# Patient Record
Sex: MALE | Race: WHITE | NOT HISPANIC OR LATINO | Employment: FULL TIME | URBAN - METROPOLITAN AREA
[De-identification: names, ages, dates, MRNs, and addresses within clinical notes are randomized per-mention and may not be internally consistent; named-entity substitution may affect disease eponyms.]

---

## 2021-07-23 ENCOUNTER — OFFICE VISIT (OUTPATIENT)
Dept: FAMILY MEDICINE | Facility: CLINIC | Age: 41
End: 2021-07-23
Payer: COMMERCIAL

## 2021-07-23 ENCOUNTER — LAB VISIT (OUTPATIENT)
Dept: LAB | Facility: HOSPITAL | Age: 41
End: 2021-07-23
Payer: COMMERCIAL

## 2021-07-23 VITALS
WEIGHT: 194.25 LBS | HEART RATE: 96 BPM | DIASTOLIC BLOOD PRESSURE: 118 MMHG | OXYGEN SATURATION: 98 % | SYSTOLIC BLOOD PRESSURE: 174 MMHG

## 2021-07-23 DIAGNOSIS — Z00.00 WELLNESS EXAMINATION: Primary | ICD-10-CM

## 2021-07-23 DIAGNOSIS — I10 ESSENTIAL HYPERTENSION: ICD-10-CM

## 2021-07-23 LAB
BASOPHILS # BLD AUTO: 0.01 K/UL (ref 0–0.2)
BASOPHILS NFR BLD: 0.2 % (ref 0–1.9)
DIFFERENTIAL METHOD: ABNORMAL
EOSINOPHIL # BLD AUTO: 0.1 K/UL (ref 0–0.5)
EOSINOPHIL NFR BLD: 1.3 % (ref 0–8)
ERYTHROCYTE [DISTWIDTH] IN BLOOD BY AUTOMATED COUNT: 12.1 % (ref 11.5–14.5)
HCT VFR BLD AUTO: 47.2 % (ref 40–54)
HGB BLD-MCNC: 16.3 G/DL (ref 14–18)
IMM GRANULOCYTES # BLD AUTO: 0 K/UL (ref 0–0.04)
IMM GRANULOCYTES NFR BLD AUTO: 0 % (ref 0–0.5)
LYMPHOCYTES # BLD AUTO: 1.3 K/UL (ref 1–4.8)
LYMPHOCYTES NFR BLD: 28.9 % (ref 18–48)
MCH RBC QN AUTO: 32 PG (ref 27–31)
MCHC RBC AUTO-ENTMCNC: 34.5 G/DL (ref 32–36)
MCV RBC AUTO: 93 FL (ref 82–98)
MONOCYTES # BLD AUTO: 0.4 K/UL (ref 0.3–1)
MONOCYTES NFR BLD: 8 % (ref 4–15)
NEUTROPHILS # BLD AUTO: 2.9 K/UL (ref 1.8–7.7)
NEUTROPHILS NFR BLD: 61.6 % (ref 38–73)
NRBC BLD-RTO: 0 /100 WBC
PLATELET # BLD AUTO: 155 K/UL (ref 150–450)
PMV BLD AUTO: 9.4 FL (ref 9.2–12.9)
RBC # BLD AUTO: 5.09 M/UL (ref 4.6–6.2)
WBC # BLD AUTO: 4.64 K/UL (ref 3.9–12.7)

## 2021-07-23 PROCEDURE — 99386 PR PREVENTIVE VISIT,NEW,40-64: ICD-10-PCS | Mod: S$GLB,,, | Performed by: FAMILY MEDICINE

## 2021-07-23 PROCEDURE — 99999 PR PBB SHADOW E&M-NEW PATIENT-LVL III: CPT | Mod: PBBFAC,,, | Performed by: FAMILY MEDICINE

## 2021-07-23 PROCEDURE — 36415 COLL VENOUS BLD VENIPUNCTURE: CPT | Mod: PO | Performed by: FAMILY MEDICINE

## 2021-07-23 PROCEDURE — 99386 PREV VISIT NEW AGE 40-64: CPT | Mod: S$GLB,,, | Performed by: FAMILY MEDICINE

## 2021-07-23 PROCEDURE — 80061 LIPID PANEL: CPT | Performed by: FAMILY MEDICINE

## 2021-07-23 PROCEDURE — 85025 COMPLETE CBC W/AUTO DIFF WBC: CPT | Performed by: FAMILY MEDICINE

## 2021-07-23 PROCEDURE — 99999 PR PBB SHADOW E&M-NEW PATIENT-LVL III: ICD-10-PCS | Mod: PBBFAC,,, | Performed by: FAMILY MEDICINE

## 2021-07-23 PROCEDURE — 80053 COMPREHEN METABOLIC PANEL: CPT | Performed by: FAMILY MEDICINE

## 2021-07-23 RX ORDER — LOSARTAN POTASSIUM 50 MG/1
50 TABLET ORAL DAILY
Qty: 90 TABLET | Refills: 3 | Status: SHIPPED | OUTPATIENT
Start: 2021-07-23 | End: 2022-03-21 | Stop reason: SDUPTHER

## 2021-07-24 LAB
ALBUMIN SERPL BCP-MCNC: 4.3 G/DL (ref 3.5–5.2)
ALP SERPL-CCNC: 100 U/L (ref 55–135)
ALT SERPL W/O P-5'-P-CCNC: 48 U/L (ref 10–44)
ANION GAP SERPL CALC-SCNC: 8 MMOL/L (ref 8–16)
AST SERPL-CCNC: 33 U/L (ref 10–40)
BILIRUB SERPL-MCNC: 0.9 MG/DL (ref 0.1–1)
BUN SERPL-MCNC: 15 MG/DL (ref 6–20)
CALCIUM SERPL-MCNC: 9.8 MG/DL (ref 8.7–10.5)
CHLORIDE SERPL-SCNC: 104 MMOL/L (ref 95–110)
CHOLEST SERPL-MCNC: 249 MG/DL (ref 120–199)
CHOLEST/HDLC SERPL: 5.7 {RATIO} (ref 2–5)
CO2 SERPL-SCNC: 26 MMOL/L (ref 23–29)
CREAT SERPL-MCNC: 1.2 MG/DL (ref 0.5–1.4)
EST. GFR  (AFRICAN AMERICAN): >60 ML/MIN/1.73 M^2
EST. GFR  (NON AFRICAN AMERICAN): >60 ML/MIN/1.73 M^2
GLUCOSE SERPL-MCNC: 102 MG/DL (ref 70–110)
HDLC SERPL-MCNC: 44 MG/DL (ref 40–75)
HDLC SERPL: 17.7 % (ref 20–50)
LDLC SERPL CALC-MCNC: 183.8 MG/DL (ref 63–159)
NONHDLC SERPL-MCNC: 205 MG/DL
POTASSIUM SERPL-SCNC: 4.7 MMOL/L (ref 3.5–5.1)
PROT SERPL-MCNC: 7.5 G/DL (ref 6–8.4)
SODIUM SERPL-SCNC: 138 MMOL/L (ref 136–145)
TRIGL SERPL-MCNC: 106 MG/DL (ref 30–150)

## 2021-07-27 ENCOUNTER — OFFICE VISIT (OUTPATIENT)
Dept: FAMILY MEDICINE | Facility: CLINIC | Age: 41
End: 2021-07-27
Payer: COMMERCIAL

## 2021-07-27 VITALS
HEART RATE: 98 BPM | WEIGHT: 196 LBS | DIASTOLIC BLOOD PRESSURE: 100 MMHG | OXYGEN SATURATION: 99 % | SYSTOLIC BLOOD PRESSURE: 140 MMHG

## 2021-07-27 DIAGNOSIS — E78.00 PURE HYPERCHOLESTEROLEMIA: Primary | ICD-10-CM

## 2021-07-27 PROCEDURE — 99999 PR PBB SHADOW E&M-EST. PATIENT-LVL III: CPT | Mod: PBBFAC,,, | Performed by: FAMILY MEDICINE

## 2021-07-27 PROCEDURE — 1126F PR PAIN SEVERITY QUANTIFIED, NO PAIN PRESENT: ICD-10-PCS | Mod: CPTII,S$GLB,, | Performed by: FAMILY MEDICINE

## 2021-07-27 PROCEDURE — 3080F DIAST BP >= 90 MM HG: CPT | Mod: CPTII,S$GLB,, | Performed by: FAMILY MEDICINE

## 2021-07-27 PROCEDURE — 3077F SYST BP >= 140 MM HG: CPT | Mod: CPTII,S$GLB,, | Performed by: FAMILY MEDICINE

## 2021-07-27 PROCEDURE — 1126F AMNT PAIN NOTED NONE PRSNT: CPT | Mod: CPTII,S$GLB,, | Performed by: FAMILY MEDICINE

## 2021-07-27 PROCEDURE — 99214 PR OFFICE/OUTPT VISIT, EST, LEVL IV, 30-39 MIN: ICD-10-PCS | Mod: S$GLB,,, | Performed by: FAMILY MEDICINE

## 2021-07-27 PROCEDURE — 99214 OFFICE O/P EST MOD 30 MIN: CPT | Mod: S$GLB,,, | Performed by: FAMILY MEDICINE

## 2021-07-27 PROCEDURE — 1159F PR MEDICATION LIST DOCUMENTED IN MEDICAL RECORD: ICD-10-PCS | Mod: CPTII,S$GLB,, | Performed by: FAMILY MEDICINE

## 2021-07-27 PROCEDURE — 99999 PR PBB SHADOW E&M-EST. PATIENT-LVL III: ICD-10-PCS | Mod: PBBFAC,,, | Performed by: FAMILY MEDICINE

## 2021-07-27 PROCEDURE — 1159F MED LIST DOCD IN RCRD: CPT | Mod: CPTII,S$GLB,, | Performed by: FAMILY MEDICINE

## 2021-07-27 PROCEDURE — 3077F PR MOST RECENT SYSTOLIC BLOOD PRESSURE >= 140 MM HG: ICD-10-PCS | Mod: CPTII,S$GLB,, | Performed by: FAMILY MEDICINE

## 2021-07-27 PROCEDURE — 3080F PR MOST RECENT DIASTOLIC BLOOD PRESSURE >= 90 MM HG: ICD-10-PCS | Mod: CPTII,S$GLB,, | Performed by: FAMILY MEDICINE

## 2021-07-27 PROCEDURE — 1160F PR REVIEW ALL MEDS BY PRESCRIBER/CLIN PHARMACIST DOCUMENTED: ICD-10-PCS | Mod: CPTII,S$GLB,, | Performed by: FAMILY MEDICINE

## 2021-07-27 PROCEDURE — 1160F RVW MEDS BY RX/DR IN RCRD: CPT | Mod: CPTII,S$GLB,, | Performed by: FAMILY MEDICINE

## 2021-07-27 RX ORDER — ROSUVASTATIN CALCIUM 20 MG/1
20 TABLET, COATED ORAL DAILY
Qty: 90 TABLET | Refills: 3 | Status: ON HOLD | OUTPATIENT
Start: 2021-07-27 | End: 2021-10-17 | Stop reason: HOSPADM

## 2021-10-12 ENCOUNTER — LAB VISIT (OUTPATIENT)
Dept: LAB | Facility: HOSPITAL | Age: 41
End: 2021-10-12
Attending: FAMILY MEDICINE
Payer: COMMERCIAL

## 2021-10-12 DIAGNOSIS — E78.00 PURE HYPERCHOLESTEROLEMIA: ICD-10-CM

## 2021-10-12 LAB
CHOLEST SERPL-MCNC: 163 MG/DL (ref 120–199)
CHOLEST/HDLC SERPL: 7.4 {RATIO} (ref 2–5)
HDLC SERPL-MCNC: 22 MG/DL (ref 40–75)
HDLC SERPL: 13.5 % (ref 20–50)
LDLC SERPL CALC-MCNC: 101.4 MG/DL (ref 63–159)
NONHDLC SERPL-MCNC: 141 MG/DL
TRIGL SERPL-MCNC: 198 MG/DL (ref 30–150)

## 2021-10-12 PROCEDURE — 80061 LIPID PANEL: CPT | Performed by: FAMILY MEDICINE

## 2021-10-12 PROCEDURE — 36415 COLL VENOUS BLD VENIPUNCTURE: CPT | Mod: PO | Performed by: FAMILY MEDICINE

## 2021-10-14 ENCOUNTER — LAB VISIT (OUTPATIENT)
Dept: LAB | Facility: HOSPITAL | Age: 41
End: 2021-10-14
Attending: FAMILY MEDICINE
Payer: COMMERCIAL

## 2021-10-14 ENCOUNTER — OFFICE VISIT (OUTPATIENT)
Dept: FAMILY MEDICINE | Facility: CLINIC | Age: 41
End: 2021-10-14
Payer: COMMERCIAL

## 2021-10-14 VITALS
BODY MASS INDEX: 27.49 KG/M2 | SYSTOLIC BLOOD PRESSURE: 120 MMHG | DIASTOLIC BLOOD PRESSURE: 90 MMHG | WEIGHT: 185.63 LBS | HEART RATE: 78 BPM | HEIGHT: 69 IN | TEMPERATURE: 98 F | OXYGEN SATURATION: 98 %

## 2021-10-14 DIAGNOSIS — R39.9 URINARY SYMPTOM OR SIGN: ICD-10-CM

## 2021-10-14 DIAGNOSIS — L29.9 PRURITUS: Primary | ICD-10-CM

## 2021-10-14 DIAGNOSIS — L29.9 PRURITUS: ICD-10-CM

## 2021-10-14 LAB
ALBUMIN SERPL BCP-MCNC: 3.7 G/DL (ref 3.5–5.2)
ALP SERPL-CCNC: 262 U/L (ref 55–135)
ALT SERPL W/O P-5'-P-CCNC: 98 U/L (ref 10–44)
ANION GAP SERPL CALC-SCNC: 14 MMOL/L (ref 8–16)
AST SERPL-CCNC: 53 U/L (ref 10–40)
BASOPHILS # BLD AUTO: 0.03 K/UL (ref 0–0.2)
BASOPHILS NFR BLD: 0.5 % (ref 0–1.9)
BILIRUB SERPL-MCNC: 7.7 MG/DL (ref 0.1–1)
BUN SERPL-MCNC: 13 MG/DL (ref 6–20)
CALCIUM SERPL-MCNC: 10.1 MG/DL (ref 8.7–10.5)
CHLORIDE SERPL-SCNC: 101 MMOL/L (ref 95–110)
CO2 SERPL-SCNC: 22 MMOL/L (ref 23–29)
CREAT SERPL-MCNC: 1 MG/DL (ref 0.5–1.4)
DIFFERENTIAL METHOD: ABNORMAL
EOSINOPHIL # BLD AUTO: 0.2 K/UL (ref 0–0.5)
EOSINOPHIL NFR BLD: 2.7 % (ref 0–8)
ERYTHROCYTE [DISTWIDTH] IN BLOOD BY AUTOMATED COUNT: 12.6 % (ref 11.5–14.5)
EST. GFR  (AFRICAN AMERICAN): >60 ML/MIN/1.73 M^2
EST. GFR  (NON AFRICAN AMERICAN): >60 ML/MIN/1.73 M^2
GLUCOSE SERPL-MCNC: 97 MG/DL (ref 70–110)
HCT VFR BLD AUTO: 49.8 % (ref 40–54)
HGB BLD-MCNC: 16.5 G/DL (ref 14–18)
IMM GRANULOCYTES # BLD AUTO: 0.02 K/UL (ref 0–0.04)
IMM GRANULOCYTES NFR BLD AUTO: 0.3 % (ref 0–0.5)
LYMPHOCYTES # BLD AUTO: 1.4 K/UL (ref 1–4.8)
LYMPHOCYTES NFR BLD: 24.2 % (ref 18–48)
MCH RBC QN AUTO: 32.2 PG (ref 27–31)
MCHC RBC AUTO-ENTMCNC: 33.1 G/DL (ref 32–36)
MCV RBC AUTO: 97 FL (ref 82–98)
MONOCYTES # BLD AUTO: 0.5 K/UL (ref 0.3–1)
MONOCYTES NFR BLD: 8.5 % (ref 4–15)
NEUTROPHILS # BLD AUTO: 3.8 K/UL (ref 1.8–7.7)
NEUTROPHILS NFR BLD: 63.8 % (ref 38–73)
NRBC BLD-RTO: 0 /100 WBC
PLATELET # BLD AUTO: 183 K/UL (ref 150–450)
PMV BLD AUTO: 10 FL (ref 9.2–12.9)
POTASSIUM SERPL-SCNC: 4.2 MMOL/L (ref 3.5–5.1)
PROT SERPL-MCNC: 7.3 G/DL (ref 6–8.4)
RBC # BLD AUTO: 5.12 M/UL (ref 4.6–6.2)
SODIUM SERPL-SCNC: 137 MMOL/L (ref 136–145)
WBC # BLD AUTO: 5.91 K/UL (ref 3.9–12.7)

## 2021-10-14 PROCEDURE — 1159F PR MEDICATION LIST DOCUMENTED IN MEDICAL RECORD: ICD-10-PCS | Mod: CPTII,S$GLB,, | Performed by: FAMILY MEDICINE

## 2021-10-14 PROCEDURE — 99214 OFFICE O/P EST MOD 30 MIN: CPT | Mod: S$GLB,,, | Performed by: FAMILY MEDICINE

## 2021-10-14 PROCEDURE — 4010F ACE/ARB THERAPY RXD/TAKEN: CPT | Mod: CPTII,S$GLB,, | Performed by: FAMILY MEDICINE

## 2021-10-14 PROCEDURE — 1160F RVW MEDS BY RX/DR IN RCRD: CPT | Mod: CPTII,S$GLB,, | Performed by: FAMILY MEDICINE

## 2021-10-14 PROCEDURE — 99999 PR PBB SHADOW E&M-EST. PATIENT-LVL III: CPT | Mod: PBBFAC,,, | Performed by: FAMILY MEDICINE

## 2021-10-14 PROCEDURE — 1160F PR REVIEW ALL MEDS BY PRESCRIBER/CLIN PHARMACIST DOCUMENTED: ICD-10-PCS | Mod: CPTII,S$GLB,, | Performed by: FAMILY MEDICINE

## 2021-10-14 PROCEDURE — 99214 PR OFFICE/OUTPT VISIT, EST, LEVL IV, 30-39 MIN: ICD-10-PCS | Mod: S$GLB,,, | Performed by: FAMILY MEDICINE

## 2021-10-14 PROCEDURE — 3008F BODY MASS INDEX DOCD: CPT | Mod: CPTII,S$GLB,, | Performed by: FAMILY MEDICINE

## 2021-10-14 PROCEDURE — 85025 COMPLETE CBC W/AUTO DIFF WBC: CPT | Performed by: FAMILY MEDICINE

## 2021-10-14 PROCEDURE — 4010F PR ACE/ARB THEARPY RXD/TAKEN: ICD-10-PCS | Mod: CPTII,S$GLB,, | Performed by: FAMILY MEDICINE

## 2021-10-14 PROCEDURE — 80053 COMPREHEN METABOLIC PANEL: CPT | Performed by: FAMILY MEDICINE

## 2021-10-14 PROCEDURE — 3080F DIAST BP >= 90 MM HG: CPT | Mod: CPTII,S$GLB,, | Performed by: FAMILY MEDICINE

## 2021-10-14 PROCEDURE — 3008F PR BODY MASS INDEX (BMI) DOCUMENTED: ICD-10-PCS | Mod: CPTII,S$GLB,, | Performed by: FAMILY MEDICINE

## 2021-10-14 PROCEDURE — 36415 COLL VENOUS BLD VENIPUNCTURE: CPT | Mod: PO | Performed by: FAMILY MEDICINE

## 2021-10-14 PROCEDURE — 3074F SYST BP LT 130 MM HG: CPT | Mod: CPTII,S$GLB,, | Performed by: FAMILY MEDICINE

## 2021-10-14 PROCEDURE — 3074F PR MOST RECENT SYSTOLIC BLOOD PRESSURE < 130 MM HG: ICD-10-PCS | Mod: CPTII,S$GLB,, | Performed by: FAMILY MEDICINE

## 2021-10-14 PROCEDURE — 99999 PR PBB SHADOW E&M-EST. PATIENT-LVL III: ICD-10-PCS | Mod: PBBFAC,,, | Performed by: FAMILY MEDICINE

## 2021-10-14 PROCEDURE — 1159F MED LIST DOCD IN RCRD: CPT | Mod: CPTII,S$GLB,, | Performed by: FAMILY MEDICINE

## 2021-10-14 PROCEDURE — 3080F PR MOST RECENT DIASTOLIC BLOOD PRESSURE >= 90 MM HG: ICD-10-PCS | Mod: CPTII,S$GLB,, | Performed by: FAMILY MEDICINE

## 2021-10-15 ENCOUNTER — OFFICE VISIT (OUTPATIENT)
Dept: FAMILY MEDICINE | Facility: CLINIC | Age: 41
End: 2021-10-15
Payer: COMMERCIAL

## 2021-10-15 VITALS
WEIGHT: 187.38 LBS | BODY MASS INDEX: 27.75 KG/M2 | SYSTOLIC BLOOD PRESSURE: 120 MMHG | HEART RATE: 96 BPM | OXYGEN SATURATION: 98 % | DIASTOLIC BLOOD PRESSURE: 88 MMHG | HEIGHT: 69 IN

## 2021-10-15 DIAGNOSIS — R79.89 ELEVATED LIVER FUNCTION TESTS: ICD-10-CM

## 2021-10-15 DIAGNOSIS — R79.89 ELEVATED LIVER FUNCTION TESTS: Primary | ICD-10-CM

## 2021-10-15 DIAGNOSIS — R17 JAUNDICE: Primary | ICD-10-CM

## 2021-10-15 PROBLEM — E80.6 HYPERBILIRUBINEMIA: Status: ACTIVE | Noted: 2021-10-15

## 2021-10-15 PROBLEM — K83.1 BILIARY OBSTRUCTION: Status: ACTIVE | Noted: 2021-10-15

## 2021-10-15 PROBLEM — I10 PRIMARY HYPERTENSION: Status: ACTIVE | Noted: 2021-10-15

## 2021-10-15 PROBLEM — K83.1 OBSTRUCTIVE JAUNDICE: Status: ACTIVE | Noted: 2021-10-15

## 2021-10-15 PROBLEM — E78.00 HYPERCHOLESTEROLEMIA: Status: ACTIVE | Noted: 2021-10-15

## 2021-10-15 PROCEDURE — 1159F MED LIST DOCD IN RCRD: CPT | Mod: CPTII,S$GLB,, | Performed by: FAMILY MEDICINE

## 2021-10-15 PROCEDURE — 4010F PR ACE/ARB THEARPY RXD/TAKEN: ICD-10-PCS | Mod: CPTII,S$GLB,, | Performed by: FAMILY MEDICINE

## 2021-10-15 PROCEDURE — 3074F SYST BP LT 130 MM HG: CPT | Mod: CPTII,S$GLB,, | Performed by: FAMILY MEDICINE

## 2021-10-15 PROCEDURE — 4010F ACE/ARB THERAPY RXD/TAKEN: CPT | Mod: CPTII,S$GLB,, | Performed by: FAMILY MEDICINE

## 2021-10-15 PROCEDURE — 3074F PR MOST RECENT SYSTOLIC BLOOD PRESSURE < 130 MM HG: ICD-10-PCS | Mod: CPTII,S$GLB,, | Performed by: FAMILY MEDICINE

## 2021-10-15 PROCEDURE — 1160F PR REVIEW ALL MEDS BY PRESCRIBER/CLIN PHARMACIST DOCUMENTED: ICD-10-PCS | Mod: CPTII,S$GLB,, | Performed by: FAMILY MEDICINE

## 2021-10-15 PROCEDURE — 3008F BODY MASS INDEX DOCD: CPT | Mod: CPTII,S$GLB,, | Performed by: FAMILY MEDICINE

## 2021-10-15 PROCEDURE — 99999 PR PBB SHADOW E&M-EST. PATIENT-LVL III: CPT | Mod: PBBFAC,,, | Performed by: FAMILY MEDICINE

## 2021-10-15 PROCEDURE — 99214 PR OFFICE/OUTPT VISIT, EST, LEVL IV, 30-39 MIN: ICD-10-PCS | Mod: S$GLB,,, | Performed by: FAMILY MEDICINE

## 2021-10-15 PROCEDURE — 3008F PR BODY MASS INDEX (BMI) DOCUMENTED: ICD-10-PCS | Mod: CPTII,S$GLB,, | Performed by: FAMILY MEDICINE

## 2021-10-15 PROCEDURE — 1159F PR MEDICATION LIST DOCUMENTED IN MEDICAL RECORD: ICD-10-PCS | Mod: CPTII,S$GLB,, | Performed by: FAMILY MEDICINE

## 2021-10-15 PROCEDURE — 3079F PR MOST RECENT DIASTOLIC BLOOD PRESSURE 80-89 MM HG: ICD-10-PCS | Mod: CPTII,S$GLB,, | Performed by: FAMILY MEDICINE

## 2021-10-15 PROCEDURE — 99999 PR PBB SHADOW E&M-EST. PATIENT-LVL III: ICD-10-PCS | Mod: PBBFAC,,, | Performed by: FAMILY MEDICINE

## 2021-10-15 PROCEDURE — 3079F DIAST BP 80-89 MM HG: CPT | Mod: CPTII,S$GLB,, | Performed by: FAMILY MEDICINE

## 2021-10-15 PROCEDURE — 1160F RVW MEDS BY RX/DR IN RCRD: CPT | Mod: CPTII,S$GLB,, | Performed by: FAMILY MEDICINE

## 2021-10-15 PROCEDURE — 99214 OFFICE O/P EST MOD 30 MIN: CPT | Mod: S$GLB,,, | Performed by: FAMILY MEDICINE

## 2021-10-18 DIAGNOSIS — K83.1 OBSTRUCTIVE JAUNDICE: Primary | ICD-10-CM

## 2021-10-19 ENCOUNTER — ANESTHESIA EVENT (OUTPATIENT)
Dept: ENDOSCOPY | Facility: HOSPITAL | Age: 41
End: 2021-10-19
Payer: COMMERCIAL

## 2021-10-19 RX ORDER — SODIUM CHLORIDE 9 MG/ML
INJECTION, SOLUTION INTRAVENOUS CONTINUOUS
Status: CANCELLED | OUTPATIENT
Start: 2021-10-19

## 2021-10-19 RX ORDER — SODIUM CHLORIDE 0.9 % (FLUSH) 0.9 %
10 SYRINGE (ML) INJECTION
Status: CANCELLED | OUTPATIENT
Start: 2021-10-19

## 2021-10-20 ENCOUNTER — HOSPITAL ENCOUNTER (OUTPATIENT)
Facility: HOSPITAL | Age: 41
Discharge: HOME | End: 2021-10-20
Attending: INTERNAL MEDICINE | Admitting: INTERNAL MEDICINE
Payer: COMMERCIAL

## 2021-10-20 ENCOUNTER — ANESTHESIA (OUTPATIENT)
Dept: ENDOSCOPY | Facility: HOSPITAL | Age: 41
End: 2021-10-20
Payer: COMMERCIAL

## 2021-10-20 VITALS
BODY MASS INDEX: 26.96 KG/M2 | DIASTOLIC BLOOD PRESSURE: 58 MMHG | TEMPERATURE: 97 F | OXYGEN SATURATION: 99 % | RESPIRATION RATE: 18 BRPM | HEART RATE: 56 BPM | HEIGHT: 69 IN | SYSTOLIC BLOOD PRESSURE: 132 MMHG | WEIGHT: 182 LBS

## 2021-10-20 DIAGNOSIS — K83.1 OBSTRUCTIVE JAUNDICE: Primary | ICD-10-CM

## 2021-10-20 PROCEDURE — 27200946 HC BRUSH, CYTOLOGY: Performed by: INTERNAL MEDICINE

## 2021-10-20 PROCEDURE — 43242 PR UPGI ENDOSCOPY,FN NEEDLE BX,GUIDED: ICD-10-PCS | Mod: 51,,, | Performed by: INTERNAL MEDICINE

## 2021-10-20 PROCEDURE — 74328 PR  X-RAY FOR BILE DUCT ENDOSCOPY: ICD-10-PCS | Mod: 26,,, | Performed by: INTERNAL MEDICINE

## 2021-10-20 PROCEDURE — 88305 TISSUE EXAM BY PATHOLOGIST: ICD-10-PCS | Mod: 26,,, | Performed by: PATHOLOGY

## 2021-10-20 PROCEDURE — 43242 EGD US FINE NEEDLE BX/ASPIR: CPT | Mod: 51,,, | Performed by: INTERNAL MEDICINE

## 2021-10-20 PROCEDURE — D9220A PRA ANESTHESIA: Mod: ANES,,, | Performed by: ANESTHESIOLOGY

## 2021-10-20 PROCEDURE — 88112 CYTOPATH CELL ENHANCE TECH: CPT | Performed by: PATHOLOGY

## 2021-10-20 PROCEDURE — 27201674 HC SPHINCTERTOME: Performed by: INTERNAL MEDICINE

## 2021-10-20 PROCEDURE — 88305 TISSUE EXAM BY PATHOLOGIST: CPT | Performed by: STUDENT IN AN ORGANIZED HEALTH CARE EDUCATION/TRAINING PROGRAM

## 2021-10-20 PROCEDURE — 43274 PR ERCP W/STENT PLCMNT BILIARY/PANCREATIC DUCT: ICD-10-PCS | Mod: ,,, | Performed by: INTERNAL MEDICINE

## 2021-10-20 PROCEDURE — 37000008 HC ANESTHESIA 1ST 15 MINUTES: Performed by: INTERNAL MEDICINE

## 2021-10-20 PROCEDURE — D9220A PRA ANESTHESIA: ICD-10-PCS | Mod: CRNA,,, | Performed by: STUDENT IN AN ORGANIZED HEALTH CARE EDUCATION/TRAINING PROGRAM

## 2021-10-20 PROCEDURE — 88173 CYTOPATH EVAL FNA REPORT: CPT | Performed by: STUDENT IN AN ORGANIZED HEALTH CARE EDUCATION/TRAINING PROGRAM

## 2021-10-20 PROCEDURE — 43242 EGD US FINE NEEDLE BX/ASPIR: CPT | Performed by: INTERNAL MEDICINE

## 2021-10-20 PROCEDURE — 43261 PR ERCP,BIOPSY: ICD-10-PCS | Mod: 59,,, | Performed by: INTERNAL MEDICINE

## 2021-10-20 PROCEDURE — D9220A PRA ANESTHESIA: Mod: CRNA,,, | Performed by: STUDENT IN AN ORGANIZED HEALTH CARE EDUCATION/TRAINING PROGRAM

## 2021-10-20 PROCEDURE — 88305 TISSUE EXAM BY PATHOLOGIST: CPT | Mod: 59 | Performed by: PATHOLOGY

## 2021-10-20 PROCEDURE — 88342 IMHCHEM/IMCYTCHM 1ST ANTB: CPT | Mod: 26,,, | Performed by: PATHOLOGY

## 2021-10-20 PROCEDURE — 27202131 HC NEEDLE, FNB SINGLE (ANY): Performed by: INTERNAL MEDICINE

## 2021-10-20 PROCEDURE — 25000003 PHARM REV CODE 250: Performed by: STUDENT IN AN ORGANIZED HEALTH CARE EDUCATION/TRAINING PROGRAM

## 2021-10-20 PROCEDURE — 88342 IMHCHEM/IMCYTCHM 1ST ANTB: CPT | Performed by: PATHOLOGY

## 2021-10-20 PROCEDURE — 43274 ERCP DUCT STENT PLACEMENT: CPT | Mod: ,,, | Performed by: INTERNAL MEDICINE

## 2021-10-20 PROCEDURE — 25000003 PHARM REV CODE 250: Performed by: INTERNAL MEDICINE

## 2021-10-20 PROCEDURE — 88342 CHG IMMUNOCYTOCHEMISTRY: ICD-10-PCS | Mod: 26,,, | Performed by: PATHOLOGY

## 2021-10-20 PROCEDURE — 43261 ENDO CHOLANGIOPANCREATOGRAPH: CPT | Mod: 59,,, | Performed by: INTERNAL MEDICINE

## 2021-10-20 PROCEDURE — 88305 TISSUE EXAM BY PATHOLOGIST: CPT | Mod: 26,,, | Performed by: PATHOLOGY

## 2021-10-20 PROCEDURE — 88112 CYTOPATH CELL ENHANCE TECH: CPT | Mod: 26,,, | Performed by: PATHOLOGY

## 2021-10-20 PROCEDURE — C1769 GUIDE WIRE: HCPCS | Performed by: INTERNAL MEDICINE

## 2021-10-20 PROCEDURE — 27201012 HC FORCEPS, HOT/COLD, DISP: Performed by: INTERNAL MEDICINE

## 2021-10-20 PROCEDURE — 63600175 PHARM REV CODE 636 W HCPCS: Performed by: STUDENT IN AN ORGANIZED HEALTH CARE EDUCATION/TRAINING PROGRAM

## 2021-10-20 PROCEDURE — C2617 STENT, NON-COR, TEM W/O DEL: HCPCS | Performed by: INTERNAL MEDICINE

## 2021-10-20 PROCEDURE — 74328 X-RAY BILE DUCT ENDOSCOPY: CPT | Performed by: INTERNAL MEDICINE

## 2021-10-20 PROCEDURE — 74328 X-RAY BILE DUCT ENDOSCOPY: CPT | Mod: 26,,, | Performed by: INTERNAL MEDICINE

## 2021-10-20 PROCEDURE — 88112 PR  CYTOPATH, CELL ENHANCE TECH: ICD-10-PCS | Mod: 26,,, | Performed by: PATHOLOGY

## 2021-10-20 PROCEDURE — 88305 TISSUE EXAM BY PATHOLOGIST: ICD-10-PCS | Mod: 26,59,, | Performed by: STUDENT IN AN ORGANIZED HEALTH CARE EDUCATION/TRAINING PROGRAM

## 2021-10-20 PROCEDURE — 37000009 HC ANESTHESIA EA ADD 15 MINS: Performed by: INTERNAL MEDICINE

## 2021-10-20 PROCEDURE — D9220A PRA ANESTHESIA: ICD-10-PCS | Mod: ANES,,, | Performed by: ANESTHESIOLOGY

## 2021-10-20 PROCEDURE — 88173 PR  INTERPRETATION OF FNA SMEAR: ICD-10-PCS | Mod: 26,,, | Performed by: STUDENT IN AN ORGANIZED HEALTH CARE EDUCATION/TRAINING PROGRAM

## 2021-10-20 PROCEDURE — 43261 ENDO CHOLANGIOPANCREATOGRAPH: CPT | Performed by: INTERNAL MEDICINE

## 2021-10-20 PROCEDURE — 88305 TISSUE EXAM BY PATHOLOGIST: CPT | Mod: 26,59,, | Performed by: STUDENT IN AN ORGANIZED HEALTH CARE EDUCATION/TRAINING PROGRAM

## 2021-10-20 PROCEDURE — 63600175 PHARM REV CODE 636 W HCPCS: Performed by: NURSE ANESTHETIST, CERTIFIED REGISTERED

## 2021-10-20 PROCEDURE — 27202125 HC BALLOON, EXTRACTION (ANY): Performed by: INTERNAL MEDICINE

## 2021-10-20 PROCEDURE — 43274 ERCP DUCT STENT PLACEMENT: CPT | Performed by: INTERNAL MEDICINE

## 2021-10-20 PROCEDURE — 88173 CYTOPATH EVAL FNA REPORT: CPT | Mod: 26,,, | Performed by: STUDENT IN AN ORGANIZED HEALTH CARE EDUCATION/TRAINING PROGRAM

## 2021-10-20 RX ORDER — PROPOFOL 10 MG/ML
VIAL (ML) INTRAVENOUS
Status: DISCONTINUED | OUTPATIENT
Start: 2021-10-20 | End: 2021-10-20

## 2021-10-20 RX ORDER — LIDOCAINE HYDROCHLORIDE 20 MG/ML
INJECTION, SOLUTION EPIDURAL; INFILTRATION; INTRACAUDAL; PERINEURAL
Status: DISCONTINUED | OUTPATIENT
Start: 2021-10-20 | End: 2021-10-20

## 2021-10-20 RX ORDER — DEXMEDETOMIDINE HYDROCHLORIDE 100 UG/ML
INJECTION, SOLUTION INTRAVENOUS
Status: DISCONTINUED | OUTPATIENT
Start: 2021-10-20 | End: 2021-10-20

## 2021-10-20 RX ORDER — KETAMINE HCL IN 0.9 % NACL 50 MG/5 ML
SYRINGE (ML) INTRAVENOUS
Status: DISCONTINUED | OUTPATIENT
Start: 2021-10-20 | End: 2021-10-20

## 2021-10-20 RX ORDER — INDOMETHACIN 50 MG/1
SUPPOSITORY RECTAL
Status: COMPLETED | OUTPATIENT
Start: 2021-10-20 | End: 2021-10-20

## 2021-10-20 RX ORDER — MIDAZOLAM HYDROCHLORIDE 1 MG/ML
INJECTION, SOLUTION INTRAMUSCULAR; INTRAVENOUS
Status: DISCONTINUED | OUTPATIENT
Start: 2021-10-20 | End: 2021-10-20

## 2021-10-20 RX ORDER — CIPROFLOXACIN 2 MG/ML
INJECTION, SOLUTION INTRAVENOUS
Status: DISCONTINUED | OUTPATIENT
Start: 2021-10-20 | End: 2021-10-20

## 2021-10-20 RX ORDER — FENTANYL CITRATE 50 UG/ML
INJECTION, SOLUTION INTRAMUSCULAR; INTRAVENOUS
Status: DISCONTINUED | OUTPATIENT
Start: 2021-10-20 | End: 2021-10-20

## 2021-10-20 RX ORDER — CIPROFLOXACIN 500 MG/1
500 TABLET ORAL EVERY 12 HOURS
Qty: 10 TABLET | Refills: 0 | Status: SHIPPED | OUTPATIENT
Start: 2021-10-20 | End: 2021-10-25

## 2021-10-20 RX ORDER — ONDANSETRON 2 MG/ML
INJECTION INTRAMUSCULAR; INTRAVENOUS
Status: DISCONTINUED | OUTPATIENT
Start: 2021-10-20 | End: 2021-10-20

## 2021-10-20 RX ORDER — PROPOFOL 10 MG/ML
VIAL (ML) INTRAVENOUS CONTINUOUS PRN
Status: DISCONTINUED | OUTPATIENT
Start: 2021-10-20 | End: 2021-10-20

## 2021-10-20 RX ORDER — SODIUM CHLORIDE 9 MG/ML
INJECTION, SOLUTION INTRAVENOUS CONTINUOUS
Status: DISCONTINUED | OUTPATIENT
Start: 2021-10-20 | End: 2021-10-20 | Stop reason: HOSPADM

## 2021-10-20 RX ADMIN — GLYCOPYRROLATE 0.2 MG: 0.2 INJECTION, SOLUTION INTRAMUSCULAR; INTRAVITREAL at 09:10

## 2021-10-20 RX ADMIN — Medication 10 MG: at 09:10

## 2021-10-20 RX ADMIN — Medication 10 MG: at 10:10

## 2021-10-20 RX ADMIN — DEXMEDETOMIDINE HYDROCHLORIDE 12 MCG: 100 INJECTION, SOLUTION INTRAVENOUS at 09:10

## 2021-10-20 RX ADMIN — Medication 150 MCG/KG/MIN: at 09:10

## 2021-10-20 RX ADMIN — PROPOFOL 70 MG: 10 INJECTION, EMULSION INTRAVENOUS at 09:10

## 2021-10-20 RX ADMIN — MIDAZOLAM 2 MG: 1 INJECTION INTRAMUSCULAR; INTRAVENOUS at 09:10

## 2021-10-20 RX ADMIN — SODIUM CHLORIDE: 0.9 INJECTION, SOLUTION INTRAVENOUS at 08:10

## 2021-10-20 RX ADMIN — SODIUM CHLORIDE: 0.9 INJECTION, SOLUTION INTRAVENOUS at 09:10

## 2021-10-20 RX ADMIN — Medication 20 MG: at 09:10

## 2021-10-20 RX ADMIN — PROPOFOL 10 MG: 10 INJECTION, EMULSION INTRAVENOUS at 09:10

## 2021-10-20 RX ADMIN — ONDANSETRON 4 MG: 2 INJECTION INTRAMUSCULAR; INTRAVENOUS at 10:10

## 2021-10-20 RX ADMIN — DEXMEDETOMIDINE HYDROCHLORIDE 4 MCG: 100 INJECTION, SOLUTION INTRAVENOUS at 09:10

## 2021-10-20 RX ADMIN — INDOMETHACIN 100 MG: 50 SUPPOSITORY RECTAL at 09:10

## 2021-10-20 RX ADMIN — FENTANYL CITRATE 25 MCG: 50 INJECTION INTRAMUSCULAR; INTRAVENOUS at 10:10

## 2021-10-20 RX ADMIN — LIDOCAINE HYDROCHLORIDE 80 MG: 20 INJECTION, SOLUTION EPIDURAL; INFILTRATION; INTRACAUDAL at 09:10

## 2021-10-20 RX ADMIN — CIPROFLOXACIN 400 MG: 2 INJECTION, SOLUTION INTRAVENOUS at 09:10

## 2021-10-25 ENCOUNTER — OFFICE VISIT (OUTPATIENT)
Dept: TRANSPLANT | Facility: CLINIC | Age: 41
End: 2021-10-25
Payer: COMMERCIAL

## 2021-10-25 ENCOUNTER — TELEPHONE (OUTPATIENT)
Dept: ENDOSCOPY | Facility: HOSPITAL | Age: 41
End: 2021-10-25
Payer: COMMERCIAL

## 2021-10-25 ENCOUNTER — TELEPHONE (OUTPATIENT)
Dept: TRANSPLANT | Facility: HOSPITAL | Age: 41
End: 2021-10-25
Payer: COMMERCIAL

## 2021-10-25 ENCOUNTER — PATIENT MESSAGE (OUTPATIENT)
Dept: GASTROENTEROLOGY | Facility: CLINIC | Age: 41
End: 2021-10-25
Payer: COMMERCIAL

## 2021-10-25 ENCOUNTER — TELEPHONE (OUTPATIENT)
Dept: HEMATOLOGY/ONCOLOGY | Facility: CLINIC | Age: 41
End: 2021-10-25
Payer: COMMERCIAL

## 2021-10-25 ENCOUNTER — TELEPHONE (OUTPATIENT)
Dept: TRANSPLANT | Facility: CLINIC | Age: 41
End: 2021-10-25
Payer: COMMERCIAL

## 2021-10-25 VITALS
DIASTOLIC BLOOD PRESSURE: 90 MMHG | HEIGHT: 69 IN | TEMPERATURE: 98 F | WEIGHT: 184.06 LBS | RESPIRATION RATE: 16 BRPM | BODY MASS INDEX: 27.26 KG/M2 | HEART RATE: 103 BPM | SYSTOLIC BLOOD PRESSURE: 136 MMHG | OXYGEN SATURATION: 98 %

## 2021-10-25 DIAGNOSIS — K83.1 BILIARY OBSTRUCTION: Primary | ICD-10-CM

## 2021-10-25 LAB
COMMENT: NORMAL
FINAL PATHOLOGIC DIAGNOSIS: NORMAL
MICROSCOPIC EXAM: NORMAL

## 2021-10-25 PROCEDURE — 1159F MED LIST DOCD IN RCRD: CPT | Mod: CPTII,S$GLB,, | Performed by: TRANSPLANT SURGERY

## 2021-10-25 PROCEDURE — 99205 PR OFFICE/OUTPT VISIT, NEW, LEVL V, 60-74 MIN: ICD-10-PCS | Mod: S$GLB,,, | Performed by: TRANSPLANT SURGERY

## 2021-10-25 PROCEDURE — 3080F PR MOST RECENT DIASTOLIC BLOOD PRESSURE >= 90 MM HG: ICD-10-PCS | Mod: CPTII,S$GLB,, | Performed by: TRANSPLANT SURGERY

## 2021-10-25 PROCEDURE — 4010F PR ACE/ARB THEARPY RXD/TAKEN: ICD-10-PCS | Mod: CPTII,S$GLB,, | Performed by: TRANSPLANT SURGERY

## 2021-10-25 PROCEDURE — 1159F PR MEDICATION LIST DOCUMENTED IN MEDICAL RECORD: ICD-10-PCS | Mod: CPTII,S$GLB,, | Performed by: TRANSPLANT SURGERY

## 2021-10-25 PROCEDURE — 3008F PR BODY MASS INDEX (BMI) DOCUMENTED: ICD-10-PCS | Mod: CPTII,S$GLB,, | Performed by: TRANSPLANT SURGERY

## 2021-10-25 PROCEDURE — 99999 PR PBB SHADOW E&M-EST. PATIENT-LVL III: ICD-10-PCS | Mod: PBBFAC,,,

## 2021-10-25 PROCEDURE — 3080F DIAST BP >= 90 MM HG: CPT | Mod: CPTII,S$GLB,, | Performed by: TRANSPLANT SURGERY

## 2021-10-25 PROCEDURE — 3008F BODY MASS INDEX DOCD: CPT | Mod: CPTII,S$GLB,, | Performed by: TRANSPLANT SURGERY

## 2021-10-25 PROCEDURE — 1111F PR DISCHARGE MEDS RECONCILED W/ CURRENT OUTPATIENT MED LIST: ICD-10-PCS | Mod: CPTII,S$GLB,, | Performed by: TRANSPLANT SURGERY

## 2021-10-25 PROCEDURE — 99205 OFFICE O/P NEW HI 60 MIN: CPT | Mod: S$GLB,,, | Performed by: TRANSPLANT SURGERY

## 2021-10-25 PROCEDURE — 3075F SYST BP GE 130 - 139MM HG: CPT | Mod: CPTII,S$GLB,, | Performed by: TRANSPLANT SURGERY

## 2021-10-25 PROCEDURE — 1111F DSCHRG MED/CURRENT MED MERGE: CPT | Mod: CPTII,S$GLB,, | Performed by: TRANSPLANT SURGERY

## 2021-10-25 PROCEDURE — 4010F ACE/ARB THERAPY RXD/TAKEN: CPT | Mod: CPTII,S$GLB,, | Performed by: TRANSPLANT SURGERY

## 2021-10-25 PROCEDURE — 99999 PR PBB SHADOW E&M-EST. PATIENT-LVL III: CPT | Mod: PBBFAC,,,

## 2021-10-25 PROCEDURE — 3075F PR MOST RECENT SYSTOLIC BLOOD PRESS GE 130-139MM HG: ICD-10-PCS | Mod: CPTII,S$GLB,, | Performed by: TRANSPLANT SURGERY

## 2021-10-26 ENCOUNTER — CONFERENCE (OUTPATIENT)
Dept: TRANSPLANT | Facility: CLINIC | Age: 41
End: 2021-10-26
Payer: COMMERCIAL

## 2021-10-26 ENCOUNTER — TELEPHONE (OUTPATIENT)
Dept: HEMATOLOGY/ONCOLOGY | Facility: CLINIC | Age: 41
End: 2021-10-26
Payer: COMMERCIAL

## 2021-10-26 ENCOUNTER — TELEPHONE (OUTPATIENT)
Dept: RADIATION ONCOLOGY | Facility: CLINIC | Age: 41
End: 2021-10-26
Payer: COMMERCIAL

## 2021-10-27 ENCOUNTER — HOSPITAL ENCOUNTER (OUTPATIENT)
Dept: RADIOLOGY | Facility: HOSPITAL | Age: 41
Discharge: HOME | End: 2021-10-27
Attending: TRANSPLANT SURGERY
Payer: COMMERCIAL

## 2021-10-27 DIAGNOSIS — K83.1 BILIARY OBSTRUCTION: ICD-10-CM

## 2021-10-27 LAB
COMMENT: NORMAL
FINAL PATHOLOGIC DIAGNOSIS: NORMAL
Lab: NORMAL

## 2021-10-27 PROCEDURE — A9698 NON-RAD CONTRAST MATERIALNOC: HCPCS | Mod: PO | Performed by: TRANSPLANT SURGERY

## 2021-10-27 PROCEDURE — 71260 CT THORAX DX C+: CPT | Mod: 26,,, | Performed by: RADIOLOGY

## 2021-10-27 PROCEDURE — 25500020 PHARM REV CODE 255: Mod: PO | Performed by: TRANSPLANT SURGERY

## 2021-10-27 PROCEDURE — 71260 CT CHEST ABDOMEN PELVIS WITH CONTRAST (XPD): ICD-10-PCS | Mod: 26,,, | Performed by: RADIOLOGY

## 2021-10-27 PROCEDURE — 71260 CT THORAX DX C+: CPT | Mod: TC,PO

## 2021-10-27 PROCEDURE — 74177 CT ABD & PELVIS W/CONTRAST: CPT | Mod: TC,PO

## 2021-10-27 PROCEDURE — 74177 CT ABD & PELVIS W/CONTRAST: CPT | Mod: 26,,, | Performed by: RADIOLOGY

## 2021-10-27 PROCEDURE — 74177 CT CHEST ABDOMEN PELVIS WITH CONTRAST (XPD): ICD-10-PCS | Mod: 26,,, | Performed by: RADIOLOGY

## 2021-10-27 RX ADMIN — IOHEXOL 75 ML: 350 INJECTION, SOLUTION INTRAVENOUS at 02:10

## 2021-10-27 RX ADMIN — IOHEXOL 1000 ML: 9 SOLUTION ORAL at 02:10

## 2021-10-28 ENCOUNTER — TELEPHONE (OUTPATIENT)
Dept: ENDOSCOPY | Facility: HOSPITAL | Age: 41
End: 2021-10-28
Payer: COMMERCIAL

## 2021-10-28 LAB
FINAL PATHOLOGIC DIAGNOSIS: NORMAL
GROSS: NORMAL
Lab: NORMAL
MICROSCOPIC EXAM: NORMAL

## 2021-10-29 ENCOUNTER — TELEPHONE (OUTPATIENT)
Dept: ENDOSCOPY | Facility: HOSPITAL | Age: 41
End: 2021-10-29
Payer: COMMERCIAL

## 2021-10-29 ENCOUNTER — OFFICE VISIT (OUTPATIENT)
Dept: RADIATION ONCOLOGY | Facility: CLINIC | Age: 41
End: 2021-10-29
Payer: COMMERCIAL

## 2021-10-29 VITALS
DIASTOLIC BLOOD PRESSURE: 88 MMHG | SYSTOLIC BLOOD PRESSURE: 132 MMHG | WEIGHT: 185.69 LBS | HEIGHT: 69 IN | BODY MASS INDEX: 27.5 KG/M2 | RESPIRATION RATE: 16 BRPM | HEART RATE: 95 BPM | TEMPERATURE: 97 F

## 2021-10-29 DIAGNOSIS — K83.1 BILIARY STRICTURE: Primary | ICD-10-CM

## 2021-10-29 DIAGNOSIS — K83.1 BILIARY OBSTRUCTION: ICD-10-CM

## 2021-10-29 PROCEDURE — 1111F DSCHRG MED/CURRENT MED MERGE: CPT | Mod: CPTII,S$GLB,, | Performed by: RADIOLOGY

## 2021-10-29 PROCEDURE — 3075F SYST BP GE 130 - 139MM HG: CPT | Mod: CPTII,S$GLB,, | Performed by: RADIOLOGY

## 2021-10-29 PROCEDURE — 99204 OFFICE O/P NEW MOD 45 MIN: CPT | Mod: S$GLB,,, | Performed by: RADIOLOGY

## 2021-10-29 PROCEDURE — 3008F PR BODY MASS INDEX (BMI) DOCUMENTED: ICD-10-PCS | Mod: CPTII,S$GLB,, | Performed by: RADIOLOGY

## 2021-10-29 PROCEDURE — 99999 PR PBB SHADOW E&M-EST. PATIENT-LVL IV: CPT | Mod: PBBFAC,,, | Performed by: RADIOLOGY

## 2021-10-29 PROCEDURE — 3008F BODY MASS INDEX DOCD: CPT | Mod: CPTII,S$GLB,, | Performed by: RADIOLOGY

## 2021-10-29 PROCEDURE — 1160F RVW MEDS BY RX/DR IN RCRD: CPT | Mod: CPTII,S$GLB,, | Performed by: RADIOLOGY

## 2021-10-29 PROCEDURE — 1159F MED LIST DOCD IN RCRD: CPT | Mod: CPTII,S$GLB,, | Performed by: RADIOLOGY

## 2021-10-29 PROCEDURE — 99204 PR OFFICE/OUTPT VISIT, NEW, LEVL IV, 45-59 MIN: ICD-10-PCS | Mod: S$GLB,,, | Performed by: RADIOLOGY

## 2021-10-29 PROCEDURE — 3079F DIAST BP 80-89 MM HG: CPT | Mod: CPTII,S$GLB,, | Performed by: RADIOLOGY

## 2021-10-29 PROCEDURE — 1160F PR REVIEW ALL MEDS BY PRESCRIBER/CLIN PHARMACIST DOCUMENTED: ICD-10-PCS | Mod: CPTII,S$GLB,, | Performed by: RADIOLOGY

## 2021-10-29 PROCEDURE — 4010F ACE/ARB THERAPY RXD/TAKEN: CPT | Mod: CPTII,S$GLB,, | Performed by: RADIOLOGY

## 2021-10-29 PROCEDURE — 3075F PR MOST RECENT SYSTOLIC BLOOD PRESS GE 130-139MM HG: ICD-10-PCS | Mod: CPTII,S$GLB,, | Performed by: RADIOLOGY

## 2021-10-29 PROCEDURE — 99999 PR PBB SHADOW E&M-EST. PATIENT-LVL IV: ICD-10-PCS | Mod: PBBFAC,,, | Performed by: RADIOLOGY

## 2021-10-29 PROCEDURE — 3079F PR MOST RECENT DIASTOLIC BLOOD PRESSURE 80-89 MM HG: ICD-10-PCS | Mod: CPTII,S$GLB,, | Performed by: RADIOLOGY

## 2021-10-29 PROCEDURE — 4010F PR ACE/ARB THEARPY RXD/TAKEN: ICD-10-PCS | Mod: CPTII,S$GLB,, | Performed by: RADIOLOGY

## 2021-10-29 PROCEDURE — 1159F PR MEDICATION LIST DOCUMENTED IN MEDICAL RECORD: ICD-10-PCS | Mod: CPTII,S$GLB,, | Performed by: RADIOLOGY

## 2021-10-29 PROCEDURE — 1111F PR DISCHARGE MEDS RECONCILED W/ CURRENT OUTPATIENT MED LIST: ICD-10-PCS | Mod: CPTII,S$GLB,, | Performed by: RADIOLOGY

## 2021-11-01 ENCOUNTER — TELEPHONE (OUTPATIENT)
Dept: ENDOSCOPY | Facility: HOSPITAL | Age: 41
End: 2021-11-01
Payer: COMMERCIAL

## 2021-11-01 ENCOUNTER — OFFICE VISIT (OUTPATIENT)
Dept: HEMATOLOGY/ONCOLOGY | Facility: CLINIC | Age: 41
End: 2021-11-01
Payer: COMMERCIAL

## 2021-11-01 VITALS
BODY MASS INDEX: 27.49 KG/M2 | WEIGHT: 185.63 LBS | RESPIRATION RATE: 18 BRPM | DIASTOLIC BLOOD PRESSURE: 90 MMHG | OXYGEN SATURATION: 98 % | HEART RATE: 84 BPM | HEIGHT: 69 IN | SYSTOLIC BLOOD PRESSURE: 131 MMHG

## 2021-11-01 DIAGNOSIS — K83.1 BILIARY OBSTRUCTION: ICD-10-CM

## 2021-11-01 DIAGNOSIS — C24.0 HILAR CHOLANGIOCARCINOMA: Primary | ICD-10-CM

## 2021-11-01 PROCEDURE — 3080F DIAST BP >= 90 MM HG: CPT | Mod: CPTII,S$GLB,, | Performed by: INTERNAL MEDICINE

## 2021-11-01 PROCEDURE — 1159F MED LIST DOCD IN RCRD: CPT | Mod: CPTII,S$GLB,, | Performed by: INTERNAL MEDICINE

## 2021-11-01 PROCEDURE — 3008F PR BODY MASS INDEX (BMI) DOCUMENTED: ICD-10-PCS | Mod: CPTII,S$GLB,, | Performed by: INTERNAL MEDICINE

## 2021-11-01 PROCEDURE — 3008F BODY MASS INDEX DOCD: CPT | Mod: CPTII,S$GLB,, | Performed by: INTERNAL MEDICINE

## 2021-11-01 PROCEDURE — 1111F DSCHRG MED/CURRENT MED MERGE: CPT | Mod: CPTII,S$GLB,, | Performed by: INTERNAL MEDICINE

## 2021-11-01 PROCEDURE — 3075F PR MOST RECENT SYSTOLIC BLOOD PRESS GE 130-139MM HG: ICD-10-PCS | Mod: CPTII,S$GLB,, | Performed by: INTERNAL MEDICINE

## 2021-11-01 PROCEDURE — 4010F ACE/ARB THERAPY RXD/TAKEN: CPT | Mod: CPTII,S$GLB,, | Performed by: INTERNAL MEDICINE

## 2021-11-01 PROCEDURE — 1160F RVW MEDS BY RX/DR IN RCRD: CPT | Mod: CPTII,S$GLB,, | Performed by: INTERNAL MEDICINE

## 2021-11-01 PROCEDURE — 99999 PR PBB SHADOW E&M-EST. PATIENT-LVL IV: CPT | Mod: PBBFAC,,, | Performed by: INTERNAL MEDICINE

## 2021-11-01 PROCEDURE — 3080F PR MOST RECENT DIASTOLIC BLOOD PRESSURE >= 90 MM HG: ICD-10-PCS | Mod: CPTII,S$GLB,, | Performed by: INTERNAL MEDICINE

## 2021-11-01 PROCEDURE — 1160F PR REVIEW ALL MEDS BY PRESCRIBER/CLIN PHARMACIST DOCUMENTED: ICD-10-PCS | Mod: CPTII,S$GLB,, | Performed by: INTERNAL MEDICINE

## 2021-11-01 PROCEDURE — 99999 PR PBB SHADOW E&M-EST. PATIENT-LVL IV: ICD-10-PCS | Mod: PBBFAC,,, | Performed by: INTERNAL MEDICINE

## 2021-11-01 PROCEDURE — 3075F SYST BP GE 130 - 139MM HG: CPT | Mod: CPTII,S$GLB,, | Performed by: INTERNAL MEDICINE

## 2021-11-01 PROCEDURE — 99205 OFFICE O/P NEW HI 60 MIN: CPT | Mod: S$GLB,,, | Performed by: INTERNAL MEDICINE

## 2021-11-01 PROCEDURE — 1111F PR DISCHARGE MEDS RECONCILED W/ CURRENT OUTPATIENT MED LIST: ICD-10-PCS | Mod: CPTII,S$GLB,, | Performed by: INTERNAL MEDICINE

## 2021-11-01 PROCEDURE — 99205 PR OFFICE/OUTPT VISIT, NEW, LEVL V, 60-74 MIN: ICD-10-PCS | Mod: S$GLB,,, | Performed by: INTERNAL MEDICINE

## 2021-11-01 PROCEDURE — 4010F PR ACE/ARB THEARPY RXD/TAKEN: ICD-10-PCS | Mod: CPTII,S$GLB,, | Performed by: INTERNAL MEDICINE

## 2021-11-01 PROCEDURE — 1159F PR MEDICATION LIST DOCUMENTED IN MEDICAL RECORD: ICD-10-PCS | Mod: CPTII,S$GLB,, | Performed by: INTERNAL MEDICINE

## 2021-11-01 RX ORDER — PROMETHAZINE HYDROCHLORIDE 12.5 MG/1
12.5 TABLET ORAL EVERY 6 HOURS PRN
Qty: 60 TABLET | Refills: 2 | Status: ON HOLD | OUTPATIENT
Start: 2021-11-01 | End: 2022-06-17 | Stop reason: HOSPADM

## 2021-11-01 RX ORDER — LIDOCAINE AND PRILOCAINE 25; 25 MG/G; MG/G
CREAM TOPICAL ONCE
Qty: 30 G | Refills: 2 | Status: SHIPPED | OUTPATIENT
Start: 2021-11-01 | End: 2021-11-05

## 2021-11-01 RX ORDER — ONDANSETRON 8 MG/1
8 TABLET, ORALLY DISINTEGRATING ORAL EVERY 6 HOURS PRN
Qty: 60 TABLET | Refills: 2 | Status: ON HOLD | OUTPATIENT
Start: 2021-11-01 | End: 2022-06-17 | Stop reason: HOSPADM

## 2021-11-02 ENCOUNTER — TELEPHONE (OUTPATIENT)
Dept: HEMATOLOGY/ONCOLOGY | Facility: CLINIC | Age: 41
End: 2021-11-02
Payer: COMMERCIAL

## 2021-11-02 ENCOUNTER — CONFERENCE (OUTPATIENT)
Dept: TRANSPLANT | Facility: CLINIC | Age: 41
End: 2021-11-02
Payer: COMMERCIAL

## 2021-11-02 ENCOUNTER — DOCUMENTATION ONLY (OUTPATIENT)
Dept: ONCOLOGY | Facility: HOSPITAL | Age: 41
End: 2021-11-02
Payer: COMMERCIAL

## 2021-11-02 ENCOUNTER — TELEPHONE (OUTPATIENT)
Dept: ENDOSCOPY | Facility: HOSPITAL | Age: 41
End: 2021-11-02
Payer: COMMERCIAL

## 2021-11-03 ENCOUNTER — HOSPITAL ENCOUNTER (OUTPATIENT)
Facility: HOSPITAL | Age: 41
Discharge: HOME | End: 2021-11-05
Attending: EMERGENCY MEDICINE | Admitting: HOSPITALIST
Payer: COMMERCIAL

## 2021-11-03 ENCOUNTER — PATIENT MESSAGE (OUTPATIENT)
Dept: ENDOSCOPY | Facility: HOSPITAL | Age: 41
End: 2021-11-03
Payer: COMMERCIAL

## 2021-11-03 ENCOUNTER — NURSE TRIAGE (OUTPATIENT)
Dept: ADMINISTRATIVE | Facility: CLINIC | Age: 41
End: 2021-11-03
Payer: COMMERCIAL

## 2021-11-03 ENCOUNTER — HOSPITAL ENCOUNTER (OUTPATIENT)
Dept: RADIOLOGY | Facility: HOSPITAL | Age: 41
Discharge: HOME | End: 2021-11-03
Attending: RADIOLOGY
Payer: COMMERCIAL

## 2021-11-03 DIAGNOSIS — K83.1 BILIARY OBSTRUCTION: ICD-10-CM

## 2021-11-03 DIAGNOSIS — A41.9 SEPSIS: ICD-10-CM

## 2021-11-03 DIAGNOSIS — K83.09 CHOLANGITIS: Primary | ICD-10-CM

## 2021-11-03 DIAGNOSIS — C24.0 HILAR CHOLANGIOCARCINOMA: ICD-10-CM

## 2021-11-03 DIAGNOSIS — R07.9 CHEST PAIN: ICD-10-CM

## 2021-11-03 LAB
BASOPHILS # BLD AUTO: 0.02 K/UL (ref 0–0.2)
BASOPHILS NFR BLD: 0.3 % (ref 0–1.9)
DIFFERENTIAL METHOD: ABNORMAL
EOSINOPHIL # BLD AUTO: 0.1 K/UL (ref 0–0.5)
EOSINOPHIL NFR BLD: 0.7 % (ref 0–8)
ERYTHROCYTE [DISTWIDTH] IN BLOOD BY AUTOMATED COUNT: 12.7 % (ref 11.5–14.5)
GLUCOSE SERPL-MCNC: 134 MG/DL (ref 70–110)
HCT VFR BLD AUTO: 35.6 % (ref 40–54)
HGB BLD-MCNC: 12.3 G/DL (ref 14–18)
IMM GRANULOCYTES # BLD AUTO: 0.08 K/UL (ref 0–0.04)
IMM GRANULOCYTES NFR BLD AUTO: 1.1 % (ref 0–0.5)
LYMPHOCYTES # BLD AUTO: 0.4 K/UL (ref 1–4.8)
LYMPHOCYTES NFR BLD: 6.2 % (ref 18–48)
MCH RBC QN AUTO: 32.6 PG (ref 27–31)
MCHC RBC AUTO-ENTMCNC: 34.6 G/DL (ref 32–36)
MCV RBC AUTO: 94 FL (ref 82–98)
MONOCYTES # BLD AUTO: 0.5 K/UL (ref 0.3–1)
MONOCYTES NFR BLD: 7.4 % (ref 4–15)
NEUTROPHILS # BLD AUTO: 6 K/UL (ref 1.8–7.7)
NEUTROPHILS NFR BLD: 84.3 % (ref 38–73)
NRBC BLD-RTO: 0 /100 WBC
PLATELET # BLD AUTO: 149 K/UL (ref 150–450)
PMV BLD AUTO: 9.4 FL (ref 9.2–12.9)
RBC # BLD AUTO: 3.77 M/UL (ref 4.6–6.2)
WBC # BLD AUTO: 7.06 K/UL (ref 3.9–12.7)

## 2021-11-03 PROCEDURE — 93010 EKG 12-LEAD: ICD-10-PCS | Mod: ,,, | Performed by: INTERNAL MEDICINE

## 2021-11-03 PROCEDURE — 87502 INFLUENZA DNA AMP PROBE: CPT

## 2021-11-03 PROCEDURE — U0002 COVID-19 LAB TEST NON-CDC: HCPCS | Performed by: EMERGENCY MEDICINE

## 2021-11-03 PROCEDURE — 63600175 PHARM REV CODE 636 W HCPCS: Performed by: STUDENT IN AN ORGANIZED HEALTH CARE EDUCATION/TRAINING PROGRAM

## 2021-11-03 PROCEDURE — 99285 EMERGENCY DEPT VISIT HI MDM: CPT | Mod: CS,,, | Performed by: EMERGENCY MEDICINE

## 2021-11-03 PROCEDURE — 78815 PET IMAGE W/CT SKULL-THIGH: CPT | Mod: 26,PS,, | Performed by: RADIOLOGY

## 2021-11-03 PROCEDURE — 83690 ASSAY OF LIPASE: CPT | Performed by: STUDENT IN AN ORGANIZED HEALTH CARE EDUCATION/TRAINING PROGRAM

## 2021-11-03 PROCEDURE — 99285 PR EMERGENCY DEPT VISIT,LEVEL V: ICD-10-PCS | Mod: CS,,, | Performed by: EMERGENCY MEDICINE

## 2021-11-03 PROCEDURE — 78815 NM PET CT ROUTINE: ICD-10-PCS | Mod: 26,PS,, | Performed by: RADIOLOGY

## 2021-11-03 PROCEDURE — 80053 COMPREHEN METABOLIC PANEL: CPT | Mod: 91 | Performed by: STUDENT IN AN ORGANIZED HEALTH CARE EDUCATION/TRAINING PROGRAM

## 2021-11-03 PROCEDURE — 85025 COMPLETE CBC W/AUTO DIFF WBC: CPT | Mod: 91 | Performed by: STUDENT IN AN ORGANIZED HEALTH CARE EDUCATION/TRAINING PROGRAM

## 2021-11-03 PROCEDURE — 96361 HYDRATE IV INFUSION ADD-ON: CPT

## 2021-11-03 PROCEDURE — 78815 PET IMAGE W/CT SKULL-THIGH: CPT | Mod: TC,PN

## 2021-11-03 PROCEDURE — 81003 URINALYSIS AUTO W/O SCOPE: CPT | Performed by: STUDENT IN AN ORGANIZED HEALTH CARE EDUCATION/TRAINING PROGRAM

## 2021-11-03 PROCEDURE — 93010 ELECTROCARDIOGRAM REPORT: CPT | Mod: ,,, | Performed by: INTERNAL MEDICINE

## 2021-11-03 PROCEDURE — 93005 ELECTROCARDIOGRAM TRACING: CPT

## 2021-11-03 PROCEDURE — 99285 EMERGENCY DEPT VISIT HI MDM: CPT | Mod: 25

## 2021-11-03 PROCEDURE — 87040 BLOOD CULTURE FOR BACTERIA: CPT | Performed by: STUDENT IN AN ORGANIZED HEALTH CARE EDUCATION/TRAINING PROGRAM

## 2021-11-03 PROCEDURE — 83605 ASSAY OF LACTIC ACID: CPT | Performed by: STUDENT IN AN ORGANIZED HEALTH CARE EDUCATION/TRAINING PROGRAM

## 2021-11-03 RX ADMIN — SODIUM CHLORIDE, SODIUM LACTATE, POTASSIUM CHLORIDE, AND CALCIUM CHLORIDE 2505 ML: .6; .31; .03; .02 INJECTION, SOLUTION INTRAVENOUS at 11:11

## 2021-11-04 ENCOUNTER — ANESTHESIA (OUTPATIENT)
Dept: ENDOSCOPY | Facility: HOSPITAL | Age: 41
End: 2021-11-04
Payer: COMMERCIAL

## 2021-11-04 ENCOUNTER — ANESTHESIA EVENT (OUTPATIENT)
Dept: ENDOSCOPY | Facility: HOSPITAL | Age: 41
End: 2021-11-04
Payer: COMMERCIAL

## 2021-11-04 PROBLEM — K83.09 CHOLANGITIS: Status: ACTIVE | Noted: 2021-11-04

## 2021-11-04 PROBLEM — C24.0: Status: ACTIVE | Noted: 2021-11-04

## 2021-11-04 LAB
ALBUMIN SERPL BCP-MCNC: 2.9 G/DL (ref 3.5–5.2)
ALBUMIN SERPL BCP-MCNC: 2.9 G/DL (ref 3.5–5.2)
ALP SERPL-CCNC: 134 U/L (ref 55–135)
ALP SERPL-CCNC: 151 U/L (ref 55–135)
ALT SERPL W/O P-5'-P-CCNC: 51 U/L (ref 10–44)
ALT SERPL W/O P-5'-P-CCNC: 55 U/L (ref 10–44)
ANION GAP SERPL CALC-SCNC: 9 MMOL/L (ref 8–16)
ANION GAP SERPL CALC-SCNC: 9 MMOL/L (ref 8–16)
AST SERPL-CCNC: 34 U/L (ref 10–40)
AST SERPL-CCNC: 35 U/L (ref 10–40)
BASOPHILS # BLD AUTO: 0.02 K/UL (ref 0–0.2)
BASOPHILS NFR BLD: 0.3 % (ref 0–1.9)
BILIRUB SERPL-MCNC: 2.7 MG/DL (ref 0.1–1)
BILIRUB SERPL-MCNC: 2.7 MG/DL (ref 0.1–1)
BILIRUB UR QL STRIP: NEGATIVE
BUN SERPL-MCNC: 14 MG/DL (ref 6–20)
BUN SERPL-MCNC: 18 MG/DL (ref 6–20)
CALCIUM SERPL-MCNC: 8.9 MG/DL (ref 8.7–10.5)
CALCIUM SERPL-MCNC: 9.2 MG/DL (ref 8.7–10.5)
CHLORIDE SERPL-SCNC: 102 MMOL/L (ref 95–110)
CHLORIDE SERPL-SCNC: 106 MMOL/L (ref 95–110)
CLARITY UR REFRACT.AUTO: CLEAR
CO2 SERPL-SCNC: 20 MMOL/L (ref 23–29)
CO2 SERPL-SCNC: 24 MMOL/L (ref 23–29)
COLOR UR AUTO: YELLOW
CREAT SERPL-MCNC: 1 MG/DL (ref 0.5–1.4)
CREAT SERPL-MCNC: 1.2 MG/DL (ref 0.5–1.4)
CTP QC/QA: YES
CTP QC/QA: YES
DIFFERENTIAL METHOD: ABNORMAL
EOSINOPHIL # BLD AUTO: 0.1 K/UL (ref 0–0.5)
EOSINOPHIL NFR BLD: 1 % (ref 0–8)
ERYTHROCYTE [DISTWIDTH] IN BLOOD BY AUTOMATED COUNT: 12.7 % (ref 11.5–14.5)
EST. GFR  (AFRICAN AMERICAN): >60 ML/MIN/1.73 M^2
EST. GFR  (AFRICAN AMERICAN): >60 ML/MIN/1.73 M^2
EST. GFR  (NON AFRICAN AMERICAN): >60 ML/MIN/1.73 M^2
EST. GFR  (NON AFRICAN AMERICAN): >60 ML/MIN/1.73 M^2
GLUCOSE SERPL-MCNC: 129 MG/DL (ref 70–110)
GLUCOSE SERPL-MCNC: 174 MG/DL (ref 70–110)
GLUCOSE UR QL STRIP: NEGATIVE
HCT VFR BLD AUTO: 37.2 % (ref 40–54)
HGB BLD-MCNC: 12.3 G/DL (ref 14–18)
HGB UR QL STRIP: NEGATIVE
IMM GRANULOCYTES # BLD AUTO: 0.02 K/UL (ref 0–0.04)
IMM GRANULOCYTES NFR BLD AUTO: 0.3 % (ref 0–0.5)
KETONES UR QL STRIP: NEGATIVE
LACTATE SERPL-SCNC: 1.6 MMOL/L (ref 0.5–2.2)
LEUKOCYTE ESTERASE UR QL STRIP: NEGATIVE
LIPASE SERPL-CCNC: 42 U/L (ref 4–60)
LYMPHOCYTES # BLD AUTO: 0.7 K/UL (ref 1–4.8)
LYMPHOCYTES NFR BLD: 11.6 % (ref 18–48)
MAGNESIUM SERPL-MCNC: 2 MG/DL (ref 1.6–2.6)
MCH RBC QN AUTO: 32.2 PG (ref 27–31)
MCHC RBC AUTO-ENTMCNC: 33.1 G/DL (ref 32–36)
MCV RBC AUTO: 97 FL (ref 82–98)
MONOCYTES # BLD AUTO: 0.5 K/UL (ref 0.3–1)
MONOCYTES NFR BLD: 8.3 % (ref 4–15)
NEUTROPHILS # BLD AUTO: 4.8 K/UL (ref 1.8–7.7)
NEUTROPHILS NFR BLD: 78.5 % (ref 38–73)
NITRITE UR QL STRIP: NEGATIVE
NRBC BLD-RTO: 0 /100 WBC
PH UR STRIP: 5 [PH] (ref 5–8)
PHOSPHATE SERPL-MCNC: 3.4 MG/DL (ref 2.7–4.5)
PLATELET # BLD AUTO: 125 K/UL (ref 150–450)
PMV BLD AUTO: 9.2 FL (ref 9.2–12.9)
POC MOLECULAR INFLUENZA A AGN: NEGATIVE
POC MOLECULAR INFLUENZA B AGN: NEGATIVE
POTASSIUM SERPL-SCNC: 3.6 MMOL/L (ref 3.5–5.1)
POTASSIUM SERPL-SCNC: 4.4 MMOL/L (ref 3.5–5.1)
PROT SERPL-MCNC: 6.1 G/DL (ref 6–8.4)
PROT SERPL-MCNC: 6.3 G/DL (ref 6–8.4)
PROT UR QL STRIP: NEGATIVE
RBC # BLD AUTO: 3.82 M/UL (ref 4.6–6.2)
SARS-COV-2 RDRP RESP QL NAA+PROBE: NEGATIVE
SODIUM SERPL-SCNC: 131 MMOL/L (ref 136–145)
SODIUM SERPL-SCNC: 139 MMOL/L (ref 136–145)
SP GR UR STRIP: 1.01 (ref 1–1.03)
URN SPEC COLLECT METH UR: NORMAL
WBC # BLD AUTO: 6.12 K/UL (ref 3.9–12.7)

## 2021-11-04 PROCEDURE — 74328 PR  X-RAY FOR BILE DUCT ENDOSCOPY: ICD-10-PCS | Mod: 26,,, | Performed by: INTERNAL MEDICINE

## 2021-11-04 PROCEDURE — 87040 BLOOD CULTURE FOR BACTERIA: CPT | Mod: 59 | Performed by: INTERNAL MEDICINE

## 2021-11-04 PROCEDURE — C2617 STENT, NON-COR, TEM W/O DEL: HCPCS | Performed by: INTERNAL MEDICINE

## 2021-11-04 PROCEDURE — 27202131 HC NEEDLE, FNB SINGLE (ANY): Performed by: INTERNAL MEDICINE

## 2021-11-04 PROCEDURE — 37000009 HC ANESTHESIA EA ADD 15 MINS: Performed by: INTERNAL MEDICINE

## 2021-11-04 PROCEDURE — 43264 ERCP REMOVE DUCT CALCULI: CPT | Mod: ,,, | Performed by: INTERNAL MEDICINE

## 2021-11-04 PROCEDURE — 99220 PR INITIAL OBSERVATION CARE,LEVL III: CPT | Mod: ,,, | Performed by: HOSPITALIST

## 2021-11-04 PROCEDURE — 74328 X-RAY BILE DUCT ENDOSCOPY: CPT | Performed by: INTERNAL MEDICINE

## 2021-11-04 PROCEDURE — 63600175 PHARM REV CODE 636 W HCPCS: Performed by: STUDENT IN AN ORGANIZED HEALTH CARE EDUCATION/TRAINING PROGRAM

## 2021-11-04 PROCEDURE — 88342 IMHCHEM/IMCYTCHM 1ST ANTB: CPT | Mod: 26,,, | Performed by: PATHOLOGY

## 2021-11-04 PROCEDURE — 43242 EGD US FINE NEEDLE BX/ASPIR: CPT | Mod: ,,, | Performed by: INTERNAL MEDICINE

## 2021-11-04 PROCEDURE — 88305 TISSUE EXAM BY PATHOLOGIST: ICD-10-PCS | Mod: 26,59,, | Performed by: PATHOLOGY

## 2021-11-04 PROCEDURE — 43264 ERCP REMOVE DUCT CALCULI: CPT | Performed by: INTERNAL MEDICINE

## 2021-11-04 PROCEDURE — 63600175 PHARM REV CODE 636 W HCPCS: Performed by: HOSPITALIST

## 2021-11-04 PROCEDURE — 99220 PR INITIAL OBSERVATION CARE,LEVL III: ICD-10-PCS | Mod: ,,, | Performed by: HOSPITALIST

## 2021-11-04 PROCEDURE — 27202125 HC BALLOON, EXTRACTION (ANY): Performed by: INTERNAL MEDICINE

## 2021-11-04 PROCEDURE — 43261 ENDO CHOLANGIOPANCREATOGRAPH: CPT | Mod: 59,,, | Performed by: INTERNAL MEDICINE

## 2021-11-04 PROCEDURE — 88305 TISSUE EXAM BY PATHOLOGIST: CPT | Performed by: PATHOLOGY

## 2021-11-04 PROCEDURE — 88173 CYTOPATH EVAL FNA REPORT: CPT | Performed by: PATHOLOGY

## 2021-11-04 PROCEDURE — 88173 PR  INTERPRETATION OF FNA SMEAR: ICD-10-PCS | Mod: 26,,, | Performed by: PATHOLOGY

## 2021-11-04 PROCEDURE — D9220A PRA ANESTHESIA: Mod: CRNA,,, | Performed by: STUDENT IN AN ORGANIZED HEALTH CARE EDUCATION/TRAINING PROGRAM

## 2021-11-04 PROCEDURE — D9220A PRA ANESTHESIA: ICD-10-PCS | Mod: ANES,,, | Performed by: ANESTHESIOLOGY

## 2021-11-04 PROCEDURE — 88305 TISSUE EXAM BY PATHOLOGIST: CPT | Mod: 59 | Performed by: PATHOLOGY

## 2021-11-04 PROCEDURE — 88305 TISSUE EXAM BY PATHOLOGIST: CPT | Mod: 26,59,, | Performed by: PATHOLOGY

## 2021-11-04 PROCEDURE — 37000008 HC ANESTHESIA 1ST 15 MINUTES: Performed by: INTERNAL MEDICINE

## 2021-11-04 PROCEDURE — 88112 CYTOPATH CELL ENHANCE TECH: CPT | Mod: 59 | Performed by: PATHOLOGY

## 2021-11-04 PROCEDURE — 43276 ERCP STENT EXCHANGE W/DILATE: CPT | Performed by: INTERNAL MEDICINE

## 2021-11-04 PROCEDURE — 27202410 HC FORCEPS, WIRE-GUIDED: Performed by: INTERNAL MEDICINE

## 2021-11-04 PROCEDURE — 43276 ERCP STENT EXCHANGE W/DILATE: CPT | Mod: 59,,, | Performed by: INTERNAL MEDICINE

## 2021-11-04 PROCEDURE — 74328 X-RAY BILE DUCT ENDOSCOPY: CPT | Mod: 26,,, | Performed by: INTERNAL MEDICINE

## 2021-11-04 PROCEDURE — 43242 EGD US FINE NEEDLE BX/ASPIR: CPT | Performed by: INTERNAL MEDICINE

## 2021-11-04 PROCEDURE — D9220A PRA ANESTHESIA: ICD-10-PCS | Mod: CRNA,,, | Performed by: STUDENT IN AN ORGANIZED HEALTH CARE EDUCATION/TRAINING PROGRAM

## 2021-11-04 PROCEDURE — C1769 GUIDE WIRE: HCPCS | Performed by: INTERNAL MEDICINE

## 2021-11-04 PROCEDURE — 99284 EMERGENCY DEPT VISIT MOD MDM: CPT | Mod: 25,,, | Performed by: INTERNAL MEDICINE

## 2021-11-04 PROCEDURE — G0378 HOSPITAL OBSERVATION PER HR: HCPCS

## 2021-11-04 PROCEDURE — 25000003 PHARM REV CODE 250: Performed by: STUDENT IN AN ORGANIZED HEALTH CARE EDUCATION/TRAINING PROGRAM

## 2021-11-04 PROCEDURE — 25000003 PHARM REV CODE 250: Performed by: HOSPITALIST

## 2021-11-04 PROCEDURE — 99284 PR EMERGENCY DEPT VISIT,LEVEL IV: ICD-10-PCS | Mod: 25,,, | Performed by: INTERNAL MEDICINE

## 2021-11-04 PROCEDURE — 83735 ASSAY OF MAGNESIUM: CPT | Performed by: HOSPITALIST

## 2021-11-04 PROCEDURE — 43264 PR ERCP,W/REMOVAL STONE,BIL/PANCR DUCTS: ICD-10-PCS | Mod: ,,, | Performed by: INTERNAL MEDICINE

## 2021-11-04 PROCEDURE — 27200946 HC BRUSH, CYTOLOGY: Performed by: INTERNAL MEDICINE

## 2021-11-04 PROCEDURE — 96365 THER/PROPH/DIAG IV INF INIT: CPT

## 2021-11-04 PROCEDURE — 88112 CYTOPATH CELL ENHANCE TECH: CPT | Mod: 26,59,, | Performed by: PATHOLOGY

## 2021-11-04 PROCEDURE — 84100 ASSAY OF PHOSPHORUS: CPT | Performed by: HOSPITALIST

## 2021-11-04 PROCEDURE — 80053 COMPREHEN METABOLIC PANEL: CPT | Performed by: STUDENT IN AN ORGANIZED HEALTH CARE EDUCATION/TRAINING PROGRAM

## 2021-11-04 PROCEDURE — 43261 ENDO CHOLANGIOPANCREATOGRAPH: CPT | Performed by: INTERNAL MEDICINE

## 2021-11-04 PROCEDURE — 85025 COMPLETE CBC W/AUTO DIFF WBC: CPT | Performed by: HOSPITALIST

## 2021-11-04 PROCEDURE — 43276 PR ERCP W/RMVL/EXCH STENT BILIARY/PANCREATIC DUCT W/DILATION: ICD-10-PCS | Mod: 59,,, | Performed by: INTERNAL MEDICINE

## 2021-11-04 PROCEDURE — 43261 PR ERCP,BIOPSY: ICD-10-PCS | Mod: 59,,, | Performed by: INTERNAL MEDICINE

## 2021-11-04 PROCEDURE — 88112 PR  CYTOPATH, CELL ENHANCE TECH: ICD-10-PCS | Mod: 26,59,, | Performed by: PATHOLOGY

## 2021-11-04 PROCEDURE — 94761 N-INVAS EAR/PLS OXIMETRY MLT: CPT

## 2021-11-04 PROCEDURE — 43242 PR UPGI ENDOSCOPY,FN NEEDLE BX,GUIDED: ICD-10-PCS | Mod: ,,, | Performed by: INTERNAL MEDICINE

## 2021-11-04 PROCEDURE — 88342 CHG IMMUNOCYTOCHEMISTRY: ICD-10-PCS | Mod: 26,,, | Performed by: PATHOLOGY

## 2021-11-04 PROCEDURE — D9220A PRA ANESTHESIA: Mod: ANES,,, | Performed by: ANESTHESIOLOGY

## 2021-11-04 PROCEDURE — 96361 HYDRATE IV INFUSION ADD-ON: CPT

## 2021-11-04 PROCEDURE — 88173 CYTOPATH EVAL FNA REPORT: CPT | Mod: 26,,, | Performed by: PATHOLOGY

## 2021-11-04 PROCEDURE — 88342 IMHCHEM/IMCYTCHM 1ST ANTB: CPT | Performed by: PATHOLOGY

## 2021-11-04 RX ORDER — LOSARTAN POTASSIUM 50 MG/1
50 TABLET ORAL DAILY
Status: DISCONTINUED | OUTPATIENT
Start: 2021-11-04 | End: 2021-11-05 | Stop reason: HOSPADM

## 2021-11-04 RX ORDER — MIDAZOLAM HYDROCHLORIDE 1 MG/ML
INJECTION, SOLUTION INTRAMUSCULAR; INTRAVENOUS
Status: DISCONTINUED | OUTPATIENT
Start: 2021-11-04 | End: 2021-11-04

## 2021-11-04 RX ORDER — SODIUM CHLORIDE 0.9 % (FLUSH) 0.9 %
10 SYRINGE (ML) INJECTION
Status: DISCONTINUED | OUTPATIENT
Start: 2021-11-04 | End: 2021-11-05 | Stop reason: HOSPADM

## 2021-11-04 RX ORDER — PROPOFOL 10 MG/ML
VIAL (ML) INTRAVENOUS
Status: DISCONTINUED | OUTPATIENT
Start: 2021-11-04 | End: 2021-11-04

## 2021-11-04 RX ORDER — TALC
6 POWDER (GRAM) TOPICAL NIGHTLY PRN
Status: DISCONTINUED | OUTPATIENT
Start: 2021-11-04 | End: 2021-11-04

## 2021-11-04 RX ORDER — CHOLESTYRAMINE 4 G/9G
4 POWDER, FOR SUSPENSION ORAL 2 TIMES DAILY
Status: DISCONTINUED | OUTPATIENT
Start: 2021-11-04 | End: 2021-11-04

## 2021-11-04 RX ORDER — NALOXONE HCL 0.4 MG/ML
0.02 VIAL (ML) INJECTION
Status: DISCONTINUED | OUTPATIENT
Start: 2021-11-04 | End: 2021-11-05 | Stop reason: HOSPADM

## 2021-11-04 RX ORDER — DIPHENHYDRAMINE HCL 25 MG
25 CAPSULE ORAL EVERY 6 HOURS PRN
Status: DISCONTINUED | OUTPATIENT
Start: 2021-11-04 | End: 2021-11-05 | Stop reason: HOSPADM

## 2021-11-04 RX ORDER — SODIUM CHLORIDE 0.9 % (FLUSH) 0.9 %
10 SYRINGE (ML) INJECTION EVERY 12 HOURS PRN
Status: DISCONTINUED | OUTPATIENT
Start: 2021-11-04 | End: 2021-11-05 | Stop reason: HOSPADM

## 2021-11-04 RX ORDER — ACETAMINOPHEN 325 MG/1
650 TABLET ORAL EVERY 6 HOURS PRN
Status: DISCONTINUED | OUTPATIENT
Start: 2021-11-04 | End: 2021-11-05 | Stop reason: HOSPADM

## 2021-11-04 RX ORDER — MEPERIDINE HYDROCHLORIDE 50 MG/ML
25 INJECTION INTRAMUSCULAR; INTRAVENOUS; SUBCUTANEOUS ONCE
Status: COMPLETED | OUTPATIENT
Start: 2021-11-04 | End: 2021-11-04

## 2021-11-04 RX ORDER — IPRATROPIUM BROMIDE AND ALBUTEROL SULFATE 2.5; .5 MG/3ML; MG/3ML
3 SOLUTION RESPIRATORY (INHALATION) EVERY 4 HOURS PRN
Status: DISCONTINUED | OUTPATIENT
Start: 2021-11-04 | End: 2021-11-05 | Stop reason: HOSPADM

## 2021-11-04 RX ORDER — KETAMINE HCL IN 0.9 % NACL 50 MG/5 ML
SYRINGE (ML) INTRAVENOUS
Status: DISCONTINUED | OUTPATIENT
Start: 2021-11-04 | End: 2021-11-04

## 2021-11-04 RX ORDER — GLUCAGON 1 MG
1 KIT INJECTION
Status: DISCONTINUED | OUTPATIENT
Start: 2021-11-04 | End: 2021-11-05 | Stop reason: HOSPADM

## 2021-11-04 RX ORDER — ONDANSETRON 2 MG/ML
INJECTION INTRAMUSCULAR; INTRAVENOUS
Status: DISCONTINUED | OUTPATIENT
Start: 2021-11-04 | End: 2021-11-04

## 2021-11-04 RX ORDER — MAG HYDROX/ALUMINUM HYD/SIMETH 200-200-20
30 SUSPENSION, ORAL (FINAL DOSE FORM) ORAL 4 TIMES DAILY PRN
Status: DISCONTINUED | OUTPATIENT
Start: 2021-11-04 | End: 2021-11-05 | Stop reason: HOSPADM

## 2021-11-04 RX ORDER — FENTANYL CITRATE 50 UG/ML
INJECTION, SOLUTION INTRAMUSCULAR; INTRAVENOUS
Status: DISCONTINUED | OUTPATIENT
Start: 2021-11-04 | End: 2021-11-04

## 2021-11-04 RX ORDER — PROPOFOL 10 MG/ML
VIAL (ML) INTRAVENOUS CONTINUOUS PRN
Status: DISCONTINUED | OUTPATIENT
Start: 2021-11-04 | End: 2021-11-04

## 2021-11-04 RX ORDER — DEXTROSE MONOHYDRATE, SODIUM CHLORIDE, AND POTASSIUM CHLORIDE 50; 1.49; 4.5 G/1000ML; G/1000ML; G/1000ML
INJECTION, SOLUTION INTRAVENOUS CONTINUOUS
Status: DISCONTINUED | OUTPATIENT
Start: 2021-11-04 | End: 2021-11-05 | Stop reason: HOSPADM

## 2021-11-04 RX ORDER — IBUPROFEN 200 MG
24 TABLET ORAL
Status: DISCONTINUED | OUTPATIENT
Start: 2021-11-04 | End: 2021-11-05 | Stop reason: HOSPADM

## 2021-11-04 RX ORDER — PHENYLEPHRINE HCL IN 0.9% NACL 1 MG/10 ML
SYRINGE (ML) INTRAVENOUS
Status: DISCONTINUED | OUTPATIENT
Start: 2021-11-04 | End: 2021-11-04

## 2021-11-04 RX ORDER — TALC
6 POWDER (GRAM) TOPICAL NIGHTLY PRN
Status: DISCONTINUED | OUTPATIENT
Start: 2021-11-04 | End: 2021-11-05 | Stop reason: HOSPADM

## 2021-11-04 RX ORDER — LIDOCAINE HYDROCHLORIDE 20 MG/ML
INJECTION, SOLUTION EPIDURAL; INFILTRATION; INTRACAUDAL; PERINEURAL
Status: DISCONTINUED | OUTPATIENT
Start: 2021-11-04 | End: 2021-11-04

## 2021-11-04 RX ORDER — IBUPROFEN 200 MG
16 TABLET ORAL
Status: DISCONTINUED | OUTPATIENT
Start: 2021-11-04 | End: 2021-11-05 | Stop reason: HOSPADM

## 2021-11-04 RX ORDER — ONDANSETRON 2 MG/ML
4 INJECTION INTRAMUSCULAR; INTRAVENOUS EVERY 8 HOURS PRN
Status: DISCONTINUED | OUTPATIENT
Start: 2021-11-04 | End: 2021-11-05 | Stop reason: HOSPADM

## 2021-11-04 RX ORDER — DEXMEDETOMIDINE HYDROCHLORIDE 100 UG/ML
INJECTION, SOLUTION INTRAVENOUS
Status: DISCONTINUED | OUTPATIENT
Start: 2021-11-04 | End: 2021-11-04

## 2021-11-04 RX ADMIN — SODIUM CHLORIDE: 0.9 INJECTION, SOLUTION INTRAVENOUS at 08:11

## 2021-11-04 RX ADMIN — MIDAZOLAM 2 MG: 1 INJECTION INTRAMUSCULAR; INTRAVENOUS at 08:11

## 2021-11-04 RX ADMIN — DEXMEDETOMIDINE HYDROCHLORIDE 4 MCG: 100 INJECTION, SOLUTION INTRAVENOUS at 08:11

## 2021-11-04 RX ADMIN — PIPERACILLIN AND TAZOBACTAM 4.5 G: 4; .5 INJECTION, POWDER, LYOPHILIZED, FOR SOLUTION INTRAVENOUS; PARENTERAL at 10:11

## 2021-11-04 RX ADMIN — DEXMEDETOMIDINE HYDROCHLORIDE 4 MCG: 100 INJECTION, SOLUTION INTRAVENOUS at 09:11

## 2021-11-04 RX ADMIN — LOSARTAN POTASSIUM 50 MG: 50 TABLET, FILM COATED ORAL at 01:11

## 2021-11-04 RX ADMIN — FENTANYL CITRATE 25 MCG: 50 INJECTION INTRAMUSCULAR; INTRAVENOUS at 09:11

## 2021-11-04 RX ADMIN — PROPOFOL 30 MG: 10 INJECTION, EMULSION INTRAVENOUS at 08:11

## 2021-11-04 RX ADMIN — GLYCOPYRROLATE 0.2 MG: 0.2 INJECTION, SOLUTION INTRAMUSCULAR; INTRAVITREAL at 08:11

## 2021-11-04 RX ADMIN — LIDOCAINE HYDROCHLORIDE 50 MG: 20 INJECTION, SOLUTION EPIDURAL; INFILTRATION; INTRACAUDAL at 08:11

## 2021-11-04 RX ADMIN — DEXTROSE, SODIUM CHLORIDE, AND POTASSIUM CHLORIDE: 5; .45; .15 INJECTION INTRAVENOUS at 07:11

## 2021-11-04 RX ADMIN — Medication 30 MG: at 08:11

## 2021-11-04 RX ADMIN — FENTANYL CITRATE 50 MCG: 50 INJECTION INTRAMUSCULAR; INTRAVENOUS at 08:11

## 2021-11-04 RX ADMIN — Medication 100 MCG: at 09:11

## 2021-11-04 RX ADMIN — MEPERIDINE HYDROCHLORIDE 25 MG: 50 INJECTION INTRAMUSCULAR; INTRAVENOUS; SUBCUTANEOUS at 11:11

## 2021-11-04 RX ADMIN — PIPERACILLIN AND TAZOBACTAM 4.5 G: 4; .5 INJECTION, POWDER, LYOPHILIZED, FOR SOLUTION INTRAVENOUS; PARENTERAL at 01:11

## 2021-11-04 RX ADMIN — DEXTROSE, SODIUM CHLORIDE, AND POTASSIUM CHLORIDE: 5; .45; .15 INJECTION INTRAVENOUS at 01:11

## 2021-11-04 RX ADMIN — Medication 10 MG: at 09:11

## 2021-11-04 RX ADMIN — Medication 200 MCG/KG/MIN: at 08:11

## 2021-11-04 RX ADMIN — PIPERACILLIN AND TAZOBACTAM 4.5 G: 4; .5 INJECTION, POWDER, LYOPHILIZED, FOR SOLUTION INTRAVENOUS; PARENTERAL at 12:11

## 2021-11-04 RX ADMIN — ACETAMINOPHEN 650 MG: 325 TABLET ORAL at 06:11

## 2021-11-04 RX ADMIN — ONDANSETRON 4 MG: 2 INJECTION INTRAMUSCULAR; INTRAVENOUS at 08:11

## 2021-11-05 ENCOUNTER — TELEPHONE (OUTPATIENT)
Dept: ENDOSCOPY | Facility: HOSPITAL | Age: 41
End: 2021-11-05
Payer: COMMERCIAL

## 2021-11-05 ENCOUNTER — TELEPHONE (OUTPATIENT)
Dept: RADIATION ONCOLOGY | Facility: CLINIC | Age: 41
End: 2021-11-05
Payer: COMMERCIAL

## 2021-11-05 VITALS
SYSTOLIC BLOOD PRESSURE: 134 MMHG | BODY MASS INDEX: 27.25 KG/M2 | RESPIRATION RATE: 18 BRPM | TEMPERATURE: 98 F | DIASTOLIC BLOOD PRESSURE: 73 MMHG | HEART RATE: 80 BPM | OXYGEN SATURATION: 100 % | WEIGHT: 184 LBS | HEIGHT: 69 IN

## 2021-11-05 DIAGNOSIS — K83.1 BILIARY STRICTURE: Primary | ICD-10-CM

## 2021-11-05 LAB
ALBUMIN SERPL BCP-MCNC: 2.6 G/DL (ref 3.5–5.2)
ALP SERPL-CCNC: 114 U/L (ref 55–135)
ALT SERPL W/O P-5'-P-CCNC: 44 U/L (ref 10–44)
ANION GAP SERPL CALC-SCNC: 7 MMOL/L (ref 8–16)
AST SERPL-CCNC: 32 U/L (ref 10–40)
BASOPHILS # BLD AUTO: 0.01 K/UL (ref 0–0.2)
BASOPHILS NFR BLD: 0.2 % (ref 0–1.9)
BILIRUB SERPL-MCNC: 2.4 MG/DL (ref 0.1–1)
BUN SERPL-MCNC: 9 MG/DL (ref 6–20)
CALCIUM SERPL-MCNC: 8.1 MG/DL (ref 8.7–10.5)
CHLORIDE SERPL-SCNC: 100 MMOL/L (ref 95–110)
CO2 SERPL-SCNC: 26 MMOL/L (ref 23–29)
CREAT SERPL-MCNC: 0.9 MG/DL (ref 0.5–1.4)
DIFFERENTIAL METHOD: ABNORMAL
EOSINOPHIL # BLD AUTO: 0.1 K/UL (ref 0–0.5)
EOSINOPHIL NFR BLD: 1.4 % (ref 0–8)
ERYTHROCYTE [DISTWIDTH] IN BLOOD BY AUTOMATED COUNT: 12.7 % (ref 11.5–14.5)
EST. GFR  (AFRICAN AMERICAN): >60 ML/MIN/1.73 M^2
EST. GFR  (NON AFRICAN AMERICAN): >60 ML/MIN/1.73 M^2
FINAL PATHOLOGIC DIAGNOSIS: NORMAL
GLUCOSE SERPL-MCNC: 124 MG/DL (ref 70–110)
HCT VFR BLD AUTO: 33.6 % (ref 40–54)
HGB BLD-MCNC: 10.9 G/DL (ref 14–18)
IMM GRANULOCYTES # BLD AUTO: 0.03 K/UL (ref 0–0.04)
IMM GRANULOCYTES NFR BLD AUTO: 0.5 % (ref 0–0.5)
LYMPHOCYTES # BLD AUTO: 0.8 K/UL (ref 1–4.8)
LYMPHOCYTES NFR BLD: 13.6 % (ref 18–48)
Lab: NORMAL
MAGNESIUM SERPL-MCNC: 1.9 MG/DL (ref 1.6–2.6)
MCH RBC QN AUTO: 31.5 PG (ref 27–31)
MCHC RBC AUTO-ENTMCNC: 32.4 G/DL (ref 32–36)
MCV RBC AUTO: 97 FL (ref 82–98)
MONOCYTES # BLD AUTO: 0.5 K/UL (ref 0.3–1)
MONOCYTES NFR BLD: 8.9 % (ref 4–15)
NEUTROPHILS # BLD AUTO: 4.2 K/UL (ref 1.8–7.7)
NEUTROPHILS NFR BLD: 75.4 % (ref 38–73)
NRBC BLD-RTO: 0 /100 WBC
PHOSPHATE SERPL-MCNC: 2.3 MG/DL (ref 2.7–4.5)
PLATELET # BLD AUTO: 140 K/UL (ref 150–450)
PMV BLD AUTO: 9.7 FL (ref 9.2–12.9)
POTASSIUM SERPL-SCNC: 4.1 MMOL/L (ref 3.5–5.1)
PROT SERPL-MCNC: 5.2 G/DL (ref 6–8.4)
RBC # BLD AUTO: 3.46 M/UL (ref 4.6–6.2)
SODIUM SERPL-SCNC: 133 MMOL/L (ref 136–145)
WBC # BLD AUTO: 5.53 K/UL (ref 3.9–12.7)

## 2021-11-05 PROCEDURE — 99217 PR OBSERVATION CARE DISCHARGE: ICD-10-PCS | Mod: ,,, | Performed by: INTERNAL MEDICINE

## 2021-11-05 PROCEDURE — 85025 COMPLETE CBC W/AUTO DIFF WBC: CPT | Performed by: HOSPITALIST

## 2021-11-05 PROCEDURE — 84100 ASSAY OF PHOSPHORUS: CPT | Performed by: HOSPITALIST

## 2021-11-05 PROCEDURE — 83735 ASSAY OF MAGNESIUM: CPT | Performed by: HOSPITALIST

## 2021-11-05 PROCEDURE — 99217 PR OBSERVATION CARE DISCHARGE: CPT | Mod: ,,, | Performed by: INTERNAL MEDICINE

## 2021-11-05 PROCEDURE — 80053 COMPREHEN METABOLIC PANEL: CPT | Performed by: STUDENT IN AN ORGANIZED HEALTH CARE EDUCATION/TRAINING PROGRAM

## 2021-11-05 PROCEDURE — 36415 COLL VENOUS BLD VENIPUNCTURE: CPT | Performed by: STUDENT IN AN ORGANIZED HEALTH CARE EDUCATION/TRAINING PROGRAM

## 2021-11-05 PROCEDURE — 25000003 PHARM REV CODE 250: Performed by: HOSPITALIST

## 2021-11-05 PROCEDURE — 63600175 PHARM REV CODE 636 W HCPCS: Performed by: HOSPITALIST

## 2021-11-05 PROCEDURE — G0378 HOSPITAL OBSERVATION PER HR: HCPCS

## 2021-11-05 RX ORDER — LIDOCAINE AND PRILOCAINE 25; 25 MG/G; MG/G
CREAM TOPICAL ONCE
Qty: 30 G | Refills: 2 | Status: SHIPPED | OUTPATIENT
Start: 2021-11-05 | End: 2021-11-05

## 2021-11-05 RX ORDER — CIPROFLOXACIN 500 MG/1
500 TABLET ORAL EVERY 12 HOURS
Qty: 28 TABLET | Refills: 0 | Status: SHIPPED | OUTPATIENT
Start: 2021-11-05 | End: 2021-11-19

## 2021-11-05 RX ORDER — METRONIDAZOLE 500 MG/1
500 TABLET ORAL EVERY 8 HOURS
Qty: 42 TABLET | Refills: 0 | Status: SHIPPED | OUTPATIENT
Start: 2021-11-05 | End: 2021-11-19

## 2021-11-05 RX ADMIN — PIPERACILLIN AND TAZOBACTAM 4.5 G: 4; .5 INJECTION, POWDER, LYOPHILIZED, FOR SOLUTION INTRAVENOUS; PARENTERAL at 05:11

## 2021-11-05 RX ADMIN — LOSARTAN POTASSIUM 50 MG: 50 TABLET, FILM COATED ORAL at 09:11

## 2021-11-08 ENCOUNTER — TELEPHONE (OUTPATIENT)
Dept: RADIATION ONCOLOGY | Facility: CLINIC | Age: 41
End: 2021-11-08
Payer: COMMERCIAL

## 2021-11-08 ENCOUNTER — TELEPHONE (OUTPATIENT)
Dept: TRANSPLANT | Facility: CLINIC | Age: 41
End: 2021-11-08
Payer: COMMERCIAL

## 2021-11-09 ENCOUNTER — DOCUMENTATION ONLY (OUTPATIENT)
Dept: HEMATOLOGY/ONCOLOGY | Facility: CLINIC | Age: 41
End: 2021-11-09
Payer: COMMERCIAL

## 2021-11-09 ENCOUNTER — PATIENT MESSAGE (OUTPATIENT)
Dept: TRANSPLANT | Facility: CLINIC | Age: 41
End: 2021-11-09
Payer: COMMERCIAL

## 2021-11-09 LAB
BACTERIA BLD CULT: NORMAL
BACTERIA BLD CULT: NORMAL
FINAL PATHOLOGIC DIAGNOSIS: NORMAL

## 2021-11-10 ENCOUNTER — TELEPHONE (OUTPATIENT)
Dept: INTERVENTIONAL RADIOLOGY/VASCULAR | Facility: CLINIC | Age: 41
End: 2021-11-10
Payer: COMMERCIAL

## 2021-11-10 DIAGNOSIS — R63.4 WEIGHT LOSS: Primary | ICD-10-CM

## 2021-11-10 DIAGNOSIS — C24.0 HILAR CHOLANGIOCARCINOMA: Primary | ICD-10-CM

## 2021-11-10 LAB
BACTERIA BLD CULT: NORMAL
BACTERIA BLD CULT: NORMAL

## 2021-11-11 ENCOUNTER — OFFICE VISIT (OUTPATIENT)
Dept: TRANSPLANT | Facility: CLINIC | Age: 41
End: 2021-11-11
Payer: COMMERCIAL

## 2021-11-11 ENCOUNTER — PATIENT MESSAGE (OUTPATIENT)
Dept: HEMATOLOGY/ONCOLOGY | Facility: CLINIC | Age: 41
End: 2021-11-11
Payer: COMMERCIAL

## 2021-11-11 VITALS
SYSTOLIC BLOOD PRESSURE: 137 MMHG | BODY MASS INDEX: 26.94 KG/M2 | HEART RATE: 112 BPM | RESPIRATION RATE: 16 BRPM | DIASTOLIC BLOOD PRESSURE: 89 MMHG | OXYGEN SATURATION: 98 % | TEMPERATURE: 97 F | WEIGHT: 181.88 LBS | HEIGHT: 69 IN

## 2021-11-11 DIAGNOSIS — C24.0 HILAR CHOLANGIOCARCINOMA: Primary | ICD-10-CM

## 2021-11-11 PROCEDURE — 99499 NO LOS: ICD-10-PCS | Mod: S$GLB,,, | Performed by: TRANSPLANT SURGERY

## 2021-11-11 PROCEDURE — 3079F DIAST BP 80-89 MM HG: CPT | Mod: CPTII,S$GLB,, | Performed by: TRANSPLANT SURGERY

## 2021-11-11 PROCEDURE — 3075F SYST BP GE 130 - 139MM HG: CPT | Mod: CPTII,S$GLB,, | Performed by: TRANSPLANT SURGERY

## 2021-11-11 PROCEDURE — 3075F PR MOST RECENT SYSTOLIC BLOOD PRESS GE 130-139MM HG: ICD-10-PCS | Mod: CPTII,S$GLB,, | Performed by: TRANSPLANT SURGERY

## 2021-11-11 PROCEDURE — 99499 UNLISTED E&M SERVICE: CPT | Mod: S$GLB,,, | Performed by: TRANSPLANT SURGERY

## 2021-11-11 PROCEDURE — 99999 PR PBB SHADOW E&M-EST. PATIENT-LVL III: CPT | Mod: PBBFAC,,,

## 2021-11-11 PROCEDURE — 3008F BODY MASS INDEX DOCD: CPT | Mod: CPTII,S$GLB,, | Performed by: TRANSPLANT SURGERY

## 2021-11-11 PROCEDURE — 99999 PR PBB SHADOW E&M-EST. PATIENT-LVL III: ICD-10-PCS | Mod: PBBFAC,,,

## 2021-11-11 PROCEDURE — 3008F PR BODY MASS INDEX (BMI) DOCUMENTED: ICD-10-PCS | Mod: CPTII,S$GLB,, | Performed by: TRANSPLANT SURGERY

## 2021-11-11 PROCEDURE — 3079F PR MOST RECENT DIASTOLIC BLOOD PRESSURE 80-89 MM HG: ICD-10-PCS | Mod: CPTII,S$GLB,, | Performed by: TRANSPLANT SURGERY

## 2021-11-15 ENCOUNTER — HOSPITAL ENCOUNTER (OUTPATIENT)
Dept: RADIATION THERAPY | Facility: HOSPITAL | Age: 41
Discharge: HOME | End: 2021-11-15
Attending: RADIOLOGY
Payer: COMMERCIAL

## 2021-11-15 DIAGNOSIS — C24.0 HILAR CHOLANGIOCARCINOMA: Primary | ICD-10-CM

## 2021-11-15 LAB
FINAL PATHOLOGIC DIAGNOSIS: NORMAL
GROSS: NORMAL
Lab: NORMAL

## 2021-11-15 PROCEDURE — 77334 RADIATION TREATMENT AID(S): CPT | Mod: TC | Performed by: RADIOLOGY

## 2021-11-15 PROCEDURE — 77014 HC CT GUIDANCE RADIATION THERAPY FLDS PLACEMENT: CPT | Mod: TC | Performed by: RADIOLOGY

## 2021-11-16 ENCOUNTER — TELEPHONE (OUTPATIENT)
Dept: HEMATOLOGY/ONCOLOGY | Facility: CLINIC | Age: 41
End: 2021-11-16
Payer: COMMERCIAL

## 2021-11-18 ENCOUNTER — TELEPHONE (OUTPATIENT)
Dept: RADIATION ONCOLOGY | Facility: CLINIC | Age: 41
End: 2021-11-18
Payer: COMMERCIAL

## 2021-11-19 PROCEDURE — 77301 RADIOTHERAPY DOSE PLAN IMRT: CPT | Mod: 26,,, | Performed by: RADIOLOGY

## 2021-11-19 PROCEDURE — 77301 RADIOTHERAPY DOSE PLAN IMRT: CPT | Mod: TC | Performed by: RADIOLOGY

## 2021-11-19 PROCEDURE — 77301 PR  INTEN MOD RADIOTHER PLAN W/DOSE VOL HIST: ICD-10-PCS | Mod: 26,,, | Performed by: RADIOLOGY

## 2021-11-22 ENCOUNTER — PATIENT MESSAGE (OUTPATIENT)
Dept: HEMATOLOGY/ONCOLOGY | Facility: CLINIC | Age: 41
End: 2021-11-22
Payer: COMMERCIAL

## 2021-11-22 PROCEDURE — 77300 RADIATION THERAPY DOSE PLAN: CPT | Mod: TC | Performed by: RADIOLOGY

## 2021-11-22 PROCEDURE — 77300 RADIATION THERAPY DOSE PLAN: CPT | Mod: 26,,, | Performed by: RADIOLOGY

## 2021-11-22 PROCEDURE — 77338 DESIGN MLC DEVICE FOR IMRT: CPT | Mod: 26,,, | Performed by: RADIOLOGY

## 2021-11-22 PROCEDURE — 77338 DESIGN MLC DEVICE FOR IMRT: CPT | Mod: TC | Performed by: RADIOLOGY

## 2021-11-22 PROCEDURE — 77300 PR RADIATION THERAPY,DOSIMETRY PLAN: ICD-10-PCS | Mod: 26,,, | Performed by: RADIOLOGY

## 2021-11-22 PROCEDURE — 77338 PR  MLC IMRT DESIGN & CONSTRUCTION PER IMRT PLAN: ICD-10-PCS | Mod: 26,,, | Performed by: RADIOLOGY

## 2021-11-23 PROCEDURE — 77470 SPECIAL RADIATION TREATMENT: CPT | Mod: 26,59,, | Performed by: RADIOLOGY

## 2021-11-23 PROCEDURE — 77470 SPECIAL RADIATION TREATMENT: CPT | Mod: 59,TC | Performed by: RADIOLOGY

## 2021-11-23 PROCEDURE — 77470 PR  SPECIAL RADIATION TREATMENT: ICD-10-PCS | Mod: 26,59,, | Performed by: RADIOLOGY

## 2021-11-24 ENCOUNTER — HOSPITAL ENCOUNTER (OUTPATIENT)
Facility: OTHER | Age: 41
Discharge: HOME | End: 2021-11-24
Attending: RADIOLOGY | Admitting: RADIOLOGY
Payer: COMMERCIAL

## 2021-11-24 VITALS
HEIGHT: 69 IN | WEIGHT: 181 LBS | BODY MASS INDEX: 26.81 KG/M2 | SYSTOLIC BLOOD PRESSURE: 151 MMHG | HEART RATE: 80 BPM | DIASTOLIC BLOOD PRESSURE: 91 MMHG | RESPIRATION RATE: 16 BRPM | OXYGEN SATURATION: 99 % | TEMPERATURE: 98 F

## 2021-11-24 DIAGNOSIS — C24.0 HILAR CHOLANGIOCARCINOMA: ICD-10-CM

## 2021-11-24 DIAGNOSIS — C22.1 CHOLANGIOCARCINOMA: ICD-10-CM

## 2021-11-24 PROCEDURE — 63600175 PHARM REV CODE 636 W HCPCS: Performed by: RADIOLOGY

## 2021-11-24 PROCEDURE — 99153 MOD SED SAME PHYS/QHP EA: CPT | Performed by: RADIOLOGY

## 2021-11-24 PROCEDURE — C1894 INTRO/SHEATH, NON-LASER: HCPCS | Performed by: RADIOLOGY

## 2021-11-24 PROCEDURE — 99152 MOD SED SAME PHYS/QHP 5/>YRS: CPT | Performed by: RADIOLOGY

## 2021-11-24 PROCEDURE — C1788 PORT, INDWELLING, IMP: HCPCS | Performed by: RADIOLOGY

## 2021-11-24 PROCEDURE — 25000003 PHARM REV CODE 250: Performed by: RADIOLOGY

## 2021-11-24 DEVICE — POWERPORT CLEARVUE IMPLANTABLE PORT WITH ATTACHABLE 8F POLYURETHANE OPEN-ENDED SINGLE-LUMEN VENOUS CATHETER INTERMEDIATE KIT
Type: IMPLANTABLE DEVICE | Site: CHEST  WALL | Status: FUNCTIONAL
Brand: POWERPORT CLEARVUE

## 2021-11-24 RX ORDER — CEFAZOLIN SODIUM 1 G/50ML
SOLUTION INTRAVENOUS
Status: DISCONTINUED | OUTPATIENT
Start: 2021-11-24 | End: 2021-11-24 | Stop reason: HOSPADM

## 2021-11-24 RX ORDER — LIDOCAINE HYDROCHLORIDE 10 MG/ML
INJECTION INFILTRATION; PERINEURAL
Status: DISCONTINUED | OUTPATIENT
Start: 2021-11-24 | End: 2021-11-24 | Stop reason: HOSPADM

## 2021-11-24 RX ORDER — HEPARIN SODIUM 1000 [USP'U]/ML
INJECTION, SOLUTION INTRAVENOUS; SUBCUTANEOUS
Status: DISCONTINUED | OUTPATIENT
Start: 2021-11-24 | End: 2021-11-24 | Stop reason: HOSPADM

## 2021-11-24 RX ORDER — FENTANYL CITRATE 50 UG/ML
INJECTION, SOLUTION INTRAMUSCULAR; INTRAVENOUS
Status: DISCONTINUED | OUTPATIENT
Start: 2021-11-24 | End: 2021-11-24 | Stop reason: HOSPADM

## 2021-11-24 RX ORDER — HYDROCODONE BITARTRATE AND ACETAMINOPHEN 5; 325 MG/1; MG/1
1 TABLET ORAL EVERY 4 HOURS PRN
Status: DISCONTINUED | OUTPATIENT
Start: 2021-11-24 | End: 2021-11-24 | Stop reason: HOSPADM

## 2021-11-24 RX ORDER — MIDAZOLAM HYDROCHLORIDE 1 MG/ML
INJECTION, SOLUTION INTRAMUSCULAR; INTRAVENOUS
Status: DISCONTINUED | OUTPATIENT
Start: 2021-11-24 | End: 2021-11-24 | Stop reason: HOSPADM

## 2021-11-29 ENCOUNTER — LAB VISIT (OUTPATIENT)
Dept: LAB | Facility: HOSPITAL | Age: 41
End: 2021-11-29
Attending: INTERNAL MEDICINE
Payer: COMMERCIAL

## 2021-11-29 ENCOUNTER — INFUSION (OUTPATIENT)
Dept: INFUSION THERAPY | Facility: HOSPITAL | Age: 41
End: 2021-11-29
Attending: INTERNAL MEDICINE
Payer: COMMERCIAL

## 2021-11-29 ENCOUNTER — OFFICE VISIT (OUTPATIENT)
Dept: HEMATOLOGY/ONCOLOGY | Facility: CLINIC | Age: 41
End: 2021-11-29
Payer: COMMERCIAL

## 2021-11-29 VITALS
BODY MASS INDEX: 28.01 KG/M2 | DIASTOLIC BLOOD PRESSURE: 90 MMHG | RESPIRATION RATE: 18 BRPM | OXYGEN SATURATION: 100 % | WEIGHT: 189.13 LBS | TEMPERATURE: 98 F | SYSTOLIC BLOOD PRESSURE: 140 MMHG | HEART RATE: 93 BPM | HEIGHT: 69 IN

## 2021-11-29 VITALS
TEMPERATURE: 98 F | SYSTOLIC BLOOD PRESSURE: 136 MMHG | BODY MASS INDEX: 28.01 KG/M2 | DIASTOLIC BLOOD PRESSURE: 97 MMHG | HEIGHT: 69 IN | RESPIRATION RATE: 18 BRPM | HEART RATE: 80 BPM | WEIGHT: 189.13 LBS

## 2021-11-29 DIAGNOSIS — C24.0 HILAR CHOLANGIOCARCINOMA: ICD-10-CM

## 2021-11-29 DIAGNOSIS — K83.1 BILIARY OBSTRUCTION: ICD-10-CM

## 2021-11-29 DIAGNOSIS — C24.0 HILAR CHOLANGIOCARCINOMA: Primary | ICD-10-CM

## 2021-11-29 DIAGNOSIS — K83.1 BILIARY OBSTRUCTION: Primary | ICD-10-CM

## 2021-11-29 DIAGNOSIS — E80.6 HYPERBILIRUBINEMIA: ICD-10-CM

## 2021-11-29 LAB
ALBUMIN SERPL BCP-MCNC: 4 G/DL (ref 3.5–5.2)
ALP SERPL-CCNC: 162 U/L (ref 55–135)
ALT SERPL W/O P-5'-P-CCNC: 53 U/L (ref 10–44)
ANION GAP SERPL CALC-SCNC: 8 MMOL/L (ref 8–16)
AST SERPL-CCNC: 41 U/L (ref 10–40)
BASOPHILS # BLD AUTO: 0.03 K/UL (ref 0–0.2)
BASOPHILS NFR BLD: 0.6 % (ref 0–1.9)
BILIRUB SERPL-MCNC: 1.1 MG/DL (ref 0.1–1)
BUN SERPL-MCNC: 11 MG/DL (ref 6–20)
CALCIUM SERPL-MCNC: 9.9 MG/DL (ref 8.7–10.5)
CANCER AG19-9 SERPL-ACNC: 2403 U/ML (ref 0–40)
CHLORIDE SERPL-SCNC: 103 MMOL/L (ref 95–110)
CO2 SERPL-SCNC: 28 MMOL/L (ref 23–29)
CREAT SERPL-MCNC: 0.8 MG/DL (ref 0.5–1.4)
DIFFERENTIAL METHOD: ABNORMAL
EOSINOPHIL # BLD AUTO: 0.2 K/UL (ref 0–0.5)
EOSINOPHIL NFR BLD: 4.8 % (ref 0–8)
ERYTHROCYTE [DISTWIDTH] IN BLOOD BY AUTOMATED COUNT: 13.2 % (ref 11.5–14.5)
EST. GFR  (AFRICAN AMERICAN): >60 ML/MIN/1.73 M^2
EST. GFR  (NON AFRICAN AMERICAN): >60 ML/MIN/1.73 M^2
GLUCOSE SERPL-MCNC: 95 MG/DL (ref 70–110)
HCT VFR BLD AUTO: 43.2 % (ref 40–54)
HGB BLD-MCNC: 14.5 G/DL (ref 14–18)
IMM GRANULOCYTES # BLD AUTO: 0.01 K/UL (ref 0–0.04)
IMM GRANULOCYTES NFR BLD AUTO: 0.2 % (ref 0–0.5)
LYMPHOCYTES # BLD AUTO: 1.6 K/UL (ref 1–4.8)
LYMPHOCYTES NFR BLD: 31.7 % (ref 18–48)
MCH RBC QN AUTO: 32.2 PG (ref 27–31)
MCHC RBC AUTO-ENTMCNC: 33.6 G/DL (ref 32–36)
MCV RBC AUTO: 96 FL (ref 82–98)
MONOCYTES # BLD AUTO: 0.5 K/UL (ref 0.3–1)
MONOCYTES NFR BLD: 10.3 % (ref 4–15)
NEUTROPHILS # BLD AUTO: 2.6 K/UL (ref 1.8–7.7)
NEUTROPHILS NFR BLD: 52.4 % (ref 38–73)
NRBC BLD-RTO: 0 /100 WBC
PLATELET # BLD AUTO: 153 K/UL (ref 150–450)
PMV BLD AUTO: 8.6 FL (ref 9.2–12.9)
POTASSIUM SERPL-SCNC: 4.3 MMOL/L (ref 3.5–5.1)
PROT SERPL-MCNC: 7 G/DL (ref 6–8.4)
RBC # BLD AUTO: 4.5 M/UL (ref 4.6–6.2)
SODIUM SERPL-SCNC: 139 MMOL/L (ref 136–145)
WBC # BLD AUTO: 4.95 K/UL (ref 3.9–12.7)

## 2021-11-29 PROCEDURE — 96416 CHEMO PROLONG INFUSE W/PUMP: CPT

## 2021-11-29 PROCEDURE — 4010F PR ACE/ARB THEARPY RXD/TAKEN: ICD-10-PCS | Mod: CPTII,S$GLB,, | Performed by: NURSE PRACTITIONER

## 2021-11-29 PROCEDURE — 96374 THER/PROPH/DIAG INJ IV PUSH: CPT

## 2021-11-29 PROCEDURE — 77014 PR  CT GUIDANCE PLACEMENT RAD THERAPY FIELDS: ICD-10-PCS | Mod: 26,,, | Performed by: RADIOLOGY

## 2021-11-29 PROCEDURE — 36415 COLL VENOUS BLD VENIPUNCTURE: CPT | Performed by: INTERNAL MEDICINE

## 2021-11-29 PROCEDURE — 99999 PR PBB SHADOW E&M-EST. PATIENT-LVL III: CPT | Mod: PBBFAC,,, | Performed by: NURSE PRACTITIONER

## 2021-11-29 PROCEDURE — 63600175 PHARM REV CODE 636 W HCPCS: Performed by: INTERNAL MEDICINE

## 2021-11-29 PROCEDURE — 85025 COMPLETE CBC W/AUTO DIFF WBC: CPT | Performed by: INTERNAL MEDICINE

## 2021-11-29 PROCEDURE — 99999 PR PBB SHADOW E&M-EST. PATIENT-LVL III: ICD-10-PCS | Mod: PBBFAC,,, | Performed by: NURSE PRACTITIONER

## 2021-11-29 PROCEDURE — 80053 COMPREHEN METABOLIC PANEL: CPT | Performed by: INTERNAL MEDICINE

## 2021-11-29 PROCEDURE — 77386 HC IMRT, COMPLEX: CPT | Performed by: RADIOLOGY

## 2021-11-29 PROCEDURE — 77417 THER RADIOLOGY PORT IMAGE(S): CPT | Performed by: RADIOLOGY

## 2021-11-29 PROCEDURE — 86301 IMMUNOASSAY TUMOR CA 19-9: CPT | Performed by: INTERNAL MEDICINE

## 2021-11-29 PROCEDURE — 96375 TX/PRO/DX INJ NEW DRUG ADDON: CPT

## 2021-11-29 PROCEDURE — 4010F ACE/ARB THERAPY RXD/TAKEN: CPT | Mod: CPTII,S$GLB,, | Performed by: NURSE PRACTITIONER

## 2021-11-29 PROCEDURE — 99215 PR OFFICE/OUTPT VISIT, EST, LEVL V, 40-54 MIN: ICD-10-PCS | Mod: S$GLB,,, | Performed by: NURSE PRACTITIONER

## 2021-11-29 PROCEDURE — 77014 HC CT GUIDANCE RADIATION THERAPY FLDS PLACEMENT: CPT | Mod: TC | Performed by: RADIOLOGY

## 2021-11-29 PROCEDURE — G6002 PR STEREOSCOPIC XRAY GUIDE FOR RADIATION TX DELIV: ICD-10-PCS | Mod: 26,59,, | Performed by: RADIOLOGY

## 2021-11-29 PROCEDURE — G6002 STEREOSCOPIC X-RAY GUIDANCE: HCPCS | Mod: 26,59,, | Performed by: RADIOLOGY

## 2021-11-29 PROCEDURE — 77014 PR  CT GUIDANCE PLACEMENT RAD THERAPY FIELDS: CPT | Mod: 26,,, | Performed by: RADIOLOGY

## 2021-11-29 PROCEDURE — 99215 OFFICE O/P EST HI 40 MIN: CPT | Mod: S$GLB,,, | Performed by: NURSE PRACTITIONER

## 2021-11-29 PROCEDURE — 25000003 PHARM REV CODE 250: Performed by: INTERNAL MEDICINE

## 2021-11-29 RX ORDER — ONDANSETRON 2 MG/ML
8 INJECTION INTRAMUSCULAR; INTRAVENOUS
Status: COMPLETED | OUTPATIENT
Start: 2021-11-29 | End: 2021-11-29

## 2021-11-29 RX ORDER — HEPARIN 100 UNIT/ML
500 SYRINGE INTRAVENOUS
Status: CANCELLED | OUTPATIENT
Start: 2021-11-29

## 2021-11-29 RX ORDER — HEPARIN 100 UNIT/ML
500 SYRINGE INTRAVENOUS
Status: DISCONTINUED | OUTPATIENT
Start: 2021-11-29 | End: 2021-11-29 | Stop reason: HOSPADM

## 2021-11-29 RX ORDER — SODIUM CHLORIDE 0.9 % (FLUSH) 0.9 %
10 SYRINGE (ML) INJECTION
Status: CANCELLED | OUTPATIENT
Start: 2021-11-29

## 2021-11-29 RX ORDER — ONDANSETRON 2 MG/ML
8 INJECTION INTRAMUSCULAR; INTRAVENOUS
Status: CANCELLED | OUTPATIENT
Start: 2021-11-29

## 2021-11-29 RX ORDER — HEPARIN 100 UNIT/ML
500 SYRINGE INTRAVENOUS
Status: CANCELLED | OUTPATIENT
Start: 2021-12-04

## 2021-11-29 RX ORDER — SODIUM CHLORIDE 0.9 % (FLUSH) 0.9 %
10 SYRINGE (ML) INJECTION
Status: CANCELLED | OUTPATIENT
Start: 2021-12-04

## 2021-11-29 RX ORDER — SODIUM CHLORIDE 0.9 % (FLUSH) 0.9 %
10 SYRINGE (ML) INJECTION
Status: DISCONTINUED | OUTPATIENT
Start: 2021-11-29 | End: 2021-11-29 | Stop reason: HOSPADM

## 2021-11-29 RX ADMIN — ONDANSETRON 8 MG: 2 INJECTION, SOLUTION INTRAMUSCULAR; INTRAVENOUS at 11:11

## 2021-11-29 RX ADMIN — FLUOROURACIL 2275 MG: 50 INJECTION, SOLUTION INTRAVENOUS at 12:11

## 2021-11-30 PROCEDURE — G6002 PR STEREOSCOPIC XRAY GUIDE FOR RADIATION TX DELIV: ICD-10-PCS | Mod: 26,,, | Performed by: RADIOLOGY

## 2021-11-30 PROCEDURE — G6002 STEREOSCOPIC X-RAY GUIDANCE: HCPCS | Mod: 26,,, | Performed by: RADIOLOGY

## 2021-11-30 PROCEDURE — 77386 HC IMRT, COMPLEX: CPT | Performed by: RADIOLOGY

## 2021-12-01 ENCOUNTER — DOCUMENTATION ONLY (OUTPATIENT)
Dept: RADIATION ONCOLOGY | Facility: CLINIC | Age: 41
End: 2021-12-01
Payer: COMMERCIAL

## 2021-12-01 ENCOUNTER — HOSPITAL ENCOUNTER (OUTPATIENT)
Dept: RADIATION THERAPY | Facility: HOSPITAL | Age: 41
Discharge: HOME | End: 2021-12-01
Attending: RADIOLOGY
Payer: COMMERCIAL

## 2021-12-01 PROCEDURE — 77014 PR  CT GUIDANCE PLACEMENT RAD THERAPY FIELDS: ICD-10-PCS | Mod: 26,,, | Performed by: RADIOLOGY

## 2021-12-01 PROCEDURE — G6002 STEREOSCOPIC X-RAY GUIDANCE: HCPCS | Mod: 26,59,, | Performed by: RADIOLOGY

## 2021-12-01 PROCEDURE — 77014 HC CT GUIDANCE RADIATION THERAPY FLDS PLACEMENT: CPT | Mod: TC | Performed by: RADIOLOGY

## 2021-12-01 PROCEDURE — 77336 RADIATION PHYSICS CONSULT: CPT | Performed by: RADIOLOGY

## 2021-12-01 PROCEDURE — 77386 HC IMRT, COMPLEX: CPT | Performed by: RADIOLOGY

## 2021-12-01 PROCEDURE — 77014 PR  CT GUIDANCE PLACEMENT RAD THERAPY FIELDS: CPT | Mod: 26,,, | Performed by: RADIOLOGY

## 2021-12-01 PROCEDURE — G6002 PR STEREOSCOPIC XRAY GUIDE FOR RADIATION TX DELIV: ICD-10-PCS | Mod: 26,59,, | Performed by: RADIOLOGY

## 2021-12-02 PROCEDURE — G6002 PR STEREOSCOPIC XRAY GUIDE FOR RADIATION TX DELIV: ICD-10-PCS | Mod: 26,,, | Performed by: RADIOLOGY

## 2021-12-02 PROCEDURE — 77417 THER RADIOLOGY PORT IMAGE(S): CPT | Performed by: RADIOLOGY

## 2021-12-02 PROCEDURE — 77386 HC IMRT, COMPLEX: CPT | Performed by: RADIOLOGY

## 2021-12-02 PROCEDURE — G6002 STEREOSCOPIC X-RAY GUIDANCE: HCPCS | Mod: 26,,, | Performed by: RADIOLOGY

## 2021-12-03 PROCEDURE — G6002 PR STEREOSCOPIC XRAY GUIDE FOR RADIATION TX DELIV: ICD-10-PCS | Mod: 26,,, | Performed by: RADIOLOGY

## 2021-12-03 PROCEDURE — 77386 HC IMRT, COMPLEX: CPT | Performed by: RADIOLOGY

## 2021-12-03 PROCEDURE — G6002 STEREOSCOPIC X-RAY GUIDANCE: HCPCS | Mod: 26,,, | Performed by: RADIOLOGY

## 2021-12-04 ENCOUNTER — INFUSION (OUTPATIENT)
Dept: INFUSION THERAPY | Facility: HOSPITAL | Age: 41
End: 2021-12-04
Attending: INTERNAL MEDICINE
Payer: COMMERCIAL

## 2021-12-04 VITALS
RESPIRATION RATE: 18 BRPM | OXYGEN SATURATION: 100 % | SYSTOLIC BLOOD PRESSURE: 145 MMHG | DIASTOLIC BLOOD PRESSURE: 90 MMHG | HEART RATE: 92 BPM | TEMPERATURE: 98 F

## 2021-12-04 DIAGNOSIS — K83.1 BILIARY OBSTRUCTION: Primary | ICD-10-CM

## 2021-12-04 PROCEDURE — 63600175 PHARM REV CODE 636 W HCPCS: Performed by: INTERNAL MEDICINE

## 2021-12-04 PROCEDURE — A4216 STERILE WATER/SALINE, 10 ML: HCPCS | Performed by: INTERNAL MEDICINE

## 2021-12-04 PROCEDURE — 25000003 PHARM REV CODE 250: Performed by: INTERNAL MEDICINE

## 2021-12-04 RX ORDER — HEPARIN 100 UNIT/ML
500 SYRINGE INTRAVENOUS
Status: DISCONTINUED | OUTPATIENT
Start: 2021-12-04 | End: 2021-12-04 | Stop reason: HOSPADM

## 2021-12-04 RX ORDER — SODIUM CHLORIDE 0.9 % (FLUSH) 0.9 %
10 SYRINGE (ML) INJECTION
Status: DISCONTINUED | OUTPATIENT
Start: 2021-12-04 | End: 2021-12-04 | Stop reason: HOSPADM

## 2021-12-04 RX ADMIN — HEPARIN 500 UNITS: 100 SYRINGE at 12:12

## 2021-12-04 RX ADMIN — Medication 10 ML: at 12:12

## 2021-12-06 ENCOUNTER — OFFICE VISIT (OUTPATIENT)
Dept: HEMATOLOGY/ONCOLOGY | Facility: CLINIC | Age: 41
End: 2021-12-06
Payer: COMMERCIAL

## 2021-12-06 ENCOUNTER — LAB VISIT (OUTPATIENT)
Dept: LAB | Facility: HOSPITAL | Age: 41
End: 2021-12-06
Attending: INTERNAL MEDICINE
Payer: COMMERCIAL

## 2021-12-06 ENCOUNTER — INFUSION (OUTPATIENT)
Dept: INFUSION THERAPY | Facility: HOSPITAL | Age: 41
End: 2021-12-06
Attending: INTERNAL MEDICINE
Payer: COMMERCIAL

## 2021-12-06 VITALS
HEIGHT: 69 IN | TEMPERATURE: 98 F | DIASTOLIC BLOOD PRESSURE: 93 MMHG | SYSTOLIC BLOOD PRESSURE: 133 MMHG | BODY MASS INDEX: 27.77 KG/M2 | WEIGHT: 187.5 LBS | HEART RATE: 70 BPM | OXYGEN SATURATION: 99 % | RESPIRATION RATE: 18 BRPM

## 2021-12-06 DIAGNOSIS — Z79.899 IMMUNODEFICIENCY SECONDARY TO CHEMOTHERAPY: ICD-10-CM

## 2021-12-06 DIAGNOSIS — D49.9 IMMUNODEFICIENCY SECONDARY TO NEOPLASM: ICD-10-CM

## 2021-12-06 DIAGNOSIS — C24.0 HILAR CHOLANGIOCARCINOMA: ICD-10-CM

## 2021-12-06 DIAGNOSIS — D84.81 IMMUNODEFICIENCY SECONDARY TO NEOPLASM: ICD-10-CM

## 2021-12-06 DIAGNOSIS — C24.0 HILAR CHOLANGIOCARCINOMA: Primary | ICD-10-CM

## 2021-12-06 DIAGNOSIS — K83.1 BILIARY OBSTRUCTION: Primary | ICD-10-CM

## 2021-12-06 DIAGNOSIS — T45.1X5A IMMUNODEFICIENCY SECONDARY TO CHEMOTHERAPY: ICD-10-CM

## 2021-12-06 DIAGNOSIS — E80.6 HYPERBILIRUBINEMIA: ICD-10-CM

## 2021-12-06 DIAGNOSIS — D84.821 IMMUNODEFICIENCY SECONDARY TO CHEMOTHERAPY: ICD-10-CM

## 2021-12-06 LAB
ALBUMIN SERPL BCP-MCNC: 3.8 G/DL (ref 3.5–5.2)
ALP SERPL-CCNC: 129 U/L (ref 55–135)
ALT SERPL W/O P-5'-P-CCNC: 39 U/L (ref 10–44)
ANION GAP SERPL CALC-SCNC: 10 MMOL/L (ref 8–16)
AST SERPL-CCNC: 26 U/L (ref 10–40)
BASOPHILS # BLD AUTO: 0.02 K/UL (ref 0–0.2)
BASOPHILS NFR BLD: 0.5 % (ref 0–1.9)
BILIRUB SERPL-MCNC: 1.3 MG/DL (ref 0.1–1)
BUN SERPL-MCNC: 11 MG/DL (ref 6–20)
CALCIUM SERPL-MCNC: 9.5 MG/DL (ref 8.7–10.5)
CHLORIDE SERPL-SCNC: 104 MMOL/L (ref 95–110)
CO2 SERPL-SCNC: 25 MMOL/L (ref 23–29)
CREAT SERPL-MCNC: 0.8 MG/DL (ref 0.5–1.4)
DIFFERENTIAL METHOD: ABNORMAL
EOSINOPHIL # BLD AUTO: 0.1 K/UL (ref 0–0.5)
EOSINOPHIL NFR BLD: 3.3 % (ref 0–8)
ERYTHROCYTE [DISTWIDTH] IN BLOOD BY AUTOMATED COUNT: 13.2 % (ref 11.5–14.5)
EST. GFR  (AFRICAN AMERICAN): >60 ML/MIN/1.73 M^2
EST. GFR  (NON AFRICAN AMERICAN): >60 ML/MIN/1.73 M^2
GLUCOSE SERPL-MCNC: 111 MG/DL (ref 70–110)
HCT VFR BLD AUTO: 40.7 % (ref 40–54)
HGB BLD-MCNC: 13.9 G/DL (ref 14–18)
IMM GRANULOCYTES # BLD AUTO: 0.02 K/UL (ref 0–0.04)
IMM GRANULOCYTES NFR BLD AUTO: 0.5 % (ref 0–0.5)
LYMPHOCYTES # BLD AUTO: 0.6 K/UL (ref 1–4.8)
LYMPHOCYTES NFR BLD: 15.1 % (ref 18–48)
MCH RBC QN AUTO: 32.2 PG (ref 27–31)
MCHC RBC AUTO-ENTMCNC: 34.2 G/DL (ref 32–36)
MCV RBC AUTO: 94 FL (ref 82–98)
MONOCYTES # BLD AUTO: 0.5 K/UL (ref 0.3–1)
MONOCYTES NFR BLD: 11.3 % (ref 4–15)
NEUTROPHILS # BLD AUTO: 2.8 K/UL (ref 1.8–7.7)
NEUTROPHILS NFR BLD: 69.3 % (ref 38–73)
NRBC BLD-RTO: 0 /100 WBC
PLATELET # BLD AUTO: 148 K/UL (ref 150–450)
PMV BLD AUTO: 8.8 FL (ref 9.2–12.9)
POTASSIUM SERPL-SCNC: 3.9 MMOL/L (ref 3.5–5.1)
PROT SERPL-MCNC: 6.9 G/DL (ref 6–8.4)
RBC # BLD AUTO: 4.32 M/UL (ref 4.6–6.2)
SODIUM SERPL-SCNC: 139 MMOL/L (ref 136–145)
WBC # BLD AUTO: 3.98 K/UL (ref 3.9–12.7)

## 2021-12-06 PROCEDURE — 99215 OFFICE O/P EST HI 40 MIN: CPT | Mod: S$GLB,,, | Performed by: INTERNAL MEDICINE

## 2021-12-06 PROCEDURE — 77336 RADIATION PHYSICS CONSULT: CPT | Performed by: RADIOLOGY

## 2021-12-06 PROCEDURE — 77014 PR  CT GUIDANCE PLACEMENT RAD THERAPY FIELDS: ICD-10-PCS | Mod: 26,,, | Performed by: RADIOLOGY

## 2021-12-06 PROCEDURE — 77386 HC IMRT, COMPLEX: CPT | Performed by: RADIOLOGY

## 2021-12-06 PROCEDURE — 80053 COMPREHEN METABOLIC PANEL: CPT | Performed by: INTERNAL MEDICINE

## 2021-12-06 PROCEDURE — 77014 HC CT GUIDANCE RADIATION THERAPY FLDS PLACEMENT: CPT | Mod: TC | Performed by: RADIOLOGY

## 2021-12-06 PROCEDURE — 96416 CHEMO PROLONG INFUSE W/PUMP: CPT

## 2021-12-06 PROCEDURE — G6002 STEREOSCOPIC X-RAY GUIDANCE: HCPCS | Mod: 26,59,, | Performed by: RADIOLOGY

## 2021-12-06 PROCEDURE — 25000003 PHARM REV CODE 250: Performed by: INTERNAL MEDICINE

## 2021-12-06 PROCEDURE — 36415 COLL VENOUS BLD VENIPUNCTURE: CPT | Performed by: INTERNAL MEDICINE

## 2021-12-06 PROCEDURE — 77014 PR  CT GUIDANCE PLACEMENT RAD THERAPY FIELDS: CPT | Mod: 26,,, | Performed by: RADIOLOGY

## 2021-12-06 PROCEDURE — 4010F PR ACE/ARB THEARPY RXD/TAKEN: ICD-10-PCS | Mod: CPTII,S$GLB,, | Performed by: INTERNAL MEDICINE

## 2021-12-06 PROCEDURE — 85025 COMPLETE CBC W/AUTO DIFF WBC: CPT | Performed by: INTERNAL MEDICINE

## 2021-12-06 PROCEDURE — 63600175 PHARM REV CODE 636 W HCPCS: Performed by: INTERNAL MEDICINE

## 2021-12-06 PROCEDURE — 99999 PR PBB SHADOW E&M-EST. PATIENT-LVL III: ICD-10-PCS | Mod: PBBFAC,,, | Performed by: INTERNAL MEDICINE

## 2021-12-06 PROCEDURE — 96375 TX/PRO/DX INJ NEW DRUG ADDON: CPT

## 2021-12-06 PROCEDURE — 4010F ACE/ARB THERAPY RXD/TAKEN: CPT | Mod: CPTII,S$GLB,, | Performed by: INTERNAL MEDICINE

## 2021-12-06 PROCEDURE — 99999 PR PBB SHADOW E&M-EST. PATIENT-LVL III: CPT | Mod: PBBFAC,,, | Performed by: INTERNAL MEDICINE

## 2021-12-06 PROCEDURE — 99215 PR OFFICE/OUTPT VISIT, EST, LEVL V, 40-54 MIN: ICD-10-PCS | Mod: S$GLB,,, | Performed by: INTERNAL MEDICINE

## 2021-12-06 PROCEDURE — G6002 PR STEREOSCOPIC XRAY GUIDE FOR RADIATION TX DELIV: ICD-10-PCS | Mod: 26,59,, | Performed by: RADIOLOGY

## 2021-12-06 RX ORDER — HEPARIN 100 UNIT/ML
500 SYRINGE INTRAVENOUS
Status: CANCELLED | OUTPATIENT
Start: 2021-12-06

## 2021-12-06 RX ORDER — SODIUM CHLORIDE 0.9 % (FLUSH) 0.9 %
10 SYRINGE (ML) INJECTION
Status: DISCONTINUED | OUTPATIENT
Start: 2021-12-06 | End: 2021-12-06 | Stop reason: HOSPADM

## 2021-12-06 RX ORDER — HEPARIN 100 UNIT/ML
500 SYRINGE INTRAVENOUS
Status: DISCONTINUED | OUTPATIENT
Start: 2021-12-06 | End: 2021-12-06 | Stop reason: HOSPADM

## 2021-12-06 RX ORDER — ONDANSETRON 2 MG/ML
8 INJECTION INTRAMUSCULAR; INTRAVENOUS
Status: COMPLETED | OUTPATIENT
Start: 2021-12-06 | End: 2021-12-06

## 2021-12-06 RX ORDER — HEPARIN 100 UNIT/ML
500 SYRINGE INTRAVENOUS
Status: CANCELLED | OUTPATIENT
Start: 2021-12-11

## 2021-12-06 RX ORDER — SODIUM CHLORIDE 0.9 % (FLUSH) 0.9 %
10 SYRINGE (ML) INJECTION
Status: CANCELLED | OUTPATIENT
Start: 2021-12-11

## 2021-12-06 RX ORDER — SODIUM CHLORIDE 0.9 % (FLUSH) 0.9 %
10 SYRINGE (ML) INJECTION
Status: CANCELLED | OUTPATIENT
Start: 2021-12-06

## 2021-12-06 RX ORDER — ONDANSETRON 2 MG/ML
8 INJECTION INTRAMUSCULAR; INTRAVENOUS
Status: CANCELLED | OUTPATIENT
Start: 2021-12-06

## 2021-12-06 RX ADMIN — FLUOROURACIL 2275 MG: 50 INJECTION, SOLUTION INTRAVENOUS at 12:12

## 2021-12-06 RX ADMIN — ONDANSETRON 8 MG: 2 INJECTION, SOLUTION INTRAMUSCULAR; INTRAVENOUS at 12:12

## 2021-12-07 ENCOUNTER — DOCUMENTATION ONLY (OUTPATIENT)
Dept: RADIATION ONCOLOGY | Facility: CLINIC | Age: 41
End: 2021-12-07
Payer: COMMERCIAL

## 2021-12-07 PROCEDURE — G6002 PR STEREOSCOPIC XRAY GUIDE FOR RADIATION TX DELIV: ICD-10-PCS | Mod: 26,,, | Performed by: RADIOLOGY

## 2021-12-07 PROCEDURE — 77386 HC IMRT, COMPLEX: CPT | Performed by: RADIOLOGY

## 2021-12-07 PROCEDURE — G6002 STEREOSCOPIC X-RAY GUIDANCE: HCPCS | Mod: 26,,, | Performed by: RADIOLOGY

## 2021-12-07 PROCEDURE — 77417 THER RADIOLOGY PORT IMAGE(S): CPT | Performed by: RADIOLOGY

## 2021-12-08 PROCEDURE — G6002 PR STEREOSCOPIC XRAY GUIDE FOR RADIATION TX DELIV: ICD-10-PCS | Mod: 26,59,, | Performed by: RADIOLOGY

## 2021-12-08 PROCEDURE — 77014 PR  CT GUIDANCE PLACEMENT RAD THERAPY FIELDS: CPT | Mod: 26,,, | Performed by: RADIOLOGY

## 2021-12-08 PROCEDURE — 77014 HC CT GUIDANCE RADIATION THERAPY FLDS PLACEMENT: CPT | Mod: TC | Performed by: RADIOLOGY

## 2021-12-08 PROCEDURE — 77014 PR  CT GUIDANCE PLACEMENT RAD THERAPY FIELDS: ICD-10-PCS | Mod: 26,,, | Performed by: RADIOLOGY

## 2021-12-08 PROCEDURE — 77386 HC IMRT, COMPLEX: CPT | Performed by: RADIOLOGY

## 2021-12-08 PROCEDURE — G6002 STEREOSCOPIC X-RAY GUIDANCE: HCPCS | Mod: 26,59,, | Performed by: RADIOLOGY

## 2021-12-09 ENCOUNTER — PATIENT MESSAGE (OUTPATIENT)
Dept: HEMATOLOGY/ONCOLOGY | Facility: CLINIC | Age: 41
End: 2021-12-09
Payer: COMMERCIAL

## 2021-12-09 PROCEDURE — G6002 PR STEREOSCOPIC XRAY GUIDE FOR RADIATION TX DELIV: ICD-10-PCS | Mod: 26,,, | Performed by: RADIOLOGY

## 2021-12-09 PROCEDURE — G6002 STEREOSCOPIC X-RAY GUIDANCE: HCPCS | Mod: 26,,, | Performed by: RADIOLOGY

## 2021-12-09 PROCEDURE — 77336 RADIATION PHYSICS CONSULT: CPT | Performed by: RADIOLOGY

## 2021-12-09 PROCEDURE — 77386 HC IMRT, COMPLEX: CPT | Performed by: RADIOLOGY

## 2021-12-10 ENCOUNTER — TELEPHONE (OUTPATIENT)
Dept: ENDOSCOPY | Facility: HOSPITAL | Age: 41
End: 2021-12-10
Payer: COMMERCIAL

## 2021-12-10 PROCEDURE — 77417 THER RADIOLOGY PORT IMAGE(S): CPT | Performed by: RADIOLOGY

## 2021-12-10 PROCEDURE — G6002 PR STEREOSCOPIC XRAY GUIDE FOR RADIATION TX DELIV: ICD-10-PCS | Mod: 26,,, | Performed by: RADIOLOGY

## 2021-12-10 PROCEDURE — 77386 HC IMRT, COMPLEX: CPT | Performed by: RADIOLOGY

## 2021-12-10 PROCEDURE — G6002 STEREOSCOPIC X-RAY GUIDANCE: HCPCS | Mod: 26,,, | Performed by: RADIOLOGY

## 2021-12-11 ENCOUNTER — INFUSION (OUTPATIENT)
Dept: INFUSION THERAPY | Facility: HOSPITAL | Age: 41
End: 2021-12-11
Attending: INTERNAL MEDICINE
Payer: COMMERCIAL

## 2021-12-11 VITALS
TEMPERATURE: 97 F | RESPIRATION RATE: 17 BRPM | SYSTOLIC BLOOD PRESSURE: 142 MMHG | HEART RATE: 95 BPM | DIASTOLIC BLOOD PRESSURE: 92 MMHG

## 2021-12-11 DIAGNOSIS — K83.1 BILIARY OBSTRUCTION: Primary | ICD-10-CM

## 2021-12-11 PROCEDURE — 63600175 PHARM REV CODE 636 W HCPCS: Performed by: INTERNAL MEDICINE

## 2021-12-11 PROCEDURE — 25000003 PHARM REV CODE 250: Performed by: INTERNAL MEDICINE

## 2021-12-11 PROCEDURE — A4216 STERILE WATER/SALINE, 10 ML: HCPCS | Performed by: INTERNAL MEDICINE

## 2021-12-11 RX ORDER — SODIUM CHLORIDE 0.9 % (FLUSH) 0.9 %
10 SYRINGE (ML) INJECTION
Status: DISCONTINUED | OUTPATIENT
Start: 2021-12-11 | End: 2021-12-11 | Stop reason: HOSPADM

## 2021-12-11 RX ORDER — HEPARIN 100 UNIT/ML
500 SYRINGE INTRAVENOUS
Status: DISCONTINUED | OUTPATIENT
Start: 2021-12-11 | End: 2021-12-11 | Stop reason: HOSPADM

## 2021-12-11 RX ADMIN — Medication 10 ML: at 12:12

## 2021-12-11 RX ADMIN — HEPARIN 500 UNITS: 100 SYRINGE at 12:12

## 2021-12-13 ENCOUNTER — INFUSION (OUTPATIENT)
Dept: INFUSION THERAPY | Facility: HOSPITAL | Age: 41
End: 2021-12-13
Attending: INTERNAL MEDICINE
Payer: COMMERCIAL

## 2021-12-13 ENCOUNTER — OFFICE VISIT (OUTPATIENT)
Dept: HEMATOLOGY/ONCOLOGY | Facility: CLINIC | Age: 41
End: 2021-12-13
Payer: COMMERCIAL

## 2021-12-13 ENCOUNTER — LAB VISIT (OUTPATIENT)
Dept: LAB | Facility: HOSPITAL | Age: 41
End: 2021-12-13
Attending: INTERNAL MEDICINE
Payer: COMMERCIAL

## 2021-12-13 VITALS
TEMPERATURE: 98 F | RESPIRATION RATE: 18 BRPM | SYSTOLIC BLOOD PRESSURE: 142 MMHG | WEIGHT: 190.69 LBS | HEART RATE: 83 BPM | OXYGEN SATURATION: 100 % | HEIGHT: 69 IN | DIASTOLIC BLOOD PRESSURE: 103 MMHG | BODY MASS INDEX: 28.24 KG/M2

## 2021-12-13 VITALS
DIASTOLIC BLOOD PRESSURE: 96 MMHG | HEART RATE: 78 BPM | SYSTOLIC BLOOD PRESSURE: 142 MMHG | RESPIRATION RATE: 18 BRPM | TEMPERATURE: 98 F

## 2021-12-13 DIAGNOSIS — C24.0 HILAR CHOLANGIOCARCINOMA: ICD-10-CM

## 2021-12-13 DIAGNOSIS — C24.0 HILAR CHOLANGIOCARCINOMA: Primary | ICD-10-CM

## 2021-12-13 DIAGNOSIS — I10 DIASTOLIC HYPERTENSION: ICD-10-CM

## 2021-12-13 DIAGNOSIS — D84.81 IMMUNODEFICIENCY SECONDARY TO NEOPLASM: ICD-10-CM

## 2021-12-13 DIAGNOSIS — E80.6 HYPERBILIRUBINEMIA: ICD-10-CM

## 2021-12-13 DIAGNOSIS — D49.9 IMMUNODEFICIENCY SECONDARY TO NEOPLASM: ICD-10-CM

## 2021-12-13 DIAGNOSIS — Z79.899 IMMUNODEFICIENCY SECONDARY TO CHEMOTHERAPY: ICD-10-CM

## 2021-12-13 DIAGNOSIS — D84.821 IMMUNODEFICIENCY SECONDARY TO CHEMOTHERAPY: ICD-10-CM

## 2021-12-13 DIAGNOSIS — K83.1 BILIARY OBSTRUCTION: ICD-10-CM

## 2021-12-13 DIAGNOSIS — K83.1 BILIARY OBSTRUCTION: Primary | ICD-10-CM

## 2021-12-13 DIAGNOSIS — T45.1X5A IMMUNODEFICIENCY SECONDARY TO CHEMOTHERAPY: ICD-10-CM

## 2021-12-13 PROBLEM — Z79.69 IMMUNODEFICIENCY SECONDARY TO CHEMOTHERAPY: Status: ACTIVE | Noted: 2021-12-13

## 2021-12-13 LAB
ALBUMIN SERPL BCP-MCNC: 4 G/DL (ref 3.5–5.2)
ALP SERPL-CCNC: 118 U/L (ref 55–135)
ALT SERPL W/O P-5'-P-CCNC: 36 U/L (ref 10–44)
ANION GAP SERPL CALC-SCNC: 10 MMOL/L (ref 8–16)
AST SERPL-CCNC: 27 U/L (ref 10–40)
BASOPHILS # BLD AUTO: 0.01 K/UL (ref 0–0.2)
BASOPHILS NFR BLD: 0.2 % (ref 0–1.9)
BILIRUB SERPL-MCNC: 1.2 MG/DL (ref 0.1–1)
BUN SERPL-MCNC: 11 MG/DL (ref 6–20)
CALCIUM SERPL-MCNC: 9.9 MG/DL (ref 8.7–10.5)
CANCER AG19-9 SERPL-ACNC: 1414.6 U/ML (ref 0–40)
CHLORIDE SERPL-SCNC: 102 MMOL/L (ref 95–110)
CO2 SERPL-SCNC: 25 MMOL/L (ref 23–29)
CREAT SERPL-MCNC: 0.9 MG/DL (ref 0.5–1.4)
DIFFERENTIAL METHOD: ABNORMAL
EOSINOPHIL # BLD AUTO: 0.1 K/UL (ref 0–0.5)
EOSINOPHIL NFR BLD: 2.9 % (ref 0–8)
ERYTHROCYTE [DISTWIDTH] IN BLOOD BY AUTOMATED COUNT: 13.4 % (ref 11.5–14.5)
EST. GFR  (AFRICAN AMERICAN): >60 ML/MIN/1.73 M^2
EST. GFR  (NON AFRICAN AMERICAN): >60 ML/MIN/1.73 M^2
GLUCOSE SERPL-MCNC: 112 MG/DL (ref 70–110)
HCT VFR BLD AUTO: 43 % (ref 40–54)
HGB BLD-MCNC: 14.5 G/DL (ref 14–18)
IMM GRANULOCYTES # BLD AUTO: 0.01 K/UL (ref 0–0.04)
IMM GRANULOCYTES NFR BLD AUTO: 0.2 % (ref 0–0.5)
LYMPHOCYTES # BLD AUTO: 0.4 K/UL (ref 1–4.8)
LYMPHOCYTES NFR BLD: 9.3 % (ref 18–48)
MCH RBC QN AUTO: 32.4 PG (ref 27–31)
MCHC RBC AUTO-ENTMCNC: 33.7 G/DL (ref 32–36)
MCV RBC AUTO: 96 FL (ref 82–98)
MONOCYTES # BLD AUTO: 0.6 K/UL (ref 0.3–1)
MONOCYTES NFR BLD: 13.4 % (ref 4–15)
NEUTROPHILS # BLD AUTO: 3 K/UL (ref 1.8–7.7)
NEUTROPHILS NFR BLD: 74 % (ref 38–73)
NRBC BLD-RTO: 0 /100 WBC
PLATELET # BLD AUTO: 135 K/UL (ref 150–450)
PMV BLD AUTO: 8.6 FL (ref 9.2–12.9)
POTASSIUM SERPL-SCNC: 4.1 MMOL/L (ref 3.5–5.1)
PROT SERPL-MCNC: 7.2 G/DL (ref 6–8.4)
RBC # BLD AUTO: 4.48 M/UL (ref 4.6–6.2)
SODIUM SERPL-SCNC: 137 MMOL/L (ref 136–145)
WBC # BLD AUTO: 4.09 K/UL (ref 3.9–12.7)

## 2021-12-13 PROCEDURE — 85025 COMPLETE CBC W/AUTO DIFF WBC: CPT | Performed by: INTERNAL MEDICINE

## 2021-12-13 PROCEDURE — 77014 PR  CT GUIDANCE PLACEMENT RAD THERAPY FIELDS: ICD-10-PCS | Mod: 26,,, | Performed by: RADIOLOGY

## 2021-12-13 PROCEDURE — 99999 PR PBB SHADOW E&M-EST. PATIENT-LVL III: CPT | Mod: PBBFAC,,, | Performed by: PHYSICIAN ASSISTANT

## 2021-12-13 PROCEDURE — 25000003 PHARM REV CODE 250: Performed by: INTERNAL MEDICINE

## 2021-12-13 PROCEDURE — 36415 COLL VENOUS BLD VENIPUNCTURE: CPT | Performed by: INTERNAL MEDICINE

## 2021-12-13 PROCEDURE — 80053 COMPREHEN METABOLIC PANEL: CPT | Performed by: INTERNAL MEDICINE

## 2021-12-13 PROCEDURE — 96374 THER/PROPH/DIAG INJ IV PUSH: CPT

## 2021-12-13 PROCEDURE — 96416 CHEMO PROLONG INFUSE W/PUMP: CPT

## 2021-12-13 PROCEDURE — G6002 PR STEREOSCOPIC XRAY GUIDE FOR RADIATION TX DELIV: ICD-10-PCS | Mod: 26,59,, | Performed by: RADIOLOGY

## 2021-12-13 PROCEDURE — 63600175 PHARM REV CODE 636 W HCPCS: Performed by: INTERNAL MEDICINE

## 2021-12-13 PROCEDURE — 86301 IMMUNOASSAY TUMOR CA 19-9: CPT | Performed by: INTERNAL MEDICINE

## 2021-12-13 PROCEDURE — 99999 PR PBB SHADOW E&M-EST. PATIENT-LVL III: ICD-10-PCS | Mod: PBBFAC,,, | Performed by: PHYSICIAN ASSISTANT

## 2021-12-13 PROCEDURE — 77014 PR  CT GUIDANCE PLACEMENT RAD THERAPY FIELDS: CPT | Mod: 26,,, | Performed by: RADIOLOGY

## 2021-12-13 PROCEDURE — G6002 STEREOSCOPIC X-RAY GUIDANCE: HCPCS | Mod: 26,59,, | Performed by: RADIOLOGY

## 2021-12-13 PROCEDURE — 77014 HC CT GUIDANCE RADIATION THERAPY FLDS PLACEMENT: CPT | Mod: TC | Performed by: RADIOLOGY

## 2021-12-13 PROCEDURE — 77386 HC IMRT, COMPLEX: CPT | Performed by: RADIOLOGY

## 2021-12-13 PROCEDURE — 99215 OFFICE O/P EST HI 40 MIN: CPT | Mod: S$GLB,,, | Performed by: PHYSICIAN ASSISTANT

## 2021-12-13 PROCEDURE — 99215 PR OFFICE/OUTPT VISIT, EST, LEVL V, 40-54 MIN: ICD-10-PCS | Mod: S$GLB,,, | Performed by: PHYSICIAN ASSISTANT

## 2021-12-13 RX ORDER — SODIUM CHLORIDE 0.9 % (FLUSH) 0.9 %
10 SYRINGE (ML) INJECTION
Status: CANCELLED | OUTPATIENT
Start: 2021-12-13

## 2021-12-13 RX ORDER — ONDANSETRON 2 MG/ML
8 INJECTION INTRAMUSCULAR; INTRAVENOUS
Status: CANCELLED | OUTPATIENT
Start: 2021-12-13

## 2021-12-13 RX ORDER — HEPARIN 100 UNIT/ML
500 SYRINGE INTRAVENOUS
Status: CANCELLED | OUTPATIENT
Start: 2021-12-13

## 2021-12-13 RX ORDER — HEPARIN 100 UNIT/ML
500 SYRINGE INTRAVENOUS
Status: CANCELLED | OUTPATIENT
Start: 2021-12-18

## 2021-12-13 RX ORDER — SODIUM CHLORIDE 0.9 % (FLUSH) 0.9 %
10 SYRINGE (ML) INJECTION
Status: CANCELLED | OUTPATIENT
Start: 2021-12-18

## 2021-12-13 RX ORDER — ONDANSETRON 2 MG/ML
8 INJECTION INTRAMUSCULAR; INTRAVENOUS
Status: COMPLETED | OUTPATIENT
Start: 2021-12-13 | End: 2021-12-13

## 2021-12-13 RX ORDER — SODIUM CHLORIDE 0.9 % (FLUSH) 0.9 %
10 SYRINGE (ML) INJECTION
Status: DISCONTINUED | OUTPATIENT
Start: 2021-12-13 | End: 2021-12-13 | Stop reason: HOSPADM

## 2021-12-13 RX ORDER — HEPARIN 100 UNIT/ML
500 SYRINGE INTRAVENOUS
Status: DISCONTINUED | OUTPATIENT
Start: 2021-12-13 | End: 2021-12-13 | Stop reason: HOSPADM

## 2021-12-13 RX ADMIN — ONDANSETRON 8 MG: 2 INJECTION, SOLUTION INTRAMUSCULAR; INTRAVENOUS at 01:12

## 2021-12-13 RX ADMIN — FLUOROURACIL 2275 MG: 50 INJECTION, SOLUTION INTRAVENOUS at 01:12

## 2021-12-14 ENCOUNTER — DOCUMENTATION ONLY (OUTPATIENT)
Dept: RADIATION ONCOLOGY | Facility: CLINIC | Age: 41
End: 2021-12-14
Payer: COMMERCIAL

## 2021-12-14 PROCEDURE — 77386 HC IMRT, COMPLEX: CPT | Performed by: RADIOLOGY

## 2021-12-14 PROCEDURE — 77417 THER RADIOLOGY PORT IMAGE(S): CPT | Performed by: RADIOLOGY

## 2021-12-14 PROCEDURE — G6002 PR STEREOSCOPIC XRAY GUIDE FOR RADIATION TX DELIV: ICD-10-PCS | Mod: 26,,, | Performed by: RADIOLOGY

## 2021-12-14 PROCEDURE — 77014 HC CT GUIDANCE RADIATION THERAPY FLDS PLACEMENT: CPT | Mod: TC | Performed by: RADIOLOGY

## 2021-12-14 PROCEDURE — 77290 THER RAD SIMULAJ FIELD CPLX: CPT | Mod: TC | Performed by: RADIOLOGY

## 2021-12-14 PROCEDURE — 77290 THER RAD SIMULAJ FIELD CPLX: CPT | Mod: 26,,, | Performed by: RADIOLOGY

## 2021-12-14 PROCEDURE — 77290 PR  SET RADN THERAPY FIELD COMPLEX: ICD-10-PCS | Mod: 26,,, | Performed by: RADIOLOGY

## 2021-12-14 PROCEDURE — G6002 STEREOSCOPIC X-RAY GUIDANCE: HCPCS | Mod: 26,,, | Performed by: RADIOLOGY

## 2021-12-14 PROCEDURE — 77336 RADIATION PHYSICS CONSULT: CPT | Mod: 59 | Performed by: RADIOLOGY

## 2021-12-15 PROCEDURE — G6002 STEREOSCOPIC X-RAY GUIDANCE: HCPCS | Mod: 26,,, | Performed by: RADIOLOGY

## 2021-12-15 PROCEDURE — 77386 HC IMRT, COMPLEX: CPT | Performed by: RADIOLOGY

## 2021-12-15 PROCEDURE — G6002 PR STEREOSCOPIC XRAY GUIDE FOR RADIATION TX DELIV: ICD-10-PCS | Mod: 26,,, | Performed by: RADIOLOGY

## 2021-12-16 PROCEDURE — 77338 PR  MLC IMRT DESIGN & CONSTRUCTION PER IMRT PLAN: ICD-10-PCS | Mod: 26,,, | Performed by: RADIOLOGY

## 2021-12-16 PROCEDURE — 77014 PR  CT GUIDANCE PLACEMENT RAD THERAPY FIELDS: ICD-10-PCS | Mod: 26,,, | Performed by: RADIOLOGY

## 2021-12-16 PROCEDURE — 77300 RADIATION THERAPY DOSE PLAN: CPT | Mod: 26,,, | Performed by: RADIOLOGY

## 2021-12-16 PROCEDURE — 77014 HC CT GUIDANCE RADIATION THERAPY FLDS PLACEMENT: CPT | Mod: TC | Performed by: RADIOLOGY

## 2021-12-16 PROCEDURE — 77338 DESIGN MLC DEVICE FOR IMRT: CPT | Mod: TC,59 | Performed by: RADIOLOGY

## 2021-12-16 PROCEDURE — G6002 STEREOSCOPIC X-RAY GUIDANCE: HCPCS | Mod: 26,59,, | Performed by: RADIOLOGY

## 2021-12-16 PROCEDURE — 77338 DESIGN MLC DEVICE FOR IMRT: CPT | Mod: 26,,, | Performed by: RADIOLOGY

## 2021-12-16 PROCEDURE — 77417 THER RADIOLOGY PORT IMAGE(S): CPT | Performed by: RADIOLOGY

## 2021-12-16 PROCEDURE — G6002 PR STEREOSCOPIC XRAY GUIDE FOR RADIATION TX DELIV: ICD-10-PCS | Mod: 26,59,, | Performed by: RADIOLOGY

## 2021-12-16 PROCEDURE — 77300 PR RADIATION THERAPY,DOSIMETRY PLAN: ICD-10-PCS | Mod: 26,,, | Performed by: RADIOLOGY

## 2021-12-16 PROCEDURE — 77300 RADIATION THERAPY DOSE PLAN: CPT | Mod: TC | Performed by: RADIOLOGY

## 2021-12-16 PROCEDURE — 77386 HC IMRT, COMPLEX: CPT | Performed by: RADIOLOGY

## 2021-12-16 PROCEDURE — 77014 PR  CT GUIDANCE PLACEMENT RAD THERAPY FIELDS: CPT | Mod: 26,,, | Performed by: RADIOLOGY

## 2021-12-17 PROCEDURE — G6002 PR STEREOSCOPIC XRAY GUIDE FOR RADIATION TX DELIV: ICD-10-PCS | Mod: 26,,, | Performed by: RADIOLOGY

## 2021-12-17 PROCEDURE — G6002 STEREOSCOPIC X-RAY GUIDANCE: HCPCS | Mod: 26,,, | Performed by: RADIOLOGY

## 2021-12-17 PROCEDURE — 77336 RADIATION PHYSICS CONSULT: CPT | Performed by: RADIOLOGY

## 2021-12-17 PROCEDURE — 77386 HC IMRT, COMPLEX: CPT | Performed by: RADIOLOGY

## 2021-12-18 ENCOUNTER — INFUSION (OUTPATIENT)
Dept: INFUSION THERAPY | Facility: HOSPITAL | Age: 41
End: 2021-12-18
Attending: PHYSICIAN ASSISTANT
Payer: COMMERCIAL

## 2021-12-18 VITALS
DIASTOLIC BLOOD PRESSURE: 92 MMHG | RESPIRATION RATE: 18 BRPM | HEART RATE: 83 BPM | SYSTOLIC BLOOD PRESSURE: 131 MMHG | TEMPERATURE: 98 F

## 2021-12-18 DIAGNOSIS — C24.0 HILAR CHOLANGIOCARCINOMA: ICD-10-CM

## 2021-12-18 DIAGNOSIS — K83.1 BILIARY OBSTRUCTION: Primary | ICD-10-CM

## 2021-12-18 PROCEDURE — 25000003 PHARM REV CODE 250: Performed by: INTERNAL MEDICINE

## 2021-12-18 PROCEDURE — A4216 STERILE WATER/SALINE, 10 ML: HCPCS | Performed by: INTERNAL MEDICINE

## 2021-12-18 PROCEDURE — 99211 OFF/OP EST MAY X REQ PHY/QHP: CPT

## 2021-12-18 PROCEDURE — 63600175 PHARM REV CODE 636 W HCPCS: Performed by: INTERNAL MEDICINE

## 2021-12-18 RX ORDER — SODIUM CHLORIDE 0.9 % (FLUSH) 0.9 %
10 SYRINGE (ML) INJECTION
Status: DISCONTINUED | OUTPATIENT
Start: 2021-12-18 | End: 2021-12-18 | Stop reason: HOSPADM

## 2021-12-18 RX ORDER — HEPARIN 100 UNIT/ML
500 SYRINGE INTRAVENOUS
Status: DISCONTINUED | OUTPATIENT
Start: 2021-12-18 | End: 2021-12-18 | Stop reason: HOSPADM

## 2021-12-18 RX ADMIN — Medication 10 ML: at 07:12

## 2021-12-18 RX ADMIN — HEPARIN 500 UNITS: 100 SYRINGE at 07:12

## 2021-12-20 ENCOUNTER — LAB VISIT (OUTPATIENT)
Dept: LAB | Facility: HOSPITAL | Age: 41
End: 2021-12-20
Attending: INTERNAL MEDICINE
Payer: COMMERCIAL

## 2021-12-20 ENCOUNTER — OFFICE VISIT (OUTPATIENT)
Dept: HEMATOLOGY/ONCOLOGY | Facility: CLINIC | Age: 41
End: 2021-12-20
Payer: COMMERCIAL

## 2021-12-20 ENCOUNTER — INFUSION (OUTPATIENT)
Dept: INFUSION THERAPY | Facility: HOSPITAL | Age: 41
End: 2021-12-20
Attending: INTERNAL MEDICINE
Payer: COMMERCIAL

## 2021-12-20 VITALS
DIASTOLIC BLOOD PRESSURE: 89 MMHG | HEART RATE: 87 BPM | TEMPERATURE: 98 F | SYSTOLIC BLOOD PRESSURE: 132 MMHG | RESPIRATION RATE: 18 BRPM

## 2021-12-20 VITALS
RESPIRATION RATE: 18 BRPM | HEIGHT: 69 IN | TEMPERATURE: 98 F | DIASTOLIC BLOOD PRESSURE: 94 MMHG | OXYGEN SATURATION: 100 % | BODY MASS INDEX: 28.09 KG/M2 | WEIGHT: 189.69 LBS | HEART RATE: 89 BPM | SYSTOLIC BLOOD PRESSURE: 131 MMHG

## 2021-12-20 DIAGNOSIS — I10 DIASTOLIC HYPERTENSION: ICD-10-CM

## 2021-12-20 DIAGNOSIS — C24.0 HILAR CHOLANGIOCARCINOMA: Primary | ICD-10-CM

## 2021-12-20 DIAGNOSIS — T45.1X5A IMMUNODEFICIENCY SECONDARY TO CHEMOTHERAPY: ICD-10-CM

## 2021-12-20 DIAGNOSIS — Z79.899 IMMUNODEFICIENCY SECONDARY TO CHEMOTHERAPY: ICD-10-CM

## 2021-12-20 DIAGNOSIS — K83.1 BILIARY OBSTRUCTION: Primary | ICD-10-CM

## 2021-12-20 DIAGNOSIS — D84.821 IMMUNODEFICIENCY SECONDARY TO CHEMOTHERAPY: ICD-10-CM

## 2021-12-20 DIAGNOSIS — C24.0 HILAR CHOLANGIOCARCINOMA: ICD-10-CM

## 2021-12-20 DIAGNOSIS — D49.9 IMMUNODEFICIENCY SECONDARY TO NEOPLASM: ICD-10-CM

## 2021-12-20 DIAGNOSIS — E80.6 HYPERBILIRUBINEMIA: ICD-10-CM

## 2021-12-20 DIAGNOSIS — D84.81 IMMUNODEFICIENCY SECONDARY TO NEOPLASM: ICD-10-CM

## 2021-12-20 DIAGNOSIS — K83.1 BILIARY OBSTRUCTION: ICD-10-CM

## 2021-12-20 LAB
ALBUMIN SERPL BCP-MCNC: 3.8 G/DL (ref 3.5–5.2)
ALP SERPL-CCNC: 118 U/L (ref 55–135)
ALT SERPL W/O P-5'-P-CCNC: 33 U/L (ref 10–44)
ANION GAP SERPL CALC-SCNC: 8 MMOL/L (ref 8–16)
AST SERPL-CCNC: 22 U/L (ref 10–40)
BASOPHILS # BLD AUTO: 0.02 K/UL (ref 0–0.2)
BASOPHILS NFR BLD: 0.5 % (ref 0–1.9)
BILIRUB SERPL-MCNC: 0.9 MG/DL (ref 0.1–1)
BUN SERPL-MCNC: 12 MG/DL (ref 6–20)
CALCIUM SERPL-MCNC: 10 MG/DL (ref 8.7–10.5)
CANCER AG19-9 SERPL-ACNC: 1190.8 U/ML (ref 0–40)
CHLORIDE SERPL-SCNC: 100 MMOL/L (ref 95–110)
CO2 SERPL-SCNC: 27 MMOL/L (ref 23–29)
CREAT SERPL-MCNC: 0.8 MG/DL (ref 0.5–1.4)
DIFFERENTIAL METHOD: ABNORMAL
EOSINOPHIL # BLD AUTO: 0.1 K/UL (ref 0–0.5)
EOSINOPHIL NFR BLD: 3.5 % (ref 0–8)
ERYTHROCYTE [DISTWIDTH] IN BLOOD BY AUTOMATED COUNT: 13.3 % (ref 11.5–14.5)
EST. GFR  (AFRICAN AMERICAN): >60 ML/MIN/1.73 M^2
EST. GFR  (NON AFRICAN AMERICAN): >60 ML/MIN/1.73 M^2
GLUCOSE SERPL-MCNC: 92 MG/DL (ref 70–110)
HCT VFR BLD AUTO: 42.6 % (ref 40–54)
HGB BLD-MCNC: 14.3 G/DL (ref 14–18)
IMM GRANULOCYTES # BLD AUTO: 0.02 K/UL (ref 0–0.04)
IMM GRANULOCYTES NFR BLD AUTO: 0.5 % (ref 0–0.5)
LYMPHOCYTES # BLD AUTO: 0.2 K/UL (ref 1–4.8)
LYMPHOCYTES NFR BLD: 6.1 % (ref 18–48)
MCH RBC QN AUTO: 32.1 PG (ref 27–31)
MCHC RBC AUTO-ENTMCNC: 33.6 G/DL (ref 32–36)
MCV RBC AUTO: 96 FL (ref 82–98)
MONOCYTES # BLD AUTO: 0.6 K/UL (ref 0.3–1)
MONOCYTES NFR BLD: 16.5 % (ref 4–15)
NEUTROPHILS # BLD AUTO: 2.7 K/UL (ref 1.8–7.7)
NEUTROPHILS NFR BLD: 72.9 % (ref 38–73)
NRBC BLD-RTO: 0 /100 WBC
PLATELET # BLD AUTO: 136 K/UL (ref 150–450)
PMV BLD AUTO: 8.8 FL (ref 9.2–12.9)
POTASSIUM SERPL-SCNC: 3.9 MMOL/L (ref 3.5–5.1)
PROT SERPL-MCNC: 7.1 G/DL (ref 6–8.4)
RBC # BLD AUTO: 4.45 M/UL (ref 4.6–6.2)
SODIUM SERPL-SCNC: 135 MMOL/L (ref 136–145)
WBC # BLD AUTO: 3.76 K/UL (ref 3.9–12.7)

## 2021-12-20 PROCEDURE — 99999 PR PBB SHADOW E&M-EST. PATIENT-LVL III: ICD-10-PCS | Mod: PBBFAC,,, | Performed by: PHYSICIAN ASSISTANT

## 2021-12-20 PROCEDURE — 77386 HC IMRT, COMPLEX: CPT | Performed by: RADIOLOGY

## 2021-12-20 PROCEDURE — 96416 CHEMO PROLONG INFUSE W/PUMP: CPT

## 2021-12-20 PROCEDURE — 86301 IMMUNOASSAY TUMOR CA 19-9: CPT | Performed by: INTERNAL MEDICINE

## 2021-12-20 PROCEDURE — 36415 COLL VENOUS BLD VENIPUNCTURE: CPT | Performed by: INTERNAL MEDICINE

## 2021-12-20 PROCEDURE — 77014 HC CT GUIDANCE RADIATION THERAPY FLDS PLACEMENT: CPT | Mod: TC | Performed by: RADIOLOGY

## 2021-12-20 PROCEDURE — 99215 OFFICE O/P EST HI 40 MIN: CPT | Mod: S$GLB,,, | Performed by: PHYSICIAN ASSISTANT

## 2021-12-20 PROCEDURE — 80053 COMPREHEN METABOLIC PANEL: CPT | Performed by: INTERNAL MEDICINE

## 2021-12-20 PROCEDURE — G6002 STEREOSCOPIC X-RAY GUIDANCE: HCPCS | Mod: 26,,, | Performed by: RADIOLOGY

## 2021-12-20 PROCEDURE — G6002 PR STEREOSCOPIC XRAY GUIDE FOR RADIATION TX DELIV: ICD-10-PCS | Mod: 26,,, | Performed by: RADIOLOGY

## 2021-12-20 PROCEDURE — 96374 THER/PROPH/DIAG INJ IV PUSH: CPT

## 2021-12-20 PROCEDURE — 25000003 PHARM REV CODE 250: Performed by: INTERNAL MEDICINE

## 2021-12-20 PROCEDURE — 85025 COMPLETE CBC W/AUTO DIFF WBC: CPT | Performed by: INTERNAL MEDICINE

## 2021-12-20 PROCEDURE — 99999 PR PBB SHADOW E&M-EST. PATIENT-LVL III: CPT | Mod: PBBFAC,,, | Performed by: PHYSICIAN ASSISTANT

## 2021-12-20 PROCEDURE — 63600175 PHARM REV CODE 636 W HCPCS: Performed by: INTERNAL MEDICINE

## 2021-12-20 PROCEDURE — 99215 PR OFFICE/OUTPT VISIT, EST, LEVL V, 40-54 MIN: ICD-10-PCS | Mod: S$GLB,,, | Performed by: PHYSICIAN ASSISTANT

## 2021-12-20 RX ORDER — SODIUM CHLORIDE 0.9 % (FLUSH) 0.9 %
10 SYRINGE (ML) INJECTION
Status: CANCELLED | OUTPATIENT
Start: 2021-12-20

## 2021-12-20 RX ORDER — ONDANSETRON 2 MG/ML
8 INJECTION INTRAMUSCULAR; INTRAVENOUS
Status: CANCELLED | OUTPATIENT
Start: 2021-12-20

## 2021-12-20 RX ORDER — SODIUM CHLORIDE 0.9 % (FLUSH) 0.9 %
10 SYRINGE (ML) INJECTION
Status: DISCONTINUED | OUTPATIENT
Start: 2021-12-20 | End: 2021-12-20 | Stop reason: HOSPADM

## 2021-12-20 RX ORDER — SODIUM CHLORIDE 0.9 % (FLUSH) 0.9 %
10 SYRINGE (ML) INJECTION
Status: CANCELLED | OUTPATIENT
Start: 2021-12-27

## 2021-12-20 RX ORDER — ONDANSETRON 2 MG/ML
8 INJECTION INTRAMUSCULAR; INTRAVENOUS
Status: COMPLETED | OUTPATIENT
Start: 2021-12-20 | End: 2021-12-20

## 2021-12-20 RX ORDER — HEPARIN 100 UNIT/ML
500 SYRINGE INTRAVENOUS
Status: CANCELLED | OUTPATIENT
Start: 2022-01-01

## 2021-12-20 RX ORDER — HEPARIN 100 UNIT/ML
500 SYRINGE INTRAVENOUS
Status: CANCELLED | OUTPATIENT
Start: 2021-12-20

## 2021-12-20 RX ORDER — SODIUM CHLORIDE 0.9 % (FLUSH) 0.9 %
10 SYRINGE (ML) INJECTION
Status: CANCELLED | OUTPATIENT
Start: 2022-01-01

## 2021-12-20 RX ORDER — ONDANSETRON 2 MG/ML
8 INJECTION INTRAMUSCULAR; INTRAVENOUS
Status: CANCELLED | OUTPATIENT
Start: 2021-12-27

## 2021-12-20 RX ORDER — HEPARIN 100 UNIT/ML
500 SYRINGE INTRAVENOUS
Status: CANCELLED | OUTPATIENT
Start: 2021-12-27

## 2021-12-20 RX ORDER — HEPARIN 100 UNIT/ML
500 SYRINGE INTRAVENOUS
Status: CANCELLED | OUTPATIENT
Start: 2021-12-25

## 2021-12-20 RX ORDER — HEPARIN 100 UNIT/ML
500 SYRINGE INTRAVENOUS
Status: DISCONTINUED | OUTPATIENT
Start: 2021-12-20 | End: 2021-12-20 | Stop reason: HOSPADM

## 2021-12-20 RX ORDER — SODIUM CHLORIDE 0.9 % (FLUSH) 0.9 %
10 SYRINGE (ML) INJECTION
Status: CANCELLED | OUTPATIENT
Start: 2021-12-25

## 2021-12-20 RX ADMIN — FLUOROURACIL 1365 MG: 50 INJECTION, SOLUTION INTRAVENOUS at 01:12

## 2021-12-20 RX ADMIN — ONDANSETRON 8 MG: 2 INJECTION, SOLUTION INTRAMUSCULAR; INTRAVENOUS at 12:12

## 2021-12-21 ENCOUNTER — DOCUMENTATION ONLY (OUTPATIENT)
Dept: RADIATION ONCOLOGY | Facility: CLINIC | Age: 41
End: 2021-12-21
Payer: COMMERCIAL

## 2021-12-21 PROCEDURE — 77386 HC IMRT, COMPLEX: CPT | Performed by: RADIOLOGY

## 2021-12-21 PROCEDURE — G6002 STEREOSCOPIC X-RAY GUIDANCE: HCPCS | Mod: 26,,, | Performed by: RADIOLOGY

## 2021-12-21 PROCEDURE — G6002 PR STEREOSCOPIC XRAY GUIDE FOR RADIATION TX DELIV: ICD-10-PCS | Mod: 26,,, | Performed by: RADIOLOGY

## 2021-12-21 PROCEDURE — 77417 THER RADIOLOGY PORT IMAGE(S): CPT | Performed by: RADIOLOGY

## 2021-12-22 PROCEDURE — G6002 STEREOSCOPIC X-RAY GUIDANCE: HCPCS | Mod: 26,,, | Performed by: RADIOLOGY

## 2021-12-22 PROCEDURE — G6002 PR STEREOSCOPIC XRAY GUIDE FOR RADIATION TX DELIV: ICD-10-PCS | Mod: 26,,, | Performed by: RADIOLOGY

## 2021-12-22 PROCEDURE — 77386 HC IMRT, COMPLEX: CPT | Performed by: RADIOLOGY

## 2021-12-22 PROCEDURE — 77336 RADIATION PHYSICS CONSULT: CPT | Performed by: RADIOLOGY

## 2021-12-23 ENCOUNTER — INFUSION (OUTPATIENT)
Dept: INFUSION THERAPY | Facility: HOSPITAL | Age: 41
End: 2021-12-23
Payer: COMMERCIAL

## 2021-12-23 VITALS
TEMPERATURE: 99 F | RESPIRATION RATE: 18 BRPM | SYSTOLIC BLOOD PRESSURE: 136 MMHG | HEART RATE: 86 BPM | DIASTOLIC BLOOD PRESSURE: 97 MMHG

## 2021-12-23 DIAGNOSIS — K83.1 BILIARY OBSTRUCTION: Primary | ICD-10-CM

## 2021-12-23 PROCEDURE — 77386 HC IMRT, COMPLEX: CPT | Performed by: RADIOLOGY

## 2021-12-23 PROCEDURE — 99211 OFF/OP EST MAY X REQ PHY/QHP: CPT | Mod: 25

## 2021-12-23 PROCEDURE — A4216 STERILE WATER/SALINE, 10 ML: HCPCS | Performed by: INTERNAL MEDICINE

## 2021-12-23 PROCEDURE — 77014 HC CT GUIDANCE RADIATION THERAPY FLDS PLACEMENT: CPT | Mod: TC | Performed by: RADIOLOGY

## 2021-12-23 PROCEDURE — 25000003 PHARM REV CODE 250: Performed by: INTERNAL MEDICINE

## 2021-12-23 PROCEDURE — 77014 PR  CT GUIDANCE PLACEMENT RAD THERAPY FIELDS: ICD-10-PCS | Mod: 26,,, | Performed by: RADIOLOGY

## 2021-12-23 PROCEDURE — 63600175 PHARM REV CODE 636 W HCPCS: Performed by: INTERNAL MEDICINE

## 2021-12-23 PROCEDURE — G6002 STEREOSCOPIC X-RAY GUIDANCE: HCPCS | Mod: 26,59,, | Performed by: RADIOLOGY

## 2021-12-23 PROCEDURE — 77014 PR  CT GUIDANCE PLACEMENT RAD THERAPY FIELDS: CPT | Mod: 26,,, | Performed by: RADIOLOGY

## 2021-12-23 PROCEDURE — G6002 PR STEREOSCOPIC XRAY GUIDE FOR RADIATION TX DELIV: ICD-10-PCS | Mod: 26,59,, | Performed by: RADIOLOGY

## 2021-12-23 RX ORDER — SODIUM CHLORIDE 0.9 % (FLUSH) 0.9 %
10 SYRINGE (ML) INJECTION
Status: DISCONTINUED | OUTPATIENT
Start: 2021-12-23 | End: 2021-12-23 | Stop reason: HOSPADM

## 2021-12-23 RX ORDER — HEPARIN 100 UNIT/ML
500 SYRINGE INTRAVENOUS
Status: DISCONTINUED | OUTPATIENT
Start: 2021-12-23 | End: 2021-12-23 | Stop reason: HOSPADM

## 2021-12-23 RX ADMIN — Medication 10 ML: at 02:12

## 2021-12-23 RX ADMIN — HEPARIN 500 UNITS: 100 SYRINGE at 02:12

## 2021-12-23 NOTE — PROGRESS NOTES
"                                                         PROGRESS NOTE    Subjective:       Patient ID: Romeo Larson is a 41 y.o. male.    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 Mr Larson is a 40 yo man who presents today for further management of suspected hilar cholangiocarcinoma. He presented with dark urine, abdominal pain and jaundice since August 2021. He presented to outside hospital ER with elevated bilirubin. MRCP was performed demonstrating bi-lobar intrahepatic bile duct dilation and a ~2cm ill defined mass at the hilum concerning for hilar cholangiocarcinoma. He was referred to the advanced endoscopy group at [facility] [place]. ERCP confirmed severe, malignant appearing stricture involving right and left main hepatic ducts. Biliary stents were placed to relieve obstruction. EUS was performed. It showed an irregular hypoechoic mass where the hepatic duct bifurcates into the right and left hepatic ducts. The mass measured 11.4 mm by 11.3 mm in maximal cross-sectional diameter. FNA sampling of hilar lymph nodes was negative for metastatic disease. Bile duct biopsy showed "rare groups of glandular epithelium with mild atypia, strips of benign glandular epithelium intermixed with blood clot, and focal fibrous tissue with chronic inflammation."  CT C/A/P 10/27/21:  1. Intrahepatic biliary dilatation despite the presence of 2 biliary drains.  The patient is recent comparison MRI a revealed a possible central hepatic mass, concerning for either cholangiocarcinoma or hepatocellular carcinoma.  This is, however, not definitively demonstrated on today's exam.  There are enlarged lymph nodes present in the vicinity of the ana cristina hepatis and celiac axis as detailed above.  2. Scattered pulmonary nodules measuring up to 6 mm.  3. Other findings as detailed above.  He presents today for further management. Seen by Dr Jara and considered a " "candidate for liver transplant. Seen by Dr Cunningham last Friday. Scheduled for PET this Wednesday. 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   Discussed neoadjuvant chemoradiation with 5FU followed by neoadjuvant cis/gem prior to liver transplant.   2. Hospitalized 11/3/21-21 for fever 2/2 acute cholangitis. Repeat ERCP biopsy on 21 again non-diagnostic. Biopsy of the celiac lymph node was negative.   3. PET scan 11/3/21: "1. Wedge-shaped geographic hypermetabolic activity within the right lobe of the liver in a similar distribution to the intrahepatic biliary dilatation.  This could relate to inflammation from multiple etiologies including biliary stasis, cholangitis, and obstructive dilatation.  Neoplastic involvement would be difficult to exclude.  The liver is poorly evaluated given background activity. 2. Hypermetabolic lymphadenopathy is noted in a periportal and celiac distribution.  Infectious inflammatory and neoplastic etiologies are on the differential.  Index nodes have been measured. 3. Multiple pulmonary nodules are noted too small to categorize by PET-CT fusion as evidence seen on a prior CT of 10/27/2021.  CT for follow-up of size stability is necessary."  4. Started chemoradiation with 5FU on 21.     Interval History:   Mr Larson returns for week 6 of chemoradiation with 5FU. Tolerating it very well. No side effects. Eating and drinking well    ECO    ROS:   A ten-point system review is obtained and negative except for what was stated in the Interval History.     Physical Examination:   Vital signs reviewed.   General: well hydrated, well developed, in no acute distress  HEENT: normocephalic, PERRLA, EOMI, anicteric sclerae, oropharynx clear  Neck: supple, no JVD, thyromegaly, cervical or supraclavicular lymphadenopathy  Lungs: clear breath sounds bilaterally, no wheezing, rales, or rhonchi  Chest: port site clean  Heart: RRR, no M/R/G  Abdomen: soft, no tenderness, non-distended, no " "hepatosplenomegaly, mass, or hernia. BS present  Extremities: no clubbing, cyanosis, or edema  Skin: no rash, ulcer, or open wounds  Neuro: alert and oriented x 4, no focal neuro deficit  Psych: pleasant and appropriate mood and affect    Objective:     Laboratory Data:  Labs reviewed. CBC, CMP unremarkable. Adequate for chemo    Imaging Data:  PET scan 11/3/21: "1. Wedge-shaped geographic hypermetabolic activity within the right lobe of the liver in a similar distribution to the intrahepatic biliary dilatation.  This could relate to inflammation from multiple etiologies including biliary stasis, cholangitis, and obstructive dilatation.  Neoplastic involvement would be difficult to exclude.  The liver is poorly evaluated given background activity. 2. Hypermetabolic lymphadenopathy is noted in a periportal and celiac distribution.  Infectious inflammatory and neoplastic etiologies are on the differential.  Index nodes have been measured. 3. Multiple pulmonary nodules are noted too small to categorize by PET-CT fusion as evidence seen on a prior CT of 10/27/2021.  CT for follow-up of size stability is necessary."    Assessment and Plan:     1. Hilar cholangiocarcinoma    2. 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 Essential hypertension    5. Obstructive jaundice      1-3  - Mr Marsha is a 40 yo man with likely stage I hilar cholangiocarcinoma. Biopsy non-diagnostic but clinical picture most consistent with hilar cholangiocarcinoma. He is a candidate for liver transplant. Getting neoadjuvant chemoradiation on protocol. After he completes chemoradiation, he will have neoadjuvant chemotherapy with cis/gem prior to liver transplant  - doing well. Week 6 chemoradiation with 5FU  - last dose of radiation this Wed morning. Remove pump this Wednesday. 5FU dose has been re-calculated  - long discussion re the regimen, schedule and side effects of cis/gem  - The side effects " of cis/gem were discussed with patient, which include but are not limited to hair loss, fatigue, decreased appetite, weight loss, weakness, bone marrow suppression, increased risk of infection, sepsis, anemia that may require blood transfusion, low platelet counts with increased risk of bleeding that may require platelet transfusion, diarrhea, constipation, mucositis, damage to the brain, heart, liver, kidney, damage to the nerves that may not be reversible, severe allergic reaction, damage at the injection site, sterility, secondary cancer, death. Handouts provided to patient. Patient understands and agrees with the plan. Consent signed in the office  - dexamethasone sent to pharmacy. He also has zofran and phenergan  - scheduled for stent exchange on 1/14  - return in 2 weeks to start cis/gem. Get restaging CT scan in [place] prior to starting chemo  - will give 3 cycles of cis/gem (9 weeks) then restage.     4.  - BP controlled  - c/w current medication    5.  - improved after ERCP  - monitor    Follow-up:     RTC in 2 weeks  Knows to call in the interval if any problems arise.    Electronically signed by Young Wesley

## 2021-12-27 ENCOUNTER — OFFICE VISIT (OUTPATIENT)
Dept: HEMATOLOGY/ONCOLOGY | Facility: CLINIC | Age: 41
End: 2021-12-27
Payer: COMMERCIAL

## 2021-12-27 ENCOUNTER — LAB VISIT (OUTPATIENT)
Dept: LAB | Facility: HOSPITAL | Age: 41
End: 2021-12-27
Attending: INTERNAL MEDICINE
Payer: COMMERCIAL

## 2021-12-27 ENCOUNTER — DOCUMENTATION ONLY (OUTPATIENT)
Dept: RADIATION ONCOLOGY | Facility: CLINIC | Age: 41
End: 2021-12-27
Payer: COMMERCIAL

## 2021-12-27 ENCOUNTER — INFUSION (OUTPATIENT)
Dept: INFUSION THERAPY | Facility: HOSPITAL | Age: 41
End: 2021-12-27
Payer: COMMERCIAL

## 2021-12-27 VITALS
TEMPERATURE: 99 F | HEIGHT: 69 IN | DIASTOLIC BLOOD PRESSURE: 95 MMHG | TEMPERATURE: 99 F | SYSTOLIC BLOOD PRESSURE: 139 MMHG | OXYGEN SATURATION: 100 % | HEART RATE: 75 BPM | HEART RATE: 86 BPM | BODY MASS INDEX: 28.27 KG/M2 | WEIGHT: 190.88 LBS | RESPIRATION RATE: 18 BRPM | RESPIRATION RATE: 18 BRPM | DIASTOLIC BLOOD PRESSURE: 98 MMHG | SYSTOLIC BLOOD PRESSURE: 136 MMHG

## 2021-12-27 DIAGNOSIS — K83.1 BILIARY OBSTRUCTION: Primary | ICD-10-CM

## 2021-12-27 DIAGNOSIS — D84.81 IMMUNODEFICIENCY SECONDARY TO NEOPLASM: ICD-10-CM

## 2021-12-27 DIAGNOSIS — C24.0 HILAR CHOLANGIOCARCINOMA: ICD-10-CM

## 2021-12-27 DIAGNOSIS — Z79.899 IMMUNODEFICIENCY SECONDARY TO CHEMOTHERAPY: ICD-10-CM

## 2021-12-27 DIAGNOSIS — E80.6 HYPERBILIRUBINEMIA: ICD-10-CM

## 2021-12-27 DIAGNOSIS — C24.0 HILAR CHOLANGIOCARCINOMA: Primary | ICD-10-CM

## 2021-12-27 DIAGNOSIS — K83.1 BILIARY OBSTRUCTION: ICD-10-CM

## 2021-12-27 DIAGNOSIS — D84.821 IMMUNODEFICIENCY SECONDARY TO CHEMOTHERAPY: ICD-10-CM

## 2021-12-27 DIAGNOSIS — T45.1X5A IMMUNODEFICIENCY SECONDARY TO CHEMOTHERAPY: ICD-10-CM

## 2021-12-27 DIAGNOSIS — D49.9 IMMUNODEFICIENCY SECONDARY TO NEOPLASM: ICD-10-CM

## 2021-12-27 DIAGNOSIS — I10 DIASTOLIC HYPERTENSION: ICD-10-CM

## 2021-12-27 LAB
ALBUMIN SERPL BCP-MCNC: 3.6 G/DL (ref 3.5–5.2)
ALP SERPL-CCNC: 120 U/L (ref 55–135)
ALT SERPL W/O P-5'-P-CCNC: 27 U/L (ref 10–44)
ANION GAP SERPL CALC-SCNC: 5 MMOL/L (ref 8–16)
AST SERPL-CCNC: 20 U/L (ref 10–40)
BASOPHILS # BLD AUTO: 0.03 K/UL (ref 0–0.2)
BASOPHILS NFR BLD: 1 % (ref 0–1.9)
BILIRUB SERPL-MCNC: 0.6 MG/DL (ref 0.1–1)
BUN SERPL-MCNC: 11 MG/DL (ref 6–20)
CALCIUM SERPL-MCNC: 9.4 MG/DL (ref 8.7–10.5)
CANCER AG19-9 SERPL-ACNC: 960.8 U/ML (ref 0–40)
CHLORIDE SERPL-SCNC: 105 MMOL/L (ref 95–110)
CO2 SERPL-SCNC: 29 MMOL/L (ref 23–29)
CREAT SERPL-MCNC: 0.9 MG/DL (ref 0.5–1.4)
DIFFERENTIAL METHOD: ABNORMAL
EOSINOPHIL # BLD AUTO: 0.1 K/UL (ref 0–0.5)
EOSINOPHIL NFR BLD: 3.8 % (ref 0–8)
ERYTHROCYTE [DISTWIDTH] IN BLOOD BY AUTOMATED COUNT: 13.4 % (ref 11.5–14.5)
EST. GFR  (AFRICAN AMERICAN): >60 ML/MIN/1.73 M^2
EST. GFR  (NON AFRICAN AMERICAN): >60 ML/MIN/1.73 M^2
GLUCOSE SERPL-MCNC: 102 MG/DL (ref 70–110)
HCT VFR BLD AUTO: 41.4 % (ref 40–54)
HGB BLD-MCNC: 13.6 G/DL (ref 14–18)
IMM GRANULOCYTES # BLD AUTO: 0.01 K/UL (ref 0–0.04)
IMM GRANULOCYTES NFR BLD AUTO: 0.3 % (ref 0–0.5)
LYMPHOCYTES # BLD AUTO: 0.3 K/UL (ref 1–4.8)
LYMPHOCYTES NFR BLD: 10.5 % (ref 18–48)
MCH RBC QN AUTO: 32.3 PG (ref 27–31)
MCHC RBC AUTO-ENTMCNC: 32.9 G/DL (ref 32–36)
MCV RBC AUTO: 98 FL (ref 82–98)
MONOCYTES # BLD AUTO: 0.6 K/UL (ref 0.3–1)
MONOCYTES NFR BLD: 20.4 % (ref 4–15)
NEUTROPHILS # BLD AUTO: 2 K/UL (ref 1.8–7.7)
NEUTROPHILS NFR BLD: 64 % (ref 38–73)
NRBC BLD-RTO: 0 /100 WBC
PLATELET # BLD AUTO: 103 K/UL (ref 150–450)
PMV BLD AUTO: 8.4 FL (ref 9.2–12.9)
POTASSIUM SERPL-SCNC: 4.2 MMOL/L (ref 3.5–5.1)
PROT SERPL-MCNC: 6.7 G/DL (ref 6–8.4)
RBC # BLD AUTO: 4.21 M/UL (ref 4.6–6.2)
SODIUM SERPL-SCNC: 139 MMOL/L (ref 136–145)
WBC # BLD AUTO: 3.14 K/UL (ref 3.9–12.7)

## 2021-12-27 PROCEDURE — 96416 CHEMO PROLONG INFUSE W/PUMP: CPT

## 2021-12-27 PROCEDURE — 99215 OFFICE O/P EST HI 40 MIN: CPT | Mod: S$GLB,,, | Performed by: PHYSICIAN ASSISTANT

## 2021-12-27 PROCEDURE — 80053 COMPREHEN METABOLIC PANEL: CPT | Performed by: INTERNAL MEDICINE

## 2021-12-27 PROCEDURE — 77386 HC IMRT, COMPLEX: CPT | Performed by: RADIOLOGY

## 2021-12-27 PROCEDURE — 96375 TX/PRO/DX INJ NEW DRUG ADDON: CPT

## 2021-12-27 PROCEDURE — 99999 PR PBB SHADOW E&M-EST. PATIENT-LVL III: ICD-10-PCS | Mod: PBBFAC,,, | Performed by: PHYSICIAN ASSISTANT

## 2021-12-27 PROCEDURE — 99215 PR OFFICE/OUTPT VISIT, EST, LEVL V, 40-54 MIN: ICD-10-PCS | Mod: S$GLB,,, | Performed by: PHYSICIAN ASSISTANT

## 2021-12-27 PROCEDURE — 86301 IMMUNOASSAY TUMOR CA 19-9: CPT | Performed by: INTERNAL MEDICINE

## 2021-12-27 PROCEDURE — 77417 THER RADIOLOGY PORT IMAGE(S): CPT | Performed by: RADIOLOGY

## 2021-12-27 PROCEDURE — 63600175 PHARM REV CODE 636 W HCPCS: Performed by: INTERNAL MEDICINE

## 2021-12-27 PROCEDURE — 85025 COMPLETE CBC W/AUTO DIFF WBC: CPT | Performed by: INTERNAL MEDICINE

## 2021-12-27 PROCEDURE — 36415 COLL VENOUS BLD VENIPUNCTURE: CPT | Performed by: INTERNAL MEDICINE

## 2021-12-27 PROCEDURE — 99999 PR PBB SHADOW E&M-EST. PATIENT-LVL III: CPT | Mod: PBBFAC,,, | Performed by: PHYSICIAN ASSISTANT

## 2021-12-27 PROCEDURE — G6002 STEREOSCOPIC X-RAY GUIDANCE: HCPCS | Mod: 26,,, | Performed by: RADIOLOGY

## 2021-12-27 PROCEDURE — 96374 THER/PROPH/DIAG INJ IV PUSH: CPT

## 2021-12-27 PROCEDURE — G6002 PR STEREOSCOPIC XRAY GUIDE FOR RADIATION TX DELIV: ICD-10-PCS | Mod: 26,,, | Performed by: RADIOLOGY

## 2021-12-27 PROCEDURE — 25000003 PHARM REV CODE 250: Performed by: INTERNAL MEDICINE

## 2021-12-27 RX ORDER — ONDANSETRON 2 MG/ML
8 INJECTION INTRAMUSCULAR; INTRAVENOUS
Status: COMPLETED | OUTPATIENT
Start: 2021-12-27 | End: 2021-12-27

## 2021-12-27 RX ORDER — SODIUM CHLORIDE 0.9 % (FLUSH) 0.9 %
10 SYRINGE (ML) INJECTION
Status: DISCONTINUED | OUTPATIENT
Start: 2021-12-27 | End: 2021-12-27 | Stop reason: HOSPADM

## 2021-12-27 RX ORDER — HEPARIN 100 UNIT/ML
500 SYRINGE INTRAVENOUS
Status: DISCONTINUED | OUTPATIENT
Start: 2021-12-27 | End: 2021-12-27 | Stop reason: HOSPADM

## 2021-12-27 RX ADMIN — FLUOROURACIL 1365 MG: 50 INJECTION, SOLUTION INTRAVENOUS at 10:12

## 2021-12-27 RX ADMIN — ONDANSETRON 8 MG: 2 INJECTION, SOLUTION INTRAMUSCULAR; INTRAVENOUS at 10:12

## 2021-12-28 PROCEDURE — 77014 PR  CT GUIDANCE PLACEMENT RAD THERAPY FIELDS: ICD-10-PCS | Mod: 26,,, | Performed by: RADIOLOGY

## 2021-12-28 PROCEDURE — 77386 HC IMRT, COMPLEX: CPT | Performed by: RADIOLOGY

## 2021-12-28 PROCEDURE — 77014 PR  CT GUIDANCE PLACEMENT RAD THERAPY FIELDS: CPT | Mod: 26,,, | Performed by: RADIOLOGY

## 2021-12-28 PROCEDURE — G6002 PR STEREOSCOPIC XRAY GUIDE FOR RADIATION TX DELIV: ICD-10-PCS | Mod: 26,59,, | Performed by: RADIOLOGY

## 2021-12-28 PROCEDURE — 77336 RADIATION PHYSICS CONSULT: CPT | Performed by: RADIOLOGY

## 2021-12-28 PROCEDURE — G6002 STEREOSCOPIC X-RAY GUIDANCE: HCPCS | Mod: 26,59,, | Performed by: RADIOLOGY

## 2021-12-28 PROCEDURE — 77014 HC CT GUIDANCE RADIATION THERAPY FLDS PLACEMENT: CPT | Mod: TC | Performed by: RADIOLOGY

## 2021-12-29 PROCEDURE — G6002 PR STEREOSCOPIC XRAY GUIDE FOR RADIATION TX DELIV: ICD-10-PCS | Mod: 26,,, | Performed by: RADIOLOGY

## 2021-12-29 PROCEDURE — G6002 STEREOSCOPIC X-RAY GUIDANCE: HCPCS | Mod: 26,,, | Performed by: RADIOLOGY

## 2021-12-29 PROCEDURE — 77417 THER RADIOLOGY PORT IMAGE(S): CPT | Performed by: RADIOLOGY

## 2021-12-29 PROCEDURE — 77386 HC IMRT, COMPLEX: CPT | Performed by: RADIOLOGY

## 2021-12-30 ENCOUNTER — INFUSION (OUTPATIENT)
Dept: INFUSION THERAPY | Facility: HOSPITAL | Age: 41
End: 2021-12-30
Attending: INTERNAL MEDICINE
Payer: COMMERCIAL

## 2021-12-30 VITALS
SYSTOLIC BLOOD PRESSURE: 117 MMHG | HEART RATE: 101 BPM | TEMPERATURE: 99 F | DIASTOLIC BLOOD PRESSURE: 81 MMHG | RESPIRATION RATE: 18 BRPM

## 2021-12-30 DIAGNOSIS — K83.1 BILIARY OBSTRUCTION: Primary | ICD-10-CM

## 2021-12-30 DIAGNOSIS — C24.0 HILAR CHOLANGIOCARCINOMA: ICD-10-CM

## 2021-12-30 PROCEDURE — A4216 STERILE WATER/SALINE, 10 ML: HCPCS | Performed by: INTERNAL MEDICINE

## 2021-12-30 PROCEDURE — 99211 OFF/OP EST MAY X REQ PHY/QHP: CPT

## 2021-12-30 PROCEDURE — G6002 STEREOSCOPIC X-RAY GUIDANCE: HCPCS | Mod: 26,,, | Performed by: RADIOLOGY

## 2021-12-30 PROCEDURE — 25000003 PHARM REV CODE 250: Performed by: INTERNAL MEDICINE

## 2021-12-30 PROCEDURE — G6002 PR STEREOSCOPIC XRAY GUIDE FOR RADIATION TX DELIV: ICD-10-PCS | Mod: 26,,, | Performed by: RADIOLOGY

## 2021-12-30 PROCEDURE — 63600175 PHARM REV CODE 636 W HCPCS: Performed by: INTERNAL MEDICINE

## 2021-12-30 PROCEDURE — 77386 HC IMRT, COMPLEX: CPT | Performed by: RADIOLOGY

## 2021-12-30 RX ORDER — SODIUM CHLORIDE 0.9 % (FLUSH) 0.9 %
10 SYRINGE (ML) INJECTION
Status: DISCONTINUED | OUTPATIENT
Start: 2021-12-30 | End: 2021-12-30 | Stop reason: HOSPADM

## 2021-12-30 RX ORDER — HEPARIN 100 UNIT/ML
500 SYRINGE INTRAVENOUS
Status: DISCONTINUED | OUTPATIENT
Start: 2021-12-30 | End: 2021-12-30 | Stop reason: HOSPADM

## 2021-12-30 RX ADMIN — Medication 10 ML: at 12:12

## 2021-12-30 RX ADMIN — HEPARIN 500 UNITS: 100 SYRINGE at 12:12

## 2022-01-03 ENCOUNTER — INFUSION (OUTPATIENT)
Dept: INFUSION THERAPY | Facility: HOSPITAL | Age: 42
End: 2022-01-03
Attending: INTERNAL MEDICINE
Payer: COMMERCIAL

## 2022-01-03 ENCOUNTER — OFFICE VISIT (OUTPATIENT)
Dept: HEMATOLOGY/ONCOLOGY | Facility: CLINIC | Age: 42
End: 2022-01-03
Payer: COMMERCIAL

## 2022-01-03 ENCOUNTER — HOSPITAL ENCOUNTER (OUTPATIENT)
Dept: RADIATION THERAPY | Facility: HOSPITAL | Age: 42
Discharge: HOME | End: 2022-01-03
Attending: RADIOLOGY
Payer: COMMERCIAL

## 2022-01-03 ENCOUNTER — LAB VISIT (OUTPATIENT)
Dept: LAB | Facility: HOSPITAL | Age: 42
End: 2022-01-03
Attending: INTERNAL MEDICINE
Payer: COMMERCIAL

## 2022-01-03 VITALS
HEIGHT: 69 IN | DIASTOLIC BLOOD PRESSURE: 96 MMHG | TEMPERATURE: 98 F | OXYGEN SATURATION: 99 % | BODY MASS INDEX: 28.49 KG/M2 | HEART RATE: 96 BPM | WEIGHT: 192.38 LBS | RESPIRATION RATE: 18 BRPM | SYSTOLIC BLOOD PRESSURE: 120 MMHG

## 2022-01-03 VITALS
RESPIRATION RATE: 18 BRPM | SYSTOLIC BLOOD PRESSURE: 143 MMHG | HEART RATE: 82 BPM | DIASTOLIC BLOOD PRESSURE: 91 MMHG | TEMPERATURE: 98 F

## 2022-01-03 DIAGNOSIS — I10 ESSENTIAL HYPERTENSION: ICD-10-CM

## 2022-01-03 DIAGNOSIS — K83.1 OBSTRUCTIVE JAUNDICE: ICD-10-CM

## 2022-01-03 DIAGNOSIS — D84.821 IMMUNODEFICIENCY SECONDARY TO CHEMOTHERAPY: ICD-10-CM

## 2022-01-03 DIAGNOSIS — C24.0 HILAR CHOLANGIOCARCINOMA: ICD-10-CM

## 2022-01-03 DIAGNOSIS — D49.9 IMMUNODEFICIENCY SECONDARY TO NEOPLASM: ICD-10-CM

## 2022-01-03 DIAGNOSIS — C24.0 HILAR CHOLANGIOCARCINOMA: Primary | ICD-10-CM

## 2022-01-03 DIAGNOSIS — Z79.899 IMMUNODEFICIENCY SECONDARY TO CHEMOTHERAPY: ICD-10-CM

## 2022-01-03 DIAGNOSIS — D84.81 IMMUNODEFICIENCY SECONDARY TO NEOPLASM: ICD-10-CM

## 2022-01-03 DIAGNOSIS — T45.1X5A IMMUNODEFICIENCY SECONDARY TO CHEMOTHERAPY: ICD-10-CM

## 2022-01-03 DIAGNOSIS — K83.1 BILIARY OBSTRUCTION: Primary | ICD-10-CM

## 2022-01-03 LAB
ALBUMIN SERPL BCP-MCNC: 4 G/DL (ref 3.5–5.2)
ALP SERPL-CCNC: 122 U/L (ref 55–135)
ALT SERPL W/O P-5'-P-CCNC: 31 U/L (ref 10–44)
ANION GAP SERPL CALC-SCNC: 10 MMOL/L (ref 8–16)
AST SERPL-CCNC: 23 U/L (ref 10–40)
BASOPHILS # BLD AUTO: 0.03 K/UL (ref 0–0.2)
BASOPHILS NFR BLD: 0.7 % (ref 0–1.9)
BILIRUB SERPL-MCNC: 1.2 MG/DL (ref 0.1–1)
BUN SERPL-MCNC: 11 MG/DL (ref 6–20)
CALCIUM SERPL-MCNC: 9.4 MG/DL (ref 8.7–10.5)
CANCER AG19-9 SERPL-ACNC: 780.8 U/ML (ref 0–40)
CHLORIDE SERPL-SCNC: 101 MMOL/L (ref 95–110)
CO2 SERPL-SCNC: 26 MMOL/L (ref 23–29)
CREAT SERPL-MCNC: 0.9 MG/DL (ref 0.5–1.4)
DIFFERENTIAL METHOD: ABNORMAL
EOSINOPHIL # BLD AUTO: 0.2 K/UL (ref 0–0.5)
EOSINOPHIL NFR BLD: 3.4 % (ref 0–8)
ERYTHROCYTE [DISTWIDTH] IN BLOOD BY AUTOMATED COUNT: 13.8 % (ref 11.5–14.5)
EST. GFR  (AFRICAN AMERICAN): >60 ML/MIN/1.73 M^2
EST. GFR  (NON AFRICAN AMERICAN): >60 ML/MIN/1.73 M^2
GLUCOSE SERPL-MCNC: 140 MG/DL (ref 70–110)
HCT VFR BLD AUTO: 44.4 % (ref 40–54)
HGB BLD-MCNC: 14.6 G/DL (ref 14–18)
IMM GRANULOCYTES # BLD AUTO: 0.01 K/UL (ref 0–0.04)
IMM GRANULOCYTES NFR BLD AUTO: 0.2 % (ref 0–0.5)
LYMPHOCYTES # BLD AUTO: 1 K/UL (ref 1–4.8)
LYMPHOCYTES NFR BLD: 22.1 % (ref 18–48)
MCH RBC QN AUTO: 32.1 PG (ref 27–31)
MCHC RBC AUTO-ENTMCNC: 32.9 G/DL (ref 32–36)
MCV RBC AUTO: 98 FL (ref 82–98)
MONOCYTES # BLD AUTO: 0.7 K/UL (ref 0.3–1)
MONOCYTES NFR BLD: 16 % (ref 4–15)
NEUTROPHILS # BLD AUTO: 2.6 K/UL (ref 1.8–7.7)
NEUTROPHILS NFR BLD: 57.6 % (ref 38–73)
NRBC BLD-RTO: 0 /100 WBC
PLATELET # BLD AUTO: 102 K/UL (ref 150–450)
PMV BLD AUTO: 8.9 FL (ref 9.2–12.9)
POTASSIUM SERPL-SCNC: 4 MMOL/L (ref 3.5–5.1)
PROT SERPL-MCNC: 6.7 G/DL (ref 6–8.4)
RBC # BLD AUTO: 4.55 M/UL (ref 4.6–6.2)
SODIUM SERPL-SCNC: 137 MMOL/L (ref 136–145)
WBC # BLD AUTO: 4.44 K/UL (ref 3.9–12.7)

## 2022-01-03 PROCEDURE — G6002 STEREOSCOPIC X-RAY GUIDANCE: HCPCS | Mod: 26,59,, | Performed by: RADIOLOGY

## 2022-01-03 PROCEDURE — 3080F PR MOST RECENT DIASTOLIC BLOOD PRESSURE >= 90 MM HG: ICD-10-PCS | Mod: CPTII,S$GLB,, | Performed by: INTERNAL MEDICINE

## 2022-01-03 PROCEDURE — 99215 OFFICE O/P EST HI 40 MIN: CPT | Mod: S$GLB,,, | Performed by: INTERNAL MEDICINE

## 2022-01-03 PROCEDURE — 36415 COLL VENOUS BLD VENIPUNCTURE: CPT | Performed by: INTERNAL MEDICINE

## 2022-01-03 PROCEDURE — 77014 HC CT GUIDANCE RADIATION THERAPY FLDS PLACEMENT: CPT | Mod: TC | Performed by: RADIOLOGY

## 2022-01-03 PROCEDURE — 99215 PR OFFICE/OUTPT VISIT, EST, LEVL V, 40-54 MIN: ICD-10-PCS | Mod: S$GLB,,, | Performed by: INTERNAL MEDICINE

## 2022-01-03 PROCEDURE — 1160F PR REVIEW ALL MEDS BY PRESCRIBER/CLIN PHARMACIST DOCUMENTED: ICD-10-PCS | Mod: CPTII,S$GLB,, | Performed by: INTERNAL MEDICINE

## 2022-01-03 PROCEDURE — 1159F MED LIST DOCD IN RCRD: CPT | Mod: CPTII,S$GLB,, | Performed by: INTERNAL MEDICINE

## 2022-01-03 PROCEDURE — 1159F PR MEDICATION LIST DOCUMENTED IN MEDICAL RECORD: ICD-10-PCS | Mod: CPTII,S$GLB,, | Performed by: INTERNAL MEDICINE

## 2022-01-03 PROCEDURE — 25000003 PHARM REV CODE 250: Performed by: INTERNAL MEDICINE

## 2022-01-03 PROCEDURE — 3074F SYST BP LT 130 MM HG: CPT | Mod: CPTII,S$GLB,, | Performed by: INTERNAL MEDICINE

## 2022-01-03 PROCEDURE — 96374 THER/PROPH/DIAG INJ IV PUSH: CPT | Mod: 59

## 2022-01-03 PROCEDURE — 96375 TX/PRO/DX INJ NEW DRUG ADDON: CPT

## 2022-01-03 PROCEDURE — 99999 PR PBB SHADOW E&M-EST. PATIENT-LVL III: CPT | Mod: PBBFAC,,, | Performed by: INTERNAL MEDICINE

## 2022-01-03 PROCEDURE — 3080F DIAST BP >= 90 MM HG: CPT | Mod: CPTII,S$GLB,, | Performed by: INTERNAL MEDICINE

## 2022-01-03 PROCEDURE — 3074F PR MOST RECENT SYSTOLIC BLOOD PRESSURE < 130 MM HG: ICD-10-PCS | Mod: CPTII,S$GLB,, | Performed by: INTERNAL MEDICINE

## 2022-01-03 PROCEDURE — 1160F RVW MEDS BY RX/DR IN RCRD: CPT | Mod: CPTII,S$GLB,, | Performed by: INTERNAL MEDICINE

## 2022-01-03 PROCEDURE — 77336 RADIATION PHYSICS CONSULT: CPT | Performed by: RADIOLOGY

## 2022-01-03 PROCEDURE — 96416 CHEMO PROLONG INFUSE W/PUMP: CPT

## 2022-01-03 PROCEDURE — 3008F BODY MASS INDEX DOCD: CPT | Mod: CPTII,S$GLB,, | Performed by: INTERNAL MEDICINE

## 2022-01-03 PROCEDURE — 77386 HC IMRT, COMPLEX: CPT | Performed by: RADIOLOGY

## 2022-01-03 PROCEDURE — 86301 IMMUNOASSAY TUMOR CA 19-9: CPT | Performed by: INTERNAL MEDICINE

## 2022-01-03 PROCEDURE — 63600175 PHARM REV CODE 636 W HCPCS: Performed by: INTERNAL MEDICINE

## 2022-01-03 PROCEDURE — G6002 PR STEREOSCOPIC XRAY GUIDE FOR RADIATION TX DELIV: ICD-10-PCS | Mod: 26,59,, | Performed by: RADIOLOGY

## 2022-01-03 PROCEDURE — 77014 PR  CT GUIDANCE PLACEMENT RAD THERAPY FIELDS: ICD-10-PCS | Mod: 26,,, | Performed by: RADIOLOGY

## 2022-01-03 PROCEDURE — 99999 PR PBB SHADOW E&M-EST. PATIENT-LVL III: ICD-10-PCS | Mod: PBBFAC,,, | Performed by: INTERNAL MEDICINE

## 2022-01-03 PROCEDURE — 80053 COMPREHEN METABOLIC PANEL: CPT | Performed by: INTERNAL MEDICINE

## 2022-01-03 PROCEDURE — 85025 COMPLETE CBC W/AUTO DIFF WBC: CPT | Performed by: INTERNAL MEDICINE

## 2022-01-03 PROCEDURE — 77014 PR  CT GUIDANCE PLACEMENT RAD THERAPY FIELDS: CPT | Mod: 26,,, | Performed by: RADIOLOGY

## 2022-01-03 PROCEDURE — 3008F PR BODY MASS INDEX (BMI) DOCUMENTED: ICD-10-PCS | Mod: CPTII,S$GLB,, | Performed by: INTERNAL MEDICINE

## 2022-01-03 RX ORDER — HEPARIN 100 UNIT/ML
500 SYRINGE INTRAVENOUS
Status: CANCELLED | OUTPATIENT
Start: 2022-01-03

## 2022-01-03 RX ORDER — HEPARIN 100 UNIT/ML
500 SYRINGE INTRAVENOUS
Status: CANCELLED | OUTPATIENT
Start: 2022-01-08

## 2022-01-03 RX ORDER — ONDANSETRON 2 MG/ML
8 INJECTION INTRAMUSCULAR; INTRAVENOUS
Status: COMPLETED | OUTPATIENT
Start: 2022-01-03 | End: 2022-01-03

## 2022-01-03 RX ORDER — DEXAMETHASONE 4 MG/1
8 TABLET ORAL DAILY
Qty: 80 TABLET | Refills: 0 | Status: SHIPPED | OUTPATIENT
Start: 2022-01-03 | End: 2022-03-24

## 2022-01-03 RX ORDER — SODIUM CHLORIDE 0.9 % (FLUSH) 0.9 %
10 SYRINGE (ML) INJECTION
Status: DISCONTINUED | OUTPATIENT
Start: 2022-01-03 | End: 2022-01-03 | Stop reason: HOSPADM

## 2022-01-03 RX ORDER — ONDANSETRON 2 MG/ML
8 INJECTION INTRAMUSCULAR; INTRAVENOUS
Status: CANCELLED | OUTPATIENT
Start: 2022-01-03

## 2022-01-03 RX ORDER — SODIUM CHLORIDE 0.9 % (FLUSH) 0.9 %
10 SYRINGE (ML) INJECTION
Status: CANCELLED | OUTPATIENT
Start: 2022-01-03

## 2022-01-03 RX ORDER — SODIUM CHLORIDE 0.9 % (FLUSH) 0.9 %
10 SYRINGE (ML) INJECTION
Status: CANCELLED | OUTPATIENT
Start: 2022-01-08

## 2022-01-03 RX ORDER — HEPARIN 100 UNIT/ML
500 SYRINGE INTRAVENOUS
Status: DISCONTINUED | OUTPATIENT
Start: 2022-01-03 | End: 2022-01-03 | Stop reason: HOSPADM

## 2022-01-03 RX ADMIN — ONDANSETRON 8 MG: 2 INJECTION, SOLUTION INTRAMUSCULAR; INTRAVENOUS at 09:01

## 2022-01-03 RX ADMIN — FLUOROURACIL 910 MG: 50 INJECTION, SOLUTION INTRAVENOUS at 10:01

## 2022-01-03 NOTE — PLAN OF CARE
Patient tolerated 5FU with no complications. VSS. Pt instructed to call MD with any problems. NAD. Pt discharged home independently with 5fu pump infusing. Instructed the patient to return to clinic on Wednesday around 10:50 for pump dc.

## 2022-01-04 PROCEDURE — G6002 PR STEREOSCOPIC XRAY GUIDE FOR RADIATION TX DELIV: ICD-10-PCS | Mod: 26,,, | Performed by: RADIOLOGY

## 2022-01-04 PROCEDURE — G6002 STEREOSCOPIC X-RAY GUIDANCE: HCPCS | Mod: 26,,, | Performed by: RADIOLOGY

## 2022-01-04 PROCEDURE — 77386 HC IMRT, COMPLEX: CPT | Performed by: RADIOLOGY

## 2022-01-05 ENCOUNTER — DOCUMENTATION ONLY (OUTPATIENT)
Dept: RADIATION ONCOLOGY | Facility: CLINIC | Age: 42
End: 2022-01-05
Payer: COMMERCIAL

## 2022-01-05 ENCOUNTER — INFUSION (OUTPATIENT)
Dept: INFUSION THERAPY | Facility: HOSPITAL | Age: 42
End: 2022-01-05
Attending: INTERNAL MEDICINE
Payer: COMMERCIAL

## 2022-01-05 VITALS
TEMPERATURE: 99 F | OXYGEN SATURATION: 100 % | DIASTOLIC BLOOD PRESSURE: 85 MMHG | RESPIRATION RATE: 18 BRPM | SYSTOLIC BLOOD PRESSURE: 130 MMHG | HEART RATE: 88 BPM

## 2022-01-05 DIAGNOSIS — K83.1 BILIARY OBSTRUCTION: Primary | ICD-10-CM

## 2022-01-05 PROCEDURE — G6002 PR STEREOSCOPIC XRAY GUIDE FOR RADIATION TX DELIV: ICD-10-PCS | Mod: 26,,, | Performed by: RADIOLOGY

## 2022-01-05 PROCEDURE — 99211 OFF/OP EST MAY X REQ PHY/QHP: CPT

## 2022-01-05 PROCEDURE — A4216 STERILE WATER/SALINE, 10 ML: HCPCS | Performed by: INTERNAL MEDICINE

## 2022-01-05 PROCEDURE — 77336 RADIATION PHYSICS CONSULT: CPT | Performed by: RADIOLOGY

## 2022-01-05 PROCEDURE — 63600175 PHARM REV CODE 636 W HCPCS: Performed by: INTERNAL MEDICINE

## 2022-01-05 PROCEDURE — 25000003 PHARM REV CODE 250: Performed by: INTERNAL MEDICINE

## 2022-01-05 PROCEDURE — 77386 HC IMRT, COMPLEX: CPT | Performed by: RADIOLOGY

## 2022-01-05 PROCEDURE — G6002 STEREOSCOPIC X-RAY GUIDANCE: HCPCS | Mod: 26,,, | Performed by: RADIOLOGY

## 2022-01-05 RX ORDER — HEPARIN 100 UNIT/ML
500 SYRINGE INTRAVENOUS
Status: DISCONTINUED | OUTPATIENT
Start: 2022-01-05 | End: 2022-01-05 | Stop reason: HOSPADM

## 2022-01-05 RX ORDER — SODIUM CHLORIDE 0.9 % (FLUSH) 0.9 %
10 SYRINGE (ML) INJECTION
Status: DISCONTINUED | OUTPATIENT
Start: 2022-01-05 | End: 2022-01-05 | Stop reason: HOSPADM

## 2022-01-05 RX ADMIN — Medication 10 ML: at 10:01

## 2022-01-05 RX ADMIN — HEPARIN 500 UNITS: 100 SYRINGE at 10:01

## 2022-01-05 NOTE — PLAN OF CARE
1010  Patient seated in chair, VSS, Assessment done.  CADD pump completed infusion, volume 0.  Pump disconnected. Port deaccessed, flushed, heparin locked.  Patient RTC 1/19/22 Patient ambulated off floor independently.

## 2022-01-06 ENCOUNTER — PATIENT MESSAGE (OUTPATIENT)
Dept: INFUSION THERAPY | Facility: HOSPITAL | Age: 42
End: 2022-01-06
Payer: COMMERCIAL

## 2022-01-07 ENCOUNTER — TELEPHONE (OUTPATIENT)
Dept: ENDOSCOPY | Facility: HOSPITAL | Age: 42
End: 2022-01-07
Payer: COMMERCIAL

## 2022-01-10 ENCOUNTER — TELEPHONE (OUTPATIENT)
Dept: ENDOSCOPY | Facility: HOSPITAL | Age: 42
End: 2022-01-10
Payer: COMMERCIAL

## 2022-01-10 ENCOUNTER — PATIENT MESSAGE (OUTPATIENT)
Dept: ENDOSCOPY | Facility: HOSPITAL | Age: 42
End: 2022-01-10
Payer: COMMERCIAL

## 2022-01-10 NOTE — TELEPHONE ENCOUNTER
Received request to schedule patient for ERCP on 1/14/22 at 9:00 am. Spoke with Patient. Reviewed medical history and medications. Instructed on procedure and prep. Patient verbalized understanding of instructions. copy of instructions sent via pt portal.

## 2022-01-11 ENCOUNTER — TELEPHONE (OUTPATIENT)
Dept: ENDOSCOPY | Facility: HOSPITAL | Age: 42
End: 2022-01-11
Payer: COMMERCIAL

## 2022-01-14 ENCOUNTER — HOSPITAL ENCOUNTER (OUTPATIENT)
Facility: HOSPITAL | Age: 42
Discharge: HOME | End: 2022-01-14
Attending: INTERNAL MEDICINE | Admitting: INTERNAL MEDICINE
Payer: COMMERCIAL

## 2022-01-14 ENCOUNTER — ANESTHESIA EVENT (OUTPATIENT)
Dept: ENDOSCOPY | Facility: HOSPITAL | Age: 42
End: 2022-01-14
Payer: COMMERCIAL

## 2022-01-14 ENCOUNTER — ANESTHESIA (OUTPATIENT)
Dept: ENDOSCOPY | Facility: HOSPITAL | Age: 42
End: 2022-01-14
Payer: COMMERCIAL

## 2022-01-14 VITALS
SYSTOLIC BLOOD PRESSURE: 117 MMHG | RESPIRATION RATE: 20 BRPM | BODY MASS INDEX: 27.4 KG/M2 | WEIGHT: 185 LBS | OXYGEN SATURATION: 96 % | HEIGHT: 69 IN | HEART RATE: 71 BPM | DIASTOLIC BLOOD PRESSURE: 72 MMHG | TEMPERATURE: 98 F

## 2022-01-14 DIAGNOSIS — K83.1 BILIARY STRICTURE: ICD-10-CM

## 2022-01-14 PROCEDURE — 88112 CYTOPATH CELL ENHANCE TECH: CPT | Performed by: PATHOLOGY

## 2022-01-14 PROCEDURE — 43276 PR ERCP W/RMVL/EXCH STENT BILIARY/PANCREATIC DUCT W/DILATION: ICD-10-PCS | Mod: 59,,, | Performed by: INTERNAL MEDICINE

## 2022-01-14 PROCEDURE — 27201089 HC SNARE, DISP (ANY): Performed by: INTERNAL MEDICINE

## 2022-01-14 PROCEDURE — 43276 ERCP STENT EXCHANGE W/DILATE: CPT | Performed by: INTERNAL MEDICINE

## 2022-01-14 PROCEDURE — 88305 TISSUE EXAM BY PATHOLOGIST: CPT | Performed by: PATHOLOGY

## 2022-01-14 PROCEDURE — 74328 X-RAY BILE DUCT ENDOSCOPY: CPT | Mod: 26,,, | Performed by: INTERNAL MEDICINE

## 2022-01-14 PROCEDURE — 63600175 PHARM REV CODE 636 W HCPCS: Performed by: NURSE ANESTHETIST, CERTIFIED REGISTERED

## 2022-01-14 PROCEDURE — 74328 X-RAY BILE DUCT ENDOSCOPY: CPT | Performed by: INTERNAL MEDICINE

## 2022-01-14 PROCEDURE — 43276 ERCP STENT EXCHANGE W/DILATE: CPT | Mod: ,,, | Performed by: INTERNAL MEDICINE

## 2022-01-14 PROCEDURE — C1769 GUIDE WIRE: HCPCS | Performed by: INTERNAL MEDICINE

## 2022-01-14 PROCEDURE — D9220A PRA ANESTHESIA: Mod: CRNA,,, | Performed by: NURSE ANESTHETIST, CERTIFIED REGISTERED

## 2022-01-14 PROCEDURE — D9220A PRA ANESTHESIA: ICD-10-PCS | Mod: CRNA,,, | Performed by: NURSE ANESTHETIST, CERTIFIED REGISTERED

## 2022-01-14 PROCEDURE — 37000008 HC ANESTHESIA 1ST 15 MINUTES: Performed by: INTERNAL MEDICINE

## 2022-01-14 PROCEDURE — D9220A PRA ANESTHESIA: Mod: ANES,,, | Performed by: ANESTHESIOLOGY

## 2022-01-14 PROCEDURE — 88112 CYTOPATH CELL ENHANCE TECH: CPT | Mod: 26,,, | Performed by: PATHOLOGY

## 2022-01-14 PROCEDURE — 25000003 PHARM REV CODE 250: Performed by: INTERNAL MEDICINE

## 2022-01-14 PROCEDURE — 43261 PR ERCP,BIOPSY: ICD-10-PCS | Mod: 59,,, | Performed by: INTERNAL MEDICINE

## 2022-01-14 PROCEDURE — D9220A PRA ANESTHESIA: ICD-10-PCS | Mod: ANES,,, | Performed by: ANESTHESIOLOGY

## 2022-01-14 PROCEDURE — 74328 PR  X-RAY FOR BILE DUCT ENDOSCOPY: ICD-10-PCS | Mod: 26,,, | Performed by: INTERNAL MEDICINE

## 2022-01-14 PROCEDURE — 25500020 PHARM REV CODE 255: Performed by: INTERNAL MEDICINE

## 2022-01-14 PROCEDURE — 88112 PR  CYTOPATH, CELL ENHANCE TECH: ICD-10-PCS | Mod: 26,,, | Performed by: PATHOLOGY

## 2022-01-14 PROCEDURE — 88305 TISSUE EXAM BY PATHOLOGIST: ICD-10-PCS | Mod: 26,,, | Performed by: PATHOLOGY

## 2022-01-14 PROCEDURE — 27202125 HC BALLOON, EXTRACTION (ANY): Performed by: INTERNAL MEDICINE

## 2022-01-14 PROCEDURE — 25000003 PHARM REV CODE 250: Performed by: NURSE ANESTHETIST, CERTIFIED REGISTERED

## 2022-01-14 PROCEDURE — 27200946 HC BRUSH, CYTOLOGY: Performed by: INTERNAL MEDICINE

## 2022-01-14 PROCEDURE — 37000009 HC ANESTHESIA EA ADD 15 MINS: Performed by: INTERNAL MEDICINE

## 2022-01-14 PROCEDURE — 27202410 HC FORCEPS, WIRE-GUIDED: Performed by: INTERNAL MEDICINE

## 2022-01-14 PROCEDURE — 43261 ENDO CHOLANGIOPANCREATOGRAPH: CPT | Performed by: INTERNAL MEDICINE

## 2022-01-14 PROCEDURE — 43261 ENDO CHOLANGIOPANCREATOGRAPH: CPT | Mod: 59,,, | Performed by: INTERNAL MEDICINE

## 2022-01-14 PROCEDURE — 88305 TISSUE EXAM BY PATHOLOGIST: CPT | Mod: 26,,, | Performed by: PATHOLOGY

## 2022-01-14 RX ORDER — PROPOFOL 10 MG/ML
VIAL (ML) INTRAVENOUS CONTINUOUS PRN
Status: DISCONTINUED | OUTPATIENT
Start: 2022-01-14 | End: 2022-01-14

## 2022-01-14 RX ORDER — SODIUM CHLORIDE 9 MG/ML
INJECTION, SOLUTION INTRAVENOUS CONTINUOUS
Status: DISCONTINUED | OUTPATIENT
Start: 2022-01-14 | End: 2022-01-14 | Stop reason: HOSPADM

## 2022-01-14 RX ORDER — LIDOCAINE HYDROCHLORIDE 20 MG/ML
INJECTION INTRAVENOUS
Status: DISCONTINUED | OUTPATIENT
Start: 2022-01-14 | End: 2022-01-14

## 2022-01-14 RX ORDER — CIPROFLOXACIN 2 MG/ML
INJECTION, SOLUTION INTRAVENOUS
Status: DISCONTINUED | OUTPATIENT
Start: 2022-01-14 | End: 2022-01-14

## 2022-01-14 RX ORDER — FENTANYL CITRATE 50 UG/ML
25 INJECTION, SOLUTION INTRAMUSCULAR; INTRAVENOUS EVERY 5 MIN PRN
Status: DISCONTINUED | OUTPATIENT
Start: 2022-01-14 | End: 2022-01-14 | Stop reason: HOSPADM

## 2022-01-14 RX ORDER — CIPROFLOXACIN 500 MG/1
500 TABLET ORAL EVERY 12 HOURS
Qty: 14 TABLET | Refills: 0 | Status: SHIPPED | OUTPATIENT
Start: 2022-01-14 | End: 2022-01-21

## 2022-01-14 RX ORDER — PROPOFOL 10 MG/ML
VIAL (ML) INTRAVENOUS
Status: DISCONTINUED | OUTPATIENT
Start: 2022-01-14 | End: 2022-01-14

## 2022-01-14 RX ORDER — ONDANSETRON 2 MG/ML
4 INJECTION INTRAMUSCULAR; INTRAVENOUS DAILY PRN
Status: DISCONTINUED | OUTPATIENT
Start: 2022-01-14 | End: 2022-01-14 | Stop reason: HOSPADM

## 2022-01-14 RX ORDER — SODIUM CHLORIDE 0.9 % (FLUSH) 0.9 %
10 SYRINGE (ML) INJECTION
Status: DISCONTINUED | OUTPATIENT
Start: 2022-01-14 | End: 2022-01-14 | Stop reason: HOSPADM

## 2022-01-14 RX ORDER — DEXMEDETOMIDINE HYDROCHLORIDE 100 UG/ML
INJECTION, SOLUTION INTRAVENOUS
Status: DISCONTINUED | OUTPATIENT
Start: 2022-01-14 | End: 2022-01-14

## 2022-01-14 RX ADMIN — DEXMEDETOMIDINE HYDROCHLORIDE 8 MCG: 100 INJECTION, SOLUTION INTRAVENOUS at 09:01

## 2022-01-14 RX ADMIN — GLYCOPYRROLATE 0.2 MG: 0.2 INJECTION, SOLUTION INTRAMUSCULAR; INTRAVITREAL at 09:01

## 2022-01-14 RX ADMIN — PROPOFOL 20 MG: 10 INJECTION, EMULSION INTRAVENOUS at 09:01

## 2022-01-14 RX ADMIN — PROPOFOL 70 MG: 10 INJECTION, EMULSION INTRAVENOUS at 09:01

## 2022-01-14 RX ADMIN — LIDOCAINE HYDROCHLORIDE 40 MG: 20 INJECTION, SOLUTION INTRAVENOUS at 09:01

## 2022-01-14 RX ADMIN — SODIUM CHLORIDE: 0.9 INJECTION, SOLUTION INTRAVENOUS at 08:01

## 2022-01-14 RX ADMIN — IOHEXOL 15 ML: 300 INJECTION, SOLUTION INTRAVENOUS at 09:01

## 2022-01-14 RX ADMIN — Medication 200 MCG/KG/MIN: at 09:01

## 2022-01-14 RX ADMIN — CIPROFLOXACIN 400 MG: 2 INJECTION, SOLUTION INTRAVENOUS at 09:01

## 2022-01-14 NOTE — ANESTHESIA PREPROCEDURE EVALUATION
01/14/2022  Romeo Larson is a 41 y.o., male.  Pre-operative evaluation for Procedure(s) (LRB):  ERCP (ENDOSCOPIC RETROGRADE CHOLANGIOPANCREATOGRAPHY) (N/A)    Romeo Larson is a 41 y.o. male       Patient Active Problem List   Diagnosis    Hypercholesterolemia    Diastolic hypertension    Hyperbilirubinemia    Obstructive jaundice    Biliary obstruction    Painless jaundice    Cholangitis    Hilar cholangiocarcinoma    Immunodeficiency secondary to neoplasm    Immunodeficiency secondary to chemotherapy       Past Surgical History:   Procedure Laterality Date    ENDOSCOPIC ULTRASOUND OF UPPER GASTROINTESTINAL TRACT N/A 10/20/2021    Procedure: ULTRASOUND, UPPER GI TRACT, ENDOSCOPIC;  Surgeon: Valeriy Sotelo MD;  [facility]  Service: Endoscopy;  Laterality: N/A;  wife to email vacc card-inst email-tb    ERCP N/A 10/20/2021    Procedure: ERCP (ENDOSCOPIC RETROGRADE CHOLANGIOPANCREATOGRAPHY);  Surgeon: Valeriy Sotelo MD;  [address]  Service: Endoscopy;  Laterality: N/A;    ERCP N/A 11/4/2021    Procedure: ERCP (ENDOSCOPIC RETROGRADE CHOLANGIOPANCREATOGRAPHY);  Surgeon: Valeriy Sotelo MD;  [address]  Service: Endoscopy;  Laterality: N/A;    ERCP N/A 11/4/2021    Procedure: ERCP (ENDOSCOPIC RETROGRADE CHOLANGIOPANCREATOGRAPHY);  Surgeon: Roselia Pereyra MD;  [facility]  Service: Endoscopy;  Laterality: N/A;  pt vaccinated, instructed to bring card to procedure, instructions sent portal-MG    INSERTION OF TUNNELED CENTRAL VENOUS CATHETER (CVC) WITH SUBCUTANEOUS PORT N/A 11/24/2021    Procedure: INSERTION, PORT-A-CATH;  Surgeon: Prem Syed MD;  Location: [facility] CATH LAB;  Service: Radiology;  Laterality: N/A;       Social History     Socioeconomic History    Marital status:    Tobacco Use     Smoking status: Never Smoker    Smokeless tobacco: Never Used   Substance and Sexual Activity    Alcohol use: Not Currently    Drug use: Never    Sexual activity: Yes       No current facility-administered medications on file prior to encounter.     Current Outpatient Medications on File Prior to Encounter   Medication Sig Dispense Refill    losartan (COZAAR) 50 MG tablet Take 1 tablet (50 mg total) by mouth once daily. 90 tablet 3    ondansetron (ZOFRAN-ODT) 8 MG TbDL Take 1 tablet (8 mg total) by mouth every 6 (six) hours as needed (nausea). 60 tablet 2    promethazine (PHENERGAN) 12.5 MG Tab Take 1 tablet (12.5 mg total) by mouth every 6 (six) hours as needed (nausea). 60 tablet 2       Review of patient's allergies indicates:  No Known Allergies      CBC: No results for input(s): WBC, RBC, HGB, HCT, PLT, MCV, MCH, MCHC in the last 72 hours.    CMP: No results for input(s): NA, K, CL, CO2, BUN, CREATININE, GLU, MG, PHOS, CALCIUM, ALBUMIN, PROT, ALKPHOS, ALT, AST, BILITOT in the last 72 hours.    INR  No results for input(s): PT, INR, PROTIME, APTT in the last 72 hours.      Diagnostic Studies:    EKG:   Results for orders placed or performed during the hospital encounter of 11/03/21   EKG 12-lead    Collection Time: 11/03/21 11:32 PM    Narrative    Test Reason : A41.9,    Vent. Rate : 099 BPM     Atrial Rate : 099 BPM     P-R Int : 134 ms          QRS Dur : 080 ms      QT Int : 338 ms       P-R-T Axes : 037 040 004 degrees     QTc Int : 433 ms    Normal sinus rhythm  Possible  Inferior infarct ,age undetermined  Abnormal ECG  No previous ECGs available  Confirmed by Amado COSME MD ([id number]) on 11/4/2021 3:00:51 PM    Referred By: JONEL YOON           Confirmed By:Amado COSME MD       TTE:  No results found for this or any previous visit.  No results found for: EF   No results found for this or any previous visit.    CAMILO:  No results found for this or any previous visit.    Stress Test:  No results found  for this or any previous visit.       LHC:  No results found for this or any previous visit.       PFT:  No results found for: FEV1, FVC, DBV0OIU, TLC, DLCO     ALLERGIES:   Review of patient's allergies indicates:  No Known Allergies  LDA:      Lines/Drains/Airways     Central Venous Catheter Line            Port A Cath Single Lumen 11/24/21 1202 right subclavian;right internal jugular 50 days                 Anesthesia Evaluation      Airway   Mallampati: II  TM distance: Normal, at least 6 cm  Neck ROM: Normal ROM  Dental    (+) In tact    Pulmonary    Cardiovascular     Rate: Normal    Neuro/Psych      GI/Hepatic/Renal      Endo/Other    Abdominal                   Anesthesia Evaluation    I have reviewed the Patient Summary Reports.    I have reviewed the Nursing Notes. I have reviewed the NPO Status.   I have reviewed the Medications.     Review of Systems  Anesthesia Hx:  No problems with previous Anesthesia  Denies Family Hx of Anesthesia complications.   Denies Personal Hx of Anesthesia complications.   Pulmonary:  Pulmonary Normal    Renal/:  Renal/ Normal     Hepatic/GI:   Hilar cholangiocarcinoma; h/o acute cholangitis   Neurological:  Neurology Normal    Endocrine:  Endocrine Normal        Physical Exam  General:  Well nourished    Airway/Jaw/Neck:  Airway Findings: Mouth Opening: Normal Tongue: Normal  General Airway Assessment: Adult, Good  Mallampati: II  TM Distance: Normal, at least 6 cm  Jaw/Neck Findings:  Neck ROM: Normal ROM      Dental:  Dental Findings: In tact   Chest/Lungs:  Chest/Lungs Findings: Clear to auscultation     Heart/Vascular:  Heart Findings: Rate: Normal  Rhythm: Regular Rhythm        Mental Status:  Mental Status Findings:  Alert and Oriented, Cooperative         Anesthesia Plan  Type of Anesthesia, risks & benefits discussed:  Anesthesia Type:  general, MAC    Patient's Preference:   Plan Factors:          Intra-op Monitoring Plan: standard ASA monitors  Intra-op  Monitoring Plan Comments:   Post Op Pain Control Plan: per primary service following discharge from PACU  Post Op Pain Control Plan Comments:     Induction:   IV  Beta Blocker:  Patient is not currently on a Beta-Blocker (No further documentation required).       Informed Consent: Patient understands risks and agrees with Anesthesia plan.  Questions answered. Anesthesia consent signed with patient.  ASA Score: 2     Day of Surgery Review of History & Physical:    H&P update referred to the provider.         Ready For Surgery From Anesthesia Perspective.

## 2022-01-14 NOTE — PROVATION PATIENT INSTRUCTIONS
Discharge Summary/Instructions after an Endoscopic Procedure  Patient Name: Romeo Larson  Patient MRN: [id number]  Patient YOB: 1980 Friday, January 14, 2022  Roselia Pereyra MD  Dear patient,  As a result of recent federal legislation (The Federal Cures Act), you may   receive lab or pathology results from your procedure in your [facility]   account before your physician is able to contact you. Your physician or   their representative will relay the results to you with their   recommendations at their soonest availability.  Thank you,  RESTRICTIONS:  During your procedure today, you received medications for sedation.  These   medications may affect your judgment, balance and coordination.  Therefore,   for 24 hours, you have the following restrictions:   - DO NOT drive a car, operate machinery, make legal/financial decisions,   sign important papers or drink alcohol.    ACTIVITY:  Today: no heavy lifting, straining or running due to procedural   sedation/anesthesia.  The following day: return to full activity including work.  DIET:  Eat and drink normally unless instructed otherwise.     TREATMENT FOR COMMON SIDE EFFECTS:  - Mild abdominal pain, nausea, belching, bloating or excessive gas:  rest,   eat lightly and use a heating pad.  - Sore Throat: treat with throat lozenges and/or gargle with warm salt   water.  - Because air was used during the procedure, expelling large amounts of air   from your rectum or belching is normal.  - If a bowel prep was taken, you may not have a bowel movement for 1-3 days.    This is normal.  SYMPTOMS TO WATCH FOR AND REPORT TO YOUR PHYSICIAN:  1. Abdominal pain or bloating, other than gas cramps.  2. Chest pain.  3. Back pain.  4. Signs of infection such as: chills or fever occurring within 24 hours   after the procedure.  5. Rectal bleeding, which would show as bright red, maroon, or black stools.   (A tablespoon of blood from the rectum is not serious,  especially if   hemorrhoids are present.)  6. Vomiting.  7. Weakness or dizziness.  GO DIRECTLY TO THE NEAREST EMERGENCY ROOM IF YOU HAVE ANY OF THE FOLLOWING:      Difficulty breathing              Chills and/or fever over 101 F   Persistent vomiting and/or vomiting blood   Severe abdominal pain   Severe chest pain   Black, tarry stools   Bleeding- more than one tablespoon   Any other symptom or condition that you feel may need urgent attention  Your doctor recommends these additional instructions:  If any biopsies were taken, your doctors clinic will contact you in 1 to 2   weeks with any results.  - Discharge patient to home.   - Resume previous diet.   - Continue present medications.   - Return to referring physician.   - Patient has a contact number available for emergencies.  The signs and   symptoms of potential delayed complications were discussed with the   patient.  Return to normal activities tomorrow.  Written discharge   instructions were provided to the patient.   - Repeat ERCP in 8 weeks to exchange stent.   - Ciprofloxacin for 1 week.  For questions, problems or results please call your physician - Roselia Pereyra MD at Work:  [phone].  [facility], EMERGENCY ROOM PHONE NUMBER: [phone]  IF A COMPLICATION OR EMERGENCY SITUATION ARISES AND YOU ARE UNABLE TO REACH   YOUR PHYSICIAN - GO DIRECTLY TO THE EMERGENCY ROOM.  Roselia Pereyra MD  1/14/2022 10:43:42 AM  This report has been verified and signed electronically.  Dear patient,  As a result of recent federal legislation (The Federal Cures Act), you may   receive lab or pathology results from your procedure in your [facility]   account before your physician is able to contact you. Your physician or   their representative will relay the results to you with their   recommendations at their soonest availability.  Thank you,  PROVATION

## 2022-01-14 NOTE — ANESTHESIA POSTPROCEDURE EVALUATION
Anesthesia Post Evaluation    Patient: Romeo Larson    Procedure(s) Performed: Procedure(s) (LRB):  ERCP (ENDOSCOPIC RETROGRADE CHOLANGIOPANCREATOGRAPHY) (N/A)    Final Anesthesia Type: general      Patient location during evaluation: PACU  Patient participation: Yes- Able to Participate  Level of consciousness: awake and alert  Post-procedure vital signs: reviewed and stable  Pain management: adequate  Airway patency: patent    PONV status at discharge: No PONV  Anesthetic complications: no      Cardiovascular status: hemodynamically stable  Respiratory status: spontaneous ventilation  Follow-up not needed.          Vitals Value Taken Time   /72 01/14/22 1016   Temp 98.0 01/14/22 1019   Pulse 70 01/14/22 1018   Resp 15 01/14/22 1018   SpO2 99 % 01/14/22 1018   Vitals shown include unvalidated device data.      No case tracking events are documented in the log.      Pain/Moe Score: Pain Rating Prior to Med Admin: 0 (1/14/2022 10:00 AM)  Pain Rating Post Med Admin: 0 (1/14/2022 10:00 AM)

## 2022-01-14 NOTE — PROGRESS NOTES
Patient stable. VSS. Wife at bedside. Patient given discharge instructions. All questions answered. Patient verbalized understanding of instructions.   Waiting on Dr. Dick Preeyra to speak with patient and wife. Will continue to monitor patient.

## 2022-01-14 NOTE — DISCHARGE INSTRUCTIONS
"Patient Education       Endoscopic Retrograde Cholangiopancreatography   Why is this procedure done?   ERCP is the short name for an endoscopic retrograde cholangiopancreatography. It is a test to check your liver, pancreas, bile ducts, and gallbladder. Treatments can also be done during the procedure.  An ERCP may be used to:  · Look for the cause of your belly problem  · Get small pieces of tissue for a biopsy  · Check for blocks in the ducts  · Treat blocked ducts or remove stones in the gallbladder  · Place drain tubes or stents to lessen fluid from cysts in the pancreas     What will the results be?   Your doctor will better understand what is causing your belly problems. Some conditions are able to be treated right away.  What happens before the procedure?   Your doctor will ask you about your health history. Talk to your doctor about:  · If you are pregnant or nursing.  · All the drugs you are taking. Be sure to include all prescription and over-the-counter (OTC) drugs, and herbal supplements. Tell the doctor about any drug allergy. Bring a list of drugs you take with you.  · Any bleeding problems. Be sure to tell your doctor if you are taking any drugs that may cause bleeding. Some of these are warfarin, rivaroxaban, apixaban, ticagrelor, clopidogrel, ketorolac, ibuprofen, naproxen, or aspirin. Certain vitamins and herbs, such as garlic and fish oil, may also add to the risk for bleeding. You may need to stop these drugs as well. Talk to your doctor about them.  · You may be given a dye called "contrast" for this procedure. Tell your doctor if you are allergic to dye.  · Your doctor may give you a drug to empty out your stomach and intestines before the procedure.  · When you need to stop eating or drinking before your procedure.  · You will not be allowed to drive right away after the procedure. Ask a family member or a friend to drive you home.  What happens during the procedure?   · Once you are in the " procedure room, the staff will put an IV in your arm to give you fluids and drugs. You will be given a drug to make you sleepy. It will also help you stay pain free during the procedure.  · You will lie on your left side or on your stomach with your head turned to the right.  · Your doctor will spray a numbing drug on the back of your throat. A mouthpiece will be placed in your mouth to keep it open during the procedure.  · Your doctor will put a special scope in your mouth. You will be asked to swallow. This will help the tube go down into your stomach and intestines. The scope has a camera and light that lets the doctor see inside your body. The pictures from the scope will be seen on a monitor in the room.  · Your doctor will pass a tiny tube from the scope into the opening of the bile and pancreatic ducts. A dye is injected into the ducts. This helps them show up more clearly on x-rays.     · The doctor may also pass a very small scope through the main endoscope. The doctor can look directly at the bile and pancreatic ducts with the small scope. The doctor can also take tissue samples from inside of the ducts.  · The doctor may take out any stones that are seen. Narrow areas will be made wider. Your doctor may also take tissue samples to send to the lab.  · The scope is removed.  · The procedure takes 30 minutes to 2 hours.  What happens after the procedure?   · You will go to the Recovery Room and the staff will watch you closely. Your doctor will tell you when you can go home.  · You may feel pain in your throat after the procedure.  · You may feel pressure in your belly and feel like burping after the procedure.  What problems could happen?   · Infection  · Pancreatitis  · Bleeding  · Damage to the esophagus, stomach, or small intestine  Where can I learn more?   UpToDate  [email or web address]   Last Reviewed Date    2021-04-14  Consumer Information Use and Disclaimer   This information is not specific medical advice and does not replace information you receive from your health care provider. This is only a brief summary of general information. It does NOT include all information about conditions, illnesses, injuries, tests, procedures, treatments, therapies, discharge instructions or life-style choices that may apply to you. You must talk with your health care provider for complete information about your health and treatment options. This information should not be used to decide whether or not to accept your health care providers advice, instructions or recommendations. Only your health care provider has the knowledge and training to provide advice that is right for you.  Copyright   Copyright © 2021 [employer] and its affiliates and/or licensors. All rights reserved.

## 2022-01-14 NOTE — TRANSFER OF CARE
"Anesthesia Transfer of Care Note    Patient: Romeo Larson    Procedure(s) Performed: Procedure(s) (LRB):  ERCP (ENDOSCOPIC RETROGRADE CHOLANGIOPANCREATOGRAPHY) (N/A)    Patient location: PACU    Anesthesia Type: general    Transport from OR: Transported from OR on 2-3 L/min O2 by NC with adequate spontaneous ventilation    Post pain: adequate analgesia    Post assessment: no apparent anesthetic complications    Post vital signs: stable    Level of consciousness: awake    Nausea/Vomiting: no nausea/vomiting    Complications: none    Transfer of care protocol was followed      Last vitals:   Visit Vitals  /88   Pulse 81   Temp 36.7 °C (98.1 °F) (Temporal)   Resp 18   Ht 5' 9" (1.753 m)   Wt 83.9 kg (185 lb)   SpO2 100%   BMI 27.32 kg/m²     "

## 2022-01-15 ENCOUNTER — TELEPHONE (OUTPATIENT)
Dept: ENDOSCOPY | Facility: HOSPITAL | Age: 42
End: 2022-01-15
Payer: COMMERCIAL

## 2022-01-15 DIAGNOSIS — K83.1 BILIARY STRICTURE: Primary | ICD-10-CM

## 2022-01-18 ENCOUNTER — HOSPITAL ENCOUNTER (OUTPATIENT)
Dept: RADIOLOGY | Facility: HOSPITAL | Age: 42
Discharge: HOME | End: 2022-01-18
Attending: INTERNAL MEDICINE
Payer: COMMERCIAL

## 2022-01-18 DIAGNOSIS — C24.0 HILAR CHOLANGIOCARCINOMA: ICD-10-CM

## 2022-01-18 PROCEDURE — 74177 CT CHEST ABDOMEN PELVIS WITH CONTRAST (XPD): ICD-10-PCS | Mod: 26,,, | Performed by: RADIOLOGY

## 2022-01-18 PROCEDURE — 74177 CT ABD & PELVIS W/CONTRAST: CPT | Mod: TC,PO

## 2022-01-18 PROCEDURE — A9698 NON-RAD CONTRAST MATERIALNOC: HCPCS | Mod: PO | Performed by: INTERNAL MEDICINE

## 2022-01-18 PROCEDURE — 25500020 PHARM REV CODE 255: Mod: PO | Performed by: INTERNAL MEDICINE

## 2022-01-18 PROCEDURE — 71260 CT THORAX DX C+: CPT | Mod: 26,,, | Performed by: RADIOLOGY

## 2022-01-18 PROCEDURE — 71260 CT THORAX DX C+: CPT | Mod: TC,PO

## 2022-01-18 PROCEDURE — 71260 CT CHEST ABDOMEN PELVIS WITH CONTRAST (XPD): ICD-10-PCS | Mod: 26,,, | Performed by: RADIOLOGY

## 2022-01-18 PROCEDURE — 74177 CT ABD & PELVIS W/CONTRAST: CPT | Mod: 26,,, | Performed by: RADIOLOGY

## 2022-01-18 RX ORDER — SODIUM CHLORIDE 0.9 % (FLUSH) 0.9 %
10 SYRINGE (ML) INJECTION
Status: CANCELLED | OUTPATIENT
Start: 2022-01-18

## 2022-01-18 RX ORDER — HEPARIN 100 UNIT/ML
500 SYRINGE INTRAVENOUS
Status: CANCELLED | OUTPATIENT
Start: 2022-01-18

## 2022-01-18 RX ADMIN — IOHEXOL 75 ML: 350 INJECTION, SOLUTION INTRAVENOUS at 02:01

## 2022-01-18 RX ADMIN — IOHEXOL 1000 ML: 9 SOLUTION ORAL at 02:01

## 2022-01-18 NOTE — PROGRESS NOTES
"                                                         PROGRESS NOTE    Subjective:       Patient ID: Romeo Larson is a 41 y.o. male.    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 Mr Larson is a 42 yo man who presents today for further management of suspected hilar cholangiocarcinoma. He presented with dark urine, abdominal pain and jaundice since August 2021. He presented to outside hospital ER with elevated bilirubin. MRCP was performed demonstrating bi-lobar intrahepatic bile duct dilation and a ~2cm ill defined mass at the hilum concerning for hilar cholangiocarcinoma. [place] [facility]CP confirmed severe, malignant appearing stricture involving right and left main hepatic ducts. Biliary stents were placed to relieve obstruction. EUS was performed. It showed an irregular hypoechoic mass where the hepatic duct bifurcates into the right and left hepatic ducts. The mass measured 11.4 mm by 11.3 mm in maximal cross-sectional diameter. FNA sampling of hilar lymph nodes was negative for metastatic disease. Bile duct biopsy showed "rare groups of glandular epithelium with mild atypia, strips of benign glandular epithelium intermixed with blood clot, and focal fibrous tissue with chronic inflammation."  CT C/A/P 10/27/21:  1. Intrahepatic biliary dilatation despite the presence of 2 biliary drains.  The patient is recent comparison MRI a revealed a possible central hepatic mass, concerning for either cholangiocarcinoma or hepatocellular carcinoma.  This is, however, not definitively demonstrated on today's exam.  There are enlarged lymph nodes present in the vicinity of the ana cristina hepatis and celiac axis as detailed above.  2. Scattered pulmonary nodules measuring up to 6 mm.  3. Other findings as detailed above.  He presents today for further management. Seen by Dr Jara and considered a " "candidate for liver transplant. Seen by Dr Cunningham last Friday. Scheduled for PET this Wednesday. 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   Discussed neoadjuvant chemoradiation with 5FU followed by neoadjuvant cis/gem prior to liver transplant.   2. Hospitalized 11/3/21-21 for fever 2/2 acute cholangitis. Repeat ERCP biopsy on 21 again non-diagnostic. Biopsy of the celiac lymph node was negative.   3. PET scan 11/3/21: "1. Wedge-shaped geographic hypermetabolic activity within the right lobe of the liver in a similar distribution to the intrahepatic biliary dilatation.  This could relate to inflammation from multiple etiologies including biliary stasis, cholangitis, and obstructive dilatation.  Neoplastic involvement would be difficult to exclude.  The liver is poorly evaluated given background activity. 2. Hypermetabolic lymphadenopathy is noted in a periportal and celiac distribution.  Infectious inflammatory and neoplastic etiologies are on the differential.  Index nodes have been measured. 3. Multiple pulmonary nodules are noted too small to categorize by PET-CT fusion as evidence seen on a prior CT of 10/27/2021.  CT for follow-up of size stability is necessary."  4. Started chemoradiation with 5FU on 21.     Interval History:   Mr Larson, patient of Dr. Landaverde, to clinic for follow up and to begin cycle 1 of cis/gem. Tolerating it very well. No side effects. Eating and drinking well    ECO    ROS:   A ten-point system review is obtained and negative except for what was stated in the Interval History.     Physical Examination:   Vital signs reviewed.   General: well hydrated, well developed, in no acute distress  HEENT: normocephalic, PERRLA, EOMI, anicteric sclerae, oropharynx clear  Neck: supple, no JVD, thyromegaly, cervical or supraclavicular lymphadenopathy  Lungs: clear breath sounds bilaterally, no wheezing, rales, or rhonchi  Chest: port site clean  Heart: RRR, no M/R/G  Abdomen: soft, no " tenderness, non-distended, no hepatosplenomegaly, mass, or hernia. BS present  Extremities: no clubbing, cyanosis, or edema  Skin: no rash, ulcer, or open wounds  Neuro: alert and oriented x 4, no focal neuro deficit  Psych: pleasant and appropriate mood and affect    LABS:  WBC   Date Value Ref Range Status   01/18/2022 3.83 (L) 3.90 - 12.70 K/uL Final     Hemoglobin   Date Value Ref Range Status   01/18/2022 14.9 14.0 - 18.0 g/dL Final     Hematocrit   Date Value Ref Range Status   01/18/2022 42.9 40.0 - 54.0 % Final     Platelets   Date Value Ref Range Status   01/18/2022 121 (L) 150 - 450 K/uL Final       Chemistry        Component Value Date/Time     01/18/2022 1455    K 4.4 01/18/2022 1455     01/18/2022 1455    CO2 29 01/18/2022 1455    BUN 12 01/18/2022 1455    CREATININE 1.1 01/18/2022 1455    GLU 72 01/18/2022 1455        Component Value Date/Time    CALCIUM 9.5 01/18/2022 1455    ALKPHOS 139 (H) 01/18/2022 1455    AST 25 01/18/2022 1455    ALT 47 (H) 01/18/2022 1455    BILITOT 0.6 01/18/2022 1455    ESTGFRAFRICA >60 01/18/2022 1455    EGFRNONAA >60 01/18/2022 1455      ,      Assessment and Plan:     1. Hilar cholangiocarcinoma    2. 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 Essential hypertension    5. Obstructive jaundice      1-3  - Mr Marsha is a 42 yo man with likely stage I hilar cholangiocarcinoma. Biopsy non-diagnostic but clinical picture most consistent with hilar cholangiocarcinoma. He is a candidate for liver transplant. Getting neoadjuvant chemoradiation on protocol. After he completes chemoradiation, he will have neoadjuvant chemotherapy with cis/gem prior to liver transplant.    He has completed 6 weeks of chemoradiation with 5FU and here to begin neoadjuvant chemotherapy. Plan to give 3 cycles of cis/gem (9 weeks) then restage. Previously consented with Dr. Wesley. Labs reviewed and acceptable to proceed. Tumor marker continuing to  decline. Reviewed CT scans with patient. He will have labs before cycle 1 day 8.    RTC 3 weeks with labs (CBC,CMP,Ca19-9), to see Dr. Wesley and cycle #2,day 1 cis/gem cycle .      4.  - BP controlled  - c/w current medication    5.  - improved after ERCP. Bili has normalized.    Patient is in agreement with the proposed treatment plan. All questions were answered to the patient's satisfaction. Pt knows to call clinic if anything is needed before the next clinic visit.    Nevaeh Lopez, MSN, APRN, FNP-C  Hematology and Medical Oncology  Nurse Practitioner to Dr. Misael Barker  Nurse Practitioner, [facility]

## 2022-01-18 NOTE — H&P
History & Physical - Short Stay  Gastroenterology      SUBJECTIVE:     Procedure: ERCP    Chief Complaint/Indication for Procedure: cholangio    History of Present Illness:  Patient is a 41 y.o. male with cholangio coming for stent exchange.     No medications prior to admission.       Review of patient's allergies indicates:  No Known Allergies     Past Medical History:   Diagnosis Date    Cholangiocarcinoma     Hypercholesteremia     Hypertension      Past Surgical History:   Procedure Laterality Date    ENDOSCOPIC ULTRASOUND OF UPPER GASTROINTESTINAL TRACT N/A 10/20/2021    Procedure: ULTRASOUND, UPPER GI TRACT, ENDOSCOPIC;  Surgeon: Valeriy Sotelo MD;  Location: [address]);  Service: Endoscopy;  Laterality: N/A;  wife to email vacc card-inst email-tb    ERCP N/A 10/20/2021    Procedure: ERCP (ENDOSCOPIC RETROGRADE CHOLANGIOPANCREATOGRAPHY);  Surgeon: Valeriy Sotelo MD;  [facility] [address]);  Service: Endoscopy;  Laterality: N/A;    ERCP N/A 11/4/2021    Procedure: ERCP (ENDOSCOPIC RETROGRADE CHOLANGIOPANCREATOGRAPHY);  Surgeon: Valeriy Sotelo MD;  Location: [address]);  Service: Endoscopy;  Laterality: N/A;    ERCP N/A 11/4/2021    Procedure: ERCP (ENDOSCOPIC RETROGRADE CHOLANGIOPANCREATOGRAPHY);  Surgeon: Roselia Pereyra MD;  [facility]  Service: Endoscopy;  Laterality: N/A;  pt vaccinated, instructed to bring card to procedure, instructions sent portal-MG    ERCP N/A 1/14/2022    Procedure: ERCP (ENDOSCOPIC RETROGRADE CHOLANGIOPANCREATOGRAPHY);  Surgeon: Roselia Pereyra MD;  Location: [address]);  Service: Endoscopy;  Laterality: N/A;  inst portal-right chest port-tb  Pt requested at home COVID testing, Pt instructed at home RAPID to be done morning of procedure, Pt instructed to call endoscopy scheuding if there is a positive result, Pt informed if a positive     INSERTION OF TUNNELED CENTRAL VENOUS CATHETER (CVC) WITH  SUBCUTANEOUS PORT N/A 11/24/2021    Procedure: INSERTION, PORT-A-CATH;  Surgeon: Prem Syed MD;  Location: [facility] C[facility] LAB;  Service: Radiology;  Laterality: N/A;    TONSILLECTOMY       Family History   Problem Relation Age of Onset    No Known Problems Mother     No Known Problems Father     No Known Problems Sister     No Known Problems Brother      Social History     Tobacco Use    Smoking status: Never Smoker    Smokeless tobacco: Never Used   Substance Use Topics    Alcohol use: Not Currently    Drug use: Never          OBJECTIVE:     Vital Signs (Most Recent)  Temp: 97.9 °F (36.6 °C) (01/14/22 1000)  Pulse: 71 (01/14/22 1015)  Resp: 20 (01/14/22 1015)  BP: 117/72 (01/14/22 1015)  SpO2: 96 % (01/14/22 1015)         ASSESSMENT/PLAN:     Patient is a 41 y.o. male with cholangio coming for stent exchange.     Plan: ERCP    Anesthesia Plan: Moderate Sedation    ASA Grade: ASA 2 - Patient with mild systemic disease with no functional limitations

## 2022-01-19 ENCOUNTER — PATIENT MESSAGE (OUTPATIENT)
Dept: HEMATOLOGY/ONCOLOGY | Facility: CLINIC | Age: 42
End: 2022-01-19
Payer: COMMERCIAL

## 2022-01-19 ENCOUNTER — OFFICE VISIT (OUTPATIENT)
Dept: HEMATOLOGY/ONCOLOGY | Facility: CLINIC | Age: 42
End: 2022-01-19
Payer: COMMERCIAL

## 2022-01-19 ENCOUNTER — PATIENT MESSAGE (OUTPATIENT)
Dept: HEMATOLOGY/ONCOLOGY | Facility: CLINIC | Age: 42
End: 2022-01-19

## 2022-01-19 ENCOUNTER — INFUSION (OUTPATIENT)
Dept: INFUSION THERAPY | Facility: HOSPITAL | Age: 42
End: 2022-01-19
Payer: COMMERCIAL

## 2022-01-19 VITALS
DIASTOLIC BLOOD PRESSURE: 88 MMHG | TEMPERATURE: 98 F | BODY MASS INDEX: 28.41 KG/M2 | SYSTOLIC BLOOD PRESSURE: 136 MMHG | HEART RATE: 99 BPM | WEIGHT: 191.81 LBS | HEIGHT: 69 IN

## 2022-01-19 VITALS — HEART RATE: 73 BPM | DIASTOLIC BLOOD PRESSURE: 73 MMHG | SYSTOLIC BLOOD PRESSURE: 108 MMHG

## 2022-01-19 DIAGNOSIS — I10 ESSENTIAL HYPERTENSION: ICD-10-CM

## 2022-01-19 DIAGNOSIS — K83.1 BILIARY OBSTRUCTION: Primary | ICD-10-CM

## 2022-01-19 DIAGNOSIS — T45.1X5A IMMUNODEFICIENCY SECONDARY TO CHEMOTHERAPY: ICD-10-CM

## 2022-01-19 DIAGNOSIS — K83.1 OBSTRUCTIVE JAUNDICE: ICD-10-CM

## 2022-01-19 DIAGNOSIS — D49.9 IMMUNODEFICIENCY SECONDARY TO NEOPLASM: ICD-10-CM

## 2022-01-19 DIAGNOSIS — Z79.899 IMMUNODEFICIENCY SECONDARY TO CHEMOTHERAPY: ICD-10-CM

## 2022-01-19 DIAGNOSIS — D84.821 IMMUNODEFICIENCY SECONDARY TO CHEMOTHERAPY: ICD-10-CM

## 2022-01-19 DIAGNOSIS — D84.81 IMMUNODEFICIENCY SECONDARY TO NEOPLASM: ICD-10-CM

## 2022-01-19 DIAGNOSIS — C24.0 HILAR CHOLANGIOCARCINOMA: Primary | ICD-10-CM

## 2022-01-19 PROCEDURE — 99215 OFFICE O/P EST HI 40 MIN: CPT | Mod: S$GLB,,, | Performed by: NURSE PRACTITIONER

## 2022-01-19 PROCEDURE — 99999 PR PBB SHADOW E&M-EST. PATIENT-LVL III: CPT | Mod: PBBFAC,,, | Performed by: NURSE PRACTITIONER

## 2022-01-19 PROCEDURE — 99215 PR OFFICE/OUTPT VISIT, EST, LEVL V, 40-54 MIN: ICD-10-PCS | Mod: S$GLB,,, | Performed by: NURSE PRACTITIONER

## 2022-01-19 PROCEDURE — 25000003 PHARM REV CODE 250: Performed by: INTERNAL MEDICINE

## 2022-01-19 PROCEDURE — 1159F MED LIST DOCD IN RCRD: CPT | Mod: CPTII,S$GLB,, | Performed by: NURSE PRACTITIONER

## 2022-01-19 PROCEDURE — 96413 CHEMO IV INFUSION 1 HR: CPT

## 2022-01-19 PROCEDURE — A4216 STERILE WATER/SALINE, 10 ML: HCPCS | Performed by: INTERNAL MEDICINE

## 2022-01-19 PROCEDURE — 1159F PR MEDICATION LIST DOCUMENTED IN MEDICAL RECORD: ICD-10-PCS | Mod: CPTII,S$GLB,, | Performed by: NURSE PRACTITIONER

## 2022-01-19 PROCEDURE — 99999 PR PBB SHADOW E&M-EST. PATIENT-LVL III: ICD-10-PCS | Mod: PBBFAC,,, | Performed by: NURSE PRACTITIONER

## 2022-01-19 PROCEDURE — 96375 TX/PRO/DX INJ NEW DRUG ADDON: CPT

## 2022-01-19 PROCEDURE — 63600175 PHARM REV CODE 636 W HCPCS: Performed by: INTERNAL MEDICINE

## 2022-01-19 PROCEDURE — 3008F PR BODY MASS INDEX (BMI) DOCUMENTED: ICD-10-PCS | Mod: CPTII,S$GLB,, | Performed by: NURSE PRACTITIONER

## 2022-01-19 PROCEDURE — 3008F BODY MASS INDEX DOCD: CPT | Mod: CPTII,S$GLB,, | Performed by: NURSE PRACTITIONER

## 2022-01-19 PROCEDURE — 3079F PR MOST RECENT DIASTOLIC BLOOD PRESSURE 80-89 MM HG: ICD-10-PCS | Mod: CPTII,S$GLB,, | Performed by: NURSE PRACTITIONER

## 2022-01-19 PROCEDURE — 3075F PR MOST RECENT SYSTOLIC BLOOD PRESS GE 130-139MM HG: ICD-10-PCS | Mod: CPTII,S$GLB,, | Performed by: NURSE PRACTITIONER

## 2022-01-19 PROCEDURE — 96361 HYDRATE IV INFUSION ADD-ON: CPT

## 2022-01-19 PROCEDURE — 96367 TX/PROPH/DG ADDL SEQ IV INF: CPT

## 2022-01-19 PROCEDURE — 96417 CHEMO IV INFUS EACH ADDL SEQ: CPT

## 2022-01-19 PROCEDURE — 3079F DIAST BP 80-89 MM HG: CPT | Mod: CPTII,S$GLB,, | Performed by: NURSE PRACTITIONER

## 2022-01-19 PROCEDURE — 3075F SYST BP GE 130 - 139MM HG: CPT | Mod: CPTII,S$GLB,, | Performed by: NURSE PRACTITIONER

## 2022-01-19 RX ORDER — SODIUM CHLORIDE 0.9 % (FLUSH) 0.9 %
10 SYRINGE (ML) INJECTION
Status: DISCONTINUED | OUTPATIENT
Start: 2022-01-19 | End: 2022-01-19 | Stop reason: HOSPADM

## 2022-01-19 RX ORDER — SODIUM CHLORIDE AND POTASSIUM CHLORIDE 150; 900 MG/100ML; MG/100ML
INJECTION, SOLUTION INTRAVENOUS
Status: COMPLETED | OUTPATIENT
Start: 2022-01-19 | End: 2022-01-19

## 2022-01-19 RX ORDER — HEPARIN 100 UNIT/ML
500 SYRINGE INTRAVENOUS
Status: DISCONTINUED | OUTPATIENT
Start: 2022-01-19 | End: 2022-01-19 | Stop reason: HOSPADM

## 2022-01-19 RX ORDER — MAGNESIUM SULFATE 1 G/100ML
1 INJECTION INTRAVENOUS
Status: COMPLETED | OUTPATIENT
Start: 2022-01-19 | End: 2022-01-19

## 2022-01-19 RX ADMIN — PALONOSETRON HYDROCHLORIDE 0.25 MG: 0.25 INJECTION, SOLUTION INTRAVENOUS at 10:01

## 2022-01-19 RX ADMIN — POTASSIUM CHLORIDE AND SODIUM CHLORIDE: 900; 150 INJECTION, SOLUTION INTRAVENOUS at 08:01

## 2022-01-19 RX ADMIN — CISPLATIN 51 MG: 1 INJECTION INTRAVENOUS at 11:01

## 2022-01-19 RX ADMIN — APREPITANT 130 MG: 130 INJECTION, EMULSION INTRAVENOUS at 09:01

## 2022-01-19 RX ADMIN — GEMCITABINE HYDROCHLORIDE 2000 MG: 1 INJECTION, SOLUTION INTRAVENOUS at 10:01

## 2022-01-19 RX ADMIN — HEPARIN 500 UNITS: 100 SYRINGE at 12:01

## 2022-01-19 RX ADMIN — SODIUM CHLORIDE 500 ML: 0.9 INJECTION, SOLUTION INTRAVENOUS at 12:01

## 2022-01-19 RX ADMIN — SODIUM CHLORIDE: 0.9 INJECTION, SOLUTION INTRAVENOUS at 10:01

## 2022-01-19 RX ADMIN — Medication 10 ML: at 12:01

## 2022-01-19 RX ADMIN — MAGNESIUM SULFATE HEPTAHYDRATE 1 G: 1 INJECTION, SOLUTION INTRAVENOUS at 08:01

## 2022-01-19 NOTE — PLAN OF CARE
Patient tolerated D1 Gemzar,cisplatin today. Pt received pre-hydration and post bolus- okay to give post hydration over 1 hour per Dr.Du. MART. PAC accessed with positive blood return. Pt declined AVS, uses [facility]. Discharged home, ambulated independently.

## 2022-01-20 ENCOUNTER — TELEPHONE (OUTPATIENT)
Dept: ENDOSCOPY | Facility: HOSPITAL | Age: 42
End: 2022-01-20
Payer: COMMERCIAL

## 2022-01-20 ENCOUNTER — PATIENT MESSAGE (OUTPATIENT)
Dept: HEMATOLOGY/ONCOLOGY | Facility: CLINIC | Age: 42
End: 2022-01-20
Payer: COMMERCIAL

## 2022-01-21 ENCOUNTER — PATIENT MESSAGE (OUTPATIENT)
Dept: ENDOSCOPY | Facility: HOSPITAL | Age: 42
End: 2022-01-21
Payer: COMMERCIAL

## 2022-01-21 LAB
FINAL PATHOLOGIC DIAGNOSIS: NORMAL
Lab: NORMAL

## 2022-01-25 ENCOUNTER — PATIENT MESSAGE (OUTPATIENT)
Dept: HEMATOLOGY/ONCOLOGY | Facility: CLINIC | Age: 42
End: 2022-01-25
Payer: COMMERCIAL

## 2022-01-25 ENCOUNTER — LAB VISIT (OUTPATIENT)
Dept: LAB | Facility: HOSPITAL | Age: 42
End: 2022-01-25
Attending: INTERNAL MEDICINE
Payer: COMMERCIAL

## 2022-01-25 DIAGNOSIS — C24.0 HILAR CHOLANGIOCARCINOMA: ICD-10-CM

## 2022-01-25 LAB
ALBUMIN SERPL BCP-MCNC: 3.9 G/DL (ref 3.5–5.2)
ALP SERPL-CCNC: 149 U/L (ref 55–135)
ALT SERPL W/O P-5'-P-CCNC: 58 U/L (ref 10–44)
ANION GAP SERPL CALC-SCNC: 11 MMOL/L (ref 8–16)
AST SERPL-CCNC: 25 U/L (ref 10–40)
BASOPHILS # BLD AUTO: 0.03 K/UL (ref 0–0.2)
BASOPHILS NFR BLD: 1.2 % (ref 0–1.9)
BILIRUB SERPL-MCNC: 0.5 MG/DL (ref 0.1–1)
BUN SERPL-MCNC: 17 MG/DL (ref 6–20)
CALCIUM SERPL-MCNC: 9.6 MG/DL (ref 8.7–10.5)
CANCER AG19-9 SERPL-ACNC: 248.2 U/ML (ref 0–40)
CHLORIDE SERPL-SCNC: 102 MMOL/L (ref 95–110)
CO2 SERPL-SCNC: 24 MMOL/L (ref 23–29)
CREAT SERPL-MCNC: 0.8 MG/DL (ref 0.5–1.4)
DIFFERENTIAL METHOD: ABNORMAL
EOSINOPHIL # BLD AUTO: 0.1 K/UL (ref 0–0.5)
EOSINOPHIL NFR BLD: 5.2 % (ref 0–8)
ERYTHROCYTE [DISTWIDTH] IN BLOOD BY AUTOMATED COUNT: 12.5 % (ref 11.5–14.5)
EST. GFR  (AFRICAN AMERICAN): >60 ML/MIN/1.73 M^2
EST. GFR  (NON AFRICAN AMERICAN): >60 ML/MIN/1.73 M^2
FINAL PATHOLOGIC DIAGNOSIS: ABNORMAL
GLUCOSE SERPL-MCNC: 113 MG/DL (ref 70–110)
HCT VFR BLD AUTO: 43.5 % (ref 40–54)
HGB BLD-MCNC: 15.3 G/DL (ref 14–18)
IMM GRANULOCYTES # BLD AUTO: 0.01 K/UL (ref 0–0.04)
IMM GRANULOCYTES NFR BLD AUTO: 0.4 % (ref 0–0.5)
LYMPHOCYTES # BLD AUTO: 0.7 K/UL (ref 1–4.8)
LYMPHOCYTES NFR BLD: 28 % (ref 18–48)
Lab: ABNORMAL
MCH RBC QN AUTO: 32.9 PG (ref 27–31)
MCHC RBC AUTO-ENTMCNC: 35.2 G/DL (ref 32–36)
MCV RBC AUTO: 94 FL (ref 82–98)
MONOCYTES # BLD AUTO: 0.1 K/UL (ref 0.3–1)
MONOCYTES NFR BLD: 3.2 % (ref 4–15)
NEUTROPHILS # BLD AUTO: 1.6 K/UL (ref 1.8–7.7)
NEUTROPHILS NFR BLD: 62 % (ref 38–73)
NRBC BLD-RTO: 0 /100 WBC
PLATELET # BLD AUTO: 141 K/UL (ref 150–450)
PMV BLD AUTO: 8.5 FL (ref 9.2–12.9)
POTASSIUM SERPL-SCNC: 4.4 MMOL/L (ref 3.5–5.1)
PROT SERPL-MCNC: 7.4 G/DL (ref 6–8.4)
RBC # BLD AUTO: 4.65 M/UL (ref 4.6–6.2)
SODIUM SERPL-SCNC: 137 MMOL/L (ref 136–145)
WBC # BLD AUTO: 2.5 K/UL (ref 3.9–12.7)

## 2022-01-25 PROCEDURE — 36415 COLL VENOUS BLD VENIPUNCTURE: CPT | Performed by: INTERNAL MEDICINE

## 2022-01-25 PROCEDURE — 85025 COMPLETE CBC W/AUTO DIFF WBC: CPT | Performed by: INTERNAL MEDICINE

## 2022-01-25 PROCEDURE — 86301 IMMUNOASSAY TUMOR CA 19-9: CPT | Performed by: INTERNAL MEDICINE

## 2022-01-25 PROCEDURE — 80053 COMPREHEN METABOLIC PANEL: CPT | Performed by: INTERNAL MEDICINE

## 2022-01-25 RX ORDER — SODIUM CHLORIDE 0.9 % (FLUSH) 0.9 %
10 SYRINGE (ML) INJECTION
Status: CANCELLED | OUTPATIENT
Start: 2022-01-26

## 2022-01-25 RX ORDER — HEPARIN 100 UNIT/ML
500 SYRINGE INTRAVENOUS
Status: CANCELLED | OUTPATIENT
Start: 2022-01-26

## 2022-01-26 ENCOUNTER — DOCUMENTATION ONLY (OUTPATIENT)
Dept: INFUSION THERAPY | Facility: HOSPITAL | Age: 42
End: 2022-01-26
Payer: COMMERCIAL

## 2022-01-26 ENCOUNTER — INFUSION (OUTPATIENT)
Dept: INFUSION THERAPY | Facility: HOSPITAL | Age: 42
End: 2022-01-26
Attending: INTERNAL MEDICINE
Payer: COMMERCIAL

## 2022-01-26 VITALS
BODY MASS INDEX: 28.5 KG/M2 | TEMPERATURE: 98 F | WEIGHT: 192.44 LBS | HEART RATE: 79 BPM | SYSTOLIC BLOOD PRESSURE: 134 MMHG | OXYGEN SATURATION: 99 % | RESPIRATION RATE: 17 BRPM | DIASTOLIC BLOOD PRESSURE: 91 MMHG | HEIGHT: 69 IN

## 2022-01-26 DIAGNOSIS — K83.1 BILIARY OBSTRUCTION: Primary | ICD-10-CM

## 2022-01-26 PROCEDURE — 25000003 PHARM REV CODE 250: Mod: PN | Performed by: INTERNAL MEDICINE

## 2022-01-26 PROCEDURE — 96417 CHEMO IV INFUS EACH ADDL SEQ: CPT | Mod: PN

## 2022-01-26 PROCEDURE — 96375 TX/PRO/DX INJ NEW DRUG ADDON: CPT | Mod: PN

## 2022-01-26 PROCEDURE — 96361 HYDRATE IV INFUSION ADD-ON: CPT | Mod: PN

## 2022-01-26 PROCEDURE — 63600175 PHARM REV CODE 636 W HCPCS: Mod: PN | Performed by: INTERNAL MEDICINE

## 2022-01-26 PROCEDURE — 96367 TX/PROPH/DG ADDL SEQ IV INF: CPT | Mod: PN

## 2022-01-26 PROCEDURE — A4216 STERILE WATER/SALINE, 10 ML: HCPCS | Mod: PN | Performed by: INTERNAL MEDICINE

## 2022-01-26 PROCEDURE — 63600175 PHARM REV CODE 636 W HCPCS: Mod: PN

## 2022-01-26 PROCEDURE — 96366 THER/PROPH/DIAG IV INF ADDON: CPT | Mod: PN

## 2022-01-26 PROCEDURE — 96413 CHEMO IV INFUSION 1 HR: CPT | Mod: PN

## 2022-01-26 RX ORDER — SODIUM CHLORIDE 0.9 % (FLUSH) 0.9 %
10 SYRINGE (ML) INJECTION
Status: DISCONTINUED | OUTPATIENT
Start: 2022-01-26 | End: 2022-01-26 | Stop reason: HOSPADM

## 2022-01-26 RX ORDER — HEPARIN 100 UNIT/ML
500 SYRINGE INTRAVENOUS
Status: DISCONTINUED | OUTPATIENT
Start: 2022-01-26 | End: 2022-01-26 | Stop reason: HOSPADM

## 2022-01-26 RX ORDER — SODIUM CHLORIDE 9 MG/ML
500 INJECTION, SOLUTION INTRAVENOUS
Status: COMPLETED | OUTPATIENT
Start: 2022-01-26 | End: 2022-01-26

## 2022-01-26 RX ADMIN — APREPITANT 130 MG: 130 INJECTION, EMULSION INTRAVENOUS at 11:01

## 2022-01-26 RX ADMIN — HEPARIN 500 UNITS: 100 SYRINGE at 02:01

## 2022-01-26 RX ADMIN — POTASSIUM CHLORIDE 500 ML/HR: 2 INJECTION, SOLUTION, CONCENTRATE INTRAVENOUS at 09:01

## 2022-01-26 RX ADMIN — CISPLATIN 51 MG: 1 INJECTION INTRAVENOUS at 12:01

## 2022-01-26 RX ADMIN — GEMCITABINE 2000 MG: 38 INJECTION, SOLUTION INTRAVENOUS at 12:01

## 2022-01-26 RX ADMIN — Medication 10 ML: at 02:01

## 2022-01-26 RX ADMIN — SODIUM CHLORIDE 500 ML: 0.9 INJECTION, SOLUTION INTRAVENOUS at 01:01

## 2022-01-26 RX ADMIN — SODIUM CHLORIDE: 0.9 INJECTION, SOLUTION INTRAVENOUS at 09:01

## 2022-01-26 NOTE — PLAN OF CARE
"  Problem: Adult Inpatient Plan of Care  Goal: Plan of Care Review  Outcome: Ongoing, Progressing  Flowsheets (Taken 1/26/2022 1458)  Plan of Care Reviewed With: patient     BP (!) 134/91   Pulse 79   Temp 98 °F (36.7 °C)   Resp 17   Ht 5' 9" (1.753 m)   Wt 87.3 kg (192 lb 7.4 oz)   SpO2 99%   BMI 28.42 kg/m²   Patient tolerated treatment well. Port flushed with blood return, heparin locked, and de-accessed. AVS provided per portal and reviewed. Ambulates per self.  Patient is going to see if he can begin having treatment here on the [facility] since it is much closer than main campus.     "

## 2022-01-26 NOTE — PROGRESS NOTES
SW met pt at chairside. Introduced self and explained role of SW. Pt reports he has been getting treatment at [facility] for two months.  He said his office is close to [facility] and his home is close to this location. Pt reports he is doing well. He denied any needs from SW. SW provide pt a support calender and card to contact SW if needs arrives.   SW to follow.     Wendi Robertson, CHRISTYW

## 2022-01-26 NOTE — PROGRESS NOTES
Nutrition Note:     RD met with patient to introduce self and explain role in POC. Pt states he is seeing an oncologist at main campus and will occasionally be getting treatment there. Pt reports he works on the [facility] but lives on the [facility] so he will be back and forth. Pt states he has not experienced any significant nutrition side effects. RD provided pt with contact information and encouraged to call with any changes and/or questions.    Elida Landaverde 01/26/2022   11:10 AM

## 2022-01-26 NOTE — PLAN OF CARE
"  Problem: Adult Inpatient Plan of Care  Goal: Patient-Specific Goal (Individualized)  Outcome: Ongoing, Progressing  Flowsheets (Taken 1/26/2022 0920)  Anxieties, Fears or Concerns: none reported  Individualized Care Needs: recliner  Patient-Specific Goals (Include Timeframe): no s/s infection during txt     Problem: Fatigue  Goal: Improved Activity Tolerance  Intervention: Promote Improved Energy  Flowsheets (Taken 1/26/2022 0920)  Fatigue Management: frequent rest breaks encouraged  Sleep/Rest Enhancement:   noise level reduced   natural light exposure provided  Activity Management:   Ambulated -L4   Ambulated in mesa - L4    BP (!) 142/93   Pulse 91   Temp 98 °F (36.7 °C)   Resp 18   Ht 5' 9" (1.753 m)   Wt 87.3 kg (192 lb 7.4 oz)   SpO2 99%   BMI 28.42 kg/m²   Patient here today for C1D8 Gemzar/Cisplatin. VSS. Port accessed to right chest without difficulty; patent with blood return.   Orders released. Pt oriented to unit (first time to unit).   Symptoms reviewed. Questions and concerns addressed.     "

## 2022-01-28 NOTE — PROGRESS NOTES
Follow up discussion to advise on treatment decisions. I emphasized near certainty of presence of a malignancy, challenges in getting a tissue diagnosis are common with this disease, and acknowledged the difficulty in processing this for he and his spouse. Committee is recommending treatment protocol and liver transplantation. I supported their interest in seeking second opinion for reassurance, but emphasized the need to move forward with treatment soon to preserve all options and prevent progression.

## 2022-02-09 ENCOUNTER — LAB VISIT (OUTPATIENT)
Dept: LAB | Facility: HOSPITAL | Age: 42
End: 2022-02-09
Attending: INTERNAL MEDICINE
Payer: COMMERCIAL

## 2022-02-09 ENCOUNTER — TELEPHONE (OUTPATIENT)
Dept: INFUSION THERAPY | Facility: HOSPITAL | Age: 42
End: 2022-02-09
Payer: COMMERCIAL

## 2022-02-09 ENCOUNTER — DOCUMENTATION ONLY (OUTPATIENT)
Dept: HEMATOLOGY/ONCOLOGY | Facility: CLINIC | Age: 42
End: 2022-02-09
Payer: COMMERCIAL

## 2022-02-09 DIAGNOSIS — D70.1 CHEMOTHERAPY INDUCED NEUTROPENIA: ICD-10-CM

## 2022-02-09 DIAGNOSIS — C24.0 HILAR CHOLANGIOCARCINOMA: ICD-10-CM

## 2022-02-09 DIAGNOSIS — T45.1X5A CHEMOTHERAPY INDUCED NEUTROPENIA: ICD-10-CM

## 2022-02-09 LAB
ALBUMIN SERPL BCP-MCNC: 3.6 G/DL (ref 3.5–5.2)
ALP SERPL-CCNC: 159 U/L (ref 55–135)
ALT SERPL W/O P-5'-P-CCNC: 116 U/L (ref 10–44)
ANION GAP SERPL CALC-SCNC: 6 MMOL/L (ref 8–16)
ANISOCYTOSIS BLD QL SMEAR: SLIGHT
AST SERPL-CCNC: 97 U/L (ref 10–40)
BASOPHILS # BLD AUTO: ABNORMAL K/UL (ref 0–0.2)
BASOPHILS NFR BLD: 0 % (ref 0–1.9)
BILIRUB SERPL-MCNC: 0.6 MG/DL (ref 0.1–1)
BUN SERPL-MCNC: 14 MG/DL (ref 6–20)
CALCIUM SERPL-MCNC: 9.3 MG/DL (ref 8.7–10.5)
CANCER AG19-9 SERPL-ACNC: 84.1 U/ML (ref 0–40)
CHLORIDE SERPL-SCNC: 102 MMOL/L (ref 95–110)
CO2 SERPL-SCNC: 24 MMOL/L (ref 23–29)
CREAT SERPL-MCNC: 1.1 MG/DL (ref 0.5–1.4)
DACRYOCYTES BLD QL SMEAR: ABNORMAL
DIFFERENTIAL METHOD: ABNORMAL
EOSINOPHIL # BLD AUTO: ABNORMAL K/UL (ref 0–0.5)
EOSINOPHIL NFR BLD: 0 % (ref 0–8)
ERYTHROCYTE [DISTWIDTH] IN BLOOD BY AUTOMATED COUNT: 12.7 % (ref 11.5–14.5)
EST. GFR  (AFRICAN AMERICAN): >60 ML/MIN/1.73 M^2
EST. GFR  (NON AFRICAN AMERICAN): >60 ML/MIN/1.73 M^2
GLUCOSE SERPL-MCNC: 276 MG/DL (ref 70–110)
HCT VFR BLD AUTO: 36.5 % (ref 40–54)
HGB BLD-MCNC: 12.5 G/DL (ref 14–18)
IMM GRANULOCYTES # BLD AUTO: ABNORMAL K/UL (ref 0–0.04)
IMM GRANULOCYTES NFR BLD AUTO: ABNORMAL % (ref 0–0.5)
LYMPHOCYTES # BLD AUTO: ABNORMAL K/UL (ref 1–4.8)
LYMPHOCYTES NFR BLD: 24 % (ref 18–48)
MCH RBC QN AUTO: 32.6 PG (ref 27–31)
MCHC RBC AUTO-ENTMCNC: 34.2 G/DL (ref 32–36)
MCV RBC AUTO: 95 FL (ref 82–98)
MONOCYTES # BLD AUTO: ABNORMAL K/UL (ref 0.3–1)
MONOCYTES NFR BLD: 10 % (ref 4–15)
NEUTROPHILS NFR BLD: 66 % (ref 38–73)
NRBC BLD-RTO: 0 /100 WBC
PLATELET # BLD AUTO: 103 K/UL (ref 150–450)
PLATELET BLD QL SMEAR: ABNORMAL
PMV BLD AUTO: 9.3 FL (ref 9.2–12.9)
POIKILOCYTOSIS BLD QL SMEAR: SLIGHT
POTASSIUM SERPL-SCNC: 4.8 MMOL/L (ref 3.5–5.1)
PROT SERPL-MCNC: 6.7 G/DL (ref 6–8.4)
RBC # BLD AUTO: 3.83 M/UL (ref 4.6–6.2)
SODIUM SERPL-SCNC: 132 MMOL/L (ref 136–145)
WBC # BLD AUTO: 0.95 K/UL (ref 3.9–12.7)

## 2022-02-09 PROCEDURE — 85007 BL SMEAR W/DIFF WBC COUNT: CPT | Performed by: INTERNAL MEDICINE

## 2022-02-09 PROCEDURE — 80053 COMPREHEN METABOLIC PANEL: CPT | Performed by: INTERNAL MEDICINE

## 2022-02-09 PROCEDURE — 86301 IMMUNOASSAY TUMOR CA 19-9: CPT | Performed by: INTERNAL MEDICINE

## 2022-02-09 PROCEDURE — 85027 COMPLETE CBC AUTOMATED: CPT | Performed by: INTERNAL MEDICINE

## 2022-02-09 PROCEDURE — 36415 COLL VENOUS BLD VENIPUNCTURE: CPT | Performed by: INTERNAL MEDICINE

## 2022-02-09 NOTE — PROGRESS NOTES
"                                                         PROGRESS NOTE    Subjective:       Patient ID: Romeo Larson is a 41 y.o. male.    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 Mr Larson is a 42 yo man who presents today for further management of suspected hilar cholangiocarcinoma. He presented with dark urine, abdominal pain and jaundice since August 2021. He presented to outside hospital ER with elevated bilirubin. MRCP was performed demonstrating bi-lobar intrahepatic bile duct dilation and a ~2cm ill defined mass at the hilum concerning for hilar cholangiocarcinoma. He was referred to the advanced endoscopy group at [facility] [place]. ERCP confirmed severe, malignant appearing stricture involving right and left main hepatic ducts. Biliary stents were placed to relieve obstruction. EUS was performed. It showed an irregular hypoechoic mass where the hepatic duct bifurcates into the right and left hepatic ducts. The mass measured 11.4 mm by 11.3 mm in maximal cross-sectional diameter. FNA sampling of hilar lymph nodes was negative for metastatic disease. Bile duct biopsy showed "rare groups of glandular epithelium with mild atypia, strips of benign glandular epithelium intermixed with blood clot, and focal fibrous tissue with chronic inflammation."  CT C/A/P 10/27/21:  1. Intrahepatic biliary dilatation despite the presence of 2 biliary drains.  The patient is recent comparison MRI a revealed a possible central hepatic mass, concerning for either cholangiocarcinoma or hepatocellular carcinoma.  This is, however, not definitively demonstrated on today's exam.  There are enlarged lymph nodes present in the vicinity of the ana cristina hepatis and celiac axis as detailed above.  2. Scattered pulmonary nodules measuring up to 6 mm.  3. Other findings as detailed above.  He presents today for further management. Seen by Dr Jara and considered a " "candidate for liver transplant. Seen by Dr Cunningham last Friday. Scheduled for PET this Wednesday. 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   Discussed neoadjuvant chemoradiation with 5FU followed by neoadjuvant cis/gem prior to liver transplant.   2. Hospitalized 11/3/21-21 for fever 2/2 acute cholangitis. Repeat ERCP biopsy on 21 again non-diagnostic. Biopsy of the celiac lymph node was negative.   3. PET scan 11/3/21: "1. Wedge-shaped geographic hypermetabolic activity within the right lobe of the liver in a similar distribution to the intrahepatic biliary dilatation.  This could relate to inflammation from multiple etiologies including biliary stasis, cholangitis, and obstructive dilatation.  Neoplastic involvement would be difficult to exclude.  The liver is poorly evaluated given background activity. 2. Hypermetabolic lymphadenopathy is noted in a periportal and celiac distribution.  Infectious inflammatory and neoplastic etiologies are on the differential.  Index nodes have been measured. 3. Multiple pulmonary nodules are noted too small to categorize by PET-CT fusion as evidence seen on a prior CT of 10/27/2021.  CT for follow-up of size stability is necessary."  4. Completed chemoradiation with 5FU from . Started cis/gem on 22. S/p 1 cycle    Interval History:   Mr Larson returns for cycle 2 day 1 of cis/gem. Had a temp of 99F last night which broke. Otherwise feels okay. Had persistent hiccup after taking dex. Stopped dex and did not have nausea.      ECO    ROS:   A ten-point system review is obtained and negative except for what was stated in the Interval History.     Physical Examination:   Vital signs reviewed.   General: well hydrated, well developed, in no acute distress  HEENT: normocephalic, PERRLA, EOMI, anicteric sclerae, oropharynx clear  Neck: supple, no JVD, thyromegaly, cervical or supraclavicular lymphadenopathy  Lungs: clear breath sounds bilaterally, no " "wheezing, rales, or rhonchi  Chest: port site clean  Heart: RRR, no M/R/G  Abdomen: soft, no tenderness, non-distended, no hepatosplenomegaly, mass, or hernia. BS present  Extremities: no clubbing, cyanosis, or edema  Skin: no rash, ulcer, or open wounds  Neuro: alert and oriented x 4, no focal neuro deficit  Psych: pleasant and appropriate mood and affect    Objective:     Laboratory Data:  Labs reviewed. WBC 0.95. CA 19-9 84.1     Imaging Data:  PET scan 11/3/21: "1. Wedge-shaped geographic hypermetabolic activity within the right lobe of the liver in a similar distribution to the intrahepatic biliary dilatation.  This could relate to inflammation from multiple etiologies including biliary stasis, cholangitis, and obstructive dilatation.  Neoplastic involvement would be difficult to exclude.  The liver is poorly evaluated given background activity. 2. Hypermetabolic lymphadenopathy is noted in a periportal and celiac distribution.  Infectious inflammatory and neoplastic etiologies are on the differential.  Index nodes have been measured. 3. Multiple pulmonary nodules are noted too small to categorize by PET-CT fusion as evidence seen on a prior CT of 10/27/2021.  CT for follow-up of size stability is necessary."    CT C/A/P 1/18/22:  1. Patient with a history of suspected hilar cholangiocarcinoma with no evidence of distant metastatic disease in the chest, abdomen, or pelvis.  There has been an interval reduction in the extent of the patient's intrahepatic biliary dilatation as compared to the previous study.  Two index lymph nodes in the vicinity of the ana cristina hepatis/gastroesophageal junction or smaller in size on today's examination.  2. Stable solid pulmonary nodules measuring up to 5 mm in size.  No new pulmonary nodules on today's study.    Assessment and Plan:     1. 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 Immunodeficiency secondary to neoplasm    4. Chemotherapy " induced neutropenia    5. Nausea      1-3  - Mr Larson is a 42 yo man with likely stage I hilar cholangiocarcinoma. Biopsy non-diagnostic but clinical picture most consistent with hilar cholangiocarcinoma. He is a candidate for liver transplant. Completed chemoradiation with 5FU from 11/29/21-1/5/2022. Started cis/gem on 1/19/22. S/p 1 cycle  - WBC low. Zarxio and neulast ordered but pending approval  - discussed with patient we will check with PA again, once approved we will give him zarxio today, tomorrow and Saturday  - labs next Tuesday, if adequate will get C2D1 next Wed  - added neulasta OBI to day 8 of each cycle. Discussed with patient.   - will give 3 cycles of cis/gem (9 weeks) then restage.     4.  - see above. Hold chemo. Give G-CSF once approved  - neutropenic precaution given    5.  - mild. Oral dex caused hiccups. Stopped dex and did not have nausea  - zofran prn      Follow-up:     RTC next week  Knows to call in the interval if any problems arise.    Electronically signed by Young Wesley

## 2022-02-09 NOTE — TELEPHONE ENCOUNTER
[10:01 AM] Meli Chadwick  MRN [id number] Marsha    [10:02 AM] Meli Chadwick  just cancelled his tx at [facility] tomorrow.  Need to change the duration of appt on 2/16 possibly and make it C2D1 plus add C2D8    [10:05 AM] Aby Lenaabdiel  mrn [id number] is schedule thanks

## 2022-02-10 ENCOUNTER — PATIENT MESSAGE (OUTPATIENT)
Dept: HEMATOLOGY/ONCOLOGY | Facility: CLINIC | Age: 42
End: 2022-02-10
Payer: COMMERCIAL

## 2022-02-10 ENCOUNTER — OFFICE VISIT (OUTPATIENT)
Dept: HEMATOLOGY/ONCOLOGY | Facility: CLINIC | Age: 42
End: 2022-02-10
Payer: COMMERCIAL

## 2022-02-10 VITALS
WEIGHT: 194.38 LBS | OXYGEN SATURATION: 98 % | TEMPERATURE: 98 F | DIASTOLIC BLOOD PRESSURE: 91 MMHG | BODY MASS INDEX: 28.79 KG/M2 | RESPIRATION RATE: 18 BRPM | HEIGHT: 69 IN | SYSTOLIC BLOOD PRESSURE: 129 MMHG | HEART RATE: 113 BPM

## 2022-02-10 DIAGNOSIS — C24.0 HILAR CHOLANGIOCARCINOMA: Primary | ICD-10-CM

## 2022-02-10 DIAGNOSIS — Z79.899 IMMUNODEFICIENCY SECONDARY TO CHEMOTHERAPY: ICD-10-CM

## 2022-02-10 DIAGNOSIS — D70.1 CHEMOTHERAPY INDUCED NEUTROPENIA: ICD-10-CM

## 2022-02-10 DIAGNOSIS — D49.9 IMMUNODEFICIENCY SECONDARY TO NEOPLASM: ICD-10-CM

## 2022-02-10 DIAGNOSIS — D84.81 IMMUNODEFICIENCY SECONDARY TO NEOPLASM: ICD-10-CM

## 2022-02-10 DIAGNOSIS — D84.821 IMMUNODEFICIENCY SECONDARY TO CHEMOTHERAPY: ICD-10-CM

## 2022-02-10 DIAGNOSIS — T45.1X5A CHEMOTHERAPY INDUCED NEUTROPENIA: ICD-10-CM

## 2022-02-10 DIAGNOSIS — R11.0 NAUSEA: ICD-10-CM

## 2022-02-10 DIAGNOSIS — T45.1X5A IMMUNODEFICIENCY SECONDARY TO CHEMOTHERAPY: ICD-10-CM

## 2022-02-10 PROCEDURE — 4010F ACE/ARB THERAPY RXD/TAKEN: CPT | Mod: CPTII,S$GLB,, | Performed by: INTERNAL MEDICINE

## 2022-02-10 PROCEDURE — 1160F PR REVIEW ALL MEDS BY PRESCRIBER/CLIN PHARMACIST DOCUMENTED: ICD-10-PCS | Mod: CPTII,S$GLB,, | Performed by: INTERNAL MEDICINE

## 2022-02-10 PROCEDURE — 99999 PR PBB SHADOW E&M-EST. PATIENT-LVL IV: CPT | Mod: PBBFAC,,, | Performed by: INTERNAL MEDICINE

## 2022-02-10 PROCEDURE — 3008F BODY MASS INDEX DOCD: CPT | Mod: CPTII,S$GLB,, | Performed by: INTERNAL MEDICINE

## 2022-02-10 PROCEDURE — 4010F PR ACE/ARB THEARPY RXD/TAKEN: ICD-10-PCS | Mod: CPTII,S$GLB,, | Performed by: INTERNAL MEDICINE

## 2022-02-10 PROCEDURE — 3080F PR MOST RECENT DIASTOLIC BLOOD PRESSURE >= 90 MM HG: ICD-10-PCS | Mod: CPTII,S$GLB,, | Performed by: INTERNAL MEDICINE

## 2022-02-10 PROCEDURE — 3080F DIAST BP >= 90 MM HG: CPT | Mod: CPTII,S$GLB,, | Performed by: INTERNAL MEDICINE

## 2022-02-10 PROCEDURE — 1159F PR MEDICATION LIST DOCUMENTED IN MEDICAL RECORD: ICD-10-PCS | Mod: CPTII,S$GLB,, | Performed by: INTERNAL MEDICINE

## 2022-02-10 PROCEDURE — 1159F MED LIST DOCD IN RCRD: CPT | Mod: CPTII,S$GLB,, | Performed by: INTERNAL MEDICINE

## 2022-02-10 PROCEDURE — 99215 PR OFFICE/OUTPT VISIT, EST, LEVL V, 40-54 MIN: ICD-10-PCS | Mod: S$GLB,,, | Performed by: INTERNAL MEDICINE

## 2022-02-10 PROCEDURE — 3074F SYST BP LT 130 MM HG: CPT | Mod: CPTII,S$GLB,, | Performed by: INTERNAL MEDICINE

## 2022-02-10 PROCEDURE — 1160F RVW MEDS BY RX/DR IN RCRD: CPT | Mod: CPTII,S$GLB,, | Performed by: INTERNAL MEDICINE

## 2022-02-10 PROCEDURE — 3008F PR BODY MASS INDEX (BMI) DOCUMENTED: ICD-10-PCS | Mod: CPTII,S$GLB,, | Performed by: INTERNAL MEDICINE

## 2022-02-10 PROCEDURE — 3074F PR MOST RECENT SYSTOLIC BLOOD PRESSURE < 130 MM HG: ICD-10-PCS | Mod: CPTII,S$GLB,, | Performed by: INTERNAL MEDICINE

## 2022-02-10 PROCEDURE — 99215 OFFICE O/P EST HI 40 MIN: CPT | Mod: S$GLB,,, | Performed by: INTERNAL MEDICINE

## 2022-02-10 PROCEDURE — 99999 PR PBB SHADOW E&M-EST. PATIENT-LVL IV: ICD-10-PCS | Mod: PBBFAC,,, | Performed by: INTERNAL MEDICINE

## 2022-02-10 NOTE — Clinical Note
Expedite prior auth of filgrastim and neulasta. Take filgrastim at [facility] (if not available then [facility]) today 2/10, 2/11, 2/12 (Saturday at [facility])

## 2022-02-11 ENCOUNTER — TELEPHONE (OUTPATIENT)
Dept: HEMATOLOGY/ONCOLOGY | Facility: CLINIC | Age: 42
End: 2022-02-11
Payer: COMMERCIAL

## 2022-02-11 ENCOUNTER — PATIENT MESSAGE (OUTPATIENT)
Dept: HEMATOLOGY/ONCOLOGY | Facility: CLINIC | Age: 42
End: 2022-02-11
Payer: COMMERCIAL

## 2022-02-15 ENCOUNTER — TELEPHONE (OUTPATIENT)
Dept: HEMATOLOGY/ONCOLOGY | Facility: CLINIC | Age: 42
End: 2022-02-15
Payer: COMMERCIAL

## 2022-02-15 ENCOUNTER — LAB VISIT (OUTPATIENT)
Dept: LAB | Facility: HOSPITAL | Age: 42
End: 2022-02-15
Attending: INTERNAL MEDICINE
Payer: COMMERCIAL

## 2022-02-15 ENCOUNTER — OFFICE VISIT (OUTPATIENT)
Dept: HEMATOLOGY/ONCOLOGY | Facility: CLINIC | Age: 42
End: 2022-02-15
Payer: COMMERCIAL

## 2022-02-15 VITALS
WEIGHT: 194.69 LBS | SYSTOLIC BLOOD PRESSURE: 127 MMHG | OXYGEN SATURATION: 99 % | BODY MASS INDEX: 28.84 KG/M2 | TEMPERATURE: 98 F | HEART RATE: 92 BPM | RESPIRATION RATE: 18 BRPM | HEIGHT: 69 IN | DIASTOLIC BLOOD PRESSURE: 95 MMHG

## 2022-02-15 DIAGNOSIS — D84.821 IMMUNODEFICIENCY SECONDARY TO CHEMOTHERAPY: ICD-10-CM

## 2022-02-15 DIAGNOSIS — T45.1X5A CHEMOTHERAPY INDUCED NEUTROPENIA: ICD-10-CM

## 2022-02-15 DIAGNOSIS — C24.0 HILAR CHOLANGIOCARCINOMA: ICD-10-CM

## 2022-02-15 DIAGNOSIS — Z79.899 IMMUNODEFICIENCY SECONDARY TO CHEMOTHERAPY: ICD-10-CM

## 2022-02-15 DIAGNOSIS — T45.1X5A IMMUNODEFICIENCY SECONDARY TO CHEMOTHERAPY: ICD-10-CM

## 2022-02-15 DIAGNOSIS — D49.9 IMMUNODEFICIENCY SECONDARY TO NEOPLASM: ICD-10-CM

## 2022-02-15 DIAGNOSIS — D70.1 CHEMOTHERAPY INDUCED NEUTROPENIA: ICD-10-CM

## 2022-02-15 DIAGNOSIS — R11.0 NAUSEA: ICD-10-CM

## 2022-02-15 DIAGNOSIS — D84.81 IMMUNODEFICIENCY SECONDARY TO NEOPLASM: ICD-10-CM

## 2022-02-15 DIAGNOSIS — C24.0 HILAR CHOLANGIOCARCINOMA: Primary | ICD-10-CM

## 2022-02-15 LAB
ALBUMIN SERPL BCP-MCNC: 3.5 G/DL (ref 3.5–5.2)
ALP SERPL-CCNC: 235 U/L (ref 55–135)
ALT SERPL W/O P-5'-P-CCNC: 85 U/L (ref 10–44)
ANION GAP SERPL CALC-SCNC: 9 MMOL/L (ref 8–16)
ANISOCYTOSIS BLD QL SMEAR: SLIGHT
AST SERPL-CCNC: 36 U/L (ref 10–40)
BASOPHILS NFR BLD: 1 % (ref 0–1.9)
BILIRUB SERPL-MCNC: 0.5 MG/DL (ref 0.1–1)
BUN SERPL-MCNC: 10 MG/DL (ref 6–20)
CALCIUM SERPL-MCNC: 10 MG/DL (ref 8.7–10.5)
CANCER AG19-9 SERPL-ACNC: 202.6 U/ML (ref 0–40)
CHLORIDE SERPL-SCNC: 101 MMOL/L (ref 95–110)
CO2 SERPL-SCNC: 26 MMOL/L (ref 23–29)
CREAT SERPL-MCNC: 0.9 MG/DL (ref 0.5–1.4)
DACRYOCYTES BLD QL SMEAR: ABNORMAL
DIFFERENTIAL METHOD: ABNORMAL
EOSINOPHIL NFR BLD: 3 % (ref 0–8)
ERYTHROCYTE [DISTWIDTH] IN BLOOD BY AUTOMATED COUNT: 12.9 % (ref 11.5–14.5)
EST. GFR  (AFRICAN AMERICAN): >60 ML/MIN/1.73 M^2
EST. GFR  (NON AFRICAN AMERICAN): >60 ML/MIN/1.73 M^2
GLUCOSE SERPL-MCNC: 140 MG/DL (ref 70–110)
HCT VFR BLD AUTO: 38.4 % (ref 40–54)
HGB BLD-MCNC: 12.8 G/DL (ref 14–18)
IMM GRANULOCYTES # BLD AUTO: ABNORMAL K/UL (ref 0–0.04)
IMM GRANULOCYTES NFR BLD AUTO: ABNORMAL % (ref 0–0.5)
LYMPHOCYTES NFR BLD: 21 % (ref 18–48)
MCH RBC QN AUTO: 32.3 PG (ref 27–31)
MCHC RBC AUTO-ENTMCNC: 33.3 G/DL (ref 32–36)
MCV RBC AUTO: 97 FL (ref 82–98)
METAMYELOCYTES NFR BLD MANUAL: 1 %
MONOCYTES NFR BLD: 16 % (ref 4–15)
NEUTROPHILS NFR BLD: 56 % (ref 38–73)
NEUTS BAND NFR BLD MANUAL: 2 %
NRBC BLD-RTO: 0 /100 WBC
OVALOCYTES BLD QL SMEAR: ABNORMAL
PLATELET # BLD AUTO: 225 K/UL (ref 150–450)
PLATELET BLD QL SMEAR: ABNORMAL
PMV BLD AUTO: 8.7 FL (ref 9.2–12.9)
POIKILOCYTOSIS BLD QL SMEAR: SLIGHT
POLYCHROMASIA BLD QL SMEAR: ABNORMAL
POTASSIUM SERPL-SCNC: 4.3 MMOL/L (ref 3.5–5.1)
PROT SERPL-MCNC: 7.2 G/DL (ref 6–8.4)
RBC # BLD AUTO: 3.96 M/UL (ref 4.6–6.2)
SODIUM SERPL-SCNC: 136 MMOL/L (ref 136–145)
WBC # BLD AUTO: 3.54 K/UL (ref 3.9–12.7)

## 2022-02-15 PROCEDURE — 99215 OFFICE O/P EST HI 40 MIN: CPT | Mod: S$GLB,,, | Performed by: PHYSICIAN ASSISTANT

## 2022-02-15 PROCEDURE — 86301 IMMUNOASSAY TUMOR CA 19-9: CPT | Performed by: INTERNAL MEDICINE

## 2022-02-15 PROCEDURE — 85007 BL SMEAR W/DIFF WBC COUNT: CPT | Performed by: INTERNAL MEDICINE

## 2022-02-15 PROCEDURE — 85027 COMPLETE CBC AUTOMATED: CPT | Performed by: INTERNAL MEDICINE

## 2022-02-15 PROCEDURE — 99999 PR PBB SHADOW E&M-EST. PATIENT-LVL III: CPT | Mod: PBBFAC,,, | Performed by: PHYSICIAN ASSISTANT

## 2022-02-15 PROCEDURE — 99999 PR PBB SHADOW E&M-EST. PATIENT-LVL III: ICD-10-PCS | Mod: PBBFAC,,, | Performed by: PHYSICIAN ASSISTANT

## 2022-02-15 PROCEDURE — 80053 COMPREHEN METABOLIC PANEL: CPT | Performed by: INTERNAL MEDICINE

## 2022-02-15 PROCEDURE — 36415 COLL VENOUS BLD VENIPUNCTURE: CPT | Performed by: INTERNAL MEDICINE

## 2022-02-15 PROCEDURE — 99215 PR OFFICE/OUTPT VISIT, EST, LEVL V, 40-54 MIN: ICD-10-PCS | Mod: S$GLB,,, | Performed by: PHYSICIAN ASSISTANT

## 2022-02-15 RX ORDER — SODIUM CHLORIDE 0.9 % (FLUSH) 0.9 %
10 SYRINGE (ML) INJECTION
Status: CANCELLED | OUTPATIENT
Start: 2022-02-16

## 2022-02-15 RX ORDER — HEPARIN 100 UNIT/ML
500 SYRINGE INTRAVENOUS
Status: CANCELLED | OUTPATIENT
Start: 2022-02-16

## 2022-02-15 NOTE — TELEPHONE ENCOUNTER
Dyan Shaw Staff  Caller: Unspecified (Today,  8:28 AM)  Name Of Caller: Tracey with [facility]     Contact Preference?: [phone] option 8     What is the nature of the call?: authorization # [id number] submitted was approved with treatment start date 2/14/2022 expires 5/30/2022

## 2022-02-15 NOTE — PROGRESS NOTES
"                                                         PROGRESS NOTE    Subjective:       Patient ID: Romeo Larson is a 41 y.o. male.    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 Mr Larson is a 42 yo man who presents today for further management of suspected hilar cholangiocarcinoma. He presented with dark urine, abdominal pain and jaundice since August 2021. He presented to outside hospital ER with elevated bilirubin. MRCP was performed demonstrating bi-lobar intrahepatic bile duct dilation and a ~2cm ill defined mass at the hilum concerning for hilar cholangiocarcinoma. He was referred to the advanced endoscopy group at [facility] [place]. ERCP confirmed severe, malignant appearing stricture involving right and left main hepatic ducts. Biliary stents were placed to relieve obstruction. EUS was performed. It showed an irregular hypoechoic mass where the hepatic duct bifurcates into the right and left hepatic ducts. The mass measured 11.4 mm by 11.3 mm in maximal cross-sectional diameter. FNA sampling of hilar lymph nodes was negative for metastatic disease. Bile duct biopsy showed "rare groups of glandular epithelium with mild atypia, strips of benign glandular epithelium intermixed with blood clot, and focal fibrous tissue with chronic inflammation."  CT C/A/P 10/27/21:  1. Intrahepatic biliary dilatation despite the presence of 2 biliary drains.  The patient is recent comparison MRI a revealed a possible central hepatic mass, concerning for either cholangiocarcinoma or hepatocellular carcinoma.  This is, however, not definitively demonstrated on today's exam.  There are enlarged lymph nodes present in the vicinity of the ana cristina hepatis and celiac axis as detailed above.  2. Scattered pulmonary nodules measuring up to 6 mm.  3. Other findings as detailed above.  He presents today for further management. Seen by Dr Jara " "and considered a candidate for liver transplant. Seen by Dr Cunningham last Friday. Scheduled for PET this Wednesday. 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   Discussed neoadjuvant chemoradiation with 5FU followed by neoadjuvant cis/gem prior to liver transplant.   2. Hospitalized 11/3/21-21 for fever 2/2 acute cholangitis. Repeat ERCP biopsy on 21 again non-diagnostic. Biopsy of the celiac lymph node was negative.   3. PET scan 11/3/21: "1. Wedge-shaped geographic hypermetabolic activity within the right lobe of the liver in a similar distribution to the intrahepatic biliary dilatation.  This could relate to inflammation from multiple etiologies including biliary stasis, cholangitis, and obstructive dilatation.  Neoplastic involvement would be difficult to exclude.  The liver is poorly evaluated given background activity. 2. Hypermetabolic lymphadenopathy is noted in a periportal and celiac distribution.  Infectious inflammatory and neoplastic etiologies are on the differential.  Index nodes have been measured. 3. Multiple pulmonary nodules are noted too small to categorize by PET-CT fusion as evidence seen on a prior CT of 10/27/2021.  CT for follow-up of size stability is necessary."  4. Completed chemoradiation with 5FU from . Started cis/gem on 22. S/p 1 cycle    Interval History:   Mr Larson returns for cycle 2 day 1 of cis/gem. Had a temp of 99F last week which broke. He wasn't feeling too good last week as well. Previous cycle was held due to neutropenia. Blood counts have improved this visit.      Today, he is doing much better. Eating well and has a better energy level today. He has not had a recurrence of fever since that visit. Stopped dex and did not have nausea. No hiccups as well due to stopping the dexamethasone. Overall, doing well and tolerating treatment well. He did have some heartburn but this has improved. He presents alone today.     ECO    ROS:   A ten-point " "system review is obtained and negative except for what was stated in the Interval History.     Physical Examination:   Vital signs reviewed.   General: well hydrated, well developed, in no acute distress  HEENT: normocephalic, PERRLA, EOMI, anicteric sclerae, oropharynx clear  Neck: supple, no JVD, thyromegaly, cervical or supraclavicular lymphadenopathy  Lungs: clear breath sounds bilaterally, no wheezing, rales, or rhonchi  Chest: port site clean  Heart: RRR, no M/R/G  Abdomen: soft, no tenderness, non-distended, no mass, or hernia. BS present  Extremities: no clubbing, cyanosis, or edema  Skin: no rash, ulcer, or open wounds  Neuro: alert and oriented x 4, no focal neuro deficit  Psych: pleasant and appropriate mood and affect    Objective:     Laboratory Data:  Labs reviewed. WBC 3.54   CA 19-9 202.6, increased from 84.1     Imaging Data:  PET scan 11/3/21: "1. Wedge-shaped geographic hypermetabolic activity within the right lobe of the liver in a similar distribution to the intrahepatic biliary dilatation.  This could relate to inflammation from multiple etiologies including biliary stasis, cholangitis, and obstructive dilatation.  Neoplastic involvement would be difficult to exclude.  The liver is poorly evaluated given background activity. 2. Hypermetabolic lymphadenopathy is noted in a periportal and celiac distribution.  Infectious inflammatory and neoplastic etiologies are on the differential.  Index nodes have been measured. 3. Multiple pulmonary nodules are noted too small to categorize by PET-CT fusion as evidence seen on a prior CT of 10/27/2021.  CT for follow-up of size stability is necessary."    CT C/A/P 1/18/22:  1. Patient with a history of suspected hilar cholangiocarcinoma with no evidence of distant metastatic disease in the chest, abdomen, or pelvis.  There has been an interval reduction in the extent of the patient's intrahepatic biliary dilatation as compared to the previous study.  Two " index lymph nodes in the vicinity of the ana cristina hepatis/gastroesophageal junction or smaller in size on today's examination.  2. Stable solid pulmonary nodules measuring up to 5 mm in size.  No new pulmonary nodules on today's study.    Assessment and Plan:     1. 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 Immunodeficiency secondary to neoplasm    4. Chemotherapy induced neutropenia    5. Nausea      1-3  - Mr Larson is a 40 yo man with likely stage I hilar cholangiocarcinoma. Biopsy non-diagnostic but clinical picture most consistent with hilar cholangiocarcinoma. He is a candidate for liver transplant. Completed chemoradiation with 5FU from 11/29/21-1/5/2022. Started cis/gem on 1/19/22. S/p 1 cycle  - WBC improved.   - Doing well. will get C2D1 tomorrow. RTC in 1 week for day 8 and clinic visit with Dr. Wesley.  - added neulasta OBI to day 8 of each cycle. Discussed with patient.   - will give 3 cycles of cis/gem (9 weeks) then restage.     4.  - see above. Blood counts have improved and WBC has improved and adequate for treatment. Give G-CSF once approved  - He is aware of neutropenic precaution    5.  - mild. Eating well with a good appetite. Stopped dex and did not have nausea  - zofran prn    Route Chart for Scheduling    Med Onc Chart Routing      Follow up with physician 1 week. Please schedule cbc, cmp, clinic visit and cycle 2 day 8 of [place]/Cis with Dr. Wesley   Follow up with JOSEPH    Labs CBC and CMP   Lab interval:     Imaging    Pharmacy appointment    Other referrals          Treatment Plan Information   OP GEMCITABINE CISPLATIN Q3W   Young Wesley MD   Upcoming Treatment Dates - OP GEMCITABINE CISPLATIN Q3W    2/16/2022       Chemotherapy       gemcitabine (GEMZAR) 2,020 mg in sodium chloride 0.9% 250 mL chemo infusion       CISplatin (PLATINOL) 25 mg/m2 = 51 mg in sodium chloride 0.9% 500 mL chemo infusion       Pre-Hydration       sodium chloride 0.9% 1,000 mL with magnesium  sulfate 1 g, potassium chloride 20 mEq infusion       Post-Hydration       sodium chloride 0.9% bolus 500 mL  2/23/2022       Chemotherapy       gemcitabine (GEMZAR) 2,020 mg in sodium chloride 0.9% 250 mL chemo infusion       CISplatin (PLATINOL) 25 mg/m2 = 51 mg in sodium chloride 0.9% 500 mL chemo infusion       Pre-Hydration       sodium chloride 0.9% 1,000 mL with magnesium sulfate 1 g, potassium chloride 20 mEq infusion       Post-Hydration       sodium chloride 0.9% bolus 500 mL  3/9/2022       Chemotherapy       gemcitabine (GEMZAR) 2,020 mg in sodium chloride 0.9% 250 mL chemo infusion       CISplatin (PLATINOL) 25 mg/m2 = 51 mg in sodium chloride 0.9% 500 mL chemo infusion       Pre-Hydration       sodium chloride 0.9% 1,000 mL with magnesium sulfate 1 g, potassium chloride 20 mEq infusion       Post-Hydration       sodium chloride 0.9% bolus 500 mL  3/16/2022       Chemotherapy       gemcitabine (GEMZAR) 2,020 mg in sodium chloride 0.9% 250 mL chemo infusion       CISplatin (PLATINOL) 25 mg/m2 = 51 mg in sodium chloride 0.9% 500 mL chemo infusion       Pre-Hydration       sodium chloride 0.9% 1,000 mL with magnesium sulfate 1 g, potassium chloride 20 mEq infusion       Post-Hydration       sodium chloride 0.9% bolus 500 mL    Supportive Plan Information  OP FILGRASTIM 480 MCG   Young Wesley MD   Upcoming Treatment Dates - OP FILGRASTIM 480 MCG    2/9/2022       Medications       filgrastim-sndz (ZARXIO) injection 480 mcg/0.8 mL (Preferred Regimen)  2/10/2022       Medications       filgrastim-sndz (ZARXIO) injection 480 mcg/0.8 mL (Preferred Regimen)  2/11/2022       Medications       filgrastim-sndz (ZARXIO) injection 480 mcg/0.8 mL (Preferred Regimen)  2/12/2022       Medications       filgrastim-sndz (ZARXIO) injection 480 mcg/0.8 mL (Preferred Regimen)      Follow-up:     RTC next week  Pte displayed understanding of the above encounter and treatment plan. All thoughtful questions were answered to  their satisfaction. Pte was advised to notify the care team or proceed to the ER if signs and symptoms worsen.     BRANDO Kelley, PA-C  Physician Assistant Certified  Dept of Hematology/Oncology  PA-C to Dr. Wesley, Dr. Mcguire and Dr. Villavicencio    Electronically signed by Tanna Godoy

## 2022-02-16 ENCOUNTER — INFUSION (OUTPATIENT)
Dept: INFUSION THERAPY | Facility: HOSPITAL | Age: 42
End: 2022-02-16
Attending: INTERNAL MEDICINE
Payer: COMMERCIAL

## 2022-02-16 VITALS
SYSTOLIC BLOOD PRESSURE: 144 MMHG | WEIGHT: 194.69 LBS | RESPIRATION RATE: 17 BRPM | OXYGEN SATURATION: 98 % | HEIGHT: 69 IN | TEMPERATURE: 99 F | HEART RATE: 92 BPM | DIASTOLIC BLOOD PRESSURE: 83 MMHG | BODY MASS INDEX: 28.84 KG/M2

## 2022-02-16 DIAGNOSIS — K83.1 BILIARY OBSTRUCTION: Primary | ICD-10-CM

## 2022-02-16 PROCEDURE — 96367 TX/PROPH/DG ADDL SEQ IV INF: CPT | Mod: PN

## 2022-02-16 PROCEDURE — 96375 TX/PRO/DX INJ NEW DRUG ADDON: CPT | Mod: PN

## 2022-02-16 PROCEDURE — 96365 THER/PROPH/DIAG IV INF INIT: CPT | Mod: PN

## 2022-02-16 PROCEDURE — 96417 CHEMO IV INFUS EACH ADDL SEQ: CPT | Mod: PN

## 2022-02-16 PROCEDURE — 63600175 PHARM REV CODE 636 W HCPCS: Mod: PN | Performed by: INTERNAL MEDICINE

## 2022-02-16 PROCEDURE — 96413 CHEMO IV INFUSION 1 HR: CPT | Mod: PN

## 2022-02-16 PROCEDURE — 25000003 PHARM REV CODE 250: Mod: PN | Performed by: INTERNAL MEDICINE

## 2022-02-16 PROCEDURE — A4216 STERILE WATER/SALINE, 10 ML: HCPCS | Mod: PN | Performed by: INTERNAL MEDICINE

## 2022-02-16 PROCEDURE — 96360 HYDRATION IV INFUSION INIT: CPT | Mod: PN

## 2022-02-16 RX ORDER — SODIUM CHLORIDE 0.9 % (FLUSH) 0.9 %
10 SYRINGE (ML) INJECTION
Status: DISCONTINUED | OUTPATIENT
Start: 2022-02-16 | End: 2022-02-16 | Stop reason: HOSPADM

## 2022-02-16 RX ORDER — HEPARIN 100 UNIT/ML
500 SYRINGE INTRAVENOUS
Status: DISCONTINUED | OUTPATIENT
Start: 2022-02-16 | End: 2022-02-16 | Stop reason: HOSPADM

## 2022-02-16 RX ORDER — SODIUM CHLORIDE 9 MG/ML
500 INJECTION, SOLUTION INTRAVENOUS
Status: COMPLETED | OUTPATIENT
Start: 2022-02-16 | End: 2022-02-16

## 2022-02-16 RX ADMIN — POTASSIUM CHLORIDE 500 ML/HR: 2 INJECTION, SOLUTION, CONCENTRATE INTRAVENOUS at 09:02

## 2022-02-16 RX ADMIN — CISPLATIN 51 MG: 1 INJECTION INTRAVENOUS at 12:02

## 2022-02-16 RX ADMIN — GEMCITABINE 2000 MG: 38 INJECTION, SOLUTION INTRAVENOUS at 11:02

## 2022-02-16 RX ADMIN — SODIUM CHLORIDE: 0.9 INJECTION, SOLUTION INTRAVENOUS at 08:02

## 2022-02-16 RX ADMIN — HEPARIN 500 UNITS: 100 SYRINGE at 02:02

## 2022-02-16 RX ADMIN — PALONOSETRON 0.25 MG: 0.05 INJECTION, SOLUTION INTRAVENOUS at 11:02

## 2022-02-16 RX ADMIN — SODIUM CHLORIDE 500 ML: 0.9 INJECTION, SOLUTION INTRAVENOUS at 12:02

## 2022-02-16 RX ADMIN — Medication 10 ML: at 02:02

## 2022-02-16 RX ADMIN — APREPITANT 130 MG: 130 INJECTION, EMULSION INTRAVENOUS at 09:02

## 2022-02-16 NOTE — PLAN OF CARE
"  Problem: Adult Inpatient Plan of Care  Goal: Plan of Care Review  Outcome: Ongoing, Progressing  Flowsheets (Taken 2/16/2022 1420)  Plan of Care Reviewed With: patient     BP (!) 144/83   Pulse 92   Temp 98.5 °F (36.9 °C)   Resp 17   Ht 5' 9" (1.753 m)   Wt 88.3 kg (194 lb 11.2 oz)   SpO2 98%   BMI 28.75 kg/m²   Patient tolerated treatment well. Port flushed with blood return, heparin locked, and de-accessed. AVS provided per portal and reviewed. Ambulates per self.     "

## 2022-02-16 NOTE — PLAN OF CARE
Problem: Adult Inpatient Plan of Care  Goal: Patient-Specific Goal (Individualized)  Outcome: Ongoing, Progressing  Flowsheets (Taken 2/16/2022 0900)  Anxieties, Fears or Concerns: none expressed  Individualized Care Needs: recliner  Patient-Specific Goals (Include Timeframe): no s/s rxn during txt     Problem: Fatigue  Goal: Improved Activity Tolerance  Intervention: Promote Improved Energy  Flowsheets (Taken 2/16/2022 0900)  Fatigue Management: frequent rest breaks encouraged  Sleep/Rest Enhancement:   natural light exposure provided   noise level reduced  Activity Management:   Ambulated -L4   Ambulated in mes[place] - L4     Patient here today for C2D1 Cisplatin/Gemzar. VSS. Port accessed to right chest without difficulty; patent with blood return. Orders released. Pt oriented to unit. Symptoms reviewed. Questions and concerns addressed.

## 2022-02-22 ENCOUNTER — OFFICE VISIT (OUTPATIENT)
Dept: HEMATOLOGY/ONCOLOGY | Facility: CLINIC | Age: 42
End: 2022-02-22
Payer: COMMERCIAL

## 2022-02-22 ENCOUNTER — LAB VISIT (OUTPATIENT)
Dept: LAB | Facility: HOSPITAL | Age: 42
End: 2022-02-22
Attending: PHYSICIAN ASSISTANT
Payer: COMMERCIAL

## 2022-02-22 VITALS
OXYGEN SATURATION: 97 % | HEIGHT: 69 IN | HEART RATE: 108 BPM | WEIGHT: 191.13 LBS | BODY MASS INDEX: 28.31 KG/M2 | RESPIRATION RATE: 18 BRPM | DIASTOLIC BLOOD PRESSURE: 85 MMHG | TEMPERATURE: 98 F | SYSTOLIC BLOOD PRESSURE: 121 MMHG

## 2022-02-22 DIAGNOSIS — D70.1 CHEMOTHERAPY INDUCED NEUTROPENIA: ICD-10-CM

## 2022-02-22 DIAGNOSIS — R06.6 HICCUPS: ICD-10-CM

## 2022-02-22 DIAGNOSIS — C24.0 HILAR CHOLANGIOCARCINOMA: Primary | ICD-10-CM

## 2022-02-22 DIAGNOSIS — Z79.899 IMMUNODEFICIENCY SECONDARY TO CHEMOTHERAPY: ICD-10-CM

## 2022-02-22 DIAGNOSIS — T45.1X5A IMMUNODEFICIENCY SECONDARY TO CHEMOTHERAPY: ICD-10-CM

## 2022-02-22 DIAGNOSIS — T45.1X5A ANTINEOPLASTIC CHEMOTHERAPY INDUCED ANEMIA: ICD-10-CM

## 2022-02-22 DIAGNOSIS — C24.0 HILAR CHOLANGIOCARCINOMA: ICD-10-CM

## 2022-02-22 DIAGNOSIS — D84.81 IMMUNODEFICIENCY SECONDARY TO NEOPLASM: ICD-10-CM

## 2022-02-22 DIAGNOSIS — I10 ESSENTIAL HYPERTENSION: ICD-10-CM

## 2022-02-22 DIAGNOSIS — D84.821 IMMUNODEFICIENCY SECONDARY TO CHEMOTHERAPY: ICD-10-CM

## 2022-02-22 DIAGNOSIS — D64.81 ANTINEOPLASTIC CHEMOTHERAPY INDUCED ANEMIA: ICD-10-CM

## 2022-02-22 DIAGNOSIS — D49.9 IMMUNODEFICIENCY SECONDARY TO NEOPLASM: ICD-10-CM

## 2022-02-22 DIAGNOSIS — T45.1X5A CHEMOTHERAPY INDUCED NEUTROPENIA: ICD-10-CM

## 2022-02-22 LAB
ALBUMIN SERPL BCP-MCNC: 3.6 G/DL (ref 3.5–5.2)
ALP SERPL-CCNC: 244 U/L (ref 55–135)
ALT SERPL W/O P-5'-P-CCNC: 96 U/L (ref 10–44)
ANION GAP SERPL CALC-SCNC: 10 MMOL/L (ref 8–16)
AST SERPL-CCNC: 47 U/L (ref 10–40)
BILIRUB SERPL-MCNC: 0.5 MG/DL (ref 0.1–1)
BUN SERPL-MCNC: 15 MG/DL (ref 6–20)
CALCIUM SERPL-MCNC: 10 MG/DL (ref 8.7–10.5)
CHLORIDE SERPL-SCNC: 101 MMOL/L (ref 95–110)
CO2 SERPL-SCNC: 27 MMOL/L (ref 23–29)
CREAT SERPL-MCNC: 0.9 MG/DL (ref 0.5–1.4)
ERYTHROCYTE [DISTWIDTH] IN BLOOD BY AUTOMATED COUNT: 12.4 % (ref 11.5–14.5)
EST. GFR  (AFRICAN AMERICAN): >60 ML/MIN/1.73 M^2
EST. GFR  (NON AFRICAN AMERICAN): >60 ML/MIN/1.73 M^2
GLUCOSE SERPL-MCNC: 174 MG/DL (ref 70–110)
HCT VFR BLD AUTO: 38.5 % (ref 40–54)
HGB BLD-MCNC: 13.2 G/DL (ref 14–18)
IMM GRANULOCYTES # BLD AUTO: 0.08 K/UL (ref 0–0.04)
MCH RBC QN AUTO: 32.8 PG (ref 27–31)
MCHC RBC AUTO-ENTMCNC: 34.3 G/DL (ref 32–36)
MCV RBC AUTO: 96 FL (ref 82–98)
NEUTROPHILS # BLD AUTO: 1.9 K/UL (ref 1.8–7.7)
PLATELET # BLD AUTO: 194 K/UL (ref 150–450)
PMV BLD AUTO: 8.5 FL (ref 9.2–12.9)
POTASSIUM SERPL-SCNC: 4.7 MMOL/L (ref 3.5–5.1)
PROT SERPL-MCNC: 7.4 G/DL (ref 6–8.4)
RBC # BLD AUTO: 4.03 M/UL (ref 4.6–6.2)
SODIUM SERPL-SCNC: 138 MMOL/L (ref 136–145)
WBC # BLD AUTO: 2.83 K/UL (ref 3.9–12.7)

## 2022-02-22 PROCEDURE — 4010F ACE/ARB THERAPY RXD/TAKEN: CPT | Mod: CPTII,S$GLB,, | Performed by: INTERNAL MEDICINE

## 2022-02-22 PROCEDURE — 3008F PR BODY MASS INDEX (BMI) DOCUMENTED: ICD-10-PCS | Mod: CPTII,S$GLB,, | Performed by: INTERNAL MEDICINE

## 2022-02-22 PROCEDURE — 99999 PR PBB SHADOW E&M-EST. PATIENT-LVL IV: CPT | Mod: PBBFAC,,, | Performed by: INTERNAL MEDICINE

## 2022-02-22 PROCEDURE — 36415 COLL VENOUS BLD VENIPUNCTURE: CPT | Performed by: PHYSICIAN ASSISTANT

## 2022-02-22 PROCEDURE — 4010F PR ACE/ARB THEARPY RXD/TAKEN: ICD-10-PCS | Mod: CPTII,S$GLB,, | Performed by: INTERNAL MEDICINE

## 2022-02-22 PROCEDURE — 99215 OFFICE O/P EST HI 40 MIN: CPT | Mod: S$GLB,,, | Performed by: INTERNAL MEDICINE

## 2022-02-22 PROCEDURE — 99999 PR PBB SHADOW E&M-EST. PATIENT-LVL IV: ICD-10-PCS | Mod: PBBFAC,,, | Performed by: INTERNAL MEDICINE

## 2022-02-22 PROCEDURE — 99215 PR OFFICE/OUTPT VISIT, EST, LEVL V, 40-54 MIN: ICD-10-PCS | Mod: S$GLB,,, | Performed by: INTERNAL MEDICINE

## 2022-02-22 PROCEDURE — 1159F PR MEDICATION LIST DOCUMENTED IN MEDICAL RECORD: ICD-10-PCS | Mod: CPTII,S$GLB,, | Performed by: INTERNAL MEDICINE

## 2022-02-22 PROCEDURE — 3079F DIAST BP 80-89 MM HG: CPT | Mod: CPTII,S$GLB,, | Performed by: INTERNAL MEDICINE

## 2022-02-22 PROCEDURE — 85027 COMPLETE CBC AUTOMATED: CPT | Performed by: PHYSICIAN ASSISTANT

## 2022-02-22 PROCEDURE — 80053 COMPREHEN METABOLIC PANEL: CPT | Performed by: PHYSICIAN ASSISTANT

## 2022-02-22 PROCEDURE — 1159F MED LIST DOCD IN RCRD: CPT | Mod: CPTII,S$GLB,, | Performed by: INTERNAL MEDICINE

## 2022-02-22 PROCEDURE — 1160F PR REVIEW ALL MEDS BY PRESCRIBER/CLIN PHARMACIST DOCUMENTED: ICD-10-PCS | Mod: CPTII,S$GLB,, | Performed by: INTERNAL MEDICINE

## 2022-02-22 PROCEDURE — 1160F RVW MEDS BY RX/DR IN RCRD: CPT | Mod: CPTII,S$GLB,, | Performed by: INTERNAL MEDICINE

## 2022-02-22 PROCEDURE — 3074F SYST BP LT 130 MM HG: CPT | Mod: CPTII,S$GLB,, | Performed by: INTERNAL MEDICINE

## 2022-02-22 PROCEDURE — 3008F BODY MASS INDEX DOCD: CPT | Mod: CPTII,S$GLB,, | Performed by: INTERNAL MEDICINE

## 2022-02-22 PROCEDURE — 3074F PR MOST RECENT SYSTOLIC BLOOD PRESSURE < 130 MM HG: ICD-10-PCS | Mod: CPTII,S$GLB,, | Performed by: INTERNAL MEDICINE

## 2022-02-22 PROCEDURE — 3079F PR MOST RECENT DIASTOLIC BLOOD PRESSURE 80-89 MM HG: ICD-10-PCS | Mod: CPTII,S$GLB,, | Performed by: INTERNAL MEDICINE

## 2022-02-22 RX ORDER — BACLOFEN 10 MG/1
10 TABLET ORAL 3 TIMES DAILY PRN
Qty: 90 TABLET | Refills: 0 | Status: SHIPPED | OUTPATIENT
Start: 2022-02-22 | End: 2022-04-06

## 2022-02-22 RX ORDER — HEPARIN 100 UNIT/ML
500 SYRINGE INTRAVENOUS
Status: CANCELLED | OUTPATIENT
Start: 2022-02-23

## 2022-02-22 RX ORDER — SODIUM CHLORIDE 0.9 % (FLUSH) 0.9 %
10 SYRINGE (ML) INJECTION
Status: CANCELLED | OUTPATIENT
Start: 2022-02-23

## 2022-02-22 NOTE — PROGRESS NOTES
"                                                         PROGRESS NOTE    Subjective:       Patient ID: Romeo Larson is a 41 y.o. male.    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 Mr Larson is a 42 yo man who presents today for further management of suspected hilar cholangiocarcinoma. He presented with dark urine, abdominal pain and jaundice since August 2021. He presented to outside hospital ER with elevated bilirubin. MRCP was performed demonstrating bi-lobar intrahepatic bile duct dilation and a ~2cm ill defined mass at the hilum concerning for hilar cholangiocarcinoma. He was referred to the advanced endoscopy group at [facility]. ERCP confirmed severe, malignant appearing stricture involving right and left main hepatic ducts. Biliary stents were placed to relieve obstruction. EUS was performed. It showed an irregular hypoechoic mass where the hepatic duct bifurcates into the right and left hepatic ducts. The mass measured 11.4 mm by 11.3 mm in maximal cross-sectional diameter. FNA sampling of hilar lymph nodes was negative for metastatic disease. Bile duct biopsy showed "rare groups of glandular epithelium with mild atypia, strips of benign glandular epithelium intermixed with blood clot, and focal fibrous tissue with chronic inflammation."  CT C/A/P 10/27/21:  1. Intrahepatic biliary dilatation despite the presence of 2 biliary drains.  The patient is recent comparison MRI a revealed a possible central hepatic mass, concerning for either cholangiocarcinoma or hepatocellular carcinoma.  This is, however, not definitively demonstrated on today's exam.  There are enlarged lymph nodes present in the vicinity of the ana cristina hepatis and celiac axis as detailed above.  2. Scattered pulmonary nodules measuring up to 6 mm.  3. Other findings as detailed above.  He presents today for further management. Seen by Dr Jara and considered a " "candidate for liver transplant. Seen by Dr Cunningham last Friday. Scheduled for PET this Wednesday. 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   Discussed neoadjuvant chemoradiation with 5FU followed by neoadjuvant cis/gem prior to liver transplant.   2. Hospitalized 11/3/21-21 for fever 2/2 acute cholangitis. Repeat ERCP biopsy on 21 again non-diagnostic. Biopsy of the celiac lymph node was negative.   3. PET scan 11/3/21: "1. Wedge-shaped geographic hypermetabolic activity within the right lobe of the liver in a similar distribution to the intrahepatic biliary dilatation.  This could relate to inflammation from multiple etiologies including biliary stasis, cholangitis, and obstructive dilatation.  Neoplastic involvement would be difficult to exclude.  The liver is poorly evaluated given background activity. 2. Hypermetabolic lymphadenopathy is noted in a periportal and celiac distribution.  Infectious inflammatory and neoplastic etiologies are on the differential.  Index nodes have been measured. 3. Multiple pulmonary nodules are noted too small to categorize by PET-CT fusion as evidence seen on a prior CT of 10/27/2021.  CT for follow-up of size stability is necessary."  4. Completed chemoradiation with 5FU from . Started cis/gem on 22. S/p 1 cycle    Interval History:   Mr Larson returns for cycle 2 day 8 of cis/gem. Tolerating chemo well. Had hiccups after chemo despite he did not take oral dex.      ECO    ROS:   A ten-point system review is obtained and negative except for what was stated in the Interval History.     Physical Examination:   Vital signs reviewed.   General: well hydrated, well developed, in no acute distress  HEENT: normocephalic, PERRLA, EOMI, anicteric sclerae, oropharynx clear  Neck: supple, no JVD, thyromegaly, cervical or supraclavicular lymphadenopathy  Lungs: clear breath sounds bilaterally, no wheezing, rales, or rhonchi  Chest: port site clean  Heart: RRR, no " "M/R/G  Abdomen: soft, no tenderness, non-distended, no hepatosplenomegaly, mass, or hernia. BS present  Extremities: no clubbing, cyanosis, or edema  Skin: no rash, ulcer, or open wounds  Neuro: alert and oriented x 4, no focal neuro deficit  Psych: pleasant and appropriate mood and affect    Objective:     Laboratory Data:  Labs reviewed. ANC 1.9 adequate for chemo    Imaging Data:  PET scan 11/3/21: "1. Wedge-shaped geographic hypermetabolic activity within the right lobe of the liver in a similar distribution to the intrahepatic biliary dilatation.  This could relate to inflammation from multiple etiologies including biliary stasis, cholangitis, and obstructive dilatation.  Neoplastic involvement would be difficult to exclude.  The liver is poorly evaluated given background activity. 2. Hypermetabolic lymphadenopathy is noted in a periportal and celiac distribution.  Infectious inflammatory and neoplastic etiologies are on the differential.  Index nodes have been measured. 3. Multiple pulmonary nodules are noted too small to categorize by PET-CT fusion as evidence seen on a prior CT of 10/27/2021.  CT for follow-up of size stability is necessary."    CT C/A/P 1/18/22:  1. Patient with a history of suspected hilar cholangiocarcinoma with no evidence of distant metastatic disease in the chest, abdomen, or pelvis.  There has been an interval reduction in the extent of the patient's intrahepatic biliary dilatation as compared to the previous study.  Two index lymph nodes in the vicinity of the ana cristina hepatis/gastroesophageal junction or smaller in size on today's examination.  2. Stable solid pulmonary nodules measuring up to 5 mm in size.  No new pulmonary nodules on today's study.    Assessment and Plan:     1. Hilar cholangiocarcinoma    2. 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 Hiccups    5. Chemotherapy induced neutropenia    6. Essential hypertension    7. " Antineoplastic chemotherapy induced anemia      1-3  - Mr Larson is a 42 yo man with likely stage I hilar cholangiocarcinoma. Biopsy non-diagnostic but clinical picture most consistent with hilar cholangiocarcinoma. He is a candidate for liver transplant. Completed chemoradiation with 5FU from 11/29/21-1/5/2022. Started cis/gem on 1/19/22. S/p 1 cycle  - doing well. Counts adequate  - C2D8 cis/gem today  - return in 2 weeks for C3D1  - get CT scan on 3/21 after 3 cycles    4.  - 2/2 IV dex with chemo  - try baclofen prn     5.  - udenyca approved  - will give udenyca on day 9 of each cycle    6.  - BP controlled  - c/w current medication    7.  - mild. monitor    Follow-up:     RTC in 2 weeks  Knows to call in the interval if any problems arise.    Electronically signed by Young Wesley    Route Chart for Scheduling    Med Onc Chart Routing  Urgent    Follow up with physician 3 weeks. CBC, CMP, Mg 1 day prior, see me then get day 8 Cis/gem, get udenyca on day 9. please add cycle 2 day 9 udenyca this Thursday at [facility]   Follow up with JOSEPH 2 weeks. CBC, CMP, Mg, CA 19-9 1 day prior, see JOSEPH then get cis/gem   Labs CA 19-9, CBC, CMP and magnesium   Lab interval:     Imaging CT chest abdomen pelvis   POC creatinine, CT C/A/P on 3/21   Pharmacy appointment    Other referrals          Treatment Plan Information   OP GEMCITABINE CISPLATIN Q3W   Young Wesley MD   Upcoming Treatment Dates - OP GEMCITABINE CISPLATIN Q3W    2/23/2022       Chemotherapy       gemcitabine (GEMZAR) 2,020 mg in sodium chloride 0.9% 250 mL chemo infusion       CISplatin (PLATINOL) 25 mg/m2 = 51 mg in sodium chloride 0.9% 500 mL chemo infusion       Pre-Hydration       sodium chloride 0.9% 1,000 mL with magnesium sulfate 1 g, potassium chloride 20 mEq infusion       Post-Hydration       sodium chloride 0.9% bolus 500 mL  3/9/2022       Chemotherapy       gemcitabine (GEMZAR) 2,020 mg in sodium chloride 0.9% 250 mL chemo infusion       CISplatin  (PLATINOL) 25 mg/m2 = 51 mg in sodium chloride 0.9% 500 mL chemo infusion       Pre-Hydration       sodium chloride 0.9% 1,000 mL with magnesium sulfate 1 g, potassium chloride 20 mEq infusion       Post-Hydration       sodium chloride 0.9% bolus 500 mL  3/16/2022       Chemotherapy       gemcitabine (GEMZAR) 2,020 mg in sodium chloride 0.9% 250 mL chemo infusion       CISplatin (PLATINOL) 25 mg/m2 = 51 mg in sodium chloride 0.9% 500 mL chemo infusion       Pre-Hydration       sodium chloride 0.9% 1,000 mL with magnesium sulfate 1 g, potassium chloride 20 mEq infusion       Post-Hydration       sodium chloride 0.9% bolus 500 mL  3/30/2022       Chemotherapy       gemcitabine (GEMZAR) 2,020 mg in sodium chloride 0.9% 250 mL chemo infusion       CISplatin (PLATINOL) 25 mg/m2 = 51 mg in sodium chloride 0.9% 500 mL chemo infusion       Pre-Hydration       sodium chloride 0.9% 1,000 mL with magnesium sulfate 1 g, potassium chloride 20 mEq infusion       Post-Hydration       sodium chloride 0.9% bolus 500 mL    Supportive Plan Information  OP FILGRASTIM 480 MCG   Young Wesley MD   Upcoming Treatment Dates - OP FILGRASTIM 480 MCG    2/9/2022       Medications       filgrastim-sndz (ZARXIO) injection 480 mcg/0.8 mL (Preferred Regimen)  2/10/2022       Medications       filgrastim-sndz (ZARXIO) injection 480 mcg/0.8 mL (Preferred Regimen)  2/11/2022       Medications       filgrastim-sndz (ZARXIO) injection 480 mcg/0.8 mL (Preferred Regimen)  2/12/2022       Medications       filgrastim-sndz (ZARXIO) injection 480 mcg/0.8 mL (Preferred Regimen)

## 2022-02-22 NOTE — Clinical Note
Please schedule udenyca injection at [facility] this Thursday 2/24. If not available, get it in [place] late in the day. CBC, CMP, CA 19-9, Mg on 3/7, see JOSEPH on 3/8 then get cis/gem. CBC, CMP, CA 19-9, magnesium on 3/14, see me on 3/15 then get cis/gem. POC creatinine, CT chest/abdomen/pelvis on 3/21

## 2022-02-23 ENCOUNTER — TELEPHONE (OUTPATIENT)
Dept: TRANSPLANT | Facility: CLINIC | Age: 42
End: 2022-02-23
Payer: COMMERCIAL

## 2022-02-23 ENCOUNTER — INFUSION (OUTPATIENT)
Dept: INFUSION THERAPY | Facility: HOSPITAL | Age: 42
End: 2022-02-23
Attending: INTERNAL MEDICINE
Payer: COMMERCIAL

## 2022-02-23 VITALS
SYSTOLIC BLOOD PRESSURE: 128 MMHG | WEIGHT: 191.13 LBS | HEIGHT: 69 IN | BODY MASS INDEX: 28.31 KG/M2 | DIASTOLIC BLOOD PRESSURE: 81 MMHG | TEMPERATURE: 98 F | HEART RATE: 79 BPM | RESPIRATION RATE: 18 BRPM

## 2022-02-23 DIAGNOSIS — K83.1 BILIARY OBSTRUCTION: Primary | ICD-10-CM

## 2022-02-23 PROCEDURE — 96366 THER/PROPH/DIAG IV INF ADDON: CPT | Mod: PN

## 2022-02-23 PROCEDURE — A4216 STERILE WATER/SALINE, 10 ML: HCPCS | Mod: PN | Performed by: INTERNAL MEDICINE

## 2022-02-23 PROCEDURE — 63600175 PHARM REV CODE 636 W HCPCS: Mod: PN | Performed by: INTERNAL MEDICINE

## 2022-02-23 PROCEDURE — 25000003 PHARM REV CODE 250: Mod: PN | Performed by: INTERNAL MEDICINE

## 2022-02-23 PROCEDURE — 96375 TX/PRO/DX INJ NEW DRUG ADDON: CPT | Mod: PN

## 2022-02-23 PROCEDURE — 96417 CHEMO IV INFUS EACH ADDL SEQ: CPT | Mod: PN

## 2022-02-23 PROCEDURE — 96413 CHEMO IV INFUSION 1 HR: CPT | Mod: PN

## 2022-02-23 PROCEDURE — 96367 TX/PROPH/DG ADDL SEQ IV INF: CPT | Mod: PN

## 2022-02-23 RX ORDER — SODIUM CHLORIDE 9 MG/ML
500 INJECTION, SOLUTION INTRAVENOUS
Status: COMPLETED | OUTPATIENT
Start: 2022-02-23 | End: 2022-02-23

## 2022-02-23 RX ORDER — HEPARIN 100 UNIT/ML
500 SYRINGE INTRAVENOUS
Status: DISCONTINUED | OUTPATIENT
Start: 2022-02-23 | End: 2022-02-23 | Stop reason: HOSPADM

## 2022-02-23 RX ORDER — SODIUM CHLORIDE 0.9 % (FLUSH) 0.9 %
10 SYRINGE (ML) INJECTION
Status: DISCONTINUED | OUTPATIENT
Start: 2022-02-23 | End: 2022-02-23 | Stop reason: HOSPADM

## 2022-02-23 RX ADMIN — GEMCITABINE 2000 MG: 38 INJECTION, SOLUTION INTRAVENOUS at 11:02

## 2022-02-23 RX ADMIN — Medication 10 ML: at 02:02

## 2022-02-23 RX ADMIN — SODIUM CHLORIDE 500 ML: 0.9 INJECTION, SOLUTION INTRAVENOUS at 01:02

## 2022-02-23 RX ADMIN — POTASSIUM CHLORIDE 500 ML/HR: 2 INJECTION, SOLUTION, CONCENTRATE INTRAVENOUS at 09:02

## 2022-02-23 RX ADMIN — CISPLATIN 51 MG: 1 INJECTION INTRAVENOUS at 12:02

## 2022-02-23 RX ADMIN — SODIUM CHLORIDE: 0.9 INJECTION, SOLUTION INTRAVENOUS at 08:02

## 2022-02-23 RX ADMIN — APREPITANT 130 MG: 130 INJECTION, EMULSION INTRAVENOUS at 11:02

## 2022-02-23 RX ADMIN — Medication 500 UNITS: at 02:02

## 2022-02-23 RX ADMIN — PALONOSETRON 0.25 MG: 0.05 INJECTION, SOLUTION INTRAVENOUS at 11:02

## 2022-02-23 NOTE — PLAN OF CARE
Problem: Adult Inpatient Plan of Care  Goal: Patient-Specific Goal (Individualized)  Outcome: Ongoing, Progressing  Flowsheets (Taken 2/23/2022 1446)  Anxieties, Fears or Concerns: None  Individualized Care Needs: Recliner  Patient-Specific Goals (Include Timeframe): Infection prevention during treatment.     Problem: Fatigue  Goal: Improved Activity Tolerance  Intervention: Promote Improved Energy  Flowsheets (Taken 2/23/2022 1446)  Fatigue Management:   activity schedule adjusted   frequent rest breaks encouraged   paced activity encouraged   fatigue-related activity identified  Sleep/Rest Enhancement:   regular sleep/rest pattern promoted   relaxation techniques promoted  Activity Management:   Ambulated -L4   Ambulated in mesa - L4     Problem: Adult Inpatient Plan of Care  Goal: Plan of Care Review  Outcome: Ongoing, Progressing  Flowsheets (Taken 2/23/2022 1446)  Plan of Care Reviewed With: patient  Tolerated treatment with no known distress.  Ambulated from infusion center with steady gait.

## 2022-02-24 ENCOUNTER — INFUSION (OUTPATIENT)
Dept: INFUSION THERAPY | Facility: HOSPITAL | Age: 42
End: 2022-02-24
Attending: INTERNAL MEDICINE
Payer: COMMERCIAL

## 2022-02-24 VITALS
WEIGHT: 191.13 LBS | TEMPERATURE: 98 F | RESPIRATION RATE: 16 BRPM | BODY MASS INDEX: 28.31 KG/M2 | SYSTOLIC BLOOD PRESSURE: 128 MMHG | HEIGHT: 69 IN | DIASTOLIC BLOOD PRESSURE: 90 MMHG | HEART RATE: 93 BPM

## 2022-02-24 DIAGNOSIS — K83.1 BILIARY OBSTRUCTION: Primary | ICD-10-CM

## 2022-02-24 PROCEDURE — 63600175 PHARM REV CODE 636 W HCPCS: Mod: TB,PN | Performed by: INTERNAL MEDICINE

## 2022-02-24 PROCEDURE — 96372 THER/PROPH/DIAG INJ SC/IM: CPT | Mod: PN

## 2022-02-24 RX ADMIN — PEGFILGRASTIM-CBQV 6 MG: 6 INJECTION, SOLUTION SUBCUTANEOUS at 04:02

## 2022-02-24 NOTE — PLAN OF CARE
Patient tolerated Udenyca injection well, d/c in no acute distress.  Discussed having patient take antihistamine x5days starting today.  Generic, no decongestant (akin to zyrtec/claritin/etc).  Pt voiced understanding.

## 2022-03-02 ENCOUNTER — PATIENT MESSAGE (OUTPATIENT)
Dept: HEMATOLOGY/ONCOLOGY | Facility: CLINIC | Age: 42
End: 2022-03-02
Payer: COMMERCIAL

## 2022-03-02 NOTE — TELEPHONE ENCOUNTER
Ivory,  Are you able to reschedule his ERCP to the following week of when he is scheduled?  Jelly

## 2022-03-03 DIAGNOSIS — Z76.82 ORGAN TRANSPLANT CANDIDATE: Primary | ICD-10-CM

## 2022-03-07 ENCOUNTER — TELEPHONE (OUTPATIENT)
Dept: ENDOSCOPY | Facility: HOSPITAL | Age: 42
End: 2022-03-07
Payer: COMMERCIAL

## 2022-03-07 ENCOUNTER — LAB VISIT (OUTPATIENT)
Dept: LAB | Facility: HOSPITAL | Age: 42
End: 2022-03-07
Attending: INTERNAL MEDICINE
Payer: COMMERCIAL

## 2022-03-07 DIAGNOSIS — C24.0 HILAR CHOLANGIOCARCINOMA: ICD-10-CM

## 2022-03-07 LAB
ALBUMIN SERPL BCP-MCNC: 3.6 G/DL (ref 3.5–5.2)
ALP SERPL-CCNC: 252 U/L (ref 55–135)
ALT SERPL W/O P-5'-P-CCNC: 75 U/L (ref 10–44)
ANION GAP SERPL CALC-SCNC: 12 MMOL/L (ref 8–16)
AST SERPL-CCNC: 26 U/L (ref 10–40)
BASOPHILS # BLD AUTO: 0.03 K/UL (ref 0–0.2)
BASOPHILS NFR BLD: 0.4 % (ref 0–1.9)
BILIRUB SERPL-MCNC: 0.5 MG/DL (ref 0.1–1)
BUN SERPL-MCNC: 10 MG/DL (ref 6–20)
CALCIUM SERPL-MCNC: 9.7 MG/DL (ref 8.7–10.5)
CANCER AG19-9 SERPL-ACNC: 176.9 U/ML (ref 0–40)
CHLORIDE SERPL-SCNC: 102 MMOL/L (ref 95–110)
CO2 SERPL-SCNC: 27 MMOL/L (ref 23–29)
CREAT SERPL-MCNC: 1 MG/DL (ref 0.5–1.4)
DIFFERENTIAL METHOD: ABNORMAL
EOSINOPHIL # BLD AUTO: 0.1 K/UL (ref 0–0.5)
EOSINOPHIL NFR BLD: 1.9 % (ref 0–8)
ERYTHROCYTE [DISTWIDTH] IN BLOOD BY AUTOMATED COUNT: 14.6 % (ref 11.5–14.5)
EST. GFR  (AFRICAN AMERICAN): >60 ML/MIN/1.73 M^2
EST. GFR  (NON AFRICAN AMERICAN): >60 ML/MIN/1.73 M^2
GLUCOSE SERPL-MCNC: 131 MG/DL (ref 70–110)
HCT VFR BLD AUTO: 34.8 % (ref 40–54)
HGB BLD-MCNC: 11.3 G/DL (ref 14–18)
IMM GRANULOCYTES # BLD AUTO: 0.25 K/UL (ref 0–0.04)
IMM GRANULOCYTES NFR BLD AUTO: 3.3 % (ref 0–0.5)
LYMPHOCYTES # BLD AUTO: 0.6 K/UL (ref 1–4.8)
LYMPHOCYTES NFR BLD: 8.2 % (ref 18–48)
MAGNESIUM SERPL-MCNC: 1.9 MG/DL (ref 1.6–2.6)
MCH RBC QN AUTO: 32.6 PG (ref 27–31)
MCHC RBC AUTO-ENTMCNC: 32.5 G/DL (ref 32–36)
MCV RBC AUTO: 100 FL (ref 82–98)
MONOCYTES # BLD AUTO: 0.8 K/UL (ref 0.3–1)
MONOCYTES NFR BLD: 10 % (ref 4–15)
NEUTROPHILS # BLD AUTO: 5.7 K/UL (ref 1.8–7.7)
NEUTROPHILS NFR BLD: 76.2 % (ref 38–73)
NRBC BLD-RTO: 0 /100 WBC
PLATELET # BLD AUTO: 94 K/UL (ref 150–450)
PMV BLD AUTO: 10 FL (ref 9.2–12.9)
POTASSIUM SERPL-SCNC: 4 MMOL/L (ref 3.5–5.1)
PROT SERPL-MCNC: 7 G/DL (ref 6–8.4)
RBC # BLD AUTO: 3.47 M/UL (ref 4.6–6.2)
SODIUM SERPL-SCNC: 141 MMOL/L (ref 136–145)
WBC # BLD AUTO: 7.47 K/UL (ref 3.9–12.7)

## 2022-03-07 PROCEDURE — 85025 COMPLETE CBC W/AUTO DIFF WBC: CPT | Mod: NTX | Performed by: INTERNAL MEDICINE

## 2022-03-07 PROCEDURE — 80053 COMPREHEN METABOLIC PANEL: CPT | Mod: NTX | Performed by: INTERNAL MEDICINE

## 2022-03-07 PROCEDURE — 36415 COLL VENOUS BLD VENIPUNCTURE: CPT | Mod: TXP | Performed by: INTERNAL MEDICINE

## 2022-03-07 PROCEDURE — 83735 ASSAY OF MAGNESIUM: CPT | Mod: NTX | Performed by: INTERNAL MEDICINE

## 2022-03-07 PROCEDURE — 86301 IMMUNOASSAY TUMOR CA 19-9: CPT | Mod: NTX | Performed by: INTERNAL MEDICINE

## 2022-03-07 NOTE — TELEPHONE ENCOUNTER
Contacted patient regarding ERCP procedure scheudled on 3/15/22.Patient stated he needs to be rescheduled from 3/15/22 to last week of March due to Chemotherapy.    Thanks,  GASPER Lion

## 2022-03-08 ENCOUNTER — OFFICE VISIT (OUTPATIENT)
Dept: HEMATOLOGY/ONCOLOGY | Facility: CLINIC | Age: 42
End: 2022-03-08
Payer: COMMERCIAL

## 2022-03-08 ENCOUNTER — PATIENT MESSAGE (OUTPATIENT)
Dept: ENDOSCOPY | Facility: HOSPITAL | Age: 42
End: 2022-03-08
Payer: COMMERCIAL

## 2022-03-08 VITALS
BODY MASS INDEX: 29.05 KG/M2 | HEIGHT: 69 IN | HEART RATE: 97 BPM | OXYGEN SATURATION: 100 % | DIASTOLIC BLOOD PRESSURE: 83 MMHG | TEMPERATURE: 99 F | RESPIRATION RATE: 18 BRPM | WEIGHT: 196.13 LBS | SYSTOLIC BLOOD PRESSURE: 121 MMHG

## 2022-03-08 DIAGNOSIS — D84.81 IMMUNODEFICIENCY SECONDARY TO NEOPLASM: ICD-10-CM

## 2022-03-08 DIAGNOSIS — I10 ESSENTIAL HYPERTENSION: ICD-10-CM

## 2022-03-08 DIAGNOSIS — Z79.899 IMMUNODEFICIENCY SECONDARY TO CHEMOTHERAPY: ICD-10-CM

## 2022-03-08 DIAGNOSIS — D84.821 IMMUNODEFICIENCY SECONDARY TO CHEMOTHERAPY: ICD-10-CM

## 2022-03-08 DIAGNOSIS — D64.81 ANTINEOPLASTIC CHEMOTHERAPY INDUCED ANEMIA: ICD-10-CM

## 2022-03-08 DIAGNOSIS — T45.1X5A ANTINEOPLASTIC CHEMOTHERAPY INDUCED ANEMIA: ICD-10-CM

## 2022-03-08 DIAGNOSIS — T45.1X5A CHEMOTHERAPY INDUCED NEUTROPENIA: ICD-10-CM

## 2022-03-08 DIAGNOSIS — D49.9 IMMUNODEFICIENCY SECONDARY TO NEOPLASM: ICD-10-CM

## 2022-03-08 DIAGNOSIS — C24.0 HILAR CHOLANGIOCARCINOMA: Primary | ICD-10-CM

## 2022-03-08 DIAGNOSIS — R06.6 HICCUPS: ICD-10-CM

## 2022-03-08 DIAGNOSIS — T45.1X5A IMMUNODEFICIENCY SECONDARY TO CHEMOTHERAPY: ICD-10-CM

## 2022-03-08 DIAGNOSIS — D70.1 CHEMOTHERAPY INDUCED NEUTROPENIA: ICD-10-CM

## 2022-03-08 PROCEDURE — 99215 OFFICE O/P EST HI 40 MIN: CPT | Mod: S$GLB,,, | Performed by: PHYSICIAN ASSISTANT

## 2022-03-08 PROCEDURE — 99999 PR PBB SHADOW E&M-EST. PATIENT-LVL IV: CPT | Mod: PBBFAC,,, | Performed by: PHYSICIAN ASSISTANT

## 2022-03-08 PROCEDURE — 1160F PR REVIEW ALL MEDS BY PRESCRIBER/CLIN PHARMACIST DOCUMENTED: ICD-10-PCS | Mod: CPTII,S$GLB,, | Performed by: PHYSICIAN ASSISTANT

## 2022-03-08 PROCEDURE — 1160F RVW MEDS BY RX/DR IN RCRD: CPT | Mod: CPTII,S$GLB,, | Performed by: PHYSICIAN ASSISTANT

## 2022-03-08 PROCEDURE — 3008F PR BODY MASS INDEX (BMI) DOCUMENTED: ICD-10-PCS | Mod: CPTII,S$GLB,, | Performed by: PHYSICIAN ASSISTANT

## 2022-03-08 PROCEDURE — 99215 PR OFFICE/OUTPT VISIT, EST, LEVL V, 40-54 MIN: ICD-10-PCS | Mod: S$GLB,,, | Performed by: PHYSICIAN ASSISTANT

## 2022-03-08 PROCEDURE — 3008F BODY MASS INDEX DOCD: CPT | Mod: CPTII,S$GLB,, | Performed by: PHYSICIAN ASSISTANT

## 2022-03-08 PROCEDURE — 3074F PR MOST RECENT SYSTOLIC BLOOD PRESSURE < 130 MM HG: ICD-10-PCS | Mod: CPTII,S$GLB,, | Performed by: PHYSICIAN ASSISTANT

## 2022-03-08 PROCEDURE — 3074F SYST BP LT 130 MM HG: CPT | Mod: CPTII,S$GLB,, | Performed by: PHYSICIAN ASSISTANT

## 2022-03-08 PROCEDURE — 3079F PR MOST RECENT DIASTOLIC BLOOD PRESSURE 80-89 MM HG: ICD-10-PCS | Mod: CPTII,S$GLB,, | Performed by: PHYSICIAN ASSISTANT

## 2022-03-08 PROCEDURE — 3079F DIAST BP 80-89 MM HG: CPT | Mod: CPTII,S$GLB,, | Performed by: PHYSICIAN ASSISTANT

## 2022-03-08 PROCEDURE — 4010F PR ACE/ARB THEARPY RXD/TAKEN: ICD-10-PCS | Mod: CPTII,S$GLB,, | Performed by: PHYSICIAN ASSISTANT

## 2022-03-08 PROCEDURE — 4010F ACE/ARB THERAPY RXD/TAKEN: CPT | Mod: CPTII,S$GLB,, | Performed by: PHYSICIAN ASSISTANT

## 2022-03-08 PROCEDURE — 1159F PR MEDICATION LIST DOCUMENTED IN MEDICAL RECORD: ICD-10-PCS | Mod: CPTII,S$GLB,, | Performed by: PHYSICIAN ASSISTANT

## 2022-03-08 PROCEDURE — 1159F MED LIST DOCD IN RCRD: CPT | Mod: CPTII,S$GLB,, | Performed by: PHYSICIAN ASSISTANT

## 2022-03-08 PROCEDURE — 99999 PR PBB SHADOW E&M-EST. PATIENT-LVL IV: ICD-10-PCS | Mod: PBBFAC,,, | Performed by: PHYSICIAN ASSISTANT

## 2022-03-08 RX ORDER — SODIUM CHLORIDE 0.9 % (FLUSH) 0.9 %
10 SYRINGE (ML) INJECTION
Status: CANCELLED | OUTPATIENT
Start: 2022-03-09

## 2022-03-08 RX ORDER — HEPARIN 100 UNIT/ML
500 SYRINGE INTRAVENOUS
Status: CANCELLED | OUTPATIENT
Start: 2022-03-09

## 2022-03-08 NOTE — PROGRESS NOTES
"                                                         PROGRESS NOTE    Subjective:       Patient ID: Romeo Larson is a 41 y.o. male.    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 Mr Larson is a 42 yo man who presents today for further management of suspected hilar cholangiocarcinoma. He presented with dark urine, abdominal pain and jaundice since August 2021. He presented to outside hospital ER with elevated bilirubin. MRCP was performed demonstrating bi-lobar intrahepatic bile duct dilation and a ~2cm ill defined mass at the hilum concerning for hilar cholangiocarcinoma. He was referred to the advanced endoscopy group at [facility]. ERCP confirmed severe, malignant appearing stricture involving right and left main hepatic ducts. Biliary stents were placed to relieve obstruction. EUS was performed. It showed an irregular hypoechoic mass where the hepatic duct bifurcates into the right and left hepatic ducts. The mass measured 11.4 mm by 11.3 mm in maximal cross-sectional diameter. FNA sampling of hilar lymph nodes was negative for metastatic disease. Bile duct biopsy showed "rare groups of glandular epithelium with mild atypia, strips of benign glandular epithelium intermixed with blood clot, and focal fibrous tissue with chronic inflammation."  CT C/A/P 10/27/21:  1. Intrahepatic biliary dilatation despite the presence of 2 biliary drains.  The patient is recent comparison MRI a revealed a possible central hepatic mass, concerning for either cholangiocarcinoma or hepatocellular carcinoma.  This is, however, not definitively demonstrated on today's exam.  There are enlarged lymph nodes present in the vicinity of the ana cristina hepatis and celiac axis as detailed above.  2. Scattered pulmonary nodules measuring up to 6 mm.  3. Other findings as detailed above.  He presents today for further management. Seen by Dr Jara " "and considered a candidate for liver transplant. Seen by Dr Cunningham last Friday. Scheduled for PET this Wednesday. Works as a salesman of electronics.   Discussed neoadjuvant chemoradiation with 5FU followed by neoadjuvant cis/gem prior to liver transplant.   2. Hospitalized 11/3/21-21 for fever 2/2 acute cholangitis. Repeat ERCP biopsy on 21 again non-diagnostic. Biopsy of the celiac lymph node was negative.   3. PET scan 11/3/21: "1. Wedge-shaped geographic hypermetabolic activity within the right lobe of the liver in a similar distribution to the intrahepatic biliary dilatation.  This could relate to inflammation from multiple etiologies including biliary stasis, cholangitis, and obstructive dilatation.  Neoplastic involvement would be difficult to exclude.  The liver is poorly evaluated given background activity. 2. Hypermetabolic lymphadenopathy is noted in a periportal and celiac distribution.  Infectious inflammatory and neoplastic etiologies are on the differential.  Index nodes have been measured. 3. Multiple pulmonary nodules are noted too small to categorize by PET-CT fusion as evidence seen on a prior CT of 10/27/2021.  CT for follow-up of size stability is necessary."  4. Completed chemoradiation with 5FU from . Started cis/gem on 22. S/p 2 cycles    Interval History:   Mr Larson returns for cycle 3 day 1 of cis/gem. Tolerating chemo well. Had hiccups after chemo despite he did not take oral dex or the baclofen. They have resolved and he is eating well. No fever, n/v, pain, diarrhea, constipation or abdominal pain. Presents alone    ECO    ROS:   A ten-point system review is obtained and negative except for what was stated in the Interval History.     Physical Examination:   Vital signs reviewed.   General: well hydrated, well developed, in no acute distress  HEENT: normocephalic, PERRLA, EOMI, anicteric sclerae, oropharynx clear  Neck: supple, no JVD, thyromegaly, " "cervical or supraclavicular lymphadenopathy  Lungs: clear breath sounds bilaterally, no wheezing, rales, or rhonchi  Chest: port site clean  Heart: RRR, no M/R/G  Abdomen: soft, no tenderness, non-distended, no mass, or hernia. BS present  Extremities: no clubbing, cyanosis, or edema  Skin: no rash, ulcer, or open wounds  Neuro: alert and oriented x 4, no focal neuro deficit  Psych: pleasant and appropriate mood and affect    Objective:     Laboratory Data:  Labs reviewed. ANC 5.7, platelet count 94K adequate for chemo    Imaging Data:  PET scan 11/3/21: "1. Wedge-shaped geographic hypermetabolic activity within the right lobe of the liver in a similar distribution to the intrahepatic biliary dilatation.  This could relate to inflammation from multiple etiologies including biliary stasis, cholangitis, and obstructive dilatation.  Neoplastic involvement would be difficult to exclude.  The liver is poorly evaluated given background activity. 2. Hypermetabolic lymphadenopathy is noted in a periportal and celiac distribution.  Infectious inflammatory and neoplastic etiologies are on the differential.  Index nodes have been measured. 3. Multiple pulmonary nodules are noted too small to categorize by PET-CT fusion as evidence seen on a prior CT of 10/27/2021.  CT for follow-up of size stability is necessary."    CT C/A/P 1/18/22:  1. Patient with a history of suspected hilar cholangiocarcinoma with no evidence of distant metastatic disease in the chest, abdomen, or pelvis.  There has been an interval reduction in the extent of the patient's intrahepatic biliary dilatation as compared to the previous study.  Two index lymph nodes in the vicinity of the ana cristina hepatis/gastroesophageal junction or smaller in size on today's examination.  2. Stable solid pulmonary nodules measuring up to 5 mm in size.  No new pulmonary nodules on today's study.    Assessment and Plan:     1. Hilar cholangiocarcinoma    2. Immunodeficiency " secondary to neoplasm    3. Immunodeficiency secondary to chemotherapy    4. Hiccups    5. Chemotherapy induced neutropenia    6. Essential hypertension    7. Antineoplastic chemotherapy induced anemia      1-3  - Mr Larson is a 40 yo man with likely stage I hilar cholangiocarcinoma. Biopsy non-diagnostic but clinical picture most consistent with hilar cholangiocarcinoma. He is a candidate for liver transplant. Completed chemoradiation with 5FU from 11/29/21-1/5/2022. Started cis/gem on 1/19/22. S/p 1 cycle  - doing very well. Counts adequate for treatment. Will continue today  - C3D1 cis/gem today  - return in 1 week for C3D8  - get CT scan on 3/21 after 3 cycles, will have repeat ERCP as well with Dr. Dick Pereyra on 3/23/2022    4.  - 2/2 IV dex with chemo  - Improved. Has Baclofen if needed    5.  - udenyca approved  - will give udenyca on day 9 of each cycle  - ANC 5.7    6.  - BP controlled  - c/w current medication    7.  - mild. monitor    Follow-up:     RTC in 1 week  Knows to call in the interval if any problems arise.    Electronically signed by Tanna Godoy    Route Chart for Scheduling    Med Onc Chart Routing  Urgent    Follow up with physician 1 week and 3 weeks. CBC, CMP, Mg 1 day prior, see Dr. Wesley then get cycle 3 day 8 Cis/gem at [place], get udenyca on day 9. Thursday at [facility]. Please schedule cbc, cmp, magnesium clinic visit with Dr. Wesley and cycle 4 day 1 of [place]/Cis with scan results in 3 weeks. Thank you   Follow up with JOSEPH    Labs CA 19-9, CBC, CMP and magnesium   Lab interval:     Imaging CT chest abdomen pelvis   POC creatinine, CT C/A/P on 3/21   Pharmacy appointment    Other referrals          Treatment Plan Information   OP GEMCITABINE CISPLATIN Q3W   Young Wesley MD   Upcoming Treatment Dates - OP GEMCITABINE CISPLATIN Q3W    3/9/2022       Chemotherapy       gemcitabine (GEMZAR) 2,020 mg in sodium chloride 0.9% 250 mL chemo infusion       CISplatin (PLATINOL) 25 mg/m2 = 51 mg in  sodium chloride 0.9% 500 mL chemo infusion       Pre-Hydration       sodium chloride 0.9% 1,000 mL with magnesium sulfate 1 g, potassium chloride 20 mEq infusion       Post-Hydration       sodium chloride 0.9% bolus 500 mL  3/16/2022       Chemotherapy       gemcitabine (GEMZAR) 2,020 mg in sodium chloride 0.9% 250 mL chemo infusion       CISplatin (PLATINOL) 25 mg/m2 = 51 mg in sodium chloride 0.9% 500 mL chemo infusion       Pre-Hydration       sodium chloride 0.9% 1,000 mL with magnesium sulfate 1 g, potassium chloride 20 mEq infusion       Post-Hydration       sodium chloride 0.9% bolus 500 mL  3/30/2022       Chemotherapy       gemcitabine (GEMZAR) 2,020 mg in sodium chloride 0.9% 250 mL chemo infusion       CISplatin (PLATINOL) 25 mg/m2 = 51 mg in sodium chloride 0.9% 500 mL chemo infusion       Pre-Hydration       sodium chloride 0.9% 1,000 mL with magnesium sulfate 1 g, potassium chloride 20 mEq infusion       Post-Hydration       sodium chloride 0.9% bolus 500 mL  4/6/2022       Chemotherapy       gemcitabine (GEMZAR) 2,020 mg in sodium chloride 0.9% 250 mL chemo infusion       CISplatin (PLATINOL) 25 mg/m2 = 51 mg in sodium chloride 0.9% 500 mL chemo infusion       Pre-Hydration       sodium chloride 0.9% 1,000 mL with magnesium sulfate 1 g, potassium chloride 20 mEq infusion       Post-Hydration       sodium chloride 0.9% bolus 500 mL    Supportive Plan Information  OP FILGRASTIM 480 MCG   Young Wesley MD   Upcoming Treatment Dates - OP FILGRASTIM 480 MCG    2/9/2022       Medications       filgrastim-sndz (ZARXIO) injection 480 mcg/0.8 mL (Preferred Regimen)  2/10/2022       Medications       filgrastim-sndz (ZARXIO) injection 480 mcg/0.8 mL (Preferred Regimen)  2/11/2022       Medications       filgrastim-sndz (ZARXIO) injection 480 mcg/0.8 mL (Preferred Regimen)  2/12/2022       Medications       filgrastim-sndz (ZARXIO) injection 480 mcg/0.8 mL (Preferred Regimen)

## 2022-03-09 ENCOUNTER — INFUSION (OUTPATIENT)
Dept: INFUSION THERAPY | Facility: HOSPITAL | Age: 42
End: 2022-03-09
Attending: INTERNAL MEDICINE
Payer: COMMERCIAL

## 2022-03-09 ENCOUNTER — DOCUMENTATION ONLY (OUTPATIENT)
Dept: INFUSION THERAPY | Facility: HOSPITAL | Age: 42
End: 2022-03-09
Payer: COMMERCIAL

## 2022-03-09 ENCOUNTER — TELEPHONE (OUTPATIENT)
Dept: HEMATOLOGY/ONCOLOGY | Facility: CLINIC | Age: 42
End: 2022-03-09
Payer: COMMERCIAL

## 2022-03-09 VITALS
TEMPERATURE: 99 F | RESPIRATION RATE: 17 BRPM | SYSTOLIC BLOOD PRESSURE: 139 MMHG | WEIGHT: 194 LBS | BODY MASS INDEX: 28.73 KG/M2 | HEART RATE: 77 BPM | HEIGHT: 69 IN | OXYGEN SATURATION: 97 % | DIASTOLIC BLOOD PRESSURE: 83 MMHG

## 2022-03-09 DIAGNOSIS — K83.1 BILIARY OBSTRUCTION: Primary | ICD-10-CM

## 2022-03-09 PROCEDURE — 96367 TX/PROPH/DG ADDL SEQ IV INF: CPT | Mod: PN

## 2022-03-09 PROCEDURE — A4216 STERILE WATER/SALINE, 10 ML: HCPCS | Mod: PN | Performed by: INTERNAL MEDICINE

## 2022-03-09 PROCEDURE — 96417 CHEMO IV INFUS EACH ADDL SEQ: CPT | Mod: PN

## 2022-03-09 PROCEDURE — 63600175 PHARM REV CODE 636 W HCPCS: Mod: PN | Performed by: INTERNAL MEDICINE

## 2022-03-09 PROCEDURE — 25000003 PHARM REV CODE 250: Mod: PN | Performed by: INTERNAL MEDICINE

## 2022-03-09 PROCEDURE — 96366 THER/PROPH/DIAG IV INF ADDON: CPT | Mod: PN

## 2022-03-09 PROCEDURE — 96413 CHEMO IV INFUSION 1 HR: CPT | Mod: PN

## 2022-03-09 PROCEDURE — 96375 TX/PRO/DX INJ NEW DRUG ADDON: CPT | Mod: PN

## 2022-03-09 RX ORDER — HEPARIN 100 UNIT/ML
500 SYRINGE INTRAVENOUS
Status: DISCONTINUED | OUTPATIENT
Start: 2022-03-09 | End: 2022-03-09 | Stop reason: HOSPADM

## 2022-03-09 RX ORDER — SODIUM CHLORIDE 0.9 % (FLUSH) 0.9 %
10 SYRINGE (ML) INJECTION
Status: DISCONTINUED | OUTPATIENT
Start: 2022-03-09 | End: 2022-03-09 | Stop reason: HOSPADM

## 2022-03-09 RX ORDER — SODIUM CHLORIDE 9 MG/ML
500 INJECTION, SOLUTION INTRAVENOUS
Status: COMPLETED | OUTPATIENT
Start: 2022-03-09 | End: 2022-03-09

## 2022-03-09 RX ADMIN — GEMCITABINE 1600 MG: 38 INJECTION, SOLUTION INTRAVENOUS at 11:03

## 2022-03-09 RX ADMIN — CISPLATIN 40 MG: 100 INJECTION, SOLUTION INTRAVENOUS at 12:03

## 2022-03-09 RX ADMIN — Medication 10 ML: at 02:03

## 2022-03-09 RX ADMIN — SODIUM CHLORIDE: 0.9 INJECTION, SOLUTION INTRAVENOUS at 08:03

## 2022-03-09 RX ADMIN — POTASSIUM CHLORIDE 500 ML/HR: 2 INJECTION, SOLUTION, CONCENTRATE INTRAVENOUS at 09:03

## 2022-03-09 RX ADMIN — APREPITANT 130 MG: 130 INJECTION, EMULSION INTRAVENOUS at 11:03

## 2022-03-09 RX ADMIN — PALONOSETRON 0.25 MG: 0.05 INJECTION, SOLUTION INTRAVENOUS at 11:03

## 2022-03-09 RX ADMIN — SODIUM CHLORIDE 500 ML: 0.9 INJECTION, SOLUTION INTRAVENOUS at 12:03

## 2022-03-09 RX ADMIN — Medication 500 UNITS: at 02:03

## 2022-03-09 NOTE — PLAN OF CARE
"  Problem: Adult Inpatient Plan of Care  Goal: Plan of Care Review  Outcome: Ongoing, Progressing  Flowsheets (Taken 3/9/2022 1430)  Plan of Care Reviewed With: patient     /83   Pulse 77   Temp 98.8 °F (37.1 °C)   Resp 17   Ht 5' 9" (1.753 m)   Wt 88 kg (194 lb 0.1 oz)   SpO2 97%   BMI 28.65 kg/m²   Patient tolerated treatment well. Port flushed with blood return, heparin locked, and de-accessed. AVS provided per portal and reviewed. Ambulates per self.     "

## 2022-03-09 NOTE — PROGRESS NOTES
Oncology Nutrition       Weight Check   Chart reviewed. Weight check completed on pt.       Wt Readings from Last 10 Encounters:   03/09/22 88 kg (194 lb 0.1 oz)   03/08/22 89 kg (196 lb 1.6 oz)   02/24/22 86.7 kg (191 lb 2.2 oz)   02/23/22 86.7 kg (191 lb 2.2 oz)   02/22/22 86.7 kg (191 lb 2.2 oz)   02/16/22 88.3 kg (194 lb 11.2 oz)   02/15/22 88.3 kg (194 lb 11.2 oz)   02/10/22 88.2 kg (194 lb 6.4 oz)   01/26/22 87.3 kg (192 lb 7.4 oz)   01/19/22 87 kg (191 lb 12.8 oz)          RD plan of care: Weight is currently 194#. No significant change at this time. Per nursing nutrition risk report, pt denies any nutritional concerns or challenges at this time. RD to continue to monitor; no nutritional interventions are needed at this time.     Elida Landaverde, MS, RD, LDN  03/09/2022  11:08 AM

## 2022-03-09 NOTE — PLAN OF CARE
"  Problem: Adult Inpatient Plan of Care  Goal: Patient-Specific Goal (Individualized)  Outcome: Ongoing, Progressing  Flowsheets (Taken 3/9/2022 0853)  Anxieties, Fears or Concerns: none voiced  Individualized Care Needs: recliner, pt food placed in refrigerator  Patient-Specific Goals (Include Timeframe): no s/s rxn during txt     Problem: Fatigue  Goal: Improved Activity Tolerance  Intervention: Promote Improved Energy  Flowsheets (Taken 3/9/2022 0853)  Fatigue Management: frequent rest breaks encouraged  Sleep/Rest Enhancement:   natural light exposure provided   noise level reduced  Activity Management:   Ambulated -L4   Ambulated in mesa - L4     /76 (Patient Position: Sitting)   Pulse 83   Temp 98.8 °F (37.1 °C)   Resp 17   Ht 5' 9" (1.753 m)   Wt 88 kg (194 lb 0.1 oz)   SpO2 97%   BMI 28.65 kg/m²   Patient here today for C3D1. VSS. Port accessed to right chest without difficulty; patent with blood return.   Orders released. Pt oriented to unit. Symptoms reviewed. Questions and concerns addressed.     "

## 2022-03-11 ENCOUNTER — LAB VISIT (OUTPATIENT)
Dept: LAB | Facility: HOSPITAL | Age: 42
End: 2022-03-11
Attending: INTERNAL MEDICINE
Payer: COMMERCIAL

## 2022-03-11 DIAGNOSIS — C24.0 HILAR CHOLANGIOCARCINOMA: ICD-10-CM

## 2022-03-11 LAB
ALBUMIN SERPL BCP-MCNC: 3.5 G/DL (ref 3.5–5.2)
ALP SERPL-CCNC: 208 U/L (ref 55–135)
ALT SERPL W/O P-5'-P-CCNC: 112 U/L (ref 10–44)
ANION GAP SERPL CALC-SCNC: 9 MMOL/L (ref 8–16)
AST SERPL-CCNC: 63 U/L (ref 10–40)
BASOPHILS # BLD AUTO: 0.01 K/UL (ref 0–0.2)
BASOPHILS NFR BLD: 0.2 % (ref 0–1.9)
BILIRUB SERPL-MCNC: 0.5 MG/DL (ref 0.1–1)
BUN SERPL-MCNC: 17 MG/DL (ref 6–20)
CALCIUM SERPL-MCNC: 9 MG/DL (ref 8.7–10.5)
CANCER AG19-9 SERPL-ACNC: 146.3 U/ML (ref 0–40)
CHLORIDE SERPL-SCNC: 102 MMOL/L (ref 95–110)
CO2 SERPL-SCNC: 27 MMOL/L (ref 23–29)
CREAT SERPL-MCNC: 1.1 MG/DL (ref 0.5–1.4)
DIFFERENTIAL METHOD: ABNORMAL
EOSINOPHIL # BLD AUTO: 0 K/UL (ref 0–0.5)
EOSINOPHIL NFR BLD: 0.8 % (ref 0–8)
ERYTHROCYTE [DISTWIDTH] IN BLOOD BY AUTOMATED COUNT: 14.7 % (ref 11.5–14.5)
EST. GFR  (AFRICAN AMERICAN): >60 ML/MIN/1.73 M^2
EST. GFR  (NON AFRICAN AMERICAN): >60 ML/MIN/1.73 M^2
GLUCOSE SERPL-MCNC: 329 MG/DL (ref 70–110)
HCT VFR BLD AUTO: 30.8 % (ref 40–54)
HGB BLD-MCNC: 10.4 G/DL (ref 14–18)
IMM GRANULOCYTES # BLD AUTO: 0.04 K/UL (ref 0–0.04)
IMM GRANULOCYTES NFR BLD AUTO: 0.8 % (ref 0–0.5)
LYMPHOCYTES # BLD AUTO: 0.4 K/UL (ref 1–4.8)
LYMPHOCYTES NFR BLD: 8.3 % (ref 18–48)
MAGNESIUM SERPL-MCNC: 1.8 MG/DL (ref 1.6–2.6)
MCH RBC QN AUTO: 33.3 PG (ref 27–31)
MCHC RBC AUTO-ENTMCNC: 33.8 G/DL (ref 32–36)
MCV RBC AUTO: 99 FL (ref 82–98)
MONOCYTES # BLD AUTO: 0.3 K/UL (ref 0.3–1)
MONOCYTES NFR BLD: 5.4 % (ref 4–15)
NEUTROPHILS # BLD AUTO: 4.4 K/UL (ref 1.8–7.7)
NEUTROPHILS NFR BLD: 84.5 % (ref 38–73)
NRBC BLD-RTO: 0 /100 WBC
PLATELET # BLD AUTO: 160 K/UL (ref 150–450)
PMV BLD AUTO: 8.9 FL (ref 9.2–12.9)
POTASSIUM SERPL-SCNC: 4.4 MMOL/L (ref 3.5–5.1)
PROT SERPL-MCNC: 6.4 G/DL (ref 6–8.4)
RBC # BLD AUTO: 3.12 M/UL (ref 4.6–6.2)
SODIUM SERPL-SCNC: 138 MMOL/L (ref 136–145)
WBC # BLD AUTO: 5.16 K/UL (ref 3.9–12.7)

## 2022-03-11 PROCEDURE — 36415 COLL VENOUS BLD VENIPUNCTURE: CPT | Mod: PO,NTX | Performed by: INTERNAL MEDICINE

## 2022-03-11 PROCEDURE — 83735 ASSAY OF MAGNESIUM: CPT | Mod: PO,NTX | Performed by: INTERNAL MEDICINE

## 2022-03-11 PROCEDURE — 86301 IMMUNOASSAY TUMOR CA 19-9: CPT | Mod: TXP | Performed by: INTERNAL MEDICINE

## 2022-03-11 PROCEDURE — 85025 COMPLETE CBC W/AUTO DIFF WBC: CPT | Mod: PO,TXP | Performed by: INTERNAL MEDICINE

## 2022-03-11 PROCEDURE — 80053 COMPREHEN METABOLIC PANEL: CPT | Mod: PO,NTX | Performed by: INTERNAL MEDICINE

## 2022-03-14 ENCOUNTER — OFFICE VISIT (OUTPATIENT)
Dept: HEMATOLOGY/ONCOLOGY | Facility: CLINIC | Age: 42
End: 2022-03-14
Payer: COMMERCIAL

## 2022-03-14 ENCOUNTER — TELEPHONE (OUTPATIENT)
Dept: HEMATOLOGY/ONCOLOGY | Facility: CLINIC | Age: 42
End: 2022-03-14
Payer: COMMERCIAL

## 2022-03-14 VITALS
HEIGHT: 69 IN | TEMPERATURE: 98 F | HEART RATE: 66 BPM | DIASTOLIC BLOOD PRESSURE: 91 MMHG | WEIGHT: 193 LBS | BODY MASS INDEX: 28.58 KG/M2 | SYSTOLIC BLOOD PRESSURE: 138 MMHG | RESPIRATION RATE: 18 BRPM | OXYGEN SATURATION: 100 %

## 2022-03-14 DIAGNOSIS — I10 ESSENTIAL HYPERTENSION: ICD-10-CM

## 2022-03-14 DIAGNOSIS — C24.0 HILAR CHOLANGIOCARCINOMA: Primary | ICD-10-CM

## 2022-03-14 DIAGNOSIS — Z79.899 IMMUNODEFICIENCY SECONDARY TO CHEMOTHERAPY: ICD-10-CM

## 2022-03-14 DIAGNOSIS — D64.81 ANTINEOPLASTIC CHEMOTHERAPY INDUCED ANEMIA: ICD-10-CM

## 2022-03-14 DIAGNOSIS — D84.821 IMMUNODEFICIENCY SECONDARY TO CHEMOTHERAPY: ICD-10-CM

## 2022-03-14 DIAGNOSIS — D49.9 IMMUNODEFICIENCY SECONDARY TO NEOPLASM: ICD-10-CM

## 2022-03-14 DIAGNOSIS — T45.1X5A CHEMOTHERAPY INDUCED NEUTROPENIA: ICD-10-CM

## 2022-03-14 DIAGNOSIS — T45.1X5A ANTINEOPLASTIC CHEMOTHERAPY INDUCED ANEMIA: ICD-10-CM

## 2022-03-14 DIAGNOSIS — D70.1 CHEMOTHERAPY INDUCED NEUTROPENIA: ICD-10-CM

## 2022-03-14 DIAGNOSIS — E13.9 SECONDARY DIABETES: ICD-10-CM

## 2022-03-14 DIAGNOSIS — D84.81 IMMUNODEFICIENCY SECONDARY TO NEOPLASM: ICD-10-CM

## 2022-03-14 DIAGNOSIS — T45.1X5A IMMUNODEFICIENCY SECONDARY TO CHEMOTHERAPY: ICD-10-CM

## 2022-03-14 PROCEDURE — 99999 PR PBB SHADOW E&M-EST. PATIENT-LVL IV: ICD-10-PCS | Mod: PBBFAC,,, | Performed by: INTERNAL MEDICINE

## 2022-03-14 PROCEDURE — 4010F PR ACE/ARB THEARPY RXD/TAKEN: ICD-10-PCS | Mod: CPTII,S$GLB,, | Performed by: INTERNAL MEDICINE

## 2022-03-14 PROCEDURE — 3008F BODY MASS INDEX DOCD: CPT | Mod: CPTII,S$GLB,, | Performed by: INTERNAL MEDICINE

## 2022-03-14 PROCEDURE — 3080F PR MOST RECENT DIASTOLIC BLOOD PRESSURE >= 90 MM HG: ICD-10-PCS | Mod: CPTII,S$GLB,, | Performed by: INTERNAL MEDICINE

## 2022-03-14 PROCEDURE — 4010F ACE/ARB THERAPY RXD/TAKEN: CPT | Mod: CPTII,S$GLB,, | Performed by: INTERNAL MEDICINE

## 2022-03-14 PROCEDURE — 99215 OFFICE O/P EST HI 40 MIN: CPT | Mod: S$GLB,,, | Performed by: INTERNAL MEDICINE

## 2022-03-14 PROCEDURE — 1159F MED LIST DOCD IN RCRD: CPT | Mod: CPTII,S$GLB,, | Performed by: INTERNAL MEDICINE

## 2022-03-14 PROCEDURE — 99999 PR PBB SHADOW E&M-EST. PATIENT-LVL IV: CPT | Mod: PBBFAC,,, | Performed by: INTERNAL MEDICINE

## 2022-03-14 PROCEDURE — 1160F PR REVIEW ALL MEDS BY PRESCRIBER/CLIN PHARMACIST DOCUMENTED: ICD-10-PCS | Mod: CPTII,S$GLB,, | Performed by: INTERNAL MEDICINE

## 2022-03-14 PROCEDURE — 3075F SYST BP GE 130 - 139MM HG: CPT | Mod: CPTII,S$GLB,, | Performed by: INTERNAL MEDICINE

## 2022-03-14 PROCEDURE — 1159F PR MEDICATION LIST DOCUMENTED IN MEDICAL RECORD: ICD-10-PCS | Mod: CPTII,S$GLB,, | Performed by: INTERNAL MEDICINE

## 2022-03-14 PROCEDURE — 99215 PR OFFICE/OUTPT VISIT, EST, LEVL V, 40-54 MIN: ICD-10-PCS | Mod: S$GLB,,, | Performed by: INTERNAL MEDICINE

## 2022-03-14 PROCEDURE — 3075F PR MOST RECENT SYSTOLIC BLOOD PRESS GE 130-139MM HG: ICD-10-PCS | Mod: CPTII,S$GLB,, | Performed by: INTERNAL MEDICINE

## 2022-03-14 PROCEDURE — 3008F PR BODY MASS INDEX (BMI) DOCUMENTED: ICD-10-PCS | Mod: CPTII,S$GLB,, | Performed by: INTERNAL MEDICINE

## 2022-03-14 PROCEDURE — 3080F DIAST BP >= 90 MM HG: CPT | Mod: CPTII,S$GLB,, | Performed by: INTERNAL MEDICINE

## 2022-03-14 PROCEDURE — 1160F RVW MEDS BY RX/DR IN RCRD: CPT | Mod: CPTII,S$GLB,, | Performed by: INTERNAL MEDICINE

## 2022-03-14 RX ORDER — METFORMIN HYDROCHLORIDE 500 MG/1
500 TABLET ORAL 2 TIMES DAILY WITH MEALS
Qty: 60 TABLET | Refills: 3 | Status: SHIPPED | OUTPATIENT
Start: 2022-03-14 | End: 2022-06-17

## 2022-03-14 RX ORDER — HEPARIN 100 UNIT/ML
500 SYRINGE INTRAVENOUS
Status: CANCELLED | OUTPATIENT
Start: 2022-03-15

## 2022-03-14 RX ORDER — SODIUM CHLORIDE 0.9 % (FLUSH) 0.9 %
10 SYRINGE (ML) INJECTION
Status: CANCELLED | OUTPATIENT
Start: 2022-03-15

## 2022-03-14 NOTE — PROGRESS NOTES
"                                                         PROGRESS NOTE    Subjective:       Patient ID: Romeo Larson is a 41 y.o. male.    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 Mr Larson is a 42 yo man who presents today for further management of suspected hilar cholangiocarcinoma. He presented with dark urine, abdominal pain and jaundice since August 2021. He presented to outside hospital ER with elevated bilirubin. MRCP was performed demonstrating bi-lobar intrahepatic bile duct dilation and a ~2cm ill defined mass at the hilum concerning for hilar cholangiocarcinoma. He was referred to the advanced endoscopy group at [facility]. ERCP confirmed severe, malignant appearing stricture involving right and left main hepatic ducts. Biliary stents were placed to relieve obstruction. EUS was performed. It showed an irregular hypoechoic mass where the hepatic duct bifurcates into the right and left hepatic ducts. The mass measured 11.4 mm by 11.3 mm in maximal cross-sectional diameter. FNA sampling of hilar lymph nodes was negative for metastatic disease. Bile duct biopsy showed "rare groups of glandular epithelium with mild atypia, strips of benign glandular epithelium intermixed with blood clot, and focal fibrous tissue with chronic inflammation."  CT C/A/P 10/27/21:  1. Intrahepatic biliary dilatation despite the presence of 2 biliary drains.  The patient is recent comparison MRI a revealed a possible central hepatic mass, concerning for either cholangiocarcinoma or hepatocellular carcinoma.  This is, however, not definitively demonstrated on today's exam.  There are enlarged lymph nodes present in the vicinity of the ana cristina hepatis and celiac axis as detailed above.  2. Scattered pulmonary nodules measuring up to 6 mm.  3. Other findings as detailed above.  He presents today for further management. Seen by Dr Jara and considered a " "candidate for liver transplant. Seen by Dr Cunningham last Friday. Scheduled for PET this Wednesday. 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   Discussed neoadjuvant chemoradiation with 5FU followed by neoadjuvant cis/gem prior to liver transplant.   2. Hospitalized 11/3/21-21 for fever 2/2 acute cholangitis. Repeat ERCP biopsy on 21 again non-diagnostic. Biopsy of the celiac lymph node was negative.   3. PET scan 11/3/21: "1. Wedge-shaped geographic hypermetabolic activity within the right lobe of the liver in a similar distribution to the intrahepatic biliary dilatation.  This could relate to inflammation from multiple etiologies including biliary stasis, cholangitis, and obstructive dilatation.  Neoplastic involvement would be difficult to exclude.  The liver is poorly evaluated given background activity. 2. Hypermetabolic lymphadenopathy is noted in a periportal and celiac distribution.  Infectious inflammatory and neoplastic etiologies are on the differential.  Index nodes have been measured. 3. Multiple pulmonary nodules are noted too small to categorize by PET-CT fusion as evidence seen on a prior CT of 10/27/2021.  CT for follow-up of size stability is necessary."  4. Completed chemoradiation with 5FU from . Started cis/gem on 22. Due for cycle 3 day 8 today    Interval History:   Mr Larson returns for cycle 3 day 8 of cis/gem. Tolerating chemo well. Baclofen helped with hiccups from IV dex. He does not need to take oral dex or any other antiemetics at home. Scheduled for CT 3/21 and ERCP 3/25.     ECO    ROS:   A ten-point system review is obtained and negative except for what was stated in the Interval History.     Physical Examination:   Vital signs reviewed.   General: well hydrated, well developed, in no acute distress  HEENT: normocephalic, PERRLA, EOMI, anicteric sclerae, oropharynx clear  Neck: supple, no JVD, thyromegaly, cervical or supraclavicular lymphadenopathy  Lungs: " "clear breath sounds bilaterally, no wheezing, rales, or rhonchi  Chest: port site clean  Heart: RRR, no M/R/G  Abdomen: soft, no tenderness, non-distended, no hepatosplenomegaly, mass, or hernia. BS present  Extremities: no clubbing, cyanosis, or edema  Skin: no rash, ulcer, or open wounds  Neuro: alert and oriented x 4, no focal neuro deficit  Psych: pleasant and appropriate mood and affect    Objective:     Laboratory Data:  Labs reviewed. ANC 4.4 adequate for chemo. CA 19-9 146.3 decreased from 176.9.     Imaging Data:  PET scan 11/3/21: "1. Wedge-shaped geographic hypermetabolic activity within the right lobe of the liver in a similar distribution to the intrahepatic biliary dilatation.  This could relate to inflammation from multiple etiologies including biliary stasis, cholangitis, and obstructive dilatation.  Neoplastic involvement would be difficult to exclude.  The liver is poorly evaluated given background activity. 2. Hypermetabolic lymphadenopathy is noted in a periportal and celiac distribution.  Infectious inflammatory and neoplastic etiologies are on the differential.  Index nodes have been measured. 3. Multiple pulmonary nodules are noted too small to categorize by PET-CT fusion as evidence seen on a prior CT of 10/27/2021.  CT for follow-up of size stability is necessary."    CT C/A/P 1/18/22:  1. Patient with a history of suspected hilar cholangiocarcinoma with no evidence of distant metastatic disease in the chest, abdomen, or pelvis.  There has been an interval reduction in the extent of the patient's intrahepatic biliary dilatation as compared to the previous study.  Two index lymph nodes in the vicinity of the ana cristina hepatis/gastroesophageal junction or smaller in size on today's examination.  2. Stable solid pulmonary nodules measuring up to 5 mm in size.  No new pulmonary nodules on today's study.    Assessment and Plan:     1. 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 Immunodeficiency secondary to neoplasm    4. Secondary diabetes    5. Chemotherapy induced neutropenia    6. Antineoplastic chemotherapy induced anemia    7. Essential hypertension      1-3  - Mr Larson is a 40 yo man with likely stage I hilar cholangiocarcinoma. Biopsy non-diagnostic but clinical picture most consistent with hilar cholangiocarcinoma. He is a candidate for liver transplant. Completed chemoradiation with 5FU from 11/29/21-1/5/2022. Started cis/gem on 1/19/22.   - doing well. Counts adequate  - C3D8 cis/gem tomorrow, udenyca on day 9  - scheduled for restaging scan next Monday. Will review at the liver conference next Tuesday  - ERCP next Friday  - return in 2 weeks for C4D1    4.  - 2/2 IV dex with chemo  - decrease dex from 12 mg to 8 mg  - start metformin 500 mg bid  - asked patient to follow up with PCP  - check HbA1c on return    5.  - udenyca on day 9 of each cycle    6.  - mild. monitor    7.  - BP controlled  - c/w current medication    Follow-up:     RTC in 2 weeks  Knows to call in the interval if any problems arise.    Electronically signed by Young Wesley    Route Chart for Scheduling    Med Onc Chart Routing  Urgent    Follow up with physician 2 weeks. Appt made. please link HbA1c and CA 19-9 to labs on 3/29. add lab (CBC, CMP, Mg, CA 19-9) and see JOSEPH on 4/5   Follow up with JOSEPH 3 weeks.   Labs CA 19-9, CBC, CMP and magnesium   Lab interval:     Imaging    Pharmacy appointment    Other referrals          Treatment Plan Information   OP GEMCITABINE CISPLATIN Q3W   Young Wesley MD   Upcoming Treatment Dates - OP GEMCITABINE CISPLATIN Q3W    3/16/2022       Chemotherapy       gemcitabine (GEMZAR) 2,020 mg in sodium chloride 0.9% 250 mL chemo infusion       CISplatin (PLATINOL) 25 mg/m2 = 51 mg in sodium chloride 0.9% 500 mL chemo infusion       Pre-Hydration       sodium chloride 0.9% 1,000 mL with magnesium sulfate 1 g, potassium chloride 20 mEq infusion        Post-Hydration       sodium chloride 0.9% bolus 500 mL  3/30/2022       Chemotherapy       gemcitabine (GEMZAR) 2,020 mg in sodium chloride 0.9% 250 mL chemo infusion       CISplatin (PLATINOL) 25 mg/m2 = 51 mg in sodium chloride 0.9% 500 mL chemo infusion       Pre-Hydration       sodium chloride 0.9% 1,000 mL with magnesium sulfate 1 g, potassium chloride 20 mEq infusion       Post-Hydration       sodium chloride 0.9% bolus 500 mL  4/6/2022       Chemotherapy       gemcitabine (GEMZAR) 2,020 mg in sodium chloride 0.9% 250 mL chemo infusion       CISplatin (PLATINOL) 25 mg/m2 = 51 mg in sodium chloride 0.9% 500 mL chemo infusion       Pre-Hydration       sodium chloride 0.9% 1,000 mL with magnesium sulfate 1 g, potassium chloride 20 mEq infusion       Post-Hydration       sodium chloride 0.9% bolus 500 mL  4/20/2022       Chemotherapy       gemcitabine (GEMZAR) 2,020 mg in sodium chloride 0.9% 250 mL chemo infusion       CISplatin (PLATINOL) 25 mg/m2 = 51 mg in sodium chloride 0.9% 500 mL chemo infusion       Pre-Hydration       sodium chloride 0.9% 1,000 mL with magnesium sulfate 1 g, potassium chloride 20 mEq infusion       Post-Hydration       sodium chloride 0.9% bolus 500 mL    Supportive Plan Information  OP FILGRASTIM 480 MCG   Young Wesley MD   Upcoming Treatment Dates - OP FILGRASTIM 480 MCG    2/9/2022       Medications       filgrastim-sndz (ZARXIO) injection 480 mcg/0.8 mL (Preferred Regimen)  2/10/2022       Medications       filgrastim-sndz (ZARXIO) injection 480 mcg/0.8 mL (Preferred Regimen)  2/11/2022       Medications       filgrastim-sndz (ZARXIO) injection 480 mcg/0.8 mL (Preferred Regimen)  2/12/2022       Medications       filgrastim-sndz (ZARXIO) injection 480 mcg/0.8 mL (Preferred Regimen)

## 2022-03-15 ENCOUNTER — TELEPHONE (OUTPATIENT)
Dept: TRANSPLANT | Facility: CLINIC | Age: 42
End: 2022-03-15
Payer: COMMERCIAL

## 2022-03-15 ENCOUNTER — INFUSION (OUTPATIENT)
Dept: INFUSION THERAPY | Facility: HOSPITAL | Age: 42
End: 2022-03-15
Attending: INTERNAL MEDICINE
Payer: COMMERCIAL

## 2022-03-15 ENCOUNTER — TELEPHONE (OUTPATIENT)
Dept: ENDOSCOPY | Facility: HOSPITAL | Age: 42
End: 2022-03-15
Payer: COMMERCIAL

## 2022-03-15 VITALS
SYSTOLIC BLOOD PRESSURE: 118 MMHG | HEIGHT: 69 IN | HEART RATE: 80 BPM | TEMPERATURE: 98 F | RESPIRATION RATE: 18 BRPM | WEIGHT: 192.25 LBS | DIASTOLIC BLOOD PRESSURE: 77 MMHG | BODY MASS INDEX: 28.47 KG/M2

## 2022-03-15 DIAGNOSIS — K83.1 BILIARY OBSTRUCTION: Primary | ICD-10-CM

## 2022-03-15 DIAGNOSIS — Z01.818 PRE-OP TESTING: ICD-10-CM

## 2022-03-15 PROCEDURE — 96417 CHEMO IV INFUS EACH ADDL SEQ: CPT | Mod: PN

## 2022-03-15 PROCEDURE — 96366 THER/PROPH/DIAG IV INF ADDON: CPT | Mod: PN

## 2022-03-15 PROCEDURE — 96367 TX/PROPH/DG ADDL SEQ IV INF: CPT | Mod: PN

## 2022-03-15 PROCEDURE — 96361 HYDRATE IV INFUSION ADD-ON: CPT | Mod: PN

## 2022-03-15 PROCEDURE — 96375 TX/PRO/DX INJ NEW DRUG ADDON: CPT | Mod: PN

## 2022-03-15 PROCEDURE — 25000003 PHARM REV CODE 250: Mod: PN | Performed by: INTERNAL MEDICINE

## 2022-03-15 PROCEDURE — 96413 CHEMO IV INFUSION 1 HR: CPT | Mod: PN

## 2022-03-15 PROCEDURE — 63600175 PHARM REV CODE 636 W HCPCS: Mod: PN | Performed by: INTERNAL MEDICINE

## 2022-03-15 RX ORDER — PALONOSETRON 0.05 MG/ML
0.25 INJECTION, SOLUTION INTRAVENOUS ONCE
Status: COMPLETED | OUTPATIENT
Start: 2022-03-15 | End: 2022-03-15

## 2022-03-15 RX ORDER — SODIUM CHLORIDE 0.9 % (FLUSH) 0.9 %
10 SYRINGE (ML) INJECTION
Status: DISCONTINUED | OUTPATIENT
Start: 2022-03-15 | End: 2022-03-15 | Stop reason: HOSPADM

## 2022-03-15 RX ORDER — SODIUM CHLORIDE 9 MG/ML
500 INJECTION, SOLUTION INTRAVENOUS
Status: COMPLETED | OUTPATIENT
Start: 2022-03-15 | End: 2022-03-15

## 2022-03-15 RX ORDER — HEPARIN 100 UNIT/ML
500 SYRINGE INTRAVENOUS
Status: DISCONTINUED | OUTPATIENT
Start: 2022-03-15 | End: 2022-03-15 | Stop reason: HOSPADM

## 2022-03-15 RX ORDER — DEXAMETHASONE SODIUM PHOSPHATE 4 MG/ML
8 INJECTION, SOLUTION INTRA-ARTICULAR; INTRALESIONAL; INTRAMUSCULAR; INTRAVENOUS; SOFT TISSUE
Status: COMPLETED | OUTPATIENT
Start: 2022-03-15 | End: 2022-03-15

## 2022-03-15 RX ADMIN — MAGNESIUM SULFATE HEPTAHYDRATE 500 ML/HR: 500 INJECTION, SOLUTION INTRAMUSCULAR; INTRAVENOUS at 09:03

## 2022-03-15 RX ADMIN — GEMCITABINE 2000 MG: 38 INJECTION, SOLUTION INTRAVENOUS at 11:03

## 2022-03-15 RX ADMIN — DEXAMETHASONE SODIUM PHOSPHATE 8 MG: 4 INJECTION INTRA-ARTICULAR; INTRALESIONAL; INTRAMUSCULAR; INTRAVENOUS; SOFT TISSUE at 11:03

## 2022-03-15 RX ADMIN — CISPLATIN 51 MG: 100 INJECTION, SOLUTION INTRAVENOUS at 12:03

## 2022-03-15 RX ADMIN — APREPITANT 130 MG: 130 INJECTION, EMULSION INTRAVENOUS at 11:03

## 2022-03-15 RX ADMIN — SODIUM CHLORIDE 500 ML: 0.9 INJECTION, SOLUTION INTRAVENOUS at 01:03

## 2022-03-15 RX ADMIN — Medication 500 UNITS: at 03:03

## 2022-03-15 RX ADMIN — SODIUM CHLORIDE: 9 INJECTION, SOLUTION INTRAVENOUS at 08:03

## 2022-03-15 RX ADMIN — PALONOSETRON 0.25 MG: 0.05 INJECTION, SOLUTION INTRAVENOUS at 11:03

## 2022-03-15 NOTE — PLAN OF CARE
Problem: Adult Inpatient Plan of Care  Goal: Plan of Care Review  Outcome: Ongoing, Progressing  Flowsheets (Taken 3/15/2022 0921)  Plan of Care Reviewed With: patient  Goal: Patient-Specific Goal (Individualized)  Outcome: Ongoing, Progressing  Flowsheets (Taken 3/15/2022 0921)  Anxieties, Fears or Concerns: None  Individualized Care Needs: Recliner, computer, education  Patient-Specific Goals (Include Timeframe): Free of S/S of reaction with infusions.     Problem: Fatigue  Goal: Improved Activity Tolerance  Outcome: Ongoing, Progressing  Intervention: Promote Improved Energy  Flowsheets (Taken 3/15/2022 0921)  Fatigue Management: paced activity encouraged  Sleep/Rest Enhancement: regular sleep/rest pattern promoted  Activity Management: Ambulated -L4    Patient to Infusion for Cisplatin and Gemzar. Treatment plan reviewed with patient. VSS. Tolerated infusions. Provided with copy of upcoming appointment schedule. Escorted to the front lobby for discharge to home.

## 2022-03-15 NOTE — TELEPHONE ENCOUNTER
Submitted for review at liver conference 3/22/2022    ----- Message from Young Wesley MD sent at 3/14/2022  8:11 AM CDT -----  Cade Barajas,     Mr Larson is scheduled for restaging CT scan on 3/21 (next Monday). He has hilar cholan and completed chemoradiation on 1/5. Can we review his scan with the surgeons at the next liver conference 3/22? Thanks

## 2022-03-16 ENCOUNTER — PATIENT MESSAGE (OUTPATIENT)
Dept: ENDOSCOPY | Facility: HOSPITAL | Age: 42
End: 2022-03-16
Payer: COMMERCIAL

## 2022-03-16 ENCOUNTER — TELEPHONE (OUTPATIENT)
Dept: ENDOSCOPY | Facility: HOSPITAL | Age: 42
End: 2022-03-16
Payer: COMMERCIAL

## 2022-03-16 ENCOUNTER — INFUSION (OUTPATIENT)
Dept: INFUSION THERAPY | Facility: HOSPITAL | Age: 42
End: 2022-03-16
Attending: INTERNAL MEDICINE
Payer: COMMERCIAL

## 2022-03-16 VITALS
RESPIRATION RATE: 18 BRPM | TEMPERATURE: 98 F | SYSTOLIC BLOOD PRESSURE: 132 MMHG | HEART RATE: 102 BPM | DIASTOLIC BLOOD PRESSURE: 92 MMHG

## 2022-03-16 DIAGNOSIS — K83.1 BILIARY OBSTRUCTION: Primary | ICD-10-CM

## 2022-03-16 PROCEDURE — 63600175 PHARM REV CODE 636 W HCPCS: Mod: TB,PN | Performed by: INTERNAL MEDICINE

## 2022-03-16 PROCEDURE — 96372 THER/PROPH/DIAG INJ SC/IM: CPT | Mod: PN

## 2022-03-16 RX ADMIN — PEGFILGRASTIM-CBQV 6 MG: 6 INJECTION, SOLUTION SUBCUTANEOUS at 05:03

## 2022-03-16 NOTE — TELEPHONE ENCOUNTER
Received request to schedule patient for ERCP 3/25/22 at 10:45 am.. Spoke with Patient. Reviewed medical history and medications. Instructed on procedure and prep. Patient verbalized understanding of instructions. Instructions sent via portal. Pt to bring covid vaccination card to procedure.

## 2022-03-16 NOTE — PLAN OF CARE
Problem: Adult Inpatient Plan of Care  Goal: Plan of Care Review  Outcome: Ongoing, Progressing  Flowsheets (Taken 3/16/2022 9872)  Plan of Care Reviewed With: patient  Goal: Patient-Specific Goal (Individualized)  Outcome: Ongoing, Progressing     Problem: Fatigue  Goal: Improved Activity Tolerance  Outcome: Ongoing, Progressing   Pt tolerated Udencya injection well.   No adverse reaction noted.  Pt left clinic in no acute distress.

## 2022-03-17 ENCOUNTER — TELEPHONE (OUTPATIENT)
Dept: ENDOSCOPY | Facility: HOSPITAL | Age: 42
End: 2022-03-17
Payer: COMMERCIAL

## 2022-03-17 ENCOUNTER — TELEPHONE (OUTPATIENT)
Dept: TRANSPLANT | Facility: CLINIC | Age: 42
End: 2022-03-17
Payer: COMMERCIAL

## 2022-03-17 NOTE — TELEPHONE ENCOUNTER
----- Message from Humberto Noguera RN sent at 3/17/2022  2:22 PM CDT -----  Regarding: FW: Speak with office  Contact: Saran white?  ----- Message -----  From: Jasmyne Coffey  Sent: 3/17/2022   2:08 PM CDT  To: [facility] Pre-Liver Transplant Clinical  Subject: Speak with office                                Calling from [facility] to give approval on authorization for pt.    Auth# [id number]      [facility]# [phone]

## 2022-03-17 NOTE — TELEPHONE ENCOUNTER
----- Message from Pai Maki sent at 3/17/2022 11:43 AM CDT -----  Regarding: RE: speak with staff  Contact: Romeo    ----- Message -----  From: Gordon Zhu  Sent: 3/17/2022  11:23 AM CDT  To: [facility] Pre-Liver Transplant Non-Clinical  Subject: speak with staff                                 Patient is calling to speak with staff regarding his upcoming appts.    Contact: [phone]

## 2022-03-17 NOTE — TELEPHONE ENCOUNTER
Telephoned patient to confirm appointment for ERCP on 3/31/2022 at 8:00am with no answer. Left voicemail message with my direct contact number for patient to return phone call.

## 2022-03-18 ENCOUNTER — PATIENT MESSAGE (OUTPATIENT)
Dept: ENDOSCOPY | Facility: HOSPITAL | Age: 42
End: 2022-03-18
Payer: COMMERCIAL

## 2022-03-18 ENCOUNTER — TELEPHONE (OUTPATIENT)
Dept: ENDOSCOPY | Facility: HOSPITAL | Age: 42
End: 2022-03-18
Payer: COMMERCIAL

## 2022-03-18 NOTE — TELEPHONE ENCOUNTER
Received request to schedule patient for ERCP on 3/31/2022 at 8:00am.  Spoke with patient and confirmed new date and time. Updated instructions sent via portal.

## 2022-03-21 ENCOUNTER — HOSPITAL ENCOUNTER (OUTPATIENT)
Dept: RADIOLOGY | Facility: HOSPITAL | Age: 42
Discharge: HOME | End: 2022-03-21
Attending: INTERNAL MEDICINE
Payer: COMMERCIAL

## 2022-03-21 ENCOUNTER — PATIENT MESSAGE (OUTPATIENT)
Dept: TRANSPLANT | Facility: CLINIC | Age: 42
End: 2022-03-21
Payer: COMMERCIAL

## 2022-03-21 ENCOUNTER — HOSPITAL ENCOUNTER (OUTPATIENT)
Dept: RADIOLOGY | Facility: HOSPITAL | Age: 42
Discharge: HOME | End: 2022-03-21
Attending: TRANSPLANT SURGERY
Payer: COMMERCIAL

## 2022-03-21 DIAGNOSIS — Z76.82 ORGAN TRANSPLANT CANDIDATE: ICD-10-CM

## 2022-03-21 DIAGNOSIS — I10 ESSENTIAL HYPERTENSION: ICD-10-CM

## 2022-03-21 DIAGNOSIS — C24.0 HILAR CHOLANGIOCARCINOMA: ICD-10-CM

## 2022-03-21 PROCEDURE — 74178 CT ABD&PLV WO CNTR FLWD CNTR: CPT | Mod: 26,TXP,, | Performed by: RADIOLOGY

## 2022-03-21 PROCEDURE — 25500020 PHARM REV CODE 255: Mod: PO,TXP | Performed by: INTERNAL MEDICINE

## 2022-03-21 PROCEDURE — 71260 CT CHEST WITH CONTRAST: ICD-10-PCS | Mod: 26,TXP,, | Performed by: RADIOLOGY

## 2022-03-21 PROCEDURE — 71260 CT THORAX DX C+: CPT | Mod: 26,TXP,, | Performed by: RADIOLOGY

## 2022-03-21 PROCEDURE — 74178 CT ABDOMEN PELVIS W WO CONTRAST: ICD-10-PCS | Mod: 26,TXP,, | Performed by: RADIOLOGY

## 2022-03-21 PROCEDURE — 74178 CT ABD&PLV WO CNTR FLWD CNTR: CPT | Mod: TC,PO,TXP

## 2022-03-21 PROCEDURE — 71260 CT THORAX DX C+: CPT | Mod: TC,PO,TXP

## 2022-03-21 PROCEDURE — A9698 NON-RAD CONTRAST MATERIALNOC: HCPCS | Mod: PO,TXP | Performed by: INTERNAL MEDICINE

## 2022-03-21 RX ORDER — LOSARTAN POTASSIUM 50 MG/1
50 TABLET ORAL DAILY
Qty: 90 TABLET | Refills: 3 | Status: SHIPPED | OUTPATIENT
Start: 2022-03-21 | End: 2022-06-24

## 2022-03-21 RX ADMIN — IOHEXOL 100 ML: 350 INJECTION, SOLUTION INTRAVENOUS at 10:03

## 2022-03-21 RX ADMIN — IOHEXOL 1000 ML: 9 SOLUTION ORAL at 10:03

## 2022-03-21 NOTE — TELEPHONE ENCOUNTER
No new care gaps identified.  Powered by [employer]. Reference number: [id number].   3/21/2022 12:36:43 PM CDT

## 2022-03-24 ENCOUNTER — EVALUATION (OUTPATIENT)
Dept: TRANSPLANT | Facility: CLINIC | Age: 42
End: 2022-03-24
Payer: COMMERCIAL

## 2022-03-24 ENCOUNTER — DOCUMENTATION ONLY (OUTPATIENT)
Dept: HEMATOLOGY/ONCOLOGY | Facility: CLINIC | Age: 42
End: 2022-03-24
Payer: COMMERCIAL

## 2022-03-24 ENCOUNTER — LAB VISIT (OUTPATIENT)
Dept: LAB | Facility: HOSPITAL | Age: 42
End: 2022-03-24
Attending: INTERNAL MEDICINE
Payer: COMMERCIAL

## 2022-03-24 ENCOUNTER — EVALUATION (OUTPATIENT)
Dept: TRANSPLANT | Facility: CLINIC | Age: 42
End: 2022-03-24
Attending: INTERNAL MEDICINE
Payer: COMMERCIAL

## 2022-03-24 VITALS
HEIGHT: 68 IN | OXYGEN SATURATION: 97 % | SYSTOLIC BLOOD PRESSURE: 139 MMHG | HEART RATE: 120 BPM | HEART RATE: 120 BPM | TEMPERATURE: 97 F | RESPIRATION RATE: 16 BRPM | DIASTOLIC BLOOD PRESSURE: 97 MMHG | WEIGHT: 188.94 LBS | BODY MASS INDEX: 28.63 KG/M2 | DIASTOLIC BLOOD PRESSURE: 97 MMHG | TEMPERATURE: 97 F | SYSTOLIC BLOOD PRESSURE: 139 MMHG | RESPIRATION RATE: 16 BRPM | OXYGEN SATURATION: 97 % | BODY MASS INDEX: 28.63 KG/M2 | HEIGHT: 68 IN | WEIGHT: 188.94 LBS

## 2022-03-24 DIAGNOSIS — Z76.82 ORGAN TRANSPLANT CANDIDATE: ICD-10-CM

## 2022-03-24 DIAGNOSIS — Z01.818 PRE-TRANSPLANT EVALUATION FOR LIVER TRANSPLANT: ICD-10-CM

## 2022-03-24 DIAGNOSIS — C24.0 HILAR CHOLANGIOCARCINOMA: Primary | ICD-10-CM

## 2022-03-24 LAB
A1AT SERPL-MCNC: 228 MG/DL (ref 100–190)
ABO + RH BLD: NORMAL
AFP SERPL-MCNC: 3.5 NG/ML (ref 0–8.4)
ALBUMIN SERPL BCP-MCNC: 3.9 G/DL (ref 3.5–5.2)
ALP SERPL-CCNC: 323 U/L (ref 55–135)
ALT SERPL W/O P-5'-P-CCNC: 114 U/L (ref 10–44)
AMPHET+METHAMPHET UR QL: NEGATIVE
ANION GAP SERPL CALC-SCNC: 11 MMOL/L (ref 8–16)
AST SERPL-CCNC: 45 U/L (ref 10–40)
BARBITURATES UR QL SCN>200 NG/ML: NEGATIVE
BENZODIAZ UR QL SCN>200 NG/ML: NEGATIVE
BILIRUB DIRECT SERPL-MCNC: 0.3 MG/DL (ref 0.1–0.3)
BILIRUB SERPL-MCNC: 0.7 MG/DL (ref 0.1–1)
BILIRUB UR QL STRIP: NEGATIVE
BLD GP AB SCN CELLS X3 SERPL QL: NORMAL
BUN SERPL-MCNC: 10 MG/DL (ref 6–20)
BZE UR QL SCN: NEGATIVE
CALCIUM SERPL-MCNC: 10 MG/DL (ref 8.7–10.5)
CANNABINOIDS UR QL SCN: NEGATIVE
CERULOPLASMIN SERPL-MCNC: 32 MG/DL (ref 15–45)
CHLORIDE SERPL-SCNC: 102 MMOL/L (ref 95–110)
CLARITY UR REFRACT.AUTO: ABNORMAL
CO2 SERPL-SCNC: 26 MMOL/L (ref 23–29)
COLOR UR AUTO: YELLOW
COMPLEXED PSA SERPL-MCNC: 0.68 NG/ML (ref 0–4)
CREAT SERPL-MCNC: 0.9 MG/DL (ref 0.5–1.4)
CREAT UR-MCNC: 280 MG/DL (ref 23–375)
EST. GFR  (AFRICAN AMERICAN): >60 ML/MIN/1.73 M^2
EST. GFR  (NON AFRICAN AMERICAN): >60 ML/MIN/1.73 M^2
ETHANOL UR-MCNC: <10 MG/DL
FERRITIN SERPL-MCNC: 2006 NG/ML (ref 20–300)
GGT SERPL-CCNC: 1180 U/L (ref 8–55)
GLUCOSE SERPL-MCNC: 97 MG/DL (ref 70–110)
GLUCOSE UR QL STRIP: NEGATIVE
HAV IGG SER QL IA: POSITIVE
HBV CORE AB SERPL QL IA: NEGATIVE
HBV SURFACE AB SER-ACNC: NEGATIVE M[IU]/ML
HBV SURFACE AG SERPL QL IA: NEGATIVE
HCV AB SERPL QL IA: NEGATIVE
HGB UR QL STRIP: NEGATIVE
HIV 1+2 AB+HIV1 P24 AG SERPL QL IA: NEGATIVE
INR PPP: 1 (ref 0.8–1.2)
IRON SERPL-MCNC: 57 UG/DL (ref 45–160)
KETONES UR QL STRIP: ABNORMAL
LEUKOCYTE ESTERASE UR QL STRIP: NEGATIVE
METHADONE UR QL SCN>300 NG/ML: NEGATIVE
NITRITE UR QL STRIP: NEGATIVE
OPIATES UR QL SCN: NEGATIVE
PCP UR QL SCN>25 NG/ML: NEGATIVE
PH UR STRIP: 5 [PH] (ref 5–8)
POTASSIUM SERPL-SCNC: 4.4 MMOL/L (ref 3.5–5.1)
PROT SERPL-MCNC: 7.2 G/DL (ref 6–8.4)
PROT UR QL STRIP: NEGATIVE
PROTHROMBIN TIME: 9.9 SEC (ref 9–12.5)
SATURATED IRON: 20 % (ref 20–50)
SODIUM SERPL-SCNC: 139 MMOL/L (ref 136–145)
SP GR UR STRIP: 1.02 (ref 1–1.03)
TOTAL IRON BINDING CAPACITY: 284 UG/DL (ref 250–450)
TOXICOLOGY INFORMATION: NORMAL
TRANSFERRIN SERPL-MCNC: 192 MG/DL (ref 200–375)
TSH SERPL DL<=0.005 MIU/L-ACNC: 0.83 UIU/ML (ref 0.4–4)
URN SPEC COLLECT METH UR: ABNORMAL

## 2022-03-24 PROCEDURE — 97802 MEDICAL NUTRITION INDIV IN: CPT | Mod: S$GLB,TXP,, | Performed by: DIETITIAN, REGISTERED

## 2022-03-24 PROCEDURE — 86706 HEP B SURFACE ANTIBODY: CPT | Mod: TXP | Performed by: INTERNAL MEDICINE

## 2022-03-24 PROCEDURE — 84630 ASSAY OF ZINC: CPT | Mod: TXP | Performed by: INTERNAL MEDICINE

## 2022-03-24 PROCEDURE — 99215 OFFICE O/P EST HI 40 MIN: CPT | Mod: S$GLB,TXP,, | Performed by: TRANSPLANT SURGERY

## 2022-03-24 PROCEDURE — 82248 BILIRUBIN DIRECT: CPT | Mod: TXP | Performed by: INTERNAL MEDICINE

## 2022-03-24 PROCEDURE — 81003 URINALYSIS AUTO W/O SCOPE: CPT | Mod: TXP,59 | Performed by: INTERNAL MEDICINE

## 2022-03-24 PROCEDURE — 86850 RBC ANTIBODY SCREEN: CPT | Mod: TXP | Performed by: INTERNAL MEDICINE

## 2022-03-24 PROCEDURE — 99215 PR OFFICE/OUTPT VISIT, EST, LEVL V, 40-54 MIN: ICD-10-PCS | Mod: S$GLB,TXP,, | Performed by: TRANSPLANT SURGERY

## 2022-03-24 PROCEDURE — 86787 VARICELLA-ZOSTER ANTIBODY: CPT | Mod: TXP | Performed by: INTERNAL MEDICINE

## 2022-03-24 PROCEDURE — 82390 ASSAY OF CERULOPLASMIN: CPT | Mod: TXP | Performed by: INTERNAL MEDICINE

## 2022-03-24 PROCEDURE — 82728 ASSAY OF FERRITIN: CPT | Mod: TXP | Performed by: INTERNAL MEDICINE

## 2022-03-24 PROCEDURE — 86480 TB TEST CELL IMMUN MEASURE: CPT | Mod: TXP | Performed by: INTERNAL MEDICINE

## 2022-03-24 PROCEDURE — 84466 ASSAY OF TRANSFERRIN: CPT | Mod: TXP | Performed by: INTERNAL MEDICINE

## 2022-03-24 PROCEDURE — 87389 HIV-1 AG W/HIV-1&-2 AB AG IA: CPT | Mod: TXP | Performed by: INTERNAL MEDICINE

## 2022-03-24 PROCEDURE — 86803 HEPATITIS C AB TEST: CPT | Mod: TXP | Performed by: INTERNAL MEDICINE

## 2022-03-24 PROCEDURE — 99999 PR PBB SHADOW E&M-EST. PATIENT-LVL III: CPT | Mod: PBBFAC,TXP,,

## 2022-03-24 PROCEDURE — 86592 SYPHILIS TEST NON-TREP QUAL: CPT | Mod: TXP | Performed by: INTERNAL MEDICINE

## 2022-03-24 PROCEDURE — 99999 PR PBB SHADOW E&M-EST. PATIENT-LVL IV: CPT | Mod: PBBFAC,TXP,, | Performed by: INTERNAL MEDICINE

## 2022-03-24 PROCEDURE — 82103 ALPHA-1-ANTITRYPSIN TOTAL: CPT | Mod: TXP | Performed by: INTERNAL MEDICINE

## 2022-03-24 PROCEDURE — 84443 ASSAY THYROID STIM HORMONE: CPT | Mod: TXP | Performed by: INTERNAL MEDICINE

## 2022-03-24 PROCEDURE — 99999 PR PBB SHADOW E&M-EST. PATIENT-LVL IV: ICD-10-PCS | Mod: PBBFAC,TXP,, | Performed by: INTERNAL MEDICINE

## 2022-03-24 PROCEDURE — 84590 ASSAY OF VITAMIN A: CPT | Mod: TXP | Performed by: INTERNAL MEDICINE

## 2022-03-24 PROCEDURE — 86704 HEP B CORE ANTIBODY TOTAL: CPT | Mod: TXP | Performed by: INTERNAL MEDICINE

## 2022-03-24 PROCEDURE — 36415 COLL VENOUS BLD VENIPUNCTURE: CPT | Mod: TXP | Performed by: INTERNAL MEDICINE

## 2022-03-24 PROCEDURE — 99999 PR PBB SHADOW E&M-EST. PATIENT-LVL III: ICD-10-PCS | Mod: PBBFAC,TXP,,

## 2022-03-24 PROCEDURE — 84153 ASSAY OF PSA TOTAL: CPT | Mod: TXP | Performed by: INTERNAL MEDICINE

## 2022-03-24 PROCEDURE — 82105 ALPHA-FETOPROTEIN SERUM: CPT | Mod: TXP | Performed by: INTERNAL MEDICINE

## 2022-03-24 PROCEDURE — 80053 COMPREHEN METABOLIC PANEL: CPT | Mod: TXP | Performed by: INTERNAL MEDICINE

## 2022-03-24 PROCEDURE — 97802 PR MED NUTR THER, 1ST, INDIV, EA 15 MIN: ICD-10-PCS | Mod: S$GLB,TXP,, | Performed by: DIETITIAN, REGISTERED

## 2022-03-24 PROCEDURE — 86644 CMV ANTIBODY: CPT | Mod: TXP | Performed by: INTERNAL MEDICINE

## 2022-03-24 PROCEDURE — 80307 DRUG TEST PRSMV CHEM ANLYZR: CPT | Mod: TXP | Performed by: INTERNAL MEDICINE

## 2022-03-24 PROCEDURE — 80321 ALCOHOLS BIOMARKERS 1OR 2: CPT | Mod: TXP | Performed by: INTERNAL MEDICINE

## 2022-03-24 PROCEDURE — 99214 PR OFFICE/OUTPT VISIT, EST, LEVL IV, 30-39 MIN: ICD-10-PCS | Mod: S$GLB,TXP,, | Performed by: INTERNAL MEDICINE

## 2022-03-24 PROCEDURE — 86790 VIRUS ANTIBODY NOS: CPT | Mod: TXP | Performed by: INTERNAL MEDICINE

## 2022-03-24 PROCEDURE — 82977 ASSAY OF GGT: CPT | Mod: TXP | Performed by: INTERNAL MEDICINE

## 2022-03-24 PROCEDURE — 85610 PROTHROMBIN TIME: CPT | Mod: TXP | Performed by: INTERNAL MEDICINE

## 2022-03-24 PROCEDURE — 86682 HELMINTH ANTIBODY: CPT | Mod: TXP | Performed by: INTERNAL MEDICINE

## 2022-03-24 PROCEDURE — 99214 OFFICE O/P EST MOD 30 MIN: CPT | Mod: S$GLB,TXP,, | Performed by: INTERNAL MEDICINE

## 2022-03-24 PROCEDURE — 87340 HEPATITIS B SURFACE AG IA: CPT | Mod: TXP | Performed by: INTERNAL MEDICINE

## 2022-03-24 NOTE — PROGRESS NOTES
Transplant Recipient Adult Psychosocial Assessment    Romeo Larson  [address] [facility] [address]  Telephone Information:   Mobile [phone]   Home  [phone] (home)  Work  There is no work phone number on file.  E-mail  [email or web address]    Sex: male  YOB: 1980  Age: 41 y.o.    Encounter Date: 3/24/2022  [place] Citizen: yes  Primary Language: English   Needed: no    Emergency Contact:  Name: Brittney Larson  Relationship: wife  Address: [address] [place] [address]    Phone Numbers:  [phone] (H)   [phone] (M)    Family/Social Support:   Number of dependents/: Pt has three children: Bradley Larson (15 yo), Benita Larson (12 yo) and Xu Larson (13 yo)  Marital history: Pt has been  to wife Brittney for 18 years  Other family dynamics: Pt parents are living and supportive/involved in pt's life. Pt has 1 brother.    Household Composition:  Name: Romeo Larson  Age: 41  Relationship: patient  Does person drive? yes    Name: Brittney Larson  Age: 41  Relationship: wife  Does person drive? yes     Name: Bradley Larson  Age: 16  Relationship: son  Does person drive? No    Name: Benita Larson  Age: 13  Relationship: daughter  Does person drive? No    Name: Xu Larson  Age: 12  Relationship: daughter  Does person drive? no    Do you and your caregivers have access to reliable transportation? yes  PRIMARY CAREGIVER: Brittney Larson (pt's wife, does drive) will be primary caregiver, phone number [phone].     provided in-depth information to patient and caregiver regarding pre- and post-transplant caregiver role.   strongly encourages patient and caregiver to have concrete plan regarding post-transplant care giving, including back-up caregiver(s) to ensure care giving needs are met as needed.    Patient and Caregiver states understanding all aspects of caregiver role/commitment and is able/willing/committed to being caregiver to the fullest  extent necessary.    Patient and Caregiver verbalizes understanding of the education provided today and caregiver responsibilities.         remains available. Patient and Caregiver agree to contact  in a timely manner if concerns arise.      Able to take time off work without financial concerns: yes.     Additional Significant Others who will Assist with Transplant:  Name: Bradley Larson ([phone])  Age: 72  City: [facility]: [place]  Relationship: father  Does person drive? yes    Name: Sonia Larson ([phone])  Age: 71  City: [place] State: [place]  Relationship: mother  Does person drive? yes    Living Will: no  Healthcare Power of : no  Advance Directives on file: <<no information> per medical record.  Verbally reviewed LW/HCPA information.   provided patient with copy of LW/HCPA documents and provided education on completion of forms.    Living Donors: No. Education and resource information given to patient.    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    Spouse/Significant Other Employment: pt's wife works part time    Disabled: no    Monthly Income: $9,000/month  Able to afford all costs now and if transplanted, including medications: yes  Patient and Caregiver verbalizes understanding of personal responsibilities related to transplant costs and the importance of having a financial plan to ensure that patients transplant costs are fully covered.      provided fundraising information/education.  Patient and Caregiver verbalizes understanding.   remains available.    Insurance:   Payer/Plan Subscr  Sex Relation Sub. Ins. ID Effective Group Num   1.  LETYELMA MOTTA* OSC[employer] PAYNE 1980 Male Self [id number] 8/1/20 [id number]                                    [address] [id number]     Primary Insurance (for UNOS reporting): Public Insurance - Medicare FFS (Fee For Service)  Secondary Insurance (for UNOS reporting): None  Patient and Caregiver verbalizes clear understanding that patient may experience difficulty obtaining and/or be denied insurance coverage post-surgery. This includes and is not limited to disability insurance, life insurance, health insurance, burial insurance, long term care insurance, and other insurances.    Patient and Caregiver also reports understanding that future health concerns related to or unrelated to transplantation may not be covered by patient's insurance.  Resources and information provided and reviewed.      Patient and Caregiver provides verbal permission to release any necessary information to outside resources for patient care and discharge planning.  Resources and information provided are reviewed.      Dialysis Adherence: pt reports no hx of dialysis    Infusion Service: patient utilizing? no  Home Health: patient utilizing? no  DME: no  Pulmonary/Cardiac Rehab: pt denies  ADLS: Pt reports independent with ADLS (such as walking, cooking, eating, bathing, housekeeping, and shopping). Pt still drives.    Adherence:  Pt reports high level of adherence to current health care regiment.  Adherence education and counseling provided. Pt verbalized understanding of importance of taking medications as instructed, following all team and MD recommendations, and coming to all follow up appointments.    Per History Section:  Past Medical History:   Diagnosis Date    Cholangiocarcinoma     Hypercholesteremia     Hypertension      Social History     Tobacco Use    Smoking status: Never Smoker    Smokeless tobacco: Never Used   Substance Use Topics    Alcohol use: Not Currently     Social History     Substance and Sexual Activity   Drug Use Never      Social History     Substance and Sexual Activity   Sexual Activity Yes     Per Today's Psychosocial:  Tobacco: none, patient denies any use.  Alcohol: none, patient denies any use.  Illicit Drugs/Non-prescribed Medications: none, patient denies any use.    Patient and Caregiver states clear understanding of the potential impact of substance use as it relates to transplant candidacy and is aware of possible random substance screening.  Substance abstinence/cessation counseling, education and resources provided and reviewed.     Arrests/DWI/Treatment/Rehab: patient denies    Psychiatric History:    Mental Health: pt denies any current mental health difficulties and reports no hx of anxiety/depression  Psychiatrist/Counselor: pt denies  Medications:  pt denies  Suicide/Homicide Issues: pt reports no SI/HI at this time and no hx of SI/HI  Safety at home: Pt reports no issues. No physical/emotional abuse reported.    Knowledge: Patient and Caregiver states having clear understanding and realistic expectations regarding the potential risks and potential benefits of organ transplantation and organ donation, agrees to discuss with health care team members and support system members and to utilize available resources and express questions and/or concerns in order to further facilitate the pt informed decision-making.  Resources and information provided and reviewed.     Patient and Caregiver is aware of [facility]'s affiliation and/or partnership with agencies in home health care, LTAC, SNF, [facility] [place] and other hospitals and clinics.    Understanding: Patient and Caregiver reports having a clear understanding of the many lifetime commitments involved with being a transplant recipient, including costs, compliance, medications, lab work, procedures, appointments, concrete and financial planning, preparedness, timely and appropriate communication of concerns, abstinence (ETOH, tobacco, illicit non-prescribed drugs), adherence to  all health care team recommendations, support system and caregiver involvement, appropriate and timely resource utilization and follow-through, mental health counseling as needed/recommended, and patient and caregiver responsibilities.  Social Service Handbook, resources and detailed educational information provided and reviewed.  Educational information provided.    Patient and Caregiver also reports current and expected compliance with health care regime and states having a clear understanding of the importance of compliance.      Patient and Caregiver reports a clear understanding that risks and benefits may be involved with organ transplantation and with organ donation.      Patient and Caregiver also reports clear understanding that psychosocial risk factors may affect patient, and include but are not limited to feelings of depression, generalized anxiety, anxiety regarding dependence on others, post traumatic stress disorder, feelings of guilt and other emotional and/or mental concerns, and/or exacerbation of existing mental health concerns.  Detailed resources provided and discussed.     Patient and Caregiver agrees to access appropriate resources in a timely manner as needed and/or as recommended, and to communicate concerns appropriately.  Patient and Caregiver also reports a clear understanding of treatment options available.      reviewed education, provided additional information, and answered questions.    Feelings or Concerns: pt and caregiver reports no concerns/questions at this time    Coping: Identify Patient & Caregiver Strategies to [place]:   1. Currently & Pre-transplant - family support, spending time outdoors, walking the dogs, fishing, attending my kid's games/lessons   2. At the time of surgery - family support, spending time outdoors, walking the dogs, fishing, attending my kid's games/lessons   3. During post-Transplant & Recovery Period - family support, spending time outdoors,  walking the dogs, fishing, attending my kid's games/lessons    Goals: Pt reports a desire to improve health. Patient referred to Vocational Rehabilitation.    Interview Behavior: Patient and Caregiver presents as alert and oriented x 4, pleasant, good eye contact, well groomed, recall good, concentration/judgement good, average intelligence, calm, communicative, cooperative and asking and answering questions appropriately.     Transplant Social Work - Candidacy  Assessment/Plan:     Psychosocial Suitability: Based on psychosocial risk factors, patient presents as low risk candidate for liver transplant at this time. Pt presents with clear caregiver plan and good family support. Pt reports financial ability to afford transplant, reports compliance with current medical regimen, reports adequate functional status - currently independent with ADLs, no reported cognitive/developmental/behavioral impairments, no significant mental health concerns reports, no reported substance use/abuse, and reports general understanding of txp process with adequate coping skills.      Recommendations/Additional Comments: SW recommends pt conduct fundraising to assist in the transplant process. SW recommends that pt remain aware of potential mental health concerns and contact the team if any concerns arise. SW recommends that pt remain abstinent from tobacco, ETOH and drug use. SW remains available to answer any questions or concerns that arise as the pt moves through the transplant process.     Final determination of transplant candidacy will be made once work up is complete and reviewed by the selection committee.      Shobha Hatfield LMSW

## 2022-03-24 NOTE — PROGRESS NOTES
Transplant Hepatology  Liver Transplant Recipient Evaluation      Consultation started: 3/24/2022 at 9:46 AM     Original Referring Provider: Young Wesley  Current Corresponding Physician:     ALEXANDRA Native Liver Diagnosis: Primary Liver Malignancy: Cholangiocarcinoma (CH-CA)    Reason for Visit: evaluation for liver transplant     Subjective:     Romeo Larson is a 41 y.o. 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  He presented with progressive jaundice, malaise, mild weight loss, pruritus and dark urine in September and October of 2021. He presented to outside hospital ER with elevated bilirubin. MRCP was performed demonstrating bi-lobar intrahepatic bile duct dilation and a ~2cm ill defined mass at the hilum concerning for hilar cholangiocarcinoma.  All an ERCP performed at [facility] [place] showed severe stricture ring and features consistent with a hilar cholangio month.  He had stenting to relieve the obstruction.  He has had 2 subsequent ERCPs. EUS was performed. It showed an irregular hypoechoic mass where the hepatic duct bifurcates into the right and left hepatic ducts. The mass measured 11.4 mm by 11.3 mm in maximal cross-sectional diameter. FNA sampling of hilar lymph nodes was negative for metastatic disease.  His weight and appetite are currently stable.  He has completed neoadjuvant chemoradiation with 5 FU.  He is currently on neoadjuvant chemotherapy with cis/gem.    Review of Systems   Constitutional: Negative for activity change, appetite change, chills, fatigue and unexpected weight change.   HENT: Negative for congestion, facial swelling and tinnitus.    Eyes: Negative for visual disturbance.   Respiratory: Negative for cough, shortness of breath and wheezing.    Cardiovascular: Negative for chest pain and palpitations.   Gastrointestinal: Negative for abdominal distention.   Genitourinary: Negative for dysuria.   Musculoskeletal: Negative for arthralgias, joint swelling and myalgias.    Neurological: Negative for syncope and headaches.   Hematological: Does not bruise/bleed easily.   Psychiatric/Behavioral: Negative for confusion.     Past Surgical History:   Procedure Laterality Date    ENDOSCOPIC ULTRASOUND OF UPPER GASTROINTESTINAL TRACT N/A 10/20/2021    Procedure: ULTRASOUND, UPPER GI TRACT, ENDOSCOPIC;  Surgeon: Valeriy Sotelo MD;  [address]  Service: Endoscopy;  Laterality: N/A;  wife to email vacc card-inst email-tb    ERCP N/A 10/20/2021    Procedure: ERCP (ENDOSCOPIC RETROGRADE CHOLANGIOPANCREATOGRAPHY);  Surgeon: Valeriy Sotelo MD;  [address]  Service: Endoscopy;  Laterality: N/A;    ERCP N/A 11/4/2021    Procedure: ERCP (ENDOSCOPIC RETROGRADE CHOLANGIOPANCREATOGRAPHY);  Surgeon: Valeriy Sotelo MD;  [address]  Service: Endoscopy;  Laterality: N/A;    ERCP N/A 11/4/2021    Procedure: ERCP (ENDOSCOPIC RETROGRADE CHOLANGIOPANCREATOGRAPHY);  Surgeon: Roselia Pereyra MD;  [address]  Service: Endoscopy;  Laterality: N/A;  pt vaccinated, instructed to bring card to procedure, instructions sent portal-MG    ERCP N/A 1/14/2022    Procedure: ERCP (ENDOSCOPIC RETROGRADE CHOLANGIOPANCREATOGRAPHY);  Surgeon: Roselia Pereyra MD;  Location: [address]);  Service: Endoscopy;  Laterality: N/A;  inst portal-right chest port-tb  Pt requested at home COVID testing, Pt instructed at home RAPID to be done morning of procedure, Pt instructed to call endoscopy scheuding if there is a positive result, Pt informed if a positive     INSERTION OF TUNNELED CENTRAL VENOUS CATHETER (CVC) WITH SUBCUTANEOUS PORT N/A 11/24/2021    Procedure: INSERTION, PORT-A-CATH;  Surgeon: Prem Syed MD;  Location: [facility] C[facility] LAB;  Service: Radiology;  Laterality: N/A;    TONSILLECTOMY       Current Outpatient Medications   Medication Sig    baclofen (LIORESAL) 10 MG tablet Take 1 tablet (10 mg total) by mouth  3 (three) times daily as needed (hiccups).    metFORMIN (GLUCOPHAGE) 500 MG tablet Take 1 tablet (500 mg total) by mouth 2 (two) times daily with meals.    ondansetron (ZOFRAN-ODT) 8 MG TbDL Take 1 tablet (8 mg total) by mouth every 6 (six) hours as needed (nausea).    losartan (COZAAR) 50 MG tablet Take 1 tablet (50 mg total) by mouth once daily. (Patient not taking: Reported on 3/24/2022)    promethazine (PHENERGAN) 12.5 MG Tab Take 1 tablet (12.5 mg total) by mouth every 6 (six) hours as needed (nausea). (Patient not taking: Reported on 3/24/2022)     No current facility-administered medications for this visit.       Objective:   Physical Exam  Constitutional:       Appearance: He is well-developed.   Eyes:      General: No scleral icterus.  Cardiovascular:      Rate and Rhythm: Normal rate and regular rhythm.      Heart sounds: Normal heart sounds.   Pulmonary:      Effort: Pulmonary effort is normal. No respiratory distress.      Breath sounds: Normal breath sounds. No wheezing.   Abdominal:      General: Bowel sounds are normal. There is no distension.      Palpations: Abdomen is soft. There is no mass.      Tenderness: There is no abdominal tenderness. There is no rebound.   Musculoskeletal:         General: Normal range of motion.   Lymphadenopathy:      Cervical: No cervical adenopathy.   Skin:     General: Skin is warm and dry.   Neurological:      Mental Status: He is alert and oriented to person, place, and time.       MELD-Na score: 14 at 10/17/2021  5:48 AM  MELD score: 14 at 10/17/2021  5:48 AM  Calculated from:  Serum Creatinine: 0.89 mg/dL (Using min of 1 mg/dL) at 10/17/2021  5:48 AM  Serum Sodium: 137 mmol/L at 10/17/2021  5:48 AM  Total Bilirubin: 8.4 mg/dL at 10/17/2021  5:48 AM  INR(ratio): 0.9 (Using min of 1) at 10/16/2021  1:09 AM  Age: 41 years  Lab Results   Component Value Date     (H) 03/11/2022    BUN 17 03/11/2022    CREATININE 1.1 03/11/2022    CALCIUM 9.0 03/11/2022    NA  138 03/11/2022    K 4.4 03/11/2022     03/11/2022    PROT 6.4 03/11/2022    CO2 27 03/11/2022    WBC 5.16 03/11/2022    RBC 3.12 (L) 03/11/2022    HGB 10.4 (L) 03/11/2022    HCT 30.8 (L) 03/11/2022     03/11/2022     Lab Results   Component Value Date    CHOL 163 10/12/2021    TRIG 198 (H) 10/12/2021    HDL 22 (L) 10/12/2021    CHOLHDL 13.5 (L) 10/12/2021    TOTALCHOLEST 7.4 (H) 10/12/2021    ALBUMIN 3.5 03/11/2022    BILITOT 0.5 03/11/2022    AST 63 (H) 03/11/2022     (H) 03/11/2022    ALKPHOS 208 (H) 03/11/2022    MG 1.8 03/11/2022    LABPROT 9.9 03/24/2022    INR 1.0 03/24/2022       Diagnostics:     1. Hilar cholangiocarcinoma    2. Organ transplant candidate        Transplant Hepatology - Candidacy   Assessment/Plan:     Transplant Candidacy: Romeo Larson is a 41 y.o. 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  In my opinion, he is a good candidate for liver transplant.  He will be presented to selection committee after all tests and evaluations are complete.    Patient advised that it is recommended that all transplant candidates, and their close contacts and household members receive Covid vaccination.    Zoran Nobles MD         [facility] Patient Status  Functional Status: 90% - Able to carry on normal activity: minor symptoms of disease  Physical Capacity: No Limitations    Patient on life support: No  Diabetes: No  Any previous malignancy: Yes, Cholangiocarcinoma  Neoadjuvant Therapy: yes  Has patient ever had a dx of HCC: no  Previous Abdominal Surgery: no  Spontaneous Bacterial Peritonitis: no  History of Portal Vein Thrombosis: no  Transjugular Intrahepatic Portosystemic Shunt: no

## 2022-03-24 NOTE — PROGRESS NOTES
Liver Transplant / Hepatobiliary Surgery  Clinic Note    Referring Provider: Young Wesley MD     Subjective:     Reason for Visit: Cholangiocarcinoma, transplant protocol    History of Present Illness: Romeo Larson is a 41 y.o. 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 He has completed neoadjuvant chemoradiation therapy and is currently on maintenance chemotherapy. His recent imaging for restaging was reviewed in conference and confirmed no evidence of disease progression or extrahepatic disease. He reports some fatigue, but overall has tolerated these treatments well.      Review of Systems   Constitutional: Negative for chills and fever.   HENT: Negative for congestion and tinnitus.    Eyes: Negative for discharge and redness.   Respiratory: Negative for cough and shortness of breath.    Cardiovascular: Negative for chest pain and palpitations.   Gastrointestinal: Negative for diarrhea and vomiting.   Genitourinary: Negative for frequency and urgency.   Musculoskeletal: Negative for joint pain and myalgias.   Neurological: Negative for tingling and weakness.   Endo/Heme/Allergies: Does not bruise/bleed easily.   Psychiatric/Behavioral: Negative for depression and suicidal ideas.        Objective:     Physical Exam  HENT:      Head: Normocephalic and atraumatic.   Eyes:      Pupils: Pupils are equal, round, and reactive to light.   Cardiovascular:      Rate and Rhythm: Normal rate and regular rhythm.   Pulmonary:      Effort: Pulmonary effort is normal. No respiratory distress.   Abdominal:      Palpations: Abdomen is soft.      Tenderness: There is no guarding.   Musculoskeletal:         General: No tenderness. Normal range of motion.      Cervical back: Normal range of motion.   Lymphadenopathy:      Cervical: No cervical adenopathy.   Skin:     General: Skin is warm and dry.   Neurological:      Mental Status: He is alert and oriented to person, place, and time.      Cranial Nerves: No  cranial nerve deficit.   Psychiatric:         Mood and Affect: Affect normal.         Judgment: Judgment normal.            MELD-Na score: MELD-Na score: 6 at 3/24/2022  8:41 AM  MELD score: 6 at 3/24/2022  8:41 AM  Calculated from:  Serum Creatinine: 0.9 mg/dL (Using min of 1 mg/dL) at 3/24/2022  8:41 AM  Serum Sodium: 139 mmol/L (Using max of 137 mmol/L) at 3/24/2022  8:41 AM  Total Bilirubin: 0.7 mg/dL (Using min of 1 mg/dL) at 3/24/2022  8:41 AM  INR(ratio): 1.0 at 3/24/2022  8:41 AM  Age: 41 years     Sodium   Date Value Ref Range Status   03/24/2022 139 136 - 145 mmol/L Final     Potassium   Date Value Ref Range Status   03/24/2022 4.4 3.5 - 5.1 mmol/L Final     Chloride   Date Value Ref Range Status   03/24/2022 102 95 - 110 mmol/L Final     CO2   Date Value Ref Range Status   03/24/2022 26 23 - 29 mmol/L Final     Glucose   Date Value Ref Range Status   03/24/2022 97 70 - 110 mg/dL Final     BUN   Date Value Ref Range Status   03/24/2022 10 6 - 20 mg/dL Final     Creatinine   Date Value Ref Range Status   03/24/2022 0.9 0.5 - 1.4 mg/dL Final     Calcium   Date Value Ref Range Status   03/24/2022 10.0 8.7 - 10.5 mg/dL Final     Total Protein   Date Value Ref Range Status   03/24/2022 7.2 6.0 - 8.4 g/dL Final     Albumin   Date Value Ref Range Status   03/24/2022 3.9 3.5 - 5.2 g/dL Final     Total Bilirubin   Date Value Ref Range Status   03/24/2022 0.7 0.1 - 1.0 mg/dL Final     Comment:     For infants and newborns, interpretation of results should be based  on gestational age, weight and in agreement with clinical  observations.    Premature Infant recommended reference ranges:  Up to 24 hours.............<8.0 mg/dL  Up to 48 hours............<12.0 mg/dL  3-5 days..................<15.0 mg/dL  6-29 days.................<15.0 mg/dL       Alkaline Phosphatase   Date Value Ref Range Status   03/24/2022 323 (H) 55 - 135 U/L Final     AST   Date Value Ref Range Status   03/24/2022 45 (H) 10 - 40 U/L Final     ALT    Date Value Ref Range Status   03/24/2022 114 (H) 10 - 44 U/L Final     Anion Gap   Date Value Ref Range Status   03/24/2022 11 8 - 16 mmol/L Final     eGFR if    Date Value Ref Range Status   03/24/2022 >60.0 >60 mL/min/1.73 m^2 Final     eGFR if non    Date Value Ref Range Status   03/24/2022 >60.0 >60 mL/min/1.73 m^2 Final     Comment:     Calculation used to obtain the estimated glomerular filtration  rate (eGFR) is the CKD-EPI equation.        Lab Results   Component Value Date    WBC 5.16 03/11/2022    HGB 10.4 (L) 03/11/2022    HCT 30.8 (L) 03/11/2022    MCV 99 (H) 03/11/2022     03/11/2022       Lab Results   Component Value Date    INR 1.0 03/24/2022    INR 0.9 10/16/2021       Oncology History   Hilar cholangiocarcinoma           Diagnoses:  Problem List Items Addressed This Visit    None          Assessment/Plan:     Mr Larson and his wife are completing the transplant eval process. I expect him to be reviewed and approved for transplant in coming weeks. He qualifies for the transplant exception score of median MELD at transplant -3. I discuss the pathway to transplant and the reality that time to transplant is highly unpredictable. Based on recent changes in allocation, the wait times have increased for our center. The most predictable path to transplant is living donation. Our discussion focused on the process of living donation and strategies to identify a potential donor.      Julio Cesar Jara MD  Liver Transplant / Hepatobiliary Surgery  [facility]

## 2022-03-24 NOTE — PROGRESS NOTES
PHARM.D. PRE-TRANSPLANT NOTE:    This patient's medication therapy was evaluated as part of his pre-transplant evaluation.      The following general pharmacologic concerns were noted: would utilize alternate therapy for hyperglycemia in the post-op period    The following concerns for post-operative pain management were noted: none     The following pharmacologic concerns related to HCV therapy were noted: none      This patient's medication profile was reviewed for considerations for DAA Hepatitis C therapy:    [x]  No current inducers of CYP 3A4 or PGP  [x]  No amiodarone on this patient's EMR profile in the last 24 months  [x]  No past or current atrial fibrillation on this patient's EMR profile       Current Outpatient Medications   Medication Sig Dispense Refill    baclofen (LIORESAL) 10 MG tablet Take 1 tablet (10 mg total) by mouth 3 (three) times daily as needed (hiccups). 90 tablet 0    metFORMIN (GLUCOPHAGE) 500 MG tablet Take 1 tablet (500 mg total) by mouth 2 (two) times daily with meals. 60 tablet 3    ondansetron (ZOFRAN-ODT) 8 MG TbDL Take 1 tablet (8 mg total) by mouth every 6 (six) hours as needed (nausea). 60 tablet 2    losartan (COZAAR) 50 MG tablet Take 1 tablet (50 mg total) by mouth once daily. (Patient not taking: Reported on 3/24/2022) 90 tablet 3    promethazine (PHENERGAN) 12.5 MG Tab Take 1 tablet (12.5 mg total) by mouth every 6 (six) hours as needed (nausea). (Patient not taking: Reported on 3/24/2022) 60 tablet 2     No current facility-administered medications for this visit.           I am available for consultation and can be contacted, as needed by the other members of the Transplant team.

## 2022-03-24 NOTE — PROGRESS NOTES
Patient seen in clinic with caregiver.  Consents reviewed and signatures obtained.   Patient given supplies needed to obtain urine specimen.    Patient's name and date of birth verified.   Instructions reviewed with the patient on the way the specimen should be obtained.   Patient stated that  they understood the information provided for the collection and  placement of the specimen. Specimen collected in clinic.  Patient given supplies and printed instructions for the collection of the 24 hour urine specimen.  Patient reports understanding the instructions provided to obtain the specimen and the storage of the specimen while at home.  Patient given instructed to bring the container to 2nd floor lab when the collection is completed.  Patient contact information verified.  Patient appointments reviewed along with location and special instructions.  Patient questions answered and concerns addressed.

## 2022-03-24 NOTE — PROGRESS NOTES
"TRANSPLANT NUTRITIONAL ASSESSMENT    Referring Provider: Zoran Nobles MD    Reason for Visit: Pre-liver transplant work-up    Age: 41 y.o.  Sex: male    Patient Active Problem List   Diagnosis    Hypercholesterolemia    Diastolic hypertension    Hyperbilirubinemia    Obstructive jaundice    Biliary obstruction    Painless jaundice    Cholangitis    Hilar cholangiocarcinoma    Immunodeficiency secondary to neoplasm    Immunodeficiency secondary to chemotherapy    Chemotherapy induced neutropenia     Past Medical History:   Diagnosis Date    Cholangiocarcinoma     Hypercholesteremia     Hypertension      Lab Results   Component Value Date    GLU 97 03/24/2022    K 4.4 03/24/2022    PHOS 2.3 (L) 11/05/2021    MG 1.8 03/11/2022    CHOL 163 10/12/2021    HDL 22 (L) 10/12/2021    TRIG 198 (H) 10/12/2021    ALBUMIN 3.9 03/24/2022    AMMONIA 21 10/15/2021    CALCIUM 10.0 03/24/2022     Other Pertinent Labs: none    Current Outpatient Medications   Medication Sig    baclofen (LIORESAL) 10 MG tablet Take 1 tablet (10 mg total) by mouth 3 (three) times daily as needed (hiccups).    metFORMIN (GLUCOPHAGE) 500 MG tablet Take 1 tablet (500 mg total) by mouth 2 (two) times daily with meals.    ondansetron (ZOFRAN-ODT) 8 MG TbDL Take 1 tablet (8 mg total) by mouth every 6 (six) hours as needed (nausea).    losartan (COZAAR) 50 MG tablet Take 1 tablet (50 mg total) by mouth once daily. (Patient not taking: Reported on 3/24/2022)    promethazine (PHENERGAN) 12.5 MG Tab Take 1 tablet (12.5 mg total) by mouth every 6 (six) hours as needed (nausea). (Patient not taking: Reported on 3/24/2022)     No current facility-administered medications for this visit.     Allergies: Patient has no known allergies.    Ht Readings from Last 1 Encounters:   03/24/22 5' 8.27" (1.734 m)     Wt Readings from Last 1 Encounters:   03/24/22 85.7 kg (188 lb 15 oz)      BMI: Body mass index is 28.5 kg/m².    Usual Weight: 185-190 " lb  Weight Change/Time: generally stable  Current Diet: regular  Appetite/Current Intake: good   Exercise/Physical Activity: walking dogs about 20 min /day, fatigue is main limitation on being more active  Nutritional/Herbal Supplements: occasional smoothie w/ protein  Potential Food/Medication Interactions: reviewed  Chewing/Swallowing Problems: none  Symptoms: none  Assessment of Lab Values: reviewed  Support System: spouse present and voices support    Estimated Kcal Need: 0965-9859 kcal (25-30 kcal/kg)  Estimated Protein Need:  gm (1-1.5 gm/kg)    Nutritional History:   Pt reports having a good appetite, no n/v/d/abd pain. He has not taken the zofran or phenergan listed in his med list. He was feeling poorly around November 2021 but has improved. Pt reported the following diet recall:  B: grits, toast, later on a muffin or banana on way to work, water/orange juice/unsweet tea or lemonade  L: out to eat/take out- [employer] food, [employer] sandwich or left overs  SNack: granola bar, apples/strawberries/grapes/banana  D: pork loin / grilled chicken/ lean ground meat / fish/shrimp @ parents house, vegetables; cauliflower/canned corn/green beans, rice/potatoes/pasta    Nutritional Diagnoses  Problem: food- and nutrition-related knowledge deficit  Etiology: r/t no prior edu on pre liver transplant nutrition recommendations  Symptoms: aeb diet recall, questions    Educational Need? yes  Barriers: none identified  Discussed with: patient and spouse  Interventions: Patient taught nutrition information regarding Pre-liver transplant work-up.    Pre liver transplant nutrition packet provided and discussed. Pt advised on the recommendations to follow a low sodium diet while taking in adequate protein.  This packet includes label low sodium reading tips, seasoning suggestions, protein intake goal amount (gm) for the individual patient, as well as oral supplement suggestions.   Exercise and physical activity are  encouraged.    Goals/Recommendations: small frequent meals and snacks, consumption of sharda nutritional supplements and choose healthy options when dining out  Actions Taken: instruct/provide written information  Strategies Used: problem solving, goal setting, motivational interviewing  Patient and/or family comprehend instructions: yes , adherence expected  Outcome: Verbalizes understanding  Monitoring: Contact information provided, will f/u in clinic and communicate with the care team as needed.     Counseling Time: 15 minutes

## 2022-03-25 ENCOUNTER — HOSPITAL ENCOUNTER (OUTPATIENT)
Dept: RADIOLOGY | Facility: HOSPITAL | Age: 42
Discharge: HOME | End: 2022-03-25
Attending: INTERNAL MEDICINE
Payer: COMMERCIAL

## 2022-03-25 DIAGNOSIS — Z76.82 ORGAN TRANSPLANT CANDIDATE: ICD-10-CM

## 2022-03-25 LAB
CMV IGG SERPL QL IA: REACTIVE
GAMMA INTERFERON BACKGROUND BLD IA-ACNC: 0.06 IU/ML
M TB IFN-G CD4+ BCKGRND COR BLD-ACNC: -0.03 IU/ML
MITOGEN IGNF BCKGRD COR BLD-ACNC: 9.87 IU/ML
RPR SER QL: NORMAL
STRONGYLOIDES ANTIBODY IGG: NEGATIVE
TB GOLD PLUS: NEGATIVE
TB2 - NIL: -0.03 IU/ML

## 2022-03-25 PROCEDURE — 71046 X-RAY EXAM CHEST 2 VIEWS: CPT | Mod: TC,FY,TXP

## 2022-03-25 PROCEDURE — 93975 VASCULAR STUDY: CPT | Mod: 59,TC,TXP

## 2022-03-25 PROCEDURE — 76700 US EXAM ABDOM COMPLETE: CPT | Mod: TC,TXP

## 2022-03-25 PROCEDURE — 71046 XR CHEST PA AND LATERAL: ICD-10-PCS | Mod: 26,TXP,, | Performed by: RADIOLOGY

## 2022-03-25 PROCEDURE — 76700 US ABDOMEN COMP WITH DOPPLER_PRE LIVER TRANSPLANT: ICD-10-PCS | Mod: 26,XS,TXP, | Performed by: RADIOLOGY

## 2022-03-25 PROCEDURE — 93975 US ABDOMEN COMP WITH DOPPLER_PRE LIVER TRANSPLANT: ICD-10-PCS | Mod: 26,TXP,, | Performed by: RADIOLOGY

## 2022-03-25 PROCEDURE — 76700 US EXAM ABDOM COMPLETE: CPT | Mod: 26,XS,TXP, | Performed by: RADIOLOGY

## 2022-03-25 PROCEDURE — 93975 VASCULAR STUDY: CPT | Mod: 26,TXP,, | Performed by: RADIOLOGY

## 2022-03-25 PROCEDURE — 71046 X-RAY EXAM CHEST 2 VIEWS: CPT | Mod: 26,TXP,, | Performed by: RADIOLOGY

## 2022-03-26 LAB
VARICELLA INTERPRETATION: POSITIVE
VARICELLA ZOSTER IGG: 2.48 ISR (ref 0–0.9)

## 2022-03-27 LAB
PETH 16:0/18.1 (POPETH): <10 NG/ML
PETH 16:0/18.2 (PLPETH): <10 NG/ML

## 2022-03-28 ENCOUNTER — TELEPHONE (OUTPATIENT)
Dept: TRANSPLANT | Facility: CLINIC | Age: 42
End: 2022-03-28
Payer: COMMERCIAL

## 2022-03-28 ENCOUNTER — PATIENT OUTREACH (OUTPATIENT)
Dept: ADMINISTRATIVE | Facility: OTHER | Age: 42
End: 2022-03-28
Payer: COMMERCIAL

## 2022-03-28 LAB — ZINC SERPL-MCNC: 72 UG/DL (ref 60–130)

## 2022-03-28 NOTE — PROGRESS NOTES
Health Maintenance Due   Topic Date Due    Pneumococcal Vaccines (Age 0-64) (1 of 4 - PCV13) Never done    COVID-19 Vaccine (2 - Moderna 3-dose series) 08/14/2021    Influenza Vaccine (1) Never done     Updates were requested from care everywhere.  Chart was reviewed for overdue Proactive [facility] Encounters (RUMA) topics (CRS, Breast Cancer Screening, Eye exam)  Health Maintenance has been updated.  LINKS immunization registry triggered.  Immunizations were reconciled.

## 2022-03-28 NOTE — TELEPHONE ENCOUNTER
Call returned with no answer. Unable to leave a voice message because the voice mailbox has not been set up.    ----- Message from Yoan Burnette sent at 3/28/2022 10:41 AM CDT -----  Regarding: Coordinator  Pt would like to speak to coordinator , pt did not state reason for call    Call # [phone]

## 2022-03-29 ENCOUNTER — LAB VISIT (OUTPATIENT)
Dept: LAB | Facility: HOSPITAL | Age: 42
End: 2022-03-29
Attending: INTERNAL MEDICINE
Payer: COMMERCIAL

## 2022-03-29 ENCOUNTER — OFFICE VISIT (OUTPATIENT)
Dept: INFECTIOUS DISEASES | Facility: CLINIC | Age: 42
End: 2022-03-29
Attending: INTERNAL MEDICINE
Payer: COMMERCIAL

## 2022-03-29 ENCOUNTER — OFFICE VISIT (OUTPATIENT)
Dept: HEMATOLOGY/ONCOLOGY | Facility: CLINIC | Age: 42
End: 2022-03-29
Payer: COMMERCIAL

## 2022-03-29 ENCOUNTER — PATIENT MESSAGE (OUTPATIENT)
Dept: TRANSPLANT | Facility: CLINIC | Age: 42
End: 2022-03-29
Payer: COMMERCIAL

## 2022-03-29 ENCOUNTER — PATIENT MESSAGE (OUTPATIENT)
Dept: ENDOSCOPY | Facility: HOSPITAL | Age: 42
End: 2022-03-29
Payer: COMMERCIAL

## 2022-03-29 VITALS
TEMPERATURE: 99 F | HEART RATE: 98 BPM | WEIGHT: 194.88 LBS | HEIGHT: 68 IN | SYSTOLIC BLOOD PRESSURE: 115 MMHG | BODY MASS INDEX: 29.54 KG/M2 | DIASTOLIC BLOOD PRESSURE: 77 MMHG

## 2022-03-29 VITALS
HEART RATE: 103 BPM | DIASTOLIC BLOOD PRESSURE: 81 MMHG | HEIGHT: 68 IN | TEMPERATURE: 98 F | OXYGEN SATURATION: 98 % | WEIGHT: 194.31 LBS | BODY MASS INDEX: 29.45 KG/M2 | SYSTOLIC BLOOD PRESSURE: 126 MMHG

## 2022-03-29 DIAGNOSIS — D84.81 IMMUNODEFICIENCY SECONDARY TO NEOPLASM: ICD-10-CM

## 2022-03-29 DIAGNOSIS — C24.0 HILAR CHOLANGIOCARCINOMA: Primary | ICD-10-CM

## 2022-03-29 DIAGNOSIS — I10 ESSENTIAL HYPERTENSION: ICD-10-CM

## 2022-03-29 DIAGNOSIS — D84.821 IMMUNODEFICIENCY SECONDARY TO CHEMOTHERAPY: ICD-10-CM

## 2022-03-29 DIAGNOSIS — D49.9 IMMUNODEFICIENCY SECONDARY TO NEOPLASM: ICD-10-CM

## 2022-03-29 DIAGNOSIS — E13.9 SECONDARY DIABETES: ICD-10-CM

## 2022-03-29 DIAGNOSIS — D70.1 CHEMOTHERAPY INDUCED NEUTROPENIA: ICD-10-CM

## 2022-03-29 DIAGNOSIS — Z76.82 ORGAN TRANSPLANT CANDIDATE: ICD-10-CM

## 2022-03-29 DIAGNOSIS — C24.0 HILAR CHOLANGIOCARCINOMA: ICD-10-CM

## 2022-03-29 DIAGNOSIS — T45.1X5A CHEMOTHERAPY INDUCED NEUTROPENIA: ICD-10-CM

## 2022-03-29 DIAGNOSIS — T45.1X5A IMMUNODEFICIENCY SECONDARY TO CHEMOTHERAPY: ICD-10-CM

## 2022-03-29 DIAGNOSIS — Z79.899 IMMUNODEFICIENCY SECONDARY TO CHEMOTHERAPY: ICD-10-CM

## 2022-03-29 LAB
ALBUMIN SERPL BCP-MCNC: 3.2 G/DL (ref 3.5–5.2)
ALP SERPL-CCNC: 270 U/L (ref 55–135)
ALT SERPL W/O P-5'-P-CCNC: 65 U/L (ref 10–44)
ANION GAP SERPL CALC-SCNC: 9 MMOL/L (ref 8–16)
AST SERPL-CCNC: 31 U/L (ref 10–40)
BILIRUB SERPL-MCNC: 0.8 MG/DL (ref 0.1–1)
BUN SERPL-MCNC: 9 MG/DL (ref 6–20)
CALCIUM SERPL-MCNC: 9.4 MG/DL (ref 8.7–10.5)
CANCER AG19-9 SERPL-ACNC: 110.7 U/ML (ref 0–40)
CHLORIDE SERPL-SCNC: 103 MMOL/L (ref 95–110)
CO2 SERPL-SCNC: 26 MMOL/L (ref 23–29)
CREAT SERPL-MCNC: 0.9 MG/DL (ref 0.5–1.4)
ERYTHROCYTE [DISTWIDTH] IN BLOOD BY AUTOMATED COUNT: 16.9 % (ref 11.5–14.5)
EST. GFR  (AFRICAN AMERICAN): >60 ML/MIN/1.73 M^2
EST. GFR  (NON AFRICAN AMERICAN): >60 ML/MIN/1.73 M^2
ESTIMATED AVG GLUCOSE: 148 MG/DL (ref 68–131)
GLUCOSE SERPL-MCNC: 182 MG/DL (ref 70–110)
HBA1C MFR BLD: 6.8 % (ref 4–5.6)
HCT VFR BLD AUTO: 26.5 % (ref 40–54)
HGB BLD-MCNC: 8.5 G/DL (ref 14–18)
IMM GRANULOCYTES # BLD AUTO: 0.17 K/UL (ref 0–0.04)
MAGNESIUM SERPL-MCNC: 2.1 MG/DL (ref 1.6–2.6)
MCH RBC QN AUTO: 32.9 PG (ref 27–31)
MCHC RBC AUTO-ENTMCNC: 32.1 G/DL (ref 32–36)
MCV RBC AUTO: 103 FL (ref 82–98)
NEUTROPHILS # BLD AUTO: 4.7 K/UL (ref 1.8–7.7)
PLATELET # BLD AUTO: 122 K/UL (ref 150–450)
PMV BLD AUTO: 9.7 FL (ref 9.2–12.9)
POTASSIUM SERPL-SCNC: 4.2 MMOL/L (ref 3.5–5.1)
PROT SERPL-MCNC: 6.3 G/DL (ref 6–8.4)
RBC # BLD AUTO: 2.58 M/UL (ref 4.6–6.2)
SODIUM SERPL-SCNC: 138 MMOL/L (ref 136–145)
VIT A SERPL-MCNC: 18 UG/DL (ref 38–106)
WBC # BLD AUTO: 6.1 K/UL (ref 3.9–12.7)

## 2022-03-29 PROCEDURE — 3074F SYST BP LT 130 MM HG: CPT | Mod: CPTII,S$GLB,TXP, | Performed by: INTERNAL MEDICINE

## 2022-03-29 PROCEDURE — 86301 IMMUNOASSAY TUMOR CA 19-9: CPT | Mod: TXP | Performed by: INTERNAL MEDICINE

## 2022-03-29 PROCEDURE — 80053 COMPREHEN METABOLIC PANEL: CPT | Mod: TXP | Performed by: PHYSICIAN ASSISTANT

## 2022-03-29 PROCEDURE — 99999 PR PBB SHADOW E&M-EST. PATIENT-LVL III: ICD-10-PCS | Mod: PBBFAC,,, | Performed by: INTERNAL MEDICINE

## 2022-03-29 PROCEDURE — 99999 PR PBB SHADOW E&M-EST. PATIENT-LVL III: CPT | Mod: PBBFAC,,, | Performed by: INTERNAL MEDICINE

## 2022-03-29 PROCEDURE — 3044F PR MOST RECENT HEMOGLOBIN A1C LEVEL <7.0%: ICD-10-PCS | Mod: CPTII,S$GLB,TXP, | Performed by: INTERNAL MEDICINE

## 2022-03-29 PROCEDURE — 3044F HG A1C LEVEL LT 7.0%: CPT | Mod: CPTII,S$GLB,TXP, | Performed by: INTERNAL MEDICINE

## 2022-03-29 PROCEDURE — 3044F HG A1C LEVEL LT 7.0%: CPT | Mod: CPTII,S$GLB,, | Performed by: INTERNAL MEDICINE

## 2022-03-29 PROCEDURE — 83735 ASSAY OF MAGNESIUM: CPT | Mod: TXP | Performed by: PHYSICIAN ASSISTANT

## 2022-03-29 PROCEDURE — 36415 COLL VENOUS BLD VENIPUNCTURE: CPT | Mod: TXP | Performed by: INTERNAL MEDICINE

## 2022-03-29 PROCEDURE — 3074F PR MOST RECENT SYSTOLIC BLOOD PRESSURE < 130 MM HG: ICD-10-PCS | Mod: CPTII,S$GLB,, | Performed by: INTERNAL MEDICINE

## 2022-03-29 PROCEDURE — 4010F ACE/ARB THERAPY RXD/TAKEN: CPT | Mod: CPTII,S$GLB,TXP, | Performed by: INTERNAL MEDICINE

## 2022-03-29 PROCEDURE — 3078F DIAST BP <80 MM HG: CPT | Mod: CPTII,S$GLB,TXP, | Performed by: INTERNAL MEDICINE

## 2022-03-29 PROCEDURE — 99215 OFFICE O/P EST HI 40 MIN: CPT | Mod: S$GLB,,, | Performed by: INTERNAL MEDICINE

## 2022-03-29 PROCEDURE — 4010F PR ACE/ARB THEARPY RXD/TAKEN: ICD-10-PCS | Mod: CPTII,S$GLB,, | Performed by: INTERNAL MEDICINE

## 2022-03-29 PROCEDURE — 3074F SYST BP LT 130 MM HG: CPT | Mod: CPTII,S$GLB,, | Performed by: INTERNAL MEDICINE

## 2022-03-29 PROCEDURE — 99999 PR PBB SHADOW E&M-EST. PATIENT-LVL V: CPT | Mod: PBBFAC,TXP,, | Performed by: INTERNAL MEDICINE

## 2022-03-29 PROCEDURE — 1160F PR REVIEW ALL MEDS BY PRESCRIBER/CLIN PHARMACIST DOCUMENTED: ICD-10-PCS | Mod: CPTII,S$GLB,, | Performed by: INTERNAL MEDICINE

## 2022-03-29 PROCEDURE — 99214 PR OFFICE/OUTPT VISIT, EST, LEVL IV, 30-39 MIN: ICD-10-PCS | Mod: S$GLB,TXP,, | Performed by: INTERNAL MEDICINE

## 2022-03-29 PROCEDURE — 3079F PR MOST RECENT DIASTOLIC BLOOD PRESSURE 80-89 MM HG: ICD-10-PCS | Mod: CPTII,S$GLB,, | Performed by: INTERNAL MEDICINE

## 2022-03-29 PROCEDURE — 4010F ACE/ARB THERAPY RXD/TAKEN: CPT | Mod: CPTII,S$GLB,, | Performed by: INTERNAL MEDICINE

## 2022-03-29 PROCEDURE — 3008F PR BODY MASS INDEX (BMI) DOCUMENTED: ICD-10-PCS | Mod: CPTII,S$GLB,TXP, | Performed by: INTERNAL MEDICINE

## 2022-03-29 PROCEDURE — 3008F PR BODY MASS INDEX (BMI) DOCUMENTED: ICD-10-PCS | Mod: CPTII,S$GLB,, | Performed by: INTERNAL MEDICINE

## 2022-03-29 PROCEDURE — 99214 OFFICE O/P EST MOD 30 MIN: CPT | Mod: S$GLB,TXP,, | Performed by: INTERNAL MEDICINE

## 2022-03-29 PROCEDURE — 1160F RVW MEDS BY RX/DR IN RCRD: CPT | Mod: CPTII,S$GLB,, | Performed by: INTERNAL MEDICINE

## 2022-03-29 PROCEDURE — 3078F PR MOST RECENT DIASTOLIC BLOOD PRESSURE < 80 MM HG: ICD-10-PCS | Mod: CPTII,S$GLB,TXP, | Performed by: INTERNAL MEDICINE

## 2022-03-29 PROCEDURE — 3008F BODY MASS INDEX DOCD: CPT | Mod: CPTII,S$GLB,TXP, | Performed by: INTERNAL MEDICINE

## 2022-03-29 PROCEDURE — 3044F PR MOST RECENT HEMOGLOBIN A1C LEVEL <7.0%: ICD-10-PCS | Mod: CPTII,S$GLB,, | Performed by: INTERNAL MEDICINE

## 2022-03-29 PROCEDURE — 3079F DIAST BP 80-89 MM HG: CPT | Mod: CPTII,S$GLB,, | Performed by: INTERNAL MEDICINE

## 2022-03-29 PROCEDURE — 3074F PR MOST RECENT SYSTOLIC BLOOD PRESSURE < 130 MM HG: ICD-10-PCS | Mod: CPTII,S$GLB,TXP, | Performed by: INTERNAL MEDICINE

## 2022-03-29 PROCEDURE — 83036 HEMOGLOBIN GLYCOSYLATED A1C: CPT | Mod: NTX | Performed by: INTERNAL MEDICINE

## 2022-03-29 PROCEDURE — 99999 PR PBB SHADOW E&M-EST. PATIENT-LVL V: ICD-10-PCS | Mod: PBBFAC,TXP,, | Performed by: INTERNAL MEDICINE

## 2022-03-29 PROCEDURE — 1159F PR MEDICATION LIST DOCUMENTED IN MEDICAL RECORD: ICD-10-PCS | Mod: CPTII,S$GLB,, | Performed by: INTERNAL MEDICINE

## 2022-03-29 PROCEDURE — 4010F PR ACE/ARB THEARPY RXD/TAKEN: ICD-10-PCS | Mod: CPTII,S$GLB,TXP, | Performed by: INTERNAL MEDICINE

## 2022-03-29 PROCEDURE — 3008F BODY MASS INDEX DOCD: CPT | Mod: CPTII,S$GLB,, | Performed by: INTERNAL MEDICINE

## 2022-03-29 PROCEDURE — 85027 COMPLETE CBC AUTOMATED: CPT | Mod: NTX | Performed by: PHYSICIAN ASSISTANT

## 2022-03-29 PROCEDURE — 99215 PR OFFICE/OUTPT VISIT, EST, LEVL V, 40-54 MIN: ICD-10-PCS | Mod: S$GLB,,, | Performed by: INTERNAL MEDICINE

## 2022-03-29 PROCEDURE — 1159F MED LIST DOCD IN RCRD: CPT | Mod: CPTII,S$GLB,, | Performed by: INTERNAL MEDICINE

## 2022-03-29 RX ORDER — SODIUM CHLORIDE 0.9 % (FLUSH) 0.9 %
10 SYRINGE (ML) INJECTION
Status: CANCELLED | OUTPATIENT
Start: 2022-03-30

## 2022-03-29 RX ORDER — HEPARIN 100 UNIT/ML
500 SYRINGE INTRAVENOUS
Status: CANCELLED | OUTPATIENT
Start: 2022-03-30

## 2022-03-29 RX ORDER — PALONOSETRON 0.05 MG/ML
0.25 INJECTION, SOLUTION INTRAVENOUS ONCE
Status: CANCELLED
Start: 2022-03-30 | End: 2022-03-30

## 2022-03-29 RX ORDER — DEXAMETHASONE SODIUM PHOSPHATE 4 MG/ML
8 INJECTION, SOLUTION INTRA-ARTICULAR; INTRALESIONAL; INTRAMUSCULAR; INTRAVENOUS; SOFT TISSUE
Status: CANCELLED
Start: 2022-03-30

## 2022-03-29 NOTE — PROGRESS NOTES
"                                                         PROGRESS NOTE    Subjective:       Patient ID: Romeo Larson is a 41 y.o. male.    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 Mr Larson is a 40 yo man who presents today for further management of suspected hilar cholangiocarcinoma. He presented with dark urine, abdominal pain and jaundice since August 2021. He presented to outside hospital ER with elevated bilirubin. MRCP was performed demonstrating bi-lobar intrahepatic bile duct dilation and a ~2cm ill defined mass at the hilum concerning for hilar cholangiocarcinoma. [place] [facility] [place] confirmed severe, malignant appearing stricture involving right and left main hepatic ducts. Biliary stents were placed to relieve obstruction. EUS was performed. It showed an irregular hypoechoic mass where the hepatic duct bifurcates into the right and left hepatic ducts. The mass measured 11.4 mm by 11.3 mm in maximal cross-sectional diameter. FNA sampling of hilar lymph nodes was negative for metastatic disease. Bile duct biopsy showed "rare groups of glandular epithelium with mild atypia, strips of benign glandular epithelium intermixed with blood clot, and focal fibrous tissue with chronic inflammation."  CT C/A/P 10/27/21:  1. Intrahepatic biliary dilatation despite the presence of 2 biliary drains.  The patient is recent comparison MRI a revealed a possible central hepatic mass, concerning for either cholangiocarcinoma or hepatocellular carcinoma.  This is, however, not definitively demonstrated on today's exam.  There are enlarged lymph nodes present in the vicinity of the ana cristina hepatis and celiac axis as detailed above.  2. Scattered pulmonary nodules measuring up to 6 mm.  3. Other findings as detailed above.  He presents today for further management. Seen by Dr Jara and considered a " "candidate for liver transplant. Seen by Dr Cunningham last Friday. Scheduled for PET this Wednesday. 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   Discussed neoadjuvant chemoradiation with 5FU followed by neoadjuvant cis/gem prior to liver transplant.   2. Hospitalized 11/3/21-21 for fever 2/2 acute cholangitis. Repeat ERCP biopsy on 21 again non-diagnostic. Biopsy of the celiac lymph node was negative.   3. PET scan 11/3/21: "1. Wedge-shaped geographic hypermetabolic activity within the right lobe of the liver in a similar distribution to the intrahepatic biliary dilatation.  This could relate to inflammation from multiple etiologies including biliary stasis, cholangitis, and obstructive dilatation.  Neoplastic involvement would be difficult to exclude.  The liver is poorly evaluated given background activity. 2. Hypermetabolic lymphadenopathy is noted in a periportal and celiac distribution.  Infectious inflammatory and neoplastic etiologies are on the differential.  Index nodes have been measured. 3. Multiple pulmonary nodules are noted too small to categorize by PET-CT fusion as evidence seen on a prior CT of 10/27/2021.  CT for follow-up of size stability is necessary."  4. Completed chemoradiation with 5FU from . Started cis/gem on 22. Due for cycle 4 day 1 today    Interval History:   Mr Larson returns for cycle 4 day 1 of cis/gem. Tolerating chemo well. Case reviewed at tumor board. Seen by Dr Jara. Option of living donor discussed. His brother is willing to donate.     ECO    ROS:   A ten-point system review is obtained and negative except for what was stated in the Interval History.     Physical Examination:   Vital signs reviewed.   General: well hydrated, well developed, in no acute distress  HEENT: normocephalic, PERRLA, EOMI, anicteric sclerae, oropharynx clear  Neck: supple, no JVD, thyromegaly, cervical or supraclavicular lymphadenopathy  Lungs: clear breath sounds " "bilaterally, no wheezing, rales, or rhonchi  Chest: port site clean  Heart: RRR, no M/R/G  Abdomen: soft, no tenderness, non-distended, no hepatosplenomegaly, mass, or hernia. BS present  Extremities: no clubbing, cyanosis, or edema  Skin: no rash, ulcer, or open wounds  Neuro: alert and oriented x 4, no focal neuro deficit  Psych: pleasant and appropriate mood and affect    Objective:     Laboratory Data:  Labs reviewed. CA 19-9 continues to decrease, 110.7.     Imaging Data:  PET scan 11/3/21: "1. Wedge-shaped geographic hypermetabolic activity within the right lobe of the liver in a similar distribution to the intrahepatic biliary dilatation.  This could relate to inflammation from multiple etiologies including biliary stasis, cholangitis, and obstructive dilatation.  Neoplastic involvement would be difficult to exclude.  The liver is poorly evaluated given background activity. 2. Hypermetabolic lymphadenopathy is noted in a periportal and celiac distribution.  Infectious inflammatory and neoplastic etiologies are on the differential.  Index nodes have been measured. 3. Multiple pulmonary nodules are noted too small to categorize by PET-CT fusion as evidence seen on a prior CT of 10/27/2021.  CT for follow-up of size stability is necessary."    CT C/A/P 1/18/22:  1. Patient with a history of suspected hilar cholangiocarcinoma with no evidence of distant metastatic disease in the chest, abdomen, or pelvis.  There has been an interval reduction in the extent of the patient's intrahepatic biliary dilatation as compared to the previous study.  Two index lymph nodes in the vicinity of the ana cristina hepatis/gastroesophageal junction or smaller in size on today's examination.  2. Stable solid pulmonary nodules measuring up to 5 mm in size.  No new pulmonary nodules on today's study.    CT C/A/P 3/21/22:  No discrete hepatic mass is seen.  Biliary stents remain in place and there is persistent intrahepatic biliary duct " dilatation not appearing significantly changed compared to the prior exam.  Air in the gallbladder with gallbladder wall slightly prominent and questionable trace pericholecystic fluid or fat stranding not significantly changed compared to the prior study.     Two index nodes previously measured near the EG junction/ana cristina hepatis do not appear significantly changed.     Small nodules in the lungs largest measuring 4 mm not significantly changed compared to the prior exam    Assessment and Plan:     1. Hilar cholangiocarcinoma    2. 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 Secondary diabetes    5. Essential hypertension    6. Chemotherapy induced neutropenia      1-3  - Mr Larson is a 42 yo man with likely stage I hilar cholangiocarcinoma. Biopsy non-diagnostic but clinical picture most consistent with hilar cholangiocarcinoma. He is a candidate for liver transplant. Completed chemoradiation with 5FU from 11/29/21-1/5/2022. Started cis/gem on 1/19/22.   - doing well. Counts adequate  - cycle 4 day 1 cis/gem tomorrow  - return in 1 week for C4D8, udenyca on day 9  - will continue with chemo until he gets liver transplant    4.  - 2/2 IV dex with chemo  - decreased dex from 12 mg to 8 mg  - c/w metformin 500 mg bid    5.  - BP controlled  - c/w current medication    6.  - udenyca on day 9 of each cycle      Follow-up:     RTC in 1 week  Knows to call in the interval if any problems arise.    Electronically signed by Young Iverson for Scheduling    Med Onc Chart Routing      Follow up with physician 3 weeks. Chemo in [place]. see me in 3 weeks with CBC, CMP, CA 19-9, magnesium, see me, chemo (cis/gem) the next day. See JOSEPH in 4 weeks with CBC, CMP, , magnesium, see JOSEPH, chemo (cis/gem) the next day, udenyca on day 9   Follow up with JOSEPH 4 weeks.   Labs CA 19-9, CBC, CMP and magnesium   Lab interval:     Imaging    Pharmacy appointment    Other  referrals          Treatment Plan Information   OP GEMCITABINE CISPLATIN Q3W   Young Wesley MD   Upcoming Treatment Dates - OP GEMCITABINE CISPLATIN Q3W    3/30/2022       Chemotherapy       gemcitabine (GEMZAR) 2,020 mg in sodium chloride 0.9% 250 mL chemo infusion       CISplatin (PLATINOL) 25 mg/m2 = 51 mg in sodium chloride 0.9% 500 mL chemo infusion       Pre-Hydration       sodium chloride 0.9% 1,000 mL with magnesium sulfate 1 g, potassium chloride 20 mEq infusion       Post-Hydration       sodium chloride 0.9% bolus 500 mL  4/6/2022       Chemotherapy       gemcitabine (GEMZAR) 2,020 mg in sodium chloride 0.9% 250 mL chemo infusion       CISplatin (PLATINOL) 25 mg/m2 = 51 mg in sodium chloride 0.9% 500 mL chemo infusion       Pre-Hydration       sodium chloride 0.9% 1,000 mL with magnesium sulfate 1 g, potassium chloride 20 mEq infusion       Post-Hydration       sodium chloride 0.9% bolus 500 mL  4/20/2022       Chemotherapy       gemcitabine (GEMZAR) 2,020 mg in sodium chloride 0.9% 250 mL chemo infusion       CISplatin (PLATINOL) 25 mg/m2 = 51 mg in sodium chloride 0.9% 500 mL chemo infusion       Pre-Hydration       sodium chloride 0.9% 1,000 mL with magnesium sulfate 1 g, potassium chloride 20 mEq infusion       Post-Hydration       sodium chloride 0.9% bolus 500 mL  4/27/2022       Chemotherapy       gemcitabine (GEMZAR) 2,020 mg in sodium chloride 0.9% 250 mL chemo infusion       CISplatin (PLATINOL) 25 mg/m2 = 51 mg in sodium chloride 0.9% 500 mL chemo infusion       Pre-Hydration       sodium chloride 0.9% 1,000 mL with magnesium sulfate 1 g, potassium chloride 20 mEq infusion       Post-Hydration       sodium chloride 0.9% bolus 500 mL    Supportive Plan Information  OP FILGRASTIM 480 MCG   Young Wesley MD   Upcoming Treatment Dates - OP FILGRASTIM 480 MCG    2/9/2022       Medications       filgrastim-sndz (ZARXIO) injection 480 mcg/0.8 mL (Preferred Regimen)  2/10/2022       Medications        filgrastim-sndz (ZARXIO) injection 480 mcg/0.8 mL (Preferred Regimen)  2/11/2022       Medications       filgrastim-sndz (ZARXIO) injection 480 mcg/0.8 mL (Preferred Regimen)  2/12/2022       Medications       filgrastim-sndz (ZARXIO) injection 480 mcg/0.8 mL (Preferred Regimen)

## 2022-03-29 NOTE — PROGRESS NOTES
1Pre Transplant Infectious Diseases Consult  Liver Transplant Recipient Evaluation  Requesting Physician: Zoran Nobles MD    Reason for Visit:    Chief Complaint   Patient presents with    Liver Transplant Evaluation     History of Present Illness  Romeo Larson is a 41 y.o. year old White male with advanced Liver disease currently being evaluated for Liver transplant.  Patient denies any recent fever, chills, or infective illnesses. 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       1) Do you have a history of:   YES NO   Diabetes      [] [x]     Diabetic Foot Infection/Bone Infection  []        [x]     Surgical Removal of Spleen   []  [x]                  2) Have you had recurrent infections involving:             YES NO  Sinus infections  []         [x]   Sore Throat   []         [x]                 Prostate Infections  []         [x]              Bladder Infections  []         [x]                     Kidney Infections  []         [x]                               Intestinal Infections  []         [x]      Skin Infections   []         [x]       Reproductive Infections          []  [x]   Periodontal Disease  []         [x]        3)Have you ever had: YES     NO UNKNOWN      Chicken Pox   [x]         []  []  5-5 y/o  Shingles   []         [x]  []   Orolabial Herpes             []  [x]  []   Genital Herpes  []         [x]  []   Cytomegalovirus  []         [x]  []   Jacki-Barr Virus  []         [x]  []   Genital Warts   []         [x]  []   Hepatitis A   []         [x]  []   Hepatitis B   []         [x]  []   Hepatitis C   []         [x]  []   Syphilis   []         [x]  []   Gonorrhea   []         [x]  []   Pelvic Inflammatory   Disease   []         [x]  []   Chlamydia Infection  []         [x]  []   Intestinal parasites   or worms   []         [x]  []   Fungal Infections  []         [x]  []   Blood Infections  []         [x]  []       Comment:       4) Have you ever been exposed   YES NO  To  someone with tuberculosis?  []   [x]   If yes, what treatment did you receive:     5) What states have you lived in? [place], [place], [place], [facility], [place], [place], [place], [place], [place], [place], [place], [place], [place], [place], [place], [place]    6) What countries have you visited for more than 2 weeks?       < 2 weeks: [place]                   YES NO  7) Did you have any associated infections?  []  [x]       8) Are you planning to travel outside the    [x]  []   [place] after your transplant?    9) Household                   YES NO  Do you have pets living in your house?    []         [x]   If yes, describe: dogs     Do you spend time or live on a farm or     []         [x]   have livestock or other farm animals?  If yes, which ones:    Do you have a fish tank?          [x]  []       Do you have a litter box?      []         [x]     Do you fish or hunt?                   [x]         []     Do you clean or skin fish or animals?    [x]         []     Do you consume raw or undercooked    []         []   meat, fish, or shellfish?      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          12)Do you garden or otherwise  YES NO   work in the soil?    [x]         []   13)Do you hike, camp, or spend     time in wooded areas?   [x]         []        14) The patient's immunization history was reviewed.    Have you ever received:  YES NO UNKNOWN DATES   Routine Childhood vaccines  [x]         []  []      Influenza vaccine   [x]  []  [] December 2021   Pneumovax    []  [x]  []     Tetanus-diptheria   []         []  [x]    Hepatitis A vaccine series       []  [x]  []    Hepatitis B vaccine series         []  [x]  []    Meningitis vaccine   []         [x]  []    Varicella vaccine   []         []  []        Based on the patients immunization history and serologies, immunizations were ordered:         Ordered  Not  Ordered  Influenza Vaccine     []    [x]   Hepatitis A series at 0,  6 months   []    [x]   Hepatitis B seriesat 0, 1, and 6 months  []    [x]   Hepatitis B High Dose 0,1, and 6 months  []    [x]   Prevnar      []    [x]   Pneumovax      []    [x]    TDap       []    [x]    Zoster       []    [x]   Menactra      []    [x]            The patient was encouraged to contact us about any problems that may develop after immunization and possible side effects were reviewed.      Previous Transplant: no    Etiology of Liver Disease: cholangiocarcinoma    Allergies: Patient has no known allergies.  Immunization History   Administered Date(s) Administered    COVID-19, MRNA, LN-S, PF (MODERNA FULL 0.5 ML DOSE) 07/17/2021     Past Medical History:   Diagnosis Date    Cholangiocarcinoma     Hypercholesteremia     Hypertension      Past Surgical History:   Procedure Laterality Date    ENDOSCOPIC ULTRASOUND OF UPPER GASTROINTESTINAL TRACT N/A 10/20/2021    Procedure: ULTRASOUND, UPPER GI TRACT, ENDOSCOPIC;  Surgeon: Valeriy Sotelo MD;  Location: [address]);  Service: Endoscopy;  Laterality: N/A;  wife to email vacc card-inst email-tb    ERCP N/A 10/20/2021    Procedure: ERCP (ENDOSCOPIC RETROGRADE CHOLANGIOPANCREATOGRAPHY);  Surgeon: Valeriy Sotelo MD;  [address]  Service: Endoscopy;  Laterality: N/A;    ERCP N/A 11/4/2021    Procedure: ERCP (ENDOSCOPIC RETROGRADE CHOLANGIOPANCREATOGRAPHY);  Surgeon: Valeriy Sotelo MD;  Location: [address]);  Service: Endoscopy;  Laterality: N/A;    ERCP N/A 11/4/2021    Procedure: ERCP (ENDOSCOPIC RETROGRADE CHOLANGIOPANCREATOGRAPHY);  Surgeon: Roselia Pereyra MD;  [facility]  Service: Endoscopy;  Laterality: N/A;  pt vaccinated, instructed to bring card to procedure, instructions sent portal-MG    ERCP N/A 1/14/2022    Procedure: ERCP (ENDOSCOPIC RETROGRADE CHOLANGIOPANCREATOGRAPHY);  Surgeon: Roselia Pereyra,  MD;  [facility] ENDO (2ND FLR);  Service: Endoscopy;  Laterality: N/A;  inst portal-right chest port-tb  Pt requested at home COVID testing, Pt instructed at home RAPID to be done morning of procedure, Pt instructed to call endoscopy scheuding if there is a positive result, Pt informed if a positive     INSERTION OF TUNNELED CENTRAL VENOUS CATHETER (CVC) WITH SUBCUTANEOUS PORT N/A 11/24/2021    Procedure: INSERTION, PORT-A-CATH;  Surgeon: Prem Syed MD;  Location[facility];  Service: Radiology;  Laterality: N/A;    TONSILLECTOMY        Social History     Socioeconomic History    Marital status:    Tobacco Use    Smoking status: Never Smoker    Smokeless tobacco: Never Used   Substance and Sexual Activity    Alcohol use: Not Currently    Drug use: Never    Sexual activity: Yes       Review of Systems   Constitutional: Negative for chills, decreased appetite, fever, malaise/fatigue, night sweats, weight gain and weight loss.   HENT: Positive for congestion and sore throat. Negative for ear pain, hearing loss, hoarse voice and tinnitus.    Eyes: Negative for blurred vision, redness and visual disturbance.   Cardiovascular: Negative for chest pain, leg swelling and palpitations.   Respiratory: Positive for cough. Negative for hemoptysis, shortness of breath, sputum production and wheezing.    Hematologic/Lymphatic: Negative for adenopathy. Does not bruise/bleed easily.   Skin: Negative for dry skin, itching, rash and suspicious lesions.   Musculoskeletal: Negative for back pain, joint pain, myalgias and neck pain.   Gastrointestinal: Negative for abdominal pain, constipation, diarrhea, heartburn, nausea and vomiting.   Genitourinary: Negative for dysuria, flank pain, frequency, hematuria, hesitancy and urgency.   Neurological: Negative for dizziness, headaches, numbness, paresthesias and weakness.   Psychiatric/Behavioral: Negative for depression and memory loss. The patient does not have  insomnia and is not nervous/anxious.      Physical Exam  Vitals and nursing note reviewed.   Constitutional:       Appearance: He is well-developed.   HENT:      Head: Normocephalic and atraumatic.   Eyes:      General: No scleral icterus.        Right eye: No discharge.         Left eye: No discharge.      Conjunctiva/sclera: Conjunctivae normal.   Pulmonary:      Effort: Pulmonary effort is normal.   Musculoskeletal:         General: Normal range of motion.   Skin:     General: Skin is warm and dry.   Neurological:      Mental Status: He is alert and oriented to person, place, and time.   Psychiatric:         Behavior: Behavior normal.         Thought Content: Thought content normal.         Judgment: Judgment normal.       Diagnostics:   RPR   Date Value Ref Range Status   03/24/2022 Non-reactive Non-reactive Final     No results found for: CMVANTIBODIE  No results found for: HIV1X2  No results found for: HTLVIIIANTIB  Hepatitis B Surface Ag   Date Value Ref Range Status   03/24/2022 Negative Negative Final     Hep B Core Total Ab   Date Value Ref Range Status   03/24/2022 Negative  Final     Hepatitis C Ab   Date Value Ref Range Status   03/24/2022 Negative Negative Final     No results found for: TOXOIGG  No components found for: TOXOIGGINTER  No results found for: QBH6RXZ  No results found for: KVI5BWN  Varicella Zoster IgG   Date Value Ref Range Status   03/24/2022 2.48 (H) 0.00 - 0.90 ISR Final     Varicella Interpretation   Date Value Ref Range Status   03/24/2022 Positive (A) Negative Final     Comment:     <or=0.8    Negative        No detectable IgG antibody to Varicella zoster  by the MOSES test. Such individuals are presumed to be   uninfected with Varicella zoster and to be susceptible to   primary infection.    0.9-1.0    Equivocal    >or=1.1    Positive        Indicates presence of detectable IgG antibody to Varicella   zoster by the MOSES test. Indicative of previous or current   infection.        Strongyloides Ab IgG   Date Value Ref Range Status   03/24/2022 Negative Negative Final     Comment:     No detectable levels of IgG antibodies to Strongyloides.  Repeat testing in 1-2 weeks if clinically indicated.    Test Performed by:  [facility] [place]  [address] [place] [address]  : Timmy Maldonado M.D. Ph.D.; CLIA# [id number]       No results found for: EPSTEINBARRV  Hep B S Ab   Date Value Ref Range Status   03/24/2022 Negative  Final     Comment:     Individual is considered not immune to HBV infection.     No results found for: QUANTIFERON  Hep A IgM   Date Value Ref Range Status   10/15/2021 Negative  Final     Comment:     Patients taking very high Biotin doses of >300 mcg/day may   cause a negative bias in this assay.       No results found for: PPD  No results found for this or any previous visit.           Transplant Infectious Diseases - Candidacy    Assessment/Plan:     Transplant Candidacy: Based on available information, there are no identified significant barriers to transplantation from an infectious disease standpoint pending acceptable serologies.  Final determination of transplant candidacy will be made once evaluation is complete and reviewed by the Transplant Selection Committee.    Patient unable to get vaccines at this time due to active chemotherapy vaccine recommendations given as follows below:   Hepatitis B recombinant (Heplisav- non live vaccine)   Tetanus-Tdap   Pneumonia: Prevnar followed by Pneumovax 8 weeks later   Shingles recombinant (Shingrix-non live)    Cecy Ervin MD         Counseling:I discussed with Romeo the risk for increased susceptibility to infections following transplantation including increased risk for infection right after transplant and if rejection should occur.  The patients has been counseled on the importance of vaccinations including but not limited to a yearly flu  vaccine.  Specific guidance has been provided to the patient regarding the patients occupation, hobbies and activities to avoid future infectious complications including but not limited to avoiding undercooked meats and seafood, proper hygiene, and contact with animals.

## 2022-03-29 NOTE — PATIENT INSTRUCTIONS
Vaccine recommendations:  Hepatitis B recombinant (Heplisav- non live vaccine)  Tetanus-Tdap  Pneumonia: Prevnar followed by Pneumovax 8 weeks later  Shingles recombinant (Shingrix-non live)

## 2022-03-30 ENCOUNTER — INFUSION (OUTPATIENT)
Dept: INFUSION THERAPY | Facility: HOSPITAL | Age: 42
End: 2022-03-30
Attending: INTERNAL MEDICINE
Payer: COMMERCIAL

## 2022-03-30 VITALS
OXYGEN SATURATION: 100 % | DIASTOLIC BLOOD PRESSURE: 68 MMHG | HEIGHT: 68 IN | BODY MASS INDEX: 29.54 KG/M2 | WEIGHT: 194.88 LBS | SYSTOLIC BLOOD PRESSURE: 118 MMHG | TEMPERATURE: 98 F | RESPIRATION RATE: 17 BRPM | HEART RATE: 78 BPM

## 2022-03-30 DIAGNOSIS — E11.9 TYPE 2 DIABETES MELLITUS WITHOUT COMPLICATION: ICD-10-CM

## 2022-03-30 DIAGNOSIS — K83.1 BILIARY OBSTRUCTION: Primary | ICD-10-CM

## 2022-03-30 PROCEDURE — 96367 TX/PROPH/DG ADDL SEQ IV INF: CPT | Mod: PN

## 2022-03-30 PROCEDURE — 63600175 PHARM REV CODE 636 W HCPCS: Mod: PN | Performed by: INTERNAL MEDICINE

## 2022-03-30 PROCEDURE — 96417 CHEMO IV INFUS EACH ADDL SEQ: CPT | Mod: PN

## 2022-03-30 PROCEDURE — A4216 STERILE WATER/SALINE, 10 ML: HCPCS | Mod: PN | Performed by: INTERNAL MEDICINE

## 2022-03-30 PROCEDURE — 96375 TX/PRO/DX INJ NEW DRUG ADDON: CPT | Mod: PN

## 2022-03-30 PROCEDURE — 96413 CHEMO IV INFUSION 1 HR: CPT | Mod: PN

## 2022-03-30 PROCEDURE — 96366 THER/PROPH/DIAG IV INF ADDON: CPT | Mod: PN

## 2022-03-30 PROCEDURE — 25000003 PHARM REV CODE 250: Mod: PN | Performed by: INTERNAL MEDICINE

## 2022-03-30 RX ORDER — SODIUM CHLORIDE 9 MG/ML
INJECTION, SOLUTION INTRAVENOUS CONTINUOUS
Status: CANCELLED | OUTPATIENT
Start: 2022-03-30

## 2022-03-30 RX ORDER — SODIUM CHLORIDE 0.9 % (FLUSH) 0.9 %
10 SYRINGE (ML) INJECTION
Status: DISCONTINUED | OUTPATIENT
Start: 2022-03-30 | End: 2022-03-30 | Stop reason: HOSPADM

## 2022-03-30 RX ORDER — SODIUM CHLORIDE 0.9 % (FLUSH) 0.9 %
10 SYRINGE (ML) INJECTION
Status: CANCELLED | OUTPATIENT
Start: 2022-03-30

## 2022-03-30 RX ORDER — HEPARIN 100 UNIT/ML
500 SYRINGE INTRAVENOUS
Status: DISCONTINUED | OUTPATIENT
Start: 2022-03-30 | End: 2022-03-30 | Stop reason: HOSPADM

## 2022-03-30 RX ORDER — DEXAMETHASONE SODIUM PHOSPHATE 4 MG/ML
8 INJECTION, SOLUTION INTRA-ARTICULAR; INTRALESIONAL; INTRAMUSCULAR; INTRAVENOUS; SOFT TISSUE
Status: COMPLETED | OUTPATIENT
Start: 2022-03-30 | End: 2022-03-30

## 2022-03-30 RX ORDER — PALONOSETRON 0.05 MG/ML
0.25 INJECTION, SOLUTION INTRAVENOUS ONCE
Status: COMPLETED | OUTPATIENT
Start: 2022-03-30 | End: 2022-03-30

## 2022-03-30 RX ORDER — SODIUM CHLORIDE 9 MG/ML
500 INJECTION, SOLUTION INTRAVENOUS
Status: COMPLETED | OUTPATIENT
Start: 2022-03-30 | End: 2022-03-30

## 2022-03-30 RX ADMIN — PALONOSETRON HYDROCHLORIDE 0.25 MG: 0.05 INJECTION, SOLUTION INTRAVENOUS at 11:03

## 2022-03-30 RX ADMIN — APREPITANT 130 MG: 130 INJECTION, EMULSION INTRAVENOUS at 11:03

## 2022-03-30 RX ADMIN — Medication 10 ML: at 02:03

## 2022-03-30 RX ADMIN — CISPLATIN 51 MG: 1 INJECTION INTRAVENOUS at 12:03

## 2022-03-30 RX ADMIN — SODIUM CHLORIDE: 0.9 INJECTION, SOLUTION INTRAVENOUS at 08:03

## 2022-03-30 RX ADMIN — DEXAMETHASONE SODIUM PHOSPHATE 8 MG: 4 INJECTION INTRA-ARTICULAR; INTRALESIONAL; INTRAMUSCULAR; INTRAVENOUS; SOFT TISSUE at 11:03

## 2022-03-30 RX ADMIN — POTASSIUM CHLORIDE 500 ML/HR: 2 INJECTION, SOLUTION, CONCENTRATE INTRAVENOUS at 09:03

## 2022-03-30 RX ADMIN — GEMCITABINE 2000 MG: 38 INJECTION, SOLUTION INTRAVENOUS at 11:03

## 2022-03-30 RX ADMIN — Medication 500 UNITS: at 02:03

## 2022-03-30 RX ADMIN — SODIUM CHLORIDE 500 ML: 0.9 INJECTION, SOLUTION INTRAVENOUS at 12:03

## 2022-03-30 NOTE — PLAN OF CARE
"  Problem: Adult Inpatient Plan of Care  Goal: Plan of Care Review  Outcome: Ongoing, Progressing  Flowsheets (Taken 3/30/2022 1415)  Plan of Care Reviewed With: patient     /68   Pulse 78   Temp 97.8 °F (36.6 °C)   Resp 17   Ht 5' 8" (1.727 m)   Wt 88.4 kg (194 lb 14.2 oz)   SpO2 100%   BMI 29.63 kg/m²   Patient tolerated treatment well. Port flushed with blood return, heparin locked, and de-accessed. AVS provided per portal/verbally and reviewed. Ambulates per self.     "

## 2022-03-30 NOTE — PLAN OF CARE
"  Problem: Adult Inpatient Plan of Care  Goal: Patient-Specific Goal (Individualized)  Outcome: Ongoing, Progressing  Flowsheets (Taken 3/30/2022 0900)  Anxieties, Fears or Concerns: none expressed  Individualized Care Needs: recliner, computer, conversation  Patient-Specific Goals (Include Timeframe): no s/s infection during txt     Problem: Fatigue  Goal: Improved Activity Tolerance  Intervention: Promote Improved Energy  Flowsheets (Taken 3/30/2022 0900)  Fatigue Management: frequent rest breaks encouraged  Sleep/Rest Enhancement:   natural light exposure provided   noise level reduced  Activity Management:   Ambulated -L4   Ambulated in mesa - L4     /85   Pulse 83   Temp 97.8 °F (36.6 °C)   Resp 18   Ht 5' 8" (1.727 m)   Wt 88.4 kg (194 lb 14.2 oz)   SpO2 100%   BMI 29.63 kg/m²   Patient here today for C4D1 Cisplatin/Gemzar. VSS. Port accessed to right chest without difficulty; patent with blood return.   Orders released. Pt oriented to unit. Symptoms reviewed. Questions and concerns addressed.     "

## 2022-03-31 ENCOUNTER — HOSPITAL ENCOUNTER (OUTPATIENT)
Facility: HOSPITAL | Age: 42
Discharge: HOME | End: 2022-03-31
Attending: INTERNAL MEDICINE | Admitting: INTERNAL MEDICINE
Payer: COMMERCIAL

## 2022-03-31 ENCOUNTER — ANESTHESIA EVENT (OUTPATIENT)
Dept: ENDOSCOPY | Facility: HOSPITAL | Age: 42
End: 2022-03-31
Payer: COMMERCIAL

## 2022-03-31 ENCOUNTER — ANESTHESIA (OUTPATIENT)
Dept: ENDOSCOPY | Facility: HOSPITAL | Age: 42
End: 2022-03-31
Payer: COMMERCIAL

## 2022-03-31 VITALS
WEIGHT: 188 LBS | RESPIRATION RATE: 17 BRPM | DIASTOLIC BLOOD PRESSURE: 86 MMHG | HEART RATE: 71 BPM | SYSTOLIC BLOOD PRESSURE: 133 MMHG | OXYGEN SATURATION: 97 % | TEMPERATURE: 98 F | BODY MASS INDEX: 28.49 KG/M2 | HEIGHT: 68 IN

## 2022-03-31 DIAGNOSIS — C22.1 CHOLANGIOCARCINOMA: ICD-10-CM

## 2022-03-31 PROCEDURE — D9220A PRA ANESTHESIA: Mod: CRNA,NTX,, | Performed by: NURSE ANESTHETIST, CERTIFIED REGISTERED

## 2022-03-31 PROCEDURE — D9220A PRA ANESTHESIA: Mod: ANES,NTX,, | Performed by: ANESTHESIOLOGY

## 2022-03-31 PROCEDURE — D9220A PRA ANESTHESIA: ICD-10-PCS | Mod: ANES,NTX,, | Performed by: ANESTHESIOLOGY

## 2022-03-31 PROCEDURE — 27202125 HC BALLOON, EXTRACTION (ANY): Mod: TXP | Performed by: INTERNAL MEDICINE

## 2022-03-31 PROCEDURE — 37000008 HC ANESTHESIA 1ST 15 MINUTES: Mod: NTX | Performed by: INTERNAL MEDICINE

## 2022-03-31 PROCEDURE — 63600175 PHARM REV CODE 636 W HCPCS: Mod: TXP | Performed by: INTERNAL MEDICINE

## 2022-03-31 PROCEDURE — 25500020 PHARM REV CODE 255: Mod: TXP | Performed by: INTERNAL MEDICINE

## 2022-03-31 PROCEDURE — C1769 GUIDE WIRE: HCPCS | Mod: TXP | Performed by: INTERNAL MEDICINE

## 2022-03-31 PROCEDURE — 25000003 PHARM REV CODE 250: Mod: NTX | Performed by: NURSE ANESTHETIST, CERTIFIED REGISTERED

## 2022-03-31 PROCEDURE — 74328 X-RAY BILE DUCT ENDOSCOPY: CPT | Mod: TXP | Performed by: INTERNAL MEDICINE

## 2022-03-31 PROCEDURE — D9220A PRA ANESTHESIA: ICD-10-PCS | Mod: CRNA,NTX,, | Performed by: NURSE ANESTHETIST, CERTIFIED REGISTERED

## 2022-03-31 PROCEDURE — 43276 ERCP STENT EXCHANGE W/DILATE: CPT | Mod: NTX,,, | Performed by: INTERNAL MEDICINE

## 2022-03-31 PROCEDURE — 37000009 HC ANESTHESIA EA ADD 15 MINS: Mod: NTX | Performed by: INTERNAL MEDICINE

## 2022-03-31 PROCEDURE — 63600175 PHARM REV CODE 636 W HCPCS: Mod: NTX | Performed by: INTERNAL MEDICINE

## 2022-03-31 PROCEDURE — 43276 PR ERCP W/RMVL/EXCH STENT BILIARY/PANCREATIC DUCT W/DILATION: ICD-10-PCS | Mod: NTX,,, | Performed by: INTERNAL MEDICINE

## 2022-03-31 PROCEDURE — 74328 PR  X-RAY FOR BILE DUCT ENDOSCOPY: ICD-10-PCS | Mod: 26,NTX,, | Performed by: INTERNAL MEDICINE

## 2022-03-31 PROCEDURE — 43276 ERCP STENT EXCHANGE W/DILATE: CPT | Mod: 59,TXP | Performed by: INTERNAL MEDICINE

## 2022-03-31 PROCEDURE — 74328 X-RAY BILE DUCT ENDOSCOPY: CPT | Mod: 26,NTX,, | Performed by: INTERNAL MEDICINE

## 2022-03-31 PROCEDURE — C2617 STENT, NON-COR, TEM W/O DEL: HCPCS | Mod: TXP | Performed by: INTERNAL MEDICINE

## 2022-03-31 PROCEDURE — 63600175 PHARM REV CODE 636 W HCPCS: Mod: TXP | Performed by: NURSE ANESTHETIST, CERTIFIED REGISTERED

## 2022-03-31 RX ORDER — PROPOFOL 10 MG/ML
VIAL (ML) INTRAVENOUS CONTINUOUS PRN
Status: DISCONTINUED | OUTPATIENT
Start: 2022-03-31 | End: 2022-03-31

## 2022-03-31 RX ORDER — ONDANSETRON 2 MG/ML
4 INJECTION INTRAMUSCULAR; INTRAVENOUS DAILY PRN
Status: DISCONTINUED | OUTPATIENT
Start: 2022-03-31 | End: 2022-03-31 | Stop reason: HOSPADM

## 2022-03-31 RX ORDER — CIPROFLOXACIN 2 MG/ML
400 INJECTION, SOLUTION INTRAVENOUS ONCE
Status: COMPLETED | OUTPATIENT
Start: 2022-03-31 | End: 2022-03-31

## 2022-03-31 RX ORDER — LIDOCAINE HCL/PF 100 MG/5ML
SYRINGE (ML) INTRAVENOUS
Status: DISCONTINUED | OUTPATIENT
Start: 2022-03-31 | End: 2022-03-31

## 2022-03-31 RX ORDER — CIPROFLOXACIN 2 MG/ML
INJECTION, SOLUTION INTRAVENOUS CONTINUOUS PRN
Status: COMPLETED | OUTPATIENT
Start: 2022-03-31 | End: 2022-03-31

## 2022-03-31 RX ORDER — FENTANYL CITRATE 50 UG/ML
INJECTION, SOLUTION INTRAMUSCULAR; INTRAVENOUS
Status: DISCONTINUED | OUTPATIENT
Start: 2022-03-31 | End: 2022-03-31

## 2022-03-31 RX ORDER — CIPROFLOXACIN 500 MG/1
500 TABLET ORAL EVERY 12 HOURS
Qty: 14 TABLET | Refills: 0 | Status: SHIPPED | OUTPATIENT
Start: 2022-03-31 | End: 2022-04-07

## 2022-03-31 RX ORDER — PROPOFOL 10 MG/ML
VIAL (ML) INTRAVENOUS
Status: DISCONTINUED | OUTPATIENT
Start: 2022-03-31 | End: 2022-03-31

## 2022-03-31 RX ORDER — SODIUM CHLORIDE 9 MG/ML
INJECTION, SOLUTION INTRAVENOUS CONTINUOUS
Status: DISCONTINUED | OUTPATIENT
Start: 2022-03-31 | End: 2022-03-31 | Stop reason: HOSPADM

## 2022-03-31 RX ADMIN — IOHEXOL 10 ML: 300 INJECTION, SOLUTION INTRAVENOUS at 08:03

## 2022-03-31 RX ADMIN — CIPROFLOXACIN 400 MG: 2 INJECTION, SOLUTION INTRAVENOUS at 07:03

## 2022-03-31 RX ADMIN — PROPOFOL 80 MG: 10 INJECTION, EMULSION INTRAVENOUS at 07:03

## 2022-03-31 RX ADMIN — FENTANYL CITRATE 50 MCG: 50 INJECTION, SOLUTION INTRAMUSCULAR; INTRAVENOUS at 07:03

## 2022-03-31 RX ADMIN — SODIUM CHLORIDE: 9 INJECTION, SOLUTION INTRAVENOUS at 07:03

## 2022-03-31 RX ADMIN — Medication 100 MG: at 07:03

## 2022-03-31 RX ADMIN — PROPOFOL 150 MCG/KG/MIN: 10 INJECTION, EMULSION INTRAVENOUS at 07:03

## 2022-03-31 NOTE — H&P
Short Stay Endoscopy History and Physical    PCP - Pravin Maria MD  Referring Physician - Roselia Pereyra MD  [address] [place] [address]    Procedure - ercp  ASA - per anesthesia  Mallampati - per anesthesia  History of Anesthesia problems - no  Family history Anesthesia problems -  no   Plan of anesthesia - General    HPI:  This is a 41 y.o. male here for evaluation of: stent exchange    Reflux - no  Dysphagia - no  Abdominal pain - no  Diarrhea - no    ROS:  Constitutional: No fevers, chills, No weight loss  CV: No chest pain  Pulm: No cough, No shortness of breath  Ophtho: No vision changes  GI: see HPI  Derm: No rash    Medical History:  has a past medical history of Cholangiocarcinoma, Hypercholesteremia, and Hypertension.    Surgical History:  has a past surgical history that includes Endoscopic ultrasound of upper gastrointestinal tract (N/A, 10/20/2021); ERCP (N/A, 10/20/2021); ERCP (N/A, 11/4/2021); ERCP (N/A, 11/4/2021); Insertion of tunneled central venous catheter (CVC) with subcutaneous port (N/A, 11/24/2021); Tonsillectomy; and ERCP (N/A, 1/14/2022).    Family History: family history includes Hyperlipidemia in his father; No Known Problems in his brother, mother, and sister..    Social History:  reports that he has never smoked. He has never used smokeless tobacco. He reports previous alcohol use. He reports that he does not use drugs.    Review of patient's allergies indicates:  No Known Allergies    Medications:   Medications Prior to Admission   Medication Sig Dispense Refill Last Dose    baclofen (LIORESAL) 10 MG tablet Take 1 tablet (10 mg total) by mouth 3 (three) times daily as needed (hiccups). 90 tablet 0 Past Week at Unknown time    losartan (COZAAR) 50 MG tablet Take 1 tablet (50 mg total) by mouth once daily. 90 tablet 3 3/31/2022 at Unknown time    metFORMIN (GLUCOPHAGE) 500 MG tablet Take 1 tablet (500 mg total) by mouth 2 (two) times daily with meals. 60  tablet 3 3/30/2022 at Unknown time    ondansetron (ZOFRAN-ODT) 8 MG TbDL Take 1 tablet (8 mg total) by mouth every 6 (six) hours as needed (nausea). 60 tablet 2 Past Week at Unknown time    promethazine (PHENERGAN) 12.5 MG Tab Take 1 tablet (12.5 mg total) by mouth every 6 (six) hours as needed (nausea). 60 tablet 2 Past Week at Unknown time       Physical Exam:    Vital Signs:   Vitals:    03/31/22 0730   BP: (!) 133/90   Pulse: 72   Resp: 18   Temp: 98 °F (36.7 °C)       General Appearance: Well appearing in no acute distress    Labs:  Lab Results   Component Value Date    WBC 6.10 03/29/2022    HGB 8.5 (L) 03/29/2022    HCT 26.5 (L) 03/29/2022     (L) 03/29/2022    CHOL 163 10/12/2021    TRIG 198 (H) 10/12/2021    HDL 22 (L) 10/12/2021    ALT 65 (H) 03/29/2022    AST 31 03/29/2022     03/29/2022    K 4.2 03/29/2022     03/29/2022    CREATININE 0.9 03/29/2022    BUN 9 03/29/2022    CO2 26 03/29/2022    TSH 0.835 03/24/2022    PSA 0.68 03/24/2022    INR 1.0 03/24/2022    HGBA1C 6.8 (H) 03/29/2022       I have explained the risks and benefits of this endoscopic procedure to the patient including but not limited to bleeding, inflammation, infection, perforation, and death.      Julio Cesar Alonzo MD

## 2022-03-31 NOTE — TRANSFER OF CARE
"Anesthesia Transfer of Care Note    Patient: Romeo Larson    Procedure(s) Performed: Procedure(s) (LRB):  ERCP (ENDOSCOPIC RETROGRADE CHOLANGIOPANCREATOGRAPHY) (N/A)    Patient location: [facility]    Anesthesia Type: MAC    Transport from OR: Transported from OR on room air with adequate spontaneous ventilation    Post pain: adequate analgesia    Post assessment: no apparent anesthetic complications and tolerated procedure well    Post vital signs: stable    Level of consciousness: awake    Nausea/Vomiting: no nausea/vomiting    Complications: none    Transfer of care protocol was followed      Last vitals:   Visit Vitals  BP (!) 133/90 (BP Location: Left arm, Patient Position: Lying)   Pulse 72   Temp 36.7 °C (98 °F) (Temporal)   Resp 18   Ht 5' 8" (1.727 m)   Wt 85.3 kg (188 lb)   SpO2 98%   BMI 28.59 kg/m²     "

## 2022-03-31 NOTE — ANESTHESIA POSTPROCEDURE EVALUATION
Anesthesia Post Evaluation    Patient: Romeo Larson    Procedure(s) Performed: Procedure(s) (LRB):  ERCP (ENDOSCOPIC RETROGRADE CHOLANGIOPANCREATOGRAPHY) (N/A)    Final Anesthesia Type: general      Patient location during evaluation: PACU  Patient participation: Yes- Able to Participate  Level of consciousness: awake and alert  Post-procedure vital signs: reviewed and stable  Pain management: adequate  Airway patency: patent    PONV status at discharge: No PONV  Anesthetic complications: no      Cardiovascular status: blood pressure returned to baseline  Respiratory status: unassisted, spontaneous ventilation and room air  Hydration status: euvolemic  Follow-up not needed.          Vitals Value Taken Time   /86 03/31/22 0916   Temp 36.7 °C (98 °F) 03/31/22 0915   Pulse 64 03/31/22 0920   Resp 17 03/31/22 0915   SpO2 100 % 03/31/22 0920   Vitals shown include unvalidated device data.      No case tracking events are documented in the log.      Pain/Moe Score: Moe Score: 10 (3/31/2022  9:22 AM)

## 2022-03-31 NOTE — ANESTHESIA PREPROCEDURE EVALUATION
03/31/2022  Romeo Larson is a 41 y.o., male.  Pre-operative evaluation for Procedure(s) (LRB):  ERCP (ENDOSCOPIC RETROGRADE CHOLANGIOPANCREATOGRAPHY) (N/A)        Patient Active Problem List   Diagnosis    Hypercholesterolemia    Diastolic hypertension    Hyperbilirubinemia    Obstructive jaundice    Biliary obstruction    Painless jaundice    Cholangitis    Hilar cholangiocarcinoma    Immunodeficiency secondary to neoplasm    Immunodeficiency secondary to chemotherapy    Chemotherapy induced neutropenia       Review of patient's allergies indicates:  No Known Allergies     No current facility-administered medications on file prior to encounter.     Current Outpatient Medications on File Prior to Encounter   Medication Sig Dispense Refill    ondansetron (ZOFRAN-ODT) 8 MG TbDL Take 1 tablet (8 mg total) by mouth every 6 (six) hours as needed (nausea). (Patient not taking: Reported on 3/29/2022) 60 tablet 2    promethazine (PHENERGAN) 12.5 MG Tab Take 1 tablet (12.5 mg total) by mouth every 6 (six) hours as needed (nausea). (Patient not taking: No sig reported) 60 tablet 2       Past Surgical History:   Procedure Laterality Date    ENDOSCOPIC ULTRASOUND OF UPPER GASTROINTESTINAL TRACT N/A 10/20/2021    Procedure: ULTRASOUND, UPPER GI TRACT, ENDOSCOPIC;  Surgeon: Valeriy Sotelo MD;  Location: [facility] ([address] [place] [address]);  Service: Endoscopy;  Laterality: N/A;  wife to email vacc card-inst email-tb    ERCP N/A 10/20/2021    Procedure: ERCP (ENDOSCOPIC RETROGRADE CHOLANGIOPANCREATOGRAPHY);  Surgeon: Valeriy Sotelo MD;  [address] [facility] [address]O [address]  Service: Endoscopy;  Laterality: N/A;    ERCP N/A 11/4/2021    Procedure: ERCP (ENDOSCOPIC RETROGRADE CHOLANGIOPANCREATOGRAPHY);  Surgeon: Valeriy Sotelo MD;  Location: [facility] ENDO ([address] [place] [address]);  Service: Endoscopy;  Laterality: N/A;    ERCP N/A  11/4/2021    Procedure: ERCP (ENDOSCOPIC RETROGRADE CHOLANGIOPANCREATOGRAPHY);  Surgeon: Roselia Pereyra MD;  [address]  Service: Endoscopy;  Laterality: N/A;  pt vaccinated, instructed to bring card to procedure, instructions sent portal-MG    ERCP N/A 1/14/2022    Procedure: ERCP (ENDOSCOPIC RETROGRADE CHOLANGIOPANCREATOGRAPHY);  Surgeon: Roselia Pereyra MD;  [address]  Service: Endoscopy;  Laterality: N/A;  inst portal-right chest port-tb  Pt requested at home COVID testing, Pt instructed at home RAPID to be done morning of procedure, Pt instructed to call endoscopy scheuding if there is a positive result, Pt informed if a positive     INSERTION OF TUNNELED CENTRAL VENOUS CATHETER (CVC) WITH SUBCUTANEOUS PORT N/A 11/24/2021    Procedure: INSERTION, PORT-A-CATH;  Surgeon: Prem Syed MD;  Location: [facility] CATH LAB;  Service: Radiology;  Laterality: N/A;    TONSILLECTOMY         Social History     Socioeconomic History    Marital status:    Tobacco Use    Smoking status: Never Smoker    Smokeless tobacco: Never Used   Substance and Sexual Activity    Alcohol use: Not Currently    Drug use: Never    Sexual activity: Yes         Vital Signs Range (Last 24H):  Temp:  [36.6 °C (97.8 °F)]   Pulse:  [78-83]   Resp:  [17-18]   BP: (118-141)/(68-95)   SpO2:  [100 %]       CBC:   Recent Labs     03/29/22  0807   WBC 6.10   RBC 2.58*   HGB 8.5*   HCT 26.5*   *   *   MCH 32.9*   MCHC 32.1       CMP:   Recent Labs     03/29/22  0807      K 4.2      CO2 26   BUN 9   CREATININE 0.9   *   MG 2.1   CALCIUM 9.4   ALBUMIN 3.2*   PROT 6.3   ALKPHOS 270*   ALT 65*   AST 31   BILITOT 0.8       INR  No results for input(s): PT, INR, PROTIME, APTT in the last 72 hours.        Diagnostic Studies:      EKG:    Normal sinus rhythm   Possible  Inferior infarct ,age undetermined   Abnormal ECG   No previous ECGs available   Confirmed by CARMELINA  Amado BOSE ([id number]) on 11/4/2021 3:00:51 PM     Pre-op Assessment    I have reviewed the Patient Summary Reports.     I have reviewed the Nursing Notes. I have reviewed the NPO Status.   I have reviewed the Medications.     Review of Systems  Anesthesia Hx:  No problems with previous Anesthesia  History of prior surgery of interest to airway management or planning: Denies Family Hx of Anesthesia complications.   Denies Personal Hx of Anesthesia complications.   Social:  No Alcohol Use, Non-Smoker    Hematology/Oncology:  Hematology Normal      Current/Recent Cancer.   EENT/Dental:EENT/Dental Normal   Cardiovascular:   Hypertension    Pulmonary:  Pulmonary Normal    Renal/:  Renal/ Normal     Hepatic/GI:   Cholangiocarcinoma   Musculoskeletal:  Musculoskeletal Normal    Neurological:  Neurology Normal    Endocrine:  Endocrine Normal    Dermatological:  Skin Normal    Psych:  Psychiatric Normal           Physical Exam  General: Well nourished, Cooperative, Alert, Oriented and Jaundice    Airway:  Mallampati: III / I  Mouth Opening: Normal  TM Distance: Normal  Tongue: Normal  Neck ROM: Normal ROM    Dental:  Intact    Chest/Lungs:  Clear to auscultation, Normal Respiratory Rate    Heart:  Rate: Normal  Rhythm: Regular Rhythm  Sounds: Normal        Anesthesia Plan  Type of Anesthesia, risks & benefits discussed:    Anesthesia Type: Gen ETT, Gen Natural Airway  Intra-op Monitoring Plan: Standard ASA Monitors  Post Op Pain Control Plan:   (medical reason for not using multimodal pain management)  Induction:  IV  Informed Consent: Informed consent signed with the Patient and all parties understand the risks and agree with anesthesia plan.  All questions answered.   ASA Score: 3  Day of Surgery Review of History & Physical: H&P Update referred to the surgeon/provider.    Ready For Surgery From Anesthesia Perspective.     .

## 2022-03-31 NOTE — PROVATION PATIENT INSTRUCTIONS
Discharge Summary/Instructions after an Endoscopic Procedure  Patient Name: Romeo Larson  Patient MRN: [id number]  Patient YOB: 1980 Thursday, March 31, 2022  Julio Cesar Alonzo MD  Dear patient,  As a result of recent federal legislation (The Federal Cures Act), you may   receive lab or pathology results from your procedure in your [facility]   account before your physician is able to contact you. Your physician or   their representative will relay the results to you with their   recommendations at their soonest availability.  Thank you,  RESTRICTIONS:  During your procedure today, you received medications for sedation.  These   medications may affect your judgment, balance and coordination.  Therefore,   for 24 hours, you have the following restrictions:   - DO NOT drive a car, operate machinery, make legal/financial decisions,   sign important papers or drink alcohol.    ACTIVITY:  Today: no heavy lifting, straining or running due to procedural   sedation/anesthesia.  The following day: return to full activity including work.  DIET:  Eat and drink normally unless instructed otherwise.     TREATMENT FOR COMMON SIDE EFFECTS:  - Mild abdominal pain, nausea, belching, bloating or excessive gas:  rest,   eat lightly and use a heating pad.  - Sore Throat: treat with throat lozenges and/or gargle with warm salt   water.  - Because air was used during the procedure, expelling large amounts of air   from your rectum or belching is normal.  - If a bowel prep was taken, you may not have a bowel movement for 1-3 days.    This is normal.  SYMPTOMS TO WATCH FOR AND REPORT TO YOUR PHYSICIAN:  1. Abdominal pain or bloating, other than gas cramps.  2. Chest pain.  3. Back pain.  4. Signs of infection such as: chills or fever occurring within 24 hours   after the procedure.  5. Rectal bleeding, which would show as bright red, maroon, or black stools.   (A tablespoon of blood from the rectum is not serious, especially if    hemorrhoids are present.)  6. Vomiting.  7. Weakness or dizziness.  GO DIRECTLY TO THE NEAREST EMERGENCY ROOM IF YOU HAVE ANY OF THE FOLLOWING:      Difficulty breathing              Chills and/or fever over 101 F   Persistent vomiting and/or vomiting blood   Severe abdominal pain   Severe chest pain   Black, tarry stools   Bleeding- more than one tablespoon   Any other symptom or condition that you feel may need urgent attention  Your doctor recommends these additional instructions:  If any biopsies were taken, your doctors clinic will contact you in 1 to 2   weeks with any results.  - Discharge patient to home.   - Resume previous diet.   - Continue present medications.   - Cipro (ciprofloxacin) 500 mg PO BID for 1 week.   - Repeat ERCP in 2 months to exchange stent.   - Return to referring physician as previously scheduled.  For questions, problems or results please call your physician - Julio Cesar Alonzo MD at Work:  [phone].  [facility], EMERGENCY ROOM PHONE NUMBER: [phone]  IF A COMPLICATION OR EMERGENCY SITUATION ARISES AND YOU ARE UNABLE TO REACH   YOUR PHYSICIAN - GO DIRECTLY TO THE EMERGENCY ROOM.  Julio Cesar Alonzo MD  3/31/2022 8:23:49 AM  This report has been verified and signed electronically.  Dear patient,  As a result of recent federal legislation (The Federal Cures Act), you may   receive lab or pathology results from your procedure in your [facility]   account before your physician is able to contact you. Your physician or   their representative will relay the results to you with their   recommendations at their soonest availability.  Thank you,  PROVATION

## 2022-04-04 ENCOUNTER — PATIENT MESSAGE (OUTPATIENT)
Dept: ADMINISTRATIVE | Facility: HOSPITAL | Age: 42
End: 2022-04-04
Payer: COMMERCIAL

## 2022-04-04 ENCOUNTER — PATIENT OUTREACH (OUTPATIENT)
Dept: ADMINISTRATIVE | Facility: HOSPITAL | Age: 42
End: 2022-04-04
Payer: COMMERCIAL

## 2022-04-04 NOTE — PROGRESS NOTES
Bulk order report.  see outreach via Portal msg/Letter:      [employer] [employer]  Also due for eye exam and foot exam

## 2022-04-05 ENCOUNTER — OFFICE VISIT (OUTPATIENT)
Dept: HEMATOLOGY/ONCOLOGY | Facility: CLINIC | Age: 42
End: 2022-04-05
Payer: COMMERCIAL

## 2022-04-05 ENCOUNTER — TELEPHONE (OUTPATIENT)
Dept: TRANSPLANT | Facility: CLINIC | Age: 42
End: 2022-04-05

## 2022-04-05 ENCOUNTER — TELEPHONE (OUTPATIENT)
Dept: ENDOSCOPY | Facility: HOSPITAL | Age: 42
End: 2022-04-05
Payer: COMMERCIAL

## 2022-04-05 ENCOUNTER — DOCUMENTATION ONLY (OUTPATIENT)
Dept: HEMATOLOGY/ONCOLOGY | Facility: CLINIC | Age: 42
End: 2022-04-05
Payer: COMMERCIAL

## 2022-04-05 ENCOUNTER — HOSPITAL ENCOUNTER (OUTPATIENT)
Dept: CARDIOLOGY | Facility: HOSPITAL | Age: 42
Discharge: HOME | End: 2022-04-05
Attending: INTERNAL MEDICINE
Payer: COMMERCIAL

## 2022-04-05 ENCOUNTER — LAB VISIT (OUTPATIENT)
Dept: LAB | Facility: HOSPITAL | Age: 42
End: 2022-04-05
Attending: INTERNAL MEDICINE
Payer: COMMERCIAL

## 2022-04-05 VITALS
BODY MASS INDEX: 28.95 KG/M2 | WEIGHT: 191 LBS | HEIGHT: 68 IN | RESPIRATION RATE: 16 BRPM | SYSTOLIC BLOOD PRESSURE: 124 MMHG | DIASTOLIC BLOOD PRESSURE: 80 MMHG

## 2022-04-05 VITALS
WEIGHT: 191.13 LBS | SYSTOLIC BLOOD PRESSURE: 119 MMHG | HEART RATE: 122 BPM | RESPIRATION RATE: 20 BRPM | BODY MASS INDEX: 28.97 KG/M2 | HEIGHT: 68 IN | TEMPERATURE: 99 F | DIASTOLIC BLOOD PRESSURE: 81 MMHG | OXYGEN SATURATION: 100 %

## 2022-04-05 DIAGNOSIS — D49.9 IMMUNODEFICIENCY SECONDARY TO NEOPLASM: ICD-10-CM

## 2022-04-05 DIAGNOSIS — T45.1X5A CHEMOTHERAPY INDUCED NEUTROPENIA: ICD-10-CM

## 2022-04-05 DIAGNOSIS — D84.821 IMMUNODEFICIENCY SECONDARY TO CHEMOTHERAPY: ICD-10-CM

## 2022-04-05 DIAGNOSIS — I10 ESSENTIAL HYPERTENSION: ICD-10-CM

## 2022-04-05 DIAGNOSIS — K83.1 BILIARY STRICTURE: Primary | ICD-10-CM

## 2022-04-05 DIAGNOSIS — Z79.899 IMMUNODEFICIENCY SECONDARY TO CHEMOTHERAPY: ICD-10-CM

## 2022-04-05 DIAGNOSIS — E13.9 SECONDARY DIABETES: ICD-10-CM

## 2022-04-05 DIAGNOSIS — D70.1 CHEMOTHERAPY INDUCED NEUTROPENIA: ICD-10-CM

## 2022-04-05 DIAGNOSIS — C24.0 HILAR CHOLANGIOCARCINOMA: ICD-10-CM

## 2022-04-05 DIAGNOSIS — Z76.82 ORGAN TRANSPLANT CANDIDATE: ICD-10-CM

## 2022-04-05 DIAGNOSIS — D84.81 IMMUNODEFICIENCY SECONDARY TO NEOPLASM: ICD-10-CM

## 2022-04-05 DIAGNOSIS — C24.0 HILAR CHOLANGIOCARCINOMA: Primary | ICD-10-CM

## 2022-04-05 DIAGNOSIS — T45.1X5A IMMUNODEFICIENCY SECONDARY TO CHEMOTHERAPY: ICD-10-CM

## 2022-04-05 LAB
ALBUMIN SERPL BCP-MCNC: 3.6 G/DL (ref 3.5–5.2)
ALP SERPL-CCNC: 238 U/L (ref 55–135)
ALT SERPL W/O P-5'-P-CCNC: 96 U/L (ref 10–44)
ANION GAP SERPL CALC-SCNC: 9 MMOL/L (ref 8–16)
ASCENDING AORTA: 2.75 CM
AST SERPL-CCNC: 29 U/L (ref 10–40)
AV INDEX (PROSTH): 1.03
AV MEAN GRADIENT: 3 MMHG
AV PEAK GRADIENT: 5 MMHG
AV VALVE AREA: 5.33 CM2
AV VELOCITY RATIO: 0.95
BASOPHILS NFR BLD: 2 % (ref 0–1.9)
BILIRUB SERPL-MCNC: 0.6 MG/DL (ref 0.1–1)
BSA FOR ECHO PROCEDURE: 2.04 M2
BUN SERPL-MCNC: 17 MG/DL (ref 6–20)
CALCIUM SERPL-MCNC: 9.2 MG/DL (ref 8.7–10.5)
CANCER AG19-9 SERPL-ACNC: 68.4 U/ML (ref 0–40)
CHLORIDE SERPL-SCNC: 101 MMOL/L (ref 95–110)
CO2 SERPL-SCNC: 25 MMOL/L (ref 23–29)
CREAT SERPL-MCNC: 0.9 MG/DL (ref 0.5–1.4)
CV ECHO LV RWT: 0.38 CM
CV STRESS BASE HR: 88 BPM
DACRYOCYTES BLD QL SMEAR: ABNORMAL
DIASTOLIC BLOOD PRESSURE: 72 MMHG
DIFFERENTIAL METHOD: ABNORMAL
DOP CALC AO PEAK VEL: 1.1 M/S
DOP CALC AO VTI: 18.96 CM
DOP CALC LVOT AREA: 5.2 CM2
DOP CALC LVOT DIAMETER: 2.57 CM
DOP CALC LVOT PEAK VEL: 1.05 M/S
DOP CALC LVOT STROKE VOLUME: 101.05 CM3
DOP CALCLVOT PEAK VEL VTI: 19.49 CM
E WAVE DECELERATION TIME: 224.11 MSEC
E/A RATIO: 1.79
E/E' RATIO: 8.11 M/S
ECHO LV POSTERIOR WALL: 0.83 CM (ref 0.6–1.1)
EJECTION FRACTION: 65 %
EOSINOPHIL NFR BLD: 0 % (ref 0–8)
ERYTHROCYTE [DISTWIDTH] IN BLOOD BY AUTOMATED COUNT: 15.7 % (ref 11.5–14.5)
EST. GFR  (AFRICAN AMERICAN): >60 ML/MIN/1.73 M^2
EST. GFR  (NON AFRICAN AMERICAN): >60 ML/MIN/1.73 M^2
FRACTIONAL SHORTENING: 35 % (ref 28–44)
GLUCOSE SERPL-MCNC: 191 MG/DL (ref 70–110)
HCT VFR BLD AUTO: 26.9 % (ref 40–54)
HGB BLD-MCNC: 8.8 G/DL (ref 14–18)
IMM GRANULOCYTES # BLD AUTO: ABNORMAL K/UL (ref 0–0.04)
IMM GRANULOCYTES NFR BLD AUTO: ABNORMAL % (ref 0–0.5)
INTERVENTRICULAR SEPTUM: 0.73 CM (ref 0.6–1.1)
IVRT: 68.51 MSEC
LA MAJOR: 5.08 CM
LA MINOR: 5.11 CM
LA WIDTH: 4.16 CM
LEFT ATRIUM SIZE: 3.5 CM
LEFT ATRIUM VOLUME INDEX MOD: 20.9 ML/M2
LEFT ATRIUM VOLUME INDEX: 31.5 ML/M2
LEFT ATRIUM VOLUME MOD: 41.81 CM3
LEFT ATRIUM VOLUME: 63.06 CM3
LEFT INTERNAL DIMENSION IN SYSTOLE: 2.86 CM (ref 2.1–4)
LEFT VENTRICLE DIASTOLIC VOLUME INDEX: 43.74 ML/M2
LEFT VENTRICLE DIASTOLIC VOLUME: 87.48 ML
LEFT VENTRICLE MASS INDEX: 53 G/M2
LEFT VENTRICLE SYSTOLIC VOLUME INDEX: 15.6 ML/M2
LEFT VENTRICLE SYSTOLIC VOLUME: 31.13 ML
LEFT VENTRICULAR INTERNAL DIMENSION IN DIASTOLE: 4.4 CM (ref 3.5–6)
LEFT VENTRICULAR MASS: 105.87 G
LV LATERAL E/E' RATIO: 7 M/S
LV SEPTAL E/E' RATIO: 9.63 M/S
LYMPHOCYTES NFR BLD: 6 % (ref 18–48)
MCH RBC QN AUTO: 34 PG (ref 27–31)
MCHC RBC AUTO-ENTMCNC: 32.7 G/DL (ref 32–36)
MCV RBC AUTO: 104 FL (ref 82–98)
MONOCYTES NFR BLD: 2 % (ref 4–15)
MV A" WAVE DURATION": 9.99 MSEC
MV PEAK A VEL: 0.43 M/S
MV PEAK E VEL: 0.77 M/S
MV STENOSIS PRESSURE HALF TIME: 64.99 MS
MV VALVE AREA P 1/2 METHOD: 3.39 CM2
NEUTROPHILS NFR BLD: 90 % (ref 38–73)
NRBC BLD-RTO: 0 /100 WBC
OHS CV CPX 1 MINUTE RECOVERY HEART RATE: 144 BPM
OHS CV CPX 85 PERCENT MAX PREDICTED HEART RATE MALE: 150
OHS CV CPX MAX PREDICTED HEART RATE: 177
OHS CV CPX PATIENT IS FEMALE: 0
OHS CV CPX PATIENT IS MALE: 1
OHS CV CPX PEAK DIASTOLIC BLOOD PRESSURE: 52 MMHG
OHS CV CPX PEAK HEAR RATE: 151 BPM
OHS CV CPX PEAK RATE PRESSURE PRODUCT: NORMAL
OHS CV CPX PEAK SYSTOLIC BLOOD PRESSURE: 95 MMHG
OHS CV CPX PERCENT MAX PREDICTED HEART RATE ACHIEVED: 85
OHS CV CPX RATE PRESSURE PRODUCT PRESENTING: NORMAL
PISA TR MAX VEL: 2.4 M/S
PLATELET # BLD AUTO: 148 K/UL (ref 150–450)
PLATELET BLD QL SMEAR: ABNORMAL
PMV BLD AUTO: 9 FL (ref 9.2–12.9)
POIKILOCYTOSIS BLD QL SMEAR: SLIGHT
POTASSIUM SERPL-SCNC: 4.6 MMOL/L (ref 3.5–5.1)
PROT SERPL-MCNC: 6.3 G/DL (ref 6–8.4)
PULM VEIN S/D RATIO: 1.52
PV PEAK D VEL: 0.54 M/S
PV PEAK S VEL: 0.82 M/S
RA MAJOR: 4.96 CM
RA PRESSURE: 3 MMHG
RA WIDTH: 3.74 CM
RBC # BLD AUTO: 2.59 M/UL (ref 4.6–6.2)
RIGHT VENTRICULAR END-DIASTOLIC DIMENSION: 4.28 CM
RV TISSUE DOPPLER FREE WALL SYSTOLIC VELOCITY 1 (APICAL 4 CHAMBER VIEW): 16.76 CM/S
SINUS: 3.09 CM
SODIUM SERPL-SCNC: 135 MMOL/L (ref 136–145)
STJ: 2.64 CM
SYSTOLIC BLOOD PRESSURE: 124 MMHG
TDI LATERAL: 0.11 M/S
TDI SEPTAL: 0.08 M/S
TDI: 0.1 M/S
TR MAX PG: 23 MMHG
TRICUSPID ANNULAR PLANE SYSTOLIC EXCURSION: 1.57 CM
TV REST PULMONARY ARTERY PRESSURE: 26 MMHG
WBC # BLD AUTO: 1.96 K/UL (ref 3.9–12.7)

## 2022-04-05 PROCEDURE — 3008F BODY MASS INDEX DOCD: CPT | Mod: CPTII,S$GLB,, | Performed by: NURSE PRACTITIONER

## 2022-04-05 PROCEDURE — 85007 BL SMEAR W/DIFF WBC COUNT: CPT | Mod: TXP | Performed by: INTERNAL MEDICINE

## 2022-04-05 PROCEDURE — 93320 STRESS ECHO (CUPID ONLY): ICD-10-PCS | Mod: 26,TXP,, | Performed by: INTERNAL MEDICINE

## 2022-04-05 PROCEDURE — 93351 STRESS TTE COMPLETE: CPT | Mod: 26,TXP,, | Performed by: INTERNAL MEDICINE

## 2022-04-05 PROCEDURE — 3074F SYST BP LT 130 MM HG: CPT | Mod: CPTII,S$GLB,, | Performed by: NURSE PRACTITIONER

## 2022-04-05 PROCEDURE — 99999 PR PBB SHADOW E&M-EST. PATIENT-LVL IV: CPT | Mod: PBBFAC,,, | Performed by: NURSE PRACTITIONER

## 2022-04-05 PROCEDURE — 85027 COMPLETE CBC AUTOMATED: CPT | Mod: NTX | Performed by: INTERNAL MEDICINE

## 2022-04-05 PROCEDURE — 1159F PR MEDICATION LIST DOCUMENTED IN MEDICAL RECORD: ICD-10-PCS | Mod: CPTII,S$GLB,, | Performed by: NURSE PRACTITIONER

## 2022-04-05 PROCEDURE — 86301 IMMUNOASSAY TUMOR CA 19-9: CPT | Mod: NTX | Performed by: INTERNAL MEDICINE

## 2022-04-05 PROCEDURE — 4010F PR ACE/ARB THEARPY RXD/TAKEN: ICD-10-PCS | Mod: CPTII,S$GLB,, | Performed by: NURSE PRACTITIONER

## 2022-04-05 PROCEDURE — 1159F MED LIST DOCD IN RCRD: CPT | Mod: CPTII,S$GLB,, | Performed by: NURSE PRACTITIONER

## 2022-04-05 PROCEDURE — 3074F PR MOST RECENT SYSTOLIC BLOOD PRESSURE < 130 MM HG: ICD-10-PCS | Mod: CPTII,S$GLB,, | Performed by: NURSE PRACTITIONER

## 2022-04-05 PROCEDURE — 36415 COLL VENOUS BLD VENIPUNCTURE: CPT | Mod: TXP | Performed by: INTERNAL MEDICINE

## 2022-04-05 PROCEDURE — 80053 COMPREHEN METABOLIC PANEL: CPT | Mod: NTX | Performed by: INTERNAL MEDICINE

## 2022-04-05 PROCEDURE — 4010F ACE/ARB THERAPY RXD/TAKEN: CPT | Mod: CPTII,S$GLB,, | Performed by: NURSE PRACTITIONER

## 2022-04-05 PROCEDURE — 99215 OFFICE O/P EST HI 40 MIN: CPT | Mod: S$GLB,,, | Performed by: NURSE PRACTITIONER

## 2022-04-05 PROCEDURE — 93325 DOPPLER ECHO COLOR FLOW MAPG: CPT | Mod: 26,TXP,, | Performed by: INTERNAL MEDICINE

## 2022-04-05 PROCEDURE — 3044F PR MOST RECENT HEMOGLOBIN A1C LEVEL <7.0%: ICD-10-PCS | Mod: CPTII,S$GLB,, | Performed by: NURSE PRACTITIONER

## 2022-04-05 PROCEDURE — 93320 DOPPLER ECHO COMPLETE: CPT | Mod: 26,TXP,, | Performed by: INTERNAL MEDICINE

## 2022-04-05 PROCEDURE — 3044F HG A1C LEVEL LT 7.0%: CPT | Mod: CPTII,S$GLB,, | Performed by: NURSE PRACTITIONER

## 2022-04-05 PROCEDURE — 3079F PR MOST RECENT DIASTOLIC BLOOD PRESSURE 80-89 MM HG: ICD-10-PCS | Mod: CPTII,S$GLB,, | Performed by: NURSE PRACTITIONER

## 2022-04-05 PROCEDURE — 99999 PR PBB SHADOW E&M-EST. PATIENT-LVL IV: ICD-10-PCS | Mod: PBBFAC,,, | Performed by: NURSE PRACTITIONER

## 2022-04-05 PROCEDURE — 3079F DIAST BP 80-89 MM HG: CPT | Mod: CPTII,S$GLB,, | Performed by: NURSE PRACTITIONER

## 2022-04-05 PROCEDURE — 1160F PR REVIEW ALL MEDS BY PRESCRIBER/CLIN PHARMACIST DOCUMENTED: ICD-10-PCS | Mod: CPTII,S$GLB,, | Performed by: NURSE PRACTITIONER

## 2022-04-05 PROCEDURE — 99215 PR OFFICE/OUTPT VISIT, EST, LEVL V, 40-54 MIN: ICD-10-PCS | Mod: S$GLB,,, | Performed by: NURSE PRACTITIONER

## 2022-04-05 PROCEDURE — 3008F PR BODY MASS INDEX (BMI) DOCUMENTED: ICD-10-PCS | Mod: CPTII,S$GLB,, | Performed by: NURSE PRACTITIONER

## 2022-04-05 PROCEDURE — 93325 DOPPLER ECHO COLOR FLOW MAPG: CPT | Mod: TXP

## 2022-04-05 PROCEDURE — 93325 STRESS ECHO (CUPID ONLY): ICD-10-PCS | Mod: 26,TXP,, | Performed by: INTERNAL MEDICINE

## 2022-04-05 PROCEDURE — 63600175 PHARM REV CODE 636 W HCPCS: Mod: TXP | Performed by: INTERNAL MEDICINE

## 2022-04-05 PROCEDURE — 1160F RVW MEDS BY RX/DR IN RCRD: CPT | Mod: CPTII,S$GLB,, | Performed by: NURSE PRACTITIONER

## 2022-04-05 PROCEDURE — 93351 STRESS ECHO (CUPID ONLY): ICD-10-PCS | Mod: 26,TXP,, | Performed by: INTERNAL MEDICINE

## 2022-04-05 RX ORDER — DOBUTAMINE HYDROCHLORIDE 400 MG/100ML
30 INJECTION INTRAVENOUS
Status: COMPLETED | OUTPATIENT
Start: 2022-04-05 | End: 2022-04-05

## 2022-04-05 RX ORDER — PALONOSETRON 0.05 MG/ML
0.25 INJECTION, SOLUTION INTRAVENOUS ONCE
Status: CANCELLED
Start: 2022-04-06 | End: 2022-04-06

## 2022-04-05 RX ORDER — DEXAMETHASONE SODIUM PHOSPHATE 4 MG/ML
8 INJECTION, SOLUTION INTRA-ARTICULAR; INTRALESIONAL; INTRAMUSCULAR; INTRAVENOUS; SOFT TISSUE
Status: CANCELLED
Start: 2022-04-06

## 2022-04-05 RX ORDER — SODIUM CHLORIDE 0.9 % (FLUSH) 0.9 %
10 SYRINGE (ML) INJECTION
Status: CANCELLED | OUTPATIENT
Start: 2022-04-06

## 2022-04-05 RX ORDER — HEPARIN 100 UNIT/ML
500 SYRINGE INTRAVENOUS
Status: CANCELLED | OUTPATIENT
Start: 2022-04-06

## 2022-04-05 RX ADMIN — DOBUTAMINE HYDROCHLORIDE 30 MCG/KG/MIN: 400 INJECTION INTRAVENOUS at 02:04

## 2022-04-05 NOTE — TELEPHONE ENCOUNTER
Call returned. Questions related to the living donor evaluation process addressed. Understanding expressed. No additional questions or concerns expressed.     ----- Message from Miya Xiong sent at 4/5/2022 11:29 AM CDT -----  Regarding: speak to coordinator  Patient's wife ( Britteny ) calling to speak to coordinator with questions. Requesting a call back.    Call: [phone] (Mobile or  [phone]

## 2022-04-05 NOTE — PROGRESS NOTES
"                                                         PROGRESS NOTE    Subjective:       Patient ID: Romeo Larson is a 41 y.o. male.    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 Mr Larson is a 42 yo man who presents today for further management of suspected hilar cholangiocarcinoma. He presented with dark urine, abdominal pain and jaundice since August 2021. He presented to outside hospital ER with elevated bilirubin. MRCP was performed demonstrating bi-lobar intrahepatic bile duct dilation and a ~2cm ill defined mass at the hilum concerning for hilar cholangiocarcinoma. He was referred to the advanced endoscopy [place] at [facility]. ERCP confirmed severe, malignant appearing stricture involving right and left main hepatic ducts. Biliary stents were placed to relieve obstruction. EUS was performed. It showed an irregular hypoechoic mass where the hepatic duct bifurcates into the right and left hepatic ducts. The mass measured 11.4 mm by 11.3 mm in maximal cross-sectional diameter. FNA sampling of hilar lymph nodes was negative for metastatic disease. Bile duct biopsy showed "rare groups of glandular epithelium with mild atypia, strips of benign glandular epithelium intermixed with blood clot, and focal fibrous tissue with chronic inflammation."  CT C/A/P 10/27/21:  1. Intrahepatic biliary dilatation despite the presence of 2 biliary drains.  The patient is recent comparison MRI a revealed a possible central hepatic mass, concerning for either cholangiocarcinoma or hepatocellular carcinoma.  This is, however, not definitively demonstrated on today's exam.  There are enlarged lymph nodes present in the vicinity of the ana cristina hepatis and celiac axis as detailed above.  2. Scattered pulmonary nodules measuring up to 6 mm.  3. Other findings as detailed above.  He presents today for further management. Seen by Dr Jara and considered a " "candidate for liver transplant. Seen by Dr Cunningham last Friday. Scheduled for PET this Wednesday. 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   Discussed neoadjuvant chemoradiation with 5FU followed by neoadjuvant cis/gem prior to liver transplant.   2. Hospitalized 11/3/21-21 for fever 2/2 acute cholangitis. Repeat ERCP biopsy on 21 again non-diagnostic. Biopsy of the celiac lymph node was negative.   3. PET scan 11/3/21: "1. Wedge-shaped geographic hypermetabolic activity within the right lobe of the liver in a similar distribution to the intrahepatic biliary dilatation.  This could relate to inflammation from multiple etiologies including biliary stasis, cholangitis, and obstructive dilatation.  Neoplastic involvement would be difficult to exclude.  The liver is poorly evaluated given background activity. 2. Hypermetabolic lymphadenopathy is noted in a periportal and celiac distribution.  Infectious inflammatory and neoplastic etiologies are on the differential.  Index nodes have been measured. 3. Multiple pulmonary nodules are noted too small to categorize by PET-CT fusion as evidence seen on a prior CT of 10/27/2021.  CT for follow-up of size stability is necessary."  4. Completed chemoradiation with 5FU from . Started cis/gem on 22. Due for cycle 4 day 1 today    Interval History:   Mr Larson returns for cycle 4 day 8 of cis/gem. Tolerating chemo well. Case reviewed at tumor board. Seen by Dr Jara. Option of living donor discussed. His brother is willing to donate. He will complete stress test today to finish liver transplant workup. Denies any fevers, chills, or feeling ill. Had a cough a week ago but has resolved.     ECO    ROS:   A ten-point system review is obtained and negative except for what was stated in the Interval History.     Physical Examination:   Vital signs reviewed.   General: well hydrated, well developed, in no acute distress  HEENT: normocephalic, PERRLA, EOMI, " "anicteric sclerae, oropharynx clear  Neck: supple, no JVD, thyromegaly, cervical or supraclavicular lymphadenopathy  Lungs: clear breath sounds bilaterally, no wheezing, rales, or rhonchi  Chest: port site clean  Heart: RRR, no M/R/G  Abdomen: soft, no tenderness, non-distended, no hepatosplenomegaly, mass, or hernia. BS present  Extremities: no clubbing, cyanosis, or edema  Skin: no rash, ulcer, or open wounds  Neuro: alert and oriented x 4, no focal neuro deficit  Psych: pleasant and appropriate mood and affect    Objective:     Laboratory Data:  Labs reviewed. CA 19-9 continues to decrease, 68.4. .    Imaging Data:  PET scan 11/3/21: "1. Wedge-shaped geographic hypermetabolic activity within the right lobe of the liver in a similar distribution to the intrahepatic biliary dilatation.  This could relate to inflammation from multiple etiologies including biliary stasis, cholangitis, and obstructive dilatation.  Neoplastic involvement would be difficult to exclude.  The liver is poorly evaluated given background activity. 2. Hypermetabolic lymphadenopathy is noted in a periportal and celiac distribution.  Infectious inflammatory and neoplastic etiologies are on the differential.  Index nodes have been measured. 3. Multiple pulmonary nodules are noted too small to categorize by PET-CT fusion as evidence seen on a prior CT of 10/27/2021.  CT for follow-up of size stability is necessary."    CT C/A/P 1/18/22:  1. Patient with a history of suspected hilar cholangiocarcinoma with no evidence of distant metastatic disease in the chest, abdomen, or pelvis.  There has been an interval reduction in the extent of the patient's intrahepatic biliary dilatation as compared to the previous study.  Two index lymph nodes in the vicinity of the ana cristina hepatis/gastroesophageal junction or smaller in size on today's examination.  2. Stable solid pulmonary nodules measuring up to 5 mm in size.  No new pulmonary nodules on today's " study.    CT C/A/P 3/21/22:  No discrete hepatic mass is seen.  Biliary stents remain in place and there is persistent intrahepatic biliary duct dilatation not appearing significantly changed compared to the prior exam.  Air in the gallbladder with gallbladder wall slightly prominent and questionable trace pericholecystic fluid or fat stranding not significantly changed compared to the prior study.     Two index nodes previously measured near the EG junction/ana cristina hepatis do not appear significantly changed.     Small nodules in the lungs largest measuring 4 mm not significantly changed compared to the prior exam    Assessment and Plan:     1. Hilar cholangiocarcinoma    2. 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 Secondary diabetes    5. Essential hypertension    6. Chemotherapy induced neutropenia      1-3  - Mr Larson is a 42 yo man with likely stage I hilar cholangiocarcinoma. Biopsy non-diagnostic but clinical picture most consistent with hilar cholangiocarcinoma. He is a candidate for liver transplant. Completed chemoradiation with 5FU from 11/29/21-1/5/2022. Started cis/gem on 1/19/22.   - doing well. Counts adequate  - cycle 4 day 8 cis/gem tomorrow  - return in 2 weeks for C5D1, udenyca on day 9  - will continue with chemo until he gets liver transplant    4.  - 2/2 IV dex with chemo  - decreased dex from 12 mg to 8 mg  - c/w metformin 500 mg bid    5.  - BP controlled  - c/w current medication    6.  - ANC 1764, adequate for treatment tomorrow. Udenyca on day 9 of each cycle      Follow-up:     RTC in 2 weeks    Patient is in agreement with the proposed treatment plan. All questions were answered to the patient's satisfaction. Pt knows to call clinic if anything is needed before the next clinic visit.    Ivonne Soni, MSN, APRN, FNP-C  Hematology and Medical Oncology  Nurse Practitioner to Dr. Cristofer Melton  Nurse Practitioner, [facility]        Route Chart for Scheduling    Med Onc Chart Routing      Follow up with physician 2 weeks. RTC as scheduled   Follow up with JOSEPH    Labs    Imaging    Pharmacy appointment    Other referrals          Treatment Plan Information   OP GEMCITABINE CISPLATIN Q3W   Young Wesley MD   Upcoming Treatment Dates - OP GEMCITABINE CISPLATIN Q3W    4/6/2022       Chemotherapy       gemcitabine (GEMZAR) 2,020 mg in sodium chloride 0.9% 250 mL chemo infusion       CISplatin (PLATINOL) 25 mg/m2 = 51 mg in sodium chloride 0.9% 500 mL chemo infusion       Pre-Hydration       sodium chloride 0.9% 1,000 mL with magnesium sulfate 1 g, potassium chloride 20 mEq infusion       Post-Hydration       sodium chloride 0.9% bolus 500 mL  4/20/2022       Chemotherapy       gemcitabine (GEMZAR) 2,020 mg in sodium chloride 0.9% 250 mL chemo infusion       CISplatin (PLATINOL) 25 mg/m2 = 51 mg in sodium chloride 0.9% 500 mL chemo infusion       Pre-Hydration       sodium chloride 0.9% 1,000 mL with magnesium sulfate 1 g, potassium chloride 20 mEq infusion       Post-Hydration       sodium chloride 0.9% bolus 500 mL  4/27/2022       Chemotherapy       gemcitabine (GEMZAR) 2,020 mg in sodium chloride 0.9% 250 mL chemo infusion       CISplatin (PLATINOL) 25 mg/m2 = 51 mg in sodium chloride 0.9% 500 mL chemo infusion       Pre-Hydration       sodium chloride 0.9% 1,000 mL with magnesium sulfate 1 g, potassium chloride 20 mEq infusion       Post-Hydration       sodium chloride 0.9% bolus 500 mL  5/11/2022       Chemotherapy       gemcitabine (GEMZAR) 2,020 mg in sodium chloride 0.9% 250 mL chemo infusion       CISplatin (PLATINOL) 25 mg/m2 = 51 mg in sodium chloride 0.9% 500 mL chemo infusion       Pre-Hydration       sodium chloride 0.9% 1,000 mL with magnesium sulfate 1 g, potassium chloride 20 mEq infusion       Post-Hydration       sodium chloride 0.9% bolus 500 mL    Supportive Plan Information  OP FILGRASTIM 480 MCG    Young Wesley MD   Upcoming Treatment Dates - OP FILGRASTIM 480 MCG    2/9/2022       Medications       filgrastim-sndz (ZARXIO) injection 480 mcg/0.8 mL (Preferred Regimen)  2/10/2022       Medications       filgrastim-sndz (ZARXIO) injection 480 mcg/0.8 mL (Preferred Regimen)  2/11/2022       Medications       filgrastim-sndz (ZARXIO) injection 480 mcg/0.8 mL (Preferred Regimen)  2/12/2022       Medications       filgrastim-sndz (ZARXIO) injection 480 mcg/0.8 mL (Preferred Regimen)      Treatment Plan Information   OP GEMCITABINE CISPLATIN Q3W   Young Wesley MD   Upcoming Treatment Dates - OP GEMCITABINE CISPLATIN Q3W    4/6/2022       Chemotherapy       gemcitabine (GEMZAR) 2,020 mg in sodium chloride 0.9% 250 mL chemo infusion       CISplatin (PLATINOL) 25 mg/m2 = 51 mg in sodium chloride 0.9% 500 mL chemo infusion       Pre-Hydration       sodium chloride 0.9% 1,000 mL with magnesium sulfate 1 g, potassium chloride 20 mEq infusion       Post-Hydration       sodium chloride 0.9% bolus 500 mL  4/20/2022       Chemotherapy       gemcitabine (GEMZAR) 2,020 mg in sodium chloride 0.9% 250 mL chemo infusion       CISplatin (PLATINOL) 25 mg/m2 = 51 mg in sodium chloride 0.9% 500 mL chemo infusion       Pre-Hydration       sodium chloride 0.9% 1,000 mL with magnesium sulfate 1 g, potassium chloride 20 mEq infusion       Post-Hydration       sodium chloride 0.9% bolus 500 mL  4/27/2022       Chemotherapy       gemcitabine (GEMZAR) 2,020 mg in sodium chloride 0.9% 250 mL chemo infusion       CISplatin (PLATINOL) 25 mg/m2 = 51 mg in sodium chloride 0.9% 500 mL chemo infusion       Pre-Hydration       sodium chloride 0.9% 1,000 mL with magnesium sulfate 1 g, potassium chloride 20 mEq infusion       Post-Hydration       sodium chloride 0.9% bolus 500 mL  5/11/2022       Chemotherapy       gemcitabine (GEMZAR) 2,020 mg in sodium chloride 0.9% 250 mL chemo infusion       CISplatin (PLATINOL) 25 mg/m2 = 51 mg in sodium  chloride 0.9% 500 mL chemo infusion       Pre-Hydration       sodium chloride 0.9% 1,000 mL with magnesium sulfate 1 g, potassium chloride 20 mEq infusion       Post-Hydration       sodium chloride 0.9% bolus 500 mL    Supportive Plan Information  OP FILGRASTIM 480 MCG   Young Wesley MD   Upcoming Treatment Dates - OP FILGRASTIM 480 MCG    2/9/2022       Medications       filgrastim-sndz (ZARXIO) injection 480 mcg/0.8 mL (Preferred Regimen)  2/10/2022       Medications       filgrastim-sndz (ZARXIO) injection 480 mcg/0.8 mL (Preferred Regimen)  2/11/2022       Medications       filgrastim-sndz (ZARXIO) injection 480 mcg/0.8 mL (Preferred Regimen)  2/12/2022       Medications       filgrastim-sndz (ZARXIO) injection 480 mcg/0.8 mL (Preferred Regimen)

## 2022-04-06 ENCOUNTER — TELEPHONE (OUTPATIENT)
Dept: INFUSION THERAPY | Facility: HOSPITAL | Age: 42
End: 2022-04-06
Payer: COMMERCIAL

## 2022-04-06 ENCOUNTER — COMMITTEE REVIEW (OUTPATIENT)
Dept: TRANSPLANT | Facility: CLINIC | Age: 42
End: 2022-04-06
Payer: COMMERCIAL

## 2022-04-06 ENCOUNTER — INFUSION (OUTPATIENT)
Dept: INFUSION THERAPY | Facility: HOSPITAL | Age: 42
End: 2022-04-06
Attending: INTERNAL MEDICINE
Payer: COMMERCIAL

## 2022-04-06 VITALS
SYSTOLIC BLOOD PRESSURE: 123 MMHG | TEMPERATURE: 99 F | BODY MASS INDEX: 28.95 KG/M2 | WEIGHT: 191 LBS | HEIGHT: 68 IN | RESPIRATION RATE: 18 BRPM | HEART RATE: 74 BPM | DIASTOLIC BLOOD PRESSURE: 84 MMHG

## 2022-04-06 DIAGNOSIS — K83.1 BILIARY OBSTRUCTION: Primary | ICD-10-CM

## 2022-04-06 PROCEDURE — 96361 HYDRATE IV INFUSION ADD-ON: CPT | Mod: PN

## 2022-04-06 PROCEDURE — 25000003 PHARM REV CODE 250: Mod: PN | Performed by: INTERNAL MEDICINE

## 2022-04-06 PROCEDURE — A4216 STERILE WATER/SALINE, 10 ML: HCPCS | Mod: PN | Performed by: INTERNAL MEDICINE

## 2022-04-06 PROCEDURE — 96375 TX/PRO/DX INJ NEW DRUG ADDON: CPT | Mod: PN

## 2022-04-06 PROCEDURE — 96417 CHEMO IV INFUS EACH ADDL SEQ: CPT | Mod: PN

## 2022-04-06 PROCEDURE — 96413 CHEMO IV INFUSION 1 HR: CPT | Mod: PN

## 2022-04-06 PROCEDURE — 96366 THER/PROPH/DIAG IV INF ADDON: CPT | Mod: PN

## 2022-04-06 PROCEDURE — 63600175 PHARM REV CODE 636 W HCPCS: Mod: JG,PN | Performed by: INTERNAL MEDICINE

## 2022-04-06 RX ORDER — SODIUM CHLORIDE 9 MG/ML
500 INJECTION, SOLUTION INTRAVENOUS
Status: COMPLETED | OUTPATIENT
Start: 2022-04-06 | End: 2022-04-06

## 2022-04-06 RX ORDER — DEXAMETHASONE SODIUM PHOSPHATE 4 MG/ML
8 INJECTION, SOLUTION INTRA-ARTICULAR; INTRALESIONAL; INTRAMUSCULAR; INTRAVENOUS; SOFT TISSUE
Status: COMPLETED | OUTPATIENT
Start: 2022-04-06 | End: 2022-04-06

## 2022-04-06 RX ORDER — HEPARIN 100 UNIT/ML
500 SYRINGE INTRAVENOUS
Status: DISCONTINUED | OUTPATIENT
Start: 2022-04-06 | End: 2022-04-06 | Stop reason: HOSPADM

## 2022-04-06 RX ORDER — SODIUM CHLORIDE 0.9 % (FLUSH) 0.9 %
10 SYRINGE (ML) INJECTION
Status: DISCONTINUED | OUTPATIENT
Start: 2022-04-06 | End: 2022-04-06 | Stop reason: HOSPADM

## 2022-04-06 RX ORDER — PALONOSETRON 0.05 MG/ML
0.25 INJECTION, SOLUTION INTRAVENOUS ONCE
Status: COMPLETED | OUTPATIENT
Start: 2022-04-06 | End: 2022-04-06

## 2022-04-06 RX ADMIN — DEXAMETHASONE SODIUM PHOSPHATE 8 MG: 4 INJECTION INTRA-ARTICULAR; INTRALESIONAL; INTRAMUSCULAR; INTRAVENOUS; SOFT TISSUE at 11:04

## 2022-04-06 RX ADMIN — PALONOSETRON 0.25 MG: 0.05 INJECTION, SOLUTION INTRAVENOUS at 11:04

## 2022-04-06 RX ADMIN — CISPLATIN 51 MG: 1 INJECTION INTRAVENOUS at 12:04

## 2022-04-06 RX ADMIN — Medication 10 ML: at 03:04

## 2022-04-06 RX ADMIN — SODIUM CHLORIDE: 0.9 INJECTION, SOLUTION INTRAVENOUS at 08:04

## 2022-04-06 RX ADMIN — Medication 500 UNITS: at 03:04

## 2022-04-06 RX ADMIN — SODIUM CHLORIDE 500 ML: 0.9 INJECTION, SOLUTION INTRAVENOUS at 01:04

## 2022-04-06 RX ADMIN — GEMCITABINE 2000 MG: 38 INJECTION, SOLUTION INTRAVENOUS at 11:04

## 2022-04-06 RX ADMIN — APREPITANT 130 MG: 130 INJECTION, EMULSION INTRAVENOUS at 11:04

## 2022-04-06 RX ADMIN — MAGNESIUM SULFATE HEPTAHYDRATE 500 ML/HR: 500 INJECTION, SOLUTION INTRAMUSCULAR; INTRAVENOUS at 09:04

## 2022-04-06 NOTE — COMMITTEE REVIEW
Romeo Marsha's case presented to selection committee.  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  Patient has no absolute contraindications for liver transplant.  Patient will be listed pending confirmation with Dr. Jara that it is ok to proceed, and financial approval.  Patient is a good candidate to receive a liver from live donor.    Patient will accept HBcAb positive livers.  Patient will accept HCVAB positive livers.  Patient will accept DCD livers.  Patient will accept HCV GUSTABO positive livers  Patient will accept HBV GUSTABO positive livers  I was present at the committee meeting and attest to the decision of the committee.    Alli Viveros  04/06/2022

## 2022-04-06 NOTE — PROGRESS NOTES
Transplant Surgery  Liver Transplant Recipient Evaluation    Referring Provider: Pravin Lacey    Subjective:     Reason for Visit: evaluation for liver transplant    History of Present Illness: Romeo Larson is a 41 y.o. male who is being evaluated for liver transplant due to Primary Liver Malignancy: Cholangiocarcinoma (CH-CA).     External provider notes reviewed: Yes    Review of Systems   Constitutional: Positive for fatigue.   Respiratory: Negative.    Cardiovascular: Negative.    Gastrointestinal: Negative.    Genitourinary: Negative.      Objective:     Physical Exam  Constitutional:       Appearance: Normal appearance.   Cardiovascular:      Pulses: Normal pulses.      Heart sounds: Normal heart sounds.   Pulmonary:      Effort: Pulmonary effort is normal.      Breath sounds: Normal breath sounds.   Abdominal:      General: There is no distension.      Palpations: Abdomen is soft.      Tenderness: There is no abdominal tenderness.      Hernia: No hernia is present.   Skin:     General: Skin is warm and dry.   Neurological:      General: No focal deficit present.      Mental Status: He is alert.   Psychiatric:         Mood and Affect: Mood normal.         Behavior: Behavior normal.         MELD-Na score: 6 at 3/25/2022 11:06 AM  MELD score: 6 at 3/25/2022 11:06 AM  Calculated from:  Serum Creatinine: 0.9 mg/dL (Using min of 1 mg/dL) at 3/25/2022 11:06 AM  Serum Sodium: 139 mmol/L (Using max of 137 mmol/L) at 3/24/2022  8:41 AM  Total Bilirubin: 0.7 mg/dL (Using min of 1 mg/dL) at 3/24/2022  8:41 AM  INR(ratio): 1.0 at 3/24/2022  8:41 AM  Age: 41 years    Diagnostics:  The following labs have been reviewed: CBC  CMP  PT  INR  The following radiology images have been independently reviewed and interpreted: Iliac doppler  CT Abd/Pelvis    Diagnoses:  1. Hilar cholangiocarcinoma    2. Organ transplant candidate    3. Pre-transplant evaluation for liver transplant        Transplant Surgery -  Candidacy   Assessment/Plan:     Transplant Candidacy: Romeo Larson is a 41 y.o. 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  Based on available information, Romeo is a suitable liver transplant candidate.  He will be presented to selection committee after all tests and evaluations are complete.    Abdominal/Body Habitus: Average habitus. No concerns    Ascites: None    SBP History: None     Portal vein: No evidence of PVT on US or cross sectional imaging    Biliary/Pancreas: Recurrent ERCPs with left and right duct and branchs with stenoses, last 3/31/22. Left and right duct stents in place.    Abdominal Surgery: None    Other surgical considerations:  Cholangiocarcinoma s/p neoadjuvant chemoradiation and now on maintenance chemotherapy. He has been restaged and reviewed in conference and confirmed no evidence of disease progression or extrahepatic disease        Additional testing to be completed according to Liver : Written Order Guideline for Adult Liver Transplant Evaluation (LI-02)    Interpretation of tests and discussion of patient management involves all members of the multidisciplinary transplant team.    Prem Ye MD           [facility] Patient Status  Functional Status: 50% - Requires considerable assistance and frequent medical care  Physical Capacity: No Limitations    Counseling: I discussed with Romeo the benefits of liver transplantation.  We discussed the evaluation and listing procedures.  We discussed the MELD system and the associated waiting times.  We discussed national and center specific survival results.  We discussed the option of being multiply listed in different OPOs.  We discussed the option of living donation versus  donor transplantation and the advantages and relative disadvantages of each.   We discussed the risks, benefits and potential complications related to surgery including the risks  related to anesthesia, bleeding, infection, primary non-function of the allograft, the risk of reoperation as well as the risk of death.  We discussed the typical post-operative course, length of hospitalization, the long-term use of immunosuppressive therapy as well as the need for long-term routine follow-up.    Patient advised that it is recommended that all transplant candidates, and their close contacts and household members receive Covid vaccination.    Coronavirus disease (COVID-19) caused by severe acute respiratory virus coronavirus 2 (SARS-C0V 2) is associated with increased mortality in solid organ transplant recipients (SOT) compared to non-transplant patients. Vaccine responses to vaccination are depressed against SARS-CoV2 compared to normal individuals but improve with third vaccination doses. Vaccination prior to SOT provides both the best opportunity for transplant candidates to develop protective immunity and to reduce the risk of serious COVID19 infections post transplantation. Organ transplant candidates at [facility] will be required to receive SARS-CoV-2 vaccination prior to being listed with a an active status, whenever possible. Exceptions will be made for disability related reasons or for sincerely held Faith beliefs.     PHS: I discussed the use of organs from donors with PHS risk criteria, including the testing protocols utilized, as well as data from the literature regarding the likelihood of transmission of hepatitis or HIV.  The patient is willing to consider such grafts.  DCD: I discussed the use of organs recovered by donation after cardiac death (DCD), including slightly decreased graft survival and greater risk of arterial and biliary complications. The potential advantage to the recipient is possibly receiving a transplant sooner by accepting such an organ. The patient is willing to consider such grafts.  HBcAb: I discussed the use of organs  from donors with HBcAb in conjunction with long term use of HBV antiviral drugs, such as lamivudine. The small but measurable risk of hepatitis B seroconversion was discussed as well as the potentially life long need to continue antiviral drugs. The patient is willing to consider such grafts.  HCV Non-viremic recipient: I discussed the use of HCV-positive organs in naive recipients, including the risk of viral transmission to the patients or others, potential insurance barriers for antiviral medication coverage, risk for fibrosing cholestatic hepatitis, death or graft loss. The potential advantage to the recipient is the possibility of receiving a transplant sooner with decreased mortality risk by accepting such an organ. The patient is willing to consider such grafts.  LDLT: I discussed the nature of living donor liver transplant, including donor risks and more frequent recipient complications. The patient is willing to consider such grafts.

## 2022-04-06 NOTE — PLAN OF CARE
Problem: Adult Inpatient Plan of Care  Goal: Patient-Specific Goal (Individualized)  4/6/2022 0839 by Genny Cantu RN  Outcome: Ongoing, Progressing  Flowsheets (Taken 4/6/2022 0839)  Anxieties, Fears or Concerns: none  Individualized Care Needs: recliner, lap top from home, coffee  Patient-Specific Goals (Include Timeframe): no s/sx of rx during tx  4/6/2022 0838 by Genny Cantu RN  Outcome: Ongoing, Progressing     Problem: Fatigue  Goal: Improved Activity Tolerance  Intervention: Promote Improved Energy  Flowsheets (Taken 4/6/2022 0839)  Fatigue Management:   frequent rest breaks encouraged   paced activity encouraged  Sleep/Rest Enhancement:   natural light exposure provided   noise level reduced  Activity Management:   Ambulated -L4   Ambulated to bathroom - L4   Ambulated in mesa - L4   Up in chair - L3

## 2022-04-07 ENCOUNTER — INFUSION (OUTPATIENT)
Dept: INFUSION THERAPY | Facility: HOSPITAL | Age: 42
End: 2022-04-07
Attending: INTERNAL MEDICINE
Payer: COMMERCIAL

## 2022-04-07 DIAGNOSIS — K83.1 BILIARY OBSTRUCTION: Primary | ICD-10-CM

## 2022-04-07 PROCEDURE — 63600175 PHARM REV CODE 636 W HCPCS: Mod: TB | Performed by: INTERNAL MEDICINE

## 2022-04-07 PROCEDURE — 96372 THER/PROPH/DIAG INJ SC/IM: CPT

## 2022-04-07 RX ADMIN — PEGFILGRASTIM-CBQV 6 MG: 6 INJECTION, SOLUTION SUBCUTANEOUS at 03:04

## 2022-04-08 ENCOUNTER — HOSPITAL ENCOUNTER (OUTPATIENT)
Dept: RADIOLOGY | Facility: HOSPITAL | Age: 42
Discharge: HOME | End: 2022-04-08
Attending: INTERNAL MEDICINE
Payer: COMMERCIAL

## 2022-04-08 ENCOUNTER — TELEPHONE (OUTPATIENT)
Dept: TRANSPLANT | Facility: CLINIC | Age: 42
End: 2022-04-08
Payer: COMMERCIAL

## 2022-04-08 DIAGNOSIS — Z76.82 ORGAN TRANSPLANT CANDIDATE: ICD-10-CM

## 2022-04-08 PROCEDURE — 77080 DEXA BONE DENSITY SPINE HIP: ICD-10-PCS | Mod: 26,TXP,, | Performed by: RADIOLOGY

## 2022-04-08 PROCEDURE — 77080 DXA BONE DENSITY AXIAL: CPT | Mod: TC,PO,TXP

## 2022-04-08 PROCEDURE — 77080 DXA BONE DENSITY AXIAL: CPT | Mod: 26,TXP,, | Performed by: RADIOLOGY

## 2022-04-08 NOTE — TELEPHONE ENCOUNTER
Dr. Jara messaged, informing patient was presented at Liver Selection and approved for listing pending his review and approval.      Per Dr. Jara, it's ok to move forward with listing.     Letter of medical necessity faxed to [facility], .

## 2022-04-11 ENCOUNTER — TELEPHONE (OUTPATIENT)
Dept: TRANSPLANT | Facility: CLINIC | Age: 42
End: 2022-04-11
Payer: COMMERCIAL

## 2022-04-11 NOTE — TELEPHONE ENCOUNTER
"  LIVER WAIT LISTING NOTE    **NOTE:   IF ANY EXTERNAL LABS ARE USED FOR LISTING THE VALUES AND DATES MUST BE ENTERED IN EPIC TO GENERATE THIS NOTE**    Date of Financial clearance to list: 2022    [facility]:        Organ: Liver    Last Name: Marsha  First Name: Romeo     : 1980      Gender:     male         MRN#: [id number]                                   State of Permanent Residence:  [address] [place] [id number]    Ethnicity: Not  or /a   Race:      White    CLINICAL INFORMATION       ABO  ABO Blood Group:   O NEG     ABO Confirmation: (THESE DATES MUST BE PRIOR TO THE LIST DATE AND SUPPORTED BY SEPARATE LAB REPORTS)    Internal Results    Lab Results   Component Value Date    GROUPTRH O NEG 2022    GROUPTRH O NEG 10/16/2021     No results found for: ABO    External Results    ABO Date 1:  ABO Result 1:  ABO Date 2:  ABO Result 2:     Are either of these ABO results based on External Labs? no  (If Yes, STOP and go to source document in Media Tab for verification).    VITALS  Height:    Ht Readings from Last 1 Encounters:   22 5' 8" (1.727 m)     Weight:    Wt Readings from Last 1 Encounters:   22 86.7 kg (191 lb 0.5 oz)       LIVER ORGAN INFORMATION  Candidate Medical Urgency Status: MELD 6    Number of Previous Transplants: 0    MELD/PELD Data Collection:  Had dialysis twice, or 24 hours of CVVHD, within 1 week prior to the serum creatinine test: No  Encephalopathy: none Date: 2022  Ascites: none Date: 2022          MELD Score:  MELD-Na score: 6 at 2022  8:09 AM  MELD score: 6 at 2022  8:09 AM  Calculated from:  Serum Creatinine: 0.9 mg/dL at 2022  8:09 AM  Serum Sodium: 135 mmol/L at 2022  8:09 AM  Total Bilirubin: 0.6 mg/dL at 2022  8:09 AM  INR (ratio): 1.0 at 3/24/2022  8:41 AM  Age: 41 years    Lab Results   Component Value Date    ALBUMIN 3.6 2022     Additional Organs: none  Kidney: No    If Kidney is "Yes" " above, check Diagnosis and enter the medical eligibility below for a simultaneous liver/kidney:    Diagnosis: Chronic kidney disease (CKD) with measured or calculated GFR less than or equal to 60 ml/min for greater than 90 consecutive days. At least one of the following must qualify for CKD:  Date Begun Dialysis CrCl (ml/min)  Must be < or = 30 eGFR (ml/min) Must be < or = 30    Yes/no:                    Diagnosis: Sustained acute kidney injury (must be confirmed at least once every 7 days). Please select at least one of the following criteria:  Date of test or treatment Dialysis received CrCl (ml/min) Must be < or = 30 eGFR (ml/min) Must be < or = 25 Number of days since previous test or treatment (must be less than or equal to 7 days)    Yes/No:                  Diagnosis: Metabolic disease, Check all diagnosis that apply:     Hyperoxaluria    Atypical hemolytic uremic syndrome (HUS) from mutations in factor H or factor I    Familial non-neuropathic systemic amyloidosis    Methylmalonic aciduria     Transplant nephrologist confirming candidate's most recent diagnosis for SLK: N/A               ## Please submit a separate Kidney Listing note for combined Liver/Kidney patients. ##    Will Recipient Accept?   Accept HBcAB Positive Organ:  yes  Accept HBV GUSTABO Positive Organ:  yes  Accept HCV Antibody Positive Organ: yes   Accept HCV GUSTABO Positive Organ:  yes                        Local: No                           Import: No  Accept DCD Organ:    yes  Minimum acceptable donor age:  5 years  Maximum acceptable donor age:  99 years  Minimum acceptable donor weight:  40 lbs    Maximum acceptable donor weight:  440 lb  Maximum miles the organ or  Recovery team will travel:   5000 miles    TCR Information    Citizenship: [place] Citizen   Country of permanent residence:   Year of entry to the [place]:   Highest education level: Attended College/Technical School    Patient on Life Support: No  Functional Status: 90% - able to carry  on normal activity, minor symptoms of disease  Working for income: yes  If yes, working activity level: Working Full Time  Previous Pancreas Islet Infusion - No  Source of payment: Public Insurance - Medicare FFS (Fee for service)  Diabetes: No  Any previous malignancy: Yes, Other (Cholangiocarcinoma)  Neoadjuvant Therapy: Yes  Has candidate ever had a diagnosis of HCC: No    Liver Medical Factors  Previous abdominal surgery: No  Spontaneous Bacterial Peritonitis: No  History of Portal Vein Thrombosis: No  Transjugular Intrahepatic Portosystemic Shunt: No    Blood type x2 verified by Altagracia Mesa RN

## 2022-04-11 NOTE — TELEPHONE ENCOUNTER
Patient listed April 11, 2022 with medical MELD score 6.  Patient notified and advised he is active on the liver transplant waitlist and eligible for organ offers. Primary and back-up offers, MELD score, and MELD exception process discussed.  Patient advised he should stay prepared for organ offers, which may come day or night. Patient instructed to notify the transplant department of any changes in contact numbers, insurance or caregiver plan.  All questions and concerns addressed. Understanding expressed.

## 2022-04-12 ENCOUNTER — TELEPHONE (OUTPATIENT)
Dept: TRANSPLANT | Facility: CLINIC | Age: 42
End: 2022-04-12
Payer: COMMERCIAL

## 2022-04-12 NOTE — TELEPHONE ENCOUNTER
Initial MELD exception request approved April 12, 2022. Patient notified. Informed updated surveillance imaging and CA 19-9 will have to be submitted every 90 days.  All questions and concerns addressed. Understanding expressed.

## 2022-04-13 ENCOUNTER — PATIENT MESSAGE (OUTPATIENT)
Dept: ENDOSCOPY | Facility: HOSPITAL | Age: 42
End: 2022-04-13
Payer: COMMERCIAL

## 2022-04-13 ENCOUNTER — TELEPHONE (OUTPATIENT)
Dept: ENDOSCOPY | Facility: HOSPITAL | Age: 42
End: 2022-04-13
Payer: COMMERCIAL

## 2022-04-13 NOTE — TELEPHONE ENCOUNTER
Telephoned pt to schedule ERCP.  Spoke with pt and procedure scheduled for 6/10/22 at 8:00am.  Pt requested Dr. Dick Pereyra.  Pt is fully vaccinated-instructed pt to bring vaccine card day of procedure.  Reviewed medical history and medications.  Instructed on procedure and prep.  Patient verbalized understanding of instructions.  Copy of instructions sent via patient portal.

## 2022-04-18 ENCOUNTER — LAB VISIT (OUTPATIENT)
Dept: LAB | Facility: HOSPITAL | Age: 42
End: 2022-04-18
Attending: INTERNAL MEDICINE
Payer: COMMERCIAL

## 2022-04-18 DIAGNOSIS — C24.0 HILAR CHOLANGIOCARCINOMA: ICD-10-CM

## 2022-04-18 LAB
ALBUMIN SERPL BCP-MCNC: 3.7 G/DL (ref 3.5–5.2)
ALP SERPL-CCNC: 177 U/L (ref 55–135)
ALT SERPL W/O P-5'-P-CCNC: 33 U/L (ref 10–44)
ANION GAP SERPL CALC-SCNC: 8 MMOL/L (ref 8–16)
ANISOCYTOSIS BLD QL SMEAR: SLIGHT
AST SERPL-CCNC: 24 U/L (ref 10–40)
BASO STIPL BLD QL SMEAR: ABNORMAL
BASOPHILS NFR BLD: 1 % (ref 0–1.9)
BILIRUB SERPL-MCNC: 0.4 MG/DL (ref 0.1–1)
BUN SERPL-MCNC: 10 MG/DL (ref 6–20)
CALCIUM SERPL-MCNC: 9.6 MG/DL (ref 8.7–10.5)
CANCER AG19-9 SERPL-ACNC: 36.6 U/ML (ref 0–40)
CHLORIDE SERPL-SCNC: 105 MMOL/L (ref 95–110)
CO2 SERPL-SCNC: 25 MMOL/L (ref 23–29)
CREAT SERPL-MCNC: 0.9 MG/DL (ref 0.5–1.4)
DIFFERENTIAL METHOD: ABNORMAL
EOSINOPHIL NFR BLD: 0 % (ref 0–8)
ERYTHROCYTE [DISTWIDTH] IN BLOOD BY AUTOMATED COUNT: 18.8 % (ref 11.5–14.5)
EST. GFR  (AFRICAN AMERICAN): >60 ML/MIN/1.73 M^2
EST. GFR  (NON AFRICAN AMERICAN): >60 ML/MIN/1.73 M^2
GLUCOSE SERPL-MCNC: 125 MG/DL (ref 70–110)
HCT VFR BLD AUTO: 22.7 % (ref 40–54)
HGB BLD-MCNC: 7.1 G/DL (ref 14–18)
IMM GRANULOCYTES # BLD AUTO: ABNORMAL K/UL (ref 0–0.04)
IMM GRANULOCYTES NFR BLD AUTO: ABNORMAL % (ref 0–0.5)
LYMPHOCYTES NFR BLD: 6 % (ref 18–48)
MAGNESIUM SERPL-MCNC: 1.9 MG/DL (ref 1.6–2.6)
MCH RBC QN AUTO: 33.8 PG (ref 27–31)
MCHC RBC AUTO-ENTMCNC: 31.3 G/DL (ref 32–36)
MCV RBC AUTO: 108 FL (ref 82–98)
METAMYELOCYTES NFR BLD MANUAL: 5 %
MONOCYTES NFR BLD: 6 % (ref 4–15)
MYELOCYTES NFR BLD MANUAL: 2 %
NEUTROPHILS NFR BLD: 76 % (ref 38–73)
NEUTS BAND NFR BLD MANUAL: 4 %
NRBC BLD-RTO: 5 /100 WBC
OVALOCYTES BLD QL SMEAR: ABNORMAL
PLATELET # BLD AUTO: 44 K/UL (ref 150–450)
PMV BLD AUTO: 11.4 FL (ref 9.2–12.9)
POIKILOCYTOSIS BLD QL SMEAR: SLIGHT
POLYCHROMASIA BLD QL SMEAR: ABNORMAL
POTASSIUM SERPL-SCNC: 4.4 MMOL/L (ref 3.5–5.1)
PROT SERPL-MCNC: 6.4 G/DL (ref 6–8.4)
RBC # BLD AUTO: 2.1 M/UL (ref 4.6–6.2)
SODIUM SERPL-SCNC: 138 MMOL/L (ref 136–145)
WBC # BLD AUTO: 7.67 K/UL (ref 3.9–12.7)

## 2022-04-18 PROCEDURE — 36415 COLL VENOUS BLD VENIPUNCTURE: CPT | Mod: TXP | Performed by: INTERNAL MEDICINE

## 2022-04-18 PROCEDURE — 85007 BL SMEAR W/DIFF WBC COUNT: CPT | Mod: NTX | Performed by: INTERNAL MEDICINE

## 2022-04-18 PROCEDURE — 80053 COMPREHEN METABOLIC PANEL: CPT | Mod: NTX | Performed by: INTERNAL MEDICINE

## 2022-04-18 PROCEDURE — 83735 ASSAY OF MAGNESIUM: CPT | Mod: NTX | Performed by: INTERNAL MEDICINE

## 2022-04-18 PROCEDURE — 85027 COMPLETE CBC AUTOMATED: CPT | Mod: NTX | Performed by: INTERNAL MEDICINE

## 2022-04-18 PROCEDURE — 86301 IMMUNOASSAY TUMOR CA 19-9: CPT | Mod: NTX | Performed by: INTERNAL MEDICINE

## 2022-04-19 ENCOUNTER — OFFICE VISIT (OUTPATIENT)
Dept: HEMATOLOGY/ONCOLOGY | Facility: CLINIC | Age: 42
End: 2022-04-19
Payer: COMMERCIAL

## 2022-04-19 VITALS
OXYGEN SATURATION: 98 % | BODY MASS INDEX: 29.47 KG/M2 | TEMPERATURE: 99 F | HEART RATE: 91 BPM | HEIGHT: 68 IN | RESPIRATION RATE: 18 BRPM | SYSTOLIC BLOOD PRESSURE: 119 MMHG | WEIGHT: 194.44 LBS | DIASTOLIC BLOOD PRESSURE: 84 MMHG

## 2022-04-19 DIAGNOSIS — D84.821 IMMUNODEFICIENCY SECONDARY TO CHEMOTHERAPY: ICD-10-CM

## 2022-04-19 DIAGNOSIS — Z79.899 IMMUNODEFICIENCY SECONDARY TO CHEMOTHERAPY: ICD-10-CM

## 2022-04-19 DIAGNOSIS — T45.1X5A CHEMOTHERAPY-INDUCED THROMBOCYTOPENIA: ICD-10-CM

## 2022-04-19 DIAGNOSIS — I10 ESSENTIAL HYPERTENSION: ICD-10-CM

## 2022-04-19 DIAGNOSIS — C24.0 HILAR CHOLANGIOCARCINOMA: Primary | ICD-10-CM

## 2022-04-19 DIAGNOSIS — D69.59 CHEMOTHERAPY-INDUCED THROMBOCYTOPENIA: ICD-10-CM

## 2022-04-19 DIAGNOSIS — E13.9 SECONDARY DIABETES: ICD-10-CM

## 2022-04-19 DIAGNOSIS — D70.1 CHEMOTHERAPY INDUCED NEUTROPENIA: ICD-10-CM

## 2022-04-19 DIAGNOSIS — D84.81 IMMUNODEFICIENCY SECONDARY TO NEOPLASM: ICD-10-CM

## 2022-04-19 DIAGNOSIS — T45.1X5A ANTINEOPLASTIC CHEMOTHERAPY INDUCED ANEMIA: ICD-10-CM

## 2022-04-19 DIAGNOSIS — D49.9 IMMUNODEFICIENCY SECONDARY TO NEOPLASM: ICD-10-CM

## 2022-04-19 DIAGNOSIS — T45.1X5A CHEMOTHERAPY INDUCED NEUTROPENIA: ICD-10-CM

## 2022-04-19 DIAGNOSIS — D64.81 ANTINEOPLASTIC CHEMOTHERAPY INDUCED ANEMIA: ICD-10-CM

## 2022-04-19 DIAGNOSIS — T45.1X5A IMMUNODEFICIENCY SECONDARY TO CHEMOTHERAPY: ICD-10-CM

## 2022-04-19 PROCEDURE — 3074F PR MOST RECENT SYSTOLIC BLOOD PRESSURE < 130 MM HG: ICD-10-PCS | Mod: CPTII,S$GLB,, | Performed by: INTERNAL MEDICINE

## 2022-04-19 PROCEDURE — 1159F MED LIST DOCD IN RCRD: CPT | Mod: CPTII,S$GLB,, | Performed by: INTERNAL MEDICINE

## 2022-04-19 PROCEDURE — 3079F PR MOST RECENT DIASTOLIC BLOOD PRESSURE 80-89 MM HG: ICD-10-PCS | Mod: CPTII,S$GLB,, | Performed by: INTERNAL MEDICINE

## 2022-04-19 PROCEDURE — 3044F HG A1C LEVEL LT 7.0%: CPT | Mod: CPTII,S$GLB,, | Performed by: INTERNAL MEDICINE

## 2022-04-19 PROCEDURE — 1159F PR MEDICATION LIST DOCUMENTED IN MEDICAL RECORD: ICD-10-PCS | Mod: CPTII,S$GLB,, | Performed by: INTERNAL MEDICINE

## 2022-04-19 PROCEDURE — 99215 OFFICE O/P EST HI 40 MIN: CPT | Mod: S$GLB,,, | Performed by: INTERNAL MEDICINE

## 2022-04-19 PROCEDURE — 3074F SYST BP LT 130 MM HG: CPT | Mod: CPTII,S$GLB,, | Performed by: INTERNAL MEDICINE

## 2022-04-19 PROCEDURE — 99999 PR PBB SHADOW E&M-EST. PATIENT-LVL IV: ICD-10-PCS | Mod: PBBFAC,,, | Performed by: INTERNAL MEDICINE

## 2022-04-19 PROCEDURE — 3044F PR MOST RECENT HEMOGLOBIN A1C LEVEL <7.0%: ICD-10-PCS | Mod: CPTII,S$GLB,, | Performed by: INTERNAL MEDICINE

## 2022-04-19 PROCEDURE — 3079F DIAST BP 80-89 MM HG: CPT | Mod: CPTII,S$GLB,, | Performed by: INTERNAL MEDICINE

## 2022-04-19 PROCEDURE — 3008F BODY MASS INDEX DOCD: CPT | Mod: CPTII,S$GLB,, | Performed by: INTERNAL MEDICINE

## 2022-04-19 PROCEDURE — 4010F ACE/ARB THERAPY RXD/TAKEN: CPT | Mod: CPTII,S$GLB,, | Performed by: INTERNAL MEDICINE

## 2022-04-19 PROCEDURE — 3008F PR BODY MASS INDEX (BMI) DOCUMENTED: ICD-10-PCS | Mod: CPTII,S$GLB,, | Performed by: INTERNAL MEDICINE

## 2022-04-19 PROCEDURE — 99215 PR OFFICE/OUTPT VISIT, EST, LEVL V, 40-54 MIN: ICD-10-PCS | Mod: S$GLB,,, | Performed by: INTERNAL MEDICINE

## 2022-04-19 PROCEDURE — 1160F RVW MEDS BY RX/DR IN RCRD: CPT | Mod: CPTII,S$GLB,, | Performed by: INTERNAL MEDICINE

## 2022-04-19 PROCEDURE — 99999 PR PBB SHADOW E&M-EST. PATIENT-LVL IV: CPT | Mod: PBBFAC,,, | Performed by: INTERNAL MEDICINE

## 2022-04-19 PROCEDURE — 1160F PR REVIEW ALL MEDS BY PRESCRIBER/CLIN PHARMACIST DOCUMENTED: ICD-10-PCS | Mod: CPTII,S$GLB,, | Performed by: INTERNAL MEDICINE

## 2022-04-19 PROCEDURE — 4010F PR ACE/ARB THEARPY RXD/TAKEN: ICD-10-PCS | Mod: CPTII,S$GLB,, | Performed by: INTERNAL MEDICINE

## 2022-04-19 NOTE — PROGRESS NOTES
"                                                         PROGRESS NOTE    Subjective:       Patient ID: Romeo Larson is a 41 y.o. male.    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 Mr Larson is a 40 yo man who presents today for further management of suspected hilar cholangiocarcinoma. He presented with dark urine, abdominal pain and jaundice since August 2021. He presented to outside hospital ER with elevated bilirubin. MRCP was performed demonstrating bi-lobar intrahepatic bile duct dilation and a ~2cm ill defined mass at the hilum concerning for hilar cholangiocarcinoma. He was referred to the advanced endoscopy group at [facility]. ERCP confirmed severe, malignant appearing stricture involving right and left main hepatic ducts. Biliary stents were placed to relieve obstruction. EUS was performed. It showed an irregular hypoechoic mass where the hepatic duct bifurcates into the right and left hepatic ducts. The mass measured 11.4 mm by 11.3 mm in maximal cross-sectional diameter. FNA sampling of hilar lymph nodes was negative for metastatic disease. Bile duct biopsy showed "rare groups of glandular epithelium with mild atypia, strips of benign glandular epithelium intermixed with blood clot, and focal fibrous tissue with chronic inflammation."  CT C/A/P 10/27/21:  1. Intrahepatic biliary dilatation despite the presence of 2 biliary drains.  The patient is recent comparison MRI a revealed a possible central hepatic mass, concerning for either cholangiocarcinoma or hepatocellular carcinoma.  This is, however, not definitively demonstrated on today's exam.  There are enlarged lymph nodes present in the vicinity of the ana cristina hepatis and celiac axis as detailed above.  2. Scattered pulmonary nodules measuring up to 6 mm.  3. Other findings as detailed above.  He presents today for further management. Seen by Dr Jara and considered a " "candidate for liver transplant. Seen by Dr Cunningham last Friday. Scheduled for PET this Wednesday. 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   Discussed neoadjuvant chemoradiation with 5FU followed by neoadjuvant cis/gem prior to liver transplant.   2. Hospitalized 11/3/21-21 for fever 2/2 acute cholangitis. Repeat ERCP biopsy on 21 again non-diagnostic. Biopsy of the celiac lymph node was negative.   3. PET scan 11/3/21: "1. Wedge-shaped geographic hypermetabolic activity within the right lobe of the liver in a similar distribution to the intrahepatic biliary dilatation.  This could relate to inflammation from multiple etiologies including biliary stasis, cholangitis, and obstructive dilatation.  Neoplastic involvement would be difficult to exclude.  The liver is poorly evaluated given background activity. 2. Hypermetabolic lymphadenopathy is noted in a periportal and celiac distribution.  Infectious inflammatory and neoplastic etiologies are on the differential.  Index nodes have been measured. 3. Multiple pulmonary nodules are noted too small to categorize by PET-CT fusion as evidence seen on a prior CT of 10/27/2021.  CT for follow-up of size stability is necessary."  4. Completed chemoradiation with 5FU from . Started cis/gem on 22. Due for cycle 5 day 1 this week    Interval History:   Mr Larson returns for cycle 5 day 1 of cis/gem. Tolerating chemo well. Brother getting tested for living donor. He felt a little out of breath cutting his grass last week. No bleeding.     ECO    ROS:   A ten-point system review is obtained and negative except for what was stated in the Interval History.     Physical Examination:   Vital signs reviewed.   General: well hydrated, well developed, in no acute distress  HEENT: normocephalic, PERRLA, EOMI, anicteric sclerae, oropharynx clear  Neck: supple, no JVD, thyromegaly, cervical or supraclavicular lymphadenopathy  Lungs: clear breath sounds " "bilaterally, no wheezing, rales, or rhonchi  Chest: port site clean  Heart: RRR, no M/R/G  Abdomen: soft, no tenderness, non-distended, no hepatosplenomegaly, mass, or hernia. BS present  Extremities: no clubbing, cyanosis, or edema  Skin: no rash, ulcer, or open wounds  Neuro: alert and oriented x 4, no focal neuro deficit  Psych: pleasant and appropriate mood and affect    Objective:     Laboratory Data:  Labs reviewed. CA 19-9 normal now. Platelet count 44. Hb 7.1    Imaging Data:  PET scan 11/3/21: "1. Wedge-shaped geographic hypermetabolic activity within the right lobe of the liver in a similar distribution to the intrahepatic biliary dilatation.  This could relate to inflammation from multiple etiologies including biliary stasis, cholangitis, and obstructive dilatation.  Neoplastic involvement would be difficult to exclude.  The liver is poorly evaluated given background activity. 2. Hypermetabolic lymphadenopathy is noted in a periportal and celiac distribution.  Infectious inflammatory and neoplastic etiologies are on the differential.  Index nodes have been measured. 3. Multiple pulmonary nodules are noted too small to categorize by PET-CT fusion as evidence seen on a prior CT of 10/27/2021.  CT for follow-up of size stability is necessary."    CT C/A/P 1/18/22:  1. Patient with a history of suspected hilar cholangiocarcinoma with no evidence of distant metastatic disease in the chest, abdomen, or pelvis.  There has been an interval reduction in the extent of the patient's intrahepatic biliary dilatation as compared to the previous study.  Two index lymph nodes in the vicinity of the ana cristina hepatis/gastroesophageal junction or smaller in size on today's examination.  2. Stable solid pulmonary nodules measuring up to 5 mm in size.  No new pulmonary nodules on today's study.    CT C/A/P 3/21/22:  No discrete hepatic mass is seen.  Biliary stents remain in place and there is persistent intrahepatic biliary " duct dilatation not appearing significantly changed compared to the prior exam.  Air in the gallbladder with gallbladder wall slightly prominent and questionable trace pericholecystic fluid or fat stranding not significantly changed compared to the prior study.     Two index nodes previously measured near the EG junction/ana cristina hepatis do not appear significantly changed.     Small nodules in the lungs largest measuring 4 mm not significantly changed compared to the prior exam    Assessment and Plan:     1. Hilar cholangiocarcinoma    2. 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 Antineoplastic chemotherapy induced anemia    5. Chemotherapy-induced thrombocytopenia    6. Chemotherapy induced neutropenia    7. Essential hypertension    8. Secondary diabetes      1-3  - Mr Larson is a 42 yo man with likely stage I hilar cholangiocarcinoma. Biopsy non-diagnostic but clinical picture most consistent with hilar cholangiocarcinoma. He is a candidate for liver transplant. Completed chemoradiation with 5FU from 11/29/21-1/5/2022. Started cis/gem on 1/19/22.   - tolerating chemo well  - reviewed test results. Platelet count too low to start cycle 5 day 1. He is rescheduled for repeat labs/visit/chemo next week  - dose reduce chemo to gemcitabine 800 mg/m2 and cisplatin 20 mg/m2 to avoid further severe thrombocytopenia as he may get a transplant any time. If thrombocytopenia is still a problem, may consider single-agent gemcitabine    4.  - minimal symptom  - monitor    5.  - no bleeding  - postpone chemo and dose reduce    6.  - counts good after udenyca added    7.  - BP controlled  - c/w current medication    8.  - 2/2 IV dex with chemo  - Dex dose reduced  - c/w metformin 500 mg bid    Follow-up:     RTC next week  Knows to call in the interval if any problems arise.    Electronically signed by Young Iverson for Scheduling    Med Onc Chart Routing      Follow up  with physician . Appointments made already   Follow up with JOSEPH    Labs CA 19-9, CBC, CMP and magnesium   Lab interval:     Imaging    Pharmacy appointment    Other referrals          Treatment Plan Information   OP GEMCITABINE CISPLATIN Q3W   Young Wesley MD   Upcoming Treatment Dates - OP GEMCITABINE CISPLATIN Q3W    4/20/2022       Chemotherapy       gemcitabine (GEMZAR) 2,020 mg in sodium chloride 0.9% 250 mL chemo infusion       CISplatin (PLATINOL) 25 mg/m2 = 51 mg in sodium chloride 0.9% 500 mL chemo infusion       Pre-Hydration       sodium chloride 0.9% 1,000 mL with magnesium sulfate 1 g, potassium chloride 20 mEq infusion       Post-Hydration       sodium chloride 0.9% bolus 500 mL  4/27/2022       Chemotherapy       gemcitabine (GEMZAR) 2,020 mg in sodium chloride 0.9% 250 mL chemo infusion       CISplatin (PLATINOL) 25 mg/m2 = 51 mg in sodium chloride 0.9% 500 mL chemo infusion       Pre-Hydration       sodium chloride 0.9% 1,000 mL with magnesium sulfate 1 g, potassium chloride 20 mEq infusion       Post-Hydration       sodium chloride 0.9% bolus 500 mL  5/11/2022       Chemotherapy       gemcitabine (GEMZAR) 2,020 mg in sodium chloride 0.9% 250 mL chemo infusion       CISplatin (PLATINOL) 25 mg/m2 = 51 mg in sodium chloride 0.9% 500 mL chemo infusion       Pre-Hydration       sodium chloride 0.9% 1,000 mL with magnesium sulfate 1 g, potassium chloride 20 mEq infusion       Post-Hydration       sodium chloride 0.9% bolus 500 mL  5/18/2022       Chemotherapy       gemcitabine (GEMZAR) 2,020 mg in sodium chloride 0.9% 250 mL chemo infusion       CISplatin (PLATINOL) 25 mg/m2 = 51 mg in sodium chloride 0.9% 500 mL chemo infusion       Pre-Hydration       sodium chloride 0.9% 1,000 mL with magnesium sulfate 1 g, potassium chloride 20 mEq infusion       Post-Hydration       sodium chloride 0.9% bolus 500 mL    Supportive Plan Information  OP FILGRASTIM 480 MCG   Young Wesley MD   Upcoming Treatment Dates -  OP FILGRASTIM 480 MCG    2/9/2022       Medications       filgrastim-sndz (ZARXIO) injection 480 mcg/0.8 mL (Preferred Regimen)  2/10/2022       Medications       filgrastim-sndz (ZARXIO) injection 480 mcg/0.8 mL (Preferred Regimen)  2/11/2022       Medications       filgrastim-sndz (ZARXIO) injection 480 mcg/0.8 mL (Preferred Regimen)  2/12/2022       Medications       filgrastim-sndz (ZARXIO) injection 480 mcg/0.8 mL (Preferred Regimen)

## 2022-04-25 ENCOUNTER — PATIENT MESSAGE (OUTPATIENT)
Dept: HEMATOLOGY/ONCOLOGY | Facility: CLINIC | Age: 42
End: 2022-04-25
Payer: COMMERCIAL

## 2022-04-25 ENCOUNTER — LAB VISIT (OUTPATIENT)
Dept: LAB | Facility: HOSPITAL | Age: 42
End: 2022-04-25
Attending: INTERNAL MEDICINE
Payer: COMMERCIAL

## 2022-04-25 DIAGNOSIS — C24.0 HILAR CHOLANGIOCARCINOMA: ICD-10-CM

## 2022-04-25 DIAGNOSIS — R50.9 FEVER, UNSPECIFIED FEVER CAUSE: Primary | ICD-10-CM

## 2022-04-25 LAB
ALBUMIN SERPL BCP-MCNC: 3.9 G/DL (ref 3.5–5.2)
ALP SERPL-CCNC: 180 U/L (ref 55–135)
ALT SERPL W/O P-5'-P-CCNC: 89 U/L (ref 10–44)
ANION GAP SERPL CALC-SCNC: 10 MMOL/L (ref 8–16)
ANISOCYTOSIS BLD QL SMEAR: ABNORMAL
AST SERPL-CCNC: 63 U/L (ref 10–40)
BASO STIPL BLD QL SMEAR: ABNORMAL
BASOPHILS # BLD AUTO: 0.02 K/UL (ref 0–0.2)
BASOPHILS NFR BLD: 0.2 % (ref 0–1.9)
BILIRUB SERPL-MCNC: 1.5 MG/DL (ref 0.1–1)
BUN SERPL-MCNC: 14 MG/DL (ref 6–20)
CALCIUM SERPL-MCNC: 9.5 MG/DL (ref 8.7–10.5)
CHLORIDE SERPL-SCNC: 104 MMOL/L (ref 95–110)
CO2 SERPL-SCNC: 24 MMOL/L (ref 23–29)
CREAT SERPL-MCNC: 1.2 MG/DL (ref 0.5–1.4)
DIFFERENTIAL METHOD: ABNORMAL
EOSINOPHIL # BLD AUTO: 0 K/UL (ref 0–0.5)
EOSINOPHIL NFR BLD: 0.2 % (ref 0–8)
ERYTHROCYTE [DISTWIDTH] IN BLOOD BY AUTOMATED COUNT: 20.2 % (ref 11.5–14.5)
EST. GFR  (AFRICAN AMERICAN): >60 ML/MIN/1.73 M^2
EST. GFR  (NON AFRICAN AMERICAN): >60 ML/MIN/1.73 M^2
GLUCOSE SERPL-MCNC: 172 MG/DL (ref 70–110)
HCT VFR BLD AUTO: 29.1 % (ref 40–54)
HGB BLD-MCNC: 9.3 G/DL (ref 14–18)
IMM GRANULOCYTES # BLD AUTO: 0.12 K/UL (ref 0–0.04)
IMM GRANULOCYTES NFR BLD AUTO: 0.9 % (ref 0–0.5)
LYMPHOCYTES # BLD AUTO: 0.3 K/UL (ref 1–4.8)
LYMPHOCYTES NFR BLD: 2.2 % (ref 18–48)
MAGNESIUM SERPL-MCNC: 1.9 MG/DL (ref 1.6–2.6)
MCH RBC QN AUTO: 34.6 PG (ref 27–31)
MCHC RBC AUTO-ENTMCNC: 32 G/DL (ref 32–36)
MCV RBC AUTO: 108 FL (ref 82–98)
MONOCYTES # BLD AUTO: 1.7 K/UL (ref 0.3–1)
MONOCYTES NFR BLD: 13.2 % (ref 4–15)
NEUTROPHILS # BLD AUTO: 10.7 K/UL (ref 1.8–7.7)
NEUTROPHILS NFR BLD: 83.3 % (ref 38–73)
NRBC BLD-RTO: 0 /100 WBC
PLATELET # BLD AUTO: 131 K/UL (ref 150–450)
PMV BLD AUTO: 9.4 FL (ref 9.2–12.9)
POIKILOCYTOSIS BLD QL SMEAR: SLIGHT
POLYCHROMASIA BLD QL SMEAR: ABNORMAL
POTASSIUM SERPL-SCNC: 4.8 MMOL/L (ref 3.5–5.1)
PROT SERPL-MCNC: 6.8 G/DL (ref 6–8.4)
RBC # BLD AUTO: 2.69 M/UL (ref 4.6–6.2)
SODIUM SERPL-SCNC: 138 MMOL/L (ref 136–145)
WBC # BLD AUTO: 12.86 K/UL (ref 3.9–12.7)

## 2022-04-25 PROCEDURE — 85025 COMPLETE CBC W/AUTO DIFF WBC: CPT | Mod: PO,NTX | Performed by: INTERNAL MEDICINE

## 2022-04-25 PROCEDURE — 83735 ASSAY OF MAGNESIUM: CPT | Mod: PO,NTX | Performed by: INTERNAL MEDICINE

## 2022-04-25 PROCEDURE — 80053 COMPREHEN METABOLIC PANEL: CPT | Mod: PO,NTX | Performed by: INTERNAL MEDICINE

## 2022-04-25 PROCEDURE — 86301 IMMUNOASSAY TUMOR CA 19-9: CPT | Mod: TXP | Performed by: INTERNAL MEDICINE

## 2022-04-25 PROCEDURE — 36415 COLL VENOUS BLD VENIPUNCTURE: CPT | Mod: PO,NTX | Performed by: INTERNAL MEDICINE

## 2022-04-25 RX ORDER — CIPROFLOXACIN 500 MG/1
500 TABLET ORAL 2 TIMES DAILY
Qty: 10 TABLET | Refills: 0 | Status: ON HOLD | OUTPATIENT
Start: 2022-04-25 | End: 2022-04-27 | Stop reason: SDUPTHER

## 2022-04-25 NOTE — PROGRESS NOTES
Discussed lab results. Elevated WBC could be from G-CSF or infection. Given that he has a low grade fever, will empirically treat with 5 days of antibiotics. Discussed risk of C diff and associated symptoms. Patient understands and agrees with the plan.

## 2022-04-25 NOTE — PROGRESS NOTES
Called patient. Discussed bilirubin is elevated which is new. I am concerned his biliary stent is blocked. In combination of the fever and weakness he is feeling today, it can indicate bile duct infection which needs urgent treatment. Advised patient to go to ER (he is going to local ER). US needs to be done right away. Discussed he will need inpatient IV antibiotics to treat cholangitis and ERCP ASAP to assess biliary stent. He needs to be transferred to main [place] for ERCP. Patient understands and agrees with the plan.

## 2022-04-26 PROBLEM — R50.9 FEVER: Status: ACTIVE | Noted: 2022-04-26

## 2022-04-26 PROBLEM — D53.9 MACROCYTIC ANEMIA: Status: ACTIVE | Noted: 2022-04-26

## 2022-04-26 PROBLEM — R74.8 ELEVATED LIVER ENZYMES: Status: ACTIVE | Noted: 2022-04-26

## 2022-04-26 LAB — CANCER AG19-9 SERPL-ACNC: 140.5 U/ML (ref 0–40)

## 2022-04-27 ENCOUNTER — TELEPHONE (OUTPATIENT)
Dept: ENDOSCOPY | Facility: HOSPITAL | Age: 42
End: 2022-04-27
Payer: COMMERCIAL

## 2022-04-27 ENCOUNTER — TELEPHONE (OUTPATIENT)
Dept: GASTROENTEROLOGY | Facility: CLINIC | Age: 42
End: 2022-04-27
Payer: COMMERCIAL

## 2022-04-27 NOTE — TELEPHONE ENCOUNTER
----- Message from Bonnie Hollingsworth sent at 4/27/2022  2:55 PM CDT -----  Type:  Needs Medical Advice    Who Called: [facility]   Reason:schedule hospital f/u  Would the patient rather a call back or a response via [facility]? call  Best Call Back Number: [phone]  Additional Information: none

## 2022-04-27 NOTE — TELEPHONE ENCOUNTER
----- Message from Jennifer Nelson sent at 4/27/2022  2:34 PM CDT -----  Regarding: PAtient call back  Who called: mariza ()    What is the request in detail: Patient is requesting a call back. Pt was advised to f/u with Dr. Campos in two Weeks . Next available is 5/27. He would like to further discuss.    Please advise.    Can the clinic reply by [facility]? No    Best call back number: [phone]    Additional Information: N/A

## 2022-04-29 DIAGNOSIS — Z76.82 ORGAN TRANSPLANT CANDIDATE: Primary | ICD-10-CM

## 2022-04-29 DIAGNOSIS — C24.0 HILAR CHOLANGIOCARCINOMA: ICD-10-CM

## 2022-05-02 ENCOUNTER — LAB VISIT (OUTPATIENT)
Dept: LAB | Facility: HOSPITAL | Age: 42
End: 2022-05-02
Attending: INTERNAL MEDICINE
Payer: COMMERCIAL

## 2022-05-02 ENCOUNTER — OFFICE VISIT (OUTPATIENT)
Dept: HEMATOLOGY/ONCOLOGY | Facility: CLINIC | Age: 42
End: 2022-05-02
Payer: COMMERCIAL

## 2022-05-02 ENCOUNTER — PATIENT MESSAGE (OUTPATIENT)
Dept: HEMATOLOGY/ONCOLOGY | Facility: CLINIC | Age: 42
End: 2022-05-02

## 2022-05-02 VITALS
WEIGHT: 190.94 LBS | HEIGHT: 69 IN | BODY MASS INDEX: 28.28 KG/M2 | SYSTOLIC BLOOD PRESSURE: 117 MMHG | OXYGEN SATURATION: 99 % | DIASTOLIC BLOOD PRESSURE: 82 MMHG | RESPIRATION RATE: 18 BRPM | TEMPERATURE: 98 F | HEART RATE: 73 BPM

## 2022-05-02 DIAGNOSIS — Z79.899 IMMUNODEFICIENCY SECONDARY TO CHEMOTHERAPY: ICD-10-CM

## 2022-05-02 DIAGNOSIS — T45.1X5A CHEMOTHERAPY-INDUCED NEUTROPENIA: ICD-10-CM

## 2022-05-02 DIAGNOSIS — D69.59 CHEMOTHERAPY-INDUCED THROMBOCYTOPENIA: ICD-10-CM

## 2022-05-02 DIAGNOSIS — I10 ESSENTIAL HYPERTENSION: ICD-10-CM

## 2022-05-02 DIAGNOSIS — T45.1X5A CHEMOTHERAPY-INDUCED THROMBOCYTOPENIA: ICD-10-CM

## 2022-05-02 DIAGNOSIS — D49.9 IMMUNODEFICIENCY SECONDARY TO NEOPLASM: ICD-10-CM

## 2022-05-02 DIAGNOSIS — D84.821 IMMUNODEFICIENCY SECONDARY TO CHEMOTHERAPY: ICD-10-CM

## 2022-05-02 DIAGNOSIS — T45.1X5A IMMUNODEFICIENCY SECONDARY TO CHEMOTHERAPY: ICD-10-CM

## 2022-05-02 DIAGNOSIS — D84.81 IMMUNODEFICIENCY SECONDARY TO NEOPLASM: ICD-10-CM

## 2022-05-02 DIAGNOSIS — E13.9 SECONDARY DIABETES: ICD-10-CM

## 2022-05-02 DIAGNOSIS — K83.09 CHOLANGITIS: ICD-10-CM

## 2022-05-02 DIAGNOSIS — C24.0 HILAR CHOLANGIOCARCINOMA: Primary | ICD-10-CM

## 2022-05-02 DIAGNOSIS — D70.1 CHEMOTHERAPY-INDUCED NEUTROPENIA: ICD-10-CM

## 2022-05-02 DIAGNOSIS — C24.0 HILAR CHOLANGIOCARCINOMA: ICD-10-CM

## 2022-05-02 LAB
ALBUMIN SERPL BCP-MCNC: 3.7 G/DL (ref 3.5–5.2)
ALP SERPL-CCNC: 139 U/L (ref 55–135)
ALT SERPL W/O P-5'-P-CCNC: 54 U/L (ref 10–44)
ANION GAP SERPL CALC-SCNC: 7 MMOL/L (ref 8–16)
AST SERPL-CCNC: 46 U/L (ref 10–40)
BASOPHILS # BLD AUTO: 0.04 K/UL (ref 0–0.2)
BASOPHILS NFR BLD: 1 % (ref 0–1.9)
BILIRUB SERPL-MCNC: 0.6 MG/DL (ref 0.1–1)
BUN SERPL-MCNC: 15 MG/DL (ref 6–20)
CALCIUM SERPL-MCNC: 9.5 MG/DL (ref 8.7–10.5)
CANCER AG19-9 SERPL-ACNC: 30.7 U/ML (ref 0–40)
CHLORIDE SERPL-SCNC: 105 MMOL/L (ref 95–110)
CO2 SERPL-SCNC: 26 MMOL/L (ref 23–29)
CREAT SERPL-MCNC: 1 MG/DL (ref 0.5–1.4)
DIFFERENTIAL METHOD: ABNORMAL
EOSINOPHIL # BLD AUTO: 0.1 K/UL (ref 0–0.5)
EOSINOPHIL NFR BLD: 2.3 % (ref 0–8)
ERYTHROCYTE [DISTWIDTH] IN BLOOD BY AUTOMATED COUNT: 16.7 % (ref 11.5–14.5)
EST. GFR  (AFRICAN AMERICAN): >60 ML/MIN/1.73 M^2
EST. GFR  (NON AFRICAN AMERICAN): >60 ML/MIN/1.73 M^2
GLUCOSE SERPL-MCNC: 105 MG/DL (ref 70–110)
HCT VFR BLD AUTO: 31.7 % (ref 40–54)
HGB BLD-MCNC: 9.8 G/DL (ref 14–18)
IMM GRANULOCYTES # BLD AUTO: 0.02 K/UL (ref 0–0.04)
IMM GRANULOCYTES NFR BLD AUTO: 0.5 % (ref 0–0.5)
LYMPHOCYTES # BLD AUTO: 0.6 K/UL (ref 1–4.8)
LYMPHOCYTES NFR BLD: 15.4 % (ref 18–48)
MAGNESIUM SERPL-MCNC: 2.1 MG/DL (ref 1.6–2.6)
MCH RBC QN AUTO: 33.2 PG (ref 27–31)
MCHC RBC AUTO-ENTMCNC: 30.9 G/DL (ref 32–36)
MCV RBC AUTO: 108 FL (ref 82–98)
MONOCYTES # BLD AUTO: 0.6 K/UL (ref 0.3–1)
MONOCYTES NFR BLD: 15.1 % (ref 4–15)
NEUTROPHILS # BLD AUTO: 2.6 K/UL (ref 1.8–7.7)
NEUTROPHILS NFR BLD: 65.7 % (ref 38–73)
NRBC BLD-RTO: 0 /100 WBC
PLATELET # BLD AUTO: 172 K/UL (ref 150–450)
PMV BLD AUTO: 9.4 FL (ref 9.2–12.9)
POTASSIUM SERPL-SCNC: 4.4 MMOL/L (ref 3.5–5.1)
PROT SERPL-MCNC: 6.7 G/DL (ref 6–8.4)
RBC # BLD AUTO: 2.95 M/UL (ref 4.6–6.2)
SODIUM SERPL-SCNC: 138 MMOL/L (ref 136–145)
WBC # BLD AUTO: 3.97 K/UL (ref 3.9–12.7)

## 2022-05-02 PROCEDURE — 3008F PR BODY MASS INDEX (BMI) DOCUMENTED: ICD-10-PCS | Mod: CPTII,S$GLB,, | Performed by: INTERNAL MEDICINE

## 2022-05-02 PROCEDURE — 83735 ASSAY OF MAGNESIUM: CPT | Mod: TXP | Performed by: INTERNAL MEDICINE

## 2022-05-02 PROCEDURE — 3079F PR MOST RECENT DIASTOLIC BLOOD PRESSURE 80-89 MM HG: ICD-10-PCS | Mod: CPTII,S$GLB,, | Performed by: INTERNAL MEDICINE

## 2022-05-02 PROCEDURE — 1159F PR MEDICATION LIST DOCUMENTED IN MEDICAL RECORD: ICD-10-PCS | Mod: CPTII,S$GLB,, | Performed by: INTERNAL MEDICINE

## 2022-05-02 PROCEDURE — 36415 COLL VENOUS BLD VENIPUNCTURE: CPT | Mod: NTX | Performed by: INTERNAL MEDICINE

## 2022-05-02 PROCEDURE — 1160F PR REVIEW ALL MEDS BY PRESCRIBER/CLIN PHARMACIST DOCUMENTED: ICD-10-PCS | Mod: CPTII,S$GLB,, | Performed by: INTERNAL MEDICINE

## 2022-05-02 PROCEDURE — 85025 COMPLETE CBC W/AUTO DIFF WBC: CPT | Mod: TXP | Performed by: INTERNAL MEDICINE

## 2022-05-02 PROCEDURE — 3079F DIAST BP 80-89 MM HG: CPT | Mod: CPTII,S$GLB,, | Performed by: INTERNAL MEDICINE

## 2022-05-02 PROCEDURE — 99999 PR PBB SHADOW E&M-EST. PATIENT-LVL IV: ICD-10-PCS | Mod: PBBFAC,,, | Performed by: INTERNAL MEDICINE

## 2022-05-02 PROCEDURE — 3008F BODY MASS INDEX DOCD: CPT | Mod: CPTII,S$GLB,, | Performed by: INTERNAL MEDICINE

## 2022-05-02 PROCEDURE — 3044F PR MOST RECENT HEMOGLOBIN A1C LEVEL <7.0%: ICD-10-PCS | Mod: CPTII,S$GLB,, | Performed by: INTERNAL MEDICINE

## 2022-05-02 PROCEDURE — 4010F PR ACE/ARB THEARPY RXD/TAKEN: ICD-10-PCS | Mod: CPTII,S$GLB,, | Performed by: INTERNAL MEDICINE

## 2022-05-02 PROCEDURE — 3044F HG A1C LEVEL LT 7.0%: CPT | Mod: CPTII,S$GLB,, | Performed by: INTERNAL MEDICINE

## 2022-05-02 PROCEDURE — 99215 PR OFFICE/OUTPT VISIT, EST, LEVL V, 40-54 MIN: ICD-10-PCS | Mod: S$GLB,,, | Performed by: INTERNAL MEDICINE

## 2022-05-02 PROCEDURE — 80053 COMPREHEN METABOLIC PANEL: CPT | Mod: TXP | Performed by: INTERNAL MEDICINE

## 2022-05-02 PROCEDURE — 4010F ACE/ARB THERAPY RXD/TAKEN: CPT | Mod: CPTII,S$GLB,, | Performed by: INTERNAL MEDICINE

## 2022-05-02 PROCEDURE — 86301 IMMUNOASSAY TUMOR CA 19-9: CPT | Mod: NTX | Performed by: INTERNAL MEDICINE

## 2022-05-02 PROCEDURE — 99215 OFFICE O/P EST HI 40 MIN: CPT | Mod: S$GLB,,, | Performed by: INTERNAL MEDICINE

## 2022-05-02 PROCEDURE — 3074F PR MOST RECENT SYSTOLIC BLOOD PRESSURE < 130 MM HG: ICD-10-PCS | Mod: CPTII,S$GLB,, | Performed by: INTERNAL MEDICINE

## 2022-05-02 PROCEDURE — 1159F MED LIST DOCD IN RCRD: CPT | Mod: CPTII,S$GLB,, | Performed by: INTERNAL MEDICINE

## 2022-05-02 PROCEDURE — 3074F SYST BP LT 130 MM HG: CPT | Mod: CPTII,S$GLB,, | Performed by: INTERNAL MEDICINE

## 2022-05-02 PROCEDURE — 99999 PR PBB SHADOW E&M-EST. PATIENT-LVL IV: CPT | Mod: PBBFAC,,, | Performed by: INTERNAL MEDICINE

## 2022-05-02 PROCEDURE — 1160F RVW MEDS BY RX/DR IN RCRD: CPT | Mod: CPTII,S$GLB,, | Performed by: INTERNAL MEDICINE

## 2022-05-02 RX ORDER — HEPARIN 100 UNIT/ML
500 SYRINGE INTRAVENOUS
Status: CANCELLED | OUTPATIENT
Start: 2022-05-04

## 2022-05-02 RX ORDER — PALONOSETRON 0.05 MG/ML
0.25 INJECTION, SOLUTION INTRAVENOUS ONCE
Status: CANCELLED
Start: 2022-05-04 | End: 2022-05-02

## 2022-05-02 RX ORDER — SODIUM CHLORIDE 0.9 % (FLUSH) 0.9 %
10 SYRINGE (ML) INJECTION
Status: CANCELLED | OUTPATIENT
Start: 2022-05-04

## 2022-05-02 RX ORDER — DEXAMETHASONE SODIUM PHOSPHATE 4 MG/ML
8 INJECTION, SOLUTION INTRA-ARTICULAR; INTRALESIONAL; INTRAMUSCULAR; INTRAVENOUS; SOFT TISSUE
Status: CANCELLED
Start: 2022-05-04

## 2022-05-02 NOTE — PROGRESS NOTES
"                                                         PROGRESS NOTE    Subjective:       Patient ID: Romeo Larson is a 41 y.o. male.    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 Mr Larson is a 40 yo man who presents today for further management of suspected hilar cholangiocarcinoma. He presented with dark urine, abdominal pain and jaundice since August 2021. He presented to outside hospital ER with elevated bilirubin. MRCP was performed demonstrating bi-lobar intrahepatic bile duct dilation and a ~2cm ill defined mass at the hilum concerning for hilar cholangiocarcinoma. He was referred to the advanced endoscopy group at [facility]. ERCP confirmed severe, malignant appearing stricture involving right and left main hepatic ducts. Biliary stents were placed to relieve obstruction. EUS was performed. It showed an irregular hypoechoic mass where the hepatic duct bifurcates into the right and left hepatic ducts. The mass measured 11.4 mm by 11.3 mm in maximal cross-sectional diameter. FNA sampling of hilar lymph nodes was negative for metastatic disease. Bile duct biopsy showed "rare groups of glandular epithelium with mild atypia, strips of benign glandular epithelium intermixed with blood clot, and focal fibrous tissue with chronic inflammation."  CT C/A/P 10/27/21:  1. Intrahepatic biliary dilatation despite the presence of 2 biliary drains.  The patient is recent comparison MRI a revealed a possible central hepatic mass, concerning for either cholangiocarcinoma or hepatocellular carcinoma.  This is, however, not definitively demonstrated on today's exam.  There are enlarged lymph nodes present in the vicinity of the ana cristina hepatis and celiac axis as detailed above.  2. Scattered pulmonary nodules measuring up to 6 mm.  3. Other findings as detailed above.  He presents today for further management. Seen by Dr Jara and considered a " "candidate for liver transplant. Seen by Dr Cunningham last Friday. Scheduled for PET this Wednesday. 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   Discussed neoadjuvant chemoradiation with 5FU followed by neoadjuvant cis/gem prior to liver transplant.   2. Hospitalized 11/3/21-21 for fever 2/2 acute cholangitis. Repeat ERCP biopsy on 21 again non-diagnostic. Biopsy of the celiac lymph node was negative.   3. PET scan 11/3/21: "1. Wedge-shaped geographic hypermetabolic activity within the right lobe of the liver in a similar distribution to the intrahepatic biliary dilatation.  This could relate to inflammation from multiple etiologies including biliary stasis, cholangitis, and obstructive dilatation.  Neoplastic involvement would be difficult to exclude.  The liver is poorly evaluated given background activity. 2. Hypermetabolic lymphadenopathy is noted in a periportal and celiac distribution.  Infectious inflammatory and neoplastic etiologies are on the differential.  Index nodes have been measured. 3. Multiple pulmonary nodules are noted too small to categorize by PET-CT fusion as evidence seen on a prior CT of 10/27/2021.  CT for follow-up of size stability is necessary."  4. Completed chemoradiation with 5FU from . Started cis/gem on 22. Due for cycle 5 day 1 this week    Interval History:   Mr Larson returns for cycle 5 day 1 of cis/gem. Hospitalized 22-22 for fever and elevated bilirubin with concerns of ascending cholangitis. US 22 showed Thickening of the wall of the gallbladder with mild prominence of the common bile duct cannot rule out a choledochal lithiasis with certainty however the intrahepatic radicles do not appear dilated. Improved with IV antibiotics. He is feeling well. Finishing up on oral cipro/flagyl.     ECO    ROS:   A ten-point system review is obtained and negative except for what was stated in the Interval History.     Physical Examination:   Vital " "signs reviewed.   General: well hydrated, well developed, in no acute distress  HEENT: normocephalic, PERRLA, EOMI, anicteric sclerae, oropharynx clear  Neck: supple, no JVD, thyromegaly, cervical or supraclavicular lymphadenopathy  Lungs: clear breath sounds bilaterally, no wheezing, rales, or rhonchi  Chest: port site clean  Heart: RRR, no M/R/G  Abdomen: soft, no tenderness, non-distended, no hepatosplenomegaly, mass, or hernia. BS present  Extremities: no clubbing, cyanosis, or edema  Skin: no rash, ulcer, or open wounds  Neuro: alert and oriented x 4, no focal neuro deficit  Psych: pleasant and appropriate mood and affect    Objective:     Laboratory Data:  Labs reviewed. CA 19-9 reduced from 140 to 30.7    Imaging Data:  PET scan 11/3/21: "1. Wedge-shaped geographic hypermetabolic activity within the right lobe of the liver in a similar distribution to the intrahepatic biliary dilatation.  This could relate to inflammation from multiple etiologies including biliary stasis, cholangitis, and obstructive dilatation.  Neoplastic involvement would be difficult to exclude.  The liver is poorly evaluated given background activity. 2. Hypermetabolic lymphadenopathy is noted in a periportal and celiac distribution.  Infectious inflammatory and neoplastic etiologies are on the differential.  Index nodes have been measured. 3. Multiple pulmonary nodules are noted too small to categorize by PET-CT fusion as evidence seen on a prior CT of 10/27/2021.  CT for follow-up of size stability is necessary."    CT C/A/P 1/18/22:  1. Patient with a history of suspected hilar cholangiocarcinoma with no evidence of distant metastatic disease in the chest, abdomen, or pelvis.  There has been an interval reduction in the extent of the patient's intrahepatic biliary dilatation as compared to the previous study.  Two index lymph nodes in the vicinity of the ana cristina hepatis/gastroesophageal junction or smaller in size on today's " examination.  2. Stable solid pulmonary nodules measuring up to 5 mm in size.  No new pulmonary nodules on today's study.    CT C/A/P 3/21/22:  No discrete hepatic mass is seen.  Biliary stents remain in place and there is persistent intrahepatic biliary duct dilatation not appearing significantly changed compared to the prior exam.  Air in the gallbladder with gallbladder wall slightly prominent and questionable trace pericholecystic fluid or fat stranding not significantly changed compared to the prior study.     Two index nodes previously measured near the EG junction/ana cristina hepatis do not appear significantly changed.     Small nodules in the lungs largest measuring 4 mm not significantly changed compared to the prior exam    Assessment and Plan:     1. Hilar cholangiocarcinoma    2. 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 Cholangitis    5. Chemotherapy-induced thrombocytopenia    6. Chemotherapy-induced neutropenia    7. Secondary diabetes    8. Essential hypertension      1-3  - Mr Larson is a 42 yo man with likely stage I hilar cholangiocarcinoma. Biopsy non-diagnostic but clinical picture most consistent with hilar cholangiocarcinoma. He is a candidate for liver transplant. Completed chemoradiation with 5FU from 11/29/21-1/5/2022. Started cis/gem on 1/19/22.   - tolerating chemo well  - doing well. Cycle 5 day 1 cis/gem this Wednesday  - dose reduced gem/cis for severe thrombocytopenia  - scheduled for liver transplant on 6/21. Will give cycle 5 day 8 cis/gem in 2 weeks which will be the last cycle before surgery  - check counts 1 week before surgery to ensure improvement  - see me 3 months after surgery    4.  - resolved after IV antibiotics    5.  - improved  - cis/gem dose reduced    6.  - improved after udenyca added on day 9  - continue    7.  - 2/2 dex with chemo  - BS good  - c/w metformin 500 mg bid    8.  - BP controlled  - c/w current  medication    Follow-up:     RTC in 2 weeks  Knows to call in the interval if any problems arise.    Electronically signed by Young Wesley    Route Chart for Scheduling    Med Onc Chart Routing      Follow up with physician 3 months. Chemo in [place]. CBC, CMP, CA 19-9, magnesium, see JOSEPH on 5/17. get cis/gem in [place] on 5/18, udenyca on 5/19. check CBC, CMP on 6/14. see me at the end of September with CBC, CMP, CA 19-9 checked 1 day prior   Follow up with JOSEPH 2 weeks.   Labs CA 19-9, CBC, CMP and magnesium   Lab interval:     Imaging    Pharmacy appointment    Other referrals          Treatment Plan Information   OP GEMCITABINE CISPLATIN Q3W   Young Wesley MD   Upcoming Treatment Dates - OP GEMCITABINE CISPLATIN Q3W    4/27/2022       Chemotherapy       gemcitabine (GEMZAR) 1,615 mg in sodium chloride 0.9% 250 mL chemo infusion       CISplatin (PLATINOL) 20 mg/m2 = 40 mg in sodium chloride 0.9% 500 mL chemo infusion       Pre-Hydration       sodium chloride 0.9% 1,000 mL with magnesium sulfate 1 g, potassium chloride 20 mEq infusion       Post-Hydration       sodium chloride 0.9% bolus 500 mL  5/4/2022       Chemotherapy       gemcitabine (GEMZAR) 1,615 mg in sodium chloride 0.9% 250 mL chemo infusion       CISplatin (PLATINOL) 20 mg/m2 = 40 mg in sodium chloride 0.9% 500 mL chemo infusion       Pre-Hydration       sodium chloride 0.9% 1,000 mL with magnesium sulfate 1 g, potassium chloride 20 mEq infusion       Post-Hydration       sodium chloride 0.9% bolus 500 mL  5/18/2022       Chemotherapy       gemcitabine (GEMZAR) 1,615 mg in sodium chloride 0.9% 250 mL chemo infusion       CISplatin (PLATINOL) 20 mg/m2 = 40 mg in sodium chloride 0.9% 500 mL chemo infusion       Pre-Hydration       sodium chloride 0.9% 1,000 mL with magnesium sulfate 1 g, potassium chloride 20 mEq infusion       Post-Hydration       sodium chloride 0.9% bolus 500 mL  5/25/2022       Chemotherapy       gemcitabine (GEMZAR) 1,615 mg in  sodium chloride 0.9% 250 mL chemo infusion       CISplatin (PLATINOL) 20 mg/m2 = 40 mg in sodium chloride 0.9% 500 mL chemo infusion       Pre-Hydration       sodium chloride 0.9% 1,000 mL with magnesium sulfate 1 g, potassium chloride 20 mEq infusion       Post-Hydration       sodium chloride 0.9% bolus 500 mL    Supportive Plan Information  OP FILGRASTIM 480 MCG   Young Wesley MD   Upcoming Treatment Dates - OP FILGRASTIM 480 MCG    2/9/2022       Medications       filgrastim-sndz (ZARXIO) injection 480 mcg/0.8 mL (Preferred Regimen)  2/10/2022       Medications       filgrastim-sndz (ZARXIO) injection 480 mcg/0.8 mL (Preferred Regimen)  2/11/2022       Medications       filgrastim-sndz (ZARXIO) injection 480 mcg/0.8 mL (Preferred Regimen)  2/12/2022       Medications       filgrastim-sndz (ZARXIO) injection 480 mcg/0.8 mL (Preferred Regimen)

## 2022-05-03 ENCOUNTER — TELEPHONE (OUTPATIENT)
Dept: TRANSPLANT | Facility: CLINIC | Age: 42
End: 2022-05-03
Payer: COMMERCIAL

## 2022-05-03 NOTE — TELEPHONE ENCOUNTER
Call returned. Questions related to the Selection meeting process addressed. Understanding expressed. No additional questions or concerns expressed at this time    ----- Message from Kevin Ji sent at 5/3/2022  8:40 AM CDT -----  Regarding: call back  Pt call to speak with Jenn in regards to some question the pt has    Call

## 2022-05-04 ENCOUNTER — DOCUMENTATION ONLY (OUTPATIENT)
Dept: INFUSION THERAPY | Facility: HOSPITAL | Age: 42
End: 2022-05-04
Payer: COMMERCIAL

## 2022-05-04 ENCOUNTER — INFUSION (OUTPATIENT)
Dept: INFUSION THERAPY | Facility: HOSPITAL | Age: 42
End: 2022-05-04
Attending: INTERNAL MEDICINE
Payer: COMMERCIAL

## 2022-05-04 VITALS
OXYGEN SATURATION: 99 % | WEIGHT: 190.94 LBS | TEMPERATURE: 98 F | BODY MASS INDEX: 28.28 KG/M2 | DIASTOLIC BLOOD PRESSURE: 85 MMHG | SYSTOLIC BLOOD PRESSURE: 119 MMHG | HEIGHT: 69 IN | RESPIRATION RATE: 18 BRPM | HEART RATE: 70 BPM

## 2022-05-04 DIAGNOSIS — K83.1 BILIARY OBSTRUCTION: Primary | ICD-10-CM

## 2022-05-04 PROCEDURE — 96366 THER/PROPH/DIAG IV INF ADDON: CPT | Mod: PN

## 2022-05-04 PROCEDURE — 96413 CHEMO IV INFUSION 1 HR: CPT | Mod: PN

## 2022-05-04 PROCEDURE — 96367 TX/PROPH/DG ADDL SEQ IV INF: CPT | Mod: PN

## 2022-05-04 PROCEDURE — 96417 CHEMO IV INFUS EACH ADDL SEQ: CPT | Mod: PN

## 2022-05-04 PROCEDURE — 96368 THER/DIAG CONCURRENT INF: CPT | Mod: PN

## 2022-05-04 PROCEDURE — 25000003 PHARM REV CODE 250: Mod: PN | Performed by: INTERNAL MEDICINE

## 2022-05-04 PROCEDURE — A4216 STERILE WATER/SALINE, 10 ML: HCPCS | Mod: PN | Performed by: INTERNAL MEDICINE

## 2022-05-04 PROCEDURE — 63600175 PHARM REV CODE 636 W HCPCS: Mod: PN | Performed by: INTERNAL MEDICINE

## 2022-05-04 PROCEDURE — 96375 TX/PRO/DX INJ NEW DRUG ADDON: CPT | Mod: PN

## 2022-05-04 RX ORDER — DEXAMETHASONE SODIUM PHOSPHATE 4 MG/ML
8 INJECTION, SOLUTION INTRA-ARTICULAR; INTRALESIONAL; INTRAMUSCULAR; INTRAVENOUS; SOFT TISSUE
Status: COMPLETED | OUTPATIENT
Start: 2022-05-04 | End: 2022-05-04

## 2022-05-04 RX ORDER — SODIUM CHLORIDE 0.9 % (FLUSH) 0.9 %
10 SYRINGE (ML) INJECTION
Status: DISCONTINUED | OUTPATIENT
Start: 2022-05-04 | End: 2022-05-04 | Stop reason: HOSPADM

## 2022-05-04 RX ORDER — SODIUM CHLORIDE 9 MG/ML
500 INJECTION, SOLUTION INTRAVENOUS
Status: COMPLETED | OUTPATIENT
Start: 2022-05-04 | End: 2022-05-04

## 2022-05-04 RX ORDER — HEPARIN 100 UNIT/ML
500 SYRINGE INTRAVENOUS
Status: DISCONTINUED | OUTPATIENT
Start: 2022-05-04 | End: 2022-05-04 | Stop reason: HOSPADM

## 2022-05-04 RX ORDER — PALONOSETRON 0.05 MG/ML
0.25 INJECTION, SOLUTION INTRAVENOUS ONCE
Status: COMPLETED | OUTPATIENT
Start: 2022-05-04 | End: 2022-05-04

## 2022-05-04 RX ADMIN — GEMCITABINE 1600 MG: 38 INJECTION, SOLUTION INTRAVENOUS at 11:05

## 2022-05-04 RX ADMIN — POTASSIUM CHLORIDE 500 ML/HR: 2 INJECTION, SOLUTION, CONCENTRATE INTRAVENOUS at 09:05

## 2022-05-04 RX ADMIN — PALONOSETRON 0.25 MG: 0.05 INJECTION, SOLUTION INTRAVENOUS at 11:05

## 2022-05-04 RX ADMIN — Medication 500 UNITS: at 01:05

## 2022-05-04 RX ADMIN — SODIUM CHLORIDE: 0.9 INJECTION, SOLUTION INTRAVENOUS at 08:05

## 2022-05-04 RX ADMIN — DEXAMETHASONE SODIUM PHOSPHATE 8 MG: 4 INJECTION INTRA-ARTICULAR; INTRALESIONAL; INTRAMUSCULAR; INTRAVENOUS; SOFT TISSUE at 11:05

## 2022-05-04 RX ADMIN — CISPLATIN 40 MG: 1 INJECTION INTRAVENOUS at 12:05

## 2022-05-04 RX ADMIN — Medication 10 ML: at 01:05

## 2022-05-04 RX ADMIN — APREPITANT 130 MG: 130 INJECTION, EMULSION INTRAVENOUS at 11:05

## 2022-05-04 RX ADMIN — SODIUM CHLORIDE 500 ML: 900 INJECTION, SOLUTION INTRAVENOUS at 12:05

## 2022-05-04 NOTE — PROGRESS NOTES
Oncology Nutrition       Weight Check   Chart reviewed. Weight check completed on pt.     CBW: 190#  Weight at initiation of tx:185#    Wt Readings from Last 10 Encounters:   05/04/22 86.6 kg (190 lb 14.7 oz)   05/02/22 86.6 kg (190 lb 14.7 oz)   04/26/22 86.1 kg (189 lb 13.1 oz)   04/19/22 88.2 kg (194 lb 7.1 oz)   04/06/22 86.7 kg (191 lb 0.5 oz)   04/05/22 86.6 kg (191 lb)   04/05/22 86.7 kg (191 lb 2.2 oz)   03/31/22 85.3 kg (188 lb)   03/30/22 88.4 kg (194 lb 14.2 oz)   03/29/22 88.4 kg (194 lb 14.2 oz)      RD plan of care: Weight is currently 190#. No significant change at this time. Per nursing nutrition risk report, pt denies any nutritional concerns or challenges at this time. Of note, pt recently met with a RD at another facility to do a pre liver transplant workup. Liver transplant scheduled for June 2022.    RD to continue to monitor; no nutritional interventions are needed at this time.     Elida Landaverde, MS, RD, LDN  05/04/2022  9:22 AM

## 2022-05-04 NOTE — PLAN OF CARE
Problem: Adult Inpatient Plan of Care  Goal: Patient-Specific Goal (Individualized)  Outcome: Ongoing, Progressing  Flowsheets (Taken 5/4/2022 0843)  Anxieties, Fears or Concerns: liver transplant  Individualized Care Needs: recliner  Patient-Specific Goals (Include Timeframe): no s/s infection during txt     Problem: Fatigue  Goal: Improved Activity Tolerance  Intervention: Promote Improved Energy  Flowsheets (Taken 5/4/2022 0843)  Fatigue Management: frequent rest breaks encouraged  Sleep/Rest Enhancement:   natural light exposure provided   noise level reduced  Activity Management:   Ambulated -L4   Ambulated in [place] - L4

## 2022-05-04 NOTE — PLAN OF CARE
Problem: Adult Inpatient Plan of Care  Goal: Plan of Care Review  Outcome: Ongoing, Progressing  Flowsheets (Taken 5/4/2022 1318)  Plan of Care Reviewed With: patient     Patient tolerated Cisplatin/Gemzar treatment well. Port flushed with blood return, heparin locked, and de-accessed. AVS provided per portal and reviewed. Patient states he cannot make appt on 5/11 due to graduation ceremony for daughter. Ambulates per self; NAD.

## 2022-05-09 ENCOUNTER — TELEPHONE (OUTPATIENT)
Dept: HEMATOLOGY/ONCOLOGY | Facility: CLINIC | Age: 42
End: 2022-05-09
Payer: COMMERCIAL

## 2022-05-09 ENCOUNTER — DOCUMENTATION ONLY (OUTPATIENT)
Dept: INFUSION THERAPY | Facility: HOSPITAL | Age: 42
End: 2022-05-09
Payer: COMMERCIAL

## 2022-05-13 ENCOUNTER — OFFICE VISIT (OUTPATIENT)
Dept: HEMATOLOGY/ONCOLOGY | Facility: CLINIC | Age: 42
End: 2022-05-13
Payer: COMMERCIAL

## 2022-05-13 ENCOUNTER — LAB VISIT (OUTPATIENT)
Dept: LAB | Facility: HOSPITAL | Age: 42
End: 2022-05-13
Attending: INTERNAL MEDICINE
Payer: COMMERCIAL

## 2022-05-13 ENCOUNTER — TELEPHONE (OUTPATIENT)
Dept: TRANSPLANT | Facility: CLINIC | Age: 42
End: 2022-05-13
Payer: COMMERCIAL

## 2022-05-13 ENCOUNTER — PATIENT MESSAGE (OUTPATIENT)
Dept: TRANSPLANT | Facility: CLINIC | Age: 42
End: 2022-05-13
Payer: COMMERCIAL

## 2022-05-13 VITALS
DIASTOLIC BLOOD PRESSURE: 88 MMHG | RESPIRATION RATE: 18 BRPM | BODY MASS INDEX: 28.43 KG/M2 | OXYGEN SATURATION: 99 % | HEIGHT: 69 IN | SYSTOLIC BLOOD PRESSURE: 127 MMHG | WEIGHT: 191.94 LBS | TEMPERATURE: 99 F | HEART RATE: 87 BPM

## 2022-05-13 DIAGNOSIS — I10 ESSENTIAL HYPERTENSION: ICD-10-CM

## 2022-05-13 DIAGNOSIS — D69.59 CHEMOTHERAPY-INDUCED THROMBOCYTOPENIA: ICD-10-CM

## 2022-05-13 DIAGNOSIS — D84.821 IMMUNODEFICIENCY SECONDARY TO CHEMOTHERAPY: ICD-10-CM

## 2022-05-13 DIAGNOSIS — C24.0 HILAR CHOLANGIOCARCINOMA: ICD-10-CM

## 2022-05-13 DIAGNOSIS — K83.09 CHOLANGITIS: ICD-10-CM

## 2022-05-13 DIAGNOSIS — Z45.2 ADJUSTMENT AND MANAGEMENT OF VASCULAR ACCESS DEVICE: ICD-10-CM

## 2022-05-13 DIAGNOSIS — T45.1X5A CHEMOTHERAPY-INDUCED THROMBOCYTOPENIA: ICD-10-CM

## 2022-05-13 DIAGNOSIS — T45.1X5A CHEMOTHERAPY-INDUCED NEUTROPENIA: ICD-10-CM

## 2022-05-13 DIAGNOSIS — D84.81 IMMUNODEFICIENCY SECONDARY TO NEOPLASM: ICD-10-CM

## 2022-05-13 DIAGNOSIS — Z79.899 IMMUNODEFICIENCY SECONDARY TO CHEMOTHERAPY: ICD-10-CM

## 2022-05-13 DIAGNOSIS — E13.9 SECONDARY DIABETES: ICD-10-CM

## 2022-05-13 DIAGNOSIS — T45.1X5A IMMUNODEFICIENCY SECONDARY TO CHEMOTHERAPY: ICD-10-CM

## 2022-05-13 DIAGNOSIS — D70.1 CHEMOTHERAPY-INDUCED NEUTROPENIA: ICD-10-CM

## 2022-05-13 DIAGNOSIS — D49.9 IMMUNODEFICIENCY SECONDARY TO NEOPLASM: ICD-10-CM

## 2022-05-13 DIAGNOSIS — C24.0 HILAR CHOLANGIOCARCINOMA: Primary | ICD-10-CM

## 2022-05-13 LAB
ALBUMIN SERPL BCP-MCNC: 3.7 G/DL (ref 3.5–5.2)
ALP SERPL-CCNC: 147 U/L (ref 55–135)
ALT SERPL W/O P-5'-P-CCNC: 42 U/L (ref 10–44)
ANION GAP SERPL CALC-SCNC: 8 MMOL/L (ref 8–16)
AST SERPL-CCNC: 29 U/L (ref 10–40)
BASOPHILS # BLD AUTO: 0.02 K/UL (ref 0–0.2)
BASOPHILS NFR BLD: 0.5 % (ref 0–1.9)
BILIRUB SERPL-MCNC: 0.4 MG/DL (ref 0.1–1)
BUN SERPL-MCNC: 16 MG/DL (ref 6–20)
CALCIUM SERPL-MCNC: 9.6 MG/DL (ref 8.7–10.5)
CANCER AG19-9 SERPL-ACNC: 18.5 U/ML (ref 0–40)
CHLORIDE SERPL-SCNC: 102 MMOL/L (ref 95–110)
CO2 SERPL-SCNC: 25 MMOL/L (ref 23–29)
CREAT SERPL-MCNC: 0.9 MG/DL (ref 0.5–1.4)
DIFFERENTIAL METHOD: ABNORMAL
EOSINOPHIL # BLD AUTO: 0.1 K/UL (ref 0–0.5)
EOSINOPHIL NFR BLD: 2.2 % (ref 0–8)
ERYTHROCYTE [DISTWIDTH] IN BLOOD BY AUTOMATED COUNT: 14.9 % (ref 11.5–14.5)
EST. GFR  (AFRICAN AMERICAN): >60 ML/MIN/1.73 M^2
EST. GFR  (NON AFRICAN AMERICAN): >60 ML/MIN/1.73 M^2
GLUCOSE SERPL-MCNC: 174 MG/DL (ref 70–110)
HCT VFR BLD AUTO: 30.9 % (ref 40–54)
HGB BLD-MCNC: 10.2 G/DL (ref 14–18)
IMM GRANULOCYTES # BLD AUTO: 0.01 K/UL (ref 0–0.04)
IMM GRANULOCYTES NFR BLD AUTO: 0.3 % (ref 0–0.5)
LYMPHOCYTES # BLD AUTO: 0.5 K/UL (ref 1–4.8)
LYMPHOCYTES NFR BLD: 13.9 % (ref 18–48)
MAGNESIUM SERPL-MCNC: 1.7 MG/DL (ref 1.6–2.6)
MCH RBC QN AUTO: 33.1 PG (ref 27–31)
MCHC RBC AUTO-ENTMCNC: 33 G/DL (ref 32–36)
MCV RBC AUTO: 100 FL (ref 82–98)
MONOCYTES # BLD AUTO: 0.4 K/UL (ref 0.3–1)
MONOCYTES NFR BLD: 10.4 % (ref 4–15)
NEUTROPHILS # BLD AUTO: 2.7 K/UL (ref 1.8–7.7)
NEUTROPHILS NFR BLD: 72.7 % (ref 38–73)
NRBC BLD-RTO: 0 /100 WBC
PLATELET # BLD AUTO: 83 K/UL (ref 150–450)
PMV BLD AUTO: 9.7 FL (ref 9.2–12.9)
POTASSIUM SERPL-SCNC: 4.3 MMOL/L (ref 3.5–5.1)
PROT SERPL-MCNC: 6.6 G/DL (ref 6–8.4)
RBC # BLD AUTO: 3.08 M/UL (ref 4.6–6.2)
SODIUM SERPL-SCNC: 135 MMOL/L (ref 136–145)
WBC # BLD AUTO: 3.66 K/UL (ref 3.9–12.7)

## 2022-05-13 PROCEDURE — 85025 COMPLETE CBC W/AUTO DIFF WBC: CPT | Mod: NTX | Performed by: INTERNAL MEDICINE

## 2022-05-13 PROCEDURE — 99999 PR PBB SHADOW E&M-EST. PATIENT-LVL IV: ICD-10-PCS | Mod: PBBFAC,,, | Performed by: PHYSICIAN ASSISTANT

## 2022-05-13 PROCEDURE — 80053 COMPREHEN METABOLIC PANEL: CPT | Mod: NTX | Performed by: INTERNAL MEDICINE

## 2022-05-13 PROCEDURE — 83735 ASSAY OF MAGNESIUM: CPT | Mod: TXP | Performed by: INTERNAL MEDICINE

## 2022-05-13 PROCEDURE — 99215 PR OFFICE/OUTPT VISIT, EST, LEVL V, 40-54 MIN: ICD-10-PCS | Mod: S$GLB,,, | Performed by: PHYSICIAN ASSISTANT

## 2022-05-13 PROCEDURE — 99215 OFFICE O/P EST HI 40 MIN: CPT | Mod: S$GLB,,, | Performed by: PHYSICIAN ASSISTANT

## 2022-05-13 PROCEDURE — 36415 COLL VENOUS BLD VENIPUNCTURE: CPT | Mod: NTX | Performed by: INTERNAL MEDICINE

## 2022-05-13 PROCEDURE — 99999 PR PBB SHADOW E&M-EST. PATIENT-LVL IV: CPT | Mod: PBBFAC,,, | Performed by: PHYSICIAN ASSISTANT

## 2022-05-13 PROCEDURE — 86301 IMMUNOASSAY TUMOR CA 19-9: CPT | Mod: TXP | Performed by: INTERNAL MEDICINE

## 2022-05-13 RX ORDER — DEXAMETHASONE SODIUM PHOSPHATE 4 MG/ML
8 INJECTION, SOLUTION INTRA-ARTICULAR; INTRALESIONAL; INTRAMUSCULAR; INTRAVENOUS; SOFT TISSUE
Status: CANCELLED
Start: 2022-05-13

## 2022-05-13 RX ORDER — PALONOSETRON 0.05 MG/ML
0.25 INJECTION, SOLUTION INTRAVENOUS ONCE
Status: CANCELLED
Start: 2022-05-13 | End: 2022-05-13

## 2022-05-13 RX ORDER — SODIUM CHLORIDE 0.9 % (FLUSH) 0.9 %
10 SYRINGE (ML) INJECTION
Status: CANCELLED | OUTPATIENT
Start: 2022-05-13

## 2022-05-13 RX ORDER — HEPARIN 100 UNIT/ML
500 SYRINGE INTRAVENOUS
Status: CANCELLED | OUTPATIENT
Start: 2022-05-13

## 2022-05-13 NOTE — TELEPHONE ENCOUNTER
Return call received. Romeo acknowledged receipt of message and agreed to Covid testing May 24th. Appointment scheduled and clinic staff notified.

## 2022-05-13 NOTE — PROGRESS NOTES
"                                                           PROGRESS NOTE    Subjective:       Patient ID: Romeo Larson is a 41 y.o. male.    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 Mr Larson is a 40 yo man who presents today for further management of suspected hilar cholangiocarcinoma. He presented with dark urine, abdominal pain and jaundice since August 2021. He presented to outside hospital ER with elevated bilirubin. MRCP was performed demonstrating bi-lobar intrahepatic bile duct dilation and a ~2cm ill defined mass at the hilum concerning for hilar cholangiocarcinoma. He was referred to the advanced endoscopy group at [facility] [place]. ERCP confirmed severe, malignant appearing stricture involving right and left main hepatic ducts. Biliary stents were placed to relieve obstruction. EUS was performed. It showed an irregular hypoechoic mass where the hepatic duct bifurcates into the right and left hepatic ducts. The mass measured 11.4 mm by 11.3 mm in maximal cross-sectional diameter. FNA sampling of hilar lymph nodes was negative for metastatic disease. Bile duct biopsy showed "rare groups of glandular epithelium with mild atypia, strips of benign glandular epithelium intermixed with blood clot, and focal fibrous tissue with chronic inflammation."  CT C/A/P 10/27/21:  1. Intrahepatic biliary dilatation despite the presence of 2 biliary drains.  The patient is recent comparison MRI a revealed a possible central hepatic mass, concerning for either cholangiocarcinoma or hepatocellular carcinoma.  This is, however, not definitively demonstrated on today's exam.  There are enlarged lymph nodes present in the vicinity of the ana cristina hepatis and celiac axis as detailed above.  2. Scattered pulmonary nodules measuring up to 6 mm.  3. Other findings as detailed above.  He presents today for further management. Seen by Dr" "Seal and considered a candidate for liver transplant. Seen by Dr Cunningham last Friday. Scheduled for PET this Wednesday. Works as a salesman of electronics.   Discussed neoadjuvant chemoradiation with 5FU followed by neoadjuvant cis/gem prior to liver transplant.   2. Hospitalized 11/3/21-11/5/21 for fever 2/2 acute cholangitis. Repeat ERCP biopsy on 11/4/21 again non-diagnostic. Biopsy of the celiac lymph node was negative.   3. PET scan 11/3/21: "1. Wedge-shaped geographic hypermetabolic activity within the right lobe of the liver in a similar distribution to the intrahepatic biliary dilatation.  This could relate to inflammation from multiple etiologies including biliary stasis, cholangitis, and obstructive dilatation.  Neoplastic involvement would be difficult to exclude.  The liver is poorly evaluated given background activity. 2. Hypermetabolic lymphadenopathy is noted in a periportal and celiac distribution.  Infectious inflammatory and neoplastic etiologies are on the differential.  Index nodes have been measured. 3. Multiple pulmonary nodules are noted too small to categorize by PET-CT fusion as evidence seen on a prior CT of 10/27/2021.  CT for follow-up of size stability is necessary."  4. Completed chemoradiation with 5FU from 11/29/21-1/5/2022. Started cis/gem on 1/19/22. Due for cycle 5 day 8 this week    Interval History:   Mr Larson returns for cycle 5 day 8 of cis/gem. Hospitalized 4/25/22-4/27/22 for fever and elevated bilirubin with concerns of ascending cholangitis. US 4/25/22 showed Thickening of the wall of the gallbladder with mild prominence of the common bile duct cannot rule out a choledochal lithiasis with certainty however the intrahepatic radicles do not appear dilated. Improved with IV antibiotics. He is feeling well. Completed oral cipro/flagyl. Good energy level and eating well. Has a good appetite. No changes to his hearing. Has tolerated treatment well with an excellent response. " "    ECO. Presents alone today. He is scheduled for liver transplant on 2022 with Dr. Cline    ROS:   A ten-point system review is obtained and negative except for what was stated in the Interval History.     Physical Examination:   Vital signs reviewed.   General: well hydrated, well developed, in no acute distress  HEENT: normocephalic, PERRLA, EOMI, anicteric sclerae, oropharynx clear  Neck: supple, no JVD  Lungs: clear breath sounds bilaterally, no wheezing, rales, or rhonchi  Chest: port site clean  Heart: RRR, no M/R/G  Abdomen: soft, no tenderness, non-distended, no mass, or hernia. BS present  Extremities: no clubbing, cyanosis, or edema  Skin: no rash, ulcer, or open wounds  Neuro: alert and oriented x 4, no focal neuro deficit  Psych: pleasant and appropriate mood and affect    Objective:     Laboratory Data:  Labs reviewed. CA 19-9 reduced from 140 to 30.7 to 18.5    Imaging Data:  PET scan 11/3/21: "1. Wedge-shaped geographic hypermetabolic activity within the right lobe of the liver in a similar distribution to the intrahepatic biliary dilatation.  This could relate to inflammation from multiple etiologies including biliary stasis, cholangitis, and obstructive dilatation.  Neoplastic involvement would be difficult to exclude.  The liver is poorly evaluated given background activity. 2. Hypermetabolic lymphadenopathy is noted in a periportal and celiac distribution.  Infectious inflammatory and neoplastic etiologies are on the differential.  Index nodes have been measured. 3. Multiple pulmonary nodules are noted too small to categorize by PET-CT fusion as evidence seen on a prior CT of 10/27/2021.  CT for follow-up of size stability is necessary."    CT C/A/P 22:  1. Patient with a history of suspected hilar cholangiocarcinoma with no evidence of distant metastatic disease in the chest, abdomen, or pelvis.  There has been an interval reduction in the extent of the patient's intrahepatic " biliary dilatation as compared to the previous study.  Two index lymph nodes in the vicinity of the ana cristina hepatis/gastroesophageal junction or smaller in size on today's examination.  2. Stable solid pulmonary nodules measuring up to 5 mm in size.  No new pulmonary nodules on today's study.    CT C/A/P 3/21/22:  No discrete hepatic mass is seen.  Biliary stents remain in place and there is persistent intrahepatic biliary duct dilatation not appearing significantly changed compared to the prior exam.  Air in the gallbladder with gallbladder wall slightly prominent and questionable trace pericholecystic fluid or fat stranding not significantly changed compared to the prior study.     Two index nodes previously measured near the EG junction/ana cristina hepatis do not appear significantly changed.     Small nodules in the lungs largest measuring 4 mm not significantly changed compared to the prior exam    Assessment and Plan:     1. Hilar cholangiocarcinoma    2. 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 Cholangitis    5. Chemotherapy-induced thrombocytopenia    6. Chemotherapy-induced neutropenia    7. Secondary diabetes    8. Essential hypertension      1-3  - Mr Marsha is a 40 yo man with likely stage I hilar cholangiocarcinoma. Biopsy non-diagnostic but clinical picture most consistent with hilar cholangiocarcinoma. He is a candidate for liver transplant. Completed chemoradiation with 5FU from 11/29/21-1/5/2022. Started cis/gem on 1/19/22.   - tolerating chemo well and has a good QOL. He continues to have an excellent response to therapy with a good decline in his tumor marker, CA 19-9 18.5 today  - doing well. Lab work has been reviewed as well. Cycle 5 day 8 cis/gem today. However, due to TCP will give Gemcitabine alone and hold the Cisplatin. Pte is agreeable.   - dose reduced gem/cis for severe thrombocytopenia  - scheduled for liver transplant on 6/21.   - check  counts 1 week before surgery to ensure improvement  - see Dr. Wesley in 3 months, after surgery    4.  - resolved after IV antibiotics    5.  - Platelet count 83K today. Adequate for hemostasis. Will hold the cisplatin. Proceed with Gemcitabine alone  - cis/gem dose reduced    6.  - improved after udenyca added on day 9  - continue    7.  - 2/2 dex with chemo  - BS good  - c/w metformin 500 mg bid    8.  - BP well controlled  - c/w current medication    Follow-up:     RTC in 3 months  Knows to call in the interval if any problems arise.    Pte displayed understanding of the above encounter and treatment plan. All thoughtful questions were answered to their satisfaction. Pte was advised to notify the care team or proceed to the ER if signs and symptoms worsen. Pt was discussed with Dr. Wesley as well.       BRANDO Kelley, PA-C  Physician Assistant Certified  Dept of Hematology/Oncology  PA-C to Dr. Wesley, Dr. Mcguire and Dr. Villavicencio    Electronically signed by Tanna Godoy    Route Chart for Scheduling    Med Onc Chart Routing      Follow up with physician 3 months. Check CBC, CMP on 6/14. see Dr. Wesley at the end of September with CBC, CMP, CA 19-9 checked 1 day prior   Follow up with JOSEPH    Labs CA 19-9, CBC, CMP and magnesium   Lab interval:     Imaging    Pharmacy appointment    Other referrals          Treatment Plan Information   OP GEMCITABINE CISPLATIN Q3W   Young Wesley MD   Upcoming Treatment Dates - OP GEMCITABINE CISPLATIN Q3W    5/11/2022       Chemotherapy       gemcitabine (GEMZAR) 1,515 mg in sodium chloride 0.9% 250 mL chemo infusion  5/25/2022       Chemotherapy       gemcitabine (GEMZAR) 1,615 mg in sodium chloride 0.9% 250 mL chemo infusion       CISplatin (PLATINOL) 20 mg/m2 = 40 mg in sodium chloride 0.9% 500 mL chemo infusion       Pre-Hydration       sodium chloride 0.9% 1,000 mL with magnesium sulfate 1 g, potassium chloride 20 mEq infusion       Post-Hydration       sodium chloride 0.9% bolus 500  mL  6/1/2022       Chemotherapy       gemcitabine (GEMZAR) 1,615 mg in sodium chloride 0.9% 250 mL chemo infusion       CISplatin (PLATINOL) 20 mg/m2 = 40 mg in sodium chloride 0.9% 500 mL chemo infusion       Pre-Hydration       sodium chloride 0.9% 1,000 mL with magnesium sulfate 1 g, potassium chloride 20 mEq infusion       Post-Hydration       sodium chloride 0.9% bolus 500 mL  6/15/2022       Chemotherapy       gemcitabine (GEMZAR) 1,615 mg in sodium chloride 0.9% 250 mL chemo infusion       CISplatin (PLATINOL) 20 mg/m2 = 40 mg in sodium chloride 0.9% 500 mL chemo infusion       Pre-Hydration       sodium chloride 0.9% 1,000 mL with magnesium sulfate 1 g, potassium chloride 20 mEq infusion       Post-Hydration       sodium chloride 0.9% bolus 500 mL    Supportive Plan Information  OP FILGRASTIM 480 MCG   Young Wesley MD   Upcoming Treatment Dates - OP FILGRASTIM 480 MCG    2/9/2022       Medications       filgrastim-sndz (ZARXIO) injection 480 mcg/0.8 mL (Preferred Regimen)  2/10/2022       Medications       filgrastim-sndz (ZARXIO) injection 480 mcg/0.8 mL (Preferred Regimen)  2/11/2022       Medications       filgrastim-sndz (ZARXIO) injection 480 mcg/0.8 mL (Preferred Regimen)  2/12/2022       Medications       filgrastim-sndz (ZARXIO) injection 480 mcg/0.8 mL (Preferred Regimen)

## 2022-05-13 NOTE — TELEPHONE ENCOUNTER
Message received for LAYLA Noguera RN, Liver Living Donor Coordinator, informing living donor transplant is scheduled 6/21/22. States recipient will need Covid testing 22-28 days prior. Request to schedule testing between 5/24-5/30.     Call placed to patient to schedule tests with no answer. Voice message left, informing of above, with request for return call with availability. Contact number provided.

## 2022-05-17 ENCOUNTER — PATIENT MESSAGE (OUTPATIENT)
Dept: HEMATOLOGY/ONCOLOGY | Facility: CLINIC | Age: 42
End: 2022-05-17
Payer: COMMERCIAL

## 2022-05-17 DIAGNOSIS — C24.0 HILAR CHOLANGIOCARCINOMA: Primary | ICD-10-CM

## 2022-05-18 ENCOUNTER — INFUSION (OUTPATIENT)
Dept: INFUSION THERAPY | Facility: HOSPITAL | Age: 42
End: 2022-05-18
Attending: INTERNAL MEDICINE
Payer: COMMERCIAL

## 2022-05-18 VITALS
WEIGHT: 196.63 LBS | BODY MASS INDEX: 29.12 KG/M2 | HEART RATE: 68 BPM | OXYGEN SATURATION: 100 % | HEIGHT: 69 IN | DIASTOLIC BLOOD PRESSURE: 86 MMHG | RESPIRATION RATE: 18 BRPM | SYSTOLIC BLOOD PRESSURE: 128 MMHG | TEMPERATURE: 98 F

## 2022-05-18 DIAGNOSIS — K83.1 BILIARY OBSTRUCTION: Primary | ICD-10-CM

## 2022-05-18 PROBLEM — Z45.2 ADJUSTMENT AND MANAGEMENT OF VASCULAR ACCESS DEVICE: Status: ACTIVE | Noted: 2022-05-18

## 2022-05-18 PROCEDURE — 25000003 PHARM REV CODE 250: Mod: PN | Performed by: INTERNAL MEDICINE

## 2022-05-18 PROCEDURE — 63600175 PHARM REV CODE 636 W HCPCS: Mod: PN | Performed by: INTERNAL MEDICINE

## 2022-05-18 PROCEDURE — A4216 STERILE WATER/SALINE, 10 ML: HCPCS | Mod: PN | Performed by: INTERNAL MEDICINE

## 2022-05-18 PROCEDURE — 96375 TX/PRO/DX INJ NEW DRUG ADDON: CPT | Mod: PN

## 2022-05-18 PROCEDURE — 96413 CHEMO IV INFUSION 1 HR: CPT | Mod: PN

## 2022-05-18 RX ORDER — SODIUM CHLORIDE 0.9 % (FLUSH) 0.9 %
10 SYRINGE (ML) INJECTION
Status: DISCONTINUED | OUTPATIENT
Start: 2022-05-18 | End: 2022-05-18 | Stop reason: HOSPADM

## 2022-05-18 RX ORDER — DEXAMETHASONE SODIUM PHOSPHATE 4 MG/ML
8 INJECTION, SOLUTION INTRA-ARTICULAR; INTRALESIONAL; INTRAMUSCULAR; INTRAVENOUS; SOFT TISSUE
Status: COMPLETED | OUTPATIENT
Start: 2022-05-18 | End: 2022-05-18

## 2022-05-18 RX ORDER — PALONOSETRON 0.05 MG/ML
0.25 INJECTION, SOLUTION INTRAVENOUS ONCE
Status: COMPLETED | OUTPATIENT
Start: 2022-05-18 | End: 2022-05-18

## 2022-05-18 RX ORDER — HEPARIN 100 UNIT/ML
500 SYRINGE INTRAVENOUS
Status: DISCONTINUED | OUTPATIENT
Start: 2022-05-18 | End: 2022-05-18 | Stop reason: HOSPADM

## 2022-05-18 RX ADMIN — Medication 10 ML: at 10:05

## 2022-05-18 RX ADMIN — PALONOSETRON 0.25 MG: 0.05 INJECTION, SOLUTION INTRAVENOUS at 09:05

## 2022-05-18 RX ADMIN — SODIUM CHLORIDE: 0.9 INJECTION, SOLUTION INTRAVENOUS at 08:05

## 2022-05-18 RX ADMIN — GEMCITABINE 1515 MG: 38 INJECTION, SOLUTION INTRAVENOUS at 09:05

## 2022-05-18 RX ADMIN — APREPITANT 130 MG: 130 INJECTION, EMULSION INTRAVENOUS at 09:05

## 2022-05-18 RX ADMIN — Medication 500 UNITS: at 10:05

## 2022-05-18 RX ADMIN — DEXAMETHASONE SODIUM PHOSPHATE 8 MG: 4 INJECTION INTRA-ARTICULAR; INTRALESIONAL; INTRAMUSCULAR; INTRAVENOUS; SOFT TISSUE at 09:05

## 2022-05-18 NOTE — PLAN OF CARE
Problem: Adult Inpatient Plan of Care  Goal: Plan of Care Review  Outcome: Ongoing, Progressing  Flowsheets (Taken 5/18/2022 1035)  Plan of Care Reviewed With: patient     Patient tolerated Gemzar treatment well. Port flushed with blood return, heparin locked, and de-accessed. AVS provided per portal and reviewed. Ambulates per self; NAD.

## 2022-05-18 NOTE — PLAN OF CARE
Problem: Adult Inpatient Plan of Care  Goal: Patient-Specific Goal (Individualized)  Outcome: Ongoing, Progressing  Flowsheets (Taken 5/18/2022 0844)  Anxieties, Fears or Concerns: none expressed  Individualized Care Needs: recliner, conversation  Patient-Specific Goals (Include Timeframe): no s/s rxn during txt     Problem: Fatigue  Goal: Improved Activity Tolerance  Intervention: Promote Improved Energy  Flowsheets (Taken 5/18/2022 0844)  Fatigue Management: frequent rest breaks encouraged  Sleep/Rest Enhancement:   natural light exposure provided   noise level reduced  Activity Management:   Ambulated -L4   Ambulated in [place] - L4

## 2022-05-19 ENCOUNTER — TELEPHONE (OUTPATIENT)
Dept: INFUSION THERAPY | Facility: HOSPITAL | Age: 42
End: 2022-05-19
Payer: COMMERCIAL

## 2022-05-20 ENCOUNTER — INFUSION (OUTPATIENT)
Dept: INFUSION THERAPY | Facility: HOSPITAL | Age: 42
End: 2022-05-20
Attending: INTERNAL MEDICINE
Payer: COMMERCIAL

## 2022-05-20 VITALS
TEMPERATURE: 98 F | HEART RATE: 89 BPM | RESPIRATION RATE: 18 BRPM | SYSTOLIC BLOOD PRESSURE: 127 MMHG | DIASTOLIC BLOOD PRESSURE: 78 MMHG

## 2022-05-20 DIAGNOSIS — K83.1 BILIARY OBSTRUCTION: Primary | ICD-10-CM

## 2022-05-20 PROCEDURE — 63600175 PHARM REV CODE 636 W HCPCS: Mod: TB,PN | Performed by: INTERNAL MEDICINE

## 2022-05-20 PROCEDURE — 96372 THER/PROPH/DIAG INJ SC/IM: CPT | Mod: PN

## 2022-05-20 RX ADMIN — PEGFILGRASTIM-CBQV 6 MG: 6 INJECTION, SOLUTION SUBCUTANEOUS at 04:05

## 2022-05-20 NOTE — PLAN OF CARE
Problem: Adult Inpatient Plan of Care  Goal: Plan of Care Review  Outcome: Ongoing, Progressing  Goal: Patient-Specific Goal (Individualized)  Outcome: Ongoing, Progressing     Problem: Fatigue  Goal: Improved Activity Tolerance  Outcome: Ongoing, Progressing   Pt tolerated udenyca injection well.   No adverse reaction noted.  Pt left clinic in no acute distress.

## 2022-05-24 ENCOUNTER — LAB VISIT (OUTPATIENT)
Dept: TRANSPLANT | Facility: CLINIC | Age: 42
End: 2022-05-24
Payer: COMMERCIAL

## 2022-05-24 DIAGNOSIS — Z01.818 PRE-OP TESTING: Primary | ICD-10-CM

## 2022-05-24 DIAGNOSIS — Z76.82 ORGAN TRANSPLANT CANDIDATE: ICD-10-CM

## 2022-05-24 DIAGNOSIS — C24.0 HILAR CHOLANGIOCARCINOMA: Primary | ICD-10-CM

## 2022-05-24 LAB — SARS-COV-2 RDRP RESP QL NAA+PROBE: NEGATIVE

## 2022-05-24 PROCEDURE — U0002 COVID-19 LAB TEST NON-CDC: HCPCS | Mod: TXP | Performed by: TRANSPLANT SURGERY

## 2022-05-24 NOTE — PROGRESS NOTES
Patient to clinic for covid test as pre op for transplant.   Denies symptoms. Specimen collected. Patient tolerated well.   Specimen tubed to lab for processing.

## 2022-05-26 ENCOUNTER — TELEPHONE (OUTPATIENT)
Dept: TRANSPLANT | Facility: CLINIC | Age: 42
End: 2022-05-26
Payer: COMMERCIAL

## 2022-05-26 NOTE — TELEPHONE ENCOUNTER
Call placed to patient. Informed of negative Covid test result. Advised of clinic appointment with Dr. Cline on 6/17 and exploratory laparotomy scheduled 6/17, with plan for live donor liver transplant 6/21, provided no unforseen findings or extrahepatic disease detected. Understanding expressed. Romeo says he's scheduled for an ERCP 6/10 for stent exchange and wants to know if this procedure is still needed. Advised it's unlikely, but I will review with Dr. Jara. Understanding expressed. Denies any additional questions or concerns at this time.

## 2022-05-27 ENCOUNTER — TELEPHONE (OUTPATIENT)
Dept: TRANSPLANT | Facility: CLINIC | Age: 42
End: 2022-05-27
Payer: COMMERCIAL

## 2022-05-27 NOTE — TELEPHONE ENCOUNTER
----- Message from Ivory Morales MA sent at 5/27/2022 12:06 PM CDT -----  Regarding: RE: ercp  Thanks    ----- Message -----  From: Jenn Carrera  Sent: 5/27/2022  12:04 PM CDT  To: Ivory Morales MA  Subject: FW: ercp                                         Mr. Marsha Morin is scheduled for an ercp/stent exchange 6/10 but will be having an exp lap on 6/17 and living donor liver transplant 6/21. Please cancel the ercp.    Cleveland Barajas    ----- Message -----  From: Julio Cesar Jara MD  Sent: 5/26/2022  12:23 PM CDT  To: Jenn Carrera  Subject: RE: ercp                                         Yes     ----- Message -----  From: Jenn Carrera  Sent: 5/26/2022  11:55 AM CDT  To: Julio Cesar Jara MD  Subject: ercp                                             Romeo is scheduled for stent exchange 6/10. Is it ok to cancel this procedure?

## 2022-05-31 ENCOUNTER — PATIENT MESSAGE (OUTPATIENT)
Dept: ADMINISTRATIVE | Facility: HOSPITAL | Age: 42
End: 2022-05-31
Payer: COMMERCIAL

## 2022-05-31 ENCOUNTER — ANESTHESIA EVENT (OUTPATIENT)
Dept: SURGERY | Facility: HOSPITAL | Age: 42
DRG: 006 | End: 2022-05-31
Payer: COMMERCIAL

## 2022-06-09 ENCOUNTER — PATIENT MESSAGE (OUTPATIENT)
Dept: TRANSPLANT | Facility: CLINIC | Age: 42
End: 2022-06-09

## 2022-06-09 ENCOUNTER — HOSPITAL ENCOUNTER (OUTPATIENT)
Dept: RADIOLOGY | Facility: HOSPITAL | Age: 42
Discharge: HOME | End: 2022-06-09
Attending: SURGERY
Payer: COMMERCIAL

## 2022-06-09 ENCOUNTER — OFFICE VISIT (OUTPATIENT)
Dept: TRANSPLANT | Facility: CLINIC | Age: 42
End: 2022-06-09
Payer: COMMERCIAL

## 2022-06-09 VITALS
HEART RATE: 93 BPM | HEIGHT: 69 IN | HEIGHT: 69 IN | RESPIRATION RATE: 16 BRPM | HEART RATE: 93 BPM | BODY MASS INDEX: 28.35 KG/M2 | SYSTOLIC BLOOD PRESSURE: 144 MMHG | BODY MASS INDEX: 28.35 KG/M2 | TEMPERATURE: 97 F | WEIGHT: 191.38 LBS | TEMPERATURE: 97 F | RESPIRATION RATE: 16 BRPM | DIASTOLIC BLOOD PRESSURE: 107 MMHG | OXYGEN SATURATION: 98 % | DIASTOLIC BLOOD PRESSURE: 107 MMHG | OXYGEN SATURATION: 98 % | SYSTOLIC BLOOD PRESSURE: 144 MMHG | WEIGHT: 191.38 LBS

## 2022-06-09 DIAGNOSIS — Z76.82 ORGAN TRANSPLANT CANDIDATE: ICD-10-CM

## 2022-06-09 DIAGNOSIS — C24.0 HILAR CHOLANGIOCARCINOMA: Primary | ICD-10-CM

## 2022-06-09 DIAGNOSIS — Z01.818 PRE-OP TESTING: ICD-10-CM

## 2022-06-09 DIAGNOSIS — C22.1 CHOLANGIOCARCINOMA: ICD-10-CM

## 2022-06-09 DIAGNOSIS — C24.0 HILAR CHOLANGIOCARCINOMA: ICD-10-CM

## 2022-06-09 PROCEDURE — 3044F PR MOST RECENT HEMOGLOBIN A1C LEVEL <7.0%: ICD-10-PCS | Mod: CPTII,S$GLB,TXP, | Performed by: SURGERY

## 2022-06-09 PROCEDURE — 3008F PR BODY MASS INDEX (BMI) DOCUMENTED: ICD-10-PCS | Mod: CPTII,S$GLB,TXP, | Performed by: TRANSPLANT SURGERY

## 2022-06-09 PROCEDURE — 99499 NO LOS: ICD-10-PCS | Mod: S$GLB,TXP,, | Performed by: SURGERY

## 2022-06-09 PROCEDURE — 4010F PR ACE/ARB THEARPY RXD/TAKEN: ICD-10-PCS | Mod: CPTII,S$GLB,TXP, | Performed by: TRANSPLANT SURGERY

## 2022-06-09 PROCEDURE — 3077F PR MOST RECENT SYSTOLIC BLOOD PRESSURE >= 140 MM HG: ICD-10-PCS | Mod: CPTII,S$GLB,TXP, | Performed by: SURGERY

## 2022-06-09 PROCEDURE — 76700 US ABDOMEN COMP WITH DOPPLER (XPD): ICD-10-PCS | Mod: 26,XS,TXP, | Performed by: RADIOLOGY

## 2022-06-09 PROCEDURE — 99999 PR PBB SHADOW E&M-EST. PATIENT-LVL III: CPT | Mod: PBBFAC,TXP,,

## 2022-06-09 PROCEDURE — 99215 OFFICE O/P EST HI 40 MIN: CPT | Mod: S$GLB,TXP,, | Performed by: TRANSPLANT SURGERY

## 2022-06-09 PROCEDURE — 99499 UNLISTED E&M SERVICE: CPT | Mod: S$GLB,TXP,, | Performed by: SURGERY

## 2022-06-09 PROCEDURE — 3044F HG A1C LEVEL LT 7.0%: CPT | Mod: CPTII,S$GLB,TXP, | Performed by: TRANSPLANT SURGERY

## 2022-06-09 PROCEDURE — 93975 VASCULAR STUDY: CPT | Mod: TC,TXP

## 2022-06-09 PROCEDURE — 76700 US EXAM ABDOM COMPLETE: CPT | Mod: 26,XS,TXP, | Performed by: RADIOLOGY

## 2022-06-09 PROCEDURE — 3080F PR MOST RECENT DIASTOLIC BLOOD PRESSURE >= 90 MM HG: ICD-10-PCS | Mod: CPTII,S$GLB,TXP, | Performed by: SURGERY

## 2022-06-09 PROCEDURE — 1159F MED LIST DOCD IN RCRD: CPT | Mod: CPTII,S$GLB,TXP, | Performed by: SURGERY

## 2022-06-09 PROCEDURE — 3077F SYST BP >= 140 MM HG: CPT | Mod: CPTII,S$GLB,TXP, | Performed by: TRANSPLANT SURGERY

## 2022-06-09 PROCEDURE — 99999 PR PBB SHADOW E&M-EST. PATIENT-LVL III: ICD-10-PCS | Mod: PBBFAC,TXP,,

## 2022-06-09 PROCEDURE — 3077F SYST BP >= 140 MM HG: CPT | Mod: CPTII,S$GLB,TXP, | Performed by: SURGERY

## 2022-06-09 PROCEDURE — 3080F PR MOST RECENT DIASTOLIC BLOOD PRESSURE >= 90 MM HG: ICD-10-PCS | Mod: CPTII,S$GLB,TXP, | Performed by: TRANSPLANT SURGERY

## 2022-06-09 PROCEDURE — 93975 VASCULAR STUDY: CPT | Mod: 26,TXP,, | Performed by: RADIOLOGY

## 2022-06-09 PROCEDURE — 3044F PR MOST RECENT HEMOGLOBIN A1C LEVEL <7.0%: ICD-10-PCS | Mod: CPTII,S$GLB,TXP, | Performed by: TRANSPLANT SURGERY

## 2022-06-09 PROCEDURE — 3080F DIAST BP >= 90 MM HG: CPT | Mod: CPTII,S$GLB,TXP, | Performed by: SURGERY

## 2022-06-09 PROCEDURE — 93975 US ABDOMEN COMP WITH DOPPLER (XPD): ICD-10-PCS | Mod: 26,TXP,, | Performed by: RADIOLOGY

## 2022-06-09 PROCEDURE — 3077F PR MOST RECENT SYSTOLIC BLOOD PRESSURE >= 140 MM HG: ICD-10-PCS | Mod: CPTII,S$GLB,TXP, | Performed by: TRANSPLANT SURGERY

## 2022-06-09 PROCEDURE — 4010F PR ACE/ARB THEARPY RXD/TAKEN: ICD-10-PCS | Mod: CPTII,S$GLB,TXP, | Performed by: SURGERY

## 2022-06-09 PROCEDURE — 4010F ACE/ARB THERAPY RXD/TAKEN: CPT | Mod: CPTII,S$GLB,TXP, | Performed by: TRANSPLANT SURGERY

## 2022-06-09 PROCEDURE — 99215 PR OFFICE/OUTPT VISIT, EST, LEVL V, 40-54 MIN: ICD-10-PCS | Mod: S$GLB,TXP,, | Performed by: TRANSPLANT SURGERY

## 2022-06-09 PROCEDURE — 1160F RVW MEDS BY RX/DR IN RCRD: CPT | Mod: CPTII,S$GLB,TXP, | Performed by: SURGERY

## 2022-06-09 PROCEDURE — 76700 US EXAM ABDOM COMPLETE: CPT | Mod: TC,TXP

## 2022-06-09 PROCEDURE — 1160F PR REVIEW ALL MEDS BY PRESCRIBER/CLIN PHARMACIST DOCUMENTED: ICD-10-PCS | Mod: CPTII,S$GLB,TXP, | Performed by: SURGERY

## 2022-06-09 PROCEDURE — 1159F PR MEDICATION LIST DOCUMENTED IN MEDICAL RECORD: ICD-10-PCS | Mod: CPTII,S$GLB,TXP, | Performed by: SURGERY

## 2022-06-09 PROCEDURE — 3080F DIAST BP >= 90 MM HG: CPT | Mod: CPTII,S$GLB,TXP, | Performed by: TRANSPLANT SURGERY

## 2022-06-09 PROCEDURE — 4010F ACE/ARB THERAPY RXD/TAKEN: CPT | Mod: CPTII,S$GLB,TXP, | Performed by: SURGERY

## 2022-06-09 PROCEDURE — 3044F HG A1C LEVEL LT 7.0%: CPT | Mod: CPTII,S$GLB,TXP, | Performed by: SURGERY

## 2022-06-09 PROCEDURE — 3008F BODY MASS INDEX DOCD: CPT | Mod: CPTII,S$GLB,TXP, | Performed by: SURGERY

## 2022-06-09 PROCEDURE — 3008F PR BODY MASS INDEX (BMI) DOCUMENTED: ICD-10-PCS | Mod: CPTII,S$GLB,TXP, | Performed by: SURGERY

## 2022-06-09 PROCEDURE — 3008F BODY MASS INDEX DOCD: CPT | Mod: CPTII,S$GLB,TXP, | Performed by: TRANSPLANT SURGERY

## 2022-06-09 NOTE — PROGRESS NOTES
Transplant Surgery  Liver Transplant Recipient Evaluation    Referring Provider: Pravin Lacey    Subjective:     Reason for Visit: evaluation for liver transplant    History of Present Illness: Romeo Larson is a 41 y.o. male who is being evaluated for liver transplant due to Primary Liver Malignancy: Cholangiocarcinoma (CH-CA). Romeo reports none.    External provider notes reviewed: Yes    Review of Systems   Constitutional: Negative for activity change, appetite change, chills and fever.   Respiratory: Negative for cough and shortness of breath.    Cardiovascular: Negative for chest pain and leg swelling.   Gastrointestinal: Negative for abdominal distention, constipation, diarrhea, nausea and vomiting.   Genitourinary: Negative for difficulty urinating and dysuria.   Skin: Negative for color change and rash.   Neurological: Negative for dizziness and light-headedness.   All other systems reviewed and are negative.    Objective:     Physical Exam  Vitals and nursing note reviewed.   Constitutional:       General: He is not in acute distress.     Appearance: He is well-developed. He is not diaphoretic.   HENT:      Mouth/Throat:      Pharynx: No oropharyngeal exudate.   Eyes:      General: No scleral icterus.     Pupils: Pupils are equal, round, and reactive to light.   Neck:      Thyroid: No thyroid mass or thyromegaly.   Cardiovascular:      Rate and Rhythm: Normal rate.      Heart sounds: Normal heart sounds. No murmur heard.  Pulmonary:      Effort: Pulmonary effort is normal. No respiratory distress.      Breath sounds: Normal breath sounds. No wheezing or rales.   Chest:   Breasts:      Right: No supraclavicular adenopathy.      Left: No supraclavicular adenopathy.       Abdominal:      General: Bowel sounds are normal. There is no distension.      Palpations: Abdomen is soft. There is no mass.      Tenderness: There is no abdominal tenderness. There is no guarding or rebound.       Hernia: No hernia is present.   Musculoskeletal:      Cervical back: Neck supple.   Lymphadenopathy:      Head:      Right side of head: No submandibular adenopathy.      Left side of head: No submandibular adenopathy.      Cervical: No cervical adenopathy.      Upper Body:      Right upper body: No supraclavicular adenopathy.      Left upper body: No supraclavicular adenopathy.   Skin:     General: Skin is dry.      Findings: No rash.   Neurological:      Mental Status: He is alert and oriented to person, place, and time.         MELD-Na score: 6 at 3/25/2022 11:06 AM  MELD score: 6 at 3/25/2022 11:06 AM  Calculated from:  Serum Creatinine: 0.9 mg/dL (Using min of 1 mg/dL) at 3/25/2022 11:06 AM  Serum Sodium: 139 mmol/L (Using max of 137 mmol/L) at 3/24/2022  8:41 AM  Total Bilirubin: 0.7 mg/dL (Using min of 1 mg/dL) at 3/24/2022  8:41 AM  INR(ratio): 1.0 at 3/24/2022  8:41 AM  Age: 41 years    Diagnostics:  The following labs have been reviewed: CBC  BMP  INR  The following radiology images have been independently reviewed and interpreted: Liver US  CT Abd/Pelvis    Diagnoses:  1. Hilar cholangiocarcinoma        Transplant Surgery - Candidacy   Assessment/Plan:     Transplant Candidacy: Romeo Larson is a 41 y.o. 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  Based on available information, Romeo is a suitable liver transplant candidate.    Interpretation of tests and discussion of patient management involves all members of the multidisciplinary transplant team. I had extensive discussion with the patient and his wife regarding living donor specific complications with a liver transplant.     Sinan Cline MD         OS Patient Status  Functional Status: 50% - Requires considerable assistance and frequent medical care  Physical Capacity: No Limitations    Counseling: I discussed with Romeo the benefits of liver transplantation.  We  discussed the evaluation and listing procedures.  We discussed the MELD system and the associated waiting times.  We discussed national and center specific survival results.  We discussed the option of being multiply listed in different OPOs.  We discussed the option of living donation versus  donor transplantation and the advantages and relative disadvantages of each.   We discussed the risks, benefits and potential complications related to surgery including the risks related to anesthesia, bleeding, infection, primary non-function of the allograft, the risk of reoperation as well as the risk of death.  We discussed the typical post-operative course, length of hospitalization, the long-term use of immunosuppressive therapy as well as the need for long-term routine follow-up.    Patient advised that it is recommended that all transplant candidates, and their close contacts and household members receive Covid vaccination.    Coronavirus disease (COVID-19) caused by severe acute respiratory virus coronavirus 2 (SARS-C0V 2) is associated with increased mortality in solid organ transplant recipients (SOT) compared to non-transplant patients. Vaccine responses to vaccination are depressed against SARS-CoV2 compared to normal individuals but improve with third vaccination doses. Vaccination prior to SOT provides both the best opportunity for transplant candidates to develop protective immunity and to reduce the risk of serious COVID19 infections post transplantation. Organ transplant candidates at [facility] will be required to receive SARS-CoV-2 vaccination prior to being listed with a an active status, whenever possible. Exceptions will be made for disability related reasons or for sincerely held [employer] beliefs.     LDLT: I discussed the nature of living donor liver transplant, including donor risks and more frequent recipient complications. The patient is willing to  consider such grafts.

## 2022-06-09 NOTE — PROGRESS NOTES
Liver Transplant / Hepatobiliary Surgery  Clinic Note    Referring Provider: No ref. provider found     Subjective:     Reason for Visit: Hilar Cholangiocarcinoma - pre-op    History of Present Illness: Romeo Larson is a 41 y.o. 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 He has completed his neoadjuvant chemoradiotherapy without evidence of disease progression or extrahepatic disease on staging studies. He is scheduled for a living donor liver transplant on 6/21/22. He is also scheduled for a staging robotic laparoscopy on 6/17/22.    Review of Systems   Constitutional: Negative for chills and fever.   HENT: Negative for congestion and tinnitus.    Eyes: Negative for discharge and redness.   Respiratory: Negative for cough and shortness of breath.    Cardiovascular: Negative for chest pain and palpitations.   Gastrointestinal: Negative for diarrhea and vomiting.   Genitourinary: Negative for frequency and urgency.   Musculoskeletal: Negative for joint pain and myalgias.   Neurological: Negative for tingling and weakness.   Endo/Heme/Allergies: Does not bruise/bleed easily.   Psychiatric/Behavioral: Negative for depression and suicidal ideas.        Objective:     Physical Exam  HENT:      Head: Normocephalic and atraumatic.   Eyes:      Pupils: Pupils are equal, round, and reactive to light.   Cardiovascular:      Rate and Rhythm: Normal rate and regular rhythm.   Pulmonary:      Effort: Pulmonary effort is normal. No respiratory distress.   Abdominal:      Palpations: Abdomen is soft.      Tenderness: There is no guarding.   Musculoskeletal:         General: No tenderness. Normal range of motion.      Cervical back: Normal range of motion.   Lymphadenopathy:      Cervical: No cervical adenopathy.   Skin:     General: Skin is warm and dry.   Neurological:      Mental Status: He is alert and oriented to person, place, and time.      Cranial Nerves: No cranial nerve deficit.   Psychiatric:          Mood and Affect: Affect normal.         Judgment: Judgment normal.            MELD-Na score: MELD-Na score: 6 at 3/25/2022 11:06 AM  MELD score: 6 at 3/25/2022 11:06 AM  Calculated from:  Serum Creatinine: 0.9 mg/dL (Using min of 1 mg/dL) at 3/25/2022 11:06 AM  Serum Sodium: 139 mmol/L (Using max of 137 mmol/L) at 3/24/2022  8:41 AM  Total Bilirubin: 0.7 mg/dL (Using min of 1 mg/dL) at 3/24/2022  8:41 AM  INR(ratio): 1.0 at 3/24/2022  8:41 AM  Age: 41 years     Sodium   Date Value Ref Range Status   05/13/2022 135 (L) 136 - 145 mmol/L Final     Potassium   Date Value Ref Range Status   05/13/2022 4.3 3.5 - 5.1 mmol/L Final     Chloride   Date Value Ref Range Status   05/13/2022 102 95 - 110 mmol/L Final     CO2   Date Value Ref Range Status   05/13/2022 25 23 - 29 mmol/L Final     Glucose   Date Value Ref Range Status   05/13/2022 174 (H) 70 - 110 mg/dL Final     BUN   Date Value Ref Range Status   05/13/2022 16 6 - 20 mg/dL Final     Creatinine   Date Value Ref Range Status   05/13/2022 0.9 0.5 - 1.4 mg/dL Final     Calcium   Date Value Ref Range Status   05/13/2022 9.6 8.7 - 10.5 mg/dL Final     Total Protein   Date Value Ref Range Status   05/13/2022 6.6 6.0 - 8.4 g/dL Final     Albumin   Date Value Ref Range Status   05/13/2022 3.7 3.5 - 5.2 g/dL Final     Total Bilirubin   Date Value Ref Range Status   05/13/2022 0.4 0.1 - 1.0 mg/dL Final     Comment:     For infants and newborns, interpretation of results should be based  on gestational age, weight and in agreement with clinical  observations.    Premature Infant recommended reference ranges:  Up to 24 hours.............<8.0 mg/dL  Up to 48 hours............<12.0 mg/dL  3-5 days..................<15.0 mg/dL  6-29 days.................<15.0 mg/dL       Alkaline Phosphatase   Date Value Ref Range Status   05/13/2022 147 (H) 55 - 135 U/L Final     AST   Date Value Ref Range Status   05/13/2022 29 10 - 40 U/L Final     ALT   Date Value Ref Range Status    05/13/2022 42 10 - 44 U/L Final     Anion Gap   Date Value Ref Range Status   05/13/2022 8 8 - 16 mmol/L Final     eGFR if    Date Value Ref Range Status   05/13/2022 >60.0 >60 mL/min/1.73 m^2 Final     eGFR if non    Date Value Ref Range Status   05/13/2022 >60.0 >60 mL/min/1.73 m^2 Final     Comment:     Calculation used to obtain the estimated glomerular filtration  rate (eGFR) is the CKD-EPI equation.        Lab Results   Component Value Date    WBC 3.66 (L) 05/13/2022    HGB 10.2 (L) 05/13/2022    HCT 30.9 (L) 05/13/2022     (H) 05/13/2022    PLT 83 (L) 05/13/2022       Lab Results   Component Value Date    INR 1.0 03/24/2022    INR 0.9 10/16/2021       Oncology History   Hilar cholangiocarcinoma           Diagnoses:  Problem List Items Addressed This Visit        Oncology    Hilar cholangiocarcinoma - Primary           Assessment/Plan:     I reviewed available imaging with the patient and his family in clinic today and discussed in detail the diagnosis of hilar cholangiocarcinoma. I discussed the approach and indication for staging laparoscopy. Risks and benefits were discussed. He is scheduled for next Friday.      Julio Cesar Jara MD  Liver Transplant / Hepatobiliary Surgery  [facility]

## 2022-06-10 ENCOUNTER — TELEPHONE (OUTPATIENT)
Dept: TRANSPLANT | Facility: CLINIC | Age: 42
End: 2022-06-10
Payer: COMMERCIAL

## 2022-06-10 DIAGNOSIS — C24.0 HILAR CHOLANGIOCARCINOMA: ICD-10-CM

## 2022-06-10 DIAGNOSIS — Z76.82 ORGAN TRANSPLANT CANDIDATE: Primary | ICD-10-CM

## 2022-06-10 DIAGNOSIS — Z01.818 PRE-OP TESTING: ICD-10-CM

## 2022-06-10 RX ORDER — METHYLPREDNISOLONE SODIUM SUCCINATE 500 MG/8ML
500 INJECTION INTRAMUSCULAR; INTRAVENOUS
Status: CANCELLED | OUTPATIENT
Start: 2022-06-10

## 2022-06-10 RX ORDER — MUPIROCIN 20 MG/G
OINTMENT TOPICAL
Status: CANCELLED | OUTPATIENT
Start: 2022-06-10

## 2022-06-10 NOTE — TELEPHONE ENCOUNTER
----- Message from Hermes Melton sent at 6/10/2022  9:44 AM CDT -----    Called and sp to pt; He say the MRI Critical access hospital[facility]ed for 6/12 at 5am is too early. Pt appts have been re/Critical access hospital'ed to 6/14 at 9 and 10pm. Pt also told me that he rec' shemar a daron from the img [facility] stating that they need to get an auth because the insur co wants the img done a certain way. Told him that will let the nurse know.     Also add ct of chest for 6/14  aftre he sees Dr. Viveros.  .

## 2022-06-10 NOTE — H&P
Transplant Surgery  Liver Transplant Recipient Evaluation    Referring Provider: Pravin Lacey    Subjective:     Reason for Visit: evaluation for liver transplant    History of Present Illness: Romeo Larson is a 41 y.o. male who is being evaluated for liver transplant due to Primary Liver Malignancy: Cholangiocarcinoma (CH-CA). Romeo reports none.    External provider notes reviewed: Yes    Review of Systems   Constitutional: Negative for activity change, appetite change, chills and fever.   Respiratory: Negative for cough and shortness of breath.    Cardiovascular: Negative for chest pain and leg swelling.   Gastrointestinal: Negative for abdominal distention, constipation, diarrhea, nausea and vomiting.   Genitourinary: Negative for difficulty urinating and dysuria.   Skin: Negative for color change and rash.   Neurological: Negative for dizziness and light-headedness.   All other systems reviewed and are negative.    Objective:     Physical Exam  Vitals and nursing note reviewed.   Constitutional:       General: He is not in acute distress.     Appearance: He is well-developed. He is not diaphoretic.   HENT:      Mouth/Throat:      Pharynx: No oropharyngeal exudate.   Eyes:      General: No scleral icterus.     Pupils: Pupils are equal, round, and reactive to light.   Neck:      Thyroid: No thyroid mass or thyromegaly.   Cardiovascular:      Rate and Rhythm: Normal rate.      Heart sounds: Normal heart sounds. No murmur heard.  Pulmonary:      Effort: Pulmonary effort is normal. No respiratory distress.      Breath sounds: Normal breath sounds. No wheezing or rales.   Chest:   Breasts:      Right: No supraclavicular adenopathy.      Left: No supraclavicular adenopathy.       Abdominal:      General: Bowel sounds are normal. There is no distension.      Palpations: Abdomen is soft. There is no mass.      Tenderness: There is no abdominal tenderness. There is no guarding or rebound.       Hernia: No hernia is present.   Musculoskeletal:      Cervical back: Neck supple.   Lymphadenopathy:      Head:      Right side of head: No submandibular adenopathy.      Left side of head: No submandibular adenopathy.      Cervical: No cervical adenopathy.      Upper Body:      Right upper body: No supraclavicular adenopathy.      Left upper body: No supraclavicular adenopathy.   Skin:     General: Skin is dry.      Findings: No rash.   Neurological:      Mental Status: He is alert and oriented to person, place, and time.         MELD-Na score: 6 at 3/25/2022 11:06 AM  MELD score: 6 at 3/25/2022 11:06 AM  Calculated from:  Serum Creatinine: 0.9 mg/dL (Using min of 1 mg/dL) at 3/25/2022 11:06 AM  Serum Sodium: 139 mmol/L (Using max of 137 mmol/L) at 3/24/2022  8:41 AM  Total Bilirubin: 0.7 mg/dL (Using min of 1 mg/dL) at 3/24/2022  8:41 AM  INR(ratio): 1.0 at 3/24/2022  8:41 AM  Age: 41 years    Diagnostics:  The following labs have been reviewed: CBC  BMP  INR  The following radiology images have been independently reviewed and interpreted: Liver US  CT Abd/Pelvis    Diagnoses:  1. Hilar cholangiocarcinoma        Transplant Surgery - Candidacy   Assessment/Plan:     Transplant Candidacy: Romeo Larson is a 41 y.o. 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  Based on available information, Romeo is a suitable liver transplant candidate.    Interpretation of tests and discussion of patient management involves all members of the multidisciplinary transplant team. I had extensive discussion with the patient and his wife regarding living donor specific complications with a liver transplant.     Sinan Cline MD         OS Patient Status  Functional Status: 50% - Requires considerable assistance and frequent medical care  Physical Capacity: No Limitations    Counseling: I discussed with Romeo the benefits of liver transplantation.  We  discussed the evaluation and listing procedures.  We discussed the MELD system and the associated waiting times.  We discussed national and center specific survival results.  We discussed the option of being multiply listed in different OPOs.  We discussed the option of living donation versus  donor transplantation and the advantages and relative disadvantages of each.   We discussed the risks, benefits and potential complications related to surgery including the risks related to anesthesia, bleeding, infection, primary non-function of the allograft, the risk of reoperation as well as the risk of death.  We discussed the typical post-operative course, length of hospitalization, the long-term use of immunosuppressive therapy as well as the need for long-term routine follow-up.    Patient advised that it is recommended that all transplant candidates, and their close contacts and household members receive Covid vaccination.    Coronavirus disease (COVID-19) caused by severe acute respiratory virus coronavirus 2 (SARS-C0V 2) is associated with increased mortality in solid organ transplant recipients (SOT) compared to non-transplant patients. Vaccine responses to vaccination are depressed against SARS-CoV2 compared to normal individuals but improve with third vaccination doses. Vaccination prior to SOT provides both the best opportunity for transplant candidates to develop protective immunity and to reduce the risk of serious COVID19 infections post transplantation. Organ transplant candidates at [facility] will be required to receive SARS-CoV-2 vaccination prior to being listed with a an active status, whenever possible. Exceptions will be made for disability related reasons or for sincerely held Gnosticism beliefs.     LDLT: I discussed the nature of living donor liver transplant, including donor risks and more frequent recipient complications. The patient is willing to  consider such grafts.

## 2022-06-14 ENCOUNTER — TELEPHONE (OUTPATIENT)
Dept: INFECTIOUS DISEASES | Facility: CLINIC | Age: 42
End: 2022-06-14
Payer: COMMERCIAL

## 2022-06-14 ENCOUNTER — PATIENT OUTREACH (OUTPATIENT)
Dept: ADMINISTRATIVE | Facility: HOSPITAL | Age: 42
End: 2022-06-14
Payer: COMMERCIAL

## 2022-06-14 ENCOUNTER — SOCIAL WORK (OUTPATIENT)
Dept: TRANSPLANT | Facility: CLINIC | Age: 42
End: 2022-06-14
Payer: COMMERCIAL

## 2022-06-14 ENCOUNTER — CLINICAL SUPPORT (OUTPATIENT)
Dept: TRANSPLANT | Facility: CLINIC | Age: 42
End: 2022-06-14
Payer: COMMERCIAL

## 2022-06-14 ENCOUNTER — HOSPITAL ENCOUNTER (OUTPATIENT)
Dept: RADIOLOGY | Facility: HOSPITAL | Age: 42
Discharge: HOME | End: 2022-06-14
Attending: INTERNAL MEDICINE
Payer: COMMERCIAL

## 2022-06-14 ENCOUNTER — OFFICE VISIT (OUTPATIENT)
Dept: TRANSPLANT | Facility: CLINIC | Age: 42
End: 2022-06-14
Payer: COMMERCIAL

## 2022-06-14 ENCOUNTER — LAB VISIT (OUTPATIENT)
Dept: SURGERY | Facility: CLINIC | Age: 42
End: 2022-06-14
Payer: COMMERCIAL

## 2022-06-14 VITALS
RESPIRATION RATE: 16 BRPM | BODY MASS INDEX: 28.67 KG/M2 | SYSTOLIC BLOOD PRESSURE: 136 MMHG | OXYGEN SATURATION: 99 % | HEART RATE: 90 BPM | TEMPERATURE: 97 F | HEIGHT: 69 IN | HEART RATE: 90 BPM | BODY MASS INDEX: 28.67 KG/M2 | DIASTOLIC BLOOD PRESSURE: 94 MMHG | WEIGHT: 193.56 LBS | OXYGEN SATURATION: 99 % | TEMPERATURE: 97 F | WEIGHT: 193.56 LBS | SYSTOLIC BLOOD PRESSURE: 136 MMHG | HEIGHT: 69 IN | RESPIRATION RATE: 16 BRPM | DIASTOLIC BLOOD PRESSURE: 94 MMHG

## 2022-06-14 DIAGNOSIS — D49.9 IMMUNODEFICIENCY SECONDARY TO NEOPLASM: Primary | ICD-10-CM

## 2022-06-14 DIAGNOSIS — Z76.82 ORGAN TRANSPLANT CANDIDATE: ICD-10-CM

## 2022-06-14 DIAGNOSIS — Z76.82 ORGAN TRANSPLANT CANDIDATE: Primary | ICD-10-CM

## 2022-06-14 DIAGNOSIS — C24.0 HILAR CHOLANGIOCARCINOMA: ICD-10-CM

## 2022-06-14 DIAGNOSIS — C24.0 HILAR CHOLANGIOCARCINOMA: Primary | ICD-10-CM

## 2022-06-14 DIAGNOSIS — T45.1X5A IMMUNODEFICIENCY SECONDARY TO CHEMOTHERAPY: Primary | ICD-10-CM

## 2022-06-14 DIAGNOSIS — D84.81 IMMUNODEFICIENCY SECONDARY TO NEOPLASM: Primary | ICD-10-CM

## 2022-06-14 DIAGNOSIS — Z79.899 IMMUNODEFICIENCY SECONDARY TO CHEMOTHERAPY: Primary | ICD-10-CM

## 2022-06-14 DIAGNOSIS — D84.821 IMMUNODEFICIENCY SECONDARY TO CHEMOTHERAPY: Primary | ICD-10-CM

## 2022-06-14 LAB — SARS-COV-2 RNA RESP QL NAA+PROBE: NOT DETECTED

## 2022-06-14 PROCEDURE — 3080F PR MOST RECENT DIASTOLIC BLOOD PRESSURE >= 90 MM HG: ICD-10-PCS | Mod: CPTII,S$GLB,TXP, | Performed by: INTERNAL MEDICINE

## 2022-06-14 PROCEDURE — 99999 PR PBB SHADOW E&M-EST. PATIENT-LVL I: CPT | Mod: PBBFAC,TXP,,

## 2022-06-14 PROCEDURE — 4010F ACE/ARB THERAPY RXD/TAKEN: CPT | Mod: CPTII,S$GLB,TXP, | Performed by: INTERNAL MEDICINE

## 2022-06-14 PROCEDURE — 3080F DIAST BP >= 90 MM HG: CPT | Mod: CPTII,S$GLB,TXP, | Performed by: INTERNAL MEDICINE

## 2022-06-14 PROCEDURE — U0003 INFECTIOUS AGENT DETECTION BY NUCLEIC ACID (DNA OR RNA); SEVERE ACUTE RESPIRATORY SYNDROME CORONAVIRUS 2 (SARS-COV-2) (CORONAVIRUS DISEASE [COVID-19]), AMPLIFIED PROBE TECHNIQUE, MAKING USE OF HIGH THROUGHPUT TECHNOLOGIES AS DESCRIBED BY CMS-2020-01-R: HCPCS | Mod: TXP | Performed by: TRANSPLANT SURGERY

## 2022-06-14 PROCEDURE — 99215 OFFICE O/P EST HI 40 MIN: CPT | Mod: S$GLB,TXP,, | Performed by: INTERNAL MEDICINE

## 2022-06-14 PROCEDURE — 99999 PR PBB SHADOW E&M-EST. PATIENT-LVL I: ICD-10-PCS | Mod: PBBFAC,TXP,,

## 2022-06-14 PROCEDURE — 99999 PR PBB SHADOW E&M-EST. PATIENT-LVL IV: CPT | Mod: PBBFAC,TXP,, | Performed by: INTERNAL MEDICINE

## 2022-06-14 PROCEDURE — 4010F PR ACE/ARB THEARPY RXD/TAKEN: ICD-10-PCS | Mod: CPTII,S$GLB,TXP, | Performed by: INTERNAL MEDICINE

## 2022-06-14 PROCEDURE — 71250 CT THORAX DX C-: CPT | Mod: 26,TXP,, | Performed by: RADIOLOGY

## 2022-06-14 PROCEDURE — 99999 PR PBB SHADOW E&M-EST. PATIENT-LVL III: ICD-10-PCS | Mod: PBBFAC,TXP,,

## 2022-06-14 PROCEDURE — 99215 PR OFFICE/OUTPT VISIT, EST, LEVL V, 40-54 MIN: ICD-10-PCS | Mod: S$GLB,TXP,, | Performed by: INTERNAL MEDICINE

## 2022-06-14 PROCEDURE — 71250 CT CHEST WITHOUT CONTRAST: ICD-10-PCS | Mod: 26,TXP,, | Performed by: RADIOLOGY

## 2022-06-14 PROCEDURE — 3008F BODY MASS INDEX DOCD: CPT | Mod: CPTII,S$GLB,TXP, | Performed by: INTERNAL MEDICINE

## 2022-06-14 PROCEDURE — 71250 CT THORAX DX C-: CPT | Mod: TC,TXP

## 2022-06-14 PROCEDURE — U0005 INFEC AGEN DETEC AMPLI PROBE: HCPCS | Mod: TXP | Performed by: TRANSPLANT SURGERY

## 2022-06-14 PROCEDURE — 3075F SYST BP GE 130 - 139MM HG: CPT | Mod: CPTII,S$GLB,TXP, | Performed by: INTERNAL MEDICINE

## 2022-06-14 PROCEDURE — 99999 PR PBB SHADOW E&M-EST. PATIENT-LVL IV: ICD-10-PCS | Mod: PBBFAC,TXP,, | Performed by: INTERNAL MEDICINE

## 2022-06-14 PROCEDURE — 3008F PR BODY MASS INDEX (BMI) DOCUMENTED: ICD-10-PCS | Mod: CPTII,S$GLB,TXP, | Performed by: INTERNAL MEDICINE

## 2022-06-14 PROCEDURE — 99999 PR PBB SHADOW E&M-EST. PATIENT-LVL III: CPT | Mod: PBBFAC,TXP,,

## 2022-06-14 PROCEDURE — 3044F PR MOST RECENT HEMOGLOBIN A1C LEVEL <7.0%: ICD-10-PCS | Mod: CPTII,S$GLB,TXP, | Performed by: INTERNAL MEDICINE

## 2022-06-14 PROCEDURE — 3075F PR MOST RECENT SYSTOLIC BLOOD PRESS GE 130-139MM HG: ICD-10-PCS | Mod: CPTII,S$GLB,TXP, | Performed by: INTERNAL MEDICINE

## 2022-06-14 PROCEDURE — 3044F HG A1C LEVEL LT 7.0%: CPT | Mod: CPTII,S$GLB,TXP, | Performed by: INTERNAL MEDICINE

## 2022-06-14 NOTE — LETTER
June 17, 2022        Pravin Maria  [address] [facility] [address]  [place] [address]  Phone: [phone]  Fax: [phone]             [facility]  [address]  [facility] [address]  Phone: [phone]   Patient: Romeo Larson   MR Number: [id number]   YOB: 1980   Date of Visit: 6/14/2022       Dear Dr. Pravin Maria    Thank you for referring Romeo Larson to me for evaluation. Attached you will find relevant portions of my assessment and plan of care.    If you have questions, please do not hesitate to call me. I look forward to following Romeo Larson along with you.    Sincerely,    Alli Viveros MD    Enclosure    If you would like to receive this communication electronically, please contact [email or web address] or [phone] to request [employer] Link access.    [employer] Link is a tool which provides read-only access to select patient information with whom you have a relationship. Its easy to use and provides real time access to review your patients record including encounter summaries, notes, results, and demographic information.    If you feel you have received this communication in error or would no longer like to receive these types of communications, please e-mail [email or web address]

## 2022-06-14 NOTE — LETTER
June 14, 2022    Romeo Larson  [address] [facility] [address]             [facility]  [address]  [facility] [address]  Phone: [phone] Dear Romeo Larson    Your [facility] primary care team is dedicated to assisting you achieve your health goal.  In order to maintain your goal, scheduling your diabetic health maintenance requirements are caldwell to successful disease management.     Pravin Maria MD has reviewed your records and found that you are overdue for your diabetic eye exam.  Yearly eye exams are especially important, as this can identify early eye complications and disease caused by your diabetes.  Pravin Maria MD has requested you schedule your diabetic eye exam and encourages you to schedule at any of the [facility] locations.  You can be seen conveniently at the [facility] location by calling [phone].      If you have already completed this exam at an outside facility, please notify us so we may request a copy of the exam and update your chart.     Sincerely,     Pravin Maria MD and your [facility] Team

## 2022-06-14 NOTE — PROGRESS NOTES
Met with pt in clinic to review pre-op instructions & answer questions r/t transplant by living donor graft on 6/21. COVID test scheduled for 6/20 @ 1530 for pre-op testing.

## 2022-06-14 NOTE — PROGRESS NOTES
Subjective:       Patient ID: Romeo Larson is a 41 y.o. male.    Chief Complaint: Waitlist Maintenance    HPI  I saw this 41 y.o. man who is currently listed for liver transplant as prt of our hilar cholangiocarcinoma protocol.    He has completed his neoadjuvant chemoradiotherapy without evidence of disease progression or extrahepatic disease on staging studies. He is scheduled for a living donor liver transplant on 6/21/22. He is also scheduled for a staging robotic laparoscopy on 6/17/22    Last chemo - May 10 2022    He is due to get additional imaging today.    MELD-Na score: 6 at 6/14/2022 12:45 PM  MELD score: 6 at 6/14/2022 12:45 PM  Calculated from:  Serum Creatinine: 0.9 mg/dL (Using min of 1 mg/dL) at 6/14/2022 12:45 PM  Serum Sodium: 139 mmol/L (Using max of 137 mmol/L) at 6/14/2022 12:45 PM  Total Bilirubin: 0.6 mg/dL (Using min of 1 mg/dL) at 6/14/2022 12:45 PM  INR(ratio): 1.0 at 6/14/2022 12:45 PM  Age: 41 years    PMH:  Tonsillectomy  No previous surgery  No DM  No heart disease    - hypertension    SH:  sales  Non smoker      Review of Systems   Constitutional: Negative for activity change, appetite change, chills, fatigue, fever and unexpected weight change.   HENT: Negative for hearing loss.    Eyes: Negative for discharge and visual disturbance.   Respiratory: Negative for cough, chest tightness, shortness of breath and wheezing.    Cardiovascular: Negative for chest pain, palpitations and leg swelling.   Gastrointestinal: Negative for abdominal distention, abdominal pain, constipation, diarrhea and nausea.   Genitourinary: Negative for dysuria and frequency.   Musculoskeletal: Negative for arthralgias and back pain.   Skin: Negative for pallor and rash.   Neurological: Negative for dizziness, tremors, speech difficulty and headaches.   Hematological: Negative for adenopathy.   Psychiatric/Behavioral: Negative for agitation and confusion.           Lab Results   Component Value Date    ALT 42  06/14/2022    AST 30 06/14/2022    GGT 1,180 (H) 03/24/2022    ALKPHOS 189 (H) 06/14/2022    BILITOT 0.6 06/14/2022     Past Medical History:   Diagnosis Date    Adjustment and management of vascular access device 5/18/2022    Cholangiocarcinoma     Hypercholesteremia     Hypertension      Past Surgical History:   Procedure Laterality Date    ENDOSCOPIC ULTRASOUND OF UPPER GASTROINTESTINAL TRACT N/A 10/20/2021    Procedure: ULTRASOUND, UPPER GI TRACT, ENDOSCOPIC;  Surgeon: Valeriy Sotelo MD;  [facility]  Service: Endoscopy;  Laterality: N/A;  wife to email vacc card-inst email-tb    ERCP N/A 10/20/2021    Procedure: ERCP (ENDOSCOPIC RETROGRADE CHOLANGIOPANCREATOGRAPHY);  Surgeon: Valeriy Sotelo MD;  [address]  Service: Endoscopy;  Laterality: N/A;    ERCP N/A 11/4/2021    Procedure: ERCP (ENDOSCOPIC RETROGRADE CHOLANGIOPANCREATOGRAPHY);  Surgeon: Valeriy Sotelo MD;  [address]  Service: Endoscopy;  Laterality: N/A;    ERCP N/A 11/4/2021    Procedure: ERCP (ENDOSCOPIC RETROGRADE CHOLANGIOPANCREATOGRAPHY);  Surgeon: Roselia Pereyra MD;  [facility]  Service: Endoscopy;  Laterality: N/A;  pt vaccinated, instructed to bring card to procedure, instructions sent portal-MG    ERCP N/A 1/14/2022    Procedure: ERCP (ENDOSCOPIC RETROGRADE CHOLANGIOPANCREATOGRAPHY);  Surgeon: Roselia Pereyra MD;  [facility]  Service: Endoscopy;  Laterality: N/A;  inst portal-right chest port-tb  Pt requested at home COVID testing, Pt instructed at home RAPID to be done morning of procedure, Pt instructed to call endoscopy scheuding if there is a positive result, Pt informed if a positive     ERCP N/A 3/31/2022    Procedure: ERCP (ENDOSCOPIC RETROGRADE CHOLANGIOPANCREATOGRAPHY);  Surgeon: Julio Cesar Alonzo MD;  Location: [facility] ENDO (2ND FLR);  Service: Endoscopy;  Laterality: N/A;  3/16: port L chest wall. pt to bring  covid vaccination card. Instructions sent via portal.-SC  3/18/22-Updated instructions sent via portal re:new date/time-DS    INSERTION OF TUNNELED CENTRAL VENOUS CATHETER (CVC) WITH SUBCUTANEOUS PORT N/A 11/24/2021    Procedure: INSERTION, PORT-A-CATH;  Surgeon: Prem Syed MD;  [facility] CATH LAB;  Service: Radiology;  Laterality: N/A;    TONSILLECTOMY       Current Outpatient Medications   Medication Sig    losartan (COZAAR) 50 MG tablet Take 1 tablet (50 mg total) by mouth once daily.    HYDROcodone-acetaminophen (NORCO) 5-325 mg per tablet Take 1 tablet by mouth every 6 (six) hours as needed for Pain.     No current facility-administered medications for this visit.       Objective:      Physical Exam  HENT:      Head: Normocephalic.   Eyes:      Pupils: Pupils are equal, round, and reactive to light.   Neck:      Thyroid: No thyromegaly.   Cardiovascular:      Rate and Rhythm: Normal rate and regular rhythm.      Heart sounds: Normal heart sounds.   Pulmonary:      Effort: Pulmonary effort is normal.      Breath sounds: Normal breath sounds. No wheezing.   Abdominal:      General: There is no distension.      Palpations: Abdomen is soft. There is no mass.      Tenderness: There is no abdominal tenderness.   Lymphadenopathy:      Cervical: No cervical adenopathy.   Skin:     General: Skin is warm.      Findings: No erythema or rash.   Neurological:      Mental Status: He is alert and oriented to person, place, and time.   Psychiatric:         Behavior: Behavior normal.          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 Hilar cholangiocarcinoma        Plan:     He is aware of the risks involved and he is well informed about the whole process.  No contraindications for LDLT.      UNOS Patient Status  Functional Status: 100% - Normal, no complaints, no evidence of disease  Physical Capacity: No Limitations    Patient on life support: No  Diabetes: No  Any previous  malignancy: Yes, Cholangiocarcinoma  Neoadjuvant Therapy: yes  Has patient ever had a dx of HCC: no  Previous Abdominal Surgery: no  Spontaneous Bacterial Peritonitis: no  History of Portal Vein Thrombosis: no  Transjugular Intrahepatic Portosystemic Shunt: no    New diabetes onset between last follow-up to the current follow-up: No  Did patient have any acute rejection episodes during the follow-up period: No  Post transplant malignancy: No

## 2022-06-14 NOTE — PROGRESS NOTES
RECIPIENT PRE-OP NOTE    Potential Recipient Name: Romeo Larson, [id number]  Encounter Date: 6/14/2022    Sex: male  YOB: 1980  Age: 41 y.o.    Housing/Contact Info:  [address] [facility] [address]  Telephone Information:   Mobile [phone]    Home: [phone] (home)  Work: There is no work phone number on file.  E-mail: [email or web address]    Potential Surgery Date: June 21, 2022   Potential Donor Name: Tushar Larson, Clinic Number: [id number]  Potential Donor Relationship: brother    Patient presents as alert and oriented x 4, pleasant, calm, communicative and asking and answering questions appropriately. Patient presents as a 41 y.o. year old male to recipient pre-op appointment for scheduled liver living donor surgery. Patient's caregiver/wife was not present during this visit as she was with their children. However, she was available via phone call if needed. Patient states that he is independent with ADLs at this time.  Patient states motivation to pursue organ transplant at this time.    Does patient drive?  Patient is aware will not be able to drive until medically cleared by the transplant team. Patient verbalizes understanding that patient will need assistance for all transportation needs until medically cleared to drive.    Caregivers/Transportation:  Name: Brittney Larson  Age: 39  Phone: [phone]   Relationship: wife  Does person drive? yes  Does person have own/reliable transportation? yes    Name: Sonia Larson  Age: 69  Phone: [phone]  Relationship: mother  Does person drive? yes  Does person have own/reliable transportation? yes    Dependents/Others who rely on Patient/Caregiver for care: Patient has 3 children who are 16, 13 and 12. Patient reports his children are able to stay home and will be supervised by grandparents.     Cognitive:  Education: college degree  Reading Level: college  Reports difficulty with: N/A  Denies difficulty with: N/A    Infusion Service: patient  utilizing? no  Home Health: patient utilizing? no  DME:  patient utilizing? no    Living Will: no  Healthcare Power of : no  Written LW/HCPA and verbal information presented to patient today.    Insurance: Payor: HUMANA / Plan: HUMANA  PPO / Product Type: PPO /   Possible concerns regarding insurance post-donation reviewed. Patient verbalizes clear understanding.    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    Able to take time off work without financial concerns: yes. Patient reports his employer is flexible with giving patient time off and patient can also work remotely if able. Patient does plan to return to work once medically cleared. Patient referred to vocational rehabilitation.  Spouse/Significant Other Employment: works part time  Patient states does not expect to have any financial problems following transplant surgery.  Patient states has not conducted fundraising to assist with post-transplant costs.    Tobacco/Alcohol/Illicit Drug Abuse: Patient reports does not use tobacco products, alcohol, illicit drugs and non-prescription medications.    Psychiatric History: patient denies    Coping: Patient states that he is coping well with having liver living donor surgery. Patient states will call as needed and does understand how to access resources including the  as needed, both inpatient and outpatient.    Resources, information and support provided. Psychosocial aspects regarding organ donation and transplantation were discussed. Patient reports having a clear understanding of resources, information, support and psychosocial aspects.    Discharge Plan: Patient to discharge to own home under the care of patient's wife post-organ transplant. Patient states that patient's wife will be present as caregiver in the hospital. Patient's wife will transport patient home. Patient states agreement with not driving and not returning to work  until medically cleared to do so.    Patient states having clear understanding and realistic expectations regarding the potential risks and potential benefits of organ transplantation and organ donation. Patient agrees to further discuss with health care team members and support system members, as well as to utilize available resources and express questions and/or concerns. Resources and information provided and reviewed.    Patient reports motivation to proceed with living organ donor transplantation as scheduled.     Suitability for Transplant: Patient presents as a low risk candidate for organ transplant at this time.  Patient states does have a caregiver plan, transportation plan, and lodging plan in place. Patient states that patient does have medical and prescription medicine insurance in place and does have a plan in place to afford post-transplant costs.    Patient provided verbal permission to release any necessary information to outside resources for patient care and discharge planning.  Resources and information provided and reviewed.  Patient is choosing has no specific agency preferences.    Patient states that he is aware of what patient's normal copays and deductibles are for prescription medicines.       provided psychosocial support, counseling, resources, education, assistance, and discharge planning.  remains available.    Recommendations/Additional Comments:   -Patient to call transplant SW with any concerns or obstacles  -Plan was created in collaboration with patient and patient/caregiver verbalize agreement with plan as discussed.      Patient states is aware of [facility]'s affiliation and/or partnership with agencies in home health care, LTAC, SNF, [facility], and other hospitals and clinics.

## 2022-06-14 NOTE — PROGRESS NOTES
Chart review completed for the following HM test:   Care Everywhere and Media reports - updates requested and reviewed.      Routine Dilated Eye Exam test for Diabetic Retinopathy     Letter mailed

## 2022-06-15 ENCOUNTER — CLINICAL SUPPORT (OUTPATIENT)
Dept: INFECTIOUS DISEASES | Facility: CLINIC | Age: 42
End: 2022-06-15
Payer: COMMERCIAL

## 2022-06-15 ENCOUNTER — TELEPHONE (OUTPATIENT)
Dept: INFECTIOUS DISEASES | Facility: CLINIC | Age: 42
End: 2022-06-15
Payer: COMMERCIAL

## 2022-06-15 DIAGNOSIS — D49.9 IMMUNODEFICIENCY SECONDARY TO NEOPLASM: ICD-10-CM

## 2022-06-15 DIAGNOSIS — D84.81 IMMUNODEFICIENCY SECONDARY TO NEOPLASM: ICD-10-CM

## 2022-06-15 DIAGNOSIS — Z76.82 ORGAN TRANSPLANT CANDIDATE: Primary | ICD-10-CM

## 2022-06-15 DIAGNOSIS — Z76.82 ORGAN TRANSPLANT CANDIDATE: ICD-10-CM

## 2022-06-15 PROCEDURE — 90715 TDAP VACCINE 7 YRS/> IM: CPT | Mod: S$GLB,TXP,, | Performed by: INTERNAL MEDICINE

## 2022-06-15 PROCEDURE — 90472 PNEUMOCOCCAL CONJUGATE VACCINE 13-VALENT LESS THAN 5YO & GREATER THAN: ICD-10-PCS | Mod: S$GLB,TXP,, | Performed by: INTERNAL MEDICINE

## 2022-06-15 PROCEDURE — 90750 ZOSTER RECOMBINANT VACCINE: ICD-10-PCS | Mod: S$GLB,TXP,, | Performed by: INTERNAL MEDICINE

## 2022-06-15 PROCEDURE — 90670 PNEUMOCOCCAL CONJUGATE VACCINE 13-VALENT LESS THAN 5YO & GREATER THAN: ICD-10-PCS | Mod: S$GLB,TXP,, | Performed by: INTERNAL MEDICINE

## 2022-06-15 PROCEDURE — 90715 TDAP VACCINE GREATER THAN OR EQUAL TO 7YO IM: ICD-10-PCS | Mod: S$GLB,TXP,, | Performed by: INTERNAL MEDICINE

## 2022-06-15 PROCEDURE — 90670 PCV13 VACCINE IM: CPT | Mod: S$GLB,TXP,, | Performed by: INTERNAL MEDICINE

## 2022-06-15 PROCEDURE — 90750 HZV VACC RECOMBINANT IM: CPT | Mod: S$GLB,TXP,, | Performed by: INTERNAL MEDICINE

## 2022-06-15 PROCEDURE — 90739 HEPB VACC 2/4 DOSE ADULT IM: CPT | Mod: S$GLB,TXP,, | Performed by: INTERNAL MEDICINE

## 2022-06-15 PROCEDURE — 90471 IMMUNIZATION ADMIN: CPT | Mod: S$GLB,TXP,, | Performed by: INTERNAL MEDICINE

## 2022-06-15 PROCEDURE — 90739 HEPATITIS B (RECOMBINANT) ADJUVANTED, 2 DOSE: ICD-10-PCS | Mod: S$GLB,TXP,, | Performed by: INTERNAL MEDICINE

## 2022-06-15 PROCEDURE — 90472 IMMUNIZATION ADMIN EACH ADD: CPT | Mod: S$GLB,TXP,, | Performed by: INTERNAL MEDICINE

## 2022-06-15 PROCEDURE — 90471 HEPATITIS B (RECOMBINANT) ADJUVANTED, 2 DOSE: ICD-10-PCS | Mod: S$GLB,TXP,, | Performed by: INTERNAL MEDICINE

## 2022-06-15 NOTE — PROGRESS NOTES
Patient received zoster #1, and tdap vaccines in the left deltoid, and Prevnar 13 and Hep B vaccines in the right deltoid. Pt tolerated well. Pt asked to wait in the clinic 15 minutes after injection in the event of an allergic reaction. Pt verbalized understanding. Pt left in NAD.

## 2022-06-16 ENCOUNTER — TELEPHONE (OUTPATIENT)
Dept: TRANSPLANT | Facility: CLINIC | Age: 42
End: 2022-06-16
Payer: COMMERCIAL

## 2022-06-16 ENCOUNTER — ANESTHESIA EVENT (OUTPATIENT)
Dept: SURGERY | Facility: HOSPITAL | Age: 42
End: 2022-06-16
Payer: COMMERCIAL

## 2022-06-16 NOTE — TELEPHONE ENCOUNTER
Donor/Recipient Compatibility    Test/Item Donor Recipient Acceptable/Not Acceptable   ABO O POS O NEG Acceptable   HBsAb (Hepatitis B Surface Antibody) Negative No results found for: HEPBSURFABQU Acceptable   HBsAg (Hepatitis B Surface Antigen) Non-reactive Hepatitis B Surface Ag (no units)   Date Value   03/24/2022 Negative    Acceptable   HBcAb (Hepatitis Core Antibody) Non-reactive Hep B Core Total Ab (no units)   Date Value   03/24/2022 Negative    Acceptable   HCVab (Hepatitis C antibody) Non-reactive Hepatitis C Ab (no units)   Date Value   03/24/2022 Negative    Acceptable   HIV  Negative HIV 1/2 Ag/Ab (no units)   Date Value   03/24/2022 Negative    Acceptable   CMV Non-reactive CMV IgG Interpretation (no units)   Date Value   03/24/2022 Reactive (A)     If no results found, check Results Review for Sendouts Acceptable   RPR Non-reactive RPR (no units)   Date Value   03/24/2022 Non-reactive    Acceptable   GUSTBAO Testing Look in the Labs tab of Chart Review to find the results or Results Review activity. Look in the Labs tab of Chart Review to find the results or Results Review activity. Acceptable       Donor Only    Imaging imaging reviewed and is acceptable      Alli Viveros MD

## 2022-06-16 NOTE — TELEPHONE ENCOUNTER
Recipient Information  Name: Romeo Larson  MRN: [id number]  Age: 41 y.o. BMI: There is no height or weight on file to calculate BMI.       Primary Surgeon:Son   Secondary Surgeon:Tato    Re-Transplant: No  Currently on Blood thinners? No Reason: N/A    Induction:  Solumedrol    Other Considerations:    Donor Information  Name: Tushar Larson    MRN: [id number]  Age: 36     BMI: 24.99 kg/m²    Surgeon: Harris and Seal  UNLEXUS ID: [id number]  Lobe to be donated: right          Comments:  N/A    Donor/Recipient Compatibility    Test/Item Donor Recipient Acceptable/Not Acceptable   ABO O POS O NEG Acceptable   HBsAb (Hepatitis B Surface Antibody) Negative No results found for: HEPBSURFABQU Acceptable   HBsAg (Hepatitis B Surface Antigen) Non-reactive Hepatitis B Surface Ag (no units)   Date Value   03/24/2022 Negative    Acceptable   HBcAb (Hepatitis Core Antibody) Non-reactive Hep B Core Total Ab (no units)   Date Value   03/24/2022 Negative    Acceptable   HCVab (Hepatitis C antibody) Non-reactive Hepatitis C Ab (no units)   Date Value   03/24/2022 Negative    Acceptable   HIV  Negative HIV 1/2 Ag/Ab (no units)   Date Value   03/24/2022 Negative    Acceptable   CMV Non-reactive CMV IgG Interpretation (no units)   Date Value   03/24/2022 Reactive (A)     If no results found, check Results Review for Sendouts Acceptable   RPR Non-reactive RPR (no units)   Date Value   03/24/2022 Non-reactive    Acceptable   GUSTABO Testing Look in the Labs tab of Chart Review to find the results or Results Review activity. Look in the Labs tab of Chart Review to find the results or Results Review activity. Reviewed       Donor Only    Imaging imaging reviewed and suitable for living donation      Julio Cesar Jara MD

## 2022-06-16 NOTE — ANESTHESIA PREPROCEDURE EVALUATION
[facility]  Anesthesia Pre-Operative Evaluation         Patient Name: Romeo Larson  YOB: 1980  MRN: [id number]    SUBJECTIVE:     Pre-operative evaluation for Procedure(s) (LRB):  XI ROBOTIC LAPAROSCOPY,EXPLORATORY (N/A)     06/16/2022    Romeo Larson is a 41 y.o. male w/ a significant PMHx of HTN, HLD, and cholangiocarcinoma s/p neoadjuvant chemoradiation who presents for staging robotic laparoscopy in preparation for living donor transplant scheduled for 6/21.    Patient now presents for the above procedure(s).    MELD-Na score: 6 at 6/14/2022 12:45 PM  MELD score: 6 at 6/14/2022 12:45 PM  Calculated from:  Serum Creatinine: 0.9 mg/dL (Using min of 1 mg/dL) at 6/14/2022 12:45 PM  Serum Sodium: 139 mmol/L (Using max of 137 mmol/L) at 6/14/2022 12:45 PM  Total Bilirubin: 0.6 mg/dL (Using min of 1 mg/dL) at 6/14/2022 12:45 PM  INR(ratio): 1.0 at 6/14/2022 12:45 PM  Age: 41 years    TTE: 3/3/2022  · The stress echo portion of this study is negative for myocardial ischemia.  · The ECG portion of this study is negative for myocardial ischemia.  · During stress, the following significant arrhythmias were observed: rare PACs.  · The patient reached the end of the protocol.  · Normal left ventricular size with normal systolic function. The estimated ejection fraction is 65%.  · Normal right ventricular size with normal right ventricular systolic function.  · Normal left ventricular diastolic function.  · The estimated PA systolic pressure is 26 mmHg.  · Normal central venous pressure (3 mmHg).    LDA:   Port A Cath Single Lumen 11/24/21 1202 right subclavian;right internal jugular (Active)   Site Assessment No drainage;No redness;No swelling;No warmth 05/18/22 0850   Dressing Type Transparent (Tegaderm) 05/18/22 0850   Dressing Status Clean;Dry;Intact 05/18/22 0850   Dressing Intervention First dressing 05/18/22 0850   Patency/Care flushed w/o difficulty;blood return present;heparin locked  05/18/22 1031   Status Deaccessed 05/18/22 1031   Accessed by: RANJANA 05/18/22 0850   Needle Insertion Date 05/18/22 05/18/22 0850   Needle Insertion Time 0850 05/18/22 0850   Type of Needle Acosta 05/18/22 0850   Gauge 20 05/18/22 0850   Needle Length 3/4 in 05/18/22 0850   Needle Status Removed 05/18/22 1031   Flush Performed Yes 05/18/22 1031   Needle Removal Date 05/18/22 05/18/22 1031   Needle Removal Time 1031 05/18/22 1031   Deaccessed By KM 05/18/22 1031   Line Necessity Review Medication caustic to vasculature 05/18/22 0850   Number of days: 204       Prev airway: None documented.    Drips: None documented.      Patient Active Problem List   Diagnosis    Hypercholesterolemia    Diastolic hypertension    Hyperbilirubinemia    Obstructive jaundice    Biliary obstruction    Painless jaundice    Cholangitis    Hilar cholangiocarcinoma    Immunodeficiency secondary to neoplasm    Immunodeficiency secondary to chemotherapy    Chemotherapy induced neutropenia    Fever    Elevated liver enzymes    Macrocytic anemia    Adjustment and management of vascular access device       Review of patient's allergies indicates:  No Known Allergies    Current Inpatient Medications:      No current facility-administered medications on file prior to encounter.     Current Outpatient Medications on File Prior to Encounter   Medication Sig Dispense Refill    baclofen (LIORESAL) 10 MG tablet TAKE 1 TABLET(10 MG) BY MOUTH THREE TIMES DAILY AS NEEDED FOR HICCUPS (Patient not taking: Reported on 6/14/2022) 90 tablet 0    losartan (COZAAR) 50 MG tablet Take 1 tablet (50 mg total) by mouth once daily. 90 tablet 3    metFORMIN (GLUCOPHAGE) 500 MG tablet Take 1 tablet (500 mg total) by mouth 2 (two) times daily with meals. (Patient not taking: Reported on 6/14/2022) 60 tablet 3    ondansetron (ZOFRAN-ODT) 8 MG TbDL Take 1 tablet (8 mg total) by mouth every 6 (six) hours as needed (nausea). (Patient not taking: Reported on  6/14/2022) 60 tablet 2    promethazine (PHENERGAN) 12.5 MG Tab Take 1 tablet (12.5 mg total) by mouth every 6 (six) hours as needed (nausea). (Patient not taking: Reported on 6/14/2022) 60 tablet 2       Past Surgical History:   Procedure Laterality Date    ENDOSCOPIC ULTRASOUND OF UPPER GASTROINTESTINAL TRACT N/A 10/20/2021    Procedure: ULTRASOUND, UPPER GI TRACT, ENDOSCOPIC;  Surgeon: Valeriy Sotelo MD;  Location: [facility] ENDO (2ND FLR);  Service: Endoscopy;  Laterality: N/A;  wife to email vacc card-inst email-tb    ERCP N/A 10/20/2021    Procedure: ERCP (ENDOSCOPIC RETROGRADE CHOLANGIOPANCREATOGRAPHY);  Surgeon: Valeriy Sotelo MD;  Location: [facility] ENDO (2ND FLR);  Service: Endoscopy;  Laterality: N/A;    ERCP N/A 11/4/2021    Procedure: ERCP (ENDOSCOPIC RETROGRADE CHOLANGIOPANCREATOGRAPHY);  Surgeon: Valeriy Sotelo MD;  Location: [facility] ENDO (2ND FLR);  Service: Endoscopy;  Laterality: N/A;    ERCP N/A 11/4/2021    Procedure: ERCP (ENDOSCOPIC RETROGRADE CHOLANGIOPANCREATOGRAPHY);  Surgeon: Roselia Pereyra MD;  Location: [facility] ENDO (2ND FLR);  Service: Endoscopy;  Laterality: N/A;  pt vaccinated, instructed to bring card to procedure, instructions sent portal-MG    ERCP N/A 1/14/2022    Procedure: ERCP (ENDOSCOPIC RETROGRADE CHOLANGIOPANCREATOGRAPHY);  Surgeon: Roselia Pereyra MD;  Location: [facility] ENDO (2ND FLR);  Service: Endoscopy;  Laterality: N/A;  inst portal-right chest port-tb  Pt requested at home COVID testing, Pt instructed at home RAPID to be done morning of procedure, Pt instructed to call endoscopy scheuding if there is a positive result, Pt informed if a positive     ERCP N/A 3/31/2022    Procedure: ERCP (ENDOSCOPIC RETROGRADE CHOLANGIOPANCREATOGRAPHY);  Surgeon: Julio Cesar Alonzo MD;  Location: [facility] ENDO (2ND FLR);  Service: Endoscopy;  Laterality: N/A;  3/16: port L chest wall. pt to bring covid vaccination card. Instructions sent via portal.-SC  3/18/22-Updated instructions sent  via portal re:new date/time-DS    INSERTION OF TUNNELED CENTRAL VENOUS CATHETER (CVC) WITH SUBCUTANEOUS PORT N/A 11/24/2021    Procedure: INSERTION, PORT-A-CATH;  Surgeon: Prem Syed MD;  Location: [facility] CATH LAB;  Service: Radiology;  Laterality: N/A;    TONSILLECTOMY         OBJECTIVE:     Vital Signs Range (Last 24H):         Significant Labs:  Lab Results   Component Value Date    WBC 4.93 06/14/2022    HGB 12.7 (L) 06/14/2022    HCT 37.5 (L) 06/14/2022     06/14/2022    CHOL 163 10/12/2021    TRIG 198 (H) 10/12/2021    HDL 22 (L) 10/12/2021    ALT 42 06/14/2022    AST 30 06/14/2022     06/14/2022    K 4.1 06/14/2022     06/14/2022    CREATININE 0.9 06/14/2022    BUN 12 06/14/2022    CO2 26 06/14/2022    TSH 0.835 03/24/2022    PSA 0.68 03/24/2022    INR 1.0 06/14/2022    HGBA1C 6.8 (H) 03/29/2022       Diagnostic Studies: No relevant studies.    EKG:   Results for orders placed or performed during the hospital encounter of 11/03/21   EKG 12-lead    Collection Time: 11/03/21 11:32 PM    Narrative    Test Reason : A41.9,    Vent. Rate : 099 BPM     Atrial Rate : 099 BPM     P-R Int : 134 ms          QRS Dur : 080 ms      QT Int : 338 ms       P-R-T Axes : 037 040 004 degrees     QTc Int : 433 ms    Normal sinus rhythm  Possible  Inferior infarct ,age undetermined  Abnormal ECG  No previous ECGs available  Confirmed by Amado COSME MD ([id number]) on 11/4/2021 3:00:51 PM    Referred By: JONEL YOON           Confirmed By:Amado COSME MD       ASSESSMENT/PLAN:                                                                                                                06/16/2022  Romeo Larson is a 41 y.o., male.      Pre-op Assessment    I have reviewed the Patient Summary Reports.     I have reviewed the Nursing Notes.    I have reviewed the Medications.     Review of Systems  Anesthesia Hx:  No problems with previous Anesthesia  History of prior surgery of interest to airway management or  planning: Previous anesthesia: General Denies Family Hx of Anesthesia complications.   Denies Personal Hx of Anesthesia complications.   Social:  No Alcohol Use, Non-Smoker    Hematology/Oncology:  Hematology Normal      Current/Recent Cancer. chemotherapy and radiation   EENT/Dental:EENT/Dental Normal   Cardiovascular:   Exercise tolerance: good Hypertension    Pulmonary:  Pulmonary Normal    Renal/:  Renal/ Normal     Hepatic/GI:   Cholangiocarcinoma   Musculoskeletal:  Musculoskeletal Normal    Neurological:  Neurology Normal    Endocrine:  Endocrine Normal    Dermatological:  Skin Normal    Psych:  Psychiatric Normal           Physical Exam  General: Well nourished, Cooperative, Alert, Oriented and Jaundice    Airway:  Mallampati: III / I  Mouth Opening: Normal  TM Distance: Normal  Tongue: Normal  Neck ROM: Normal ROM    Dental:  Intact        Anesthesia Plan  Type of Anesthesia, risks & benefits discussed:    Anesthesia Type: Gen ETT  Intra-op Monitoring Plan: Standard ASA Monitors  Post Op Pain Control Plan: multimodal analgesia and IV/PO Opioids PRN  Induction:  IV  Airway Plan: Direct, Post-Induction  Informed Consent: Informed consent signed with the Patient and all parties understand the risks and agree with anesthesia plan.  All questions answered.   ASA Score: 3  Day of Surgery Review of History & Physical: H&P Update referred to the surgeon/provider.    Ready For Surgery From Anesthesia Perspective.     .

## 2022-06-16 NOTE — TELEPHONE ENCOUNTER
Recipient Information  Name: Romoe Larson  MRN: [id number]  Age: 41 y.o. BMI: There is no height or weight on file to calculate BMI.       Primary Surgeon:Son   Secondary Surgeon:Tato    Re-Transplant: No  Currently on Blood thinners? No Reason: N/A    Induction: Solumedrol    Other Considerations:    Donor Information  Name:  Tushar Larson                                        MRN: [id number]  Age: 36                                                            BMI: 24.99 kg/m²        Surgeon: Harris and Seal  UNOS ID: [id number]  Lobe to be donated: right          Comments:  N/A

## 2022-06-17 ENCOUNTER — HOSPITAL ENCOUNTER (OUTPATIENT)
Facility: HOSPITAL | Age: 42
Discharge: HOME | End: 2022-06-17
Attending: TRANSPLANT SURGERY | Admitting: TRANSPLANT SURGERY
Payer: COMMERCIAL

## 2022-06-17 ENCOUNTER — ANESTHESIA (OUTPATIENT)
Dept: SURGERY | Facility: HOSPITAL | Age: 42
End: 2022-06-17
Payer: COMMERCIAL

## 2022-06-17 VITALS
HEART RATE: 65 BPM | TEMPERATURE: 98 F | BODY MASS INDEX: 28.47 KG/M2 | RESPIRATION RATE: 20 BRPM | DIASTOLIC BLOOD PRESSURE: 76 MMHG | SYSTOLIC BLOOD PRESSURE: 115 MMHG | WEIGHT: 190 LBS | OXYGEN SATURATION: 100 %

## 2022-06-17 DIAGNOSIS — C22.1 CHOLANGIOCARCINOMA: ICD-10-CM

## 2022-06-17 PROCEDURE — D9220A PRA ANESTHESIA: Mod: NTX,,, | Performed by: ANESTHESIOLOGY

## 2022-06-17 PROCEDURE — 88342 CHG IMMUNOCYTOCHEMISTRY: ICD-10-PCS | Mod: 26,,, | Performed by: PATHOLOGY

## 2022-06-17 PROCEDURE — 88342 IMHCHEM/IMCYTCHM 1ST ANTB: CPT | Mod: 26,,, | Performed by: PATHOLOGY

## 2022-06-17 PROCEDURE — 88341 IMHCHEM/IMCYTCHM EA ADD ANTB: CPT | Mod: NTX | Performed by: PATHOLOGY

## 2022-06-17 PROCEDURE — 36000710: Mod: NTX | Performed by: TRANSPLANT SURGERY

## 2022-06-17 PROCEDURE — 63600175 PHARM REV CODE 636 W HCPCS: Mod: NTX | Performed by: TRANSPLANT SURGERY

## 2022-06-17 PROCEDURE — 88305 TISSUE EXAM BY PATHOLOGIST: CPT | Mod: 26,,, | Performed by: PATHOLOGY

## 2022-06-17 PROCEDURE — 71000044 HC DOSC ROUTINE RECOVERY FIRST HOUR: Mod: TXP | Performed by: TRANSPLANT SURGERY

## 2022-06-17 PROCEDURE — 25000003 PHARM REV CODE 250: Mod: NTX | Performed by: STUDENT IN AN ORGANIZED HEALTH CARE EDUCATION/TRAINING PROGRAM

## 2022-06-17 PROCEDURE — 88342 IMHCHEM/IMCYTCHM 1ST ANTB: CPT | Mod: TXP | Performed by: PATHOLOGY

## 2022-06-17 PROCEDURE — D9220A PRA ANESTHESIA: ICD-10-PCS | Mod: NTX,,, | Performed by: ANESTHESIOLOGY

## 2022-06-17 PROCEDURE — 71000016 HC POSTOP RECOV ADDL HR: Mod: TXP | Performed by: TRANSPLANT SURGERY

## 2022-06-17 PROCEDURE — 88305 TISSUE EXAM BY PATHOLOGIST: CPT | Mod: NTX | Performed by: PATHOLOGY

## 2022-06-17 PROCEDURE — 37000009 HC ANESTHESIA EA ADD 15 MINS: Mod: TXP | Performed by: TRANSPLANT SURGERY

## 2022-06-17 PROCEDURE — 25000003 PHARM REV CODE 250: Mod: NTX

## 2022-06-17 PROCEDURE — 71000015 HC POSTOP RECOV 1ST HR: Mod: TXP | Performed by: TRANSPLANT SURGERY

## 2022-06-17 PROCEDURE — 49320 PR LAP,DIAGNOSTIC ABDOMEN: ICD-10-PCS | Mod: ,,, | Performed by: TRANSPLANT SURGERY

## 2022-06-17 PROCEDURE — 88305 TISSUE EXAM BY PATHOLOGIST: ICD-10-PCS | Mod: 26,,, | Performed by: PATHOLOGY

## 2022-06-17 PROCEDURE — 63600175 PHARM REV CODE 636 W HCPCS: Mod: NTX

## 2022-06-17 PROCEDURE — 27201423 OPTIME MED/SURG SUP & DEVICES STERILE SUPPLY: Mod: NTX | Performed by: TRANSPLANT SURGERY

## 2022-06-17 PROCEDURE — 88341 IMHCHEM/IMCYTCHM EA ADD ANTB: CPT | Mod: 26,,, | Performed by: PATHOLOGY

## 2022-06-17 PROCEDURE — 94761 N-INVAS EAR/PLS OXIMETRY MLT: CPT | Mod: NTX

## 2022-06-17 PROCEDURE — 49320 DIAG LAPARO SEPARATE PROC: CPT | Mod: ,,, | Performed by: TRANSPLANT SURGERY

## 2022-06-17 PROCEDURE — 36000711: Mod: TXP | Performed by: TRANSPLANT SURGERY

## 2022-06-17 PROCEDURE — 25000003 PHARM REV CODE 250: Mod: TXP | Performed by: TRANSPLANT SURGERY

## 2022-06-17 PROCEDURE — 88341 PR IHC OR ICC EACH ADD'L SINGLE ANTIBODY  STAINPR: ICD-10-PCS | Mod: 26,,, | Performed by: PATHOLOGY

## 2022-06-17 PROCEDURE — 37000008 HC ANESTHESIA 1ST 15 MINUTES: Mod: NTX | Performed by: TRANSPLANT SURGERY

## 2022-06-17 RX ORDER — PROPOFOL 10 MG/ML
VIAL (ML) INTRAVENOUS
Status: DISCONTINUED | OUTPATIENT
Start: 2022-06-17 | End: 2022-06-17

## 2022-06-17 RX ORDER — CEFAZOLIN SODIUM/WATER 2 G/20 ML
2 SYRINGE (ML) INTRAVENOUS
Status: COMPLETED | OUTPATIENT
Start: 2022-06-17 | End: 2022-06-17

## 2022-06-17 RX ORDER — HEPARIN SODIUM 5000 [USP'U]/ML
5000 INJECTION, SOLUTION INTRAVENOUS; SUBCUTANEOUS EVERY 8 HOURS
Status: DISCONTINUED | OUTPATIENT
Start: 2022-06-17 | End: 2022-06-17 | Stop reason: HOSPADM

## 2022-06-17 RX ORDER — KETAMINE HCL IN 0.9 % NACL 50 MG/5 ML
SYRINGE (ML) INTRAVENOUS
Status: DISCONTINUED | OUTPATIENT
Start: 2022-06-17 | End: 2022-06-17

## 2022-06-17 RX ORDER — LIDOCAINE HYDROCHLORIDE 20 MG/ML
INJECTION, SOLUTION EPIDURAL; INFILTRATION; INTRACAUDAL; PERINEURAL
Status: DISCONTINUED | OUTPATIENT
Start: 2022-06-17 | End: 2022-06-17

## 2022-06-17 RX ORDER — HALOPERIDOL 5 MG/ML
INJECTION INTRAMUSCULAR
Status: DISCONTINUED | OUTPATIENT
Start: 2022-06-17 | End: 2022-06-17

## 2022-06-17 RX ORDER — HYDROCODONE BITARTRATE AND ACETAMINOPHEN 5; 325 MG/1; MG/1
1 TABLET ORAL ONCE AS NEEDED
Status: COMPLETED | OUTPATIENT
Start: 2022-06-17 | End: 2022-06-17

## 2022-06-17 RX ORDER — DEXAMETHASONE SODIUM PHOSPHATE 4 MG/ML
INJECTION, SOLUTION INTRA-ARTICULAR; INTRALESIONAL; INTRAMUSCULAR; INTRAVENOUS; SOFT TISSUE
Status: DISCONTINUED | OUTPATIENT
Start: 2022-06-17 | End: 2022-06-17

## 2022-06-17 RX ORDER — ONDANSETRON 2 MG/ML
INJECTION INTRAMUSCULAR; INTRAVENOUS
Status: DISCONTINUED | OUTPATIENT
Start: 2022-06-17 | End: 2022-06-17

## 2022-06-17 RX ORDER — FENTANYL CITRATE 50 UG/ML
INJECTION, SOLUTION INTRAMUSCULAR; INTRAVENOUS
Status: DISCONTINUED | OUTPATIENT
Start: 2022-06-17 | End: 2022-06-17

## 2022-06-17 RX ORDER — MIDAZOLAM HYDROCHLORIDE 1 MG/ML
INJECTION, SOLUTION INTRAMUSCULAR; INTRAVENOUS
Status: DISCONTINUED | OUTPATIENT
Start: 2022-06-17 | End: 2022-06-17

## 2022-06-17 RX ORDER — NEOSTIGMINE METHYLSULFATE 0.5 MG/ML
INJECTION, SOLUTION INTRAVENOUS
Status: DISCONTINUED | OUTPATIENT
Start: 2022-06-17 | End: 2022-06-17

## 2022-06-17 RX ORDER — DEXMEDETOMIDINE HYDROCHLORIDE 100 UG/ML
INJECTION, SOLUTION INTRAVENOUS
Status: DISCONTINUED | OUTPATIENT
Start: 2022-06-17 | End: 2022-06-17

## 2022-06-17 RX ORDER — HYDROMORPHONE HYDROCHLORIDE 1 MG/ML
0.2 INJECTION, SOLUTION INTRAMUSCULAR; INTRAVENOUS; SUBCUTANEOUS EVERY 5 MIN PRN
Status: DISCONTINUED | OUTPATIENT
Start: 2022-06-17 | End: 2022-06-17 | Stop reason: HOSPADM

## 2022-06-17 RX ORDER — SODIUM CHLORIDE 9 MG/ML
INJECTION, SOLUTION INTRAVENOUS CONTINUOUS
Status: DISCONTINUED | OUTPATIENT
Start: 2022-06-17 | End: 2022-06-17 | Stop reason: HOSPADM

## 2022-06-17 RX ORDER — BUPIVACAINE HYDROCHLORIDE 2.5 MG/ML
INJECTION, SOLUTION EPIDURAL; INFILTRATION; INTRACAUDAL
Status: DISCONTINUED | OUTPATIENT
Start: 2022-06-17 | End: 2022-06-17 | Stop reason: HOSPADM

## 2022-06-17 RX ORDER — SODIUM CHLORIDE 0.9 % (FLUSH) 0.9 %
10 SYRINGE (ML) INJECTION
Status: DISCONTINUED | OUTPATIENT
Start: 2022-06-17 | End: 2022-06-17 | Stop reason: HOSPADM

## 2022-06-17 RX ORDER — HYDROCODONE BITARTRATE AND ACETAMINOPHEN 5; 325 MG/1; MG/1
1 TABLET ORAL EVERY 6 HOURS PRN
Qty: 20 TABLET | Refills: 0 | Status: ON HOLD | OUTPATIENT
Start: 2022-06-17 | End: 2022-06-28 | Stop reason: HOSPADM

## 2022-06-17 RX ORDER — ROCURONIUM BROMIDE 10 MG/ML
INJECTION, SOLUTION INTRAVENOUS
Status: DISCONTINUED | OUTPATIENT
Start: 2022-06-17 | End: 2022-06-17

## 2022-06-17 RX ORDER — PHENYLEPHRINE HCL IN 0.9% NACL 1 MG/10 ML
SYRINGE (ML) INTRAVENOUS
Status: DISCONTINUED | OUTPATIENT
Start: 2022-06-17 | End: 2022-06-17

## 2022-06-17 RX ADMIN — ROCURONIUM BROMIDE 10 MG: 10 INJECTION INTRAVENOUS at 07:06

## 2022-06-17 RX ADMIN — Medication 200 MCG: at 07:06

## 2022-06-17 RX ADMIN — HYDROCODONE BITARTRATE AND ACETAMINOPHEN 1 TABLET: 5; 325 TABLET ORAL at 09:06

## 2022-06-17 RX ADMIN — ONDANSETRON 4 MG: 2 INJECTION INTRAMUSCULAR; INTRAVENOUS at 08:06

## 2022-06-17 RX ADMIN — DEXMEDETOMIDINE HYDROCHLORIDE 8 MCG: 100 INJECTION, SOLUTION INTRAVENOUS at 08:06

## 2022-06-17 RX ADMIN — Medication 2 G: at 07:06

## 2022-06-17 RX ADMIN — PROPOFOL 200 MG: 10 INJECTION, EMULSION INTRAVENOUS at 07:06

## 2022-06-17 RX ADMIN — Medication 20 MG: at 07:06

## 2022-06-17 RX ADMIN — HALOPERIDOL LACTATE 0.5 MG: 5 INJECTION, SOLUTION INTRAMUSCULAR at 08:06

## 2022-06-17 RX ADMIN — ROCURONIUM BROMIDE 40 MG: 10 INJECTION INTRAVENOUS at 07:06

## 2022-06-17 RX ADMIN — GLYCOPYRROLATE 0.6 MG: 0.2 INJECTION, SOLUTION INTRAMUSCULAR; INTRAVENOUS at 08:06

## 2022-06-17 RX ADMIN — DEXAMETHASONE SODIUM PHOSPHATE 4 MG: 4 INJECTION INTRA-ARTICULAR; INTRALESIONAL; INTRAMUSCULAR; INTRAVENOUS; SOFT TISSUE at 07:06

## 2022-06-17 RX ADMIN — NEOSTIGMINE METHYLSULFATE 5 MG: 0.5 INJECTION, SOLUTION INTRAVENOUS at 08:06

## 2022-06-17 RX ADMIN — Medication 10 MG: at 07:06

## 2022-06-17 RX ADMIN — Medication 100 MCG: at 08:06

## 2022-06-17 RX ADMIN — FENTANYL CITRATE 75 MCG: 50 INJECTION INTRAMUSCULAR; INTRAVENOUS at 07:06

## 2022-06-17 RX ADMIN — Medication 100 MCG: at 07:06

## 2022-06-17 RX ADMIN — DEXMEDETOMIDINE HYDROCHLORIDE 4 MCG: 100 INJECTION, SOLUTION INTRAVENOUS at 08:06

## 2022-06-17 RX ADMIN — LIDOCAINE HYDROCHLORIDE 5 ML: 20 INJECTION, SOLUTION EPIDURAL; INFILTRATION; INTRACAUDAL at 07:06

## 2022-06-17 RX ADMIN — MIDAZOLAM 2 MG: 1 INJECTION INTRAMUSCULAR; INTRAVENOUS at 06:06

## 2022-06-17 RX ADMIN — ROCURONIUM BROMIDE 10 MG: 10 INJECTION INTRAVENOUS at 08:06

## 2022-06-17 RX ADMIN — HEPARIN SODIUM 5000 UNITS: 5000 INJECTION INTRAVENOUS; SUBCUTANEOUS at 06:06

## 2022-06-17 RX ADMIN — SODIUM CHLORIDE: 0.9 INJECTION, SOLUTION INTRAVENOUS at 08:06

## 2022-06-17 RX ADMIN — Medication 50 MCG: at 07:06

## 2022-06-17 RX ADMIN — SODIUM CHLORIDE: 0.9 INJECTION, SOLUTION INTRAVENOUS at 07:06

## 2022-06-17 RX ADMIN — FENTANYL CITRATE 25 MCG: 50 INJECTION INTRAMUSCULAR; INTRAVENOUS at 07:06

## 2022-06-17 NOTE — ANESTHESIA PROCEDURE NOTES
Intubation    Date/Time: 6/17/2022 7:18 AM  Performed by: Ruth Espinal MD  Authorized by: John Lopez MD     Intubation:     Induction:  Intravenous    Intubated:  Postinduction    Mask Ventilation:  Easy mask    Attempts:  2    Attempted By:  Resident anesthesiologist    Method of Intubation:  Direct    Blade:  Roman 3    Laryngeal View Grade: Grade IV - neither epiglottis nor glottis seen      Attempted By (2nd Attempt):  Resident anesthesiologist    Method of Intubation (2nd Attempt):  Video laryngoscopy    Blade (2nd Attempt):  Gonzalez 3    Laryngeal View Grade (2nd Attempt): Grade I - full view of cords      Difficult Airway Encountered?: No      Complications:  None    Airway Device:  Oral endotracheal tube    Airway Device Size:  7.5    Style/Cuff Inflation:  Cuffed (inflated to minimal occlusive pressure)    Tube secured:  24    Secured at:  The lips    Placement Verified By:  Capnometry and Revisualization with laryngoscopy    Complicating Factors:  None    Findings Post-Intubation:  BS equal bilateral

## 2022-06-17 NOTE — PROGRESS NOTES
Pt discharged to home . Pt IV removed. Pt has all discharge instructions and personal belongings. Awaiting prescriptions delivered to BS. Tolerating clear liquids well. No further questions. Adequate for discharge.

## 2022-06-17 NOTE — TRANSFER OF CARE
Anesthesia Transfer of Care Note    Patient: Romeo Larson    Procedure(s) Performed: Procedure(s) (LRB):  XI ROBOTIC LAPAROSCOPY,EXPLORATORY (N/A)  XI ROBOTIC LYMPHADENECTOMY - Portal    Patient location: PACU    Anesthesia Type: general    Transport from OR: Transported from OR on 6-10 L/min O2 by face mask with adequate spontaneous ventilation    Post pain: adequate analgesia    Post assessment: no apparent anesthetic complications    Post vital signs: stable    Level of consciousness: awake and alert    Nausea/Vomiting: no nausea/vomiting    Complications: none    Transfer of care protocol was followed      Last vitals:   Visit Vitals  /88   Pulse 86   Temp 36.9 °C (98.4 °F) (Oral)   Resp 18   Wt 86.2 kg (190 lb)   SpO2 100%   BMI 28.47 kg/m²

## 2022-06-17 NOTE — ANESTHESIA POSTPROCEDURE EVALUATION
Anesthesia Post Evaluation    Patient: Romeo Larson    Procedure(s) Performed: Procedure(s) (LRB):  XI ROBOTIC LAPAROSCOPY,EXPLORATORY (N/A)  XI ROBOTIC LYMPHADENECTOMY - Portal    Final Anesthesia Type: general      Patient location during evaluation: PACU  Patient participation: Yes- Able to Participate  Level of consciousness: awake and alert  Post-procedure vital signs: reviewed and stable  Pain management: adequate  Airway patency: patent    PONV status at discharge: No PONV  Anesthetic complications: no      Cardiovascular status: blood pressure returned to baseline  Respiratory status: unassisted  Hydration status: euvolemic  Follow-up not needed.          Vitals Value Taken Time   /76 06/17/22 1032   Temp 36.4 °C (97.5 °F) 06/17/22 1035   Pulse 65 06/17/22 1035   Resp 20 06/17/22 1035   SpO2 100 % 06/17/22 1035         No case tracking events are documented in the log.      Pain/Moe Score: Pain Rating Prior to Med Admin: 4 (6/17/2022  9:30 AM)  Moe Score: 10 (6/17/2022 10:35 AM)

## 2022-06-17 NOTE — OP NOTE
[facility]    Operative Report     Date of Procedure: 6/17/22    Surgeons:    Surgeon(s):  MD Esther Traylor MD     First Assistant Attestation:    The presence of an additional attending surgeon functioning as first assistant was required due to the complexity of the procedure relative to any available residents. I certify that no resident was available who was qualified to serve as first assistant. Duties performed by the assistant included assisting the primary surgeon.     Pre-operative Diagnosis: Hilar cholangiocarcinoma      Post-operative Diagnosis: Same     Procedure Perfomed: Robotic assisted exploratory laparoscopy, portal lymphadenectomy      Anesthesia: General endotracheal    Findings: No signs of peritoneal seeding, extrahepatic tumor or lymph node involvement    Estimated Blood Loss: 5ml    Specimens: Portal tissue and lymph nodes    Drains: None     Preamble  Indications and Patient Counseling: Patient is a 41 y.o. male with hilar cholangiocarcinoma presenting for ex lap and node staging. The procedure was thoroughly discussed with the patient, including potential risks, complications, and alternatives. Specific complications mentioned included death, bleeding, infection, bile leak, intra-abdominal abscess pulmonary embolism, deep vein thrombosis, liver dysfunction/failure, as well as the possibility of other complications not specifically mentioned.     All questions were answered, the patient voiced appropriate understanding, and agreed to proceed with the planned procedure.     Time-Out: A complete time out was carried out prior to incision, with confirmation of patient identity, correct procedure, correct operative site, appropriate antibiotic prophylaxis, review of any known allergies, and presence of all needed equipment.     Procedure in Detail  Following the induction of general endotracheal anesthesia, appropriate arterial and venous  lines were placed by the anesthesia team.  Care was taken to pad all pressure points and avoid any potential traction injuries from positioning. The urinary bladder was catheterized.  Sequential compression boots and Reece Huggers were used. The chest, abdomen, and upper thighs were sterilely prepped and draped.      The abdomen was entered via supraumbilical port placement of 8mm AirSeal using open Kauffman technique. Pneumoperitoneum was established and abdomen inspected. There were no signs of peritoneal seeding or extrahepatic disease. Additional ports were placed and the robot docked. The falciform ligament was taken down with vessel sealer. Radiation fibrosis to the hilum was moderate. The gallbladder was freed from adhesions and the right side of the ana cristina hepatic examined. There were no obvious lymph nodes. Fatty tissue was removed down to the level of the portal structures. The gastrohepatic ligament and associated fatty and node tissue was resected. There were no enlarged lymph nodes noted. The peritoneum overlying the ana cristina hepatis was excised and included in specimen. The parmjit-portal tissue was removed using a 5mm endocatch bag. Pneumoperitoneum was evacuated, robot undocked and ports removed. The umbilical port site was closed with a 0 vicryl suture. The skin was closed with 4-0 monocryl and dermabond. At the conclusion of the operation, all instrument, sponge and needle counts were correct. The patient was extubated in the operating room and transferred to the PACU in stable condition.       Additional Comments: None

## 2022-06-18 ENCOUNTER — HOSPITAL ENCOUNTER (INPATIENT)
Facility: HOSPITAL | Age: 42
LOS: 10 days | Discharge: HOME | DRG: 006 | End: 2022-06-28
Attending: TRANSPLANT SURGERY | Admitting: TRANSPLANT SURGERY
Payer: COMMERCIAL

## 2022-06-18 DIAGNOSIS — Z94.4 S/P LIVER TRANSPLANT: ICD-10-CM

## 2022-06-18 DIAGNOSIS — Z94.4 STATUS POST LIVER TRANSPLANTATION: Primary | ICD-10-CM

## 2022-06-18 DIAGNOSIS — Z91.89 AT RISK FOR OPPORTUNISTIC INFECTIONS: ICD-10-CM

## 2022-06-18 DIAGNOSIS — Z01.810 PREOP CARDIOVASCULAR EXAM: ICD-10-CM

## 2022-06-18 DIAGNOSIS — E11.65 TYPE 2 DIABETES MELLITUS WITH HYPERGLYCEMIA, WITH LONG-TERM CURRENT USE OF INSULIN: ICD-10-CM

## 2022-06-18 DIAGNOSIS — R00.0 TACHYCARDIA: ICD-10-CM

## 2022-06-18 DIAGNOSIS — Z01.818 PRE-OP TESTING: ICD-10-CM

## 2022-06-18 DIAGNOSIS — R00.0 TACHYARRHYTHMIA: ICD-10-CM

## 2022-06-18 DIAGNOSIS — T38.0X5A ADRENAL CORTICOSTEROID CAUSING ADVERSE EFFECT IN THERAPEUTIC USE: ICD-10-CM

## 2022-06-18 DIAGNOSIS — Z79.60 LONG-TERM USE OF IMMUNOSUPPRESSANT MEDICATION: ICD-10-CM

## 2022-06-18 DIAGNOSIS — Z29.89 PROPHYLACTIC IMMUNOTHERAPY: ICD-10-CM

## 2022-06-18 DIAGNOSIS — R50.9 FEVER OF UNKNOWN ORIGIN: ICD-10-CM

## 2022-06-18 DIAGNOSIS — Z79.4 TYPE 2 DIABETES MELLITUS WITH HYPERGLYCEMIA, WITH LONG-TERM CURRENT USE OF INSULIN: ICD-10-CM

## 2022-06-18 DIAGNOSIS — Z99.11 ENCOUNTER FOR WEANING FROM VENTILATOR: ICD-10-CM

## 2022-06-18 DIAGNOSIS — C24.0 HILAR CHOLANGIOCARCINOMA: ICD-10-CM

## 2022-06-18 LAB
ALBUMIN SERPL BCP-MCNC: 3.3 G/DL (ref 3.5–5.2)
ALP SERPL-CCNC: 163 U/L (ref 55–135)
ALT SERPL W/O P-5'-P-CCNC: 69 U/L (ref 10–44)
ANION GAP SERPL CALC-SCNC: 8 MMOL/L (ref 8–16)
AST SERPL-CCNC: 46 U/L (ref 10–40)
BASOPHILS # BLD AUTO: 0.04 K/UL (ref 0–0.2)
BASOPHILS NFR BLD: 0.2 % (ref 0–1.9)
BILIRUB SERPL-MCNC: 0.7 MG/DL (ref 0.1–1)
BILIRUB UR QL STRIP: NEGATIVE
BUN SERPL-MCNC: 15 MG/DL (ref 6–20)
CALCIUM SERPL-MCNC: 8.7 MG/DL (ref 8.7–10.5)
CHLORIDE SERPL-SCNC: 103 MMOL/L (ref 95–110)
CLARITY UR REFRACT.AUTO: CLEAR
CO2 SERPL-SCNC: 25 MMOL/L (ref 23–29)
COLOR UR AUTO: YELLOW
CREAT SERPL-MCNC: 1 MG/DL (ref 0.5–1.4)
DIFFERENTIAL METHOD: ABNORMAL
EOSINOPHIL # BLD AUTO: 0 K/UL (ref 0–0.5)
EOSINOPHIL NFR BLD: 0.1 % (ref 0–8)
ERYTHROCYTE [DISTWIDTH] IN BLOOD BY AUTOMATED COUNT: 13.4 % (ref 11.5–14.5)
EST. GFR  (AFRICAN AMERICAN): >60 ML/MIN/1.73 M^2
EST. GFR  (NON AFRICAN AMERICAN): >60 ML/MIN/1.73 M^2
GLUCOSE SERPL-MCNC: 122 MG/DL (ref 70–110)
GLUCOSE UR QL STRIP: NEGATIVE
HCT VFR BLD AUTO: 34.6 % (ref 40–54)
HGB BLD-MCNC: 11.4 G/DL (ref 14–18)
HGB UR QL STRIP: NEGATIVE
IMM GRANULOCYTES # BLD AUTO: 0.14 K/UL (ref 0–0.04)
IMM GRANULOCYTES NFR BLD AUTO: 0.8 % (ref 0–0.5)
INR PPP: 1.1 (ref 0.8–1.2)
KETONES UR QL STRIP: NEGATIVE
LEUKOCYTE ESTERASE UR QL STRIP: NEGATIVE
LYMPHOCYTES # BLD AUTO: 0.5 K/UL (ref 1–4.8)
LYMPHOCYTES NFR BLD: 3 % (ref 18–48)
MAGNESIUM SERPL-MCNC: 1.5 MG/DL (ref 1.6–2.6)
MCH RBC QN AUTO: 34 PG (ref 27–31)
MCHC RBC AUTO-ENTMCNC: 32.9 G/DL (ref 32–36)
MCV RBC AUTO: 103 FL (ref 82–98)
MONOCYTES # BLD AUTO: 1.4 K/UL (ref 0.3–1)
MONOCYTES NFR BLD: 7.8 % (ref 4–15)
NEUTROPHILS # BLD AUTO: 15.8 K/UL (ref 1.8–7.7)
NEUTROPHILS NFR BLD: 88.1 % (ref 38–73)
NITRITE UR QL STRIP: NEGATIVE
NRBC BLD-RTO: 0 /100 WBC
PH UR STRIP: 6 [PH] (ref 5–8)
PHOSPHATE SERPL-MCNC: 4.4 MG/DL (ref 2.7–4.5)
PLATELET # BLD AUTO: 109 K/UL (ref 150–450)
PMV BLD AUTO: 9 FL (ref 9.2–12.9)
POTASSIUM SERPL-SCNC: 4.6 MMOL/L (ref 3.5–5.1)
PROT SERPL-MCNC: 5.8 G/DL (ref 6–8.4)
PROT UR QL STRIP: NEGATIVE
PROTHROMBIN TIME: 11.5 SEC (ref 9–12.5)
RBC # BLD AUTO: 3.35 M/UL (ref 4.6–6.2)
SODIUM SERPL-SCNC: 136 MMOL/L (ref 136–145)
SP GR UR STRIP: 1.01 (ref 1–1.03)
URN SPEC COLLECT METH UR: NORMAL
WBC # BLD AUTO: 17.91 K/UL (ref 3.9–12.7)

## 2022-06-18 PROCEDURE — 85610 PROTHROMBIN TIME: CPT | Mod: NTX | Performed by: NURSE PRACTITIONER

## 2022-06-18 PROCEDURE — 87040 BLOOD CULTURE FOR BACTERIA: CPT | Mod: 59,NTX | Performed by: NURSE PRACTITIONER

## 2022-06-18 PROCEDURE — 81003 URINALYSIS AUTO W/O SCOPE: CPT | Mod: NTX | Performed by: NURSE PRACTITIONER

## 2022-06-18 PROCEDURE — 80053 COMPREHEN METABOLIC PANEL: CPT | Mod: 91,NTX | Performed by: NURSE PRACTITIONER

## 2022-06-18 PROCEDURE — 83735 ASSAY OF MAGNESIUM: CPT | Mod: NTX | Performed by: NURSE PRACTITIONER

## 2022-06-18 PROCEDURE — 99223 PR INITIAL HOSPITAL CARE,LEVL III: ICD-10-PCS | Mod: 24,NTX,, | Performed by: NURSE PRACTITIONER

## 2022-06-18 PROCEDURE — A9698 NON-RAD CONTRAST MATERIALNOC: HCPCS | Mod: NTX | Performed by: STUDENT IN AN ORGANIZED HEALTH CARE EDUCATION/TRAINING PROGRAM

## 2022-06-18 PROCEDURE — 85025 COMPLETE CBC W/AUTO DIFF WBC: CPT | Mod: 91,NTX | Performed by: NURSE PRACTITIONER

## 2022-06-18 PROCEDURE — 20600001 HC STEP DOWN PRIVATE ROOM: Mod: NTX

## 2022-06-18 PROCEDURE — 99223 1ST HOSP IP/OBS HIGH 75: CPT | Mod: 24,NTX,, | Performed by: NURSE PRACTITIONER

## 2022-06-18 PROCEDURE — 36415 COLL VENOUS BLD VENIPUNCTURE: CPT | Mod: NTX | Performed by: NURSE PRACTITIONER

## 2022-06-18 PROCEDURE — 25000003 PHARM REV CODE 250: Mod: NTX | Performed by: NURSE PRACTITIONER

## 2022-06-18 PROCEDURE — 84100 ASSAY OF PHOSPHORUS: CPT | Mod: NTX | Performed by: NURSE PRACTITIONER

## 2022-06-18 PROCEDURE — 25500020 PHARM REV CODE 255: Mod: NTX | Performed by: STUDENT IN AN ORGANIZED HEALTH CARE EDUCATION/TRAINING PROGRAM

## 2022-06-18 RX ORDER — CEFEPIME HYDROCHLORIDE 1 G/50ML
1 INJECTION, SOLUTION INTRAVENOUS
Status: DISCONTINUED | OUTPATIENT
Start: 2022-06-18 | End: 2022-06-19

## 2022-06-18 RX ORDER — CEFEPIME HYDROCHLORIDE 1 G/50ML
1 INJECTION, SOLUTION INTRAVENOUS
Status: DISCONTINUED | OUTPATIENT
Start: 2022-06-18 | End: 2022-06-18

## 2022-06-18 RX ORDER — ONDANSETRON 8 MG/1
8 TABLET, ORALLY DISINTEGRATING ORAL EVERY 8 HOURS PRN
Status: DISCONTINUED | OUTPATIENT
Start: 2022-06-18 | End: 2022-06-22

## 2022-06-18 RX ORDER — TALC
6 POWDER (GRAM) TOPICAL NIGHTLY PRN
Status: DISCONTINUED | OUTPATIENT
Start: 2022-06-18 | End: 2022-06-22

## 2022-06-18 RX ORDER — HYDROCODONE BITARTRATE AND ACETAMINOPHEN 5; 325 MG/1; MG/1
1 TABLET ORAL EVERY 6 HOURS PRN
Status: DISCONTINUED | OUTPATIENT
Start: 2022-06-18 | End: 2022-06-22

## 2022-06-18 RX ORDER — SODIUM CHLORIDE 0.9 % (FLUSH) 0.9 %
10 SYRINGE (ML) INJECTION
Status: DISCONTINUED | OUTPATIENT
Start: 2022-06-18 | End: 2022-06-22

## 2022-06-18 RX ORDER — ACETAMINOPHEN 325 MG/1
650 TABLET ORAL EVERY 8 HOURS PRN
Status: DISCONTINUED | OUTPATIENT
Start: 2022-06-18 | End: 2022-06-22

## 2022-06-18 RX ORDER — HEPARIN SODIUM 5000 [USP'U]/ML
5000 INJECTION, SOLUTION INTRAVENOUS; SUBCUTANEOUS EVERY 8 HOURS
Status: DISCONTINUED | OUTPATIENT
Start: 2022-06-18 | End: 2022-06-22

## 2022-06-18 RX ORDER — SODIUM CHLORIDE 9 MG/ML
INJECTION, SOLUTION INTRAVENOUS CONTINUOUS
Status: ACTIVE | OUTPATIENT
Start: 2022-06-18 | End: 2022-06-19

## 2022-06-18 RX ADMIN — SODIUM CHLORIDE: 0.9 INJECTION, SOLUTION INTRAVENOUS at 04:06

## 2022-06-18 RX ADMIN — BARIUM SULFATE 450 ML: 20 SUSPENSION ORAL at 09:06

## 2022-06-18 NOTE — NURSING
Pt admitted to TSU. Vital signs obtained, VSS. Pt w/o complaints of pain. MANJEET Arenas NP, notified of pt's arrival. Pt sitting up in chair, chair wheels locked, call light within pt reach. Pt oriented to room & staff.

## 2022-06-18 NOTE — PROGRESS NOTES
Pharmacokinetic Assessment Follow Up: IV Vancomycin    Assessment/Plan:  · Transfer from [facility].    · LD:  2000 mg x 1 in ER at 1030 on 6/18 prior to transfer  · MD: 1250 mg administered every 12 hours to begin at 2230 on 6/18.  · Desired empiric serum trough concentration is: 15-20 ug/mL.  · Vancomycin trough has been scheduled for 06/19/2022 at 2200, which is 30 minutes prior to 4th dose      Drug levels (last 3 results):  No results for input(s): VANCOMYCINRA, VANCORANDOM, VANCOMYCINPE, VANCOPEAK, VANCOMYCINTR, VANCOTROUGH in the last 72 hours.    Pharmacy will continue to follow and monitor vancomycin.    Please contact pharmacy at extension [phone] for questions regarding this assessment.    Thank you for the consult,   Jose De Jesus Young       Patient brief summary:  Romeo Larson is a 41 y.o. male initiated on antimicrobial therapy with IV Vancomycin for treatment of intra-abdominal infection      Drug Allergies:   Review of patient's allergies indicates:  No Known Allergies    Actual Body Weight:   87 kg    Renal Function:   Estimated Creatinine Clearance: 84.6 mL/min (based on SCr of 1.21 mg/dL).,     Dialysis Method (if applicable):  N/A    CBC (last 72 hours):  Recent Labs   Lab Result Units 06/18/22  0812   WBC K/uL 9.12   Hemoglobin g/dL 13.0*   Hematocrit % 39.1*   Platelets K/uL 116*   Gran % % 96.3*   Lymph % % 1.5*   Mono % % 1.1*   Eosinophil % % 0.1   Basophil % % 0.2   Differential Method  Automated       Metabolic Panel (last 72 hours):  Recent Labs   Lab Result Units 06/18/22  0812   Sodium mmol/L 135*   Potassium mmol/L 3.3*   Chloride mmol/L 102   CO2 mmol/L 24   Glucose mg/dL 198*   BUN mg/dL 17   Creatinine mg/dL 1.21   Albumin g/dL 4.1   Total Bilirubin mg/dL 0.8   Alkaline Phosphatase U/L 227*   AST U/L 97*   ALT U/L 104*       Vancomycin Administrations:  vancomycin given in the last 96 hours                   vancomycin (VANCOCIN) 2,000 mg in dextrose 5 % 500 mL IVPB (mg) 2,000 mg New Bag  06/18/22 1039                Microbiologic Results:  Microbiology Results (last 7 days)     Procedure Component Value Units Date/Time    Blood culture [[id number]]     Order Status: Sent Specimen: Blood     Blood culture [[id number]]     Order Status: Sent Specimen: Blood

## 2022-06-18 NOTE — HPI
"HPI: Romeo Larson is a 41 y.o. male who presents to the emergency department at OSH w c/o complaint of fevers. Pt is s/p exploratory lap 6/17/22 to assess liver for tentative living liver transplant on 6/21/22 for cholangiocarcinoma. Last chemo  5/10/22. Patient reports "normally" gets chills after having anesthesia. Chills, described as rigors, started ~3am this morning. Patient toke temp at 7am that read 103°.  T max at OSH 38.2. Pt denies n/v/diarrhea. Lap sites CDI. Lactic acid at OSH was 5.4. He received IV fluids and dose of Vancomycin. Plan to transfer to [facility] for further eval w cultures, CT A/P WO, cont IV fluids and broad spectrum abx. POC reviewed w Dr Godwin.    Patient is vaccinated against COVID-19 and received booster in December.  COVID at OSH 6/18 neg. Did recently get his tetanus and shingles shot in preparation for the surgery.                    "

## 2022-06-19 LAB
ALBUMIN SERPL BCP-MCNC: 3 G/DL (ref 3.5–5.2)
ALP SERPL-CCNC: 138 U/L (ref 55–135)
ALT SERPL W/O P-5'-P-CCNC: 53 U/L (ref 10–44)
ANION GAP SERPL CALC-SCNC: 10 MMOL/L (ref 8–16)
AST SERPL-CCNC: 29 U/L (ref 10–40)
BASOPHILS # BLD AUTO: 0.03 K/UL (ref 0–0.2)
BASOPHILS NFR BLD: 0.3 % (ref 0–1.9)
BILIRUB SERPL-MCNC: 0.6 MG/DL (ref 0.1–1)
BUN SERPL-MCNC: 12 MG/DL (ref 6–20)
CALCIUM SERPL-MCNC: 8.9 MG/DL (ref 8.7–10.5)
CHLORIDE SERPL-SCNC: 104 MMOL/L (ref 95–110)
CO2 SERPL-SCNC: 24 MMOL/L (ref 23–29)
CREAT SERPL-MCNC: 0.8 MG/DL (ref 0.5–1.4)
DIFFERENTIAL METHOD: ABNORMAL
EOSINOPHIL # BLD AUTO: 0.1 K/UL (ref 0–0.5)
EOSINOPHIL NFR BLD: 1.2 % (ref 0–8)
ERYTHROCYTE [DISTWIDTH] IN BLOOD BY AUTOMATED COUNT: 13.5 % (ref 11.5–14.5)
EST. GFR  (AFRICAN AMERICAN): >60 ML/MIN/1.73 M^2
EST. GFR  (NON AFRICAN AMERICAN): >60 ML/MIN/1.73 M^2
GLUCOSE SERPL-MCNC: 115 MG/DL (ref 70–110)
HCT VFR BLD AUTO: 32.1 % (ref 40–54)
HGB BLD-MCNC: 10.7 G/DL (ref 14–18)
IMM GRANULOCYTES # BLD AUTO: 0.11 K/UL (ref 0–0.04)
IMM GRANULOCYTES NFR BLD AUTO: 1 % (ref 0–0.5)
LACTATE SERPL-SCNC: 1 MMOL/L (ref 0.5–2.2)
LYMPHOCYTES # BLD AUTO: 0.6 K/UL (ref 1–4.8)
LYMPHOCYTES NFR BLD: 4.9 % (ref 18–48)
MAGNESIUM SERPL-MCNC: 1.8 MG/DL (ref 1.6–2.6)
MCH RBC QN AUTO: 34.1 PG (ref 27–31)
MCHC RBC AUTO-ENTMCNC: 33.3 G/DL (ref 32–36)
MCV RBC AUTO: 102 FL (ref 82–98)
MONOCYTES # BLD AUTO: 0.8 K/UL (ref 0.3–1)
MONOCYTES NFR BLD: 7.4 % (ref 4–15)
NEUTROPHILS # BLD AUTO: 9.6 K/UL (ref 1.8–7.7)
NEUTROPHILS NFR BLD: 85.2 % (ref 38–73)
NRBC BLD-RTO: 0 /100 WBC
PHOSPHATE SERPL-MCNC: 2.7 MG/DL (ref 2.7–4.5)
PLATELET # BLD AUTO: 94 K/UL (ref 150–450)
PMV BLD AUTO: 9 FL (ref 9.2–12.9)
POTASSIUM SERPL-SCNC: 3.7 MMOL/L (ref 3.5–5.1)
PROT SERPL-MCNC: 5.8 G/DL (ref 6–8.4)
RBC # BLD AUTO: 3.14 M/UL (ref 4.6–6.2)
SODIUM SERPL-SCNC: 138 MMOL/L (ref 136–145)
VANCOMYCIN TROUGH SERPL-MCNC: 10 UG/ML (ref 10–22)
WBC # BLD AUTO: 11.28 K/UL (ref 3.9–12.7)

## 2022-06-19 PROCEDURE — 80202 ASSAY OF VANCOMYCIN: CPT | Mod: NTX | Performed by: TRANSPLANT SURGERY

## 2022-06-19 PROCEDURE — 83735 ASSAY OF MAGNESIUM: CPT | Mod: NTX | Performed by: NURSE PRACTITIONER

## 2022-06-19 PROCEDURE — 85025 COMPLETE CBC W/AUTO DIFF WBC: CPT | Mod: NTX | Performed by: NURSE PRACTITIONER

## 2022-06-19 PROCEDURE — 84100 ASSAY OF PHOSPHORUS: CPT | Mod: NTX | Performed by: NURSE PRACTITIONER

## 2022-06-19 PROCEDURE — 25000003 PHARM REV CODE 250: Mod: NTX | Performed by: NURSE PRACTITIONER

## 2022-06-19 PROCEDURE — 80053 COMPREHEN METABOLIC PANEL: CPT | Mod: NTX | Performed by: NURSE PRACTITIONER

## 2022-06-19 PROCEDURE — 25000003 PHARM REV CODE 250: Mod: NTX | Performed by: TRANSPLANT SURGERY

## 2022-06-19 PROCEDURE — 83605 ASSAY OF LACTIC ACID: CPT | Mod: NTX | Performed by: NURSE PRACTITIONER

## 2022-06-19 PROCEDURE — 63600175 PHARM REV CODE 636 W HCPCS: Mod: NTX | Performed by: TRANSPLANT SURGERY

## 2022-06-19 PROCEDURE — 63600175 PHARM REV CODE 636 W HCPCS: Mod: NTX | Performed by: NURSE PRACTITIONER

## 2022-06-19 PROCEDURE — 20600001 HC STEP DOWN PRIVATE ROOM: Mod: NTX

## 2022-06-19 RX ORDER — CEFEPIME HYDROCHLORIDE 1 G/50ML
1 INJECTION, SOLUTION INTRAVENOUS
Status: DISCONTINUED | OUTPATIENT
Start: 2022-06-19 | End: 2022-06-25

## 2022-06-19 RX ADMIN — CEFEPIME 1 G: 1 INJECTION, POWDER, FOR SOLUTION INTRAMUSCULAR; INTRAVENOUS at 05:06

## 2022-06-19 RX ADMIN — ACETAMINOPHEN 650 MG: 325 TABLET ORAL at 01:06

## 2022-06-19 RX ADMIN — CEFEPIME 1 G: 1 INJECTION, POWDER, FOR SOLUTION INTRAMUSCULAR; INTRAVENOUS at 11:06

## 2022-06-19 RX ADMIN — VANCOMYCIN HYDROCHLORIDE 1250 MG: 1.25 INJECTION, POWDER, LYOPHILIZED, FOR SOLUTION INTRAVENOUS at 12:06

## 2022-06-19 RX ADMIN — HEPARIN SODIUM 5000 UNITS: 5000 INJECTION INTRAVENOUS; SUBCUTANEOUS at 09:06

## 2022-06-19 RX ADMIN — CEFEPIME HYDROCHLORIDE 1 G: 1 INJECTION, SOLUTION INTRAVENOUS at 12:06

## 2022-06-19 RX ADMIN — HEPARIN SODIUM 5000 UNITS: 5000 INJECTION INTRAVENOUS; SUBCUTANEOUS at 12:06

## 2022-06-19 RX ADMIN — VANCOMYCIN HYDROCHLORIDE 1250 MG: 1.25 INJECTION, POWDER, LYOPHILIZED, FOR SOLUTION INTRAVENOUS at 10:06

## 2022-06-19 RX ADMIN — LOSARTAN POTASSIUM 25 MG: 25 TABLET, FILM COATED ORAL at 04:06

## 2022-06-19 RX ADMIN — VANCOMYCIN HYDROCHLORIDE 1250 MG: 1.25 INJECTION, POWDER, LYOPHILIZED, FOR SOLUTION INTRAVENOUS at 11:06

## 2022-06-19 NOTE — PLAN OF CARE
VSS  No signs of evident distress   Tylenol admin for HA with complete resolution of pain.  Right chest wall port dressing CDI.  NS infusing at 75mL/hr  Telly discontinued  Family at bedside  Cefepime and Vanc admin as per MAR  Bed in lowest locked position.  Call light within reach.  Instructed to call for assistance.  Pt. Expressed understanding.  Educated on plan of care.   Home

## 2022-06-19 NOTE — ASSESSMENT & PLAN NOTE
- s/p exploratory lap 6/17 to assess liver for cholangio in preparation for living liver txp scheduled 6/21/22   - developed chills overnight and fever 103 this am, presented to OSH for tx  - lactic acid was 5.4  - cont IV fluids  - cont broad spectrum abx (started Vanc and cefepime at OSH)  - cx w NGTD  - treating as presumed cholangitis s/p procedure  - trend lactic acid- down to 1 6/19, wbc normalized

## 2022-06-19 NOTE — PROGRESS NOTES
"[facility] - Transplant Stepdown  Liver Transplant  Progress Note    Patient Name: Romeo Larson  MRN: [id number]  Admission Date: 6/18/2022  Hospital Length of Stay: 1 days  Code Status: Full Code  Primary Care Provider: Pravin Maria MD  Post-Operative Day:     ORGAN:   RIGHT LIVER LOBE  Disease Etiology: Primary Liver Malignancy: Cholangiocarcinoma (CH-CA)  Donor Type:   Living  CDC High Risk:     Donor CMV Status:   Donor CMV Status:   Donor HBcAB:     Donor HCV Status:     Donor HBV GUSTABO:   Donor HCV GUSTABO:   Whole or Partial:   Biliary Anastomosis:   Arterial Anatomy:   Subjective:     History of Present Illness:  HPI: Romeo Larson is a 41 y.o. male who presents to the emergency department at OSH w c/o complaint of fevers. Pt is s/p exploratory lap 6/17/22 to assess liver for tentative living liver transplant on 6/21/22 for cholangiocarcinoma. Last chemo  5/10/22. Patient reports "normally" gets chills after having anesthesia. Chills, described as rigors, started ~3am this morning. Patient toke temp at 7am that read 103°.  T max at OSH 38.2. Pt denies n/v/diarrhea. Lap sites CDI. Lactic acid at OSH was 5.4. He received IV fluids and dose of Vancomycin. Plan to transfer to [facility] for further eval w cultures, CT A/P WO, cont IV fluids and broad spectrum abx. POC reviewed w Dr Godwin.    Patient is vaccinated against COVID-19 and received booster in December.  COVID at OSH 6/18 neg. Did recently get his tetanus and shingles shot in preparation for the surgery.                        Hospital Course:  Interval History: no acute events overnight. Afebrile. Blood cx w NGTD. Cont IV abx. Surgeon(s) to review repeat CT A/P and discuss proceeding with living liver transplant tentatively scheduled 6/21/22. Eating well, denies pain, lap sites CDI, independent in room. VSS. Monitor.       Past Medical History:   Diagnosis Date    Adjustment and management of vascular access device 5/18/2022    Cholangiocarcinoma     " Hypercholesteremia     Hypertension        Past Surgical History:   Procedure Laterality Date    ENDOSCOPIC ULTRASOUND OF UPPER GASTROINTESTINAL TRACT N/A 10/20/2021    Procedure: ULTRASOUND, UPPER GI TRACT, ENDOSCOPIC;  Surgeon: Valeriy Sotelo MD;  [facility]  Service: Endoscopy;  Laterality: N/A;  wife to email vacc card-inst email-tb    ERCP N/A 10/20/2021    Procedure: ERCP (ENDOSCOPIC RETROGRADE CHOLANGIOPANCREATOGRAPHY);  Surgeon: Valeriy Sotelo MD;  Location: [facility] (2ND FLR);  Service: Endoscopy;  Laterality: N/A;    ERCP N/A 11/4/2021    Procedure: ERCP (ENDOSCOPIC RETROGRADE CHOLANGIOPANCREATOGRAPHY);  Surgeon: Valeriy Sotelo MD;  Location: [facility] ENDO (2ND FLR);  Service: Endoscopy;  Laterality: N/A;    ERCP N/A 11/4/2021    Procedure: ERCP (ENDOSCOPIC RETROGRADE CHOLANGIOPANCREATOGRAPHY);  Surgeon: Roselia Pereyra MD;  Location: [facility] (2ND FLR);  Service: Endoscopy;  Laterality: N/A;  pt vaccinated, instructed to bring card to procedure, instructions sent portal-    ERCP N/A 1/14/2022    Procedure: ERCP (ENDOSCOPIC RETROGRADE CHOLANGIOPANCREATOGRAPHY);  Surgeon: Roselia Pereyra MD;  Location: [facility] (2ND FLR);  Service: Endoscopy;  Laterality: N/A;  inst portal-right chest port-tb  Pt requested at home COVID testing, Pt instructed at home RAPID to be done morning of procedure, Pt instructed to call endoscopy scheuding if there is a positive result, Pt informed if a positive     ERCP N/A 3/31/2022    Procedure: ERCP (ENDOSCOPIC RETROGRADE CHOLANGIOPANCREATOGRAPHY);  Surgeon: Julio Cesar Alonzo MD;  Location: [facility] ENDO (2ND FLR);  Service: Endoscopy;  Laterality: N/A;  3/16: port L chest wall. pt to bring covid vaccination card. Instructions sent via portal.-SC  3/18/22-Updated instructions sent via portal re:new date/time-DS    INSERTION OF TUNNELED CENTRAL VENOUS CATHETER (CVC) WITH SUBCUTANEOUS PORT N/A 11/24/2021    Procedure: INSERTION,  PORT-A-CATH;  Surgeon: Prem Syed MD;  Location: [facility] CATH LAB;  Service: Radiology;  Laterality: N/A;    TONSILLECTOMY         Review of patient's allergies indicates:  No Known Allergies    Family History       Problem Relation (Age of Onset)    Hyperlipidemia Father    No Known Problems Mother, Sister, Brother          Tobacco Use    Smoking status: Never Smoker    Smokeless tobacco: Never Used   Substance and Sexual Activity    Alcohol use: Not Currently    Drug use: Never    Sexual activity: Yes       PTA Medications   Medication Sig    HYDROcodone-acetaminophen (NORCO) 5-325 mg per tablet Take 1 tablet by mouth every 6 (six) hours as needed for Pain.    losartan (COZAAR) 50 MG tablet Take 1 tablet (50 mg total) by mouth once daily.       Review of Systems   Constitutional:  Negative for activity change, appetite change, chills, fatigue and fever.   HENT:  Negative for congestion and trouble swallowing.    Eyes:  Negative for visual disturbance.   Respiratory:  Negative for cough, shortness of breath and wheezing.    Cardiovascular:  Negative for chest pain and leg swelling.   Gastrointestinal:  Negative for abdominal distention, abdominal pain, constipation, diarrhea, nausea and vomiting.   Endocrine: Negative.    Genitourinary:  Negative for decreased urine volume, difficulty urinating, dysuria, hematuria and urgency.   Musculoskeletal:  Negative for arthralgias.   Skin:  Negative for color change, pallor and rash. Wound: lap sites.  Allergic/Immunologic: Negative for immunocompromised state.   Neurological:  Negative for dizziness, tremors, seizures, syncope, weakness and headaches.   Hematological:  Does not bruise/bleed easily.   Psychiatric/Behavioral:  Negative for agitation, confusion, decreased concentration, sleep disturbance and suicidal ideas. The patient is not nervous/anxious.    Objective:     Vital Signs (Most Recent):  Temp: 98.1 °F (36.7 °C) (06/19/22 1641)  Pulse: 95 (06/19/22  1641)  Resp: 18 (06/19/22 1641)  BP: 133/85 (06/19/22 1641)  SpO2: 97 % (06/19/22 1641) Vital Signs (24h Range):  Temp:  [97.8 °F (36.6 °C)-98.4 °F (36.9 °C)] 98.1 °F (36.7 °C)  Pulse:  [82-98] 95  Resp:  [12-29] 18  SpO2:  [93 %-97 %] 97 %  BP: (111-133)/(71-85) 133/85     Weight: 87.3 kg (192 lb 7.4 oz)  Body mass index is 29.26 kg/m².    No intake or output data in the 24 hours ending 06/19/22 1756    Physical Exam  Vitals and nursing note reviewed.   Constitutional:       General: He is not in acute distress.     Appearance: He is well-developed. He is not diaphoretic.      Comments: No hand or temporal muscle wasting noted     HENT:      Head: Normocephalic and atraumatic.      Mouth/Throat:      Pharynx: No oropharyngeal exudate.   Eyes:      General: No scleral icterus.     Conjunctiva/sclera: Conjunctivae normal.      Pupils: Pupils are equal, round, and reactive to light.   Neck:      Thyroid: No thyromegaly.   Cardiovascular:      Rate and Rhythm: Normal rate and regular rhythm.      Heart sounds: Normal heart sounds. No murmur heard.  Pulmonary:      Effort: Pulmonary effort is normal. No respiratory distress.      Breath sounds: Normal breath sounds. No wheezing.   Abdominal:      General: Bowel sounds are normal. There is no distension.      Tenderness: There is abdominal tenderness. There is no guarding.      Comments: Lap sites CDI    Genitourinary:     Penis: Normal.    Musculoskeletal:         General: Normal range of motion.      Cervical back: Normal range of motion and neck supple.   Skin:     General: Skin is warm and dry.      Capillary Refill: Capillary refill takes 2 to 3 seconds.      Findings: No erythema.   Neurological:      Mental Status: He is alert and oriented to person, place, and time.   Psychiatric:         Mood and Affect: Mood normal.         Behavior: Behavior normal.         Thought Content: Thought content normal.         Judgment: Judgment normal.       Laboratory:  CBC:    Recent Labs   Lab 06/19/22  0523   WBC 11.28   RBC 3.14*   HGB 10.7*   HCT 32.1*   PLT 94*   *   MCH 34.1*   MCHC 33.3       CMP:   Recent Labs   Lab 06/19/22  0523   *   CALCIUM 8.9   ALBUMIN 3.0*   PROT 5.8*      K 3.7   CO2 24      BUN 12   CREATININE 0.8   ALKPHOS 138*   ALT 53*   AST 29   BILITOT 0.6       Coagulation:   Recent Labs   Lab 06/18/22  1752   INR 1.1       Labs within the past 24 hours have been reviewed.    Diagnostic Results:  CT Abd/Pelvis: PENDING    I have personally reviewed all pertinent imaging studies.    Assessment/Plan:     * Hilar cholangiocarcinoma  - s/p exploratory lap 6/17 to assess liver for cholangio in preparation for living liver txp scheduled 6/21/22   - developed chills overnight and fever 103 this am, presented to OSH for tx  - lactic acid was 5.4  - cont IV fluids  - cont broad spectrum abx (started Vanc and cefepime at OSH)  - cx w NGTD  - treating as presumed cholangitis s/p procedure  - trend lactic acid- down to 1 6/19, wbc normalized        Tachycardia  - improved w fluids  - cont tele    Fever of unknown origin  - s/p ex lap, suspect fever r/t cholangitis post procedure  - d/c fluids now that tolerating diet  - CT a/p to be reviewed by Dr Jara, Dr Cline and Dr Godwin  - cont bs abx  - pt feeling well and fever resolved        VTE Risk Mitigation (From admission, onward)         Ordered     heparin (porcine) injection 5,000 Units  Every 8 hours         06/18/22 1727     IP VTE HIGH RISK PATIENT  Once         06/18/22 1727                The patients clinical status was discussed at multidisplinary rounds, involving transplant surgery, transplant medicine, pharmacy, nursing, nutrition, and social work    Discharge Planning:  Discussed plan of care.  No plan for discharge today.      Abida Arenas, NP  Liver Transplant  [facility] - Transplant Stepdown

## 2022-06-19 NOTE — PLAN OF CARE
· Patient is alert and oriented x4  · VSS; afebrile  · NS running @ 75 ml/hr  · Pt sent to CT scan- results pending  · IVPB vanc and cefepime given  · No c/o of pain   · Safety measures being maintained

## 2022-06-19 NOTE — H&P
"[facility] - Transplant Stepdown  Liver Transplant  History & Physical    Patient Name: Romeo Larson  MRN: [id number]  Admission Date: 6/18/2022  Code Status: Full Code  Primary Care Provider: Pravin Maria MD    Subjective:     History of Present Illness:  HPI: Romeo Larson is a 41 y.o. male who presents to the emergency department at OSH w c/o complaint of fevers. Pt is s/p exploratory lap 6/17/22 to assess liver for tentative living liver transplant on 6/21/22 for cholangiocarcinoma. Last chemo  5/10/22. Patient reports "normally" gets chills after having anesthesia. Chills, described as rigors, started ~3am this morning. Patient toke temp at 7am that read 103°.  T max at OSH 38.2. Pt denies n/v/diarrhea. Lap sites CDI. Lactic acid at OSH was 5.4. He received IV fluids and dose of Vancomycin. Plan to transfer to [facility] for further eval w cultures, CT A/P WO, cont IV fluids and broad spectrum abx. POC reviewed w Dr Godwin.    Patient is vaccinated against COVID-19 and received booster in December.  COVID at OSH 6/18 neg. Did recently get his tetanus and shingles shot in preparation for the surgery.                        Past Medical History:   Diagnosis Date    Adjustment and management of vascular access device 5/18/2022    Cholangiocarcinoma     Hypercholesteremia     Hypertension        Past Surgical History:   Procedure Laterality Date    ENDOSCOPIC ULTRASOUND OF UPPER GASTROINTESTINAL TRACT N/A 10/20/2021    Procedure: ULTRASOUND, UPPER GI TRACT, ENDOSCOPIC;  Surgeon: Valeriy Sotelo MD;  Location: [address]);  Service: Endoscopy;  Laterality: N/A;  wife to email vacc card-inst email-tb    ERCP N/A 10/20/2021    Procedure: ERCP (ENDOSCOPIC RETROGRADE CHOLANGIOPANCREATOGRAPHY);  Surgeon: Valeriy Sotelo MD;  Location: [address]);  Service: Endoscopy;  Laterality: N/A;    ERCP N/A 11/4/2021    Procedure: ERCP (ENDOSCOPIC RETROGRADE CHOLANGIOPANCREATOGRAPHY);  Surgeon: Valeriy STEPHENSON" MD Deepak;  Location: [address]);  Service: Endoscopy;  Laterality: N/A;    ERCP N/A 11/4/2021    Procedure: ERCP (ENDOSCOPIC RETROGRADE CHOLANGIOPANCREATOGRAPHY);  Surgeon: Roselia Pereyra MD;  [address]  Service: Endoscopy;  Laterality: N/A;  pt vaccinated, instructed to bring card to procedure, instructions sent portal-MG    ERCP N/A 1/14/2022    Procedure: ERCP (ENDOSCOPIC RETROGRADE CHOLANGIOPANCREATOGRAPHY);  Surgeon: Roselia Pereyra MD;  [address]  Service: Endoscopy;  Laterality: N/A;  inst portal-right chest port-tb  Pt requested at home COVID testing, Pt instructed at home RAPID to be done morning of procedure, Pt instructed to call endoscopy scheuding if there is a positive result, Pt informed if a positive     ERCP N/A 3/31/2022    Procedure: ERCP (ENDOSCOPIC RETROGRADE CHOLANGIOPANCREATOGRAPHY);  Surgeon: Julio Cesar Alonzo MD;  Location: [facility] ([address] [place] [address]);  Service: Endoscopy;  Laterality: N/A;  3/16: port L chest wall. pt to bring covid vaccination card. Instructions sent via portal.-SC  3/18/22-Updated instructions sent via portal re:new date/time-DS    INSERTION OF TUNNELED CENTRAL VENOUS CATHETER (CVC) WITH SUBCUTANEOUS PORT N/A 11/24/2021    Procedure: INSERTION, PORT-A-CATH;  Surgeon: Prem Syed MD;  Location: [facility] CATH LAB;  Service: Radiology;  Laterality: N/A;    TONSILLECTOMY         Review of patient's allergies indicates:  No Known Allergies    Family History       Problem Relation (Age of Onset)    Hyperlipidemia Father    No Known Problems Mother, Sister, Brother          Tobacco Use    Smoking status: Never Smoker    Smokeless tobacco: Never Used   Substance and Sexual Activity    Alcohol use: Not Currently    Drug use: Never    Sexual activity: Yes       PTA Medications   Medication Sig    HYDROcodone-acetaminophen (NORCO) 5-325 mg per tablet Take 1 tablet by mouth every 6 (six) hours as needed for Pain.     losartan (COZAAR) 50 MG tablet Take 1 tablet (50 mg total) by mouth once daily.       Review of Systems   Constitutional:  Negative for activity change, appetite change, chills, fatigue and fever.   HENT:  Negative for congestion and trouble swallowing.    Eyes:  Negative for visual disturbance.   Respiratory:  Negative for cough, shortness of breath and wheezing.    Cardiovascular:  Positive for leg swelling. Negative for chest pain.   Gastrointestinal:  Negative for abdominal distention, abdominal pain, constipation, diarrhea, nausea and vomiting.   Endocrine: Negative.    Genitourinary:  Negative for decreased urine volume, difficulty urinating, dysuria, hematuria and urgency.   Musculoskeletal:  Negative for arthralgias.   Skin:  Negative for color change, pallor and rash. Wound: lap sites.  Allergic/Immunologic: Negative for immunocompromised state.   Neurological:  Negative for dizziness, tremors, seizures, syncope, weakness and headaches.   Hematological:  Bruises/bleeds easily.   Psychiatric/Behavioral:  Negative for agitation, confusion, decreased concentration, sleep disturbance and suicidal ideas. The patient is not nervous/anxious.    Objective:     Vital Signs (Most Recent):  Temp: 98.4 °F (36.9 °C) (06/18/22 1940)  Pulse: 96 (06/18/22 1940)  Resp: 12 (06/18/22 1940)  BP: 111/76 (06/18/22 1940)  SpO2: 95 % (06/18/22 1940) Vital Signs (24h Range):  Temp:  [97.6 °F (36.4 °C)-100.8 °F (38.2 °C)] 98.4 °F (36.9 °C)  Pulse:  [] 96  Resp:  [12-17] 12  SpO2:  [95 %-100 %] 95 %  BP: (100-174)/(72-98) 111/76     Weight: 87.3 kg (192 lb 7.4 oz)  Body mass index is 29.26 kg/m².    No intake or output data in the 24 hours ending 06/18/22 1950    Physical Exam  Vitals and nursing note reviewed.   Constitutional:       General: He is not in acute distress.     Appearance: He is well-developed. He is not diaphoretic.      Comments: No hand or temporal muscle wasting noted     HENT:      Head: Normocephalic and  atraumatic.      Mouth/Throat:      Pharynx: No oropharyngeal exudate.   Eyes:      General: No scleral icterus.     Conjunctiva/sclera: Conjunctivae normal.      Pupils: Pupils are equal, round, and reactive to light.   Neck:      Thyroid: No thyromegaly.   Cardiovascular:      Rate and Rhythm: Normal rate and regular rhythm.      Heart sounds: Normal heart sounds. No murmur heard.  Pulmonary:      Effort: Pulmonary effort is normal. No respiratory distress.      Breath sounds: Normal breath sounds. No wheezing.   Abdominal:      General: Bowel sounds are normal. There is no distension.      Tenderness: There is abdominal tenderness. There is no guarding.      Comments: Lap sites CDI    Genitourinary:     Penis: Normal.    Musculoskeletal:         General: Normal range of motion.      Cervical back: Normal range of motion and neck supple.   Skin:     General: Skin is warm and dry.      Capillary Refill: Capillary refill takes 2 to 3 seconds.      Findings: No erythema.   Neurological:      Mental Status: He is alert and oriented to person, place, and time.   Psychiatric:         Mood and Affect: Mood normal.         Behavior: Behavior normal.         Thought Content: Thought content normal.         Judgment: Judgment normal.       Laboratory:  CBC:   Recent Labs   Lab 06/18/22  1752   WBC 17.91*   RBC 3.35*   HGB 11.4*   HCT 34.6*   *   *   MCH 34.0*   MCHC 32.9     CMP:   Recent Labs   Lab 06/18/22  1752   *   CALCIUM 8.7   ALBUMIN 3.3*   PROT 5.8*      K 4.6   CO2 25      BUN 15   CREATININE 1.0   ALKPHOS 163*   ALT 69*   AST 46*   BILITOT 0.7     Coagulation:   Recent Labs   Lab 06/18/22  1752   INR 1.1     Labs within the past 24 hours have been reviewed.    Diagnostic Results:  CT Abd/Pelvis: PENDING    I have personally reviewed all pertinent imaging studies.    Assessment/Plan:     * Hilar cholangiocarcinoma  - s/p exploratory lap 6/17 to assess liver for cholangio in  preparation for living liver txp scheduled 6/21/22   - developed chills overnight and fever 103 this am, presented to OSH for tx  - lactic acid was 5.4  - cont IV fluids  - cont broad spectrum abx (started Vanc and cefepime at OSH)  - cx pending  - treating as if presumed cholangitis s/p procedure  - trend lactic acid        Tachycardia  - improved w fluids  - cont tele    Fever  - s/p ex lap, suspect fever r/t cholangitis post procedure  - cont IV fluids  - CT a/p ordered  - cont bs abx  - pt feeling well and fever decreased        MELD-Na score: 7 at 6/18/2022  5:52 PM  MELD score: 7 at 6/18/2022  5:52 PM  Calculated from:  Serum Creatinine: 1.0 mg/dL at 6/18/2022  5:52 PM  Serum Sodium: 136 mmol/L at 6/18/2022  5:52 PM  Total Bilirubin: 0.7 mg/dL (Using min of 1 mg/dL) at 6/18/2022  5:52 PM  INR(ratio): 1.1 at 6/18/2022  5:52 PM  Age: 41 years        Patient was SARS-CoV-2 /COVID-19 tested with negative results.     A complete discussion of the transplant procedure, including risks, complications, and alternatives, as well as any donor-specific risk factors requiring specific disclosure, will be carried out by the responsible staff surgeon prior to the procedure.     Discharge Planning: Discussed plan of care.  No plan for discharge today.      Abida Arenas, NP  Liver Transplant  [facility] - Transplant Stepdown

## 2022-06-19 NOTE — SUBJECTIVE & OBJECTIVE
Past Medical History:   Diagnosis Date    Adjustment and management of vascular access device 5/18/2022    Cholangiocarcinoma     Hypercholesteremia     Hypertension        Past Surgical History:   Procedure Laterality Date    ENDOSCOPIC ULTRASOUND OF UPPER GASTROINTESTINAL TRACT N/A 10/20/2021    Procedure: ULTRASOUND, UPPER GI TRACT, ENDOSCOPIC;  Surgeon: Valeriy Sotelo MD;  Location: [facility] ENDO (2ND FLR);  Service: Endoscopy;  Laterality: N/A;  wife to email vacc card-inst email-tb    ERCP N/A 10/20/2021    Procedure: ERCP (ENDOSCOPIC RETROGRADE CHOLANGIOPANCREATOGRAPHY);  Surgeon: Valeriy Sotelo MD;  Location: [facility] ENDO (2ND FLR);  Service: Endoscopy;  Laterality: N/A;    ERCP N/A 11/4/2021    Procedure: ERCP (ENDOSCOPIC RETROGRADE CHOLANGIOPANCREATOGRAPHY);  Surgeon: Valeriy Sotelo MD;  Location: [facility] ENDO (2ND FLR);  Service: Endoscopy;  Laterality: N/A;    ERCP N/A 11/4/2021    Procedure: ERCP (ENDOSCOPIC RETROGRADE CHOLANGIOPANCREATOGRAPHY);  Surgeon: Roselia Pereyra MD;  Location: [facility] ENDO (2ND FLR);  Service: Endoscopy;  Laterality: N/A;  pt vaccinated, instructed to bring card to procedure, instructions sent portal-MG    ERCP N/A 1/14/2022    Procedure: ERCP (ENDOSCOPIC RETROGRADE CHOLANGIOPANCREATOGRAPHY);  Surgeon: Roselia Pereyra MD;  Location: [facility] ENDO (2ND FLR);  Service: Endoscopy;  Laterality: N/A;  inst portal-right chest port-tb  Pt requested at home COVID testing, Pt instructed at home RAPID to be done morning of procedure, Pt instructed to call endoscopy scheuding if there is a positive result, Pt informed if a positive     ERCP N/A 3/31/2022    Procedure: ERCP (ENDOSCOPIC RETROGRADE CHOLANGIOPANCREATOGRAPHY);  Surgeon: Julio Cesar Alonzo MD;  Location: [facility] ENDO (2ND FLR);  Service: Endoscopy;  Laterality: N/A;  3/16: port L chest wall. pt to bring covid vaccination card. Instructions sent via portal.-SC  3/18/22-Updated instructions sent via portal re:new date/time-DS     INSERTION OF TUNNELED CENTRAL VENOUS CATHETER (CVC) WITH SUBCUTANEOUS PORT N/A 11/24/2021    Procedure: INSERTION, PORT-A-CATH;  Surgeon: Prem Syed MD;  Location: [facility] CATH LAB;  Service: Radiology;  Laterality: N/A;    TONSILLECTOMY         Review of patient's allergies indicates:  No Known Allergies    Family History       Problem Relation (Age of Onset)    Hyperlipidemia Father    No Known Problems Mother, Sister, Brother          Tobacco Use    Smoking status: Never Smoker    Smokeless tobacco: Never Used   Substance and Sexual Activity    Alcohol use: Not Currently    Drug use: Never    Sexual activity: Yes       PTA Medications   Medication Sig    HYDROcodone-acetaminophen (NORCO) 5-325 mg per tablet Take 1 tablet by mouth every 6 (six) hours as needed for Pain.    losartan (COZAAR) 50 MG tablet Take 1 tablet (50 mg total) by mouth once daily.       Review of Systems   Constitutional:  Negative for activity change, appetite change, chills, fatigue and fever.   HENT:  Negative for congestion and trouble swallowing.    Eyes:  Negative for visual disturbance.   Respiratory:  Negative for cough, shortness of breath and wheezing.    Cardiovascular:  Positive for leg swelling. Negative for chest pain.   Gastrointestinal:  Negative for abdominal distention, abdominal pain, constipation, diarrhea, nausea and vomiting.   Endocrine: Negative.    Genitourinary:  Negative for decreased urine volume, difficulty urinating, dysuria, hematuria and urgency.   Musculoskeletal:  Negative for arthralgias.   Skin:  Negative for color change, pallor and rash. Wound: lap sites.  Allergic/Immunologic: Negative for immunocompromised state.   Neurological:  Negative for dizziness, tremors, seizures, syncope, weakness and headaches.   Hematological:  Bruises/bleeds easily.   Psychiatric/Behavioral:  Negative for agitation, confusion, decreased concentration, sleep disturbance and suicidal ideas. The patient is not  nervous/anxious.    Objective:     Vital Signs (Most Recent):  Temp: 98.4 °F (36.9 °C) (06/18/22 1940)  Pulse: 96 (06/18/22 1940)  Resp: 12 (06/18/22 1940)  BP: 111/76 (06/18/22 1940)  SpO2: 95 % (06/18/22 1940) Vital Signs (24h Range):  Temp:  [97.6 °F (36.4 °C)-100.8 °F (38.2 °C)] 98.4 °F (36.9 °C)  Pulse:  [] 96  Resp:  [12-17] 12  SpO2:  [95 %-100 %] 95 %  BP: (100-174)/(72-98) 111/76     Weight: 87.3 kg (192 lb 7.4 oz)  Body mass index is 29.26 kg/m².    No intake or output data in the 24 hours ending 06/18/22 1950    Physical Exam  Vitals and nursing note reviewed.   Constitutional:       General: He is not in acute distress.     Appearance: He is well-developed. He is not diaphoretic.      Comments: No hand or temporal muscle wasting noted     HENT:      Head: Normocephalic and atraumatic.      Mouth/Throat:      Pharynx: No oropharyngeal exudate.   Eyes:      General: No scleral icterus.     Conjunctiva/sclera: Conjunctivae normal.      Pupils: Pupils are equal, round, and reactive to light.   Neck:      Thyroid: No thyromegaly.   Cardiovascular:      Rate and Rhythm: Normal rate and regular rhythm.      Heart sounds: Normal heart sounds. No murmur heard.  Pulmonary:      Effort: Pulmonary effort is normal. No respiratory distress.      Breath sounds: Normal breath sounds. No wheezing.   Abdominal:      General: Bowel sounds are normal. There is no distension.      Tenderness: There is abdominal tenderness. There is no guarding.      Comments: Lap sites CDI    Genitourinary:     Penis: Normal.    Musculoskeletal:         General: Normal range of motion.      Cervical back: Normal range of motion and neck supple.   Skin:     General: Skin is warm and dry.      Capillary Refill: Capillary refill takes 2 to 3 seconds.      Findings: No erythema.   Neurological:      Mental Status: He is alert and oriented to person, place, and time.   Psychiatric:         Mood and Affect: Mood normal.         Behavior:  Behavior normal.         Thought Content: Thought content normal.         Judgment: Judgment normal.       Laboratory:  CBC:   Recent Labs   Lab 06/18/22  1752   WBC 17.91*   RBC 3.35*   HGB 11.4*   HCT 34.6*   *   *   MCH 34.0*   MCHC 32.9     CMP:   Recent Labs   Lab 06/18/22  1752   *   CALCIUM 8.7   ALBUMIN 3.3*   PROT 5.8*      K 4.6   CO2 25      BUN 15   CREATININE 1.0   ALKPHOS 163*   ALT 69*   AST 46*   BILITOT 0.7     Coagulation:   Recent Labs   Lab 06/18/22  1752   INR 1.1     Labs within the past 24 hours have been reviewed.    Diagnostic Results:  CT Abd/Pelvis: PENDING    I have personally reviewed all pertinent imaging studies.

## 2022-06-19 NOTE — HOSPITAL COURSE
S/P s/p Living transplant on 6/21/2022 Liver for Primary Liver Malignancy: Cholangiocarcinoma CMV -/+6/22: Extubated, but there is a fear of a bile leak, so patient to remain on abx (Vanc/cefepime/fluc)  Returned to OR on 6/23 for Bile leak. [facility] on 6/24    Hospital Interval: No acute events overnight. Pt cont to progress well. LFTs cont to trend down. POD#6 Liver US reviewed per surgeon, increased velocity at IVC anastomosis noted, fluid collection posterioir to allograft [id number] noted. Pt has 2 SHRUTHI drains in placed. Drain output in #2 more bilious, sent fluid for tbili- level 7.2 in drain, serum 2.9. Output decreasing. Keep charged and cont to monitor. [employer] closed w dermabond, no drainage noted, no SSI.  Pain well managed w current regimen. Pt tolerating diet, appetite slowly improving, passing flatus and had BM. Ambulating w/o issues. Cont IS and OOB.  VSS.  Cultures from OR with ENTEROCOCCUS FAECALIS- based on sensitives now on Unasyn IV de escalate to Augmentin PO 6/27/22 until drains removed. Continue to monitor.

## 2022-06-19 NOTE — ASSESSMENT & PLAN NOTE
- s/p exploratory lap 6/17 to assess liver for cholangio in preparation for living liver txp scheduled 6/21/22   - developed chills overnight and fever 103 this am, presented to OSH for tx  - lactic acid was 5.4  - cont IV fluids  - cont broad spectrum abx (started Vanc and cefepime at OSH)  - cx pending  - treating as if presumed cholangitis s/p procedure  - trend lactic acid

## 2022-06-19 NOTE — ASSESSMENT & PLAN NOTE
- s/p ex lap, suspect fever r/t cholangitis post procedure  - d/c fluids now that tolerating diet  - CT a/p to be reviewed by Dr Jara, Dr Cline and Dr Godwin  - cont bs abx  - pt feeling well and fever resolved

## 2022-06-19 NOTE — ASSESSMENT & PLAN NOTE
- s/p ex lap, suspect fever r/t cholangitis post procedure  - cont IV fluids  - CT a/p ordered  - cont bs abx  - pt feeling well and fever decreased

## 2022-06-19 NOTE — SUBJECTIVE & OBJECTIVE
Past Medical History:   Diagnosis Date    Adjustment and management of vascular access device 5/18/2022    Cholangiocarcinoma     Hypercholesteremia     Hypertension        Past Surgical History:   Procedure Laterality Date    ENDOSCOPIC ULTRASOUND OF UPPER GASTROINTESTINAL TRACT N/A 10/20/2021    Procedure: ULTRASOUND, UPPER GI TRACT, ENDOSCOPIC;  Surgeon: Valeriy Sotelo MD;  Location: [facility] ENDO (2ND FLR);  Service: Endoscopy;  Laterality: N/A;  wife to email vacc card-inst email-tb    ERCP N/A 10/20/2021    Procedure: ERCP (ENDOSCOPIC RETROGRADE CHOLANGIOPANCREATOGRAPHY);  Surgeon: Valeriy Sotelo MD;  Location: [facility] ENDO (2ND FLR);  Service: Endoscopy;  Laterality: N/A;    ERCP N/A 11/4/2021    Procedure: ERCP (ENDOSCOPIC RETROGRADE CHOLANGIOPANCREATOGRAPHY);  Surgeon: Valeriy Sotelo MD;  Location: [facility] ENDO (2ND FLR);  Service: Endoscopy;  Laterality: N/A;    ERCP N/A 11/4/2021    Procedure: ERCP (ENDOSCOPIC RETROGRADE CHOLANGIOPANCREATOGRAPHY);  Surgeon: Roselia Pereyra MD;  Location: [facility] ENDO (2ND FLR);  Service: Endoscopy;  Laterality: N/A;  pt vaccinated, instructed to bring card to procedure, instructions sent portal-MG    ERCP N/A 1/14/2022    Procedure: ERCP (ENDOSCOPIC RETROGRADE CHOLANGIOPANCREATOGRAPHY);  Surgeon: Roselia Pereyra MD;  Location: [facility] ENDO (2ND FLR);  Service: Endoscopy;  Laterality: N/A;  inst portal-right chest port-tb  Pt requested at home COVID testing, Pt instructed at home RAPID to be done morning of procedure, Pt instructed to call endoscopy scheuding if there is a positive result, Pt informed if a positive     ERCP N/A 3/31/2022    Procedure: ERCP (ENDOSCOPIC RETROGRADE CHOLANGIOPANCREATOGRAPHY);  Surgeon: Julio Cesar Alonzo MD;  Location: [facility] ENDO (2ND FLR);  Service: Endoscopy;  Laterality: N/A;  3/16: port L chest wall. pt to bring covid vaccination card. Instructions sent via portal.-SC  3/18/22-Updated instructions sent via portal re:new date/time-DS     INSERTION OF TUNNELED CENTRAL VENOUS CATHETER (CVC) WITH SUBCUTANEOUS PORT N/A 11/24/2021    Procedure: INSERTION, PORT-A-CATH;  Surgeon: Prem Syed MD;  Location: [facility] CATH LAB;  Service: Radiology;  Laterality: N/A;    TONSILLECTOMY         Review of patient's allergies indicates:  No Known Allergies    Family History       Problem Relation (Age of Onset)    Hyperlipidemia Father    No Known Problems Mother, Sister, Brother          Tobacco Use    Smoking status: Never Smoker    Smokeless tobacco: Never Used   Substance and Sexual Activity    Alcohol use: Not Currently    Drug use: Never    Sexual activity: Yes       PTA Medications   Medication Sig    HYDROcodone-acetaminophen (NORCO) 5-325 mg per tablet Take 1 tablet by mouth every 6 (six) hours as needed for Pain.    losartan (COZAAR) 50 MG tablet Take 1 tablet (50 mg total) by mouth once daily.       Review of Systems   Constitutional:  Negative for activity change, appetite change, chills, fatigue and fever.   HENT:  Negative for congestion and trouble swallowing.    Eyes:  Negative for visual disturbance.   Respiratory:  Negative for cough, shortness of breath and wheezing.    Cardiovascular:  Negative for chest pain and leg swelling.   Gastrointestinal:  Negative for abdominal distention, abdominal pain, constipation, diarrhea, nausea and vomiting.   Endocrine: Negative.    Genitourinary:  Negative for decreased urine volume, difficulty urinating, dysuria, hematuria and urgency.   Musculoskeletal:  Negative for arthralgias.   Skin:  Negative for color change, pallor and rash. Wound: lap sites.  Allergic/Immunologic: Negative for immunocompromised state.   Neurological:  Negative for dizziness, tremors, seizures, syncope, weakness and headaches.   Hematological:  Does not bruise/bleed easily.   Psychiatric/Behavioral:  Negative for agitation, confusion, decreased concentration, sleep disturbance and suicidal ideas. The patient is not nervous/anxious.     Objective:     Vital Signs (Most Recent):  Temp: 98.1 °F (36.7 °C) (06/19/22 1641)  Pulse: 95 (06/19/22 1641)  Resp: 18 (06/19/22 1641)  BP: 133/85 (06/19/22 1641)  SpO2: 97 % (06/19/22 1641) Vital Signs (24h Range):  Temp:  [97.8 °F (36.6 °C)-98.4 °F (36.9 °C)] 98.1 °F (36.7 °C)  Pulse:  [82-98] 95  Resp:  [12-29] 18  SpO2:  [93 %-97 %] 97 %  BP: (111-133)/(71-85) 133/85     Weight: 87.3 kg (192 lb 7.4 oz)  Body mass index is 29.26 kg/m².    No intake or output data in the 24 hours ending 06/19/22 1756    Physical Exam  Vitals and nursing note reviewed.   Constitutional:       General: He is not in acute distress.     Appearance: He is well-developed. He is not diaphoretic.      Comments: No hand or temporal muscle wasting noted     HENT:      Head: Normocephalic and atraumatic.      Mouth/Throat:      Pharynx: No oropharyngeal exudate.   Eyes:      General: No scleral icterus.     Conjunctiva/sclera: Conjunctivae normal.      Pupils: Pupils are equal, round, and reactive to light.   Neck:      Thyroid: No thyromegaly.   Cardiovascular:      Rate and Rhythm: Normal rate and regular rhythm.      Heart sounds: Normal heart sounds. No murmur heard.  Pulmonary:      Effort: Pulmonary effort is normal. No respiratory distress.      Breath sounds: Normal breath sounds. No wheezing.   Abdominal:      General: Bowel sounds are normal. There is no distension.      Tenderness: There is abdominal tenderness. There is no guarding.      Comments: Lap sites CDI    Genitourinary:     Penis: Normal.    Musculoskeletal:         General: Normal range of motion.      Cervical back: Normal range of motion and neck supple.   Skin:     General: Skin is warm and dry.      Capillary Refill: Capillary refill takes 2 to 3 seconds.      Findings: No erythema.   Neurological:      Mental Status: He is alert and oriented to person, place, and time.   Psychiatric:         Mood and Affect: Mood normal.         Behavior: Behavior normal.          Thought Content: Thought content normal.         Judgment: Judgment normal.       Laboratory:  CBC:   Recent Labs   Lab 06/19/22  0523   WBC 11.28   RBC 3.14*   HGB 10.7*   HCT 32.1*   PLT 94*   *   MCH 34.1*   MCHC 33.3       CMP:   Recent Labs   Lab 06/19/22  0523   *   CALCIUM 8.9   ALBUMIN 3.0*   PROT 5.8*      K 3.7   CO2 24      BUN 12   CREATININE 0.8   ALKPHOS 138*   ALT 53*   AST 29   BILITOT 0.6       Coagulation:   Recent Labs   Lab 06/18/22  1752   INR 1.1       Labs within the past 24 hours have been reviewed.    Diagnostic Results:  CT Abd/Pelvis: PENDING    I have personally reviewed all pertinent imaging studies.

## 2022-06-20 LAB
ABO + RH BLD: NORMAL
ALBUMIN SERPL BCP-MCNC: 3 G/DL (ref 3.5–5.2)
ALP SERPL-CCNC: 136 U/L (ref 55–135)
ALT SERPL W/O P-5'-P-CCNC: 40 U/L (ref 10–44)
ANION GAP SERPL CALC-SCNC: 9 MMOL/L (ref 8–16)
APTT BLDCRRT: 25.5 SEC (ref 21–32)
AST SERPL-CCNC: 22 U/L (ref 10–40)
BASOPHILS # BLD AUTO: 0.03 K/UL (ref 0–0.2)
BASOPHILS NFR BLD: 0.5 % (ref 0–1.9)
BILIRUB DIRECT SERPL-MCNC: 0.2 MG/DL (ref 0.1–0.3)
BILIRUB SERPL-MCNC: 0.4 MG/DL (ref 0.1–1)
BILIRUB UR QL STRIP: NEGATIVE
BLD GP AB SCN CELLS X3 SERPL QL: NORMAL
BLOOD BANK HEPATITIS FREEZE AND HOLD: NORMAL
BUN SERPL-MCNC: 8 MG/DL (ref 6–20)
CALCIUM SERPL-MCNC: 9.2 MG/DL (ref 8.7–10.5)
CHLORIDE SERPL-SCNC: 106 MMOL/L (ref 95–110)
CLARITY UR REFRACT.AUTO: CLEAR
CO2 SERPL-SCNC: 26 MMOL/L (ref 23–29)
COLOR UR AUTO: NORMAL
CREAT SERPL-MCNC: 0.8 MG/DL (ref 0.5–1.4)
DIFFERENTIAL METHOD: ABNORMAL
EOSINOPHIL # BLD AUTO: 0.2 K/UL (ref 0–0.5)
EOSINOPHIL NFR BLD: 3.7 % (ref 0–8)
ERYTHROCYTE [DISTWIDTH] IN BLOOD BY AUTOMATED COUNT: 13 % (ref 11.5–14.5)
EST. GFR  (AFRICAN AMERICAN): >60 ML/MIN/1.73 M^2
EST. GFR  (NON AFRICAN AMERICAN): >60 ML/MIN/1.73 M^2
ESTIMATED AVG GLUCOSE: 120 MG/DL (ref 68–131)
FIBRINOGEN PPP-MCNC: 550 MG/DL (ref 182–400)
GLUCOSE SERPL-MCNC: 94 MG/DL (ref 70–110)
GLUCOSE UR QL STRIP: NEGATIVE
HBA1C MFR BLD: 5.8 % (ref 4–5.6)
HCT VFR BLD AUTO: 33 % (ref 40–54)
HGB BLD-MCNC: 11 G/DL (ref 14–18)
HGB UR QL STRIP: NEGATIVE
IMM GRANULOCYTES # BLD AUTO: 0.04 K/UL (ref 0–0.04)
IMM GRANULOCYTES NFR BLD AUTO: 0.6 % (ref 0–0.5)
INR PPP: 1 (ref 0.8–1.2)
KETONES UR QL STRIP: NEGATIVE
LEUKOCYTE ESTERASE UR QL STRIP: NEGATIVE
LYMPHOCYTES # BLD AUTO: 0.6 K/UL (ref 1–4.8)
LYMPHOCYTES NFR BLD: 10.3 % (ref 18–48)
MAGNESIUM SERPL-MCNC: 1.9 MG/DL (ref 1.6–2.6)
MCH RBC QN AUTO: 33.2 PG (ref 27–31)
MCHC RBC AUTO-ENTMCNC: 33.3 G/DL (ref 32–36)
MCV RBC AUTO: 100 FL (ref 82–98)
MONOCYTES # BLD AUTO: 0.6 K/UL (ref 0.3–1)
MONOCYTES NFR BLD: 9.1 % (ref 4–15)
NEUTROPHILS # BLD AUTO: 4.7 K/UL (ref 1.8–7.7)
NEUTROPHILS NFR BLD: 75.8 % (ref 38–73)
NITRITE UR QL STRIP: NEGATIVE
NRBC BLD-RTO: 0 /100 WBC
PH UR STRIP: 7 [PH] (ref 5–8)
PHOSPHATE SERPL-MCNC: 4.2 MG/DL (ref 2.7–4.5)
PLATELET # BLD AUTO: 105 K/UL (ref 150–450)
PMV BLD AUTO: 8.7 FL (ref 9.2–12.9)
POTASSIUM SERPL-SCNC: 3.8 MMOL/L (ref 3.5–5.1)
PROT SERPL-MCNC: 6.1 G/DL (ref 6–8.4)
PROT UR QL STRIP: NEGATIVE
PROTHROMBIN TIME: 10.6 SEC (ref 9–12.5)
RBC # BLD AUTO: 3.31 M/UL (ref 4.6–6.2)
SARS-COV-2 RDRP RESP QL NAA+PROBE: NEGATIVE
SODIUM SERPL-SCNC: 141 MMOL/L (ref 136–145)
SP GR UR STRIP: 1.01 (ref 1–1.03)
URN SPEC COLLECT METH UR: NORMAL
WBC # BLD AUTO: 6.23 K/UL (ref 3.9–12.7)

## 2022-06-20 PROCEDURE — 86920 COMPATIBILITY TEST SPIN: CPT | Mod: NTX | Performed by: PHYSICIAN ASSISTANT

## 2022-06-20 PROCEDURE — 86901 BLOOD TYPING SEROLOGIC RH(D): CPT | Mod: NTX | Performed by: PHYSICIAN ASSISTANT

## 2022-06-20 PROCEDURE — 25000003 PHARM REV CODE 250: Mod: NTX | Performed by: NURSE PRACTITIONER

## 2022-06-20 PROCEDURE — 86706 HEP B SURFACE ANTIBODY: CPT | Mod: NTX | Performed by: PHYSICIAN ASSISTANT

## 2022-06-20 PROCEDURE — 85730 THROMBOPLASTIN TIME PARTIAL: CPT | Mod: NTX | Performed by: PHYSICIAN ASSISTANT

## 2022-06-20 PROCEDURE — U0002 COVID-19 LAB TEST NON-CDC: HCPCS | Mod: NTX | Performed by: PHYSICIAN ASSISTANT

## 2022-06-20 PROCEDURE — 94761 N-INVAS EAR/PLS OXIMETRY MLT: CPT | Mod: NTX

## 2022-06-20 PROCEDURE — 25000003 PHARM REV CODE 250: Mod: NTX | Performed by: PHARMACIST

## 2022-06-20 PROCEDURE — 84100 ASSAY OF PHOSPHORUS: CPT | Mod: NTX | Performed by: PHYSICIAN ASSISTANT

## 2022-06-20 PROCEDURE — 93010 ELECTROCARDIOGRAM REPORT: CPT | Mod: NTX,,, | Performed by: INTERNAL MEDICINE

## 2022-06-20 PROCEDURE — 87389 HIV-1 AG W/HIV-1&-2 AB AG IA: CPT | Mod: NTX | Performed by: PHYSICIAN ASSISTANT

## 2022-06-20 PROCEDURE — 85384 FIBRINOGEN ACTIVITY: CPT | Mod: NTX | Performed by: PHYSICIAN ASSISTANT

## 2022-06-20 PROCEDURE — 25000003 PHARM REV CODE 250: Mod: NTX | Performed by: PHYSICIAN ASSISTANT

## 2022-06-20 PROCEDURE — 63600175 PHARM REV CODE 636 W HCPCS: Mod: NTX | Performed by: TRANSPLANT SURGERY

## 2022-06-20 PROCEDURE — 86803 HEPATITIS C AB TEST: CPT | Mod: NTX | Performed by: PHYSICIAN ASSISTANT

## 2022-06-20 PROCEDURE — 87340 HEPATITIS B SURFACE AG IA: CPT | Mod: NTX | Performed by: PHYSICIAN ASSISTANT

## 2022-06-20 PROCEDURE — 82248 BILIRUBIN DIRECT: CPT | Mod: NTX | Performed by: PHYSICIAN ASSISTANT

## 2022-06-20 PROCEDURE — 63600175 PHARM REV CODE 636 W HCPCS: Mod: NTX | Performed by: PHYSICIAN ASSISTANT

## 2022-06-20 PROCEDURE — 87086 URINE CULTURE/COLONY COUNT: CPT | Mod: NTX | Performed by: PHYSICIAN ASSISTANT

## 2022-06-20 PROCEDURE — 63600175 PHARM REV CODE 636 W HCPCS: Mod: NTX | Performed by: PHARMACIST

## 2022-06-20 PROCEDURE — 83036 HEMOGLOBIN GLYCOSYLATED A1C: CPT | Mod: NTX | Performed by: PHYSICIAN ASSISTANT

## 2022-06-20 PROCEDURE — 99233 PR SUBSEQUENT HOSPITAL CARE,LEVL III: ICD-10-PCS | Mod: NTX,,, | Performed by: PHYSICIAN ASSISTANT

## 2022-06-20 PROCEDURE — 85025 COMPLETE CBC W/AUTO DIFF WBC: CPT | Mod: NTX | Performed by: PHYSICIAN ASSISTANT

## 2022-06-20 PROCEDURE — 83735 ASSAY OF MAGNESIUM: CPT | Mod: NTX | Performed by: PHYSICIAN ASSISTANT

## 2022-06-20 PROCEDURE — 85610 PROTHROMBIN TIME: CPT | Mod: NTX | Performed by: PHYSICIAN ASSISTANT

## 2022-06-20 PROCEDURE — 20600001 HC STEP DOWN PRIVATE ROOM: Mod: NTX

## 2022-06-20 PROCEDURE — 86382 NEUTRALIZATION TEST VIRAL: CPT | Performed by: PHYSICIAN ASSISTANT

## 2022-06-20 PROCEDURE — 87522 HEPATITIS C REVRS TRNSCRPJ: CPT | Mod: NTX | Performed by: PHYSICIAN ASSISTANT

## 2022-06-20 PROCEDURE — 99233 SBSQ HOSP IP/OBS HIGH 50: CPT | Mod: NTX,,, | Performed by: PHYSICIAN ASSISTANT

## 2022-06-20 PROCEDURE — 93005 ELECTROCARDIOGRAM TRACING: CPT | Mod: NTX

## 2022-06-20 PROCEDURE — 99000 SPECIMEN HANDLING OFFICE-LAB: CPT | Mod: NTX | Performed by: PHYSICIAN ASSISTANT

## 2022-06-20 PROCEDURE — 93010 EKG 12-LEAD: ICD-10-PCS | Mod: NTX,,, | Performed by: INTERNAL MEDICINE

## 2022-06-20 PROCEDURE — 80053 COMPREHEN METABOLIC PANEL: CPT | Mod: NTX | Performed by: PHYSICIAN ASSISTANT

## 2022-06-20 PROCEDURE — 81003 URINALYSIS AUTO W/O SCOPE: CPT | Mod: NTX | Performed by: PHYSICIAN ASSISTANT

## 2022-06-20 PROCEDURE — 86704 HEP B CORE ANTIBODY TOTAL: CPT | Mod: NTX | Performed by: PHYSICIAN ASSISTANT

## 2022-06-20 RX ORDER — HYDROCODONE BITARTRATE AND ACETAMINOPHEN 500; 5 MG/1; MG/1
TABLET ORAL
Status: DISCONTINUED | OUTPATIENT
Start: 2022-06-20 | End: 2022-06-22

## 2022-06-20 RX ORDER — METHYLPREDNISOLONE SODIUM SUCCINATE 500 MG/8ML
500 INJECTION INTRAMUSCULAR; INTRAVENOUS
Status: CANCELLED | OUTPATIENT
Start: 2022-06-20

## 2022-06-20 RX ORDER — MUPIROCIN 20 MG/G
OINTMENT TOPICAL
Status: DISCONTINUED | OUTPATIENT
Start: 2022-06-20 | End: 2022-06-22 | Stop reason: HOSPADM

## 2022-06-20 RX ORDER — FLUCONAZOLE 200 MG/1
200 TABLET ORAL DAILY
Status: DISCONTINUED | OUTPATIENT
Start: 2022-06-20 | End: 2022-06-28 | Stop reason: HOSPADM

## 2022-06-20 RX ORDER — HEPARIN 100 UNIT/ML
5 SYRINGE INTRAVENOUS
Status: DISCONTINUED | OUTPATIENT
Start: 2022-06-20 | End: 2022-06-22

## 2022-06-20 RX ADMIN — CEFEPIME 1 G: 1 INJECTION, POWDER, FOR SOLUTION INTRAMUSCULAR; INTRAVENOUS at 05:06

## 2022-06-20 RX ADMIN — LOSARTAN POTASSIUM 25 MG: 25 TABLET, FILM COATED ORAL at 08:06

## 2022-06-20 RX ADMIN — CEFEPIME 1 G: 1 INJECTION, POWDER, FOR SOLUTION INTRAMUSCULAR; INTRAVENOUS at 09:06

## 2022-06-20 RX ADMIN — VANCOMYCIN HYDROCHLORIDE 1500 MG: 1.5 INJECTION, POWDER, LYOPHILIZED, FOR SOLUTION INTRAVENOUS at 10:06

## 2022-06-20 RX ADMIN — FLUCONAZOLE 200 MG: 200 TABLET ORAL at 12:06

## 2022-06-20 RX ADMIN — CEFEPIME 1 G: 1 INJECTION, POWDER, FOR SOLUTION INTRAMUSCULAR; INTRAVENOUS at 01:06

## 2022-06-20 RX ADMIN — Medication 500 UNITS: at 12:06

## 2022-06-20 NOTE — ASSESSMENT & PLAN NOTE
- s/p exploratory lap 6/17 to assess liver for cholangio in preparation for living liver txp scheduled 6/21/22   - developed chills overnight and fever 103 this am, presented to OSH for tx  - lactic acid was 5.4  - cont IV fluids  - cont broad spectrum abx (started Vanc and cefepime at OSH)  - cx w NGTD  - treating as presumed cholangitis s/p procedure  - trend lactic acid- down to 1 6/19, wbc normalized  - plan for living liver txp tomorrow. NPO after MN

## 2022-06-20 NOTE — ANESTHESIA PREPROCEDURE EVALUATION
[facility]  Anesthesia Pre-Operative Evaluation         Patient Name: Romeo Larson  YOB: 1980  MRN: [id number]    SUBJECTIVE:     Pre-operative evaluation for Procedure(s) (LRB):  TRANSPLANT, LIVER (N/A)     06/20/2022    Romeo Larson is a 41 y.o. male w/ a significant PMHx of HTN, HLD, and cholangiocarcinoma s/p neoadjuvant chemoradiation who presents living donor transplant.    Of note, pt underwent diagnostic ex lap on 6/17 and developed fever /chills with elevated lactate on 6/18 treated as post-procedure cholangitis. Now resolved (afebrile, lactate 1, normal WBC).    Patient endorses history of shivering with previous anesthetic.     Patient now presents for the above procedure(s).    TTE: 4/5/22  · The stress echo portion of this study is negative for myocardial ischemia.  · The ECG portion of this study is negative for myocardial ischemia.  · During stress, the following significant arrhythmias were observed: rare PACs.  · The patient reached the end of the protocol.  · Normal left ventricular size with normal systolic function. The estimated ejection fraction is 65%.  · Normal right ventricular size with normal right ventricular systolic function.  · Normal left ventricular diastolic function.  · The estimated PA systolic pressure is 26 mmHg.  · Normal central venous pressure (3 mmHg).    LDA:   Port A Cath Single Lumen 11/24/21 1202 right subclavian;right internal jugular (Active)   Number of days: 207       Port A Cath Single Lumen 06/18/22 right subclavian (Active)   Site Assessment No drainage;No redness;No swelling;No warmth 06/19/22 2000   Line Securement Device Secured with sutureless device 06/20/22 0800   Dressing Type Biopatch in place;Central line dressing;Transparent (Tegaderm) 06/20/22 0800   Dressing Status Clean;Dry;Intact 06/20/22 0800   Dressing Intervention First dressing 06/20/22 0800   Date on Dressing 06/18/22 06/20/22 0800   Dressing Due to be Changed  06/25/22 06/20/22 0800   Patency/Care infusing 06/20/22 0800   Status Accessed 06/20/22 0800   Needle Insertion Date 06/18/22 06/19/22 2000   Type of Needle Acosta 06/20/22 0800   Line Necessity Review Poor venous access 06/20/22 0800   Number of days: 2       Prev airway:     Placement Date: 06/17/22; Placement Time: 0718 (created via procedure documentation); Method of Intubation: Direct laryngoscopy; Mask Ventilation: Easy; Intubated: Postinduction; Blade: Roman #3; Airway Device Size: 7.5; Cuff Inflation: Minimal occlusive pressure; Placement Verified By: Capnometry; Complicating Factors: None; Intubation Findings: Bilateral breath sounds; Securment: Lips; Complications: None; Removal Date: 06/17/22;  Removal Time: 0854    Drips: None documented.      Patient Active Problem List   Diagnosis    Hypercholesterolemia    Diastolic hypertension    Hyperbilirubinemia    Obstructive jaundice    Biliary obstruction    Painless jaundice    Cholangitis    Hilar cholangiocarcinoma    Immunodeficiency secondary to neoplasm    Immunodeficiency secondary to chemotherapy    Chemotherapy induced neutropenia    Fever of unknown origin    Elevated liver enzymes    Macrocytic anemia    Adjustment and management of vascular access device    Tachycardia       Review of patient's allergies indicates:  No Known Allergies    Current Inpatient Medications:      No current facility-administered medications on file prior to encounter.     Current Outpatient Medications on File Prior to Encounter   Medication Sig Dispense Refill    losartan (COZAAR) 50 MG tablet Take 1 tablet (50 mg total) by mouth once daily. 90 tablet 3    [DISCONTINUED] baclofen (LIORESAL) 10 MG tablet TAKE 1 TABLET(10 MG) BY MOUTH THREE TIMES DAILY AS NEEDED FOR HICCUPS (Patient not taking: No sig reported) 90 tablet 0    [DISCONTINUED] metFORMIN (GLUCOPHAGE) 500 MG tablet Take 1 tablet (500 mg total) by mouth 2 (two) times daily with meals. (Patient  not taking: No sig reported) 60 tablet 3       Past Surgical History:   Procedure Laterality Date    ENDOSCOPIC ULTRASOUND OF UPPER GASTROINTESTINAL TRACT N/A 10/20/2021    Procedure: ULTRASOUND, UPPER GI TRACT, ENDOSCOPIC;  Surgeon: Valeriy Sotelo MD;  [facility]  Service: Endoscopy;  Laterality: N/A;  wife to email vacc card-inst email-tb    ERCP N/A 10/20/2021    Procedure: ERCP (ENDOSCOPIC RETROGRADE CHOLANGIOPANCREATOGRAPHY);  Surgeon: Valeriy Sotelo MD;  Location: [facility] (2ND FLR);  Service: Endoscopy;  Laterality: N/A;    ERCP N/A 11/4/2021    Procedure: ERCP (ENDOSCOPIC RETROGRADE CHOLANGIOPANCREATOGRAPHY);  Surgeon: Valeriy Sotelo MD;  Location: [facility] (2ND FLR);  Service: Endoscopy;  Laterality: N/A;    ERCP N/A 11/4/2021    Procedure: ERCP (ENDOSCOPIC RETROGRADE CHOLANGIOPANCREATOGRAPHY);  Surgeon: Roselia Pereyra MD;  [facility]  Service: Endoscopy;  Laterality: N/A;  pt vaccinated, instructed to bring card to procedure, instructions sent portal-    ERCP N/A 1/14/2022    Procedure: ERCP (ENDOSCOPIC RETROGRADE CHOLANGIOPANCREATOGRAPHY);  Surgeon: Roselia Pereyra MD;  Location: [facility] (2ND FLR);  Service: Endoscopy;  Laterality: N/A;  inst portal-right chest port-tb  Pt requested at home COVID testing, Pt instructed at home RAPID to be done morning of procedure, Pt instructed to call endoscopy scheuding if there is a positive result, Pt informed if a positive     ERCP N/A 3/31/2022    Procedure: ERCP (ENDOSCOPIC RETROGRADE CHOLANGIOPANCREATOGRAPHY);  Surgeon: Julio Cesar Alonzo MD;  Location: [facility] (2ND FLR);  Service: Endoscopy;  Laterality: N/A;  3/16: port L chest wall. pt to bring covid vaccination card. Instructions sent via portal.-SC  3/18/22-Updated instructions sent via portal re:new date/time-DS    INSERTION OF TUNNELED CENTRAL VENOUS CATHETER (CVC) WITH SUBCUTANEOUS PORT N/A 11/24/2021    Procedure: INSERTION,  PORT-A-CATH;  Surgeon: Prem Syed MD;  Location: [facility] CATH LAB;  Service: Radiology;  Laterality: N/A;    ROBOT-ASSISTED LAPAROSCOPIC LYMPHADENECTOMY USING DA МАРИНА XI  6/17/2022    Procedure: XI ROBOTIC LYMPHADENECTOMY - Portal;  Surgeon: Julio Cesar Jara MD;  Location: [facility] OR 2ND FLR;  Service: Transplant;;    TONSILLECTOMY      XI ROBOTIC LAPAROSCOPY, EXPLORATORY N/A 6/17/2022    Procedure: XI ROBOTIC LAPAROSCOPY,EXPLORATORY;  Surgeon: Julio Cesar Jara MD;  Location: [facility] OR 2ND FLR;  Service: Transplant;  Laterality: N/A;       OBJECTIVE:     Vital Signs Range (Last 24H):  Temp:  [36.7 °C (98 °F)-36.8 °C (98.3 °F)]   Pulse:  [77-95]   Resp:  [16-18]   BP: (127-134)/(84-95)   SpO2:  [94 %-98 %]       Significant Labs:  Lab Results   Component Value Date    WBC 6.23 06/20/2022    HGB 11.0 (L) 06/20/2022    HCT 33.0 (L) 06/20/2022     (L) 06/20/2022    CHOL 163 10/12/2021    TRIG 198 (H) 10/12/2021    HDL 22 (L) 10/12/2021    ALT 40 06/20/2022    AST 22 06/20/2022     06/20/2022    K 3.8 06/20/2022     06/20/2022    CREATININE 0.8 06/20/2022    BUN 8 06/20/2022    CO2 26 06/20/2022    TSH 0.835 03/24/2022    PSA 0.68 03/24/2022    INR 1.0 06/20/2022    HGBA1C 6.8 (H) 03/29/2022       Diagnostic Studies: No relevant studies.    EKG:   Results for orders placed or performed during the hospital encounter of 11/03/21   EKG 12-lead    Collection Time: 11/03/21 11:32 PM    Narrative    Test Reason : A41.9,    Vent. Rate : 099 BPM     Atrial Rate : 099 BPM     P-R Int : 134 ms          QRS Dur : 080 ms      QT Int : 338 ms       P-R-T Axes : 037 040 004 degrees     QTc Int : 433 ms    Normal sinus rhythm  Possible  Inferior infarct ,age undetermined  Abnormal ECG  No previous ECGs available  Confirmed by Amado COSME MD ([id number]) on 11/4/2021 3:00:51 PM    Referred By: JONEL YOON           Confirmed By:Amado COSME MD       ASSESSMENT/PLAN:                                                                                                                 06/20/2022  Romeo Larson is a 41 y.o., male.      Pre-op Assessment    I have reviewed the Patient Summary Reports.     I have reviewed the Nursing Notes.    I have reviewed the Medications.     Review of Systems  Anesthesia Hx:  No problems with previous Anesthesia  History of prior surgery of interest to airway management or planning: Previous anesthesia: General Denies Family Hx of Anesthesia complications.  Personal Hx of Anesthesia complications (shivering)   Social:  No Alcohol Use, Non-Smoker    Hematology/Oncology:  Hematology Normal      Current/Recent Cancer. chemotherapy and radiation   EENT/Dental:EENT/Dental Normal   Cardiovascular:   Exercise tolerance: good Hypertension    Pulmonary:  Pulmonary Normal    Renal/:  Renal/ Normal     Hepatic/GI:   Cholangiocarcinoma   Musculoskeletal:  Musculoskeletal Normal    Neurological:  Neurology Normal    Endocrine:  Endocrine Normal    Dermatological:  Skin Normal    Psych:  Psychiatric Normal           Physical Exam  General: Well nourished, Cooperative, Alert and Oriented    Airway:  Mallampati: III / I  Mouth Opening: Normal  TM Distance: Normal  Tongue: Normal  Neck ROM: Normal ROM    Dental:  Intact        Anesthesia Plan  Type of Anesthesia, risks & benefits discussed:    Anesthesia Type: Gen ETT  Intra-op Monitoring Plan: Standard ASA Monitors, Art Line and Central Line  Post Op Pain Control Plan: multimodal analgesia and IV/PO Opioids PRN  Induction:  IV  Airway Plan: Direct, Post-Induction  Informed Consent: Informed consent signed with the Patient and all parties understand the risks and agree with anesthesia plan.  All questions answered.   ASA Score: 3  Day of Surgery Review of History & Physical: H&P Update referred to the surgeon/provider.    Ready For Surgery From Anesthesia Perspective.     .

## 2022-06-20 NOTE — PROGRESS NOTES
Pharmacokinetic Assessment Follow Up: IV Vancomycin    Vancomycin serum concentration assessment(s):    The trough level was drawn correctly and can be used to guide therapy at this time. The measurement is below the desired definitive target range of 15 to 20 mcg/mL.    Vancomycin Regimen Plan:    Increasing dose as intended trough increased to 15-20.  Change regimen to Vancomycin 1500 mg IV every 12 hours with next serum trough concentration measured at 0900 prior to 6th dose on 6/22    Drug levels (last 3 results):  Recent Labs   Lab Result Units 06/19/22 2203   Vancomycin-Trough ug/mL 10.0       Pharmacy will continue to follow and monitor vancomycin.    Please contact pharmacy at extension [phone] for questions regarding this assessment.    Thank you for the consult,   Brad Alcantar       Patient brief summary:  Romeo Larson is a 41 y.o. male initiated on antimicrobial therapy with IV Vancomycin for treatment of intra-abdominal infection    The patient's current regimen is 1250mg IV Q12h    Drug Allergies:   Review of patient's allergies indicates:  No Known Allergies    Actual Body Weight:   87.3kg    Renal Function:   Estimated Creatinine Clearance: 128 mL/min (based on SCr of 0.8 mg/dL).,     Dialysis Method (if applicable):  N/A    CBC (last 72 hours):  Recent Labs   Lab Result Units 06/18/22  0812 06/18/22  1752 06/19/22  0523 06/20/22  0549   WBC K/uL 9.12 17.91* 11.28 6.23   Hemoglobin g/dL 13.0* 11.4* 10.7* 11.0*   Hematocrit % 39.1* 34.6* 32.1* 33.0*   Platelets K/uL 116* 109* 94* 105*   Gran % % 96.3* 88.1* 85.2* 75.8*   Lymph % % 1.5* 3.0* 4.9* 10.3*   Mono % % 1.1* 7.8 7.4 9.1   Eosinophil % % 0.1 0.1 1.2 3.7   Basophil % % 0.2 0.2 0.3 0.5   Differential Method  Automated Automated Automated Automated       Metabolic Panel (last 72 hours):  Recent Labs   Lab Result Units 06/18/22  0812 06/18/22  1743 06/18/22  1752 06/19/22  0523 06/20/22  0549   Sodium mmol/L 135*  --  136 138 141   Potassium  mmol/L 3.3*  --  4.6 3.7 3.8   Chloride mmol/L 102  --  103 104 106   CO2 mmol/L 24  --  25 24 26   Glucose mg/dL 198*  --  122* 115* 94   Glucose, UA   --  Negative  --   --   --    BUN mg/dL 17  --  15 12 8   Creatinine mg/dL 1.21  --  1.0 0.8 0.8   Albumin g/dL 4.1  --  3.3* 3.0* 3.0*   Total Bilirubin mg/dL 0.8  --  0.7 0.6 0.4   Alkaline Phosphatase U/L 227*  --  163* 138* 136*   AST U/L 97*  --  46* 29 22   ALT U/L 104*  --  69* 53* 40   Magnesium mg/dL  --   --  1.5* 1.8 1.9   Phosphorus mg/dL  --   --  4.4 2.7 4.2       Vancomycin Administrations:  vancomycin given in the last 96 hours                   vancomycin 1.5 g in dextrose 5 % 250 mL IVPB (ready to mix) (mg) 1,500 mg New Bag 06/20/22 1047    vancomycin 1.25 g in dextrose 5% 250 mL IVPB (ready to mix) (mg) 1,250 mg New Bag 06/19/22 2256     1,250 mg New Bag  1149     1,250 mg New Bag  0056    vancomycin (VANCOCIN) 2,000 mg in dextrose 5 % 500 mL IVPB (mg) 2,000 mg New Bag 06/18/22 1039                Microbiologic Results:  Microbiology Results (last 7 days)     Procedure Component Value Units Date/Time    Urine culture [[id number]]     Order Status: No result Specimen: Urine, Clean Catch     Blood culture [[id number]] Collected: 06/18/22 1819    Order Status: Completed Specimen: Blood from Peripheral, Left Arm Updated: 06/19/22 2212     Blood Culture, Routine No Growth to date      No Growth to date    Narrative:      Draw 15 min apart    Blood culture [[id number]] Collected: 06/18/22 1752    Order Status: Completed Specimen: Blood from Antecubital, Left Updated: 06/19/22 2012     Blood Culture, Routine No Growth to date      No Growth to date    Narrative:      Draw 15 min apart

## 2022-06-20 NOTE — SUBJECTIVE & OBJECTIVE
Scheduled Meds:   ceFEPime (MAXIPIME) IVPB  1 g Intravenous Q8H    fluconazole  200 mg Oral Daily    heparin (porcine)  5,000 Units Subcutaneous Q8H    losartan  25 mg Oral Daily    vancomycin (VANCOCIN) IVPB  1,500 mg Intravenous Q12H     Continuous Infusions:  PRN Meds:sodium chloride, sodium chloride, acetaminophen, heparin, porcine (PF), HYDROcodone-acetaminophen, melatonin, mupirocin, ondansetron, sodium chloride 0.9%, Pharmacy to dose Vancomycin consult **AND** vancomycin - pharmacy to dose    Review of Systems   Constitutional:  Negative for activity change, appetite change, chills, fatigue and fever.   HENT:  Negative for congestion and trouble swallowing.    Eyes:  Negative for visual disturbance.   Respiratory:  Negative for cough, shortness of breath and wheezing.    Cardiovascular:  Negative for chest pain and leg swelling.   Gastrointestinal:  Negative for abdominal distention, abdominal pain, constipation, diarrhea, nausea and vomiting.   Endocrine: Negative.    Genitourinary:  Negative for decreased urine volume, difficulty urinating, dysuria, hematuria and urgency.   Musculoskeletal:  Negative for arthralgias.   Skin:  Negative for color change, pallor and rash. Wound: lap sites.  Allergic/Immunologic: Negative for immunocompromised state.   Neurological:  Negative for dizziness, tremors, seizures, syncope, weakness and headaches.   Hematological:  Does not bruise/bleed easily.   Psychiatric/Behavioral:  Negative for agitation, confusion, decreased concentration, sleep disturbance and suicidal ideas. The patient is not nervous/anxious.    Objective:     Vital Signs (Most Recent):  Temp: 98.2 °F (36.8 °C) (06/20/22 1222)  Pulse: 92 (06/20/22 1222)  Resp: 16 (06/20/22 1222)  BP: 132/88 (06/20/22 1222)  SpO2: 98 % (06/20/22 1222) Vital Signs (24h Range):  Temp:  [98 °F (36.7 °C)-98.3 °F (36.8 °C)] 98.2 °F (36.8 °C)  Pulse:  [77-95] 92  Resp:  [16-18] 16  SpO2:  [94 %-98 %] 98 %  BP: (127-134)/(84-95)  132/88     Weight: 87.3 kg (192 lb 7.4 oz)  Body mass index is 29.26 kg/m².    Intake/Output - Last 3 Shifts         06/18 0700  06/19 0659 06/19 0700 06/20 0659 06/20 0700 06/21 0659    P.O.  120 240    I.V. (mL/kg)  892.6 (10.2)     IV Piggyback  350 300    Total Intake(mL/kg)  1362.6 (15.6) 540 (6.2)    Urine (mL/kg/hr)  1700 (0.8) 400 (0.8)    Stool  0     Total Output  1700 400    Net  -337.4 +140           Stool Occurrence 1 x 0 x             Physical Exam  Vitals and nursing note reviewed.   Constitutional:       General: He is not in acute distress.     Appearance: He is well-developed. He is not diaphoretic.      Comments: No hand or temporal muscle wasting noted     HENT:      Head: Normocephalic and atraumatic.      Mouth/Throat:      Pharynx: No oropharyngeal exudate.   Eyes:      General: No scleral icterus.     Conjunctiva/sclera: Conjunctivae normal.      Pupils: Pupils are equal, round, and reactive to light.   Neck:      Thyroid: No thyromegaly.   Cardiovascular:      Rate and Rhythm: Normal rate and regular rhythm.      Heart sounds: Normal heart sounds. No murmur heard.  Pulmonary:      Effort: Pulmonary effort is normal. No respiratory distress.      Breath sounds: Normal breath sounds. No wheezing.   Abdominal:      General: Bowel sounds are normal. There is no distension.      Tenderness: There is abdominal tenderness. There is no guarding.      Comments: Lap sites CDI    Genitourinary:     Penis: Normal.    Musculoskeletal:         General: Normal range of motion.      Cervical back: Normal range of motion and neck supple.   Skin:     General: Skin is warm and dry.      Capillary Refill: Capillary refill takes 2 to 3 seconds.      Findings: No erythema.   Neurological:      Mental Status: He is alert and oriented to person, place, and time.   Psychiatric:         Mood and Affect: Mood normal.         Behavior: Behavior normal.         Thought Content: Thought content normal.          Judgment: Judgment normal.       Laboratory:  Immunosuppressants       None          CBC:   Recent Labs   Lab 06/20/22  0549   WBC 6.23   RBC 3.31*   HGB 11.0*   HCT 33.0*   *   *   MCH 33.2*   MCHC 33.3     CMP:   Recent Labs   Lab 06/20/22  0549   GLU 94   CALCIUM 9.2   ALBUMIN 3.0*   PROT 6.1      K 3.8   CO2 26      BUN 8   CREATININE 0.8   ALKPHOS 136*   ALT 40   AST 22   BILITOT 0.4     Labs within the past 24 hours have been reviewed.    Diagnostic Results:  I have personally reviewed all pertinent imaging studies.    Debility/Functional status: No debility noted.

## 2022-06-20 NOTE — PROGRESS NOTES
Pharmacokinetic Assessment Follow Up: IV Vancomycin    Vancomycin serum concentration assessment(s):    The trough level was drawn correctly and can be used to guide therapy at this time. The measurement is within the desired definitive target range of 10 to 20 mcg/mL.    Vancomycin Regimen Plan:    Continue regimen of Vancomycin 1250 mg IV every 12 hours with next serum trough concentration measured in ~ 3 - 5 days or sooner if kidney function changes significantly      Drug levels (last 3 results):  Recent Labs   Lab Result Units 06/19/22  2203   Vancomycin-Trough ug/mL 10.0       Pharmacy will continue to follow and monitor vancomycin.    Please contact pharmacy at extension [phone] for questions regarding this assessment.    Thank you for the consult,   Shobha Willis       Patient brief summary:  Romeo Larson is a 41 y.o. male initiated on antimicrobial therapy with IV Vancomycin for treatment of intra-abdominal infection    The patient's current regimen is 1250mg IV every 12 hours    Drug Allergies:   Review of patient's allergies indicates:  No Known Allergies    Actual Body Weight:   87.3 kg    Renal Function:   Estimated Creatinine Clearance: 128 mL/min (based on SCr of 0.8 mg/dL).,     Dialysis Method (if applicable):  N/A    CBC (last 72 hours):  Recent Labs   Lab Result Units 06/18/22  0812 06/18/22 1752 06/19/22  0523   WBC K/uL 9.12 17.91* 11.28   Hemoglobin g/dL 13.0* 11.4* 10.7*   Hematocrit % 39.1* 34.6* 32.1*   Platelets K/uL 116* 109* 94*   Gran % % 96.3* 88.1* 85.2*   Lymph % % 1.5* 3.0* 4.9*   Mono % % 1.1* 7.8 7.4   Eosinophil % % 0.1 0.1 1.2   Basophil % % 0.2 0.2 0.3   Differential Method  Automated Automated Automated       Metabolic Panel (last 72 hours):  Recent Labs   Lab Result Units 06/18/22  0812 06/18/22  1743 06/18/22 1752 06/19/22  0523   Sodium mmol/L 135*  --  136 138   Potassium mmol/L 3.3*  --  4.6 3.7   Chloride mmol/L 102  --  103 104   CO2 mmol/L 24  --  25 24   Glucose mg/dL  198*  --  122* 115*   Glucose, UA   --  Negative  --   --    BUN mg/dL 17  --  15 12   Creatinine mg/dL 1.21  --  1.0 0.8   Albumin g/dL 4.1  --  3.3* 3.0*   Total Bilirubin mg/dL 0.8  --  0.7 0.6   Alkaline Phosphatase U/L 227*  --  163* 138*   AST U/L 97*  --  46* 29   ALT U/L 104*  --  69* 53*   Magnesium mg/dL  --   --  1.5* 1.8   Phosphorus mg/dL  --   --  4.4 2.7       Vancomycin Administrations:  vancomycin given in the last 96 hours                   vancomycin 1.25 g in dextrose 5% 250 mL IVPB (ready to mix) (mg) 1,250 mg New Bag 06/19/22 2256     1,250 mg New Bag  1149     1,250 mg New Bag  0056    vancomycin (VANCOCIN) 2,000 mg in dextrose 5 % 500 mL IVPB (mg) 2,000 mg New Bag 06/18/22 1039                Microbiologic Results:  Microbiology Results (last 7 days)     Procedure Component Value Units Date/Time    Blood culture [[id number]] Collected: 06/18/22 1819    Order Status: Completed Specimen: Blood from Peripheral, Left Arm Updated: 06/19/22 2212     Blood Culture, Routine No Growth to date      No Growth to date    Narrative:      Draw 15 min apart    Blood culture [[id number]] Collected: 06/18/22 1752    Order Status: Completed Specimen: Blood from Antecubital, Left Updated: 06/19/22 2012     Blood Culture, Routine No Growth to date      No Growth to date    Narrative:      Draw 15 min apart

## 2022-06-20 NOTE — PROGRESS NOTES
Admit/Transplant Note     SW met with patient at bedside to assess needs. Patient is a 41 y.o.  male, admitted for Fever of unknown origin [R50.9] on 6/18/2022. Patient is scheduled for living donor liver transplant tomorrow (6/21/22)      At this time, patient presents as alert and oriented and very friendly and provided the following information. Patient was alone in the room and shared that his family, including his wife, brother and sister in law were currently staying at [facility].    Household/Family Systems     Patient resides with his wife and 3 children, ages 16, 13 and 12, at [address] [address].  Support system includes his wife, father, mother and brother.     Patients primary caregiver is his wife, Shell Larson, [phone] and secondary caregiver is his father, Bradley Larson, [phone]. Confirmed patients contact information is [phone] (home);   Telephone Information:   Mobile [phone]   .    During admission, patient's caregiver plans to [facility].  Confirmed patient and patients caregivers have access to reliable transportation. Patient live around an hour from this transplant center and will be going home upon discharge.    Cognitive Status/Learning     Patient reports reading ability at college level. He denies difficulty with seeing or hearing.    Vocation/Disability     Working for Income: Patient reports working remotely and expressed hope that he would be able to log in to his work computer in a week to check on work and to resume work when medically cleared.    Adherence     Patient reports a high level of adherence to patients health care regimen.  Adherence counseling and education provided. Patient verbalizes understanding.    Substance Use    Patient reports the following substance usage.    Tobacco: none  Alcohol: none  Illicit Drugs/Non-prescribed Medications: none  Patient states clear understanding of the potential impact of  substance use.  Substance abstinence/cessation counseling, education and resources provided and reviewed.     Services Utilizing/ADLS    Infusion Service: Prior to admission, patient utilizing? no  Home Health: Prior to admission, patient utilizing? no  DME: Prior to admission, no  Pulmonary/Cardiac Rehab: Prior to admission, no  Dialysis:  Prior to admission, no  Transplant Specialty Pharmacy:  Prior to admission, no    Prior to admission, patient reports being independent with ADLS but was not driving following medical advice.    Insurance/Medications    Insured by   Payer/Plan Subscr  Sex Relation Sub. Ins. ID Effective Group Num   1. [employer] - ÁNGELA* OSCAR LARSON 1980 Male Self [id number]                                   [address] [address]      Primary Insurance (for UNOS reporting): Hum[employer]  Secondary Insurance (for UNOS reporting): none    Patient reports he is able to obtain and afford medications at this time and at time of discharge.    Living Will/Healthcare Power of     Patient states he does not have a LW and/or HCPA.   provided education regarding LW and HCPA and the completion of forms. Patient's spouse, Shell Larson will be his legal next of kin in the absence of HCPA according to [place] law.    Coping/Mental Health    Patient shared that he is coping well with the support of his family.  Patient denies mental health difficulties. He looks forward to going fishing and going to his children's ball games when he is medically cleared to do so.    Discharge Planning    At time of discharge, patient plans to return to his house in [place] under the care of his wife.  Patients wife, Shell and his father Bradley will transport patient. Per rounds today, patient date of discharge is to be determined. Patient verbalized understanding and his caregivers are involved in treatment planning and discharge process.      Additional Concerns    Patient is being followed for  needs, education, resources, information, emotional support, supportive counseling, and for supportive and skilled discharge plan of care.  providing ongoing psychosocial support, education, resources and d/c planning as needed.  SW remains available.  remains available.    Supervised by Gregg Cottrell LMSW

## 2022-06-20 NOTE — PROGRESS NOTES
"[facility] - Transplant Stepdown  Liver Transplant  Progress Note    Patient Name: Romeo Larson  MRN: [id number]  Admission Date: 6/18/2022  Hospital Length of Stay: 2 days  Code Status: Full Code  Primary Care Provider: Pravin Maria MD  Post-Operative Day:     ORGAN:   RIGHT LIVER LOBE  Disease Etiology: Primary Liver Malignancy: Cholangiocarcinoma (CH-CA)  Donor Type:   Living  CDC High Risk:     Donor CMV Status:   Donor CMV Status:   Donor HBcAB:     Donor HCV Status:     Donor HBV GUSTABO:   Donor HCV GUSTABO:   Whole or Partial:   Biliary Anastomosis:   Arterial Anatomy:   Subjective:     History of Present Illness:  HPI: Romeo Larson is a 41 y.o. male who presents to the emergency department at OSH w c/o complaint of fevers. Pt is s/p exploratory lap 6/17/22 to assess liver for tentative living liver transplant on 6/21/22 for cholangiocarcinoma. Last chemo  5/10/22. Patient reports "normally" gets chills after having anesthesia. Chills, described as rigors, started ~3am this morning. Patient toke temp at 7am that read 103°.  T max at OSH 38.2. Pt denies n/v/diarrhea. Lap sites CDI. Lactic acid at OSH was 5.4. He received IV fluids and dose of Vancomycin. Plan to transfer to [facility] for further eval w cultures, CT A/P WO, cont IV fluids and broad spectrum abx. POC reviewed w Dr Godwin.    Patient is vaccinated against COVID-19 and received booster in December.  COVID at OSH 6/18 neg. Did recently get his tetanus and shingles shot in preparation for the surgery.                        Hospital Course:  Interval History: no acute events overnight. Afebrile. Blood cx w NGTD. Cont IV abx (Vanc/cefepime). CT scan reviewed by surgeons. Plan to proceed with living liver transplant scheduled 6/21/22. NPO after MN. Eating well, denies pain, lap sites CDI, independent in room. VSS. Monitor.       Scheduled Meds:   ceFEPime (MAXIPIME) IVPB  1 g Intravenous Q8H    fluconazole  200 mg Oral Daily    heparin (porcine)  5,000 " Units Subcutaneous Q8H    losartan  25 mg Oral Daily    vancomycin (VANCOCIN) IVPB  1,500 mg Intravenous Q12H     Continuous Infusions:  PRN Meds:sodium chloride, sodium chloride, acetaminophen, heparin, porcine (PF), HYDROcodone-acetaminophen, melatonin, mupirocin, ondansetron, sodium chloride 0.9%, Pharmacy to dose Vancomycin consult **AND** vancomycin - pharmacy to dose    Review of Systems   Constitutional:  Negative for activity change, appetite change, chills, fatigue and fever.   HENT:  Negative for congestion and trouble swallowing.    Eyes:  Negative for visual disturbance.   Respiratory:  Negative for cough, shortness of breath and wheezing.    Cardiovascular:  Negative for chest pain and leg swelling.   Gastrointestinal:  Negative for abdominal distention, abdominal pain, constipation, diarrhea, nausea and vomiting.   Endocrine: Negative.    Genitourinary:  Negative for decreased urine volume, difficulty urinating, dysuria, hematuria and urgency.   Musculoskeletal:  Negative for arthralgias.   Skin:  Negative for color change, pallor and rash. Wound: lap sites.  Allergic/Immunologic: Negative for immunocompromised state.   Neurological:  Negative for dizziness, tremors, seizures, syncope, weakness and headaches.   Hematological:  Does not bruise/bleed easily.   Psychiatric/Behavioral:  Negative for agitation, confusion, decreased concentration, sleep disturbance and suicidal ideas. The patient is not nervous/anxious.    Objective:     Vital Signs (Most Recent):  Temp: 98.2 °F (36.8 °C) (06/20/22 1222)  Pulse: 92 (06/20/22 1222)  Resp: 16 (06/20/22 1222)  BP: 132/88 (06/20/22 1222)  SpO2: 98 % (06/20/22 1222) Vital Signs (24h Range):  Temp:  [98 °F (36.7 °C)-98.3 °F (36.8 °C)] 98.2 °F (36.8 °C)  Pulse:  [77-95] 92  Resp:  [16-18] 16  SpO2:  [94 %-98 %] 98 %  BP: (127-134)/(84-95) 132/88     Weight: 87.3 kg (192 lb 7.4 oz)  Body mass index is 29.26 kg/m².    Intake/Output - Last 3 Shifts         06/18  0700  06/19 0659 06/19 0700  06/20 0659 06/20 0700  06/21 0659    P.O.  120 240    I.V. (mL/kg)  892.6 (10.2)     IV Piggyback  350 300    Total Intake(mL/kg)  1362.6 (15.6) 540 (6.2)    Urine (mL/kg/hr)  1700 (0.8) 400 (0.8)    Stool  0     Total Output  1700 400    Net  -337.4 +140           Stool Occurrence 1 x 0 x             Physical Exam  Vitals and nursing note reviewed.   Constitutional:       General: He is not in acute distress.     Appearance: He is well-developed. He is not diaphoretic.      Comments: No hand or temporal muscle wasting noted     HENT:      Head: Normocephalic and atraumatic.      Mouth/Throat:      Pharynx: No oropharyngeal exudate.   Eyes:      General: No scleral icterus.     Conjunctiva/sclera: Conjunctivae normal.      Pupils: Pupils are equal, round, and reactive to light.   Neck:      Thyroid: No thyromegaly.   Cardiovascular:      Rate and Rhythm: Normal rate and regular rhythm.      Heart sounds: Normal heart sounds. No murmur heard.  Pulmonary:      Effort: Pulmonary effort is normal. No respiratory distress.      Breath sounds: Normal breath sounds. No wheezing.   Abdominal:      General: Bowel sounds are normal. There is no distension.      Tenderness: There is abdominal tenderness. There is no guarding.      Comments: Lap sites CDI    Genitourinary:     Penis: Normal.    Musculoskeletal:         General: Normal range of motion.      Cervical back: Normal range of motion and neck supple.   Skin:     General: Skin is warm and dry.      Capillary Refill: Capillary refill takes 2 to 3 seconds.      Findings: No erythema.   Neurological:      Mental Status: He is alert and oriented to person, place, and time.   Psychiatric:         Mood and Affect: Mood normal.         Behavior: Behavior normal.         Thought Content: Thought content normal.         Judgment: Judgment normal.       Laboratory:  Immunosuppressants       None          CBC:   Recent Labs   Lab 06/20/22  0549   WBC  6.23   RBC 3.31*   HGB 11.0*   HCT 33.0*   *   *   MCH 33.2*   MCHC 33.3     CMP:   Recent Labs   Lab 06/20/22  0549   GLU 94   CALCIUM 9.2   ALBUMIN 3.0*   PROT 6.1      K 3.8   CO2 26      BUN 8   CREATININE 0.8   ALKPHOS 136*   ALT 40   AST 22   BILITOT 0.4     Labs within the past 24 hours have been reviewed.    Diagnostic Results:  I have personally reviewed all pertinent imaging studies.    Debility/Functional status: No debility noted.    Assessment/Plan:     * Hilar cholangiocarcinoma  - s/p exploratory lap 6/17 to assess liver for cholangio in preparation for living liver txp scheduled 6/21/22   - developed chills overnight and fever 103 this am, presented to OSH for tx  - lactic acid was 5.4  - cont IV fluids  - cont broad spectrum abx (started Vanc and cefepime at OSH)  - cx w NGTD  - treating as presumed cholangitis s/p procedure  - trend lactic acid- down to 1 6/19, wbc normalized  - plan for living liver txp tomorrow. NPO after MN        Tachycardia  - improved w fluids  - cont tele    Fever of unknown origin  - s/p ex lap, suspect fever r/t cholangitis post procedure  - d/c fluids now that tolerating diet  - CT a/p to be reviewed by Dr Jara, Dr Cline and Dr Godwin  - cont bs abx  - pt feeling well and fever resolved        VTE Risk Mitigation (From admission, onward)         Ordered     IP VTE HIGH RISK PATIENT  Once         06/20/22 1226     Send SCD stockings and SUNITA hose with patient to OR  Continuous         06/20/22 1226     heparin, porcine (PF) 100 unit/mL injection flush 500 Units  As needed (PRN)         06/20/22 1116     heparin (porcine) injection 5,000 Units  Every 8 hours         06/18/22 1727                The patients clinical status was discussed at multidisplinary rounds, involving transplant surgery, transplant medicine, pharmacy, nursing, nutrition, and social work    Discharge Planning:  No Patient Care Coordination Note on file.      Ana Nettles,  ESTEFANI  Liver Transplant  [facility] - Transplant Stepdown

## 2022-06-21 ENCOUNTER — ANESTHESIA (OUTPATIENT)
Dept: SURGERY | Facility: HOSPITAL | Age: 42
DRG: 006 | End: 2022-06-21
Payer: COMMERCIAL

## 2022-06-21 PROBLEM — Z94.4 S/P LIVER TRANSPLANT: Status: ACTIVE | Noted: 2022-06-21

## 2022-06-21 PROBLEM — Z91.89 AT RISK FOR OPPORTUNISTIC INFECTIONS: Status: ACTIVE | Noted: 2022-06-21

## 2022-06-21 PROBLEM — Z29.89 PROPHYLACTIC IMMUNOTHERAPY: Status: ACTIVE | Noted: 2022-06-21

## 2022-06-21 LAB
ALBUMIN SERPL BCP-MCNC: 3 G/DL (ref 3.5–5.2)
ALBUMIN SERPL BCP-MCNC: 3.1 G/DL (ref 3.5–5.2)
ALBUMIN SERPL BCP-MCNC: 3.4 G/DL (ref 3.5–5.2)
ALBUMIN SERPL BCP-MCNC: 3.4 G/DL (ref 3.5–5.2)
ALBUMIN SERPL BCP-MCNC: 3.6 G/DL (ref 3.5–5.2)
ALP SERPL-CCNC: 161 U/L (ref 55–135)
ALT SERPL W/O P-5'-P-CCNC: 1961 U/L (ref 10–44)
ALT SERPL W/O P-5'-P-CCNC: 36 U/L (ref 10–44)
ANION GAP SERPL CALC-SCNC: 8 MMOL/L (ref 8–16)
APTT BLDCRRT: 24.9 SEC (ref 21–32)
APTT BLDCRRT: 25.6 SEC (ref 21–32)
APTT BLDCRRT: 27.4 SEC (ref 21–32)
APTT BLDCRRT: 31.4 SEC (ref 21–32)
AST SERPL-CCNC: 1733 U/L (ref 10–40)
AST SERPL-CCNC: 23 U/L (ref 10–40)
BACTERIA UR CULT: NO GROWTH
BASOPHILS # BLD AUTO: 0.03 K/UL (ref 0–0.2)
BASOPHILS NFR BLD: 0.6 % (ref 0–1.9)
BILIRUB SERPL-MCNC: 0.3 MG/DL (ref 0.1–1)
BUN SERPL-MCNC: 9 MG/DL (ref 6–20)
CA-I BLDV-SCNC: 1.02 MMOL/L (ref 1.06–1.42)
CA-I BLDV-SCNC: 1.08 MMOL/L (ref 1.06–1.42)
CA-I BLDV-SCNC: 1.15 MMOL/L (ref 1.06–1.42)
CA-I BLDV-SCNC: 1.2 MMOL/L (ref 1.06–1.42)
CALCIUM SERPL-MCNC: 9.5 MG/DL (ref 8.7–10.5)
CHLORIDE SERPL-SCNC: 106 MMOL/L (ref 95–110)
CO2 SERPL-SCNC: 26 MMOL/L (ref 23–29)
CREAT SERPL-MCNC: 0.8 MG/DL (ref 0.5–1.4)
DIFFERENTIAL METHOD: ABNORMAL
EOSINOPHIL # BLD AUTO: 0.4 K/UL (ref 0–0.5)
EOSINOPHIL NFR BLD: 6.8 % (ref 0–8)
ERYTHROCYTE [DISTWIDTH] IN BLOOD BY AUTOMATED COUNT: 13.1 % (ref 11.5–14.5)
EST. GFR  (AFRICAN AMERICAN): >60 ML/MIN/1.73 M^2
EST. GFR  (NON AFRICAN AMERICAN): >60 ML/MIN/1.73 M^2
FIBRINOGEN PPP-MCNC: 332 MG/DL (ref 182–400)
FIBRINOGEN PPP-MCNC: 332 MG/DL (ref 182–400)
FIBRINOGEN PPP-MCNC: 353 MG/DL (ref 182–400)
FIBRINOGEN PPP-MCNC: 485 MG/DL (ref 182–400)
FINAL PATHOLOGIC DIAGNOSIS: NORMAL
GLUCOSE SERPL-MCNC: 107 MG/DL (ref 70–110)
GLUCOSE SERPL-MCNC: 114 MG/DL (ref 70–110)
GLUCOSE SERPL-MCNC: 160 MG/DL (ref 70–110)
GLUCOSE SERPL-MCNC: 177 MG/DL (ref 70–110)
GLUCOSE SERPL-MCNC: 190 MG/DL (ref 70–110)
GRAM STN SPEC: NORMAL
GROSS: NORMAL
HBV CORE AB SERPL QL IA: NEGATIVE
HBV SURFACE AB SER-ACNC: NEGATIVE M[IU]/ML
HCT VFR BLD AUTO: 30 % (ref 40–54)
HCT VFR BLD AUTO: 31.7 % (ref 40–54)
HCT VFR BLD AUTO: 31.9 % (ref 40–54)
HCT VFR BLD AUTO: 32.9 % (ref 40–54)
HCT VFR BLD AUTO: 34.4 % (ref 40–54)
HCV AB SERPL QL IA: NEGATIVE
HGB BLD-MCNC: 10.4 G/DL (ref 14–18)
HGB BLD-MCNC: 10.9 G/DL (ref 14–18)
HGB BLD-MCNC: 11 G/DL (ref 14–18)
HGB BLD-MCNC: 11.1 G/DL (ref 14–18)
HGB BLD-MCNC: 11.5 G/DL (ref 14–18)
HIV 1+2 AB+HIV1 P24 AG SERPL QL IA: NEGATIVE
IMM GRANULOCYTES # BLD AUTO: 0.02 K/UL (ref 0–0.04)
IMM GRANULOCYTES NFR BLD AUTO: 0.4 % (ref 0–0.5)
INR PPP: 1 (ref 0.8–1.2)
INR PPP: 1 (ref 0.8–1.2)
INR PPP: 1.2 (ref 0.8–1.2)
LYMPHOCYTES # BLD AUTO: 0.8 K/UL (ref 1–4.8)
LYMPHOCYTES NFR BLD: 15.2 % (ref 18–48)
Lab: NORMAL
MAGNESIUM SERPL-MCNC: 1.7 MG/DL (ref 1.6–2.6)
MAGNESIUM SERPL-MCNC: 1.8 MG/DL (ref 1.6–2.6)
MCH RBC QN AUTO: 33.4 PG (ref 27–31)
MCHC RBC AUTO-ENTMCNC: 33.4 G/DL (ref 32–36)
MCV RBC AUTO: 100 FL (ref 82–98)
MONOCYTES # BLD AUTO: 0.5 K/UL (ref 0.3–1)
MONOCYTES NFR BLD: 9.6 % (ref 4–15)
NEUTROPHILS # BLD AUTO: 3.6 K/UL (ref 1.8–7.7)
NEUTROPHILS NFR BLD: 67.4 % (ref 38–73)
NRBC BLD-RTO: 0 /100 WBC
PHOSPHATE SERPL-MCNC: 5.3 MG/DL (ref 2.7–4.5)
PLATELET # BLD AUTO: 127 K/UL (ref 150–450)
PLATELET # BLD AUTO: 154 K/UL (ref 150–450)
PLATELET # BLD AUTO: 157 K/UL (ref 150–450)
PLATELET # BLD AUTO: 168 K/UL (ref 150–450)
PLATELET # BLD AUTO: 179 K/UL (ref 150–450)
PMV BLD AUTO: 8.8 FL (ref 9.2–12.9)
PMV BLD AUTO: 8.9 FL (ref 9.2–12.9)
PMV BLD AUTO: 9.1 FL (ref 9.2–12.9)
PMV BLD AUTO: 9.2 FL (ref 9.2–12.9)
PMV BLD AUTO: 9.2 FL (ref 9.2–12.9)
POTASSIUM SERPL-SCNC: 3.9 MMOL/L (ref 3.5–5.1)
POTASSIUM SERPL-SCNC: 3.9 MMOL/L (ref 3.5–5.1)
POTASSIUM SERPL-SCNC: 4.1 MMOL/L (ref 3.5–5.1)
POTASSIUM SERPL-SCNC: 4.9 MMOL/L (ref 3.5–5.1)
POTASSIUM SERPL-SCNC: 5 MMOL/L (ref 3.5–5.1)
PROT SERPL-MCNC: 6.3 G/DL (ref 6–8.4)
PROTHROMBIN TIME: 10.5 SEC (ref 9–12.5)
PROTHROMBIN TIME: 10.7 SEC (ref 9–12.5)
PROTHROMBIN TIME: 12.1 SEC (ref 9–12.5)
PROTHROMBIN TIME: 12.4 SEC (ref 9–12.5)
PROTHROMBIN TIME: 12.5 SEC (ref 9–12.5)
RBC # BLD AUTO: 3.44 M/UL (ref 4.6–6.2)
SODIUM SERPL-SCNC: 136 MMOL/L (ref 136–145)
SODIUM SERPL-SCNC: 136 MMOL/L (ref 136–145)
SODIUM SERPL-SCNC: 139 MMOL/L (ref 136–145)
SODIUM SERPL-SCNC: 140 MMOL/L (ref 136–145)
SODIUM SERPL-SCNC: 140 MMOL/L (ref 136–145)
WBC # BLD AUTO: 5.32 K/UL (ref 3.9–12.7)

## 2022-06-21 PROCEDURE — 27800505 HC CATH, RADIAL ARTERY KIT: Mod: NTX | Performed by: ANESTHESIOLOGY

## 2022-06-21 PROCEDURE — C1751 CATH, INF, PER/CENT/MIDLINE: HCPCS | Mod: NTX | Performed by: ANESTHESIOLOGY

## 2022-06-21 PROCEDURE — 87070 CULTURE OTHR SPECIMN AEROBIC: CPT | Mod: 59,NTX | Performed by: SURGERY

## 2022-06-21 PROCEDURE — 93010 ELECTROCARDIOGRAM REPORT: CPT | Mod: NTX,,, | Performed by: INTERNAL MEDICINE

## 2022-06-21 PROCEDURE — 47143 PR TRANSPLANT,PREP DONOR LIVER, WHOLE: ICD-10-PCS | Mod: 82,,, | Performed by: SURGERY

## 2022-06-21 PROCEDURE — C1894 INTRO/SHEATH, NON-LASER: HCPCS | Mod: NTX | Performed by: ANESTHESIOLOGY

## 2022-06-21 PROCEDURE — 25000003 PHARM REV CODE 250: Mod: NTX | Performed by: NURSE PRACTITIONER

## 2022-06-21 PROCEDURE — 88342 IMHCHEM/IMCYTCHM 1ST ANTB: CPT | Mod: 26,,, | Performed by: PATHOLOGY

## 2022-06-21 PROCEDURE — 88313 SPECIAL STAINS GROUP 2: CPT | Performed by: PATHOLOGY

## 2022-06-21 PROCEDURE — 76937 RIGHT FEMORAL ARTERIAL LINE: ICD-10-PCS | Mod: 26,,, | Performed by: STUDENT IN AN ORGANIZED HEALTH CARE EDUCATION/TRAINING PROGRAM

## 2022-06-21 PROCEDURE — 88313 SPECIAL STAINS GROUP 2: CPT | Mod: 26,,, | Performed by: PATHOLOGY

## 2022-06-21 PROCEDURE — D9220A PRA ANESTHESIA: Mod: CRNA,,, | Performed by: NURSE ANESTHETIST, CERTIFIED REGISTERED

## 2022-06-21 PROCEDURE — 83735 ASSAY OF MAGNESIUM: CPT | Mod: 91,NTX | Performed by: TRANSPLANT SURGERY

## 2022-06-21 PROCEDURE — 88342 IMHCHEM/IMCYTCHM 1ST ANTB: CPT | Performed by: PATHOLOGY

## 2022-06-21 PROCEDURE — 36000931 HC OR TIME LEV VII EA ADD 15 MIN: Performed by: SURGERY

## 2022-06-21 PROCEDURE — 88341 IMHCHEM/IMCYTCHM EA ADD ANTB: CPT | Mod: 26,,, | Performed by: PATHOLOGY

## 2022-06-21 PROCEDURE — 47135 PR TRANSPLANT LIVER,ALLOTRANSPLANT: ICD-10-PCS | Mod: 79,,, | Performed by: SURGERY

## 2022-06-21 PROCEDURE — 94760 N-INVAS EAR/PLS OXIMETRY 1: CPT | Mod: NTX

## 2022-06-21 PROCEDURE — 63600175 PHARM REV CODE 636 W HCPCS: Mod: NTX | Performed by: TRANSPLANT SURGERY

## 2022-06-21 PROCEDURE — 36620 INSERTION CATHETER ARTERY: CPT | Mod: 59,,, | Performed by: ANESTHESIOLOGY

## 2022-06-21 PROCEDURE — 85610 PROTHROMBIN TIME: CPT | Mod: 91 | Performed by: TRANSPLANT SURGERY

## 2022-06-21 PROCEDURE — 63600175 PHARM REV CODE 636 W HCPCS: Performed by: NURSE ANESTHETIST, CERTIFIED REGISTERED

## 2022-06-21 PROCEDURE — 37000008 HC ANESTHESIA 1ST 15 MINUTES: Performed by: SURGERY

## 2022-06-21 PROCEDURE — 27800506 HC CATH, RAPID INFUSION (7.5&8.5): Mod: NTX | Performed by: ANESTHESIOLOGY

## 2022-06-21 PROCEDURE — 63600175 PHARM REV CODE 636 W HCPCS: Mod: NTX | Performed by: SURGERY

## 2022-06-21 PROCEDURE — 88309 PR  SURG PATH,LEVEL VI: ICD-10-PCS | Mod: 26,,, | Performed by: PATHOLOGY

## 2022-06-21 PROCEDURE — 93010 EKG 12-LEAD: ICD-10-PCS | Mod: NTX,,, | Performed by: INTERNAL MEDICINE

## 2022-06-21 PROCEDURE — 81300000 HC LIVER ACQUISITION CHARGE

## 2022-06-21 PROCEDURE — 36620 RIGHT FEMORAL ARTERIAL LINE: ICD-10-PCS | Mod: 59,,, | Performed by: STUDENT IN AN ORGANIZED HEALTH CARE EDUCATION/TRAINING PROGRAM

## 2022-06-21 PROCEDURE — 47135 PR TRANSPLANT LIVER,ALLOTRANSPLANT: ICD-10-PCS | Mod: 82,79,, | Performed by: TRANSPLANT SURGERY

## 2022-06-21 PROCEDURE — 27200675 HC TRANSDUCER MONITOR KIT 4 LINES: Mod: NTX | Performed by: ANESTHESIOLOGY

## 2022-06-21 PROCEDURE — 47146 PR TRANSPLANT,PREP DONOR LIVER/VENOUS: ICD-10-PCS | Mod: 51,,, | Performed by: TRANSPLANT SURGERY

## 2022-06-21 PROCEDURE — 87186 SC STD MICRODIL/AGAR DIL: CPT | Performed by: SURGERY

## 2022-06-21 PROCEDURE — 87075 CULTR BACTERIA EXCEPT BLOOD: CPT | Mod: NTX | Performed by: SURGERY

## 2022-06-21 PROCEDURE — 93503 SWAN GANZ LINE: ICD-10-PCS | Mod: 59,,, | Performed by: STUDENT IN AN ORGANIZED HEALTH CARE EDUCATION/TRAINING PROGRAM

## 2022-06-21 PROCEDURE — 63600175 PHARM REV CODE 636 W HCPCS: Performed by: SURGERY

## 2022-06-21 PROCEDURE — 47135 TRANSPLANTATION OF LIVER: CPT | Mod: 82,79,, | Performed by: TRANSPLANT SURGERY

## 2022-06-21 PROCEDURE — 84132 ASSAY OF SERUM POTASSIUM: CPT | Mod: 91 | Performed by: TRANSPLANT SURGERY

## 2022-06-21 PROCEDURE — 88341 PR IHC OR ICC EACH ADD'L SINGLE ANTIBODY  STAINPR: ICD-10-PCS | Mod: 26,,, | Performed by: PATHOLOGY

## 2022-06-21 PROCEDURE — 25000003 PHARM REV CODE 250: Performed by: NURSE ANESTHETIST, CERTIFIED REGISTERED

## 2022-06-21 PROCEDURE — 36000930 HC OR TIME LEV VII 1ST 15 MIN: Performed by: SURGERY

## 2022-06-21 PROCEDURE — 81300005 HC LIVER TRANSPORT, GROUND 4-5 HOURS

## 2022-06-21 PROCEDURE — 85610 PROTHROMBIN TIME: CPT | Mod: NTX | Performed by: PHYSICIAN ASSISTANT

## 2022-06-21 PROCEDURE — 93503 INSERT/PLACE HEART CATHETER: CPT | Mod: 59,,, | Performed by: STUDENT IN AN ORGANIZED HEALTH CARE EDUCATION/TRAINING PROGRAM

## 2022-06-21 PROCEDURE — 88309 TISSUE EXAM BY PATHOLOGIST: CPT | Performed by: PATHOLOGY

## 2022-06-21 PROCEDURE — 82330 ASSAY OF CALCIUM: CPT | Mod: NTX | Performed by: TRANSPLANT SURGERY

## 2022-06-21 PROCEDURE — 80053 COMPREHEN METABOLIC PANEL: CPT | Mod: NTX | Performed by: PHYSICIAN ASSISTANT

## 2022-06-21 PROCEDURE — 85384 FIBRINOGEN ACTIVITY: CPT | Mod: NTX | Performed by: TRANSPLANT SURGERY

## 2022-06-21 PROCEDURE — 12000002 HC ACUTE/MED SURGE SEMI-PRIVATE ROOM

## 2022-06-21 PROCEDURE — 76937 US GUIDE VASCULAR ACCESS: CPT | Mod: 26,,, | Performed by: STUDENT IN AN ORGANIZED HEALTH CARE EDUCATION/TRAINING PROGRAM

## 2022-06-21 PROCEDURE — 84295 ASSAY OF SERUM SODIUM: CPT | Mod: 91 | Performed by: TRANSPLANT SURGERY

## 2022-06-21 PROCEDURE — 87077 CULTURE AEROBIC IDENTIFY: CPT | Performed by: SURGERY

## 2022-06-21 PROCEDURE — 88342 CHG IMMUNOCYTOCHEMISTRY: ICD-10-PCS | Mod: 26,,, | Performed by: PATHOLOGY

## 2022-06-21 PROCEDURE — 36620 PR INSERT CATH,ART,PERCUT,SHORTTERM: ICD-10-PCS | Mod: 59,,, | Performed by: ANESTHESIOLOGY

## 2022-06-21 PROCEDURE — 85025 COMPLETE CBC W/AUTO DIFF WBC: CPT | Mod: NTX | Performed by: PHYSICIAN ASSISTANT

## 2022-06-21 PROCEDURE — 47143 PR TRANSPLANT,PREP DONOR LIVER, WHOLE: ICD-10-PCS | Mod: ,,, | Performed by: TRANSPLANT SURGERY

## 2022-06-21 PROCEDURE — 87106 FUNGI IDENTIFICATION YEAST: CPT | Performed by: SURGERY

## 2022-06-21 PROCEDURE — 85014 HEMATOCRIT: CPT | Mod: 91 | Performed by: TRANSPLANT SURGERY

## 2022-06-21 PROCEDURE — 87205 SMEAR GRAM STAIN: CPT | Mod: NTX | Performed by: SURGERY

## 2022-06-21 PROCEDURE — D9220A PRA ANESTHESIA: ICD-10-PCS | Mod: CRNA,,, | Performed by: NURSE ANESTHETIST, CERTIFIED REGISTERED

## 2022-06-21 PROCEDURE — 85049 AUTOMATED PLATELET COUNT: CPT | Mod: 91 | Performed by: TRANSPLANT SURGERY

## 2022-06-21 PROCEDURE — 36415 COLL VENOUS BLD VENIPUNCTURE: CPT | Mod: NTX | Performed by: PHYSICIAN ASSISTANT

## 2022-06-21 PROCEDURE — 88341 IMHCHEM/IMCYTCHM EA ADD ANTB: CPT | Performed by: PATHOLOGY

## 2022-06-21 PROCEDURE — 37000009 HC ANESTHESIA EA ADD 15 MINS: Performed by: SURGERY

## 2022-06-21 PROCEDURE — 63600175 PHARM REV CODE 636 W HCPCS: Mod: NTX | Performed by: PHARMACIST

## 2022-06-21 PROCEDURE — 36620 INSERTION CATHETER ARTERY: CPT | Mod: 59,,, | Performed by: STUDENT IN AN ORGANIZED HEALTH CARE EDUCATION/TRAINING PROGRAM

## 2022-06-21 PROCEDURE — D9220A PRA ANESTHESIA: Mod: ANES,,, | Performed by: ANESTHESIOLOGY

## 2022-06-21 PROCEDURE — C1729 CATH, DRAINAGE: HCPCS | Performed by: SURGERY

## 2022-06-21 PROCEDURE — 88309 TISSUE EXAM BY PATHOLOGIST: CPT | Mod: 26,,, | Performed by: PATHOLOGY

## 2022-06-21 PROCEDURE — 27100025 HC TUBING, SET FLUID WARMER: Mod: NTX | Performed by: ANESTHESIOLOGY

## 2022-06-21 PROCEDURE — D9220A PRA ANESTHESIA: ICD-10-PCS | Mod: ANES,,, | Performed by: ANESTHESIOLOGY

## 2022-06-21 PROCEDURE — 84450 TRANSFERASE (AST) (SGOT): CPT | Performed by: TRANSPLANT SURGERY

## 2022-06-21 PROCEDURE — 85730 THROMBOPLASTIN TIME PARTIAL: CPT | Mod: 91 | Performed by: TRANSPLANT SURGERY

## 2022-06-21 PROCEDURE — 83735 ASSAY OF MAGNESIUM: CPT | Mod: NTX | Performed by: PHYSICIAN ASSISTANT

## 2022-06-21 PROCEDURE — 84100 ASSAY OF PHOSPHORUS: CPT | Mod: NTX | Performed by: PHYSICIAN ASSISTANT

## 2022-06-21 PROCEDURE — 85018 HEMOGLOBIN: CPT | Mod: 91 | Performed by: TRANSPLANT SURGERY

## 2022-06-21 PROCEDURE — 27200656 HC CATH, FEMORAL ARTERY: Mod: NTX | Performed by: ANESTHESIOLOGY

## 2022-06-21 PROCEDURE — 88313 PR  SPECIAL STAINS,GROUP II: ICD-10-PCS | Mod: 26,,, | Performed by: PATHOLOGY

## 2022-06-21 PROCEDURE — 27100021 HC MULTIPORT INFUSION MANIFOLD: Mod: NTX | Performed by: ANESTHESIOLOGY

## 2022-06-21 PROCEDURE — 87102 FUNGUS ISOLATION CULTURE: CPT | Mod: NTX | Performed by: SURGERY

## 2022-06-21 PROCEDURE — 47146 PR TRANSPLANT,PREP DONOR LIVER/VENOUS: ICD-10-PCS | Mod: 82,51,, | Performed by: SURGERY

## 2022-06-21 PROCEDURE — 82040 ASSAY OF SERUM ALBUMIN: CPT | Mod: 91 | Performed by: TRANSPLANT SURGERY

## 2022-06-21 PROCEDURE — 84460 ALANINE AMINO (ALT) (SGPT): CPT | Performed by: TRANSPLANT SURGERY

## 2022-06-21 PROCEDURE — 27201423 OPTIME MED/SURG SUP & DEVICES STERILE SUPPLY: Performed by: SURGERY

## 2022-06-21 PROCEDURE — 36415 COLL VENOUS BLD VENIPUNCTURE: CPT | Mod: NTX | Performed by: TRANSPLANT SURGERY

## 2022-06-21 PROCEDURE — 47135 TRANSPLANTATION OF LIVER: CPT | Mod: 79,,, | Performed by: SURGERY

## 2022-06-21 PROCEDURE — 25000003 PHARM REV CODE 250: Mod: NTX | Performed by: PHYSICIAN ASSISTANT

## 2022-06-21 PROCEDURE — 82947 ASSAY GLUCOSE BLOOD QUANT: CPT | Mod: NTX | Performed by: TRANSPLANT SURGERY

## 2022-06-21 PROCEDURE — P9045 ALBUMIN (HUMAN), 5%, 250 ML: HCPCS | Mod: JG | Performed by: NURSE ANESTHETIST, CERTIFIED REGISTERED

## 2022-06-21 PROCEDURE — 93005 ELECTROCARDIOGRAM TRACING: CPT | Mod: NTX

## 2022-06-21 RX ORDER — PROPOFOL 10 MG/ML
VIAL (ML) INTRAVENOUS CONTINUOUS PRN
Status: DISCONTINUED | OUTPATIENT
Start: 2022-06-21 | End: 2022-06-22

## 2022-06-21 RX ORDER — FENTANYL CITRATE 50 UG/ML
INJECTION, SOLUTION INTRAMUSCULAR; INTRAVENOUS
Status: DISCONTINUED | OUTPATIENT
Start: 2022-06-21 | End: 2022-06-22

## 2022-06-21 RX ORDER — KETAMINE HCL IN 0.9 % NACL 50 MG/5 ML
SYRINGE (ML) INTRAVENOUS
Status: DISCONTINUED | OUTPATIENT
Start: 2022-06-21 | End: 2022-06-22

## 2022-06-21 RX ORDER — ROCURONIUM BROMIDE 10 MG/ML
INJECTION, SOLUTION INTRAVENOUS
Status: DISCONTINUED | OUTPATIENT
Start: 2022-06-21 | End: 2022-06-22

## 2022-06-21 RX ORDER — HEPARIN SODIUM 1000 [USP'U]/ML
INJECTION, SOLUTION INTRAVENOUS; SUBCUTANEOUS
Status: DISCONTINUED | OUTPATIENT
Start: 2022-06-21 | End: 2022-06-22 | Stop reason: HOSPADM

## 2022-06-21 RX ORDER — HEPARIN SODIUM 1000 [USP'U]/ML
INJECTION, SOLUTION INTRAVENOUS; SUBCUTANEOUS
Status: DISCONTINUED | OUTPATIENT
Start: 2022-06-21 | End: 2022-06-22

## 2022-06-21 RX ORDER — LIDOCAINE HCL/PF 100 MG/5ML
SYRINGE (ML) INTRAVENOUS
Status: DISCONTINUED | OUTPATIENT
Start: 2022-06-21 | End: 2022-06-22

## 2022-06-21 RX ORDER — VASOPRESSIN 20 [USP'U]/ML
INJECTION, SOLUTION INTRAMUSCULAR; SUBCUTANEOUS CONTINUOUS PRN
Status: DISCONTINUED | OUTPATIENT
Start: 2022-06-21 | End: 2022-06-21

## 2022-06-21 RX ORDER — METHYLPREDNISOLONE SODIUM SUCCINATE 500 MG/8ML
500 INJECTION INTRAMUSCULAR; INTRAVENOUS
Status: DISCONTINUED | OUTPATIENT
Start: 2022-06-21 | End: 2022-06-22 | Stop reason: HOSPADM

## 2022-06-21 RX ORDER — ONDANSETRON 2 MG/ML
INJECTION INTRAMUSCULAR; INTRAVENOUS
Status: DISCONTINUED | OUTPATIENT
Start: 2022-06-21 | End: 2022-06-22

## 2022-06-21 RX ORDER — MUPIROCIN 20 MG/G
OINTMENT TOPICAL
Status: DISCONTINUED | OUTPATIENT
Start: 2022-06-21 | End: 2022-06-22 | Stop reason: HOSPADM

## 2022-06-21 RX ORDER — FUROSEMIDE 10 MG/ML
INJECTION INTRAMUSCULAR; INTRAVENOUS
Status: DISCONTINUED | OUTPATIENT
Start: 2022-06-21 | End: 2022-06-22

## 2022-06-21 RX ORDER — PROPOFOL 10 MG/ML
VIAL (ML) INTRAVENOUS
Status: DISCONTINUED | OUTPATIENT
Start: 2022-06-21 | End: 2022-06-22

## 2022-06-21 RX ORDER — ALBUMIN HUMAN 50 G/1000ML
SOLUTION INTRAVENOUS CONTINUOUS PRN
Status: DISCONTINUED | OUTPATIENT
Start: 2022-06-21 | End: 2022-06-22

## 2022-06-21 RX ORDER — NOREPINEPHRINE BITARTRATE 1 MG/ML
INJECTION, SOLUTION INTRAVENOUS
Status: DISCONTINUED | OUTPATIENT
Start: 2022-06-21 | End: 2022-06-22

## 2022-06-21 RX ORDER — METOPROLOL TARTRATE 1 MG/ML
INJECTION, SOLUTION INTRAVENOUS
Status: DISCONTINUED | OUTPATIENT
Start: 2022-06-21 | End: 2022-06-22

## 2022-06-21 RX ORDER — EPINEPHRINE 1 MG/ML
INJECTION, SOLUTION INTRACARDIAC; INTRAMUSCULAR; INTRAVENOUS; SUBCUTANEOUS
Status: DISCONTINUED | OUTPATIENT
Start: 2022-06-21 | End: 2022-06-21

## 2022-06-21 RX ORDER — MIDAZOLAM HYDROCHLORIDE 1 MG/ML
INJECTION INTRAMUSCULAR; INTRAVENOUS
Status: DISCONTINUED | OUTPATIENT
Start: 2022-06-21 | End: 2022-06-22

## 2022-06-21 RX ORDER — PHENYLEPHRINE HYDROCHLORIDE 10 MG/ML
INJECTION INTRAVENOUS
Status: DISCONTINUED | OUTPATIENT
Start: 2022-06-21 | End: 2022-06-22

## 2022-06-21 RX ORDER — EPINEPHRINE 0.1 MG/ML
INJECTION INTRAVENOUS
Status: DISCONTINUED | OUTPATIENT
Start: 2022-06-21 | End: 2022-06-22

## 2022-06-21 RX ORDER — MANNITOL 250 MG/ML
INJECTION, SOLUTION INTRAVENOUS
Status: DISCONTINUED | OUTPATIENT
Start: 2022-06-21 | End: 2022-06-22

## 2022-06-21 RX ADMIN — SODIUM CHLORIDE, SODIUM GLUCONATE, SODIUM ACETATE, POTASSIUM CHLORIDE, MAGNESIUM CHLORIDE, SODIUM PHOSPHATE, DIBASIC, AND POTASSIUM PHOSPHATE: .53; .5; .37; .037; .03; .012; .00082 INJECTION, SOLUTION INTRAVENOUS at 12:06

## 2022-06-21 RX ADMIN — FENTANYL CITRATE 200 MCG: 50 INJECTION, SOLUTION INTRAMUSCULAR; INTRAVENOUS at 11:06

## 2022-06-21 RX ADMIN — ROCURONIUM BROMIDE 50 MG: 10 INJECTION, SOLUTION INTRAVENOUS at 09:06

## 2022-06-21 RX ADMIN — INSULIN HUMAN 3 UNITS/HR: 1 INJECTION, SOLUTION INTRAVENOUS at 02:06

## 2022-06-21 RX ADMIN — MIDAZOLAM HYDROCHLORIDE 2 MG: 1 INJECTION, SOLUTION INTRAMUSCULAR; INTRAVENOUS at 10:06

## 2022-06-21 RX ADMIN — NOREPINEPHRINE BITARTRATE 16 MCG: 1 INJECTION, SOLUTION, CONCENTRATE INTRAVENOUS at 07:06

## 2022-06-21 RX ADMIN — MANNITOL 45 ML: 250 INJECTION, SOLUTION INTRAVENOUS at 07:06

## 2022-06-21 RX ADMIN — CALCIUM CHLORIDE 1 G: 100 INJECTION, SOLUTION INTRAVENOUS at 11:06

## 2022-06-21 RX ADMIN — MIDAZOLAM HYDROCHLORIDE 2 MG: 1 INJECTION, SOLUTION INTRAMUSCULAR; INTRAVENOUS at 06:06

## 2022-06-21 RX ADMIN — Medication 20 MG: at 12:06

## 2022-06-21 RX ADMIN — CEFEPIME 1 G: 1 INJECTION, POWDER, FOR SOLUTION INTRAMUSCULAR; INTRAVENOUS at 03:06

## 2022-06-21 RX ADMIN — CALCIUM CHLORIDE 0.5 G: 100 INJECTION, SOLUTION INTRAVENOUS at 02:06

## 2022-06-21 RX ADMIN — SODIUM CHLORIDE, SODIUM GLUCONATE, SODIUM ACETATE, POTASSIUM CHLORIDE, MAGNESIUM CHLORIDE, SODIUM PHOSPHATE, DIBASIC, AND POTASSIUM PHOSPHATE: .53; .5; .37; .037; .03; .012; .00082 INJECTION, SOLUTION INTRAVENOUS at 11:06

## 2022-06-21 RX ADMIN — EPINEPHRINE 10 MCG: 0.1 INJECTION, SOLUTION ENDOTRACHEAL; INTRACARDIAC; INTRAVENOUS at 07:06

## 2022-06-21 RX ADMIN — ALBUMIN (HUMAN): 12.5 SOLUTION INTRAVENOUS at 03:06

## 2022-06-21 RX ADMIN — ROCURONIUM BROMIDE 30 MG: 10 INJECTION, SOLUTION INTRAVENOUS at 11:06

## 2022-06-21 RX ADMIN — FUROSEMIDE 90 MG: 10 INJECTION, SOLUTION INTRAMUSCULAR; INTRAVENOUS at 12:06

## 2022-06-21 RX ADMIN — FENTANYL CITRATE 100 MCG: 50 INJECTION, SOLUTION INTRAMUSCULAR; INTRAVENOUS at 12:06

## 2022-06-21 RX ADMIN — NOREPINEPHRINE BITARTRATE 16 MCG: 1 INJECTION, SOLUTION, CONCENTRATE INTRAVENOUS at 06:06

## 2022-06-21 RX ADMIN — ROCURONIUM BROMIDE 30 MG: 10 INJECTION, SOLUTION INTRAVENOUS at 01:06

## 2022-06-21 RX ADMIN — CEFEPIME 1 G: 1 INJECTION, POWDER, FOR SOLUTION INTRAMUSCULAR; INTRAVENOUS at 11:06

## 2022-06-21 RX ADMIN — VASOPRESSIN 0.04 UNITS/MIN: 20 INJECTION INTRAVENOUS at 01:06

## 2022-06-21 RX ADMIN — CEFEPIME 1 G: 1 INJECTION, POWDER, FOR SOLUTION INTRAMUSCULAR; INTRAVENOUS at 12:06

## 2022-06-21 RX ADMIN — HEPARIN SODIUM 2000 UNITS: 1000 INJECTION, SOLUTION INTRAVENOUS; SUBCUTANEOUS at 06:06

## 2022-06-21 RX ADMIN — CALCIUM CHLORIDE 0.5 G: 100 INJECTION, SOLUTION INTRAVENOUS at 09:06

## 2022-06-21 RX ADMIN — LIDOCAINE HYDROCHLORIDE 60 MG: 20 INJECTION, SOLUTION INTRAVENOUS at 11:06

## 2022-06-21 RX ADMIN — METOROPROLOL TARTRATE 1 MG: 5 INJECTION, SOLUTION INTRAVENOUS at 04:06

## 2022-06-21 RX ADMIN — ALBUMIN (HUMAN): 12.5 SOLUTION INTRAVENOUS at 02:06

## 2022-06-21 RX ADMIN — ROCURONIUM BROMIDE 30 MG: 10 INJECTION, SOLUTION INTRAVENOUS at 05:06

## 2022-06-21 RX ADMIN — ALBUMIN (HUMAN): 12.5 SOLUTION INTRAVENOUS at 06:06

## 2022-06-21 RX ADMIN — ROCURONIUM BROMIDE 70 MG: 10 INJECTION, SOLUTION INTRAVENOUS at 11:06

## 2022-06-21 RX ADMIN — SODIUM CHLORIDE, SODIUM GLUCONATE, SODIUM ACETATE, POTASSIUM CHLORIDE, MAGNESIUM CHLORIDE, SODIUM PHOSPHATE, DIBASIC, AND POTASSIUM PHOSPHATE: .53; .5; .37; .037; .03; .012; .00082 INJECTION, SOLUTION INTRAVENOUS at 09:06

## 2022-06-21 RX ADMIN — ROCURONIUM BROMIDE 20 MG: 10 INJECTION, SOLUTION INTRAVENOUS at 08:06

## 2022-06-21 RX ADMIN — METOROPROLOL TARTRATE 1 MG: 5 INJECTION, SOLUTION INTRAVENOUS at 02:06

## 2022-06-21 RX ADMIN — LOSARTAN POTASSIUM 25 MG: 25 TABLET, FILM COATED ORAL at 08:06

## 2022-06-21 RX ADMIN — PROPOFOL 180 MG: 10 INJECTION, EMULSION INTRAVENOUS at 11:06

## 2022-06-21 RX ADMIN — CALCIUM CHLORIDE 0.5 G: 100 INJECTION, SOLUTION INTRAVENOUS at 04:06

## 2022-06-21 RX ADMIN — SODIUM CHLORIDE: 9 INJECTION, SOLUTION INTRAVENOUS at 10:06

## 2022-06-21 RX ADMIN — NOREPINEPHRINE BITARTRATE 0.02 MCG/KG/MIN: 1 INJECTION, SOLUTION, CONCENTRATE INTRAVENOUS at 02:06

## 2022-06-21 RX ADMIN — MANNITOL 90 ML: 250 INJECTION, SOLUTION INTRAVENOUS at 12:06

## 2022-06-21 RX ADMIN — VANCOMYCIN HYDROCHLORIDE 1500 MG: 1.5 INJECTION, POWDER, LYOPHILIZED, FOR SOLUTION INTRAVENOUS at 11:06

## 2022-06-21 RX ADMIN — SODIUM CHLORIDE, SODIUM GLUCONATE, SODIUM ACETATE, POTASSIUM CHLORIDE, MAGNESIUM CHLORIDE, SODIUM PHOSPHATE, DIBASIC, AND POTASSIUM PHOSPHATE: .53; .5; .37; .037; .03; .012; .00082 INJECTION, SOLUTION INTRAVENOUS at 06:06

## 2022-06-21 RX ADMIN — PHENYLEPHRINE HYDROCHLORIDE 100 MCG: 10 INJECTION INTRAVENOUS at 01:06

## 2022-06-21 RX ADMIN — METHYLPREDNISOLONE SODIUM SUCCINATE 500 MG: 500 INJECTION, POWDER, FOR SOLUTION INTRAMUSCULAR; INTRAVENOUS at 12:06

## 2022-06-21 RX ADMIN — PHENYLEPHRINE HYDROCHLORIDE 100 MCG: 10 INJECTION INTRAVENOUS at 06:06

## 2022-06-21 RX ADMIN — Medication 10 MG: at 12:06

## 2022-06-21 RX ADMIN — PROPOFOL 50 MCG/KG/MIN: 10 INJECTION, EMULSION INTRAVENOUS at 11:06

## 2022-06-21 RX ADMIN — SODIUM CHLORIDE, SODIUM GLUCONATE, SODIUM ACETATE, POTASSIUM CHLORIDE, MAGNESIUM CHLORIDE, SODIUM PHOSPHATE, DIBASIC, AND POTASSIUM PHOSPHATE: .53; .5; .37; .037; .03; .012; .00082 INJECTION, SOLUTION INTRAVENOUS at 03:06

## 2022-06-21 RX ADMIN — FLUCONAZOLE 200 MG: 200 TABLET ORAL at 09:06

## 2022-06-21 RX ADMIN — ROCURONIUM BROMIDE 30 MG: 10 INJECTION, SOLUTION INTRAVENOUS at 06:06

## 2022-06-21 RX ADMIN — SODIUM CHLORIDE, SODIUM GLUCONATE, SODIUM ACETATE, POTASSIUM CHLORIDE, MAGNESIUM CHLORIDE, SODIUM PHOSPHATE, DIBASIC, AND POTASSIUM PHOSPHATE: .53; .5; .37; .037; .03; .012; .00082 INJECTION, SOLUTION INTRAVENOUS at 04:06

## 2022-06-21 RX ADMIN — ROCURONIUM BROMIDE 50 MG: 10 INJECTION, SOLUTION INTRAVENOUS at 03:06

## 2022-06-21 RX ADMIN — CEFEPIME 1 G: 1 INJECTION, POWDER, FOR SOLUTION INTRAMUSCULAR; INTRAVENOUS at 08:06

## 2022-06-21 RX ADMIN — SODIUM CHLORIDE, SODIUM GLUCONATE, SODIUM ACETATE, POTASSIUM CHLORIDE, MAGNESIUM CHLORIDE, SODIUM PHOSPHATE, DIBASIC, AND POTASSIUM PHOSPHATE: .53; .5; .37; .037; .03; .012; .00082 INJECTION, SOLUTION INTRAVENOUS at 02:06

## 2022-06-21 NOTE — ANESTHESIA PROCEDURE NOTES
Arterial    Diagnosis: esld    Patient location during procedure: done in OR  Procedure start time: 6/21/2022 11:36 AM  Timeout: 6/21/2022 11:36 AM  Procedure end time: 6/21/2022 11:36 AM    Staffing  Authorizing Provider: Genaro Alonzo MD  Performing Provider: Blaire Fisher CRNA    Anesthesiologist was present at the time of the procedure.    Preanesthetic Checklist  Completed: patient identified, IV checked, site marked, risks and benefits discussed, surgical consent, monitors and equipment checked, pre-op evaluation, timeout performed and anesthesia consent givenArterial  Skin Prep: chlorhexidine gluconate  Local Infiltration: none  Orientation: left  Location: radial    Catheter Size: 20 G  Catheter placement by Anatomical landmarks. Heme positive aspiration all ports. Insertion Attempts: 1  Assessment  Dressing: secured with tape and tegaderm  Patient: Tolerated well

## 2022-06-21 NOTE — ANESTHESIA PROCEDURE NOTES
Intubation    Date/Time: 6/21/2022 11:17 AM  Performed by: Yamilka Jaramillo CRNA  Authorized by: Genaro Alonzo MD     Intubation:     Induction:  Intravenous    Intubated:  Postinduction    Mask Ventilation:  Easy mask    Attempts:  1    Attempted By:  CRNA    Method of Intubation:  Direct    Blade:  Roman 2    Laryngeal View Grade: Grade IIA - cords partially seen      Difficult Airway Encountered?: No      Complications:  None    Airway Device:  Oral endotracheal tube    Airway Device Size:  7.5    Style/Cuff Inflation:  Cuffed (inflated to minimal occlusive pressure)    Inflation Amount (mL):  6    Tube secured:  22    Secured at:  The lips    Placement Verified By:  Capnometry    Complicating Factors:  Anterior larynx    Findings Post-Intubation:  BS equal bilateral and atraumatic/condition of teeth unchanged

## 2022-06-21 NOTE — ANESTHESIA PROCEDURE NOTES
Right Internal Jugular Central Venous Line    Diagnosis: Hilar cholangiocarcinoma  Patient location during procedure: done in OR  Timeout: 6/21/2022 11:23 AM  Procedure end time: 6/21/2022 12:00 PM    Staffing  Authorizing Provider: Genaro Alonzo MD  Performing Provider: Michael Coelho MD    Staffing  Performed: other anesthesia staff   Anesthesiologist: Genaro Alonzo MD  Other anesthesia staff: Michael Coelho MD  Anesthesiologist was present at the time of the procedure.  Preanesthetic Checklist  Completed: patient identified, IV checked, risks and benefits discussed, surgical consent, monitors and equipment checked, pre-op evaluation, timeout performed and anesthesia consent given  Indication   Indication: vascular access, med administration     Anesthesia   general anesthesia    Central Line   Skin Prep: skin prepped with ChloraPrep, skin prep agent completely dried prior to procedure  Sterile Barriers Followed: Yes    All five maximal barriers used- gloves, gown, cap, mask, and large sterile sheet    hand hygiene performed prior to central venous catheter insertion  Location: right internal jugular.   Catheter type: triple lumen  Catheter Size: 12 Fr  Inserted Catheter Length: 15 cm  Ultrasound: vascular probe with ultrasound   Vessel Caliber: medium, patent  Vascular Doppler:  not done, compressibility normal  Needle advanced into vessel with real time Ultrasound guidance.  Guidewire confirmed in vessel.  Image recorded and saved.  sterile gel and probe cover used in ultrasound-guided central venous catheter insertion   Manometry: Venous cannualation confirmed by visual estimation of blood vessel pressure using manometry.  Insertion Attempts: 1   Securement:line sutured, chlorhexidine patch, sterile dressing applied and blood return through all ports    Post-Procedure   Post-procedure assessment: CXR pending for SICU.   Adverse Events:none      Guidewire Guidewire removed intact. Guidewire removed  intact, verified with nurse.

## 2022-06-21 NOTE — PROGRESS NOTES
Report given to day of surgery.  Per nurse, give all PO morning meds with sip of water (including losartan).  Will carry out and monitor for patient to be transferred for liver transplant this am.

## 2022-06-21 NOTE — PLAN OF CARE
Patient is alert and oriented x4  VSS; afebrile  IVPB vanc and cefepime given  No c/o of pain   Safety measures being maintained, Verbal Understanding noted to call PRN

## 2022-06-21 NOTE — ANESTHESIA PROCEDURE NOTES
[place] Line    Diagnosis: Hilar cholangiocarcinoma  Patient location during procedure: done in OR  Procedure start time: 6/21/2022 11:30 AM  Timeout: 6/21/2022 11:23 AM  Procedure end time: 6/21/2022 12:00 PM    Staffing  Authorizing Provider: Genaro Alonzo MD  Performing Provider: Michael Coelho MD    Anesthesiologist was present at the time of the procedure.  Preanesthetic Checklist  Completed: patient identified, IV checked, risks and benefits discussed, surgical consent, monitors and equipment checked, pre-op evaluation, timeout performed and anesthesia consent given  [place] Line  Skin Prep: chlorhexidine gluconate and isopropyl alcohol  Local Infiltration: none  Location: right,  internal jugular vein  Vessel Caliber: medium, patent, compressibility normal  Vascular Doppler:  not done  Introducer: 9 Fr single lumen, manometry used.  Device: CCO/Oximetric Catheter   Catheter Size: 8 Fr  Catheter placement by yes. Heme positive aspiration all ports. PAC floated with balloon up not wedgedSterile sheath used  Locked at: 46 cm.Insertion Attempts: 1  Indication: hemodynamic monitoring  Ultrasound Guidance  Needle advanced into vessel with real time Ultrasound guidance.  Image recorded and saved.  Guidewire confirmed in vessel.  Sterile sheath used.  Assessment  Central Line Bundle Protocol followed. Hand hygiene before procedure, surgical cap worn, surgical mask worn, sterile surgical gloves worn, large sterile drape used.  Catheter location verification: CXR pending for SICU.  Dressing: sutured in place and taped and tegaderm  Patient: Tolerated Well

## 2022-06-21 NOTE — ANESTHESIA PROCEDURE NOTES
Right Femoral Arterial Line    Diagnosis: Hilar cholangiocarcinoma    Patient location during procedure: done in OR  Procedure start time: 6/21/2022 12:09 PM  Timeout: 6/21/2022 11:23 AM  Procedure end time: 6/21/2022 12:21 PM    Staffing  Authorizing Provider: Genaro Alonzo MD  Performing Provider: Michael Coelho MD    Anesthesiologist was present at the time of the procedure.    Preanesthetic Checklist  Completed: patient identified, IV checked, risks and benefits discussed, surgical consent, monitors and equipment checked, pre-op evaluation, timeout performed and anesthesia consent givenRight Femoral Arterial Line  Skin Prep: chlorhexidine gluconate and isopropyl alcohol  Local Infiltration: lidocaine  Orientation: right  Location: femoral    Catheter Size: 4 Fr Cook  Catheter placement by Ultrasound guidance. Heme positive aspiration all ports.   Vessel Caliber: medium, patent, compressibility normal  Vascular Doppler:  not done  Needle advanced into vessel with real time Ultrasound guidance.  Guidewire confirmed in vessel.  Sterile sheath used.  Image recorded and saved.Insertion Attempts: 1  Assessment  Dressing: sutured in place and taped and tegaderm  Patient: Tolerated well

## 2022-06-21 NOTE — PROGRESS NOTES
Pt sent down to surgery for liver transplant in wheelchair with transport.  Wife at bedside with all belongings packed.  Pt AAOx4, VSS. Cefepime running to port.  Pt without any concerns or issues.

## 2022-06-22 PROBLEM — T38.0X5A ADRENAL CORTICOSTEROID CAUSING ADVERSE EFFECT IN THERAPEUTIC USE: Status: ACTIVE | Noted: 2022-06-22

## 2022-06-22 PROBLEM — E11.65 TYPE 2 DIABETES MELLITUS WITH HYPERGLYCEMIA: Status: ACTIVE | Noted: 2022-06-22

## 2022-06-22 LAB
ALBUMIN SERPL BCP-MCNC: 3.4 G/DL (ref 3.5–5.2)
ALBUMIN SERPL BCP-MCNC: 3.4 G/DL (ref 3.5–5.2)
ALLENS TEST: ABNORMAL
ALP SERPL-CCNC: 105 U/L (ref 55–135)
ALP SERPL-CCNC: 106 U/L (ref 55–135)
ALT SERPL W/O P-5'-P-CCNC: 2131 U/L (ref 10–44)
ALT SERPL W/O P-5'-P-CCNC: 2195 U/L (ref 10–44)
AMYLASE SERPL-CCNC: 97 U/L (ref 20–110)
ANION GAP SERPL CALC-SCNC: 12 MMOL/L (ref 8–16)
ANION GAP SERPL CALC-SCNC: 15 MMOL/L (ref 8–16)
ANION GAP SERPL CALC-SCNC: 6 MMOL/L (ref 8–16)
ANION GAP SERPL CALC-SCNC: 9 MMOL/L (ref 8–16)
APTT BLDCRRT: 24.8 SEC (ref 21–32)
APTT BLDCRRT: 25.4 SEC (ref 21–32)
AST SERPL-CCNC: 1002 U/L (ref 10–40)
AST SERPL-CCNC: 1064 U/L (ref 10–40)
AST SERPL-CCNC: 1391 U/L (ref 10–40)
AST SERPL-CCNC: 1539 U/L (ref 10–40)
AST SERPL-CCNC: 1750 U/L (ref 10–40)
AST SERPL-CCNC: 1761 U/L (ref 10–40)
AST SERPL-CCNC: 1848 U/L (ref 10–40)
BASOPHILS # BLD AUTO: 0.02 K/UL (ref 0–0.2)
BASOPHILS # BLD AUTO: 0.03 K/UL (ref 0–0.2)
BASOPHILS # BLD AUTO: 0.04 K/UL (ref 0–0.2)
BASOPHILS # BLD AUTO: 0.04 K/UL (ref 0–0.2)
BASOPHILS NFR BLD: 0 % (ref 0–1.9)
BASOPHILS NFR BLD: 0.2 % (ref 0–1.9)
BASOPHILS NFR BLD: 0.3 % (ref 0–1.9)
BASOPHILS NFR BLD: 0.3 % (ref 0–1.9)
BILIRUB DIRECT SERPL-MCNC: 1.1 MG/DL (ref 0.1–0.3)
BILIRUB SERPL-MCNC: 2.5 MG/DL (ref 0.1–1)
BILIRUB SERPL-MCNC: 3 MG/DL (ref 0.1–1)
BLD PROD TYP BPU: NORMAL
BLOOD UNIT EXPIRATION DATE: NORMAL
BLOOD UNIT TYPE CODE: 5100
BLOOD UNIT TYPE CODE: 9500
BLOOD UNIT TYPE CODE: 9500
BLOOD UNIT TYPE: NORMAL
BUN SERPL-MCNC: 13 MG/DL (ref 6–20)
BUN SERPL-MCNC: 14 MG/DL (ref 6–20)
BUN SERPL-MCNC: 14 MG/DL (ref 6–20)
BUN SERPL-MCNC: 15 MG/DL (ref 6–20)
CA-I BLDV-SCNC: 1.32 MMOL/L (ref 1.06–1.42)
CALCIUM SERPL-MCNC: 10.2 MG/DL (ref 8.7–10.5)
CALCIUM SERPL-MCNC: 8.9 MG/DL (ref 8.7–10.5)
CALCIUM SERPL-MCNC: 9.3 MG/DL (ref 8.7–10.5)
CALCIUM SERPL-MCNC: 9.4 MG/DL (ref 8.7–10.5)
CHLORIDE SERPL-SCNC: 101 MMOL/L (ref 95–110)
CHLORIDE SERPL-SCNC: 102 MMOL/L (ref 95–110)
CHLORIDE SERPL-SCNC: 104 MMOL/L (ref 95–110)
CHLORIDE SERPL-SCNC: 106 MMOL/L (ref 95–110)
CO2 SERPL-SCNC: 20 MMOL/L (ref 23–29)
CO2 SERPL-SCNC: 22 MMOL/L (ref 23–29)
CO2 SERPL-SCNC: 25 MMOL/L (ref 23–29)
CO2 SERPL-SCNC: 26 MMOL/L (ref 23–29)
CODING SYSTEM: NORMAL
CREAT SERPL-MCNC: 0.7 MG/DL (ref 0.5–1.4)
CREAT SERPL-MCNC: 0.9 MG/DL (ref 0.5–1.4)
CREAT SERPL-MCNC: 0.9 MG/DL (ref 0.5–1.4)
CREAT SERPL-MCNC: 1 MG/DL (ref 0.5–1.4)
DELSYS: ABNORMAL
DIFFERENTIAL METHOD: ABNORMAL
DISPENSE STATUS: NORMAL
EOSINOPHIL # BLD AUTO: 0 K/UL (ref 0–0.5)
EOSINOPHIL NFR BLD: 0 % (ref 0–8)
ERYTHROCYTE [DISTWIDTH] IN BLOOD BY AUTOMATED COUNT: 13.2 % (ref 11.5–14.5)
ERYTHROCYTE [DISTWIDTH] IN BLOOD BY AUTOMATED COUNT: 13.3 % (ref 11.5–14.5)
ERYTHROCYTE [DISTWIDTH] IN BLOOD BY AUTOMATED COUNT: 13.3 % (ref 11.5–14.5)
ERYTHROCYTE [DISTWIDTH] IN BLOOD BY AUTOMATED COUNT: 13.4 % (ref 11.5–14.5)
ERYTHROCYTE [DISTWIDTH] IN BLOOD BY AUTOMATED COUNT: 13.4 % (ref 11.5–14.5)
ERYTHROCYTE [DISTWIDTH] IN BLOOD BY AUTOMATED COUNT: 13.5 % (ref 11.5–14.5)
ERYTHROCYTE [DISTWIDTH] IN BLOOD BY AUTOMATED COUNT: 13.6 % (ref 11.5–14.5)
ERYTHROCYTE [SEDIMENTATION RATE] IN BLOOD BY WESTERGREN METHOD: 16 MM/H
ERYTHROCYTE [SEDIMENTATION RATE] IN BLOOD BY WESTERGREN METHOD: 18 MM/H
EST. GFR  (AFRICAN AMERICAN): >60 ML/MIN/1.73 M^2
EST. GFR  (NON AFRICAN AMERICAN): >60 ML/MIN/1.73 M^2
FIBRINOGEN PPP-MCNC: 325 MG/DL (ref 182–400)
FIO2: 30
FIO2: 40
FIO2: 70
FIO2: 70
GLUCOSE SERPL-MCNC: 106 MG/DL (ref 70–110)
GLUCOSE SERPL-MCNC: 111 MG/DL (ref 70–110)
GLUCOSE SERPL-MCNC: 138 MG/DL (ref 70–110)
GLUCOSE SERPL-MCNC: 141 MG/DL (ref 70–110)
GLUCOSE SERPL-MCNC: 144 MG/DL (ref 70–110)
GLUCOSE SERPL-MCNC: 153 MG/DL (ref 70–110)
GLUCOSE SERPL-MCNC: 171 MG/DL (ref 70–110)
GLUCOSE SERPL-MCNC: 178 MG/DL (ref 70–110)
GLUCOSE SERPL-MCNC: 186 MG/DL (ref 70–110)
GLUCOSE SERPL-MCNC: 187 MG/DL (ref 70–110)
GLUCOSE SERPL-MCNC: 188 MG/DL (ref 70–110)
GLUCOSE SERPL-MCNC: 200 MG/DL (ref 70–110)
GLUCOSE SERPL-MCNC: 201 MG/DL (ref 70–110)
GLUCOSE SERPL-MCNC: 224 MG/DL (ref 70–110)
GLUCOSE SERPL-MCNC: 245 MG/DL (ref 70–110)
GLUCOSE SERPL-MCNC: 253 MG/DL (ref 70–110)
GLUCOSE SERPL-MCNC: 274 MG/DL (ref 70–110)
GLUCOSE SERPL-MCNC: 289 MG/DL (ref 70–110)
HCO3 UR-SCNC: 22.5 MMOL/L (ref 24–28)
HCO3 UR-SCNC: 24 MMOL/L (ref 24–28)
HCO3 UR-SCNC: 24.2 MMOL/L (ref 24–28)
HCO3 UR-SCNC: 24.2 MMOL/L (ref 24–28)
HCO3 UR-SCNC: 24.4 MMOL/L (ref 24–28)
HCO3 UR-SCNC: 24.8 MMOL/L (ref 24–28)
HCO3 UR-SCNC: 24.9 MMOL/L (ref 24–28)
HCO3 UR-SCNC: 24.9 MMOL/L (ref 24–28)
HCO3 UR-SCNC: 25.2 MMOL/L (ref 24–28)
HCO3 UR-SCNC: 25.6 MMOL/L (ref 24–28)
HCO3 UR-SCNC: 25.6 MMOL/L (ref 24–28)
HCO3 UR-SCNC: 25.8 MMOL/L (ref 24–28)
HCO3 UR-SCNC: 25.9 MMOL/L (ref 24–28)
HCO3 UR-SCNC: 26.1 MMOL/L (ref 24–28)
HCO3 UR-SCNC: 27.5 MMOL/L (ref 24–28)
HCO3 UR-SCNC: 27.9 MMOL/L (ref 24–28)
HCO3 UR-SCNC: 28.5 MMOL/L (ref 24–28)
HCO3 UR-SCNC: 28.6 MMOL/L (ref 24–28)
HCT VFR BLD AUTO: 27.1 % (ref 40–54)
HCT VFR BLD AUTO: 28.1 % (ref 40–54)
HCT VFR BLD AUTO: 28.4 % (ref 40–54)
HCT VFR BLD AUTO: 30.4 % (ref 40–54)
HCT VFR BLD AUTO: 30.7 % (ref 40–54)
HCT VFR BLD AUTO: 31.1 % (ref 40–54)
HCT VFR BLD AUTO: 31.5 % (ref 40–54)
HCT VFR BLD CALC: 28 %PCV (ref 36–54)
HCT VFR BLD CALC: 29 %PCV (ref 36–54)
HCT VFR BLD CALC: 29 %PCV (ref 36–54)
HCT VFR BLD CALC: 30 %PCV (ref 36–54)
HCT VFR BLD CALC: 31 %PCV (ref 36–54)
HCT VFR BLD CALC: 31 %PCV (ref 36–54)
HCT VFR BLD CALC: 32 %PCV (ref 36–54)
HCT VFR BLD CALC: 33 %PCV (ref 36–54)
HCT VFR BLD CALC: 36 %PCV (ref 36–54)
HCV RNA SERPL NAA+PROBE-ACNC: NORMAL IU/ML
HGB BLD-MCNC: 10.1 G/DL (ref 14–18)
HGB BLD-MCNC: 10.4 G/DL (ref 14–18)
HGB BLD-MCNC: 10.5 G/DL (ref 14–18)
HGB BLD-MCNC: 10.5 G/DL (ref 14–18)
HGB BLD-MCNC: 8.9 G/DL (ref 14–18)
HGB BLD-MCNC: 9.5 G/DL (ref 14–18)
HGB BLD-MCNC: 9.5 G/DL (ref 14–18)
HYPOCHROMIA BLD QL SMEAR: ABNORMAL
IMM GRANULOCYTES # BLD AUTO: 0.06 K/UL (ref 0–0.04)
IMM GRANULOCYTES # BLD AUTO: 0.08 K/UL (ref 0–0.04)
IMM GRANULOCYTES # BLD AUTO: 0.09 K/UL (ref 0–0.04)
IMM GRANULOCYTES # BLD AUTO: 0.09 K/UL (ref 0–0.04)
IMM GRANULOCYTES # BLD AUTO: 0.12 K/UL (ref 0–0.04)
IMM GRANULOCYTES # BLD AUTO: 0.14 K/UL (ref 0–0.04)
IMM GRANULOCYTES # BLD AUTO: ABNORMAL K/UL (ref 0–0.04)
IMM GRANULOCYTES NFR BLD AUTO: 0.5 % (ref 0–0.5)
IMM GRANULOCYTES NFR BLD AUTO: 0.5 % (ref 0–0.5)
IMM GRANULOCYTES NFR BLD AUTO: 0.6 % (ref 0–0.5)
IMM GRANULOCYTES NFR BLD AUTO: 0.6 % (ref 0–0.5)
IMM GRANULOCYTES NFR BLD AUTO: 0.8 % (ref 0–0.5)
IMM GRANULOCYTES NFR BLD AUTO: 0.9 % (ref 0–0.5)
IMM GRANULOCYTES NFR BLD AUTO: ABNORMAL % (ref 0–0.5)
INR PPP: 1.4 (ref 0.8–1.2)
INR PPP: 1.4 (ref 0.8–1.2)
INR PPP: 1.5 (ref 0.8–1.2)
INR PPP: 1.5 (ref 0.8–1.2)
INR PPP: 1.6 (ref 0.8–1.2)
INR PPP: 1.7 (ref 0.8–1.2)
LACTATE SERPL-SCNC: 2.6 MMOL/L (ref 0.5–2.2)
LACTATE SERPL-SCNC: 3.3 MMOL/L (ref 0.5–2.2)
LACTATE SERPL-SCNC: 5.3 MMOL/L (ref 0.5–2.2)
LACTATE SERPL-SCNC: 6.5 MMOL/L (ref 0.5–2.2)
LDH SERPL L TO P-CCNC: 2599 U/L (ref 110–260)
LDH SERPL L TO P-CCNC: 5.67 MMOL/L (ref 0.36–1.25)
LDH SERPL L TO P-CCNC: 6.28 MMOL/L (ref 0.36–1.25)
LYMPHOCYTES # BLD AUTO: 0.3 K/UL (ref 1–4.8)
LYMPHOCYTES # BLD AUTO: 0.3 K/UL (ref 1–4.8)
LYMPHOCYTES # BLD AUTO: 0.4 K/UL (ref 1–4.8)
LYMPHOCYTES NFR BLD: 2 % (ref 18–48)
LYMPHOCYTES NFR BLD: 2.2 % (ref 18–48)
LYMPHOCYTES NFR BLD: 2.4 % (ref 18–48)
LYMPHOCYTES NFR BLD: 2.5 % (ref 18–48)
LYMPHOCYTES NFR BLD: 2.5 % (ref 18–48)
LYMPHOCYTES NFR BLD: 2.6 % (ref 18–48)
LYMPHOCYTES NFR BLD: 2.8 % (ref 18–48)
MAGNESIUM SERPL-MCNC: 1.7 MG/DL (ref 1.6–2.6)
MAGNESIUM SERPL-MCNC: 2.3 MG/DL (ref 1.6–2.6)
MCH RBC QN AUTO: 32.4 PG (ref 27–31)
MCH RBC QN AUTO: 32.8 PG (ref 27–31)
MCH RBC QN AUTO: 32.9 PG (ref 27–31)
MCH RBC QN AUTO: 33.1 PG (ref 27–31)
MCH RBC QN AUTO: 33.5 PG (ref 27–31)
MCH RBC QN AUTO: 33.5 PG (ref 27–31)
MCH RBC QN AUTO: 33.8 PG (ref 27–31)
MCHC RBC AUTO-ENTMCNC: 32.8 G/DL (ref 32–36)
MCHC RBC AUTO-ENTMCNC: 33.2 G/DL (ref 32–36)
MCHC RBC AUTO-ENTMCNC: 33.3 G/DL (ref 32–36)
MCHC RBC AUTO-ENTMCNC: 33.5 G/DL (ref 32–36)
MCHC RBC AUTO-ENTMCNC: 33.8 G/DL (ref 32–36)
MCHC RBC AUTO-ENTMCNC: 33.8 G/DL (ref 32–36)
MCHC RBC AUTO-ENTMCNC: 33.9 G/DL (ref 32–36)
MCV RBC AUTO: 100 FL (ref 82–98)
MCV RBC AUTO: 97 FL (ref 82–98)
MCV RBC AUTO: 97 FL (ref 82–98)
MCV RBC AUTO: 99 FL (ref 82–98)
MCV RBC AUTO: 99 FL (ref 82–98)
METAMYELOCYTES NFR BLD MANUAL: 1 %
MIN VOL: 11.5
MIN VOL: 12.5
MIN VOL: 8
MIN VOL: 8.3
MODE: ABNORMAL
MONOCYTES # BLD AUTO: 0.4 K/UL (ref 0.3–1)
MONOCYTES # BLD AUTO: 0.4 K/UL (ref 0.3–1)
MONOCYTES # BLD AUTO: 0.6 K/UL (ref 0.3–1)
MONOCYTES # BLD AUTO: 0.6 K/UL (ref 0.3–1)
MONOCYTES # BLD AUTO: 0.7 K/UL (ref 0.3–1)
MONOCYTES # BLD AUTO: 0.7 K/UL (ref 0.3–1)
MONOCYTES NFR BLD: 2.4 % (ref 4–15)
MONOCYTES NFR BLD: 3 % (ref 4–15)
MONOCYTES NFR BLD: 3.7 % (ref 4–15)
MONOCYTES NFR BLD: 3.8 % (ref 4–15)
MONOCYTES NFR BLD: 3.9 % (ref 4–15)
MONOCYTES NFR BLD: 4.4 % (ref 4–15)
MONOCYTES NFR BLD: 4.8 % (ref 4–15)
NEUTROPHILS # BLD AUTO: 10.5 K/UL (ref 1.8–7.7)
NEUTROPHILS # BLD AUTO: 13.3 K/UL (ref 1.8–7.7)
NEUTROPHILS # BLD AUTO: 13.8 K/UL (ref 1.8–7.7)
NEUTROPHILS # BLD AUTO: 14.2 K/UL (ref 1.8–7.7)
NEUTROPHILS # BLD AUTO: 14.4 K/UL (ref 1.8–7.7)
NEUTROPHILS # BLD AUTO: 15 K/UL (ref 1.8–7.7)
NEUTROPHILS NFR BLD: 74 % (ref 38–73)
NEUTROPHILS NFR BLD: 92 % (ref 38–73)
NEUTROPHILS NFR BLD: 92.1 % (ref 38–73)
NEUTROPHILS NFR BLD: 92.6 % (ref 38–73)
NEUTROPHILS NFR BLD: 92.7 % (ref 38–73)
NEUTROPHILS NFR BLD: 92.9 % (ref 38–73)
NEUTROPHILS NFR BLD: 94.4 % (ref 38–73)
NEUTS BAND NFR BLD MANUAL: 20 %
NRBC BLD-RTO: 0 /100 WBC
NUM UNITS TRANS FFP: NORMAL
OVALOCYTES BLD QL SMEAR: ABNORMAL
PCO2 BLDA: 33.7 MMHG (ref 35–45)
PCO2 BLDA: 37.1 MMHG (ref 35–45)
PCO2 BLDA: 37.2 MMHG (ref 35–45)
PCO2 BLDA: 37.9 MMHG (ref 35–45)
PCO2 BLDA: 38.3 MMHG (ref 35–45)
PCO2 BLDA: 38.6 MMHG (ref 35–45)
PCO2 BLDA: 39 MMHG (ref 35–45)
PCO2 BLDA: 39.3 MMHG (ref 35–45)
PCO2 BLDA: 39.3 MMHG (ref 35–45)
PCO2 BLDA: 39.5 MMHG (ref 35–45)
PCO2 BLDA: 40.1 MMHG (ref 35–45)
PCO2 BLDA: 40.8 MMHG (ref 35–45)
PCO2 BLDA: 41.2 MMHG (ref 35–45)
PCO2 BLDA: 42.3 MMHG (ref 35–45)
PCO2 BLDA: 43.9 MMHG (ref 35–45)
PCO2 BLDA: 44.2 MMHG (ref 35–45)
PCO2 BLDA: 45.3 MMHG (ref 35–45)
PCO2 BLDA: 47.4 MMHG (ref 35–45)
PEEP: 5
PH SMN: 7.35 [PH] (ref 7.35–7.45)
PH SMN: 7.36 [PH] (ref 7.35–7.45)
PH SMN: 7.37 [PH] (ref 7.35–7.45)
PH SMN: 7.37 [PH] (ref 7.35–7.45)
PH SMN: 7.39 [PH] (ref 7.35–7.45)
PH SMN: 7.39 [PH] (ref 7.35–7.45)
PH SMN: 7.4 [PH] (ref 7.35–7.45)
PH SMN: 7.41 [PH] (ref 7.35–7.45)
PH SMN: 7.42 [PH] (ref 7.35–7.45)
PH SMN: 7.43 [PH] (ref 7.35–7.45)
PH SMN: 7.45 [PH] (ref 7.35–7.45)
PH SMN: 7.47 [PH] (ref 7.35–7.45)
PH SMN: 7.48 [PH] (ref 7.35–7.45)
PH SMN: 7.49 [PH] (ref 7.35–7.45)
PHOSPHATE SERPL-MCNC: 3.3 MG/DL (ref 2.7–4.5)
PHOSPHATE SERPL-MCNC: 4.2 MG/DL (ref 2.7–4.5)
PIP: 11
PIP: 13
PIP: 14
PIP: 15
PLATELET # BLD AUTO: 105 K/UL (ref 150–450)
PLATELET # BLD AUTO: 106 K/UL (ref 150–450)
PLATELET # BLD AUTO: 116 K/UL (ref 150–450)
PLATELET # BLD AUTO: 132 K/UL (ref 150–450)
PLATELET # BLD AUTO: 133 K/UL (ref 150–450)
PLATELET # BLD AUTO: 137 K/UL (ref 150–450)
PLATELET # BLD AUTO: 92 K/UL (ref 150–450)
PLATELET BLD QL SMEAR: ABNORMAL
PMV BLD AUTO: 8.9 FL (ref 9.2–12.9)
PMV BLD AUTO: 8.9 FL (ref 9.2–12.9)
PMV BLD AUTO: 9.3 FL (ref 9.2–12.9)
PMV BLD AUTO: 9.5 FL (ref 9.2–12.9)
PMV BLD AUTO: 9.6 FL (ref 9.2–12.9)
PO2 BLDA: 101 MMHG (ref 80–100)
PO2 BLDA: 122 MMHG (ref 80–100)
PO2 BLDA: 129 MMHG (ref 80–100)
PO2 BLDA: 139 MMHG (ref 80–100)
PO2 BLDA: 139 MMHG (ref 80–100)
PO2 BLDA: 144 MMHG (ref 80–100)
PO2 BLDA: 149 MMHG (ref 80–100)
PO2 BLDA: 156 MMHG (ref 80–100)
PO2 BLDA: 165 MMHG (ref 80–100)
PO2 BLDA: 166 MMHG (ref 80–100)
PO2 BLDA: 176 MMHG (ref 80–100)
PO2 BLDA: 182 MMHG (ref 80–100)
PO2 BLDA: 183 MMHG (ref 80–100)
PO2 BLDA: 204 MMHG (ref 80–100)
PO2 BLDA: 320 MMHG (ref 80–100)
PO2 BLDA: 378 MMHG (ref 80–100)
PO2 BLDA: 45 MMHG (ref 40–60)
PO2 BLDA: 45 MMHG (ref 80–100)
POC BE: -1 MMOL/L
POC BE: -1 MMOL/L
POC BE: -3 MMOL/L
POC BE: 0 MMOL/L
POC BE: 1 MMOL/L
POC BE: 1 MMOL/L
POC BE: 2 MMOL/L
POC BE: 3 MMOL/L
POC BE: 4 MMOL/L
POC BE: 5 MMOL/L
POC BE: 5 MMOL/L
POC IONIZED CALCIUM: 1.04 MMOL/L (ref 1.06–1.42)
POC IONIZED CALCIUM: 1.07 MMOL/L (ref 1.06–1.42)
POC IONIZED CALCIUM: 1.09 MMOL/L (ref 1.06–1.42)
POC IONIZED CALCIUM: 1.12 MMOL/L (ref 1.06–1.42)
POC IONIZED CALCIUM: 1.12 MMOL/L (ref 1.06–1.42)
POC IONIZED CALCIUM: 1.13 MMOL/L (ref 1.06–1.42)
POC IONIZED CALCIUM: 1.14 MMOL/L (ref 1.06–1.42)
POC IONIZED CALCIUM: 1.15 MMOL/L (ref 1.06–1.42)
POC IONIZED CALCIUM: 1.16 MMOL/L (ref 1.06–1.42)
POC IONIZED CALCIUM: 1.16 MMOL/L (ref 1.06–1.42)
POC IONIZED CALCIUM: 1.21 MMOL/L (ref 1.06–1.42)
POC IONIZED CALCIUM: 1.24 MMOL/L (ref 1.06–1.42)
POC IONIZED CALCIUM: 1.25 MMOL/L (ref 1.06–1.42)
POC IONIZED CALCIUM: 1.26 MMOL/L (ref 1.06–1.42)
POC SATURATED O2: 100 % (ref 95–100)
POC SATURATED O2: 78 % (ref 95–100)
POC SATURATED O2: 81 % (ref 95–100)
POC SATURATED O2: 98 % (ref 95–100)
POC SATURATED O2: 99 % (ref 95–100)
POC TCO2: 24 MMOL/L (ref 23–27)
POC TCO2: 25 MMOL/L (ref 23–27)
POC TCO2: 26 MMOL/L (ref 23–27)
POC TCO2: 27 MMOL/L (ref 23–27)
POC TCO2: 27 MMOL/L (ref 24–29)
POC TCO2: 29 MMOL/L (ref 23–27)
POC TCO2: 29 MMOL/L (ref 23–27)
POC TCO2: 30 MMOL/L (ref 23–27)
POC TCO2: 30 MMOL/L (ref 23–27)
POCT GLUCOSE: 190 MG/DL (ref 70–110)
POCT GLUCOSE: 200 MG/DL (ref 70–110)
POCT GLUCOSE: 222 MG/DL (ref 70–110)
POCT GLUCOSE: 226 MG/DL (ref 70–110)
POCT GLUCOSE: 230 MG/DL (ref 70–110)
POCT GLUCOSE: 235 MG/DL (ref 70–110)
POCT GLUCOSE: 237 MG/DL (ref 70–110)
POCT GLUCOSE: 243 MG/DL (ref 70–110)
POCT GLUCOSE: 243 MG/DL (ref 70–110)
POIKILOCYTOSIS BLD QL SMEAR: SLIGHT
POLYCHROMASIA BLD QL SMEAR: ABNORMAL
POTASSIUM BLD-SCNC: 3.4 MMOL/L (ref 3.5–5.1)
POTASSIUM BLD-SCNC: 3.4 MMOL/L (ref 3.5–5.1)
POTASSIUM BLD-SCNC: 3.6 MMOL/L (ref 3.5–5.1)
POTASSIUM BLD-SCNC: 3.6 MMOL/L (ref 3.5–5.1)
POTASSIUM BLD-SCNC: 3.8 MMOL/L (ref 3.5–5.1)
POTASSIUM BLD-SCNC: 3.8 MMOL/L (ref 3.5–5.1)
POTASSIUM BLD-SCNC: 4 MMOL/L (ref 3.5–5.1)
POTASSIUM BLD-SCNC: 4 MMOL/L (ref 3.5–5.1)
POTASSIUM BLD-SCNC: 4.2 MMOL/L (ref 3.5–5.1)
POTASSIUM BLD-SCNC: 4.7 MMOL/L (ref 3.5–5.1)
POTASSIUM BLD-SCNC: 4.7 MMOL/L (ref 3.5–5.1)
POTASSIUM BLD-SCNC: 4.8 MMOL/L (ref 3.5–5.1)
POTASSIUM BLD-SCNC: 4.9 MMOL/L (ref 3.5–5.1)
POTASSIUM BLD-SCNC: 5 MMOL/L (ref 3.5–5.1)
POTASSIUM SERPL-SCNC: 4 MMOL/L (ref 3.5–5.1)
POTASSIUM SERPL-SCNC: 4.3 MMOL/L (ref 3.5–5.1)
POTASSIUM SERPL-SCNC: 4.3 MMOL/L (ref 3.5–5.1)
POTASSIUM SERPL-SCNC: 4.8 MMOL/L (ref 3.5–5.1)
PROT SERPL-MCNC: 5.2 G/DL (ref 6–8.4)
PROT SERPL-MCNC: 5.3 G/DL (ref 6–8.4)
PROTHROMBIN TIME: 14 SEC (ref 9–12.5)
PROTHROMBIN TIME: 14.3 SEC (ref 9–12.5)
PROTHROMBIN TIME: 15.2 SEC (ref 9–12.5)
PROTHROMBIN TIME: 15.6 SEC (ref 9–12.5)
PROTHROMBIN TIME: 15.8 SEC (ref 9–12.5)
PROTHROMBIN TIME: 17 SEC (ref 9–12.5)
RBC # BLD AUTO: 2.71 M/UL (ref 4.6–6.2)
RBC # BLD AUTO: 2.84 M/UL (ref 4.6–6.2)
RBC # BLD AUTO: 2.84 M/UL (ref 4.6–6.2)
RBC # BLD AUTO: 3.11 M/UL (ref 4.6–6.2)
RBC # BLD AUTO: 3.12 M/UL (ref 4.6–6.2)
RBC # BLD AUTO: 3.16 M/UL (ref 4.6–6.2)
RBC # BLD AUTO: 3.17 M/UL (ref 4.6–6.2)
SAMPLE: ABNORMAL
SITE: ABNORMAL
SODIUM BLD-SCNC: 134 MMOL/L (ref 136–145)
SODIUM BLD-SCNC: 135 MMOL/L (ref 136–145)
SODIUM BLD-SCNC: 135 MMOL/L (ref 136–145)
SODIUM BLD-SCNC: 136 MMOL/L (ref 136–145)
SODIUM BLD-SCNC: 136 MMOL/L (ref 136–145)
SODIUM BLD-SCNC: 137 MMOL/L (ref 136–145)
SODIUM BLD-SCNC: 138 MMOL/L (ref 136–145)
SODIUM BLD-SCNC: 139 MMOL/L (ref 136–145)
SODIUM SERPL-SCNC: 135 MMOL/L (ref 136–145)
SODIUM SERPL-SCNC: 137 MMOL/L (ref 136–145)
SODIUM SERPL-SCNC: 138 MMOL/L (ref 136–145)
SODIUM SERPL-SCNC: 138 MMOL/L (ref 136–145)
SP02: 100
SP02: 100
SP02: 99
SP02: 99
TACROLIMUS BLD-MCNC: <2 NG/ML (ref 5–15)
VT: 450
WBC # BLD AUTO: 11.32 K/UL (ref 3.9–12.7)
WBC # BLD AUTO: 12.62 K/UL (ref 3.9–12.7)
WBC # BLD AUTO: 14.44 K/UL (ref 3.9–12.7)
WBC # BLD AUTO: 14.64 K/UL (ref 3.9–12.7)
WBC # BLD AUTO: 15.24 K/UL (ref 3.9–12.7)
WBC # BLD AUTO: 15.48 K/UL (ref 3.9–12.7)
WBC # BLD AUTO: 16.33 K/UL (ref 3.9–12.7)

## 2022-06-22 PROCEDURE — 25000003 PHARM REV CODE 250: Performed by: STUDENT IN AN ORGANIZED HEALTH CARE EDUCATION/TRAINING PROGRAM

## 2022-06-22 PROCEDURE — 20000000 HC ICU ROOM

## 2022-06-22 PROCEDURE — 27000191 HC C-V MONITORING

## 2022-06-22 PROCEDURE — 37799 UNLISTED PX VASCULAR SURGERY: CPT

## 2022-06-22 PROCEDURE — 27100088 HC CELL SAVER

## 2022-06-22 PROCEDURE — 85520 HEPARIN ASSAY: CPT

## 2022-06-22 PROCEDURE — 83615 LACTATE (LD) (LDH) ENZYME: CPT | Performed by: STUDENT IN AN ORGANIZED HEALTH CARE EDUCATION/TRAINING PROGRAM

## 2022-06-22 PROCEDURE — 99900035 HC TECH TIME PER 15 MIN (STAT)

## 2022-06-22 PROCEDURE — 99291 CRITICAL CARE FIRST HOUR: CPT | Mod: 24,,, | Performed by: STUDENT IN AN ORGANIZED HEALTH CARE EDUCATION/TRAINING PROGRAM

## 2022-06-22 PROCEDURE — 63600175 PHARM REV CODE 636 W HCPCS: Performed by: STUDENT IN AN ORGANIZED HEALTH CARE EDUCATION/TRAINING PROGRAM

## 2022-06-22 PROCEDURE — 85025 COMPLETE CBC W/AUTO DIFF WBC: CPT | Mod: 91 | Performed by: STUDENT IN AN ORGANIZED HEALTH CARE EDUCATION/TRAINING PROGRAM

## 2022-06-22 PROCEDURE — 80197 ASSAY OF TACROLIMUS: CPT | Performed by: STUDENT IN AN ORGANIZED HEALTH CARE EDUCATION/TRAINING PROGRAM

## 2022-06-22 PROCEDURE — 99900017 HC EXTUBATION W/PARAMETERS (STAT)

## 2022-06-22 PROCEDURE — 82803 BLOOD GASES ANY COMBINATION: CPT

## 2022-06-22 PROCEDURE — 63600175 PHARM REV CODE 636 W HCPCS: Performed by: SURGERY

## 2022-06-22 PROCEDURE — 83735 ASSAY OF MAGNESIUM: CPT | Mod: 91 | Performed by: STUDENT IN AN ORGANIZED HEALTH CARE EDUCATION/TRAINING PROGRAM

## 2022-06-22 PROCEDURE — 27000221 HC OXYGEN, UP TO 24 HOURS

## 2022-06-22 PROCEDURE — 93005 ELECTROCARDIOGRAM TRACING: CPT

## 2022-06-22 PROCEDURE — 99223 1ST HOSP IP/OBS HIGH 75: CPT | Mod: ,,, | Performed by: NURSE PRACTITIONER

## 2022-06-22 PROCEDURE — 99291 PR CRITICAL CARE, E/M 30-74 MINUTES: ICD-10-PCS | Mod: 24,,, | Performed by: STUDENT IN AN ORGANIZED HEALTH CARE EDUCATION/TRAINING PROGRAM

## 2022-06-22 PROCEDURE — 81300000 HC LIVER ACQUISITION CHARGE

## 2022-06-22 PROCEDURE — 82150 ASSAY OF AMYLASE: CPT | Performed by: STUDENT IN AN ORGANIZED HEALTH CARE EDUCATION/TRAINING PROGRAM

## 2022-06-22 PROCEDURE — 27200966 HC CLOSED SUCTION SYSTEM

## 2022-06-22 PROCEDURE — 99223 PR INITIAL HOSPITAL CARE,LEVL III: ICD-10-PCS | Mod: ,,, | Performed by: NURSE PRACTITIONER

## 2022-06-22 PROCEDURE — 85610 PROTHROMBIN TIME: CPT | Mod: 91 | Performed by: STUDENT IN AN ORGANIZED HEALTH CARE EDUCATION/TRAINING PROGRAM

## 2022-06-22 PROCEDURE — 85007 BL SMEAR W/DIFF WBC COUNT: CPT | Performed by: STUDENT IN AN ORGANIZED HEALTH CARE EDUCATION/TRAINING PROGRAM

## 2022-06-22 PROCEDURE — 99900026 HC AIRWAY MAINTENANCE (STAT)

## 2022-06-22 PROCEDURE — 84450 TRANSFERASE (AST) (SGOT): CPT | Mod: 91 | Performed by: STUDENT IN AN ORGANIZED HEALTH CARE EDUCATION/TRAINING PROGRAM

## 2022-06-22 PROCEDURE — 94002 VENT MGMT INPAT INIT DAY: CPT

## 2022-06-22 PROCEDURE — 94010 BREATHING CAPACITY TEST: CPT

## 2022-06-22 PROCEDURE — 27201041 HC RESERVOIR, CARDIOTOMY

## 2022-06-22 PROCEDURE — 25000003 PHARM REV CODE 250: Performed by: SURGERY

## 2022-06-22 PROCEDURE — 82330 ASSAY OF CALCIUM: CPT | Performed by: STUDENT IN AN ORGANIZED HEALTH CARE EDUCATION/TRAINING PROGRAM

## 2022-06-22 PROCEDURE — 93010 EKG 12-LEAD: ICD-10-PCS | Mod: ,,, | Performed by: INTERNAL MEDICINE

## 2022-06-22 PROCEDURE — 83605 ASSAY OF LACTIC ACID: CPT | Performed by: STUDENT IN AN ORGANIZED HEALTH CARE EDUCATION/TRAINING PROGRAM

## 2022-06-22 PROCEDURE — 85730 THROMBOPLASTIN TIME PARTIAL: CPT | Mod: 91 | Performed by: STUDENT IN AN ORGANIZED HEALTH CARE EDUCATION/TRAINING PROGRAM

## 2022-06-22 PROCEDURE — 82248 BILIRUBIN DIRECT: CPT | Performed by: STUDENT IN AN ORGANIZED HEALTH CARE EDUCATION/TRAINING PROGRAM

## 2022-06-22 PROCEDURE — 85002 BLEEDING TIME TEST: CPT

## 2022-06-22 PROCEDURE — 85384 FIBRINOGEN ACTIVITY: CPT | Performed by: STUDENT IN AN ORGANIZED HEALTH CARE EDUCATION/TRAINING PROGRAM

## 2022-06-22 PROCEDURE — 94761 N-INVAS EAR/PLS OXIMETRY MLT: CPT

## 2022-06-22 PROCEDURE — 84100 ASSAY OF PHOSPHORUS: CPT | Mod: 91 | Performed by: STUDENT IN AN ORGANIZED HEALTH CARE EDUCATION/TRAINING PROGRAM

## 2022-06-22 PROCEDURE — 80048 BASIC METABOLIC PNL TOTAL CA: CPT | Mod: XB | Performed by: STUDENT IN AN ORGANIZED HEALTH CARE EDUCATION/TRAINING PROGRAM

## 2022-06-22 PROCEDURE — 85027 COMPLETE CBC AUTOMATED: CPT | Performed by: STUDENT IN AN ORGANIZED HEALTH CARE EDUCATION/TRAINING PROGRAM

## 2022-06-22 PROCEDURE — 94150 VITAL CAPACITY TEST: CPT

## 2022-06-22 PROCEDURE — 83605 ASSAY OF LACTIC ACID: CPT | Mod: 91 | Performed by: TRANSPLANT SURGERY

## 2022-06-22 PROCEDURE — 93010 ELECTROCARDIOGRAM REPORT: CPT | Mod: ,,, | Performed by: INTERNAL MEDICINE

## 2022-06-22 PROCEDURE — 80053 COMPREHEN METABOLIC PANEL: CPT | Mod: 91 | Performed by: STUDENT IN AN ORGANIZED HEALTH CARE EDUCATION/TRAINING PROGRAM

## 2022-06-22 RX ORDER — OXYCODONE HYDROCHLORIDE 5 MG/1
5 TABLET ORAL EVERY 4 HOURS PRN
Status: DISCONTINUED | OUTPATIENT
Start: 2022-06-22 | End: 2022-06-24

## 2022-06-22 RX ORDER — SODIUM CHLORIDE 9 MG/ML
INJECTION, SOLUTION INTRAVENOUS
Status: DISCONTINUED | OUTPATIENT
Start: 2022-06-22 | End: 2022-06-28 | Stop reason: HOSPADM

## 2022-06-22 RX ORDER — PREDNISONE 20 MG/1
20 TABLET ORAL DAILY
Status: DISCONTINUED | OUTPATIENT
Start: 2022-06-27 | End: 2022-06-28 | Stop reason: HOSPADM

## 2022-06-22 RX ORDER — HYDROMORPHONE HYDROCHLORIDE 1 MG/ML
0.2 INJECTION, SOLUTION INTRAMUSCULAR; INTRAVENOUS; SUBCUTANEOUS
Status: DISCONTINUED | OUTPATIENT
Start: 2022-06-22 | End: 2022-06-22

## 2022-06-22 RX ORDER — HYDROMORPHONE HYDROCHLORIDE 1 MG/ML
0.5 INJECTION, SOLUTION INTRAMUSCULAR; INTRAVENOUS; SUBCUTANEOUS
Status: DISCONTINUED | OUTPATIENT
Start: 2022-06-22 | End: 2022-06-22

## 2022-06-22 RX ORDER — ALBUMIN HUMAN 50 G/1000ML
25 SOLUTION INTRAVENOUS ONCE
Status: DISCONTINUED | OUTPATIENT
Start: 2022-06-22 | End: 2022-06-25

## 2022-06-22 RX ORDER — HYDRALAZINE HYDROCHLORIDE 20 MG/ML
10 INJECTION INTRAMUSCULAR; INTRAVENOUS EVERY 6 HOURS PRN
Status: DISCONTINUED | OUTPATIENT
Start: 2022-06-22 | End: 2022-06-28 | Stop reason: HOSPADM

## 2022-06-22 RX ORDER — HYDROMORPHONE HYDROCHLORIDE 1 MG/ML
0.2 INJECTION, SOLUTION INTRAMUSCULAR; INTRAVENOUS; SUBCUTANEOUS
Status: DISCONTINUED | OUTPATIENT
Start: 2022-06-22 | End: 2022-06-24

## 2022-06-22 RX ORDER — OXYCODONE HYDROCHLORIDE 10 MG/1
10 TABLET ORAL EVERY 4 HOURS PRN
Status: DISCONTINUED | OUTPATIENT
Start: 2022-06-22 | End: 2022-06-24

## 2022-06-22 RX ORDER — HEPARIN SODIUM 5000 [USP'U]/ML
5000 INJECTION, SOLUTION INTRAVENOUS; SUBCUTANEOUS EVERY 8 HOURS
Status: DISCONTINUED | OUTPATIENT
Start: 2022-06-22 | End: 2022-06-28 | Stop reason: HOSPADM

## 2022-06-22 RX ORDER — MYCOPHENOLATE MOFETIL 200 MG/ML
1000 POWDER, FOR SUSPENSION ORAL 2 TIMES DAILY
Status: DISCONTINUED | OUTPATIENT
Start: 2022-06-22 | End: 2022-06-24

## 2022-06-22 RX ORDER — NYSTATIN 100000 [USP'U]/ML
500000 SUSPENSION ORAL
Status: DISCONTINUED | OUTPATIENT
Start: 2022-06-22 | End: 2022-06-22

## 2022-06-22 RX ORDER — HYDROMORPHONE HYDROCHLORIDE 1 MG/ML
0.5 INJECTION, SOLUTION INTRAMUSCULAR; INTRAVENOUS; SUBCUTANEOUS
Status: DISCONTINUED | OUTPATIENT
Start: 2022-06-22 | End: 2022-06-24

## 2022-06-22 RX ORDER — SULFAMETHOXAZOLE AND TRIMETHOPRIM 400; 80 MG/1; MG/1
1 TABLET ORAL EVERY MORNING
Status: DISCONTINUED | OUTPATIENT
Start: 2022-06-28 | End: 2022-06-28 | Stop reason: HOSPADM

## 2022-06-22 RX ORDER — PROPOFOL 10 MG/ML
0-50 INJECTION, EMULSION INTRAVENOUS CONTINUOUS
Status: DISCONTINUED | OUTPATIENT
Start: 2022-06-22 | End: 2022-06-22

## 2022-06-22 RX ORDER — MAGNESIUM SULFATE HEPTAHYDRATE 40 MG/ML
2 INJECTION, SOLUTION INTRAVENOUS ONCE
Status: COMPLETED | OUTPATIENT
Start: 2022-06-22 | End: 2022-06-22

## 2022-06-22 RX ORDER — SODIUM CHLORIDE 0.9 % (FLUSH) 0.9 %
10 SYRINGE (ML) INJECTION
Status: DISCONTINUED | OUTPATIENT
Start: 2022-06-22 | End: 2022-06-28 | Stop reason: HOSPADM

## 2022-06-22 RX ORDER — MUPIROCIN 20 MG/G
1 OINTMENT TOPICAL 2 TIMES DAILY
Status: COMPLETED | OUTPATIENT
Start: 2022-06-22 | End: 2022-06-26

## 2022-06-22 RX ORDER — VALGANCICLOVIR 450 MG/1
450 TABLET, FILM COATED ORAL DAILY
Status: DISCONTINUED | OUTPATIENT
Start: 2022-07-01 | End: 2022-06-28 | Stop reason: HOSPADM

## 2022-06-22 RX ORDER — FAMOTIDINE 10 MG/ML
20 INJECTION INTRAVENOUS EVERY 12 HOURS
Status: DISCONTINUED | OUTPATIENT
Start: 2022-06-22 | End: 2022-06-24

## 2022-06-22 RX ORDER — DEXTROSE MONOHYDRATE AND SODIUM CHLORIDE 5; .9 G/100ML; G/100ML
INJECTION, SOLUTION INTRAVENOUS CONTINUOUS
Status: DISCONTINUED | OUTPATIENT
Start: 2022-06-22 | End: 2022-06-23

## 2022-06-22 RX ORDER — LABETALOL HCL 20 MG/4 ML
10 SYRINGE (ML) INTRAVENOUS EVERY 6 HOURS PRN
Status: DISCONTINUED | OUTPATIENT
Start: 2022-06-22 | End: 2022-06-27

## 2022-06-22 RX ORDER — ROCURONIUM BROMIDE 10 MG/ML
INJECTION, SOLUTION INTRAVENOUS
Status: DISCONTINUED
Start: 2022-06-22 | End: 2022-06-22 | Stop reason: WASHOUT

## 2022-06-22 RX ADMIN — HEPARIN SODIUM 5000 UNITS: 5000 INJECTION INTRAVENOUS; SUBCUTANEOUS at 05:06

## 2022-06-22 RX ADMIN — HEPARIN SODIUM 5000 UNITS: 5000 INJECTION INTRAVENOUS; SUBCUTANEOUS at 01:06

## 2022-06-22 RX ADMIN — MYCOPHENOLATE MOFETIL 1000 MG: 200 POWDER, FOR SUSPENSION ORAL at 09:06

## 2022-06-22 RX ADMIN — NYSTATIN 500000 UNITS: 500000 SUSPENSION ORAL at 09:06

## 2022-06-22 RX ADMIN — PROPOFOL 50 MCG/KG/MIN: 10 INJECTION, EMULSION INTRAVENOUS at 03:06

## 2022-06-22 RX ADMIN — FAMOTIDINE 20 MG: 10 INJECTION INTRAVENOUS at 09:06

## 2022-06-22 RX ADMIN — METHYLPREDNISOLONE SODIUM SUCCINATE 200 MG: 40 INJECTION, POWDER, FOR SOLUTION INTRAMUSCULAR; INTRAVENOUS at 08:06

## 2022-06-22 RX ADMIN — PHYTONADIONE 10 MG: 10 INJECTION, EMULSION INTRAMUSCULAR; INTRAVENOUS; SUBCUTANEOUS at 03:06

## 2022-06-22 RX ADMIN — PHYTONADIONE 10 MG: 10 INJECTION, EMULSION INTRAMUSCULAR; INTRAVENOUS; SUBCUTANEOUS at 10:06

## 2022-06-22 RX ADMIN — MUPIROCIN 1 G: 20 OINTMENT TOPICAL at 08:06

## 2022-06-22 RX ADMIN — MYCOPHENOLATE MOFETIL 1000 MG: 200 POWDER, FOR SUSPENSION ORAL at 01:06

## 2022-06-22 RX ADMIN — MAGNESIUM SULFATE HEPTAHYDRATE 2 G: 40 INJECTION, SOLUTION INTRAVENOUS at 01:06

## 2022-06-22 RX ADMIN — INSULIN HUMAN 3.8 UNITS/HR: 1 INJECTION, SOLUTION INTRAVENOUS at 06:06

## 2022-06-22 RX ADMIN — MUPIROCIN 1 G: 20 OINTMENT TOPICAL at 09:06

## 2022-06-22 RX ADMIN — TACROLIMUS 1 MG: 1 CAPSULE, GELATIN COATED ORAL at 08:06

## 2022-06-22 RX ADMIN — TACROLIMUS 1 MG: 1 CAPSULE, GELATIN COATED ORAL at 06:06

## 2022-06-22 RX ADMIN — OXYCODONE 5 MG: 5 TABLET ORAL at 07:06

## 2022-06-22 RX ADMIN — HEPARIN SODIUM 5000 UNITS: 5000 INJECTION INTRAVENOUS; SUBCUTANEOUS at 09:06

## 2022-06-22 RX ADMIN — PROPOFOL 40 MCG/KG/MIN: 10 INJECTION, EMULSION INTRAVENOUS at 06:06

## 2022-06-22 RX ADMIN — PHYTONADIONE 10 MG: 10 INJECTION, EMULSION INTRAMUSCULAR; INTRAVENOUS; SUBCUTANEOUS at 05:06

## 2022-06-22 RX ADMIN — MUPIROCIN 1 G: 20 OINTMENT TOPICAL at 12:06

## 2022-06-22 RX ADMIN — OXYCODONE 5 MG: 5 TABLET ORAL at 11:06

## 2022-06-22 RX ADMIN — FAMOTIDINE 20 MG: 10 INJECTION INTRAVENOUS at 12:06

## 2022-06-22 RX ADMIN — HYDRALAZINE HYDROCHLORIDE 10 MG: 20 INJECTION, SOLUTION INTRAMUSCULAR; INTRAVENOUS at 07:06

## 2022-06-22 RX ADMIN — Medication 10 MG: at 06:06

## 2022-06-22 RX ADMIN — FAMOTIDINE 20 MG: 10 INJECTION INTRAVENOUS at 08:06

## 2022-06-22 RX ADMIN — DEXTROSE AND SODIUM CHLORIDE: 5; .9 INJECTION, SOLUTION INTRAVENOUS at 12:06

## 2022-06-22 RX ADMIN — HYDROMORPHONE HYDROCHLORIDE 0.2 MG: 1 INJECTION, SOLUTION INTRAMUSCULAR; INTRAVENOUS; SUBCUTANEOUS at 10:06

## 2022-06-22 RX ADMIN — VANCOMYCIN HYDROCHLORIDE 1500 MG: 1.5 INJECTION, POWDER, LYOPHILIZED, FOR SOLUTION INTRAVENOUS at 02:06

## 2022-06-22 RX ADMIN — FLUCONAZOLE 200 MG: 200 TABLET ORAL at 08:06

## 2022-06-22 RX ADMIN — OXYCODONE 5 MG: 5 TABLET ORAL at 03:06

## 2022-06-22 RX ADMIN — HYDRALAZINE HYDROCHLORIDE 10 MG: 20 INJECTION, SOLUTION INTRAMUSCULAR; INTRAVENOUS at 01:06

## 2022-06-22 RX ADMIN — HYDROMORPHONE HYDROCHLORIDE 0.2 MG: 1 INJECTION, SOLUTION INTRAMUSCULAR; INTRAVENOUS; SUBCUTANEOUS at 02:06

## 2022-06-22 RX ADMIN — HYDROMORPHONE HYDROCHLORIDE 0.2 MG: 1 INJECTION, SOLUTION INTRAMUSCULAR; INTRAVENOUS; SUBCUTANEOUS at 09:06

## 2022-06-22 RX ADMIN — CEFEPIME 1 G: 1 INJECTION, POWDER, FOR SOLUTION INTRAMUSCULAR; INTRAVENOUS at 07:06

## 2022-06-22 RX ADMIN — LOSARTAN POTASSIUM 25 MG: 25 TABLET, FILM COATED ORAL at 08:06

## 2022-06-22 RX ADMIN — INSULIN HUMAN 11.4 UNITS/HR: 1 INJECTION, SOLUTION INTRAVENOUS at 06:06

## 2022-06-22 RX ADMIN — CEFEPIME 1 G: 1 INJECTION, POWDER, FOR SOLUTION INTRAMUSCULAR; INTRAVENOUS at 03:06

## 2022-06-22 RX ADMIN — HYDROMORPHONE HYDROCHLORIDE 0.2 MG: 1 INJECTION, SOLUTION INTRAMUSCULAR; INTRAVENOUS; SUBCUTANEOUS at 06:06

## 2022-06-22 NOTE — ASSESSMENT & PLAN NOTE
41 y.o. male with end-stage liver disease secondary to cholangiocarcinoma s/p living-donor liver transplant on 6/21/22.    Neuro:   - Sedation: Prop  - Analgesia: Multimodal    Pulmonary:   - Intubated--tube repositioned by anesthesia shortly after arrival   -Plan to remain intubated tonight and work towards extubation in the morning  - Wean vent settings as able  - Daily CXRs while intubated  - ABGs PRN    Cardiac:  - HDS off pressors  - SBP goal >100  - Goal CVP <7    FEN/GI:  - NPO  - Monitor SHRUTHI drain output   - Famotidine ppx  - q4h AST  - mIVF  - Cautious bolusing with albumin as needed. CVP goal <7  - Replete lytes prn  - Liver US in the morning    Renal:    - Monitor UOP  - Trend BUN/Cr  - Keep campos    ID/Immunology:  - Afebrile  - Immunosuppression and ppx ordered  - Continue Vanc/Cefepime/Fluconazole that he was on preoperatively for now    Heme:   - Trend H&H  - Heparin ppx    Endocrine:    - Insulin gtt for glucose control  - Endocrine consulted, appreciate assistance    DISPO:  - Continue SICU care  - Wean vent requirements per Transplant Surgery  - Wean sedation per Transplant Surgery

## 2022-06-22 NOTE — PROGRESS NOTES
TRANSPLANT NOTE:    Admit Date: 6/18/2022    ORGAN:   RIGHT LIVER LOBE (SEGS 5,6,7,8) WITHOUT MIDDLE HEPATIC VEIN  Disease Etiology: Primary Liver Malignancy: Cholangiocarcinoma (CH-CA)  Donor CMV Status: Negative  Donor HCV Status: Negative  Donor HBcAb: Negative  Donor HBV GUSTABO:  Negative  Donor HCV NATNegative:   Whole or Partial: Partial  Biliary Anastomosis: Nina-en-Y  Arterial Anatomy: Standard    Romeo Larson is a 41 y.o. male s/p    Living liver transplant on 6/21/2022 (Liver) for Primary Liver Malignancy: Cholangiocarcinoma (CH-CA).  This patient will follow the Steroid Induction protocol.  This patients immunosuppression will include a steroid taper over 5 weeks, Cellcept for Negative and Prograf maintenance.  Opportunistic infection prophylaxis will include Valcyte for 3 months (CMV D- R+), Bactrim for 6 months, and fluconazole.  Osteoporosis risk assessment identifies DEXA WNL, Vit D needs to be drawn once outpatient, therapy will include combo Ca/Vit D.  I have reviewed the pre-op medications and those have been restarted those, as appropriate.

## 2022-06-22 NOTE — SUBJECTIVE & OBJECTIVE
Follow-up For: Procedure(s) (LRB):  TRANSPLANT, LIVER (N/A)  ULTRASOUND, DIAGNOSTIC (N/A)    Post-Operative Day: Day of Surgery     Past Medical History:   Diagnosis Date    Adjustment and management of vascular access device 5/18/2022    Cholangiocarcinoma     Hypercholesteremia     Hypertension        Past Surgical History:   Procedure Laterality Date    ENDOSCOPIC ULTRASOUND OF UPPER GASTROINTESTINAL TRACT N/A 10/20/2021    Procedure: ULTRASOUND, UPPER GI TRACT, ENDOSCOPIC;  Surgeon: Valeriy Sotelo MD;  Location: [facility] ENDO (2ND FLR);  Service: Endoscopy;  Laterality: N/A;  wife to email vacc card-inst email-tb    ERCP N/A 10/20/2021    Procedure: ERCP (ENDOSCOPIC RETROGRADE CHOLANGIOPANCREATOGRAPHY);  Surgeon: Valeriy Sotelo MD;  Location: [facility] ENDO (2ND FLR);  Service: Endoscopy;  Laterality: N/A;    ERCP N/A 11/4/2021    Procedure: ERCP (ENDOSCOPIC RETROGRADE CHOLANGIOPANCREATOGRAPHY);  Surgeon: Valeriy Sotelo MD;  Location: [facility] ENDO (2ND FLR);  Service: Endoscopy;  Laterality: N/A;    ERCP N/A 11/4/2021    Procedure: ERCP (ENDOSCOPIC RETROGRADE CHOLANGIOPANCREATOGRAPHY);  Surgeon: Roselia Pereyra MD;  Location: [facility] ENDO (2ND FLR);  Service: Endoscopy;  Laterality: N/A;  pt vaccinated, instructed to bring card to procedure, instructions sent portal-MG    ERCP N/A 1/14/2022    Procedure: ERCP (ENDOSCOPIC RETROGRADE CHOLANGIOPANCREATOGRAPHY);  Surgeon: Roselia Pereyra MD;  Location: [facility] ENDO (2ND FLR);  Service: Endoscopy;  Laterality: N/A;  inst portal-right chest port-tb  Pt requested at home COVID testing, Pt instructed at home RAPID to be done morning of procedure, Pt instructed to call endoscopy scheuding if there is a positive result, Pt informed if a positive     ERCP N/A 3/31/2022    Procedure: ERCP (ENDOSCOPIC RETROGRADE CHOLANGIOPANCREATOGRAPHY);  Surgeon: Julio Cesar Alonzo MD;  Location: [facility] ENDO (2ND FLR);  Service: Endoscopy;  Laterality: N/A;  3/16: port L chest wall. pt to  bring covid vaccination card. Instructions sent via portal.-SC  3/18/22-Updated instructions sent via portal re:new date/time-DS    INSERTION OF TUNNELED CENTRAL VENOUS CATHETER (CVC) WITH SUBCUTANEOUS PORT N/A 11/24/2021    Procedure: INSERTION, PORT-A-CATH;  Surgeon: Prem Syed MD;  Location: [facility] CATH LAB;  Service: Radiology;  Laterality: N/A;    ROBOT-ASSISTED LAPAROSCOPIC LYMPHADENECTOMY USING DA МАРИНА XI  6/17/2022    Procedure: XI ROBOTIC LYMPHADENECTOMY - Portal;  Surgeon: Julio Cesar Jara MD;  [facility];  Service: Transplant;;    TONSILLECTOMY      XI ROBOTIC LAPAROSCOPY, EXPLORATORY N/A 6/17/2022    Procedure: XI ROBOTIC LAPAROSCOPY,EXPLORATORY;  Surgeon: Julio Cesar Jara MD;  [facility];  Service: Transplant;  Laterality: N/A;       Review of patient's allergies indicates:  No Known Allergies    Family History       Problem Relation (Age of Onset)    Hyperlipidemia Father    No Known Problems Mother, Sister, Brother          Tobacco Use    Smoking status: Never Smoker    Smokeless tobacco: Never Used   Substance and Sexual Activity    Alcohol use: Not Currently    Drug use: Never    Sexual activity: Yes      Review of Systems   Unable to perform ROS: Intubated   Objective:     Vital Signs (Most Recent):  Temp: 98.3 °F (36.8 °C) (06/21/22 0910)  Pulse: 70 (06/21/22 0910)  Resp: 18 (06/21/22 0910)  BP: (!) 127/95 (06/21/22 0910)  SpO2: 100 % (06/21/22 0910)   Vital Signs (24h Range):  Temp:  [97.6 °F (36.4 °C)-98.3 °F (36.8 °C)] 98.3 °F (36.8 °C)  Pulse:  [70-98] 70  Resp:  [18] 18  SpO2:  [100 %] 100 %  BP: (127-133)/(72-97) 127/95     Weight: 87.3 kg (192 lb 7.4 oz)  Body mass index is 29.26 kg/m².      Intake/Output Summary (Last 24 hours) at 6/21/2022 2356  Last data filed at 6/21/2022 2320  Gross per 24 hour   Intake 8144 ml   Output 6800 ml   Net 1344 ml       Physical Exam  Vitals reviewed.   Constitutional:       Comments: Sedated   HENT:      Head: Normocephalic.    Eyes:      Extraocular Movements: Extraocular movements intact.      Conjunctiva/sclera: Conjunctivae normal.   Cardiovascular:      Rate and Rhythm: Normal rate and regular rhythm.      Comments: R IJ central venous and PA catheter  R IJ Port in place  Pulmonary:      Comments: Intubated. Breath sounds bilaterally  Abdominal:      Comments: Soft, non-distended. Chevron incision c/d/I. SHRUTHI drains with minimal sanguinous output.   Musculoskeletal:         General: No signs of injury.      Cervical back: Normal range of motion and neck supple.      Right lower leg: No edema.      Left lower leg: No edema.      Comments: L radial arterial line  R fem arterial line   Skin:     General: Skin is warm and dry.   Neurological:      General: No focal deficit present.      Mental Status: He is oriented to person, place, and time.       Vents:       Lines/Drains/Airways       Central Venous Catheter Line  Duration             Port A Cath Single Lumen 11/24/21 1202 right subclavian;right internal jugular 209 days    Port A Cath Single Lumen 06/18/22 right subclavian 3 days    Percutaneous Central Line Insertion/Assessment - Triple Lumen  06/21/22 1310 right internal jugular <1 day    Pulmonary Artery Catheter Assessment  06/21/22 1130 <1 day              Drain  Duration                  Closed/Suction Drain 06/21/22 2345 Lateral;Right Abdomen Bulb 19 Fr. <1 day         Closed/Suction Drain 06/21/22 2345 Right;Medial Abdomen Bulb 19 Fr. <1 day         Urethral Catheter 06/21/22 1143 Non-latex;Straight-tip 16 Fr. <1 day              Airway  Duration                  Airway - Non-Surgical 06/21/22 1117 <1 day              Arterial Line  Duration             Arterial Line 06/21/22 1136 Left Radial <1 day    Arterial Line 06/21/22 1209 Right Femoral <1 day              Peripheral Intravenous Line  Duration                  Peripheral IV - Single Lumen 06/21/22 1137 16 G Right Hand <1 day         SYDNEY 06/21/22 1134 Left Antecubital  <1 day                    Significant Labs:    CBC/Anemia Profile:  Recent Labs   Lab 06/20/22  0549 06/21/22 0456 06/21/22 1146 06/21/22 1954 06/21/22 2106 06/21/22 2200   WBC 6.23 5.32  --   --   --   --    HGB 11.0* 11.5*   < > 10.4* 11.1* 10.9*   HCT 33.0* 34.4*   < > 30.0* 31.9* 31.7*   * 127*   < > 179 168 154   * 100*  --   --   --   --    RDW 13.0 13.1  --   --   --   --     < > = values in this interval not displayed.        Chemistries:  Recent Labs   Lab 06/20/22 0549 06/21/22 0456 06/21/22 1146 06/21/22 1954 06/21/22 2106 06/21/22 2200    140   < > 139 136 136   K 3.8 3.9   < > 3.9 5.0 4.9    106  --   --   --   --    CO2 26 26  --   --   --   --    BUN 8 9  --   --   --   --    CREATININE 0.8 0.8  --   --   --   --    CALCIUM 9.2 9.5  --   --   --   --    ALBUMIN 3.0* 3.1*   < > 3.4* 3.6 3.4*   PROT 6.1 6.3  --   --   --   --    BILITOT 0.4 0.3  --   --   --   --    ALKPHOS 136* 161*  --   --   --   --    ALT 40 36  --  1,961*  --   --    AST 22 23  --  1,733*  --   --    MG 1.9 1.8   < > 1.8 1.8 1.7   PHOS 4.2 5.3*  --   --   --   --     < > = values in this interval not displayed.       Postoperative labs are pending    Significant Imaging: I have reviewed all pertinent imaging results/findings within the past 24 hours.    Post-Op CXR demonstrating near extubation.

## 2022-06-22 NOTE — PROGRESS NOTES
Transplant Social Work 2nd Note     Transplant  presented to patient's bedside for any needs and continuity of care. Patient is a recipient of a living-donor liver transplantation and received transplant on 6/21/22.  Patient is currently intubated and sedated. Patient's wife, his father and mother presented at patients bedside and answering questions appropriately. Patient's father expressed his gratefulness to the medical team. Patient's wife, Shell shared that she would be the primary caregiver for the patient and that she will transport patient home upon discharge.  Patient's father, Bradley will be the secondary caregiver and will also help in bringing patient to his appointments.   The patient's family reported coping adequately at this time with support from family and friends.       Patient's caregivers do not have any other questions or concerns at this time.    will continue to follow for psychosocial needs, resources and discharge planning.     Supervised by Gregg Finley LMSW, Becki Jones LCSW.

## 2022-06-22 NOTE — SUBJECTIVE & OBJECTIVE
"Interval HPI:   Overnight events: No acute events overnight. Patient on the SICU in room 09133 CVICU/32646 CVICU A. Blood glucose improving. BG above goal on current insulin regimen (IIP). Steroid use- Methylprednisolone  500 mg. 1 Day Post-Op  Renal function- Normal   Vasopressors-  None       Diet NPO Except for: Sips with Medication     Eating:   NPO  Nausea: No  Hypoglycemia and intervention: No  Fever: No  TPN and/or TF: No    ROS RACHEL due to intubation.     Physical Exam:  Constitutional: Well developed, well nourished, NAD.  ENT: External ears no masses with nose patent; normal hearing.  Neck: Supple; trachea midline.  Cardiovascular: Normal heart sounds, no LE edema. DP +2 bilaterally.  Lungs: Intubated; lungs anterior bilaterally clear to auscultation.  Abdomen: Soft, no masses, no hernias.  MS: No clubbing or cyanosis of nails noted; unable to assess gait.  Skin: No rashes, lesions, or ulcers; no nodules. Injection sites are ok. No lipo hypertropthy or atrophy. Large surgical incision noted.   Psychiatric: RACHEL  Neurological: RACHEL  Foot: Nails in good condition, no amputations noted.     /78 (BP Location: Right arm, Patient Position: Lying)   Pulse 107   Temp 97.9 °F (36.6 °C) (Core ([place]))   Resp 20   Ht 5' 8" (1.727 m)   Wt 86.7 kg (191 lb 2.2 oz)   SpO2 98%   BMI 29.06 kg/m²     Labs Reviewed and Include    Recent Labs   Lab 06/22/22  0409   *   CALCIUM 9.4   ALBUMIN 3.4*   PROT 5.3*   *   K 4.3   CO2 22*      BUN 14   CREATININE 0.9   ALKPHOS 105   ALT 2,195*   AST 1,761*  1,750*   BILITOT 3.0*     Lab Results   Component Value Date    WBC 15.24 (H) 06/22/2022    HGB 10.4 (L) 06/22/2022    HCT 30.7 (L) 06/22/2022    MCV 97 06/22/2022     (L) 06/22/2022     No results for input(s): TSH, FREET4 in the last 168 hours.  Lab Results   Component Value Date    HGBA1C 5.8 (H) 06/20/2022       Nutritional status:   Body mass index is 29.06 kg/m².  Lab Results " "  Component Value Date    ALBUMIN 3.4 (L) 06/22/2022    ALBUMIN 3.4 (L) 06/22/2022    ALBUMIN 3.4 (L) 06/21/2022     No results found for: PREALBUMIN    Estimated Creatinine Clearance: 113.3 mL/min (based on SCr of 0.9 mg/dL).    Accu-Checks  No results for input(s): POCTGLUCOSE in the last 72 hours.    Current Medications and/or Treatments Impacting Glycemic Control  Immunotherapy:    Immunosuppressants           Stop Route Frequency     tacrolimus (PROGRAF) 1 mg/mL oral syringe         -- Oral 2 times daily     mycophenolate mofetil 200 mg/mL suspension 1,000 mg         -- Oral 2 times daily          Steroids:   Hormones (From admission, onward)                Start     Stop Route Frequency Ordered    06/27/22 0900  predniSONE tablet 20 mg  (methylprednisolone taper panel)        "Followed by" Linked Group Details    -- Oral Daily 06/22/22 0014    06/26/22 0900  methylPREDNISolone sodium succinate injection 40 mg  (methylprednisolone taper panel)        "Followed by" Linked Group Details    06/27 0859 IV Daily 06/22/22 0014    06/25/22 0900  methylPREDNISolone sodium succinate injection 80 mg  (methylprednisolone taper panel)        "Followed by" Linked Group Details    06/26 0859 IV Daily 06/22/22 0014    06/24/22 0900  methylPREDNISolone sodium succinate injection 120 mg  (methylprednisolone taper panel)        "Followed by" Linked Group Details    06/25 0859 IV Daily 06/22/22 0014    06/23/22 0900  methylPREDNISolone sodium succinate injection 160 mg  (methylprednisolone taper panel)        "Followed by" Linked Group Details    06/24 0859 IV Daily 06/22/22 0014    06/22/22 0900  methylPREDNISolone sodium succinate injection 200 mg  (methylprednisolone taper panel)        "Followed by" Linked Group Details    06/23 0859 IV Daily 06/22/22 0014          Pressors:    Autonomic Drugs (From admission, onward)                None          Hyperglycemia/Diabetes Medications:   Antihyperglycemics (From admission, " onward)                Start     Stop Route Frequency Ordered    06/22/22 0100  insulin regular in 0.9 % NaCl 100 unit/100 mL (1 unit/mL) infusion        Question Answer Comment   Insulin Rate Adjustment (DO NOT MODIFY ANSWER) [email or web address]    Enter initial dose from Infusion Protocol Chart (Units/hr): 2.3        -- IV Continuous 06/22/22 0014

## 2022-06-22 NOTE — PT/OT/SLP PROGRESS
Physical Therapy  Consult    Patient Name:  Romeo Larson   MRN:  [id number]  Admitting Diagnosis:  Hilar cholangiocarcinoma   Recent Surgery: Procedure(s) (LRB):  TRANSPLANT, LIVER (N/A)  ULTRASOUND, DIAGNOSTIC (N/A) 1 Day Post-Op  Admit Date: 6/18/2022  Length of Stay: 4 days    Physical Therapy orders received. Patient not seen today due to pt intubated and sedated this AM. Pt extubated later in day.  PT to attempt when Romeo Larson is medically appropriate for progressive mobility.    Shobha Tripp PT, DPT  6/22/2022   Pager: [phone]

## 2022-06-22 NOTE — PROGRESS NOTES
Saw patient in the ICU resting.  Left [facility]: Living [facility] After My Liver Transplant at patient's bedside.  Called spouse, Shell.  No answer.

## 2022-06-22 NOTE — OP NOTE
Certification of Assistant at Surgery       Surgery Date: 6/21/2022     Participating Surgeons:  Surgeon(s) and Role:     * Sinan Cline MD - Primary     * Julio Cesar Jara MD - Assisting     * Esther Lu MD - Fellow     * Santi Godwin MD    Procedures:  Procedure(s) (LRB):  TRANSPLANT, LIVER (N/A)  ULTRASOUND, DIAGNOSTIC (N/A)    Assistant Surgeon's Certification of Necessity:  I understand that section 1842 (b) (6) (d) of the Social Security Act generally prohibits Medicare Part B reasonable charge payment for the services of assistants at surgery in teaching hospitals when qualified residents are available to furnish such services. I certify that the services for which payment is claimed were medically necessary, and that no qualified resident was available to perform the services. I further understand that these services are subject to post-payment review by the Medicare carrier.      Santi Godwin MD    06/21/2022  11:29 PM

## 2022-06-22 NOTE — ASSESSMENT & PLAN NOTE
Endocrinology consulted for BG management.   BG goal 140-180        - IIP  - Requires intensive BG monitoring.   - BG checks q1hr  - Hypoglycemia protocol in place    - Notify endocrine if patient becomes hypokalemic (K < 3.3) and/or is not responding to replacement.   - Notify endocrine if patient requiring > 20 units/hr.      ** Please notify Endocrine for any change and/or advance in diet**  ** Please call Endocrine for any BG related issues **    Discharge Planning:   TBD. Please notify endocrinology prior to discharge.

## 2022-06-22 NOTE — PROGRESS NOTES
Pharmacokinetic Assessment Follow Up: IV Vancomycin    Vancomycin order has been discontinued. Pharmacy will sign off. Please reconsult as needed! Thank you!    Please contact pharmacy for questions regarding this assessment.    Thank you for the consult,   Di Cazares

## 2022-06-22 NOTE — OP NOTE
Operative Report    Date of Procedure: 6/21/2022  Date of Transplant (UNOS): 6/21/22    Surgeons:  Surgeon(s) and Role:     * Sinan Cline MD - Primary     * Julio Cesar Jara MD - Assisting     * Esther Lu MD - Fellow     * Santi Godwin MD    First Assistant Attestation:  The presence of an additional attending surgeon functioning as first assistant was required due to the complexity of the procedure relative to any available residents. I certify that no resident was available who was qualified to serve as first assistant. Duties performed by the assistant included assisting the primary surgeon.    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 year-old male with Primary Liver Malignancy: Cholangiocarcinoma (CH-CA),  (UNOS terminology) who has been evaluated for a liver transplant.  The procedure was thoroughly discussed with the patient, including potential risks, complications, and alternatives. Specific complications mentioned included death, graft non-function, bleeding, infection, rejection, renal failure, respiratory failure, and neurologic deficits, as well as the possibility of other complications not specifically mentioned.    Donor Risk Factors:  Prior to the operation, the patient was advised of any donor-specific risk factors requiring specific disclosure. Factors in this case included nothing that required specific disclosure.      Specific [facility] Donor Risk criteria for the organ donor include:  None    All questions were answered,  the patient voiced appropriate understanding, and he agreed to proceed with the planned procedure.    ABO Confirmation: Immediately following arrival of the donor organ and prior to implantation, a formal ABO confirmation was done according to hospital and UNOS policies.  I confirmed the UNOS ID number of the donor organ ([id number]) and the donor and recipient ABO types, directly verifying these data by comparison with the UNOS Match Run report (.  This confirmation was personally done by an attending surgeon and circulating nurse, and is officially documented elsewhere.    Time-Out: A complete time out was carried out prior to incision, with confirmation of patient identity, correct procedure, correct operative site, appropriate antibiotic prophylaxis, review of any known allergies, and presence of all needed equipment.    Procedure in Detail  Following the induction of general endotracheal anesthesia, appropriate arterial and venous lines were placed by the anesthesia team.  Care was taken to pad all pressure points and avoid any potential traction injuries from positioning. The urinary bladder was catheterized.  Sequential compression boots and Reece Huggers were used. The chest, abdomen, and upper thighs were sterilely prepped and draped.     The abdomen was entered via a wide bilateral subcostal incision. 0 mL of ascites was removed. The round ligament was ligated and divided. The falciform, left triangular, and gastrohepatic ligaments were divided using the electrocautery, with 2-0 silk ties as needed. The Quezada self-retaining retractor was placed to provide exposure. Adhesions between the abdominal viscera and the abdominal wall and the undersurface of the liver were divided as needed using cautery dissection and silk ties or suture ligatures. Hilar dissection was then carried out, with ligation of the right and left hepatic arteries as well as the cystic duct and the common hepatic duct. The recipient  arterial anatomy was found to be: standard. The portal vein was exposed circumferentially from its bifurcation down to the superior border of the pancreas. The portal vein was found to be patent.  Attention was next directed to the right side of the liver where the right triangular ligament was taken down, exposing the bare area of the liver, and the IVC. The infrahepatic IVC was isolated, followed by mobilization of the retrohepatic cava, division of the right adrenal vein, and isolation of the suprahepatic IVC. The patient was given 2000 units of heparin prior to caval cross-clamping. . After assuring adequate stability after cross-clamping, the native liver was excised and the allograft liver was brought to the operative field.   The hePATIC VEIN AND RECONSTRUCTED SEGMENT 5/8 VEINS WERE ANASTOMOSED TO A OPENED right hepatic vein using 4-0 prolene.   The donor portal vein was then anastomosed to the recipient portal inflow site (end portal vein) with 5-0 polypropylene. Once this was completed, anesthesia was notified and the liver was re-perfused. Copious irrigation with warm saline and temporary finger occlusion of portal inflow were used as needed to avoid any problems with hypothermia. Temporary packing, electrocautery, and polypropylene suture ligatures were used as appropriate to provide hemostasis. The medial edge of the liver appeared congested so an additional outflow tract was created using iliac vein graft between the vena cava and the reconstructed segment 5/8 outflow vein using 5-0 prolene. The congested medial edge resolved.   Once this was satisfactory, attention was directed to the arterial reconstruction. Arterial inflow was provided to the graft by anastomosing the recipient common hepatic artery to the donor  other using 7-0 polypropylene. The liver was assessed for adequacy of perfusion, which was not satisfactory. The artery was reclamped. and the interpostiton graft was removed and the  arterial anastomosis was redone. The artery was then felt to be satisfactory by palpation and by ultrasound. Portal pressure at this time was 7 mmhg  a temporary packing period was carried out to help assure hemostatis.   Attention was next directed toward the bile duct. The donor bile duct was prepared and assessed for adequate vascular supply. Biliary reconstruction was then performed by anastomosing the recipient enteric-jejunum  to the donor duct using 6-0 PDS sutures.  The biliary stent used was: none.  A final check for hemostasis was made. 2 19f Cyril drains were placed through separate incisions below the transverse abdominal incision and placed in the usual positions. The cavity was irrigated with saline. The abdomen was closed in layers using running #1 PDS suture. The incision was irrigated and the skin was closed with staples. At the end of the case all instrument, needle, and sponge counts were correct. The patient was taken to the ICU in good condition.     Fluids Administered:   Crystalloid (mL) 7,000   Colloid (mL) 250   Cell Saver (CCs) 100   Cell Saver (mL) 100      Specimens: Explant liver  Drains: 19f Cyril drains x 2    Additional Findings:    Estimated Blood Loss: 700 mL  Ascites Evacuated: 0 mL    Ischemic Times:  Time of portal reperfusion: 6/21/2022  7:13 PM  Time of arterial reperfusion: 6/21/2022  7:36 PM  Anastomosis (warm ischemia) time: 27 minutes  Cold ischemia time: 155 minutes    Surgical Data:  Graft type (whole vs. partial): partial  IVC reconstruction: hepatic vein confluence piggyback  Portal vein status: patent  Portal graft performed? no  Donor arterial anatomy: standard  Donor arterial inflow: other  Recipient arterial anatomy: standard  Recipient arterial inflow: common hepatic artery  Arterial graft performed? no  Biliary reconstruction: vick-en-y (donor left hepatic duct to recipient enteric-jejunum )  Biliary stent: none  Disposition of Vessels from donor of transplanted  organ:   Damage during procurement: no  Procurement damage comments:     Donor Factors:  UNOS ID: [id number]  UNOS Match Run:   Donor type: living  Donor with reported PHS increased risk criteria:   Donor CMV serologic status:   Donor with known cancer:   Donor HCV serologic status:   Donor HBcAb:   Donor HBV GUSTABO:   Donor HCV GUSTABO:   Liver weight: 892 grams

## 2022-06-22 NOTE — H&P
[facility] - [facility] Intensive [facility]  Critical Care - Surgery  History & Physical    Patient Name: Romeo Larson  MRN: [id number]  Admission Date: 6/18/2022  Code Status: Prior  Attending Physician: Santi Godwin MD   Primary Care Provider: Pravin Maria MD   Principal Problem: Hilar cholangiocarcinoma    Subjective:     HPI:  Romeo Larson is our 40 yo M with end stage liver disease secondary to cholangiocarcinoma who presented today as the recipient of a liver-donor liver transplantation. Surgical data and intraoperative fluids can be found below. He was brought to the SICU postoperatively for further monitoring and care. He arrived sedated, intubated, and off vasopressor/inotropic support. His immediate arrival was significant for a CXR demonstrating the ETT tip in the cervical neck. Anesthesia presented to the bedside immediately to evaluate and found the ETT tip at the cords and the balloon above the cords. The tube was advanced into appropriate position without issue.    Fluids Administered:   Crystalloid (mL) 7,000   Colloid (mL) 250   Cell Saver (CCs) 100   Cell Saver (mL) 100     Estimated Blood Loss: 700 mL  Ascites Evacuated: 0 mL     Surgical Data:  Graft type (whole vs. partial): partial  IVC reconstruction: hepatic vein confluence piggyback  Portal vein status: patent  Portal graft performed? no  Donor arterial anatomy: standard  Donor arterial inflow: other  Recipient arterial anatomy: standard  Recipient arterial inflow: common hepatic artery  Arterial graft performed? no  Biliary reconstruction: vick-en-y (donor left hepatic duct to recipient enteric-jejunum )  Biliary stent: none        Hospital/ICU Course:  No notes on file    Follow-up For: Procedure(s) (LRB):  TRANSPLANT, LIVER (N/A)  ULTRASOUND, DIAGNOSTIC (N/A)    Post-Operative Day: Day of Surgery     Past Medical History:   Diagnosis Date    Adjustment and management of vascular access device 5/18/2022    Cholangiocarcinoma      Hypercholesteremia     Hypertension        Past Surgical History:   Procedure Laterality Date    ENDOSCOPIC ULTRASOUND OF UPPER GASTROINTESTINAL TRACT N/A 10/20/2021    Procedure: ULTRASOUND, UPPER GI TRACT, ENDOSCOPIC;  Surgeon: Valeriy Sotelo MD;  [facility]  Service: Endoscopy;  Laterality: N/A;  wife to email vacc card-inst email-tb    ERCP N/A 10/20/2021    Procedure: ERCP (ENDOSCOPIC RETROGRADE CHOLANGIOPANCREATOGRAPHY);  Surgeon: Valeriy Sotelo MD;  Location: [facility] (2ND FLR);  Service: Endoscopy;  Laterality: N/A;    ERCP N/A 11/4/2021    Procedure: ERCP (ENDOSCOPIC RETROGRADE CHOLANGIOPANCREATOGRAPHY);  Surgeon: Valeriy Sotelo MD;  Location: [facility] ENDO (2ND FLR);  Service: Endoscopy;  Laterality: N/A;    ERCP N/A 11/4/2021    Procedure: ERCP (ENDOSCOPIC RETROGRADE CHOLANGIOPANCREATOGRAPHY);  Surgeon: Roselia Pereyra MD;  Location: [facility] (2ND FLR);  Service: Endoscopy;  Laterality: N/A;  pt vaccinated, instructed to bring card to procedure, instructions sent portal-    ERCP N/A 1/14/2022    Procedure: ERCP (ENDOSCOPIC RETROGRADE CHOLANGIOPANCREATOGRAPHY);  Surgeon: Roselia Pereyra MD;  Location: [facility] (2ND FLR);  Service: Endoscopy;  Laterality: N/A;  inst portal-right chest port-tb  Pt requested at home COVID testing, Pt instructed at home RAPID to be done morning of procedure, Pt instructed to call endoscopy scheuding if there is a positive result, Pt informed if a positive     ERCP N/A 3/31/2022    Procedure: ERCP (ENDOSCOPIC RETROGRADE CHOLANGIOPANCREATOGRAPHY);  Surgeon: Julio Cesar Alonzo MD;  Location: [facility] ENDO (2ND FLR);  Service: Endoscopy;  Laterality: N/A;  3/16: port L chest wall. pt to bring covid vaccination card. Instructions sent via portal.-SC  3/18/22-Updated instructions sent via portal re:new date/time-DS    INSERTION OF TUNNELED CENTRAL VENOUS CATHETER (CVC) WITH SUBCUTANEOUS PORT N/A 11/24/2021    Procedure: INSERTION,  PORT-A-CATH;  Surgeon: Prem Syed MD;  Location: [facility] CATH LAB;  Service: Radiology;  Laterality: N/A;    ROBOT-ASSISTED LAPAROSCOPIC LYMPHADENECTOMY USING DA МАРИНА XI  6/17/2022    Procedure: XI ROBOTIC LYMPHADENECTOMY - Portal;  Surgeon: Julio Cesar Jara MD;  [facility];  Service: Transplant;;    TONSILLECTOMY      XI ROBOTIC LAPAROSCOPY, EXPLORATORY N/A 6/17/2022    Procedure: XI ROBOTIC LAPAROSCOPY,EXPLORATORY;  Surgeon: Julio Cesar Jara MD;  [facility];  Service: Transplant;  Laterality: N/A;       Review of patient's allergies indicates:  No Known Allergies    Family History       Problem Relation (Age of Onset)    Hyperlipidemia Father    No Known Problems Mother, Sister, Brother          Tobacco Use    Smoking status: Never Smoker    Smokeless tobacco: Never Used   Substance and Sexual Activity    Alcohol use: Not Currently    Drug use: Never    Sexual activity: Yes      Review of Systems   Unable to perform ROS: Intubated   Objective:     Vital Signs (Most Recent):  Temp: 98.3 °F (36.8 °C) (06/21/22 0910)  Pulse: 70 (06/21/22 0910)  Resp: 18 (06/21/22 0910)  BP: (!) 127/95 (06/21/22 0910)  SpO2: 100 % (06/21/22 0910)   Vital Signs (24h Range):  Temp:  [97.6 °F (36.4 °C)-98.3 °F (36.8 °C)] 98.3 °F (36.8 °C)  Pulse:  [70-98] 70  Resp:  [18] 18  SpO2:  [100 %] 100 %  BP: (127-133)/(72-97) 127/95     Weight: 87.3 kg (192 lb 7.4 oz)  Body mass index is 29.26 kg/m².      Intake/Output Summary (Last 24 hours) at 6/21/2022 2356  Last data filed at 6/21/2022 2320  Gross per 24 hour   Intake 8144 ml   Output 6800 ml   Net 1344 ml       Physical Exam  Vitals reviewed.   Constitutional:       Comments: Sedated   HENT:      Head: Normocephalic.   Eyes:      Extraocular Movements: Extraocular movements intact.      Conjunctiva/sclera: Conjunctivae normal.   Cardiovascular:      Rate and Rhythm: Normal rate and regular rhythm.      Comments: R IJ central venous and PA catheter  R IJ  Port in place  Pulmonary:      Comments: Intubated. Breath sounds bilaterally  Abdominal:      Comments: Soft, non-distended. Chevron incision c/d/I. SHRUTHI drains with minimal sanguinous output.   Musculoskeletal:         General: No signs of injury.      Cervical back: Normal range of motion and neck supple.      Right lower leg: No edema.      Left lower leg: No edema.      Comments: L radial arterial line  R fem arterial line   Skin:     General: Skin is warm and dry.   Neurological:      General: No focal deficit present.      Mental Status: He is oriented to person, place, and time.       Vents:       Lines/Drains/Airways       Central Venous Catheter Line  Duration             Port A Cath Single Lumen 11/24/21 1202 right subclavian;right internal jugular 209 days    Port A Cath Single Lumen 06/18/22 right subclavian 3 days    Percutaneous Central Line Insertion/Assessment - Triple Lumen  06/21/22 1310 right internal jugular <1 day    Pulmonary Artery Catheter Assessment  06/21/22 1130 <1 day              Drain  Duration                  Closed/Suction Drain 06/21/22 2345 Lateral;Right Abdomen Bulb 19 Fr. <1 day         Closed/Suction Drain 06/21/22 2345 Right;Medial Abdomen Bulb 19 Fr. <1 day         Urethral Catheter 06/21/22 1143 Non-latex;Straight-tip 16 Fr. <1 day              Airway  Duration                  Airway - Non-Surgical 06/21/22 1117 <1 day              Arterial Line  Duration             Arterial Line 06/21/22 1136 Left Radial <1 day    Arterial Line 06/21/22 1209 Right Femoral <1 day              Peripheral Intravenous Line  Duration                  Peripheral IV - Single Lumen 06/21/22 1137 16 G Right Hand <1 day         SYDNEY 06/21/22 1134 Left Antecubital <1 day                    Significant Labs:    CBC/Anemia Profile:  Recent Labs   Lab 06/20/22  0549 06/21/22  0456 06/21/22  1146 06/21/22  1954 06/21/22  2106 06/21/22  2200   WBC 6.23 5.32  --   --   --   --    HGB 11.0* 11.5*   < > 10.4*  11.1* 10.9*   HCT 33.0* 34.4*   < > 30.0* 31.9* 31.7*   * 127*   < > 179 168 154   * 100*  --   --   --   --    RDW 13.0 13.1  --   --   --   --     < > = values in this interval not displayed.        Chemistries:  Recent Labs   Lab 06/20/22  0549 06/21/22  0456 06/21/22  1146 06/21/22  1954 06/21/22  2106 06/21/22  2200    140   < > 139 136 136   K 3.8 3.9   < > 3.9 5.0 4.9    106  --   --   --   --    CO2 26 26  --   --   --   --    BUN 8 9  --   --   --   --    CREATININE 0.8 0.8  --   --   --   --    CALCIUM 9.2 9.5  --   --   --   --    ALBUMIN 3.0* 3.1*   < > 3.4* 3.6 3.4*   PROT 6.1 6.3  --   --   --   --    BILITOT 0.4 0.3  --   --   --   --    ALKPHOS 136* 161*  --   --   --   --    ALT 40 36  --  1,961*  --   --    AST 22 23  --  1,733*  --   --    MG 1.9 1.8   < > 1.8 1.8 1.7   PHOS 4.2 5.3*  --   --   --   --     < > = values in this interval not displayed.       Postoperative labs are pending    Significant Imaging: I have reviewed all pertinent imaging results/findings within the past 24 hours.    Post-Op CXR demonstrating near extubation.    Assessment/Plan:     * Hilar cholangiocarcinoma  41 y.o. male with end-stage liver disease secondary to cholangiocarcinoma s/p living-donor liver transplant on 6/21/22.    Neuro:   - Sedation: Prop  - Analgesia: Multimodal    Pulmonary:   - Intubated--tube repositioned by anesthesia shortly after arrival   -Plan to remain intubated tonight and work towards extubation in the morning  - Wean vent settings as able  - Daily CXRs while intubated  - ABGs PRN    Cardiac:  - HDS off pressors  - SBP goal >100  - Goal CVP <7    FEN/GI:  - NPO  - Monitor SHRUTHI drain output   - Famotidine ppx  - q4h AST  - mIVF  - Cautious bolusing with albumin as needed. CVP goal <7  - Replete lytes prn  - Liver US in the morning    Renal:    - Monitor UOP  - Trend BUN/Cr  - Keep campos    ID/Immunology:  - Afebrile  - Immunosuppression and ppx ordered  - Continue  Vanc/Cefepime/Fluconazole that he was on preoperatively for now    Heme:   - Trend H&H  - Heparin ppx    Endocrine:    - Insulin gtt for glucose control  - Endocrine consulted, appreciate assistance    DISPO:  - Continue SICU care  - Wean vent requirements per Transplant Surgery  - Wean sedation per Transplant Surgery         Critical care was time spent personally by me on the following activities: development of treatment plan with patient or surrogate and bedside caregivers, discussions with consultants, evaluation of patient's response to treatment, examination of patient, ordering and performing treatments and interventions, ordering and review of laboratory studies, ordering and review of radiographic studies, pulse oximetry, re-evaluation of patient's condition.  This critical care time did not overlap with that of any other provider or involve time for any procedures.     Michael Atkinson MD  Critical Care - Surgery  [facility] - [facility]

## 2022-06-22 NOTE — PROGRESS NOTES
Autotransfusion/Rapid Infusion Record:      06/22/2022  Autotransfusionist:  Rebel Mckeon    Surgeon(s) and Role:     * Sinan Cline MD - Primary     * Julio Cesar Jara MD - Assisting     * Esther Lu MD - Fellow     * Santi Godwin MD  Anesthesiologist:  Cristofer Camp MD    Past Medical History:   Diagnosis Date    Adjustment and management of vascular access device 5/18/2022    Cholangiocarcinoma     Hypercholesteremia     Hypertension        Procedure(s) (LRB):  TRANSPLANT, LIVER (N/A)  ULTRASOUND, DIAGNOSTIC (N/A)     12:12 AM    Equipment:    Cell Saver     R.I.S.  : [employer] Model: CATSmart  : [place]   Model: [id number]     Serial number: [id number]   Serial number:    Disposable lot #:  Disposable lot #:      Were extra cardiotomies used for cell saver?  yes, 1    Solutions:  Anticoagulant: ACD-A   Expiration date: 10/23 Volume used: 2850cc   Wash solution: 0.9% NaCl   Expiration date: 2/24 Volume used: 2478cc     Cell saver checklist  Time completed:           [x]   Circuit assembled correctly     [x]   Cell saver powered and operational     [x]   Vacuum connected, functional, adjust to max -150mmHg     [x]   Anticoagulant drip rate adjusted     [x]   Transfer bag properly labeled with patient name, expiration time, volume,       anticoagulant, OR number, and initials     [x]   Cell saver disinfected after use (completed at end of case)       Cell Saver volumes:    Total volume processed:     888 mL     Total volume pRBCs recovered     244 mL     Volume pRBCs infused     150 mL         RIS checklist   Time completed:  []   RIS circuit assembled correctly     []   RIS power and operational     []   RIS disinfected after use (completed at end of case)       RIS volumes:    Total volume infused:    (see anesthesia record for blood       product information)    mL       Additional comments:

## 2022-06-22 NOTE — PROGRESS NOTES
Report received from anesthesia. Pt transported to SICU 10861 with portable telemetry and portable vent. Pt connected to ICU monitor and Ventilator. Charge RN, Dr. Atkinson, and RT called and made aware of patient arrival. New orders received and implemented. Pt assessed, immediate needs met. Family brought to bedside, updated on the patient's current condition and PoC for remainder of shift. Family also given ICU Welcome packet and educated on visiting hours. All questions answered, emotional support provided.     Admit Skin Note: No breakdown noted.

## 2022-06-22 NOTE — PLAN OF CARE
Pt follows commands, moves all extremities. Afebrile. Pt remains on ventilator ACVC 30% and 5 PEEP, O2 100%. HR 90-110s ST. -160. CVP 6-7.   Gtts: Propofol @ 40 mcg/kg/min, Insulin @ 11.4 U/h, MIVF @ 100cc/h.   OG to LIWS. UOP 1.2L per campos. SHRUTHI 1 and 2 with minimal serosanguineous output.    Labs trended, lytes replaced per order. Dr. Atkinson updated throughout night. VSS at this time. Will continue to monitor. See flowsheet for full assessment details.

## 2022-06-22 NOTE — PLAN OF CARE
[facility] - [facility] Intensive Care  Initial Discharge Assessment    BEING FOLLOWED BY TRANSPLANT UR/CM/ABENA

## 2022-06-22 NOTE — CARE UPDATE
Pt extubated.   Pt placed on 4L NC.   SpO2 ~98%, sustaining.   Pt opens eyes spontaneously.   Following all commands.   Nods/gestures appropriately.   Moving all extremities freely/equally.     STANLEY Avila MD at bedside.   SICU Charge notified.   Plan of care reviewed with pt and spouse.   Questions answered per SICU RN.     See flowsheets for details.

## 2022-06-22 NOTE — HPI
Romeo Larson is our 40 yo M with end stage liver disease secondary to cholangiocarcinoma who presented today as the recipient of a liver-donor liver transplantation. Surgical data and intraoperative fluids can be found below. He was brought to the SICU postoperatively for further monitoring and care. He arrived sedated, intubated, and off vasopressor/inotropic support. His immediate arrival was significant for a CXR demonstrating the ETT tip in the cervical neck. Anesthesia presented to the bedside immediately to evaluate and found the ETT tip at the cords and the balloon above the cords. The tube was advanced into appropriate position without issue.    Fluids Administered:   Crystalloid (mL) 7,000   Colloid (mL) 250   Cell Saver (CCs) 100   Cell Saver (mL) 100     Estimated Blood Loss: 700 mL  Ascites Evacuated: 0 mL     Surgical Data:  Graft type (whole vs. partial): partial  IVC reconstruction: hepatic vein confluence piggyback  Portal vein status: patent  Portal graft performed? no  Donor arterial anatomy: standard  Donor arterial inflow: other  Recipient arterial anatomy: standard  Recipient arterial inflow: common hepatic artery  Arterial graft performed? no  Biliary reconstruction: vick-en-y (donor left hepatic duct to recipient enteric-jejunum )  Biliary stent: none

## 2022-06-22 NOTE — CONSULTS
[facility]  Endocrinology  Diabetes Consult Note    Consult Requested by: Santi Godwin MD   Reason for admit: Hilar cholangiocarcinoma    HISTORY OF PRESENT ILLNESS:  Reason for Consult: Management of T2DM vs Steroid Induced Hyperglycemia     Surgical Procedure and Date: Liver Transplant 6/21/2022    Diabetes diagnosis year: 2022    Home Diabetes Medications: Metformin 500 mg BID- While on Chemo     How often checking glucose at home?  RACHEL    BG readings on regimen: RACHEL  Hypoglycemia on the regimen?  RACHEL  Missed doses on regimen?  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 He was brought to the SICU postoperatively for further monitoring and care. He arrived sedated, intubated, and off vasopressor/inotropic support. His immediate arrival was significant for a CXR demonstrating the ETT tip in the cervical neck. Anesthesia presented to the bedside immediately to evaluate and found the ETT tip at the cords and the balloon above the cords. The tube was advanced into appropriate position without issue. Endocrine consulted to manage type 2 diabetes and hyperglycemia.     Hemoglobin A1C   Date Value Ref Range Status   06/20/2022 5.8 (H) 4.0 - 5.6 % Final     Comment:     ADA Screening Guidelines:  5.7-6.4%  Consistent with prediabetes  >or=6.5%  Consistent with diabetes    High levels of fetal hemoglobin interfere with the HbA1C  assay. Heterozygous hemoglobin variants (HbS, HgC, etc)do  not significantly interfere with this assay.   However, presence of multiple variants may affect accuracy.     03/29/2022 6.8 (H) 4.0 - 5.6 % Final     Comment:     ADA Screening Guidelines:  5.7-6.4%  Consistent with prediabetes  >or=6.5%  Consistent with  "diabetes    High levels of fetal hemoglobin interfere with the HbA1C  assay. Heterozygous hemoglobin variants (HbS, HgC, etc)do  not significantly interfere with this assay.   However, presence of multiple variants may affect accuracy.                 Interval HPI:   Overnight events: No acute events overnight. Patient on the SICU in room 40932 CVICU/63968 CVICU A. Blood glucose improving. BG above goal on current insulin regimen (IIP). Steroid use- Methylprednisolone  500 mg. 1 Day Post-Op  Renal function- Normal   Vasopressors-  None       Diet NPO Except for: Sips with Medication     Eating:   NPO  Nausea: No  Hypoglycemia and intervention: No  Fever: No  TPN and/or TF: No    ROS RACHEL due to intubation.     Physical Exam:  Constitutional: Well developed, well nourished, NAD.  ENT: External ears no masses with nose patent; normal hearing.  Neck: Supple; trachea midline.  Cardiovascular: Normal heart sounds, no LE edema. DP +2 bilaterally.  Lungs: Intubated; lungs anterior bilaterally clear to auscultation.  Abdomen: Soft, no masses, no hernias.  MS: No clubbing or cyanosis of nails noted; unable to assess gait.  Skin: No rashes, lesions, or ulcers; no nodules. Injection sites are ok. No lipo hypertropthy or atrophy. Large surgical incision noted.   Psychiatric: RACHEL  Neurological: RACHEL  Foot: Nails in good condition, no amputations noted.     /78 (BP Location: Right arm, Patient Position: Lying)   Pulse 107   Temp 97.9 °F (36.6 °C) (Core ([place]))   Resp 20   Ht 5' 8" (1.727 m)   Wt 86.7 kg (191 lb 2.2 oz)   SpO2 98%   BMI 29.06 kg/m²     Labs Reviewed and Include    Recent Labs   Lab 06/22/22  0409   *   CALCIUM 9.4   ALBUMIN 3.4*   PROT 5.3*   *   K 4.3   CO2 22*      BUN 14   CREATININE 0.9   ALKPHOS 105   ALT 2,195*   AST 1,761*  1,750*   BILITOT 3.0*     Lab Results   Component Value Date    WBC 15.24 (H) 06/22/2022    HGB 10.4 (L) 06/22/2022    HCT 30.7 (L) 06/22/2022    MCV " "97 06/22/2022     (L) 06/22/2022     No results for input(s): TSH, FREET4 in the last 168 hours.  Lab Results   Component Value Date    HGBA1C 5.8 (H) 06/20/2022       Nutritional status:   Body mass index is 29.06 kg/m².  Lab Results   Component Value Date    ALBUMIN 3.4 (L) 06/22/2022    ALBUMIN 3.4 (L) 06/22/2022    ALBUMIN 3.4 (L) 06/21/2022     No results found for: PREALBUMIN    Estimated Creatinine Clearance: 113.3 mL/min (based on SCr of 0.9 mg/dL).    Accu-Checks  No results for input(s): POCTGLUCOSE in the last 72 hours.    Current Medications and/or Treatments Impacting Glycemic Control  Immunotherapy:    Immunosuppressants           Stop Route Frequency     tacrolimus (PROGRAF) 1 mg/mL oral syringe         -- Oral 2 times daily     mycophenolate mofetil 200 mg/mL suspension 1,000 mg         -- Oral 2 times daily          Steroids:   Hormones (From admission, onward)                Start     Stop Route Frequency Ordered    06/27/22 0900  predniSONE tablet 20 mg  (methylprednisolone taper panel)        "Followed by" Linked Group Details    -- Oral Daily 06/22/22 0014    06/26/22 0900  methylPREDNISolone sodium succinate injection 40 mg  (methylprednisolone taper panel)        "Followed by" Linked Group Details    06/27 0859 IV Daily 06/22/22 0014    06/25/22 0900  methylPREDNISolone sodium succinate injection 80 mg  (methylprednisolone taper panel)        "Followed by" Linked Group Details    06/26 0859 IV Daily 06/22/22 0014    06/24/22 0900  methylPREDNISolone sodium succinate injection 120 mg  (methylprednisolone taper panel)        "Followed by" Linked Group Details    06/25 0859 IV Daily 06/22/22 0014    06/23/22 0900  methylPREDNISolone sodium succinate injection 160 mg  (methylprednisolone taper panel)        "Followed by" Linked Group Details    06/24 0859 IV Daily 06/22/22 0014    06/22/22 0900  methylPREDNISolone sodium succinate injection 200 mg  (methylprednisolone taper panel)      " "  "Followed by" Linked Group Details    06/23 0859 IV Daily 06/22/22 0014          Pressors:    Autonomic Drugs (From admission, onward)                None          Hyperglycemia/Diabetes Medications:   Antihyperglycemics (From admission, onward)                Start     Stop Route Frequency Ordered    06/22/22 0100  insulin regular in 0.9 % NaCl 100 unit/100 mL (1 unit/mL) infusion        Question Answer Comment   Insulin Rate Adjustment (DO NOT MODIFY ANSWER) \\[email or web address]    Enter initial dose from Infusion Protocol Chart (Units/hr): 2.3        -- IV Continuous 06/22/22 0014            Labs Reviewed and Include   Recent Labs   Lab 06/22/22  0409 06/22/22  0730   * 200*   CALCIUM 9.4 9.3   ALBUMIN 3.4*  --    PROT 5.3*  --    * 138   K 4.3 4.3   CO2 22* 25    104   BUN 14 14   CREATININE 0.9 0.9   ALKPHOS 105  --    ALT 2,195*  --    AST 1,761*  1,750* 1,539*   BILITOT 3.0*  --      Lab Results   Component Value Date    WBC 15.48 (H) 06/22/2022    HGB 10.5 (L) 06/22/2022    HCT 31.5 (L) 06/22/2022    MCV 99 (H) 06/22/2022     (L) 06/22/2022     No results for input(s): TSH, FREET4 in the last 168 hours.  Lab Results   Component Value Date    HGBA1C 5.8 (H) 06/20/2022       Nutritional status:   Body mass index is 29.06 kg/m².  Lab Results   Component Value Date    ALBUMIN 3.4 (L) 06/22/2022    ALBUMIN 3.4 (L) 06/22/2022    ALBUMIN 3.4 (L) 06/21/2022     No results found for: PREALBUMIN    Estimated Creatinine Clearance: 113.3 mL/min (based on SCr of 0.9 mg/dL).    Accu-Checks  Recent Labs     06/22/22  0019 06/22/22  0107 06/22/22  0204 06/22/22  0314 06/22/22  0408 06/22/22  0504 06/22/22  0601 06/22/22  0734 06/22/22  0804   POCTGLUCOSE 190* 230* 237* 243* 235* 243* 226* 200* 222*        ASSESSMENT and PLAN    * Hilar cholangiocarcinoma  Managed per primary team  Avoid hypoglycemia        Type 2 diabetes mellitus with " hyperglycemia  Endocrinology consulted for BG management.   BG goal 140-180        - IIP  - Requires intensive BG monitoring.   - BG checks q1hr  - Hypoglycemia protocol in place    - Notify endocrine if patient becomes hypokalemic (K < 3.3) and/or is not responding to replacement.   - Notify endocrine if patient requiring > 20 units/hr.      ** Please notify Endocrine for any change and/or advance in diet**  ** Please call Endocrine for any BG related issues **    Discharge Planning:   TBD. Please notify endocrinology prior to discharge.        S/P liver transplant  Optimize BG control to improve wound healing        Adrenal corticosteroid causing adverse effect in therapeutic use  On steroid therapy per transplant team; may elevate BG readings            Plan discussed with patient, family, and RN at bedside.      Brad Singleton, DNP, FNP  Endocrinology  [facility]

## 2022-06-22 NOTE — HPI
Reason for Consult: Management of T2DM vs Steroid Induced Hyperglycemia     Surgical Procedure and Date: Liver Transplant 6/21/2022    Diabetes diagnosis year: 2022    Home Diabetes Medications: Metformin 500 mg BID- While on Chemo     How often checking glucose at home?  RACHEL    BG readings on regimen: RACHEL  Hypoglycemia on the regimen?  RACHEL  Missed doses on regimen?  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 He was brought to the SICU postoperatively for further monitoring and care. He arrived sedated, intubated, and off vasopressor/inotropic support. His immediate arrival was significant for a CXR demonstrating the ETT tip in the cervical neck. Anesthesia presented to the bedside immediately to evaluate and found the ETT tip at the cords and the balloon above the cords. The tube was advanced into appropriate position without issue. Endocrine consulted to manage type 2 diabetes and hyperglycemia.     Hemoglobin A1C   Date Value Ref Range Status   06/20/2022 5.8 (H) 4.0 - 5.6 % Final     Comment:     ADA Screening Guidelines:  5.7-6.4%  Consistent with prediabetes  >or=6.5%  Consistent with diabetes    High levels of fetal hemoglobin interfere with the HbA1C  assay. Heterozygous hemoglobin variants (HbS, HgC, etc)do  not significantly interfere with this assay.   However, presence of multiple variants may affect accuracy.     03/29/2022 6.8 (H) 4.0 - 5.6 % Final     Comment:     ADA Screening Guidelines:  5.7-6.4%  Consistent with prediabetes  >or=6.5%  Consistent with diabetes    High levels of fetal hemoglobin interfere with the HbA1C  assay. Heterozygous hemoglobin variants (HbS, HgC, etc)do  not significantly interfere with this assay.   However, presence of  multiple variants may affect accuracy.

## 2022-06-22 NOTE — CONSULTS
[facility]  Adult Nutrition  Consult Note    SUMMARY     Recommendations    1) Advance diet as medically feasible per physician.     2) If pt eating <50% of meals, rec'd Boost Plus TID to optimize nutrition intake.    3) RD to monitor and f/u.    Goals: Meet estimated kcal/protein needs by RD f/u date  Nutrition Goal Status: new  Communication of RD Recs:  (POC)    Assessment and Plan  Nutrition Problem  Increased protein needs     Related to (etiology):   Increased demand for nutrient    Signs and Symptoms (as evidenced by):   S/p liver transplant  CA    Interventions/Recommendations (treatment strategy):  Collaboration of nutrition care with other providers  Diet advancement + commercial beverage     Nutrition Diagnosis Status:   New    Reason for Assessment    Reason For Assessment: consult (s/p liver transplant)  Diagnosis:  (end stage liver disease 2/2 cholangiocarcinoma s/p liver transplant)  Relevant Medical History: DMII  Interdisciplinary Rounds: did not attend    General Information Comments: Pt s/p liver transplant POD#1. Pt extubated this morning. Pt sleeping during visit, but pt's wife present and able to provide history. Pt endorsed good appetite/PO intake PTA, eating 3 meals per day + snacks. Pt has no existing chewing/swallowing difficulties and wt has been stable since January. NFPE - pt appears well nourished. Low-sodium diet and nutrition therapy for immunocompromised education was reviewed with wife and handouts were provided. Wife verbalized understanding of material, and I encouraged pt to call with questions as needed.    Nutrition Discharge Planning: Post transplant nutrition education provided. Food safety/drug interactions emphasized. General healthy diet recommended. Education provided, encouraged to call RD with questions. Pt's wife present. No other needs identified.    Nutrition Risk Screen    Nutrition Risk Screen: no indicators present    Nutrition/Diet  "History    Spiritual, Cultural Beliefs, Mu-ism Practices, Values that Affect Care: no  Food Allergies: NKFA    Anthropometrics    Temp: 98.1 °F (36.7 °C)  Height Method: Stated  Height: 5' 8" (172.7 cm)  Height (inches): 68 in  Weight Method: Bed Scale  Weight: 86.7 kg (191 lb 2.2 oz)  Weight (lb): 191.14 lb  Ideal Body Weight (IBW), Male: 154 lb  % Ideal Body Weight, Male (lb): 124.97 %  BMI (Calculated): 29.1  BMI Grade: 25 - 29.9 - overweight     Lab/Procedures/Meds    Pertinent Labs Reviewed: reviewed  Pertinent Labs Comments: glu 200, alb 3.4, tbili 1.1, AST 1761, ALT 2195 (HA1C 5.8% (6/20/22))  Pertinent Medications Reviewed: reviewed  Pertinent Medications Comments: prednisone, vit K, propofol, insulin    Estimated/Assessed Needs    Weight Used For Calorie Calculations: 86.7 kg (191 lb 2.2 oz)  Energy Calorie Requirements (kcal): 2648-7777 kcal/day  Energy Need Method: Kcal/kg (25-30 kcal/kg)  Protein Requirements: 104-130g pro/day (1.2-1.5g pro/kg)  Weight Used For Protein Calculations: 86.7 kg (191 lb 2.2 oz)  Fluid Requirements (mL): 1mL per kg or fluid per physician     RDA Method (mL): 2168     Nutrition Prescription Ordered    Current Diet Order: NPO    Evaluation of Received Nutrient/Fluid Intake    I/O: +0.4L since admit  Comments: LBM 6/20  % Intake of Estimated Energy Needs: 0 - 25 %  % Meal Intake: NPO    Nutrition Risk    Level of Risk/Frequency of Follow-up: high     Monitor and Evaluation    Food and Nutrient Intake: food and beverage intake, enteral nutrition intake  Food and Nutrient Adminstration: diet order, enteral and parenteral nutrition administration  Physical Activity and Function: nutrition-related ADLs and IADLs  Anthropometric Measurements: weight, weight change  Biochemical Data, Medical Tests and Procedures: electrolyte and renal panel, gastrointestinal profile, glucose/endocrine profile  Nutrition-Focused Physical Findings: overall appearance     Nutrition Follow-Up    RD " Follow-up?: Yes     Elida Sandra RD, [facility]

## 2022-06-22 NOTE — PLAN OF CARE
Recommendations    1) Advance diet as medically feasible per physician.     2) If pt eating <50% of meals, rec'd Boost Plus TID to optimize nutrition intake.    3) RD to monitor and f/u.    Goals: Meet estimated kcal/protein needs by RD f/u date  Nutrition Goal Status: new  Communication of RD Recs:  (POC)

## 2022-06-22 NOTE — ANESTHESIA POSTPROCEDURE EVALUATION
Anesthesia Post Evaluation    Patient: Romeo Larson    Procedure(s) Performed: Procedure(s) (LRB):  TRANSPLANT, LIVER (N/A)  ULTRASOUND, DIAGNOSTIC (N/A)    Final Anesthesia Type: general      Patient location during evaluation: ICU  Patient participation: No - Unable to Participate, Intubation  Level of consciousness: sedated  Post-procedure vital signs: reviewed and stable  Pain management: adequate  Airway patency: patent    PONV status at discharge: No PONV  Anesthetic complications: no      Cardiovascular status: hemodynamically stable and blood pressure returned to baseline  Respiratory status: ETT  Hydration status: euvolemic  Follow-up not needed.          Vitals Value Taken Time   /78 06/22/22 1001   Temp 36.6 °C (97.8 °F) 06/22/22 1000   Pulse 106 06/22/22 1037   Resp 21 06/22/22 1037   SpO2 99 % 06/22/22 1037   Vitals shown include unvalidated device data.      No case tracking events are documented in the log.      Pain/Moe Score: Pain Rating Prior to Med Admin: 3 (6/22/2022  9:38 AM)

## 2022-06-22 NOTE — PROGRESS NOTES
[facility]  Transplant Surgery  Progress Note    Hospital Day: 4  Post-Op Day: 1 Day Post-Op    ORGAN:  RIGHT LIVER LOBE (SEGS 5,6,7,8) WITHOUT MIDDLE HEPATIC VEIN  Disease Etiology:Primary Liver Malignancy: Cholangiocarcinoma (CH-CA)  Donor Type:  Living  Biliary Anastomosis:Nina-en-Y  Arterial Anatomy:Standard    Subjective:     Interval History:   Admitted to SICU overnight, remains stable  Adequate UOP  CVP 6-7  Tachycardic, will increase pain medication and assess response        Medications:  Continuous Infusions:   dextrose 5 % and 0.9 % NaCl 100 mL/hr at 06/22/22 0400    insulin regular 1 units/mL infusion orderable (DKA) 5.9 Units/hr (06/22/22 0400)    propofoL 50 mcg/kg/min (06/22/22 0400)     Scheduled Meds:   ceFEPime (MAXIPIME) IVPB  1 g Intravenous Q8H    famotidine (PF)  20 mg Intravenous Q12H    fluconazole  200 mg Oral Daily    heparin (porcine)  5,000 Units Subcutaneous Q8H    HYDROmorphone  0.5 mg Intravenous Q2H    losartan  25 mg Oral Daily    methylPREDNISolone sodium succinate injection  200 mg Intravenous Daily    Followed by    [START ON 6/23/2022] methylPREDNISolone sodium succinate injection  160 mg Intravenous Daily    Followed by    [START ON 6/24/2022] methylPREDNISolone sodium succinate injection  120 mg Intravenous Daily    Followed by    [START ON 6/25/2022] methylPREDNISolone sodium succinate injection  80 mg Intravenous Daily    Followed by    [START ON 6/26/2022] methylPREDNISolone sodium succinate injection  40 mg Intravenous Daily    Followed by    [START ON 6/27/2022] predniSONE  20 mg Oral Daily    mupirocin  1 g Nasal BID    mycophenolate mofetil  1,000 mg Oral BID    nystatin  500,000 Units Mouth/Throat TID PC    phytonadione ((AQUA-MEPHYTON) IVPB  10 mg Intravenous Q8H    [START ON 6/28/2022] sulfamethoxazole-trimethoprim 400-80mg  1 tablet Oral Daily AM    tacrolimus  1 mg Oral BID    [START ON 7/1/2022] valGANciclovir  450 mg Oral Daily      PRN Meds:dextrose 10%, dextrose 10%, HYDROmorphone, sodium chloride 0.9%     Objective:   Vital Signs (Most Recent):  Temp: 98.5 °F (36.9 °C) (06/22/22 0500)  Pulse: (!) 114 (06/22/22 0600)  Resp: 18 (06/22/22 0600)  BP: 119/81 (06/22/22 0600)  SpO2: 99 % (06/22/22 0600)    Ventilator Data (Last 24H):     Vent Mode: A/C  Oxygen Concentration (%):  [30-40] 30  Resp Rate Total:  [18 br/min-25 br/min] 18 br/min  Vt Set:  [450 mL] 450 mL  PEEP/CPAP:  [5 cmH20] 5 cmH20  Mean Airway Pressure:  [6.7 cmH20-6.8 cmH20] 6.7 cmH20    Hemodynamic Parameters (Last 24H):  PAP: (25-29)/(6-12) 27/9  PAP (Mean):  [13 mmHg-20 mmHg] 15 mmHg  CO:  [6.9 L/min-8 L/min] 6.9 L/min  CI:  [3.4 L/min/m2-4 L/min/m2] 3.4 L/min/m2    Physical Exam:  General: Intubated and sedated  Neuro: GCS3T  HEENT: Normocephalic  Cardio: S1 and S2, RRR  Resp: Equal chest rise  Abd: Soft, NT, ND  Ext: Warm and well perfused      Lines/Drains:  Introducer 06/21/22 1000 right internal jugular (Active)   Site Assessment No drainage;No redness;No swelling;No warmth 06/22/22 0300   Line Securement Device Secured with sutures 06/22/22 0300   Dressing Type Biopatch in place;Central line dressing 06/22/22 0300   Dressing Status Clean;Dry;Intact 06/22/22 0300   Dressing Intervention Integrity maintained 06/22/22 0300   Date on Dressing 06/22/22 06/22/22 0300   Dressing Due to be Changed 06/29/22 06/22/22 0300   Line Necessity Review Hemodynamic instability 06/22/22 0300   Number of days: 0       Port A Cath Single Lumen 11/24/21 1202 right subclavian;right internal jugular (Active)   Number of days: 209       Port A Cath Single Lumen 06/18/22 right subclavian (Active)   Site Assessment No drainage;No redness;No swelling;No warmth 06/22/22 0300   Line Securement Device Secured with sutures 06/21/22 0800   Dressing Type Other (Comment) 06/22/22 0300   Dressing Status Clean;Dry;Intact 06/21/22 0800   Dressing Intervention Integrity maintained 06/21/22 0800   Date on  Dressing 06/18/22 06/20/22 2000   Dressing Due to be Changed 06/25/22 06/20/22 2000   Patency/Care heparin locked 06/20/22 2000   Status Deaccessed 06/22/22 0300   Needle Insertion Date 06/18/22 06/19/22 2000   Type of Needle Acosta 06/21/22 0800   Line Necessity Review Poor venous access 06/20/22 0800   Number of days: 4       Pulmonary Artery Catheter Assessment  06/21/22 1130 (Active)   Verification by X-ray Yes 06/22/22 0300   Site Assessment No redness;No drainage;No swelling;No warmth 06/22/22 0300   Line Securement Device Secured with sutureless device 06/22/22 0300   Dressing Type Biopatch in place;Central line dressing 06/22/22 0300   Dressing Status Dry;Clean;Intact 06/22/22 0300   Dressing Intervention Integrity maintained 06/22/22 0300   Date on Dressing 06/22/22 06/22/22 0300   Dressing Due to be Changed 06/29/22 06/22/22 0300   Balloon Patency/Care other (see comments) 06/22/22 0300   Thermistor Patency/Care flushed w/o difficulty 06/22/22 0300   Proximal Patency/Care infusing 06/22/22 0300   Distal Patency/Care infusing 06/22/22 0300   Extra Patency/Care infusing 06/22/22 0300   Current Insertion Depth (cm) 40 cm 06/22/22 0300   Waveform Normal 06/22/22 0300   Line Necessity Review Hemodynamic instability 06/22/22 0300   Number of days: 0       Percutaneous Central Line Insertion/Assessment - Triple Lumen  06/21/22 1310 right internal jugular (Active)   Line Necessity Review Hemodynamic instability 06/22/22 0300   Verification by X-ray Yes 06/22/22 0300   Site Assessment No drainage;No redness;No swelling;No warmth 06/22/22 0300   Line Securement Device Secured with sutures 06/22/22 0300   Dressing Type Biopatch in place;Central line dressing 06/22/22 0300   Dressing Status Clean;Dry;Intact 06/22/22 0300   Dressing Intervention Integrity maintained 06/22/22 0300   Date on Dressing 06/22/22 06/22/22 0300   Dressing Due to be Changed 06/29/22 06/22/22 0300   Distal Patency/Care flushed w/o difficulty  06/22/22 0300   Medial 1 Patency/Care flushed w/o difficulty 06/22/22 0300   Proximal 1 Patency/Care flushed w/o difficulty 06/22/22 0300   Waveform Not being transduced 06/22/22 0300   Number of days: 0            Peripheral IV - Single Lumen 06/21/22 1137 16 G Right Hand (Active)   Site Assessment Clean;Dry;Intact;No redness;No swelling 06/22/22 0300   Extremity Assessment Distal to IV No abnormal discoloration;No redness;No swelling;No warmth 06/22/22 0300   Line Status Infusing 06/22/22 0300   Dressing Status Clean;Dry;Intact 06/22/22 0300   Dressing Intervention Integrity maintained 06/22/22 0300   Dressing Change Due 06/25/22 06/22/22 0300   Site Change Due 06/25/22 06/22/22 0300   Reason Not Rotated Not due 06/22/22 0300   Number of days: 0            SYDNEY 06/21/22 1134 Left Antecubital (Active)   Site Assessment Clean;Dry;Intact;No redness;No swelling 06/22/22 0300   Line Status Flushed;Saline locked 06/22/22 0300   Dressing Status Clean;Dry;Intact 06/22/22 0300   Dressing Intervention Integrity maintained 06/22/22 0300   Dressing Change Due 06/25/22 06/22/22 0300   Site Change Due 06/25/22 06/22/22 0300   Reason Not Rotated Not due 06/22/22 0300   Number of days: 0       Arterial Line 06/21/22 1136 Left Radial (Active)   Site Assessment Clean;Dry;Intact;No redness;No swelling 06/22/22 0300   Line Status Pulsatile blood flow 06/22/22 0300   Art Line Waveform Not being transduced 06/22/22 0300   Arterial Line Interventions Zeroed and calibrated;Leveled;Connections checked and tightened;Flushed per protocol 06/22/22 0300   Color/Movement/Sensation Capillary refill less than 3 sec 06/22/22 0300   Dressing Type Central line dressing 06/22/22 0300   Dressing Status Dry;Clean;Intact 06/22/22 0300   Dressing Intervention Integrity maintained 06/22/22 0300   Dressing Change Due 06/25/22 06/22/22 0300   Tubing Change Due 06/25/22 06/22/22 0300   Number of days: 0       Arterial Line 06/21/22 1209 Right Femoral  (Active)   Site Assessment Clean;Dry;Intact;No redness;No swelling 06/22/22 0300   Line Status Pulsatile blood flow 06/22/22 0300   Art Line Waveform Appropriate;Square wave test performed 06/22/22 0300   Arterial Line Interventions Zeroed and calibrated;Leveled;Connections checked and tightened;Flushed per protocol 06/22/22 0300   Color/Movement/Sensation Capillary refill less than 3 sec 06/22/22 0300   Dressing Type Central line dressing 06/22/22 0300   Dressing Status Clean;Dry;Intact 06/22/22 0300   Dressing Intervention Integrity maintained 06/22/22 0300   Dressing Change Due 06/25/22 06/22/22 0300   Tubing Change Due 06/25/22 06/22/22 0300   Number of days: 0            Closed/Suction Drain 06/21/22 2345 Lateral;Right Abdomen Bulb 19 Fr. (Active)   Site Description Healing 06/22/22 0300   Dressing Type Gauze 06/22/22 0300   Dressing Status Clean;Dry;Intact 06/22/22 0300   Dressing Intervention First dressing 06/22/22 0300   Drainage Serosanguineous 06/22/22 0300   Status To bulb suction 06/22/22 0300   Output (mL) 2 mL 06/22/22 0600   Number of days: 0            Closed/Suction Drain 06/21/22 2345 Right;Medial Abdomen Bulb 19 Fr. (Active)   Site Description Healing 06/22/22 0300   Dressing Type Gauze 06/22/22 0300   Dressing Status Clean;Dry;Intact 06/22/22 0300   Dressing Intervention First dressing 06/22/22 0300   Drainage Serosanguineous 06/22/22 0300   Status To bulb suction 06/22/22 0300   Output (mL) 12 mL 06/22/22 0600   Number of days: 0            NG/OG Tube 06/22/22 0100 Center mouth (Active)   Placement Check placement verified by x-ray;placement verified by aspirate characteristics;placement verified by distal tube length measurement 06/22/22 0300   Tolerance no signs/symptoms of discomfort 06/22/22 0300   Securement secured to commercial device 06/22/22 0300   Clamp Status/Tolerance unclamped 06/22/22 0300   Suction Setting/Drainage Method suction initiated at;low;intermittent setting 06/22/22  "0300   Insertion Site Appearance no redness, warmth, tenderness, skin breakdown, drainage 06/22/22 0300   Drainage Bile 06/22/22 0300   Flush/Irrigation flushed w/;water;no resistance met 06/22/22 0300   Intake (mL) 30 mL 06/22/22 0300   Number of days: 0            Urethral Catheter 06/21/22 1143 Non-latex;Straight-tip 16 Fr. (Active)   Site Assessment Clean;Intact 06/22/22 0300   Collection Container Urimeter 06/22/22 0300   Securement Method secured to top of thigh w/ adhesive device 06/22/22 0300   Catheter Care Performed yes 06/22/22 0300   Reason for Continuing Urinary Catheterization Post operative;Critically ill in ICU and requiring hourly monitoring of intake/output 06/22/22 0300   CAUTI Prevention Bundle Intact seal between catheter & drainage tubing;Securement Device in place with 1" slack;Drainage bag/urimeter off the floor;No dependent loops or kinks;Sheeting clip in use;Drainage bag/urimeter not overfilled (<2/3 full);Drainage bag/urimeter below bladder 06/22/22 0300   Output (mL) 155 mL 06/22/22 0600   Number of days: 0       Laboratory:  CBC:   Recent Labs   Lab 06/22/22  0409   WBC 14.64*   RBC 3.11*   HGB 10.5*   HCT 31.1*   *   *   MCH 33.8*   MCHC 33.8       CMP:   Recent Labs   Lab 06/22/22  0409   *   CALCIUM 9.4   ALBUMIN 3.4*   PROT 5.3*   *   K 4.3   CO2 22*      BUN 14   CREATININE 0.9   ALKPHOS 105   ALT 2,195*   AST 1,761*  1,750*   BILITOT 3.0*       Coagulation:   Recent Labs   Lab 06/22/22  0409   INR 1.4*   APTT 24.8       Cardiac markers:   Recent Labs   Lab 06/18/22  1027   TROPONINI <0.012       ABGs:   Recent Labs   Lab 06/22/22  0408   PH 7.361   PCO2 45.3*   PO2 129*   HCO3 25.6   POCSATURATED 99   BE 0         Chest X-Ray:  Morning film pending read      ASSESSMENT/PLAN:   Romeo Larson is a 41 y.o. male s/p liver transplant, living donor right lobe        Neuro/Psych:   -- Sedation: Propofol   -- Pain: Dilaudid prn             Cards:   -- " Tachycardic, CVP 6-7 (Goal <7)  -- Continue cardiac monitoring  -- Losartan      Pulm:   -- Goal O2 sat > 90%  -- SBT this morning, likely extubate      Renal:  -- Keep campos for strict I/O  -- BUN/Cr 14/0.9      FEN / GI:   -- Net +723ml   -- UOP >100ml/hr   -- Drain 1 26ml serous   -- Drain 2 135ml serosangenous  -- Replace lytes as needed  -- Nutrition: NPO  -- GI ppx: Pepcid        ID:   -- Tm: afebrile; WBC 14.64  -- Cefepime and fluconazole  -- Bile no organisms seen, cultures pending      Heme/Onc:   -- H/H stable 10.5/31; plts 137  -- Continue trending CBCs  -- Immunosuppression: Solumedrol, cellcept, Prograf      Endo:   -- Endocrine consulted, appreciate recs  -- BG goal 110-140      PPx:   Feeding: NPO  Analgesia/Sedation: Dilaudid / Propofol  Thromboembolic prevention: Hep TID, SCDs  HOB >30: Yes  Stress Ulcer ppx: Pepcid  Glucose control: Critical care goal 110-140 g/dl, ISS    Lines/Drains/Airway: ETT, R IJx2, R femoral art, L Radial      Dispo/Code Status/Palliative:   -- SICU / Full Code    Elida Avila  Transplant Surgery HO2

## 2022-06-23 ENCOUNTER — ANESTHESIA (OUTPATIENT)
Dept: SURGERY | Facility: HOSPITAL | Age: 42
DRG: 006 | End: 2022-06-23
Payer: COMMERCIAL

## 2022-06-23 ENCOUNTER — ANESTHESIA EVENT (OUTPATIENT)
Dept: SURGERY | Facility: HOSPITAL | Age: 42
DRG: 006 | End: 2022-06-23
Payer: COMMERCIAL

## 2022-06-23 ENCOUNTER — CONFERENCE (OUTPATIENT)
Dept: TRANSPLANT | Facility: CLINIC | Age: 42
End: 2022-06-23
Payer: COMMERCIAL

## 2022-06-23 LAB
ABO + RH BLD: NORMAL
ALBUMIN SERPL BCP-MCNC: 3 G/DL (ref 3.5–5.2)
ALBUMIN SERPL BCP-MCNC: 3.1 G/DL (ref 3.5–5.2)
ALBUMIN SERPL BCP-MCNC: 3.1 G/DL (ref 3.5–5.2)
ALBUMIN SERPL BCP-MCNC: 3.4 G/DL (ref 3.5–5.2)
ALP SERPL-CCNC: 102 U/L (ref 55–135)
ALP SERPL-CCNC: 93 U/L (ref 55–135)
ALP SERPL-CCNC: 94 U/L (ref 55–135)
ALP SERPL-CCNC: 94 U/L (ref 55–135)
ALT SERPL W/O P-5'-P-CCNC: 1426 U/L (ref 10–44)
ALT SERPL W/O P-5'-P-CCNC: 1485 U/L (ref 10–44)
ALT SERPL W/O P-5'-P-CCNC: 1499 U/L (ref 10–44)
ALT SERPL W/O P-5'-P-CCNC: 1731 U/L (ref 10–44)
ANION GAP SERPL CALC-SCNC: 6 MMOL/L (ref 8–16)
ANION GAP SERPL CALC-SCNC: 7 MMOL/L (ref 8–16)
APTT BLDCRRT: 29.3 SEC (ref 21–32)
APTT BLDCRRT: 30 SEC (ref 21–32)
APTT BLDCRRT: 30.4 SEC (ref 21–32)
AST SERPL-CCNC: 400 U/L (ref 10–40)
AST SERPL-CCNC: 485 U/L (ref 10–40)
AST SERPL-CCNC: 522 U/L (ref 10–40)
AST SERPL-CCNC: 704 U/L (ref 10–40)
AST SERPL-CCNC: 854 U/L (ref 10–40)
BACTERIA BLD CULT: NORMAL
BACTERIA BLD CULT: NORMAL
BASOPHILS # BLD AUTO: 0.01 K/UL (ref 0–0.2)
BASOPHILS # BLD AUTO: 0.02 K/UL (ref 0–0.2)
BASOPHILS # BLD AUTO: 0.02 K/UL (ref 0–0.2)
BASOPHILS # BLD AUTO: 0.03 K/UL (ref 0–0.2)
BASOPHILS NFR BLD: 0.1 % (ref 0–1.9)
BASOPHILS NFR BLD: 0.1 % (ref 0–1.9)
BASOPHILS NFR BLD: 0.2 % (ref 0–1.9)
BASOPHILS NFR BLD: 0.2 % (ref 0–1.9)
BILIRUB DIRECT SERPL-MCNC: 0.8 MG/DL (ref 0.1–0.3)
BILIRUB FLD-MCNC: 90.4 MG/DL
BILIRUB SERPL-MCNC: 2.6 MG/DL (ref 0.1–1)
BILIRUB SERPL-MCNC: 2.8 MG/DL (ref 0.1–1)
BILIRUB SERPL-MCNC: 3 MG/DL (ref 0.1–1)
BILIRUB SERPL-MCNC: 3 MG/DL (ref 0.1–1)
BLD GP AB SCN CELLS X3 SERPL QL: NORMAL
BODY FLUID SOURCE, BILIRUBIN: NORMAL
BUN SERPL-MCNC: 15 MG/DL (ref 6–20)
CALCIUM SERPL-MCNC: 8 MG/DL (ref 8.7–10.5)
CALCIUM SERPL-MCNC: 8.4 MG/DL (ref 8.7–10.5)
CALCIUM SERPL-MCNC: 8.4 MG/DL (ref 8.7–10.5)
CALCIUM SERPL-MCNC: 8.7 MG/DL (ref 8.7–10.5)
CHLORIDE SERPL-SCNC: 104 MMOL/L (ref 95–110)
CHLORIDE SERPL-SCNC: 106 MMOL/L (ref 95–110)
CO2 SERPL-SCNC: 25 MMOL/L (ref 23–29)
CO2 SERPL-SCNC: 25 MMOL/L (ref 23–29)
CO2 SERPL-SCNC: 27 MMOL/L (ref 23–29)
CO2 SERPL-SCNC: 27 MMOL/L (ref 23–29)
CREAT SERPL-MCNC: 0.7 MG/DL (ref 0.5–1.4)
DIFFERENTIAL METHOD: ABNORMAL
EOSINOPHIL # BLD AUTO: 0 K/UL (ref 0–0.5)
EOSINOPHIL NFR BLD: 0 % (ref 0–8)
EOSINOPHIL NFR BLD: 0 % (ref 0–8)
EOSINOPHIL NFR BLD: 0.1 % (ref 0–8)
EOSINOPHIL NFR BLD: 0.1 % (ref 0–8)
ERYTHROCYTE [DISTWIDTH] IN BLOOD BY AUTOMATED COUNT: 13.5 % (ref 11.5–14.5)
ERYTHROCYTE [DISTWIDTH] IN BLOOD BY AUTOMATED COUNT: 13.9 % (ref 11.5–14.5)
ERYTHROCYTE [DISTWIDTH] IN BLOOD BY AUTOMATED COUNT: 14 % (ref 11.5–14.5)
ERYTHROCYTE [DISTWIDTH] IN BLOOD BY AUTOMATED COUNT: 14 % (ref 11.5–14.5)
EST. GFR  (AFRICAN AMERICAN): >60 ML/MIN/1.73 M^2
EST. GFR  (NON AFRICAN AMERICAN): >60 ML/MIN/1.73 M^2
GLUCOSE SERPL-MCNC: 154 MG/DL (ref 70–110)
GLUCOSE SERPL-MCNC: 178 MG/DL (ref 70–110)
GLUCOSE SERPL-MCNC: 223 MG/DL (ref 70–110)
GLUCOSE SERPL-MCNC: 241 MG/DL (ref 70–110)
GLUCOSE SERPL-MCNC: 277 MG/DL (ref 70–110)
GLUCOSE SERPL-MCNC: 299 MG/DL (ref 70–110)
GRAM STN SPEC: NORMAL
GRAM STN SPEC: NORMAL
HBSAG CONFIRMATION: POSITIVE
HBV SURFACE AG SERPL QL IA: POSITIVE
HCO3 UR-SCNC: 23.9 MMOL/L (ref 24–28)
HCO3 UR-SCNC: 24 MMOL/L (ref 24–28)
HCT VFR BLD AUTO: 27.3 % (ref 40–54)
HCT VFR BLD AUTO: 28.4 % (ref 40–54)
HCT VFR BLD AUTO: 28.5 % (ref 40–54)
HCT VFR BLD AUTO: 28.8 % (ref 40–54)
HCT VFR BLD CALC: 33 %PCV (ref 36–54)
HCT VFR BLD CALC: 35 %PCV (ref 36–54)
HGB BLD-MCNC: 8.9 G/DL (ref 14–18)
HGB BLD-MCNC: 9.4 G/DL (ref 14–18)
HGB BLD-MCNC: 9.5 G/DL (ref 14–18)
HGB BLD-MCNC: 9.6 G/DL (ref 14–18)
IMM GRANULOCYTES # BLD AUTO: 0.18 K/UL (ref 0–0.04)
IMM GRANULOCYTES # BLD AUTO: 0.24 K/UL (ref 0–0.04)
IMM GRANULOCYTES # BLD AUTO: 0.32 K/UL (ref 0–0.04)
IMM GRANULOCYTES # BLD AUTO: 0.42 K/UL (ref 0–0.04)
IMM GRANULOCYTES NFR BLD AUTO: 1.4 % (ref 0–0.5)
IMM GRANULOCYTES NFR BLD AUTO: 1.5 % (ref 0–0.5)
IMM GRANULOCYTES NFR BLD AUTO: 2.5 % (ref 0–0.5)
IMM GRANULOCYTES NFR BLD AUTO: 2.6 % (ref 0–0.5)
INR PPP: 1.1 (ref 0.8–1.2)
INR PPP: 1.2 (ref 0.8–1.2)
INR PPP: 1.2 (ref 0.8–1.2)
INR PPP: 1.4 (ref 0.8–1.2)
LYMPHOCYTES # BLD AUTO: 0.4 K/UL (ref 1–4.8)
LYMPHOCYTES # BLD AUTO: 0.4 K/UL (ref 1–4.8)
LYMPHOCYTES # BLD AUTO: 0.5 K/UL (ref 1–4.8)
LYMPHOCYTES # BLD AUTO: 0.6 K/UL (ref 1–4.8)
LYMPHOCYTES NFR BLD: 2.7 % (ref 18–48)
LYMPHOCYTES NFR BLD: 2.8 % (ref 18–48)
LYMPHOCYTES NFR BLD: 3.1 % (ref 18–48)
LYMPHOCYTES NFR BLD: 3.6 % (ref 18–48)
MAGNESIUM SERPL-MCNC: 1.8 MG/DL (ref 1.6–2.6)
MAGNESIUM SERPL-MCNC: 2 MG/DL (ref 1.6–2.6)
MAGNESIUM SERPL-MCNC: 2.2 MG/DL (ref 1.6–2.6)
MCH RBC QN AUTO: 33.4 PG (ref 27–31)
MCH RBC QN AUTO: 33.6 PG (ref 27–31)
MCH RBC QN AUTO: 33.7 PG (ref 27–31)
MCH RBC QN AUTO: 33.7 PG (ref 27–31)
MCHC RBC AUTO-ENTMCNC: 32.6 G/DL (ref 32–36)
MCHC RBC AUTO-ENTMCNC: 33.1 G/DL (ref 32–36)
MCHC RBC AUTO-ENTMCNC: 33.3 G/DL (ref 32–36)
MCHC RBC AUTO-ENTMCNC: 33.3 G/DL (ref 32–36)
MCV RBC AUTO: 100 FL (ref 82–98)
MCV RBC AUTO: 101 FL (ref 82–98)
MCV RBC AUTO: 101 FL (ref 82–98)
MCV RBC AUTO: 103 FL (ref 82–98)
MONOCYTES # BLD AUTO: 0.4 K/UL (ref 0.3–1)
MONOCYTES # BLD AUTO: 0.5 K/UL (ref 0.3–1)
MONOCYTES # BLD AUTO: 0.9 K/UL (ref 0.3–1)
MONOCYTES # BLD AUTO: 1 K/UL (ref 0.3–1)
MONOCYTES NFR BLD: 3.3 % (ref 4–15)
MONOCYTES NFR BLD: 4.1 % (ref 4–15)
MONOCYTES NFR BLD: 5.6 % (ref 4–15)
MONOCYTES NFR BLD: 6.3 % (ref 4–15)
NEUTROPHILS # BLD AUTO: 11.8 K/UL (ref 1.8–7.7)
NEUTROPHILS # BLD AUTO: 12.2 K/UL (ref 1.8–7.7)
NEUTROPHILS # BLD AUTO: 14 K/UL (ref 1.8–7.7)
NEUTROPHILS # BLD AUTO: 14.7 K/UL (ref 1.8–7.7)
NEUTROPHILS NFR BLD: 87.3 % (ref 38–73)
NEUTROPHILS NFR BLD: 89.6 % (ref 38–73)
NEUTROPHILS NFR BLD: 90.5 % (ref 38–73)
NEUTROPHILS NFR BLD: 92.3 % (ref 38–73)
NRBC BLD-RTO: 0 /100 WBC
PCO2 BLDA: 36.6 MMHG (ref 35–45)
PCO2 BLDA: 42.2 MMHG (ref 35–45)
PH SMN: 7.36 [PH] (ref 7.35–7.45)
PH SMN: 7.42 [PH] (ref 7.35–7.45)
PHOSPHATE SERPL-MCNC: 2.2 MG/DL (ref 2.7–4.5)
PHOSPHATE SERPL-MCNC: 2.2 MG/DL (ref 2.7–4.5)
PHOSPHATE SERPL-MCNC: 3.4 MG/DL (ref 2.7–4.5)
PLATELET # BLD AUTO: 107 K/UL (ref 150–450)
PLATELET # BLD AUTO: 113 K/UL (ref 150–450)
PLATELET # BLD AUTO: 116 K/UL (ref 150–450)
PLATELET # BLD AUTO: 121 K/UL (ref 150–450)
PMV BLD AUTO: 8.9 FL (ref 9.2–12.9)
PMV BLD AUTO: 9.3 FL (ref 9.2–12.9)
PMV BLD AUTO: 9.6 FL (ref 9.2–12.9)
PMV BLD AUTO: 9.7 FL (ref 9.2–12.9)
PO2 BLDA: 139 MMHG (ref 80–100)
PO2 BLDA: 158 MMHG (ref 80–100)
POC BE: -1 MMOL/L
POC BE: -1 MMOL/L
POC IONIZED CALCIUM: 1.08 MMOL/L (ref 1.06–1.42)
POC IONIZED CALCIUM: 1.12 MMOL/L (ref 1.06–1.42)
POC SATURATED O2: 99 % (ref 95–100)
POC SATURATED O2: 99 % (ref 95–100)
POC TCO2: 25 MMOL/L (ref 23–27)
POC TCO2: 25 MMOL/L (ref 23–27)
POCT GLUCOSE: 189 MG/DL (ref 70–110)
POCT GLUCOSE: 204 MG/DL (ref 70–110)
POCT GLUCOSE: 210 MG/DL (ref 70–110)
POCT GLUCOSE: 227 MG/DL (ref 70–110)
POCT GLUCOSE: 232 MG/DL (ref 70–110)
POCT GLUCOSE: 238 MG/DL (ref 70–110)
POCT GLUCOSE: 244 MG/DL (ref 70–110)
POCT GLUCOSE: 244 MG/DL (ref 70–110)
POCT GLUCOSE: 267 MG/DL (ref 70–110)
POTASSIUM BLD-SCNC: 4.6 MMOL/L (ref 3.5–5.1)
POTASSIUM BLD-SCNC: 5 MMOL/L (ref 3.5–5.1)
POTASSIUM SERPL-SCNC: 4.1 MMOL/L (ref 3.5–5.1)
POTASSIUM SERPL-SCNC: 4.3 MMOL/L (ref 3.5–5.1)
POTASSIUM SERPL-SCNC: 4.3 MMOL/L (ref 3.5–5.1)
POTASSIUM SERPL-SCNC: 4.6 MMOL/L (ref 3.5–5.1)
PROT SERPL-MCNC: 4.4 G/DL (ref 6–8.4)
PROT SERPL-MCNC: 4.7 G/DL (ref 6–8.4)
PROT SERPL-MCNC: 4.9 G/DL (ref 6–8.4)
PROT SERPL-MCNC: 5 G/DL (ref 6–8.4)
PROTHROMBIN TIME: 11.8 SEC (ref 9–12.5)
PROTHROMBIN TIME: 11.9 SEC (ref 9–12.5)
PROTHROMBIN TIME: 12.7 SEC (ref 9–12.5)
PROTHROMBIN TIME: 13.9 SEC (ref 9–12.5)
RBC # BLD AUTO: 2.64 M/UL (ref 4.6–6.2)
RBC # BLD AUTO: 2.8 M/UL (ref 4.6–6.2)
RBC # BLD AUTO: 2.82 M/UL (ref 4.6–6.2)
RBC # BLD AUTO: 2.87 M/UL (ref 4.6–6.2)
SAMPLE: ABNORMAL
SAMPLE: ABNORMAL
SODIUM BLD-SCNC: 134 MMOL/L (ref 136–145)
SODIUM BLD-SCNC: 135 MMOL/L (ref 136–145)
SODIUM SERPL-SCNC: 136 MMOL/L (ref 136–145)
SODIUM SERPL-SCNC: 136 MMOL/L (ref 136–145)
SODIUM SERPL-SCNC: 137 MMOL/L (ref 136–145)
SODIUM SERPL-SCNC: 140 MMOL/L (ref 136–145)
TACROLIMUS BLD-MCNC: 2.7 NG/ML (ref 5–15)
WBC # BLD AUTO: 13.01 K/UL (ref 3.9–12.7)
WBC # BLD AUTO: 13.2 K/UL (ref 3.9–12.7)
WBC # BLD AUTO: 16 K/UL (ref 3.9–12.7)
WBC # BLD AUTO: 16.37 K/UL (ref 3.9–12.7)

## 2022-06-23 PROCEDURE — 99291 CRITICAL CARE FIRST HOUR: CPT | Mod: 24,,, | Performed by: STUDENT IN AN ORGANIZED HEALTH CARE EDUCATION/TRAINING PROGRAM

## 2022-06-23 PROCEDURE — 85730 THROMBOPLASTIN TIME PARTIAL: CPT | Mod: 91 | Performed by: SURGERY

## 2022-06-23 PROCEDURE — 86901 BLOOD TYPING SEROLOGIC RH(D): CPT | Performed by: SURGERY

## 2022-06-23 PROCEDURE — 36000709 HC OR TIME LEV III EA ADD 15 MIN: Performed by: TRANSPLANT SURGERY

## 2022-06-23 PROCEDURE — 87102 FUNGUS ISOLATION CULTURE: CPT | Performed by: SURGERY

## 2022-06-23 PROCEDURE — 83735 ASSAY OF MAGNESIUM: CPT | Performed by: TRANSPLANT SURGERY

## 2022-06-23 PROCEDURE — 63600175 PHARM REV CODE 636 W HCPCS: Performed by: NURSE ANESTHETIST, CERTIFIED REGISTERED

## 2022-06-23 PROCEDURE — D9220A PRA ANESTHESIA: Mod: CRNA,,, | Performed by: NURSE ANESTHETIST, CERTIFIED REGISTERED

## 2022-06-23 PROCEDURE — 36000708 HC OR TIME LEV III 1ST 15 MIN: Performed by: TRANSPLANT SURGERY

## 2022-06-23 PROCEDURE — 25000003 PHARM REV CODE 250: Performed by: STUDENT IN AN ORGANIZED HEALTH CARE EDUCATION/TRAINING PROGRAM

## 2022-06-23 PROCEDURE — 80053 COMPREHEN METABOLIC PANEL: CPT | Performed by: STUDENT IN AN ORGANIZED HEALTH CARE EDUCATION/TRAINING PROGRAM

## 2022-06-23 PROCEDURE — 87205 SMEAR GRAM STAIN: CPT | Performed by: SURGERY

## 2022-06-23 PROCEDURE — 37000009 HC ANESTHESIA EA ADD 15 MINS: Performed by: TRANSPLANT SURGERY

## 2022-06-23 PROCEDURE — 37000008 HC ANESTHESIA 1ST 15 MINUTES: Performed by: TRANSPLANT SURGERY

## 2022-06-23 PROCEDURE — 27201423 OPTIME MED/SURG SUP & DEVICES STERILE SUPPLY: Performed by: TRANSPLANT SURGERY

## 2022-06-23 PROCEDURE — 94761 N-INVAS EAR/PLS OXIMETRY MLT: CPT

## 2022-06-23 PROCEDURE — 80197 ASSAY OF TACROLIMUS: CPT | Performed by: STUDENT IN AN ORGANIZED HEALTH CARE EDUCATION/TRAINING PROGRAM

## 2022-06-23 PROCEDURE — 35840 PR EXPLOR POSTOP BLEED,INFEC,CLOT-ABD: ICD-10-PCS | Mod: 78,,, | Performed by: TRANSPLANT SURGERY

## 2022-06-23 PROCEDURE — D9220A PRA ANESTHESIA: Mod: ANES,,, | Performed by: ANESTHESIOLOGY

## 2022-06-23 PROCEDURE — 83735 ASSAY OF MAGNESIUM: CPT | Mod: 91 | Performed by: SURGERY

## 2022-06-23 PROCEDURE — 35840 EXPLORE ABDOMINAL VESSELS: CPT | Mod: 78,,, | Performed by: TRANSPLANT SURGERY

## 2022-06-23 PROCEDURE — 87075 CULTR BACTERIA EXCEPT BLOOD: CPT | Performed by: SURGERY

## 2022-06-23 PROCEDURE — 94799 UNLISTED PULMONARY SVC/PX: CPT

## 2022-06-23 PROCEDURE — 85730 THROMBOPLASTIN TIME PARTIAL: CPT | Performed by: STUDENT IN AN ORGANIZED HEALTH CARE EDUCATION/TRAINING PROGRAM

## 2022-06-23 PROCEDURE — 80053 COMPREHEN METABOLIC PANEL: CPT | Mod: 91 | Performed by: SURGERY

## 2022-06-23 PROCEDURE — 35840 EXPLORE ABDOMINAL VESSELS: CPT | Mod: 78,82,, | Performed by: TRANSPLANT SURGERY

## 2022-06-23 PROCEDURE — 25000003 PHARM REV CODE 250: Performed by: NURSE ANESTHETIST, CERTIFIED REGISTERED

## 2022-06-23 PROCEDURE — 99233 PR SUBSEQUENT HOSPITAL CARE,LEVL III: ICD-10-PCS | Mod: ,,, | Performed by: NURSE PRACTITIONER

## 2022-06-23 PROCEDURE — 25000003 PHARM REV CODE 250: Performed by: TRANSPLANT SURGERY

## 2022-06-23 PROCEDURE — 85610 PROTHROMBIN TIME: CPT | Mod: 91 | Performed by: SURGERY

## 2022-06-23 PROCEDURE — 25000003 PHARM REV CODE 250: Performed by: SURGERY

## 2022-06-23 PROCEDURE — 63600175 PHARM REV CODE 636 W HCPCS: Performed by: SURGERY

## 2022-06-23 PROCEDURE — 85025 COMPLETE CBC W/AUTO DIFF WBC: CPT | Mod: 91 | Performed by: SURGERY

## 2022-06-23 PROCEDURE — 82247 BILIRUBIN TOTAL: CPT | Performed by: TRANSPLANT SURGERY

## 2022-06-23 PROCEDURE — 63600175 PHARM REV CODE 636 W HCPCS: Performed by: STUDENT IN AN ORGANIZED HEALTH CARE EDUCATION/TRAINING PROGRAM

## 2022-06-23 PROCEDURE — 84100 ASSAY OF PHOSPHORUS: CPT | Performed by: TRANSPLANT SURGERY

## 2022-06-23 PROCEDURE — 99233 SBSQ HOSP IP/OBS HIGH 50: CPT | Mod: ,,, | Performed by: NURSE PRACTITIONER

## 2022-06-23 PROCEDURE — 35840 PR EXPLOR POSTOP BLEED,INFEC,CLOT-ABD: ICD-10-PCS | Mod: 78,82,, | Performed by: TRANSPLANT SURGERY

## 2022-06-23 PROCEDURE — 87070 CULTURE OTHR SPECIMN AEROBIC: CPT | Performed by: SURGERY

## 2022-06-23 PROCEDURE — 84100 ASSAY OF PHOSPHORUS: CPT | Mod: 91 | Performed by: SURGERY

## 2022-06-23 PROCEDURE — D9220A PRA ANESTHESIA: ICD-10-PCS | Mod: ANES,,, | Performed by: ANESTHESIOLOGY

## 2022-06-23 PROCEDURE — 85610 PROTHROMBIN TIME: CPT | Performed by: STUDENT IN AN ORGANIZED HEALTH CARE EDUCATION/TRAINING PROGRAM

## 2022-06-23 PROCEDURE — D9220A PRA ANESTHESIA: ICD-10-PCS | Mod: CRNA,,, | Performed by: NURSE ANESTHETIST, CERTIFIED REGISTERED

## 2022-06-23 PROCEDURE — 99900035 HC TECH TIME PER 15 MIN (STAT)

## 2022-06-23 PROCEDURE — 86900 BLOOD TYPING SEROLOGIC ABO: CPT | Performed by: SURGERY

## 2022-06-23 PROCEDURE — 99291 PR CRITICAL CARE, E/M 30-74 MINUTES: ICD-10-PCS | Mod: 24,,, | Performed by: STUDENT IN AN ORGANIZED HEALTH CARE EDUCATION/TRAINING PROGRAM

## 2022-06-23 PROCEDURE — 85025 COMPLETE CBC W/AUTO DIFF WBC: CPT | Performed by: STUDENT IN AN ORGANIZED HEALTH CARE EDUCATION/TRAINING PROGRAM

## 2022-06-23 PROCEDURE — 20000000 HC ICU ROOM

## 2022-06-23 PROCEDURE — 82248 BILIRUBIN DIRECT: CPT | Performed by: STUDENT IN AN ORGANIZED HEALTH CARE EDUCATION/TRAINING PROGRAM

## 2022-06-23 RX ORDER — PROPOFOL 10 MG/ML
VIAL (ML) INTRAVENOUS
Status: DISCONTINUED | OUTPATIENT
Start: 2022-06-23 | End: 2022-06-23

## 2022-06-23 RX ORDER — BUPIVACAINE HYDROCHLORIDE 2.5 MG/ML
INJECTION, SOLUTION EPIDURAL; INFILTRATION; INTRACAUDAL
Status: DISCONTINUED | OUTPATIENT
Start: 2022-06-23 | End: 2022-06-23 | Stop reason: HOSPADM

## 2022-06-23 RX ORDER — MAGNESIUM SULFATE HEPTAHYDRATE 40 MG/ML
4 INJECTION, SOLUTION INTRAVENOUS
Status: DISCONTINUED | OUTPATIENT
Start: 2022-06-23 | End: 2022-06-25

## 2022-06-23 RX ORDER — FENTANYL CITRATE 50 UG/ML
INJECTION, SOLUTION INTRAMUSCULAR; INTRAVENOUS
Status: DISCONTINUED | OUTPATIENT
Start: 2022-06-23 | End: 2022-06-23

## 2022-06-23 RX ORDER — PHENYLEPHRINE HYDROCHLORIDE 10 MG/ML
INJECTION INTRAVENOUS
Status: DISCONTINUED | OUTPATIENT
Start: 2022-06-23 | End: 2022-06-23

## 2022-06-23 RX ORDER — POTASSIUM CHLORIDE 14.9 MG/ML
60 INJECTION INTRAVENOUS
Status: DISCONTINUED | OUTPATIENT
Start: 2022-06-23 | End: 2022-06-25

## 2022-06-23 RX ORDER — ROCURONIUM BROMIDE 10 MG/ML
INJECTION, SOLUTION INTRAVENOUS
Status: DISCONTINUED | OUTPATIENT
Start: 2022-06-23 | End: 2022-06-23

## 2022-06-23 RX ORDER — LIDOCAINE HYDROCHLORIDE 20 MG/ML
INJECTION, SOLUTION EPIDURAL; INFILTRATION; INTRACAUDAL; PERINEURAL
Status: DISCONTINUED | OUTPATIENT
Start: 2022-06-23 | End: 2022-06-23

## 2022-06-23 RX ORDER — FLUCONAZOLE 2 MG/ML
INJECTION, SOLUTION INTRAVENOUS
Status: DISCONTINUED | OUTPATIENT
Start: 2022-06-23 | End: 2022-06-23

## 2022-06-23 RX ORDER — CALCIUM GLUCONATE 20 MG/ML
3 INJECTION, SOLUTION INTRAVENOUS
Status: DISCONTINUED | OUTPATIENT
Start: 2022-06-23 | End: 2022-06-25

## 2022-06-23 RX ORDER — ONDANSETRON 2 MG/ML
INJECTION INTRAMUSCULAR; INTRAVENOUS
Status: DISCONTINUED | OUTPATIENT
Start: 2022-06-23 | End: 2022-06-23

## 2022-06-23 RX ORDER — MIDAZOLAM HYDROCHLORIDE 1 MG/ML
INJECTION, SOLUTION INTRAMUSCULAR; INTRAVENOUS
Status: DISCONTINUED | OUTPATIENT
Start: 2022-06-23 | End: 2022-06-23

## 2022-06-23 RX ORDER — POTASSIUM CHLORIDE 29.8 MG/ML
80 INJECTION INTRAVENOUS
Status: DISCONTINUED | OUTPATIENT
Start: 2022-06-23 | End: 2022-06-25

## 2022-06-23 RX ORDER — CALCIUM GLUCONATE 20 MG/ML
1 INJECTION, SOLUTION INTRAVENOUS
Status: DISCONTINUED | OUTPATIENT
Start: 2022-06-23 | End: 2022-06-25

## 2022-06-23 RX ORDER — POTASSIUM CHLORIDE 29.8 MG/ML
40 INJECTION INTRAVENOUS
Status: DISCONTINUED | OUTPATIENT
Start: 2022-06-23 | End: 2022-06-25

## 2022-06-23 RX ORDER — MAGNESIUM SULFATE HEPTAHYDRATE 40 MG/ML
2 INJECTION, SOLUTION INTRAVENOUS
Status: DISCONTINUED | OUTPATIENT
Start: 2022-06-23 | End: 2022-06-25

## 2022-06-23 RX ORDER — VASOPRESSIN 20 [USP'U]/ML
INJECTION, SOLUTION INTRAMUSCULAR; SUBCUTANEOUS
Status: DISCONTINUED | OUTPATIENT
Start: 2022-06-23 | End: 2022-06-23

## 2022-06-23 RX ORDER — CALCIUM GLUCONATE 20 MG/ML
2 INJECTION, SOLUTION INTRAVENOUS
Status: DISCONTINUED | OUTPATIENT
Start: 2022-06-23 | End: 2022-06-25

## 2022-06-23 RX ADMIN — HYDRALAZINE HYDROCHLORIDE 10 MG: 20 INJECTION, SOLUTION INTRAMUSCULAR; INTRAVENOUS at 06:06

## 2022-06-23 RX ADMIN — FENTANYL CITRATE 50 MCG: 50 INJECTION, SOLUTION INTRAMUSCULAR; INTRAVENOUS at 09:06

## 2022-06-23 RX ADMIN — MIDAZOLAM HYDROCHLORIDE 2 MG: 1 INJECTION, SOLUTION INTRAMUSCULAR; INTRAVENOUS at 08:06

## 2022-06-23 RX ADMIN — FENTANYL CITRATE 50 MCG: 50 INJECTION, SOLUTION INTRAMUSCULAR; INTRAVENOUS at 11:06

## 2022-06-23 RX ADMIN — HEPARIN SODIUM 5000 UNITS: 5000 INJECTION INTRAVENOUS; SUBCUTANEOUS at 09:06

## 2022-06-23 RX ADMIN — OXYCODONE HYDROCHLORIDE 10 MG: 10 TABLET ORAL at 08:06

## 2022-06-23 RX ADMIN — PROPOFOL 160 MG: 10 INJECTION, EMULSION INTRAVENOUS at 09:06

## 2022-06-23 RX ADMIN — METHYLPREDNISOLONE SODIUM SUCCINATE 160 MG: 40 INJECTION, POWDER, FOR SOLUTION INTRAMUSCULAR; INTRAVENOUS at 12:06

## 2022-06-23 RX ADMIN — MUPIROCIN 1 G: 20 OINTMENT TOPICAL at 08:06

## 2022-06-23 RX ADMIN — CEFEPIME 1 G: 1 INJECTION, POWDER, FOR SOLUTION INTRAMUSCULAR; INTRAVENOUS at 08:06

## 2022-06-23 RX ADMIN — HYDRALAZINE HYDROCHLORIDE 10 MG: 20 INJECTION, SOLUTION INTRAMUSCULAR; INTRAVENOUS at 04:06

## 2022-06-23 RX ADMIN — Medication 10 MG: at 09:06

## 2022-06-23 RX ADMIN — TACROLIMUS 3 MG: 1 CAPSULE, GELATIN COATED ORAL at 05:06

## 2022-06-23 RX ADMIN — ROCURONIUM BROMIDE 30 MG: 10 INJECTION, SOLUTION INTRAVENOUS at 09:06

## 2022-06-23 RX ADMIN — FENTANYL CITRATE 25 MCG: 50 INJECTION, SOLUTION INTRAMUSCULAR; INTRAVENOUS at 11:06

## 2022-06-23 RX ADMIN — CEFEPIME 1 G: 1 INJECTION, POWDER, FOR SOLUTION INTRAMUSCULAR; INTRAVENOUS at 04:06

## 2022-06-23 RX ADMIN — FAMOTIDINE 20 MG: 10 INJECTION INTRAVENOUS at 08:06

## 2022-06-23 RX ADMIN — INSULIN HUMAN 8.8 UNITS/HR: 1 INJECTION, SOLUTION INTRAVENOUS at 08:06

## 2022-06-23 RX ADMIN — FENTANYL CITRATE 50 MCG: 50 INJECTION, SOLUTION INTRAMUSCULAR; INTRAVENOUS at 08:06

## 2022-06-23 RX ADMIN — OXYCODONE HYDROCHLORIDE 10 MG: 10 TABLET ORAL at 04:06

## 2022-06-23 RX ADMIN — HEPARIN SODIUM 5000 UNITS: 5000 INJECTION INTRAVENOUS; SUBCUTANEOUS at 06:06

## 2022-06-23 RX ADMIN — CEFEPIME 1 G: 1 INJECTION, POWDER, FOR SOLUTION INTRAMUSCULAR; INTRAVENOUS at 12:06

## 2022-06-23 RX ADMIN — VANCOMYCIN HYDROCHLORIDE 1500 MG: 1.5 INJECTION, POWDER, LYOPHILIZED, FOR SOLUTION INTRAVENOUS at 03:06

## 2022-06-23 RX ADMIN — MAGNESIUM SULFATE HEPTAHYDRATE 2 G: 40 INJECTION, SOLUTION INTRAVENOUS at 06:06

## 2022-06-23 RX ADMIN — PHENYLEPHRINE HYDROCHLORIDE 100 MCG: 10 INJECTION INTRAVENOUS at 09:06

## 2022-06-23 RX ADMIN — VANCOMYCIN HYDROCHLORIDE 1500 MG: 1.5 INJECTION, POWDER, LYOPHILIZED, FOR SOLUTION INTRAVENOUS at 02:06

## 2022-06-23 RX ADMIN — OXYCODONE 5 MG: 5 TABLET ORAL at 04:06

## 2022-06-23 RX ADMIN — MYCOPHENOLATE MOFETIL 1000 MG: 200 POWDER, FOR SUSPENSION ORAL at 09:06

## 2022-06-23 RX ADMIN — MYCOPHENOLATE MOFETIL 1000 MG: 200 POWDER, FOR SUSPENSION ORAL at 01:06

## 2022-06-23 RX ADMIN — PHENYLEPHRINE HYDROCHLORIDE 200 MCG: 10 INJECTION INTRAVENOUS at 09:06

## 2022-06-23 RX ADMIN — VASOPRESSIN 2 UNITS: 20 INJECTION, SOLUTION INTRAMUSCULAR; SUBCUTANEOUS at 10:06

## 2022-06-23 RX ADMIN — VASOPRESSIN 2 UNITS: 20 INJECTION, SOLUTION INTRAMUSCULAR; SUBCUTANEOUS at 09:06

## 2022-06-23 RX ADMIN — FLUCONAZOLE 200 MG: 2 INJECTION, SOLUTION INTRAVENOUS at 09:06

## 2022-06-23 RX ADMIN — SODIUM CHLORIDE, SODIUM GLUCONATE, SODIUM ACETATE, POTASSIUM CHLORIDE, MAGNESIUM CHLORIDE, SODIUM PHOSPHATE, DIBASIC, AND POTASSIUM PHOSPHATE: .53; .5; .37; .037; .03; .012; .00082 INJECTION, SOLUTION INTRAVENOUS at 08:06

## 2022-06-23 RX ADMIN — ONDANSETRON 4 MG: 2 INJECTION INTRAMUSCULAR; INTRAVENOUS at 10:06

## 2022-06-23 RX ADMIN — HYDROMORPHONE HYDROCHLORIDE 0.5 MG: 1 INJECTION, SOLUTION INTRAMUSCULAR; INTRAVENOUS; SUBCUTANEOUS at 12:06

## 2022-06-23 RX ADMIN — FAMOTIDINE 20 MG: 10 INJECTION INTRAVENOUS at 12:06

## 2022-06-23 RX ADMIN — ROCURONIUM BROMIDE 50 MG: 10 INJECTION, SOLUTION INTRAVENOUS at 09:06

## 2022-06-23 RX ADMIN — SUGAMMADEX 200 MG: 100 INJECTION, SOLUTION INTRAVENOUS at 11:06

## 2022-06-23 RX ADMIN — HYDROMORPHONE HYDROCHLORIDE 0.5 MG: 1 INJECTION, SOLUTION INTRAMUSCULAR; INTRAVENOUS; SUBCUTANEOUS at 06:06

## 2022-06-23 RX ADMIN — CEFEPIME 1 G: 1 INJECTION, POWDER, FOR SOLUTION INTRAMUSCULAR; INTRAVENOUS at 11:06

## 2022-06-23 RX ADMIN — Medication 10 MG: at 04:06

## 2022-06-23 RX ADMIN — DEXTROSE AND SODIUM CHLORIDE: 5; .9 INJECTION, SOLUTION INTRAVENOUS at 04:06

## 2022-06-23 RX ADMIN — TACROLIMUS 1 MG: 1 CAPSULE, GELATIN COATED ORAL at 08:06

## 2022-06-23 RX ADMIN — HEPARIN SODIUM 5000 UNITS: 5000 INJECTION INTRAVENOUS; SUBCUTANEOUS at 02:06

## 2022-06-23 NOTE — PROGRESS NOTES
[facility] - [facility] ([facility])  Endocrinology  Progress Note    Admit Date: 6/18/2022     Reason for Consult: Management of T2DM vs Steroid Induced Hyperglycemia     Surgical Procedure and Date: Liver Transplant 6/21/2022    Diabetes diagnosis year: 2022    Home Diabetes Medications: Metformin 500 mg BID- While on Chemo     How often checking glucose at home?  RACHEL    BG readings on regimen: RACHEL  Hypoglycemia on the regimen?  RACHEL  Missed doses on regimen?  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 He was brought to the SICU postoperatively for further monitoring and care. He arrived sedated, intubated, and off vasopressor/inotropic support. His immediate arrival was significant for a CXR demonstrating the ETT tip in the cervical neck. Anesthesia presented to the bedside immediately to evaluate and found the ETT tip at the cords and the balloon above the cords. The tube was advanced into appropriate position without issue. Endocrine consulted to manage type 2 diabetes and hyperglycemia.     Hemoglobin A1C   Date Value Ref Range Status   06/20/2022 5.8 (H) 4.0 - 5.6 % Final     Comment:     ADA Screening Guidelines:  5.7-6.4%  Consistent with prediabetes  >or=6.5%  Consistent with diabetes    High levels of fetal hemoglobin interfere with the HbA1C  assay. Heterozygous hemoglobin variants (HbS, HgC, etc)do  not significantly interfere with this assay.   However, presence of multiple variants may affect accuracy.     03/29/2022 6.8 (H) 4.0 - 5.6 % Final     Comment:     ADA Screening Guidelines:  5.7-6.4%  Consistent with prediabetes  >or=6.5%  Consistent with diabetes    High levels of fetal hemoglobin interfere with the HbA1C  assay. Heterozygous hemoglobin variants (HbS,  "HgC, etc)do  not significantly interfere with this assay.   However, presence of multiple variants may affect accuracy.                 Interval HPI:   Overnight events: No acute events overnight. Patient on the SICU. Blood glucose improving. BG at and above goal on current insulin regimen (IIP). Steroid use- Methylprednisolone  200 mg. Day of Surgery  Renal function- Normal   Vasopressors-  None       Diet NPO Except for: Sips with Medication     Eating:   NPO  Nausea: RACHEL  Hypoglycemia and intervention: No  Fever: No  TPN and/or TF: No    /76 (BP Location: Right arm, Patient Position: Lying)   Pulse (!) 114   Temp 98.5 °F (36.9 °C) (Oral)   Resp (!) 28   Ht 5' 8" (1.727 m)   Wt 88 kg (194 lb 0.1 oz)   SpO2 95%   BMI 29.50 kg/m²     Labs Reviewed and Include    Recent Labs   Lab 06/23/22  0400   *   CALCIUM 8.7   ALBUMIN 3.4*   PROT 5.0*      K 4.1   CO2 25      BUN 15   CREATININE 0.7   ALKPHOS 94   ALT 1,731*   *   BILITOT 3.0*     Lab Results   Component Value Date    WBC 16.37 (H) 06/23/2022    HGB 9.5 (L) 06/23/2022    HCT 28.5 (L) 06/23/2022     (H) 06/23/2022     (L) 06/23/2022     No results for input(s): TSH, FREET4 in the last 168 hours.  Lab Results   Component Value Date    HGBA1C 5.8 (H) 06/20/2022       Nutritional status:   Body mass index is 29.5 kg/m².  Lab Results   Component Value Date    ALBUMIN 3.4 (L) 06/23/2022    ALBUMIN 3.4 (L) 06/22/2022    ALBUMIN 3.4 (L) 06/22/2022     No results found for: PREALBUMIN    Estimated Creatinine Clearance: 146.7 mL/min (based on SCr of 0.7 mg/dL).    Accu-Checks  Recent Labs     06/22/22  0019 06/22/22  0107 06/22/22  0204 06/22/22  0314 06/22/22  0408 06/22/22  0504 06/22/22  0601 06/22/22  0734 06/22/22  0804   POCTGLUCOSE 190* 230* 237* 243* 235* 243* 226* 200* 222*       Current Medications and/or Treatments Impacting Glycemic Control  Immunotherapy:    Immunosuppressants           Stop Route Frequency " "    tacrolimus (PROGRAF) 1 mg/mL oral syringe         -- Oral 2 times daily     mycophenolate mofetil 200 mg/mL suspension 1,000 mg         -- Oral 2 times daily          Steroids:   Hormones (From admission, onward)                Start     Stop Route Frequency Ordered    06/27/22 0900  predniSONE tablet 20 mg  (methylprednisolone taper panel)        "Followed by" Linked Group Details    -- Oral Daily 06/22/22 0014    06/26/22 0900  methylPREDNISolone sodium succinate injection 40 mg  (methylprednisolone taper panel)        "Followed by" Linked Group Details    06/27 0859 IV Daily 06/22/22 0014    06/25/22 0900  methylPREDNISolone sodium succinate injection 80 mg  (methylprednisolone taper panel)        "Followed by" Linked Group Details    06/26 0859 IV Daily 06/22/22 0014    06/24/22 0900  methylPREDNISolone sodium succinate injection 120 mg  (methylprednisolone taper panel)        "Followed by" Linked Group Details    06/25 0859 IV Daily 06/22/22 0014    06/23/22 0900  methylPREDNISolone sodium succinate injection 160 mg  (methylprednisolone taper panel)        "Followed by" Linked Group Details    06/24 0859 IV Daily 06/22/22 0014          Pressors:    Autonomic Drugs (From admission, onward)                None          Hyperglycemia/Diabetes Medications:   Antihyperglycemics (From admission, onward)                Start     Stop Route Frequency Ordered    06/22/22 0100  insulin regular in 0.9 % NaCl 100 unit/100 mL (1 unit/mL) infusion        Question Answer Comment   Insulin Rate Adjustment (DO NOT MODIFY ANSWER) [email or web address]    Enter initial dose from Infusion Protocol Chart (Units/hr): 2.3        -- IV Continuous 06/22/22 0014            ASSESSMENT and PLAN    * Hilar cholangiocarcinoma  Managed per primary team  Avoid hypoglycemia        Type 2 diabetes mellitus with hyperglycemia  Endocrinology consulted for BG management.   BG goal " 140-180    - IIP  - Requires intensive BG monitoring.   - BG checks q1hr  - Hypoglycemia protocol in place    - Notify endocrine if patient becomes hypokalemic (K < 3.3) and/or is not responding to replacement.   - Notify endocrine if patient requiring > 20 units/hr.      ** Please notify Endocrine for any change and/or advance in diet**  ** Please call Endocrine for any BG related issues **    Discharge Planning:   TBD. Please notify endocrinology prior to discharge.        S/P liver transplant  Optimize BG control to improve wound healing        Adrenal corticosteroid causing adverse effect in therapeutic use  On steroid therapy per transplant team; may elevate BG readings             Brad Singleton, DNP, FNP  Endocrinology  [facility] - Surgery (2nd Fl)

## 2022-06-23 NOTE — SIGNIFICANT EVENT
Stable overnight. Labs improving. Medial drain bilious with drain bilirubin at 90 - low volume ~10 cc/hr  Discussed with patient and will return to OR for repair.   All questions answered.

## 2022-06-23 NOTE — PROGRESS NOTES
[facility]  Transplant Surgery  Progress Note    Hospital Day: 5  Post-Op Day: 2 Days Post-Op    ORGAN:  RIGHT LIVER LOBE (SEGS 5,6,7,8) WITHOUT MIDDLE HEPATIC VEIN  Disease Etiology:Primary Liver Malignancy: Cholangiocarcinoma (CH-CA)  Donor Type:  Living  Biliary Anastomosis:Nina-en-Y  Arterial Anatomy:Standard    Subjective:     Interval History:   Extubated yesterday. On room air  Remains tachycardic although reports pain controlled.  2.25L UOP  Drain 2 output remains banks/red       Medications:  Continuous Infusions:   dextrose 5 % and 0.9 % NaCl 100 mL/hr at 06/23/22 0503    insulin regular 1 units/mL infusion orderable (DKA) 6.8 Units/hr (06/23/22 0400)     Scheduled Meds:   albumin human 5%  25 g Intravenous Once    ceFEPime (MAXIPIME) IVPB  1 g Intravenous Q8H    famotidine (PF)  20 mg Intravenous Q12H    fluconazole  200 mg Oral Daily    heparin (porcine)  5,000 Units Subcutaneous Q8H    methylPREDNISolone sodium succinate injection  160 mg Intravenous Daily    Followed by    [START ON 6/24/2022] methylPREDNISolone sodium succinate injection  120 mg Intravenous Daily    Followed by    [START ON 6/25/2022] methylPREDNISolone sodium succinate injection  80 mg Intravenous Daily    Followed by    [START ON 6/26/2022] methylPREDNISolone sodium succinate injection  40 mg Intravenous Daily    Followed by    [START ON 6/27/2022] predniSONE  20 mg Oral Daily    mupirocin  1 g Nasal BID    mycophenolate mofetil  1,000 mg Oral BID    [START ON 6/28/2022] sulfamethoxazole-trimethoprim 400-80mg  1 tablet Oral Daily AM    tacrolimus  1 mg Oral BID    [START ON 7/1/2022] valGANciclovir  450 mg Oral Daily    vancomycin (VANCOCIN) IVPB  1,500 mg Intravenous Q12H     PRN Meds:sodium chloride 0.9%, dextrose 10%, dextrose 10%, hydrALAZINE, HYDROmorphone, HYDROmorphone, labetalol, oxyCODONE, oxyCODONE, sodium chloride 0.9%     Objective:   Vital Signs (Most Recent):  Temp: 98.4 °F (36.9 °C)  (06/23/22 0400)  Pulse: (!) 115 (06/23/22 0500)  Resp: (!) 21 (06/23/22 0500)  BP: (!) 161/87 (06/23/22 0423)  SpO2: (!) 94 % (06/23/22 0500)    Ventilator Data (Last 24H):     Vent Mode: A/C  Oxygen Concentration (%):  [30] 30  Resp Rate Total:  [12 br/min-23 br/min] 23 br/min  Vt Set:  [450 mL] 450 mL  PEEP/CPAP:  [5 cmH20] 5 cmH20  Mean Airway Pressure:  [6.8 cmH20-7.6 cmH20] 6.8 cmH20    Hemodynamic Parameters (Last 24H):  PAP: (21-28)/(8-17) 25/8  PAP (Mean):  [13 mmHg-19 mmHg] 14 mmHg  CO:  [6.6 L/min-8 L/min] 7.5 L/min  CI:  [3.2 L/min/m2-4 L/min/m2] 3.7 L/min/m2    Physical Exam:  General: NAD   Neuro: AAOx3  HEENT: Normocephalic  Cardio: Tachycardic  Resp: Equal chest rise  Abd: Soft, appropriately tender, ND  Ext: Warm and well perfused      Lines/Drains:  Introducer 06/21/22 1000 right internal jugular (Active)   Site Assessment No drainage;No redness;No swelling;No warmth 06/23/22 0300   Line Securement Device Secured with sutures 06/23/22 0300   Dressing Type Biopatch in place;Central line dressing 06/23/22 0300   Dressing Status Clean;Dry;Intact 06/23/22 0300   Dressing Intervention Integrity maintained 06/23/22 0300   Date on Dressing 06/22/22 06/23/22 0300   Dressing Due to be Changed 06/29/22 06/23/22 0300   Line Necessity Review Medication caustic to vasculature 06/23/22 0300   Number of days: 1       Port A Cath Single Lumen 11/24/21 1202 right subclavian;right internal jugular (Active)   Number of days: 210       Port A Cath Single Lumen 06/18/22 right subclavian (Active)   Site Assessment No drainage;No redness;No swelling;No warmth 06/22/22 1500   Line Securement Device Other (comment) 06/22/22 1500   Dressing Type Other (Comment) 06/22/22 0300   Dressing Status Clean;Dry;Intact 06/21/22 0800   Dressing Intervention Integrity maintained 06/21/22 0800   Date on Dressing 06/18/22 06/20/22 2000   Dressing Due to be Changed 06/25/22 06/20/22 2000   Patency/Care heparin locked 06/20/22 2000   Status  Deaccessed 06/23/22 0300   Needle Insertion Date 06/18/22 06/19/22 2000   Type of Needle Acosta 06/21/22 0800   Line Necessity Review Poor venous access 06/20/22 0800   Number of days: 5       Trialysis (Dialysis) Catheter 06/22/22 0700 right internal jugular (Active)   Verification by X-ray Yes 06/23/22 0300   Site Assessment No drainage;No redness;No swelling;No warmth 06/23/22 0300   Line Securement Device Secured with sutures 06/23/22 0300   Dressing Type Biopatch in place;Central line dressing 06/23/22 0300   Dressing Status Clean;Dry;Intact 06/23/22 0300   Dressing Intervention Integrity maintained 06/23/22 0300   Date on Dressing 06/22/22 06/23/22 0300   Dressing Due to be Changed 06/29/22 06/23/22 0300   Venous Patency/Care flushed w/o difficulty;normal saline locked 06/23/22 0300   Arterial Patency/Care flushed w/o difficulty;normal saline locked 06/23/22 0300   Distal Patency/Care flushed w/o difficulty;normal saline locked 06/23/22 0300   Waveform Normal 06/23/22 0300   Line Necessity Review Medication caustic to vasculature 06/23/22 0300   Number of days: 0            Peripheral IV - Single Lumen 06/21/22 1137 16 G Right Hand (Active)   Site Assessment Clean;Dry;Intact;No redness;No swelling 06/23/22 0300   Extremity Assessment Distal to IV No abnormal discoloration;No redness;No swelling;No warmth 06/23/22 0300   Line Status Flushed;Saline locked 06/23/22 0300   Dressing Status Clean;Dry;Intact 06/23/22 0300   Dressing Intervention Integrity maintained 06/23/22 0300   Dressing Change Due 06/25/22 06/23/22 0300   Site Change Due 06/25/22 06/23/22 0300   Reason Not Rotated Not due 06/23/22 0300   Number of days: 1            SYDNEY 06/21/22 1134 Left Antecubital (Active)   Site Assessment Clean;Dry;Intact;No redness;No swelling 06/23/22 0300   Line Status Flushed;Saline locked 06/23/22 0300   Dressing Status Clean;Dry;Intact 06/23/22 0300   Dressing Intervention Integrity maintained 06/23/22 0300   Dressing  Change Due 06/25/22 06/23/22 0300   Site Change Due 06/25/22 06/23/22 0300   Reason Not Rotated Not due 06/23/22 0300   Number of days: 1       Arterial Line 06/21/22 1136 Left Radial (Active)   Site Assessment Clean;Dry;Intact;No redness;No swelling 06/23/22 0300   Line Status Pulsatile blood flow 06/23/22 0300   Art Line Waveform Not being transduced 06/23/22 0300   Arterial Line Interventions Flushed per protocol 06/23/22 0300   Color/Movement/Sensation Capillary refill less than 3 sec 06/23/22 0300   Dressing Type Central line dressing 06/23/22 0300   Dressing Status Clean;Dry;Intact 06/23/22 0300   Dressing Intervention Integrity maintained 06/23/22 0300   Dressing Change Due 06/25/22 06/23/22 0300   Tubing Change Due 06/25/22 06/23/22 0300   Number of days: 1       Arterial Line 06/21/22 1209 Right Femoral (Active)   Site Assessment Clean;Dry;Intact;No redness;No swelling 06/23/22 0300   Line Status Pulsatile blood flow 06/23/22 0300   Art Line Waveform Appropriate;Square wave test performed 06/23/22 0300   Arterial Line Interventions Zeroed and calibrated;Leveled;Connections checked and tightened;Flushed per protocol 06/23/22 0300   Color/Movement/Sensation Capillary refill less than 3 sec 06/23/22 0300   Dressing Type Biopatch in place;Central line dressing 06/23/22 0300   Dressing Status Clean;Dry;Intact 06/23/22 0300   Dressing Intervention Integrity maintained 06/23/22 0300   Dressing Change Due 06/25/22 06/23/22 0300   Tubing Change Due 06/25/22 06/23/22 0300   Number of days: 1            Closed/Suction Drain 06/21/22 2345 Lateral;Right Abdomen Bulb 19 Fr. (Active)   Site Description Unable to view 06/23/22 0300   Dressing Type Gauze 06/23/22 0300   Dressing Status Clean;Dry;Intact 06/23/22 0300   Dressing Intervention Integrity maintained 06/23/22 0300   Drainage Serous 06/23/22 0300   Status To bulb suction 06/23/22 0300   Output (mL) 4 mL 06/23/22 0300   Number of days: 1            Closed/Suction  "Drain 06/21/22 2345 Right;Medial Abdomen Bulb 19 Fr. (Active)   Site Description Unable to view 06/23/22 0300   Dressing Type Gauze 06/23/22 0300   Dressing Status Clean;Dry;Intact 06/23/22 0300   Dressing Intervention Integrity maintained 06/23/22 0300   Drainage Serosanguineous 06/23/22 0300   Status To bulb suction 06/23/22 0300   Output (mL) 9 mL 06/23/22 0300   Number of days: 1            Urethral Catheter 06/21/22 1143 Non-latex;Straight-tip 16 Fr. (Active)   Site Assessment Clean;Intact 06/23/22 0300   Collection Container Urimeter 06/23/22 0300   Securement Method secured to top of thigh w/ adhesive device 06/23/22 0300   Catheter Care Performed yes 06/23/22 0300   Reason for Continuing Urinary Catheterization Critically ill in ICU and requiring hourly monitoring of intake/output 06/23/22 0300   CAUTI Prevention Bundle Securement Device in place with 1" slack;Intact seal between catheter & drainage tubing;Drainage bag/urimeter off the floor;Sheeting clip in use;No dependent loops or kinks;Drainage bag/urimeter not overfilled (<2/3 full);Drainage bag/urimeter below bladder 06/23/22 0300   Output (mL) 175 mL 06/23/22 0400   Number of days: 1       Laboratory:  CBC:   Recent Labs   Lab 06/23/22  0400   WBC 16.37*   RBC 2.82*   HGB 9.5*   HCT 28.5*   *   *   MCH 33.7*   MCHC 33.3       CMP:   Recent Labs   Lab 06/22/22  0409 06/22/22  0730 06/22/22  1934 06/22/22  2337   *   < > 153*  --    CALCIUM 9.4   < > 8.9  --    ALBUMIN 3.4*  --   --   --    PROT 5.3*  --   --   --    *   < > 138  --    K 4.3   < > 4.0  --    CO2 22*   < > 26  --       < > 106  --    BUN 14   < > 15  --    CREATININE 0.9   < > 0.7  --    ALKPHOS 105  --   --   --    ALT 2,195*  --   --   --    AST 1,761*  1,750*   < > 1,002* 854*   BILITOT 3.0*  --   --   --     < > = values in this interval not displayed.       Coagulation:   Recent Labs   Lab 06/23/22  0400   INR 1.4*   APTT 30.0       Cardiac markers: "   Recent Labs   Lab 06/18/22  1027   TROPONINI <0.012       ABGs:   Recent Labs   Lab 06/22/22  0806   PH 7.395   PCO2 42.3   PO2 101*   HCO3 25.9   POCSATURATED 98   BE 1         ASSESSMENT/PLAN:   Romeo Larson is a 41 y.o. male s/p living donor liver transplant for cholangiocarcinoma        Neuro/Psych:   -- Pain well controlled  -- Continue prn narcotics              Cards:   -- Tachycardic despite adequate resuscitation  -- Hypertensive receiving prn hydralazine and labetalol  -- Continue cardiac monitoring      Pulm:   -- Goal O2 sat > 90%  -- RINKU      Renal:  -- Keep campos for strict I/O  -- BUN/Cr 15/0.7      FEN / GI:   -- Net -200ml   Drain 1 45ml   Drain 2 286ml   Adequate UOP  -- Replace lytes as needed  -- Nutrition: NPO on mIVF  -- GI ppx: Protonix  --  (854)      ID:   -- Tm: afebrile  -- Perioperative Vanc, cefepime and fluconazole      Heme/Onc:   -- H/H stable 9.5/28  -- Daily CBC  -- Immunosuppression: Solumedrol, cellcept, prograf      Endo:   -- Endocrine consulted, appreciate recs  -- BG goal 110-140      PPx:   Feeding: NPO  Analgesia/Sedation: PRN dilaudid, oxycodone  Thromboembolic prevention: Hep TID  HOB >30: Yes  Stress Ulcer ppx: Pepcid  Glucose control: Critical care goal 110-140 g/dl, ISS    Lines/Drains/Airway: R IJ vasc, R IJ cordis, Radial art line      Dispo/Code Status/Palliative:   -- SICU / Full Code  -- Consideration for re-exploration to examine biliary anastomosis today in the OR. Drain bilirubin pending     Elida Avila  Transplant Surgery HO2

## 2022-06-23 NOTE — NURSING
Pt returned to SICU per anesthesia. Pt connected to NC and SICU monitor. Insulin and MIVF infusing. All care assumed per SICU RN. Transplant team at bedside. Family updated per surgeon.

## 2022-06-23 NOTE — PROGRESS NOTES
Met patient's wife and mother in the ICU.  They are reading My [facility] Journey in preparation for education.  Allowed time for questions and answers.

## 2022-06-23 NOTE — TRANSFER OF CARE
"Anesthesia Transfer of Care Note    Patient: Romeo Larson    Procedure(s) Performed: Procedure(s) (LRB):  REPAIR, BILE DUCT (N/A)  LAPAROTOMY, EXPLORATORY, AFTER LIVER TRANSPLANT (N/A)    Patient location: ICU    Anesthesia Type: general    Transport from OR: Transported from OR on 6-10 L/min O2 by face mask with adequate spontaneous ventilation    Post pain: adequate analgesia    Post assessment: no apparent anesthetic complications    Post vital signs: stable    Level of consciousness: responds to stimulation    Nausea/Vomiting: no nausea/vomiting    Complications: none    Transfer of care protocol was followed      Last vitals:   Visit Vitals  /76 (BP Location: Right arm, Patient Position: Lying)   Pulse 109   Temp 37.1 °C (98.7 °F) (Oral)   Resp (!) 28   Ht 5' 8" (1.727 m)   Wt 88 kg (194 lb 0.1 oz)   SpO2 (!) 91%   BMI 29.50 kg/m²     "

## 2022-06-23 NOTE — PLAN OF CARE
POC reviewed with patient and spouse, no events overnight, VSS, Hydralazine 10 mg given X2 for SBP>160, CVP 2,1 and 0. Remains on D5NS @ 100 mL/hr and Insulin gtt titrated per nomogram. Incision c/d/I, SHRUTHI 1 with scant serous drainage, SHRUTHI remains with bilious drainage <10 mL/hr. -325 mL/hr. Pain well controlled with PRN Oxycodone and 1 dose of 0.2 mg Dilaudid. Rested well in between care. No skin breakdown noted, weight shifting encouraged, pillows and mattress overlay in use. All questions answered and addressed.

## 2022-06-23 NOTE — OP NOTE
Operative Report    Date of Procedure: 6/23/2022    Surgeons:  Surgeon(s) and Role:     * Julio Cesar Jara MD - Primary     * Braydon Iglesias MD - Assisting    First Assistant Attestation:  The presence of an additional attending surgeon functioning as first assistant was required due to the complexity of the procedure relative to any available residents. I certify that no resident was available who was qualified to serve as first assistant. Duties performed by the assistant included assisting the primary surgeon.    Pre-operative Diagnosis: bile leak  Post-operative Diagnosis: Same    Procedure(s) Perfomed: Exploration of abdomen and bile duct repair  Anesthesia: General endotracheal  Estimated Blood Loss: 20 mL  Blood Products Administered: None  Fluids Administered: 1000 mL  Findings: Grossly normal and well perfused liver, no bile leak from cut surface, small leak near bile duct anastomosis  Specimens: Peritoneal fluid for culture  Drains: 19f Cyril drains x 2    Preamble  Indications and Patient Counseling: The procedure was thoroughly explained to the patient and/or family members as appropriate, including potential risks, complications, and alternatives.  The risks associated with not undergoing the proposed procedure were also discussed.  All questions were answered, and the patient and/or family members voiced understanding and agreed to proceed with the operation.    Time-Out: A complete time out was carried out prior to incision, with confirmation of patient identity, correct procedure, correct operative site, appropriate antibiotic prophylaxis, review of any known allergies, and presence of all needed equipment.    Procedure in Detail  Following the induction of general endotracheal anesthesia, appropriate arterial and venous lines were placed by the anesthesia team.  Care was taken to pad all pressure points and avoid any potential traction injuries from positioning. The urinary bladder was  catheterized.  Sequential compression boots and Reece Huggers were used. The abdomen was sterilely prepped and draped.    The abdomen was entered by re-opening the liver transplant incision.  Cultures were obtained of any subcutaneous and peritoneal fluid or clot as appropriate.  Significant peritoneal fluid was not present.  Significant hematoma was not present. There was no active bleeding. The bile duct was assessed, and there was visible evidence of a bile leak.  The hepatic artery was palpated, and the thrill was good. The liver itself was soft to palpation, and had a well-perfused appearance.      The cut surface was copiously irrigate and packed with no evidence of bile leak. There was a small leak near the biliary anastomosis, unable to clearly identify source. 3 repair suture were placed in the anterior wall of the anastomosis and on the bile stained plate tissue adjacent to the duct. After a period of packing there was no evidence of active staining/leak. A patch of omentum was freed and placed along the cut surface and hilum. The medial drain was placed posterior to the duct. The lateral drain was placed anterior to the duct.     After the above exploration, all identifiable bleeding sites were addressed using electrocautery, argon beam coagulation, suture ligatures, and topical hemostatic agents as appropriate.  A needle biopsy of the liver was not obtained.       A final check for hemostasis was made.  2 19f Cyril drains were placed through separate incisions below the transverse abdominal incision and placed in the usual positions. The cavity was irrigated with saline. The abdomen was closed in layers using running #1 PDS suture. The incision was irrigated and the skin was closed with staples. At the end of the case all instrument, needle, and sponge counts were correct. The patient was taken from the OR in stable condition.

## 2022-06-23 NOTE — PT/OT/SLP PROGRESS
Physical Therapy  Consult    Patient Name:  Romeo Larson   MRN:  [id number]  Admitting Diagnosis:  Hilar cholangiocarcinoma   Recent Surgery: Procedure(s) (LRB):  REPAIR, BILE DUCT (N/A)  LAPAROTOMY, EXPLORATORY, AFTER LIVER TRANSPLANT (N/A) Day of Surgery  Admit Date: 6/18/2022  Length of Stay: 5 days    Physical Therapy orders received. Patient not seen today due to pt returning to OR for bile duct repair. New orders needed post-op to initiate PT evaluation and plan of care. Will follow-up.    Shobha Tripp, PT, DPT  6/23/2022   Pager: [phone]

## 2022-06-23 NOTE — SUBJECTIVE & OBJECTIVE
"Interval HPI:   Overnight events: No acute events overnight. Patient on the SICU. Blood glucose improving. BG at and above goal on current insulin regimen (IIP). Steroid use- Methylprednisolone  200 mg. Day of Surgery  Renal function- Normal   Vasopressors-  None       Diet NPO Except for: Sips with Medication     Eating:   NPO  Nausea: RACHEL  Hypoglycemia and intervention: No  Fever: No  TPN and/or TF: No    /76 (BP Location: Right arm, Patient Position: Lying)   Pulse (!) 114   Temp 98.5 °F (36.9 °C) (Oral)   Resp (!) 28   Ht 5' 8" (1.727 m)   Wt 88 kg (194 lb 0.1 oz)   SpO2 95%   BMI 29.50 kg/m²     Labs Reviewed and Include    Recent Labs   Lab 06/23/22  0400   *   CALCIUM 8.7   ALBUMIN 3.4*   PROT 5.0*      K 4.1   CO2 25      BUN 15   CREATININE 0.7   ALKPHOS 94   ALT 1,731*   *   BILITOT 3.0*     Lab Results   Component Value Date    WBC 16.37 (H) 06/23/2022    HGB 9.5 (L) 06/23/2022    HCT 28.5 (L) 06/23/2022     (H) 06/23/2022     (L) 06/23/2022     No results for input(s): TSH, FREET4 in the last 168 hours.  Lab Results   Component Value Date    HGBA1C 5.8 (H) 06/20/2022       Nutritional status:   Body mass index is 29.5 kg/m².  Lab Results   Component Value Date    ALBUMIN 3.4 (L) 06/23/2022    ALBUMIN 3.4 (L) 06/22/2022    ALBUMIN 3.4 (L) 06/22/2022     No results found for: PREALBUMIN    Estimated Creatinine Clearance: 146.7 mL/min (based on SCr of 0.7 mg/dL).    Accu-Checks  Recent Labs     06/22/22  0019 06/22/22  0107 06/22/22  0204 06/22/22  0314 06/22/22  0408 06/22/22  0504 06/22/22  0601 06/22/22  0734 06/22/22  0804   POCTGLUCOSE 190* 230* 237* 243* 235* 243* 226* 200* 222*       Current Medications and/or Treatments Impacting Glycemic Control  Immunotherapy:    Immunosuppressants           Stop Route Frequency     tacrolimus (PROGRAF) 1 mg/mL oral syringe         -- Oral 2 times daily     mycophenolate mofetil 200 mg/mL suspension 1,000 mg     " "    -- Oral 2 times daily          Steroids:   Hormones (From admission, onward)                Start     Stop Route Frequency Ordered    06/27/22 0900  predniSONE tablet 20 mg  (methylprednisolone taper panel)        "Followed by" Linked Group Details    -- Oral Daily 06/22/22 0014    06/26/22 0900  methylPREDNISolone sodium succinate injection 40 mg  (methylprednisolone taper panel)        "Followed by" Linked Group Details    06/27 0859 IV Daily 06/22/22 0014    06/25/22 0900  methylPREDNISolone sodium succinate injection 80 mg  (methylprednisolone taper panel)        "Followed by" Linked Group Details    06/26 0859 IV Daily 06/22/22 0014    06/24/22 0900  methylPREDNISolone sodium succinate injection 120 mg  (methylprednisolone taper panel)        "Followed by" Linked Group Details    06/25 0859 IV Daily 06/22/22 0014    06/23/22 0900  methylPREDNISolone sodium succinate injection 160 mg  (methylprednisolone taper panel)        "Followed by" Linked Group Details    06/24 0859 IV Daily 06/22/22 0014          Pressors:    Autonomic Drugs (From admission, onward)                None          Hyperglycemia/Diabetes Medications:   Antihyperglycemics (From admission, onward)                Start     Stop Route Frequency Ordered    06/22/22 0100  insulin regular in 0.9 % NaCl 100 unit/100 mL (1 unit/mL) infusion        Question Answer Comment   Insulin Rate Adjustment (DO NOT MODIFY ANSWER) [email or web address]    Enter initial dose from Infusion Protocol Chart (Units/hr): 2.3        -- IV Continuous 06/22/22 0014          "

## 2022-06-23 NOTE — ANESTHESIA PREPROCEDURE EVALUATION
[facility]  Anesthesia Pre-Operative Evaluation         Patient Name: Romeo Larson  YOB: 1980  MRN: [id number]    SUBJECTIVE:     Pre-operative evaluation for Procedure(s) (LRB):  TRANSPLANT, LIVER (N/A)     06/23/2022    Romeo Larson is a 41 y.o. male w/ a significant PMHx of HTN, HLD, and cholangiocarcinoma s/p neoadjuvant chemoradiation who presents living donor transplant.    Of note, pt underwent diagnostic ex lap on 6/17 and developed fever /chills with elevated lactate on 6/18 treated as post-procedure cholangitis. Now resolved (afebrile, lactate 1, normal WBC).    Patient endorses history of shivering with previous anesthetic.     Patient now presents for the above procedure(s).    TTE: 4/5/22  · The stress echo portion of this study is negative for myocardial ischemia.  · The ECG portion of this study is negative for myocardial ischemia.  · During stress, the following significant arrhythmias were observed: rare PACs.  · The patient reached the end of the protocol.  · Normal left ventricular size with normal systolic function. The estimated ejection fraction is 65%.  · Normal right ventricular size with normal right ventricular systolic function.  · Normal left ventricular diastolic function.  · The estimated PA systolic pressure is 26 mmHg.  · Normal central venous pressure (3 mmHg).    LDA:   Port A Cath Single Lumen 11/24/21 1202 right subclavian;right internal jugular (Active)   Number of days: 207       Port A Cath Single Lumen 06/18/22 right subclavian (Active)   Site Assessment No drainage;No redness;No swelling;No warmth 06/19/22 2000   Line Securement Device Secured with sutureless device 06/20/22 0800   Dressing Type Biopatch in place;Central line dressing;Transparent (Tegaderm) 06/20/22 0800   Dressing Status Clean;Dry;Intact 06/20/22 0800   Dressing Intervention First dressing 06/20/22 0800   Date on Dressing 06/18/22 06/20/22 0800   Dressing Due to be Changed  06/25/22 06/20/22 0800   Patency/Care infusing 06/20/22 0800   Status Accessed 06/20/22 0800   Needle Insertion Date 06/18/22 06/19/22 2000   Type of Needle Acosta 06/20/22 0800   Line Necessity Review Poor venous access 06/20/22 0800   Number of days: 2       Prev airway:     Placement Date: 06/17/22; Placement Time: 0718 (created via procedure documentation); Method of Intubation: Direct laryngoscopy; Mask Ventilation: Easy; Intubated: Postinduction; Blade: Roman #3; Airway Device Size: 7.5; Cuff Inflation: Minimal occlusive pressure; Placement Verified By: Capnometry; Complicating Factors: None; Intubation Findings: Bilateral breath sounds; Securment: Lips; Complications: None; Removal Date: 06/17/22;  Removal Time: 0854    Drips: None documented.      Patient Active Problem List   Diagnosis    Hypercholesterolemia    Diastolic hypertension    Hyperbilirubinemia    Obstructive jaundice    Biliary obstruction    Painless jaundice    Cholangitis    Hilar cholangiocarcinoma    Immunodeficiency secondary to neoplasm    Immunodeficiency secondary to chemotherapy    Chemotherapy induced neutropenia    Fever of unknown origin    Elevated liver enzymes    Macrocytic anemia    Adjustment and management of vascular access device    Tachycardia    Pre-op testing    S/P liver transplant    Prophylactic immunotherapy    At risk for opportunistic infections    Type 2 diabetes mellitus with hyperglycemia    Adrenal corticosteroid causing adverse effect in therapeutic use    Encounter for weaning from ventilator       Review of patient's allergies indicates:  No Known Allergies    Current Inpatient Medications:      Current Facility-Administered Medications on File Prior to Visit   Medication Dose Route Frequency Provider Last Rate Last Admin    0.9%  NaCl infusion   Intravenous PRN Santi Godwin MD        albumin human 5% bottle 25 g  25 g Intravenous Once Santi Godwin MD   Stopped at 06/22/22 1500     calcium gluconate 1 g in NS IVPB (premixed)  1 g Intravenous PRN Elida Avila MD        calcium gluconate 1 g in NS IVPB (premixed)  2 g Intravenous PRN Elida Avila MD        calcium gluconate 1 g in NS IVPB (premixed)  3 g Intravenous PRN Elida Avila MD        cefepime in dextrose 5 % 1 gram/50 mL IVPB 1 g  1 g Intravenous Q8H Esther Lu  mL/hr at 06/23/22 0820 1 g at 06/23/22 0820    dextrose 10% bolus 125 mL  12.5 g Intravenous PRN Santi Godwin MD        dextrose 10% bolus 250 mL  25 g Intravenous PRN Santi Godwin MD        dextrose 5 % and 0.9 % NaCl infusion   Intravenous Continuous Esther Lu  mL/hr at 06/23/22 0700 Rate Verify at 06/23/22 0700    famotidine (PF) injection 20 mg  20 mg Intravenous Q12H Esther Lu MD   20 mg at 06/22/22 2116    fluconazole tablet 200 mg  200 mg Oral Daily Esther Lu MD   200 mg at 06/22/22 0800    heparin (porcine) injection 5,000 Units  5,000 Units Subcutaneous Q8H Esther Lu MD   5,000 Units at 06/23/22 0604    hydrALAZINE injection 10 mg  10 mg Intravenous Q6H PRN Elida Avila MD   10 mg at 06/23/22 0423    HYDROmorphone injection 0.2 mg  0.2 mg Intravenous Q2H PRN Elida Avila MD   0.2 mg at 06/22/22 2228    HYDROmorphone injection 0.5 mg  0.5 mg Intravenous Q2H PRN Elida Avila MD        insulin regular in 0.9 % NaCl 100 unit/100 mL (1 unit/mL) infusion  0-52 Units/hr Intravenous Continuous Esther Lu MD 8.8 mL/hr at 06/23/22 0807 8.8 Units/hr at 06/23/22 0807    labetalol 20 mg/4 mL (5 mg/mL) IV syring  10 mg Intravenous Q6H PRN Elida Avila MD   10 mg at 06/22/22 1800    magnesium sulfate 2g in water 50mL IVPB (premix)  2 g Intravenous PRN Elida Avila MD 25 mL/hr at 06/23/22 0700 Rate Verify at 06/23/22 0700    magnesium sulfate 2g in water 50mL IVPB (premix)  4 g Intravenous PRN Elida Avila MD        methylPREDNISolone  sodium succinate injection 160 mg  160 mg Intravenous Daily Esther Lu MD        Followed by    [START ON 6/24/2022] methylPREDNISolone sodium succinate injection 120 mg  120 mg Intravenous Daily Esther Lu MD        Followed by    [START ON 6/25/2022] methylPREDNISolone sodium succinate injection 80 mg  80 mg Intravenous Daily Esther Lu MD        Followed by    [START ON 6/26/2022] methylPREDNISolone sodium succinate injection 40 mg  40 mg Intravenous Daily Esther Lu MD        Followed by    [START ON 6/27/2022] predniSONE tablet 20 mg  20 mg Oral Daily Esther Lu MD        mupirocin 2 % ointment 1 g  1 g Nasal BID Esther Lu MD   1 g at 06/22/22 2116    mycophenolate mofetil 200 mg/mL suspension 1,000 mg  1,000 mg Oral BID Esther Lu MD   1,000 mg at 06/22/22 2116    oxyCODONE immediate release tablet 5 mg  5 mg Oral Q4H PRN Elida Avila MD   5 mg at 06/23/22 0412    oxyCODONE immediate release tablet Tab 10 mg  10 mg Oral Q4H PRN Elida Avila MD        potassium chloride 40 mEq in 100 mL IVPB (FOR CENTRAL LINE ADMINISTRATION ONLY)  40 mEq Intravenous PRN Elida Avila MD        And    potassium chloride 20 mEq in 100 mL IVPB (FOR CENTRAL LINE ADMINISTRATION ONLY)  60 mEq Intravenous PRN Elida Avila MD        And    potassium chloride 40 mEq in 100 mL IVPB (FOR CENTRAL LINE ADMINISTRATION ONLY)  80 mEq Intravenous PRN Elida Avila MD        sodium chloride 0.9% flush 10 mL  10 mL Intravenous PRN Esther Lu MD        [START ON 6/28/2022] sulfamethoxazole-trimethoprim 400-80mg per tablet 1 tablet  1 tablet Oral Daily AM Esther Lu MD        tacrolimus (PROGRAF) 1 mg/mL oral syringe  1 mg Oral BID Esther Lu MD   1 mg at 06/22/22 1800    [START ON 7/1/2022] valGANciclovir tablet 450 mg  450 mg Oral Daily Esther Lu MD        vancomycin 1.5 g in dextrose 5 % 250 mL IVPB (ready to mix)  1,500 mg Intravenous Q12H Sinan DELEON  MD Son   Stopped at 06/23/22 0446     Current Outpatient Medications on File Prior to Visit   Medication Sig Dispense Refill    losartan (COZAAR) 50 MG tablet Take 1 tablet (50 mg total) by mouth once daily. 90 tablet 3    [DISCONTINUED] baclofen (LIORESAL) 10 MG tablet TAKE 1 TABLET(10 MG) BY MOUTH THREE TIMES DAILY AS NEEDED FOR HICCUPS (Patient not taking: No sig reported) 90 tablet 0    [DISCONTINUED] metFORMIN (GLUCOPHAGE) 500 MG tablet Take 1 tablet (500 mg total) by mouth 2 (two) times daily with meals. (Patient not taking: No sig reported) 60 tablet 3       Past Surgical History:   Procedure Laterality Date    DIAGNOSTIC ULTRASOUND N/A 6/21/2022    Procedure: ULTRASOUND, DIAGNOSTIC;  Surgeon: Sinan Cline MD;  Location: [facility] OR 2ND FLR;  Service: Transplant;  Laterality: N/A;    ENDOSCOPIC ULTRASOUND OF UPPER GASTROINTESTINAL TRACT N/A 10/20/2021    Procedure: ULTRASOUND, UPPER GI TRACT, ENDOSCOPIC;  Surgeon: Valeriy Sotelo MD;  Location: [facility] (2ND FLR);  Service: Endoscopy;  Laterality: N/A;  wife to email vacc card-inst email-tb    ERCP N/A 10/20/2021    Procedure: ERCP (ENDOSCOPIC RETROGRADE CHOLANGIOPANCREATOGRAPHY);  Surgeon: Valeriy Sotelo MD;  Location: [facility] (2ND FLR);  Service: Endoscopy;  Laterality: N/A;    ERCP N/A 11/4/2021    Procedure: ERCP (ENDOSCOPIC RETROGRADE CHOLANGIOPANCREATOGRAPHY);  Surgeon: Valeriy Sotelo MD;  Location: [facility] ENDO (2ND FLR);  Service: Endoscopy;  Laterality: N/A;    ERCP N/A 11/4/2021    Procedure: ERCP (ENDOSCOPIC RETROGRADE CHOLANGIOPANCREATOGRAPHY);  Surgeon: Roselia Pereyra MD;  Location: [facility] ENDO (2ND FLR);  Service: Endoscopy;  Laterality: N/A;  pt vaccinated, instructed to bring card to procedure, instructions sent portal-MG    ERCP N/A 1/14/2022    Procedure: ERCP (ENDOSCOPIC RETROGRADE CHOLANGIOPANCREATOGRAPHY);  Surgeon: Roselia Pereyra MD;  Location: [facility] ENDO (2ND FLR);  Service: Endoscopy;  Laterality: N/A;  inst  portal-right chest port-tb  Pt requested at home COVID testing, Pt instructed at home RAPID to be done morning of procedure, Pt instructed to call endoscopy scheuding if there is a positive result, Pt informed if a positive     ERCP N/A 3/31/2022    Procedure: ERCP (ENDOSCOPIC RETROGRADE CHOLANGIOPANCREATOGRAPHY);  Surgeon: Julio Cesar Alonzo MD;  [facility]  Service: Endoscopy;  Laterality: N/A;  3/16: port L chest wall. pt to bring covid vaccination card. Instructions sent via portal.-SC  3/18/22-Updated instructions sent via portal re:new date/time-DS    INSERTION OF TUNNELED CENTRAL VENOUS CATHETER (CVC) WITH SUBCUTANEOUS PORT N/A 11/24/2021    Procedure: INSERTION, PORT-A-CATH;  Surgeon: Prem Syed MD;  Location: [facility] CATH LAB;  Service: Radiology;  Laterality: N/A;    LIVER TRANSPLANT N/A 6/21/2022    Procedure: TRANSPLANT, LIVER;  Surgeon: Sinan Cline MD;  [facility];  Service: Transplant;  Laterality: N/A;    ROBOT-ASSISTED LAPAROSCOPIC LYMPHADENECTOMY USING DA МАРИНА XI  6/17/2022    Procedure: XI ROBOTIC LYMPHADENECTOMY - Portal;  Surgeon: Julio Cesar Jara MD;  [facility];  Service: Transplant;;    TONSILLECTOMY      XI ROBOTIC LAPAROSCOPY, EXPLORATORY N/A 6/17/2022    Procedure: XI ROBOTIC LAPAROSCOPY,EXPLORATORY;  Surgeon: Julio Cesar Jara MD;  [facility];  Service: Transplant;  Laterality: N/A;       OBJECTIVE:     Vital Signs Range (Last 24H):  Temp:  [36.6 °C (97.8 °F)-37.4 °C (99.3 °F)]   Pulse:  [103-121]   Resp:  [13-36]   BP: (103-162)/()   SpO2:  [93 %-100 %]   Arterial Line BP: (123-159)/(63-92)       Significant Labs:  Lab Results   Component Value Date    WBC 16.37 (H) 06/23/2022    HGB 9.5 (L) 06/23/2022    HCT 28.5 (L) 06/23/2022     (L) 06/23/2022    CHOL 163 10/12/2021    TRIG 198 (H) 10/12/2021    HDL 22 (L) 10/12/2021    ALT 1,731 (H) 06/23/2022     (H) 06/23/2022     06/23/2022    K 4.1  06/23/2022     06/23/2022    CREATININE 0.7 06/23/2022    BUN 15 06/23/2022    CO2 25 06/23/2022    TSH 0.835 03/24/2022    PSA 0.68 03/24/2022    INR 1.4 (H) 06/23/2022    HGBA1C 5.8 (H) 06/20/2022       Diagnostic Studies: No relevant studies.    EKG:   Results for orders placed or performed during the hospital encounter of 06/18/22   EKG 12-lead    Collection Time: 06/21/22  9:28 AM    Narrative    Test Reason :     Vent. Rate : 077 BPM     Atrial Rate : 077 BPM     P-R Int : 142 ms          QRS Dur : 076 ms      QT Int : 376 ms       P-R-T Axes : 029 021 -05 degrees     QTc Int : 425 ms    Normal sinus rhythm  Nonspecific ST and/or T wave abnormalities  Abnormal ECG  When compared with ECG of 20-JUN-2022 12:54,  No significant change was found  Confirmed by Kolton Rick MD ([id number]) on 6/21/2022 9:59:48 AM    Referred By: SHAYLA JASSO           Confirmed By:Kolton Rick MD       ASSESSMENT/PLAN:                                                                                                                06/23/2022  Romeo Larson is a 41 y.o., male.      Pre-op Assessment    I have reviewed the Patient Summary Reports.     I have reviewed the Nursing Notes.    I have reviewed the Medications.     Review of Systems  Anesthesia Hx:  No problems with previous Anesthesia  History of prior surgery of interest to airway management or planning: Previous anesthesia: General Denies Family Hx of Anesthesia complications.  Personal Hx of Anesthesia complications (shivering)   Social:  No Alcohol Use, Non-Smoker    Hematology/Oncology:  Hematology Normal      Current/Recent Cancer. chemotherapy and radiation   EENT/Dental:EENT/Dental Normal   Cardiovascular:   Exercise tolerance: good Hypertension    Pulmonary:  Pulmonary Normal    Renal/:  Renal/ Normal     Hepatic/GI:   Cholangiocarcinoma  S/P Living Related Liver Transplant on 6/21/22   Musculoskeletal:  Musculoskeletal Normal    Neurological:  Neurology  Normal    Endocrine:  Endocrine Normal    Dermatological:  Skin Normal    Psych:  Psychiatric Normal           Physical Exam  General: Well nourished, Cooperative, Alert and Oriented    Airway:  Mallampati: III / I  Mouth Opening: Normal  TM Distance: Normal  Tongue: Normal  Neck ROM: Normal ROM    Dental:  Intact        Anesthesia Plan  Type of Anesthesia, risks & benefits discussed:    Anesthesia Type: Gen ETT  Intra-op Monitoring Plan: Standard ASA Monitors, Art Line and Central Line  Post Op Pain Control Plan: multimodal analgesia and IV/PO Opioids PRN  Induction:  IV  Airway Plan: Direct, Post-Induction  Informed Consent: Informed consent signed with the Patient and all parties understand the risks and agree with anesthesia plan.  All questions answered.   ASA Score: 3  Day of Surgery Review of History & Physical: H&P Update referred to the surgeon/provider.    Ready For Surgery From Anesthesia Perspective.     .

## 2022-06-24 LAB
ALBUMIN SERPL BCP-MCNC: 3 G/DL (ref 3.5–5.2)
ALP SERPL-CCNC: 98 U/L (ref 55–135)
ALT SERPL W/O P-5'-P-CCNC: 1362 U/L (ref 10–44)
ANION GAP SERPL CALC-SCNC: 8 MMOL/L (ref 8–16)
APTT BLDCRRT: 29.9 SEC (ref 21–32)
AST SERPL-CCNC: 341 U/L (ref 10–40)
BACTERIA SPEC AEROBE CULT: ABNORMAL
BASOPHILS # BLD AUTO: 0.02 K/UL (ref 0–0.2)
BASOPHILS NFR BLD: 0.2 % (ref 0–1.9)
BILIRUB SERPL-MCNC: 2.6 MG/DL (ref 0.1–1)
BLD PROD TYP BPU: NORMAL
BLOOD UNIT EXPIRATION DATE: NORMAL
BLOOD UNIT TYPE CODE: 9500
BLOOD UNIT TYPE: NORMAL
BUN SERPL-MCNC: 16 MG/DL (ref 6–20)
CALCIUM SERPL-MCNC: 8.7 MG/DL (ref 8.7–10.5)
CHLORIDE SERPL-SCNC: 103 MMOL/L (ref 95–110)
CO2 SERPL-SCNC: 25 MMOL/L (ref 23–29)
CODING SYSTEM: NORMAL
CREAT SERPL-MCNC: 0.6 MG/DL (ref 0.5–1.4)
DIFFERENTIAL METHOD: ABNORMAL
DISPENSE STATUS: NORMAL
EOSINOPHIL # BLD AUTO: 0 K/UL (ref 0–0.5)
EOSINOPHIL NFR BLD: 0.1 % (ref 0–8)
ERYTHROCYTE [DISTWIDTH] IN BLOOD BY AUTOMATED COUNT: 13.6 % (ref 11.5–14.5)
EST. GFR  (AFRICAN AMERICAN): >60 ML/MIN/1.73 M^2
EST. GFR  (NON AFRICAN AMERICAN): >60 ML/MIN/1.73 M^2
GLUCOSE SERPL-MCNC: 132 MG/DL (ref 70–110)
GLUCOSE SERPL-MCNC: 154 MG/DL (ref 70–110)
HCO3 UR-SCNC: 27.4 MMOL/L (ref 24–28)
HCT VFR BLD AUTO: 29.2 % (ref 40–54)
HCT VFR BLD CALC: 25 %PCV (ref 36–54)
HGB BLD-MCNC: 9.5 G/DL (ref 14–18)
IMM GRANULOCYTES # BLD AUTO: 0.25 K/UL (ref 0–0.04)
IMM GRANULOCYTES NFR BLD AUTO: 2.1 % (ref 0–0.5)
INR PPP: 1.1 (ref 0.8–1.2)
INR PPP: 1.1 (ref 0.8–1.2)
LYMPHOCYTES # BLD AUTO: 0.5 K/UL (ref 1–4.8)
LYMPHOCYTES NFR BLD: 3.9 % (ref 18–48)
MAGNESIUM SERPL-MCNC: 1.9 MG/DL (ref 1.6–2.6)
MCH RBC QN AUTO: 33.1 PG (ref 27–31)
MCHC RBC AUTO-ENTMCNC: 32.5 G/DL (ref 32–36)
MCV RBC AUTO: 102 FL (ref 82–98)
MONOCYTES # BLD AUTO: 0.7 K/UL (ref 0.3–1)
MONOCYTES NFR BLD: 5.5 % (ref 4–15)
NEUTROPHILS # BLD AUTO: 10.7 K/UL (ref 1.8–7.7)
NEUTROPHILS NFR BLD: 88.2 % (ref 38–73)
NRBC BLD-RTO: 0 /100 WBC
NUM UNITS TRANS PACKED RBC: NORMAL
NUM UNITS TRANS PACKED RBC: NORMAL
PCO2 BLDA: 49.1 MMHG (ref 35–45)
PH SMN: 7.35 [PH] (ref 7.35–7.45)
PHOSPHATE SERPL-MCNC: 2.4 MG/DL (ref 2.7–4.5)
PLATELET # BLD AUTO: 121 K/UL (ref 150–450)
PMV BLD AUTO: 9.7 FL (ref 9.2–12.9)
PO2 BLDA: 55 MMHG (ref 40–60)
POC BE: 2 MMOL/L
POC IONIZED CALCIUM: 1.23 MMOL/L (ref 1.06–1.42)
POC SATURATED O2: 86 % (ref 95–100)
POC TCO2: 29 MMOL/L (ref 24–29)
POCT GLUCOSE: 145 MG/DL (ref 70–110)
POCT GLUCOSE: 151 MG/DL (ref 70–110)
POCT GLUCOSE: 152 MG/DL (ref 70–110)
POCT GLUCOSE: 168 MG/DL (ref 70–110)
POCT GLUCOSE: 174 MG/DL (ref 70–110)
POCT GLUCOSE: 174 MG/DL (ref 70–110)
POCT GLUCOSE: 175 MG/DL (ref 70–110)
POCT GLUCOSE: 176 MG/DL (ref 70–110)
POCT GLUCOSE: 186 MG/DL (ref 70–110)
POCT GLUCOSE: 189 MG/DL (ref 70–110)
POCT GLUCOSE: 192 MG/DL (ref 70–110)
POCT GLUCOSE: 195 MG/DL (ref 70–110)
POCT GLUCOSE: 208 MG/DL (ref 70–110)
POTASSIUM BLD-SCNC: 4.1 MMOL/L (ref 3.5–5.1)
POTASSIUM SERPL-SCNC: 4.3 MMOL/L (ref 3.5–5.1)
PROT SERPL-MCNC: 4.9 G/DL (ref 6–8.4)
PROTHROMBIN TIME: 11.9 SEC (ref 9–12.5)
PROTHROMBIN TIME: 11.9 SEC (ref 9–12.5)
RBC # BLD AUTO: 2.87 M/UL (ref 4.6–6.2)
SAMPLE: ABNORMAL
SODIUM BLD-SCNC: 139 MMOL/L (ref 136–145)
SODIUM SERPL-SCNC: 136 MMOL/L (ref 136–145)
TACROLIMUS BLD-MCNC: 7.2 NG/ML (ref 5–15)
TRANS ERYTHROCYTES VOL PATIENT: NORMAL ML
VANCOMYCIN TROUGH SERPL-MCNC: 6.4 UG/ML (ref 10–22)
WBC # BLD AUTO: 12.16 K/UL (ref 3.9–12.7)

## 2022-06-24 PROCEDURE — 63600175 PHARM REV CODE 636 W HCPCS: Performed by: STUDENT IN AN ORGANIZED HEALTH CARE EDUCATION/TRAINING PROGRAM

## 2022-06-24 PROCEDURE — 99232 SBSQ HOSP IP/OBS MODERATE 35: CPT | Mod: ,,, | Performed by: NURSE PRACTITIONER

## 2022-06-24 PROCEDURE — 84100 ASSAY OF PHOSPHORUS: CPT | Performed by: SURGERY

## 2022-06-24 PROCEDURE — 99291 PR CRITICAL CARE, E/M 30-74 MINUTES: ICD-10-PCS | Mod: 24,,, | Performed by: STUDENT IN AN ORGANIZED HEALTH CARE EDUCATION/TRAINING PROGRAM

## 2022-06-24 PROCEDURE — 80202 ASSAY OF VANCOMYCIN: CPT | Performed by: SURGERY

## 2022-06-24 PROCEDURE — 99291 CRITICAL CARE FIRST HOUR: CPT | Mod: 24,,, | Performed by: STUDENT IN AN ORGANIZED HEALTH CARE EDUCATION/TRAINING PROGRAM

## 2022-06-24 PROCEDURE — 97161 PT EVAL LOW COMPLEX 20 MIN: CPT

## 2022-06-24 PROCEDURE — 25000003 PHARM REV CODE 250: Performed by: PHYSICIAN ASSISTANT

## 2022-06-24 PROCEDURE — 20600001 HC STEP DOWN PRIVATE ROOM

## 2022-06-24 PROCEDURE — 85025 COMPLETE CBC W/AUTO DIFF WBC: CPT | Performed by: STUDENT IN AN ORGANIZED HEALTH CARE EDUCATION/TRAINING PROGRAM

## 2022-06-24 PROCEDURE — 63600175 PHARM REV CODE 636 W HCPCS: Performed by: SURGERY

## 2022-06-24 PROCEDURE — 97530 THERAPEUTIC ACTIVITIES: CPT

## 2022-06-24 PROCEDURE — 94761 N-INVAS EAR/PLS OXIMETRY MLT: CPT

## 2022-06-24 PROCEDURE — 25000003 PHARM REV CODE 250: Performed by: STUDENT IN AN ORGANIZED HEALTH CARE EDUCATION/TRAINING PROGRAM

## 2022-06-24 PROCEDURE — 63600175 PHARM REV CODE 636 W HCPCS: Performed by: NURSE PRACTITIONER

## 2022-06-24 PROCEDURE — 25000003 PHARM REV CODE 250: Performed by: SURGERY

## 2022-06-24 PROCEDURE — 83735 ASSAY OF MAGNESIUM: CPT | Performed by: SURGERY

## 2022-06-24 PROCEDURE — 80053 COMPREHEN METABOLIC PANEL: CPT | Performed by: STUDENT IN AN ORGANIZED HEALTH CARE EDUCATION/TRAINING PROGRAM

## 2022-06-24 PROCEDURE — 85730 THROMBOPLASTIN TIME PARTIAL: CPT | Performed by: STUDENT IN AN ORGANIZED HEALTH CARE EDUCATION/TRAINING PROGRAM

## 2022-06-24 PROCEDURE — 80197 ASSAY OF TACROLIMUS: CPT | Performed by: STUDENT IN AN ORGANIZED HEALTH CARE EDUCATION/TRAINING PROGRAM

## 2022-06-24 PROCEDURE — 85610 PROTHROMBIN TIME: CPT | Performed by: STUDENT IN AN ORGANIZED HEALTH CARE EDUCATION/TRAINING PROGRAM

## 2022-06-24 PROCEDURE — 25000003 PHARM REV CODE 250: Performed by: NURSE PRACTITIONER

## 2022-06-24 PROCEDURE — 99232 PR SUBSEQUENT HOSPITAL CARE,LEVL II: ICD-10-PCS | Mod: ,,, | Performed by: NURSE PRACTITIONER

## 2022-06-24 RX ORDER — FAMOTIDINE 20 MG/1
20 TABLET, FILM COATED ORAL NIGHTLY
Status: DISCONTINUED | OUTPATIENT
Start: 2022-06-24 | End: 2022-06-28 | Stop reason: HOSPADM

## 2022-06-24 RX ORDER — METHOCARBAMOL 500 MG/1
500 TABLET, FILM COATED ORAL 4 TIMES DAILY
Status: DISCONTINUED | OUTPATIENT
Start: 2022-06-24 | End: 2022-06-28 | Stop reason: HOSPADM

## 2022-06-24 RX ORDER — SULFAMETHOXAZOLE AND TRIMETHOPRIM 400; 80 MG/1; MG/1
1 TABLET ORAL EVERY MORNING
Qty: 30 TABLET | Refills: 5 | Status: ON HOLD | OUTPATIENT
Start: 2022-06-21 | End: 2022-07-16 | Stop reason: HOSPADM

## 2022-06-24 RX ORDER — IBUPROFEN 200 MG
16 TABLET ORAL
Status: DISCONTINUED | OUTPATIENT
Start: 2022-06-24 | End: 2022-06-27

## 2022-06-24 RX ORDER — OXYCODONE HYDROCHLORIDE 10 MG/1
10 TABLET ORAL EVERY 4 HOURS PRN
Status: DISCONTINUED | OUTPATIENT
Start: 2022-06-24 | End: 2022-06-28 | Stop reason: HOSPADM

## 2022-06-24 RX ORDER — MYCOPHENOLATE MOFETIL 250 MG/1
1000 CAPSULE ORAL 2 TIMES DAILY
Status: DISCONTINUED | OUTPATIENT
Start: 2022-06-24 | End: 2022-06-28 | Stop reason: HOSPADM

## 2022-06-24 RX ORDER — TACROLIMUS 1 MG/1
3 CAPSULE ORAL EVERY 12 HOURS
Qty: 180 CAPSULE | Refills: 11 | Status: ON HOLD | OUTPATIENT
Start: 2022-06-24 | End: 2022-07-01 | Stop reason: DRUGHIGH

## 2022-06-24 RX ORDER — MYCOPHENOLATE MOFETIL 250 MG/1
1000 CAPSULE ORAL 2 TIMES DAILY
Qty: 240 CAPSULE | Refills: 11 | Status: ON HOLD | OUTPATIENT
Start: 2022-06-24 | End: 2022-07-08 | Stop reason: HOSPADM

## 2022-06-24 RX ORDER — TACROLIMUS 1 MG/1
3 CAPSULE ORAL 2 TIMES DAILY
Status: DISCONTINUED | OUTPATIENT
Start: 2022-06-24 | End: 2022-06-25

## 2022-06-24 RX ORDER — FUROSEMIDE 10 MG/ML
40 INJECTION INTRAMUSCULAR; INTRAVENOUS ONCE
Status: COMPLETED | OUTPATIENT
Start: 2022-06-24 | End: 2022-06-24

## 2022-06-24 RX ORDER — VALGANCICLOVIR 450 MG/1
450 TABLET, FILM COATED ORAL DAILY
Qty: 30 TABLET | Refills: 2 | Status: ON HOLD | OUTPATIENT
Start: 2022-06-21 | End: 2022-09-17 | Stop reason: HOSPADM

## 2022-06-24 RX ORDER — FAMOTIDINE 20 MG/1
20 TABLET, FILM COATED ORAL NIGHTLY
Qty: 30 TABLET | Refills: 0 | Status: ON HOLD | OUTPATIENT
Start: 2022-06-21 | End: 2022-07-08 | Stop reason: HOSPADM

## 2022-06-24 RX ORDER — FLUCONAZOLE 200 MG/1
200 TABLET ORAL DAILY
Qty: 30 TABLET | Refills: 0 | Status: ON HOLD | OUTPATIENT
Start: 2022-06-20 | End: 2022-07-22 | Stop reason: HOSPADM

## 2022-06-24 RX ORDER — INSULIN ASPART 100 [IU]/ML
0-5 INJECTION, SOLUTION INTRAVENOUS; SUBCUTANEOUS
Status: DISCONTINUED | OUTPATIENT
Start: 2022-06-24 | End: 2022-06-27

## 2022-06-24 RX ORDER — IBUPROFEN 200 MG
24 TABLET ORAL
Status: DISCONTINUED | OUTPATIENT
Start: 2022-06-24 | End: 2022-06-27

## 2022-06-24 RX ORDER — HYDROMORPHONE HYDROCHLORIDE 1 MG/ML
0.5 INJECTION, SOLUTION INTRAMUSCULAR; INTRAVENOUS; SUBCUTANEOUS
Status: DISCONTINUED | OUTPATIENT
Start: 2022-06-24 | End: 2022-06-27

## 2022-06-24 RX ORDER — PREDNISONE 5 MG/1
TABLET ORAL
Qty: 75 TABLET | Refills: 0 | Status: ON HOLD | OUTPATIENT
Start: 2022-06-24 | End: 2022-07-27 | Stop reason: HOSPADM

## 2022-06-24 RX ORDER — GLUCAGON 1 MG
1 KIT INJECTION
Status: DISCONTINUED | OUTPATIENT
Start: 2022-06-24 | End: 2022-06-27

## 2022-06-24 RX ORDER — BACLOFEN 5 MG/1
5 TABLET ORAL 3 TIMES DAILY PRN
Status: DISCONTINUED | OUTPATIENT
Start: 2022-06-24 | End: 2022-06-28 | Stop reason: HOSPADM

## 2022-06-24 RX ADMIN — FAMOTIDINE 20 MG: 20 TABLET ORAL at 08:06

## 2022-06-24 RX ADMIN — INSULIN ASPART 1 UNITS: 100 INJECTION, SOLUTION INTRAVENOUS; SUBCUTANEOUS at 03:06

## 2022-06-24 RX ADMIN — METHYLPREDNISOLONE SODIUM SUCCINATE 120 MG: 40 INJECTION, POWDER, FOR SOLUTION INTRAMUSCULAR; INTRAVENOUS at 08:06

## 2022-06-24 RX ADMIN — INSULIN HUMAN 3.5 UNITS/HR: 1 INJECTION, SOLUTION INTRAVENOUS at 10:06

## 2022-06-24 RX ADMIN — SODIUM PHOSPHATE, MONOBASIC, MONOHYDRATE 20.01 MMOL: 276; 142 INJECTION, SOLUTION INTRAVENOUS at 06:06

## 2022-06-24 RX ADMIN — HYDROMORPHONE HYDROCHLORIDE 0.5 MG: 1 INJECTION, SOLUTION INTRAMUSCULAR; INTRAVENOUS; SUBCUTANEOUS at 10:06

## 2022-06-24 RX ADMIN — MUPIROCIN 1 G: 20 OINTMENT TOPICAL at 08:06

## 2022-06-24 RX ADMIN — MUPIROCIN 1 G: 20 OINTMENT TOPICAL at 09:06

## 2022-06-24 RX ADMIN — OXYCODONE HYDROCHLORIDE 10 MG: 10 TABLET ORAL at 08:06

## 2022-06-24 RX ADMIN — BACLOFEN 5 MG: 5 TABLET ORAL at 09:06

## 2022-06-24 RX ADMIN — METHOCARBAMOL 500 MG: 500 TABLET ORAL at 01:06

## 2022-06-24 RX ADMIN — HYDROMORPHONE HYDROCHLORIDE 0.5 MG: 1 INJECTION, SOLUTION INTRAMUSCULAR; INTRAVENOUS; SUBCUTANEOUS at 03:06

## 2022-06-24 RX ADMIN — MYCOPHENOLATE MOFETIL 1000 MG: 250 CAPSULE ORAL at 10:06

## 2022-06-24 RX ADMIN — HEPARIN SODIUM 5000 UNITS: 5000 INJECTION INTRAVENOUS; SUBCUTANEOUS at 08:06

## 2022-06-24 RX ADMIN — OXYCODONE 5 MG: 5 TABLET ORAL at 10:06

## 2022-06-24 RX ADMIN — HYDRALAZINE HYDROCHLORIDE 10 MG: 20 INJECTION, SOLUTION INTRAMUSCULAR; INTRAVENOUS at 02:06

## 2022-06-24 RX ADMIN — FLUCONAZOLE 200 MG: 200 TABLET ORAL at 08:06

## 2022-06-24 RX ADMIN — INSULIN HUMAN 3.5 UNITS/HR: 1 INJECTION, SOLUTION INTRAVENOUS at 07:06

## 2022-06-24 RX ADMIN — FAMOTIDINE 20 MG: 10 INJECTION INTRAVENOUS at 08:06

## 2022-06-24 RX ADMIN — OXYCODONE HYDROCHLORIDE 10 MG: 10 TABLET ORAL at 02:06

## 2022-06-24 RX ADMIN — CEFEPIME 1 G: 1 INJECTION, POWDER, FOR SOLUTION INTRAMUSCULAR; INTRAVENOUS at 03:06

## 2022-06-24 RX ADMIN — Medication 10 MG: at 06:06

## 2022-06-24 RX ADMIN — CEFEPIME 1 G: 1 INJECTION, POWDER, FOR SOLUTION INTRAMUSCULAR; INTRAVENOUS at 08:06

## 2022-06-24 RX ADMIN — TACROLIMUS 3 MG: 1 CAPSULE ORAL at 06:06

## 2022-06-24 RX ADMIN — FUROSEMIDE 40 MG: 10 INJECTION, SOLUTION INTRAMUSCULAR; INTRAVENOUS at 10:06

## 2022-06-24 RX ADMIN — OXYCODONE HYDROCHLORIDE 10 MG: 10 TABLET ORAL at 06:06

## 2022-06-24 RX ADMIN — METHOCARBAMOL 500 MG: 500 TABLET ORAL at 10:06

## 2022-06-24 RX ADMIN — INSULIN ASPART 1 UNITS: 100 INJECTION, SOLUTION INTRAVENOUS; SUBCUTANEOUS at 11:06

## 2022-06-24 RX ADMIN — HEPARIN SODIUM 5000 UNITS: 5000 INJECTION INTRAVENOUS; SUBCUTANEOUS at 06:06

## 2022-06-24 RX ADMIN — HEPARIN SODIUM 5000 UNITS: 5000 INJECTION INTRAVENOUS; SUBCUTANEOUS at 01:06

## 2022-06-24 RX ADMIN — VANCOMYCIN HYDROCHLORIDE 1500 MG: 1.5 INJECTION, POWDER, LYOPHILIZED, FOR SOLUTION INTRAVENOUS at 04:06

## 2022-06-24 RX ADMIN — MYCOPHENOLATE MOFETIL 1000 MG: 250 CAPSULE ORAL at 08:06

## 2022-06-24 RX ADMIN — TACROLIMUS 3 MG: 1 CAPSULE, GELATIN COATED ORAL at 08:06

## 2022-06-24 RX ADMIN — INSULIN ASPART 1 UNITS: 100 INJECTION, SOLUTION INTRAVENOUS; SUBCUTANEOUS at 09:06

## 2022-06-24 RX ADMIN — HYDROMORPHONE HYDROCHLORIDE 0.5 MG: 1 INJECTION, SOLUTION INTRAMUSCULAR; INTRAVENOUS; SUBCUTANEOUS at 05:06

## 2022-06-24 RX ADMIN — VANCOMYCIN HYDROCHLORIDE 1750 MG: 10 INJECTION, POWDER, LYOPHILIZED, FOR SOLUTION INTRAVENOUS at 06:06

## 2022-06-24 RX ADMIN — HYDROMORPHONE HYDROCHLORIDE 0.5 MG: 1 INJECTION, SOLUTION INTRAMUSCULAR; INTRAVENOUS; SUBCUTANEOUS at 09:06

## 2022-06-24 RX ADMIN — METHOCARBAMOL 500 MG: 500 TABLET ORAL at 06:06

## 2022-06-24 NOTE — PLAN OF CARE
Patient transferred to U [id number].  Reports pain is managed well at this point (4/10).  Family at bedside.  Patient up to chair.  Telemetry monitor applied & NS-ST ( BPM).  Visi applied.  Call light placed in reach, bed low & locked, chair locked, nonslip socks in use.  Patient demonstrates use of call light.    Problem: Adult Inpatient Plan of Care  Goal: Plan of Care Review  Outcome: Ongoing, Progressing  Goal: Patient-Specific Goal (Individualized)  Outcome: Ongoing, Progressing  Flowsheets (Taken 6/24/2022 1155)  Anxieties, Fears or Concerns: none expressed  Individualized Care Needs: advance diet  Patient-Specific Goals (Include Timeframe): advance diet in the AM  Goal: Absence of Hospital-Acquired Illness or Injury  Outcome: Ongoing, Progressing  Goal: Optimal Comfort and Wellbeing  Outcome: Ongoing, Progressing     Problem: Infection  Goal: Absence of Infection Signs and Symptoms  Outcome: Ongoing, Progressing     Problem: Fall Injury Risk  Goal: Absence of Fall and Fall-Related Injury  Outcome: Ongoing, Progressing     Problem: Restraint, Nonbehavioral (Nonviolent)  Goal: Absence of Harm or Injury  Outcome: Met     Problem: Skin Injury Risk Increased  Goal: Skin Health and Integrity  Outcome: Ongoing, Progressing     Problem: Communication Impairment (Mechanical Ventilation, Invasive)  Goal: Effective Communication  Outcome: Met     Problem: Device-Related Complication Risk (Mechanical Ventilation, Invasive)  Goal: Optimal Device Function  Outcome: Met     Problem: Inability to Wean (Mechanical Ventilation, Invasive)  Goal: Mechanical Ventilation Liberation  Outcome: Met     Problem: Nutrition Impairment (Mechanical Ventilation, Invasive)  Goal: Optimal Nutrition Delivery  Outcome: Met     Problem: Skin and Tissue Injury (Mechanical Ventilation, Invasive)  Goal: Absence of Device-Related Skin and Tissue Injury  Outcome: Met     Problem: Ventilator-Induced Lung Injury (Mechanical Ventilation,  Invasive)  Goal: Absence of Ventilator-Induced Lung Injury  Outcome: Met     Problem: Communication Impairment (Artificial Airway)  Goal: Effective Communication  Outcome: Met     Problem: Device-Related Complication Risk (Artificial Airway)  Goal: Optimal Device Function  Outcome: Met     Problem: Skin and Tissue Injury (Artificial Airway)  Goal: Absence of Device-Related Skin or Tissue Injury  Outcome: Met     Problem: Noninvasive Ventilation Acute  Goal: Effective Unassisted Ventilation and Oxygenation  Outcome: Met     Problem: Diabetes Comorbidity  Goal: Blood Glucose Level Within Targeted Range  Outcome: Ongoing, Progressing     Problem: Adjustment to Transplant (Liver Transplant)  Goal: Optimal Coping with Organ Transplant  Outcome: Ongoing, Progressing     Problem: Bleeding (Liver Transplant)  Goal: Absence of Bleeding  Outcome: Ongoing, Progressing     Problem: Bowel Motility Impaired (Liver Transplant)  Goal: Effective Bowel Elimination  Outcome: Ongoing, Progressing     Problem: Donor Organ Function (Liver Transplant)  Goal: Effective Organ Function  Outcome: Ongoing, Progressing     Problem: Fluid and Electrolyte Imbalance (Liver Transplant)  Goal: Fluid and Electrolyte Balance  Outcome: Ongoing, Progressing     Problem: Glycemic Control Impaired (Liver Transplant)  Goal: Blood Glucose Level Within Targeted Range  Outcome: Ongoing, Progressing     Problem: Infection (Liver Transplant)  Goal: Absence of Infection Signs and Symptoms  Outcome: Ongoing, Progressing     Problem: Neurologic Impairment (Liver Transplant)  Goal: Optimal Neurologic Function  Outcome: Ongoing, Progressing     Problem: Ongoing Anesthesia Effects (Liver Transplant)  Goal: Anesthesia/Sedation Recovery  Outcome: Ongoing, Progressing     Problem: Oral Intake Inadequate (Liver Transplant)  Goal: Optimal Nutrition Intake  Outcome: Ongoing, Progressing     Problem: Pain (Liver Transplant)  Goal: Acceptable Pain Control  Outcome: Ongoing,  Progressing     Problem: Postoperative Nausea and Vomiting (Liver Transplant)  Goal: Nausea and Vomiting Relief  Outcome: Ongoing, Progressing     Problem: Postoperative Urinary Retention (Liver Transplant)  Goal: Effective Urinary Elimination  Outcome: Ongoing, Progressing     Problem: Respiratory Compromise (Liver Transplant)  Goal: Effective Oxygenation and Ventilation  Outcome: Ongoing, Progressing

## 2022-06-24 NOTE — PLAN OF CARE
POC reviewed with pt and spouse, no acute events overnight, SHRUTHI drains with serosang drainage, assessed by MD this AM, tolerating CLD, OOBTC this AM, all AM labs reviewed. All questions answered and addressed.

## 2022-06-24 NOTE — SUBJECTIVE & OBJECTIVE
"Interval HPI:   Overnight events: No acute events overnight. Patient on the SICU in room 02840 CVICU/93349 CVICU A. Blood glucose stable. BG at goal on current insulin regimen (IIP). Steroid use- Methylprednisolone  120 mg. 1 Day Post-Op  Renal function- Normal   Vasopressors-  None       Diet clear liquid     Eating:   <25%  Nausea: No  Hypoglycemia and intervention: No  Fever: No  TPN and/or TF: No      /89   Pulse 93   Temp 97.7 °F (36.5 °C)   Resp 18   Ht 5' 8" (1.727 m)   Wt 88 kg (194 lb 0.1 oz)   SpO2 97%   BMI 29.50 kg/m²     Labs Reviewed and Include    Recent Labs   Lab 06/24/22  0315   *   CALCIUM 8.7   ALBUMIN 3.0*   PROT 4.9*      K 4.3   CO2 25      BUN 16   CREATININE 0.6   ALKPHOS 98   ALT 1,362*   *   BILITOT 2.6*     Lab Results   Component Value Date    WBC 12.16 06/24/2022    HGB 9.5 (L) 06/24/2022    HCT 29.2 (L) 06/24/2022     (H) 06/24/2022     (L) 06/24/2022     No results for input(s): TSH, FREET4 in the last 168 hours.  Lab Results   Component Value Date    HGBA1C 5.8 (H) 06/20/2022       Nutritional status:   Body mass index is 29.5 kg/m².  Lab Results   Component Value Date    ALBUMIN 3.0 (L) 06/24/2022    ALBUMIN 3.1 (L) 06/23/2022    ALBUMIN 3.1 (L) 06/23/2022     No results found for: PREALBUMIN    Estimated Creatinine Clearance: 171.1 mL/min (based on SCr of 0.6 mg/dL).    Accu-Checks  Recent Labs     06/23/22  2310 06/24/22  0026 06/24/22  0100 06/24/22  0220 06/24/22  0313 06/24/22  0414 06/24/22  0505 06/24/22  0620 06/24/22  0734 06/24/22  0833   POCTGLUCOSE 189* 189* 174* 151* 152* 145* 175* 174* 168* 186*       Current Medications and/or Treatments Impacting Glycemic Control  Immunotherapy:    Immunosuppressants           Stop Route Frequency     tacrolimus capsule 3 mg         -- Oral 2 times daily     mycophenolate capsule 1,000 mg         -- Oral 2 times daily          Steroids:   Hormones (From admission, onward)           " "     Start     Stop Route Frequency Ordered    06/27/22 0900  predniSONE tablet 20 mg  (methylprednisolone taper panel)        "Followed by" Linked Group Details    -- Oral Daily 06/22/22 0014    06/26/22 0900  methylPREDNISolone sodium succinate injection 40 mg  (methylprednisolone taper panel)        "Followed by" Linked Group Details    06/27 0859 IV Daily 06/22/22 0014    06/25/22 0900  methylPREDNISolone sodium succinate injection 80 mg  (methylprednisolone taper panel)        "Followed by" Linked Group Details    06/26 0859 IV Daily 06/22/22 0014          Pressors:    Autonomic Drugs (From admission, onward)                None          Hyperglycemia/Diabetes Medications:   Antihyperglycemics (From admission, onward)                Start     Stop Route Frequency Ordered    06/24/22 1045  insulin regular in 0.9 % NaCl 100 unit/100 mL (1 unit/mL) infusion        Question:  Enter initial dose (Units/hr):  Answer:  3.5    -- IV Continuous 06/24/22 0935    06/24/22 1035  insulin aspart U-100 pen 0-5 Units         -- SubQ As needed (PRN) 06/24/22 0935          "

## 2022-06-24 NOTE — ASSESSMENT & PLAN NOTE
Endocrinology consulted for BG management.   BG goal 140-180    - D/C IIP  - Transition drip starting at 3.5 units/hr (based on insulin needs while on IIP. Patient remained at the same rate on IIP for > 6 hours).   - Novolog (aspart) insulin LDC (150/50) SSI  Units SQ prn for BG excursions   - BG checks q4hr  - Hypoglycemia protocol in place    ** Please notify Endocrine for any change and/or advance in diet**  ** Please call Endocrine for any BG related issues **    Discharge Planning:   TBD. Please notify endocrinology prior to discharge.

## 2022-06-24 NOTE — PROGRESS NOTES
Pharmacokinetic Assessment Follow Up: IV Vancomycin    Vancomycin serum concentration assessment(s):  -Vanc trough drawn slightly early but still out of range  -increase dose and redraw rough level 6/26 @ 0430    Drug levels (last 3 results):  Recent Labs   Lab Result Units 06/24/22  0315   Vancomycin-Trough ug/mL 6.4*       Pharmacy will continue to follow and monitor vancomycin.    Please contact pharmacy at extension [phone] for questions regarding this assessment.    Thank you for the consult,   Vy Urias       Patient brief summary:  Romeo Larson is a 41 y.o. male initiated on antimicrobial therapy with IV Vancomycin for treatment of intra-abdominal infection    The patient's current regimen is vanc 1750 mg q12h    Drug Allergies:   Review of patient's allergies indicates:  No Known Allergies    Actual Body Weight:   88    Renal Function:   Estimated Creatinine Clearance: 171.1 mL/min (based on SCr of 0.6 mg/dL).,     Dialysis Method (if applicable):  N/A    CBC (last 72 hours):  Recent Labs   Lab Result Units 06/21/22  1954 06/21/22  2106 06/21/22  2200 06/22/22  0027 06/22/22  0409 06/22/22  0730 06/22/22  1104 06/22/22  1522 06/22/22  1934 06/22/22  2337 06/23/22  0400 06/23/22  1204 06/23/22  1601 06/23/22  2121 06/24/22  0315   WBC K/uL  --   --   --  12.62 14.64* 15.24* 15.48* 11.32 14.44* 16.33* 16.37* 16.00* 13.01* 13.20* 12.16   Hemoglobin g/dL 10.4* 11.1* 10.9* 10.1* 10.5* 10.4* 10.5* 8.9* 9.5* 9.5* 9.5* 8.9* 9.4* 9.6* 9.5*   Hematocrit % 30.0* 31.9* 31.7* 30.4* 31.1* 30.7* 31.5* 27.1* 28.4* 28.1* 28.5* 27.3* 28.4* 28.8* 29.2*   Platelets K/uL 179 168 154 116* 137* 132* 133* 92* 105* 106* 121* 116* 107* 113* 121*   Gran % %  --   --   --  74.0* 94.4* 92.9* 92.7* 92.6* 92.0* 92.1* 89.6* 87.3* 90.5* 92.3* 88.2*   Lymph % %  --   --   --  2.0* 2.5* 2.5* 2.6* 2.8* 2.2* 2.4* 3.1* 3.6* 2.7* 2.8* 3.9*   Mono % %  --   --   --  3.0* 2.4* 3.7* 3.8* 3.9* 4.8 4.4 5.6 6.3 4.1 3.3* 5.5   Eosinophil % %  --    --   --  0.0 0.0 0.0 0.0 0.0 0.0 0.0 0.0 0.1 0.1 0.0 0.1   Basophil % %  --   --   --  0.0 0.2 0.3 0.3 0.2 0.2 0.2 0.2 0.1 0.1 0.2 0.2   Differential Method   --   --   --  Manual Automated Automated Automated Automated Automated Automated Automated Automated Automated Automated Automated       Metabolic Panel (last 72 hours):  Recent Labs   Lab Result Units 06/21/22  1954 06/21/22  2106 06/21/22  2200 06/22/22  0019 06/22/22  0409 06/22/22  0730 06/22/22  1104 06/22/22  1522 06/22/22  1934 06/22/22  2337 06/23/22  0400 06/23/22  0407 06/23/22  1204 06/23/22  1601 06/23/22  2121 06/24/22  0315   Sodium mmol/L 139 136 136 137 135* 138  --   --  138  --  136  --  140 137 136 136   Potassium mmol/L 3.9 5.0 4.9 4.8 4.3 4.3  --   --  4.0  --  4.1  --  4.3 4.6 4.3 4.3   Chloride mmol/L  --   --   --  102 101 104  --   --  106  --  104  --  106 104 104 103   CO2 mmol/L  --   --   --  20* 22* 25  --   --  26  --  25  --  27 27 25 25   Glucose mg/dL 160* 190* 177* 188* 224* 200*  --   --  153*  --  178*  --  154* 241* 223* 132*   BUN mg/dL  --   --   --  13 14 14  --   --  15  --  15  --  15 15 15 16   Creatinine mg/dL  --   --   --  1.0 0.9 0.9  --   --  0.7  --  0.7  --  0.7 0.7 0.7 0.6   Albumin g/dL 3.4* 3.6 3.4* 3.4* 3.4*  --   --   --   --   --  3.4*  --  3.0* 3.1* 3.1* 3.0*   Total Bilirubin mg/dL  --   --   --  2.5* 3.0*  --   --   --   --   --  3.0*  --  2.6* 3.0* 2.8* 2.6*   Alkaline Phosphatase U/L  --   --   --  106 105  --   --   --   --   --  94  --  102 93 94 98   AST U/L 1,733*  --   --  1,848* 1,761*  1,750* 1,539* 1,391* 1,064* 1,002* 854* 704*  --  522* 485* 400* 341*   ALT U/L 1,961*  --   --  2,131* 2,195*  --   --   --   --   --  1,731*  --  1,485* 1,499* 1,426* 1,362*   Magnesium mg/dL 1.8 1.8 1.7 1.7 2.3  --   --   --   --   --   --  1.8 2.2 2.0  --  1.9   Phosphorus mg/dL  --   --   --  4.2 3.3  --   --   --   --   --   --  2.2* 3.4 2.2*  --  2.4*       Vancomycin Administrations:  vancomycin given  in the last 96 hours                   vancomycin 1.5 g in dextrose 5 % 250 mL IVPB (ready to mix) (mg) 1,500 mg New Bag 06/24/22 0400     1,500 mg New Bag 06/23/22 1420     1,500 mg New Bag  0310     1,500 mg New Bag 06/22/22 1400    vancomycin 1.5 g in dextrose 5 % 250 mL IVPB (ready to mix) (mg) 1,500 mg Given 06/21/22 2316     1,500 mg Given  1113     1,500 mg New Bag 06/20/22 2253                Microbiologic Results:  Microbiology Results (last 7 days)     Procedure Component Value Units Date/Time    Aerobic culture [[id number]]  (Abnormal)  (Susceptibility) Collected: 06/21/22 1342    Order Status: Completed Specimen: Bile Updated: 06/24/22 1224     Aerobic Bacterial Culture ENTEROCOCCUS FAECALIS  Moderate      Narrative:      Bile Duct    Aerobic culture [[id number]] Collected: 06/23/22 0932    Order Status: Completed Specimen: Abdominal from Abdomen Updated: 06/24/22 1003     Aerobic Bacterial Culture No growth    Narrative:      Peritoneal fluid    Culture, Anaerobe [[id number]] Collected: 06/23/22 0932    Order Status: Completed Specimen: Abdominal from Abdomen Updated: 06/24/22 0757     Anaerobic Culture Culture in progress    Narrative:      Peritoneal fluid    Aerobic culture [[id number]]  (Abnormal) Collected: 06/21/22 1512    Order Status: Completed Specimen: Liver from Abdomen Updated: 06/24/22 0752     Aerobic Bacterial Culture ENTEROCOCCUS SPECIES  Few  Identification and susceptibility pending      Narrative:      Liver tissue    Blood culture [[id number]] Collected: 06/18/22 1819    Order Status: Completed Specimen: Blood from Peripheral, Left Arm Updated: 06/23/22 2212     Blood Culture, Routine No growth after 5 days.    Narrative:      Draw 15 min apart    Blood culture [[id number]] Collected: 06/18/22 1752    Order Status: Completed Specimen: Blood from Antecubital, Left Updated: 06/23/22 2012     Blood Culture, Routine No growth after 5 days.    Narrative:      Draw 15 min apart    Culture,  Anaerobe [[id number]] Collected: 06/21/22 1512    Order Status: Completed Specimen: Liver from Abdomen Updated: 06/23/22 1302     Anaerobic Culture Culture in progress    Narrative:      Liver tissue    Culture, Anaerobe [[id number]] Collected: 06/21/22 1342    Order Status: Completed Specimen: Bile Updated: 06/23/22 1301     Anaerobic Culture Culture in progress    Narrative:      Bile Duct    Gram stain [[id number]] Collected: 06/23/22 0932    Order Status: Completed Specimen: Abdominal from Abdomen Updated: 06/23/22 1203     Gram Stain Result No organisms seen      No WBC's    Narrative:      Peritoneal fluid    Fungus culture [[id number]] Collected: 06/23/22 0932    Order Status: Sent Specimen: Abdominal from Abdomen Updated: 06/23/22 0950    Fungus culture [[id number]] Collected: 06/21/22 1342    Order Status: Completed Specimen: Bile Updated: 06/22/22 0918     Fungus (Mycology) Culture Culture in progress    Narrative:      Bile Duct    Fungus culture [[id number]] Collected: 06/21/22 1512    Order Status: Completed Specimen: Liver from Abdomen Updated: 06/22/22 0918     Fungus (Mycology) Culture Culture in progress    Narrative:      Liver tissue    Urine culture [[id number]] Collected: 06/20/22 1519    Order Status: Completed Specimen: Urine, Clean Catch Updated: 06/21/22 1956     Urine Culture, Routine No growth    Narrative:      On admission  Indicated criteria for high risk culture:->Other  Other (specify):->pre-op testing    Gram stain [[id number]] Collected: 06/21/22 1512    Order Status: Completed Specimen: Liver from Abdomen Updated: 06/21/22 1849     Gram Stain Result Rare WBC's      No organisms seen    Narrative:      Liver tissue    Gram stain [[id number]] Collected: 06/21/22 1342    Order Status: Completed Specimen: Bile Updated: 06/21/22 1848     Gram Stain Result No WBC's      No organisms seen    Narrative:      Bile Duct

## 2022-06-24 NOTE — PLAN OF CARE
Recommendations    1) Advance diet as medically feasible     2) If pt eating <50% of meals, rec'd Boost Plus TID to optimize PO intake.     3) RD to monitor and f/u.    Goals: Meet estimated kcal/protein needs by RD f/u date  Nutrition Goal Status: progressing towards goal  Communication of RD Recs:  (POC)

## 2022-06-24 NOTE — PROGRESS NOTES
"[facility]  Adult Nutrition  Consult Note    SUMMARY     Recommendations    1) Advance diet as medically feasible     2) If pt eating <50% of meals, rec'd Boost Plus TID to optimize PO intake.     3) RD to monitor and f/u.    Goals: Meet estimated kcal/protein needs by RD f/u date  Nutrition Goal Status: progressing towards goal  Communication of RD Recs:  (POC)    Assessment and Plan    Nutrition Problem  Increased protein needs              Related to (etiology):   Increased demand for nutrient     Signs and Symptoms (as evidenced by):   S/p liver transplant  CA     Interventions/Recommendations (treatment strategy):  Collaboration of nutrition care with other providers  Diet advancement + commercial beverage      Nutrition Diagnosis Status:   Continues    Reason for Assessment    Reason For Assessment: RD follow-up  Diagnosis:  (end stage liver disease 2/2 cholangiocarcinoma s/p liver transplant)  Relevant Medical History: DMII  Interdisciplinary Rounds: did not attend    General Information Comments: s/p liver transplant POD#3. Diet advanced to clear liquids today, pt says appetite slowly returning. Pt w/ no indicators of malnutrition, NFPE not warranted. Pt/pt's wife had no questions/concerns regarding low sodium/food safety diet.     Nutrition Discharge Planning: Post transplant nutrition education provided. Food safety/drug interactions emphasized. General healthy diet recommended. Education provided, encouraged to call RD with questions. Pt's wife present. No other needs identified.    Nutrition Risk Screen    Nutrition Risk Screen: no indicators present    Nutrition/Diet History    Spiritual, Cultural Beliefs, Synagogue Practices, Values that Affect Care: no  Food Allergies: NKFA    Anthropometrics    Temp: 97.7 °F (36.5 °C)  Height Method: Stated  Height: 5' 8" (172.7 cm)  Height (inches): 68 in  Weight Method: Bed Scale  Weight: 88 kg (194 lb 0.1 oz)  Weight (lb): 194.01 " lb  Ideal Body Weight (IBW), Male: 154 lb  % Ideal Body Weight, Male (lb): 124.97 %  BMI (Calculated): 29.5  BMI Grade: 25 - 29.9 - overweight       Lab/Procedures/Meds    Pertinent Labs Reviewed: reviewed  Pertinent Labs Comments: glu 132, phos 2.4, alb3, Tbili 2.6, , ALT 1362, HA1C 5.8%  Pertinent Medications Reviewed: reviewed  Pertinent Medications Comments: prednisone, furosemide, sodium phosphate, insulin    Estimated/Assessed Needs    Weight Used For Calorie Calculations: 86.7 kg (191 lb 2.2 oz)  Energy Calorie Requirements (kcal): 9740-3821 kcal/day  Energy Need Method: Kcal/kg (25-30 kcal/kg)  Protein Requirements: 104-130g pro/day (1.2-1.5g pro/kg)  Weight Used For Protein Calculations: 86.7 kg (191 lb 2.2 oz)  Fluid Requirements (mL): 1mL per kg or fluid per physician  RDA Method (mL): 2168     Nutrition Prescription Ordered    Current Diet Order: clear liquids    Evaluation of Received Nutrient/Fluid Intake    I/O: -0.12L since admit  Comments: LBM 6/20  % Intake of Estimated Energy Needs: 0 - 25 %  % Meal Intake: 25 - 50 %    Nutrition Risk    Level of Risk/Frequency of Follow-up: high     Monitor and Evaluation    Food and Nutrient Intake: food and beverage intake, enteral nutrition intake  Food and Nutrient Adminstration: diet order, enteral and parenteral nutrition administration  Physical Activity and Function: nutrition-related ADLs and IADLs  Anthropometric Measurements: weight, weight change  Biochemical Data, Medical Tests and Procedures: electrolyte and renal panel, gastrointestinal profile, glucose/endocrine profile  Nutrition-Focused Physical Findings: overall appearance       Nutrition Follow-Up    RD Follow-up?: Yes     Elida Sandra RD, LDN

## 2022-06-24 NOTE — PLAN OF CARE
Problem: Physical Therapy  Goal: Physical Therapy Goal  Description: Goals to be met by: 22     Patient will increase functional independence with mobility by performin. Supine to sit with Modified [place]  2. Sit to supine with Modified [place]  3. Sit to stand transfer with Modified [place]  4. Gait  x 300 feet with Supervision using LRAD     Outcome: Ongoing, Progressing     Discharge Recommendation: Home, no PT needs.    Evaluation completed today. PT goals appropriate.    Patient is safe to perform transfers and ambulation with CGA and HHA with nursing staff.    Please continue Progressive Mobility Protocol as appropriate.    Lizy Preston, SPT  2022

## 2022-06-24 NOTE — NURSING TRANSFER
Nursing Transfer Note      6/24/2022     Reason patient is being transferred: stable    Transfer To: 1178    Transfer via wheelchair    Transfer with cardiac monitoring    Transported by RN    Medicines sent: Vanc and Insulin pen    Any special needs or follow-up needed: N/A    Chart send with patient: yes    Notified: Family and RN Teetee who is assuming care of pt at this time     Patient reassessed at: 6/24/22 at 17:00    Upon arrival to floor: Patient oriented to room and up in chair with telemonitoring, pt is stable and no acute distress noted.

## 2022-06-24 NOTE — PT/OT/SLP EVAL
"Physical Therapy Evaluation and Treatment    Patient Name:  Romeo Larson   MRN:  [id number]  Admit Date: 6/18/2022  Admitting Diagnosis:  Hilar cholangiocarcinoma   Length of Stay: 6 days  Recent Surgery: Procedure(s) (LRB):  REPAIR, BILE DUCT (N/A)  LAPAROTOMY, EXPLORATORY, AFTER LIVER TRANSPLANT (N/A) 1 Day Post-Op    Recommendations:     Discharge Recommendations:  home   Discharge Equipment Recommendations: other (see comments) (TBD closer to discharge)   Barriers to discharge: None    Plan:     During this hospitalization, patient to be seen 4 x/week to address the identified rehab impairments via gait training, therapeutic activities, therapeutic exercises and progress towards the established goals.  · Plan of Care Expires:  07/24/22  · Plan of Care Reviewed with: patient, spouse    Assessment:     Romeo Larson is a 41 y.o. male admitted with a medical diagnosis of Hilar cholangiocarcinoma. Pt found alert in bedside chair. Pt tolerated evaluation well with good activity tolerance and no adverse effects. Pt reported slight dizziness upon changing positions from sitting to standing that resolved in < 1 minute with VSS. Pt demonstrated good LE strength as shown by performing STS transfer with CGA. Pt was able to ambulate 120 feet with CGA and R hand on IV pole demonstrating good endurance. Pt demonstrated decreased merissa, decreased foot clearance, decreased step length, and instability during ambulation. Pt reported "feeling tired" with increased respiration rate at the end of ambulation with no LOB and VSS.  It is anticipated that patient will be able to ambulate further in next treatment session. Pt was educated to walk with nursing throughout the day to help improve endurance and activity tolerance. Pt will benefit from PT to increase endurance, improve functional mobility, and return to PLOF. Upon discharge, it is expected that patient will be able to return home with no PT needs due to high level of " motivation, family support, and current functional status.     Problem List: weakness, impaired endurance, impaired self care skills, impaired functional mobilty, gait instability, impaired balance, impaired cardiopulmonary response to activity.  Rehab Prognosis: Good; patient would benefit from acute skilled PT services to address these deficits and reach maximum level of function.      Goals:   Multidisciplinary Problems     Physical Therapy Goals        Problem: Physical Therapy    Goal Priority Disciplines Outcome Goal Variances Interventions   Physical Therapy Goal     PT, PT/OT Ongoing, Progressing     Description: Goals to be met by: 22     Patient will increase functional independence with mobility by performin. Supine to sit with Modified [place]  2. Sit to supine with Modified [place]  3. Sit to stand transfer with Modified [place]  4. Gait  x 300 feet with Supervision using LRAD                      Subjective     RN notified prior to session. Family present upon PT entrance into room. Patient agreeable to PT evaluation.    Chief Complaint: Fever    Patient/Family Comments/goals: To return to PLOF  Pain/Comfort:  Pain Rating 1: 4/10 (Over incision)  Pain Addressed 1: Reposition, Distraction  Pain Rating Post-Intervention 1: other (see comments) (Did not rate)    Social History:  Residence: lives with their family 1-story house with no LUPIS. Pt's bathroom has a walk-in shower with a built in bench.  Support available: family  Equipment Used: none  Equipment Owned (not using): None  Prior level of function: independent  Work: Employed.   Drive: yes.   Hobbies: Fishing    Patient reports they will have assistance from family upon discharge.    Objective:     Additional staff present: Supervising PT    Patient found up in chair with: telemetry, blood pressure cuff, pulse ox (continuous), arterial line, campos catheter, peripheral IV, SHRUTHI drain     General Precautions: Standard, Cardiac  "fall   Orthopedic Precautions:N/A   Braces: N/A   Body mass index is 29.5 kg/m².  Oxygen Device: Room Air  Vitals: BP (!) 145/95 (BP Location: Right arm, Patient Position: Lying)   Pulse 102   Temp 97.7 °F (36.5 °C)   Resp 14   Ht 5' 8" (1.727 m)   Wt 88 kg (194 lb 0.1 oz)   SpO2 96%   BMI 29.50 kg/m²     Exams:  · Cognition:   · Alert and Uncooperative  · AxOx4  · Command following: Follows multistep verbal commands  · Fluency: clear/fluent  · Hearing: Intact  · Vision:  Intact  · Skin Integrity: Visible skin intact  · Postural Assessment: slouched posture  · Physical Exam:    Left UE Left LE Right UE Right LE   Edema absent absent absent absent   ROM AROM WFL AROM WFL AROM WFL AROM WFL   Strength within normal limits within normal limits within normal limits within normal limits     Outcome Measures:  AM-PAC 6 CLICK MOBILITY  Turning over in bed (including adjusting bedclothes, sheets and blankets)?: 3  Sitting down on and standing up from a chair with arms (e.g., wheelchair, bedside commode, etc.): 3  Moving from lying on back to sitting on the side of the bed?: 3  Moving to and from a bed to a chair (including a wheelchair)?: 3  Need to walk in hospital room?: 3  Climbing 3-5 steps with a railing?: 3  Basic Mobility Total Score: 18     Functional Mobility:    Bed Mobility:   · Pt found/returned to bedside chair    Transfers:   · Sit <> Stand Transfer: contact guard assistance with no assistive device   · Stand <> Sit Transfer: contact guard assistance with no assistive device   · [id number] from bedside chair    Standing Balance:   Static Standing Balance: Fair- : requires minimal assistance or UE support in order to stand without LOB   Dynamic Standing Balance: Fair : stand independently unsupported, weight shift, and reach ipsilaterally. LOB noted when crossing midline.   Assistance Level Required: Contact Guard Assistance   Patient used: hand-held assist    Postural deviations noted: slouched " "posture   Comments: Pt reported slight dizziness upon changing from sitting to standing that resolved in < 1 minute with no LOB and VSS      Gait:   · Patient ambulated: 120 feet   · Patient required: contact guard  · Patient used:  hand-held assist   · Gait Pattern observed: reciprocal gait  · Gait Deviation(s): occasional unsteady gait, decreased step length, decreased arm swing and decreased merissa  · Impairments due to: impaired balance, decreased strength and decreased endurance  · Portable monitor utilized  · all lines remained intact throughout ambulation trial  · Mask donned for out of room ambulation  · Comments: Pt reported "feeling tired" with increased respiratory rate after ambulation; no LOB and VSS    Education:   Time provided for education, counseling and discussion of health disposition in regards to patient's current status   All questions answered within PT scope of practice and to patient's satisfaction   PT role in POC to address current functional deficits   Pt educated on proper body mechanics, safety techniques, and energy conservation with PT facilitation and cueing throughout session   Call nursing/pct to transfer to chair/use bathroom. Pt stated understanding.   Pt to ambulate 2-3x/day with CGA and HHA with nsg in order to maintain functional mobility    Patient left up in chair with all lines intact, call button in reach, RN notified and family present.      History:     Past Medical History:   Diagnosis Date    Adjustment and management of vascular access device 5/18/2022    Cholangiocarcinoma     Hypercholesteremia     Hypertension        Past Surgical History:   Procedure Laterality Date    DIAGNOSTIC ULTRASOUND N/A 6/21/2022    Procedure: ULTRASOUND, DIAGNOSTIC;  Surgeon: Sinan Cline MD;  Location: [facility] OR [address] [place] [address];  Service: Transplant;  Laterality: N/A;    ENDOSCOPIC ULTRASOUND OF UPPER GASTROINTESTINAL TRACT N/A 10/20/2021    Procedure: ULTRASOUND, UPPER GI TRACT, " ENDOSCOPIC;  Surgeon: Valeriy Sotelo MD;  Location: [facility] ENDO (2ND FLR);  Service: Endoscopy;  Laterality: N/A;  wife to email vacc card-inst email-tb    ERCP N/A 10/20/2021    Procedure: ERCP (ENDOSCOPIC RETROGRADE CHOLANGIOPANCREATOGRAPHY);  Surgeon: Valeriy Sotelo MD;  Location: [facility] ENDO (2ND FLR);  Service: Endoscopy;  Laterality: N/A;    ERCP N/A 11/4/2021    Procedure: ERCP (ENDOSCOPIC RETROGRADE CHOLANGIOPANCREATOGRAPHY);  Surgeon: Valeriy Sotelo MD;  Location: [facility] ENDO (2ND FLR);  Service: Endoscopy;  Laterality: N/A;    ERCP N/A 11/4/2021    Procedure: ERCP (ENDOSCOPIC RETROGRADE CHOLANGIOPANCREATOGRAPHY);  Surgeon: Roselia Pereyra MD;  Location: [facility] ENDO (2ND FLR);  Service: Endoscopy;  Laterality: N/A;  pt vaccinated, instructed to bring card to procedure, instructions sent portal-    ERCP N/A 1/14/2022    Procedure: ERCP (ENDOSCOPIC RETROGRADE CHOLANGIOPANCREATOGRAPHY);  Surgeon: Roselia Pereyra MD;  Location: [facility] ENDO (2ND FLR);  Service: Endoscopy;  Laterality: N/A;  inst portal-right chest port-tb  Pt requested at home COVID testing, Pt instructed at home RAPID to be done morning of procedure, Pt instructed to call endoscopy scheuding if there is a positive result, Pt informed if a positive     ERCP N/A 3/31/2022    Procedure: ERCP (ENDOSCOPIC RETROGRADE CHOLANGIOPANCREATOGRAPHY);  Surgeon: Julio Cesar Alonzo MD;  Location: [facility] ENDO (2ND FLR);  Service: Endoscopy;  Laterality: N/A;  3/16: port L chest wall. pt to bring covid vaccination card. Instructions sent via portal.-SC  3/18/22-Updated instructions sent via portal re:new date/time-DS    EXPLORATORY LAPAROTOMY AFTER LIVER TRANSPLANTATION N/A 6/23/2022    Procedure: LAPAROTOMY, EXPLORATORY, AFTER LIVER TRANSPLANT;  Surgeon: Julio Cesar Jara MD;  Location: [facility] OR 2ND FLR;  Service: Transplant;  Laterality: N/A;    INSERTION OF TUNNELED CENTRAL VENOUS CATHETER (CVC) WITH SUBCUTANEOUS PORT N/A 11/24/2021    Procedure:  INSERTION, PORT-A-CATH;  Surgeon: Prem Syed MD;  Location: [facility] CATH LAB;  Service: Radiology;  Laterality: N/A;    LIVER TRANSPLANT N/A 6/21/2022    Procedure: TRANSPLANT, LIVER;  Surgeon: Sinan Cline MD;  Location: [facility] OR [facility] FLR;  Service: Transplant;  Laterality: N/A;    REPAIR OF BILE DUCT N/A 6/23/2022    Procedure: REPAIR, BILE DUCT;  Surgeon: Julio Cesar Jara MD;  Location: [facility] OR 2ND FLR;  Service: Transplant;  Laterality: N/A;    ROBOT-ASSISTED LAPAROSCOPIC LYMPHADENECTOMY USING DA МАРИНА XI  6/17/2022    Procedure: XI ROBOTIC LYMPHADENECTOMY - Portal;  Surgeon: Julio Cesar Jara MD;  Location: [facility] OR [facility] FLR;  Service: Transplant;;    TONSILLECTOMY      XI ROBOTIC LAPAROSCOPY, EXPLORATORY N/A 6/17/2022    Procedure: XI ROBOTIC LAPAROSCOPY,EXPLORATORY;  Surgeon: Julio Cesar Jara MD;  [facility];  Service: Transplant;  Laterality: N/A;       Family History   Problem Relation Age of Onset    No Known Problems Mother     Hyperlipidemia Father     No Known Problems Sister     No Known Problems Brother        Social History     Socioeconomic History    Marital status:    Tobacco Use    Smoking status: Never Smoker    Smokeless tobacco: Never Used   Substance and Sexual Activity    Alcohol use: Not Currently    Drug use: Never    Sexual activity: Yes       Time Tracking:     PT Received On: 06/24/22  PT Start Time: 0943     PT Stop Time: 1008  PT Total Time (min): 25 min     Billable Minutes: Evaluation 10 minutes and Therapeutic Activity 15 minutes    Lizy Preston, [facility]  6/24/2022

## 2022-06-24 NOTE — OP NOTE
Certification of Assistant at Surgery       Surgery Date: 6/23/2022     Participating Surgeons:  Surgeon(s) and Role:     * Julio Cesar Jara MD - Primary     * Braydon Iglesias MD - Assisting    Procedures:  Procedure(s) (LRB):  REPAIR, BILE DUCT (N/A)  LAPAROTOMY, EXPLORATORY, AFTER LIVER TRANSPLANT (N/A)    Assistant Surgeon's Certification of Necessity:  I understand that section 1842 (b) (6) (d) of the Social Security Act generally prohibits Medicare Part B reasonable charge payment for the services of assistants at surgery in teaching hospitals when qualified residents are available to furnish such services. I certify that the services for which payment is claimed were medically necessary, and that no qualified resident was available to perform the services. I further understand that these services are subject to post-payment review by the Medicare carrier.      Braydon Iglesias MD    06/24/2022  12:32 AM

## 2022-06-24 NOTE — PROGRESS NOTES
[facility] - Surgical Intensive Care  Endocrinology  Progress Note    Admit Date: 6/18/2022     Reason for Consult: Management of T2DM vs Steroid Induced Hyperglycemia     Surgical Procedure and Date: Liver Transplant 6/21/2022    Diabetes diagnosis year: 2022    Home Diabetes Medications: Metformin 500 mg BID- While on Chemo     How often checking glucose at home?  RACHEL    BG readings on regimen: RACHEL  Hypoglycemia on the regimen?  RACHEL  Missed doses on regimen?  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 He was brought to the SICU postoperatively for further monitoring and care. He arrived sedated, intubated, and off vasopressor/inotropic support. His immediate arrival was significant for a CXR demonstrating the ETT tip in the cervical neck. Anesthesia presented to the bedside immediately to evaluate and found the ETT tip at the cords and the balloon above the cords. The tube was advanced into appropriate position without issue. Endocrine consulted to manage type 2 diabetes and hyperglycemia.     Hemoglobin A1C   Date Value Ref Range Status   06/20/2022 5.8 (H) 4.0 - 5.6 % Final     Comment:     ADA Screening Guidelines:  5.7-6.4%  Consistent with prediabetes  >or=6.5%  Consistent with diabetes    High levels of fetal hemoglobin interfere with the HbA1C  assay. Heterozygous hemoglobin variants (HbS, HgC, etc)do  not significantly interfere with this assay.   However, presence of multiple variants may affect accuracy.     03/29/2022 6.8 (H) 4.0 - 5.6 % Final     Comment:     ADA Screening Guidelines:  5.7-6.4%  Consistent with prediabetes  >or=6.5%  Consistent with diabetes    High levels of fetal hemoglobin interfere with the HbA1C  assay. Heterozygous hemoglobin  "variants (HbS, HgC, etc)do  not significantly interfere with this assay.   However, presence of multiple variants may affect accuracy.                 Interval HPI:   Overnight events: No acute events overnight. Patient on the SICU in room 38907 CVICU/23070 CVICU A. Blood glucose stable. BG at goal on current insulin regimen (IIP). Steroid use- Methylprednisolone  120 mg. 1 Day Post-Op  Renal function- Normal   Vasopressors-  None       Diet clear liquid     Eating:   <25%  Nausea: No  Hypoglycemia and intervention: No  Fever: No  TPN and/or TF: No      /89   Pulse 93   Temp 97.7 °F (36.5 °C)   Resp 18   Ht 5' 8" (1.727 m)   Wt 88 kg (194 lb 0.1 oz)   SpO2 97%   BMI 29.50 kg/m²     Labs Reviewed and Include    Recent Labs   Lab 06/24/22 0315   *   CALCIUM 8.7   ALBUMIN 3.0*   PROT 4.9*      K 4.3   CO2 25      BUN 16   CREATININE 0.6   ALKPHOS 98   ALT 1,362*   *   BILITOT 2.6*     Lab Results   Component Value Date    WBC 12.16 06/24/2022    HGB 9.5 (L) 06/24/2022    HCT 29.2 (L) 06/24/2022     (H) 06/24/2022     (L) 06/24/2022     No results for input(s): TSH, FREET4 in the last 168 hours.  Lab Results   Component Value Date    HGBA1C 5.8 (H) 06/20/2022       Nutritional status:   Body mass index is 29.5 kg/m².  Lab Results   Component Value Date    ALBUMIN 3.0 (L) 06/24/2022    ALBUMIN 3.1 (L) 06/23/2022    ALBUMIN 3.1 (L) 06/23/2022     No results found for: PREALBUMIN    Estimated Creatinine Clearance: 171.1 mL/min (based on SCr of 0.6 mg/dL).    Accu-Checks  Recent Labs     06/23/22  2310 06/24/22  0026 06/24/22  0100 06/24/22  0220 06/24/22  0313 06/24/22  0414 06/24/22  0505 06/24/22  0620 06/24/22  0734 06/24/22  0833   POCTGLUCOSE 189* 189* 174* 151* 152* 145* 175* 174* 168* 186*       Current Medications and/or Treatments Impacting Glycemic Control  Immunotherapy:    Immunosuppressants           Stop Route Frequency     tacrolimus capsule 3 mg         " "-- Oral 2 times daily     mycophenolate capsule 1,000 mg         -- Oral 2 times daily          Steroids:   Hormones (From admission, onward)                Start     Stop Route Frequency Ordered    06/27/22 0900  predniSONE tablet 20 mg  (methylprednisolone taper panel)        "Followed by" Linked Group Details    -- Oral Daily 06/22/22 0014    06/26/22 0900  methylPREDNISolone sodium succinate injection 40 mg  (methylprednisolone taper panel)        "Followed by" Linked Group Details    06/27 0859 IV Daily 06/22/22 0014    06/25/22 0900  methylPREDNISolone sodium succinate injection 80 mg  (methylprednisolone taper panel)        "Followed by" Linked Group Details    06/26 0859 IV Daily 06/22/22 0014          Pressors:    Autonomic Drugs (From admission, onward)                None          Hyperglycemia/Diabetes Medications:   Antihyperglycemics (From admission, onward)                Start     Stop Route Frequency Ordered    06/24/22 1045  insulin regular in 0.9 % NaCl 100 unit/100 mL (1 unit/mL) infusion        Question:  Enter initial dose (Units/hr):  Answer:  3.5    -- IV Continuous 06/24/22 0935    06/24/22 1035  insulin aspart U-100 pen 0-5 Units         -- SubQ As needed (PRN) 06/24/22 0935            ASSESSMENT and PLAN    * Hilar cholangiocarcinoma  Managed per primary team  Avoid hypoglycemia        Type 2 diabetes mellitus with hyperglycemia  Endocrinology consulted for BG management.   BG goal 140-180    - D/C IIP  - Transition drip starting at 3.5 units/hr (based on insulin needs while on IIP. Patient remained at the same rate on IIP for > 6 hours).   - Novolog (aspart) insulin LDC (150/50) SSI  Units SQ prn for BG excursions   - BG checks q4hr  - Hypoglycemia protocol in place    ** Please notify Endocrine for any change and/or advance in diet**  ** Please call Endocrine for any BG related issues **    Discharge Planning:   TBD. Please notify endocrinology prior to discharge.          S/P liver " transplant  Optimize BG control to improve wound healing        Adrenal corticosteroid causing adverse effect in therapeutic use  On steroid therapy per transplant team; may elevate BG readings             Brad Singleton, DNP, FNP  Endocrinology  [facility] - [facility]ensive [facility]

## 2022-06-24 NOTE — PROGRESS NOTES
[facility]  Transplant Surgery  Progress Note    Hospital Day: 6  Post-Op Day: 1 Day Post-Op    ORGAN:  RIGHT LIVER LOBE (SEGS 5,6,7,8) WITHOUT MIDDLE HEPATIC VEIN  Disease Etiology:Primary Liver Malignancy: Cholangiocarcinoma (CH-CA)  Donor Type:  Living  Biliary Anastomosis:Nina-en-Y  Arterial Anatomy:Standard     Subjective:     Interval History:   Went back to the OR yesterday and underwent re-exploration and repair bile duct  Doing well since back in the unit, drain output serous  Pain well controlled  Tolerating CLD w/out N/V     Medications:  Continuous Infusions:   insulin regular 1 units/mL infusion orderable (DKA) 3.5 Units/hr (06/24/22 0500)     Scheduled Meds:   albumin human 5%  25 g Intravenous Once    ceFEPime (MAXIPIME) IVPB  1 g Intravenous Q8H    famotidine (PF)  20 mg Intravenous Q12H    fluconazole  200 mg Oral Daily    heparin (porcine)  5,000 Units Subcutaneous Q8H    methylPREDNISolone sodium succinate injection  120 mg Intravenous Daily    Followed by    [START ON 6/25/2022] methylPREDNISolone sodium succinate injection  80 mg Intravenous Daily    Followed by    [START ON 6/26/2022] methylPREDNISolone sodium succinate injection  40 mg Intravenous Daily    Followed by    [START ON 6/27/2022] predniSONE  20 mg Oral Daily    mupirocin  1 g Nasal BID    mycophenolate mofetil  1,000 mg Oral BID    [START ON 6/28/2022] sulfamethoxazole-trimethoprim 400-80mg  1 tablet Oral Daily AM    tacrolimus  3 mg Oral BID    [START ON 7/1/2022] valGANciclovir  450 mg Oral Daily    vancomycin (VANCOCIN) IVPB  1,500 mg Intravenous Q12H     PRN Meds:sodium chloride 0.9%, calcium gluconate IVPB, calcium gluconate IVPB, calcium gluconate IVPB, dextrose 10%, dextrose 10%, hydrALAZINE, HYDROmorphone, HYDROmorphone, labetalol, magnesium sulfate IVPB, magnesium sulfate IVPB, oxyCODONE, oxyCODONE, potassium chloride in water **AND** potassium chloride in water **AND** potassium chloride in  water, sodium chloride 0.9%     Objective:   Vital Signs (Most Recent):  Temp: 98.6 °F (37 °C) (06/24/22 0300)  Pulse: 96 (06/24/22 0500)  Resp: 19 (06/24/22 0500)  BP: 135/85 (06/24/22 0500)  SpO2: (!) 93 % (06/24/22 0500)      Physical Exam:  General: NAD   Neuro: AAOx3  HEENT: Normocephalic   Cardio: S1 and S2, RRR  Resp: Moving air appropriately, breathing even and unlabored  Abd: Soft, NT, ND  Ext: Warm and well perfused      Lines/Drains:  Port A Cath Single Lumen 11/24/21 1202 right subclavian;right internal jugular (Active)   Number of days: 211       Port A Cath Single Lumen 06/18/22 right subclavian (Active)   Site Assessment Other (Comment) 06/23/22 0715   Line Securement Device Other (comment) 06/22/22 1500   Dressing Type Other (Comment) 06/22/22 0300   Dressing Status Clean;Dry;Intact 06/21/22 0800   Dressing Intervention Integrity maintained 06/21/22 0800   Date on Dressing 06/18/22 06/20/22 2000   Dressing Due to be Changed 06/25/22 06/20/22 2000   Patency/Care heparin locked 06/20/22 2000   Status Deaccessed 06/24/22 0301   Needle Insertion Date 06/18/22 06/19/22 2000   Type of Needle Acosta 06/21/22 0800   Line Necessity Review Poor venous access 06/20/22 0800   Number of days: 6       Trialysis (Dialysis) Catheter 06/22/22 0700 right internal jugular (Active)   Verification by X-ray Yes 06/24/22 0301   Site Assessment No drainage;No redness;No swelling;No warmth 06/24/22 0301   Line Securement Device Secured with sutures 06/24/22 0301   Dressing Type Biopatch in place;Central line dressing 06/24/22 0301   Dressing Status Clean;Dry;Intact 06/24/22 0301   Dressing Intervention Integrity maintained 06/24/22 0301   Date on Dressing 06/23/22 06/24/22 0301   Dressing Due to be Changed 06/29/22 06/24/22 0301   Venous Patency/Care flushed w/o difficulty;normal saline locked 06/24/22 0301   Arterial Patency/Care flushed w/o difficulty;normal saline locked 06/24/22 0301   Distal Patency/Care flushed w/o  difficulty;normal saline locked 06/24/22 0301   Waveform Normal 06/24/22 0301   Line Necessity Review Medication caustic to vasculature 06/23/22 2300   Number of days: 1            Peripheral IV - Single Lumen 06/21/22 1137 16 G Right Hand (Active)   Site Assessment Clean;Dry;Intact;No redness;No swelling 06/24/22 0301   Extremity Assessment Distal to IV No abnormal discoloration;No redness;No swelling;No warmth 06/24/22 0301   Line Status Flushed;Saline locked 06/24/22 0301   Dressing Status Clean;Dry;Intact 06/24/22 0301   Dressing Intervention Integrity maintained 06/24/22 0301   Dressing Change Due 06/25/22 06/24/22 0301   Site Change Due 06/25/22 06/24/22 0301   Reason Not Rotated Not due 06/24/22 0301   Number of days: 2            SYDNEY 06/21/22 1134 Left Antecubital (Active)   Site Assessment Clean;Dry;Intact;No redness;No swelling 06/24/22 0301   Line Status Flushed;Saline locked 06/24/22 0301   Dressing Status Clean;Dry;Intact 06/24/22 0301   Dressing Intervention Integrity maintained 06/24/22 0301   Dressing Change Due 06/25/22 06/24/22 0301   Site Change Due 06/25/22 06/24/22 0301   Reason Not Rotated Not due 06/24/22 0301   Number of days: 2       Arterial Line 06/21/22 1136 Left Radial (Active)   Site Assessment Clean;Dry;Intact;No redness;No swelling 06/24/22 0301   Line Status Pulsatile blood flow 06/24/22 0301   Art Line Waveform Appropriate;Square wave test performed 06/24/22 0301   Arterial Line Interventions Flushed per protocol 06/24/22 0301   Color/Movement/Sensation Capillary refill less than 3 sec 06/24/22 0301   Dressing Type Central line dressing 06/24/22 0301   Dressing Status Clean;Dry;Intact 06/24/22 0301   Dressing Intervention Integrity maintained 06/24/22 0301   Dressing Change Due 06/25/22 06/24/22 0301   Tubing Change Due 06/25/22 06/24/22 0301   Number of days: 2            Closed/Suction Drain 06/21/22 2345 Lateral;Right Abdomen Bulb 19 Fr. (Active)   Site Description Unable to view  "06/24/22 0301   Dressing Type Gauze 06/24/22 0301   Dressing Status Clean;Dry;Intact 06/24/22 0301   Dressing Intervention Integrity maintained 06/24/22 0301   Drainage Serous 06/24/22 0301   Status To bulb suction 06/24/22 0301   Output (mL) 32 mL 06/24/22 0500   Number of days: 2            Closed/Suction Drain 06/21/22 2345 Right;Medial Abdomen Bulb 19 Fr. (Active)   Site Description Unable to view 06/24/22 0301   Dressing Type Gauze 06/24/22 0301   Dressing Status Clean;Dry;Intact 06/24/22 0301   Dressing Intervention Integrity maintained 06/24/22 0301   Drainage Serous 06/24/22 0301   Status To bulb suction 06/24/22 0301   Output (mL) 9 mL 06/24/22 0500   Number of days: 2            Urethral Catheter 06/21/22 1143 Non-latex;Straight-tip 16 Fr. (Active)   Site Assessment Clean;Intact 06/24/22 0301   Collection Container Urimeter 06/24/22 0301   Securement Method secured to top of thigh w/ adhesive device 06/24/22 0301   Catheter Care Performed yes 06/24/22 0301   Reason for Continuing Urinary Catheterization Critically ill in ICU and requiring hourly monitoring of intake/output 06/24/22 0301   CAUTI Prevention Bundle Securement Device in place with 1" slack;Intact seal between catheter & drainage tubing;Drainage bag/urimeter off the floor;Sheeting clip in use;No dependent loops or kinks;Drainage bag/urimeter not overfilled (<2/3 full);Drainage bag/urimeter below bladder 06/24/22 0301   Output (mL) 60 mL 06/24/22 0500   Number of days: 2       Laboratory:  CBC:   Recent Labs   Lab 06/24/22 0315   WBC 12.16   RBC 2.87*   HGB 9.5*   HCT 29.2*   *   *   MCH 33.1*   MCHC 32.5       CMP:   Recent Labs   Lab 06/24/22 0315   *   CALCIUM 8.7   ALBUMIN 3.0*   PROT 4.9*      K 4.3   CO2 25      BUN 16   CREATININE 0.6   ALKPHOS 98   ALT 1,362*   *   BILITOT 2.6*       Coagulation:   Recent Labs   Lab 06/24/22 0315   INR 1.1  1.1   APTT 29.9       Cardiac markers:   Recent Labs "   Lab 06/18/22  1027   TROPONINI <0.012       ABGs:   Recent Labs   Lab 06/22/22  0806   PH 7.395   PCO2 42.3   PO2 101*   HCO3 25.9   POCSATURATED 98   BE 1         Chest X-Ray:  Pending read, grossly unchanged      ASSESSMENT/PLAN:   Romeo Larson is a 41 y.o. male s/p living donor liver transplant complicated by bile leak s/p take-back with exploration and primary repair bile duct.         Neuro/Psych:   -- Awake and alert  -- Pain: Oxycodone and Dilaudid              Cards:   -- Mildly tachycardic in low 100s  -- Requiring intermittent labetalol and hydralazine for SBP>160  -- Continue cardiac monitoring      Pulm:   -- Goal O2 sat > 90%  -- RINKU      Renal:  -- Keep campos for strict I/O  -- BUN/Cr 16/0.6      FEN / GI:   -- Net +670ml   -- 2,215 ml urine output   -- Drain output serous  -- Replace lytes as needed  -- Nutrition: NPO  -- GI ppx: Pepcid        ID:   -- Tm: afebrile; WBC 12.2  -- Vancomycin, fluc, and cefepime   -- Liver/Bile culture +enterococcus pending speciation    -- Pre op blood and urine NGTD        Heme/Onc:   -- H/H stable 9.5/29, plts 121, PT/INR normal  -- Daily CBC  -- Immunosuppression: Solumedrol, Cellcept, Prograf      Endo:   -- Endocrine consulted, appreciate recs  -- BG goal 110-140      PPx:   Feeding: NPO  Analgesia/Sedation: PRN narcotics  Thromboembolic prevention: Hep TID  HOB >30: Yes  Stress Ulcer ppx: Pepcid  Glucose control: Critical care goal 110-140 g/dl, ISS    Lines/Drains/Airway: Port right chest, left radial art, Campos, JPx2, R IJ   Anticipate removal campos, arterial line and Vasc cath      Dispo/Code Status/Palliative:   -- SICU / Full Code  -- Consider TTF    Elida Santiagoveronica  Transplant Surgery HO2

## 2022-06-25 PROBLEM — Z79.60 LONG-TERM USE OF IMMUNOSUPPRESSANT MEDICATION: Status: ACTIVE | Noted: 2022-06-25

## 2022-06-25 LAB
ALBUMIN SERPL BCP-MCNC: 2.8 G/DL (ref 3.5–5.2)
ALP SERPL-CCNC: 110 U/L (ref 55–135)
ALT SERPL W/O P-5'-P-CCNC: 870 U/L (ref 10–44)
ANION GAP SERPL CALC-SCNC: 7 MMOL/L (ref 8–16)
APTT BLDCRRT: 25.3 SEC (ref 21–32)
AST SERPL-CCNC: 139 U/L (ref 10–40)
BACTERIA SPEC AEROBE CULT: ABNORMAL
BASOPHILS # BLD AUTO: 0.02 K/UL (ref 0–0.2)
BASOPHILS NFR BLD: 0.2 % (ref 0–1.9)
BILIRUB SERPL-MCNC: 2.4 MG/DL (ref 0.1–1)
BUN SERPL-MCNC: 26 MG/DL (ref 6–20)
CALCIUM SERPL-MCNC: 9.1 MG/DL (ref 8.7–10.5)
CHLORIDE SERPL-SCNC: 99 MMOL/L (ref 95–110)
CO2 SERPL-SCNC: 30 MMOL/L (ref 23–29)
CREAT SERPL-MCNC: 0.7 MG/DL (ref 0.5–1.4)
DIFFERENTIAL METHOD: ABNORMAL
EOSINOPHIL # BLD AUTO: 0 K/UL (ref 0–0.5)
EOSINOPHIL NFR BLD: 0 % (ref 0–8)
ERYTHROCYTE [DISTWIDTH] IN BLOOD BY AUTOMATED COUNT: 13.4 % (ref 11.5–14.5)
EST. GFR  (AFRICAN AMERICAN): >60 ML/MIN/1.73 M^2
EST. GFR  (NON AFRICAN AMERICAN): >60 ML/MIN/1.73 M^2
GLUCOSE SERPL-MCNC: 108 MG/DL (ref 70–110)
HCT VFR BLD AUTO: 28.4 % (ref 40–54)
HGB BLD-MCNC: 9.5 G/DL (ref 14–18)
IMM GRANULOCYTES # BLD AUTO: 0.15 K/UL (ref 0–0.04)
IMM GRANULOCYTES NFR BLD AUTO: 1.3 % (ref 0–0.5)
INR PPP: 1.1 (ref 0.8–1.2)
LYMPHOCYTES # BLD AUTO: 0.6 K/UL (ref 1–4.8)
LYMPHOCYTES NFR BLD: 5.3 % (ref 18–48)
MCH RBC QN AUTO: 33.2 PG (ref 27–31)
MCHC RBC AUTO-ENTMCNC: 33.5 G/DL (ref 32–36)
MCV RBC AUTO: 99 FL (ref 82–98)
MONOCYTES # BLD AUTO: 0.7 K/UL (ref 0.3–1)
MONOCYTES NFR BLD: 6.1 % (ref 4–15)
NEUTROPHILS # BLD AUTO: 10 K/UL (ref 1.8–7.7)
NEUTROPHILS NFR BLD: 87.1 % (ref 38–73)
NRBC BLD-RTO: 0 /100 WBC
PLATELET # BLD AUTO: 113 K/UL (ref 150–450)
PMV BLD AUTO: 9.9 FL (ref 9.2–12.9)
POCT GLUCOSE: 114 MG/DL (ref 70–110)
POCT GLUCOSE: 129 MG/DL (ref 70–110)
POCT GLUCOSE: 132 MG/DL (ref 70–110)
POCT GLUCOSE: 136 MG/DL (ref 70–110)
POCT GLUCOSE: 137 MG/DL (ref 70–110)
POCT GLUCOSE: 153 MG/DL (ref 70–110)
POCT GLUCOSE: 155 MG/DL (ref 70–110)
POCT GLUCOSE: 157 MG/DL (ref 70–110)
POCT GLUCOSE: 161 MG/DL (ref 70–110)
POCT GLUCOSE: 161 MG/DL (ref 70–110)
POCT GLUCOSE: 163 MG/DL (ref 70–110)
POCT GLUCOSE: 164 MG/DL (ref 70–110)
POCT GLUCOSE: 165 MG/DL (ref 70–110)
POCT GLUCOSE: 167 MG/DL (ref 70–110)
POCT GLUCOSE: 170 MG/DL (ref 70–110)
POCT GLUCOSE: 173 MG/DL (ref 70–110)
POCT GLUCOSE: 176 MG/DL (ref 70–110)
POCT GLUCOSE: 177 MG/DL (ref 70–110)
POCT GLUCOSE: 178 MG/DL (ref 70–110)
POCT GLUCOSE: 179 MG/DL (ref 70–110)
POCT GLUCOSE: 179 MG/DL (ref 70–110)
POCT GLUCOSE: 183 MG/DL (ref 70–110)
POCT GLUCOSE: 186 MG/DL (ref 70–110)
POCT GLUCOSE: 188 MG/DL (ref 70–110)
POCT GLUCOSE: 189 MG/DL (ref 70–110)
POCT GLUCOSE: 190 MG/DL (ref 70–110)
POCT GLUCOSE: 194 MG/DL (ref 70–110)
POCT GLUCOSE: 197 MG/DL (ref 70–110)
POCT GLUCOSE: 216 MG/DL (ref 70–110)
POCT GLUCOSE: 217 MG/DL (ref 70–110)
POTASSIUM SERPL-SCNC: 3.7 MMOL/L (ref 3.5–5.1)
PROT SERPL-MCNC: 5.2 G/DL (ref 6–8.4)
PROTHROMBIN TIME: 11.3 SEC (ref 9–12.5)
RBC # BLD AUTO: 2.86 M/UL (ref 4.6–6.2)
SODIUM SERPL-SCNC: 136 MMOL/L (ref 136–145)
TACROLIMUS BLD-MCNC: 26 NG/ML (ref 5–15)
WBC # BLD AUTO: 11.51 K/UL (ref 3.9–12.7)

## 2022-06-25 PROCEDURE — 63600175 PHARM REV CODE 636 W HCPCS: Performed by: STUDENT IN AN ORGANIZED HEALTH CARE EDUCATION/TRAINING PROGRAM

## 2022-06-25 PROCEDURE — 85025 COMPLETE CBC W/AUTO DIFF WBC: CPT | Performed by: STUDENT IN AN ORGANIZED HEALTH CARE EDUCATION/TRAINING PROGRAM

## 2022-06-25 PROCEDURE — 25000003 PHARM REV CODE 250: Performed by: NURSE PRACTITIONER

## 2022-06-25 PROCEDURE — 99232 SBSQ HOSP IP/OBS MODERATE 35: CPT | Mod: ,,, | Performed by: NURSE PRACTITIONER

## 2022-06-25 PROCEDURE — 80197 ASSAY OF TACROLIMUS: CPT | Performed by: STUDENT IN AN ORGANIZED HEALTH CARE EDUCATION/TRAINING PROGRAM

## 2022-06-25 PROCEDURE — 99232 PR SUBSEQUENT HOSPITAL CARE,LEVL II: ICD-10-PCS | Mod: ,,, | Performed by: NURSE PRACTITIONER

## 2022-06-25 PROCEDURE — 25000003 PHARM REV CODE 250: Performed by: STUDENT IN AN ORGANIZED HEALTH CARE EDUCATION/TRAINING PROGRAM

## 2022-06-25 PROCEDURE — 85730 THROMBOPLASTIN TIME PARTIAL: CPT | Performed by: STUDENT IN AN ORGANIZED HEALTH CARE EDUCATION/TRAINING PROGRAM

## 2022-06-25 PROCEDURE — 85610 PROTHROMBIN TIME: CPT | Performed by: STUDENT IN AN ORGANIZED HEALTH CARE EDUCATION/TRAINING PROGRAM

## 2022-06-25 PROCEDURE — 20600001 HC STEP DOWN PRIVATE ROOM

## 2022-06-25 PROCEDURE — 63600175 PHARM REV CODE 636 W HCPCS: Performed by: SURGERY

## 2022-06-25 PROCEDURE — 25000003 PHARM REV CODE 250: Performed by: SURGERY

## 2022-06-25 PROCEDURE — 94761 N-INVAS EAR/PLS OXIMETRY MLT: CPT

## 2022-06-25 PROCEDURE — 25000003 PHARM REV CODE 250: Performed by: PHYSICIAN ASSISTANT

## 2022-06-25 PROCEDURE — 80053 COMPREHEN METABOLIC PANEL: CPT | Performed by: STUDENT IN AN ORGANIZED HEALTH CARE EDUCATION/TRAINING PROGRAM

## 2022-06-25 PROCEDURE — 63600175 PHARM REV CODE 636 W HCPCS: Performed by: NURSE PRACTITIONER

## 2022-06-25 RX ADMIN — FLUCONAZOLE 200 MG: 200 TABLET ORAL at 08:06

## 2022-06-25 RX ADMIN — HEPARIN SODIUM 5000 UNITS: 5000 INJECTION INTRAVENOUS; SUBCUTANEOUS at 02:06

## 2022-06-25 RX ADMIN — MUPIROCIN 1 G: 20 OINTMENT TOPICAL at 08:06

## 2022-06-25 RX ADMIN — INSULIN ASPART 1 UNITS: 100 INJECTION, SOLUTION INTRAVENOUS; SUBCUTANEOUS at 06:06

## 2022-06-25 RX ADMIN — METHOCARBAMOL 500 MG: 500 TABLET ORAL at 12:06

## 2022-06-25 RX ADMIN — CEFEPIME 1 G: 1 INJECTION, POWDER, FOR SOLUTION INTRAMUSCULAR; INTRAVENOUS at 12:06

## 2022-06-25 RX ADMIN — HYDROMORPHONE HYDROCHLORIDE 0.5 MG: 1 INJECTION, SOLUTION INTRAMUSCULAR; INTRAVENOUS; SUBCUTANEOUS at 06:06

## 2022-06-25 RX ADMIN — DOCUSATE SODIUM 50 MG: 50 CAPSULE, LIQUID FILLED ORAL at 05:06

## 2022-06-25 RX ADMIN — HEPARIN SODIUM 5000 UNITS: 5000 INJECTION INTRAVENOUS; SUBCUTANEOUS at 08:06

## 2022-06-25 RX ADMIN — METHOCARBAMOL 500 MG: 500 TABLET ORAL at 05:06

## 2022-06-25 RX ADMIN — MYCOPHENOLATE MOFETIL 1000 MG: 250 CAPSULE ORAL at 08:06

## 2022-06-25 RX ADMIN — MYCOPHENOLATE MOFETIL 1000 MG: 250 CAPSULE ORAL at 09:06

## 2022-06-25 RX ADMIN — INSULIN ASPART 1 UNITS: 100 INJECTION, SOLUTION INTRAVENOUS; SUBCUTANEOUS at 02:06

## 2022-06-25 RX ADMIN — AMPICILLIN AND SULBACTAM 3 G: 2; 1 INJECTION, POWDER, FOR SOLUTION INTRAMUSCULAR; INTRAVENOUS at 08:06

## 2022-06-25 RX ADMIN — INSULIN ASPART 1 UNITS: 100 INJECTION, SOLUTION INTRAVENOUS; SUBCUTANEOUS at 10:06

## 2022-06-25 RX ADMIN — OXYCODONE HYDROCHLORIDE 10 MG: 10 TABLET ORAL at 01:06

## 2022-06-25 RX ADMIN — OXYCODONE HYDROCHLORIDE 10 MG: 10 TABLET ORAL at 08:06

## 2022-06-25 RX ADMIN — BACLOFEN 5 MG: 5 TABLET ORAL at 08:06

## 2022-06-25 RX ADMIN — OXYCODONE HYDROCHLORIDE 10 MG: 10 TABLET ORAL at 12:06

## 2022-06-25 RX ADMIN — OXYCODONE HYDROCHLORIDE 10 MG: 10 TABLET ORAL at 04:06

## 2022-06-25 RX ADMIN — MUPIROCIN 1 G: 20 OINTMENT TOPICAL at 09:06

## 2022-06-25 RX ADMIN — HYDROMORPHONE HYDROCHLORIDE 0.5 MG: 1 INJECTION, SOLUTION INTRAMUSCULAR; INTRAVENOUS; SUBCUTANEOUS at 03:06

## 2022-06-25 RX ADMIN — TACROLIMUS 3 MG: 1 CAPSULE ORAL at 08:06

## 2022-06-25 RX ADMIN — INSULIN HUMAN 2.2 UNITS/HR: 1 INJECTION, SOLUTION INTRAVENOUS at 08:06

## 2022-06-25 RX ADMIN — BACLOFEN 5 MG: 5 TABLET ORAL at 09:06

## 2022-06-25 RX ADMIN — HEPARIN SODIUM 5000 UNITS: 5000 INJECTION INTRAVENOUS; SUBCUTANEOUS at 05:06

## 2022-06-25 RX ADMIN — AMPICILLIN AND SULBACTAM 3 G: 2; 1 INJECTION, POWDER, FOR SOLUTION INTRAMUSCULAR; INTRAVENOUS at 12:06

## 2022-06-25 RX ADMIN — METHOCARBAMOL 500 MG: 500 TABLET ORAL at 08:06

## 2022-06-25 RX ADMIN — FAMOTIDINE 20 MG: 20 TABLET ORAL at 08:06

## 2022-06-25 RX ADMIN — VANCOMYCIN HYDROCHLORIDE 1750 MG: 10 INJECTION, POWDER, LYOPHILIZED, FOR SOLUTION INTRAVENOUS at 05:06

## 2022-06-25 RX ADMIN — METHYLPREDNISOLONE SODIUM SUCCINATE 80 MG: 40 INJECTION, POWDER, FOR SOLUTION INTRAMUSCULAR; INTRAVENOUS at 08:06

## 2022-06-25 NOTE — SUBJECTIVE & OBJECTIVE
"Interval HPI:   Overnight events: No acute events overnight. Patient on the SICU in room 75698/27978 A. Blood glucose stable. BG at goal on current insulin regimen (Transition Insulin Drip). Steroid use- Methylprednisolone  80 mg. 2 Days Post-Op  Renal function- Normal   Vasopressors-  None       Diet clear liquid     Eatin%  Nausea: No  Hypoglycemia and intervention: No  Fever: No  TPN and/or TF: No      BP (!) 143/100   Pulse 93   Temp 98 °F (36.7 °C)   Resp 12   Ht 5' 8" (1.727 m)   Wt 88 kg (194 lb 0.1 oz)   SpO2 (!) 92%   BMI 29.50 kg/m²     Labs Reviewed and Include    Recent Labs   Lab 22  0510      CALCIUM 9.1   ALBUMIN 2.8*   PROT 5.2*      K 3.7   CO2 30*   CL 99   BUN 26*   CREATININE 0.7   ALKPHOS 110   *   *   BILITOT 2.4*     Lab Results   Component Value Date    WBC 11.51 2022    HGB 9.5 (L) 2022    HCT 28.4 (L) 2022    MCV 99 (H) 2022     (L) 2022     No results for input(s): TSH, FREET4 in the last 168 hours.  Lab Results   Component Value Date    HGBA1C 5.8 (H) 2022       Nutritional status:   Body mass index is 29.5 kg/m².  Lab Results   Component Value Date    ALBUMIN 2.8 (L) 2022    ALBUMIN 3.0 (L) 2022    ALBUMIN 3.1 (L) 2022     No results found for: PREALBUMIN    Estimated Creatinine Clearance: 146.7 mL/min (based on SCr of 0.7 mg/dL).    Accu-Checks  Recent Labs     22  0505 22  0620 22  0734 22  0833 22  1020 22  1152 22  1536 22  2146 22  0224 22  0626   POCTGLUCOSE 175* 174* 168* 186* 208* 195* 176* 192* 161* 157*       Current Medications and/or Treatments Impacting Glycemic Control  Immunotherapy:    Immunosuppressants           Stop Route Frequency     mycophenolate capsule 1,000 mg         -- Oral 2 times daily          Steroids:   Hormones (From admission, onward)                Start     Stop Route Frequency Ordered " "   06/27/22 0900  predniSONE tablet 20 mg  (methylprednisolone taper panel)        "Followed by" Linked Group Details    -- Oral Daily 06/22/22 0014    06/26/22 0900  methylPREDNISolone sodium succinate injection 40 mg  (methylprednisolone taper panel)        "Followed by" Linked Group Details    06/27 0859 IV Daily 06/22/22 0014          Pressors:    Autonomic Drugs (From admission, onward)                None          Hyperglycemia/Diabetes Medications:   Antihyperglycemics (From admission, onward)                Start     Stop Route Frequency Ordered    06/24/22 1045  insulin regular in 0.9 % NaCl 100 unit/100 mL (1 unit/mL) infusion        Question:  Enter initial dose (Units/hr):  Answer:  3.5    -- IV Continuous 06/24/22 0935    06/24/22 1035  insulin aspart U-100 pen 0-5 Units         -- SubQ As needed (PRN) 06/24/22 0935          "

## 2022-06-25 NOTE — NURSING
Central line removed and tip intact. Pt lying flat- instructed pt to remain in lying position for 30 minutes and to gently move around the proceeding 30 min. Pressure dressing clean, dry, and intact. RN instructed pt to keep dressing clean and dry for the next 24 hrs. Pt verbalized understanding. RN will continue to monitor.

## 2022-06-25 NOTE — ASSESSMENT & PLAN NOTE
- continue prograf  - continue to monitor for toxic side effects and check daily levels. Will adjust for therapeutic dose

## 2022-06-25 NOTE — PLAN OF CARE
Pt is AAOx4, independent, and VSS. Self-meds explained to pt and pt's wife- both verbalized understanding. SHRUTHI drains to bulb suction- serosanguineous drainage in drain #2 and serosanguineous/greenish drainage in drain #1. Central line removed- pressure clean, dry, and intact. Blood glucose monitoring performed and treated as ordered- Insulin 2.2 unit/hr. Telemetry and VISI monitoring in place. Pt's wife at bedside. Pt's bed in lowest position, call bell within reach, nonskid socks on, and RN encouraged pt to call for any assistance needed. RN rounded on pt throughout shift. Will continue to monitor.

## 2022-06-25 NOTE — ASSESSMENT & PLAN NOTE
-S/P s/p Living transplant on 6/21/2022 Liver for Primary Liver Malignancy: Cholangiocarcinoma CMV -/+6/22:  -Bile leakon abx (Vanc/cefepime/fluc)    -Returned to OR on 6/23 for Bile leak.ENTEROCOCCUS FAECALIS changed ABX to Unasyn  - 2 drains will stay for a while d/t bile leak

## 2022-06-25 NOTE — SUBJECTIVE & OBJECTIVE
"Interval HPI:   Overnight events: No acute events overnight. Patient on the SICU in room 04199/83919 A. Blood glucose stable. BG at goal on current insulin regimen (Transition Insulin Drip). Steroid use- Methylprednisolone  80 mg. 2 Days Post-Op  Renal function- Normal   Vasopressors-  None       Diet clear liquid     Eatin%  Nausea: No  Hypoglycemia and intervention: No  Fever: No  TPN and/or TF: No      BP (!) 140/100   Pulse 110   Temp 98 °F (36.7 °C)   Resp 18   Ht 5' 8" (1.727 m)   Wt 88 kg (194 lb 0.1 oz)   SpO2 (!) 94%   BMI 29.50 kg/m²     Labs Reviewed and Include    Recent Labs   Lab 22  0510      CALCIUM 9.1   ALBUMIN 2.8*   PROT 5.2*      K 3.7   CO2 30*   CL 99   BUN 26*   CREATININE 0.7   ALKPHOS 110   *   *   BILITOT 2.4*     Lab Results   Component Value Date    WBC 11.51 2022    HGB 9.5 (L) 2022    HCT 28.4 (L) 2022    MCV 99 (H) 2022     (L) 2022     No results for input(s): TSH, FREET4 in the last 168 hours.  Lab Results   Component Value Date    HGBA1C 5.8 (H) 2022       Nutritional status:   Body mass index is 29.5 kg/m².  Lab Results   Component Value Date    ALBUMIN 2.8 (L) 2022    ALBUMIN 3.0 (L) 2022    ALBUMIN 3.1 (L) 2022     No results found for: PREALBUMIN    Estimated Creatinine Clearance: 146.7 mL/min (based on SCr of 0.7 mg/dL).    Accu-Checks  Recent Labs     22  0620 22  0734 22  0833 22  1020 22  1152 22  1536 22  2146 22  0224 22  0626 22  1024   POCTGLUCOSE 174* 168* 186* 208* 195* 176* 192* 161* 157* 114*       Current Medications and/or Treatments Impacting Glycemic Control  Immunotherapy:    Immunosuppressants           Stop Route Frequency     mycophenolate capsule 1,000 mg         -- Oral 2 times daily          Steroids:   Hormones (From admission, onward)                Start     Stop Route Frequency Ordered " "   06/27/22 0900  predniSONE tablet 20 mg  (methylprednisolone taper panel)        "Followed by" Linked Group Details    -- Oral Daily 06/22/22 0014    06/26/22 0900  methylPREDNISolone sodium succinate injection 40 mg  (methylprednisolone taper panel)        "Followed by" Linked Group Details    06/27 0859 IV Daily 06/22/22 0014          Pressors:    Autonomic Drugs (From admission, onward)                None          Hyperglycemia/Diabetes Medications:   Antihyperglycemics (From admission, onward)                Start     Stop Route Frequency Ordered    06/25/22 1245  insulin regular in 0.9 % NaCl 100 unit/100 mL (1 unit/mL) infusion        Question:  Enter initial dose (Units/hr):  Answer:  2.2    -- IV Continuous 06/25/22 1233    06/24/22 1035  insulin aspart U-100 pen 0-5 Units         -- SubQ As needed (PRN) 06/24/22 0935        Scheduled Meds:   ampicillin-sulbactam (UNASYN) IVPB 3 g  3 g Intravenous Q8H    famotidine  20 mg Oral QHS    fluconazole  200 mg Oral Daily    heparin (porcine)  5,000 Units Subcutaneous Q8H    methocarbamoL  500 mg Oral QID    [START ON 6/26/2022] methylPREDNISolone sodium succinate injection  40 mg Intravenous Daily    Followed by    [START ON 6/27/2022] predniSONE  20 mg Oral Daily    mupirocin  1 g Nasal BID    mycophenolate  1,000 mg Oral BID    [START ON 6/28/2022] sulfamethoxazole-trimethoprim 400-80mg  1 tablet Oral Daily AM    [START ON 7/1/2022] valGANciclovir  450 mg Oral Daily     Continuous Infusions:   insulin regular 1 units/mL infusion orderable (TRANSFER)       PRN Meds:sodium chloride 0.9%, baclofen, dextrose 10%, dextrose 10%, glucagon (human recombinant), glucose, glucose, hydrALAZINE, HYDROmorphone, insulin aspart U-100, labetalol, oxyCODONE, oxyCODONE, sodium chloride 0.9%    Review of Systems   Constitutional:  Negative for activity change and appetite change.   Eyes:  Negative for visual disturbance.   Respiratory:  Negative for cough and shortness of breath. "    Cardiovascular:  Negative for chest pain, palpitations and leg swelling.   Gastrointestinal:  Negative for abdominal distention, abdominal pain, constipation, diarrhea, nausea and vomiting.   Genitourinary:  Negative for difficulty urinating.   Musculoskeletal:  Negative for arthralgias.   Skin:  Negative for wound.   Allergic/Immunologic: Positive for immunocompromised state.   Neurological:  Negative for dizziness.   Hematological:  Negative for adenopathy. Does not bruise/bleed easily.   Psychiatric/Behavioral:  Negative for agitation.    Objective:     Vital Signs (Most Recent):  Temp: 98 °F (36.7 °C) (06/25/22 0742)  Pulse: 110 (06/25/22 1153)  Resp: 18 (06/25/22 1332)  BP: (!) 140/100 (06/25/22 1153)  SpO2: (!) 94 % (06/25/22 1153)   Vital Signs (24h Range):  Temp:  [97.6 °F (36.4 °C)-98 °F (36.7 °C)] 98 °F (36.7 °C)  Pulse:  [] 110  Resp:  [11-28] 18  SpO2:  [92 %-96 %] 94 %  BP: (122-143)/() 140/100     Weight: 88 kg (194 lb 0.1 oz)  Body mass index is 29.5 kg/m².    Intake/Output - Last 3 Shifts         06/23 0700  06/24 0659 06/24 0700 06/25 0659 06/25 0700  06/26 0659    P.O. 540      I.V. (mL/kg) 1857.7 (21.1) 40 (0.5)     NG/GT       IV Piggyback 1033.6 429.6     Total Intake(mL/kg) 3431.3 (39) 469.6 (5.3)     Urine (mL/kg/hr) 2565 (1.2) 1830 (0.9)     Emesis/NG output  0     Drains 320 328     Other  0     Stool  0     Blood  0     Total Output 2885 2158     Net +546.3 -1688.4                    Physical Exam  Vitals and nursing note reviewed.   Constitutional:       Appearance: He is well-developed.   HENT:      Head: Normocephalic.   Eyes:      Conjunctiva/sclera: Conjunctivae normal.   Cardiovascular:      Rate and Rhythm: Normal rate and regular rhythm.      Heart sounds: No murmur heard.  Pulmonary:      Effort: Pulmonary effort is normal.      Breath sounds: Normal breath sounds.   Abdominal:      General: Bowel sounds are normal. There is no distension.      Palpations: Abdomen  is soft.      Tenderness: There is no abdominal tenderness.       Musculoskeletal:         General: Normal range of motion.      Cervical back: Normal range of motion.   Skin:     General: Skin is warm and dry.      Capillary Refill: Capillary refill takes less than 2 seconds.   Neurological:      Mental Status: He is alert and oriented to person, place, and time.   Psychiatric:         Behavior: Behavior normal.         Thought Content: Thought content normal.         Judgment: Judgment normal.       Laboratory:  Immunosuppressants           Stop Route Frequency     mycophenolate capsule 1,000 mg         -- Oral 2 times daily          CBC:   Recent Labs   Lab 06/25/22  0510   WBC 11.51   RBC 2.86*   HGB 9.5*   HCT 28.4*   *   MCV 99*   MCH 33.2*   MCHC 33.5     BMP:   Recent Labs   Lab 06/25/22  0510         K 3.7   CL 99   CO2 30*   BUN 26*   CREATININE 0.7   CALCIUM 9.1     Coagulation:   Recent Labs   Lab 06/25/22  0510   INR 1.1   APTT 25.3     Labs within the past 24 hours have been reviewed.    Diagnostic Results:  US Liver Transplant Post:  Results for orders placed during the hospital encounter of 06/18/22    US Doppler Liver Transplant Post (xpd)    Narrative  EXAMINATION:  US DOPPLER LIVER TRANSPLANT POST (XPD)    CLINICAL HISTORY:  assess flow; Liver transplant status    TECHNIQUE:  Limited abdominal ultrasound of the transplant liver with Doppler evaluation.  Color and spectral Doppler were performed.    COMPARISON:  Transplant liver ultrasound 06/22/2022.  CT abdomen pelvis 06/19/2022.    FINDINGS:  Patient is status post orthotopic liver transplanton 06/21/2022.  Liver allograft is normal in size.  The liver demonstrates homogeneous echotexture.  No focal hepatic lesions are seen.  No fluid collections.    The common duct is not dilated, measuring 4 mm.  No dilated intrahepatic radicles are seen.    Color flow and spectral waveform analysis was performed.  The main portal vein,  right portal vein, middle hepatic vein, right hepatic vein, and IVC are patent with proper directional flow.    Anastomosis site of the main hepatic artery demonstrates a peak systolic velocity 117 (previously 247) cm/sec.    Main hepatic artery demonstrates resistive index 0.63 (previously 0.60) with normal waveform.    Anterior branch of the right hepatic artery demonstrates resistive index 0.63 (previously 0.47) with normal waveform.    Posterior branch of the right hepatic artery demonstrates resistive index 0.67 (previously 0.60) with normal waveform.    Impression  Satisfactory Doppler evaluation of liver transplant noting improvement of arterial resistive index in the anterior branch of the right hepatic artery.    Previously described hyperechoic area in the medial aspect of the allograft is not seen on this exam.    Electronically signed by resident: Teetee Diallo  Date:    06/23/2022  Time:    08:11    Electronically signed by: Coy Mena  Date:    06/23/2022  Time:    08:47    Debility/Functional status: Patient debilitated by evidence of Weakness. Physical and occupational therapy ordered daily to evaluate and treat. Debility was: present on admission.

## 2022-06-25 NOTE — PROGRESS NOTES
[facility] - Transplant Stepdown  Endocrinology  Progress Note    Admit Date: 6/18/2022     Reason for Consult: Management of T2DM vs Steroid Induced Hyperglycemia     Surgical Procedure and Date: Liver Transplant 6/21/2022    Diabetes diagnosis year: 2022    Home Diabetes Medications: Metformin 500 mg BID- While on Chemo     How often checking glucose at home?  RACHEL    BG readings on regimen: RACHEL  Hypoglycemia on the regimen?  RACHEL  Missed doses on regimen?  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 He was brought to the SICU postoperatively for further monitoring and care. He arrived sedated, intubated, and off vasopressor/inotropic support. His immediate arrival was significant for a CXR demonstrating the ETT tip in the cervical neck. Anesthesia presented to the bedside immediately to evaluate and found the ETT tip at the cords and the balloon above the cords. The tube was advanced into appropriate position without issue. Endocrine consulted to manage type 2 diabetes and hyperglycemia.     Hemoglobin A1C   Date Value Ref Range Status   06/20/2022 5.8 (H) 4.0 - 5.6 % Final     Comment:     ADA Screening Guidelines:  5.7-6.4%  Consistent with prediabetes  >or=6.5%  Consistent with diabetes    High levels of fetal hemoglobin interfere with the HbA1C  assay. Heterozygous hemoglobin variants (HbS, HgC, etc)do  not significantly interfere with this assay.   However, presence of multiple variants may affect accuracy.     03/29/2022 6.8 (H) 4.0 - 5.6 % Final     Comment:     ADA Screening Guidelines:  5.7-6.4%  Consistent with prediabetes  >or=6.5%  Consistent with diabetes    High levels of fetal hemoglobin interfere with the HbA1C  assay. Heterozygous hemoglobin variants  "(HbS, HgC, etc)do  not significantly interfere with this assay.   However, presence of multiple variants may affect accuracy.                 Interval HPI:   Overnight events: No acute events overnight. Patient on the SICU in room 27493/47805 A. Blood glucose stable. BG at goal on current insulin regimen (Transition Insulin Drip). Steroid use- Methylprednisolone  80 mg. 2 Days Post-Op  Renal function- Normal   Vasopressors-  None       Diet clear liquid     Eatin%  Nausea: No  Hypoglycemia and intervention: No  Fever: No  TPN and/or TF: No      BP (!) 143/100   Pulse 93   Temp 98 °F (36.7 °C)   Resp 12   Ht 5' 8" (1.727 m)   Wt 88 kg (194 lb 0.1 oz)   SpO2 (!) 92%   BMI 29.50 kg/m²     Labs Reviewed and Include    Recent Labs   Lab 22  0510      CALCIUM 9.1   ALBUMIN 2.8*   PROT 5.2*      K 3.7   CO2 30*   CL 99   BUN 26*   CREATININE 0.7   ALKPHOS 110   *   *   BILITOT 2.4*     Lab Results   Component Value Date    WBC 11.51 2022    HGB 9.5 (L) 2022    HCT 28.4 (L) 2022    MCV 99 (H) 2022     (L) 2022     No results for input(s): TSH, FREET4 in the last 168 hours.  Lab Results   Component Value Date    HGBA1C 5.8 (H) 2022       Nutritional status:   Body mass index is 29.5 kg/m².  Lab Results   Component Value Date    ALBUMIN 2.8 (L) 2022    ALBUMIN 3.0 (L) 2022    ALBUMIN 3.1 (L) 2022     No results found for: PREALBUMIN    Estimated Creatinine Clearance: 146.7 mL/min (based on SCr of 0.7 mg/dL).    Accu-Checks  Recent Labs     22  0505 22  0620 22  0734 22  0833 22  1020 22  1152 22  1536 22  2146 22  0224 22  0626   POCTGLUCOSE 175* 174* 168* 186* 208* 195* 176* 192* 161* 157*       Current Medications and/or Treatments Impacting Glycemic Control  Immunotherapy:    Immunosuppressants           Stop Route Frequency     mycophenolate capsule 1,000 " "mg         -- Oral 2 times daily          Steroids:   Hormones (From admission, onward)                Start     Stop Route Frequency Ordered    06/27/22 0900  predniSONE tablet 20 mg  (methylprednisolone taper panel)        "Followed by" Linked Group Details    -- Oral Daily 06/22/22 0014    06/26/22 0900  methylPREDNISolone sodium succinate injection 40 mg  (methylprednisolone taper panel)        "Followed by" Linked Group Details    06/27 0859 IV Daily 06/22/22 0014          Pressors:    Autonomic Drugs (From admission, onward)                None          Hyperglycemia/Diabetes Medications:   Antihyperglycemics (From admission, onward)                Start     Stop Route Frequency Ordered    06/24/22 1045  insulin regular in 0.9 % NaCl 100 unit/100 mL (1 unit/mL) infusion        Question:  Enter initial dose (Units/hr):  Answer:  3.5    -- IV Continuous 06/24/22 0935    06/24/22 1035  insulin aspart U-100 pen 0-5 Units         -- SubQ As needed (PRN) 06/24/22 0935            ASSESSMENT and PLAN    * Hilar cholangiocarcinoma  Managed per primary team  Avoid hypoglycemia        Type 2 diabetes mellitus with hyperglycemia  Endocrinology consulted for BG management.   BG goal 140-180    - Transition drip sat 3.5 units/hr with step-down parameters.   - Novolog (aspart) insulin LDC (150/50) SSI  Units SQ prn for BG excursions   - BG checks q4hr  - Hypoglycemia protocol in place    ** Please notify Endocrine for any change and/or advance in diet**  ** Please call Endocrine for any BG related issues **    Discharge Planning:   TBD. Please notify endocrinology prior to discharge.          S/P liver transplant  Optimize BG control to improve wound healing        Adrenal corticosteroid causing adverse effect in therapeutic use  On steroid therapy per transplant team; may elevate BG readings             Brad Singleton, DNP, FNP  Endocrinology  [facility] - Trans[facility]  "

## 2022-06-25 NOTE — ASSESSMENT & PLAN NOTE
Endocrinology consulted for BG management.   BG goal 140-180    - Transition drip sat 3.5 units/hr with step-down parameters.   - Novolog (aspart) insulin LDC (150/50) SSI  Units SQ prn for BG excursions   - BG checks q4hr  - Hypoglycemia protocol in place    ** Please notify Endocrine for any change and/or advance in diet**  ** Please call Endocrine for any BG related issues **    Discharge Planning:   TBD. Please notify endocrinology prior to discharge.

## 2022-06-25 NOTE — PROGRESS NOTES
"[facility] - Transplant Stepdown  Liver Transplant  Progress Note    Patient Name: Romeo Larson  MRN: [id number]  Admission Date: 6/18/2022  Hospital Length of Stay: 7 days  Code Status: Full Code  Primary Care Provider: Pravin Maria MD  Post-Operative Day: 4    ORGAN:   RIGHT LIVER LOBE (SEGS 5,6,7,8) WITHOUT MIDDLE HEPATIC VEIN  Disease Etiology: Primary Liver Malignancy: Cholangiocarcinoma (CH-CA)  Donor Type:   Living  CDC High Risk:     Donor CMV Status:   Donor CMV Status:   Donor HBcAB:     Donor HCV Status:     Donor HBV GUSTABO:   Donor HCV GUSTABO:   Whole or Partial:   Biliary Anastomosis: Nina-en-Y  Arterial Anatomy: Standard  Subjective:     History of Present Illness:  HPI: Romeo Larson is a 41 y.o. male who presents to the emergency department at OSH w c/o complaint of fevers. Pt is s/p exploratory lap 6/17/22 to assess liver for tentative living liver transplant on 6/21/22 for cholangiocarcinoma. Last chemo  5/10/22. Patient reports "normally" gets chills after having anesthesia. Chills, described as rigors, started ~3am this morning. Patient toke temp at 7am that read 103°.  T max at OSH 38.2. Pt denies n/v/diarrhea. Lap sites CDI. Lactic acid at OSH was 5.4. He received IV fluids and dose of Vancomycin. Plan to transfer to [facility] for further eval w cultures, CT A/P WO, cont IV fluids and broad spectrum abx. POC reviewed w Dr Godwin.    Patient is vaccinated against COVID-19 and received booster in December.  COVID at OSH 6/18 neg. Did recently get his tetanus and shingles shot in preparation for the surgery.                        Hospital Course:  S/P s/p Living transplant on 6/21/2022 Liver for Primary Liver Malignancy: Cholangiocarcinoma CMV -/+6/22: Extubated, but there is a fear of a bile leak, so patient to remain on abx (Vanc/cefepime/fluc)  Returned to OR on 6/23 for Bile leak. Stepdown toTSU on 6/24, No acute events overnight. Pt reports feeling well this am. Progressing well post-op. Patient " "with 2 drains intact with pierce-sang draiange.  Tolerating Clear liquid diet, ambulating, pain controlled, +flatus -BM.  Encouraged fluid intake.  Also Prograf level elevated today will hold with prograf level in am. LFTs stable.  VSS, Continue to monitor.                       Interval HPI:   Overnight events: No acute events overnight. Patient on the SICU in room 71794/54702 A. Blood glucose stable. BG at goal on current insulin regimen (Transition Insulin Drip). Steroid use- Methylprednisolone  80 mg. 2 Days Post-Op  Renal function- Normal   Vasopressors-  None       Diet clear liquid     Eatin%  Nausea: No  Hypoglycemia and intervention: No  Fever: No  TPN and/or TF: No      BP (!) 140/100   Pulse 110   Temp 98 °F (36.7 °C)   Resp 18   Ht 5' 8" (1.727 m)   Wt 88 kg (194 lb 0.1 oz)   SpO2 (!) 94%   BMI 29.50 kg/m²     Labs Reviewed and Include    Recent Labs   Lab 22  0510      CALCIUM 9.1   ALBUMIN 2.8*   PROT 5.2*      K 3.7   CO2 30*   CL 99   BUN 26*   CREATININE 0.7   ALKPHOS 110   *   *   BILITOT 2.4*     Lab Results   Component Value Date    WBC 11.51 2022    HGB 9.5 (L) 2022    HCT 28.4 (L) 2022    MCV 99 (H) 2022     (L) 2022     No results for input(s): TSH, FREET4 in the last 168 hours.  Lab Results   Component Value Date    HGBA1C 5.8 (H) 2022       Nutritional status:   Body mass index is 29.5 kg/m².  Lab Results   Component Value Date    ALBUMIN 2.8 (L) 2022    ALBUMIN 3.0 (L) 2022    ALBUMIN 3.1 (L) 2022     No results found for: PREALBUMIN    Estimated Creatinine Clearance: 146.7 mL/min (based on SCr of 0.7 mg/dL).    Accu-Checks  Recent Labs     22  0620 22  0734 22  0833 22  1020 22  1152 22  1536 22  2146 22  0224 22  0626 22  1024   POCTGLUCOSE 174* 168* 186* 208* 195* 176* 192* 161* 157* 114*       Current Medications and/or " "Treatments Impacting Glycemic Control  Immunotherapy:    Immunosuppressants           Stop Route Frequency     mycophenolate capsule 1,000 mg         -- Oral 2 times daily          Steroids:   Hormones (From admission, onward)                Start     Stop Route Frequency Ordered    06/27/22 0900  predniSONE tablet 20 mg  (methylprednisolone taper panel)        "Followed by" Linked Group Details    -- Oral Daily 06/22/22 0014    06/26/22 0900  methylPREDNISolone sodium succinate injection 40 mg  (methylprednisolone taper panel)        "Followed by" Linked Group Details    06/27 0859 IV Daily 06/22/22 0014          Pressors:    Autonomic Drugs (From admission, onward)                None          Hyperglycemia/Diabetes Medications:   Antihyperglycemics (From admission, onward)                Start     Stop Route Frequency Ordered    06/25/22 1245  insulin regular in 0.9 % NaCl 100 unit/100 mL (1 unit/mL) infusion        Question:  Enter initial dose (Units/hr):  Answer:  2.2    -- IV Continuous 06/25/22 1233    06/24/22 1035  insulin aspart U-100 pen 0-5 Units         -- SubQ As needed (PRN) 06/24/22 0935        Scheduled Meds:   ampicillin-sulbactam (UNASYN) IVPB 3 g  3 g Intravenous Q8H    famotidine  20 mg Oral QHS    fluconazole  200 mg Oral Daily    heparin (porcine)  5,000 Units Subcutaneous Q8H    methocarbamoL  500 mg Oral QID    [START ON 6/26/2022] methylPREDNISolone sodium succinate injection  40 mg Intravenous Daily    Followed by    [START ON 6/27/2022] predniSONE  20 mg Oral Daily    mupirocin  1 g Nasal BID    mycophenolate  1,000 mg Oral BID    [START ON 6/28/2022] sulfamethoxazole-trimethoprim 400-80mg  1 tablet Oral Daily AM    [START ON 7/1/2022] valGANciclovir  450 mg Oral Daily     Continuous Infusions:   insulin regular 1 units/mL infusion orderable (TRANSFER)       PRN Meds:sodium chloride 0.9%, baclofen, dextrose 10%, dextrose 10%, glucagon (human recombinant), glucose, glucose, " hydrALAZINE, HYDROmorphone, insulin aspart U-100, labetalol, oxyCODONE, oxyCODONE, sodium chloride 0.9%    Review of Systems   Constitutional:  Negative for activity change and appetite change.   Eyes:  Negative for visual disturbance.   Respiratory:  Negative for cough and shortness of breath.    Cardiovascular:  Negative for chest pain, palpitations and leg swelling.   Gastrointestinal:  Negative for abdominal distention, abdominal pain, constipation, diarrhea, nausea and vomiting.   Genitourinary:  Negative for difficulty urinating.   Musculoskeletal:  Negative for arthralgias.   Skin:  Negative for wound.   Allergic/Immunologic: Positive for immunocompromised state.   Neurological:  Negative for dizziness.   Hematological:  Negative for adenopathy. Does not bruise/bleed easily.   Psychiatric/Behavioral:  Negative for agitation.    Objective:     Vital Signs (Most Recent):  Temp: 98 °F (36.7 °C) (06/25/22 0742)  Pulse: 110 (06/25/22 1153)  Resp: 18 (06/25/22 1332)  BP: (!) 140/100 (06/25/22 1153)  SpO2: (!) 94 % (06/25/22 1153)   Vital Signs (24h Range):  Temp:  [97.6 °F (36.4 °C)-98 °F (36.7 °C)] 98 °F (36.7 °C)  Pulse:  [] 110  Resp:  [11-28] 18  SpO2:  [92 %-96 %] 94 %  BP: (122-143)/() 140/100     Weight: 88 kg (194 lb 0.1 oz)  Body mass index is 29.5 kg/m².    Intake/Output - Last 3 Shifts         06/23 0700 06/24 0659 06/24 0700 06/25 0659 06/25 0700 06/26 0659    P.O. 540      I.V. (mL/kg) 1857.7 (21.1) 40 (0.5)     NG/GT       IV Piggyback 1033.6 429.6     Total Intake(mL/kg) 3431.3 (39) 469.6 (5.3)     Urine (mL/kg/hr) 2565 (1.2) 1830 (0.9)     Emesis/NG output  0     Drains 320 328     Other  0     Stool  0     Blood  0     Total Output 2885 2158     Net +546.3 -1688.4                    Physical Exam  Vitals and nursing note reviewed.   Constitutional:       Appearance: He is well-developed.   HENT:      Head: Normocephalic.   Eyes:      Conjunctiva/sclera: Conjunctivae normal.    Cardiovascular:      Rate and Rhythm: Normal rate and regular rhythm.      Heart sounds: No murmur heard.  Pulmonary:      Effort: Pulmonary effort is normal.      Breath sounds: Normal breath sounds.   Abdominal:      General: Bowel sounds are normal. There is no distension.      Palpations: Abdomen is soft.      Tenderness: There is no abdominal tenderness.       Musculoskeletal:         General: Normal range of motion.      Cervical back: Normal range of motion.   Skin:     General: Skin is warm and dry.      Capillary Refill: Capillary refill takes less than 2 seconds.   Neurological:      Mental Status: He is alert and oriented to person, place, and time.   Psychiatric:         Behavior: Behavior normal.         Thought Content: Thought content normal.         Judgment: Judgment normal.       Laboratory:  Immunosuppressants           Stop Route Frequency     mycophenolate capsule 1,000 mg         -- Oral 2 times daily          CBC:   Recent Labs   Lab 06/25/22  0510   WBC 11.51   RBC 2.86*   HGB 9.5*   HCT 28.4*   *   MCV 99*   MCH 33.2*   MCHC 33.5     BMP:   Recent Labs   Lab 06/25/22  0510         K 3.7   CL 99   CO2 30*   BUN 26*   CREATININE 0.7   CALCIUM 9.1     Coagulation:   Recent Labs   Lab 06/25/22  0510   INR 1.1   APTT 25.3     Labs within the past 24 hours have been reviewed.    Diagnostic Results:  US Liver Transplant Post:  Results for orders placed during the hospital encounter of 06/18/22    US Doppler Liver Transplant Post (xpd)    Narrative  EXAMINATION:  US DOPPLER LIVER TRANSPLANT POST (XPD)    CLINICAL HISTORY:  assess flow; Liver transplant status    TECHNIQUE:  Limited abdominal ultrasound of the transplant liver with Doppler evaluation.  Color and spectral Doppler were performed.    COMPARISON:  Transplant liver ultrasound 06/22/2022.  CT abdomen pelvis 06/19/2022.    FINDINGS:  Patient is status post orthotopic liver transplanton 06/21/2022.  Liver allograft is  normal in size.  The liver demonstrates homogeneous echotexture.  No focal hepatic lesions are seen.  No fluid collections.    The common duct is not dilated, measuring 4 mm.  No dilated intrahepatic radicles are seen.    Color flow and spectral waveform analysis was performed.  The main portal vein, right portal vein, middle hepatic vein, right hepatic vein, and IVC are patent with proper directional flow.    Anastomosis site of the main hepatic artery demonstrates a peak systolic velocity 117 (previously 247) cm/sec.    Main hepatic artery demonstrates resistive index 0.63 (previously 0.60) with normal waveform.    Anterior branch of the right hepatic artery demonstrates resistive index 0.63 (previously 0.47) with normal waveform.    Posterior branch of the right hepatic artery demonstrates resistive index 0.67 (previously 0.60) with normal waveform.    Impression  Satisfactory Doppler evaluation of liver transplant noting improvement of arterial resistive index in the anterior branch of the right hepatic artery.    Previously described hyperechoic area in the medial aspect of the allograft is not seen on this exam.    Electronically signed by resident: Teetee Diallo  Date:    06/23/2022  Time:    08:11    Electronically signed by: Coy Mena  Date:    06/23/2022  Time:    08:47    Debility/Functional status: Patient debilitated by evidence of Weakness. Physical and occupational therapy ordered daily to evaluate and treat. Debility was: present on admission.    Assessment/Plan:     * S/P liver transplant  -S/P s/p Living transplant on 6/21/2022 Liver for Primary Liver Malignancy: Cholangiocarcinoma CMV -/+6/22:  -Bile leakon abx (Vanc/cefepime/fluc)    -Returned to OR on 6/23 for Bile leak.ENTEROCOCCUS FAECALIS changed ABX to Unasyn  - 2 drains will stay for a while d/t bile leak      Long-term use of immunosuppressant medication  - see prophylactic immunotherapy      Type 2 diabetes mellitus with  hyperglycemia  Insulin gtt, appreciate ENDO rec's       At risk for opportunistic infections  - Bactrim for PCP ppx.  - Valcyte for CMV ppx.      Prophylactic immunotherapy  - continue prograf  - continue to monitor for toxic side effects and check daily levels. Will adjust for therapeutic dose      Tachycardia  - improved w fluids  - cont tele    Fever of unknown origin  - s/p ex lap, suspect fever r/t cholangitis post procedure  - d/c fluids now that tolerating diet  - CT a/p to be reviewed by Dr Jara, Dr Cline and Dr Godwin  - cont bs abx  - pt feeling well and fever resolved    Hilar cholangiocarcinoma  - s/p exploratory lap 6/17 to assess liver for cholangio in preparation for living liver txp scheduled 6/21/22   - developed chills overnight and fever 103 this am, presented to OSH for tx  - lactic acid was 5.4  - cont IV fluids  - cont broad spectrum abx (started Vanc and cefepime at OSH)  - cx w NGTD  - treating as presumed cholangitis s/p procedure  - trend lactic acid- down to 1 6/19, wbc normalized  - Liver Transplant 6/21/2022          VTE Risk Mitigation (From admission, onward)         Ordered     heparin (porcine) injection 5,000 Units  Every 8 hours         06/22/22 0014     Place sequential compression device  Until discontinued         06/22/22 0014     IP VTE HIGH RISK PATIENT  Once         06/22/22 0014                The patients clinical status was discussed at multidisplinary rounds, involving transplant surgery, transplant medicine, pharmacy, nursing, nutrition, and social work    Discharge Planning:  No Patient Care Coordination Note on file.      Abida Robles, FNP  Liver Transplant  [facility]

## 2022-06-25 NOTE — ASSESSMENT & PLAN NOTE
- s/p exploratory lap 6/17 to assess liver for cholangio in preparation for living liver txp scheduled 6/21/22   - developed chills overnight and fever 103 this am, presented to OSH for tx  - lactic acid was 5.4  - cont IV fluids  - cont broad spectrum abx (started Vanc and cefepime at OSH)  - cx w NGTD  - treating as presumed cholangitis s/p procedure  - trend lactic acid- down to 1 6/19, wbc normalized  - Liver Transplant 6/21/2022

## 2022-06-25 NOTE — PLAN OF CARE
Patient AAO X 4, VSS.Gave PRN for pain.   Visi and Telemonitor in place NS. Chevron with dermabond. 2 Right SHRUTHI putting out serosanguinous drainage. Insulin gtt 3.5cc/hr. IV antibiotics administered. Made blue card and will teach patient tomorrow. Up ambulatory, bed locked at lowest setting, yellow socks on, call bell in reach. Remains free from falls.

## 2022-06-26 PROBLEM — R50.9 FEVER OF UNKNOWN ORIGIN: Status: RESOLVED | Noted: 2022-04-26 | Resolved: 2022-06-26

## 2022-06-26 LAB
ALBUMIN SERPL BCP-MCNC: 2.7 G/DL (ref 3.5–5.2)
ALP SERPL-CCNC: 121 U/L (ref 55–135)
ALT SERPL W/O P-5'-P-CCNC: 642 U/L (ref 10–44)
ANION GAP SERPL CALC-SCNC: 6 MMOL/L (ref 8–16)
APTT BLDCRRT: 23.4 SEC (ref 21–32)
AST SERPL-CCNC: 110 U/L (ref 10–40)
BASOPHILS # BLD AUTO: 0.03 K/UL (ref 0–0.2)
BASOPHILS NFR BLD: 0.2 % (ref 0–1.9)
BILIRUB SERPL-MCNC: 2.9 MG/DL (ref 0.1–1)
BUN SERPL-MCNC: 30 MG/DL (ref 6–20)
CALCIUM SERPL-MCNC: 8.9 MG/DL (ref 8.7–10.5)
CHLORIDE SERPL-SCNC: 98 MMOL/L (ref 95–110)
CO2 SERPL-SCNC: 31 MMOL/L (ref 23–29)
CREAT SERPL-MCNC: 0.8 MG/DL (ref 0.5–1.4)
DIFFERENTIAL METHOD: ABNORMAL
EOSINOPHIL # BLD AUTO: 0 K/UL (ref 0–0.5)
EOSINOPHIL NFR BLD: 0 % (ref 0–8)
ERYTHROCYTE [DISTWIDTH] IN BLOOD BY AUTOMATED COUNT: 13.6 % (ref 11.5–14.5)
EST. GFR  (AFRICAN AMERICAN): >60 ML/MIN/1.73 M^2
EST. GFR  (NON AFRICAN AMERICAN): >60 ML/MIN/1.73 M^2
GLUCOSE SERPL-MCNC: 123 MG/DL (ref 70–110)
HCT VFR BLD AUTO: 30.8 % (ref 40–54)
HGB BLD-MCNC: 10.2 G/DL (ref 14–18)
IMM GRANULOCYTES # BLD AUTO: 0.19 K/UL (ref 0–0.04)
IMM GRANULOCYTES NFR BLD AUTO: 1.3 % (ref 0–0.5)
LYMPHOCYTES # BLD AUTO: 0.6 K/UL (ref 1–4.8)
LYMPHOCYTES NFR BLD: 3.8 % (ref 18–48)
MCH RBC QN AUTO: 32.5 PG (ref 27–31)
MCHC RBC AUTO-ENTMCNC: 33.1 G/DL (ref 32–36)
MCV RBC AUTO: 98 FL (ref 82–98)
MONOCYTES # BLD AUTO: 1 K/UL (ref 0.3–1)
MONOCYTES NFR BLD: 6.7 % (ref 4–15)
NEUTROPHILS # BLD AUTO: 13.1 K/UL (ref 1.8–7.7)
NEUTROPHILS NFR BLD: 88 % (ref 38–73)
NRBC BLD-RTO: 1 /100 WBC
PLATELET # BLD AUTO: 118 K/UL (ref 150–450)
PMV BLD AUTO: 9.6 FL (ref 9.2–12.9)
POCT GLUCOSE: 118 MG/DL (ref 70–110)
POCT GLUCOSE: 143 MG/DL (ref 70–110)
POCT GLUCOSE: 147 MG/DL (ref 70–110)
POCT GLUCOSE: 149 MG/DL (ref 70–110)
POCT GLUCOSE: 241 MG/DL (ref 70–110)
POTASSIUM SERPL-SCNC: 4 MMOL/L (ref 3.5–5.1)
PROT SERPL-MCNC: 5 G/DL (ref 6–8.4)
RBC # BLD AUTO: 3.14 M/UL (ref 4.6–6.2)
SODIUM SERPL-SCNC: 135 MMOL/L (ref 136–145)
TACROLIMUS BLD-MCNC: 23.6 NG/ML (ref 5–15)
WBC # BLD AUTO: 14.9 K/UL (ref 3.9–12.7)

## 2022-06-26 PROCEDURE — 25000003 PHARM REV CODE 250: Performed by: NURSE PRACTITIONER

## 2022-06-26 PROCEDURE — 25000003 PHARM REV CODE 250: Performed by: STUDENT IN AN ORGANIZED HEALTH CARE EDUCATION/TRAINING PROGRAM

## 2022-06-26 PROCEDURE — 99232 PR SUBSEQUENT HOSPITAL CARE,LEVL II: ICD-10-PCS | Mod: ,,, | Performed by: NURSE PRACTITIONER

## 2022-06-26 PROCEDURE — 63600175 PHARM REV CODE 636 W HCPCS: Performed by: STUDENT IN AN ORGANIZED HEALTH CARE EDUCATION/TRAINING PROGRAM

## 2022-06-26 PROCEDURE — 25000003 PHARM REV CODE 250: Performed by: PHYSICIAN ASSISTANT

## 2022-06-26 PROCEDURE — 20600001 HC STEP DOWN PRIVATE ROOM

## 2022-06-26 PROCEDURE — 80053 COMPREHEN METABOLIC PANEL: CPT | Performed by: STUDENT IN AN ORGANIZED HEALTH CARE EDUCATION/TRAINING PROGRAM

## 2022-06-26 PROCEDURE — 94760 N-INVAS EAR/PLS OXIMETRY 1: CPT

## 2022-06-26 PROCEDURE — 25000003 PHARM REV CODE 250: Performed by: SURGERY

## 2022-06-26 PROCEDURE — 85025 COMPLETE CBC W/AUTO DIFF WBC: CPT | Performed by: STUDENT IN AN ORGANIZED HEALTH CARE EDUCATION/TRAINING PROGRAM

## 2022-06-26 PROCEDURE — 63600175 PHARM REV CODE 636 W HCPCS: Performed by: SURGERY

## 2022-06-26 PROCEDURE — 80197 ASSAY OF TACROLIMUS: CPT | Performed by: STUDENT IN AN ORGANIZED HEALTH CARE EDUCATION/TRAINING PROGRAM

## 2022-06-26 PROCEDURE — 63600175 PHARM REV CODE 636 W HCPCS: Performed by: NURSE PRACTITIONER

## 2022-06-26 PROCEDURE — 99232 SBSQ HOSP IP/OBS MODERATE 35: CPT | Mod: ,,, | Performed by: NURSE PRACTITIONER

## 2022-06-26 PROCEDURE — 85730 THROMBOPLASTIN TIME PARTIAL: CPT | Performed by: STUDENT IN AN ORGANIZED HEALTH CARE EDUCATION/TRAINING PROGRAM

## 2022-06-26 PROCEDURE — 94761 N-INVAS EAR/PLS OXIMETRY MLT: CPT

## 2022-06-26 PROCEDURE — 36415 COLL VENOUS BLD VENIPUNCTURE: CPT | Performed by: STUDENT IN AN ORGANIZED HEALTH CARE EDUCATION/TRAINING PROGRAM

## 2022-06-26 PROCEDURE — P9045 ALBUMIN (HUMAN), 5%, 250 ML: HCPCS | Mod: JG | Performed by: NURSE PRACTITIONER

## 2022-06-26 RX ORDER — ALBUMIN HUMAN 50 G/1000ML
25 SOLUTION INTRAVENOUS ONCE
Status: COMPLETED | OUTPATIENT
Start: 2022-06-26 | End: 2022-06-26

## 2022-06-26 RX ADMIN — INSULIN ASPART 2 UNITS: 100 INJECTION, SOLUTION INTRAVENOUS; SUBCUTANEOUS at 05:06

## 2022-06-26 RX ADMIN — INSULIN HUMAN 1.4 UNITS/HR: 1 INJECTION, SOLUTION INTRAVENOUS at 10:06

## 2022-06-26 RX ADMIN — AMPICILLIN AND SULBACTAM 3 G: 2; 1 INJECTION, POWDER, FOR SOLUTION INTRAMUSCULAR; INTRAVENOUS at 11:06

## 2022-06-26 RX ADMIN — HEPARIN SODIUM 5000 UNITS: 5000 INJECTION INTRAVENOUS; SUBCUTANEOUS at 02:06

## 2022-06-26 RX ADMIN — INSULIN ASPART 1 UNITS: 100 INJECTION, SOLUTION INTRAVENOUS; SUBCUTANEOUS at 09:06

## 2022-06-26 RX ADMIN — METHOCARBAMOL 500 MG: 500 TABLET ORAL at 08:06

## 2022-06-26 RX ADMIN — FLUCONAZOLE 200 MG: 200 TABLET ORAL at 08:06

## 2022-06-26 RX ADMIN — BACLOFEN 5 MG: 5 TABLET ORAL at 02:06

## 2022-06-26 RX ADMIN — ALBUMIN (HUMAN) 25 G: 12.5 SOLUTION INTRAVENOUS at 10:06

## 2022-06-26 RX ADMIN — METHOCARBAMOL 500 MG: 500 TABLET ORAL at 05:06

## 2022-06-26 RX ADMIN — FAMOTIDINE 20 MG: 20 TABLET ORAL at 08:06

## 2022-06-26 RX ADMIN — HEPARIN SODIUM 5000 UNITS: 5000 INJECTION INTRAVENOUS; SUBCUTANEOUS at 09:06

## 2022-06-26 RX ADMIN — INSULIN ASPART 1 UNITS: 100 INJECTION, SOLUTION INTRAVENOUS; SUBCUTANEOUS at 02:06

## 2022-06-26 RX ADMIN — MYCOPHENOLATE MOFETIL 1000 MG: 250 CAPSULE ORAL at 08:06

## 2022-06-26 RX ADMIN — OXYCODONE HYDROCHLORIDE 10 MG: 10 TABLET ORAL at 11:06

## 2022-06-26 RX ADMIN — AMPICILLIN AND SULBACTAM 3 G: 2; 1 INJECTION, POWDER, FOR SOLUTION INTRAMUSCULAR; INTRAVENOUS at 02:06

## 2022-06-26 RX ADMIN — HEPARIN SODIUM 5000 UNITS: 5000 INJECTION INTRAVENOUS; SUBCUTANEOUS at 06:06

## 2022-06-26 RX ADMIN — BACLOFEN 5 MG: 5 TABLET ORAL at 08:06

## 2022-06-26 RX ADMIN — OXYCODONE HYDROCHLORIDE 10 MG: 10 TABLET ORAL at 02:06

## 2022-06-26 RX ADMIN — AMPICILLIN AND SULBACTAM 3 G: 2; 1 INJECTION, POWDER, FOR SOLUTION INTRAMUSCULAR; INTRAVENOUS at 07:06

## 2022-06-26 RX ADMIN — METHYLPREDNISOLONE SODIUM SUCCINATE 40 MG: 40 INJECTION, POWDER, FOR SOLUTION INTRAMUSCULAR; INTRAVENOUS at 08:06

## 2022-06-26 RX ADMIN — MUPIROCIN 1 G: 20 OINTMENT TOPICAL at 08:06

## 2022-06-26 RX ADMIN — METHOCARBAMOL 500 MG: 500 TABLET ORAL at 02:06

## 2022-06-26 NOTE — PLAN OF CARE
Pt is AAOx4, independent, and VSS. Self meds 100% correctly. Albumin 5% 500 ml infused. SHRUTHI drains to bulb suction- both drains serosanguineous drainage. Blood glucose monitoring performed and treated as ordered- Insulin 1.4 unit/hr. Telemetry and VISI monitoring in place. Pt's family visit. Pt up in chair, call bell within reach, nonskid socks on, and RN encouraged pt to call for any assistance needed. RN rounded on pt throughout shift. Will continue to monitor. Liver US ordered.

## 2022-06-26 NOTE — PROGRESS NOTES
[facility] - Transplant Stepdown  Endocrinology  Progress Note    Admit Date: 6/18/2022     Reason for Consult: Management of T2DM vs Steroid Induced Hyperglycemia     Surgical Procedure and Date: Liver Transplant 6/21/2022    Diabetes diagnosis year: 2022    Home Diabetes Medications: Metformin 500 mg BID- While on Chemo     How often checking glucose at home?  RACHEL    BG readings on regimen: RACHEL  Hypoglycemia on the regimen?  RACHEL  Missed doses on regimen?  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 He was brought to the SICU postoperatively for further monitoring and care. He arrived sedated, intubated, and off vasopressor/inotropic support. His immediate arrival was significant for a CXR demonstrating the ETT tip in the cervical neck. Anesthesia presented to the bedside immediately to evaluate and found the ETT tip at the cords and the balloon above the cords. The tube was advanced into appropriate position without issue. Endocrine consulted to manage type 2 diabetes and hyperglycemia.     Hemoglobin A1C   Date Value Ref Range Status   06/20/2022 5.8 (H) 4.0 - 5.6 % Final     Comment:     ADA Screening Guidelines:  5.7-6.4%  Consistent with prediabetes  >or=6.5%  Consistent with diabetes    High levels of fetal hemoglobin interfere with the HbA1C  assay. Heterozygous hemoglobin variants (HbS, HgC, etc)do  not significantly interfere with this assay.   However, presence of multiple variants may affect accuracy.     03/29/2022 6.8 (H) 4.0 - 5.6 % Final     Comment:     ADA Screening Guidelines:  5.7-6.4%  Consistent with prediabetes  >or=6.5%  Consistent with diabetes    High levels of fetal hemoglobin interfere with the HbA1C  assay. Heterozygous hemoglobin variants  "(HbS, HgC, etc)do  not significantly interfere with this assay.   However, presence of multiple variants may affect accuracy.                 Interval HPI:   Overnight events: No acute events overnight. Patient on the TSU in room 10952/64843 A. Blood glucose stable. BG at goal on current insulin regimen (Transition Insulin Drip). Steroid use- Methylprednisolone  40 mg. 3 Days Post-Op  Renal function- Normal   Vasopressors-  None       Diet Adult Regular (IDDSI Level 7) [facility]     Eatin%  Nausea: No  Hypoglycemia and intervention: No  Fever: No  TPN and/or TF: No    BP (!) 126/102   Pulse 105   Temp 98.3 °F (36.8 °C) (Oral)   Resp (!) 22   Ht 5' 8" (1.727 m)   Wt 88 kg (194 lb 0.1 oz)   SpO2 99%   BMI 29.50 kg/m²     Labs Reviewed and Include    Recent Labs   Lab 22  0749   *   CALCIUM 8.9   ALBUMIN 2.7*   PROT 5.0*   *   K 4.0   CO2 31*   CL 98   BUN 30*   CREATININE 0.8   ALKPHOS 121   *   *   BILITOT 2.9*     Lab Results   Component Value Date    WBC 14.90 (H) 2022    HGB 10.2 (L) 2022    HCT 30.8 (L) 2022    MCV 98 2022     (L) 2022     No results for input(s): TSH, FREET4 in the last 168 hours.  Lab Results   Component Value Date    HGBA1C 5.8 (H) 2022       Nutritional status:   Body mass index is 29.5 kg/m².  Lab Results   Component Value Date    ALBUMIN 2.7 (L) 2022    ALBUMIN 2.8 (L) 2022    ALBUMIN 3.0 (L) 2022     No results found for: PREALBUMIN    Estimated Creatinine Clearance: 128.3 mL/min (based on SCr of 0.8 mg/dL).    Accu-Checks  Recent Labs     22  1536 22  2146 22  0224 22  0626 22  1024 22  1432 22  1832 22  2228 22  0240 22  0627   POCTGLUCOSE 176* 192* 161* 157* 114* 153* 179* 155* 143* 149*       Current Medications and/or Treatments Impacting Glycemic Control  Immunotherapy:    Immunosuppressants           Stop Route " "Frequency     mycophenolate capsule 1,000 mg         -- Oral 2 times daily          Steroids:   Hormones (From admission, onward)                Start     Stop Route Frequency Ordered    06/27/22 0900  predniSONE tablet 20 mg  (methylprednisolone taper panel)        "Followed by" Linked Group Details    -- Oral Daily 06/22/22 0014          Pressors:    Autonomic Drugs (From admission, onward)                None          Hyperglycemia/Diabetes Medications:   Antihyperglycemics (From admission, onward)                Start     Stop Route Frequency Ordered    06/25/22 1245  insulin regular in 0.9 % NaCl 100 unit/100 mL (1 unit/mL) infusion        Question:  Enter initial dose (Units/hr):  Answer:  2.2    -- IV Continuous 06/25/22 1233    06/24/22 1035  insulin aspart U-100 pen 0-5 Units         -- SubQ As needed (PRN) 06/24/22 0935            ASSESSMENT and PLAN    * S/P liver transplant  Optimize BG control to improve wound healing        Type 2 diabetes mellitus with hyperglycemia  Endocrinology consulted for BG management.   BG goal 140-180    - Transition drip sat 2.2 units/hr with step-down parameters.   - Novolog (aspart) insulin LDC (150/50) SSI  Units SQ prn for BG excursions   - BG checks q4hr  - Hypoglycemia protocol in place    ** Please notify Endocrine for any change and/or advance in diet**  ** Please call Endocrine for any BG related issues **    Discharge Planning:   TBD. Please notify endocrinology prior to discharge.          Hilar cholangiocarcinoma  Managed per primary team  Avoid hypoglycemia        Adrenal corticosteroid causing adverse effect in therapeutic use  On steroid therapy per transplant team; may elevate BG readings             Brad Singleton, DNP, FNP  Endocrinology  [facility] - [facility]  "

## 2022-06-26 NOTE — PROGRESS NOTES
"[facility] - [facility] Stepdown  Liver Transplant  Progress Note    Patient Name: Romeo Larson  MRN: [id number]  Admission Date: 6/18/2022  Hospital Length of Stay: 8 days  Code Status: Full Code  Primary Care Provider: Pravin Maria MD  Post-Operative Day: 5    ORGAN:   RIGHT LIVER LOBE (SEGS 5,6,7,8) WITHOUT MIDDLE HEPATIC VEIN  Disease Etiology: Primary Liver Malignancy: Cholangiocarcinoma (CH-CA)  Donor Type:   Living  CDC High Risk:     Donor CMV Status:   Donor CMV Status:   Donor HBcAB:     Donor HCV Status:     Donor HBV GUSTABO:   Donor HCV GUSTABO:   Whole or Partial:   Biliary Anastomosis: Nina-en-Y  Arterial Anatomy: Standard  Subjective:     History of Present Illness:  HPI: Romeo Larson is a 41 y.o. male who presents to the emergency department at OSH w c/o complaint of fevers. Pt is s/p exploratory lap 6/17/22 to assess liver for tentative living liver transplant on 6/21/22 for cholangiocarcinoma. Last chemo  5/10/22. Patient reports "normally" gets chills after having anesthesia. Chills, described as rigors, started ~3am this morning. Patient toke temp at 7am that read 103°.  T max at OSH 38.2. Pt denies n/v/diarrhea. Lap sites CDI. Lactic acid at OSH was 5.4. He received IV fluids and dose of Vancomycin. Plan to transfer to [facility] for further eval w cultures, CT A/P WO, cont IV fluids and broad spectrum abx. POC reviewed w Dr Godwin.    Patient is vaccinated against COVID-19 and received booster in December.  COVID at OSH 6/18 neg. Did recently get his tetanus and shingles shot in preparation for the surgery.                        Hospital Course:  S/P s/p Living transplant on 6/21/2022 Liver for Primary Liver Malignancy: Cholangiocarcinoma CMV -/+6/22: Extubated, but there is a fear of a bile leak, so patient to remain on abx (Vanc/cefepime/fluc)  Returned to OR on 6/23 for Bile leak. Stepdown toTSU on 6/24    Hospital Interval: No acute events overnight. Pt reports feeling well this am. Progressing well " "post-op. Patient with 2 drains intact with pierce-sang draiange.  Advanced diet today, ambulating, pain controlled, +flatus +BM.  Encouraged fluid intake. Cultures from OR with ENTEROCOCCUS FAECALIS- based on sensitives now on Unasyn IV. Prograf level elevated continue to hold prograf. LFTs stable, plan for week #1 Liver U/S surveillance on Monday.  VSS, Continue to monitor.                         Interval HPI:   Overnight events: No acute events overnight. Patient on the SICU in room 36128/16834 A. Blood glucose stable. BG at goal on current insulin regimen (Transition Insulin Drip). Steroid use- Methylprednisolone  80 mg. 3 Days Post-Op  Renal function- Normal   Vasopressors-  None       Diet Adult Regular (IDDSI Level 7) [facility]     Eatin%  Nausea: No  Hypoglycemia and intervention: No  Fever: No  TPN and/or TF: No      BP (!) 126/102   Pulse 105   Temp 98.3 °F (36.8 °C) (Oral)   Resp (!) 22   Ht 5' 8" (1.727 m)   Wt 88 kg (194 lb 0.1 oz)   SpO2 99%   BMI 29.50 kg/m²     Labs Reviewed and Include    Recent Labs   Lab 22  0749   *   CALCIUM 8.9   ALBUMIN 2.7*   PROT 5.0*   *   K 4.0   CO2 31*   CL 98   BUN 30*   CREATININE 0.8   ALKPHOS 121   *   *   BILITOT 2.9*     Lab Results   Component Value Date    WBC 14.90 (H) 2022    HGB 10.2 (L) 2022    HCT 30.8 (L) 2022    MCV 98 2022     (L) 2022     No results for input(s): TSH, FREET4 in the last 168 hours.  Lab Results   Component Value Date    HGBA1C 5.8 (H) 2022       Nutritional status:   Body mass index is 29.5 kg/m².  Lab Results   Component Value Date    ALBUMIN 2.7 (L) 2022    ALBUMIN 2.8 (L) 2022    ALBUMIN 3.0 (L) 2022     No results found for: PREALBUMIN    Estimated Creatinine Clearance: 128.3 mL/min (based on SCr of 0.8 mg/dL).    Accu-Checks  Recent Labs     22  2146 22  0224 22  0626 22  1024 22  1432 " "06/25/22  1832 06/25/22  2228 06/26/22  0240 06/26/22  0627 06/26/22  1039   POCTGLUCOSE 192* 161* 157* 114* 153* 179* 155* 143* 149* 118*       Current Medications and/or Treatments Impacting Glycemic Control  Immunotherapy:    Immunosuppressants           Stop Route Frequency     mycophenolate capsule 1,000 mg         -- Oral 2 times daily          Steroids:   Hormones (From admission, onward)                Start     Stop Route Frequency Ordered    06/27/22 0900  predniSONE tablet 20 mg  (methylprednisolone taper panel)        "Followed by" Linked Group Details    -- Oral Daily 06/22/22 0014          Pressors:    Autonomic Drugs (From admission, onward)                None          Hyperglycemia/Diabetes Medications:   Antihyperglycemics (From admission, onward)                Start     Stop Route Frequency Ordered    06/25/22 1245  insulin regular in 0.9 % NaCl 100 unit/100 mL (1 unit/mL) infusion        Question:  Enter initial dose (Units/hr):  Answer:  2.2    -- IV Continuous 06/25/22 1233    06/24/22 1035  insulin aspart U-100 pen 0-5 Units         -- SubQ As needed (PRN) 06/24/22 0935        Scheduled Meds:   albumin human 5%  25 g Intravenous Once    ampicillin-sulbactam (UNASYN) IVPB 3 g  3 g Intravenous Q8H    docusate sodium  50 mg Oral Daily    famotidine  20 mg Oral QHS    fluconazole  200 mg Oral Daily    heparin (porcine)  5,000 Units Subcutaneous Q8H    methocarbamoL  500 mg Oral QID    mycophenolate  1,000 mg Oral BID    [START ON 6/27/2022] predniSONE  20 mg Oral Daily    [START ON 6/28/2022] sulfamethoxazole-trimethoprim 400-80mg  1 tablet Oral Daily AM    [START ON 7/1/2022] valGANciclovir  450 mg Oral Daily     Continuous Infusions:   insulin regular 1 units/mL infusion orderable (TRANSFER) 1.4 Units/hr (06/26/22 1040)     PRN Meds:sodium chloride 0.9%, baclofen, dextrose 10%, dextrose 10%, glucagon (human recombinant), glucose, glucose, hydrALAZINE, HYDROmorphone, insulin aspart " U-100, labetalol, oxyCODONE, oxyCODONE, sodium chloride 0.9%    Review of Systems   Constitutional:  Negative for activity change and appetite change.   Eyes:  Negative for visual disturbance.   Respiratory:  Negative for cough and shortness of breath.    Cardiovascular:  Negative for chest pain, palpitations and leg swelling.   Gastrointestinal:  Negative for abdominal distention, abdominal pain, constipation, diarrhea, nausea and vomiting.   Genitourinary:  Negative for difficulty urinating.   Musculoskeletal:  Negative for arthralgias.   Skin:  Negative for wound.   Allergic/Immunologic: Positive for immunocompromised state.   Neurological:  Negative for dizziness.   Hematological:  Negative for adenopathy. Does not bruise/bleed easily.   Psychiatric/Behavioral:  Negative for agitation.    Objective:     Vital Signs (Most Recent):  Temp: 98.3 °F (36.8 °C) (06/26/22 0845)  Pulse: 105 (06/26/22 0845)  Resp: (!) 22 (06/26/22 0845)  BP: (!) 126/102 (06/26/22 0845)  SpO2: 99 % (06/26/22 0845)   Vital Signs (24h Range):  Temp:  [98 °F (36.7 °C)-98.4 °F (36.9 °C)] 98.3 °F (36.8 °C)  Pulse:  [] 105  Resp:  [14-22] 22  SpO2:  [93 %-99 %] 99 %  BP: (126-145)/() 126/102     Weight: 88 kg (194 lb 0.1 oz)  Body mass index is 29.5 kg/m².    Intake/Output - Last 3 Shifts         06/24 0700 06/25 0659 06/25 0700 06/26 0659 06/26 0700 06/27 0659    P.O.  700     I.V. (mL/kg) 40 (0.5)      IV Piggyback 429.6      Total Intake(mL/kg) 469.6 (5.3) 700 (8)     Urine (mL/kg/hr) 1830 (0.9) 0 (0)     Emesis/NG output 0      Drains 328 320     Other 0      Stool 0      Blood 0      Total Output 2158 320     Net -1688.4 +380            Urine Occurrence  4 x             Physical Exam  Vitals and nursing note reviewed.   Constitutional:       Appearance: He is well-developed.   HENT:      Head: Normocephalic.   Eyes:      Conjunctiva/sclera: Conjunctivae normal.   Cardiovascular:      Rate and Rhythm: Normal rate and regular  rhythm.      Heart sounds: No murmur heard.  Pulmonary:      Effort: Pulmonary effort is normal.      Breath sounds: Normal breath sounds.   Abdominal:      General: Bowel sounds are normal. There is no distension.      Palpations: Abdomen is soft.      Tenderness: There is no abdominal tenderness.       Musculoskeletal:         General: Normal range of motion.      Cervical back: Normal range of motion.   Skin:     General: Skin is warm and dry.      Capillary Refill: Capillary refill takes less than 2 seconds.   Neurological:      Mental Status: He is alert and oriented to person, place, and time.   Psychiatric:         Behavior: Behavior normal.         Thought Content: Thought content normal.         Judgment: Judgment normal.       Laboratory:  Immunosuppressants           Stop Route Frequency     mycophenolate capsule 1,000 mg         -- Oral 2 times daily          CBC:   Recent Labs   Lab 06/26/22  0749   WBC 14.90*   RBC 3.14*   HGB 10.2*   HCT 30.8*   *   MCV 98   MCH 32.5*   MCHC 33.1     BMP:   Recent Labs   Lab 06/26/22  0749   *   *   K 4.0   CL 98   CO2 31*   BUN 30*   CREATININE 0.8   CALCIUM 8.9     Coagulation:   Recent Labs   Lab 06/25/22  0510 06/26/22  0749   INR 1.1  --    APTT 25.3 23.4     Labs within the past 24 hours have been reviewed.    Diagnostic Results:  US Liver Transplant Post:  Results for orders placed during the hospital encounter of 06/18/22    US Doppler Liver Transplant Post (xpd)    Narrative  EXAMINATION:  US DOPPLER LIVER TRANSPLANT POST (XPD)    CLINICAL HISTORY:  assess flow; Liver transplant status    TECHNIQUE:  Limited abdominal ultrasound of the transplant liver with Doppler evaluation.  Color and spectral Doppler were performed.    COMPARISON:  Transplant liver ultrasound 06/22/2022.  CT abdomen pelvis 06/19/2022.    FINDINGS:  Patient is status post orthotopic liver transplanton 06/21/2022.  Liver allograft is normal in size.  The liver  demonstrates homogeneous echotexture.  No focal hepatic lesions are seen.  No fluid collections.    The common duct is not dilated, measuring 4 mm.  No dilated intrahepatic radicles are seen.    Color flow and spectral waveform analysis was performed.  The main portal vein, right portal vein, middle hepatic vein, right hepatic vein, and IVC are patent with proper directional flow.    Anastomosis site of the main hepatic artery demonstrates a peak systolic velocity 117 (previously 247) cm/sec.    Main hepatic artery demonstrates resistive index 0.63 (previously 0.60) with normal waveform.    Anterior branch of the right hepatic artery demonstrates resistive index 0.63 (previously 0.47) with normal waveform.    Posterior branch of the right hepatic artery demonstrates resistive index 0.67 (previously 0.60) with normal waveform.    Impression  Satisfactory Doppler evaluation of liver transplant noting improvement of arterial resistive index in the anterior branch of the right hepatic artery.    Previously described hyperechoic area in the medial aspect of the allograft is not seen on this exam.    Electronically signed by resident: Teetee Diallo  Date:    06/23/2022  Time:    08:11    Electronically signed by: Coy Mena  Date:    06/23/2022  Time:    08:47    Debility/Functional status: Patient debilitated by evidence of Weakness. Physical and occupational therapy ordered daily to evaluate and treat. Debility was: present on admission.    Assessment/Plan:     * S/P liver transplant  -S/P s/p Living transplant on 6/21/2022 Liver for Primary Liver Malignancy: Cholangiocarcinoma CMV -/+6/22:  -Bile leakon abx (Vanc/cefepime/fluc)    -Returned to OR on 6/23 for Bile leak.ENTEROCOCCUS FAECALIS changed ABX to Unasyn  - 2 drains will stay for a while d/t bile leak      Long-term use of immunosuppressant medication  - see prophylactic immunotherapy      Adrenal corticosteroid causing adverse effect in therapeutic  use        Type 2 diabetes mellitus with hyperglycemia  Insulin gtt, appreciate ENDO rec's       At risk for opportunistic infections  - Bactrim for PCP ppx.  - Valcyte for CMV ppx.      Prophylactic immunotherapy  - continue prograf  - continue to monitor for toxic side effects and check daily levels. Will adjust for therapeutic dose      Tachycardia  - improved w fluids  - cont tele    Hilar cholangiocarcinoma  - s/p exploratory lap 6/17 to assess liver for cholangio in preparation for living liver txp scheduled 6/21/22   - developed chills overnight and fever 103 this am, presented to OSH for tx  - lactic acid was 5.4  - cont IV fluids  - cont broad spectrum abx (started Vanc and cefepime at OSH)  - cx w NGTD  - treating as presumed cholangitis s/p procedure  - trend lactic acid- down to 1 6/19, wbc normalized  - Liver Transplant 6/21/2022          VTE Risk Mitigation (From admission, onward)         Ordered     heparin (porcine) injection 5,000 Units  Every 8 hours         06/22/22 0014     Place sequential compression device  Until discontinued         06/22/22 0014     IP VTE HIGH RISK PATIENT  Once         06/22/22 0014                The patients clinical status was discussed at multidisplinary rounds, involving transplant surgery, transplant medicine, pharmacy, nursing, nutrition, and social work    Discharge Planning:  No Patient Care Coordination Note on file.      Abida Robles, MOYP  Liver Transplant  [facility] - Transplant [facility]

## 2022-06-26 NOTE — PLAN OF CARE
Patient AAO X 4, VSS.Gave PRN for pain.   Visi and Telemonitor in place NS. Chevron with dermabond. 2 Right SHRUTHI putting out serosanguinous drainage. Insulin gtt 2.2cc/hr. Blood cx positive for enterococcus. IVPB ampicillin. Taught patient medications during shift, will pull AM medications. Up ambulatory, bed locked at lowest setting, yellow socks on, call bell in reach. Remains free from falls.

## 2022-06-26 NOTE — SUBJECTIVE & OBJECTIVE
"Interval HPI:   Overnight events: No acute events overnight. Patient on the TSU in room 86629/87497 A. Blood glucose stable. BG at goal on current insulin regimen (Transition Insulin Drip). Steroid use- Methylprednisolone  40 mg. 3 Days Post-Op  Renal function- Normal   Vasopressors-  None       Diet Adult Regular (IDDSI Level 7) [facility]     Eatin%  Nausea: No  Hypoglycemia and intervention: No  Fever: No  TPN and/or TF: No    BP (!) 126/102   Pulse 105   Temp 98.3 °F (36.8 °C) (Oral)   Resp (!) 22   Ht 5' 8" (1.727 m)   Wt 88 kg (194 lb 0.1 oz)   SpO2 99%   BMI 29.50 kg/m²     Labs Reviewed and Include    Recent Labs   Lab 22  0749   *   CALCIUM 8.9   ALBUMIN 2.7*   PROT 5.0*   *   K 4.0   CO2 31*   CL 98   BUN 30*   CREATININE 0.8   ALKPHOS 121   *   *   BILITOT 2.9*     Lab Results   Component Value Date    WBC 14.90 (H) 2022    HGB 10.2 (L) 2022    HCT 30.8 (L) 2022    MCV 98 2022     (L) 2022     No results for input(s): TSH, FREET4 in the last 168 hours.  Lab Results   Component Value Date    HGBA1C 5.8 (H) 2022       Nutritional status:   Body mass index is 29.5 kg/m².  Lab Results   Component Value Date    ALBUMIN 2.7 (L) 2022    ALBUMIN 2.8 (L) 2022    ALBUMIN 3.0 (L) 2022     No results found for: PREALBUMIN    Estimated Creatinine Clearance: 128.3 mL/min (based on SCr of 0.8 mg/dL).    Accu-Checks  Recent Labs     22  1536 22  2146 22  0224 22  0626 22  1024 22  1432 22  1832 22  2228 22  0240 22  0627   POCTGLUCOSE 176* 192* 161* 157* 114* 153* 179* 155* 143* 149*       Current Medications and/or Treatments Impacting Glycemic Control  Immunotherapy:    Immunosuppressants           Stop Route Frequency     mycophenolate capsule 1,000 mg         -- Oral 2 times daily          Steroids:   Hormones (From admission, onward)           " "     Start     Stop Route Frequency Ordered    06/27/22 0900  predniSONE tablet 20 mg  (methylprednisolone taper panel)        "Followed by" Linked Group Details    -- Oral Daily 06/22/22 0014          Pressors:    Autonomic Drugs (From admission, onward)                None          Hyperglycemia/Diabetes Medications:   Antihyperglycemics (From admission, onward)                Start     Stop Route Frequency Ordered    06/25/22 1245  insulin regular in 0.9 % NaCl 100 unit/100 mL (1 unit/mL) infusion        Question:  Enter initial dose (Units/hr):  Answer:  2.2    -- IV Continuous 06/25/22 1233    06/24/22 1035  insulin aspart U-100 pen 0-5 Units         -- SubQ As needed (PRN) 06/24/22 0935          "

## 2022-06-26 NOTE — ASSESSMENT & PLAN NOTE
Endocrinology consulted for BG management.   BG goal 140-180    - Transition drip sat 2.2 units/hr with step-down parameters.   - Novolog (aspart) insulin LDC (150/50) SSI  Units SQ prn for BG excursions   - BG checks q4hr  - Hypoglycemia protocol in place    ** Please notify Endocrine for any change and/or advance in diet**  ** Please call Endocrine for any BG related issues **    Discharge Planning:   TBD. Please notify endocrinology prior to discharge.

## 2022-06-26 NOTE — SUBJECTIVE & OBJECTIVE
"Interval HPI:   Overnight events: No acute events overnight. Patient on the SICU in room 28947/22950 A. Blood glucose stable. BG at goal on current insulin regimen (Transition Insulin Drip). Steroid use- Methylprednisolone  80 mg. 3 Days Post-Op  Renal function- Normal   Vasopressors-  None       Diet Adult Regular (IDDSI Level 7) [facility]     Eatin%  Nausea: No  Hypoglycemia and intervention: No  Fever: No  TPN and/or TF: No      BP (!) 126/102   Pulse 105   Temp 98.3 °F (36.8 °C) (Oral)   Resp (!) 22   Ht 5' 8" (1.727 m)   Wt 88 kg (194 lb 0.1 oz)   SpO2 99%   BMI 29.50 kg/m²     Labs Reviewed and Include    Recent Labs   Lab 22  0749   *   CALCIUM 8.9   ALBUMIN 2.7*   PROT 5.0*   *   K 4.0   CO2 31*   CL 98   BUN 30*   CREATININE 0.8   ALKPHOS 121   *   *   BILITOT 2.9*     Lab Results   Component Value Date    WBC 14.90 (H) 2022    HGB 10.2 (L) 2022    HCT 30.8 (L) 2022    MCV 98 2022     (L) 2022     No results for input(s): TSH, FREET4 in the last 168 hours.  Lab Results   Component Value Date    HGBA1C 5.8 (H) 2022       Nutritional status:   Body mass index is 29.5 kg/m².  Lab Results   Component Value Date    ALBUMIN 2.7 (L) 2022    ALBUMIN 2.8 (L) 2022    ALBUMIN 3.0 (L) 2022     No results found for: PREALBUMIN    Estimated Creatinine Clearance: 128.3 mL/min (based on SCr of 0.8 mg/dL).    Accu-Checks  Recent Labs     22  2146 22  0224 22  0626 22  1024 22  1432 22  1832 22  2228 22  0240 22  0627 22  1039   POCTGLUCOSE 192* 161* 157* 114* 153* 179* 155* 143* 149* 118*       Current Medications and/or Treatments Impacting Glycemic Control  Immunotherapy:    Immunosuppressants           Stop Route Frequency     mycophenolate capsule 1,000 mg         -- Oral 2 times daily          Steroids:   Hormones (From admission, onward)          " "      Start     Stop Route Frequency Ordered    06/27/22 0900  predniSONE tablet 20 mg  (methylprednisolone taper panel)        "Followed by" Linked Group Details    -- Oral Daily 06/22/22 0014          Pressors:    Autonomic Drugs (From admission, onward)                None          Hyperglycemia/Diabetes Medications:   Antihyperglycemics (From admission, onward)                Start     Stop Route Frequency Ordered    06/25/22 1245  insulin regular in 0.9 % NaCl 100 unit/100 mL (1 unit/mL) infusion        Question:  Enter initial dose (Units/hr):  Answer:  2.2    -- IV Continuous 06/25/22 1233    06/24/22 1035  insulin aspart U-100 pen 0-5 Units         -- SubQ As needed (PRN) 06/24/22 0935        Scheduled Meds:   albumin human 5%  25 g Intravenous Once    ampicillin-sulbactam (UNASYN) IVPB 3 g  3 g Intravenous Q8H    docusate sodium  50 mg Oral Daily    famotidine  20 mg Oral QHS    fluconazole  200 mg Oral Daily    heparin (porcine)  5,000 Units Subcutaneous Q8H    methocarbamoL  500 mg Oral QID    mycophenolate  1,000 mg Oral BID    [START ON 6/27/2022] predniSONE  20 mg Oral Daily    [START ON 6/28/2022] sulfamethoxazole-trimethoprim 400-80mg  1 tablet Oral Daily AM    [START ON 7/1/2022] valGANciclovir  450 mg Oral Daily     Continuous Infusions:   insulin regular 1 units/mL infusion orderable (TRANSFER) 1.4 Units/hr (06/26/22 1040)     PRN Meds:sodium chloride 0.9%, baclofen, dextrose 10%, dextrose 10%, glucagon (human recombinant), glucose, glucose, hydrALAZINE, HYDROmorphone, insulin aspart U-100, labetalol, oxyCODONE, oxyCODONE, sodium chloride 0.9%    Review of Systems   Constitutional:  Negative for activity change and appetite change.   Eyes:  Negative for visual disturbance.   Respiratory:  Negative for cough and shortness of breath.    Cardiovascular:  Negative for chest pain, palpitations and leg swelling.   Gastrointestinal:  Negative for abdominal distention, abdominal pain, constipation, " diarrhea, nausea and vomiting.   Genitourinary:  Negative for difficulty urinating.   Musculoskeletal:  Negative for arthralgias.   Skin:  Negative for wound.   Allergic/Immunologic: Positive for immunocompromised state.   Neurological:  Negative for dizziness.   Hematological:  Negative for adenopathy. Does not bruise/bleed easily.   Psychiatric/Behavioral:  Negative for agitation.    Objective:     Vital Signs (Most Recent):  Temp: 98.3 °F (36.8 °C) (06/26/22 0845)  Pulse: 105 (06/26/22 0845)  Resp: (!) 22 (06/26/22 0845)  BP: (!) 126/102 (06/26/22 0845)  SpO2: 99 % (06/26/22 0845)   Vital Signs (24h Range):  Temp:  [98 °F (36.7 °C)-98.4 °F (36.9 °C)] 98.3 °F (36.8 °C)  Pulse:  [] 105  Resp:  [14-22] 22  SpO2:  [93 %-99 %] 99 %  BP: (126-145)/() 126/102     Weight: 88 kg (194 lb 0.1 oz)  Body mass index is 29.5 kg/m².    Intake/Output - Last 3 Shifts         06/24 0700  06/25 0659 06/25 0700 06/26 0659 06/26 0700  06/27 0659    P.O.  700     I.V. (mL/kg) 40 (0.5)      IV Piggyback 429.6      Total Intake(mL/kg) 469.6 (5.3) 700 (8)     Urine (mL/kg/hr) 1830 (0.9) 0 (0)     Emesis/NG output 0      Drains 328 320     Other 0      Stool 0      Blood 0      Total Output 2158 320     Net -1688.4 +380            Urine Occurrence  4 x             Physical Exam  Vitals and nursing note reviewed.   Constitutional:       Appearance: He is well-developed.   HENT:      Head: Normocephalic.   Eyes:      Conjunctiva/sclera: Conjunctivae normal.   Cardiovascular:      Rate and Rhythm: Normal rate and regular rhythm.      Heart sounds: No murmur heard.  Pulmonary:      Effort: Pulmonary effort is normal.      Breath sounds: Normal breath sounds.   Abdominal:      General: Bowel sounds are normal. There is no distension.      Palpations: Abdomen is soft.      Tenderness: There is no abdominal tenderness.       Musculoskeletal:         General: Normal range of motion.      Cervical back: Normal range of motion.    Skin:     General: Skin is warm and dry.      Capillary Refill: Capillary refill takes less than 2 seconds.   Neurological:      Mental Status: He is alert and oriented to person, place, and time.   Psychiatric:         Behavior: Behavior normal.         Thought Content: Thought content normal.         Judgment: Judgment normal.       Laboratory:  Immunosuppressants           Stop Route Frequency     mycophenolate capsule 1,000 mg         -- Oral 2 times daily          CBC:   Recent Labs   Lab 06/26/22  0749   WBC 14.90*   RBC 3.14*   HGB 10.2*   HCT 30.8*   *   MCV 98   MCH 32.5*   MCHC 33.1     BMP:   Recent Labs   Lab 06/26/22  0749   *   *   K 4.0   CL 98   CO2 31*   BUN 30*   CREATININE 0.8   CALCIUM 8.9     Coagulation:   Recent Labs   Lab 06/25/22  0510 06/26/22  0749   INR 1.1  --    APTT 25.3 23.4     Labs within the past 24 hours have been reviewed.    Diagnostic Results:  US Liver Transplant Post:  Results for orders placed during the hospital encounter of 06/18/22    US Doppler Liver Transplant Post (xpd)    Narrative  EXAMINATION:  US DOPPLER LIVER TRANSPLANT POST (XPD)    CLINICAL HISTORY:  assess flow; Liver transplant status    TECHNIQUE:  Limited abdominal ultrasound of the transplant liver with Doppler evaluation.  Color and spectral Doppler were performed.    COMPARISON:  Transplant liver ultrasound 06/22/2022.  CT abdomen pelvis 06/19/2022.    FINDINGS:  Patient is status post orthotopic liver transplanton 06/21/2022.  Liver allograft is normal in size.  The liver demonstrates homogeneous echotexture.  No focal hepatic lesions are seen.  No fluid collections.    The common duct is not dilated, measuring 4 mm.  No dilated intrahepatic radicles are seen.    Color flow and spectral waveform analysis was performed.  The main portal vein, right portal vein, middle hepatic vein, right hepatic vein, and IVC are patent with proper directional flow.    Anastomosis site of the main  hepatic artery demonstrates a peak systolic velocity 117 (previously 247) cm/sec.    Main hepatic artery demonstrates resistive index 0.63 (previously 0.60) with normal waveform.    Anterior branch of the right hepatic artery demonstrates resistive index 0.63 (previously 0.47) with normal waveform.    Posterior branch of the right hepatic artery demonstrates resistive index 0.67 (previously 0.60) with normal waveform.    Impression  Satisfactory Doppler evaluation of liver transplant noting improvement of arterial resistive index in the anterior branch of the right hepatic artery.    Previously described hyperechoic area in the medial aspect of the allograft is not seen on this exam.    Electronically signed by resident: Teetee Diallo  Date:    06/23/2022  Time:    08:11    Electronically signed by: Coy Mena  Date:    06/23/2022  Time:    08:47    Debility/Functional status: Patient debilitated by evidence of Weakness. Physical and occupational therapy ordered daily to evaluate and treat. Debility was: present on admission.

## 2022-06-27 LAB
ALBUMIN SERPL BCP-MCNC: 3.1 G/DL (ref 3.5–5.2)
ALP SERPL-CCNC: 133 U/L (ref 55–135)
ALT SERPL W/O P-5'-P-CCNC: 520 U/L (ref 10–44)
ANION GAP SERPL CALC-SCNC: 8 MMOL/L (ref 8–16)
APTT BLDCRRT: 23.6 SEC (ref 21–32)
AST SERPL-CCNC: 124 U/L (ref 10–40)
BACTERIA SPEC AEROBE CULT: NO GROWTH
BACTERIA SPEC ANAEROBE CULT: NORMAL
BASOPHILS # BLD AUTO: 0.02 K/UL (ref 0–0.2)
BASOPHILS NFR BLD: 0.1 % (ref 0–1.9)
BILIRUB SERPL-MCNC: 2.7 MG/DL (ref 0.1–1)
BUN SERPL-MCNC: 24 MG/DL (ref 6–20)
CALCIUM SERPL-MCNC: 8.7 MG/DL (ref 8.7–10.5)
CHLORIDE SERPL-SCNC: 99 MMOL/L (ref 95–110)
CO2 SERPL-SCNC: 30 MMOL/L (ref 23–29)
CREAT SERPL-MCNC: 0.7 MG/DL (ref 0.5–1.4)
DIFFERENTIAL METHOD: ABNORMAL
EOSINOPHIL # BLD AUTO: 0.1 K/UL (ref 0–0.5)
EOSINOPHIL NFR BLD: 0.5 % (ref 0–8)
ERYTHROCYTE [DISTWIDTH] IN BLOOD BY AUTOMATED COUNT: 13.6 % (ref 11.5–14.5)
EST. GFR  (AFRICAN AMERICAN): >60 ML/MIN/1.73 M^2
EST. GFR  (NON AFRICAN AMERICAN): >60 ML/MIN/1.73 M^2
GLUCOSE SERPL-MCNC: 98 MG/DL (ref 70–110)
HCT VFR BLD AUTO: 30 % (ref 40–54)
HGB BLD-MCNC: 10.5 G/DL (ref 14–18)
IMM GRANULOCYTES # BLD AUTO: 0.15 K/UL (ref 0–0.04)
IMM GRANULOCYTES NFR BLD AUTO: 1 % (ref 0–0.5)
INR PPP: 1.1 (ref 0.8–1.2)
LYMPHOCYTES # BLD AUTO: 0.8 K/UL (ref 1–4.8)
LYMPHOCYTES NFR BLD: 5.4 % (ref 18–48)
MCH RBC QN AUTO: 33.2 PG (ref 27–31)
MCHC RBC AUTO-ENTMCNC: 35 G/DL (ref 32–36)
MCV RBC AUTO: 95 FL (ref 82–98)
MONOCYTES # BLD AUTO: 0.8 K/UL (ref 0.3–1)
MONOCYTES NFR BLD: 5 % (ref 4–15)
NEUTROPHILS # BLD AUTO: 13.2 K/UL (ref 1.8–7.7)
NEUTROPHILS NFR BLD: 88 % (ref 38–73)
NRBC BLD-RTO: 1 /100 WBC
PLATELET # BLD AUTO: 142 K/UL (ref 150–450)
PMV BLD AUTO: 10.1 FL (ref 9.2–12.9)
POCT GLUCOSE: 101 MG/DL (ref 70–110)
POCT GLUCOSE: 107 MG/DL (ref 70–110)
POCT GLUCOSE: 112 MG/DL (ref 70–110)
POCT GLUCOSE: 134 MG/DL (ref 70–110)
POCT GLUCOSE: 177 MG/DL (ref 70–110)
POCT GLUCOSE: 206 MG/DL (ref 70–110)
POCT GLUCOSE: 221 MG/DL (ref 70–110)
POCT GLUCOSE: 285 MG/DL (ref 70–110)
POCT GLUCOSE: 96 MG/DL (ref 70–110)
POTASSIUM SERPL-SCNC: 3.4 MMOL/L (ref 3.5–5.1)
PROT SERPL-MCNC: 5.4 G/DL (ref 6–8.4)
PROTHROMBIN TIME: 11.7 SEC (ref 9–12.5)
RBC # BLD AUTO: 3.16 M/UL (ref 4.6–6.2)
SODIUM SERPL-SCNC: 137 MMOL/L (ref 136–145)
TACROLIMUS BLD-MCNC: 14.3 NG/ML (ref 5–15)
WBC # BLD AUTO: 14.96 K/UL (ref 3.9–12.7)

## 2022-06-27 PROCEDURE — 99233 PR SUBSEQUENT HOSPITAL CARE,LEVL III: ICD-10-PCS | Mod: 24,,, | Performed by: NURSE PRACTITIONER

## 2022-06-27 PROCEDURE — 25000003 PHARM REV CODE 250: Performed by: STUDENT IN AN ORGANIZED HEALTH CARE EDUCATION/TRAINING PROGRAM

## 2022-06-27 PROCEDURE — 99232 PR SUBSEQUENT HOSPITAL CARE,LEVL II: ICD-10-PCS | Mod: ,,, | Performed by: NURSE PRACTITIONER

## 2022-06-27 PROCEDURE — 63600175 PHARM REV CODE 636 W HCPCS: Performed by: STUDENT IN AN ORGANIZED HEALTH CARE EDUCATION/TRAINING PROGRAM

## 2022-06-27 PROCEDURE — 63600175 PHARM REV CODE 636 W HCPCS: Performed by: SURGERY

## 2022-06-27 PROCEDURE — 20600001 HC STEP DOWN PRIVATE ROOM

## 2022-06-27 PROCEDURE — 85610 PROTHROMBIN TIME: CPT | Performed by: STUDENT IN AN ORGANIZED HEALTH CARE EDUCATION/TRAINING PROGRAM

## 2022-06-27 PROCEDURE — 25000003 PHARM REV CODE 250: Performed by: NURSE PRACTITIONER

## 2022-06-27 PROCEDURE — 82247 BILIRUBIN TOTAL: CPT | Mod: 91 | Performed by: PHYSICIAN ASSISTANT

## 2022-06-27 PROCEDURE — 85025 COMPLETE CBC W/AUTO DIFF WBC: CPT | Performed by: STUDENT IN AN ORGANIZED HEALTH CARE EDUCATION/TRAINING PROGRAM

## 2022-06-27 PROCEDURE — 63600175 PHARM REV CODE 636 W HCPCS: Performed by: NURSE PRACTITIONER

## 2022-06-27 PROCEDURE — 25000003 PHARM REV CODE 250: Performed by: SURGERY

## 2022-06-27 PROCEDURE — 94761 N-INVAS EAR/PLS OXIMETRY MLT: CPT

## 2022-06-27 PROCEDURE — 99900035 HC TECH TIME PER 15 MIN (STAT)

## 2022-06-27 PROCEDURE — 80053 COMPREHEN METABOLIC PANEL: CPT | Performed by: STUDENT IN AN ORGANIZED HEALTH CARE EDUCATION/TRAINING PROGRAM

## 2022-06-27 PROCEDURE — 80197 ASSAY OF TACROLIMUS: CPT | Performed by: STUDENT IN AN ORGANIZED HEALTH CARE EDUCATION/TRAINING PROGRAM

## 2022-06-27 PROCEDURE — 99232 SBSQ HOSP IP/OBS MODERATE 35: CPT | Mod: ,,, | Performed by: NURSE PRACTITIONER

## 2022-06-27 PROCEDURE — 36415 COLL VENOUS BLD VENIPUNCTURE: CPT | Performed by: STUDENT IN AN ORGANIZED HEALTH CARE EDUCATION/TRAINING PROGRAM

## 2022-06-27 PROCEDURE — 85730 THROMBOPLASTIN TIME PARTIAL: CPT | Performed by: STUDENT IN AN ORGANIZED HEALTH CARE EDUCATION/TRAINING PROGRAM

## 2022-06-27 PROCEDURE — 99233 SBSQ HOSP IP/OBS HIGH 50: CPT | Mod: 24,,, | Performed by: NURSE PRACTITIONER

## 2022-06-27 RX ORDER — INSULIN ASPART 100 [IU]/ML
1-10 INJECTION, SOLUTION INTRAVENOUS; SUBCUTANEOUS
Status: DISCONTINUED | OUTPATIENT
Start: 2022-06-27 | End: 2022-06-28 | Stop reason: HOSPADM

## 2022-06-27 RX ORDER — AMOXICILLIN AND CLAVULANATE POTASSIUM 875; 125 MG/1; MG/1
1 TABLET, FILM COATED ORAL EVERY 12 HOURS
Status: DISCONTINUED | OUTPATIENT
Start: 2022-06-27 | End: 2022-06-28 | Stop reason: HOSPADM

## 2022-06-27 RX ORDER — CARVEDILOL 3.12 MG/1
3.12 TABLET ORAL 2 TIMES DAILY
Status: DISCONTINUED | OUTPATIENT
Start: 2022-06-27 | End: 2022-06-28 | Stop reason: HOSPADM

## 2022-06-27 RX ORDER — INSULIN ASPART 100 [IU]/ML
5 INJECTION, SOLUTION INTRAVENOUS; SUBCUTANEOUS
Status: DISCONTINUED | OUTPATIENT
Start: 2022-06-28 | End: 2022-06-28 | Stop reason: HOSPADM

## 2022-06-27 RX ORDER — IBUPROFEN 200 MG
16 TABLET ORAL
Status: DISCONTINUED | OUTPATIENT
Start: 2022-06-27 | End: 2022-06-28 | Stop reason: HOSPADM

## 2022-06-27 RX ORDER — GLUCAGON 1 MG
1 KIT INJECTION
Status: DISCONTINUED | OUTPATIENT
Start: 2022-06-27 | End: 2022-06-28 | Stop reason: HOSPADM

## 2022-06-27 RX ORDER — IBUPROFEN 200 MG
24 TABLET ORAL
Status: DISCONTINUED | OUTPATIENT
Start: 2022-06-27 | End: 2022-06-28 | Stop reason: HOSPADM

## 2022-06-27 RX ORDER — POTASSIUM CHLORIDE 20 MEQ/1
40 TABLET, EXTENDED RELEASE ORAL ONCE
Status: COMPLETED | OUTPATIENT
Start: 2022-06-27 | End: 2022-06-27

## 2022-06-27 RX ADMIN — OXYCODONE HYDROCHLORIDE 10 MG: 10 TABLET ORAL at 08:06

## 2022-06-27 RX ADMIN — OXYCODONE HYDROCHLORIDE 10 MG: 10 TABLET ORAL at 06:06

## 2022-06-27 RX ADMIN — MYCOPHENOLATE MOFETIL 1000 MG: 250 CAPSULE ORAL at 08:06

## 2022-06-27 RX ADMIN — CARVEDILOL 3.12 MG: 3.12 TABLET, FILM COATED ORAL at 03:06

## 2022-06-27 RX ADMIN — METHOCARBAMOL 500 MG: 500 TABLET ORAL at 08:06

## 2022-06-27 RX ADMIN — OXYCODONE HYDROCHLORIDE 10 MG: 10 TABLET ORAL at 11:06

## 2022-06-27 RX ADMIN — FLUCONAZOLE 200 MG: 200 TABLET ORAL at 08:06

## 2022-06-27 RX ADMIN — AMOXICILLIN AND CLAVULANATE POTASSIUM 1 TABLET: 875; 125 TABLET, FILM COATED ORAL at 08:06

## 2022-06-27 RX ADMIN — HYDRALAZINE HYDROCHLORIDE 10 MG: 20 INJECTION, SOLUTION INTRAMUSCULAR; INTRAVENOUS at 12:06

## 2022-06-27 RX ADMIN — HEPARIN SODIUM 5000 UNITS: 5000 INJECTION INTRAVENOUS; SUBCUTANEOUS at 06:06

## 2022-06-27 RX ADMIN — METHOCARBAMOL 500 MG: 500 TABLET ORAL at 12:06

## 2022-06-27 RX ADMIN — METHOCARBAMOL 500 MG: 500 TABLET ORAL at 04:06

## 2022-06-27 RX ADMIN — INSULIN ASPART 6 UNITS: 100 INJECTION, SOLUTION INTRAVENOUS; SUBCUTANEOUS at 05:06

## 2022-06-27 RX ADMIN — AMPICILLIN AND SULBACTAM 3 G: 2; 1 INJECTION, POWDER, FOR SOLUTION INTRAMUSCULAR; INTRAVENOUS at 10:06

## 2022-06-27 RX ADMIN — POTASSIUM CHLORIDE 40 MEQ: 1500 TABLET, EXTENDED RELEASE ORAL at 11:06

## 2022-06-27 RX ADMIN — HEPARIN SODIUM 5000 UNITS: 5000 INJECTION INTRAVENOUS; SUBCUTANEOUS at 01:06

## 2022-06-27 RX ADMIN — HEPARIN SODIUM 5000 UNITS: 5000 INJECTION INTRAVENOUS; SUBCUTANEOUS at 08:06

## 2022-06-27 RX ADMIN — INSULIN ASPART 4 UNITS: 100 INJECTION, SOLUTION INTRAVENOUS; SUBCUTANEOUS at 12:06

## 2022-06-27 RX ADMIN — FAMOTIDINE 20 MG: 20 TABLET ORAL at 08:06

## 2022-06-27 RX ADMIN — AMPICILLIN AND SULBACTAM 3 G: 2; 1 INJECTION, POWDER, FOR SOLUTION INTRAMUSCULAR; INTRAVENOUS at 02:06

## 2022-06-27 RX ADMIN — DOCUSATE SODIUM 50 MG: 50 CAPSULE, LIQUID FILLED ORAL at 08:06

## 2022-06-27 RX ADMIN — PREDNISONE 20 MG: 20 TABLET ORAL at 08:06

## 2022-06-27 NOTE — ASSESSMENT & PLAN NOTE
-S/P s/p Living transplant on 6/21/2022 Liver for Primary Liver Malignancy: Cholangiocarcinoma CMV -/+6/22:  -Bile leakon abx (Vanc/cefepime/fluc)    -Returned to OR on 6/23 for Bile leak. ENTEROCOCCUS FAECALIS from OR cultures. Changed ABX to Unasyn  - 2 drains will stay for a while d/t bile leak  - de escalate abx to Augmentin PO 6/27/22  - reviewed POD#6 Liver US, Increased IVC velocity at anastomosis. Cont to monitor.

## 2022-06-27 NOTE — PROGRESS NOTES
AAOx4. Liver U/S done today  - shows increased velocities at the IVC anastomosis and small fluid collection. Unasyn IVPB given - now d/c - switch to PO Augmentin. K 3.4 on AM labs - PO replacement given. BS 96 @ 0800 - transitional insulin gtt stopped per MAR. Moderate SSI initiated. SBP in 160s - PRN hydralazine given IVP - BP 140s systolic on recheck. 16G R Hand PIV removed d/t being  - new 20G R FA placed for PIV access by PICC team. HR sustaining in the 120s-130s - T. RADHA Arenas notified and aware - ordered PO coreg - HR now 100s-110s at rest. Patient up ad pradeep - ambulates independently in room and bathroom. 2 RLQ SHRUTHI drains w/ SS drainage noted - see flowsheet for exact output amount. Chevron incision DEBBIE/dermabond - cleaned w/ soap+water. PRN oxy 10mg given x1 for pain. Non-skid socks on. Bed in low position. Call light within reach. Plan for possible d/c tomorrow .

## 2022-06-27 NOTE — PROGRESS NOTES
EDUCATION NOTE:    Met with Romeo Larson and his caregivers to provide teaching re: immunosuppressant medications.  Reviewed medication section of the Liver Transplant Education book that was provided.  Emphasized the importance of compliance, role of the blue medication card, concerns for drug interactions, and process of obtaining refills.  Counseled regarding Prograf, Cellcept , prednisone, including directions for use, monitoring, how to handle missed doses, and side effects.  Mr. Larson and his wife verbalized understanding and had the opportunity to ask questions.

## 2022-06-27 NOTE — PROGRESS NOTES
Met with patient and his wife for  discharge teaching.  Reviewed My New Journey: Living Smart After My Liver Transplant.  Sections reviewed were: First Steps, Appointments and Prevention.  Medication section will be reviewed by Pharm D.  Allowed time for questions and answers.  Asked patient to complete the review questions in the discharge book.

## 2022-06-27 NOTE — SUBJECTIVE & OBJECTIVE
"Interval HPI:   Overnight events: No acute events overnight. Patient on the SICU in room 35748/59719 A. Blood glucose stable. BG at goal on current insulin regimen (Transition Insulin Drip). Steroid use- Methylprednisolone  80 mg. 4 Days Post-Op  Renal function- Normal   Vasopressors-  None       Diet Adult Regular (IDDSI Level 7) [facility]     Eatin%  Nausea: No  Hypoglycemia and intervention: No  Fever: No  TPN and/or TF: No      BP (!) 177/97 (BP Location: Left arm, Patient Position: Sitting)   Pulse 102   Temp 98.4 °F (36.9 °C) (Oral)   Resp 18   Ht 5' 8" (1.727 m)   Wt 85.4 kg (188 lb 4.4 oz)   SpO2 96%   BMI 28.63 kg/m²     Labs Reviewed and Include    Recent Labs   Lab 22  0638   GLU 98   CALCIUM 8.7   ALBUMIN 3.1*   PROT 5.4*      K 3.4*   CO2 30*   CL 99   BUN 24*   CREATININE 0.7   ALKPHOS 133   *   *   BILITOT 2.7*       Lab Results   Component Value Date    WBC 14.96 (H) 2022    HGB 10.5 (L) 2022    HCT 30.0 (L) 2022    MCV 95 2022     (L) 2022     No results for input(s): TSH, FREET4 in the last 168 hours.  Lab Results   Component Value Date    HGBA1C 5.8 (H) 2022       Nutritional status:   Body mass index is 28.63 kg/m².  Lab Results   Component Value Date    ALBUMIN 3.1 (L) 2022    ALBUMIN 2.7 (L) 2022    ALBUMIN 2.8 (L) 2022     No results found for: PREALBUMIN    Estimated Creatinine Clearance: 144.7 mL/min (based on SCr of 0.7 mg/dL).    Accu-Checks  Recent Labs     22  0627 22  1039 22  1400 22  1746 22  2156 22  0259 22  0629 22  0851 22  0933 22  1206   POCTGLUCOSE 149* 118* 147* 241* 177* 112* 107 96 101 206*         Current Medications and/or Treatments Impacting Glycemic Control  Immunotherapy:    Immunosuppressants           Stop Route Frequency     mycophenolate capsule 1,000 mg         -- Oral 2 times daily      " "    Steroids:   Hormones (From admission, onward)                Start     Stop Route Frequency Ordered    06/27/22 0900  predniSONE tablet 20 mg  (methylprednisolone taper panel)        "Followed by" Linked Group Details    -- Oral Daily 06/22/22 0014          Pressors:    Autonomic Drugs (From admission, onward)                None          Hyperglycemia/Diabetes Medications:   Antihyperglycemics (From admission, onward)                Start     Stop Route Frequency Ordered    06/27/22 1303  insulin aspart U-100 pen 1-10 Units         -- SubQ Before meals & nightly PRN 06/27/22 1204        Scheduled Meds:   amoxicillin-clavulanate 875-125mg  1 tablet Oral Q12H    docusate sodium  50 mg Oral Daily    famotidine  20 mg Oral QHS    fluconazole  200 mg Oral Daily    heparin (porcine)  5,000 Units Subcutaneous Q8H    methocarbamoL  500 mg Oral QID    mycophenolate  1,000 mg Oral BID    predniSONE  20 mg Oral Daily    [START ON 6/28/2022] sulfamethoxazole-trimethoprim 400-80mg  1 tablet Oral Daily AM    [START ON 7/1/2022] valGANciclovir  450 mg Oral Daily     Continuous Infusions:      PRN Meds:sodium chloride 0.9%, baclofen, dextrose 10%, dextrose 10%, glucagon (human recombinant), glucose, glucose, hydrALAZINE, insulin aspart U-100, oxyCODONE, oxyCODONE, sodium chloride 0.9%    Review of Systems   Constitutional:  Positive for activity change and appetite change (improving).   Eyes:  Negative for visual disturbance.   Respiratory:  Negative for cough and shortness of breath.    Cardiovascular:  Negative for chest pain, palpitations and leg swelling.   Gastrointestinal:  Positive for abdominal distention and abdominal pain. Negative for constipation, diarrhea, nausea and vomiting.   Genitourinary:  Negative for difficulty urinating.   Musculoskeletal:  Negative for arthralgias.   Skin:  Positive for wound.   Allergic/Immunologic: Positive for immunocompromised state.   Neurological:  Negative for dizziness. "   Hematological:  Negative for adenopathy. Does not bruise/bleed easily.   Psychiatric/Behavioral:  Negative for agitation.    Objective:     Vital Signs (Most Recent):  Temp: 98.4 °F (36.9 °C) (06/27/22 1157)  Pulse: 102 (06/27/22 1157)  Resp: 18 (06/27/22 1157)  BP: (!) 177/97 (06/27/22 1157)  SpO2: 96 % (06/27/22 1157)   Vital Signs (24h Range):  Temp:  [97.9 °F (36.6 °C)-98.4 °F (36.9 °C)] 98.4 °F (36.9 °C)  Pulse:  [] 102  Resp:  [14-21] 18  SpO2:  [95 %-99 %] 96 %  BP: (130-177)/() 177/97     Weight: 85.4 kg (188 lb 4.4 oz)  Body mass index is 28.63 kg/m².    Intake/Output - Last 3 Shifts         06/25 0700 06/26 0659 06/26 0700 06/27 0659 06/27 0700 06/28 0659    P.O. 700 70 120    I.V. (mL/kg)       IV Piggyback       Total Intake(mL/kg) 700 (8) 70 (0.8) 120 (1.4)    Urine (mL/kg/hr) 0 (0) 0 (0)     Emesis/NG output       Drains 320 300 150    Other       Stool  0     Blood       Total Output 320 300 150    Net +380 -230 -30           Urine Occurrence 4 x 3 x     Stool Occurrence  1 x             Physical Exam  Vitals and nursing note reviewed.   Constitutional:       Appearance: Normal appearance. He is well-developed.   HENT:      Head: Normocephalic.   Eyes:      General: No scleral icterus.     Conjunctiva/sclera: Conjunctivae normal.   Cardiovascular:      Rate and Rhythm: Normal rate and regular rhythm.      Heart sounds: No murmur heard.  Pulmonary:      Effort: Pulmonary effort is normal.      Breath sounds: Normal breath sounds.   Abdominal:      General: Abdomen is flat. Bowel sounds are normal. There is no distension.      Palpations: Abdomen is soft.      Tenderness: There is no abdominal tenderness.       Musculoskeletal:         General: No swelling. Normal range of motion.      Cervical back: Normal range of motion.   Skin:     General: Skin is warm and dry.      Capillary Refill: Capillary refill takes 2 to 3 seconds.   Neurological:      General: No focal deficit present.       Mental Status: He is alert and oriented to person, place, and time.   Psychiatric:         Mood and Affect: Mood normal.         Behavior: Behavior normal.         Thought Content: Thought content normal.         Judgment: Judgment normal.       Laboratory:  Immunosuppressants           Stop Route Frequency     mycophenolate capsule 1,000 mg         -- Oral 2 times daily          CBC:   Recent Labs   Lab 06/27/22  0638   WBC 14.96*   RBC 3.16*   HGB 10.5*   HCT 30.0*   *   MCV 95   MCH 33.2*   MCHC 35.0       BMP:   Recent Labs   Lab 06/27/22  0638   GLU 98      K 3.4*   CL 99   CO2 30*   BUN 24*   CREATININE 0.7   CALCIUM 8.7       Coagulation:   Recent Labs   Lab 06/27/22  0638   INR 1.1   APTT 23.6       Labs within the past 24 hours have been reviewed.    Diagnostic Results:  Liver US Liver Transplant Post 6/27/22:     Narrative  FINDINGS:  Patient is status post orthotopic liver transplant of the right hepatic lobe on 06/21/2022.  The patient returned to the OR on 06/23/2022 for bile duct repair.     The liver demonstrates homogeneous echotexture.  No focal hepatic lesions are seen.     Small fluid collection posterior to the allograft measuring 2.1 x 1.7 x 2.0 cm.  Partially imaged surgical drain adjacent to the allograft.  Right pleural effusion.     The common duct is not dilated, measuring 4 mm.  No dilated intrahepatic radicles are seen.     Color flow and spectral waveform analysis was performed.     Main portal vein, anterior branch of the right portal vein, and posterior branch of the right portal vein are patent.     Middle hepatic vein velocity measures 17 cm/s (previously 12 cm/s).     Right hepatic vein velocity measures 36 cm/s (previously 48 cm/s).     IVC piggyback velocity measures 196 cm/s (previously 62 cm/s).     Visualized main hepatic artery velocity measures 82 cm/s (previously 147 cm/s). Extrahepatic main hepatic artery is obscured by overlying bowel gas.     Main hepatic  artery resistive index measures 0.69 (previously 0.63) with normal waveform.     Right hepatic artery anterior branch resistive index measures 0.59 (previously 0.63), with normal waveform.     Right hepatic artery posterior branch resistive index measures 0.61 (previously 0.67), with normal waveform.     Impression:     Elevated velocities at the IVC anastomosis.  Attention on follow-up imaging is recommended.     Otherwise satisfactory Doppler evaluation of the liver allograft, noting that the extrahepatic MHA was obscured by bowel gas.     Small peritransplant fluid collection and right pleural effusion.     This report was flagged in Epic as abnormal.      EXAMINATION:  US DOPPLER LIVER TRANSPLANT POST (XPD)    CLINICAL HISTORY:  assess flow; Liver transplant status    TECHNIQUE:  Limited abdominal ultrasound of the transplant liver with Doppler evaluation.  Color and spectral Doppler were performed.    COMPARISON:  Transplant liver ultrasound 06/22/2022.  CT abdomen pelvis 06/19/2022.    FINDINGS:  Patient is status post orthotopic liver transplanton 06/21/2022.  Liver allograft is normal in size.  The liver demonstrates homogeneous echotexture.  No focal hepatic lesions are seen.  No fluid collections.    The common duct is not dilated, measuring 4 mm.  No dilated intrahepatic radicles are seen.    Color flow and spectral waveform analysis was performed.  The main portal vein, right portal vein, middle hepatic vein, right hepatic vein, and IVC are patent with proper directional flow.    Anastomosis site of the main hepatic artery demonstrates a peak systolic velocity 117 (previously 247) cm/sec.    Main hepatic artery demonstrates resistive index 0.63 (previously 0.60) with normal waveform.    Anterior branch of the right hepatic artery demonstrates resistive index 0.63 (previously 0.47) with normal waveform.    Posterior branch of the right hepatic artery demonstrates resistive index 0.67 (previously 0.60)  with normal waveform.    Impression  Satisfactory Doppler evaluation of liver transplant noting improvement of arterial resistive index in the anterior branch of the right hepatic artery.    Previously described hyperechoic area in the medial aspect of the allograft is not seen on this exam.    Electronically signed by resident: Teetee Diallo  Date:    06/23/2022  Time:    08:11    Electronically signed by: Coy Mena  Date:    06/23/2022  Time:    08:47    Debility/Functional status: Patient debilitated by evidence of Weakness. Physical and occupational therapy ordered daily to evaluate and treat. Debility was: present on admission.

## 2022-06-27 NOTE — PLAN OF CARE
Patient AAO X 4, VSS.Gave PRN for pain.   Visi and Telemonitor in place NS. Chevron with dermabond. 2 Right SHRUTHI putting out serosanguinous drainage. Insulin gtt 0.9cc/hr. Blood cx positive for enterococcus. IVPB ampicillin. SANTIAGO planned for the day. Medications pulled 100%. Up ambulatory, bed locked at lowest setting, yellow socks on, call bell in reach. Remains free from falls.

## 2022-06-27 NOTE — ASSESSMENT & PLAN NOTE
- Chronic Prophylactic Immunosuppression- cont to check prograf level daily.  Assess for toxicity and adjust level as needed  - cont full dose MMF

## 2022-06-27 NOTE — ANESTHESIA POSTPROCEDURE EVALUATION
Anesthesia Post Evaluation    Patient: Romeo Larson    Procedure(s) Performed: Procedure(s) (LRB):  REPAIR, BILE DUCT (N/A)  LAPAROTOMY, EXPLORATORY, AFTER LIVER TRANSPLANT (N/A)    Final Anesthesia Type: general      Patient location during evaluation: PACU  Patient participation: Yes- Able to Participate  Level of consciousness: awake and alert  Post-procedure vital signs: reviewed and stable  Pain management: adequate  Airway patency: patent    PONV status at discharge: No PONV  Anesthetic complications: no      Cardiovascular status: blood pressure returned to baseline  Respiratory status: unassisted  Hydration status: euvolemic  Follow-up not needed.          Vitals Value Taken Time   /112 06/27/22 0030   Temp 36.9 °C (98.4 °F) 06/26/22 1930   Pulse 83 06/27/22 0620   Resp 28 06/27/22 0031   SpO2 98 % 06/27/22 0620   Vitals shown include unvalidated device data.      No case tracking events are documented in the log.      Pain/Moe Score: Pain Rating Prior to Med Admin: 2 (6/26/2022 11:25 PM)

## 2022-06-27 NOTE — RESPIRATORY THERAPY
RAPID RESPONSE RESPIRATORY CHART CHECK       Chart check completed.  Please call [phone] for further concerns or assistance.

## 2022-06-27 NOTE — PROGRESS NOTES
"[facility] - [facility]  Liver Transplant  Progress Note    Patient Name: Romeo Larson  MRN: [id number]  Admission Date: 6/18/2022  Hospital Length of Stay: 9 days  Code Status: Full Code  Primary Care Provider: Pravin Maria MD  Post-Operative Day: 6    ORGAN:   RIGHT LIVER LOBE (SEGS 5,6,7,8) WITHOUT MIDDLE HEPATIC VEIN  Disease Etiology: Primary Liver Malignancy: Cholangiocarcinoma (CH-CA)  Donor Type:   Living  CDC High Risk:     Donor CMV Status:   Donor CMV Status:   Donor HBcAB:     Donor HCV Status:     Donor HBV GUSTABO:   Donor HCV GUSTABO:   Whole or Partial:   Biliary Anastomosis: Nina-en-Y  Arterial Anatomy: Standard  Subjective:     History of Present Illness:  HPI: Romeo Larson is a 41 y.o. male who presents to the emergency department at OSH w c/o complaint of fevers. Pt is s/p exploratory lap 6/17/22 to assess liver for tentative living liver transplant on 6/21/22 for cholangiocarcinoma. Last chemo  5/10/22. Patient reports "normally" gets chills after having anesthesia. Chills, described as rigors, started ~3am this morning. Patient toke temp at 7am that read 103°.  T max at OSH 38.2. Pt denies n/v/diarrhea. Lap sites CDI. Lactic acid at OSH was 5.4. He received IV fluids and dose of Vancomycin. Plan to transfer to [facility] for further eval w cultures, CT A/P WO, cont IV fluids and broad spectrum abx. POC reviewed w Dr Godwin.    Patient is vaccinated against COVID-19 and received booster in December.  COVID at OSH 6/18 neg. Did recently get his tetanus and shingles shot in preparation for the surgery.    Hospital Course:  S/P s/p Living transplant on 6/21/2022 Liver for Primary Liver Malignancy: Cholangiocarcinoma CMV -/+6/22: Extubated, but there is a fear of a bile leak, so patient to remain on abx (Vanc/cefepime/fluc)  Returned to OR on 6/23 for Bile leak. Stepdown toTSU on 6/24    Hospital Interval: No acute events overnight. Pt cont to progress well. LFTs cont to slowly trended down. POD#6 " Liver US reviewed per surgeon, increased velocity at IVC anastomosis noted, fluid collection posterioir to allograft 2.1x1.7x2.1 noted. Pt has 2 SHRUTHI drains in placed, medial with 520cc serosang drainage and lateral w 133cc serosang drainage. Keep charged and cont to monitor. [employer] closed w dermabond, no drainage noted, no SSI.  Pain well managed w current regimen. Pt tolerating diet, appetite slowly improving, passing flatus and had BM. Ambulating w/o issues. Cont IS and OOB.  VSS.  Cultures from OR with ENTEROCOCCUS FAECALIS- based on sensitives now on Unasyn IV de escalate to Augmentin PO 6/27/22 until drains removed. Continue to monitor.          Scheduled Meds:   amoxicillin-clavulanate 875-125mg  1 tablet Oral Q12H    docusate sodium  50 mg Oral Daily    famotidine  20 mg Oral QHS    fluconazole  200 mg Oral Daily    heparin (porcine)  5,000 Units Subcutaneous Q8H    methocarbamoL  500 mg Oral QID    mycophenolate  1,000 mg Oral BID    predniSONE  20 mg Oral Daily    [START ON 6/28/2022] sulfamethoxazole-trimethoprim 400-80mg  1 tablet Oral Daily AM    [START ON 7/1/2022] valGANciclovir  450 mg Oral Daily     Continuous Infusions:      PRN Meds:sodium chloride 0.9%, baclofen, dextrose 10%, dextrose 10%, glucagon (human recombinant), glucose, glucose, hydrALAZINE, insulin aspart U-100, oxyCODONE, oxyCODONE, sodium chloride 0.9%    Review of Systems   Constitutional:  Positive for activity change and appetite change (improving).   Eyes:  Negative for visual disturbance.   Respiratory:  Negative for cough and shortness of breath.    Cardiovascular:  Negative for chest pain, palpitations and leg swelling.   Gastrointestinal:  Positive for abdominal distention and abdominal pain. Negative for constipation, diarrhea, nausea and vomiting.   Genitourinary:  Negative for difficulty urinating.   Musculoskeletal:  Negative for arthralgias.   Skin:  Positive for wound.   Allergic/Immunologic: Positive for  immunocompromised state.   Neurological:  Negative for dizziness.   Hematological:  Negative for adenopathy. Does not bruise/bleed easily.   Psychiatric/Behavioral:  Negative for agitation.    Objective:     Vital Signs (Most Recent):  Temp: 98.4 °F (36.9 °C) (06/27/22 1157)  Pulse: 102 (06/27/22 1157)  Resp: 18 (06/27/22 1157)  BP: (!) 177/97 (06/27/22 1157)  SpO2: 96 % (06/27/22 1157)   Vital Signs (24h Range):  Temp:  [97.9 °F (36.6 °C)-98.4 °F (36.9 °C)] 98.4 °F (36.9 °C)  Pulse:  [] 102  Resp:  [14-21] 18  SpO2:  [95 %-99 %] 96 %  BP: (130-177)/() 177/97     Weight: 85.4 kg (188 lb 4.4 oz)  Body mass index is 28.63 kg/m².    Intake/Output - Last 3 Shifts         06/25 0700  06/26 0659 06/26 0700  06/27 0659 06/27 0700  06/28 0659    P.O. 700 70 120    I.V. (mL/kg)       IV Piggyback       Total Intake(mL/kg) 700 (8) 70 (0.8) 120 (1.4)    Urine (mL/kg/hr) 0 (0) 0 (0)     Emesis/NG output       Drains 320 300 150    Other       Stool  0     Blood       Total Output 320 300 150    Net +380 -230 -30           Urine Occurrence 4 x 3 x     Stool Occurrence  1 x             Physical Exam  Vitals and nursing note reviewed.   Constitutional:       Appearance: Normal appearance. He is well-developed.   HENT:      Head: Normocephalic.   Eyes:      General: No scleral icterus.     Conjunctiva/sclera: Conjunctivae normal.   Cardiovascular:      Rate and Rhythm: Normal rate and regular rhythm.      Heart sounds: No murmur heard.  Pulmonary:      Effort: Pulmonary effort is normal.      Breath sounds: Normal breath sounds.   Abdominal:      General: Abdomen is flat. Bowel sounds are normal. There is no distension.      Palpations: Abdomen is soft.      Tenderness: There is no abdominal tenderness.       Musculoskeletal:         General: No swelling. Normal range of motion.      Cervical back: Normal range of motion.   Skin:     General: Skin is warm and dry.      Capillary Refill: Capillary refill takes 2 to 3  seconds.   Neurological:      General: No focal deficit present.      Mental Status: He is alert and oriented to person, place, and time.   Psychiatric:         Mood and Affect: Mood normal.         Behavior: Behavior normal.         Thought Content: Thought content normal.         Judgment: Judgment normal.       Laboratory:  Immunosuppressants           Stop Route Frequency     mycophenolate capsule 1,000 mg         -- Oral 2 times daily          CBC:   Recent Labs   Lab 06/27/22  0638   WBC 14.96*   RBC 3.16*   HGB 10.5*   HCT 30.0*   *   MCV 95   MCH 33.2*   MCHC 35.0       BMP:   Recent Labs   Lab 06/27/22  0638   GLU 98      K 3.4*   CL 99   CO2 30*   BUN 24*   CREATININE 0.7   CALCIUM 8.7       Coagulation:   Recent Labs   Lab 06/27/22  0638   INR 1.1   APTT 23.6       Labs within the past 24 hours have been reviewed.    Diagnostic Results:  Liver US Liver Transplant Post 6/27/22:     Narrative  FINDINGS:  Patient is status post orthotopic liver transplant of the right hepatic lobe on 06/21/2022.  The patient returned to the OR on 06/23/2022 for bile duct repair.     The liver demonstrates homogeneous echotexture.  No focal hepatic lesions are seen.     Small fluid collection posterior to the allograft measuring 2.1 x 1.7 x 2.0 cm.  Partially imaged surgical drain adjacent to the allograft.  Right pleural effusion.     The common duct is not dilated, measuring 4 mm.  No dilated intrahepatic radicles are seen.     Color flow and spectral waveform analysis was performed.     Main portal vein, anterior branch of the right portal vein, and posterior branch of the right portal vein are patent.     Middle hepatic vein velocity measures 17 cm/s (previously 12 cm/s).     Right hepatic vein velocity measures 36 cm/s (previously 48 cm/s).     IVC piggyback velocity measures 196 cm/s (previously 62 cm/s).     Visualized main hepatic artery velocity measures 82 cm/s (previously 147 cm/s). Extrahepatic main  hepatic artery is obscured by overlying bowel gas.     Main hepatic artery resistive index measures 0.69 (previously 0.63) with normal waveform.     Right hepatic artery anterior branch resistive index measures 0.59 (previously 0.63), with normal waveform.     Right hepatic artery posterior branch resistive index measures 0.61 (previously 0.67), with normal waveform.     Impression:     Elevated velocities at the IVC anastomosis.  Attention on follow-up imaging is recommended.     Otherwise satisfactory Doppler evaluation of the liver allograft, noting that the extrahepatic MHA was obscured by bowel gas.     Small peritransplant fluid collection and right pleural effusion.     This report was flagged in Epic as abnormal.      EXAMINATION:  US DOPPLER LIVER TRANSPLANT POST (XPD)    CLINICAL HISTORY:  assess flow; Liver transplant status    TECHNIQUE:  Limited abdominal ultrasound of the transplant liver with Doppler evaluation.  Color and spectral Doppler were performed.    COMPARISON:  Transplant liver ultrasound 06/22/2022.  CT abdomen pelvis 06/19/2022.    FINDINGS:  Patient is status post orthotopic liver transplanton 06/21/2022.  Liver allograft is normal in size.  The liver demonstrates homogeneous echotexture.  No focal hepatic lesions are seen.  No fluid collections.    The common duct is not dilated, measuring 4 mm.  No dilated intrahepatic radicles are seen.    Color flow and spectral waveform analysis was performed.  The main portal vein, right portal vein, middle hepatic vein, right hepatic vein, and IVC are patent with proper directional flow.    Anastomosis site of the main hepatic artery demonstrates a peak systolic velocity 117 (previously 247) cm/sec.    Main hepatic artery demonstrates resistive index 0.63 (previously 0.60) with normal waveform.    Anterior branch of the right hepatic artery demonstrates resistive index 0.63 (previously 0.47) with normal waveform.    Posterior branch of the right  hepatic artery demonstrates resistive index 0.67 (previously 0.60) with normal waveform.    Impression  Satisfactory Doppler evaluation of liver transplant noting improvement of arterial resistive index in the anterior branch of the right hepatic artery.    Previously described hyperechoic area in the medial aspect of the allograft is not seen on this exam.    Electronically signed by resident: Teetee Diallo  Date:    06/23/2022  Time:    08:11    Electronically signed by: Coy Mena  Date:    06/23/2022  Time:    08:47    Debility/Functional status: Patient debilitated by evidence of Weakness. Physical and occupational therapy ordered daily to evaluate and treat. Debility was: present on admission.    Assessment/Plan:     * Status post liver transplantation  -S/P s/p Living transplant on 6/21/2022 Liver for Primary Liver Malignancy: Cholangiocarcinoma CMV -/+6/22:  -Bile leakon abx (Vanc/cefepime/fluc)    -Returned to OR on 6/23 for Bile leak. ENTEROCOCCUS FAECALIS from OR cultures. Changed ABX to Unasyn  - 2 drains will stay for a while d/t bile leak  - de escalate abx to Augmentin PO 6/27/22  - reviewed POD#6 Liver US, Increased IVC velocity at anastomosis. Cont to monitor.      Long-term use of immunosuppressant medication  - see prophylactic immunotherapy      Adrenal corticosteroid causing adverse effect in therapeutic use  - endo consulted and following, apprec recs      Type 2 diabetes mellitus with hyperglycemia  - Insulin gtt, appreciate ENDO rec's       At risk for opportunistic infections  - Bactrim for PCP ppx.  - Valcyte for CMV ppx.      Prophylactic immunotherapy  - Chronic Prophylactic Immunosuppression- cont to check prograf level daily.  Assess for toxicity and adjust level as needed  - cont full dose MMF      Tachycardia  - improved w fluids  - cont tele    Hilar cholangiocarcinoma  - s/p exploratory lap 6/17 to assess liver for cholangio in preparation for living liver txp scheduled  6/21/22   - developed chills overnight and fever 103 this am, presented to OSH for tx  - lactic acid was 5.4  - cont IV fluids  - cont broad spectrum abx (started Vanc and cefepime at OSH)  - cx w NGTD  - treating as presumed cholangitis s/p procedure  - trend lactic acid- down to 1 6/19, wbc normalized  - Liver Transplant 6/21/2022            VTE Risk Mitigation (From admission, onward)         Ordered     heparin (porcine) injection 5,000 Units  Every 8 hours         06/22/22 0014     IP VTE HIGH RISK PATIENT  Once         06/22/22 0014                The patients clinical status was discussed at multidisplinary rounds, involving transplant surgery, transplant medicine, pharmacy, nursing, nutrition, and social work    Discharge Planning:  Discussed plan of care.  No plan for discharge today. Reassess d/c tomorrow.      Abida Arenas, NP  Liver Transplant  [facility] - [facility]

## 2022-06-27 NOTE — PROGRESS NOTES
[facility] - Transplant [facility]  Endocrinology  Progress Note    Admit Date: 6/18/2022     Reason for Consult: Management of T2DM vs Steroid Induced Hyperglycemia     Surgical Procedure and Date: Liver Transplant 6/21/2022    Diabetes diagnosis year: 2022    Home Diabetes Medications: Metformin 500 mg BID- While on Chemo     How often checking glucose at home?  RACHEL    BG readings on regimen: RACHEL  Hypoglycemia on the regimen?  RACHEL  Missed doses on regimen?  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 He was brought to the SICU postoperatively for further monitoring and care. He arrived sedated, intubated, and off vasopressor/inotropic support. His immediate arrival was significant for a CXR demonstrating the ETT tip in the cervical neck. Anesthesia presented to the bedside immediately to evaluate and found the ETT tip at the cords and the balloon above the cords. The tube was advanced into appropriate position without issue. Endocrine consulted to manage type 2 diabetes and hyperglycemia.     Hemoglobin A1C   Date Value Ref Range Status   06/20/2022 5.8 (H) 4.0 - 5.6 % Final     Comment:     ADA Screening Guidelines:  5.7-6.4%  Consistent with prediabetes  >or=6.5%  Consistent with diabetes    High levels of fetal hemoglobin interfere with the HbA1C  assay. Heterozygous hemoglobin variants (HbS, HgC, etc)do  not significantly interfere with this assay.   However, presence of multiple variants may affect accuracy.     03/29/2022 6.8 (H) 4.0 - 5.6 % Final     Comment:     ADA Screening Guidelines:  5.7-6.4%  Consistent with prediabetes  >or=6.5%  Consistent with diabetes    High levels of fetal hemoglobin interfere with the HbA1C  assay. Heterozygous hemoglobin variants  "(HbS, HgC, etc)do  not significantly interfere with this assay.   However, presence of multiple variants may affect accuracy.                 Interval HPI:   Overnight events: No acute events overnight. Patient on the TSU in room 41896/93530 A. Blood glucose stable. BG at goal on current insulin regimen (Transition Insulin Drip). Steroid use- Prednisone 20 mg. 4 Days Post-Op  Renal function- Normal   Vasopressors-  None       Diet Adult Regular (IDDSI Level 7) [facility]     Eatin%  Nausea: No  Hypoglycemia and intervention: No  Fever: No  TPN and/or TF: No      BP (!) 177/97 (BP Location: Left arm, Patient Position: Sitting)   Pulse 102   Temp 98.4 °F (36.9 °C) (Oral)   Resp 18   Ht 5' 8" (1.727 m)   Wt 85.4 kg (188 lb 4.4 oz)   SpO2 96%   BMI 28.63 kg/m²     Labs Reviewed and Include    Recent Labs   Lab 22  0638   GLU 98   CALCIUM 8.7   ALBUMIN 3.1*   PROT 5.4*      K 3.4*   CO2 30*   CL 99   BUN 24*   CREATININE 0.7   ALKPHOS 133   *   *   BILITOT 2.7*     Lab Results   Component Value Date    WBC 14.96 (H) 2022    HGB 10.5 (L) 2022    HCT 30.0 (L) 2022    MCV 95 2022     (L) 2022     No results for input(s): TSH, FREET4 in the last 168 hours.  Lab Results   Component Value Date    HGBA1C 5.8 (H) 2022       Nutritional status:   Body mass index is 28.63 kg/m².  Lab Results   Component Value Date    ALBUMIN 3.1 (L) 2022    ALBUMIN 2.7 (L) 2022    ALBUMIN 2.8 (L) 2022     No results found for: PREALBUMIN    Estimated Creatinine Clearance: 144.7 mL/min (based on SCr of 0.7 mg/dL).    Accu-Checks  Recent Labs     22  2228 22  0240 22  0627 22  1039 22  1400 22  1746 22  2156 22  0259 22  0629 22  0851   POCTGLUCOSE 155* 143* 149* 118* 147* 241* 177* 112* 107 96       Current Medications and/or Treatments Impacting Glycemic Control  Immunotherapy:  " "  Immunosuppressants           Stop Route Frequency     mycophenolate capsule 1,000 mg         -- Oral 2 times daily          Steroids:   Hormones (From admission, onward)                Start     Stop Route Frequency Ordered    06/27/22 0900  predniSONE tablet 20 mg  (methylprednisolone taper panel)        "Followed by" Linked Group Details    -- Oral Daily 06/22/22 0014          Pressors:    Autonomic Drugs (From admission, onward)                None          Hyperglycemia/Diabetes Medications:   Antihyperglycemics (From admission, onward)                Start     Stop Route Frequency Ordered    06/27/22 1303  insulin aspart U-100 pen 1-10 Units         -- SubQ Before meals & nightly PRN 06/27/22 1204            ASSESSMENT and PLAN    * S/P liver transplant  Optimize BG control to improve wound healing        Type 2 diabetes mellitus with hyperglycemia  Endocrinology consulted for BG management.   BG goal 140-180    - D/C Transition drip  - Novolog (aspart) insulin [facility] (150/25) SSI  Units SQ prn for BG excursions   - BG checks AC/HS  - Hypoglycemia protocol in place    ** Please notify Endocrine for any change and/or advance in diet**  ** Please call Endocrine for any BG related issues **    Discharge Planning:   TBD. Please notify endocrinology prior to discharge.          Hilar cholangiocarcinoma  Managed per primary team  Avoid hypoglycemia        Adrenal corticosteroid causing adverse effect in therapeutic use  On steroid therapy per transplant team; may elevate BG readings             Brad Singleton, DNP, FNP  Endocrinology  [facility]  "

## 2022-06-27 NOTE — SUBJECTIVE & OBJECTIVE
"Interval HPI:   Overnight events: No acute events overnight. Patient on the TSU in room 31249/55237 A. Blood glucose stable. BG at goal on current insulin regimen (Transition Insulin Drip). Steroid use- Prednisone 20 mg. 4 Days Post-Op  Renal function- Normal   Vasopressors-  None       Diet Adult Regular (IDDSI Level 7) [facility]     Eatin%  Nausea: No  Hypoglycemia and intervention: No  Fever: No  TPN and/or TF: No      BP (!) 177/97 (BP Location: Left arm, Patient Position: Sitting)   Pulse 102   Temp 98.4 °F (36.9 °C) (Oral)   Resp 18   Ht 5' 8" (1.727 m)   Wt 85.4 kg (188 lb 4.4 oz)   SpO2 96%   BMI 28.63 kg/m²     Labs Reviewed and Include    Recent Labs   Lab 22  0638   GLU 98   CALCIUM 8.7   ALBUMIN 3.1*   PROT 5.4*      K 3.4*   CO2 30*   CL 99   BUN 24*   CREATININE 0.7   ALKPHOS 133   *   *   BILITOT 2.7*     Lab Results   Component Value Date    WBC 14.96 (H) 2022    HGB 10.5 (L) 2022    HCT 30.0 (L) 2022    MCV 95 2022     (L) 2022     No results for input(s): TSH, FREET4 in the last 168 hours.  Lab Results   Component Value Date    HGBA1C 5.8 (H) 2022       Nutritional status:   Body mass index is 28.63 kg/m².  Lab Results   Component Value Date    ALBUMIN 3.1 (L) 2022    ALBUMIN 2.7 (L) 2022    ALBUMIN 2.8 (L) 2022     No results found for: PREALBUMIN    Estimated Creatinine Clearance: 144.7 mL/min (based on SCr of 0.7 mg/dL).    Accu-Checks  Recent Labs     22  2228 22  0240 22  0627 22  1039 22  1400 22  1746 22  2156 22  0259 22  0629 22  0851   POCTGLUCOSE 155* 143* 149* 118* 147* 241* 177* 112* 107 96       Current Medications and/or Treatments Impacting Glycemic Control  Immunotherapy:    Immunosuppressants           Stop Route Frequency     mycophenolate capsule 1,000 mg         -- Oral 2 times daily          Steroids:   Hormones " "(From admission, onward)                Start     Stop Route Frequency Ordered    06/27/22 0900  predniSONE tablet 20 mg  (methylprednisolone taper panel)        "Followed by" Linked Group Details    -- Oral Daily 06/22/22 0014          Pressors:    Autonomic Drugs (From admission, onward)                None          Hyperglycemia/Diabetes Medications:   Antihyperglycemics (From admission, onward)                Start     Stop Route Frequency Ordered    06/27/22 1303  insulin aspart U-100 pen 1-10 Units         -- SubQ Before meals & nightly PRN 06/27/22 1204          "

## 2022-06-27 NOTE — ASSESSMENT & PLAN NOTE
Endocrinology consulted for BG management.   BG goal 140-180    - D/C Transition drip  - Novolog (aspart) insulin [facility] (150/25) SSI  Units SQ prn for BG excursions   - BG checks AC/HS  - Hypoglycemia protocol in place    ** Please notify Endocrine for any change and/or advance in diet**  ** Please call Endocrine for any BG related issues **    Discharge Planning:   TBD. Please notify endocrinology prior to discharge.

## 2022-06-28 VITALS
DIASTOLIC BLOOD PRESSURE: 79 MMHG | RESPIRATION RATE: 18 BRPM | SYSTOLIC BLOOD PRESSURE: 120 MMHG | OXYGEN SATURATION: 95 % | TEMPERATURE: 99 F | BODY MASS INDEX: 28.2 KG/M2 | HEART RATE: 100 BPM | WEIGHT: 186.06 LBS | HEIGHT: 68 IN

## 2022-06-28 DIAGNOSIS — Z94.4 LIVER REPLACED BY TRANSPLANT: Primary | ICD-10-CM

## 2022-06-28 LAB
ALBUMIN SERPL BCP-MCNC: 2.7 G/DL (ref 3.5–5.2)
ALP SERPL-CCNC: 135 U/L (ref 55–135)
ALT SERPL W/O P-5'-P-CCNC: 432 U/L (ref 10–44)
ANION GAP SERPL CALC-SCNC: 5 MMOL/L (ref 8–16)
AST SERPL-CCNC: 100 U/L (ref 10–40)
BASOPHILS # BLD AUTO: 0.02 K/UL (ref 0–0.2)
BASOPHILS NFR BLD: 0.2 % (ref 0–1.9)
BILIRUB FLD-MCNC: 1.7 MG/DL
BILIRUB FLD-MCNC: 2.2 MG/DL
BILIRUB FLD-MCNC: 7.1 MG/DL
BILIRUB FLD-MCNC: 7.5 MG/DL
BILIRUB SERPL-MCNC: 1.9 MG/DL (ref 0.1–1)
BODY FLUID SOURCE, BILIRUBIN: NORMAL
BUN SERPL-MCNC: 21 MG/DL (ref 6–20)
CALCIUM SERPL-MCNC: 8.2 MG/DL (ref 8.7–10.5)
CHLORIDE SERPL-SCNC: 99 MMOL/L (ref 95–110)
CO2 SERPL-SCNC: 29 MMOL/L (ref 23–29)
CREAT SERPL-MCNC: 0.8 MG/DL (ref 0.5–1.4)
DIFFERENTIAL METHOD: ABNORMAL
EOSINOPHIL # BLD AUTO: 0.4 K/UL (ref 0–0.5)
EOSINOPHIL NFR BLD: 3.6 % (ref 0–8)
ERYTHROCYTE [DISTWIDTH] IN BLOOD BY AUTOMATED COUNT: 14.2 % (ref 11.5–14.5)
EST. GFR  (AFRICAN AMERICAN): >60 ML/MIN/1.73 M^2
EST. GFR  (NON AFRICAN AMERICAN): >60 ML/MIN/1.73 M^2
GLUCOSE SERPL-MCNC: 125 MG/DL (ref 70–110)
HCT VFR BLD AUTO: 29.2 % (ref 40–54)
HGB BLD-MCNC: 9.8 G/DL (ref 14–18)
IMM GRANULOCYTES # BLD AUTO: 0.09 K/UL (ref 0–0.04)
IMM GRANULOCYTES NFR BLD AUTO: 0.8 % (ref 0–0.5)
INR PPP: 1.1 (ref 0.8–1.2)
LYMPHOCYTES # BLD AUTO: 0.5 K/UL (ref 1–4.8)
LYMPHOCYTES NFR BLD: 4.5 % (ref 18–48)
MAGNESIUM SERPL-MCNC: 1.4 MG/DL (ref 1.6–2.6)
MCH RBC QN AUTO: 33.4 PG (ref 27–31)
MCHC RBC AUTO-ENTMCNC: 33.6 G/DL (ref 32–36)
MCV RBC AUTO: 100 FL (ref 82–98)
MONOCYTES # BLD AUTO: 0.5 K/UL (ref 0.3–1)
MONOCYTES NFR BLD: 4.6 % (ref 4–15)
NEUTROPHILS # BLD AUTO: 10 K/UL (ref 1.8–7.7)
NEUTROPHILS NFR BLD: 86.3 % (ref 38–73)
NRBC BLD-RTO: 0 /100 WBC
PHOSPHATE SERPL-MCNC: 2.9 MG/DL (ref 2.7–4.5)
PLATELET # BLD AUTO: 108 K/UL (ref 150–450)
PMV BLD AUTO: 10.4 FL (ref 9.2–12.9)
POCT GLUCOSE: 118 MG/DL (ref 70–110)
POCT GLUCOSE: 172 MG/DL (ref 70–110)
POTASSIUM SERPL-SCNC: 4 MMOL/L (ref 3.5–5.1)
PROT SERPL-MCNC: 4.5 G/DL (ref 6–8.4)
PROTHROMBIN TIME: 11.8 SEC (ref 9–12.5)
RBC # BLD AUTO: 2.93 M/UL (ref 4.6–6.2)
SODIUM SERPL-SCNC: 133 MMOL/L (ref 136–145)
TACROLIMUS BLD-MCNC: 9.5 NG/ML (ref 5–15)
WBC # BLD AUTO: 11.53 K/UL (ref 3.9–12.7)

## 2022-06-28 PROCEDURE — 99233 PR SUBSEQUENT HOSPITAL CARE,LEVL III: ICD-10-PCS | Mod: 24,,, | Performed by: NURSE PRACTITIONER

## 2022-06-28 PROCEDURE — 83735 ASSAY OF MAGNESIUM: CPT | Performed by: STUDENT IN AN ORGANIZED HEALTH CARE EDUCATION/TRAINING PROGRAM

## 2022-06-28 PROCEDURE — 25000003 PHARM REV CODE 250: Performed by: STUDENT IN AN ORGANIZED HEALTH CARE EDUCATION/TRAINING PROGRAM

## 2022-06-28 PROCEDURE — 99232 SBSQ HOSP IP/OBS MODERATE 35: CPT | Mod: ,,, | Performed by: NURSE PRACTITIONER

## 2022-06-28 PROCEDURE — 25000003 PHARM REV CODE 250: Performed by: NURSE PRACTITIONER

## 2022-06-28 PROCEDURE — 85025 COMPLETE CBC W/AUTO DIFF WBC: CPT | Performed by: STUDENT IN AN ORGANIZED HEALTH CARE EDUCATION/TRAINING PROGRAM

## 2022-06-28 PROCEDURE — 99233 SBSQ HOSP IP/OBS HIGH 50: CPT | Mod: 24,,, | Performed by: NURSE PRACTITIONER

## 2022-06-28 PROCEDURE — 97116 GAIT TRAINING THERAPY: CPT | Mod: CQ

## 2022-06-28 PROCEDURE — 36415 COLL VENOUS BLD VENIPUNCTURE: CPT | Performed by: STUDENT IN AN ORGANIZED HEALTH CARE EDUCATION/TRAINING PROGRAM

## 2022-06-28 PROCEDURE — 63600175 PHARM REV CODE 636 W HCPCS: Performed by: SURGERY

## 2022-06-28 PROCEDURE — 93010 ELECTROCARDIOGRAM REPORT: CPT | Mod: ,,, | Performed by: INTERNAL MEDICINE

## 2022-06-28 PROCEDURE — 93010 EKG 12-LEAD: ICD-10-PCS | Mod: ,,, | Performed by: INTERNAL MEDICINE

## 2022-06-28 PROCEDURE — 63600175 PHARM REV CODE 636 W HCPCS: Performed by: NURSE PRACTITIONER

## 2022-06-28 PROCEDURE — 82247 BILIRUBIN TOTAL: CPT | Performed by: NURSE PRACTITIONER

## 2022-06-28 PROCEDURE — 85610 PROTHROMBIN TIME: CPT | Performed by: STUDENT IN AN ORGANIZED HEALTH CARE EDUCATION/TRAINING PROGRAM

## 2022-06-28 PROCEDURE — 80197 ASSAY OF TACROLIMUS: CPT | Performed by: STUDENT IN AN ORGANIZED HEALTH CARE EDUCATION/TRAINING PROGRAM

## 2022-06-28 PROCEDURE — 63600175 PHARM REV CODE 636 W HCPCS: Performed by: STUDENT IN AN ORGANIZED HEALTH CARE EDUCATION/TRAINING PROGRAM

## 2022-06-28 PROCEDURE — 80053 COMPREHEN METABOLIC PANEL: CPT | Performed by: STUDENT IN AN ORGANIZED HEALTH CARE EDUCATION/TRAINING PROGRAM

## 2022-06-28 PROCEDURE — 99232 PR SUBSEQUENT HOSPITAL CARE,LEVL II: ICD-10-PCS | Mod: ,,, | Performed by: NURSE PRACTITIONER

## 2022-06-28 PROCEDURE — 93005 ELECTROCARDIOGRAM TRACING: CPT

## 2022-06-28 PROCEDURE — 84100 ASSAY OF PHOSPHORUS: CPT | Performed by: STUDENT IN AN ORGANIZED HEALTH CARE EDUCATION/TRAINING PROGRAM

## 2022-06-28 RX ORDER — DEXTROSE 4 G
TABLET,CHEWABLE ORAL
Qty: 1 EACH | Refills: 0 | Status: SHIPPED | OUTPATIENT
Start: 2022-06-28 | End: 2022-11-03

## 2022-06-28 RX ORDER — ACETAMINOPHEN 500 MG
1 TABLET ORAL DAILY
Qty: 30 TABLET | Refills: 5 | Status: SHIPPED | OUTPATIENT
Start: 2022-06-28 | End: 2023-06-20

## 2022-06-28 RX ORDER — CARVEDILOL 3.12 MG/1
3.12 TABLET ORAL 2 TIMES DAILY
Qty: 60 TABLET | Refills: 11 | Status: ON HOLD | OUTPATIENT
Start: 2022-06-28 | End: 2022-07-27 | Stop reason: HOSPADM

## 2022-06-28 RX ORDER — MAGNESIUM SULFATE HEPTAHYDRATE 40 MG/ML
2 INJECTION, SOLUTION INTRAVENOUS ONCE
Status: COMPLETED | OUTPATIENT
Start: 2022-06-28 | End: 2022-06-28

## 2022-06-28 RX ORDER — BLOOD-GLUCOSE CONTROL, NORMAL
EACH MISCELLANEOUS 3 TIMES DAILY
Qty: 100 EACH | Refills: 5 | Status: SHIPPED | OUTPATIENT
Start: 2022-06-28 | End: 2022-11-03

## 2022-06-28 RX ORDER — OXYCODONE HYDROCHLORIDE 10 MG/1
10-15 TABLET ORAL EVERY 4 HOURS PRN
Qty: 50 TABLET | Refills: 0 | Status: ON HOLD | OUTPATIENT
Start: 2022-06-28 | End: 2022-08-26 | Stop reason: HOSPADM

## 2022-06-28 RX ORDER — NAPROXEN SODIUM 220 MG/1
81 TABLET, FILM COATED ORAL DAILY
Qty: 30 TABLET | Refills: 11 | Status: SHIPPED | OUTPATIENT
Start: 2022-06-28 | End: 2023-07-06

## 2022-06-28 RX ORDER — AMOXICILLIN AND CLAVULANATE POTASSIUM 875; 125 MG/1; MG/1
1 TABLET, FILM COATED ORAL EVERY 12 HOURS
Qty: 60 TABLET | Refills: 1 | Status: ON HOLD | OUTPATIENT
Start: 2022-06-28 | End: 2022-07-16 | Stop reason: HOSPADM

## 2022-06-28 RX ORDER — METHOCARBAMOL 500 MG/1
500 TABLET, FILM COATED ORAL 4 TIMES DAILY
Qty: 120 TABLET | Refills: 1 | Status: ON HOLD | OUTPATIENT
Start: 2022-06-28 | End: 2022-08-26 | Stop reason: HOSPADM

## 2022-06-28 RX ORDER — INSULIN ASPART 100 [IU]/ML
4 INJECTION, SOLUTION INTRAVENOUS; SUBCUTANEOUS 3 TIMES DAILY
Qty: 15 ML | Refills: 5 | Status: ON HOLD | OUTPATIENT
Start: 2022-06-28 | End: 2022-07-08 | Stop reason: SDUPTHER

## 2022-06-28 RX ORDER — PEN NEEDLE, DIABETIC 30 GX3/16"
1 NEEDLE, DISPOSABLE MISCELLANEOUS 3 TIMES DAILY
Qty: 100 EACH | Refills: 5 | Status: SHIPPED | OUTPATIENT
Start: 2022-06-28 | End: 2022-11-03

## 2022-06-28 RX ADMIN — INSULIN ASPART 5 UNITS: 100 INJECTION, SOLUTION INTRAVENOUS; SUBCUTANEOUS at 12:06

## 2022-06-28 RX ADMIN — OXYCODONE HYDROCHLORIDE 10 MG: 10 TABLET ORAL at 08:06

## 2022-06-28 RX ADMIN — CARVEDILOL 3.12 MG: 3.12 TABLET, FILM COATED ORAL at 08:06

## 2022-06-28 RX ADMIN — FLUCONAZOLE 200 MG: 200 TABLET ORAL at 08:06

## 2022-06-28 RX ADMIN — OXYCODONE HYDROCHLORIDE 10 MG: 10 TABLET ORAL at 04:06

## 2022-06-28 RX ADMIN — METHOCARBAMOL 500 MG: 500 TABLET ORAL at 04:06

## 2022-06-28 RX ADMIN — INSULIN ASPART 2 UNITS: 100 INJECTION, SOLUTION INTRAVENOUS; SUBCUTANEOUS at 12:06

## 2022-06-28 RX ADMIN — HEPARIN SODIUM 5000 UNITS: 5000 INJECTION INTRAVENOUS; SUBCUTANEOUS at 04:06

## 2022-06-28 RX ADMIN — SULFAMETHOXAZOLE AND TRIMETHOPRIM 1 TABLET: 400; 80 TABLET ORAL at 07:06

## 2022-06-28 RX ADMIN — MAGNESIUM SULFATE HEPTAHYDRATE 2 G: 2 INJECTION, SOLUTION INTRAVENOUS at 10:06

## 2022-06-28 RX ADMIN — AMOXICILLIN AND CLAVULANATE POTASSIUM 1 TABLET: 875; 125 TABLET, FILM COATED ORAL at 08:06

## 2022-06-28 RX ADMIN — OXYCODONE HYDROCHLORIDE 10 MG: 10 TABLET ORAL at 12:06

## 2022-06-28 RX ADMIN — INSULIN ASPART 5 UNITS: 100 INJECTION, SOLUTION INTRAVENOUS; SUBCUTANEOUS at 08:06

## 2022-06-28 RX ADMIN — DOCUSATE SODIUM 50 MG: 50 CAPSULE, LIQUID FILLED ORAL at 08:06

## 2022-06-28 RX ADMIN — METHOCARBAMOL 500 MG: 500 TABLET ORAL at 01:06

## 2022-06-28 RX ADMIN — HEPARIN SODIUM 5000 UNITS: 5000 INJECTION INTRAVENOUS; SUBCUTANEOUS at 01:06

## 2022-06-28 RX ADMIN — METHOCARBAMOL 500 MG: 500 TABLET ORAL at 08:06

## 2022-06-28 RX ADMIN — PREDNISONE 20 MG: 20 TABLET ORAL at 08:06

## 2022-06-28 RX ADMIN — MYCOPHENOLATE MOFETIL 1000 MG: 250 CAPSULE ORAL at 08:06

## 2022-06-28 NOTE — PROGRESS NOTES
Met with patient and his wife in preparation for discharge today.  Reviewed their answers to the review questions in [facility].  All questions were answered correctly.  These questions cover: post op care, medication management, infection prevention and emergency contacts.  Outpatient coordinator assigned.  Outpatient appointments reviewed.  Allowed time for questions and answers.

## 2022-06-28 NOTE — PT/OT/SLP PROGRESS
Physical Therapy Treatment    Patient Name:  Romeo Larson   MRN:  [id number]    Recommendations:     Discharge Recommendations:  home   Discharge Equipment Recommendations: other (see comments) (TBD closer to discharge)   Barriers to discharge: None    Assessment:     Romeo Larson is a 41 y.o. male admitted with a medical diagnosis of S/P liver transplant.  He presents with the following impairments/functional limitations:   (weakness; impaired endurance; impaired self care skills; impaired functional mobilty; gait instability; impaired balance; impaired cardiopulmonary response to activity) . Pt  Was motivated and cooperative with treatment session. Pt Progressing with PT Intervention. Pt Progressing with improving gait distance. Pt would continue to benefit from skilled PT to address overall functional mobility, goals , and to return to functional baseline.  Goals remain appropriate.      Rehab Prognosis: Good; patient would benefit from acute skilled PT services to address these deficits and reach maximum level of function.    Recent Surgery: Procedure(s) (LRB):  REPAIR, BILE DUCT (N/A)  LAPAROTOMY, EXPLORATORY, AFTER LIVER TRANSPLANT (N/A) 5 Days Post-Op    Plan:     During this hospitalization, patient to be seen 4 x/week to address the identified rehab impairments via gait training, therapeutic activities, therapeutic exercises and progress toward the following goals:    · Plan of Care Expires:  07/24/22    Subjective     Patient/Family Comments/goals: I might leave today  Pain/Comfort:  · Pain Rating 1:  (not rated)  · Pain Addressed 1: Reposition, Distraction      Objective:     Communicated with RN prior to session.  Patient found up in chair with telemetry, SHRUTHI drain upon PT entry to room.     General Precautions: Standard, fall Cardiac  Orthopedic Precautions:N/A   Braces:    Respiratory Status: Room air     Functional Mobility:  · Transfers:     · Sit to Stand:  supervision with no AD  · Gait: pt amb with no  AD with SBA x 160 ft with no LOB      AM-PAC 6 CLICK MOBILITY  Turning over in bed (including adjusting bedclothes, sheets and blankets)?: 3  Sitting down on and standing up from a chair with arms (e.g., wheelchair, bedside commode, etc.): 3  Moving from lying on back to sitting on the side of the bed?: 3  Moving to and from a bed to a chair (including a wheelchair)?: 3  Need to walk in hospital room?: 3  Climbing 3-5 steps with a railing?: 3  Basic Mobility Total Score: 18       Therapeutic Activities and Exercises:   Therapist provided instruction and educated of  patient on progress, safety,d/c,PT POC,   proper body mechanics, energy conservation, and fall prevention strategies during tasks listed above    Patient  facilitated therex seated in bedside chair B LE AROM AP, LAQ, Hip Flexion, Hip Abd/Add. Patient required skilled PTA for instruction of exercises and appropriate cues to perform exercises safely, sequencing and appropriately.   Exercises performed to develop and maintain pt's strength, endurance and flexibility.  Updated white board with appropriate PT mobility information for medical team notification    Pt encouraged to ambulate in hallways 3x/day with nursing or family assistance to improve endurance.     Pt safe to ambulate in hallway with RN or PCT assistance      Call nursing/pct to transfer to chair/use bathroom. Pt stated understanding      Bedside table in front of patient and area set up for function, convenience, and safety. RN aware of patient's mobility needs and status. Questions/concerns addressed within PTA scope of practice; patient  with no further questions. Time was provided for active listening, discussion of health disposition, and discussion of safe discharge. Pt?verbalized?agreement .    Patient left up in chair with all lines intact, call button in reach and nsg notified..    GOALS:   Multidisciplinary Problems     Physical Therapy Goals        Problem: Physical Therapy    Goal  Priority Disciplines Outcome Goal Variances Interventions   Physical Therapy Goal     PT, PT/OT Ongoing, Progressing     Description: Goals to be met by: 22     Patient will increase functional independence with mobility by performin. Supine to sit with Modified [place]  2. Sit to supine with Modified [place]  3. Sit to stand transfer with Modified [place]  4. Gait  x 300 feet with Supervision using LRAD                      Time Tracking:     PT Received On: 22  PT Start Time: 1153     PT Stop Time: 1201  PT Total Time (min): 8 min     Billable Minutes: Gait Training 8    Treatment Type: Treatment  PT/PTA: PTA     PTA Visit Number: 1     2022

## 2022-06-28 NOTE — PROGRESS NOTES
Patient discharged from hospital via wheelchair per self/spose/transport staff. PIVs removed prior to discharge. Discharge instructions and papers provided to and reviewed w/ patient and spouse - verbalized understanding - no questions asked. 2 RLQ SHRUTHI drains in place - patient and spouse educated on how to empty drains and record output - verbalized understanding. No questions asked.

## 2022-06-28 NOTE — ASSESSMENT & PLAN NOTE
- s/p exploratory lap 6/17 to assess liver for cholangio in preparation for living liver txp scheduled 6/21/22   - developed chills overnight and fever 103 this am, presented to OSH for tx  - lactic acid was 5.4  - cont IV fluids  - cont broad spectrum abx (started Vanc and cefepime at OSH)  - cx w NGTD  - treating as presumed cholangitis s/p procedure  - trend lactic acid- down to 1 6/19, wbc normalized  - now s/p living Liver Transplant 6/21/2022

## 2022-06-28 NOTE — PROGRESS NOTES
SW Discharge Note:    Pt was alert and oriented x4, calm and in good spirits. Pt presented with his wife, Shell (ph# [phone]). SW met with pt and wife to assess discharge plan and any concerns or needs.    Pt reports agreeing with the discharge plan and has no psychosocial concerns. Pt will discharge today to home with no needs. Pt's wife will transport patient. Pt denies the need for home health or DME at this time. Patient's caretakers verbalize understanding and are involved in treatment planning and discharge process. Pt is coping well with support from his family. Pt nor caregiver had concerns or questions.     SW providing psychosocial and counseling support, education, resources, assistance, and discharge planning as indicated. SW remains available.

## 2022-06-28 NOTE — SUBJECTIVE & OBJECTIVE
Scheduled Meds:   amoxicillin-clavulanate 875-125mg  1 tablet Oral Q12H    carvediloL  3.125 mg Oral BID    docusate sodium  50 mg Oral Daily    famotidine  20 mg Oral QHS    fluconazole  200 mg Oral Daily    heparin (porcine)  5,000 Units Subcutaneous Q8H    insulin aspart U-100  5 Units Subcutaneous TIDWM    methocarbamoL  500 mg Oral QID    mycophenolate  1,000 mg Oral BID    predniSONE  20 mg Oral Daily    sulfamethoxazole-trimethoprim 400-80mg  1 tablet Oral Daily AM    [START ON 7/1/2022] valGANciclovir  450 mg Oral Daily     Continuous Infusions:      PRN Meds:sodium chloride 0.9%, baclofen, dextrose 10%, dextrose 10%, glucagon (human recombinant), glucose, glucose, hydrALAZINE, insulin aspart U-100, oxyCODONE, oxyCODONE, sodium chloride 0.9%    Review of Systems   Constitutional:  Positive for activity change and appetite change (improving).   Eyes:  Negative for visual disturbance.   Respiratory:  Negative for cough and shortness of breath.    Cardiovascular:  Negative for chest pain, palpitations and leg swelling.   Gastrointestinal:  Positive for abdominal distention and abdominal pain. Negative for constipation, diarrhea, nausea and vomiting.   Genitourinary:  Negative for difficulty urinating.   Musculoskeletal:  Negative for arthralgias.   Skin:  Positive for wound.   Allergic/Immunologic: Positive for immunocompromised state.   Neurological:  Negative for dizziness.   Hematological:  Negative for adenopathy. Does not bruise/bleed easily.   Psychiatric/Behavioral:  Negative for agitation.    Objective:     Vital Signs (Most Recent):  Temp: 98.4 °F (36.9 °C) (06/28/22 0707)  Pulse: 91 (06/28/22 0707)  Resp: 18 (06/28/22 0859)  BP: 135/88 (06/28/22 0707)  SpO2: 98 % (06/28/22 0707)   Vital Signs (24h Range):  Temp:  [98.1 °F (36.7 °C)-99.1 °F (37.3 °C)] 98.4 °F (36.9 °C)  Pulse:  [] 91  Resp:  [17-22] 18  SpO2:  [95 %-99 %] 98 %  BP: (125-177)/() 135/88     Weight: 84.4 kg (186 lb 1.1  oz)  Body mass index is 28.29 kg/m².    Intake/Output - Last 3 Shifts         06/26 0700 06/27 0659 06/27 0700 06/28 0659 06/28 0700 06/29 0659    P.O. 70 960 120    I.V. (mL/kg)  0 (0)     Other  0     Total Intake(mL/kg) 70 (0.8) 960 (11.4) 120 (1.4)    Urine (mL/kg/hr) 0 (0) 1200 (0.6)     Emesis/NG output  0     Drains 300 390 50    Other  0     Stool 0 0     Blood  0     Total Output 300 1590 50    Net -230 -630 +70           Urine Occurrence 3 x 3 x     Stool Occurrence 1 x 0 x     Emesis Occurrence  0 x             Physical Exam  Vitals and nursing note reviewed.   Constitutional:       Appearance: Normal appearance. He is well-developed.   HENT:      Head: Normocephalic.   Eyes:      General: No scleral icterus.     Conjunctiva/sclera: Conjunctivae normal.   Cardiovascular:      Rate and Rhythm: Normal rate and regular rhythm.      Heart sounds: No murmur heard.  Pulmonary:      Effort: Pulmonary effort is normal.      Breath sounds: Normal breath sounds.   Abdominal:      General: Abdomen is flat. Bowel sounds are normal. There is no distension.      Palpations: Abdomen is soft.      Tenderness: There is no abdominal tenderness.       Musculoskeletal:         General: No swelling. Normal range of motion.      Cervical back: Normal range of motion.   Skin:     General: Skin is warm and dry.      Capillary Refill: Capillary refill takes 2 to 3 seconds.   Neurological:      General: No focal deficit present.      Mental Status: He is alert and oriented to person, place, and time.   Psychiatric:         Mood and Affect: Mood normal.         Behavior: Behavior normal.         Thought Content: Thought content normal.         Judgment: Judgment normal.       Laboratory:  Immunosuppressants           Stop Route Frequency     mycophenolate capsule 1,000 mg         -- Oral 2 times daily          CBC:   Recent Labs   Lab 06/28/22  0606   WBC 11.53   RBC 2.93*   HGB 9.8*   HCT 29.2*   *   *   MCH 33.4*    MCHC 33.6       BMP:   Recent Labs   Lab 06/28/22  0606   *   *   K 4.0   CL 99   CO2 29   BUN 21*   CREATININE 0.8   CALCIUM 8.2*       Coagulation:   Recent Labs   Lab 06/27/22  0638 06/28/22  0606   INR 1.1 1.1   APTT 23.6  --        Labs within the past 24 hours have been reviewed.    Diagnostic Results:  Liver US Liver Transplant Post 6/27/22:     Narrative  FINDINGS:  Patient is status post orthotopic liver transplant of the right hepatic lobe on 06/21/2022.  The patient returned to the OR on 06/23/2022 for bile duct repair.     The liver demonstrates homogeneous echotexture.  No focal hepatic lesions are seen.     Small fluid collection posterior to the allograft measuring 2.1 x 1.7 x 2.0 cm.  Partially imaged surgical drain adjacent to the allograft.  Right pleural effusion.     The common duct is not dilated, measuring 4 mm.  No dilated intrahepatic radicles are seen.     Color flow and spectral waveform analysis was performed.     Main portal vein, anterior branch of the right portal vein, and posterior branch of the right portal vein are patent.     Middle hepatic vein velocity measures 17 cm/s (previously 12 cm/s).     Right hepatic vein velocity measures 36 cm/s (previously 48 cm/s).     IVC piggyback velocity measures 196 cm/s (previously 62 cm/s).     Visualized main hepatic artery velocity measures 82 cm/s (previously 147 cm/s). Extrahepatic main hepatic artery is obscured by overlying bowel gas.     Main hepatic artery resistive index measures 0.69 (previously 0.63) with normal waveform.     Right hepatic artery anterior branch resistive index measures 0.59 (previously 0.63), with normal waveform.     Right hepatic artery posterior branch resistive index measures 0.61 (previously 0.67), with normal waveform.     Impression:     Elevated velocities at the IVC anastomosis.  Attention on follow-up imaging is recommended.     Otherwise satisfactory Doppler evaluation of the liver  allograft, noting that the extrahepatic MHA was obscured by bowel gas.     Small peritransplant fluid collection and right pleural effusion.     This report was flagged in Epic as abnormal.      EXAMINATION:  US DOPPLER LIVER TRANSPLANT POST (XPD)    CLINICAL HISTORY:  assess flow; Liver transplant status    TECHNIQUE:  Limited abdominal ultrasound of the transplant liver with Doppler evaluation.  Color and spectral Doppler were performed.    COMPARISON:  Transplant liver ultrasound 06/22/2022.  CT abdomen pelvis 06/19/2022.    FINDINGS:  Patient is status post orthotopic liver transplanton 06/21/2022.  Liver allograft is normal in size.  The liver demonstrates homogeneous echotexture.  No focal hepatic lesions are seen.  No fluid collections.    The common duct is not dilated, measuring 4 mm.  No dilated intrahepatic radicles are seen.    Color flow and spectral waveform analysis was performed.  The main portal vein, right portal vein, middle hepatic vein, right hepatic vein, and IVC are patent with proper directional flow.    Anastomosis site of the main hepatic artery demonstrates a peak systolic velocity 117 (previously 247) cm/sec.    Main hepatic artery demonstrates resistive index 0.63 (previously 0.60) with normal waveform.    Anterior branch of the right hepatic artery demonstrates resistive index 0.63 (previously 0.47) with normal waveform.    Posterior branch of the right hepatic artery demonstrates resistive index 0.67 (previously 0.60) with normal waveform.    Impression  Satisfactory Doppler evaluation of liver transplant noting improvement of arterial resistive index in the anterior branch of the right hepatic artery.    Previously described hyperechoic area in the medial aspect of the allograft is not seen on this exam.    Electronically signed by resident: Teetee Diallo  Date:    06/23/2022  Time:    08:11    Electronically signed by: Coy  Rajesh  Date:    06/23/2022  Time:    08:47    Debility/Functional status: Patient debilitated by evidence of Weakness. Physical and occupational therapy ordered daily to evaluate and treat. Debility was: present on admission.

## 2022-06-28 NOTE — PLAN OF CARE
AAOx3, afebrile, c/o pain. PRN pain medication given. Scheduled Robaxin given. Telemetry monitor in place ([facility]-). Bg monitored achs and tx per orders. Endocrine is following. Pt will start meal time insulin this am. PO Augmentin started. [employer] and lap sites with dermabond. Self meds 100%. SHRUTHI drains x2 on rt side. Drains looked more bili color last night. Drain samples sent for bili. Number 1 bili is 2.2 and Number 2 drain bili is 7.1. Notified [facility] [place]. Pt able to position self independently.  Pt in lowest position, side rails up x2, non-skid foot wear in place, call light within reach, pt verbalized understanding to call RN when needed.  Hand hygiene practiced per protocol.  Will continue to monitor.

## 2022-06-28 NOTE — ASSESSMENT & PLAN NOTE
-S/P s/p Living transplant on 6/21/2022 Liver for Primary Liver Malignancy: Cholangiocarcinoma CMV -/+6/22:  -Bile leakon abx (Vanc/cefepime/fluc)    -Returned to OR on 6/23 for Bile leak. ENTEROCOCCUS FAECALIS from OR cultures. Changed ABX to Unasyn  - 2 drains will stay for a while d/t bile leak  - de escalate abx to Augmentin PO 6/27/22  - reviewed POD#6 Liver US, Increased IVC velocity at anastomosis. Cont to monitor.  - drain #2(lateral) more bilious overnight 6/27, drain fluid 7.1, serum 1.9

## 2022-06-28 NOTE — DISCHARGE SUMMARY
"[address] - Transplant Stepdown  Liver Transplant  Discharge Summary      Patient Name: Romeo Larson  MRN: [id number]  Admission Date: 6/18/2022  Hospital Length of Stay: 10 days  Discharge Date and Time:  06/28/2022 11:58 AM  Attending Physician: Sinan Cline MD   Discharging Provider: Abida Arenas NP  Primary Care Provider: Pravin Maria MD  Post-Operative Day: 7     ORGAN:   RIGHT LIVER LOBE (SEGS 5,6,7,8) WITHOUT MIDDLE HEPATIC VEIN  Disease Etiology: Primary Liver Malignancy: Cholangiocarcinoma (CH-CA)  Donor Type:   Living  CDC High Risk:     Donor CMV Status:   Donor CMV Status:   Donor HBcAB:     Donor HCV Status:     Whole or Partial:   Biliary Anastomosis: Nina-en-Y  Arterial Anatomy: Standard    HPI:   HPI: Romeo Larson is a 41 y.o. male who presents to the emergency department at OSH w c/o complaint of fevers. Pt is s/p exploratory lap 6/17/22 to assess liver for tentative living liver transplant on 6/21/22 for cholangiocarcinoma. Last chemo  5/10/22. Patient reports "normally" gets chills after having anesthesia. Chills, described as rigors, started ~3am this morning. Patient toke temp at 7am that read 103°.  T max at OSH 38.2. Pt denies n/v/diarrhea. Lap sites CDI. Lactic acid at OSH was 5.4. He received IV fluids and dose of Vancomycin. Plan to transfer to [facility] for further eval w cultures, CT A/P WO, cont IV fluids and broad spectrum abx. POC reviewed w Dr Godwin.    Patient is vaccinated against COVID-19 and received booster in December.  COVID at OSH 6/18 neg. Did recently get his tetanus and shingles shot in preparation for the surgery.     Procedure(s) (LRB):  REPAIR, BILE DUCT (N/A)  LAPAROTOMY, EXPLORATORY, AFTER LIVER TRANSPLANT (N/A)     Hospital Course:    S/P Living transplant on 6/21/2022 Liver for Primary Liver Malignancy: Cholangiocarcinoma CMV -/+6/22: Extubated, but there is a fear of a bile leak, so patient to remain on abx (Vanc/cefepime/fluc)  Returned to OR on 6/23 for Bile " leak. [facility] on 6/24. Patient has since progressed well. LFTs cont to trend down. POD#6 Liver US reviewed per surgeon, increased velocity at IVC anastomosis noted, fluid collection posterioir to allograft 2.1x1.7x2.1 noted. Pt has 2 SHRUTHI drains in placed. Drain output in #2 more bilious on 6/27. Drain fluid sent for tbili on 6/27 and was 7.2, serum is 1.7. Output decreasing. Pt educated how to empty drain every 8-12 hours and keep drain charged. [place] closed w dermabond, no drainage noted, no SSI.  Pain well managed w current regimen. Pt tolerating diet, appetite improving, passing flatus and having BMs. Ambulating w/o issues. No HHC needs. Of note, cultures from OR with ENTEROCOCCUS FAECALIS. Pt was initially treated w Unasyn IV, de escalate to Augmentin PO 6/27/22 while drains in place. Prograf level was 26 on POD#2. Pharmacy following prograf level.     On day of discharge, patient feeling well. Educated pt and CG to report temp >101, change in drain output, increased pain, change in appetite to coordinator. Discharge instructions reviewed by Pharmacist, Nursing and Transplant coordinator.  Patient and CG verbalize understanding and are in agreement with discharge from hospital today.  Patient is medically stable for discharge.  Patient will f/u w labs and will meet w transplant coordinator in am.  Next visit in surgery clinic next week.       Goals of Care Treatment Preferences:  Code Status: Full Code      Final Active Diagnoses:    Diagnosis Date Noted POA    PRINCIPAL PROBLEM:  S/P liver transplant [Z94.4] 06/21/2022 Not Applicable    Long-term use of immunosuppressant medication [Z79.899] 06/25/2022 Not Applicable    Type 2 diabetes mellitus with hyperglycemia [E11.65] 06/22/2022 Yes    Adrenal corticosteroid causing adverse effect in therapeutic use [T38.0X5A] 06/22/2022 Yes    Prophylactic immunotherapy [Z29.8] 06/21/2022 Not Applicable    At risk for opportunistic infections [Z91.89]  06/21/2022 No    Tachycardia [R00.0] 06/18/2022 Yes    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      Problems Resolved During this Admission:    Diagnosis Date Noted Date Resolved POA    Encounter for weaning from ventilator [Z99.11]  06/23/2022 Not Applicable    Fever of unknown origin [R50.9] 04/26/2022 06/26/2022 Yes       Consults (From admission, onward)        Status Ordering Provider     Inpatient consult to PICC team ([facility])  Once        Provider:  (Not yet assigned)    Completed AMNA BRUNSON     Inpatient consult to Endocrinology  Once        Provider:  (Not yet assigned)    Completed DONNA HOWELL     Inpatient consult to Registered Dietitian/Nutritionist  Once        Provider:  (Not yet assigned)    Completed DONNA HOWELL          Pending Diagnostic Studies:     Procedure Component Value Units Date/Time    CBC auto differential [[id number]] Collected: 06/22/22 0409    Order Status: Sent Lab Status: In process Updated: 06/22/22 0409    Specimen: Blood     Freeze and Hold -BB HEP [[id number]] Collected: 06/22/22 0019    Order Status: Sent Lab Status: In process Updated: 06/22/22 0020    Specimen: Blood     Protime-INR [[id number]] Collected: 06/22/22 0409    Order Status: Sent Lab Status: In process Updated: 06/22/22 0409    Specimen: Blood     Specimen to Pathology, Surgery Liver [[id number]] Collected: 06/21/22 2346    Order Status: Sent Lab Status: In process Updated: 06/22/22 1006    Specimen: Tissue         Significant Diagnostic Studies: Labs:   CMP   Recent Labs   Lab 06/27/22  0638 06/28/22  0606    133*   K 3.4* 4.0   CL 99 99   CO2 30* 29   GLU 98 125*   BUN 24* 21*   CREATININE 0.7 0.8   CALCIUM 8.7 8.2*   PROT 5.4* 4.5*   ALBUMIN 3.1* 2.7*   BILITOT 2.7* 1.9*   ALKPHOS 133 135   * 100*   * 432*   ANIONGAP 8 5*   ESTGFRAFRICA >60.0 >60.0   EGFRNONAA >60.0 >60.0   , CBC   Recent Labs   Lab 06/27/22  0638 06/28/22  0606   WBC 14.96* 11.53   HGB 10.5* 9.8*   HCT 30.0*  "29.2*   * 108*   , INR   Lab Results   Component Value Date    INR 1.1 06/28/2022    INR 1.1 06/27/2022    INR 1.1 06/25/2022    and All labs within the past 24 hours have been reviewed  Microbiology:   Blood Culture   Lab Results   Component Value Date    LABBLOO No growth after 5 days. 06/18/2022    and Urine Culture    Lab Results   Component Value Date    LABURIN No growth 06/20/2022     Radiology: X-Ray: CXR: X-Ray Chest 1 View (CXR):   Results for orders placed or performed during the hospital encounter of 06/18/22   X-Ray Chest 1 View    Narrative    EXAMINATION:  XR CHEST 1 VIEW    CLINICAL HISTORY:  Provided history is "ETT;  ".    TECHNIQUE:  One view of the chest.    COMPARISON:  06/22/2022 at 00:38.    FINDINGS:  Interval advancement of endotracheal tube with the tip now approximately 3.5 cm above the vinod.  [place] catheter overlies the left pulmonary artery as seen previously.  Enteric tube overlies the stomach.  Right-sided central venous catheter overlies the SVC.  Cardiac silhouette is stable.  No detrimental change in lung aeration.  Postoperative changes again noted in the upper abdomen.      Impression    Interval advancement of endotracheal tube, with the tip now approximately 3.5 cm above the vinod.    Otherwise, no detrimental change when compared with 00:38.      Electronically signed by: Alli Orosco MD  Date:    06/22/2022  Time:    01:46     CT scan: CT ABDOMEN PELVIS WITHOUT CONTRAST:   Results for orders placed or performed during the hospital encounter of 06/18/22   CT Abdomen Pelvis  Without Contrast    Narrative    EXAMINATION:  CT ABDOMEN PELVIS WITHOUT CONTRAST    CLINICAL HISTORY:  Abdominal abscess/infection suspected;    TECHNIQUE:  The patient was surveyed from the lung bases through the pelvis.  IV contrast were not administered.  30 cc oral contrast was administered.  The data was reconstructed for coronal, sagittal, and axial images.    COMPARISON:  MRI " 06/14/2022, CT 06/14/2022, 03/21/2022, CT 10/27/2021    FINDINGS:  CHEST:    Lungs/Pleura: Bibasilar atelectasis.  No focal consolidation, pneumothorax, or pleural fluid.    Heart: The visualized portions of the heart are normal. No pericardial effusion.  Multivessel coronary artery calcifications.    ABDOMEN:    Liver: Liver appears normal size.  No obvious of hepatic lesions noting lack of intravenous contrast.    Gallbladder/Bile ducts: Gallbladder is distended.  Internal biliary drains are in place extending into the left right hepatic lobes.  Small focus of air is present within the common bile duct in a few additional foci of air within the intrahepatic biliary ducts.  Diffuse dilation of the biliary ducts in keeping with reported history of cholangiocarcinoma.    Spleen:Unremarkable.    Stomach: Unremarkable.    Pancreas: Unremarkable.    Adrenals: Unremarkable.    Renal/Ureters: The kidneys are normal in size and location. No hydronephrosis. Nonspecific perinephric fat stranding.  The ureters are normal in course and caliber. The urinary bladder is unremarkable.    Reproductive: Unremarkable.    Bowel: The visualized loops of small and large bowel show no evidence of obstruction or inflammation.  Appendix is prominent measuring 9 mm in maximal transverse diameter and gradually tapers.  Air and stool is mixed within the appendix.  No significant wall thickening or adjacent inflammatory change.  Appendix appears similar to prior exams.    Peritoneum: Diffuse intra-abdominal free air consistent with recent exploratory laparotomy.  No evidence of pelvic free fluid.Scattered mesenteric haziness along the ana cristina hepatis and peripancreatic region likely reactive from known cholangiocarcinoma.    Lymph Nodes: Few prominent portacaval lymph nodes without evidence of pathologic enlargement.  Index lymph nodes described on multiple prior exams at the ana cristina hepatis and gastroesophageal junction appear stable from 03/21,  and significantly decreased in size compared to 10/27/2021.  Borderline enlarged right cardiophrenic lymph node measuring 1.0 cm on series 2, image 21, previously 0.8 cm.    Vasculature: The abdominal aorta is normal in course and caliber.  Trace atherosclerotic calcifications.    Bones: No acute fractures or osseous destructive lesions.    Soft Tissues: Scattered subcutaneous free air and few scattered areas of subcutaneous inflammation, sequela of recent surgery.      Impression    Postoperative changes of recent laparotomy with expected intraperitoneal and subcutaneous free air.  No evidence of fluid collections within the abdomen to suggest abscess.    Diffuse intrahepatic biliary ductal dilation in keeping with known cholangiocarcinoma.  No obvious solid organ metastasis noting lack of IV contrast.    Index lesions at the ana cristina hepatis and gastroesophageal junction appear stable from prior exam.  Slight interval enlargement of right cardiophrenic lymph node, now measuring 1.0 cm in the short axis.    Electronically signed by resident: Brad Espitia  Date:    06/19/2022  Time:    09:04    Electronically signed by: Morgan Haynes MD  Date:    06/19/2022  Time:    09:40       The patients clinical status was discussed at multidisplinary rounds, involving transplant surgery, transplant medicine, pharmacy, nursing, nutrition, and social work    Discharged Condition: fair    Disposition: to home accompanied by wife    Follow Up: in am w labs, see hospital course    Patient Instructions:   No discharge procedures on file.  Medications:  Reconciled Home Medications:      Medication List      START taking these medications    famotidine 20 MG tablet  Commonly known as: PEPCID  Take 1 tablet (20 mg total) by mouth every evening. STOP 7/24/22     fluconazole 200 MG Tab  Commonly known as: DIFLUCAN  Take 1 tablet (200 mg total) by mouth once daily. STOP 7/20/22     mycophenolate 250 mg Cap  Commonly known as:  CELLCEPT  Take 4 capsules (1,000 mg total) by mouth 2 (two) times daily.     predniSONE 5 MG tablet  Commonly known as: DELTASONE  Take by mouth once daily: 20 mg 6/27-7/3; 15 mg 7/4-7/10; 10 mg 7/11-7/17; 5 mg 7/18-7/24; STOP 7/25/22     sulfamethoxazole-trimethoprim 400-80mg 400-80 mg per tablet  Commonly known as: BACTRIM,SEPTRA  Take 1 tablet by mouth every morning. STOP 12/18/22     tacrolimus 1 MG Cap  Commonly known as: PROGRAF  Take 3 capsules (3 mg total) by mouth every 12 (twelve) hours.     valGANciclovir 450 mg Tab  Commonly known as: VALCYTE  Take 1 tablet (450 mg total) by mouth once daily. STOP 9/19/22        STOP taking these medications    losartan 50 MG tablet  Commonly known as: COZAAR        ASK your doctor about these medications    baclofen 10 MG tablet  Commonly known as: LIORESAL  TAKE 1 TABLET(10 MG) BY MOUTH THREE TIMES DAILY AS NEEDED FOR HICCUPS     HYDROcodone-acetaminophen 5-325 mg per tablet  Commonly known as: NORCO  Take 1 tablet by mouth every 6 (six) hours as needed for Pain.  Ask about: Should I take this medication?     metFORMIN 500 MG tablet  Commonly known as: GLUCOPHAGE  Take 1 tablet (500 mg total) by mouth 2 (two) times daily with meals.          Time spent caring for patient (Greater than 1/2 spent in direct face-to-face contact): > 30 minutes    Abida Arenas NP  Liver Transplant  [facility] - Transplant Stepdown

## 2022-06-28 NOTE — PROGRESS NOTES
DISCHARGE NOTE:    Romeo Larson is a 41 y.o. 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    Hypercholesteremia     Hypertension        Hospital Course: Patient's hospital course complicated by a bile leak. Patient placed on augmentin (duration undecided, but possibly until drain is removed. Patient's tac level also elevated, so patient discharged without tacrolimus.    Allergies: Review of patient's allergies indicates:  No Known Allergies    Patient Pharmacy: [facility]    Discharge Medications:     Medication List      START taking these medications    amoxicillin-clavulanate 875-125mg 875-125 mg per tablet  Commonly known as: AUGMENTIN  Take 1 tablet by mouth every 12 (twelve) hours.     aspirin 81 MG Chew  CHEW 1 tablet (81 mg total) by mouth once daily.     blood sugar diagnostic Strp  1 each by Misc.(Non-Drug; Combo Route) route 3 (three) times daily.     blood-glucose meter Misc  Use as instructed     calcium carbonate-vitamin D3 600 mg-20 mcg (800 unit) Tab  Take 1 tablet by mouth once daily at 6am.     carvediloL 3.125 MG tablet  Commonly known as: COREG  Take 1 tablet (3.125 mg total) by mouth 2 (two) times daily.     famotidine 20 MG tablet  Commonly known as: PEPCID  Take 1 tablet (20 mg total) by mouth every evening. STOP 7/24/22     fluconazole 200 MG Tab  Commonly known as: DIFLUCAN  Take 1 tablet (200 mg total) by mouth once daily. STOP 7/20/22     insulin aspart U-100 100 unit/mL (3 mL) Inpn pen  Commonly known as: NovoLOG  Inject 4 Units into the skin 3 (three) times daily. Plus Sliding Scale. MDD: 42 UNITS     lancets 30 gauge Misc  1 each by  "Misc.(Non-Drug; Combo Route) route 3 (three) times daily.     methocarbamoL 500 MG Tab  Commonly known as: ROBAXIN  Take 1 tablet (500 mg total) by mouth 4 (four) times daily.     mycophenolate 250 mg Cap  Commonly known as: CELLCEPT  Take 4 capsules (1,000 mg total) by mouth 2 (two) times daily.     oxyCODONE 10 mg Tab immediate release tablet  Commonly known as: ROXICODONE  Take 1-1.5 tablets (10-15 mg total) by mouth every 4 (four) hours as needed for Pain.     pen needle, diabetic 32 gauge x 5/32" Ndle  1 each by Misc.(Non-Drug; Combo Route) route 3 (three) times daily.     predniSONE 5 MG tablet  Commonly known as: DELTASONE  Take by mouth once daily: 20 mg 6/27-7/3; 15 mg 7/4-7/10; 10 mg 7/11-7/17; 5 mg 7/18-7/24; STOP 7/25/22     sulfamethoxazole-trimethoprim 400-80mg 400-80 mg per tablet  Commonly known as: BACTRIM,SEPTRA  Take 1 tablet by mouth every morning. STOP 12/18/22     tacrolimus 1 MG Cap  Commonly known as: PROGRAF  Take 3 capsules (3 mg total) by mouth every 12 (twelve) hours.     valGANciclovir 450 mg Tab  Commonly known as: VALCYTE  Take 1 tablet (450 mg total) by mouth once daily. STOP 9/19/22        STOP taking these medications    baclofen 10 MG tablet  Commonly known as: LIORESAL     HYDROcodone-acetaminophen 5-325 mg per tablet  Commonly known as: NORCO     losartan 50 MG tablet  Commonly known as: COZAAR     metFORMIN 500 MG tablet  Commonly known as: GLUCOPHAGE           Where to Get Your Medications      These medications were sent to [facility] [address] [address]    Hours: Mon-Fri 7a-7p, Sat-Sun 10a-4p Phone: [phone]   · amoxicillin-clavulanate 875-125mg 875-125 mg per tablet  · aspirin 81 MG Chew  · blood sugar diagnostic Strp  · blood-glucose meter Misc  · calcium carbonate-vitamin D3 600 mg-20 mcg (800 unit) Tab  · carvediloL 3.125 MG tablet  · famotidine 20 MG tablet  · fluconazole 200 MG Tab  · insulin aspart U-100 100 unit/mL (3 mL) " "Inpn pen  · lancets 30 gauge Misc  · methocarbamoL 500 MG Tab  · mycophenolate 250 mg Cap  · oxyCODONE 10 mg Tab immediate release tablet  · pen needle, diabetic 32 gauge x 5/32" Ndle  · predniSONE 5 MG tablet  · sulfamethoxazole-trimethoprim 400-80mg 400-80 mg per tablet  · tacrolimus 1 MG Cap  · valGANciclovir 450 mg Tab          Pharmacy Interventions/Recommendations:  1) Transplant Immunosuppression: Induction None, and maintenance Tac (On Hold), MMF, and Pred taper    2) Opportunistic Infection prophylaxis: Bactrim, Valcyte, and Diflucan    3) Osteoporosis Prevention measures (liver txp): Needs dexa and Vit D level outpatient. Started on combo Ca/Vit until resulted.    4) Patient Counseling/Education:  Patient and wife counseled and offered the opportunity to ask questions.  Demonstrated the use of the BP cuff, thermometer.    5) Follow-Up/Discharge Needs:  Will need tacro filled once level lower.    6) Patient Assistance Information: None    7) The following medications have been placed on HOLD and should be restarted in the outpatient setting (when appropriate): Rosalba Walters and his caregiver verbalized their understanding and had the opportunity to ask questions.  "

## 2022-06-28 NOTE — SUBJECTIVE & OBJECTIVE
"Interval HPI:   Overnight events: Remains in TSU. [facility]. Tentative discharge today. BG reasonably well controlled with scheduled insulin and prn correction scale. Prednisone 20 mg daily.   Eating:  Diet diabetic [facility]; 2000 Calorie   100%  Nausea: No  Hypoglycemia and intervention: No  Fever: No  TPN and/or TF: No    /79 (BP Location: Left arm, Patient Position: Sitting)   Pulse 100   Temp 98.5 °F (36.9 °C) (Oral)   Resp 18   Ht 5' 8" (1.727 m)   Wt 84.4 kg (186 lb 1.1 oz)   SpO2 95%   BMI 28.29 kg/m²     Labs Reviewed and Include    Recent Labs   Lab 06/28/22  0606   *   CALCIUM 8.2*   ALBUMIN 2.7*   PROT 4.5*   *   K 4.0   CO2 29   CL 99   BUN 21*   CREATININE 0.8   ALKPHOS 135   *   *   BILITOT 1.9*     Lab Results   Component Value Date    WBC 11.53 06/28/2022    HGB 9.8 (L) 06/28/2022    HCT 29.2 (L) 06/28/2022     (H) 06/28/2022     (L) 06/28/2022     No results for input(s): TSH, FREET4 in the last 168 hours.  Lab Results   Component Value Date    HGBA1C 5.8 (H) 06/20/2022       Nutritional status:   Body mass index is 28.29 kg/m².  Lab Results   Component Value Date    ALBUMIN 2.7 (L) 06/28/2022    ALBUMIN 3.1 (L) 06/27/2022    ALBUMIN 2.7 (L) 06/26/2022     No results found for: PREALBUMIN    Estimated Creatinine Clearance: 126 mL/min (based on SCr of 0.8 mg/dL).    Accu-Checks  Recent Labs     06/27/22  0259 06/27/22  0629 06/27/22  0851 06/27/22  0933 06/27/22  1206 06/27/22  1255 06/27/22  1750 06/27/22  2306 06/28/22  0823 06/28/22  1255   POCTGLUCOSE 112* 107 96 101 206* 221* 285* 134* 118* 172*       Current Medications and/or Treatments Impacting Glycemic Control  Immunotherapy:    Immunosuppressants           Stop Route Frequency     mycophenolate capsule 1,000 mg         -- Oral 2 times daily          Steroids:   Hormones (From admission, onward)                Start     Stop Route Frequency Ordered    06/27/22 0900  predniSONE tablet " "20 mg  (methylprednisolone taper panel)        "Followed by" Linked Group Details    -- Oral Daily 06/22/22 0014          Pressors:    Autonomic Drugs (From admission, onward)                None          Hyperglycemia/Diabetes Medications:   Antihyperglycemics (From admission, onward)                Start     Stop Route Frequency Ordered    06/28/22 0715  insulin aspart U-100 pen 5 Units         -- SubQ 3 times daily with meals 06/27/22 2043    06/27/22 1303  insulin aspart U-100 pen 1-10 Units         -- SubQ Before meals & nightly PRN 06/27/22 1204          "

## 2022-06-28 NOTE — PROGRESS NOTES
[facility] - Transplant Stepdown  Endocrinology  Progress Note    Admit Date: 6/18/2022     Reason for Consult: Management of T2DM vs Steroid Induced Hyperglycemia     Surgical Procedure and Date: Liver Transplant 6/21/2022    Diabetes diagnosis year: 2022    Home Diabetes Medications: Metformin 500 mg BID- While on Chemo     How often checking glucose at home?  RACHEL    BG readings on regimen: RACHEL  Hypoglycemia on the regimen?  RACHEL  Missed doses on regimen?  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 He was brought to the SICU postoperatively for further monitoring and care. He arrived sedated, intubated, and off vasopressor/inotropic support. His immediate arrival was significant for a CXR demonstrating the ETT tip in the cervical neck. Anesthesia presented to the bedside immediately to evaluate and found the ETT tip at the cords and the balloon above the cords. The tube was advanced into appropriate position without issue. Endocrine consulted to manage type 2 diabetes and hyperglycemia.     Hemoglobin A1C   Date Value Ref Range Status   06/20/2022 5.8 (H) 4.0 - 5.6 % Final     Comment:     ADA Screening Guidelines:  5.7-6.4%  Consistent with prediabetes  >or=6.5%  Consistent with diabetes    High levels of fetal hemoglobin interfere with the HbA1C  assay. Heterozygous hemoglobin variants (HbS, HgC, etc)do  not significantly interfere with this assay.   However, presence of multiple variants may affect accuracy.     03/29/2022 6.8 (H) 4.0 - 5.6 % Final     Comment:     ADA Screening Guidelines:  5.7-6.4%  Consistent with prediabetes  >or=6.5%  Consistent with diabetes    High levels of fetal hemoglobin interfere with the HbA1C  assay. Heterozygous hemoglobin variants  "(HbS, HgC, etc)do  not significantly interfere with this assay.   However, presence of multiple variants may affect accuracy.                 Interval HPI:   Overnight events: Remains in TSU. NAEON. Tentative discharge today. BG reasonably well controlled with scheduled insulin and prn correction scale. Prednisone 20 mg daily.   Eating:  Diet diabetic [facility]; 2000 Calorie   100%  Nausea: No  Hypoglycemia and intervention: No  Fever: No  TPN and/or TF: No    /79 (BP Location: Left arm, Patient Position: Sitting)   Pulse 100   Temp 98.5 °F (36.9 °C) (Oral)   Resp 18   Ht 5' 8" (1.727 m)   Wt 84.4 kg (186 lb 1.1 oz)   SpO2 95%   BMI 28.29 kg/m²     Labs Reviewed and Include    Recent Labs   Lab 06/28/22  0606   *   CALCIUM 8.2*   ALBUMIN 2.7*   PROT 4.5*   *   K 4.0   CO2 29   CL 99   BUN 21*   CREATININE 0.8   ALKPHOS 135   *   *   BILITOT 1.9*     Lab Results   Component Value Date    WBC 11.53 06/28/2022    HGB 9.8 (L) 06/28/2022    HCT 29.2 (L) 06/28/2022     (H) 06/28/2022     (L) 06/28/2022     No results for input(s): TSH, FREET4 in the last 168 hours.  Lab Results   Component Value Date    HGBA1C 5.8 (H) 06/20/2022       Nutritional status:   Body mass index is 28.29 kg/m².  Lab Results   Component Value Date    ALBUMIN 2.7 (L) 06/28/2022    ALBUMIN 3.1 (L) 06/27/2022    ALBUMIN 2.7 (L) 06/26/2022     No results found for: PREALBUMIN    Estimated Creatinine Clearance: 126 mL/min (based on SCr of 0.8 mg/dL).    Accu-Checks  Recent Labs     06/27/22  0259 06/27/22  0629 06/27/22  0851 06/27/22  0933 06/27/22  1206 06/27/22  1255 06/27/22  1750 06/27/22  2306 06/28/22  0823 06/28/22  1255   POCTGLUCOSE 112* 107 96 101 206* 221* 285* 134* 118* 172*       Current Medications and/or Treatments Impacting Glycemic Control  Immunotherapy:    Immunosuppressants           Stop Route Frequency     mycophenolate capsule 1,000 mg         -- Oral 2 times daily " "         Steroids:   Hormones (From admission, onward)                Start     Stop Route Frequency Ordered    06/27/22 0900  predniSONE tablet 20 mg  (methylprednisolone taper panel)        "Followed by" Linked Group Details    -- Oral Daily 06/22/22 0014          Pressors:    Autonomic Drugs (From admission, onward)                None          Hyperglycemia/Diabetes Medications:   Antihyperglycemics (From admission, onward)                Start     Stop Route Frequency Ordered    06/28/22 0715  insulin aspart U-100 pen 5 Units         -- SubQ 3 times daily with meals 06/27/22 2043 06/27/22 1303  insulin aspart U-100 pen 1-10 Units         -- SubQ Before meals & nightly PRN 06/27/22 1204            ASSESSMENT and PLAN    * S/P liver transplant  Optimize BG control to improve wound healing        Type 2 diabetes mellitus with hyperglycemia  Endocrinology consulted for BG management.   BG goal 140-180      - Novolog (aspart) insulin MDC (150/25) SSI  Units SQ prn for BG excursions   - BG checks AC/HS  - Hypoglycemia protocol in place  novolog 5 units with meals     ** Please notify Endocrine for any change and/or advance in diet**  ** Please call Endocrine for any BG related issues **    Discharge Planning: novolog 4 units with meals plus correction scale 180/25. BG logs in 4 days. Follow up in discharge clinic.           Adrenal corticosteroid causing adverse effect in therapeutic use  On steroid therapy per transplant team; may elevate BG readings        Prophylactic immunotherapy  May increase insulin resistance.                Cristiano Pace NP  Endocrinology  [facility] - [facility]  "

## 2022-06-29 ENCOUNTER — PATIENT MESSAGE (OUTPATIENT)
Dept: TRANSPLANT | Facility: CLINIC | Age: 42
End: 2022-06-29

## 2022-06-29 ENCOUNTER — TELEPHONE (OUTPATIENT)
Dept: TRANSPLANT | Facility: CLINIC | Age: 42
End: 2022-06-29

## 2022-06-29 ENCOUNTER — LAB VISIT (OUTPATIENT)
Dept: LAB | Facility: HOSPITAL | Age: 42
End: 2022-06-29
Attending: SURGERY
Payer: COMMERCIAL

## 2022-06-29 ENCOUNTER — CLINICAL SUPPORT (OUTPATIENT)
Dept: TRANSPLANT | Facility: CLINIC | Age: 42
End: 2022-06-29
Payer: COMMERCIAL

## 2022-06-29 VITALS
RESPIRATION RATE: 16 BRPM | BODY MASS INDEX: 27.6 KG/M2 | WEIGHT: 186.31 LBS | DIASTOLIC BLOOD PRESSURE: 67 MMHG | HEART RATE: 112 BPM | TEMPERATURE: 97 F | HEIGHT: 69 IN | WEIGHT: 186.31 LBS | SYSTOLIC BLOOD PRESSURE: 113 MMHG | HEIGHT: 69 IN | TEMPERATURE: 97 F | RESPIRATION RATE: 16 BRPM | OXYGEN SATURATION: 100 % | BODY MASS INDEX: 27.6 KG/M2 | SYSTOLIC BLOOD PRESSURE: 113 MMHG | DIASTOLIC BLOOD PRESSURE: 67 MMHG | HEART RATE: 112 BPM | OXYGEN SATURATION: 100 %

## 2022-06-29 DIAGNOSIS — Z94.4 LIVER REPLACED BY TRANSPLANT: Primary | ICD-10-CM

## 2022-06-29 DIAGNOSIS — Z94.4 LIVER REPLACED BY TRANSPLANT: ICD-10-CM

## 2022-06-29 LAB
ALBUMIN SERPL BCP-MCNC: 2.8 G/DL (ref 3.5–5.2)
ALP SERPL-CCNC: 131 U/L (ref 55–135)
ALT SERPL W/O P-5'-P-CCNC: 404 U/L (ref 10–44)
ANION GAP SERPL CALC-SCNC: 8 MMOL/L (ref 8–16)
AST SERPL-CCNC: 98 U/L (ref 10–40)
BASOPHILS # BLD AUTO: 0.01 K/UL (ref 0–0.2)
BASOPHILS NFR BLD: 0.1 % (ref 0–1.9)
BILIRUB DIRECT SERPL-MCNC: 0.9 MG/DL (ref 0.1–0.3)
BILIRUB SERPL-MCNC: 1.9 MG/DL (ref 0.1–1)
BUN SERPL-MCNC: 19 MG/DL (ref 6–20)
CALCIUM SERPL-MCNC: 8.6 MG/DL (ref 8.7–10.5)
CHLORIDE SERPL-SCNC: 98 MMOL/L (ref 95–110)
CO2 SERPL-SCNC: 26 MMOL/L (ref 23–29)
CREAT SERPL-MCNC: 0.8 MG/DL (ref 0.5–1.4)
DIFFERENTIAL METHOD: ABNORMAL
EOSINOPHIL # BLD AUTO: 0.5 K/UL (ref 0–0.5)
EOSINOPHIL NFR BLD: 4.2 % (ref 0–8)
ERYTHROCYTE [DISTWIDTH] IN BLOOD BY AUTOMATED COUNT: 14.7 % (ref 11.5–14.5)
EST. GFR  (AFRICAN AMERICAN): >60 ML/MIN/1.73 M^2
EST. GFR  (NON AFRICAN AMERICAN): >60 ML/MIN/1.73 M^2
GLUCOSE SERPL-MCNC: 116 MG/DL (ref 70–110)
HCT VFR BLD AUTO: 32 % (ref 40–54)
HGB BLD-MCNC: 10.8 G/DL (ref 14–18)
IMM GRANULOCYTES # BLD AUTO: 0.11 K/UL (ref 0–0.04)
IMM GRANULOCYTES NFR BLD AUTO: 0.9 % (ref 0–0.5)
LYMPHOCYTES # BLD AUTO: 0.7 K/UL (ref 1–4.8)
LYMPHOCYTES NFR BLD: 5.5 % (ref 18–48)
MCH RBC QN AUTO: 32.6 PG (ref 27–31)
MCHC RBC AUTO-ENTMCNC: 33.8 G/DL (ref 32–36)
MCV RBC AUTO: 97 FL (ref 82–98)
MONOCYTES # BLD AUTO: 0.7 K/UL (ref 0.3–1)
MONOCYTES NFR BLD: 5.2 % (ref 4–15)
NEUTROPHILS # BLD AUTO: 10.5 K/UL (ref 1.8–7.7)
NEUTROPHILS NFR BLD: 84.1 % (ref 38–73)
NRBC BLD-RTO: 0 /100 WBC
PLATELET # BLD AUTO: 148 K/UL (ref 150–450)
PMV BLD AUTO: 10.7 FL (ref 9.2–12.9)
POTASSIUM SERPL-SCNC: 4 MMOL/L (ref 3.5–5.1)
PROT SERPL-MCNC: 5.3 G/DL (ref 6–8.4)
RBC # BLD AUTO: 3.31 M/UL (ref 4.6–6.2)
SODIUM SERPL-SCNC: 132 MMOL/L (ref 136–145)
TACROLIMUS BLD-MCNC: 7.8 NG/ML (ref 5–15)
WBC # BLD AUTO: 12.44 K/UL (ref 3.9–12.7)

## 2022-06-29 PROCEDURE — 80053 COMPREHEN METABOLIC PANEL: CPT | Performed by: SURGERY

## 2022-06-29 PROCEDURE — 80197 ASSAY OF TACROLIMUS: CPT | Performed by: SURGERY

## 2022-06-29 PROCEDURE — 85025 COMPLETE CBC W/AUTO DIFF WBC: CPT | Performed by: SURGERY

## 2022-06-29 PROCEDURE — 99999 PR PBB SHADOW E&M-EST. PATIENT-LVL III: ICD-10-PCS | Mod: PBBFAC,,,

## 2022-06-29 PROCEDURE — 99999 PR PBB SHADOW E&M-EST. PATIENT-LVL IV: CPT | Mod: PBBFAC,,,

## 2022-06-29 PROCEDURE — 36415 COLL VENOUS BLD VENIPUNCTURE: CPT | Performed by: SURGERY

## 2022-06-29 PROCEDURE — 82248 BILIRUBIN DIRECT: CPT | Performed by: SURGERY

## 2022-06-29 PROCEDURE — 99999 PR PBB SHADOW E&M-EST. PATIENT-LVL IV: ICD-10-PCS | Mod: PBBFAC,,,

## 2022-06-29 PROCEDURE — 99999 PR PBB SHADOW E&M-EST. PATIENT-LVL III: CPT | Mod: PBBFAC,,,

## 2022-06-29 NOTE — TELEPHONE ENCOUNTER
Patient notified and instructed via [facility], as per KRISTINA Swartz:    Your labs have been reviewed by ; continue to hold the Prograf.  Repeat labs on Friday morning here at [facility] lab on Friday morning due 8am.  before your clinic visit on same day , thanks.

## 2022-06-29 NOTE — PROGRESS NOTES
Clinic Note: First Return to Clinic Post-  Liver Transplant    Romeo Larson  is a 41 y.o. male  S/p   RIGHT LIVER LOBE (SEGS 5,6,7,8) WITHOUT MIDDLE HEPATIC VEIN transplant on 6/21/2022 (Liver) for Primary Liver Malignancy: Cholangiocarcinoma (CH-CA).      Discharge Course (Issues/Concerns): no issues    Objective:   Vitals:    06/29/22 0929   BP: 113/67   Pulse: (!) 112   Resp: 16   Temp: 97.3 °F (36.3 °C)       Met with patient and his caregiver in the clinic to review current medication list.     Current Outpatient Medications   Medication Sig Dispense Refill    amoxicillin-clavulanate 875-125mg (AUGMENTIN) 875-125 mg per tablet Take 1 tablet by mouth every 12 (twelve) hours. 60 tablet 1    aspirin 81 MG Chew CHEW 1 tablet (81 mg total) by mouth once daily. 30 tablet 11    blood sugar diagnostic Strp 1 each by Misc.(Non-Drug; Combo Route) route 3 (three) times daily. 100 each 5    blood-glucose meter Misc Use as instructed 1 each 0    calcium carbonate-vitamin D3 600 mg-20 mcg (800 unit) Tab Take 1 tablet by mouth once daily at 6am. 30 tablet 5    carvediloL (COREG) 3.125 MG tablet Take 1 tablet (3.125 mg total) by mouth 2 (two) times daily. 60 tablet 11    famotidine (PEPCID) 20 MG tablet Take 1 tablet (20 mg total) by mouth every evening. STOP 7/24/22 30 tablet 0    fluconazole (DIFLUCAN) 200 MG Tab Take 1 tablet (200 mg total) by mouth once daily. STOP 7/20/22 30 tablet 0    insulin aspart U-100 (NOVOLOG) 100 unit/mL (3 mL) InPn pen Inject 4 Units into the skin 3 (three) times daily. Plus Sliding Scale. MDD: 42 UNITS 15 mL 5    lancets 30 gauge Misc 1 each by Misc.(Non-Drug; Combo Route) route 3 (three) times daily. 100 each 5    methocarbamoL (ROBAXIN) 500 MG Tab Take 1 tablet (500 mg total) by mouth 4 (four) times daily. 120 tablet 1    mycophenolate (CELLCEPT) 250 mg Cap Take 4 capsules (1,000 mg total) by mouth 2 (two) times daily. 240 capsule 11    oxyCODONE (ROXICODONE) 10 mg Tab immediate  "release tablet Take 1-1.5 tablets (10-15 mg total) by mouth every 4 (four) hours as needed for Pain. 50 tablet 0    pen needle, diabetic 32 gauge x 5/32" Ndle 1 each by Misc.(Non-Drug; Combo Route) route 3 (three) times daily. 100 each 5    predniSONE (DELTASONE) 5 MG tablet Take by mouth once daily: 20 mg 6/27-7/3; 15 mg 7/4-7/10; 10 mg 7/11-7/17; 5 mg 7/18-7/24; STOP 7/25/22 75 tablet 0    sulfamethoxazole-trimethoprim 400-80mg (BACTRIM,SEPTRA) 400-80 mg per tablet Take 1 tablet by mouth every morning. STOP 12/18/22 30 tablet 5    valGANciclovir (VALCYTE) 450 mg Tab Take 1 tablet (450 mg total) by mouth once daily. STOP 9/19/22 30 tablet 2    tacrolimus (PROGRAF) 1 MG Cap Take 3 capsules (3 mg total) by mouth every 12 (twelve) hours. (Patient not taking: Reported on 6/29/2022) 180 capsule 11     No current facility-administered medications for this visit.       Pharmacy Interventions/Recommendations:     1) Graft Function & Immunosuppression Issues:     2) Opportunistic Infection prophylaxis:   PCP ppx: Bactrim until 12/18/22  CMV ppx: Valcyte until 9/19/22  Fungal ppx: Fluconazole until 7/20    3) Donor Serologies & Monitoring:     Donor CMV Status: Negative  Donor HCV Status: Negative  Donor HBcAb: Negative  Donor HBV GUSTABO: Negative  Donor HCV GUSTABO: Negative      4) Pain Management & Bowel Regimen: oxycodone and methocarbamol, having BMs, took oxy around 6 am this morning.      5) Blood Pressure Management: 113/67, --just walked over to clinic form cafeteria    6) Blood Sugar Management & Follow-up: checking BSGs at home, WNL    7) Electrolyte Management: WNL    8) Osteoporosis Prevention Strategy (Liver Transplant): Ca/Vit D combo, needs Vit D level and DEXA as outpatient    8) OTHER medication follow-up (patient assistance, held medications, etc):   -needs Prograf Rx if plan is to resume dose after supratherapeutic level    9) Sent an EUA Evusheld request for this patient as it is being offered to all " freshly transplanted patients.  Marilu FAQ provided to patient and answered their questions about the monoclonal antibody.      10) Reinforced medication education conducted in the hospital, including medication indications, dosing, administration, side effects, monitoring-- including timing of immunosuppressant levels.     Patient received their FIRST fill of medications from [facility].  Discussed the process for obtaining refills of medications, including verifying the dose and mailing address to have medications delivered.     Romeo and his caregivers verbalized understanding and had the opportunity to ask questions.

## 2022-06-29 NOTE — PROGRESS NOTES
1ST NURSING VISIT POST DISCHARGE NOTE    1st RN appointment with Romeo Larson post discharge 6/28/22 s/p 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.  Patient's spouse accompanied him.  Upon assessment, patient complains of none.  Incision intact with glue in place,  also with 2 drains in place to Rt.side of abdomen draining brownish colored fluid, denies any issues..  Patient that he is able to explain daily incision care and showering instructions.  Medication list and rationale were reviewed.  Patient states  did bring blue medication card and medication bottles for review.  Self-assessment reviewed with patient and spouse; time allowed for questions.  Patient expressed understanding of daily care including BID VS and medications.  Patient aware that nurse will review today's labs with a transplant physician and call with any does changes indicated.  Next labs and first post-operative transplant team appointment with labs scheduled for 7/1/22.

## 2022-07-01 ENCOUNTER — PATIENT MESSAGE (OUTPATIENT)
Dept: TRANSPLANT | Facility: CLINIC | Age: 42
End: 2022-07-01

## 2022-07-01 ENCOUNTER — OFFICE VISIT (OUTPATIENT)
Dept: TRANSPLANT | Facility: CLINIC | Age: 42
End: 2022-07-01
Payer: COMMERCIAL

## 2022-07-01 VITALS
DIASTOLIC BLOOD PRESSURE: 83 MMHG | OXYGEN SATURATION: 100 % | RESPIRATION RATE: 16 BRPM | HEIGHT: 68 IN | TEMPERATURE: 97 F | BODY MASS INDEX: 27.5 KG/M2 | SYSTOLIC BLOOD PRESSURE: 127 MMHG | HEART RATE: 124 BPM | WEIGHT: 181.44 LBS

## 2022-07-01 DIAGNOSIS — Z79.60 LONG-TERM USE OF IMMUNOSUPPRESSANT MEDICATION: ICD-10-CM

## 2022-07-01 DIAGNOSIS — Z94.4 LIVER REPLACED BY TRANSPLANT: Primary | ICD-10-CM

## 2022-07-01 DIAGNOSIS — Z94.4 S/P LIVER TRANSPLANT: Primary | ICD-10-CM

## 2022-07-01 DIAGNOSIS — Z51.81 ENCOUNTER FOR THERAPEUTIC DRUG MONITORING: ICD-10-CM

## 2022-07-01 PROCEDURE — 3074F SYST BP LT 130 MM HG: CPT | Mod: CPTII,S$GLB,, | Performed by: TRANSPLANT SURGERY

## 2022-07-01 PROCEDURE — 3044F HG A1C LEVEL LT 7.0%: CPT | Mod: CPTII,S$GLB,, | Performed by: TRANSPLANT SURGERY

## 2022-07-01 PROCEDURE — 3044F PR MOST RECENT HEMOGLOBIN A1C LEVEL <7.0%: ICD-10-PCS | Mod: CPTII,S$GLB,, | Performed by: TRANSPLANT SURGERY

## 2022-07-01 PROCEDURE — 1111F PR DISCHARGE MEDS RECONCILED W/ CURRENT OUTPATIENT MED LIST: ICD-10-PCS | Mod: CPTII,S$GLB,, | Performed by: TRANSPLANT SURGERY

## 2022-07-01 PROCEDURE — 3008F PR BODY MASS INDEX (BMI) DOCUMENTED: ICD-10-PCS | Mod: CPTII,S$GLB,, | Performed by: TRANSPLANT SURGERY

## 2022-07-01 PROCEDURE — 1159F PR MEDICATION LIST DOCUMENTED IN MEDICAL RECORD: ICD-10-PCS | Mod: CPTII,S$GLB,, | Performed by: TRANSPLANT SURGERY

## 2022-07-01 PROCEDURE — 1111F DSCHRG MED/CURRENT MED MERGE: CPT | Mod: CPTII,S$GLB,, | Performed by: TRANSPLANT SURGERY

## 2022-07-01 PROCEDURE — 99215 OFFICE O/P EST HI 40 MIN: CPT | Mod: 24,S$GLB,, | Performed by: TRANSPLANT SURGERY

## 2022-07-01 PROCEDURE — 99999 PR PBB SHADOW E&M-EST. PATIENT-LVL IV: CPT | Mod: PBBFAC,,,

## 2022-07-01 PROCEDURE — 99215 PR OFFICE/OUTPT VISIT, EST, LEVL V, 40-54 MIN: ICD-10-PCS | Mod: 24,S$GLB,, | Performed by: TRANSPLANT SURGERY

## 2022-07-01 PROCEDURE — 3079F DIAST BP 80-89 MM HG: CPT | Mod: CPTII,S$GLB,, | Performed by: TRANSPLANT SURGERY

## 2022-07-01 PROCEDURE — 4010F ACE/ARB THERAPY RXD/TAKEN: CPT | Mod: CPTII,S$GLB,, | Performed by: TRANSPLANT SURGERY

## 2022-07-01 PROCEDURE — 3079F PR MOST RECENT DIASTOLIC BLOOD PRESSURE 80-89 MM HG: ICD-10-PCS | Mod: CPTII,S$GLB,, | Performed by: TRANSPLANT SURGERY

## 2022-07-01 PROCEDURE — 99999 PR PBB SHADOW E&M-EST. PATIENT-LVL IV: ICD-10-PCS | Mod: PBBFAC,,,

## 2022-07-01 PROCEDURE — 1159F MED LIST DOCD IN RCRD: CPT | Mod: CPTII,S$GLB,, | Performed by: TRANSPLANT SURGERY

## 2022-07-01 PROCEDURE — 4010F PR ACE/ARB THEARPY RXD/TAKEN: ICD-10-PCS | Mod: CPTII,S$GLB,, | Performed by: TRANSPLANT SURGERY

## 2022-07-01 PROCEDURE — 3074F PR MOST RECENT SYSTOLIC BLOOD PRESSURE < 130 MM HG: ICD-10-PCS | Mod: CPTII,S$GLB,, | Performed by: TRANSPLANT SURGERY

## 2022-07-01 PROCEDURE — 3008F BODY MASS INDEX DOCD: CPT | Mod: CPTII,S$GLB,, | Performed by: TRANSPLANT SURGERY

## 2022-07-01 NOTE — TELEPHONE ENCOUNTER
Patient notified and instructed via [facility]:  to start Prograf 2mg twice daily(Rx.to be sent to [facility] per patient request.  Repeat labs on Fernando 7/3/22

## 2022-07-01 NOTE — PROGRESS NOTES
Transplant Surgery  Liver Transplant Recipient Follow-up    Original Referring Physician: Pravin Lacey  Current Corresponding Physician: Pravin Maria    Chief Complaint: Romeo is here for follow up of his liver transplant performed 6/21/2022 for the primary diagnosis (UNOS) of Primary Liver Malignancy: Cholangiocarcinoma (CH-CA)    ORGAN: RIGHT LIVER LOBE (SEGS 5,6,7,8) WITHOUT MIDDLE HEPATIC VEIN  Whole or Partial: partial   Donor Type: living  PHS Increased Risk:   Donor CMV Status:   Donor HCV Status:   Donor HBcAb:   Donor HBV GUSTABO:   Donor HCV GUSTABO:     Biliary Anastomosis: vick-en-y  Arterial Anatomy: standard  IVC reconstruction: hepatic vein confluence piggyback  Portal vein status: patent    Subjective:     History of Present Illness: He has had the following complications since transplant: bile leak.  The noted complications are well controlled. 20-40 from drain number 2 daily, 50-75 from drain 1.  Both serous    Interval History: Currently, he is doing well.  Current complaints include none.  Romeo is here for management of his immunosuppression medication.    External provider notes reviewed: Yes    Review of Systems  Objective:     Physical Exam  Constitutional:       Appearance: He is well-developed.   HENT:      Head: Normocephalic and atraumatic.   Eyes:      Pupils: Pupils are equal, round, and reactive to light.   Neck:      Vascular: No JVD.   Cardiovascular:      Rate and Rhythm: Normal rate and regular rhythm.      Heart sounds: Normal heart sounds.   Pulmonary:      Effort: Pulmonary effort is normal.      Breath sounds: Normal breath sounds. No stridor.   Abdominal:      General: There is no distension.      Palpations: Abdomen is soft.      Tenderness: There is no abdominal tenderness.       Musculoskeletal:         General: Normal range of motion.   Skin:     General: Skin is warm and dry.   Neurological:      Mental Status: He is alert and oriented to person,  place, and time.   Psychiatric:         Behavior: Behavior normal.       Lab Results   Component Value Date    BILITOT 1.3 (H) 07/01/2022     (H) 07/01/2022     (H) 07/01/2022    ALKPHOS 205 (H) 07/01/2022    CREATININE 1.0 07/01/2022    ALBUMIN 3.1 (L) 07/01/2022     Lab Results   Component Value Date    WBC 12.71 (H) 07/01/2022    HGB 11.6 (L) 07/01/2022    HCT 35.2 (L) 07/01/2022    HCT 25 (L) 06/23/2022     07/01/2022     Lab Results   Component Value Date    TACROLIMUS 3.7 (L) 07/01/2022       Diagnostics:  The following labs have been reviewed: CBC; tac level  CMP  INR  The following radiology images have been independently reviewed and interpreted: Liver US    Assessment/Plan:          · S/P liver transplant.  · Removed SHRUTHI 2  · Chronic immunosuppressive medications for rejection prophylaxis subtherapeutic.  Plan: need to restart today.  · Continue monitoring symptoms, labs and drug levels for drug-related toxicity and side effects.  · Incision: wound c/d/i  · Femoral arterial line site: no complications evident    Additional testing to be completed according to Written Order Guidelines for Post-Liver and Combined Liver/Kidney Transplant Follow-up (LI-09)    Interpretation of tests and discussion of patient management involves all members of the multidisciplinary transplant team  Patient advised that it is recommended that all transplanted patients, and their close contacts and household members receive Covid vaccination.  MD JUDIT Linn Jr Patient Status  Functional Status: 70% - Cares for self: unable to carry on normal activity or active work  Physical Capacity: No Limitations

## 2022-07-03 ENCOUNTER — TELEPHONE (OUTPATIENT)
Dept: TRANSPLANT | Facility: CLINIC | Age: 42
End: 2022-07-03
Payer: COMMERCIAL

## 2022-07-03 ENCOUNTER — NURSE TRIAGE (OUTPATIENT)
Dept: ADMINISTRATIVE | Facility: CLINIC | Age: 42
End: 2022-07-03
Payer: COMMERCIAL

## 2022-07-03 NOTE — TELEPHONE ENCOUNTER
Reviewed today's labs with Dr. Cline.  Plan- no med changes.  Next labs Tuesday 7/5/22.  Patient's wife, Shell, notified.

## 2022-07-04 ENCOUNTER — ANESTHESIA (OUTPATIENT)
Dept: SURGERY | Facility: HOSPITAL | Age: 42
DRG: 357 | End: 2022-07-04
Payer: COMMERCIAL

## 2022-07-04 ENCOUNTER — ANESTHESIA EVENT (OUTPATIENT)
Dept: SURGERY | Facility: HOSPITAL | Age: 42
DRG: 357 | End: 2022-07-04
Payer: COMMERCIAL

## 2022-07-04 ENCOUNTER — HOSPITAL ENCOUNTER (INPATIENT)
Facility: HOSPITAL | Age: 42
LOS: 4 days | Discharge: HOME | DRG: 357 | End: 2022-07-08
Attending: TRANSPLANT SURGERY | Admitting: TRANSPLANT SURGERY
Payer: COMMERCIAL

## 2022-07-04 DIAGNOSIS — R06.6 HICCUPS: ICD-10-CM

## 2022-07-04 DIAGNOSIS — Z29.89 PROPHYLACTIC IMMUNOTHERAPY: ICD-10-CM

## 2022-07-04 DIAGNOSIS — K66.8 BILOMA FOLLOWING SURGERY, SUBSEQUENT ENCOUNTER: ICD-10-CM

## 2022-07-04 DIAGNOSIS — K66.8 BILOMA FOLLOWING SURGERY, INITIAL ENCOUNTER: ICD-10-CM

## 2022-07-04 DIAGNOSIS — D72.829 LEUKOCYTOSIS, UNSPECIFIED TYPE: ICD-10-CM

## 2022-07-04 DIAGNOSIS — E11.65 TYPE 2 DIABETES MELLITUS WITH HYPERGLYCEMIA, WITH LONG-TERM CURRENT USE OF INSULIN: ICD-10-CM

## 2022-07-04 DIAGNOSIS — T81.89XA BILOMA FOLLOWING SURGERY, INITIAL ENCOUNTER: ICD-10-CM

## 2022-07-04 DIAGNOSIS — Z91.89 AT RISK FOR OPPORTUNISTIC INFECTIONS: ICD-10-CM

## 2022-07-04 DIAGNOSIS — Z79.4 TYPE 2 DIABETES MELLITUS WITH HYPERGLYCEMIA, WITH LONG-TERM CURRENT USE OF INSULIN: ICD-10-CM

## 2022-07-04 DIAGNOSIS — Z94.4 S/P LIVER TRANSPLANT: ICD-10-CM

## 2022-07-04 DIAGNOSIS — T38.0X5A ADRENAL CORTICOSTEROID CAUSING ADVERSE EFFECT IN THERAPEUTIC USE: ICD-10-CM

## 2022-07-04 DIAGNOSIS — Z79.899 HIGH RISK MEDICATION USE: ICD-10-CM

## 2022-07-04 DIAGNOSIS — T86.49 BILOMA OF TRANSPLANTED LIVER: Primary | ICD-10-CM

## 2022-07-04 DIAGNOSIS — T81.89XD BILOMA FOLLOWING SURGERY, SUBSEQUENT ENCOUNTER: ICD-10-CM

## 2022-07-04 DIAGNOSIS — Z79.60 LONG-TERM USE OF IMMUNOSUPPRESSANT MEDICATION: ICD-10-CM

## 2022-07-04 DIAGNOSIS — E13.9 SECONDARY DIABETES: ICD-10-CM

## 2022-07-04 DIAGNOSIS — K66.8 BILOMA OF TRANSPLANTED LIVER: Primary | ICD-10-CM

## 2022-07-04 LAB
ABO + RH BLD: NORMAL
ALBUMIN SERPL BCP-MCNC: 2.9 G/DL (ref 3.5–5.2)
ALBUMIN SERPL BCP-MCNC: 2.9 G/DL (ref 3.5–5.2)
ALP SERPL-CCNC: 215 U/L (ref 55–135)
ALP SERPL-CCNC: 215 U/L (ref 55–135)
ALT SERPL W/O P-5'-P-CCNC: 237 U/L (ref 10–44)
ALT SERPL W/O P-5'-P-CCNC: 237 U/L (ref 10–44)
ANION GAP SERPL CALC-SCNC: 11 MMOL/L (ref 8–16)
ANION GAP SERPL CALC-SCNC: 11 MMOL/L (ref 8–16)
AST SERPL-CCNC: 44 U/L (ref 10–40)
AST SERPL-CCNC: 44 U/L (ref 10–40)
BACTERIA #/AREA URNS AUTO: ABNORMAL /HPF
BASOPHILS # BLD AUTO: 0.08 K/UL (ref 0–0.2)
BASOPHILS # BLD AUTO: 0.08 K/UL (ref 0–0.2)
BASOPHILS NFR BLD: 0.3 % (ref 0–1.9)
BASOPHILS NFR BLD: 0.3 % (ref 0–1.9)
BILIRUB FLD-MCNC: 7 MG/DL
BILIRUB SERPL-MCNC: 1.6 MG/DL (ref 0.1–1)
BILIRUB SERPL-MCNC: 1.6 MG/DL (ref 0.1–1)
BILIRUB UR QL STRIP: NEGATIVE
BLD GP AB SCN CELLS X3 SERPL QL: NORMAL
BODY FLUID SOURCE, BILIRUBIN: NORMAL
BUN SERPL-MCNC: 13 MG/DL (ref 6–20)
BUN SERPL-MCNC: 13 MG/DL (ref 6–20)
CALCIUM SERPL-MCNC: 9.4 MG/DL (ref 8.7–10.5)
CALCIUM SERPL-MCNC: 9.4 MG/DL (ref 8.7–10.5)
CHLORIDE SERPL-SCNC: 94 MMOL/L (ref 95–110)
CHLORIDE SERPL-SCNC: 94 MMOL/L (ref 95–110)
CLARITY UR REFRACT.AUTO: ABNORMAL
CO2 SERPL-SCNC: 25 MMOL/L (ref 23–29)
CO2 SERPL-SCNC: 25 MMOL/L (ref 23–29)
COLOR UR AUTO: YELLOW
CREAT SERPL-MCNC: 0.8 MG/DL (ref 0.5–1.4)
CREAT SERPL-MCNC: 0.8 MG/DL (ref 0.5–1.4)
DIFFERENTIAL METHOD: ABNORMAL
DIFFERENTIAL METHOD: ABNORMAL
EOSINOPHIL # BLD AUTO: 0 K/UL (ref 0–0.5)
EOSINOPHIL # BLD AUTO: 0 K/UL (ref 0–0.5)
EOSINOPHIL NFR BLD: 0.1 % (ref 0–8)
EOSINOPHIL NFR BLD: 0.1 % (ref 0–8)
ERYTHROCYTE [DISTWIDTH] IN BLOOD BY AUTOMATED COUNT: 15.4 % (ref 11.5–14.5)
ERYTHROCYTE [DISTWIDTH] IN BLOOD BY AUTOMATED COUNT: 15.4 % (ref 11.5–14.5)
EST. GFR  (AFRICAN AMERICAN): >60 ML/MIN/1.73 M^2
EST. GFR  (AFRICAN AMERICAN): >60 ML/MIN/1.73 M^2
EST. GFR  (NON AFRICAN AMERICAN): >60 ML/MIN/1.73 M^2
EST. GFR  (NON AFRICAN AMERICAN): >60 ML/MIN/1.73 M^2
GLUCOSE SERPL-MCNC: 196 MG/DL (ref 70–110)
GLUCOSE SERPL-MCNC: 196 MG/DL (ref 70–110)
GLUCOSE UR QL STRIP: NEGATIVE
GRAM STN SPEC: NORMAL
HCT VFR BLD AUTO: 36.3 % (ref 40–54)
HCT VFR BLD AUTO: 36.3 % (ref 40–54)
HGB BLD-MCNC: 11.7 G/DL (ref 14–18)
HGB BLD-MCNC: 11.7 G/DL (ref 14–18)
HGB UR QL STRIP: ABNORMAL
IMM GRANULOCYTES # BLD AUTO: 0.28 K/UL (ref 0–0.04)
IMM GRANULOCYTES # BLD AUTO: 0.28 K/UL (ref 0–0.04)
IMM GRANULOCYTES NFR BLD AUTO: 0.9 % (ref 0–0.5)
IMM GRANULOCYTES NFR BLD AUTO: 0.9 % (ref 0–0.5)
KETONES UR QL STRIP: NEGATIVE
LACTATE SERPL-SCNC: 2.1 MMOL/L (ref 0.5–2.2)
LEUKOCYTE ESTERASE UR QL STRIP: ABNORMAL
LYMPHOCYTES # BLD AUTO: 1.1 K/UL (ref 1–4.8)
LYMPHOCYTES # BLD AUTO: 1.1 K/UL (ref 1–4.8)
LYMPHOCYTES NFR BLD: 3.4 % (ref 18–48)
LYMPHOCYTES NFR BLD: 3.4 % (ref 18–48)
MAGNESIUM SERPL-MCNC: 1.4 MG/DL (ref 1.6–2.6)
MAGNESIUM SERPL-MCNC: 1.4 MG/DL (ref 1.6–2.6)
MCH RBC QN AUTO: 33.1 PG (ref 27–31)
MCH RBC QN AUTO: 33.1 PG (ref 27–31)
MCHC RBC AUTO-ENTMCNC: 32.2 G/DL (ref 32–36)
MCHC RBC AUTO-ENTMCNC: 32.2 G/DL (ref 32–36)
MCV RBC AUTO: 103 FL (ref 82–98)
MCV RBC AUTO: 103 FL (ref 82–98)
MICROSCOPIC COMMENT: ABNORMAL
MONOCYTES # BLD AUTO: 1.5 K/UL (ref 0.3–1)
MONOCYTES # BLD AUTO: 1.5 K/UL (ref 0.3–1)
MONOCYTES NFR BLD: 4.9 % (ref 4–15)
MONOCYTES NFR BLD: 4.9 % (ref 4–15)
NEUTROPHILS # BLD AUTO: 28.1 K/UL (ref 1.8–7.7)
NEUTROPHILS # BLD AUTO: 28.1 K/UL (ref 1.8–7.7)
NEUTROPHILS NFR BLD: 90.4 % (ref 38–73)
NEUTROPHILS NFR BLD: 90.4 % (ref 38–73)
NITRITE UR QL STRIP: NEGATIVE
NON-SQ EPI CELLS #/AREA URNS AUTO: <1 /HPF
NRBC BLD-RTO: 0 /100 WBC
NRBC BLD-RTO: 0 /100 WBC
PH UR STRIP: 6 [PH] (ref 5–8)
PHOSPHATE SERPL-MCNC: 4.1 MG/DL (ref 2.7–4.5)
PLATELET # BLD AUTO: 241 K/UL (ref 150–450)
PLATELET # BLD AUTO: 241 K/UL (ref 150–450)
PMV BLD AUTO: 10.5 FL (ref 9.2–12.9)
PMV BLD AUTO: 10.5 FL (ref 9.2–12.9)
POCT GLUCOSE: 173 MG/DL (ref 70–110)
POCT GLUCOSE: 188 MG/DL (ref 70–110)
POCT GLUCOSE: 199 MG/DL (ref 70–110)
POCT GLUCOSE: 202 MG/DL (ref 70–110)
POCT GLUCOSE: 292 MG/DL (ref 70–110)
POTASSIUM SERPL-SCNC: 4.9 MMOL/L (ref 3.5–5.1)
POTASSIUM SERPL-SCNC: 4.9 MMOL/L (ref 3.5–5.1)
PROT SERPL-MCNC: 6.3 G/DL (ref 6–8.4)
PROT SERPL-MCNC: 6.3 G/DL (ref 6–8.4)
PROT UR QL STRIP: NEGATIVE
RBC # BLD AUTO: 3.53 M/UL (ref 4.6–6.2)
RBC # BLD AUTO: 3.53 M/UL (ref 4.6–6.2)
RBC #/AREA URNS AUTO: 48 /HPF (ref 0–4)
SODIUM SERPL-SCNC: 130 MMOL/L (ref 136–145)
SODIUM SERPL-SCNC: 130 MMOL/L (ref 136–145)
SP GR UR STRIP: 1.01 (ref 1–1.03)
SQUAMOUS #/AREA URNS AUTO: 1 /HPF
URN SPEC COLLECT METH UR: ABNORMAL
WBC # BLD AUTO: 31.03 K/UL (ref 3.9–12.7)
WBC # BLD AUTO: 31.03 K/UL (ref 3.9–12.7)
WBC #/AREA URNS AUTO: 15 /HPF (ref 0–5)
WBC CLUMPS UR QL AUTO: ABNORMAL

## 2022-07-04 PROCEDURE — 87075 CULTR BACTERIA EXCEPT BLOOD: CPT | Mod: 59 | Performed by: TRANSPLANT SURGERY

## 2022-07-04 PROCEDURE — 63600175 PHARM REV CODE 636 W HCPCS: Performed by: PHYSICIAN ASSISTANT

## 2022-07-04 PROCEDURE — 87116 MYCOBACTERIA CULTURE: CPT | Performed by: TRANSPLANT SURGERY

## 2022-07-04 PROCEDURE — 81001 URINALYSIS AUTO W/SCOPE: CPT | Performed by: CLINICAL NURSE SPECIALIST

## 2022-07-04 PROCEDURE — 71000015 HC POSTOP RECOV 1ST HR: Performed by: SURGERY

## 2022-07-04 PROCEDURE — 84100 ASSAY OF PHOSPHORUS: CPT | Performed by: CLINICAL NURSE SPECIALIST

## 2022-07-04 PROCEDURE — 25000003 PHARM REV CODE 250: Performed by: TRANSPLANT SURGERY

## 2022-07-04 PROCEDURE — 99222 1ST HOSP IP/OBS MODERATE 55: CPT | Mod: ,,, | Performed by: NURSE PRACTITIONER

## 2022-07-04 PROCEDURE — 27201423 OPTIME MED/SURG SUP & DEVICES STERILE SUPPLY: Performed by: SURGERY

## 2022-07-04 PROCEDURE — 71000033 HC RECOVERY, INTIAL HOUR: Performed by: SURGERY

## 2022-07-04 PROCEDURE — 87076 CULTURE ANAEROBE IDENT EACH: CPT | Performed by: TRANSPLANT SURGERY

## 2022-07-04 PROCEDURE — 82247 BILIRUBIN TOTAL: CPT | Performed by: CLINICAL NURSE SPECIALIST

## 2022-07-04 PROCEDURE — 94761 N-INVAS EAR/PLS OXIMETRY MLT: CPT

## 2022-07-04 PROCEDURE — 87205 SMEAR GRAM STAIN: CPT | Performed by: TRANSPLANT SURGERY

## 2022-07-04 PROCEDURE — 83735 ASSAY OF MAGNESIUM: CPT | Performed by: CLINICAL NURSE SPECIALIST

## 2022-07-04 PROCEDURE — D9220A PRA ANESTHESIA: ICD-10-PCS | Mod: ANES,,, | Performed by: ANESTHESIOLOGY

## 2022-07-04 PROCEDURE — 25000003 PHARM REV CODE 250: Performed by: STUDENT IN AN ORGANIZED HEALTH CARE EDUCATION/TRAINING PROGRAM

## 2022-07-04 PROCEDURE — 37000008 HC ANESTHESIA 1ST 15 MINUTES: Performed by: SURGERY

## 2022-07-04 PROCEDURE — 36415 COLL VENOUS BLD VENIPUNCTURE: CPT | Performed by: CLINICAL NURSE SPECIALIST

## 2022-07-04 PROCEDURE — 63600175 PHARM REV CODE 636 W HCPCS: Performed by: TRANSPLANT SURGERY

## 2022-07-04 PROCEDURE — D9220A PRA ANESTHESIA: Mod: CRNA,,, | Performed by: STUDENT IN AN ORGANIZED HEALTH CARE EDUCATION/TRAINING PROGRAM

## 2022-07-04 PROCEDURE — 87206 SMEAR FLUORESCENT/ACID STAI: CPT | Mod: 91 | Performed by: TRANSPLANT SURGERY

## 2022-07-04 PROCEDURE — 35840 PR EXPLOR POSTOP BLEED,INFEC,CLOT-ABD: ICD-10-PCS | Mod: 78,,, | Performed by: SURGERY

## 2022-07-04 PROCEDURE — 99222 PR INITIAL HOSPITAL CARE,LEVL II: ICD-10-PCS | Mod: ,,, | Performed by: NURSE PRACTITIONER

## 2022-07-04 PROCEDURE — 86901 BLOOD TYPING SEROLOGIC RH(D): CPT | Performed by: CLINICAL NURSE SPECIALIST

## 2022-07-04 PROCEDURE — 83605 ASSAY OF LACTIC ACID: CPT | Mod: 91 | Performed by: CLINICAL NURSE SPECIALIST

## 2022-07-04 PROCEDURE — 25000003 PHARM REV CODE 250: Performed by: ANESTHESIOLOGY

## 2022-07-04 PROCEDURE — 99223 PR INITIAL HOSPITAL CARE,LEVL III: ICD-10-PCS | Mod: 24,,, | Performed by: CLINICAL NURSE SPECIALIST

## 2022-07-04 PROCEDURE — 85025 COMPLETE CBC W/AUTO DIFF WBC: CPT | Performed by: CLINICAL NURSE SPECIALIST

## 2022-07-04 PROCEDURE — 80053 COMPREHEN METABOLIC PANEL: CPT | Performed by: CLINICAL NURSE SPECIALIST

## 2022-07-04 PROCEDURE — 63600175 PHARM REV CODE 636 W HCPCS: Performed by: CLINICAL NURSE SPECIALIST

## 2022-07-04 PROCEDURE — 63600175 PHARM REV CODE 636 W HCPCS: Performed by: NURSE PRACTITIONER

## 2022-07-04 PROCEDURE — 20600001 HC STEP DOWN PRIVATE ROOM

## 2022-07-04 PROCEDURE — 35840 PR EXPLOR POSTOP BLEED,INFEC,CLOT-ABD: ICD-10-PCS | Mod: 78,82,, | Performed by: TRANSPLANT SURGERY

## 2022-07-04 PROCEDURE — 36000707: Performed by: SURGERY

## 2022-07-04 PROCEDURE — 99223 1ST HOSP IP/OBS HIGH 75: CPT | Mod: 24,,, | Performed by: CLINICAL NURSE SPECIALIST

## 2022-07-04 PROCEDURE — 87102 FUNGUS ISOLATION CULTURE: CPT | Performed by: TRANSPLANT SURGERY

## 2022-07-04 PROCEDURE — 35840 EXPLORE ABDOMINAL VESSELS: CPT | Mod: 78,82,, | Performed by: TRANSPLANT SURGERY

## 2022-07-04 PROCEDURE — D9220A PRA ANESTHESIA: Mod: ANES,,, | Performed by: ANESTHESIOLOGY

## 2022-07-04 PROCEDURE — 37000009 HC ANESTHESIA EA ADD 15 MINS: Performed by: SURGERY

## 2022-07-04 PROCEDURE — 25000003 PHARM REV CODE 250: Performed by: CLINICAL NURSE SPECIALIST

## 2022-07-04 PROCEDURE — 35840 EXPLORE ABDOMINAL VESSELS: CPT | Mod: 78,,, | Performed by: SURGERY

## 2022-07-04 PROCEDURE — 87070 CULTURE OTHR SPECIMN AEROBIC: CPT | Mod: 59 | Performed by: TRANSPLANT SURGERY

## 2022-07-04 PROCEDURE — 36000706: Performed by: SURGERY

## 2022-07-04 PROCEDURE — 87086 URINE CULTURE/COLONY COUNT: CPT | Performed by: CLINICAL NURSE SPECIALIST

## 2022-07-04 PROCEDURE — 87040 BLOOD CULTURE FOR BACTERIA: CPT | Mod: 59 | Performed by: CLINICAL NURSE SPECIALIST

## 2022-07-04 PROCEDURE — 63600175 PHARM REV CODE 636 W HCPCS: Performed by: STUDENT IN AN ORGANIZED HEALTH CARE EDUCATION/TRAINING PROGRAM

## 2022-07-04 PROCEDURE — D9220A PRA ANESTHESIA: ICD-10-PCS | Mod: CRNA,,, | Performed by: STUDENT IN AN ORGANIZED HEALTH CARE EDUCATION/TRAINING PROGRAM

## 2022-07-04 RX ORDER — KETAMINE HCL IN 0.9 % NACL 50 MG/5 ML
SYRINGE (ML) INTRAVENOUS
Status: DISCONTINUED | OUTPATIENT
Start: 2022-07-04 | End: 2022-07-04

## 2022-07-04 RX ORDER — ACETAMINOPHEN 325 MG/1
650 TABLET ORAL EVERY 8 HOURS PRN
Status: DISCONTINUED | OUTPATIENT
Start: 2022-07-04 | End: 2022-07-08 | Stop reason: HOSPADM

## 2022-07-04 RX ORDER — DIPHENHYDRAMINE HYDROCHLORIDE 50 MG/ML
25 INJECTION INTRAMUSCULAR; INTRAVENOUS EVERY 6 HOURS PRN
Status: DISCONTINUED | OUTPATIENT
Start: 2022-07-04 | End: 2022-07-05

## 2022-07-04 RX ORDER — SODIUM CHLORIDE 0.9 % (FLUSH) 0.9 %
3 SYRINGE (ML) INJECTION
Status: DISCONTINUED | OUTPATIENT
Start: 2022-07-04 | End: 2022-07-05

## 2022-07-04 RX ORDER — HYDROMORPHONE HYDROCHLORIDE 1 MG/ML
0.5 INJECTION, SOLUTION INTRAMUSCULAR; INTRAVENOUS; SUBCUTANEOUS EVERY 4 HOURS PRN
Status: DISCONTINUED | OUTPATIENT
Start: 2022-07-04 | End: 2022-07-05

## 2022-07-04 RX ORDER — MIDAZOLAM HYDROCHLORIDE 1 MG/ML
INJECTION, SOLUTION INTRAMUSCULAR; INTRAVENOUS
Status: DISCONTINUED | OUTPATIENT
Start: 2022-07-04 | End: 2022-07-04

## 2022-07-04 RX ORDER — HYDROMORPHONE HYDROCHLORIDE 1 MG/ML
0.2 INJECTION, SOLUTION INTRAMUSCULAR; INTRAVENOUS; SUBCUTANEOUS EVERY 5 MIN PRN
Status: DISCONTINUED | OUTPATIENT
Start: 2022-07-04 | End: 2022-07-05

## 2022-07-04 RX ORDER — MAGNESIUM SULFATE HEPTAHYDRATE 40 MG/ML
2 INJECTION, SOLUTION INTRAVENOUS ONCE
Status: COMPLETED | OUTPATIENT
Start: 2022-07-04 | End: 2022-07-04

## 2022-07-04 RX ORDER — CARVEDILOL 3.12 MG/1
3.12 TABLET ORAL 2 TIMES DAILY
Status: DISCONTINUED | OUTPATIENT
Start: 2022-07-04 | End: 2022-07-08 | Stop reason: HOSPADM

## 2022-07-04 RX ORDER — INSULIN ASPART 100 [IU]/ML
0-5 INJECTION, SOLUTION INTRAVENOUS; SUBCUTANEOUS EVERY 6 HOURS PRN
Status: DISCONTINUED | OUTPATIENT
Start: 2022-07-04 | End: 2022-07-04

## 2022-07-04 RX ORDER — VASOPRESSIN 20 [USP'U]/ML
INJECTION, SOLUTION INTRAMUSCULAR; SUBCUTANEOUS
Status: DISCONTINUED | OUTPATIENT
Start: 2022-07-04 | End: 2022-07-04

## 2022-07-04 RX ORDER — GLUCAGON 1 MG
1 KIT INJECTION
Status: DISCONTINUED | OUTPATIENT
Start: 2022-07-04 | End: 2022-07-05

## 2022-07-04 RX ORDER — SODIUM CHLORIDE 9 MG/ML
INJECTION, SOLUTION INTRAVENOUS CONTINUOUS
Status: DISCONTINUED | OUTPATIENT
Start: 2022-07-04 | End: 2022-07-06

## 2022-07-04 RX ORDER — SODIUM CHLORIDE 0.9 % (FLUSH) 0.9 %
10 SYRINGE (ML) INJECTION
Status: DISCONTINUED | OUTPATIENT
Start: 2022-07-04 | End: 2022-07-08 | Stop reason: HOSPADM

## 2022-07-04 RX ORDER — ONDANSETRON 2 MG/ML
INJECTION INTRAMUSCULAR; INTRAVENOUS
Status: DISCONTINUED | OUTPATIENT
Start: 2022-07-04 | End: 2022-07-04

## 2022-07-04 RX ORDER — TACROLIMUS 1 MG/1
2 CAPSULE ORAL EVERY EVENING
Status: DISCONTINUED | OUTPATIENT
Start: 2022-07-04 | End: 2022-07-06

## 2022-07-04 RX ORDER — CEFEPIME HYDROCHLORIDE 1 G/50ML
2 INJECTION, SOLUTION INTRAVENOUS
Status: DISCONTINUED | OUTPATIENT
Start: 2022-07-04 | End: 2022-07-04

## 2022-07-04 RX ORDER — TACROLIMUS 1 MG/1
3 CAPSULE ORAL EVERY MORNING
Status: DISCONTINUED | OUTPATIENT
Start: 2022-07-04 | End: 2022-07-06

## 2022-07-04 RX ORDER — DEXAMETHASONE SODIUM PHOSPHATE 4 MG/ML
INJECTION, SOLUTION INTRA-ARTICULAR; INTRALESIONAL; INTRAMUSCULAR; INTRAVENOUS; SOFT TISSUE
Status: DISCONTINUED | OUTPATIENT
Start: 2022-07-04 | End: 2022-07-04

## 2022-07-04 RX ORDER — PROPOFOL 10 MG/ML
VIAL (ML) INTRAVENOUS
Status: DISCONTINUED | OUTPATIENT
Start: 2022-07-04 | End: 2022-07-04

## 2022-07-04 RX ORDER — CEFEPIME HYDROCHLORIDE 1 G/50ML
2 INJECTION, SOLUTION INTRAVENOUS
Status: DISCONTINUED | OUTPATIENT
Start: 2022-07-04 | End: 2022-07-07

## 2022-07-04 RX ORDER — LIDOCAINE HYDROCHLORIDE 20 MG/ML
INJECTION INTRAVENOUS
Status: DISCONTINUED | OUTPATIENT
Start: 2022-07-04 | End: 2022-07-04

## 2022-07-04 RX ORDER — ROCURONIUM BROMIDE 10 MG/ML
INJECTION, SOLUTION INTRAVENOUS
Status: DISCONTINUED | OUTPATIENT
Start: 2022-07-04 | End: 2022-07-04

## 2022-07-04 RX ORDER — TALC
6 POWDER (GRAM) TOPICAL NIGHTLY PRN
Status: DISCONTINUED | OUTPATIENT
Start: 2022-07-04 | End: 2022-07-08 | Stop reason: HOSPADM

## 2022-07-04 RX ORDER — ONDANSETRON 2 MG/ML
4 INJECTION INTRAMUSCULAR; INTRAVENOUS EVERY 8 HOURS PRN
Status: DISCONTINUED | OUTPATIENT
Start: 2022-07-04 | End: 2022-07-08 | Stop reason: HOSPADM

## 2022-07-04 RX ORDER — FENTANYL CITRATE 50 UG/ML
INJECTION, SOLUTION INTRAMUSCULAR; INTRAVENOUS
Status: DISCONTINUED | OUTPATIENT
Start: 2022-07-04 | End: 2022-07-04

## 2022-07-04 RX ORDER — FENTANYL CITRATE 50 UG/ML
25 INJECTION, SOLUTION INTRAMUSCULAR; INTRAVENOUS EVERY 5 MIN PRN
Status: DISCONTINUED | OUTPATIENT
Start: 2022-07-04 | End: 2022-07-05

## 2022-07-04 RX ORDER — PROCHLORPERAZINE EDISYLATE 5 MG/ML
5 INJECTION INTRAMUSCULAR; INTRAVENOUS EVERY 30 MIN PRN
Status: DISCONTINUED | OUTPATIENT
Start: 2022-07-04 | End: 2022-07-05

## 2022-07-04 RX ORDER — OXYCODONE HYDROCHLORIDE 5 MG/1
10 TABLET ORAL EVERY 4 HOURS PRN
Status: DISCONTINUED | OUTPATIENT
Start: 2022-07-04 | End: 2022-07-08 | Stop reason: HOSPADM

## 2022-07-04 RX ORDER — FLUCONAZOLE 200 MG/1
200 TABLET ORAL DAILY
Status: DISCONTINUED | OUTPATIENT
Start: 2022-07-04 | End: 2022-07-08 | Stop reason: HOSPADM

## 2022-07-04 RX ORDER — VALGANCICLOVIR 450 MG/1
450 TABLET, FILM COATED ORAL DAILY
Status: DISCONTINUED | OUTPATIENT
Start: 2022-07-04 | End: 2022-07-08 | Stop reason: HOSPADM

## 2022-07-04 RX ORDER — FAMOTIDINE 20 MG/1
20 TABLET, FILM COATED ORAL NIGHTLY
Status: DISCONTINUED | OUTPATIENT
Start: 2022-07-04 | End: 2022-07-05

## 2022-07-04 RX ORDER — HALOPERIDOL 5 MG/ML
0.5 INJECTION INTRAMUSCULAR EVERY 10 MIN PRN
Status: DISCONTINUED | OUTPATIENT
Start: 2022-07-04 | End: 2022-07-05

## 2022-07-04 RX ORDER — HEPARIN SODIUM 5000 [USP'U]/ML
5000 INJECTION, SOLUTION INTRAVENOUS; SUBCUTANEOUS EVERY 8 HOURS
Status: DISCONTINUED | OUTPATIENT
Start: 2022-07-04 | End: 2022-07-08 | Stop reason: HOSPADM

## 2022-07-04 RX ORDER — INSULIN ASPART 100 [IU]/ML
0-5 INJECTION, SOLUTION INTRAVENOUS; SUBCUTANEOUS EVERY 4 HOURS PRN
Status: DISCONTINUED | OUTPATIENT
Start: 2022-07-04 | End: 2022-07-05

## 2022-07-04 RX ADMIN — ONDANSETRON 4 MG: 2 INJECTION INTRAMUSCULAR; INTRAVENOUS at 11:07

## 2022-07-04 RX ADMIN — ACETAMINOPHEN 650 MG: 325 TABLET ORAL at 08:07

## 2022-07-04 RX ADMIN — FLUCONAZOLE 200 MG: 200 TABLET ORAL at 08:07

## 2022-07-04 RX ADMIN — SODIUM CHLORIDE, SODIUM GLUCONATE, SODIUM ACETATE, POTASSIUM CHLORIDE, MAGNESIUM CHLORIDE, SODIUM PHOSPHATE, DIBASIC, AND POTASSIUM PHOSPHATE: .53; .5; .37; .037; .03; .012; .00082 INJECTION, SOLUTION INTRAVENOUS at 11:07

## 2022-07-04 RX ADMIN — HYDROMORPHONE HYDROCHLORIDE 0.5 MG: 1 INJECTION, SOLUTION INTRAMUSCULAR; INTRAVENOUS; SUBCUTANEOUS at 04:07

## 2022-07-04 RX ADMIN — OXYCODONE HYDROCHLORIDE 10 MG: 10 TABLET ORAL at 01:07

## 2022-07-04 RX ADMIN — MIDAZOLAM HYDROCHLORIDE 2 MG: 1 INJECTION, SOLUTION INTRAMUSCULAR; INTRAVENOUS at 11:07

## 2022-07-04 RX ADMIN — INSULIN ASPART 3 UNITS: 100 INJECTION, SOLUTION INTRAVENOUS; SUBCUTANEOUS at 04:07

## 2022-07-04 RX ADMIN — LIDOCAINE HYDROCHLORIDE 100 MG: 20 INJECTION, SOLUTION INTRAVENOUS at 11:07

## 2022-07-04 RX ADMIN — HEPARIN SODIUM 5000 UNITS: 5000 INJECTION INTRAVENOUS; SUBCUTANEOUS at 07:07

## 2022-07-04 RX ADMIN — CARVEDILOL 3.12 MG: 3.12 TABLET, FILM COATED ORAL at 07:07

## 2022-07-04 RX ADMIN — CARVEDILOL 3.12 MG: 3.12 TABLET, FILM COATED ORAL at 08:07

## 2022-07-04 RX ADMIN — PROPOFOL 100 MG: 10 INJECTION, EMULSION INTRAVENOUS at 11:07

## 2022-07-04 RX ADMIN — FENTANYL CITRATE 50 MCG: 50 INJECTION, SOLUTION INTRAMUSCULAR; INTRAVENOUS at 11:07

## 2022-07-04 RX ADMIN — SODIUM CHLORIDE: 0.9 INJECTION, SOLUTION INTRAVENOUS at 06:07

## 2022-07-04 RX ADMIN — PREDNISONE 15 MG: 5 TABLET ORAL at 08:07

## 2022-07-04 RX ADMIN — TACROLIMUS 2 MG: 1 CAPSULE ORAL at 05:07

## 2022-07-04 RX ADMIN — VALGANCICLOVIR 450 MG: 450 TABLET, FILM COATED ORAL at 08:07

## 2022-07-04 RX ADMIN — FAMOTIDINE 20 MG: 20 TABLET ORAL at 07:07

## 2022-07-04 RX ADMIN — VANCOMYCIN HYDROCHLORIDE 1250 MG: 1.25 INJECTION, POWDER, LYOPHILIZED, FOR SOLUTION INTRAVENOUS at 02:07

## 2022-07-04 RX ADMIN — HYDROMORPHONE HYDROCHLORIDE 0.5 MG: 1 INJECTION, SOLUTION INTRAMUSCULAR; INTRAVENOUS; SUBCUTANEOUS at 06:07

## 2022-07-04 RX ADMIN — MAGNESIUM SULFATE HEPTAHYDRATE 2 G: 40 INJECTION, SOLUTION INTRAVENOUS at 10:07

## 2022-07-04 RX ADMIN — FENTANYL CITRATE 25 MCG: 50 INJECTION, SOLUTION INTRAMUSCULAR; INTRAVENOUS at 01:07

## 2022-07-04 RX ADMIN — FENTANYL CITRATE 25 MCG: 50 INJECTION, SOLUTION INTRAMUSCULAR; INTRAVENOUS at 12:07

## 2022-07-04 RX ADMIN — ROCURONIUM BROMIDE 50 MG: 10 INJECTION, SOLUTION INTRAVENOUS at 11:07

## 2022-07-04 RX ADMIN — CEFEPIME 2 G: 2 INJECTION, POWDER, FOR SOLUTION INTRAVENOUS at 05:07

## 2022-07-04 RX ADMIN — FAMOTIDINE 20 MG: 20 TABLET ORAL at 04:07

## 2022-07-04 RX ADMIN — HYDROMORPHONE HYDROCHLORIDE 0.5 MG: 1 INJECTION, SOLUTION INTRAMUSCULAR; INTRAVENOUS; SUBCUTANEOUS at 02:07

## 2022-07-04 RX ADMIN — HEPARIN SODIUM 5000 UNITS: 5000 INJECTION INTRAVENOUS; SUBCUTANEOUS at 02:07

## 2022-07-04 RX ADMIN — Medication 50 MG: at 11:07

## 2022-07-04 RX ADMIN — ACETAMINOPHEN 650 MG: 325 TABLET ORAL at 04:07

## 2022-07-04 RX ADMIN — OXYCODONE HYDROCHLORIDE 10 MG: 10 TABLET ORAL at 09:07

## 2022-07-04 RX ADMIN — HEPARIN SODIUM 5000 UNITS: 5000 INJECTION INTRAVENOUS; SUBCUTANEOUS at 05:07

## 2022-07-04 RX ADMIN — HYDROMORPHONE HYDROCHLORIDE 0.5 MG: 1 INJECTION, SOLUTION INTRAMUSCULAR; INTRAVENOUS; SUBCUTANEOUS at 11:07

## 2022-07-04 RX ADMIN — OXYCODONE HYDROCHLORIDE 10 MG: 10 TABLET ORAL at 05:07

## 2022-07-04 RX ADMIN — FENTANYL CITRATE 25 MCG: 50 INJECTION, SOLUTION INTRAMUSCULAR; INTRAVENOUS at 11:07

## 2022-07-04 RX ADMIN — VASOPRESSIN 1 UNITS: 20 INJECTION, SOLUTION INTRAMUSCULAR; SUBCUTANEOUS at 11:07

## 2022-07-04 RX ADMIN — SUGAMMADEX 200 MG: 100 INJECTION, SOLUTION INTRAVENOUS at 01:07

## 2022-07-04 RX ADMIN — DEXAMETHASONE SODIUM PHOSPHATE 4 MG: 4 INJECTION, SOLUTION INTRAMUSCULAR; INTRAVENOUS at 11:07

## 2022-07-04 RX ADMIN — ROCURONIUM BROMIDE 20 MG: 10 INJECTION, SOLUTION INTRAVENOUS at 11:07

## 2022-07-04 RX ADMIN — VASOPRESSIN 0.5 UNITS: 20 INJECTION, SOLUTION INTRAMUSCULAR; SUBCUTANEOUS at 11:07

## 2022-07-04 RX ADMIN — TACROLIMUS 3 MG: 1 CAPSULE, GELATIN COATED ORAL at 08:07

## 2022-07-04 RX ADMIN — CEFEPIME 2 G: 2 INJECTION, POWDER, FOR SOLUTION INTRAVENOUS at 06:07

## 2022-07-04 NOTE — ANESTHESIA POSTPROCEDURE EVALUATION
Anesthesia Post Evaluation    Patient: Romeo Larson    Procedure(s) Performed: Procedure(s) (LRB):  LAPAROTOMY, EXPLORATORY (N/A)    Final Anesthesia Type: general      Patient location during evaluation: PACU  Patient participation: Yes- Able to Participate  Level of consciousness: awake and alert  Post-procedure vital signs: reviewed and stable  Pain management: adequate  Airway patency: patent    PONV status at discharge: No PONV  Anesthetic complications: no      Cardiovascular status: blood pressure returned to baseline  Respiratory status: unassisted  Follow-up not needed.          Vitals Value Taken Time   /103 07/04/22 1628   Temp 36.7 °C (98.1 °F) 07/04/22 1627   Pulse 106 07/04/22 1628   Resp 35 07/04/22 1628   SpO2 96 % 07/04/22 1628   Vitals shown include unvalidated device data.      Event Time   Out of Recovery 13:45:00         Pain/Moe Score: Pain Rating Prior to Med Admin: 4 (7/4/2022  4:22 PM)  Pain Rating Post Med Admin: 4 (7/4/2022  2:00 PM)  Moe Score: 10 (7/4/2022  2:00 PM)

## 2022-07-04 NOTE — HPI
Romeo Larson is a 41 y.o. with PMHx of cholangiocarcinoma now s/p Living liver transplant on 6/21/2022 (CMV -/+). The patient returned to OR on 6/23 for Bile leak repair and was maintained on IV anbx. He was stepped down on 6/24 and progressed well post takeback. Of note, cultures from OR with ENTEROCOCCUS FAECALIS. Pt was initially treated w Unasyn IV, de escalated to Augmentin PO 6/27/22 and was to remain on Augmentin while drains in place. Patient was discharged with 2 SHRUTHI drains. He was seen in clinic 7/1 by the transplant surgeon and was feeling well at that time. SHRUTHI #2 removed in clinic on 7/1. The patient continued to do well until 7/3 afternoon. Patient began experiencing acute onset of mid to lower severe abdominal pain and inability to urinate. He denies fevers, chills, nausea, vomiting, SOB, CP. He presented to OSH where labs showed leukocytosis (WBC 35) and hyperkalemia (K 6.1). CT A/P was performed which showed a localized fluid collection measuring 11.2 x 10.1 cm in maximum axial dimensions concerning for a large biloma. The patient was transferred to [place] for admission to LTS for management of the above findings. He was initiated on IV cefepime/vanc at OSH, will continue here. Infectious work-up performed at OSH, will repeat on admit. CT reviewed by Dr. Iglesias, will plan for ERCP vs IR intervention for biloma in AM. Patient will be made NPO. Patient and caretaker notified of plan.

## 2022-07-04 NOTE — ASSESSMENT & PLAN NOTE
- Continue prograf and steroid taper  - Check prograf level daily and adjust for therapeutic dosage. Monitor for toxic side effects  - Hold Cellcept on admit d/t possible bile leak/infection/leukocytosis

## 2022-07-04 NOTE — ASSESSMENT & PLAN NOTE
- CT A/P at OSH with large localized fluid collection concerning for biloma  - NPO  - Discuss IR intervention vs ERCP to manage bile leak  - Continue IV anbx  - Drain fluid sent for bilirubin

## 2022-07-04 NOTE — PLAN OF CARE
Vital signs stable. Afebrile. Alert, oreinted and following commands. Pain controlled with PRN meds. Denies nausea. Tolerating sips of water. SHRUTHI drains intact and to bulb suction x3. Rocha intact to gravity. Incision remains CDI. POC reviewed and understanding verbalized. Wife updated via text.

## 2022-07-04 NOTE — NURSING TRANSFER
Nursing Transfer Note      7/4/2022     Reason patient is being transferred: per md order    Transfer to [id number]    Transfer via stretcher    Transfer with mask    Transported by pt escort    Medicines sent: n/a    Any special needs or follow-up needed: n/a    Chart send with patient: yes    Notified: wife via text

## 2022-07-04 NOTE — PROGRESS NOTES
Pt escorted off unit via stretcher, no apparent signs of distress noted. Wife with pt. Chart sent with pt.

## 2022-07-04 NOTE — TRANSFER OF CARE
"Anesthesia Transfer of Care Note    Patient: Romeo Larson    Procedure(s) Performed: Procedure(s) (LRB):  LAPAROTOMY, EXPLORATORY (N/A)    Patient location: PACU    Anesthesia Type: general    Transport from OR: Transported from OR on 6-10 L/min O2 by face mask with adequate spontaneous ventilation    Post pain: adequate analgesia    Post assessment: no apparent anesthetic complications and tolerated procedure well    Post vital signs: stable    Level of consciousness: awake    Nausea/Vomiting: no nausea/vomiting    Complications: none    Transfer of care protocol was followed      Last vitals:   Visit Vitals  BP (!) 154/98 (BP Location: Right arm, Patient Position: Lying)   Pulse 92   Temp 36.3 °C (97.3 °F) (Temporal)   Resp 18   Ht 5' 8" (1.727 m)   SpO2 100%   BMI 27.35 kg/m²     "

## 2022-07-04 NOTE — PLAN OF CARE
Pt admitted from [facility] overnight with abdominal pain, diaphoresis, increased WBC. History of living donor 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. WBC 31 on admit. LFTs elevated. Pt is AAOx4, VSS on ViSi monitor, afebrile this shift. NSR-NST on tele, HR 90-110s today. Pt on RA. Pt went to OR for ex lap this morning. Large fluid collection drained and 2 SHRUTHI drains placed. Chevron incision with surgical dressing remains C/D/I this shift. RLQ SHRUTHI drain with bilious/serosanguineous drainage. LUQ and LLQ SHRUTHI drains with bilious/serosanguineous/darker red drainage. See flowsheets for details. Pt reporting moderate pain; PRN tylenol, oxy 10mg, and 0.5mg IVP dilaudid given. Rocha to gravity for retention. Diet restarted this afternoon, pt is accuchecks q4hrs, SSI given PRN. NS @ 50cc/hr. Pt receiving vanc and cefepime q12hrs. Mag 1.4, mag rider x1 this shift. Pt independent prior to ex-lap, has not stood up since before procedure today. Remains free from falls/injuries so far this shift. Nonskid socks on, call bell within reach, bed locked and in lowest position. TEDs/SCDs remain in place. Pt's wife at bedside. Pt and pt's wife verbalized understanding to call for any needs/assistance.

## 2022-07-04 NOTE — PROGRESS NOTES
Pharmacokinetic Initial Assessment: IV Vancomycin    Assessment/Plan:    Mr. Larson transferred from [facility], where he received intravenous vancomycin with loading dose of 2250 mg once.  - His renal function appears stable and at baseline.  - Will continue vanc with a maintenance dose of vancomycin 1250 mg IV every 12 hours.  - Desired empiric serum trough concentration is 10 to 20 mcg/mL.  - Draw vancomycin trough level 60 min prior to fourth dose on 7/5 at approximately 1300.   - Please draw random level sooner than scheduled trough if renal function changes significantly.    Pharmacy will continue to follow and monitor vancomycin.      Please contact pharmacy at extension [phone] with any questions regarding this assessment.     Thank you for the consult,   Di Cazares       Patient brief summary:  Romeo Larson is a 41 y.o. male initiated on antimicrobial therapy with IV Vancomycin for treatment of suspected intra-abdominal infection    Actual Body Weight:   84.4 kg    Renal Function:   Estimated Creatinine Clearance: 94.2 mL/min (based on SCr of 1.07 mg/dL).    Dialysis Method (if applicable):  N/A

## 2022-07-04 NOTE — HPI
Reason for Consult: Management of T2DM, Hyperglycemia     Surgical Procedure and Date: Liver Transplant 6/21/2022; LAPAROTOMY, EXPLORATORY (N/A) on 7/4    Diabetes diagnosis year: 2022    Home Diabetes Medications:  Novolog 4 units TID with meals     How often checking glucose at home?  3-4x daily    BG readings on regimen: low 100s-200  Hypoglycemia on the regimen?  No  Missed doses on regimen?  No    Diabetes Complications include:     Hyperglycemia     Complicating diabetes co morbidities:   Glucocorticoid Use    HPI:   Patient is a 41 y.o. male with a diagnosis of cholangiocarcinoma now s/p Living liver transplant on 6/21/2022. The patient returned to OR on 6/23 for Bile leak repair and was maintained on IV abx. He was seen in clinic 7/1 by the transplant surgeon and was feeling well at that time. Patient began experiencing acute onset of mid to lower severe abdominal pain and inability to urinate. Found to have Biloma on CT scan. Class A to OR for ex-lap for biloma. Patient now presents for the above procedure(s). Endocrinology consulted for management of T2DM.       Lab Results   Component Value Date    HGBA1C 5.8 (H) 06/20/2022

## 2022-07-04 NOTE — ASSESSMENT & PLAN NOTE
BG goal 140-180    Start Low Dose Correction Scale  BG monitoring q 4 hrs while NPO  Once diet progresses, recommend starting Novolog 4 units TID with meals    ** Please call Endocrine for any BG related issues **    Discharge plans: TBD

## 2022-07-04 NOTE — OP NOTE
Certification of Assistant at Surgery       Surgery Date: 7/4/2022     Participating Surgeons:  Surgeon(s) and Role:     * Roberto Carrillo MD - Primary     * Prem Ye MD - Resident - Assisting     * Brad Rudd MD - Resident - Assisting    Procedures:  Procedure(s) (LRB):  LAPAROTOMY, EXPLORATORY (N/A)    Assistant Surgeon's Certification of Necessity:  I understand that section 1842 (b) (6) (d) of the Social Security Act generally prohibits Medicare Part B reasonable charge payment for the services of assistants at surgery in [facility] hospitals when qualified residents are available to furnish such services. I certify that the services for which payment is claimed were medically necessary, and that no qualified resident was available to perform the services. I further understand that these services are subject to post-payment review by the Medicare carrier.      Prem Ye MD    07/04/2022  5:48 PM

## 2022-07-04 NOTE — CONSULTS
[facility] - Transplant Stepdown  Endocrinology  Diabetes Consult Note    Consult Requested by: Braydon Iglesias MD   Reason for admit: Biloma following surgery    HISTORY OF PRESENT ILLNESS:  Reason for Consult: Management of T2DM, Hyperglycemia     Surgical Procedure and Date: Liver Transplant 6/21/2022; LAPAROTOMY, EXPLORATORY (N/A) on 7/4    Diabetes diagnosis year: 2022    Home Diabetes Medications:  Novolog 4 units TID with meals     How often checking glucose at home?  3-4x daily    BG readings on regimen: low 100s-200  Hypoglycemia on the regimen?  No  Missed doses on regimen?  No    Diabetes Complications include:     Hyperglycemia     Complicating diabetes co morbidities:   Glucocorticoid Use    HPI:   Patient is a 41 y.o. male with a diagnosis of cholangiocarcinoma now s/p Living liver transplant on 6/21/2022. The patient returned to OR on 6/23 for Bile leak repair and was maintained on IV abx. He was seen in clinic 7/1 by the transplant surgeon and was feeling well at that time. Patient began experiencing acute onset of mid to lower severe abdominal pain and inability to urinate. Found to have Biloma on CT scan. Class A to OR for ex-lap for biloma. Patient now presents for the above procedure(s). Endocrinology consulted for management of T2DM.       Lab Results   Component Value Date    HGBA1C 5.8 (H) 06/20/2022           Interval HPI:   Overnight events: Remains in TSU. To OR today. BG at higher end of goal ranges. Receiving Prednisone 15 mg daily. Dex 4 mg in OR. Diet NPO  Eating:   NPO  Nausea: No  Hypoglycemia and intervention: No  Fever: No  TPN and/or TF: No  If yes, type of TF/TPN and rate: n/a    PMH, PSH, FH, SH reviewed     ROS:  Constitutional: Negative for weight changes.  Eyes: Negative for visual disturbance.  Respiratory: Negative for cough.   Cardiovascular: Negative for chest pain.  Gastrointestinal: Positive for abdominal pain. Negative for nausea.  Endocrine: Negative for polyuria,  polydipsia.  Musculoskeletal: Negative for back pain.  Skin: Positive for wound.   Neurological: Positive for weakness.  Psychiatric/Behavioral: Negative for depression.      Review of Systems    Current Medications and/or Treatments Impacting Glycemic Control  Immunotherapy:    Immunosuppressants           Stop Route Frequency     tacrolimus capsule 2 mg         -- Oral Every evening     tacrolimus capsule 3 mg         -- Oral Every morning          Steroids:   Hormones (From admission, onward)                Start     Stop Route Frequency Ordered    07/04/22 0900  predniSONE tablet 15 mg         -- Oral Daily 07/04/22 0419    07/04/22 0421  melatonin tablet 6 mg         -- Oral Nightly PRN 07/04/22 0422          Pressors:    Autonomic Drugs (From admission, onward)                None          Hyperglycemia/Diabetes Medications:   Antihyperglycemics (From admission, onward)                Start     Stop Route Frequency Ordered    07/04/22 0526  insulin aspart U-100 pen 0-5 Units         -- SubQ Every 6 hours PRN 07/04/22 0427             PHYSICAL EXAMINATION:  Vitals:    07/04/22 1016   BP: (!) 135/91   Pulse: (!) 119   Resp: 17   Temp:      Body mass index is 27.35 kg/m².    Physical Exam  Constitutional: Well developed, well nourished, NAD.  ENT: External ears no masses with nose patent; normal hearing.  Neck: Supple; trachea midline.  Cardiovascular: Normal heart sounds, no LE edema. DP +2 bilaterally.  Lungs: Normal effort; lungs anterior bilaterally clear to auscultation.  Abdomen: Soft, no masses, no hernias. Abdominal tenderness. RLQ SHRUTHI drain.   MS: No clubbing or cyanosis of nails noted; unable to assess gait.  Skin: No rashes, lesions, or ulcers; no nodules. Injection sites are ok. No lipo hypertropthy or atrophy.  Psychiatric: Good judgement and insight; normal mood and affect.  Neurological: Cranial nerves are grossly intact.   Foot: Nails in good condition, no amputations noted.        Labs Reviewed  and Include   Recent Labs   Lab 07/04/22  0526   *  196*   CALCIUM 9.4  9.4   ALBUMIN 2.9*  2.9*   PROT 6.3  6.3   *  130*   K 4.9  4.9   CO2 25  25   CL 94*  94*   BUN 13  13   CREATININE 0.8  0.8   ALKPHOS 215*  215*   *  237*   AST 44*  44*   BILITOT 1.6*  1.6*     Lab Results   Component Value Date    WBC 31.03 (H) 07/04/2022    WBC 31.03 (H) 07/04/2022    HGB 11.7 (L) 07/04/2022    HGB 11.7 (L) 07/04/2022    HCT 36.3 (L) 07/04/2022    HCT 36.3 (L) 07/04/2022     (H) 07/04/2022     (H) 07/04/2022     07/04/2022     07/04/2022     No results for input(s): TSH, FREET4 in the last 168 hours.  Lab Results   Component Value Date    HGBA1C 5.8 (H) 06/20/2022       Nutritional status:   Body mass index is 27.35 kg/m².  Lab Results   Component Value Date    ALBUMIN 2.9 (L) 07/04/2022    ALBUMIN 2.9 (L) 07/04/2022    ALBUMIN 3.7 07/03/2022     No results found for: PREALBUMIN    Estimated Creatinine Clearance: 126 mL/min (based on SCr of 0.8 mg/dL).    Accu-Checks  Recent Labs     07/04/22  0439 07/04/22  0758   POCTGLUCOSE 199* 173*        ASSESSMENT and PLAN    * Biloma following surgery  Managed per primary team          Type 2 diabetes mellitus with hyperglycemia  BG goal 140-180    Start Low Dose Correction Scale  BG monitoring q 4 hrs while NPO  Once diet progresses, recommend starting Novolog 4 units TID with meals    ** Please call Endocrine for any BG related issues **    Discharge plans: TBD      S/P liver transplant  Managed per primary team  Optimize BG control        Prophylactic immunotherapy  May increase insulin resistance.         Adrenal corticosteroid causing adverse effect in therapeutic use  Glucocorticoids markedly increase glucoses. Expect steroid taper to help with glucose control.          Plan discussed with patient, family, and RN at bedside.     Justina Montelongo, NP  Endocrinology  [facility] - Trans[facility] Stepdown

## 2022-07-04 NOTE — ASSESSMENT & PLAN NOTE
- s/p living donor liver txp 6/21/22 d/t cholangiocarcinoma  - TB OR 6/23 for bile leak repair  - Now admitted for intra-abdominal fluid collection concerning for biloma

## 2022-07-04 NOTE — ANESTHESIA PREPROCEDURE EVALUATION
[facility]  Anesthesia Pre-Operative Evaluation         Patient Name: Romeo Larson  YOB: 1980  MRN: [id number]    SUBJECTIVE:     Pre-operative evaluation for Procedure(s) (LRB):  LAPAROTOMY, EXPLORATORY (N/A)     07/04/2022    Romeo Larson is a 41 y.o. male w/ a significant PMHx of cholangiocarcinoma now s/p Living liver transplant on 6/21/2022. The patient returned to OR on 6/23 for Bile leak repair and was maintained on IV abx. was seen in clinic 7/1 by the transplant surgeon and was feeling well at that time. Patient began experiencing acute onset of mid to lower severe abdominal pain and inability to urinate. Found to have Biloma on CT scan. Class A to OR for ex-lap for biloma.      Patient now presents for the above procedure(s).    NPO Status: Patient has had sips of water with medications this morning. No solid foods since yesterday afternoon.       LDA:   Port A Cath Single Lumen 11/24/21 1202 right subclavian;right internal jugular (Active)   Port A Cath Single Lumen 06/18/22 right subclavian (Active)        Peripheral IV - Single Lumen 07/03/22 2329 20 G Left Antecubital (Active)        Urethral Catheter 07/04/22 0532 Double-lumen (Active)       Prev airway:   Intubation:     Induction:  Intravenous    Intubated:  Postinduction    Mask Ventilation:  Easy mask    Attempts:  1    Attempted By:  CRNA    Method of Intubation:  Direct    Blade:  Roman 2    Laryngeal View Grade: Grade IIA - cords partially seen      Difficult Airway Encountered?: No      Complications:  None    Airway Device:  Oral endotracheal tube    Airway Device Size:  7.5    Style/Cuff Inflation:  Cuffed (inflated to minimal occlusive pressure)    Inflation Amount (mL):  6    Tube secured:  22    Secured at:  The lips    Placement Verified By:  Capnometry    Complicating Factors:  Anterior larynx    Findings Post-Intubation:  BS equal bilateral and atraumatic/condition of teeth  unchanged    Drips:    sodium chloride 0.9% 50 mL/hr at 07/04/22 0600       Patient Active Problem List   Diagnosis    Hypercholesterolemia    Diastolic hypertension    Hyperbilirubinemia    Obstructive jaundice    Biliary obstruction    Painless jaundice    Cholangitis    Hilar cholangiocarcinoma    Immunodeficiency secondary to neoplasm    Immunodeficiency secondary to chemotherapy    Chemotherapy induced neutropenia    Elevated liver enzymes    Macrocytic anemia    Adjustment and management of vascular access device    Tachycardia    S/P liver transplant    Prophylactic immunotherapy    At risk for opportunistic infections    Type 2 diabetes mellitus with hyperglycemia    Adrenal corticosteroid causing adverse effect in therapeutic use    Long-term use of immunosuppressant medication    Biloma following surgery    Leukocytosis    Biloma of transplanted liver       Review of patient's allergies indicates:  No Known Allergies    Current Inpatient Medications:   carvediloL  3.125 mg Oral BID    ceFEPime (MAXIPIME) IVPB  2 g Intravenous Q12H    famotidine  20 mg Oral QHS    fluconazole  200 mg Oral Daily    heparin (porcine)  5,000 Units Subcutaneous Q8H    magnesium sulfate IVPB  2 g Intravenous Once    predniSONE  15 mg Oral Daily    tacrolimus  2 mg Oral Daily PM    tacrolimus  3 mg Oral Daily AM    valGANciclovir  450 mg Oral Daily    vancomycin (VANCOCIN) IVPB  1,250 mg Intravenous Q12H       No current facility-administered medications on file prior to encounter.     Current Outpatient Medications on File Prior to Encounter   Medication Sig Dispense Refill    amoxicillin-clavulanate 875-125mg (AUGMENTIN) 875-125 mg per tablet Take 1 tablet by mouth every 12 (twelve) hours. 60 tablet 1    aspirin 81 MG Chew CHEW 1 tablet (81 mg total) by mouth once daily. 30 tablet 11    blood sugar diagnostic Strp 1 each by Misc.(Non-Drug; Combo Route) route 3 (three) times daily. 100 each  "5    blood-glucose meter Misc Use as instructed 1 each 0    calcium carbonate-vitamin D3 600 mg-20 mcg (800 unit) Tab Take 1 tablet by mouth once daily at 6am. 30 tablet 5    carvediloL (COREG) 3.125 MG tablet Take 1 tablet (3.125 mg total) by mouth 2 (two) times daily. 60 tablet 11    famotidine (PEPCID) 20 MG tablet Take 1 tablet (20 mg total) by mouth every evening. STOP 7/24/22 30 tablet 0    fluconazole (DIFLUCAN) 200 MG Tab Take 1 tablet (200 mg total) by mouth once daily. STOP 7/20/22 30 tablet 0    insulin aspart U-100 (NOVOLOG) 100 unit/mL (3 mL) InPn pen Inject 4 Units into the skin 3 (three) times daily. Plus Sliding Scale. MDD: 42 UNITS 15 mL 5    lancets 30 gauge Misc 1 each by Misc.(Non-Drug; Combo Route) route 3 (three) times daily. 100 each 5    methocarbamoL (ROBAXIN) 500 MG Tab Take 1 tablet (500 mg total) by mouth 4 (four) times daily. 120 tablet 1    mycophenolate (CELLCEPT) 250 mg Cap Take 4 capsules (1,000 mg total) by mouth 2 (two) times daily. 240 capsule 11    oxyCODONE (ROXICODONE) 10 mg Tab immediate release tablet Take 1-1.5 tablets (10-15 mg total) by mouth every 4 (four) hours as needed for Pain. 50 tablet 0    pen needle, diabetic 32 gauge x 5/32" Ndle 1 each by Misc.(Non-Drug; Combo Route) route 3 (three) times daily. 100 each 5    predniSONE (DELTASONE) 5 MG tablet Take by mouth once daily: 20 mg 6/27-7/3; 15 mg 7/4-7/10; 10 mg 7/11-7/17; 5 mg 7/18-7/24; STOP 7/25/22 75 tablet 0    sulfamethoxazole-trimethoprim 400-80mg (BACTRIM,SEPTRA) 400-80 mg per tablet Take 1 tablet by mouth every morning. STOP 12/18/22 30 tablet 5    tacrolimus (PROGRAF) 1 MG Cap Take 3 capsules (3 mg total) by mouth every 12 (twelve) hours. 180 capsule 11    tacrolimus (PROGRAF) 1 MG Cap take 2 capsules by mouth every 12 hours 120 capsule 11    valGANciclovir (VALCYTE) 450 mg Tab Take 1 tablet (450 mg total) by mouth once daily. STOP 9/19/22 30 tablet 2    [DISCONTINUED] baclofen (LIORESAL) 10 " MG tablet TAKE 1 TABLET(10 MG) BY MOUTH THREE TIMES DAILY AS NEEDED FOR HICCUPS (Patient not taking: No sig reported) 90 tablet 0    [DISCONTINUED] losartan (COZAAR) 50 MG tablet Take 1 tablet (50 mg total) by mouth once daily. 90 tablet 3    [DISCONTINUED] metFORMIN (GLUCOPHAGE) 500 MG tablet Take 1 tablet (500 mg total) by mouth 2 (two) times daily with meals. (Patient not taking: No sig reported) 60 tablet 3       Past Surgical History:   Procedure Laterality Date    DIAGNOSTIC ULTRASOUND N/A 6/21/2022    Procedure: ULTRASOUND, DIAGNOSTIC;  Surgeon: Sinan Cline MD;  [facility];  Service: Transplant;  Laterality: N/A;    ENDOSCOPIC ULTRASOUND OF UPPER GASTROINTESTINAL TRACT N/A 10/20/2021    Procedure: ULTRASOUND, UPPER GI TRACT, ENDOSCOPIC;  Surgeon: Valeriy Sotelo MD;  Location: [facility] ENDO (2ND FLR);  Service: Endoscopy;  Laterality: N/A;  wife to email vacc card-inst email-tb    ERCP N/A 10/20/2021    Procedure: ERCP (ENDOSCOPIC RETROGRADE CHOLANGIOPANCREATOGRAPHY);  Surgeon: Valeriy Sotelo MD;  [facility]  Service: Endoscopy;  Laterality: N/A;    ERCP N/A 11/4/2021    Procedure: ERCP (ENDOSCOPIC RETROGRADE CHOLANGIOPANCREATOGRAPHY);  Surgeon: Valeriy Sotelo MD;  Location: [facility] (2ND FLR);  Service: Endoscopy;  Laterality: N/A;    ERCP N/A 11/4/2021    Procedure: ERCP (ENDOSCOPIC RETROGRADE CHOLANGIOPANCREATOGRAPHY);  Surgeon: Roselia Pereyra MD;  Location: [facility] ENDO (2ND FLR);  Service: Endoscopy;  Laterality: N/A;  pt vaccinated, instructed to bring card to procedure, instructions sent portal-MG    ERCP N/A 1/14/2022    Procedure: ERCP (ENDOSCOPIC RETROGRADE CHOLANGIOPANCREATOGRAPHY);  Surgeon: Roselia Pereyra MD;  Location: [facility] ENDO (2ND FLR);  Service: Endoscopy;  Laterality: N/A;  inst portal-right chest port-tb  Pt requested at home COVID testing, Pt instructed at home RAPID to be done morning of procedure, Pt instructed to call  endoscopy scheuding if there is a positive result, Pt informed if a positive     ERCP N/A 3/31/2022    Procedure: ERCP (ENDOSCOPIC RETROGRADE CHOLANGIOPANCREATOGRAPHY);  Surgeon: Julio Cesar Alonzo MD;  Location: [facility] ENDO (2ND FLR);  Service: Endoscopy;  Laterality: N/A;  3/16: port L chest wall. pt to bring covid vaccination card. Instructions sent via portal.-SC  3/18/22-Updated instructions sent via portal re:new date/time-DS    EXPLORATORY LAPAROTOMY AFTER LIVER TRANSPLANTATION N/A 6/23/2022    Procedure: LAPAROTOMY, EXPLORATORY, AFTER LIVER TRANSPLANT;  Surgeon: Julio Cesar Jara MD;  [facility];  Service: Transplant;  Laterality: N/A;    INSERTION OF TUNNELED CENTRAL VENOUS CATHETER (CVC) WITH SUBCUTANEOUS PORT N/A 11/24/2021    Procedure: INSERTION, PORT-A-CATH;  Surgeon: Prem Syed MD;  Location: [facility] CATH LAB;  Service: Radiology;  Laterality: N/A;    LIVER TRANSPLANT N/A 6/21/2022    Procedure: TRANSPLANT, LIVER;  Surgeon: Sinan Cline MD;  [facility];  Service: Transplant;  Laterality: N/A;    REPAIR OF BILE DUCT N/A 6/23/2022    Procedure: REPAIR, BILE DUCT;  Surgeon: Julio Cesar Jara MD;  [facility];  Service: Transplant;  Laterality: N/A;    ROBOT-ASSISTED LAPAROSCOPIC LYMPHADENECTOMY USING DA МАРИНА XI  6/17/2022    Procedure: XI ROBOTIC LYMPHADENECTOMY - Portal;  Surgeon: Julio Cesar Jara MD;  [facility];  Service: Transplant;;    TONSILLECTOMY      XI ROBOTIC LAPAROSCOPY, EXPLORATORY N/A 6/17/2022    Procedure: XI ROBOTIC LAPAROSCOPY,EXPLORATORY;  Surgeon: Julio Cesar Jara MD;  [facility];  Service: Transplant;  Laterality: N/A;       Social History     Socioeconomic History    Marital status:    Tobacco Use    Smoking status: Never Smoker    Smokeless tobacco: Never Used   Substance and Sexual Activity    Alcohol use: Not Currently    Drug use: Never    Sexual activity: Yes       OBJECTIVE:     Vital Signs Range  (Last 24H):  Temp:  [36.7 °C (98.1 °F)-37.3 °C (99.2 °F)]   Pulse:  []   Resp:  [18-32]   BP: (123-155)/()   SpO2:  [95 %-99 %]       Significant Labs:  Lab Results   Component Value Date    WBC 31.03 (H) 07/04/2022    WBC 31.03 (H) 07/04/2022    HGB 11.7 (L) 07/04/2022    HGB 11.7 (L) 07/04/2022    HCT 36.3 (L) 07/04/2022    HCT 36.3 (L) 07/04/2022     07/04/2022     07/04/2022    CHOL 163 10/12/2021    TRIG 198 (H) 10/12/2021    HDL 22 (L) 10/12/2021     (H) 07/04/2022     (H) 07/04/2022    AST 44 (H) 07/04/2022    AST 44 (H) 07/04/2022     (L) 07/04/2022     (L) 07/04/2022    K 4.9 07/04/2022    K 4.9 07/04/2022    CL 94 (L) 07/04/2022    CL 94 (L) 07/04/2022    CREATININE 0.8 07/04/2022    CREATININE 0.8 07/04/2022    BUN 13 07/04/2022    BUN 13 07/04/2022    CO2 25 07/04/2022    CO2 25 07/04/2022    TSH 0.835 03/24/2022    PSA 0.68 03/24/2022    INR 1.1 06/28/2022    HGBA1C 5.8 (H) 06/20/2022       Diagnostic Studies: No relevant studies.    EKG:   Results for orders placed or performed during the hospital encounter of 06/18/22   EKG 12-lead    Collection Time: 06/28/22  7:42 AM    Narrative    Test Reason : R00.0,    Vent. Rate : 090 BPM     Atrial Rate : 090 BPM     P-R Int : 136 ms          QRS Dur : 072 ms      QT Int : 360 ms       P-R-T Axes : 038 034 -10 degrees     QTc Int : 440 ms    Normal sinus rhythm  Possible Left atrial enlargement  Nonspecific T wave abnormality  Abnormal ECG  When compared with ECG of 22-JUN-2022 18:19,  Nonspecific T wave abnormality now evident in Lateral leads  Confirmed by NEVAEH RAMSEY MD ([id number]) on 6/28/2022 11:35:46 AM    Referred By: SHAYLA JASSO           Confirmed By:NEVAEH RAMSEY MD       2D ECHO:  TTE:  No results found for this or any previous visit.    CAMILO:  No results found for this or any previous visit.    ASSESSMENT/PLAN:         Pre-op Assessment    I have reviewed the Patient Summary Reports.     I  have reviewed the Nursing Notes. I have reviewed the NPO Status.   I have reviewed the Medications.     Review of Systems  Anesthesia Hx:  Denies Hx of Anesthetic complications  Denies Family Hx of Anesthesia complications.   Denies Personal Hx of Anesthesia complications.   Hematology/Oncology:  Hematology Normal   Oncology Normal     EENT/Dental:EENT/Dental Normal   Cardiovascular:   Hypertension    Pulmonary:  Pulmonary Normal    Renal/:  Renal/ Normal     Hepatic/GI:   Liver Disease,    Musculoskeletal:  Musculoskeletal Normal    Neurological:  Neurology Normal    Endocrine:   Diabetes    Dermatological:  Skin Normal    Psych:  Psychiatric Normal           Physical Exam  General: Well nourished, Cooperative, Alert and Oriented    Airway:  Mallampati: II   Mouth Opening: Normal  TM Distance: Normal  Tongue: Normal  Neck ROM: Normal ROM    Dental:  Intact        Anesthesia Plan  Type of Anesthesia, risks & benefits discussed:    Anesthesia Type: Gen ETT  Intra-op Monitoring Plan: Standard ASA Monitors  Post Op Pain Control Plan: multimodal analgesia and IV/PO Opioids PRN  Induction:  IV  Airway Plan: Direct  Informed Consent: Informed consent signed with the Patient and all parties understand the risks and agree with anesthesia plan.  All questions answered.   ASA Score: 3 Emergent  Day of Surgery Review of History & Physical: H&P Update referred to the surgeon/provider.    Ready For Surgery From Anesthesia Perspective.     .

## 2022-07-04 NOTE — SIGNIFICANT EVENT
Patient had live donor complicated with bile leak requiring reoperation, drainage and omentoplasty to cut surface.    Had drain removed on Friday, feeling pain, CT shows large parmjit graft collection with air and patient has significant incraesed wbc. There does not appear to be a safe window for percutaneous drainage.    Remaining drain appears to be in the pelvis and is bilious in nature.    Dx: uncontrolled infected bile leak post LRD    Plan:    Book for OR as class A for washout and optimization of drain placement.  Risks and benefits were reviewed with patient and wife and informed consent obtained. All questions answered.   Subjective





- Reason for Consult


Consult date: 07/29/21


Reason for consult: Mental health evaluation





- Chief Complaint


Chief complaint: 


 The patient was seen resting quietly, he reports doing well. He denies any 

current suicidal/homicidal ideation and denies hallucinations. Per nurse, the 

patient had a quiet night. Awaiting  for disposition.





REVIEW OF SYSTEMS


Constitutional: Negative for weight loss


ENT: Negative for stridor


Respiratory: Negative for cough or hemoptysis


All other systems reviewed and are negative





MENTAL STATUS EXAMINATION


General Appearance and Behavior: Age appropriate, dressed appropriately, calm 

and cooperative


Cooperation: Participating


Psychomotor Behavior: psychomotor normal


Mood: calm


Affect and affective range: Congruent with stated mood


Thought Process: Goal directed


Thought Content: Not SI


Speech: Normal volume, Regular rate and rhythm,


Intellectual Functioning: Average


Suicidal Ideation: Denied


Homicidal Ideation: Denied


Hallucinations: Denied


Delusions: None elicited


Impulse Control: Unimpaired


Insight and Judgment: Limited insight and judgment,


Memory: Normal


Attention: Undivided


Orientation: Alert, oriented





 Assessment and Plan 


(1)Schizophrenia


(2) 





Treatment plan





Risks, benefits and alternatives of medications discussed with the patient, 

questions answered and consent obtained from patient.


PSYCHOTHERAPY: Supportive psychotherapy provided


MEDICAL: Per primary team


DELIRIUM PRECAUTIONS: Please re-orient patient frequently, keep lights on during

the day, and minimize benzodiazepines and opiates as these medications could 

worsen patient's confusion.


SAFETY SITTER: Per medical team


DISPOSITION:  Do not recommend acute inpatient psychiatric hospitalization.


FOLLOW-UP: Will sign off.


Patient knows to follow up with psychiatric outpatient and to call the crisis 

hotline or go the ER if suicidal/ homicidal or any endangering ideas arise.


Thank you for the consult.  Please contact with any questions and/or concerns.


Case staffed with Dr. Harrington











Mental Status Exam





- Vital signs


                                Last Vital Signs











Temp  97.9 F   07/29/21 09:55


 


Pulse  80   07/29/21 09:55


 


Resp  18   07/29/21 09:55


 


BP  113/74   07/29/21 09:55


 


Pulse Ox  99   07/29/21 09:55

## 2022-07-04 NOTE — ASSESSMENT & PLAN NOTE
- WBC 35 at OSH  - CT A/P with large intra-abdominal fluid collection, concerning for biloma  - Infectious workup ordered  - Cefepime/vanc

## 2022-07-04 NOTE — OP NOTE
Operative Report    Date of Procedure: 7/4/2022    Surgeons:  Surgeon(s) and Role:     * Roberto Carrillo MD - Primary   Prem Rudd MD resident    First Assistant Attestation:  The presence of an additional attending surgeon functioning as first assistant was required due to the complexity of the procedure relative to any available residents. I certify that no resident was available who was qualified to serve as first assistant. Duties performed by the assistant included assisting the primary surgeon.    Pre-operative Diagnosis: bile leak  Post-operative Diagnosis: same    Procedure(s) Perfomed: Exploration of abdomen and drainage opf biloam and washout  Anesthesia: General endotracheal  Estimated Blood Loss: 100 mL  Blood Products Administered: None    Findings: bilious fluid in pelvis, biloma anterior and medial to stomach  Specimens: abdominal fluid, biloma culture  Drains: 19f Cyril drains x 3    Preamble  Indications and Patient Counseling: The procedure was thoroughly explained to the patient and/or family members as appropriate, including potential risks, complications, and alternatives.  The risks associated with not undergoing the proposed procedure were also discussed.  All questions were answered, and the patient and/or family members voiced understanding and agreed to proceed with the operation.    Time-Out: A complete time out was carried out prior to incision, with confirmation of patient identity, correct procedure, correct operative site, appropriate antibiotic prophylaxis, review of any known allergies, and presence of all needed equipment.    Procedure in Detail  Following the induction of general endotracheal anesthesia, appropriate arterial and venous lines were placed by the anesthesia team.  Care was taken to pad all pressure points and avoid any potential traction injuries from positioning. The urinary bladder was catheterized.  Sequential compression boots and Reece  Huggers were used. The abdomen was sterilely prepped and draped.    The abdomen was entered by re-opening the liver transplant incision.  Cultures were obtained of any subcutaneous and peritoneal fluid or clot as appropriate.  Significant peritoneal fluid was present, and it was bilious in nature.  Significant hematoma was not present. There was no active bleeding. The bile duct / vick anastomosis was not assessed, due to adhesions.  The hepatic artery was not evaluated due to adhesions. The liver itself was covered by adhesions and not visible.     Copious warm irrigation was used ot clean out the abdomen of bilious fluid.     Adhesions to the abdominal were taken down and the biloma was identified, the wall was made up of omentum, the superior aspect was diaphragm, lateral aspect was stomach. This was cleaned out and cultured.    The original drain on the right side was left in place.    2 drains on the left side were placed - the upper drain straight into the biloma cavity and the lower drain to the pelvis bilaterally    After the above exploration, all identifiable bleeding sites were addressed using electrocautery, argon beam coagulation, suture ligatures, and topical hemostatic agents as appropriate.  A needle biopsy of the liver was not obtained.       A final check for hemostasis was made.  3 19f Cyril drains were placed through separate incisions below the transverse abdominal incision and placed in the usual positions. The cavity was irrigated with saline. The abdomen was closed in layers using running #1 PDS suture. The incision was irrigated and the skin was closed with staples. At the end of the case all instrument, needle, and sponge counts were correct. The patient was taken from the OR in stable condition.     Details of the operation were reviewed with the family, all questions were answered.

## 2022-07-04 NOTE — PROGRESS NOTES
Pt back in room 75464 from ex-lap. No apparent signs of distress noted. ViSi and tele re-applied. BP noted to be slightly elevated, pt reporting increased abdominal pain with transfer from stretcher to bed. PRN 0.5mg dilaudid given. Chevron incision with surgical dressing is C/D/I. RLQ SHRUTHI and 2x LLQ SHRUTHI drains C/D/I. Will monitor.

## 2022-07-04 NOTE — PROGRESS NOTES
Pharmacist Renal Dose Adjustment Note    Romeo Larson is a 41 y.o. male being treated with the medication cefepime.    Patient Data:    Vital Signs (Most Recent):    Vital Signs (72h Range):  Temp:  [97.3 °F (36.3 °C)-98.6 °F (37 °C)]   Pulse:  []   Resp:  [16-32]   BP: (123-142)/()   SpO2:  [98 %-100 %]      Recent Labs   Lab 07/01/22  0805 07/03/22  0831 07/03/22  2244   CREATININE 1.0 1.0 1.07     Serum creatinine: 1.07 mg/dL 07/03/22 2244  Estimated creatinine clearance: 94.2 mL/min    Cefepime 2 g Q12H will be changed to cefepime 2 g Q8H    Pharmacist's Name: Di Cazares  Pharmacist's Extension: [id number]

## 2022-07-04 NOTE — TELEPHONE ENCOUNTER
Reason for Disposition   [1] Discomfort (pain, burning or stinging) when passing urine AND [2] male   [1] Unable to urinate (or only a few drops) > 4 hours AND [2] bladder feels very full (e.g., feels blocked with strong urge to urinate; palpable bladder)    Additional Information   Negative: Sounds like a life-threatening emergency to the triager   Negative: Chest pain   Negative: Difficulty breathing   Negative: Acting confused (e.g., disoriented, slurred speech) or excessively sleepy   Negative: Surgical incision symptoms and questions   Negative: Shock suspected (e.g., cold/pale/clammy skin, too weak to stand, low BP, rapid pulse)   Negative: Sounds like a life-threatening emergency to the triager   Negative: Followed a genital area injury (e.g., penis, scrotum)   Negative: Taking antibiotic for urinary tract infection (UTI)   Negative: Pain in scrotum is main symptom    Protocols used: POST-OP SYMPTOMS AND [id number], URINATION PAIN - MALE-A-AH   Liver Transplant - 6/21/2022 (#1)     BPA 5  CC: spouse called started yest with constip/ gas. passed BM yest 7/2 afternoon. last night started on prograf. this evening abd discomfort thought to be gas. no flatus. no N.V. rating pain 6. comes and goes. today ate bkft. had labs this am. ate sandwich at 4pm. no night meds yet and hasnt eaten since. on colace BID, just did glycerine supp at 630pm and hasnt produced a stool. also had miralax today. last uop at 430pm. rec ED. Spouse agrees. Call back with questions

## 2022-07-04 NOTE — SUBJECTIVE & OBJECTIVE
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    Hypercholesteremia     Hypertension        Past Surgical History:   Procedure Laterality Date    DIAGNOSTIC ULTRASOUND N/A 6/21/2022    Procedure: ULTRASOUND, DIAGNOSTIC;  Surgeon: Sinan Cline MD;  Location: [facility] OR [address] [place] [address];  Service: Transplant;  Laterality: N/A;    ENDOSCOPIC ULTRASOUND OF UPPER GASTROINTESTINAL TRACT N/A 10/20/2021    Procedure: ULTRASOUND, UPPER GI TRACT, ENDOSCOPIC;  Surgeon: Valeriy Sotelo MD;  [facility]  Service: Endoscopy;  Laterality: N/A;  wife to email vacc card-inst email-tb    ERCP N/A 10/20/2021    Procedure: ERCP (ENDOSCOPIC RETROGRADE CHOLANGIOPANCREATOGRAPHY);  Surgeon: Valeriy Sotelo MD;  [address]  Service: Endoscopy;  Laterality: N/A;    ERCP N/A 11/4/2021    Procedure: ERCP (ENDOSCOPIC RETROGRADE CHOLANGIOPANCREATOGRAPHY);  Surgeon: Valeriy Sotelo MD;  Location: [address]);  Service: Endoscopy;  Laterality: N/A;    ERCP N/A 11/4/2021    Procedure: ERCP (ENDOSCOPIC RETROGRADE CHOLANGIOPANCREATOGRAPHY);  Surgeon: Roselia Pereyra MD;  [facility]  Service: Endoscopy;  Laterality: N/A;  pt vaccinated, instructed to bring card to procedure, instructions sent portal-MG    ERCP N/A 1/14/2022    Procedure: ERCP (ENDOSCOPIC RETROGRADE CHOLANGIOPANCREATOGRAPHY);  Surgeon: Roselia Pereyra MD;  [facility]  Service: Endoscopy;  Laterality: N/A;  inst portal-right chest port-tb  Pt requested at home COVID testing, Pt instructed at home RAPID to be done morning of procedure, Pt instructed to call endoscopy scheuding if there is a positive result, Pt informed if a positive     ERCP N/A 3/31/2022    Procedure: ERCP (ENDOSCOPIC RETROGRADE CHOLANGIOPANCREATOGRAPHY);  Surgeon: Julio Cesar ANDERSON  MD [place];  Location: [facility] ENDO (2ND FLR);  Service: Endoscopy;  Laterality: N/A;  3/16: port L chest wall. pt to bring covid vaccination card. Instructions sent via portal.-SC  3/18/22-Updated instructions sent via portal re:new date/time-DS    EXPLORATORY LAPAROTOMY AFTER LIVER TRANSPLANTATION N/A 6/23/2022    Procedure: LAPAROTOMY, EXPLORATORY, AFTER LIVER TRANSPLANT;  Surgeon: Julio Cesar Jara MD;  Location: [facility] OR [facility] FLR;  Service: Transplant;  Laterality: N/A;    INSERTION OF TUNNELED CENTRAL VENOUS CATHETER (CVC) WITH SUBCUTANEOUS PORT N/A 11/24/2021    Procedure: INSERTION, PORT-A-CATH;  Surgeon: Prem Syed MD;  Location: [facility] CATH LAB;  Service: Radiology;  Laterality: N/A;    LIVER TRANSPLANT N/A 6/21/2022    Procedure: TRANSPLANT, LIVER;  Surgeon: Sinan Cline MD;  Location: [facility] OR [facility] FLR;  Service: Transplant;  Laterality: N/A;    REPAIR OF BILE DUCT N/A 6/23/2022    Procedure: REPAIR, BILE DUCT;  Surgeon: Julio Cesar Jara MD;  [facility];  Service: Transplant;  Laterality: N/A;    ROBOT-ASSISTED LAPAROSCOPIC LYMPHADENECTOMY USING DA МАРИНА XI  6/17/2022    Procedure: XI ROBOTIC LYMPHADENECTOMY - Portal;  Surgeon: Julio Cesar Jara MD;  Location: [facility] OR [facility] FLR;  Service: Transplant;;    TONSILLECTOMY      XI ROBOTIC LAPAROSCOPY, EXPLORATORY N/A 6/17/2022    Procedure: XI ROBOTIC LAPAROSCOPY,EXPLORATORY;  Surgeon: Julio Cesar Jara MD;  Location: [facility] OR [facility] FLR;  Service: Transplant;  Laterality: N/A;       Review of patient's allergies indicates:  No Known Allergies    Family History       Problem Relation (Age of Onset)    Hyperlipidemia Father    No Known Problems Mother, Sister, Brother          Tobacco Use    Smoking status: Never Smoker    Smokeless tobacco: Never Used   Substance and Sexual Activity    Alcohol use: Not Currently    Drug use: Never    Sexual activity: Yes       PTA Medications   Medication Sig    amoxicillin-clavulanate 875-125mg (AUGMENTIN) 875-125 mg per tablet Take  "1 tablet by mouth every 12 (twelve) hours.    aspirin 81 MG Chew CHEW 1 tablet (81 mg total) by mouth once daily.    blood sugar diagnostic Strp 1 each by Misc.(Non-Drug; Combo Route) route 3 (three) times daily.    blood-glucose meter Misc Use as instructed    calcium carbonate-vitamin D3 600 mg-20 mcg (800 unit) Tab Take 1 tablet by mouth once daily at 6am.    carvediloL (COREG) 3.125 MG tablet Take 1 tablet (3.125 mg total) by mouth 2 (two) times daily.    famotidine (PEPCID) 20 MG tablet Take 1 tablet (20 mg total) by mouth every evening. STOP 7/24/22    fluconazole (DIFLUCAN) 200 MG Tab Take 1 tablet (200 mg total) by mouth once daily. STOP 7/20/22    insulin aspart U-100 (NOVOLOG) 100 unit/mL (3 mL) InPn pen Inject 4 Units into the skin 3 (three) times daily. Plus Sliding Scale. MDD: 42 UNITS    lancets 30 gauge Misc 1 each by Misc.(Non-Drug; Combo Route) route 3 (three) times daily.    methocarbamoL (ROBAXIN) 500 MG Tab Take 1 tablet (500 mg total) by mouth 4 (four) times daily.    mycophenolate (CELLCEPT) 250 mg Cap Take 4 capsules (1,000 mg total) by mouth 2 (two) times daily.    oxyCODONE (ROXICODONE) 10 mg Tab immediate release tablet Take 1-1.5 tablets (10-15 mg total) by mouth every 4 (four) hours as needed for Pain.    pen needle, diabetic 32 gauge x 5/32" Ndle 1 each by Misc.(Non-Drug; Combo Route) route 3 (three) times daily.    predniSONE (DELTASONE) 5 MG tablet Take by mouth once daily: 20 mg 6/27-7/3; 15 mg 7/4-7/10; 10 mg 7/11-7/17; 5 mg 7/18-7/24; STOP 7/25/22    sulfamethoxazole-trimethoprim 400-80mg (BACTRIM,SEPTRA) 400-80 mg per tablet Take 1 tablet by mouth every morning. STOP 12/18/22    tacrolimus (PROGRAF) 1 MG Cap Take 3 capsules (3 mg total) by mouth every 12 (twelve) hours.    tacrolimus (PROGRAF) 1 MG Cap take 2 capsules by mouth every 12 hours    valGANciclovir (VALCYTE) 450 mg Tab Take 1 tablet (450 mg total) by mouth once daily. STOP 9/19/22       Review of Systems "   Constitutional:  Positive for activity change (decreased), appetite change (decreased) and diaphoresis.   HENT: Negative.     Respiratory:  Negative for cough and shortness of breath.    Cardiovascular:  Negative for chest pain and leg swelling.   Gastrointestinal:  Positive for abdominal pain. Negative for constipation, diarrhea, nausea and vomiting.   Genitourinary:  Positive for difficulty urinating.   Skin:  Positive for wound.   Allergic/Immunologic: Positive for immunocompromised state.   Neurological:  Positive for weakness. Negative for dizziness and headaches.   Psychiatric/Behavioral:  Negative for agitation and confusion.    Objective:     Vital Signs (Most Recent):  Temp: 99.2 °F (37.3 °C) (07/04/22 0429)  Pulse: (!) 112 (07/04/22 0429)  Resp: 18 (07/04/22 0515)  BP: (!) 155/118 (07/04/22 0429)  SpO2: 99 % (07/04/22 0429)   Vital Signs (24h Range):  Temp:  [98.1 °F (36.7 °C)-99.2 °F (37.3 °C)] 99.2 °F (37.3 °C)  Pulse:  [] 112  Resp:  [18-32] 18  SpO2:  [98 %-99 %] 99 %  BP: (123-155)/() 155/118        Body mass index is 27.35 kg/m².    No intake or output data in the 24 hours ending 07/04/22 0542    Physical Exam  Constitutional:       Appearance: He is ill-appearing.   Eyes:      Extraocular Movements: Extraocular movements intact.      Conjunctiva/sclera: Conjunctivae normal.   Cardiovascular:      Rate and Rhythm: Tachycardia present.      Pulses: Normal pulses.      Heart sounds: Normal heart sounds.   Pulmonary:      Effort: Pulmonary effort is normal.      Breath sounds: Normal breath sounds.   Abdominal:      General: Bowel sounds are decreased.      Palpations: Abdomen is soft.      Tenderness: There is abdominal tenderness in the right lower quadrant, periumbilical area and left lower quadrant.      Comments: RLQ SHRUTHI drain with yellow output   Skin:     General: Skin is warm.   Neurological:      Mental Status: He is alert and oriented to person, place, and time. Mental status is  at baseline.   Psychiatric:         Mood and Affect: Mood normal.         Behavior: Behavior normal.       Laboratory:  CBC:   Recent Labs   Lab 07/04/22  0526   WBC 31.03*  31.03*   RBC 3.53*  3.53*   HGB 11.7*  11.7*   HCT 36.3*  36.3*     241   *  103*   MCH 33.1*  33.1*   MCHC 32.2  32.2     BMP:   Recent Labs   Lab 07/03/22  2244   *   *   K 6.1*   CL 93*   CO2 31   BUN 18   CREATININE 1.07   CALCIUM 9.3     CMP:   Recent Labs   Lab 07/03/22  2244   *   CALCIUM 9.3   ALBUMIN 3.7   PROT 6.9   *   K 6.1*   CO2 31   CL 93*   BUN 18   CREATININE 1.07   ALKPHOS 231*   *   AST 71*   BILITOT 1.4*     Labs within the past 24 hours have been reviewed.    Diagnostic Results:  I have personally reviewed all pertinent imaging studies.

## 2022-07-04 NOTE — SUBJECTIVE & OBJECTIVE
Interval HPI:   Overnight events: Remains in TSU. To OR today. BG at higher end of goal ranges. Receiving Prednisone 15 mg daily. Dex 4 mg in OR. Diet NPO  Eating:   NPO  Nausea: No  Hypoglycemia and intervention: No  Fever: No  TPN and/or TF: No  If yes, type of TF/TPN and rate: n/a    PMH, PSH, FH, SH reviewed     ROS:  Constitutional: Negative for weight changes.  Eyes: Negative for visual disturbance.  Respiratory: Negative for cough.   Cardiovascular: Negative for chest pain.  Gastrointestinal: Positive for abdominal pain. Negative for nausea.  Endocrine: Negative for polyuria, polydipsia.  Musculoskeletal: Negative for back pain.  Skin: Positive for wound.   Neurological: Positive for weakness.  Psychiatric/Behavioral: Negative for depression.      Review of Systems    Current Medications and/or Treatments Impacting Glycemic Control  Immunotherapy:    Immunosuppressants           Stop Route Frequency     tacrolimus capsule 2 mg         -- Oral Every evening     tacrolimus capsule 3 mg         -- Oral Every morning          Steroids:   Hormones (From admission, onward)                Start     Stop Route Frequency Ordered    07/04/22 0900  predniSONE tablet 15 mg         -- Oral Daily 07/04/22 0419    07/04/22 0421  melatonin tablet 6 mg         -- Oral Nightly PRN 07/04/22 0422          Pressors:    Autonomic Drugs (From admission, onward)                None          Hyperglycemia/Diabetes Medications:   Antihyperglycemics (From admission, onward)                Start     Stop Route Frequency Ordered    07/04/22 0526  insulin aspart U-100 pen 0-5 Units         -- SubQ Every 6 hours PRN 07/04/22 0427             PHYSICAL EXAMINATION:  Vitals:    07/04/22 1016   BP: (!) 135/91   Pulse: (!) 119   Resp: 17   Temp:      Body mass index is 27.35 kg/m².    Physical Exam  Constitutional: Well developed, well nourished, NAD.  ENT: External ears no masses with nose patent; normal hearing.  Neck: Supple; trachea  midline.  Cardiovascular: Normal heart sounds, no LE edema. DP +2 bilaterally.  Lungs: Normal effort; lungs anterior bilaterally clear to auscultation.  Abdomen: Soft, no masses, no hernias. Abdominal tenderness. RLQ SHRUTHI drain.   MS: No clubbing or cyanosis of nails noted; unable to assess gait.  Skin: No rashes, lesions, or ulcers; no nodules. Injection sites are ok. No lipo hypertropthy or atrophy.  Psychiatric: Good judgement and insight; normal mood and affect.  Neurological: Cranial nerves are grossly intact.   Foot: Nails in good condition, no amputations noted.

## 2022-07-04 NOTE — NURSING
This RN notified of pt being class A to OR for ex-lap. VSS on VISi monitor, tele removed. Consents obtained and in chart. SCDs/TEDs in place. Pt has been NPO since arrival this am. POC reviewed with pt and pt's wife. CHG bath performed. Gown on patient. Awaiting instructions from OR for transport.

## 2022-07-04 NOTE — CARE UPDATE
RAPID RESPONSE NURSE ROUND       Rounding completed with TSU Charge RN, Susana. No concerns verbalized at this time. Instructed to call [phone] for further concerns or assistance.    \

## 2022-07-04 NOTE — H&P
[facility] - Transplant [facility]  Liver Transplant  History & Physical    Patient Name: Romeo Larson  MRN: [id number]  Admission Date: 7/4/2022  Code Status: Full Code  Primary Care Provider: Pravin Maria MD  Post-Operative Day: 13     ORGAN:   RIGHT LIVER LOBE (SEGS 5,6,7,8) WITHOUT MIDDLE HEPATIC VEIN  Disease Etiology: Primary Liver Malignancy: Cholangiocarcinoma (CH-CA)  Donor Type:   Living  CDC High Risk:     Donor CMV Status:   Donor CMV Status:   Donor HBcAB:     Donor HCV Status:     Donor HBV GUSTABO:   Donor HCV GUSTABO:   Whole or Partial:   Biliary Anastomosis: Nina-en-Y  Arterial Anatomy: Standard    Subjective:     History of Present Illness:  Romeo Larson is a 41 y.o. with PMHx of cholangiocarcinoma now s/p Living liver transplant on 6/21/2022 (CMV -/+). The patient returned to OR on 6/23 for Bile leak repair and was maintained on IV anbx. He was stepped down on 6/24 and progressed well post takeback. Of note, cultures from OR with ENTEROCOCCUS FAECALIS. Pt was initially treated w Unasyn IV, de escalated to Augmentin PO 6/27/22 and was to remain on Augmentin while drains in place. Patient was discharged with 2 SHRUTHI drains. He was seen in clinic 7/1 by the transplant surgeon and was feeling well at that time. SHRUTHI #2 removed in clinic on 7/1. The patient continued to do well until 7/3 afternoon. Patient began experiencing acute onset of mid to lower severe abdominal pain and inability to urinate. He denies fevers, chills, nausea, vomiting, SOB, CP. He presented to OSH where labs showed leukocytosis (WBC 35) and hyperkalemia (K 6.1). CT A/P was performed which showed a localized fluid collection measuring 11.2 x 10.1 cm in maximum axial dimensions concerning for a large biloma. The patient was transferred to [facility] for admission to LTS for management of the above findings. He was initiated on IV cefepime/vanc at OSH, will continue here. Infectious work-up performed at OSH, will repeat on admit. CT reviewed by   Harris will plan for ERCP vs IR intervention for biloma in AM. Patient will be made NPO. Patient and caretaker notified of plan.        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    Hypercholesteremia     Hypertension        Past Surgical History:   Procedure Laterality Date    DIAGNOSTIC ULTRASOUND N/A 6/21/2022    Procedure: ULTRASOUND, DIAGNOSTIC;  Surgeon: Sinan Cline MD;  Location: [facility] OR 2ND FLR;  Service: Transplant;  Laterality: N/A;    ENDOSCOPIC ULTRASOUND OF UPPER GASTROINTESTINAL TRACT N/A 10/20/2021    Procedure: ULTRASOUND, UPPER GI TRACT, ENDOSCOPIC;  Surgeon: Valeriy Sotelo MD;  Location: [facility] ENDO (2ND FLR);  Service: Endoscopy;  Laterality: N/A;  wife to email vacc card-inst email-tb    ERCP N/A 10/20/2021    Procedure: ERCP (ENDOSCOPIC RETROGRADE CHOLANGIOPANCREATOGRAPHY);  Surgeon: Valeriy Sotelo MD;  Location: [facility] ENDO (2ND FLR);  Service: Endoscopy;  Laterality: N/A;    ERCP N/A 11/4/2021    Procedure: ERCP (ENDOSCOPIC RETROGRADE CHOLANGIOPANCREATOGRAPHY);  Surgeon: Valeriy Sotelo MD;  Location: [facility] ENDO (2ND FLR);  Service: Endoscopy;  Laterality: N/A;    ERCP N/A 11/4/2021    Procedure: ERCP (ENDOSCOPIC RETROGRADE CHOLANGIOPANCREATOGRAPHY);  Surgeon: Roselia Pereyra MD;  Location: [facility] ENDO (2ND FLR);  Service: Endoscopy;  Laterality: N/A;  pt vaccinated, instructed to bring card to procedure, instructions sent portal-MG    ERCP N/A 1/14/2022    Procedure: ERCP (ENDOSCOPIC RETROGRADE CHOLANGIOPANCREATOGRAPHY);  Surgeon: Roselia Pereyra MD;  Location: [facility] ENDO (2ND FLR);  Service: Endoscopy;  Laterality: N/A;  inst portal-right chest port-tb  Pt requested at home COVID testing, Pt instructed at home RAPID to be done morning of procedure, Pt instructed to call endoscopy scheuding if there is a  positive result, Pt informed if a positive     ERCP N/A 3/31/2022    Procedure: ERCP (ENDOSCOPIC RETROGRADE CHOLANGIOPANCREATOGRAPHY);  Surgeon: Julio Cesar Alonzo MD;  Location: [facility] ENDO (2ND FLR);  Service: Endoscopy;  Laterality: N/A;  3/16: port L chest wall. pt to bring covid vaccination card. Instructions sent via portal.-SC  3/18/22-Updated instructions sent via portal re:new date/time-DS    EXPLORATORY LAPAROTOMY AFTER LIVER TRANSPLANTATION N/A 6/23/2022    Procedure: LAPAROTOMY, EXPLORATORY, AFTER LIVER TRANSPLANT;  Surgeon: Julio Cesar Jara MD;  Location: [facility] OR [facility] FLR;  Service: Transplant;  Laterality: N/A;    INSERTION OF TUNNELED CENTRAL VENOUS CATHETER (CVC) WITH SUBCUTANEOUS PORT N/A 11/24/2021    Procedure: INSERTION, PORT-A-CATH;  Surgeon: Prem Syed MD;  Location: [facility] CATH LAB;  Service: Radiology;  Laterality: N/A;    LIVER TRANSPLANT N/A 6/21/2022    Procedure: TRANSPLANT, LIVER;  Surgeon: Sinan Cline MD;  Location: [facility] OR [facility] FLR;  Service: Transplant;  Laterality: N/A;    REPAIR OF BILE DUCT N/A 6/23/2022    Procedure: REPAIR, BILE DUCT;  Surgeon: Julio Cesar Jara MD;  Location: [facility] OR [facility] FLR;  Service: Transplant;  Laterality: N/A;    ROBOT-ASSISTED LAPAROSCOPIC LYMPHADENECTOMY USING DA МАРИНА XI  6/17/2022    Procedure: XI ROBOTIC LYMPHADENECTOMY - Portal;  Surgeon: Julio Cesar Jara MD;  [facility];  Service: Transplant;;    TONSILLECTOMY      XI ROBOTIC LAPAROSCOPY, EXPLORATORY N/A 6/17/2022    Procedure: XI ROBOTIC LAPAROSCOPY,EXPLORATORY;  Surgeon: Julio Cesar aJra MD;  Location: [facility] OR [facility] FLR;  Service: Transplant;  Laterality: N/A;       Review of patient's allergies indicates:  No Known Allergies    Family History       Problem Relation (Age of Onset)    Hyperlipidemia Father    No Known Problems Mother, Sister, Brother          Tobacco Use    Smoking status: Never Smoker    Smokeless tobacco: Never Used   Substance and Sexual Activity    Alcohol use: Not  "Currently    Drug use: Never    Sexual activity: Yes       PTA Medications   Medication Sig    amoxicillin-clavulanate 875-125mg (AUGMENTIN) 875-125 mg per tablet Take 1 tablet by mouth every 12 (twelve) hours.    aspirin 81 MG Chew CHEW 1 tablet (81 mg total) by mouth once daily.    blood sugar diagnostic Strp 1 each by Misc.(Non-Drug; Combo Route) route 3 (three) times daily.    blood-glucose meter Misc Use as instructed    calcium carbonate-vitamin D3 600 mg-20 mcg (800 unit) Tab Take 1 tablet by mouth once daily at 6am.    carvediloL (COREG) 3.125 MG tablet Take 1 tablet (3.125 mg total) by mouth 2 (two) times daily.    famotidine (PEPCID) 20 MG tablet Take 1 tablet (20 mg total) by mouth every evening. STOP 7/24/22    fluconazole (DIFLUCAN) 200 MG Tab Take 1 tablet (200 mg total) by mouth once daily. STOP 7/20/22    insulin aspart U-100 (NOVOLOG) 100 unit/mL (3 mL) InPn pen Inject 4 Units into the skin 3 (three) times daily. Plus Sliding Scale. MDD: 42 UNITS    lancets 30 gauge Misc 1 each by Misc.(Non-Drug; Combo Route) route 3 (three) times daily.    methocarbamoL (ROBAXIN) 500 MG Tab Take 1 tablet (500 mg total) by mouth 4 (four) times daily.    mycophenolate (CELLCEPT) 250 mg Cap Take 4 capsules (1,000 mg total) by mouth 2 (two) times daily.    oxyCODONE (ROXICODONE) 10 mg Tab immediate release tablet Take 1-1.5 tablets (10-15 mg total) by mouth every 4 (four) hours as needed for Pain.    pen needle, diabetic 32 gauge x 5/32" Ndle 1 each by Misc.(Non-Drug; Combo Route) route 3 (three) times daily.    predniSONE (DELTASONE) 5 MG tablet Take by mouth once daily: 20 mg 6/27-7/3; 15 mg 7/4-7/10; 10 mg 7/11-7/17; 5 mg 7/18-7/24; STOP 7/25/22    sulfamethoxazole-trimethoprim 400-80mg (BACTRIM,SEPTRA) 400-80 mg per tablet Take 1 tablet by mouth every morning. STOP 12/18/22    tacrolimus (PROGRAF) 1 MG Cap Take 3 capsules (3 mg total) by mouth every 12 (twelve) hours.    tacrolimus (PROGRAF) 1 " MG Cap take 2 capsules by mouth every 12 hours    valGANciclovir (VALCYTE) 450 mg Tab Take 1 tablet (450 mg total) by mouth once daily. STOP 9/19/22       Review of Systems   Constitutional:  Positive for activity change (decreased), appetite change (decreased) and diaphoresis.   HENT: Negative.     Respiratory:  Negative for cough and shortness of breath.    Cardiovascular:  Negative for chest pain and leg swelling.   Gastrointestinal:  Positive for abdominal pain. Negative for constipation, diarrhea, nausea and vomiting.   Genitourinary:  Positive for difficulty urinating.   Skin:  Positive for wound.   Allergic/Immunologic: Positive for immunocompromised state.   Neurological:  Positive for weakness. Negative for dizziness and headaches.   Psychiatric/Behavioral:  Negative for agitation and confusion.    Objective:     Vital Signs (Most Recent):  Temp: 99.2 °F (37.3 °C) (07/04/22 0429)  Pulse: (!) 112 (07/04/22 0429)  Resp: 18 (07/04/22 0515)  BP: (!) 155/118 (07/04/22 0429)  SpO2: 99 % (07/04/22 0429)   Vital Signs (24h Range):  Temp:  [98.1 °F (36.7 °C)-99.2 °F (37.3 °C)] 99.2 °F (37.3 °C)  Pulse:  [] 112  Resp:  [18-32] 18  SpO2:  [98 %-99 %] 99 %  BP: (123-155)/() 155/118        Body mass index is 27.35 kg/m².    No intake or output data in the 24 hours ending 07/04/22 0542    Physical Exam  Constitutional:       Appearance: He is ill-appearing.   Eyes:      Extraocular Movements: Extraocular movements intact.      Conjunctiva/sclera: Conjunctivae normal.   Cardiovascular:      Rate and Rhythm: Tachycardia present.      Pulses: Normal pulses.      Heart sounds: Normal heart sounds.   Pulmonary:      Effort: Pulmonary effort is normal.      Breath sounds: Normal breath sounds.   Abdominal:      General: Bowel sounds are decreased.      Palpations: Abdomen is soft.      Tenderness: There is abdominal tenderness in the right lower quadrant, periumbilical area and left lower quadrant.      Comments:  "RLQ SHRUTHI drain with yellow output   Skin:     General: Skin is warm.   Neurological:      Mental Status: He is alert and oriented to person, place, and time. Mental status is at baseline.   Psychiatric:         Mood and Affect: Mood normal.         Behavior: Behavior normal.       Laboratory:  CBC:   Recent Labs   Lab 07/04/22  0526   WBC 31.03*  31.03*   RBC 3.53*  3.53*   HGB 11.7*  11.7*   HCT 36.3*  36.3*     241   *  103*   MCH 33.1*  33.1*   MCHC 32.2  32.2     BMP:   Recent Labs   Lab 07/03/22  2244   *   *   K 6.1*   CL 93*   CO2 31   BUN 18   CREATININE 1.07   CALCIUM 9.3     CMP:   Recent Labs   Lab 07/03/22  2244   *   CALCIUM 9.3   ALBUMIN 3.7   PROT 6.9   *   K 6.1*   CO2 31   CL 93*   BUN 18   CREATININE 1.07   ALKPHOS 231*   *   AST 71*   BILITOT 1.4*     Labs within the past 24 hours have been reviewed.    Diagnostic Results:  I have personally reviewed all pertinent imaging studies.    Assessment/Plan:     * Biloma following surgery  - CT A/P at OSH with large localized fluid collection concerning for biloma  - NPO  - Discuss IR intervention vs ERCP to manage bile leak  - Continue IV anbx  - Drain fluid sent for bilirubin      Leukocytosis  - WBC 35 at OSH  - CT A/P with large intra-abdominal fluid collection, concerning for biloma  - Infectious workup ordered  - Cefepime/vanc      Long-term use of immunosuppressant medication  - Continue prograf and steroid taper  - Check prograf level daily and adjust for therapeutic dosage. Monitor for toxic side effects  - Hold Cellcept on admit d/t possible bile leak/infection/leukocytosis      Type 2 diabetes mellitus with hyperglycemia  - endocrine consulted, appreciate recs      At risk for opportunistic infections  - Continue OI prophylaxis per protocol.   - Hold Bactrim on admit d/t hyperkalemia at OSH      Prophylactic immunotherapy  - see" long term use of immunosuppression"      S/P liver " transplant  - s/p living donor liver txp 6/21/22 d/t cholangiocarcinoma  - TB OR 6/23 for bile leak repair  - Now admitted for intra-abdominal fluid collection concerning for biloma          Discharge Planning:  Not suitable at this time    Michael Sahu, RADHA  Liver Transplant  [facility] - Transplant Stepdown

## 2022-07-04 NOTE — ANESTHESIA PROCEDURE NOTES
Intubation    Date/Time: 7/4/2022 11:18 AM  Performed by: Erich Contreras CRNA  Authorized by: Pablo Castro MD     Intubation:     Induction:  Intravenous    Intubated:  Postinduction    Mask Ventilation:  Easy mask    Attempts:  1    Attempted By:  HENRIETTA (Erich Contreras)    Method of Intubation:  Direct    Blade:  Roman 2    Laryngeal View Grade: Grade IIA - cords partially seen      Difficult Airway Encountered?: No      Complications:  None    Airway Device:  Oral endotracheal tube    Airway Device Size:  7.5    Style/Cuff Inflation:  Cuffed    Inflation Amount (mL):  6    Tube secured:  23    Secured at:  The lips    Placement Verified By:  Capnometry    Complicating Factors:  Anterior larynx    Findings Post-Intubation:  BS equal bilateral and atraumatic/condition of teeth unchanged

## 2022-07-05 PROBLEM — E44.1 MALNUTRITION OF MILD DEGREE: Status: ACTIVE | Noted: 2022-07-05

## 2022-07-05 LAB
ALBUMIN SERPL BCP-MCNC: 2.4 G/DL (ref 3.5–5.2)
ALBUMIN SERPL BCP-MCNC: 2.6 G/DL (ref 3.5–5.2)
ALP SERPL-CCNC: 152 U/L (ref 55–135)
ALP SERPL-CCNC: 168 U/L (ref 55–135)
ALT SERPL W/O P-5'-P-CCNC: 111 U/L (ref 10–44)
ALT SERPL W/O P-5'-P-CCNC: 130 U/L (ref 10–44)
ANION GAP SERPL CALC-SCNC: 7 MMOL/L (ref 8–16)
ANION GAP SERPL CALC-SCNC: 9 MMOL/L (ref 8–16)
AST SERPL-CCNC: 22 U/L (ref 10–40)
AST SERPL-CCNC: 22 U/L (ref 10–40)
BACTERIA UR CULT: NO GROWTH
BASOPHILS # BLD AUTO: 0.05 K/UL (ref 0–0.2)
BASOPHILS NFR BLD: 0.3 % (ref 0–1.9)
BILIRUB SERPL-MCNC: 1.3 MG/DL (ref 0.1–1)
BILIRUB SERPL-MCNC: 1.5 MG/DL (ref 0.1–1)
BUN SERPL-MCNC: 17 MG/DL (ref 6–20)
BUN SERPL-MCNC: 17 MG/DL (ref 6–20)
CALCIUM SERPL-MCNC: 9.2 MG/DL (ref 8.7–10.5)
CALCIUM SERPL-MCNC: 9.5 MG/DL (ref 8.7–10.5)
CHLORIDE SERPL-SCNC: 95 MMOL/L (ref 95–110)
CHLORIDE SERPL-SCNC: 96 MMOL/L (ref 95–110)
CO2 SERPL-SCNC: 24 MMOL/L (ref 23–29)
CO2 SERPL-SCNC: 27 MMOL/L (ref 23–29)
CREAT SERPL-MCNC: 0.8 MG/DL (ref 0.5–1.4)
CREAT SERPL-MCNC: 0.9 MG/DL (ref 0.5–1.4)
DIFFERENTIAL METHOD: ABNORMAL
EOSINOPHIL # BLD AUTO: 0 K/UL (ref 0–0.5)
EOSINOPHIL NFR BLD: 0.2 % (ref 0–8)
ERYTHROCYTE [DISTWIDTH] IN BLOOD BY AUTOMATED COUNT: 15.6 % (ref 11.5–14.5)
EST. GFR  (AFRICAN AMERICAN): >60 ML/MIN/1.73 M^2
EST. GFR  (AFRICAN AMERICAN): >60 ML/MIN/1.73 M^2
EST. GFR  (NON AFRICAN AMERICAN): >60 ML/MIN/1.73 M^2
EST. GFR  (NON AFRICAN AMERICAN): >60 ML/MIN/1.73 M^2
GLUCOSE SERPL-MCNC: 147 MG/DL (ref 70–110)
GLUCOSE SERPL-MCNC: 170 MG/DL (ref 70–110)
HCT VFR BLD AUTO: 35.4 % (ref 40–54)
HGB BLD-MCNC: 11.3 G/DL (ref 14–18)
IMM GRANULOCYTES # BLD AUTO: 0.1 K/UL (ref 0–0.04)
IMM GRANULOCYTES NFR BLD AUTO: 0.5 % (ref 0–0.5)
LYMPHOCYTES # BLD AUTO: 0.8 K/UL (ref 1–4.8)
LYMPHOCYTES NFR BLD: 3.9 % (ref 18–48)
MAGNESIUM SERPL-MCNC: 1.7 MG/DL (ref 1.6–2.6)
MCH RBC QN AUTO: 32.5 PG (ref 27–31)
MCHC RBC AUTO-ENTMCNC: 31.9 G/DL (ref 32–36)
MCV RBC AUTO: 102 FL (ref 82–98)
MONOCYTES # BLD AUTO: 0.9 K/UL (ref 0.3–1)
MONOCYTES NFR BLD: 4.7 % (ref 4–15)
NEUTROPHILS # BLD AUTO: 18.1 K/UL (ref 1.8–7.7)
NEUTROPHILS NFR BLD: 90.4 % (ref 38–73)
NRBC BLD-RTO: 0 /100 WBC
PHOSPHATE SERPL-MCNC: 4 MG/DL (ref 2.7–4.5)
PLATELET # BLD AUTO: 218 K/UL (ref 150–450)
PMV BLD AUTO: 9.8 FL (ref 9.2–12.9)
POCT GLUCOSE: 175 MG/DL (ref 70–110)
POCT GLUCOSE: 180 MG/DL (ref 70–110)
POCT GLUCOSE: 183 MG/DL (ref 70–110)
POTASSIUM SERPL-SCNC: 5.2 MMOL/L (ref 3.5–5.1)
POTASSIUM SERPL-SCNC: 5.4 MMOL/L (ref 3.5–5.1)
PROT SERPL-MCNC: 5.9 G/DL (ref 6–8.4)
PROT SERPL-MCNC: 6.1 G/DL (ref 6–8.4)
RBC # BLD AUTO: 3.48 M/UL (ref 4.6–6.2)
SODIUM SERPL-SCNC: 129 MMOL/L (ref 136–145)
SODIUM SERPL-SCNC: 129 MMOL/L (ref 136–145)
TACROLIMUS BLD-MCNC: 7.9 NG/ML (ref 5–15)
VANCOMYCIN TROUGH SERPL-MCNC: 8 UG/ML (ref 10–22)
WBC # BLD AUTO: 19.99 K/UL (ref 3.9–12.7)

## 2022-07-05 PROCEDURE — 99233 PR SUBSEQUENT HOSPITAL CARE,LEVL III: ICD-10-PCS | Mod: 24,,, | Performed by: NURSE PRACTITIONER

## 2022-07-05 PROCEDURE — 99232 PR SUBSEQUENT HOSPITAL CARE,LEVL II: ICD-10-PCS | Mod: ,,, | Performed by: NURSE PRACTITIONER

## 2022-07-05 PROCEDURE — 20600001 HC STEP DOWN PRIVATE ROOM

## 2022-07-05 PROCEDURE — 25000003 PHARM REV CODE 250: Performed by: TRANSPLANT SURGERY

## 2022-07-05 PROCEDURE — 93005 ELECTROCARDIOGRAM TRACING: CPT

## 2022-07-05 PROCEDURE — 36415 COLL VENOUS BLD VENIPUNCTURE: CPT | Performed by: CLINICAL NURSE SPECIALIST

## 2022-07-05 PROCEDURE — 93010 EKG 12-LEAD: ICD-10-PCS | Mod: ,,, | Performed by: INTERNAL MEDICINE

## 2022-07-05 PROCEDURE — 99233 SBSQ HOSP IP/OBS HIGH 50: CPT | Mod: 24,,, | Performed by: NURSE PRACTITIONER

## 2022-07-05 PROCEDURE — 63600175 PHARM REV CODE 636 W HCPCS: Performed by: CLINICAL NURSE SPECIALIST

## 2022-07-05 PROCEDURE — 25000003 PHARM REV CODE 250: Performed by: NURSE PRACTITIONER

## 2022-07-05 PROCEDURE — 80202 ASSAY OF VANCOMYCIN: CPT | Performed by: TRANSPLANT SURGERY

## 2022-07-05 PROCEDURE — 83735 ASSAY OF MAGNESIUM: CPT | Performed by: CLINICAL NURSE SPECIALIST

## 2022-07-05 PROCEDURE — 80053 COMPREHEN METABOLIC PANEL: CPT | Performed by: NURSE PRACTITIONER

## 2022-07-05 PROCEDURE — 84100 ASSAY OF PHOSPHORUS: CPT | Performed by: CLINICAL NURSE SPECIALIST

## 2022-07-05 PROCEDURE — 63600175 PHARM REV CODE 636 W HCPCS: Performed by: TRANSPLANT SURGERY

## 2022-07-05 PROCEDURE — 93010 ELECTROCARDIOGRAM REPORT: CPT | Mod: ,,, | Performed by: INTERNAL MEDICINE

## 2022-07-05 PROCEDURE — 99232 SBSQ HOSP IP/OBS MODERATE 35: CPT | Mod: ,,, | Performed by: NURSE PRACTITIONER

## 2022-07-05 PROCEDURE — 85025 COMPLETE CBC W/AUTO DIFF WBC: CPT | Performed by: CLINICAL NURSE SPECIALIST

## 2022-07-05 PROCEDURE — 25000003 PHARM REV CODE 250: Performed by: CLINICAL NURSE SPECIALIST

## 2022-07-05 PROCEDURE — 80197 ASSAY OF TACROLIMUS: CPT | Performed by: CLINICAL NURSE SPECIALIST

## 2022-07-05 PROCEDURE — 80053 COMPREHEN METABOLIC PANEL: CPT | Mod: 91 | Performed by: CLINICAL NURSE SPECIALIST

## 2022-07-05 RX ORDER — TAMSULOSIN HYDROCHLORIDE 0.4 MG/1
0.8 CAPSULE ORAL DAILY
Status: DISCONTINUED | OUTPATIENT
Start: 2022-07-05 | End: 2022-07-08 | Stop reason: HOSPADM

## 2022-07-05 RX ORDER — CYCLOBENZAPRINE HCL 5 MG
5 TABLET ORAL 3 TIMES DAILY PRN
Status: DISCONTINUED | OUTPATIENT
Start: 2022-07-05 | End: 2022-07-08 | Stop reason: HOSPADM

## 2022-07-05 RX ORDER — INSULIN ASPART 100 [IU]/ML
0-5 INJECTION, SOLUTION INTRAVENOUS; SUBCUTANEOUS
Status: DISCONTINUED | OUTPATIENT
Start: 2022-07-05 | End: 2022-07-07

## 2022-07-05 RX ORDER — CHLORPROMAZINE HYDROCHLORIDE 25 MG/1
25 TABLET, FILM COATED ORAL 3 TIMES DAILY
Status: CANCELLED | OUTPATIENT
Start: 2022-07-05

## 2022-07-05 RX ORDER — BACLOFEN 5 MG/1
20 TABLET ORAL 3 TIMES DAILY PRN
Status: DISCONTINUED | OUTPATIENT
Start: 2022-07-05 | End: 2022-07-08 | Stop reason: HOSPADM

## 2022-07-05 RX ORDER — PANTOPRAZOLE SODIUM 40 MG/1
40 TABLET, DELAYED RELEASE ORAL DAILY
Status: DISCONTINUED | OUTPATIENT
Start: 2022-07-05 | End: 2022-07-08 | Stop reason: HOSPADM

## 2022-07-05 RX ORDER — BACLOFEN 5 MG/1
20 TABLET ORAL 3 TIMES DAILY
Status: DISCONTINUED | OUTPATIENT
Start: 2022-07-05 | End: 2022-07-05

## 2022-07-05 RX ORDER — HYDROMORPHONE HYDROCHLORIDE 1 MG/ML
0.5 INJECTION, SOLUTION INTRAMUSCULAR; INTRAVENOUS; SUBCUTANEOUS EVERY 6 HOURS PRN
Status: DISCONTINUED | OUTPATIENT
Start: 2022-07-05 | End: 2022-07-08 | Stop reason: HOSPADM

## 2022-07-05 RX ORDER — GLUCAGON 1 MG
1 KIT INJECTION
Status: DISCONTINUED | OUTPATIENT
Start: 2022-07-05 | End: 2022-07-08 | Stop reason: HOSPADM

## 2022-07-05 RX ORDER — TACROLIMUS 1 MG/1
2 CAPSULE ORAL EVERY 12 HOURS
Qty: 120 CAPSULE | Refills: 11 | OUTPATIENT
Start: 2022-07-05 | End: 2022-07-08

## 2022-07-05 RX ORDER — BACLOFEN 5 MG/1
10 TABLET ORAL 2 TIMES DAILY PRN
Status: DISCONTINUED | OUTPATIENT
Start: 2022-07-05 | End: 2022-07-05

## 2022-07-05 RX ORDER — CYCLOBENZAPRINE HCL 5 MG
5 TABLET ORAL 3 TIMES DAILY PRN
Status: DISCONTINUED | OUTPATIENT
Start: 2022-07-05 | End: 2022-07-05

## 2022-07-05 RX ORDER — OXYBUTYNIN CHLORIDE 5 MG/1
5 TABLET ORAL 3 TIMES DAILY
Status: DISCONTINUED | OUTPATIENT
Start: 2022-07-05 | End: 2022-07-05

## 2022-07-05 RX ORDER — IBUPROFEN 200 MG
16 TABLET ORAL
Status: DISCONTINUED | OUTPATIENT
Start: 2022-07-05 | End: 2022-07-08 | Stop reason: HOSPADM

## 2022-07-05 RX ORDER — IBUPROFEN 200 MG
24 TABLET ORAL
Status: DISCONTINUED | OUTPATIENT
Start: 2022-07-05 | End: 2022-07-08 | Stop reason: HOSPADM

## 2022-07-05 RX ORDER — MUPIROCIN 20 MG/G
OINTMENT TOPICAL 2 TIMES DAILY
Status: DISCONTINUED | OUTPATIENT
Start: 2022-07-05 | End: 2022-07-08 | Stop reason: HOSPADM

## 2022-07-05 RX ORDER — INSULIN ASPART 100 [IU]/ML
4 INJECTION, SOLUTION INTRAVENOUS; SUBCUTANEOUS
Status: DISCONTINUED | OUTPATIENT
Start: 2022-07-05 | End: 2022-07-07

## 2022-07-05 RX ADMIN — HYDROMORPHONE HYDROCHLORIDE 0.5 MG: 1 INJECTION, SOLUTION INTRAMUSCULAR; INTRAVENOUS; SUBCUTANEOUS at 11:07

## 2022-07-05 RX ADMIN — OXYCODONE HYDROCHLORIDE 10 MG: 10 TABLET ORAL at 02:07

## 2022-07-05 RX ADMIN — VANCOMYCIN HYDROCHLORIDE 1250 MG: 1.25 INJECTION, POWDER, LYOPHILIZED, FOR SOLUTION INTRAVENOUS at 02:07

## 2022-07-05 RX ADMIN — OXYCODONE HYDROCHLORIDE 10 MG: 10 TABLET ORAL at 06:07

## 2022-07-05 RX ADMIN — HEPARIN SODIUM 5000 UNITS: 5000 INJECTION INTRAVENOUS; SUBCUTANEOUS at 10:07

## 2022-07-05 RX ADMIN — TACROLIMUS 2 MG: 1 CAPSULE ORAL at 06:07

## 2022-07-05 RX ADMIN — ACETAMINOPHEN 650 MG: 325 TABLET ORAL at 03:07

## 2022-07-05 RX ADMIN — HEPARIN SODIUM 5000 UNITS: 5000 INJECTION INTRAVENOUS; SUBCUTANEOUS at 05:07

## 2022-07-05 RX ADMIN — MUPIROCIN: 20 OINTMENT TOPICAL at 10:07

## 2022-07-05 RX ADMIN — HYDROMORPHONE HYDROCHLORIDE 0.5 MG: 1 INJECTION, SOLUTION INTRAMUSCULAR; INTRAVENOUS; SUBCUTANEOUS at 05:07

## 2022-07-05 RX ADMIN — HEPARIN SODIUM 5000 UNITS: 5000 INJECTION INTRAVENOUS; SUBCUTANEOUS at 08:07

## 2022-07-05 RX ADMIN — CEFEPIME 2 G: 2 INJECTION, POWDER, FOR SOLUTION INTRAVENOUS at 08:07

## 2022-07-05 RX ADMIN — FLUCONAZOLE 200 MG: 200 TABLET ORAL at 09:07

## 2022-07-05 RX ADMIN — OXYBUTYNIN CHLORIDE 5 MG: 5 TABLET ORAL at 10:07

## 2022-07-05 RX ADMIN — TAMSULOSIN HYDROCHLORIDE 0.8 MG: 0.4 CAPSULE ORAL at 12:07

## 2022-07-05 RX ADMIN — BACLOFEN 10 MG: 5 TABLET ORAL at 12:07

## 2022-07-05 RX ADMIN — CARVEDILOL 3.12 MG: 3.12 TABLET, FILM COATED ORAL at 09:07

## 2022-07-05 RX ADMIN — OXYCODONE HYDROCHLORIDE 10 MG: 10 TABLET ORAL at 09:07

## 2022-07-05 RX ADMIN — VALGANCICLOVIR 450 MG: 450 TABLET, FILM COATED ORAL at 09:07

## 2022-07-05 RX ADMIN — BACLOFEN 20 MG: 5 TABLET ORAL at 09:07

## 2022-07-05 RX ADMIN — BACLOFEN 10 MG: 5 TABLET ORAL at 03:07

## 2022-07-05 RX ADMIN — CARVEDILOL 3.12 MG: 3.12 TABLET, FILM COATED ORAL at 08:07

## 2022-07-05 RX ADMIN — MUPIROCIN: 20 OINTMENT TOPICAL at 09:07

## 2022-07-05 RX ADMIN — TACROLIMUS 3 MG: 1 CAPSULE, GELATIN COATED ORAL at 08:07

## 2022-07-05 RX ADMIN — CEFEPIME 2 G: 2 INJECTION, POWDER, FOR SOLUTION INTRAVENOUS at 05:07

## 2022-07-05 RX ADMIN — PREDNISONE 15 MG: 5 TABLET ORAL at 09:07

## 2022-07-05 RX ADMIN — CYCLOBENZAPRINE HYDROCHLORIDE 5 MG: 5 TABLET, FILM COATED ORAL at 09:07

## 2022-07-05 RX ADMIN — BACLOFEN 20 MG: 5 TABLET ORAL at 04:07

## 2022-07-05 RX ADMIN — PANTOPRAZOLE SODIUM 40 MG: 40 TABLET, DELAYED RELEASE ORAL at 10:07

## 2022-07-05 RX ADMIN — HEPARIN SODIUM 5000 UNITS: 5000 INJECTION INTRAVENOUS; SUBCUTANEOUS at 02:07

## 2022-07-05 NOTE — ASSESSMENT & PLAN NOTE
- no appetite since 7/3  - cont IV fluids  - cont liquids at tolerated  - eval for advancing diet in am

## 2022-07-05 NOTE — ASSESSMENT & PLAN NOTE
BG goal 140-180    Start Novolog 4 units TID with meals (home dosing)   Low Dose Correction Scale  BG monitoring ac/hs    ** Please call Endocrine for any BG related issues **    Discharge plans: TBD     Requesting medication refill.  Please approve or deny this request.    Rx requested:  Requested Prescriptions     Pending Prescriptions Disp Refills    chlorthalidone (HYGROTON) 25 MG tablet [Pharmacy Med Name: Sheridan Boast  TAB 25MG] 90 tablet 1     Sig: TAKE 1 TABLET DAILY       Last Office Visit:   11/24/2021    Last Filled:      Last Labs:      Next Visit Date:  Future Appointments   Date Time Provider Ryan Cheung   4/25/2022  8:30 AM Anson Munoz MD [address] [place] [address] 7

## 2022-07-05 NOTE — SUBJECTIVE & OBJECTIVE
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    Hypercholesteremia     Hypertension        Past Surgical History:   Procedure Laterality Date    DIAGNOSTIC ULTRASOUND N/A 6/21/2022    Procedure: ULTRASOUND, DIAGNOSTIC;  Surgeon: Sinan Cline MD;  Location: [facility] OR [address] [place] [address];  Service: Transplant;  Laterality: N/A;    ENDOSCOPIC ULTRASOUND OF UPPER GASTROINTESTINAL TRACT N/A 10/20/2021    Procedure: ULTRASOUND, UPPER GI TRACT, ENDOSCOPIC;  Surgeon: Valeriy Sotelo MD;  [facility]  Service: Endoscopy;  Laterality: N/A;  wife to email vacc card-inst email-tb    ERCP N/A 10/20/2021    Procedure: ERCP (ENDOSCOPIC RETROGRADE CHOLANGIOPANCREATOGRAPHY);  Surgeon: Valeriy Sotelo MD;  [address]  Service: Endoscopy;  Laterality: N/A;    ERCP N/A 11/4/2021    Procedure: ERCP (ENDOSCOPIC RETROGRADE CHOLANGIOPANCREATOGRAPHY);  Surgeon: Valeriy Sotelo MD;  Location: [address]);  Service: Endoscopy;  Laterality: N/A;    ERCP N/A 11/4/2021    Procedure: ERCP (ENDOSCOPIC RETROGRADE CHOLANGIOPANCREATOGRAPHY);  Surgeon: Roselia Pereyra MD;  [facility]  Service: Endoscopy;  Laterality: N/A;  pt vaccinated, instructed to bring card to procedure, instructions sent portal-MG    ERCP N/A 1/14/2022    Procedure: ERCP (ENDOSCOPIC RETROGRADE CHOLANGIOPANCREATOGRAPHY);  Surgeon: Roselia Pereyra MD;  [facility]  Service: Endoscopy;  Laterality: N/A;  inst portal-right chest port-tb  Pt requested at home COVID testing, Pt instructed at home RAPID to be done morning of procedure, Pt instructed to call endoscopy scheuding if there is a positive result, Pt informed if a positive     ERCP N/A 3/31/2022    Procedure: ERCP (ENDOSCOPIC RETROGRADE CHOLANGIOPANCREATOGRAPHY);  Surgeon: Julio Cesar ANDERSON  MD [place];  Location: [facility] ENDO (2ND FLR);  Service: Endoscopy;  Laterality: N/A;  3/16: port L chest wall. pt to bring covid vaccination card. Instructions sent via portal.-SC  3/18/22-Updated instructions sent via portal re:new date/time-DS    EXPLORATORY LAPAROTOMY AFTER LIVER TRANSPLANTATION N/A 6/23/2022    Procedure: LAPAROTOMY, EXPLORATORY, AFTER LIVER TRANSPLANT;  Surgeon: Julio Cesar Jara MD;  Location: [facility] OR [facility] FLR;  Service: Transplant;  Laterality: N/A;    INSERTION OF TUNNELED CENTRAL VENOUS CATHETER (CVC) WITH SUBCUTANEOUS PORT N/A 11/24/2021    Procedure: INSERTION, PORT-A-CATH;  Surgeon: Prem Syed MD;  Location: [facility] CATH LAB;  Service: Radiology;  Laterality: N/A;    LIVER TRANSPLANT N/A 6/21/2022    Procedure: TRANSPLANT, LIVER;  Surgeon: Sinan Cline MD;  Location: [facility] OR [facility] FLR;  Service: Transplant;  Laterality: N/A;    REPAIR OF BILE DUCT N/A 6/23/2022    Procedure: REPAIR, BILE DUCT;  Surgeon: Julio Cesar Jara MD;  [facility];  Service: Transplant;  Laterality: N/A;    ROBOT-ASSISTED LAPAROSCOPIC LYMPHADENECTOMY USING DA МАРИНА XI  6/17/2022    Procedure: XI ROBOTIC LYMPHADENECTOMY - Portal;  Surgeon: Julio Cesar Jara MD;  Location: [facility] OR [facility] FLR;  Service: Transplant;;    TONSILLECTOMY      XI ROBOTIC LAPAROSCOPY, EXPLORATORY N/A 6/17/2022    Procedure: XI ROBOTIC LAPAROSCOPY,EXPLORATORY;  Surgeon: Julio Cesar Jara MD;  Location: [facility] OR [facility] FLR;  Service: Transplant;  Laterality: N/A;       Review of patient's allergies indicates:  No Known Allergies    Family History       Problem Relation (Age of Onset)    Hyperlipidemia Father    No Known Problems Mother, Sister, Brother          Tobacco Use    Smoking status: Never Smoker    Smokeless tobacco: Never Used   Substance and Sexual Activity    Alcohol use: Not Currently    Drug use: Never    Sexual activity: Yes       PTA Medications   Medication Sig    amoxicillin-clavulanate 875-125mg (AUGMENTIN) 875-125 mg per tablet Take  "1 tablet by mouth every 12 (twelve) hours.    aspirin 81 MG Chew CHEW 1 tablet (81 mg total) by mouth once daily.    blood sugar diagnostic Strp 1 each by Misc.(Non-Drug; Combo Route) route 3 (three) times daily.    blood-glucose meter Misc Use as instructed    calcium carbonate-vitamin D3 600 mg-20 mcg (800 unit) Tab Take 1 tablet by mouth once daily at 6am.    carvediloL (COREG) 3.125 MG tablet Take 1 tablet (3.125 mg total) by mouth 2 (two) times daily.    famotidine (PEPCID) 20 MG tablet Take 1 tablet (20 mg total) by mouth every evening. STOP 7/24/22    fluconazole (DIFLUCAN) 200 MG Tab Take 1 tablet (200 mg total) by mouth once daily. STOP 7/20/22    insulin aspart U-100 (NOVOLOG) 100 unit/mL (3 mL) InPn pen Inject 4 Units into the skin 3 (three) times daily. Plus Sliding Scale. MDD: 42 UNITS    lancets 30 gauge Misc 1 each by Misc.(Non-Drug; Combo Route) route 3 (three) times daily.    methocarbamoL (ROBAXIN) 500 MG Tab Take 1 tablet (500 mg total) by mouth 4 (four) times daily.    mycophenolate (CELLCEPT) 250 mg Cap Take 4 capsules (1,000 mg total) by mouth 2 (two) times daily.    oxyCODONE (ROXICODONE) 10 mg Tab immediate release tablet Take 1-1.5 tablets (10-15 mg total) by mouth every 4 (four) hours as needed for Pain.    pen needle, diabetic 32 gauge x 5/32" Ndle 1 each by Misc.(Non-Drug; Combo Route) route 3 (three) times daily.    predniSONE (DELTASONE) 5 MG tablet Take by mouth once daily: 20 mg 6/27-7/3; 15 mg 7/4-7/10; 10 mg 7/11-7/17; 5 mg 7/18-7/24; STOP 7/25/22    sulfamethoxazole-trimethoprim 400-80mg (BACTRIM,SEPTRA) 400-80 mg per tablet Take 1 tablet by mouth every morning. STOP 12/18/22    tacrolimus (PROGRAF) 1 MG Cap take 2 capsules by mouth every 12 hours    valGANciclovir (VALCYTE) 450 mg Tab Take 1 tablet (450 mg total) by mouth once daily. STOP 9/19/22       Review of Systems   Constitutional:  Positive for activity change (decreased), appetite change (decreased) and diaphoresis. "   HENT: Negative.     Respiratory:  Negative for cough and shortness of breath.    Cardiovascular:  Negative for chest pain and leg swelling.   Gastrointestinal:  Positive for abdominal pain. Negative for constipation, diarrhea, nausea and vomiting.   Genitourinary:  Positive for difficulty urinating.   Skin:  Positive for wound.   Allergic/Immunologic: Positive for immunocompromised state.   Neurological:  Positive for weakness. Negative for dizziness and headaches.   Psychiatric/Behavioral:  Negative for agitation and confusion.    Objective:     Vital Signs (Most Recent):  Temp: 98.6 °F (37 °C) (07/05/22 1119)  Pulse: (!) 118 (07/05/22 1145)  Resp: 14 (07/05/22 1145)  BP: (!) 139/109 (07/05/22 1145)  SpO2: 95 % (07/05/22 1145)   Vital Signs (24h Range):  Temp:  [97.3 °F (36.3 °C)-99 °F (37.2 °C)] 98.6 °F (37 °C)  Pulse:  [] 118  Resp:  [12-31] 14  SpO2:  [93 %-100 %] 95 %  BP: (127-163)/() 139/109     Weight: 82 kg (180 lb 12.4 oz)  Body mass index is 27.49 kg/m².      Intake/Output Summary (Last 24 hours) at 7/5/2022 1157  Last data filed at 7/5/2022 0600  Gross per 24 hour   Intake 1864.73 ml   Output 3105 ml   Net -1240.27 ml       Physical Exam  Constitutional:       Appearance: He is ill-appearing.   Eyes:      Extraocular Movements: Extraocular movements intact.      Conjunctiva/sclera: Conjunctivae normal.   Cardiovascular:      Rate and Rhythm: Tachycardia present.      Pulses: Normal pulses.      Heart sounds: Normal heart sounds.   Pulmonary:      Effort: Pulmonary effort is normal.      Breath sounds: Normal breath sounds.   Abdominal:      General: Bowel sounds are decreased.      Palpations: Abdomen is soft.      Tenderness: There is abdominal tenderness in the right lower quadrant, periumbilical area and left lower quadrant.      Comments: Velasquez w staples, surgical dressing removed  RLQ SHRUTHI drain with serosang drainage  LUQ drainage bilious  LLQ drainage serosang   Skin:     General:  Skin is warm.   Neurological:      Mental Status: He is alert and oriented to person, place, and time. Mental status is at baseline.   Psychiatric:         Mood and Affect: Mood normal.         Behavior: Behavior normal.         Thought Content: Thought content normal.       Laboratory:  CBC:   Recent Labs   Lab 07/05/22 0722   WBC 19.99*   RBC 3.48*   HGB 11.3*   HCT 35.4*      *   MCH 32.5*   MCHC 31.9*       BMP:   Recent Labs   Lab 07/05/22 0722   *   *   K 5.4*   CL 96   CO2 24   BUN 17   CREATININE 0.9   CALCIUM 9.5       CMP:   Recent Labs   Lab 07/05/22 0722   *   CALCIUM 9.5   ALBUMIN 2.6*   PROT 6.1   *   K 5.4*   CO2 24   CL 96   BUN 17   CREATININE 0.9   ALKPHOS 168*   *   AST 22   BILITOT 1.5*       Labs within the past 24 hours have been reviewed.    Diagnostic Results:  I have personally reviewed all pertinent imaging studies.

## 2022-07-05 NOTE — PLAN OF CARE
AAOx4, VSS, Tele- sinus tach baseline,afebrile overnight; IV vanc/cefepime continued  Surgical dressing remains CDI, 3 SHRUTHI drains to bulb suction, refer to flowsheet for total overnight  Pain moderately continued with 0.5mg IV dilaudid + 10mg PRN oxy   Rocha remains CDI, refer to flowsheet for total overnight, NS @ 50cc/hr   Non skid socks worn, call light within reach, will cont to monitor

## 2022-07-05 NOTE — ASSESSMENT & PLAN NOTE
- WBC 35 at OSH  - CT A/P with large intra-abdominal fluid collection, concerning for biloma  - Infectious workup ordered  - Cefepime/vanc continued

## 2022-07-05 NOTE — PLAN OF CARE
Patient AAO I with ambulation, not seen by PT today due to patients pain.  PRN oxy, muscle relaxer, and IV dilaudid given.  Rocha intact draining clear yellow urine,will be dcd @0600 tomorrow, flomax started.  Patient receiving IV antibiotics, vanc trough pending, WBC improved from 30 to 19 today.  Liver numbers coming down.  3 SHRUTHI drains remain charged, draining yellow to bile colored fluid.  Hiccups throughout the day, baclofen increased, thorazine ordered, but as of now hiccups have stopped, will hold thorazine unless needed. Appetite poor, no insulin given, patient is not eating. Am labs drawn late and labs drawn at noon, no finger stick for blood sugar due to appropriate lab draws.  Patients wife at the bedside attentive to patient involved in care. [employer] DEBBIE with staples, painted with betadine.  Vitals stable.

## 2022-07-05 NOTE — PROGRESS NOTES
"Patient not feeling well this am, team aware, lots of pain to the lower back. Patient stated "I felt better right after transplant."  Administered ordered muscle relaxer, and oxycodone.  Patients appetite poor due to pain. Will continue to monitor.    "

## 2022-07-05 NOTE — PROGRESS NOTES
Social Work Admit Note    SW met with patient and patients wife at bedside to assess needs. Patient is a 41 y.o.  male, admitted for Biloma of transplanted liver [T86.49, K66.8] on 6/18/2022. Patient received liver transplant from a living donor on (6/21/22).    At this time, patient presents as asleep and patients wifeShell provided the information needed. Patient wifeShell presented alert and oriented x 4 and asking and answering questions appropriately.     Household/Family Systems     Patient resides with his wife and 3 children, ages 16, 13 and 12, at [address] [address]. Support system includes his wife, father, mother and brother.     Patients primary caregiver is his wife, Shell Larsno, [phone] and secondary caregiver is his father, Bradley Larson, [phone].  Confirmed patients contact information is [phone] (home);   Telephone Information:   Mobile [phone]     During admission, patient's caregiver plans to stay in the room with the patient. Confirmed patient and patients caregivers have access to reliable transportation.    Cognitive Status/Learning     Patient reports reading ability at college level. He denies difficulty with seeing or hearing.    Vocation/Disability     Working for Income: Patient reports working remotely and expressed hope that he would be able to log in to his work computer in a week to check on work and to resume work when medically cleared.    Adherence     Patient reports a high level of adherence to patients health care regimen.  Adherence counseling and education provided. Patient verbalizes understanding.    Substance Use    Patient reports the following substance usage.    Tobacco: none  Alcohol: none  Illicit Drugs/Non-prescribed Medications: none  Patient states clear understanding of the potential impact of substance use.  Substance abstinence/cessation counseling, education and resources provided and reviewed.      Services Utilizing/ADLS    Infusion Service: Prior to admission, patient utilizing? no  Home Health: Prior to admission, patient utilizing? no  DME: Prior to admission, no  Pulmonary/Cardiac Rehab: Prior to admission, no  Dialysis:  Prior to admission, no  Transplant Specialty Pharmacy:  Prior to admission, no    Prior to admission, patient reports being independent with ADLS but was not driving following medical advice.    Insurance/Medications    Insured by   Payer/Plan Subscr  Sex Relation Sub. Ins. ID Effective Group Num   1. [employer] - ÁNGELA* OSCAR LARSON 1980 Male Self [id number]                                   [address] [address]      Primary Insurance (for UNOS reporting): [employer]  Secondary Insurance (for UNOS reporting): none    Patient reports he is able to obtain and afford medications at this time and at time of discharge.    Living Will/Healthcare Power of     Patient states he does not have a LW and/or HCPA.  provided education regarding LW and HCPA and the completion of forms. Patient's spouse, Shell Larson will be his legal next of kin in the absence of HCPA according to [place] law.    Coping/Mental Health    Patient shared that he is coping well with the support of his family. Patient denies mental health difficulties. He looks forward to going fishing and going to his children's ball games when he is medically cleared to do so.    Discharge Planning    At time of discharge, patient plans to return to his house in [place] under the care of his wife. Patients wife, Shell and his father Bradley will transport patient. Per rounds today, patient date of discharge is to be determined. Patient verbalized understanding and his caregivers are involved in treatment planning and discharge process.      Additional Concerns    Patient is being followed for needs, education, resources, information, emotional support, supportive counseling, and for supportive and  skilled discharge plan of care.  providing ongoing psychosocial support, education, resources and d/c planning as needed.  SW remains available.  remains available.

## 2022-07-05 NOTE — ASSESSMENT & PLAN NOTE
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 Right drain is also near anastomosis.  - LFTs trending down, cont to trend

## 2022-07-05 NOTE — ASSESSMENT & PLAN NOTE
- Continue prograf and steroid taper  - Check prograf level daily and adjust for therapeutic dosage. Monitor for toxic side effects  - Holding Cellcept on admit d/t possible bile leak/infection/leukocytosis

## 2022-07-05 NOTE — SUBJECTIVE & OBJECTIVE
"Interval HPI:   Overnight events: Remains in TSU. POD 1. BG at and above goal ranges on prn correction scale. Receiving Prednisone 15 mg daily. Diet diabetic [facility];  Calorie; Isolation Tray - Regular [place]  Eatin%  Nausea: No  Hypoglycemia and intervention: No  Fever: No  TPN and/or TF: No  If yes, type of TF/TPN and rate: n/a    BP (!) 135/105   Pulse (!) 114   Temp 99 °F (37.2 °C) (Oral)   Resp (!) 26   Ht 5' 8" (1.727 m)   Wt 82 kg (180 lb 12.4 oz)   SpO2 97%   BMI 27.49 kg/m²     Labs Reviewed and Include    Recent Labs   Lab 22   *   CALCIUM 9.5   ALBUMIN 2.6*   PROT 6.1   *   K 5.4*   CO2 24   CL 96   BUN 17   CREATININE 0.9   ALKPHOS 168*   *   AST 22   BILITOT 1.5*     Lab Results   Component Value Date    WBC 19.99 (H) 2022    HGB 11.3 (L) 2022    HCT 35.4 (L) 2022     (H) 2022     2022     No results for input(s): TSH, FREET4 in the last 168 hours.  Lab Results   Component Value Date    HGBA1C 5.8 (H) 2022       Nutritional status:   Body mass index is 27.49 kg/m².  Lab Results   Component Value Date    ALBUMIN 2.6 (L) 2022    ALBUMIN 2.9 (L) 2022    ALBUMIN 2.9 (L) 2022     No results found for: PREALBUMIN    Estimated Creatinine Clearance: 102.4 mL/min (based on SCr of 0.9 mg/dL).    Accu-Checks  Recent Labs     22  0439 22  0758 22  1419 22  1609 22  2159 22  0532   POCTGLUCOSE 199* 173* 202* 292* 188* 183*       Current Medications and/or Treatments Impacting Glycemic Control  Immunotherapy:    Immunosuppressants           Stop Route Frequency     tacrolimus capsule 2 mg         -- Oral Every evening     tacrolimus capsule 3 mg         -- Oral Every morning          Steroids:   Hormones (From admission, onward)                Start     Stop Route Frequency Ordered    22 0900  predniSONE tablet 15 mg         -- Oral Daily 22 0419 "    07/04/22 0421  melatonin tablet 6 mg         -- Oral Nightly PRN 07/04/22 0422          Pressors:    Autonomic Drugs (From admission, onward)                None          Hyperglycemia/Diabetes Medications:   Antihyperglycemics (From admission, onward)                Start     Stop Route Frequency Ordered    07/05/22 1130  insulin aspart U-100 pen 4 Units         -- SubQ 3 times daily with meals 07/05/22 0930    07/05/22 1030  insulin aspart U-100 pen 0-5 Units         -- SubQ Before meals & nightly PRN 07/05/22 0930

## 2022-07-05 NOTE — PROGRESS NOTES
[facility] - Transplant Stepdown  Liver Transplant  Progress Note    Patient Name: Romeo Larson  MRN: [id number]  Admission Date: 7/4/2022  Hospital Length of Stay: 1 days  Code Status: Full Code  Primary Care Provider: Pravin Maria MD  Post-Operative Day: 14    ORGAN:   RIGHT LIVER LOBE (SEGS 5,6,7,8) WITHOUT MIDDLE HEPATIC VEIN  Disease Etiology: Primary Liver Malignancy: Cholangiocarcinoma (CH-CA)  Donor Type:   Living  CDC High Risk:     Donor CMV Status:   Donor CMV Status:   Donor HBcAB:     Donor HCV Status:     Donor HBV GUSTABO:   Donor HCV GUSTABO:   Whole or Partial:   Biliary Anastomosis: Nina-en-Y  Arterial Anatomy: Standard  Subjective:     History of Present Illness:  Romeo Larson is a 41 y.o. with PMHx of cholangiocarcinoma now s/p Living liver transplant on 6/21/2022 (CMV -/+). The patient returned to OR on 6/23 for Bile leak repair and was maintained on IV anbx. He was stepped down on 6/24 and progressed well post takeback. Of note, cultures from OR with ENTEROCOCCUS FAECALIS. Pt was initially treated w Unasyn IV, de escalated to Augmentin PO 6/27/22 and was to remain on Augmentin while drains in place. Patient was discharged with 2 SHRUTHI drains. He was seen in clinic 7/1 by the transplant surgeon and was feeling well at that time. SHRUTHI #2 removed in clinic on 7/1. The patient continued to do well until 7/3 afternoon. Patient began experiencing acute onset of mid to lower severe abdominal pain and inability to urinate. He denies fevers, chills, nausea, vomiting, SOB, CP. He presented to OSH where labs showed leukocytosis (WBC 35) and hyperkalemia (K 6.1). CT A/P was performed which showed a localized fluid collection measuring 11.2 x 10.1 cm in maximum axial dimensions concerning for a large biloma. The [place] [facility] [place] for management of the above findings. He was initiated on IV cefepime/vanc at OSH, will continue here. Infectious work-up performed at OSH, will repeat on admit.  CT reviewed by Dr. Iglesias, will plan for ERCP vs IR intervention for biloma in AM. Patient will be made NPO. Patient and caretaker notified of plan.        Hospital Course:  Interval History: pt is s/p exploratory lap 7/4 w copious warm irrigation was used ot clean out the abdomen of bilious fluid, adhesions to the abdominal were taken down and biloma was identified, the wall was made up of omentum, the superior aspect was diaphragm, lateral aspect was stomach. This was cleaned out and cultured. Original drain on the right side was left in place(near left upper drain). 2 drains on the left side were placed - the upper drain straight into the biloma cavity and the lower drain to the pelvis bilaterally. Today right drain w bilious but more serosang drainage, Left upper drain bilious, left lower drainage serosang. Keeping drains charged and drain as needed. OR cx w NGTD, anaerobic cx pending. Blood cx w NGTD. LFTs better today. Cont abx for now. WBC 19. Surgical dressing removed, chevron closed w staples, CDI. Pt w persistent hiccups, temporarily releived w Baclofen. THorazine IV x1 ordered. EKG show QTc 426. Pt tolerating liquids, no appetite. Can cosider advancing diet tomorrow. Cont IV fluids for now. Pt had urineary retention 7/3, campos placed at OSH. Flomax started for retention 7/5. Orders placed to remove campos in am. Am labs show Na 129, K+ 5.4. PLan to repeat labs at 1300. VSS, again tachycardia noted. EKG w ST. Monitor.      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    Hypercholesteremia     Hypertension        Past Surgical History:   Procedure Laterality Date    DIAGNOSTIC ULTRASOUND N/A 6/21/2022    Procedure: ULTRASOUND, DIAGNOSTIC;  Surgeon: Sinan Cline MD;  Location: [facility] OR [address] [place] [address];  Service: Transplant;  Laterality: N/A;     ENDOSCOPIC ULTRASOUND OF UPPER GASTROINTESTINAL TRACT N/A 10/20/2021    Procedure: ULTRASOUND, UPPER GI TRACT, ENDOSCOPIC;  Surgeon: Valeriy Sotelo MD;  Location: [facility] ENDO (2ND FLR);  Service: Endoscopy;  Laterality: N/A;  wife to email vacc card-inst email-tb    ERCP N/A 10/20/2021    Procedure: ERCP (ENDOSCOPIC RETROGRADE CHOLANGIOPANCREATOGRAPHY);  Surgeon: Vlaeriy Sotelo MD;  Location: [facility] ENDO (2ND FLR);  Service: Endoscopy;  Laterality: N/A;    ERCP N/A 11/4/2021    Procedure: ERCP (ENDOSCOPIC RETROGRADE CHOLANGIOPANCREATOGRAPHY);  Surgeon: Valeriy Sotelo MD;  Location: [facility] ENDO (2ND FLR);  Service: Endoscopy;  Laterality: N/A;    ERCP N/A 11/4/2021    Procedure: ERCP (ENDOSCOPIC RETROGRADE CHOLANGIOPANCREATOGRAPHY);  Surgeon: Roselia Pereyra MD;  Location: [facility] ENDO (2ND FLR);  Service: Endoscopy;  Laterality: N/A;  pt vaccinated, instructed to bring card to procedure, instructions sent portal-MG    ERCP N/A 1/14/2022    Procedure: ERCP (ENDOSCOPIC RETROGRADE CHOLANGIOPANCREATOGRAPHY);  Surgeon: Roselia Pereyra MD;  Location: [facility] ENDO (2ND FLR);  Service: Endoscopy;  Laterality: N/A;  inst portal-right chest port-tb  Pt requested at home COVID testing, Pt instructed at home RAPID to be done morning of procedure, Pt instructed to call endoscopy scheuding if there is a positive result, Pt informed if a positive     ERCP N/A 3/31/2022    Procedure: ERCP (ENDOSCOPIC RETROGRADE CHOLANGIOPANCREATOGRAPHY);  Surgeon: Julio Cesar Alonzo MD;  Location: [facility] ENDO (2ND FLR);  Service: Endoscopy;  Laterality: N/A;  3/16: port L chest wall. pt to bring covid vaccination card. Instructions sent via portal.-SC  3/18/22-Updated instructions sent via portal re:new date/time-DS    EXPLORATORY LAPAROTOMY AFTER LIVER TRANSPLANTATION N/A 6/23/2022    Procedure: LAPAROTOMY, EXPLORATORY, AFTER LIVER TRANSPLANT;  Surgeon: Julio Cesar Jara MD;  Location: [facility] OR 2ND FLR;  Service: Transplant;  Laterality: N/A;     INSERTION OF TUNNELED CENTRAL VENOUS CATHETER (CVC) WITH SUBCUTANEOUS PORT N/A 11/24/2021    Procedure: INSERTION, PORT-A-CATH;  Surgeon: Prem Syed MD;  Location: [facility] CATH LAB;  Service: Radiology;  Laterality: N/A;    LIVER TRANSPLANT N/A 6/21/2022    Procedure: TRANSPLANT, LIVER;  Surgeon: Sinan Cline MD;  Location: [facility] OR 2ND FLR;  Service: Transplant;  Laterality: N/A;    REPAIR OF BILE DUCT N/A 6/23/2022    Procedure: REPAIR, BILE DUCT;  Surgeon: Julio Cesar Jara MD;  Location: [facility] OR 2ND FLR;  Service: Transplant;  Laterality: N/A;    ROBOT-ASSISTED LAPAROSCOPIC LYMPHADENECTOMY USING DA МАРИНА XI  6/17/2022    Procedure: XI ROBOTIC LYMPHADENECTOMY - Portal;  Surgeon: Julio Cesar Jara MD;  Location: [facility] OR 2ND FLR;  Service: Transplant;;    TONSILLECTOMY      XI ROBOTIC LAPAROSCOPY, EXPLORATORY N/A 6/17/2022    Procedure: XI ROBOTIC LAPAROSCOPY,EXPLORATORY;  Surgeon: Julio Cesar Jara MD;  Location: [facility] OR 2ND FLR;  Service: Transplant;  Laterality: N/A;       Review of patient's allergies indicates:  No Known Allergies    Family History       Problem Relation (Age of Onset)    Hyperlipidemia Father    No Known Problems Mother, Sister, Brother          Tobacco Use    Smoking status: Never Smoker    Smokeless tobacco: Never Used   Substance and Sexual Activity    Alcohol use: Not Currently    Drug use: Never    Sexual activity: Yes       PTA Medications   Medication Sig    amoxicillin-clavulanate 875-125mg (AUGMENTIN) 875-125 mg per tablet Take 1 tablet by mouth every 12 (twelve) hours.    aspirin 81 MG Chew CHEW 1 tablet (81 mg total) by mouth once daily.    blood sugar diagnostic Strp 1 each by Misc.(Non-Drug; Combo Route) route 3 (three) times daily.    blood-glucose meter Misc Use as instructed    calcium carbonate-vitamin D3 600 mg-20 mcg (800 unit) Tab Take 1 tablet by mouth once daily at 6am.    carvediloL (COREG) 3.125 MG tablet Take 1 tablet (3.125 mg total) by mouth 2 (two) times  "daily.    famotidine (PEPCID) 20 MG tablet Take 1 tablet (20 mg total) by mouth every evening. STOP 7/24/22    fluconazole (DIFLUCAN) 200 MG Tab Take 1 tablet (200 mg total) by mouth once daily. STOP 7/20/22    insulin aspart U-100 (NOVOLOG) 100 unit/mL (3 mL) InPn pen Inject 4 Units into the skin 3 (three) times daily. Plus Sliding Scale. MDD: 42 UNITS    lancets 30 gauge Misc 1 each by Misc.(Non-Drug; Combo Route) route 3 (three) times daily.    methocarbamoL (ROBAXIN) 500 MG Tab Take 1 tablet (500 mg total) by mouth 4 (four) times daily.    mycophenolate (CELLCEPT) 250 mg Cap Take 4 capsules (1,000 mg total) by mouth 2 (two) times daily.    oxyCODONE (ROXICODONE) 10 mg Tab immediate release tablet Take 1-1.5 tablets (10-15 mg total) by mouth every 4 (four) hours as needed for Pain.    pen needle, diabetic 32 gauge x 5/32" Ndle 1 each by Misc.(Non-Drug; Combo Route) route 3 (three) times daily.    predniSONE (DELTASONE) 5 MG tablet Take by mouth once daily: 20 mg 6/27-7/3; 15 mg 7/4-7/10; 10 mg 7/11-7/17; 5 mg 7/18-7/24; STOP 7/25/22    sulfamethoxazole-trimethoprim 400-80mg (BACTRIM,SEPTRA) 400-80 mg per tablet Take 1 tablet by mouth every morning. STOP 12/18/22    tacrolimus (PROGRAF) 1 MG Cap take 2 capsules by mouth every 12 hours    valGANciclovir (VALCYTE) 450 mg Tab Take 1 tablet (450 mg total) by mouth once daily. STOP 9/19/22       Review of Systems   Constitutional:  Positive for activity change (decreased), appetite change (decreased) and diaphoresis.   HENT: Negative.     Respiratory:  Negative for cough and shortness of breath.    Cardiovascular:  Negative for chest pain and leg swelling.   Gastrointestinal:  Positive for abdominal pain. Negative for constipation, diarrhea, nausea and vomiting.   Genitourinary:  Positive for difficulty urinating.   Skin:  Positive for wound.   Allergic/Immunologic: Positive for immunocompromised state.   Neurological:  Positive for weakness. Negative for " dizziness and headaches.   Psychiatric/Behavioral:  Negative for agitation and confusion.    Objective:     Vital Signs (Most Recent):  Temp: 98.6 °F (37 °C) (07/05/22 1119)  Pulse: (!) 118 (07/05/22 1145)  Resp: 14 (07/05/22 1145)  BP: (!) 139/109 (07/05/22 1145)  SpO2: 95 % (07/05/22 1145)   Vital Signs (24h Range):  Temp:  [97.3 °F (36.3 °C)-99 °F (37.2 °C)] 98.6 °F (37 °C)  Pulse:  [] 118  Resp:  [12-31] 14  SpO2:  [93 %-100 %] 95 %  BP: (127-163)/() 139/109     Weight: 82 kg (180 lb 12.4 oz)  Body mass index is 27.49 kg/m².      Intake/Output Summary (Last 24 hours) at 7/5/2022 1157  Last data filed at 7/5/2022 0600  Gross per 24 hour   Intake 1864.73 ml   Output 3105 ml   Net -1240.27 ml       Physical Exam  Constitutional:       Appearance: He is ill-appearing.   Eyes:      Extraocular Movements: Extraocular movements intact.      Conjunctiva/sclera: Conjunctivae normal.   Cardiovascular:      Rate and Rhythm: Tachycardia present.      Pulses: Normal pulses.      Heart sounds: Normal heart sounds.   Pulmonary:      Effort: Pulmonary effort is normal.      Breath sounds: Normal breath sounds.   Abdominal:      General: Bowel sounds are decreased.      Palpations: Abdomen is soft.      Tenderness: There is abdominal tenderness in the right lower quadrant, periumbilical area and left lower quadrant.      Comments: Cheadrienne w staples, surgical dressing removed  RLQ SHRUTHI drain with serosang drainage  LUQ drainage bilious  LLQ drainage serosang   Skin:     General: Skin is warm.   Neurological:      Mental Status: He is alert and oriented to person, place, and time. Mental status is at baseline.   Psychiatric:         Mood and Affect: Mood normal.         Behavior: Behavior normal.         Thought Content: Thought content normal.       Laboratory:  CBC:   Recent Labs   Lab 07/05/22  0722   WBC 19.99*   RBC 3.48*   HGB 11.3*   HCT 35.4*      *   MCH 32.5*   MCHC 31.9*       BMP:   Recent Labs  "  Lab 07/05/22  0722   *   *   K 5.4*   CL 96   CO2 24   BUN 17   CREATININE 0.9   CALCIUM 9.5       CMP:   Recent Labs   Lab 07/05/22  0722   *   CALCIUM 9.5   ALBUMIN 2.6*   PROT 6.1   *   K 5.4*   CO2 24   CL 96   BUN 17   CREATININE 0.9   ALKPHOS 168*   *   AST 22   BILITOT 1.5*       Labs within the past 24 hours have been reviewed.    Diagnostic Results:  I have personally reviewed all pertinent imaging studies.    Assessment/Plan:     * Biloma following surgery  - CT A/P at OSH with large localized fluid collection concerning for biloma  - NPO  - take back to OR 7/4, biloma identified, washed out, 2 drains placed  - Continue IV anbx  - Drain fluid sent for bilirubin- 7, serum 1.7  - keep drains charged and empty      Malnutrition of mild degree  - no appetite since 7/3  - cont IV fluids  - cont liquids at tolerated  - eval for advancing diet in am      Hiccups  - temporary relieve w Baclofen  - one time dose of Thorazine ordered      Biloma of transplanted liver  - see s/p liver transplant      Leukocytosis  - WBC 35 at OSH  - CT A/P with large intra-abdominal fluid collection, concerning for biloma  - Infectious workup ordered  - Cefepime/vanc continued      Long-term use of immunosuppressant medication  - Continue prograf and steroid taper  - Check prograf level daily and adjust for therapeutic dosage. Monitor for toxic side effects  - Holding Cellcept on admit d/t possible bile leak/infection/leukocytosis      Adrenal corticosteroid causing adverse effect in therapeutic use        Type 2 diabetes mellitus with hyperglycemia  - endocrine consulted, appreciate recs      At risk for opportunistic infections  - Continue OI prophylaxis per protocol.   - Hold Bactrim on admit d/t hyperkalemia at OSH  - repeat labs 1300      Prophylactic immunotherapy  - see" long term use of immunosuppression"      S/P liver transplant  - s/p living donor liver txp 6/21/22 d/t " cholangiocarcinoma  - TB OR 6/23 for bile leak repair  - Now admitted for intra-abdominal fluid collection concerning for biloma  - s/p take back 7/4 biloma identified, 2 additional left drains placed-the upper drain straight into the biloma cavity and the lower drain to the pelvis bilaterally. Right drain is also near anastomosis.  - LFTs trending down, cont to trend          VTE Risk Mitigation (From admission, onward)         Ordered     heparin (porcine) injection 5,000 Units  Every 8 hours         07/04/22 0422     IP VTE HIGH RISK PATIENT  Once         07/04/22 0422     Place sequential compression device  Until discontinued         07/04/22 0422                The patients clinical status was discussed at multidisplinary rounds, involving transplant surgery, transplant medicine, pharmacy, nursing, nutrition, and social work    Discharge Planning: Discussed plan of care.  No plan for discharge today.    PharmD Review: resume tac 7/1 after being held since 6/25 for high levels--monitor level and AST/ALT [LENA 7/1]      Abida Arenas NP  Liver Transplant  [facility] - Transplant Stepdown

## 2022-07-05 NOTE — ASSESSMENT & PLAN NOTE
- Continue OI prophylaxis per protocol.   - Hold Bactrim on admit d/t hyperkalemia at OSH  - repeat labs 1300

## 2022-07-05 NOTE — PROGRESS NOTES
"[facility] - Transplant Stepdown  Endocrinology  Progress Note    Admit Date: 2022     Reason for Consult: Management of T2DM, Hyperglycemia     Surgical Procedure and Date: Liver Transplant 2022; LAPAROTOMY, EXPLORATORY (N/A) on     Diabetes diagnosis year:     Home Diabetes Medications:  Novolog 4 units TID with meals     How often checking glucose at home?  3-4x daily    BG readings on regimen: low 100s-200  Hypoglycemia on the regimen?  No  Missed doses on regimen?  No    Diabetes Complications include:     Hyperglycemia     Complicating diabetes co morbidities:   Glucocorticoid Use    HPI:   Patient is a 41 y.o. male with a diagnosis of cholangiocarcinoma now s/p Living liver transplant on 2022. The patient returned to OR on  for Bile leak repair and was maintained on IV abx. He was seen in clinic  by the transplant surgeon and was feeling well at that time. Patient began experiencing acute onset of mid to lower severe abdominal pain and inability to urinate. Found to have Biloma on CT scan. Class A to OR for ex-lap for biloma. Patient now presents for the above procedure(s). Endocrinology consulted for management of T2DM.       Lab Results   Component Value Date    HGBA1C 5.8 (H) 2022           Interval HPI:   Overnight events: Remains in TSU. POD 1. BG at and above goal ranges on prn correction scale. Receiving Prednisone 15 mg daily. Diet diabetic [facility]; 2000 Calorie; Isolation Tray - Regular [place]  Eatin%  Nausea: No  Hypoglycemia and intervention: No  Fever: No  TPN and/or TF: No  If yes, type of TF/TPN and rate: n/a    BP (!) 135/105   Pulse (!) 114   Temp 99 °F (37.2 °C) (Oral)   Resp (!) 26   Ht 5' 8" (1.727 m)   Wt 82 kg (180 lb 12.4 oz)   SpO2 97%   BMI 27.49 kg/m²     Labs Reviewed and Include    Recent Labs   Lab 22  0722   *   CALCIUM 9.5   ALBUMIN 2.6*   PROT 6.1   *   K 5.4*   CO2 24   CL 96   BUN 17   CREATININE 0.9 "   ALKPHOS 168*   *   AST 22   BILITOT 1.5*     Lab Results   Component Value Date    WBC 19.99 (H) 07/05/2022    HGB 11.3 (L) 07/05/2022    HCT 35.4 (L) 07/05/2022     (H) 07/05/2022     07/05/2022     No results for input(s): TSH, FREET4 in the last 168 hours.  Lab Results   Component Value Date    HGBA1C 5.8 (H) 06/20/2022       Nutritional status:   Body mass index is 27.49 kg/m².  Lab Results   Component Value Date    ALBUMIN 2.6 (L) 07/05/2022    ALBUMIN 2.9 (L) 07/04/2022    ALBUMIN 2.9 (L) 07/04/2022     No results found for: PREALBUMIN    Estimated Creatinine Clearance: 102.4 mL/min (based on SCr of 0.9 mg/dL).    Accu-Checks  Recent Labs     07/04/22  0439 07/04/22  0758 07/04/22  1419 07/04/22  1609 07/04/22  2159 07/05/22  0532   POCTGLUCOSE 199* 173* 202* 292* 188* 183*       Current Medications and/or Treatments Impacting Glycemic Control  Immunotherapy:    Immunosuppressants           Stop Route Frequency     tacrolimus capsule 2 mg         -- Oral Every evening     tacrolimus capsule 3 mg         -- Oral Every morning          Steroids:   Hormones (From admission, onward)                Start     Stop Route Frequency Ordered    07/04/22 0900  predniSONE tablet 15 mg         -- Oral Daily 07/04/22 0419    07/04/22 0421  melatonin tablet 6 mg         -- Oral Nightly PRN 07/04/22 0422          Pressors:    Autonomic Drugs (From admission, onward)                None          Hyperglycemia/Diabetes Medications:   Antihyperglycemics (From admission, onward)                Start     Stop Route Frequency Ordered    07/05/22 1130  insulin aspart U-100 pen 4 Units         -- SubQ 3 times daily with meals 07/05/22 0930    07/05/22 1030  insulin aspart U-100 pen 0-5 Units         -- SubQ Before meals & nightly PRN 07/05/22 0930            ASSESSMENT and PLAN    * Biloma following surgery  Managed per primary team          Type 2 diabetes mellitus with hyperglycemia  BG goal 140-180    Start  Novolog 4 units TID with meals (home dosing)   Low Dose Correction Scale  BG monitoring ac/hs    ** Please call Endocrine for any BG related issues **    Discharge plans: TBD      S/P liver transplant  Managed per primary team  Optimize BG control        Prophylactic immunotherapy  May increase insulin resistance.         Adrenal corticosteroid causing adverse effect in therapeutic use  Glucocorticoids markedly increase glucoses. Expect steroid taper to help with glucose control.          Justina Montelongo NP  Endocrinology  [facility] - Transplant Stepdown

## 2022-07-05 NOTE — ASSESSMENT & PLAN NOTE
- CT A/P at OSH with large localized fluid collection concerning for biloma  - NPO  - take back to OR 7/4, biloma identified, washed out, 2 drains placed  - Continue IV anbx  - Drain fluid sent for bilirubin- 7, serum 1.7  - keep drains charged and empty

## 2022-07-05 NOTE — HOSPITAL COURSE
Interval History: No acute events overnight. Patient states he feels good and is ready to go home. Patient is s/p exploratory lap on 7/4 w copious warm irrigation was used ot clean out the abdomen of bilious fluid, adhesions to the abdominal were taken down and biloma was identified, the wall was made up of omentum, the superior aspect was diaphragm, lateral aspect was stomach. This was cleaned out and cultured. Original drain on the right side was left in place(near left upper drain). 2 drains on the left side were placed - the upper drain straight into the biloma cavity and the lower drain to the pelvis bilaterally. Today right drain with serosang drainage, Left upper drain with serousang drainage, left lower drainage serosang. Keeping drains charged and drain as needed. Aerobic abdominal culture from OR with reuslts of Lactobacillus species. Other OR cx w NGTD, anaerobic cx pending. Blood cx w NGTD. LFTs continuing to trend down. Will  discontinue IV vancomycin and cefepime; start PO amoxicillin- clavulanate  WBC trending down to 8.7  from 11. Chev[employer] incision closed w staples, CDI.  Patient states his appetite continues to get better everyday. Rocha catheter removed yesterday morning. Patient able to void. Bladder scan done this morning showing 550ml; however, patient was able to void about 550ml shortly after the bladder scan. Will continue to bladder scan as needed to assess for retention. Flomax started for retention on 7/5. VSS, again tachycardia noted. Will continue to monitor closely.

## 2022-07-05 NOTE — PT/OT/SLP PROGRESS
Physical Therapy      Patient Name:  Romeo Larson   MRN:  [id number]    Patient not seen today secondary to Nurse/ JOSEPH hold (Morning evaluation held by PA due to proximity to surgery and pain. PM evaluation held by nurse as patient has had hiccups all day and just got to sleep). Will follow-up 7/6.

## 2022-07-06 LAB
ALBUMIN SERPL BCP-MCNC: 2.3 G/DL (ref 3.5–5.2)
ALP SERPL-CCNC: 141 U/L (ref 55–135)
ALT SERPL W/O P-5'-P-CCNC: 79 U/L (ref 10–44)
ANION GAP SERPL CALC-SCNC: 8 MMOL/L (ref 8–16)
AST SERPL-CCNC: 15 U/L (ref 10–40)
BACTERIA SPEC AEROBE CULT: ABNORMAL
BASOPHILS # BLD AUTO: 0.03 K/UL (ref 0–0.2)
BASOPHILS NFR BLD: 0.3 % (ref 0–1.9)
BILIRUB SERPL-MCNC: 1 MG/DL (ref 0.1–1)
BUN SERPL-MCNC: 16 MG/DL (ref 6–20)
CALCIUM SERPL-MCNC: 9 MG/DL (ref 8.7–10.5)
CHLORIDE SERPL-SCNC: 95 MMOL/L (ref 95–110)
CO2 SERPL-SCNC: 25 MMOL/L (ref 23–29)
CREAT SERPL-MCNC: 0.8 MG/DL (ref 0.5–1.4)
DIFFERENTIAL METHOD: ABNORMAL
EOSINOPHIL # BLD AUTO: 0.3 K/UL (ref 0–0.5)
EOSINOPHIL NFR BLD: 2.5 % (ref 0–8)
ERYTHROCYTE [DISTWIDTH] IN BLOOD BY AUTOMATED COUNT: 14.8 % (ref 11.5–14.5)
EST. GFR  (AFRICAN AMERICAN): >60 ML/MIN/1.73 M^2
EST. GFR  (NON AFRICAN AMERICAN): >60 ML/MIN/1.73 M^2
GLUCOSE SERPL-MCNC: 162 MG/DL (ref 70–110)
HCT VFR BLD AUTO: 32.7 % (ref 40–54)
HGB BLD-MCNC: 10.6 G/DL (ref 14–18)
IMM GRANULOCYTES # BLD AUTO: 0.06 K/UL (ref 0–0.04)
IMM GRANULOCYTES NFR BLD AUTO: 0.5 % (ref 0–0.5)
LYMPHOCYTES # BLD AUTO: 0.6 K/UL (ref 1–4.8)
LYMPHOCYTES NFR BLD: 4.8 % (ref 18–48)
MAGNESIUM SERPL-MCNC: 1.5 MG/DL (ref 1.6–2.6)
MCH RBC QN AUTO: 32.7 PG (ref 27–31)
MCHC RBC AUTO-ENTMCNC: 32.4 G/DL (ref 32–36)
MCV RBC AUTO: 101 FL (ref 82–98)
MONOCYTES # BLD AUTO: 0.5 K/UL (ref 0.3–1)
MONOCYTES NFR BLD: 4.5 % (ref 4–15)
NEUTROPHILS # BLD AUTO: 10.5 K/UL (ref 1.8–7.7)
NEUTROPHILS NFR BLD: 87.4 % (ref 38–73)
NRBC BLD-RTO: 0 /100 WBC
PHOSPHATE SERPL-MCNC: 3.8 MG/DL (ref 2.7–4.5)
PLATELET # BLD AUTO: 200 K/UL (ref 150–450)
PMV BLD AUTO: 10 FL (ref 9.2–12.9)
POCT GLUCOSE: 160 MG/DL (ref 70–110)
POCT GLUCOSE: 164 MG/DL (ref 70–110)
POCT GLUCOSE: 246 MG/DL (ref 70–110)
POCT GLUCOSE: 351 MG/DL (ref 70–110)
POTASSIUM SERPL-SCNC: 4.6 MMOL/L (ref 3.5–5.1)
PROT SERPL-MCNC: 5.6 G/DL (ref 6–8.4)
RBC # BLD AUTO: 3.24 M/UL (ref 4.6–6.2)
SODIUM SERPL-SCNC: 128 MMOL/L (ref 136–145)
TACROLIMUS BLD-MCNC: 11.8 NG/ML (ref 5–15)
WBC # BLD AUTO: 11.98 K/UL (ref 3.9–12.7)

## 2022-07-06 PROCEDURE — 99233 PR SUBSEQUENT HOSPITAL CARE,LEVL III: ICD-10-PCS | Mod: ,,,

## 2022-07-06 PROCEDURE — 20600001 HC STEP DOWN PRIVATE ROOM

## 2022-07-06 PROCEDURE — 83735 ASSAY OF MAGNESIUM: CPT | Performed by: CLINICAL NURSE SPECIALIST

## 2022-07-06 PROCEDURE — P9045 ALBUMIN (HUMAN), 5%, 250 ML: HCPCS | Mod: JG

## 2022-07-06 PROCEDURE — 99233 SBSQ HOSP IP/OBS HIGH 50: CPT | Mod: ,,,

## 2022-07-06 PROCEDURE — 25000003 PHARM REV CODE 250

## 2022-07-06 PROCEDURE — 63600175 PHARM REV CODE 636 W HCPCS: Performed by: SURGERY

## 2022-07-06 PROCEDURE — 84100 ASSAY OF PHOSPHORUS: CPT | Performed by: CLINICAL NURSE SPECIALIST

## 2022-07-06 PROCEDURE — 25000003 PHARM REV CODE 250: Performed by: SURGERY

## 2022-07-06 PROCEDURE — 63600175 PHARM REV CODE 636 W HCPCS: Mod: JG

## 2022-07-06 PROCEDURE — 80197 ASSAY OF TACROLIMUS: CPT | Performed by: CLINICAL NURSE SPECIALIST

## 2022-07-06 PROCEDURE — 63600175 PHARM REV CODE 636 W HCPCS: Performed by: NURSE PRACTITIONER

## 2022-07-06 PROCEDURE — 36415 COLL VENOUS BLD VENIPUNCTURE: CPT | Performed by: CLINICAL NURSE SPECIALIST

## 2022-07-06 PROCEDURE — 85025 COMPLETE CBC W/AUTO DIFF WBC: CPT | Performed by: CLINICAL NURSE SPECIALIST

## 2022-07-06 PROCEDURE — 25000003 PHARM REV CODE 250: Performed by: CLINICAL NURSE SPECIALIST

## 2022-07-06 PROCEDURE — 63600175 PHARM REV CODE 636 W HCPCS: Performed by: CLINICAL NURSE SPECIALIST

## 2022-07-06 PROCEDURE — 80053 COMPREHEN METABOLIC PANEL: CPT | Performed by: CLINICAL NURSE SPECIALIST

## 2022-07-06 PROCEDURE — 25000003 PHARM REV CODE 250: Performed by: NURSE PRACTITIONER

## 2022-07-06 RX ORDER — ALBUMIN HUMAN 50 G/1000ML
25 SOLUTION INTRAVENOUS ONCE
Status: COMPLETED | OUTPATIENT
Start: 2022-07-06 | End: 2022-07-06

## 2022-07-06 RX ORDER — SODIUM CHLORIDE, SODIUM LACTATE, POTASSIUM CHLORIDE, CALCIUM CHLORIDE 600; 310; 30; 20 MG/100ML; MG/100ML; MG/100ML; MG/100ML
INJECTION, SOLUTION INTRAVENOUS CONTINUOUS
Status: DISCONTINUED | OUTPATIENT
Start: 2022-07-06 | End: 2022-07-06

## 2022-07-06 RX ORDER — LANOLIN ALCOHOL/MO/W.PET/CERES
400 CREAM (GRAM) TOPICAL ONCE
Status: COMPLETED | OUTPATIENT
Start: 2022-07-06 | End: 2022-07-06

## 2022-07-06 RX ORDER — TACROLIMUS 1 MG/1
1 CAPSULE ORAL ONCE
Status: COMPLETED | OUTPATIENT
Start: 2022-07-06 | End: 2022-07-06

## 2022-07-06 RX ORDER — TACROLIMUS 1 MG/1
2 CAPSULE ORAL 2 TIMES DAILY
Status: DISCONTINUED | OUTPATIENT
Start: 2022-07-07 | End: 2022-07-08

## 2022-07-06 RX ADMIN — INSULIN ASPART 4 UNITS: 100 INJECTION, SOLUTION INTRAVENOUS; SUBCUTANEOUS at 07:07

## 2022-07-06 RX ADMIN — CEFEPIME 2 G: 2 INJECTION, POWDER, FOR SOLUTION INTRAVENOUS at 06:07

## 2022-07-06 RX ADMIN — OXYCODONE HYDROCHLORIDE 10 MG: 10 TABLET ORAL at 06:07

## 2022-07-06 RX ADMIN — BACLOFEN 20 MG: 5 TABLET ORAL at 08:07

## 2022-07-06 RX ADMIN — CYCLOBENZAPRINE HYDROCHLORIDE 5 MG: 5 TABLET, FILM COATED ORAL at 10:07

## 2022-07-06 RX ADMIN — CYCLOBENZAPRINE HYDROCHLORIDE 5 MG: 5 TABLET, FILM COATED ORAL at 04:07

## 2022-07-06 RX ADMIN — VANCOMYCIN HYDROCHLORIDE 1750 MG: 500 INJECTION, POWDER, LYOPHILIZED, FOR SOLUTION INTRAVENOUS at 01:07

## 2022-07-06 RX ADMIN — PANTOPRAZOLE SODIUM 40 MG: 40 TABLET, DELAYED RELEASE ORAL at 09:07

## 2022-07-06 RX ADMIN — HEPARIN SODIUM 5000 UNITS: 5000 INJECTION INTRAVENOUS; SUBCUTANEOUS at 06:07

## 2022-07-06 RX ADMIN — OXYCODONE HYDROCHLORIDE 10 MG: 10 TABLET ORAL at 07:07

## 2022-07-06 RX ADMIN — INSULIN ASPART 1 UNITS: 100 INJECTION, SOLUTION INTRAVENOUS; SUBCUTANEOUS at 10:07

## 2022-07-06 RX ADMIN — CARVEDILOL 3.12 MG: 3.12 TABLET, FILM COATED ORAL at 08:07

## 2022-07-06 RX ADMIN — SODIUM CHLORIDE, SODIUM LACTATE, POTASSIUM CHLORIDE, AND CALCIUM CHLORIDE: 600; 310; 30; 20 INJECTION, SOLUTION INTRAVENOUS at 09:07

## 2022-07-06 RX ADMIN — INSULIN ASPART 4 UNITS: 100 INJECTION, SOLUTION INTRAVENOUS; SUBCUTANEOUS at 04:07

## 2022-07-06 RX ADMIN — HEPARIN SODIUM 5000 UNITS: 5000 INJECTION INTRAVENOUS; SUBCUTANEOUS at 08:07

## 2022-07-06 RX ADMIN — TACROLIMUS 3 MG: 1 CAPSULE, GELATIN COATED ORAL at 08:07

## 2022-07-06 RX ADMIN — VANCOMYCIN HYDROCHLORIDE 1750 MG: 500 INJECTION, POWDER, LYOPHILIZED, FOR SOLUTION INTRAVENOUS at 02:07

## 2022-07-06 RX ADMIN — ACETAMINOPHEN 650 MG: 325 TABLET ORAL at 06:07

## 2022-07-06 RX ADMIN — PREDNISONE 15 MG: 5 TABLET ORAL at 09:07

## 2022-07-06 RX ADMIN — OXYCODONE HYDROCHLORIDE 10 MG: 10 TABLET ORAL at 02:07

## 2022-07-06 RX ADMIN — CARVEDILOL 3.12 MG: 3.12 TABLET, FILM COATED ORAL at 09:07

## 2022-07-06 RX ADMIN — VALGANCICLOVIR 450 MG: 450 TABLET, FILM COATED ORAL at 09:07

## 2022-07-06 RX ADMIN — CYCLOBENZAPRINE HYDROCHLORIDE 5 MG: 5 TABLET, FILM COATED ORAL at 06:07

## 2022-07-06 RX ADMIN — MUPIROCIN: 20 OINTMENT TOPICAL at 08:07

## 2022-07-06 RX ADMIN — OXYCODONE HYDROCHLORIDE 10 MG: 10 TABLET ORAL at 12:07

## 2022-07-06 RX ADMIN — MUPIROCIN: 20 OINTMENT TOPICAL at 09:07

## 2022-07-06 RX ADMIN — TACROLIMUS 1 MG: 1 CAPSULE, GELATIN COATED ORAL at 06:07

## 2022-07-06 RX ADMIN — FLUCONAZOLE 200 MG: 200 TABLET ORAL at 09:07

## 2022-07-06 RX ADMIN — Medication 400 MG: at 10:07

## 2022-07-06 RX ADMIN — HEPARIN SODIUM 5000 UNITS: 5000 INJECTION INTRAVENOUS; SUBCUTANEOUS at 02:07

## 2022-07-06 RX ADMIN — ALBUMIN (HUMAN) 25 G: 12.5 SOLUTION INTRAVENOUS at 10:07

## 2022-07-06 RX ADMIN — TAMSULOSIN HYDROCHLORIDE 0.8 MG: 0.4 CAPSULE ORAL at 09:07

## 2022-07-06 NOTE — PLAN OF CARE
"Patient is AAO, independent with ambulation up to the chair for most of the day today, feeling "MUCH better" than yesterday.  3xJP drains remain charged with minimal output.  Pain medication given once today. Baclofen given this am, no hiccups noted.  Rocha dcd this am, urine noted, flomax on board. NS changed to LR @ 50, album,en given.  Patient ST.  Patients wife at the bedside attentive to patient. Liver numbers continue to come down.  Patient educated this am on how to pain the incision with betadine.  "

## 2022-07-06 NOTE — PT/OT/SLP PROGRESS
Physical Therapy      Patient Name:  Romeo Larson   MRN:  [id number]    Patient not seen today secondary to  (pt. tired after transferring to bedside chair on first attempt and sleeping soundly in chair on second attempt. Caregiver and nursing requested to allow pt. to rest.). Will follow-up tomorrow.    Santi Altman, PT  7/6/2022

## 2022-07-06 NOTE — SUBJECTIVE & OBJECTIVE
Scheduled Meds:   albumin human 5%  25 g Intravenous Once    carvediloL  3.125 mg Oral BID    ceFEPime (MAXIPIME) IVPB  2 g Intravenous Q12H    fluconazole  200 mg Oral Daily    heparin (porcine)  5,000 Units Subcutaneous Q8H    insulin aspart U-100  4 Units Subcutaneous TIDWM    mupirocin   Nasal BID    pantoprazole  40 mg Oral Daily    predniSONE  15 mg Oral Daily    tacrolimus  2 mg Oral Daily PM    tacrolimus  3 mg Oral Daily AM    tamsulosin  0.8 mg Oral Daily    valGANciclovir  450 mg Oral Daily    vancomycin (VANCOCIN) IVPB  1,750 mg Intravenous Q12H     Continuous Infusions:   lactated ringers       PRN Meds:acetaminophen, baclofen, cyclobenzaprine, dextrose 10%, dextrose 10%, glucagon (human recombinant), glucose, glucose, HYDROmorphone, insulin aspart U-100, melatonin, ondansetron, oxyCODONE, sodium chloride 0.9%, Pharmacy to dose Vancomycin consult **AND** vancomycin - pharmacy to dose    Review of Systems   Constitutional:  Positive for activity change (decreased) and appetite change (decreased). Negative for diaphoresis.   HENT: Negative.     Respiratory:  Negative for cough and shortness of breath.    Cardiovascular:  Negative for chest pain and leg swelling.   Gastrointestinal:  Negative for abdominal distention, abdominal pain, constipation, diarrhea, nausea and vomiting.   Genitourinary:  Positive for difficulty urinating.   Skin:  Positive for wound.   Allergic/Immunologic: Positive for immunocompromised state.   Neurological:  Positive for weakness. Negative for dizziness and headaches.   Psychiatric/Behavioral:  Negative for agitation and confusion.    Objective:     Vital Signs (Most Recent):  Temp: 98.7 °F (37.1 °C) (07/05/22 2030)  Pulse: (!) 118 (07/06/22 0800)  Resp: 12 (07/06/22 0800)  BP: (!) 121/90 (07/06/22 0800)  SpO2: 95 % (07/06/22 0800)   Vital Signs (24h Range):  Temp:  [98.6 °F (37 °C)-98.7 °F (37.1 °C)] 98.7 °F (37.1 °C)  Pulse:  [105-123] 118  Resp:  [12-20] 12  SpO2:  [95 %-96  %] 95 %  BP: (121-139)/() 121/90     Weight: 82 kg (180 lb 12.4 oz)  Body mass index is 27.49 kg/m².    Intake/Output - Last 3 Shifts         07/04 0700  07/05 0659 07/05 0700  07/06 0659 07/06 0700  07/07 0659    P.O. 125 620 300    I.V. (mL/kg) 1023.6 (12.5)      IV Piggyback 1791.1      Total Intake(mL/kg) 2939.7 (35.9) 620 (7.6) 300 (3.7)    Urine (mL/kg/hr) 3010 (1.5) 1950 (1)     Emesis/NG output 0      Drains 465 415     Other 0      Stool 0      Blood 0      Total Output 3475 2365     Net -535.3 -1745 +300           Urine Occurrence 0 x      Stool Occurrence 0 x      Emesis Occurrence 0 x              Physical Exam  Vitals and nursing note reviewed.   Constitutional:       General: He is not in acute distress.     Appearance: Normal appearance. He is not ill-appearing or toxic-appearing.   Eyes:      Extraocular Movements: Extraocular movements intact.      Conjunctiva/sclera: Conjunctivae normal.   Cardiovascular:      Rate and Rhythm: Tachycardia present.      Pulses: Normal pulses.      Heart sounds: Normal heart sounds, S1 normal and S2 normal. No murmur heard.  Pulmonary:      Effort: Pulmonary effort is normal.      Breath sounds: Normal breath sounds.   Abdominal:      General: Bowel sounds are normal. There is no distension.      Palpations: Abdomen is soft.      Tenderness: There is no abdominal tenderness. There is no guarding or rebound.      Comments: [employer] w staples CDI  RLQ SHRUTHI drain with serosang drainage  LUQ drainage bilious drainage  LLQ drainage serosang draiange   Musculoskeletal:      Right lower leg: No edema.      Left lower leg: No edema.   Skin:     General: Skin is warm and dry.      Capillary Refill: Capillary refill takes less than 2 seconds.   Neurological:      Mental Status: He is alert and oriented to person, place, and time. Mental status is at baseline.   Psychiatric:         Mood and Affect: Mood normal.         Speech: Speech normal.         Behavior: Behavior  normal. Behavior is cooperative.         Thought Content: Thought content normal.       Laboratory:  Immunosuppressants           Stop Route Frequency     tacrolimus capsule 2 mg         -- Oral Every evening     tacrolimus capsule 3 mg         -- Oral Every morning          CBC:   Recent Labs   Lab 07/06/22  0708   WBC 11.98   RBC 3.24*   HGB 10.6*   HCT 32.7*      *   MCH 32.7*   MCHC 32.4     CMP:   Recent Labs   Lab 07/06/22  0708   *   CALCIUM 9.0   ALBUMIN 2.3*   PROT 5.6*   *   K 4.6   CO2 25   CL 95   BUN 16   CREATININE 0.8   ALKPHOS 141*   ALT 79*   AST 15   BILITOT 1.0     Coagulation: No results for input(s): PT, INR, APTT in the last 168 hours.  Labs within the past 24 hours have been reviewed.

## 2022-07-06 NOTE — ASSESSMENT & PLAN NOTE
- CT A/P at OSH with large localized fluid collection concerning for biloma  - take back to OR 7/4, biloma identified, washed out, 2 drains placed  - Continue IV antibiotics  - Drain fluid sent for bilirubin- 7, serum 1.7  - keep drains charged and empty  - R lateral drain with 190ml serousang drainage within last 24 hours  - LUQ drain with 45ml of bilious drainage within last 24 hours  - LLQ drain with 180ml of serousang drainage within last 24 hours

## 2022-07-06 NOTE — PLAN OF CARE
Pt AOX4, VSS, afebrile.   No c/o N/V.  Pt c/o 4/10 pain- mild relief found with PRN oxycodone  Telemonitor ST (baseline) HR= 100-120s   Accucheck ACHS. No SSI needed this shift  [employer]ron DEBBIE with staples, painted with betadine  Rocha draining to gravity- due to be d/c at 6AM  NS @ 50cc/hr continuously  IV vanc and cefepime continued.  SHRUTHI drain sites x3 CDI- output documented per flowsheets   Fall and infection precautions maintained throughout shift.   Pt in lowest settings, call light w/in reach, nonskid socks when ambulating, remains free from falls. NAD. WCTM.

## 2022-07-06 NOTE — PROGRESS NOTES
[facility] - Transplant Step[facility]  Liver Transplant  Progress Note    Patient Name: Romeo Larson  MRN: [id number]  Admission Date: 7/4/2022  Hospital Length of Stay: 2 days  Code Status: Full Code  Primary Care Provider: Pravin Maria MD  Post-Operative Day: 15    ORGAN:   RIGHT LIVER LOBE (SEGS 5,6,7,8) WITHOUT MIDDLE HEPATIC VEIN  Disease Etiology: Primary Liver Malignancy: Cholangiocarcinoma (CH-CA)  Donor Type:   Living  Biliary Anastomosis: Nina-en-Y  Arterial Anatomy: Standard  Subjective:     History of Present Illness:  Romeo Larson is a 41 y.o. with PMHx of cholangiocarcinoma now s/p Living liver transplant on 6/21/2022 (CMV -/+). The patient returned to OR on 6/23 for Bile leak repair and was maintained on IV anbx. He was stepped down on 6/24 and progressed well post takeback. Of note, cultures from OR with ENTEROCOCCUS FAECALIS. Pt was initially treated w Unasyn IV, de escalated to Augmentin PO 6/27/22 and was to remain on Augmentin while drains in place. Patient was discharged with 2 SHRUTHI drains. He was seen in clinic 7/1 by the transplant surgeon and was feeling well at that time. SHRUTHI #2 removed in clinic on 7/1. The patient continued to do well until 7/3 afternoon. Patient began experiencing acute onset of mid to lower severe abdominal pain and inability to urinate. He denies fevers, chills, nausea, vomiting, SOB, CP. He presented to OSH where labs showed leukocytosis (WBC 35) and hyperkalemia (K 6.1). CT A/P was performed which showed a localized fluid collection measuring 11.2 x 10.1 cm in maximum axial dimensions concerning for a large biloma. The patient was transferred to [facility] for admission to LTS for management of the above findings. He was initiated on IV cefepime/vanc at OSH, will continue here. Infectious work-up performed at OSH, will repeat on admit. CT reviewed by Dr. Iglesias, will plan for ERCP vs IR intervention for biloma in AM. Patient will be made NPO. Patient and caretaker notified of plan.         Hospital Course:  Interval History: No acute events overnight. Patient states he is feeling much better today. Patient is s/p exploratory lap on 7/4 w copious warm irrigation was used ot clean out the abdomen of bilious fluid, adhesions to the abdominal were taken down and biloma was identified, the wall was made up of omentum, the superior aspect was diaphragm, lateral aspect was stomach. This was cleaned out and cultured. Original drain on the right side was left in place(near left upper drain). 2 drains on the left side were placed - the upper drain straight into the biloma cavity and the lower drain to the pelvis bilaterally. Today right drain with serosang drainage, Left upper drain bilious, left lower drainage serosang. Keeping drains charged and drain as needed. OR cx w NGTD, anaerobic cx pending. Blood cx w NGTD. LFTs continuing to trend down. Cont abx for now. WBC trending down to 11,9 from 19. Chevron incision closed w staples, CDI.  Patient states he has more of an appetite today. Cont IV fluids for now to ensure pateint is eating and drinking enough. Pt had urinary retention on 7/3 and campos placed at OSH. Flomax started for retention on 7/5. Campos removed this monring at 0600. Patient is due to void. PRN bladder scan order in place. VSS, again tachycardia noted. EKG w ST. 5% Albumin ordered. Will continue to monitor closely.      Scheduled Meds:   albumin human 5%  25 g Intravenous Once    carvediloL  3.125 mg Oral BID    ceFEPime (MAXIPIME) IVPB  2 g Intravenous Q12H    fluconazole  200 mg Oral Daily    heparin (porcine)  5,000 Units Subcutaneous Q8H    insulin aspart U-100  4 Units Subcutaneous TIDWM    mupirocin   Nasal BID    pantoprazole  40 mg Oral Daily    predniSONE  15 mg Oral Daily    tacrolimus  2 mg Oral Daily PM    tacrolimus  3 mg Oral Daily AM    tamsulosin  0.8 mg Oral Daily    valGANciclovir  450 mg Oral Daily    vancomycin (VANCOCIN) IVPB  1,750 mg Intravenous Q12H  [FreeTextEntry1] : \par Twenty month old male who due to recent exposure and symptoms patient possibly has COVID-19 Infection. Mom can continue to nurse.Offerd to have testing done but since everyone in the household is positive to assume he has it.Signs and symptoms discussed with parents. Parents educated to self isolate in a room in his/her home away from others in household. Mask if available. Patient advised not to leave house for any reason.\par \par Self treatment discussed including acetaminophen for fever, pain or myalgia; cough/cold medications for symptoms. Patient to check temperature daily. Monitor for symptoms of respiratory distress. Advised to check in daily with our office via phone with symptoms.	\par \par Nature of disease to cause severe respiratory distress day 8 or 9 discussed. If needs emergent care to notify EMS or ED or our office that he may have COVID to allow for proper PPE and isolation.	\par      Continuous Infusions:   lactated ringers       PRN Meds:acetaminophen, baclofen, cyclobenzaprine, dextrose 10%, dextrose 10%, glucagon (human recombinant), glucose, glucose, HYDROmorphone, insulin aspart U-100, melatonin, ondansetron, oxyCODONE, sodium chloride 0.9%, Pharmacy to dose Vancomycin consult **AND** vancomycin - pharmacy to dose    Review of Systems   Constitutional:  Positive for activity change (decreased) and appetite change (decreased). Negative for diaphoresis.   HENT: Negative.     Respiratory:  Negative for cough and shortness of breath.    Cardiovascular:  Negative for chest pain and leg swelling.   Gastrointestinal:  Negative for abdominal distention, abdominal pain, constipation, diarrhea, nausea and vomiting.   Genitourinary:  Positive for difficulty urinating.   Skin:  Positive for wound.   Allergic/Immunologic: Positive for immunocompromised state.   Neurological:  Positive for weakness. Negative for dizziness and headaches.   Psychiatric/Behavioral:  Negative for agitation and confusion.    Objective:     Vital Signs (Most Recent):  Temp: 98.7 °F (37.1 °C) (07/05/22 2030)  Pulse: (!) 118 (07/06/22 0800)  Resp: 12 (07/06/22 0800)  BP: (!) 121/90 (07/06/22 0800)  SpO2: 95 % (07/06/22 0800)   Vital Signs (24h Range):  Temp:  [98.6 °F (37 °C)-98.7 °F (37.1 °C)] 98.7 °F (37.1 °C)  Pulse:  [105-123] 118  Resp:  [12-20] 12  SpO2:  [95 %-96 %] 95 %  BP: (121-139)/() 121/90     Weight: 82 kg (180 lb 12.4 oz)  Body mass index is 27.49 kg/m².    Intake/Output - Last 3 Shifts         07/04 0700 07/05 0659 07/05 0700 07/06 0659 07/06 0700 07/07 0659    P.O. 125 620 300    I.V. (mL/kg) 1023.6 (12.5)      IV Piggyback 1791.1      Total Intake(mL/kg) 2939.7 (35.9) 620 (7.6) 300 (3.7)    Urine (mL/kg/hr) 3010 (1.5) 1950 (1)     Emesis/NG output 0      Drains 465 415     Other 0      Stool 0      Blood 0      Total Output 3475 2365     Net -535.3 -1745 +300           Urine Occurrence 0 x       Stool Occurrence 0 x      Emesis Occurrence 0 x              Physical Exam  Vitals and nursing note reviewed.   Constitutional:       General: He is not in acute distress.     Appearance: Normal appearance. He is not ill-appearing or toxic-appearing.   Eyes:      Extraocular Movements: Extraocular movements intact.      Conjunctiva/sclera: Conjunctivae normal.   Cardiovascular:      Rate and Rhythm: Tachycardia present.      Pulses: Normal pulses.      Heart sounds: Normal heart sounds, S1 normal and S2 normal. No murmur heard.  Pulmonary:      Effort: Pulmonary effort is normal.      Breath sounds: Normal breath sounds.   Abdominal:      General: Bowel sounds are normal. There is no distension.      Palpations: Abdomen is soft.      Tenderness: There is no abdominal tenderness. There is no guarding or rebound.      Comments: Velasquez w staples CDI  RLQ SHRUTHI drain with serosang drainage  LUQ drainage bilious drainage  LLQ drainage serosang draiange   Musculoskeletal:      Right lower leg: No edema.      Left lower leg: No edema.   Skin:     General: Skin is warm and dry.      Capillary Refill: Capillary refill takes less than 2 seconds.   Neurological:      Mental Status: He is alert and oriented to person, place, and time. Mental status is at baseline.   Psychiatric:         Mood and Affect: Mood normal.         Speech: Speech normal.         Behavior: Behavior normal. Behavior is cooperative.         Thought Content: Thought content normal.       Laboratory:  Immunosuppressants           Stop Route Frequency     tacrolimus capsule 2 mg         -- Oral Every evening     tacrolimus capsule 3 mg         -- Oral Every morning          CBC:   Recent Labs   Lab 07/06/22  0708   WBC 11.98   RBC 3.24*   HGB 10.6*   HCT 32.7*      *   MCH 32.7*   MCHC 32.4     CMP:   Recent Labs   Lab 07/06/22  0708   *   CALCIUM 9.0   ALBUMIN 2.3*   PROT 5.6*   *   K 4.6   CO2 25   CL 95   BUN 16   CREATININE 0.8    ALKPHOS 141*   ALT 79*   AST 15   BILITOT 1.0     Coagulation: No results for input(s): PT, INR, APTT in the last 168 hours.  Labs within the past 24 hours have been reviewed.        Assessment/Plan:     * Biloma following surgery  - CT A/P at OSH with large localized fluid collection concerning for biloma  - take back to OR 7/4, biloma identified, washed out, 2 drains placed  - Continue IV antibiotics  - Drain fluid sent for bilirubin- 7, serum 1.7  - keep drains charged and empty  - R lateral drain with 190ml serousang drainage within last 24 hours  - LUQ drain with 45ml of bilious drainage within last 24 hours  - LLQ drain with 180ml of serousang drainage within last 24 hours      Malnutrition of mild degree  - no appetite since 7/3  - diet advanced yesterday afternoon  - patient states he has more of an appetite today (7/6/22) and eating regular foods  - Will cont IV fluids for now to ensure patient is eating and drinking enough; switch from NS to LR        Biloma of transplanted liver  - see s/p liver transplant      Leukocytosis  - WBC 35 at OSH  - CT A/P with large intra-abdominal fluid collection, concerning for biloma  - Infectious workup ordered; blood cultures NGTD; aerobic abdominal fluid with no growth; gram stain No WBCs and no organisms seen; fungal and anaerobic cultures in process  - Cefepime/vanc continued  - WBC continuing to trend down with WBC today of 11.9      Long-term use of immunosuppressant medication  - Continue prograf and steroid taper  - Check prograf level daily and adjust for therapeutic dosage. Monitor for toxic side effects  - Continue to hold Cellcept d/t possible bile leak/infection/leukocytosis. Will restart when medically stable.      Type 2 diabetes mellitus with hyperglycemia  - endocrine consulted, appreciate recs      At risk for opportunistic infections  - Continue OI prophylaxis per protocol.   - Hold Bactrim on admit d/t hyperkalemia at OSH; will continue to hold for  "now. Will restart when medically stable      Prophylactic immunotherapy  - see" long term use of immunosuppression"      S/P liver transplant  - s/p living donor liver txp 6/21/22 d/t cholangiocarcinoma  - TB OR 6/23 for bile leak repair  - Now admitted for intra-abdominal fluid collection concerning for biloma  - s/p take back 7/4 biloma identified, 2 additional left drains placed-the upper drain straight into the biloma cavity and the lower drain to the pelvis bilaterally. Right drain is also near anastomosis.  - LFTs continuing to trend down          VTE Risk Mitigation (From admission, onward)         Ordered     heparin (porcine) injection 5,000 Units  Every 8 hours         07/04/22 0422     IP VTE HIGH RISK PATIENT  Once         07/04/22 0422     Place sequential compression device  Until discontinued         07/04/22 0422                The patients clinical status was discussed at multidisplinary rounds, involving transplant surgery, transplant medicine, pharmacy, nursing, nutrition, and social work    Discharge Planning:  Not applicable for discharge at this time.     Hannah Young NP  Liver Transplant  [facility] - [facility]  "

## 2022-07-06 NOTE — ASSESSMENT & PLAN NOTE
- no appetite since 7/3  - diet advanced yesterday afternoon  - patient states he has more of an appetite today (7/6/22) and eating regular foods  - Will cont IV fluids for now to ensure patient is eating and drinking enough; switch from NS to LR

## 2022-07-06 NOTE — ASSESSMENT & PLAN NOTE
- WBC 35 at OSH  - CT A/P with large intra-abdominal fluid collection, concerning for biloma  - Infectious workup ordered; blood cultures NGTD; aerobic abdominal fluid with no growth; gram stain No WBCs and no organisms seen; fungal and anaerobic cultures in process  - Cefepime/vanc continued  - WBC continuing to trend down with WBC today of 11.9

## 2022-07-06 NOTE — CARE UPDATE
BG goal 140-180    Remains in TSU. NAEON. POD  BG reasonably well controlled on current SQ insulin regimen. Diet diabetic [facility]; 2000 Calorie; Isolation Tray - Regular [place]      Plan:   -Continue Novolog 4 units TID with meals   -Continue Low Dose Correction Scale  -BG monitoring ac/hs    ** Please call Endocrine for any BG related issues **    Discharge plans: TBD    Lab Results   Component Value Date    HGBA1C 5.8 (H) 06/20/2022

## 2022-07-06 NOTE — PROGRESS NOTES
Pharmacokinetic Assessment Follow Up: IV Vancomycin    Vancomycin serum concentration assessment(s):    The trough level was drawn correctly and can be used to guide therapy at this time. The measurement is below the desired definitive target range of 15 to 20 mcg/mL.    Vancomycin Regimen Plan:    Change regimen to Vancomycin 1750 mg IV every 12 hours with next serum trough concentration measured at 0130 prior to 5th dose on 7/7    Drug levels (last 3 results):  Recent Labs   Lab Result Units 07/05/22  1350   Vancomycin-Trough ug/mL 8.0*       Pharmacy will continue to follow and monitor vancomycin.    Please contact pharmacy at extension [phone] for questions regarding this assessment.    Thank you for the consult,   Brad Alcantar       Patient brief summary:  Romeo Larson is a 41 y.o. male initiated on antimicrobial therapy with IV Vancomycin for treatment of intra-abdominal infection    The patient's current regimen is 1250mg IV q 12 hours    Drug Allergies:   Review of patient's allergies indicates:  No Known Allergies    Actual Body Weight:   82kg    Renal Function:   Estimated Creatinine Clearance: 115.2 mL/min (based on SCr of 0.8 mg/dL).,     Dialysis Method (if applicable):  N/A    CBC (last 72 hours):  Recent Labs   Lab Result Units 07/03/22 2244 07/04/22  0526 07/05/22  0722 07/06/22  0708   WBC K/uL 35.13* 31.03*  31.03* 19.99* 11.98   Hemoglobin g/dL 12.4* 11.7*  11.7* 11.3* 10.6*   Hematocrit % 36.0* 36.3*  36.3* 35.4* 32.7*   Platelets K/uL 265 241  241 218 200   Gran % % 91.6* 90.4*  90.4* 90.4* 87.4*   Lymph % % 2.4* 3.4*  3.4* 3.9* 4.8*   Mono % % 4.6 4.9  4.9 4.7 4.5   Eosinophil % % 0.0 0.1  0.1 0.2 2.5   Basophil % % 0.3 0.3  0.3 0.3 0.3   Differential Method  Automated Automated  Automated Automated Automated       Metabolic Panel (last 72 hours):  Recent Labs   Lab Result Units 07/03/22  2244 07/04/22  0431 07/04/22  0526 07/05/22  0722 07/05/22  1350 07/06/22  0708   Sodium mmol/L  128*  --  130*  130* 129* 129* 128*   Potassium mmol/L 6.1*  --  4.9  4.9 5.4* 5.2* 4.6   Chloride mmol/L 93*  --  94*  94* 96 95 95   CO2 mmol/L 31  --  25  25 24 27 25   Glucose mg/dL 255*  --  196*  196* 170* 147* 162*   Glucose, UA  Trace* Negative  --   --   --   --    BUN mg/dL 18  --  13  13 17 17 16   Creatinine mg/dL 1.07  --  0.8  0.8 0.9 0.8 0.8   Albumin g/dL 3.7  --  2.9*  2.9* 2.6* 2.4* 2.3*   Total Bilirubin mg/dL 1.4*  --  1.6*  1.6* 1.5* 1.3* 1.0   Alkaline Phosphatase U/L 231*  --  215*  215* 168* 152* 141*   AST U/L 71*  --  44*  44* 22 22 15   ALT U/L 311*  --  237*  237* 130* 111* 79*   Magnesium mg/dL  --   --  1.4*  1.4* 1.7  --  1.5*   Phosphorus mg/dL  --   --  4.1 4.0  --  3.8       Vancomycin Administrations:  vancomycin given in the last 96 hours                   vancomycin 1.75 g in 5 % dextrose 500 mL IVPB (mg) 1,750 mg New Bag 07/06/22 0154    vancomycin 1.25 g in dextrose 5% 250 mL IVPB (ready to mix) (mg) 1,250 mg New Bag 07/05/22 1400     1,250 mg New Bag  0204      Restarted 07/04/22 1435     1,250 mg New Bag  1427    vancomycin (VANCOCIN) 2,250 mg in dextrose 5 % 500 mL IVPB (mg) 2,250 mg New Bag 07/04/22 0214                Microbiologic Results:  Microbiology Results (last 7 days)     Procedure Component Value Units Date/Time    Fungus culture [[id number]] Collected: 07/04/22 1150    Order Status: Completed Specimen: Wound from Abdomen Updated: 07/06/22 1011     Fungus (Mycology) Culture Culture in progress    Fungus culture [[id number]] Collected: 07/04/22 1202    Order Status: Completed Specimen: Body Fluid from Abdomen Updated: 07/06/22 1011     Fungus (Mycology) Culture Culture in progress    Narrative:      Collection fluid    Culture, Anaerobic [[id number]] Collected: 07/04/22 1202    Order Status: Completed Specimen: Body Fluid from Abdomen Updated: 07/06/22 0858     Anaerobic Culture Culture in progress    Narrative:      Collection fluid    Culture, Anaerobe  [[id number]] Collected: 07/04/22 1150    Order Status: Completed Specimen: Wound from Abdomen Updated: 07/06/22 0856     Anaerobic Culture Culture in progress    Blood culture - site #1 [[id number]] Collected: 07/04/22 0733    Order Status: Completed Specimen: Blood from Antecubital, Right Arm Updated: 07/06/22 0812     Blood Culture, Routine No Growth to date      No Growth to date      No Growth to date    Blood culture - site #2 [[id number]] Collected: 07/04/22 0735    Order Status: Completed Specimen: Blood from Peripheral, Right Arm Updated: 07/06/22 0812     Blood Culture, Routine No Growth to date      No Growth to date      No Growth to date    AFB Culture & Smear [[id number]] Collected: 07/04/22 1150    Order Status: Completed Specimen: Wound from Abdomen Updated: 07/05/22 2127     AFB Culture & Smear Culture in progress     AFB CULTURE STAIN No acid fast bacilli seen.    AFB Culture & Smear [[id number]] Collected: 07/04/22 1202    Order Status: Completed Specimen: Body Fluid from Abdomen Updated: 07/05/22 2127     AFB Culture & Smear Culture in progress     AFB CULTURE STAIN No acid fast bacilli seen.    Narrative:      Collection fluid    Urine Culture High Risk [[id number]] Collected: 07/04/22 0431    Order Status: Completed Specimen: Urine, Catheterized Updated: 07/05/22 1051     Urine Culture, Routine No growth    Narrative:      Indicated criteria for high risk culture:->Other  Other (specify):->less than 2 months from liver txp    Aerobic culture [[id number]] Collected: 07/04/22 1150    Order Status: Completed Specimen: Wound from Abdomen Updated: 07/05/22 0706     Aerobic Bacterial Culture No growth    Aerobic culture [[id number]] Collected: 07/04/22 1203    Order Status: Completed Specimen: Incision site from Abdomen Updated: 07/05/22 0706     Aerobic Bacterial Culture No growth    Narrative:      Collection fluid    Gram stain [[id number]] Collected: 07/04/22 1202    Order Status: Completed Specimen: Body  Fluid from Abdomen Updated: 07/04/22 1259     Gram Stain Result No WBC's      No organisms seen    Narrative:      Collection fluid    Gram stain [[id number]] Collected: 07/04/22 1150    Order Status: Completed Specimen: Wound from Abdomen Updated: 07/04/22 1256     Gram Stain Result Few WBC's      No organisms seen    Fungus culture [[id number]]     Order Status: No result Specimen: Body Fluid from Abdomen     Culture, Anaerobic [[id number]]     Order Status: No result Specimen: Body Fluid from Abdomen     AFB Culture & Smear [[id number]]     Order Status: No result Specimen: Body Fluid from Abdomen     Aerobic culture [[id number]]     Order Status: No result Specimen: Abdominal from Abdomen     Gram stain [[id number]]     Order Status: No result Specimen: Body Fluid from Abdomen

## 2022-07-06 NOTE — ASSESSMENT & PLAN NOTE
- Continue OI prophylaxis per protocol.   - Hold Bactrim on admit d/t hyperkalemia at OSH; will continue to hold for now. Will restart when medically stable

## 2022-07-06 NOTE — ASSESSMENT & PLAN NOTE
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 Right drain is also near anastomosis.  - LFTs continuing to trend down

## 2022-07-07 LAB
ALBUMIN SERPL BCP-MCNC: 2.7 G/DL (ref 3.5–5.2)
ALP SERPL-CCNC: 141 U/L (ref 55–135)
ALT SERPL W/O P-5'-P-CCNC: 68 U/L (ref 10–44)
ANION GAP SERPL CALC-SCNC: 11 MMOL/L (ref 8–16)
AST SERPL-CCNC: 24 U/L (ref 10–40)
BACTERIA SPEC AEROBE CULT: NO GROWTH
BASOPHILS # BLD AUTO: 0.02 K/UL (ref 0–0.2)
BASOPHILS NFR BLD: 0.2 % (ref 0–1.9)
BILIRUB SERPL-MCNC: 0.9 MG/DL (ref 0.1–1)
BUN SERPL-MCNC: 21 MG/DL (ref 6–20)
CALCIUM SERPL-MCNC: 9.3 MG/DL (ref 8.7–10.5)
CHLORIDE SERPL-SCNC: 94 MMOL/L (ref 95–110)
CO2 SERPL-SCNC: 22 MMOL/L (ref 23–29)
CREAT SERPL-MCNC: 1.2 MG/DL (ref 0.5–1.4)
DIFFERENTIAL METHOD: ABNORMAL
EOSINOPHIL # BLD AUTO: 0.2 K/UL (ref 0–0.5)
EOSINOPHIL NFR BLD: 1.7 % (ref 0–8)
ERYTHROCYTE [DISTWIDTH] IN BLOOD BY AUTOMATED COUNT: 14.6 % (ref 11.5–14.5)
EST. GFR  (AFRICAN AMERICAN): >60 ML/MIN/1.73 M^2
EST. GFR  (NON AFRICAN AMERICAN): >60 ML/MIN/1.73 M^2
GLUCOSE SERPL-MCNC: 237 MG/DL (ref 70–110)
HCT VFR BLD AUTO: 31.8 % (ref 40–54)
HGB BLD-MCNC: 10.4 G/DL (ref 14–18)
IMM GRANULOCYTES # BLD AUTO: 0.05 K/UL (ref 0–0.04)
IMM GRANULOCYTES NFR BLD AUTO: 0.6 % (ref 0–0.5)
LYMPHOCYTES # BLD AUTO: 0.5 K/UL (ref 1–4.8)
LYMPHOCYTES NFR BLD: 5.9 % (ref 18–48)
MAGNESIUM SERPL-MCNC: 1.5 MG/DL (ref 1.6–2.6)
MCH RBC QN AUTO: 32.8 PG (ref 27–31)
MCHC RBC AUTO-ENTMCNC: 32.7 G/DL (ref 32–36)
MCV RBC AUTO: 100 FL (ref 82–98)
MONOCYTES # BLD AUTO: 0.3 K/UL (ref 0.3–1)
MONOCYTES NFR BLD: 3.9 % (ref 4–15)
NEUTROPHILS # BLD AUTO: 7.6 K/UL (ref 1.8–7.7)
NEUTROPHILS NFR BLD: 87.7 % (ref 38–73)
NRBC BLD-RTO: 0 /100 WBC
PHOSPHATE SERPL-MCNC: 3.9 MG/DL (ref 2.7–4.5)
PLATELET # BLD AUTO: 180 K/UL (ref 150–450)
PMV BLD AUTO: 9.8 FL (ref 9.2–12.9)
POCT GLUCOSE: 199 MG/DL (ref 70–110)
POCT GLUCOSE: 275 MG/DL (ref 70–110)
POTASSIUM SERPL-SCNC: 4.4 MMOL/L (ref 3.5–5.1)
PROT SERPL-MCNC: 5.9 G/DL (ref 6–8.4)
RBC # BLD AUTO: 3.17 M/UL (ref 4.6–6.2)
SODIUM SERPL-SCNC: 127 MMOL/L (ref 136–145)
TACROLIMUS BLD-MCNC: 17.2 NG/ML (ref 5–15)
VANCOMYCIN TROUGH SERPL-MCNC: 26 UG/ML (ref 10–22)
WBC # BLD AUTO: 8.7 K/UL (ref 3.9–12.7)

## 2022-07-07 PROCEDURE — 63600175 PHARM REV CODE 636 W HCPCS: Performed by: NURSE PRACTITIONER

## 2022-07-07 PROCEDURE — 25000003 PHARM REV CODE 250: Performed by: NURSE PRACTITIONER

## 2022-07-07 PROCEDURE — 80197 ASSAY OF TACROLIMUS: CPT | Performed by: CLINICAL NURSE SPECIALIST

## 2022-07-07 PROCEDURE — 99233 SBSQ HOSP IP/OBS HIGH 50: CPT | Mod: 24,S$GLB,,

## 2022-07-07 PROCEDURE — 20600001 HC STEP DOWN PRIVATE ROOM

## 2022-07-07 PROCEDURE — 83735 ASSAY OF MAGNESIUM: CPT | Performed by: CLINICAL NURSE SPECIALIST

## 2022-07-07 PROCEDURE — 99233 PR SUBSEQUENT HOSPITAL CARE,LEVL III: ICD-10-PCS | Mod: 24,S$GLB,,

## 2022-07-07 PROCEDURE — 99232 PR SUBSEQUENT HOSPITAL CARE,LEVL II: ICD-10-PCS | Mod: ,,, | Performed by: NURSE PRACTITIONER

## 2022-07-07 PROCEDURE — 25000003 PHARM REV CODE 250: Performed by: SURGERY

## 2022-07-07 PROCEDURE — 25000003 PHARM REV CODE 250: Performed by: CLINICAL NURSE SPECIALIST

## 2022-07-07 PROCEDURE — 97161 PT EVAL LOW COMPLEX 20 MIN: CPT

## 2022-07-07 PROCEDURE — 84100 ASSAY OF PHOSPHORUS: CPT | Performed by: CLINICAL NURSE SPECIALIST

## 2022-07-07 PROCEDURE — 80202 ASSAY OF VANCOMYCIN: CPT | Performed by: TRANSPLANT SURGERY

## 2022-07-07 PROCEDURE — 63600175 PHARM REV CODE 636 W HCPCS: Performed by: SURGERY

## 2022-07-07 PROCEDURE — 99232 SBSQ HOSP IP/OBS MODERATE 35: CPT | Mod: ,,, | Performed by: NURSE PRACTITIONER

## 2022-07-07 PROCEDURE — 85025 COMPLETE CBC W/AUTO DIFF WBC: CPT | Performed by: CLINICAL NURSE SPECIALIST

## 2022-07-07 PROCEDURE — 36415 COLL VENOUS BLD VENIPUNCTURE: CPT | Performed by: TRANSPLANT SURGERY

## 2022-07-07 PROCEDURE — 25000003 PHARM REV CODE 250

## 2022-07-07 PROCEDURE — 63600175 PHARM REV CODE 636 W HCPCS: Performed by: CLINICAL NURSE SPECIALIST

## 2022-07-07 PROCEDURE — 80053 COMPREHEN METABOLIC PANEL: CPT | Performed by: CLINICAL NURSE SPECIALIST

## 2022-07-07 RX ORDER — INSULIN ASPART 100 [IU]/ML
1-10 INJECTION, SOLUTION INTRAVENOUS; SUBCUTANEOUS
Status: DISCONTINUED | OUTPATIENT
Start: 2022-07-07 | End: 2022-07-08 | Stop reason: HOSPADM

## 2022-07-07 RX ORDER — INSULIN ASPART 100 [IU]/ML
6 INJECTION, SOLUTION INTRAVENOUS; SUBCUTANEOUS
Status: DISCONTINUED | OUTPATIENT
Start: 2022-07-07 | End: 2022-07-08 | Stop reason: HOSPADM

## 2022-07-07 RX ORDER — AMOXICILLIN AND CLAVULANATE POTASSIUM 875; 125 MG/1; MG/1
1 TABLET, FILM COATED ORAL EVERY 12 HOURS
Status: DISCONTINUED | OUTPATIENT
Start: 2022-07-07 | End: 2022-07-08 | Stop reason: HOSPADM

## 2022-07-07 RX ADMIN — INSULIN ASPART 4 UNITS: 100 INJECTION, SOLUTION INTRAVENOUS; SUBCUTANEOUS at 07:07

## 2022-07-07 RX ADMIN — INSULIN ASPART 6 UNITS: 100 INJECTION, SOLUTION INTRAVENOUS; SUBCUTANEOUS at 04:07

## 2022-07-07 RX ADMIN — TACROLIMUS 2 MG: 1 CAPSULE ORAL at 06:07

## 2022-07-07 RX ADMIN — INSULIN ASPART 4 UNITS: 100 INJECTION, SOLUTION INTRAVENOUS; SUBCUTANEOUS at 11:07

## 2022-07-07 RX ADMIN — TAMSULOSIN HYDROCHLORIDE 0.8 MG: 0.4 CAPSULE ORAL at 09:07

## 2022-07-07 RX ADMIN — CYCLOBENZAPRINE HYDROCHLORIDE 5 MG: 5 TABLET, FILM COATED ORAL at 10:07

## 2022-07-07 RX ADMIN — OXYCODONE HYDROCHLORIDE 10 MG: 10 TABLET ORAL at 10:07

## 2022-07-07 RX ADMIN — AMOXICILLIN AND CLAVULANATE POTASSIUM 1 TABLET: 875; 125 TABLET, FILM COATED ORAL at 10:07

## 2022-07-07 RX ADMIN — VANCOMYCIN HYDROCHLORIDE 1750 MG: 500 INJECTION, POWDER, LYOPHILIZED, FOR SOLUTION INTRAVENOUS at 01:07

## 2022-07-07 RX ADMIN — CARVEDILOL 3.12 MG: 3.12 TABLET, FILM COATED ORAL at 09:07

## 2022-07-07 RX ADMIN — MUPIROCIN: 20 OINTMENT TOPICAL at 08:07

## 2022-07-07 RX ADMIN — AMOXICILLIN AND CLAVULANATE POTASSIUM 1 TABLET: 875; 125 TABLET, FILM COATED ORAL at 08:07

## 2022-07-07 RX ADMIN — MUPIROCIN: 20 OINTMENT TOPICAL at 09:07

## 2022-07-07 RX ADMIN — OXYCODONE HYDROCHLORIDE 10 MG: 10 TABLET ORAL at 01:07

## 2022-07-07 RX ADMIN — OXYCODONE HYDROCHLORIDE 10 MG: 10 TABLET ORAL at 04:07

## 2022-07-07 RX ADMIN — FLUCONAZOLE 200 MG: 200 TABLET ORAL at 09:07

## 2022-07-07 RX ADMIN — PREDNISONE 15 MG: 5 TABLET ORAL at 09:07

## 2022-07-07 RX ADMIN — HEPARIN SODIUM 5000 UNITS: 5000 INJECTION INTRAVENOUS; SUBCUTANEOUS at 02:07

## 2022-07-07 RX ADMIN — CARVEDILOL 3.12 MG: 3.12 TABLET, FILM COATED ORAL at 08:07

## 2022-07-07 RX ADMIN — HEPARIN SODIUM 5000 UNITS: 5000 INJECTION INTRAVENOUS; SUBCUTANEOUS at 08:07

## 2022-07-07 RX ADMIN — PANTOPRAZOLE SODIUM 40 MG: 40 TABLET, DELAYED RELEASE ORAL at 09:07

## 2022-07-07 RX ADMIN — HEPARIN SODIUM 5000 UNITS: 5000 INJECTION INTRAVENOUS; SUBCUTANEOUS at 06:07

## 2022-07-07 RX ADMIN — TACROLIMUS 2 MG: 1 CAPSULE ORAL at 08:07

## 2022-07-07 RX ADMIN — CEFEPIME 2 G: 2 INJECTION, POWDER, FOR SOLUTION INTRAVENOUS at 06:07

## 2022-07-07 RX ADMIN — VALGANCICLOVIR 450 MG: 450 TABLET, FILM COATED ORAL at 09:07

## 2022-07-07 RX ADMIN — OXYCODONE HYDROCHLORIDE 10 MG: 10 TABLET ORAL at 06:07

## 2022-07-07 RX ADMIN — INSULIN ASPART 1 UNITS: 100 INJECTION, SOLUTION INTRAVENOUS; SUBCUTANEOUS at 10:07

## 2022-07-07 NOTE — CARE UPDATE
"RAPID RESPONSE NURSE CHART REVIEW        Chart Reviewed: 07/07/2022, 10:49 AM    MRN: [id number]  Bed: 44511/88948 A    Dx: Biloma following surgery    Romeo Larson has a past medical history of Adjustment and management of vascular access device, Cholangiocarcinoma, Fever of unknown origin, Hiccups, Hilar cholangiocarcinoma, Hypercholesteremia, and Hypertension.    Last VS: /82   Pulse 101   Temp 98 °F (36.7 °C)   Resp (!) 9   Ht 5' 8" (1.727 m)   Wt 82 kg (180 lb 12.4 oz)   SpO2 95%   BMI 27.49 kg/m²     24H Vital Sign Range:  Temp:  [97.8 °F (36.6 °C)-98.6 °F (37 °C)]   Pulse:  []   Resp:  [9-22]   BP: (116-145)/(82-96)   SpO2:  [95 %-99 %]     Level of Consciousness (AVPU): alert    Recent Labs     07/05/22  0722 07/06/22  0708 07/07/22  0911   WBC 19.99* 11.98 8.70   HGB 11.3* 10.6* 10.4*   HCT 35.4* 32.7* 31.8*    200 180       Recent Labs     07/05/22  0722 07/05/22  1350 07/06/22  0708 07/07/22  0911   * 129* 128* 127*   K 5.4* 5.2* 4.6 4.4   CL 96 95 95 94*   CO2 24 27 25 22*   CREATININE 0.9 0.8 0.8 1.2   * 147* 162* 237*   PHOS 4.0  --  3.8 3.9   MG 1.7  --  1.5* 1.5*       OXYGEN:  Flow (L/min): 6  O2 Device (Oxygen Therapy): room air    MEWS score: 4    Bedside Lindy JACKMAN contacted reports patient is much improved today and may be discharged home later. Magnesium noted at 1.5, states she will call primary team for replacement. No additional concerns verbalized at this time. Instructed to call [phone] for further concerns or assistance.    Constance Warner RN        "

## 2022-07-07 NOTE — ASSESSMENT & PLAN NOTE
- no appetite since 7/3  - tolerating diabetic diet  - patient states his appetite is getting better every day  - Encouraged patient to increase PO intake.

## 2022-07-07 NOTE — NURSING
- Pt AAOx4, afebrile, VSS, on RA  - Telemetry monitoring continued during the night, NSR. HR 80 at time of note.  - IV vanc and cefepime continued   - 3 SHRUTHI drains present, charge maintained. Refer to flowsheets for output   - PRN oxycodone administered x2  - PRN flexeril administered x1   - Chevron with staples intact  - Pt's wife at bedside, attentive to pt

## 2022-07-07 NOTE — SUBJECTIVE & OBJECTIVE
"Interval HPI:   Overnight events:   BG above goal today. Patient appears to have snacked yesterday afternoon according to BG trends. Endocrinology will continue to follow, and manage glycemic control while inpatient.   Diet diabetic [facility];  Calorie; Isolation Tray - Regular [place]  3 Days Post-Op    Eatin%  Nausea: No  Hypoglycemia and intervention: No  Fever: No  TPN and/or TF: No  If yes, type of TF/TPN and rate: None    /74   Pulse (!) 118   Temp 98.5 °F (36.9 °C) (Oral)   Resp 11   Ht 5' 8" (1.727 m)   Wt 82 kg (180 lb 12.4 oz)   SpO2 99%   BMI 27.49 kg/m²     Labs Reviewed and Include    Recent Labs   Lab 22  0911   *   CALCIUM 9.3   ALBUMIN 2.7*   PROT 5.9*   *   K 4.4   CO2 22*   CL 94*   BUN 21*   CREATININE 1.2   ALKPHOS 141*   ALT 68*   AST 24   BILITOT 0.9     Lab Results   Component Value Date    WBC 8.70 2022    HGB 10.4 (L) 2022    HCT 31.8 (L) 2022     (H) 2022     2022     No results for input(s): TSH, FREET4 in the last 168 hours.  Lab Results   Component Value Date    HGBA1C 5.8 (H) 2022       Nutritional status:   Body mass index is 27.49 kg/m².  Lab Results   Component Value Date    ALBUMIN 2.7 (L) 2022    ALBUMIN 2.3 (L) 2022    ALBUMIN 2.4 (L) 2022     No results found for: PREALBUMIN    Estimated Creatinine Clearance: 76.8 mL/min (based on SCr of 1.2 mg/dL).    Accu-Checks  Recent Labs     22  1609 22  2159 22  0532 22  1809 22  2045 22  0856 22  1205 22  1754 22  2236   POCTGLUCOSE 292* 188* 183* 180* 175* 160* 164* 351* 246*       Current Medications and/or Treatments Impacting Glycemic Control  Immunotherapy:    Immunosuppressants           Stop Route Frequency     tacrolimus capsule 2 mg         -- Oral 2 times daily          Steroids:   Hormones (From admission, onward)                Start     Stop Route Frequency " Ordered    07/04/22 0900  predniSONE tablet 15 mg         -- Oral Daily 07/04/22 0419    07/04/22 0421  melatonin tablet 6 mg         -- Oral Nightly PRN 07/04/22 0422          Pressors:    Autonomic Drugs (From admission, onward)                None          Hyperglycemia/Diabetes Medications:   Antihyperglycemics (From admission, onward)                Start     Stop Route Frequency Ordered    07/05/22 1130  insulin aspart U-100 pen 4 Units         -- SubQ 3 times daily with meals 07/05/22 0930 07/05/22 1030  insulin aspart U-100 pen 0-5 Units         -- SubQ Before meals & nightly PRN 07/05/22 0930

## 2022-07-07 NOTE — DISCHARGE SUMMARY
[facility] - Transplant Stepdown  Liver Transplant  Discharge Summary      Patient Name: Romeo Larson  MRN: [id number]  Admission Date: 7/4/2022  Hospital Length of Stay: 3 days  Discharge Date and Time:  07/08/2022 12:08 PM  Attending Physician: Braydon Iglesias MD   Discharging Provider: Hannah Young NP  Primary Care Provider: Pravin Maria MD  Post-Operative Day: 16     ORGAN:   RIGHT LIVER LOBE (SEGS 5,6,7,8) WITHOUT MIDDLE HEPATIC VEIN  Disease Etiology: Primary Liver Malignancy: Cholangiocarcinoma (CH-CA)  Donor Type:   Living  Biliary Anastomosis: Nina-en-Y  Arterial Anatomy: Standard    HPI:   Romeo Larson is a 41 y.o. with PMHx of cholangiocarcinoma now s/p Living liver transplant on 6/21/2022 (CMV -/+). The patient returned to OR on 6/23 for Bile leak repair and was maintained on IV anbx. He was stepped down on 6/24 and progressed well post takeback. Of note, cultures from OR with ENTEROCOCCUS FAECALIS. Pt was initially treated w Unasyn IV, de escalated to Augmentin PO 6/27/22 and was to remain on Augmentin while drains in place. Patient was discharged with 2 SHRUTHI drains. He was seen in clinic 7/1 by the transplant surgeon and was feeling well at that time. SHRUTHI #2 removed in clinic on 7/1. The patient continued to do well until 7/3 afternoon. Patient began experiencing acute onset of mid to lower severe abdominal pain and inability to urinate. He denies fevers, chills, nausea, vomiting, SOB, CP. He presented to OSH where labs showed leukocytosis (WBC 35) and hyperkalemia (K 6.1). CT A/P was performed which showed a localized fluid collection measuring 11.2 x 10.1 cm in maximum axial dimensions concerning for a large biloma. The patient was [place] [facility] [place] [place] to LTS for management of the above findings. He was initiated on IV cefepime/vanc at OSH, will continue here. Infectious work-up performed at OSH, will repeat on admit. CT reviewed by Dr. Iglesias, will plan for ERCP vs IR intervention  "for biloma in AM. Patient will be made NPO. Patient and caretaker notified of plan.        Procedure(s) (LRB):  LAPAROTOMY, EXPLORATORY (N/A)     Hospital Course:    Patient admitted for large biloma with symptoms.  Patient is s/p exploratory lap on 7/4 w copious warm irrigation was used ot clean out the abdomen of bilious fluid, adhesions to the abdominal were taken down and biloma was identified, the wall was made up of omentum, the superior aspect was diaphragm, lateral aspect was stomach. This was cleaned out and cultured. Original drain on the right side was left in place(near left upper drain). 2 drains on the left side were placed - the upper drain straight into the biloma cavity and the lower drain to the pelvis bilaterally. The lower drain in the pelvis was removed today without any issues. Today right drain with serosang drainage and left upper drain with serousang drainage. Patient being discharged with both LUQ and Right sided drains.  Aerobic abdominal culture from OR with reuslts of Lactobacillus species. Other OR cx w NGTD, anaerobic cx pending. Blood cx w NGTD. LFTs back to normal range. WBC also trended down to WNL. Will be discharged on PO amoxicillin- clavulanate for 14 days from start date. Chevron incision closed w staples, CDI. Rocha catheter removed 7/6/22 morning. Patient able to void without issues. On day of discharge, patient states he feels good and is ready to go home. CT abdomen/pelvis from today with impression reading of: "Interval resolution of the subhepatic gas and fluid collection when compared to 07/03/2022". LLQ SHRUTHI drain removed today. All VSS. Patient verbalizes understanding of follow up labs tomorrow, Saturday 7/9/2022.     Goals of Care Treatment Preferences:  Code Status: Full Code      Final Active Diagnoses:    Diagnosis Date Noted POA    PRINCIPAL PROBLEM:  Biloma following surgery [T81.89XA, K66.8] 07/04/2022 Yes    Malnutrition of mild degree [E44.1] 07/05/2022 Yes "    Leukocytosis [D72.829] 07/04/2022 Yes    Biloma of transplanted liver [T86.49, K66.8]  Yes    Long-term use of immunosuppressant medication [Z79.899] 06/25/2022 Not Applicable    Type 2 diabetes mellitus with hyperglycemia [E11.65] 06/22/2022 Yes    Adrenal corticosteroid causing adverse effect in therapeutic use [T38.0X5A] 06/22/2022 Yes    At risk for opportunistic infections [Z91.89] 06/21/2022 Yes    Prophylactic immunotherapy [Z29.8] 06/21/2022 Not Applicable    S/P liver transplant [Z94.4] 06/21/2022 Not Applicable      Problems Resolved During this Admission:    Diagnosis Date Noted Date Resolved POA    Hiccups [R06.6]  07/06/2022 Unknown       Consults (From admission, onward)        Status Ordering Provider     Inpatient consult to Endocrinology  Once        Provider:  (Not yet assigned)    Completed AARON HART          Pending Diagnostic Studies:     None        Significant Diagnostic Studies: Labs:   BMP:   Recent Labs   Lab 07/07/22  0911 07/08/22  0658   * 114*   * 132*   K 4.4 5.4*   CL 94* 96   CO2 22* 27   BUN 21* 29*   CREATININE 1.2 1.3   CALCIUM 9.3 9.5   MG 1.5* 1.7   , CBC   Recent Labs   Lab 07/07/22  0911 07/08/22  0658   WBC 8.70 5.85   HGB 10.4* 9.3*   HCT 31.8* 28.6*    165   , INR   Lab Results   Component Value Date    INR 1.1 06/28/2022    INR 1.1 06/27/2022    INR 1.1 06/25/2022    and All labs within the past 24 hours have been reviewed    The patients clinical status was discussed at multidisplinary rounds, involving transplant surgery, transplant medicine, pharmacy, nursing, nutrition, and social work    Discharged Condition: good    Follow Up: labs tomorrow, Saturday 7/9/22    Patient Instructions:   No discharge procedures on file.  Medications:  Reconciled Home Medications:      Medication List      START taking these medications    pantoprazole 40 MG tablet  Commonly known as: PROTONIX  Take 1 tablet (40 mg total) by mouth once  "daily.  Start taking on: July 9, 2022     tamsulosin 0.4 mg Cap  Commonly known as: FLOMAX  Take 2 capsules (0.8 mg total) by mouth once daily.  Start taking on: July 9, 2022        CHANGE how you take these medications    insulin aspart U-100 100 unit/mL (3 mL) Inpn pen  Commonly known as: NovoLOG  Inject 7 Units into the skin 3 (three) times daily with meals. + sliding scale.  MDD 36 units/d  What changed:   · how much to take  · when to take this  · additional instructions        CONTINUE taking these medications    amoxicillin-clavulanate 875-125mg 875-125 mg per tablet  Commonly known as: AUGMENTIN  Take 1 tablet by mouth every 12 (twelve) hours.     aspirin 81 MG Chew  CHEW 1 tablet (81 mg total) by mouth once daily.     BD ULTRA-FINE KAREN PEN NEEDLE 32 gauge x 5/32" Ndle  Generic drug: pen needle, diabetic  1 each by Misc.(Non-Drug; Combo Route) route 3 (three) times daily.     calcium carbonate-vitamin D3 600 mg-20 mcg (800 unit) Tab  Take 1 tablet by mouth once daily at 6am.     carvediloL 3.125 MG tablet  Commonly known as: COREG  Take 1 tablet (3.125 mg total) by mouth 2 (two) times daily.     fluconazole 200 MG Tab  Commonly known as: DIFLUCAN  Take 1 tablet (200 mg total) by mouth once daily. STOP 7/20/22     metFORMIN 500 MG tablet  Commonly known as: GLUCOPHAGE  Take 1 tablet (500 mg total) by mouth 2 (two) times daily with meals.     methocarbamoL 500 MG Tab  Commonly known as: ROBAXIN  Take 1 tablet (500 mg total) by mouth 4 (four) times daily.     oxyCODONE 10 mg Tab immediate release tablet  Commonly known as: ROXICODONE  Take 1-1.5 tablets (10-15 mg total) by mouth every 4 (four) hours as needed for Pain.     predniSONE 5 MG tablet  Commonly known as: DELTASONE  Take by mouth once daily: 20 mg 6/27-7/3; 15 mg 7/4-7/10; 10 mg 7/11-7/17; 5 mg 7/18-7/24; STOP 7/25/22     sulfamethoxazole-trimethoprim 400-80mg 400-80 mg per tablet  Commonly known as: BACTRIM,SEPTRA  Take 1 tablet by mouth every " morning. STOP 12/18/22     TRUE METRIX GLUCOSE METER Misc  Generic drug: blood-glucose meter  Use as instructed     TRUE METRIX GLUCOSE TEST STRIP Strp  Generic drug: blood sugar diagnostic  1 each by Misc.(Non-Drug; Combo Route) route 3 (three) times daily.     TRUEPLUS LANCETS 30 gauge Misc  Generic drug: lancets  1 each by Misc.(Non-Drug; Combo Route) route 3 (three) times daily.     valGANciclovir 450 mg Tab  Commonly known as: VALCYTE  Take 1 tablet (450 mg total) by mouth once daily. STOP 9/19/22        STOP taking these medications    baclofen 10 MG tablet  Commonly known as: LIORESAL     famotidine 20 MG tablet  Commonly known as: PEPCID     losartan 50 MG tablet  Commonly known as: COZAAR     mycophenolate 250 mg Cap  Commonly known as: CELLCEPT     tacrolimus 1 MG Cap  Commonly known as: PROGRAF          Time spent caring for patient (Greater than 1/2 spent in direct face-to-face contact): > 30 minutes    Hannah Young NP  Liver Transplant  [facility] - Transplant Stepdown

## 2022-07-07 NOTE — ASSESSMENT & PLAN NOTE
- WBC 35 at OSH  - CT A/P with large intra-abdominal fluid collection, concerning for biloma  - Infectious workup ordered; blood cultures NGTD; aerobic culture from OR (wound from abdomen) on 7/4 with results of Lactobacillus species. Incision site from abdomen aerobic culture with no growth; gram stain No WBCs and no organisms seen; fungal and anaerobic cultures in progress  - - Discontinue IV vancomycin and cefepime; start PO amoxicillin- clavulanate   - WBC continuing to trend down with WBC today of 8.7

## 2022-07-07 NOTE — PROGRESS NOTES
Therapy with Vancomycin complete and/or consult discontinued by provider.  [id number] will sign off, please re-consult as needed.

## 2022-07-07 NOTE — ASSESSMENT & PLAN NOTE
- Continue prograf and steroid taper  - Check prograf level daily and adjust for therapeutic dosage. Monitor for toxic side effects  - Holding Cellcept on admit d/t bile leak/infection/leukocytosis

## 2022-07-07 NOTE — PROGRESS NOTES
[facility] - Transplant Stepdown  Liver Transplant  Progress Note    Patient Name: Romeo Larson  MRN: [id number]  Admission Date: 7/4/2022  Hospital Length of Stay: 3 days  Code Status: Full Code  Primary Care Provider: Pravin Maria MD  Post-Operative Day: 16    ORGAN:   RIGHT LIVER LOBE (SEGS 5,6,7,8) WITHOUT MIDDLE HEPATIC VEIN  Disease Etiology: Primary Liver Malignancy: Cholangiocarcinoma (CH-CA)  Donor Type:   Living  Biliary Anastomosis: Nina-en-Y  Arterial Anatomy: Standard  Subjective:     History of Present Illness:  Romeo Larson is a 41 y.o. with PMHx of cholangiocarcinoma now s/p Living liver transplant on 6/21/2022 (CMV -/+). The patient returned to OR on 6/23 for Bile leak repair and was maintained on IV anbx. He was stepped down on 6/24 and progressed well post takeback. Of note, cultures from OR with ENTEROCOCCUS FAECALIS. Pt was initially treated w Unasyn IV, de escalated to Augmentin PO 6/27/22 and was to remain on Augmentin while drains in place. Patient was discharged with 2 SHRUTHI drains. He was seen in clinic 7/1 by the transplant surgeon and was feeling well at that time. SHRUTHI #2 removed in clinic on 7/1. The patient continued to do well until 7/3 afternoon. Patient began experiencing acute onset of mid to lower severe abdominal pain and inability to urinate. He denies fevers, chills, nausea, vomiting, SOB, CP. He presented to OSH where labs showed leukocytosis (WBC 35) and hyperkalemia (K 6.1). CT A/P was performed which showed a localized fluid collection measuring 11.2 x 10.1 cm in maximum axial dimensions concerning for a large biloma. [place] [facility] [place] for management of the above findings. He was initiated on IV cefepime/vanc at OSH, will continue here. Infectious work-up performed at OSH, will repeat on admit. CT reviewed by Dr. Iglesias, will plan for ERCP vs IR intervention for biloma in AM. Patient will be made NPO. Patient and caretaker notified of plan.         Hospital Course:  Interval History: No acute events overnight. Patient states he feels good and is ready to go home. Patient is s/p exploratory lap on 7/4 w copious warm irrigation was used ot clean out the abdomen of bilious fluid, adhesions to the abdominal were taken down and biloma was identified, the wall was made up of omentum, the superior aspect was diaphragm, lateral aspect was stomach. This was cleaned out and cultured. Original drain on the right side was left in place(near left upper drain). 2 drains on the left side were placed - the upper drain straight into the biloma cavity and the lower drain to the pelvis bilaterally. Today right drain with serosang drainage, Left upper drain with serousang drainage, left lower drainage serosang. Keeping drains charged and drain as needed. Aerobic abdominal culture from OR with reuslts of Lactobacillus species. Other OR cx w NGTD, anaerobic cx pending. Blood cx w NGTD. LFTs continuing to trend down. Will  discontinue IV vancomycin and cefepime; start PO amoxicillin- clavulanate  WBC trending down to 8.7  from 11. Chevron incision closed w staples, CDI.  Patient states his appetite continues to get better everyday. Rocha catheter removed yesterday morning. Patient able to void. Bladder scan done this morning showing 550ml; however, patient was able to void about 550ml shortly after the bladder scan. Will continue to bladder scan as needed to assess for retention. Flomax started for retention on 7/5. VSS, again tachycardia noted. Will continue to monitor closely.      Scheduled Meds:   carvediloL  3.125 mg Oral BID    ceFEPime (MAXIPIME) IVPB  2 g Intravenous Q12H    fluconazole  200 mg Oral Daily    heparin (porcine)  5,000 Units Subcutaneous Q8H    insulin aspart U-100  4 Units Subcutaneous TIDWM    mupirocin   Nasal BID    pantoprazole  40 mg Oral Daily    predniSONE  15 mg Oral Daily    tacrolimus  2 mg Oral BID    tamsulosin  0.8 mg Oral Daily     valGANciclovir  450 mg Oral Daily    vancomycin (VANCOCIN) IVPB  1,750 mg Intravenous Q12H     Continuous Infusions:  PRN Meds:acetaminophen, baclofen, cyclobenzaprine, dextrose 10%, dextrose 10%, glucagon (human recombinant), glucose, glucose, HYDROmorphone, insulin aspart U-100, melatonin, ondansetron, oxyCODONE, sodium chloride 0.9%, Pharmacy to dose Vancomycin consult **AND** vancomycin - pharmacy to dose    Review of Systems   Constitutional:  Positive for activity change (decreased) and appetite change (decreased). Negative for diaphoresis and fever.   HENT: Negative.     Eyes: Negative.    Respiratory:  Negative for cough and shortness of breath.    Cardiovascular:  Negative for chest pain and leg swelling.   Gastrointestinal:  Negative for abdominal distention, abdominal pain, constipation, diarrhea, nausea and vomiting.   Endocrine: Negative.    Genitourinary:  Negative for difficulty urinating, dysuria and urgency.   Musculoskeletal:  Negative for arthralgias, myalgias, neck pain and neck stiffness.   Skin:  Positive for wound.   Allergic/Immunologic: Positive for immunocompromised state.   Neurological:  Positive for weakness. Negative for dizziness and headaches.   Psychiatric/Behavioral:  Negative for agitation and confusion.    Objective:     Vital Signs (Most Recent):  Temp: 98.4 °F (36.9 °C) (07/07/22 0514)  Pulse: (!) 131 (07/07/22 0746)  Resp: 18 (07/07/22 0648)  BP: (!) 145/91 (07/07/22 0514)  SpO2: 99 % (07/07/22 0514)   Vital Signs (24h Range):  Temp:  [97.8 °F (36.6 °C)-98.6 °F (37 °C)] 98.4 °F (36.9 °C)  Pulse:  [] 131  Resp:  [9-22] 18  SpO2:  [95 %-99 %] 99 %  BP: (131-145)/(89-96) 145/91     Weight: 82 kg (180 lb 12.4 oz)  Body mass index is 27.49 kg/m².    Intake/Output - Last 3 Shifts         07/05 0700 07/06 0659 07/06 0700 07/07 0659 07/07 0700 07/08 0659    P.O. 620 1420     I.V. (mL/kg)  1748.3 (21.3)     IV Piggyback       Total Intake(mL/kg) 620 (7.6) 3168.3 (38.6)      Urine (mL/kg/hr) 1950 (1) 500 (0.3)     Emesis/NG output       Drains 415 180 80    Other       Stool       Blood       Total Output 2365 680 80    Net -1745 +2488.3 -80                   Physical Exam  Vitals and nursing note reviewed.   Constitutional:       General: He is not in acute distress.     Appearance: Normal appearance. He is not ill-appearing or toxic-appearing.   HENT:      Head: Atraumatic.   Eyes:      Extraocular Movements: Extraocular movements intact.      Conjunctiva/sclera: Conjunctivae normal.   Cardiovascular:      Rate and Rhythm: Tachycardia present.      Pulses: Normal pulses.      Heart sounds: Normal heart sounds, S1 normal and S2 normal. No murmur heard.  Pulmonary:      Effort: Pulmonary effort is normal.      Breath sounds: Normal breath sounds.   Abdominal:      General: Bowel sounds are normal. There is no distension.      Palpations: Abdomen is soft.      Tenderness: There is no abdominal tenderness. There is no guarding or rebound.      Comments: Velasquez lima staples CDI  RLQ SHRUTHI drain with serosang drainage  LUQ drainage serousang drainage  LLQ drainage serosang draiange   Musculoskeletal:      Right lower leg: No edema.      Left lower leg: No edema.   Skin:     General: Skin is warm and dry.      Capillary Refill: Capillary refill takes less than 2 seconds.   Neurological:      Mental Status: He is alert and oriented to person, place, and time. Mental status is at baseline.   Psychiatric:         Mood and Affect: Mood normal.         Speech: Speech normal.         Behavior: Behavior normal. Behavior is cooperative.         Thought Content: Thought content normal.       Laboratory:  Immunosuppressants           Stop Route Frequency     tacrolimus capsule 2 mg         -- Oral 2 times daily          CBC:   Recent Labs   Lab 07/07/22  0911   WBC 8.70   RBC 3.17*   HGB 10.4*   HCT 31.8*      *   MCH 32.8*   MCHC 32.7     CMP:   Recent Labs   Lab 07/07/22  0911   *    CALCIUM 9.3   ALBUMIN 2.7*   PROT 5.9*   *   K 4.4   CO2 22*   CL 94*   BUN 21*   CREATININE 1.2   ALKPHOS 141*   ALT 68*   AST 24   BILITOT 0.9       Labs within the past 24 hours have been reviewed.          Assessment/Plan:     * Biloma following surgery  - CT A/P at OSH with large localized fluid collection concerning for biloma  - take back to OR 7/4, biloma identified, washed out, 2 drains placed  - Discontinue IV vancomycin and cefepime; start PO amoxicillin- clavulanate    - Drain fluid sent for bilirubin- 7, serum 1.7  - keep drains charged and empty  - R lateral drain with 120ml serousang drainage within last 24 hours  - LUQ drain with 30ml of serousang drainage within last 24 hours  - LLQ drain with 30ml of serousang drainage within last 24 hours      Malnutrition of mild degree  - no appetite since 7/3  - tolerating diabetic diet  - patient states his appetite is getting better every day  - Encouraged patient to increase PO intake.         Biloma of transplanted liver  - see s/p liver transplant      Leukocytosis  - WBC 35 at OSH  - CT A/P with large intra-abdominal fluid collection, concerning for biloma  - Infectious workup ordered; blood cultures NGTD; aerobic culture from OR (wound from abdomen) on 7/4 with results of Lactobacillus species. Incision site from abdomen aerobic culture with no growth; gram stain No WBCs and no organisms seen; fungal and anaerobic cultures in progress  - - Discontinue IV vancomycin and cefepime; start PO amoxicillin- clavulanate   - WBC continuing to trend down with WBC today of 8.7       Long-term use of immunosuppressant medication  - Continue prograf and steroid taper  - Check prograf level daily and adjust for therapeutic dosage. Monitor for toxic side effects  - Holding Cellcept on admit d/t bile leak/infection/leukocytosis      Type 2 diabetes mellitus with hyperglycemia  - endocrine consulted, appreciate recs      At risk for opportunistic infections  -  "Continue OI prophylaxis per protocol.   - Hold Bactrim on admit d/t hyperkalemia at OSH; will continue to hold for now. Will restart when medically stable        Prophylactic immunotherapy  - see" long term use of immunosuppression"      S/P liver transplant  - s/p living donor liver txp 6/21/22 d/t cholangiocarcinoma  - TB OR 6/23 for bile leak repair  - Now admitted for intra-abdominal fluid collection concerning for biloma  - s/p take back 7/4 biloma identified, 2 additional left drains placed-the upper drain straight into the biloma cavity and the lower drain to the pelvis bilaterally. Right drain is also near anastomosis.  - LFTs continuing to trend down          VTE Risk Mitigation (From admission, onward)         Ordered     heparin (porcine) injection 5,000 Units  Every 8 hours         07/04/22 0422     IP VTE HIGH RISK PATIENT  Once         07/04/22 0422     Place sequential compression device  Until discontinued         07/04/22 0422                The patients clinical status was discussed at multidisplinary rounds, involving transplant surgery, transplant medicine, pharmacy, nursing, nutrition, and social work    Discharge Planning:  Not applicable for discharge at this time.     Hannah Young, RADHA  Liver Transplant  [facility] - Transplant Stepdown  "

## 2022-07-07 NOTE — PLAN OF CARE
Patient aao, independent with ambulation. Patient still felling better each day. Hermilo drains x3 are clearing up, no not appear bile like anymore. Antibiotics transitioned to po today. Patients vitals stable, HR improved, tachy with ambulation.  Patient will have CT tomorrow am to evaluate bile leak.  Patients wife at the bedside attentive to patient involved in care.

## 2022-07-07 NOTE — ASSESSMENT & PLAN NOTE
- CT A/P at OSH with large localized fluid collection concerning for biloma  - take back to OR 7/4, biloma identified, washed out, 2 drains placed  - Discontinue IV vancomycin and cefepime; start PO amoxicillin- clavulanate    - Drain fluid sent for bilirubin- 7, serum 1.7  - keep drains charged and empty  - R lateral drain with 120ml serousang drainage within last 24 hours  - LUQ drain with 30ml of serousang drainage within last 24 hours  - LLQ drain with 30ml of serousang drainage within last 24 hours

## 2022-07-07 NOTE — ASSESSMENT & PLAN NOTE
BG goal 140-180    - Increase Novolog to 6 units TIDWM.   - Increase to Moderate Dose SQ Insulin Correction Scale PRN subsequent hyperglycemia.   - BG monitoring ac/hs    ** Please call Endocrine for any BG related issues **  ** Please notify Endocrine for any change and/or advance in diet**  Lab Results   Component Value Date    HGBA1C 5.8 (H) 06/20/2022         Discharge Planning:   TBD. Please notify endocrinology prior to discharge.

## 2022-07-07 NOTE — PT/OT/SLP EVAL
"Physical Therapy Evaluation and Discharge Note    Patient Name:  Romeo Larson   MRN:  [id number]    Recommendations:     Discharge Recommendations:  home   Discharge Equipment Recommendations: none   Barriers to discharge: None    Assessment:     Romeo Larson is a 41 y.o. male admitted with a medical diagnosis of Biloma following surgery. Patient is able to complete all visualized functional mobility with independence. They are functioning at their baseline and no longer require acute care physical therapy.    Plan:     During this hospitalization, patient does not require further acute PT services. Please re-consult if situation changes.      Subjective     Chief Complaint: None verbalized  Patient/Family Comments/goals: "I think they're sending me home tomorrow"  Pain/Comfort:  · Pain Rating 1: 0/10    Patients cultural, spiritual, Alevism conflicts given the current situation: no    Living Environment:  Patient lives with their spouse and 3 kids (aged 16, 13, 12) in a single story home, number of outside stairs: 0, walk-in shower with built in seat.   Prior to admission, patient was independent with ADLs, driving, working in  (not currently d/t recent liver transplant). Patient uses DME as follows: none. DME owned (not currently used): none. Upon discharge, patient will have assistance from family.    Objective:     Communicated with RN prior to session.  Patient found up in chair with telemetry, SHRUTHI drain upon PT entry to room.    General Precautions: Standard     Orthopedic Precautions:N/A   Braces: N/A    Exams:  · Cognitive Exam:  Patient is oriented to Person, Place, Time, Situation  · RLE ROM: WFL  · RLE Strength: WFL  · LLE ROM: WFL  · LLE Strength: WFL  · Sensation: Intact light touch to BLEs    Functional Mobility:  · Bed Mobility:     · Seated in chair at start of session and returned to chair  · Transfers:     · Sit to Stand: modified independence with no AD  · Gait: Patient ambulated 300 " ft with no AD and independence. Patient demonstrates steady gait. No LOB noted. All lines remained intact throughout ambulation trial, mask donned for out of room ambulation.  · Balance:   · Static Sitting: Good, able to maintain for 3 minute(s) with independence  · Dynamic Sitting: Good: Patient accepts moderate challenge, independence  · Static Standing: Good, able to maintain for 30 seconds with independence  · Dynamic Standing: Fair: Patient accepts minimal challenge, independence    Therapeutic Activities and Exercises:  Patient educated on role of acute care PT and PT POC and call light usage  Patient is clear to ambulate in hallways with RN/PCT/family   Educated about gradual progression of activity once out of hospital  Educated about safety with functional mobility  Answered all questions within PT scope of practice to patient's satisfaction    AM-PAC 6 CLICK MOBILITY  Total Score:24     Patient left up in chair with all lines intact, call button in reach and family present.    GOALS:   Multidisciplinary Problems     Physical Therapy Goals     Not on file          Multidisciplinary Problems (Resolved)        Problem: Physical Therapy    Goal Priority Disciplines Outcome Goal Variances Interventions   Physical Therapy Goal   (Resolved)     PT, PT/OT Met     Description: Goals to be met by: 7/7/2022    1. 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    Hypercholesteremia     Hypertension        Past Surgical History:   Procedure Laterality Date    DIAGNOSTIC ULTRASOUND N/A 6/21/2022    Procedure: ULTRASOUND, DIAGNOSTIC;  Surgeon: Sinan Cline MD;  Location: [facility] OR [address] [place] [address];  Service: Transplant;  Laterality: N/A;    ENDOSCOPIC ULTRASOUND OF UPPER  GASTROINTESTINAL TRACT N/A 10/20/2021    Procedure: ULTRASOUND, UPPER GI TRACT, ENDOSCOPIC;  Surgeon: Valeriy Sotelo MD;  Location: [facility] ENDO (2ND FLR);  Service: Endoscopy;  Laterality: N/A;  wife to email vacc card-inst email-tb    ERCP N/A 10/20/2021    Procedure: ERCP (ENDOSCOPIC RETROGRADE CHOLANGIOPANCREATOGRAPHY);  Surgeon: Valeriy Sotelo MD;  Location: [facility] ENDO (2ND FLR);  Service: Endoscopy;  Laterality: N/A;    ERCP N/A 11/4/2021    Procedure: ERCP (ENDOSCOPIC RETROGRADE CHOLANGIOPANCREATOGRAPHY);  Surgeon: Valeriy Sotelo MD;  Location: [facility] ENDO (2ND FLR);  Service: Endoscopy;  Laterality: N/A;    ERCP N/A 11/4/2021    Procedure: ERCP (ENDOSCOPIC RETROGRADE CHOLANGIOPANCREATOGRAPHY);  Surgeon: Roselia Pereyra MD;  Location: [facility] ENDO (2ND FLR);  Service: Endoscopy;  Laterality: N/A;  pt vaccinated, instructed to bring card to procedure, instructions sent portal-MG    ERCP N/A 1/14/2022    Procedure: ERCP (ENDOSCOPIC RETROGRADE CHOLANGIOPANCREATOGRAPHY);  Surgeon: Roselia Pereyra MD;  Location: [facility] ENDO (2ND FLR);  Service: Endoscopy;  Laterality: N/A;  inst portal-right chest port-tb  Pt requested at home COVID testing, Pt instructed at home RAPID to be done morning of procedure, Pt instructed to call endoscopy scheuding if there is a positive result, Pt informed if a positive     ERCP N/A 3/31/2022    Procedure: ERCP (ENDOSCOPIC RETROGRADE CHOLANGIOPANCREATOGRAPHY);  Surgeon: Julio Cesar Alonzo MD;  Location: [facility] ENDO (2ND FLR);  Service: Endoscopy;  Laterality: N/A;  3/16: port L chest wall. pt to bring covid vaccination card. Instructions sent via portal.-SC  3/18/22-Updated instructions sent via portal re:new date/time-DS    EXPLORATORY LAPAROTOMY AFTER LIVER TRANSPLANTATION N/A 6/23/2022    Procedure: LAPAROTOMY, EXPLORATORY, AFTER LIVER TRANSPLANT;  Surgeon: Julio Cesar Jara MD;  Location: [facility] OR 2ND FLR;  Service: Transplant;  Laterality: N/A;    INSERTION OF TUNNELED  CENTRAL VENOUS CATHETER (CVC) WITH SUBCUTANEOUS PORT N/A 11/24/2021    Procedure: INSERTION, PORT-A-CATH;  Surgeon: Prem Syed MD;  Location: [facility] CATH LAB;  Service: Radiology;  Laterality: N/A;    LIVER TRANSPLANT N/A 6/21/2022    Procedure: TRANSPLANT, LIVER;  Surgeon: Sinan Cline MD;  Location: [facility] OR [facility] FLR;  Service: Transplant;  Laterality: N/A;    REPAIR OF BILE DUCT N/A 6/23/2022    Procedure: REPAIR, BILE DUCT;  Surgeon: Julio Cesar Jara MD;  Location: [facility] OR [facility] FLR;  Service: Transplant;  Laterality: N/A;    ROBOT-ASSISTED LAPAROSCOPIC LYMPHADENECTOMY USING DA МАРИНА XI  6/17/2022    Procedure: XI ROBOTIC LYMPHADENECTOMY - Portal;  Surgeon: Julio Cesar Jara MD;  [facility];  Service: Transplant;;    TONSILLECTOMY      XI ROBOTIC LAPAROSCOPY, EXPLORATORY N/A 6/17/2022    Procedure: XI ROBOTIC LAPAROSCOPY,EXPLORATORY;  Surgeon: Julio Cesar Jara MD;  [facility];  Service: Transplant;  Laterality: N/A;       Time Tracking:     PT Received On: 07/07/22  PT Start Time: 1346     PT Stop Time: 1356  PT Total Time (min): 10 min     Billable Minutes: Evaluation 10 min     07/07/2022

## 2022-07-07 NOTE — PLAN OF CARE
Pt evaluation complete, no goals established as pt presents at baseline functional level. No acute care PT needs.    Problem: Physical Therapy  Goal: Physical Therapy Goal  Description: Goals to be met by: 7/7/2022    1. Evaluate patient need for acute care PT- met 7/7/2022    Outcome: Met

## 2022-07-07 NOTE — SUBJECTIVE & OBJECTIVE
Scheduled Meds:   carvediloL  3.125 mg Oral BID    ceFEPime (MAXIPIME) IVPB  2 g Intravenous Q12H    fluconazole  200 mg Oral Daily    heparin (porcine)  5,000 Units Subcutaneous Q8H    insulin aspart U-100  4 Units Subcutaneous TIDWM    mupirocin   Nasal BID    pantoprazole  40 mg Oral Daily    predniSONE  15 mg Oral Daily    tacrolimus  2 mg Oral BID    tamsulosin  0.8 mg Oral Daily    valGANciclovir  450 mg Oral Daily    vancomycin (VANCOCIN) IVPB  1,750 mg Intravenous Q12H     Continuous Infusions:  PRN Meds:acetaminophen, baclofen, cyclobenzaprine, dextrose 10%, dextrose 10%, glucagon (human recombinant), glucose, glucose, HYDROmorphone, insulin aspart U-100, melatonin, ondansetron, oxyCODONE, sodium chloride 0.9%, Pharmacy to dose Vancomycin consult **AND** vancomycin - pharmacy to dose    Review of Systems   Constitutional:  Positive for activity change (decreased) and appetite change (decreased). Negative for diaphoresis and fever.   HENT: Negative.     Eyes: Negative.    Respiratory:  Negative for cough and shortness of breath.    Cardiovascular:  Negative for chest pain and leg swelling.   Gastrointestinal:  Negative for abdominal distention, abdominal pain, constipation, diarrhea, nausea and vomiting.   Endocrine: Negative.    Genitourinary:  Negative for difficulty urinating, dysuria and urgency.   Musculoskeletal:  Negative for arthralgias, myalgias, neck pain and neck stiffness.   Skin:  Positive for wound.   Allergic/Immunologic: Positive for immunocompromised state.   Neurological:  Positive for weakness. Negative for dizziness and headaches.   Psychiatric/Behavioral:  Negative for agitation and confusion.    Objective:     Vital Signs (Most Recent):  Temp: 98.4 °F (36.9 °C) (07/07/22 0514)  Pulse: (!) 131 (07/07/22 0746)  Resp: 18 (07/07/22 0648)  BP: (!) 145/91 (07/07/22 0514)  SpO2: 99 % (07/07/22 0514)   Vital Signs (24h Range):  Temp:  [97.8 °F (36.6 °C)-98.6 °F (37 °C)] 98.4 °F (36.9  °C)  Pulse:  [] 131  Resp:  [9-22] 18  SpO2:  [95 %-99 %] 99 %  BP: (131-145)/(89-96) 145/91     Weight: 82 kg (180 lb 12.4 oz)  Body mass index is 27.49 kg/m².    Intake/Output - Last 3 Shifts         07/05 0700  07/06 0659 07/06 0700  07/07 0659 07/07 0700  07/08 0659    P.O. 620 1420     I.V. (mL/kg)  1748.3 (21.3)     IV Piggyback       Total Intake(mL/kg) 620 (7.6) 3168.3 (38.6)     Urine (mL/kg/hr) 1950 (1) 500 (0.3)     Emesis/NG output       Drains 415 180 80    Other       Stool       Blood       Total Output 2365 680 80    Net -1745 +2488.3 -80                   Physical Exam  Vitals and nursing note reviewed.   Constitutional:       General: He is not in acute distress.     Appearance: Normal appearance. He is not ill-appearing or toxic-appearing.   HENT:      Head: Atraumatic.   Eyes:      Extraocular Movements: Extraocular movements intact.      Conjunctiva/sclera: Conjunctivae normal.   Cardiovascular:      Rate and Rhythm: Tachycardia present.      Pulses: Normal pulses.      Heart sounds: Normal heart sounds, S1 normal and S2 normal. No murmur heard.  Pulmonary:      Effort: Pulmonary effort is normal.      Breath sounds: Normal breath sounds.   Abdominal:      General: Bowel sounds are normal. There is no distension.      Palpations: Abdomen is soft.      Tenderness: There is no abdominal tenderness. There is no guarding or rebound.      Comments: [employer] w staples CDI  RLQ SHRUTHI drain with serosang drainage  LUQ drainage serousang drainage  LLQ drainage serosang draiange   Musculoskeletal:      Right lower leg: No edema.      Left lower leg: No edema.   Skin:     General: Skin is warm and dry.      Capillary Refill: Capillary refill takes less than 2 seconds.   Neurological:      Mental Status: He is alert and oriented to person, place, and time. Mental status is at baseline.   Psychiatric:         Mood and Affect: Mood normal.         Speech: Speech normal.         Behavior: Behavior normal.  Behavior is cooperative.         Thought Content: Thought content normal.       Laboratory:  Immunosuppressants           Stop Route Frequency     tacrolimus capsule 2 mg         -- Oral 2 times daily          CBC:   Recent Labs   Lab 07/07/22  0911   WBC 8.70   RBC 3.17*   HGB 10.4*   HCT 31.8*      *   MCH 32.8*   MCHC 32.7     CMP:   Recent Labs   Lab 07/07/22  0911   *   CALCIUM 9.3   ALBUMIN 2.7*   PROT 5.9*   *   K 4.4   CO2 22*   CL 94*   BUN 21*   CREATININE 1.2   ALKPHOS 141*   ALT 68*   AST 24   BILITOT 0.9       Labs within the past 24 hours have been reviewed.

## 2022-07-08 ENCOUNTER — PATIENT MESSAGE (OUTPATIENT)
Dept: TRANSPLANT | Facility: CLINIC | Age: 42
End: 2022-07-08
Payer: COMMERCIAL

## 2022-07-08 VITALS
WEIGHT: 180.75 LBS | OXYGEN SATURATION: 98 % | HEART RATE: 81 BPM | TEMPERATURE: 98 F | HEIGHT: 68 IN | BODY MASS INDEX: 27.39 KG/M2 | DIASTOLIC BLOOD PRESSURE: 83 MMHG | SYSTOLIC BLOOD PRESSURE: 127 MMHG | RESPIRATION RATE: 13 BRPM

## 2022-07-08 DIAGNOSIS — Z94.4 LIVER REPLACED BY TRANSPLANT: Primary | ICD-10-CM

## 2022-07-08 LAB
ABO + RH BLD: NORMAL
ALBUMIN SERPL BCP-MCNC: 2.6 G/DL (ref 3.5–5.2)
ALP SERPL-CCNC: 147 U/L (ref 55–135)
ALT SERPL W/O P-5'-P-CCNC: 74 U/L (ref 10–44)
ANION GAP SERPL CALC-SCNC: 9 MMOL/L (ref 8–16)
AST SERPL-CCNC: 27 U/L (ref 10–40)
BACTERIA SPEC ANAEROBE CULT: NORMAL
BACTERIA SPEC ANAEROBE CULT: NORMAL
BASOPHILS # BLD AUTO: 0.02 K/UL (ref 0–0.2)
BASOPHILS NFR BLD: 0.3 % (ref 0–1.9)
BILIRUB SERPL-MCNC: 0.8 MG/DL (ref 0.1–1)
BLD GP AB SCN CELLS X3 SERPL QL: NORMAL
BUN SERPL-MCNC: 29 MG/DL (ref 6–20)
CALCIUM SERPL-MCNC: 9.5 MG/DL (ref 8.7–10.5)
CHLORIDE SERPL-SCNC: 96 MMOL/L (ref 95–110)
CO2 SERPL-SCNC: 27 MMOL/L (ref 23–29)
CREAT SERPL-MCNC: 1.3 MG/DL (ref 0.5–1.4)
DIFFERENTIAL METHOD: ABNORMAL
EOSINOPHIL # BLD AUTO: 0.2 K/UL (ref 0–0.5)
EOSINOPHIL NFR BLD: 3.6 % (ref 0–8)
ERYTHROCYTE [DISTWIDTH] IN BLOOD BY AUTOMATED COUNT: 14.6 % (ref 11.5–14.5)
EST. GFR  (AFRICAN AMERICAN): >60 ML/MIN/1.73 M^2
EST. GFR  (NON AFRICAN AMERICAN): >60 ML/MIN/1.73 M^2
GLUCOSE SERPL-MCNC: 114 MG/DL (ref 70–110)
HCT VFR BLD AUTO: 28.6 % (ref 40–54)
HGB BLD-MCNC: 9.3 G/DL (ref 14–18)
IMM GRANULOCYTES # BLD AUTO: 0.04 K/UL (ref 0–0.04)
IMM GRANULOCYTES NFR BLD AUTO: 0.7 % (ref 0–0.5)
LYMPHOCYTES # BLD AUTO: 0.6 K/UL (ref 1–4.8)
LYMPHOCYTES NFR BLD: 10.4 % (ref 18–48)
MAGNESIUM SERPL-MCNC: 1.7 MG/DL (ref 1.6–2.6)
MCH RBC QN AUTO: 32 PG (ref 27–31)
MCHC RBC AUTO-ENTMCNC: 32.5 G/DL (ref 32–36)
MCV RBC AUTO: 98 FL (ref 82–98)
MONOCYTES # BLD AUTO: 0.3 K/UL (ref 0.3–1)
MONOCYTES NFR BLD: 5.6 % (ref 4–15)
NEUTROPHILS # BLD AUTO: 4.6 K/UL (ref 1.8–7.7)
NEUTROPHILS NFR BLD: 79.4 % (ref 38–73)
NRBC BLD-RTO: 0 /100 WBC
PHOSPHATE SERPL-MCNC: 4.5 MG/DL (ref 2.7–4.5)
PLATELET # BLD AUTO: 165 K/UL (ref 150–450)
PMV BLD AUTO: 9.9 FL (ref 9.2–12.9)
POCT GLUCOSE: 123 MG/DL (ref 70–110)
POCT GLUCOSE: 147 MG/DL (ref 70–110)
POTASSIUM SERPL-SCNC: 5.4 MMOL/L (ref 3.5–5.1)
PROT SERPL-MCNC: 5.6 G/DL (ref 6–8.4)
RBC # BLD AUTO: 2.91 M/UL (ref 4.6–6.2)
SODIUM SERPL-SCNC: 132 MMOL/L (ref 136–145)
TACROLIMUS BLD-MCNC: 16.1 NG/ML (ref 5–15)
WBC # BLD AUTO: 5.85 K/UL (ref 3.9–12.7)

## 2022-07-08 PROCEDURE — 80197 ASSAY OF TACROLIMUS: CPT | Performed by: CLINICAL NURSE SPECIALIST

## 2022-07-08 PROCEDURE — 63600175 PHARM REV CODE 636 W HCPCS: Performed by: CLINICAL NURSE SPECIALIST

## 2022-07-08 PROCEDURE — 83735 ASSAY OF MAGNESIUM: CPT | Performed by: CLINICAL NURSE SPECIALIST

## 2022-07-08 PROCEDURE — 99232 SBSQ HOSP IP/OBS MODERATE 35: CPT | Mod: ,,, | Performed by: NURSE PRACTITIONER

## 2022-07-08 PROCEDURE — 63600175 PHARM REV CODE 636 W HCPCS

## 2022-07-08 PROCEDURE — 80053 COMPREHEN METABOLIC PANEL: CPT | Performed by: CLINICAL NURSE SPECIALIST

## 2022-07-08 PROCEDURE — 99239 PR HOSPITAL DISCHARGE DAY,>30 MIN: ICD-10-PCS | Mod: 24,,,

## 2022-07-08 PROCEDURE — 99239 HOSP IP/OBS DSCHRG MGMT >30: CPT | Mod: 24,,,

## 2022-07-08 PROCEDURE — 25000003 PHARM REV CODE 250: Performed by: NURSE PRACTITIONER

## 2022-07-08 PROCEDURE — 25000003 PHARM REV CODE 250: Performed by: CLINICAL NURSE SPECIALIST

## 2022-07-08 PROCEDURE — 25000003 PHARM REV CODE 250

## 2022-07-08 PROCEDURE — 84100 ASSAY OF PHOSPHORUS: CPT | Performed by: CLINICAL NURSE SPECIALIST

## 2022-07-08 PROCEDURE — 85025 COMPLETE CBC W/AUTO DIFF WBC: CPT | Performed by: CLINICAL NURSE SPECIALIST

## 2022-07-08 PROCEDURE — 63600175 PHARM REV CODE 636 W HCPCS: Performed by: NURSE PRACTITIONER

## 2022-07-08 PROCEDURE — 86901 BLOOD TYPING SEROLOGIC RH(D): CPT | Performed by: TRANSPLANT SURGERY

## 2022-07-08 PROCEDURE — 99232 PR SUBSEQUENT HOSPITAL CARE,LEVL II: ICD-10-PCS | Mod: ,,, | Performed by: NURSE PRACTITIONER

## 2022-07-08 RX ORDER — INSULIN ASPART 100 [IU]/ML
7 INJECTION, SOLUTION INTRAVENOUS; SUBCUTANEOUS
Qty: 15 ML | Refills: 5 | Status: ON HOLD | OUTPATIENT
Start: 2022-07-08 | End: 2022-07-16 | Stop reason: SDUPTHER

## 2022-07-08 RX ORDER — METFORMIN HYDROCHLORIDE 500 MG/1
500 TABLET ORAL 2 TIMES DAILY WITH MEALS
Qty: 60 TABLET | Refills: 3 | Status: SHIPPED | OUTPATIENT
Start: 2022-07-08 | End: 2022-07-08 | Stop reason: HOSPADM

## 2022-07-08 RX ORDER — TAMSULOSIN HYDROCHLORIDE 0.4 MG/1
0.8 CAPSULE ORAL DAILY
Qty: 60 CAPSULE | Refills: 11 | Status: SHIPPED | OUTPATIENT
Start: 2022-07-09 | End: 2022-07-27

## 2022-07-08 RX ORDER — PANTOPRAZOLE SODIUM 40 MG/1
40 TABLET, DELAYED RELEASE ORAL DAILY
Qty: 30 TABLET | Refills: 5 | Status: SHIPPED | OUTPATIENT
Start: 2022-07-09 | End: 2023-06-20

## 2022-07-08 RX ORDER — HYDROMORPHONE HYDROCHLORIDE 1 MG/ML
0.5 INJECTION, SOLUTION INTRAMUSCULAR; INTRAVENOUS; SUBCUTANEOUS ONCE
Status: COMPLETED | OUTPATIENT
Start: 2022-07-08 | End: 2022-07-08

## 2022-07-08 RX ADMIN — FLUCONAZOLE 200 MG: 200 TABLET ORAL at 09:07

## 2022-07-08 RX ADMIN — CARVEDILOL 3.12 MG: 3.12 TABLET, FILM COATED ORAL at 09:07

## 2022-07-08 RX ADMIN — HEPARIN SODIUM 5000 UNITS: 5000 INJECTION INTRAVENOUS; SUBCUTANEOUS at 04:07

## 2022-07-08 RX ADMIN — INSULIN ASPART 6 UNITS: 100 INJECTION, SOLUTION INTRAVENOUS; SUBCUTANEOUS at 01:07

## 2022-07-08 RX ADMIN — OXYCODONE HYDROCHLORIDE 10 MG: 10 TABLET ORAL at 04:07

## 2022-07-08 RX ADMIN — TACROLIMUS 2 MG: 1 CAPSULE ORAL at 09:07

## 2022-07-08 RX ADMIN — PREDNISONE 15 MG: 5 TABLET ORAL at 09:07

## 2022-07-08 RX ADMIN — VALGANCICLOVIR 450 MG: 450 TABLET, FILM COATED ORAL at 09:07

## 2022-07-08 RX ADMIN — OXYCODONE HYDROCHLORIDE 10 MG: 10 TABLET ORAL at 09:07

## 2022-07-08 RX ADMIN — HYDROMORPHONE HYDROCHLORIDE 0.5 MG: 1 INJECTION, SOLUTION INTRAMUSCULAR; INTRAVENOUS; SUBCUTANEOUS at 11:07

## 2022-07-08 RX ADMIN — MUPIROCIN: 20 OINTMENT TOPICAL at 09:07

## 2022-07-08 RX ADMIN — TAMSULOSIN HYDROCHLORIDE 0.8 MG: 0.4 CAPSULE ORAL at 09:07

## 2022-07-08 RX ADMIN — CYCLOBENZAPRINE HYDROCHLORIDE 5 MG: 5 TABLET, FILM COATED ORAL at 09:07

## 2022-07-08 RX ADMIN — INSULIN ASPART 6 UNITS: 100 INJECTION, SOLUTION INTRAVENOUS; SUBCUTANEOUS at 09:07

## 2022-07-08 RX ADMIN — AMOXICILLIN AND CLAVULANATE POTASSIUM 1 TABLET: 875; 125 TABLET, FILM COATED ORAL at 09:07

## 2022-07-08 RX ADMIN — PANTOPRAZOLE SODIUM 40 MG: 40 TABLET, DELAYED RELEASE ORAL at 09:07

## 2022-07-08 NOTE — NURSING
- Pt AAOx4, afebrile, VSS, on RA  - Telemetry monitoring continued during the night, NSR. HR 76 at time of note.  - PO amoxicillin, IV antibiotics d/c  - 3 SHRUTHI drains present, charge maintained. Refer to flowsheets for output. #3 putting out the most.   - PRN oxycodone administered x1  - PRN flexeril administered x1   - ACHS accuchecks followed, glucose 199 at bedtime. 1 unit SSI administered. Pt ate about 50-75% of dinner around 1945. No meal time insulin administered by this RN.   - Rosanneon with staples intact, instructed pt and wife how to clean with betadine.   - Pt's wife at bedside, attentive to pt  - Bed in lowest locked position, call light and personal items in reach, nonskid socks on, verbalized understanding to call for assistance

## 2022-07-08 NOTE — SUBJECTIVE & OBJECTIVE
"Interval HPI:   Overnight events:  BG stble and well controlled this AM. Patient is scheduled for discharge today. Discharge planning reviewed yesterday during rounds. Endocrinology will continue to follow, and manage glycemic control while inpatient, and provide discharge medication recommendations.   Diet diabetic [facility]; 2000 Calorie; Isolation Tray - Regular [place]  Diet diabetic  4 Days Post-Op    Eatin%  Nausea: No  Hypoglycemia and intervention: No  Fever: No  TPN and/or TF: No  If yes, type of TF/TPN and rate: None    /86 (Patient Position: Lying)   Pulse 85   Temp 98.4 °F (36.9 °C) (Oral)   Resp 16   Ht 5' 8" (1.727 m)   Wt 82 kg (180 lb 12.4 oz)   SpO2 98%   BMI 27.49 kg/m²     Labs Reviewed and Include    Recent Labs   Lab 22  0658   *   CALCIUM 9.5   ALBUMIN 2.6*   PROT 5.6*   *   K 5.4*   CO2 27   CL 96   BUN 29*   CREATININE 1.3   ALKPHOS 147*   ALT 74*   AST 27   BILITOT 0.8     Lab Results   Component Value Date    WBC 5.85 2022    HGB 9.3 (L) 2022    HCT 28.6 (L) 2022    MCV 98 2022     2022     No results for input(s): TSH, FREET4 in the last 168 hours.  Lab Results   Component Value Date    HGBA1C 5.8 (H) 2022       Nutritional status:   Body mass index is 27.49 kg/m².  Lab Results   Component Value Date    ALBUMIN 2.6 (L) 2022    ALBUMIN 2.7 (L) 2022    ALBUMIN 2.3 (L) 2022     No results found for: PREALBUMIN    Estimated Creatinine Clearance: 70.9 mL/min (based on SCr of 1.3 mg/dL).    Accu-Checks  Recent Labs     22  1809 22  2045 22  0856 22  1205 22  1754 22  2236 22  1653 22  2244 22  0858   POCTGLUCOSE 180* 175* 160* 164* 351* 246* 275* 199* 123*       Current Medications and/or Treatments Impacting Glycemic Control  Immunotherapy:    Immunosuppressants       None          Steroids:   Hormones (From admission, onward)           "      Start     Stop Route Frequency Ordered    07/04/22 0900  predniSONE tablet 15 mg         -- Oral Daily 07/04/22 0419    07/04/22 0421  melatonin tablet 6 mg         -- Oral Nightly PRN 07/04/22 0422          Pressors:    Autonomic Drugs (From admission, onward)                None          Hyperglycemia/Diabetes Medications:   Antihyperglycemics (From admission, onward)                Start     Stop Route Frequency Ordered    07/07/22 1645  insulin aspart U-100 pen 6 Units         -- SubQ 3 times daily with meals 07/07/22 1454    07/07/22 1555  insulin aspart U-100 pen 1-10 Units         -- SubQ Before meals & nightly PRN 07/07/22 145

## 2022-07-08 NOTE — PROGRESS NOTES
"[facility] - Transplant Stepdown  Endocrinology  Progress Note    Admit Date: 2022     Reason for Consult: Management of T2DM, Hyperglycemia     Surgical Procedure and Date: Liver Transplant 2022; LAPAROTOMY, EXPLORATORY (N/A) on     Diabetes diagnosis year:     Home Diabetes Medications:  Novolog 4 units TID with meals     How often checking glucose at home?  3-4x daily    BG readings on regimen: low 100s-200  Hypoglycemia on the regimen?  No  Missed doses on regimen?  No    Diabetes Complications include:     Hyperglycemia     Complicating diabetes co morbidities:   Glucocorticoid Use    HPI:   Patient is a 41 y.o. male with a diagnosis of cholangiocarcinoma now s/p Living liver transplant on 2022. The patient returned to OR on  for Bile leak repair and was maintained on IV abx. He was seen in clinic  by the transplant surgeon and was feeling well at that time. Patient began experiencing acute onset of mid to lower severe abdominal pain and inability to urinate. Found to have Biloma on CT scan. Class A to OR for ex-lap for biloma. Patient now presents for the above procedure(s). Endocrinology consulted for management of T2DM.       Lab Results   Component Value Date    HGBA1C 5.8 (H) 2022           Interval HPI:   Overnight events:  BG stble and well controlled this AM. Patient is scheduled for discharge today. Discharge planning reviewed yesterday during rounds. Endocrinology will continue to follow, and manage glycemic control while inpatient, and provide discharge medication recommendations.   Diet diabetic [facility]; 2000 Calorie; Isolation Tray - Regular [place]  Diet diabetic  4 Days Post-Op    Eatin%  Nausea: No  Hypoglycemia and intervention: No  Fever: No  TPN and/or TF: No  If yes, type of TF/TPN and rate: None    /86 (Patient Position: Lying)   Pulse 85   Temp 98.4 °F (36.9 °C) (Oral)   Resp 16   Ht 5' 8" (1.727 m)   Wt 82 kg (180 lb 12.4 oz)   " SpO2 98%   BMI 27.49 kg/m²     Labs Reviewed and Include    Recent Labs   Lab 07/08/22  0658   *   CALCIUM 9.5   ALBUMIN 2.6*   PROT 5.6*   *   K 5.4*   CO2 27   CL 96   BUN 29*   CREATININE 1.3   ALKPHOS 147*   ALT 74*   AST 27   BILITOT 0.8     Lab Results   Component Value Date    WBC 5.85 07/08/2022    HGB 9.3 (L) 07/08/2022    HCT 28.6 (L) 07/08/2022    MCV 98 07/08/2022     07/08/2022     No results for input(s): TSH, FREET4 in the last 168 hours.  Lab Results   Component Value Date    HGBA1C 5.8 (H) 06/20/2022       Nutritional status:   Body mass index is 27.49 kg/m².  Lab Results   Component Value Date    ALBUMIN 2.6 (L) 07/08/2022    ALBUMIN 2.7 (L) 07/07/2022    ALBUMIN 2.3 (L) 07/06/2022     No results found for: PREALBUMIN    Estimated Creatinine Clearance: 70.9 mL/min (based on SCr of 1.3 mg/dL).    Accu-Checks  Recent Labs     07/05/22  1809 07/05/22  2045 07/06/22  0856 07/06/22  1205 07/06/22  1754 07/06/22  2236 07/07/22  1653 07/07/22  2244 07/08/22  0858   POCTGLUCOSE 180* 175* 160* 164* 351* 246* 275* 199* 123*       Current Medications and/or Treatments Impacting Glycemic Control  Immunotherapy:    Immunosuppressants       None          Steroids:   Hormones (From admission, onward)                Start     Stop Route Frequency Ordered    07/04/22 0900  predniSONE tablet 15 mg         -- Oral Daily 07/04/22 0419    07/04/22 0421  melatonin tablet 6 mg         -- Oral Nightly PRN 07/04/22 0422          Pressors:    Autonomic Drugs (From admission, onward)                None          Hyperglycemia/Diabetes Medications:   Antihyperglycemics (From admission, onward)                Start     Stop Route Frequency Ordered    07/07/22 1645  insulin aspart U-100 pen 6 Units         -- SubQ 3 times daily with meals 07/07/22 1454    07/07/22 1555  insulin aspart U-100 pen 1-10 Units         -- SubQ Before meals & nightly PRN 07/07/22 1455            ASSESSMENT and PLAN    * Biloma  following surgery  Managed per primary team          Type 2 diabetes mellitus with hyperglycemia  BG goal 140-180    - Continue Novolog to 6 units TIDWM.   - Continue Moderate Dose SQ Insulin Correction Scale PRN subsequent hyperglycemia.   - BG monitoring ac/hs    ** Please call Endocrine for any BG related issues **  ** Please notify Endocrine for any change and/or advance in diet**     Lab Results   Component Value Date    HGBA1C 5.8 (H) 06/20/2022         Discharge Planning:   - PRN refill of Insurance preferred diabetes testing supplies  - Increase home meal time Novolog to 7 units TIDWIM in addition to the following correction scale:    150 - 200 + 1 unit    201 - 250 + 2 units    251 - 300 + 3 units    301 - 350 + 4 units     > 350   + 5 units  - Patient is to continue to keep blood sugar logs, and monitor blood sugar ACHS while on steroid therapy. Patient is to upload blood sugar logs once completed so that Inpatient DM team may continue to monitor blood sugar trends while on steroid therapy.             Prophylactic immunotherapy  May increase insulin resistance.         S/P liver transplant  Managed per primary team  Optimize BG control        Adrenal corticosteroid causing adverse effect in therapeutic use  Glucocorticoids markedly increase glucoses. Expect steroid taper to help with glucose control.            Cristiano Pace, NP  Endocrinology  [facility] - Transplant Stepdown

## 2022-07-08 NOTE — PROGRESS NOTES
Social Work Discharge Note    Social work presented to patients bedside for continuity of care and any discharge needs. Patient presented sitting up in the chair and alert and oriented x 4. Patients wife, Shell [phone] presented at patients bedside and also alert and oriented x 4. Patient and patients caregiver both agree to discharge plan and are coping adequately at this time. Patients wife will transport patient back home today. Patient and patients caregiver with no other questions or concerns at this time. Social work will continue to follow for psychosocial needs, resources and continuity of care.

## 2022-07-08 NOTE — PROGRESS NOTES
Patient transported off unit via wheelchair per wife. Prescriptions picked up from pharmacy. Patient in NAD.

## 2022-07-08 NOTE — PROGRESS NOTES
"Discharge Medication Note:    Hospital Course:  Mr Larson is s/p liver transplant from 6/21/22 readmitted for symptoms related to large biloma. Pt returned to OR on 7/4 for exlap and wash out.  All cultures NGTD.  Will discharge pt on Augmentin x14 days (STOP 7/22/22).  Endo consulted this admission and home insulin regimen adjusted.  Pepcid changed to protonix and tamsulosin started this admission. Of note - tacrolimus level elevated- will hold dose tonight and plan to repeat labs tomorrow to determine when to restart.     Met with Romeo Larson at discharge to review discharge medications and to update the blue medication card.           Medication List      START taking these medications    pantoprazole 40 MG tablet  Commonly known as: PROTONIX  Take 1 tablet (40 mg total) by mouth once daily.  Start taking on: July 9, 2022     tamsulosin 0.4 mg Cap  Commonly known as: FLOMAX  Take 2 capsules (0.8 mg total) by mouth once daily.  Start taking on: July 9, 2022        CHANGE how you take these medications    insulin aspart U-100 100 unit/mL (3 mL) Inpn pen  Commonly known as: NovoLOG  Inject 7 Units into the skin 3 (three) times daily with meals. + sliding scale.  MDD 36 units/d  What changed:   · how much to take  · when to take this  · additional instructions        CONTINUE taking these medications    amoxicillin-clavulanate 875-125mg 875-125 mg per tablet  Commonly known as: AUGMENTIN  Take 1 tablet by mouth every 12 (twelve) hours.     aspirin 81 MG Chew  CHEW 1 tablet (81 mg total) by mouth once daily.     BD ULTRA-FINE KAREN PEN NEEDLE 32 gauge x 5/32" Ndle  Generic drug: pen needle, diabetic  1 each by Misc.(Non-Drug; Combo Route) route 3 (three) times daily.     calcium carbonate-vitamin D3 600 mg-20 mcg (800 unit) Tab  Take 1 tablet by mouth once daily at 6am.     carvediloL 3.125 MG tablet  Commonly known as: COREG  Take 1 tablet (3.125 mg total) by mouth 2 (two) times daily.     fluconazole 200 MG " Tab  Commonly known as: DIFLUCAN  Take 1 tablet (200 mg total) by mouth once daily. STOP 7/20/22     methocarbamoL 500 MG Tab  Commonly known as: ROBAXIN  Take 1 tablet (500 mg total) by mouth 4 (four) times daily.     oxyCODONE 10 mg Tab immediate release tablet  Commonly known as: ROXICODONE  Take 1-1.5 tablets (10-15 mg total) by mouth every 4 (four) hours as needed for Pain.     predniSONE 5 MG tablet  Commonly known as: DELTASONE  Take by mouth once daily: 20 mg 6/27-7/3; 15 mg 7/4-7/10; 10 mg 7/11-7/17; 5 mg 7/18-7/24; STOP 7/25/22     sulfamethoxazole-trimethoprim 400-80mg 400-80 mg per tablet  Commonly known as: BACTRIM,SEPTRA  Take 1 tablet by mouth every morning. STOP 12/18/22     TRUE METRIX GLUCOSE METER Misc  Generic drug: blood-glucose meter  Use as instructed     TRUE METRIX GLUCOSE TEST STRIP Strp  Generic drug: blood sugar diagnostic  1 each by Misc.(Non-Drug; Combo Route) route 3 (three) times daily.     TRUEPLUS LANCETS 30 gauge Misc  Generic drug: lancets  1 each by Misc.(Non-Drug; Combo Route) route 3 (three) times daily.     valGANciclovir 450 mg Tab  Commonly known as: VALCYTE  Take 1 tablet (450 mg total) by mouth once daily. STOP 9/19/22        STOP taking these medications    baclofen 10 MG tablet  Commonly known as: LIORESAL     famotidine 20 MG tablet  Commonly known as: PEPCID     losartan 50 MG tablet  Commonly known as: COZAAR     metFORMIN 500 MG tablet  Commonly known as: GLUCOPHAGE     mycophenolate 250 mg Cap  Commonly known as: CELLCEPT     tacrolimus 1 MG Cap  Commonly known as: PROGRAF           Where to Get Your Medications      These medications [facility] sent [facility] [place] [address] [address] [facility] [address]    Hours: Mon-Fri 7a-7p, Sat-Sun 10a-4p Phone: [phone]   · insulin aspart U-100 100 unit/mL (3 mL) Inpn pen  · pantoprazole 40 MG tablet  · tamsulosin 0.4 mg Cap            The following medications have been placed on HOLD and should be restarted  in the outpatient setting (when appropriate): mmf (infection)    Romeo Larson verbalized understanding and had the opportunity to ask questions.

## 2022-07-08 NOTE — ASSESSMENT & PLAN NOTE
BG goal 140-180    - Continue Novolog to 6 units TIDWM.   - Continue Moderate Dose SQ Insulin Correction Scale PRN subsequent hyperglycemia.   - BG monitoring ac/hs    ** Please call Endocrine for any BG related issues **  ** Please notify Endocrine for any change and/or advance in diet**     Lab Results   Component Value Date    HGBA1C 5.8 (H) 06/20/2022         Discharge Planning:   - PRN refill of Insurance preferred diabetes testing supplies  - Increase home meal time Novolog to 7 units TIDWIM in addition to the following correction scale:    150 - 200 + 1 unit    201 - 250 + 2 units    251 - 300 + 3 units    301 - 350 + 4 units     > 350   + 5 units  - Patient is to continue to keep blood sugar logs, and monitor blood sugar ACHS while on steroid therapy. Patient is to upload blood sugar logs once completed so that Inpatient DM team may continue to monitor blood sugar trends while on steroid therapy.

## 2022-07-08 NOTE — PROGRESS NOTES
Discharge instructions given to and reviewed with patient and wife. Both verbalized understanding of follow-up appointments and when to call coordinator. Patient to be discharged with two drains - supplies provided. Declined SHRUTHI dressing. PIV d/c'd. Awaiting medications to be delivered to bedside prior to d/c.

## 2022-07-09 ENCOUNTER — TELEPHONE (OUTPATIENT)
Dept: TRANSPLANT | Facility: CLINIC | Age: 42
End: 2022-07-09
Payer: COMMERCIAL

## 2022-07-09 LAB
BACTERIA BLD CULT: NORMAL
BACTERIA BLD CULT: NORMAL

## 2022-07-09 NOTE — TELEPHONE ENCOUNTER
Labs reviewed with Dr. Cline via phone.  Prograf level decreased, but remains elevated.  Per MD, continue to hold Prograf and repeat labs tomorrow.  Called to discuss.  Spoke to pt's wife, Shell.  Discussed labs, instructed to NOT restart prograf, and repeat labs tomorrow.  Shell verbalized understanding and repeated instructions.

## 2022-07-10 NOTE — TELEPHONE ENCOUNTER
Labs reviewed with Dr. Cline via phone.  Per MD, restart FK 1/1 and repeat labs tomorrow.  Called to discuss, spoke to pt's wife.  Instructed to restart Prograf at 1 mg twice daily and repeat labs tomorrow.  Requested that labs be drawn at [facility] for same day results.  Pt's wife repeated instructions and verbalized understanding.

## 2022-07-11 ENCOUNTER — PATIENT MESSAGE (OUTPATIENT)
Dept: TRANSPLANT | Facility: CLINIC | Age: 42
End: 2022-07-11
Payer: COMMERCIAL

## 2022-07-11 DIAGNOSIS — Z94.4 LIVER REPLACED BY TRANSPLANT: Primary | ICD-10-CM

## 2022-07-11 RX ORDER — TACROLIMUS 1 MG/1
1 CAPSULE ORAL EVERY 12 HOURS
Qty: 60 CAPSULE | Refills: 11 | Status: ON HOLD | OUTPATIENT
Start: 2022-07-11 | End: 2022-07-27 | Stop reason: SDUPTHER

## 2022-07-11 NOTE — INTERVAL H&P NOTE
The patient has been examined and the H&P has been reviewed:    I concur with the findings and no changes have occurred since H&P was written.    Surgery risks, benefits and alternative options discussed and understood by patient/family.          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

## 2022-07-11 NOTE — TELEPHONE ENCOUNTER
Portal message sent, repeat labs 7/14/22. Clinic 7/12/22----- Message from Sinan Cline MD sent at 7/11/2022  2:13 PM CDT -----  Results ok. No action needed

## 2022-07-12 ENCOUNTER — PATIENT OUTREACH (OUTPATIENT)
Dept: ADMINISTRATIVE | Facility: CLINIC | Age: 42
End: 2022-07-12
Payer: COMMERCIAL

## 2022-07-12 ENCOUNTER — OFFICE VISIT (OUTPATIENT)
Dept: TRANSPLANT | Facility: CLINIC | Age: 42
End: 2022-07-12
Payer: COMMERCIAL

## 2022-07-12 ENCOUNTER — PATIENT MESSAGE (OUTPATIENT)
Dept: TRANSPLANT | Facility: CLINIC | Age: 42
End: 2022-07-12

## 2022-07-12 ENCOUNTER — TELEPHONE (OUTPATIENT)
Dept: TRANSPLANT | Facility: CLINIC | Age: 42
End: 2022-07-12
Payer: COMMERCIAL

## 2022-07-12 ENCOUNTER — HOSPITAL ENCOUNTER (INPATIENT)
Facility: HOSPITAL | Age: 42
LOS: 4 days | Discharge: HOME | DRG: 442 | End: 2022-07-16
Attending: SURGERY | Admitting: TRANSPLANT SURGERY
Payer: COMMERCIAL

## 2022-07-12 VITALS
TEMPERATURE: 97 F | WEIGHT: 166.88 LBS | HEART RATE: 92 BPM | DIASTOLIC BLOOD PRESSURE: 98 MMHG | OXYGEN SATURATION: 100 % | SYSTOLIC BLOOD PRESSURE: 132 MMHG | RESPIRATION RATE: 16 BRPM | HEIGHT: 68 IN | BODY MASS INDEX: 25.29 KG/M2

## 2022-07-12 DIAGNOSIS — Z51.81 ENCOUNTER FOR THERAPEUTIC DRUG MONITORING: Primary | ICD-10-CM

## 2022-07-12 DIAGNOSIS — Z79.4 TYPE 2 DIABETES MELLITUS WITH HYPERGLYCEMIA, WITH LONG-TERM CURRENT USE OF INSULIN: ICD-10-CM

## 2022-07-12 DIAGNOSIS — Z79.60 LONG-TERM USE OF IMMUNOSUPPRESSANT MEDICATION: ICD-10-CM

## 2022-07-12 DIAGNOSIS — E11.65 TYPE 2 DIABETES MELLITUS WITH HYPERGLYCEMIA, WITH LONG-TERM CURRENT USE OF INSULIN: ICD-10-CM

## 2022-07-12 DIAGNOSIS — Z94.4 LIVER REPLACED BY TRANSPLANT: ICD-10-CM

## 2022-07-12 DIAGNOSIS — Z29.89 PROPHYLACTIC IMMUNOTHERAPY: ICD-10-CM

## 2022-07-12 DIAGNOSIS — Z94.4 S/P LIVER TRANSPLANT: ICD-10-CM

## 2022-07-12 DIAGNOSIS — Z91.89 AT RISK FOR OPPORTUNISTIC INFECTIONS: ICD-10-CM

## 2022-07-12 DIAGNOSIS — Z79.899 ENCOUNTER FOR LONG-TERM (CURRENT) USE OF OTHER MEDICATIONS: ICD-10-CM

## 2022-07-12 DIAGNOSIS — E44.1 MALNUTRITION OF MILD DEGREE: ICD-10-CM

## 2022-07-12 DIAGNOSIS — K66.8 BILOMA OF TRANSPLANTED LIVER: Primary | ICD-10-CM

## 2022-07-12 DIAGNOSIS — K83.9 BILE LEAK: ICD-10-CM

## 2022-07-12 DIAGNOSIS — T86.49 BILOMA OF TRANSPLANTED LIVER: Primary | ICD-10-CM

## 2022-07-12 DIAGNOSIS — E87.5 HYPERKALEMIA: ICD-10-CM

## 2022-07-12 PROBLEM — R33.9 URINARY RETENTION: Status: ACTIVE | Noted: 2022-07-12

## 2022-07-12 LAB
ALBUMIN SERPL BCP-MCNC: 3.1 G/DL (ref 3.5–5.2)
ALP SERPL-CCNC: 307 U/L (ref 55–135)
ALT SERPL W/O P-5'-P-CCNC: 77 U/L (ref 10–44)
ANION GAP SERPL CALC-SCNC: 10 MMOL/L (ref 8–16)
AST SERPL-CCNC: 18 U/L (ref 10–40)
BASOPHILS # BLD AUTO: 0.02 K/UL (ref 0–0.2)
BASOPHILS NFR BLD: 0.3 % (ref 0–1.9)
BILIRUB SERPL-MCNC: 1.3 MG/DL (ref 0.1–1)
BUN SERPL-MCNC: 23 MG/DL (ref 6–20)
CALCIUM SERPL-MCNC: 9.9 MG/DL (ref 8.7–10.5)
CHLORIDE SERPL-SCNC: 94 MMOL/L (ref 95–110)
CO2 SERPL-SCNC: 24 MMOL/L (ref 23–29)
CREAT SERPL-MCNC: 1.3 MG/DL (ref 0.5–1.4)
DIFFERENTIAL METHOD: ABNORMAL
EOSINOPHIL # BLD AUTO: 0 K/UL (ref 0–0.5)
EOSINOPHIL NFR BLD: 0.5 % (ref 0–8)
ERYTHROCYTE [DISTWIDTH] IN BLOOD BY AUTOMATED COUNT: 14.6 % (ref 11.5–14.5)
EST. GFR  (AFRICAN AMERICAN): >60 ML/MIN/1.73 M^2
EST. GFR  (NON AFRICAN AMERICAN): >60 ML/MIN/1.73 M^2
GLUCOSE SERPL-MCNC: 156 MG/DL (ref 70–110)
HCT VFR BLD AUTO: 40.3 % (ref 40–54)
HGB BLD-MCNC: 13.1 G/DL (ref 14–18)
IMM GRANULOCYTES # BLD AUTO: 0.03 K/UL (ref 0–0.04)
IMM GRANULOCYTES NFR BLD AUTO: 0.4 % (ref 0–0.5)
INR PPP: 1 (ref 0.8–1.2)
LYMPHOCYTES # BLD AUTO: 0.6 K/UL (ref 1–4.8)
LYMPHOCYTES NFR BLD: 7.3 % (ref 18–48)
MAGNESIUM SERPL-MCNC: 1.5 MG/DL (ref 1.6–2.6)
MCH RBC QN AUTO: 31.6 PG (ref 27–31)
MCHC RBC AUTO-ENTMCNC: 32.5 G/DL (ref 32–36)
MCV RBC AUTO: 97 FL (ref 82–98)
MONOCYTES # BLD AUTO: 0.2 K/UL (ref 0.3–1)
MONOCYTES NFR BLD: 2.3 % (ref 4–15)
NEUTROPHILS # BLD AUTO: 6.7 K/UL (ref 1.8–7.7)
NEUTROPHILS NFR BLD: 89.2 % (ref 38–73)
NRBC BLD-RTO: 0 /100 WBC
PLATELET # BLD AUTO: 195 K/UL (ref 150–450)
PMV BLD AUTO: 9.4 FL (ref 9.2–12.9)
POTASSIUM SERPL-SCNC: 5.8 MMOL/L (ref 3.5–5.1)
PROT SERPL-MCNC: 6.7 G/DL (ref 6–8.4)
PROTHROMBIN TIME: 10.9 SEC (ref 9–12.5)
RBC # BLD AUTO: 4.15 M/UL (ref 4.6–6.2)
SARS-COV-2 RDRP RESP QL NAA+PROBE: NEGATIVE
SODIUM SERPL-SCNC: 128 MMOL/L (ref 136–145)
WBC # BLD AUTO: 7.49 K/UL (ref 3.9–12.7)

## 2022-07-12 PROCEDURE — U0002 COVID-19 LAB TEST NON-CDC: HCPCS

## 2022-07-12 PROCEDURE — 63600175 PHARM REV CODE 636 W HCPCS: Performed by: SURGERY

## 2022-07-12 PROCEDURE — 4010F ACE/ARB THERAPY RXD/TAKEN: CPT | Mod: CPTII,S$GLB,, | Performed by: TRANSPLANT SURGERY

## 2022-07-12 PROCEDURE — 3080F DIAST BP >= 90 MM HG: CPT | Mod: CPTII,S$GLB,, | Performed by: TRANSPLANT SURGERY

## 2022-07-12 PROCEDURE — 85610 PROTHROMBIN TIME: CPT

## 2022-07-12 PROCEDURE — 25000003 PHARM REV CODE 250: Performed by: SURGERY

## 2022-07-12 PROCEDURE — 1159F PR MEDICATION LIST DOCUMENTED IN MEDICAL RECORD: ICD-10-PCS | Mod: CPTII,S$GLB,, | Performed by: TRANSPLANT SURGERY

## 2022-07-12 PROCEDURE — 83735 ASSAY OF MAGNESIUM: CPT

## 2022-07-12 PROCEDURE — 99215 OFFICE O/P EST HI 40 MIN: CPT | Mod: 24,S$GLB,, | Performed by: TRANSPLANT SURGERY

## 2022-07-12 PROCEDURE — 99223 1ST HOSP IP/OBS HIGH 75: CPT | Mod: 24,,, | Performed by: NURSE PRACTITIONER

## 2022-07-12 PROCEDURE — 25000003 PHARM REV CODE 250

## 2022-07-12 PROCEDURE — 63600175 PHARM REV CODE 636 W HCPCS

## 2022-07-12 PROCEDURE — 99999 PR PBB SHADOW E&M-EST. PATIENT-LVL IV: ICD-10-PCS | Mod: PBBFAC,,,

## 2022-07-12 PROCEDURE — 3044F HG A1C LEVEL LT 7.0%: CPT | Mod: CPTII,S$GLB,, | Performed by: TRANSPLANT SURGERY

## 2022-07-12 PROCEDURE — 4010F PR ACE/ARB THEARPY RXD/TAKEN: ICD-10-PCS | Mod: CPTII,S$GLB,, | Performed by: TRANSPLANT SURGERY

## 2022-07-12 PROCEDURE — 99223 PR INITIAL HOSPITAL CARE,LEVL III: ICD-10-PCS | Mod: 24,,, | Performed by: NURSE PRACTITIONER

## 2022-07-12 PROCEDURE — 1111F PR DISCHARGE MEDS RECONCILED W/ CURRENT OUTPATIENT MED LIST: ICD-10-PCS | Mod: CPTII,S$GLB,, | Performed by: TRANSPLANT SURGERY

## 2022-07-12 PROCEDURE — 3075F SYST BP GE 130 - 139MM HG: CPT | Mod: CPTII,S$GLB,, | Performed by: TRANSPLANT SURGERY

## 2022-07-12 PROCEDURE — 3008F BODY MASS INDEX DOCD: CPT | Mod: CPTII,S$GLB,, | Performed by: TRANSPLANT SURGERY

## 2022-07-12 PROCEDURE — 1159F MED LIST DOCD IN RCRD: CPT | Mod: CPTII,S$GLB,, | Performed by: TRANSPLANT SURGERY

## 2022-07-12 PROCEDURE — 1111F DSCHRG MED/CURRENT MED MERGE: CPT | Mod: CPTII,S$GLB,, | Performed by: TRANSPLANT SURGERY

## 2022-07-12 PROCEDURE — 3075F PR MOST RECENT SYSTOLIC BLOOD PRESS GE 130-139MM HG: ICD-10-PCS | Mod: CPTII,S$GLB,, | Performed by: TRANSPLANT SURGERY

## 2022-07-12 PROCEDURE — 85025 COMPLETE CBC W/AUTO DIFF WBC: CPT

## 2022-07-12 PROCEDURE — 3044F PR MOST RECENT HEMOGLOBIN A1C LEVEL <7.0%: ICD-10-PCS | Mod: CPTII,S$GLB,, | Performed by: TRANSPLANT SURGERY

## 2022-07-12 PROCEDURE — 80053 COMPREHEN METABOLIC PANEL: CPT

## 2022-07-12 PROCEDURE — 93005 ELECTROCARDIOGRAM TRACING: CPT

## 2022-07-12 PROCEDURE — 3008F PR BODY MASS INDEX (BMI) DOCUMENTED: ICD-10-PCS | Mod: CPTII,S$GLB,, | Performed by: TRANSPLANT SURGERY

## 2022-07-12 PROCEDURE — 25000003 PHARM REV CODE 250: Performed by: NURSE PRACTITIONER

## 2022-07-12 PROCEDURE — 3080F PR MOST RECENT DIASTOLIC BLOOD PRESSURE >= 90 MM HG: ICD-10-PCS | Mod: CPTII,S$GLB,, | Performed by: TRANSPLANT SURGERY

## 2022-07-12 PROCEDURE — 99999 PR PBB SHADOW E&M-EST. PATIENT-LVL IV: CPT | Mod: PBBFAC,,,

## 2022-07-12 PROCEDURE — 20600001 HC STEP DOWN PRIVATE ROOM

## 2022-07-12 PROCEDURE — 63600175 PHARM REV CODE 636 W HCPCS: Performed by: PHYSICIAN ASSISTANT

## 2022-07-12 PROCEDURE — 36415 COLL VENOUS BLD VENIPUNCTURE: CPT

## 2022-07-12 PROCEDURE — 99215 PR OFFICE/OUTPT VISIT, EST, LEVL V, 40-54 MIN: ICD-10-PCS | Mod: 24,S$GLB,, | Performed by: TRANSPLANT SURGERY

## 2022-07-12 RX ORDER — NAPROXEN SODIUM 220 MG/1
81 TABLET, FILM COATED ORAL DAILY
Status: DISCONTINUED | OUTPATIENT
Start: 2022-07-13 | End: 2022-07-16 | Stop reason: HOSPADM

## 2022-07-12 RX ORDER — ONDANSETRON 8 MG/1
8 TABLET, ORALLY DISINTEGRATING ORAL EVERY 6 HOURS PRN
Status: DISCONTINUED | OUTPATIENT
Start: 2022-07-12 | End: 2022-07-16 | Stop reason: HOSPADM

## 2022-07-12 RX ORDER — HYDROMORPHONE HYDROCHLORIDE 1 MG/ML
0.5 INJECTION, SOLUTION INTRAMUSCULAR; INTRAVENOUS; SUBCUTANEOUS EVERY 4 HOURS PRN
Status: DISCONTINUED | OUTPATIENT
Start: 2022-07-12 | End: 2022-07-15

## 2022-07-12 RX ORDER — OXYCODONE HYDROCHLORIDE 10 MG/1
10 TABLET ORAL EVERY 4 HOURS PRN
Status: DISCONTINUED | OUTPATIENT
Start: 2022-07-12 | End: 2022-07-15

## 2022-07-12 RX ORDER — VALGANCICLOVIR 450 MG/1
450 TABLET, FILM COATED ORAL DAILY
Status: DISCONTINUED | OUTPATIENT
Start: 2022-07-13 | End: 2022-07-16 | Stop reason: HOSPADM

## 2022-07-12 RX ORDER — SULFAMETHOXAZOLE AND TRIMETHOPRIM 400; 80 MG/1; MG/1
1 TABLET ORAL EVERY MORNING
Status: DISCONTINUED | OUTPATIENT
Start: 2022-07-13 | End: 2022-07-12

## 2022-07-12 RX ORDER — TACROLIMUS 1 MG/1
1 CAPSULE ORAL 2 TIMES DAILY
Status: DISCONTINUED | OUTPATIENT
Start: 2022-07-12 | End: 2022-07-14

## 2022-07-12 RX ORDER — SODIUM CHLORIDE 9 MG/ML
INJECTION, SOLUTION INTRAVENOUS CONTINUOUS
Status: DISCONTINUED | OUTPATIENT
Start: 2022-07-12 | End: 2022-07-13

## 2022-07-12 RX ORDER — TAMSULOSIN HYDROCHLORIDE 0.4 MG/1
0.8 CAPSULE ORAL DAILY
Status: DISCONTINUED | OUTPATIENT
Start: 2022-07-13 | End: 2022-07-16 | Stop reason: HOSPADM

## 2022-07-12 RX ORDER — ALUMINUM HYDROXIDE, MAGNESIUM HYDROXIDE, AND SIMETHICONE 2400; 240; 2400 MG/30ML; MG/30ML; MG/30ML
30 SUSPENSION ORAL EVERY 6 HOURS PRN
Status: DISCONTINUED | OUTPATIENT
Start: 2022-07-12 | End: 2022-07-16 | Stop reason: HOSPADM

## 2022-07-12 RX ORDER — INSULIN ASPART 100 [IU]/ML
0-5 INJECTION, SOLUTION INTRAVENOUS; SUBCUTANEOUS
Status: DISCONTINUED | OUTPATIENT
Start: 2022-07-12 | End: 2022-07-14

## 2022-07-12 RX ORDER — FUROSEMIDE 10 MG/ML
20 INJECTION INTRAMUSCULAR; INTRAVENOUS ONCE
Status: COMPLETED | OUTPATIENT
Start: 2022-07-12 | End: 2022-07-12

## 2022-07-12 RX ORDER — CEFEPIME HYDROCHLORIDE 1 G/50ML
1 INJECTION, SOLUTION INTRAVENOUS
Status: DISCONTINUED | OUTPATIENT
Start: 2022-07-12 | End: 2022-07-15

## 2022-07-12 RX ORDER — PREDNISONE 10 MG/1
10 TABLET ORAL DAILY
Status: DISCONTINUED | OUTPATIENT
Start: 2022-07-13 | End: 2022-07-16 | Stop reason: HOSPADM

## 2022-07-12 RX ORDER — METHOCARBAMOL 500 MG/1
500 TABLET, FILM COATED ORAL 4 TIMES DAILY
Status: DISCONTINUED | OUTPATIENT
Start: 2022-07-12 | End: 2022-07-16 | Stop reason: HOSPADM

## 2022-07-12 RX ORDER — SODIUM CHLORIDE 0.9 % (FLUSH) 0.9 %
10 SYRINGE (ML) INJECTION EVERY 6 HOURS PRN
Status: DISCONTINUED | OUTPATIENT
Start: 2022-07-12 | End: 2022-07-16 | Stop reason: HOSPADM

## 2022-07-12 RX ORDER — IBUPROFEN 200 MG
16 TABLET ORAL
Status: DISCONTINUED | OUTPATIENT
Start: 2022-07-12 | End: 2022-07-14

## 2022-07-12 RX ORDER — AMOXICILLIN AND CLAVULANATE POTASSIUM 875; 125 MG/1; MG/1
1 TABLET, FILM COATED ORAL EVERY 12 HOURS
Status: CANCELLED | OUTPATIENT
Start: 2022-07-12

## 2022-07-12 RX ORDER — PANTOPRAZOLE SODIUM 40 MG/1
40 TABLET, DELAYED RELEASE ORAL DAILY
Status: DISCONTINUED | OUTPATIENT
Start: 2022-07-13 | End: 2022-07-16 | Stop reason: HOSPADM

## 2022-07-12 RX ORDER — CARVEDILOL 3.12 MG/1
3.12 TABLET ORAL 2 TIMES DAILY
Status: DISCONTINUED | OUTPATIENT
Start: 2022-07-12 | End: 2022-07-16 | Stop reason: HOSPADM

## 2022-07-12 RX ORDER — FLUCONAZOLE 200 MG/1
200 TABLET ORAL DAILY
Status: DISCONTINUED | OUTPATIENT
Start: 2022-07-13 | End: 2022-07-16 | Stop reason: HOSPADM

## 2022-07-12 RX ORDER — GLUCAGON 1 MG
1 KIT INJECTION
Status: DISCONTINUED | OUTPATIENT
Start: 2022-07-12 | End: 2022-07-14

## 2022-07-12 RX ORDER — IBUPROFEN 200 MG
24 TABLET ORAL
Status: DISCONTINUED | OUTPATIENT
Start: 2022-07-12 | End: 2022-07-14

## 2022-07-12 RX ORDER — INSULIN ASPART 100 [IU]/ML
4 INJECTION, SOLUTION INTRAVENOUS; SUBCUTANEOUS
Status: DISCONTINUED | OUTPATIENT
Start: 2022-07-12 | End: 2022-07-13

## 2022-07-12 RX ORDER — TALC
6 POWDER (GRAM) TOPICAL NIGHTLY PRN
Status: DISCONTINUED | OUTPATIENT
Start: 2022-07-12 | End: 2022-07-16 | Stop reason: HOSPADM

## 2022-07-12 RX ORDER — ACETAMINOPHEN 325 MG/1
650 TABLET ORAL EVERY 6 HOURS PRN
Status: DISCONTINUED | OUTPATIENT
Start: 2022-07-12 | End: 2022-07-16 | Stop reason: HOSPADM

## 2022-07-12 RX ORDER — MAGNESIUM SULFATE HEPTAHYDRATE 40 MG/ML
2 INJECTION, SOLUTION INTRAVENOUS ONCE
Status: COMPLETED | OUTPATIENT
Start: 2022-07-12 | End: 2022-07-12

## 2022-07-12 RX ORDER — FERROUS SULFATE, DRIED 160(50) MG
1 TABLET, EXTENDED RELEASE ORAL DAILY
Status: DISCONTINUED | OUTPATIENT
Start: 2022-07-13 | End: 2022-07-16 | Stop reason: HOSPADM

## 2022-07-12 RX ADMIN — HYDROMORPHONE HYDROCHLORIDE 0.5 MG: 1 INJECTION, SOLUTION INTRAMUSCULAR; INTRAVENOUS; SUBCUTANEOUS at 08:07

## 2022-07-12 RX ADMIN — OXYCODONE HYDROCHLORIDE 10 MG: 10 TABLET ORAL at 10:07

## 2022-07-12 RX ADMIN — ALUMINUM HYDROXIDE, MAGNESIUM HYDROXIDE, AND DIMETHICONE 30 ML: 400; 400; 40 SUSPENSION ORAL at 08:07

## 2022-07-12 RX ADMIN — MAGNESIUM SULFATE 2 G: 2 INJECTION INTRAVENOUS at 09:07

## 2022-07-12 RX ADMIN — METHOCARBAMOL 500 MG: 500 TABLET ORAL at 06:07

## 2022-07-12 RX ADMIN — METHOCARBAMOL 500 MG: 500 TABLET ORAL at 08:07

## 2022-07-12 RX ADMIN — CEFEPIME 1 G: 1 INJECTION, POWDER, FOR SOLUTION INTRAMUSCULAR; INTRAVENOUS at 08:07

## 2022-07-12 RX ADMIN — VANCOMYCIN HYDROCHLORIDE 1500 MG: 1.5 INJECTION, POWDER, LYOPHILIZED, FOR SOLUTION INTRAVENOUS at 10:07

## 2022-07-12 RX ADMIN — FUROSEMIDE 20 MG: 10 INJECTION, SOLUTION INTRAVENOUS at 08:07

## 2022-07-12 RX ADMIN — CARVEDILOL 3.12 MG: 3.12 TABLET, FILM COATED ORAL at 08:07

## 2022-07-12 RX ADMIN — OXYCODONE HYDROCHLORIDE 10 MG: 10 TABLET ORAL at 06:07

## 2022-07-12 RX ADMIN — SODIUM CHLORIDE: 0.9 INJECTION, SOLUTION INTRAVENOUS at 06:07

## 2022-07-12 RX ADMIN — TACROLIMUS 1 MG: 1 CAPSULE ORAL at 06:07

## 2022-07-12 NOTE — ASSESSMENT & PLAN NOTE
- continue bactrim for PCP prophylaxis  - continue valcyte for CMV prophylaxis  - continue fluconazole for fungal prophylaxis

## 2022-07-12 NOTE — PROGRESS NOTES
Transplant Surgery  Liver Transplant Recipient Follow-up    Original Referring Physician: Pravin Lacey  Current Corresponding Physician: Pravin Maria    Chief Complaint: Romeo is here for follow up of his liver transplant performed 6/21/2022 for the primary diagnosis (UNOS) of Primary Liver Malignancy: Cholangiocarcinoma (CH-CA)    ORGAN: RIGHT LIVER LOBE (SEGS 5,6,7,8) WITHOUT MIDDLE HEPATIC VEIN  Whole or Partial: Partial  Donor Type: living  PHS Increased Risk: No  Donor CMV Status:   Donor HCV Status: Negative  Donor HBcAb: Negative  Donor HBV GUSTABO:   Donor HCV GUSTABO:     Biliary Anastomosis: vick-en-y  Arterial Anatomy: standard  IVC reconstruction: hepatic vein confluence piggyback  Portal vein status: patent    Subjective:     History of Present Illness: He has had the following complications since transplant: bile leak.  The noted complications need to be addressed more thoroughly today.    Interval History: Currently, he is doing with difficulty.  Current complaints include abdominal pain.  Romeo is here for management of his immunosuppression medication.    External provider notes reviewed: Yes    Review of Systems   Constitutional: Negative for activity change and appetite change.   HENT: Negative for congestion and tinnitus.    Eyes: Negative for redness and visual disturbance.   Respiratory: Negative for cough and shortness of breath.    Cardiovascular: Negative for chest pain and palpitations.   Gastrointestinal: Positive for abdominal distention. Negative for abdominal pain.   Endocrine: Negative for polydipsia and polyuria.   Genitourinary: Negative for decreased urine volume and dysuria.   Musculoskeletal: Negative for arthralgias and back pain.   Skin: Negative for pallor and rash.   Allergic/Immunologic: Positive for immunocompromised state. Negative for environmental allergies.   Neurological: Negative for tremors and headaches.   Hematological: Negative for adenopathy.  Does not bruise/bleed easily.   Psychiatric/Behavioral: Negative for behavioral problems and confusion.     Objective:     Physical Exam  Constitutional:       General: He is not in acute distress.     Appearance: He is well-developed. He is not diaphoretic.   HENT:      Head: Normocephalic.   Eyes:      General: No scleral icterus.  Neck:      Vascular: No JVD.   Cardiovascular:      Rate and Rhythm: Normal rate and regular rhythm.   Pulmonary:      Effort: Pulmonary effort is normal. No respiratory distress.   Abdominal:      Palpations: Abdomen is soft. There is no mass.      Tenderness: There is no guarding or rebound.       Musculoskeletal:         General: Normal range of motion.      Cervical back: Normal range of motion and neck supple.   Skin:     General: Skin is warm and dry.   Neurological:      Mental Status: He is alert and oriented to person, place, and time.   Psychiatric:         Behavior: Behavior normal.         Thought Content: Thought content normal.         Judgment: Judgment normal.       Lab Results   Component Value Date    BILITOT 0.7 07/11/2022    AST 25 07/11/2022     (H) 07/11/2022    ALKPHOS 344 (H) 07/11/2022    CREATININE 1.3 07/11/2022    ALBUMIN 3.1 (L) 07/11/2022     Lab Results   Component Value Date    WBC 7.08 07/11/2022    HGB 11.5 (L) 07/11/2022    HCT 36.1 (L) 07/11/2022    HCT 25 (L) 06/23/2022     07/11/2022     Lab Results   Component Value Date    TACROLIMUS 7.4 07/11/2022       Diagnostics:  The following labs have been reviewed: CBC  CMP  The following radiology images have been independently reviewed and interpreted: CT Abd/Pelvis    Assessment/Plan:          · S/P liver transplant.  · Chronic immunosuppressive medications for rejection prophylaxis at target.  Plan: no adjustment needed.  · Continue monitoring symptoms, labs and drug levels for drug-related toxicity and side effects.  · Incision: staples out, wound well-healed, no evidence of  hernia  · Femoral arterial line site: no complications evident   · Bile leak: well controlled until last evening when bilious fluid frequently filling drain. Plan for admission, CT scan with contrast, broad spectrum antibiotic coverage and NPO after MN for IR placement of PTC    Additional testing to be completed according to Written Order Guidelines for Post-Liver and Combined Liver/Kidney Transplant Follow-up (LI-09)    Interpretation of tests and discussion of patient management involves all members of the multidisciplinary transplant team  Patient advised that it is recommended that all transplanted patients, and their close contacts and household members receive Covid vaccination.  Julio Cesar Jara MD       [facility] Patient Status  Functional Status: 80% - Normal activity with effort: some symptoms of disease  Physical Capacity: No Limitations

## 2022-07-12 NOTE — ASSESSMENT & PLAN NOTE
- Persistent bile leak post op, unable to be adequately identifed in surgery  - Keeping drain empty- right drain placed 6/21/22, drain on left placed during recent hospitalization. Both w bilious output  - Start broad spectrum antibiotics on admit: cefepime, vanc  - IR consult for PTC, NPO after MN  - CT abd/pelvis with PO/IV contrast

## 2022-07-12 NOTE — H&P
[facility] - Transplant Stepdown  Liver Transplant  History & Physical    Patient Name: Romeo Larson  MRN: [id number]  Admission Date: 7/12/2022  Code Status: Full Code  Primary Care Provider: Pravin Maria MD  Post-Operative Day: 21     ORGAN:   RIGHT LIVER LOBE (SEGS 5,6,7,8) WITHOUT MIDDLE HEPATIC VEIN  Disease Etiology: Primary Liver Malignancy: Cholangiocarcinoma (CH-CA)  Donor Type:   Living  CDC High Risk:     Donor CMV Status:   Donor CMV Status:   Donor HBcAB:     Donor HCV Status:     Donor HBV GUSTABO:   Donor HCV GUSTABO:   Whole or Partial:   Biliary Anastomosis: Vick-en-Y  Arterial Anatomy: Standard    Subjective:     History of Present Illness:  Romeo Larson is a 41yr old male s/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 Post operative course significant for recurrent bile leak. OR take back 6/23 for bile duct repair (small leak found near biliary anastomosis, unable to clearly identify source), cultures grew enterococcus faecalis. OR take back 7/4 for ex lap, washout of biloma, bile duct unable to be assessed due to adhesions, OR cultures with lactobacillus species. Patient was discharged home with 2 drains- one on right placed in 6/21 in OR, drain on left placed during last hospitalization, Both w bilious drainage. Patient presented for outpatient follow up, drain found to be bilious, and patient reported feeling weak, decreased appeitie. Decision made to admit patient for further interventions. Patient denies fever, chest pain, SOB, change in bowel function. He reports abdominal/suprapubic pain and urinary retention. Last voided at 1500. He remains on Flomax. Plan to start broad spectrum antibiotics, obtain CT abd/pelvis W PO/IV contrast, IR consult for PTC drain.       Past Medical History:   Diagnosis Date    Adjustment and management of vascular access device 5/18/2022    Cholangiocarcinoma     Fever of unknown origin 4/26/2022    Hiccups     Hilar  cholangiocarcinoma 11/4/2021    Hypercholesteremia     Hypertension        Past Surgical History:   Procedure Laterality Date    DIAGNOSTIC ULTRASOUND N/A 6/21/2022    Procedure: ULTRASOUND, DIAGNOSTIC;  Surgeon: Sinan Cline MD;  [facility];  Service: Transplant;  Laterality: N/A;    ENDOSCOPIC ULTRASOUND OF UPPER GASTROINTESTINAL TRACT N/A 10/20/2021    Procedure: ULTRASOUND, UPPER GI TRACT, ENDOSCOPIC;  Surgeon: Valeriy Sotelo MD;  Location: [facility] (2ND FLR);  Service: Endoscopy;  Laterality: N/A;  wife to email vacc card-inst email-tb    ERCP N/A 10/20/2021    Procedure: ERCP (ENDOSCOPIC RETROGRADE CHOLANGIOPANCREATOGRAPHY);  Surgeon: Valeriy Sotelo MD;  [facility]  Service: Endoscopy;  Laterality: N/A;    ERCP N/A 11/4/2021    Procedure: ERCP (ENDOSCOPIC RETROGRADE CHOLANGIOPANCREATOGRAPHY);  Surgeon: Valeriy Sotelo MD;  [facility]  Service: Endoscopy;  Laterality: N/A;    ERCP N/A 11/4/2021    Procedure: ERCP (ENDOSCOPIC RETROGRADE CHOLANGIOPANCREATOGRAPHY);  Surgeon: Roselia Pereyra MD;  [facility]  Service: Endoscopy;  Laterality: N/A;  pt vaccinated, instructed to bring card to procedure, instructions sent portal-MG    ERCP N/A 1/14/2022    Procedure: ERCP (ENDOSCOPIC RETROGRADE CHOLANGIOPANCREATOGRAPHY);  Surgeon: Roselia Pereyra MD;  [facility]  Service: Endoscopy;  Laterality: N/A;  inst portal-right chest port-tb  Pt requested at home COVID testing, Pt instructed at home RAPID to be done morning of procedure, Pt instructed to call endoscopy scheuding if there is a positive result, Pt informed if a positive     ERCP N/A 3/31/2022    Procedure: ERCP (ENDOSCOPIC RETROGRADE CHOLANGIOPANCREATOGRAPHY);  Surgeon: Julio Cesar Alonzo MD;  Location: [facility] (2ND FLR);  Service: Endoscopy;  Laterality: N/A;  3/16: port L chest wall. pt to bring covid vaccination card. Instructions sent via  [place]  3/18/22-Updated instructions sent via portal re:new date/time-DS    EXPLORATORY LAPAROTOMY AFTER LIVER TRANSPLANTATION N/A 6/23/2022    Procedure: LAPAROTOMY, EXPLORATORY, AFTER LIVER TRANSPLANT;  Surgeon: Julio Cesar Jara MD;  [facility];  Service: Transplant;  Laterality: N/A;    INSERTION OF TUNNELED CENTRAL VENOUS CATHETER (CVC) WITH SUBCUTANEOUS PORT N/A 11/24/2021    Procedure: INSERTION, PORT-A-CATH;  Surgeon: Prem Syed MD;  Location: [facility] CATH LAB;  Service: Radiology;  Laterality: N/A;    LIVER TRANSPLANT N/A 6/21/2022    Procedure: TRANSPLANT, LIVER;  Surgeon: Sinan Cline MD;  [facility] F[facility];  Service: Transplant;  Laterality: N/A;    REPAIR OF BILE DUCT N/A 6/23/2022    Procedure: REPAIR, BILE DUCT;  Surgeon: Julio Cesar Jara MD;  Location: [facility] FLR;  Service: Transplant;  Laterality: N/A;    ROBOT-ASSISTED LAPAROSCOPIC LYMPHADENECTOMY USING DA МАРИНА XI  6/17/2022    Procedure: XI ROBOTIC LYMPHADENECTOMY - Portal;  Surgeon: Julio Cesar Jara MD;  [facility];  Service: Transplant;;    TONSILLECTOMY      XI ROBOTIC LAPAROSCOPY, EXPLORATORY N/A 6/17/2022    Procedure: XI ROBOTIC LAPAROSCOPY,EXPLORATORY;  Surgeon: Julio Cesar Jara MD;  [facility];  Service: Transplant;  Laterality: N/A;       Review of patient's allergies indicates:  No Known Allergies    Family History       Problem Relation (Age of Onset)    Hyperlipidemia Father    No Known Problems Mother, Sister, Brother          Tobacco Use    Smoking status: Never Smoker    Smokeless tobacco: Never Used   Substance and Sexual Activity    Alcohol use: Not Currently    Drug use: Never    Sexual activity: Yes       No medications prior to admission.       Review of Systems   Constitutional:  Positive for activity change (decreased) and fatigue. Negative for chills and fever. Appetite change: decreased.  HENT:  Negative for congestion and trouble swallowing.    Eyes:   Negative for visual disturbance.   Respiratory:  Negative for cough, shortness of breath and wheezing.    Cardiovascular:  Negative for chest pain and leg swelling.   Gastrointestinal:  Positive for abdominal distention and abdominal pain. Negative for constipation, diarrhea, nausea and vomiting.   Endocrine: Negative.    Genitourinary:  Negative for decreased urine volume, difficulty urinating, dysuria, hematuria and urgency.   Musculoskeletal:  Negative for arthralgias.   Skin:  Positive for wound. Negative for color change, pallor and rash.   Allergic/Immunologic: Positive for immunocompromised state.   Neurological:  Positive for weakness. Negative for dizziness, tremors, seizures, syncope and headaches.   Hematological:  Bruises/bleeds easily.   Psychiatric/Behavioral:  Positive for sleep disturbance. Negative for agitation, confusion, decreased concentration and suicidal ideas. The patient is not nervous/anxious.    Objective:     Vital Signs (Most Recent):    Vital Signs (24h Range):  Temp:  [97.3 °F (36.3 °C)] 97.3 °F (36.3 °C)  Pulse:  [92] 92  Resp:  [16] 16  SpO2:  [100 %] 100 %  BP: (132)/(98) 132/98        There is no height or weight on file to calculate BMI.    No intake or output data in the 24 hours ending 07/12/22 1730    Physical Exam  Vitals and nursing note reviewed.   Constitutional:       General: He is not in acute distress.     Appearance: He is well-developed. He is not diaphoretic.   HENT:      Head: Normocephalic and atraumatic.      Mouth/Throat:      Pharynx: No oropharyngeal exudate.   Eyes:      General: Scleral icterus present.      Conjunctiva/sclera: Conjunctivae normal.      Pupils: Pupils are equal, round, and reactive to light.   Neck:      Thyroid: No thyromegaly.   Cardiovascular:      Rate and Rhythm: Normal rate and regular rhythm.      Heart sounds: Normal heart sounds. No murmur heard.  Pulmonary:      Effort: Pulmonary effort is normal. No respiratory distress.       Breath sounds: No wheezing.   Abdominal:      General: Bowel sounds are normal. There is distension.      Tenderness: There is abdominal tenderness. There is no guarding.      Comments: Chevron incision CDI w staples  Drain x1 w bilious output   Genitourinary:     Penis: Normal.    Musculoskeletal:         General: Normal range of motion.      Cervical back: Normal range of motion and neck supple.   Skin:     General: Skin is warm and dry.      Coloration: Skin is jaundiced.      Findings: No erythema.   Neurological:      Mental Status: He is alert and oriented to person, place, and time.   Psychiatric:         Behavior: Behavior normal.       Laboratory:  CBC:   Recent Labs   Lab 07/11/22  0834   WBC 7.08   RBC 3.60*   HGB 11.5*   HCT 36.1*      *   MCH 31.9*   MCHC 31.9*     CMP:   Recent Labs   Lab 07/11/22  0834   *   CALCIUM 9.8   ALBUMIN 3.1*   PROT 6.5   *   K 4.9   CO2 28   CL 94*   BUN 24*   CREATININE 1.3   ALKPHOS 344*   *   AST 25   BILITOT 0.7     Coagulation: No results for input(s): PT, INR, APTT in the last 168 hours.  Labs within the past 24 hours have been reviewed.    Diagnostic Results:  I have personally reviewed all pertinent imaging studies.    Assessment/Plan:     * Biloma of transplanted liver  - Persistent bile leak post op, unable to be adequately identifed in surgery  - Keeping drain empty- right drain placed 6/21/22, drain on left placed during recent hospitalization. Both w bilious output  - Start broad spectrum antibiotics on admit: cefepime, vanc  - IR consult for PTC, NPO after MN  - CT abd/pelvis with PO/IV contrast    Urinary retention  - hx of urinary retention last hospitalization  - remains on Flomax  - nurse comm to bladder scan, if >250, straight cath  - leave campos in place if urine return >250      Malnutrition of mild degree  - supp ordered  - consider dietician consult    Long-term use of immunosuppressant medication  - Maintenance IS  with prograf. cont to check tacrolimus level daily. Assess for toxicity and adjust level as needed    Type 2 diabetes mellitus with hyperglycemia  - Endocrine consulted for DM management.     At risk for opportunistic infections  - continue bactrim for PCP prophylaxis  - continue valcyte for CMV prophylaxis  - continue fluconazole for fungal prophylaxis    Prophylactic immunotherapy  - See long-term use of immunosuppressant medication    S/P liver transplant  - s/p living liver txp 6/21/22  - See biloma of transplanted liver        Discharge Planning: Discussed plan of care.  No plan for discharge today.    PharmD Review: fluconazole stops 7/25, monitor and adjust tac as needed [7/11/2022 sfa]      Abida Arenas, NP  Liver Transplant  [facility] - Transplant Stepdown

## 2022-07-12 NOTE — ASSESSMENT & PLAN NOTE
- hx of urinary retention last hospitalization  - remains on Flomax  - nurse comm to bladder scan, if >250, straight cath  - leave campos in place if urine return >250

## 2022-07-12 NOTE — HPI
Romeo Larson is a 41yr old male s/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 Post operative course significant for recurrent bile leak. OR take back 6/23 for bile duct repair (small leak found near biliary anastomosis, unable to clearly identify source), cultures grew enterococcus faecalis. OR take back 7/4 for ex lap, washout of biloma, bile duct unable to be assessed due to adhesions, OR cultures with lactobacillus species. Patient was discharged home with 1 drain in place. Patient presented for outpatient follow up, drain found to be bilious, and patient reported feeling weak, decreased appeitie. Decision made to admit patient for further interventions. Patient denies fever, chest pain, SOB, change in bowel function. He reports abdominal/suprapubic pain and urinary retention. Last voided at 1500. He remains on Flomax. Plan to start broad spectrum antibiotics, obtain CT abd/pelvis W PO/IV contrast, IR consult for PTC drain.

## 2022-07-12 NOTE — SUBJECTIVE & OBJECTIVE
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    Hypercholesteremia     Hypertension        Past Surgical History:   Procedure Laterality Date    DIAGNOSTIC ULTRASOUND N/A 6/21/2022    Procedure: ULTRASOUND, DIAGNOSTIC;  Surgeon: Sinan Cline MD;  Location: [facility] OR [address] [place] [address];  Service: Transplant;  Laterality: N/A;    ENDOSCOPIC ULTRASOUND OF UPPER GASTROINTESTINAL TRACT N/A 10/20/2021    Procedure: ULTRASOUND, UPPER GI TRACT, ENDOSCOPIC;  Surgeon: Valeriy Sotelo MD;  [facility]  Service: Endoscopy;  Laterality: N/A;  wife to email vacc card-inst email-tb    ERCP N/A 10/20/2021    Procedure: ERCP (ENDOSCOPIC RETROGRADE CHOLANGIOPANCREATOGRAPHY);  Surgeon: Valeriy Sotelo MD;  [address]  Service: Endoscopy;  Laterality: N/A;    ERCP N/A 11/4/2021    Procedure: ERCP (ENDOSCOPIC RETROGRADE CHOLANGIOPANCREATOGRAPHY);  Surgeon: Valeriy Sotelo MD;  [address]  Service: Endoscopy;  Laterality: N/A;    ERCP N/A 11/4/2021    Procedure: ERCP (ENDOSCOPIC RETROGRADE CHOLANGIOPANCREATOGRAPHY);  Surgeon: Roselia Pereyra MD;  [facility]  Service: Endoscopy;  Laterality: N/A;  pt vaccinated, instructed to bring card to procedure, instructions sent portal-MG    ERCP N/A 1/14/2022    Procedure: ERCP (ENDOSCOPIC RETROGRADE CHOLANGIOPANCREATOGRAPHY);  Surgeon: Roselia Pereyra MD;  [facility]  Service: Endoscopy;  Laterality: N/A;  inst portal-right chest port-tb  Pt requested at home COVID testing, Pt instructed at home RAPID to be done morning of procedure, Pt instructed to call endoscopy scheuding if there is a positive result, Pt informed if a positive     ERCP N/A 3/31/2022    Procedure: ERCP (ENDOSCOPIC RETROGRADE CHOLANGIOPANCREATOGRAPHY);   Surgeon: Julio Cesar Alonzo MD;  Location: [facility] ENDO (2ND FLR);  Service: Endoscopy;  Laterality: N/A;  3/16: port L chest wall. pt to bring covid vaccination card. Instructions sent via portal.-SC  3/18/22-Updated instructions sent via portal re:new date/time-DS    EXPLORATORY LAPAROTOMY AFTER LIVER TRANSPLANTATION N/A 6/23/2022    Procedure: LAPAROTOMY, EXPLORATORY, AFTER LIVER TRANSPLANT;  Surgeon: Julio Cesar Jara MD;  Location: [facility] OR [facility] FLR;  Service: Transplant;  Laterality: N/A;    INSERTION OF TUNNELED CENTRAL VENOUS CATHETER (CVC) WITH SUBCUTANEOUS PORT N/A 11/24/2021    Procedure: INSERTION, PORT-A-CATH;  Surgeon: Prem Syed MD;  Location: [facility] CATH LAB;  Service: Radiology;  Laterality: N/A;    LIVER TRANSPLANT N/A 6/21/2022    Procedure: TRANSPLANT, LIVER;  Surgeon: Sinan Cline MD;  [facility];  Service: Transplant;  Laterality: N/A;    REPAIR OF BILE DUCT N/A 6/23/2022    Procedure: REPAIR, BILE DUCT;  Surgeon: Julio Cesar Jara MD;  [facility];  Service: Transplant;  Laterality: N/A;    ROBOT-ASSISTED LAPAROSCOPIC LYMPHADENECTOMY USING DA МАРИНА XI  6/17/2022    Procedure: XI ROBOTIC LYMPHADENECTOMY - Portal;  Surgeon: Julio Cesar Jara MD;  [facility];  Service: Transplant;;    TONSILLECTOMY      XI ROBOTIC LAPAROSCOPY, EXPLORATORY N/A 6/17/2022    Procedure: XI ROBOTIC LAPAROSCOPY,EXPLORATORY;  Surgeon: Julio Cesar Jara MD;  [facility];  Service: Transplant;  Laterality: N/A;       Review of patient's allergies indicates:  No Known Allergies    Family History       Problem Relation (Age of Onset)    Hyperlipidemia Father    No Known Problems Mother, Sister, Brother          Tobacco Use    Smoking status: Never Smoker    Smokeless tobacco: Never Used   Substance and Sexual Activity    Alcohol use: Not Currently    Drug use: Never    Sexual activity: Yes       No medications prior to admission.       Review of Systems   Constitutional:  Positive for  activity change (decreased) and fatigue. Negative for chills and fever. Appetite change: decreased.  HENT:  Negative for congestion and trouble swallowing.    Eyes:  Negative for visual disturbance.   Respiratory:  Negative for cough, shortness of breath and wheezing.    Cardiovascular:  Negative for chest pain and leg swelling.   Gastrointestinal:  Positive for abdominal distention and abdominal pain. Negative for constipation, diarrhea, nausea and vomiting.   Endocrine: Negative.    Genitourinary:  Negative for decreased urine volume, difficulty urinating, dysuria, hematuria and urgency.   Musculoskeletal:  Negative for arthralgias.   Skin:  Positive for wound. Negative for color change, pallor and rash.   Allergic/Immunologic: Positive for immunocompromised state.   Neurological:  Positive for weakness. Negative for dizziness, tremors, seizures, syncope and headaches.   Hematological:  Bruises/bleeds easily.   Psychiatric/Behavioral:  Positive for sleep disturbance. Negative for agitation, confusion, decreased concentration and suicidal ideas. The patient is not nervous/anxious.    Objective:     Vital Signs (Most Recent):    Vital Signs (24h Range):  Temp:  [97.3 °F (36.3 °C)] 97.3 °F (36.3 °C)  Pulse:  [92] 92  Resp:  [16] 16  SpO2:  [100 %] 100 %  BP: (132)/(98) 132/98        There is no height or weight on file to calculate BMI.    No intake or output data in the 24 hours ending 07/12/22 1730    Physical Exam  Vitals and nursing note reviewed.   Constitutional:       General: He is not in acute distress.     Appearance: He is well-developed. He is not diaphoretic.   HENT:      Head: Normocephalic and atraumatic.      Mouth/Throat:      Pharynx: No oropharyngeal exudate.   Eyes:      General: Scleral icterus present.      Conjunctiva/sclera: Conjunctivae normal.      Pupils: Pupils are equal, round, and reactive to light.   Neck:      Thyroid: No thyromegaly.   Cardiovascular:      Rate and Rhythm: Normal rate  and regular rhythm.      Heart sounds: Normal heart sounds. No murmur heard.  Pulmonary:      Effort: Pulmonary effort is normal. No respiratory distress.      Breath sounds: No wheezing.   Abdominal:      General: Bowel sounds are normal. There is distension.      Tenderness: There is abdominal tenderness. There is no guarding.      Comments: Chevron incision CDI w staples  Drain x1 w bilious output   Genitourinary:     Penis: Normal.    Musculoskeletal:         General: Normal range of motion.      Cervical back: Normal range of motion and neck supple.   Skin:     General: Skin is warm and dry.      Coloration: Skin is jaundiced.      Findings: No erythema.   Neurological:      Mental Status: He is alert and oriented to person, place, and time.   Psychiatric:         Behavior: Behavior normal.       Laboratory:  CBC:   Recent Labs   Lab 07/11/22  0834   WBC 7.08   RBC 3.60*   HGB 11.5*   HCT 36.1*      *   MCH 31.9*   MCHC 31.9*     CMP:   Recent Labs   Lab 07/11/22  0834   *   CALCIUM 9.8   ALBUMIN 3.1*   PROT 6.5   *   K 4.9   CO2 28   CL 94*   BUN 24*   CREATININE 1.3   ALKPHOS 344*   *   AST 25   BILITOT 0.7     Coagulation: No results for input(s): PT, INR, APTT in the last 168 hours.  Labs within the past 24 hours have been reviewed.    Diagnostic Results:  I have personally reviewed all pertinent imaging studies.

## 2022-07-12 NOTE — TELEPHONE ENCOUNTER
Patient seen in clinic by ; to be admitted to LTS for IR intervention.  spoke to  who will accept patient.  Spoke to admit (Amalia) and reservation entered ([facility] [phone]).  Spoke to Jeanine Nixon inpatient JOSEPH with report. Spoke to TSU charge nurse , patient may go to T[facility] now to await bed , she said.  Patient/wife informed.

## 2022-07-12 NOTE — ASSESSMENT & PLAN NOTE
- Maintenance IS with prograf. cont to check tacrolimus level daily. Assess for toxicity and adjust level as needed

## 2022-07-13 ENCOUNTER — ANESTHESIA EVENT (OUTPATIENT)
Dept: INTERVENTIONAL RADIOLOGY/VASCULAR | Facility: HOSPITAL | Age: 42
DRG: 442 | End: 2022-07-13
Payer: COMMERCIAL

## 2022-07-13 LAB
ALBUMIN SERPL BCP-MCNC: 2.6 G/DL (ref 3.5–5.2)
ALP SERPL-CCNC: 234 U/L (ref 55–135)
ALT SERPL W/O P-5'-P-CCNC: 54 U/L (ref 10–44)
ANION GAP SERPL CALC-SCNC: 7 MMOL/L (ref 8–16)
ANION GAP SERPL CALC-SCNC: 9 MMOL/L (ref 8–16)
AST SERPL-CCNC: 13 U/L (ref 10–40)
BASOPHILS # BLD AUTO: 0.02 K/UL (ref 0–0.2)
BASOPHILS NFR BLD: 0.2 % (ref 0–1.9)
BILIRUB SERPL-MCNC: 1.7 MG/DL (ref 0.1–1)
BILIRUB UR QL STRIP: NEGATIVE
BUN SERPL-MCNC: 22 MG/DL (ref 6–20)
BUN SERPL-MCNC: 23 MG/DL (ref 6–20)
CALCIUM SERPL-MCNC: 9.1 MG/DL (ref 8.7–10.5)
CALCIUM SERPL-MCNC: 9.3 MG/DL (ref 8.7–10.5)
CHLORIDE SERPL-SCNC: 91 MMOL/L (ref 95–110)
CHLORIDE SERPL-SCNC: 91 MMOL/L (ref 95–110)
CLARITY UR REFRACT.AUTO: ABNORMAL
CO2 SERPL-SCNC: 24 MMOL/L (ref 23–29)
CO2 SERPL-SCNC: 26 MMOL/L (ref 23–29)
COLOR UR AUTO: YELLOW
CREAT SERPL-MCNC: 1.3 MG/DL (ref 0.5–1.4)
CREAT SERPL-MCNC: 1.3 MG/DL (ref 0.5–1.4)
DIFFERENTIAL METHOD: ABNORMAL
EOSINOPHIL # BLD AUTO: 0.1 K/UL (ref 0–0.5)
EOSINOPHIL NFR BLD: 0.6 % (ref 0–8)
ERYTHROCYTE [DISTWIDTH] IN BLOOD BY AUTOMATED COUNT: 14.8 % (ref 11.5–14.5)
EST. GFR  (AFRICAN AMERICAN): >60 ML/MIN/1.73 M^2
EST. GFR  (AFRICAN AMERICAN): >60 ML/MIN/1.73 M^2
EST. GFR  (NON AFRICAN AMERICAN): >60 ML/MIN/1.73 M^2
EST. GFR  (NON AFRICAN AMERICAN): >60 ML/MIN/1.73 M^2
GLUCOSE SERPL-MCNC: 161 MG/DL (ref 70–110)
GLUCOSE SERPL-MCNC: 219 MG/DL (ref 70–110)
GLUCOSE UR QL STRIP: ABNORMAL
HCT VFR BLD AUTO: 39.5 % (ref 40–54)
HGB BLD-MCNC: 13.2 G/DL (ref 14–18)
HGB UR QL STRIP: NEGATIVE
IMM GRANULOCYTES # BLD AUTO: 0.06 K/UL (ref 0–0.04)
IMM GRANULOCYTES NFR BLD AUTO: 0.6 % (ref 0–0.5)
KETONES UR QL STRIP: NEGATIVE
LEUKOCYTE ESTERASE UR QL STRIP: NEGATIVE
LYMPHOCYTES # BLD AUTO: 0.8 K/UL (ref 1–4.8)
LYMPHOCYTES NFR BLD: 7.1 % (ref 18–48)
MAGNESIUM SERPL-MCNC: 2 MG/DL (ref 1.6–2.6)
MCH RBC QN AUTO: 31.9 PG (ref 27–31)
MCHC RBC AUTO-ENTMCNC: 33.4 G/DL (ref 32–36)
MCV RBC AUTO: 95 FL (ref 82–98)
MONOCYTES # BLD AUTO: 0.3 K/UL (ref 0.3–1)
MONOCYTES NFR BLD: 2.9 % (ref 4–15)
NEUTROPHILS # BLD AUTO: 9.3 K/UL (ref 1.8–7.7)
NEUTROPHILS NFR BLD: 88.6 % (ref 38–73)
NITRITE UR QL STRIP: NEGATIVE
NRBC BLD-RTO: 0 /100 WBC
PH UR STRIP: 6 [PH] (ref 5–8)
PLATELET # BLD AUTO: 196 K/UL (ref 150–450)
PMV BLD AUTO: 10 FL (ref 9.2–12.9)
POCT GLUCOSE: 144 MG/DL (ref 70–110)
POCT GLUCOSE: 163 MG/DL (ref 70–110)
POCT GLUCOSE: 171 MG/DL (ref 70–110)
POCT GLUCOSE: 198 MG/DL (ref 70–110)
POCT GLUCOSE: 245 MG/DL (ref 70–110)
POTASSIUM SERPL-SCNC: 5.2 MMOL/L (ref 3.5–5.1)
POTASSIUM SERPL-SCNC: 5.2 MMOL/L (ref 3.5–5.1)
PROT SERPL-MCNC: 5.9 G/DL (ref 6–8.4)
PROT UR QL STRIP: NEGATIVE
RBC # BLD AUTO: 4.14 M/UL (ref 4.6–6.2)
SODIUM SERPL-SCNC: 124 MMOL/L (ref 136–145)
SODIUM SERPL-SCNC: 124 MMOL/L (ref 136–145)
SP GR UR STRIP: >1.03 (ref 1–1.03)
TACROLIMUS BLD-MCNC: 6 NG/ML (ref 5–15)
URN SPEC COLLECT METH UR: ABNORMAL
VANCOMYCIN SERPL-MCNC: 15.8 UG/ML
WBC # BLD AUTO: 10.51 K/UL (ref 3.9–12.7)

## 2022-07-13 PROCEDURE — 63600175 PHARM REV CODE 636 W HCPCS

## 2022-07-13 PROCEDURE — 25500020 PHARM REV CODE 255: Performed by: SURGERY

## 2022-07-13 PROCEDURE — 36415 COLL VENOUS BLD VENIPUNCTURE: CPT | Performed by: SURGERY

## 2022-07-13 PROCEDURE — 83735 ASSAY OF MAGNESIUM: CPT

## 2022-07-13 PROCEDURE — 63600175 PHARM REV CODE 636 W HCPCS: Performed by: PHYSICIAN ASSISTANT

## 2022-07-13 PROCEDURE — D9220A PRA ANESTHESIA: Mod: CRNA,,, | Performed by: NURSE ANESTHETIST, CERTIFIED REGISTERED

## 2022-07-13 PROCEDURE — D9220A PRA ANESTHESIA: ICD-10-PCS | Mod: ANES,,, | Performed by: ANESTHESIOLOGY

## 2022-07-13 PROCEDURE — 25500020 PHARM REV CODE 255: Performed by: STUDENT IN AN ORGANIZED HEALTH CARE EDUCATION/TRAINING PROGRAM

## 2022-07-13 PROCEDURE — 99233 PR SUBSEQUENT HOSPITAL CARE,LEVL III: ICD-10-PCS | Mod: 24,,,

## 2022-07-13 PROCEDURE — 20600001 HC STEP DOWN PRIVATE ROOM

## 2022-07-13 PROCEDURE — 87040 BLOOD CULTURE FOR BACTERIA: CPT | Performed by: PHYSICIAN ASSISTANT

## 2022-07-13 PROCEDURE — 99222 PR INITIAL HOSPITAL CARE,LEVL II: ICD-10-PCS | Mod: ,,, | Performed by: NURSE PRACTITIONER

## 2022-07-13 PROCEDURE — 99222 1ST HOSP IP/OBS MODERATE 55: CPT | Mod: ,,, | Performed by: NURSE PRACTITIONER

## 2022-07-13 PROCEDURE — 25000003 PHARM REV CODE 250: Performed by: SURGERY

## 2022-07-13 PROCEDURE — 36415 COLL VENOUS BLD VENIPUNCTURE: CPT | Performed by: PHYSICIAN ASSISTANT

## 2022-07-13 PROCEDURE — 85025 COMPLETE CBC W/AUTO DIFF WBC: CPT

## 2022-07-13 PROCEDURE — 99233 SBSQ HOSP IP/OBS HIGH 50: CPT | Mod: 24,,,

## 2022-07-13 PROCEDURE — 63600175 PHARM REV CODE 636 W HCPCS: Performed by: NURSE ANESTHETIST, CERTIFIED REGISTERED

## 2022-07-13 PROCEDURE — 25000003 PHARM REV CODE 250

## 2022-07-13 PROCEDURE — 80048 BASIC METABOLIC PNL TOTAL CA: CPT | Performed by: PHYSICIAN ASSISTANT

## 2022-07-13 PROCEDURE — 81003 URINALYSIS AUTO W/O SCOPE: CPT | Performed by: PHYSICIAN ASSISTANT

## 2022-07-13 PROCEDURE — D9220A PRA ANESTHESIA: ICD-10-PCS | Mod: CRNA,,, | Performed by: NURSE ANESTHETIST, CERTIFIED REGISTERED

## 2022-07-13 PROCEDURE — 63600175 PHARM REV CODE 636 W HCPCS: Performed by: NURSE PRACTITIONER

## 2022-07-13 PROCEDURE — 25000003 PHARM REV CODE 250: Performed by: NURSE ANESTHETIST, CERTIFIED REGISTERED

## 2022-07-13 PROCEDURE — 80053 COMPREHEN METABOLIC PANEL: CPT

## 2022-07-13 PROCEDURE — 80197 ASSAY OF TACROLIMUS: CPT

## 2022-07-13 PROCEDURE — 80202 ASSAY OF VANCOMYCIN: CPT | Performed by: SURGERY

## 2022-07-13 PROCEDURE — 63600175 PHARM REV CODE 636 W HCPCS: Performed by: SURGERY

## 2022-07-13 PROCEDURE — D9220A PRA ANESTHESIA: Mod: ANES,,, | Performed by: ANESTHESIOLOGY

## 2022-07-13 RX ORDER — PHENYLEPHRINE HYDROCHLORIDE 10 MG/ML
INJECTION INTRAVENOUS
Status: DISCONTINUED | OUTPATIENT
Start: 2022-07-13 | End: 2022-07-13

## 2022-07-13 RX ORDER — FENTANYL CITRATE 50 UG/ML
25 INJECTION, SOLUTION INTRAMUSCULAR; INTRAVENOUS EVERY 5 MIN PRN
Status: DISCONTINUED | OUTPATIENT
Start: 2022-07-13 | End: 2022-07-15

## 2022-07-13 RX ORDER — INSULIN ASPART 100 [IU]/ML
3-5 INJECTION, SOLUTION INTRAVENOUS; SUBCUTANEOUS
Status: DISCONTINUED | OUTPATIENT
Start: 2022-07-14 | End: 2022-07-15

## 2022-07-13 RX ORDER — MIDAZOLAM HYDROCHLORIDE 1 MG/ML
INJECTION, SOLUTION INTRAMUSCULAR; INTRAVENOUS
Status: DISCONTINUED | OUTPATIENT
Start: 2022-07-13 | End: 2022-07-13

## 2022-07-13 RX ORDER — OXYCODONE HYDROCHLORIDE 5 MG/1
5 TABLET ORAL
Status: DISCONTINUED | OUTPATIENT
Start: 2022-07-13 | End: 2022-07-15

## 2022-07-13 RX ORDER — LIDOCAINE HYDROCHLORIDE 20 MG/ML
INJECTION INTRAVENOUS
Status: DISCONTINUED | OUTPATIENT
Start: 2022-07-13 | End: 2022-07-13

## 2022-07-13 RX ORDER — ONDANSETRON 2 MG/ML
4 INJECTION INTRAMUSCULAR; INTRAVENOUS DAILY PRN
Status: DISCONTINUED | OUTPATIENT
Start: 2022-07-13 | End: 2022-07-16 | Stop reason: HOSPADM

## 2022-07-13 RX ORDER — PROPOFOL 10 MG/ML
VIAL (ML) INTRAVENOUS
Status: DISCONTINUED | OUTPATIENT
Start: 2022-07-13 | End: 2022-07-13

## 2022-07-13 RX ORDER — VANCOMYCIN HCL IN 5 % DEXTROSE 1G/250ML
1000 PLASTIC BAG, INJECTION (ML) INTRAVENOUS
Status: DISCONTINUED | OUTPATIENT
Start: 2022-07-13 | End: 2022-07-15

## 2022-07-13 RX ORDER — ONDANSETRON 2 MG/ML
INJECTION INTRAMUSCULAR; INTRAVENOUS
Status: DISCONTINUED | OUTPATIENT
Start: 2022-07-13 | End: 2022-07-13

## 2022-07-13 RX ORDER — HYDROMORPHONE HYDROCHLORIDE 1 MG/ML
0.2 INJECTION, SOLUTION INTRAMUSCULAR; INTRAVENOUS; SUBCUTANEOUS EVERY 5 MIN PRN
Status: DISCONTINUED | OUTPATIENT
Start: 2022-07-13 | End: 2022-07-15

## 2022-07-13 RX ORDER — FENTANYL CITRATE 50 UG/ML
INJECTION, SOLUTION INTRAMUSCULAR; INTRAVENOUS
Status: DISCONTINUED | OUTPATIENT
Start: 2022-07-13 | End: 2022-07-13

## 2022-07-13 RX ORDER — ROCURONIUM BROMIDE 10 MG/ML
INJECTION, SOLUTION INTRAVENOUS
Status: DISCONTINUED | OUTPATIENT
Start: 2022-07-13 | End: 2022-07-13

## 2022-07-13 RX ADMIN — ASPIRIN 81 MG CHEWABLE TABLET 81 MG: 81 TABLET CHEWABLE at 09:07

## 2022-07-13 RX ADMIN — CEFEPIME 1 G: 1 INJECTION, POWDER, FOR SOLUTION INTRAMUSCULAR; INTRAVENOUS at 04:07

## 2022-07-13 RX ADMIN — PANTOPRAZOLE SODIUM 40 MG: 40 TABLET, DELAYED RELEASE ORAL at 09:07

## 2022-07-13 RX ADMIN — CEFEPIME 1 G: 1 INJECTION, POWDER, FOR SOLUTION INTRAMUSCULAR; INTRAVENOUS at 12:07

## 2022-07-13 RX ADMIN — SODIUM CHLORIDE: 0.9 INJECTION, SOLUTION INTRAVENOUS at 02:07

## 2022-07-13 RX ADMIN — CEFEPIME 1 G: 1 INJECTION, POWDER, FOR SOLUTION INTRAMUSCULAR; INTRAVENOUS at 08:07

## 2022-07-13 RX ADMIN — ONDANSETRON 4 MG: 2 INJECTION INTRAMUSCULAR; INTRAVENOUS at 03:07

## 2022-07-13 RX ADMIN — IOHEXOL 40 ML: 300 INJECTION, SOLUTION INTRAVENOUS at 04:07

## 2022-07-13 RX ADMIN — OXYCODONE HYDROCHLORIDE 10 MG: 10 TABLET ORAL at 09:07

## 2022-07-13 RX ADMIN — INSULIN ASPART 2 UNITS: 100 INJECTION, SOLUTION INTRAVENOUS; SUBCUTANEOUS at 12:07

## 2022-07-13 RX ADMIN — SODIUM ZIRCONIUM CYCLOSILICATE 10 G: 5 POWDER, FOR SUSPENSION ORAL at 06:07

## 2022-07-13 RX ADMIN — SUGAMMADEX 200 MG: 100 INJECTION, SOLUTION INTRAVENOUS at 04:07

## 2022-07-13 RX ADMIN — PROPOFOL 200 MG: 10 INJECTION, EMULSION INTRAVENOUS at 03:07

## 2022-07-13 RX ADMIN — VANCOMYCIN HYDROCHLORIDE 1000 MG: 1 INJECTION, POWDER, LYOPHILIZED, FOR SOLUTION INTRAVENOUS at 09:07

## 2022-07-13 RX ADMIN — ACETAMINOPHEN 650 MG: 325 TABLET ORAL at 12:07

## 2022-07-13 RX ADMIN — VANCOMYCIN HYDROCHLORIDE 1000 MG: 1 INJECTION, POWDER, LYOPHILIZED, FOR SOLUTION INTRAVENOUS at 10:07

## 2022-07-13 RX ADMIN — TACROLIMUS 1 MG: 1 CAPSULE ORAL at 06:07

## 2022-07-13 RX ADMIN — METHOCARBAMOL 500 MG: 500 TABLET ORAL at 09:07

## 2022-07-13 RX ADMIN — TAMSULOSIN HYDROCHLORIDE 0.8 MG: 0.4 CAPSULE ORAL at 09:07

## 2022-07-13 RX ADMIN — FLUCONAZOLE 200 MG: 200 TABLET ORAL at 09:07

## 2022-07-13 RX ADMIN — METHOCARBAMOL 500 MG: 500 TABLET ORAL at 01:07

## 2022-07-13 RX ADMIN — CARVEDILOL 3.12 MG: 3.12 TABLET, FILM COATED ORAL at 09:07

## 2022-07-13 RX ADMIN — HYDROMORPHONE HYDROCHLORIDE 0.5 MG: 1 INJECTION, SOLUTION INTRAMUSCULAR; INTRAVENOUS; SUBCUTANEOUS at 09:07

## 2022-07-13 RX ADMIN — IOHEXOL 75 ML: 350 INJECTION, SOLUTION INTRAVENOUS at 01:07

## 2022-07-13 RX ADMIN — PHENYLEPHRINE HYDROCHLORIDE 200 MCG: 10 INJECTION INTRAVENOUS at 03:07

## 2022-07-13 RX ADMIN — VALGANCICLOVIR HYDROCHLORIDE 450 MG: 450 TABLET ORAL at 09:07

## 2022-07-13 RX ADMIN — Medication 1 TABLET: at 09:07

## 2022-07-13 RX ADMIN — CARVEDILOL 3.12 MG: 3.12 TABLET, FILM COATED ORAL at 08:07

## 2022-07-13 RX ADMIN — TACROLIMUS 1 MG: 1 CAPSULE ORAL at 09:07

## 2022-07-13 RX ADMIN — ROCURONIUM BROMIDE 50 MG: 10 INJECTION, SOLUTION INTRAVENOUS at 03:07

## 2022-07-13 RX ADMIN — HYDROMORPHONE HYDROCHLORIDE 0.5 MG: 1 INJECTION, SOLUTION INTRAMUSCULAR; INTRAVENOUS; SUBCUTANEOUS at 01:07

## 2022-07-13 RX ADMIN — LIDOCAINE HYDROCHLORIDE 100 MG: 20 INJECTION, SOLUTION INTRAVENOUS at 03:07

## 2022-07-13 RX ADMIN — OXYCODONE HYDROCHLORIDE 10 MG: 10 TABLET ORAL at 06:07

## 2022-07-13 RX ADMIN — FENTANYL CITRATE 100 MCG: 50 INJECTION, SOLUTION INTRAMUSCULAR; INTRAVENOUS at 03:07

## 2022-07-13 RX ADMIN — IOHEXOL 30 ML: 350 INJECTION, SOLUTION INTRAVENOUS at 12:07

## 2022-07-13 RX ADMIN — MIDAZOLAM HYDROCHLORIDE 2 MG: 1 INJECTION, SOLUTION INTRAMUSCULAR; INTRAVENOUS at 02:07

## 2022-07-13 RX ADMIN — METHOCARBAMOL 500 MG: 500 TABLET ORAL at 08:07

## 2022-07-13 RX ADMIN — PREDNISONE 10 MG: 10 TABLET ORAL at 09:07

## 2022-07-13 RX ADMIN — METHOCARBAMOL 500 MG: 500 TABLET ORAL at 05:07

## 2022-07-13 NOTE — TRANSFER OF CARE
"Anesthesia Transfer of Care Note    Patient: Romeo Larson    Procedure(s) Performed: * No procedures listed *    Patient location: PACU    Anesthesia Type: general    Transport from OR: Transported from OR on 6-10 L/min O2 by face mask with adequate spontaneous ventilation    Post pain: adequate analgesia    Post assessment: no apparent anesthetic complications and tolerated procedure well    Post vital signs: stable    Level of consciousness: awake and alert    Nausea/Vomiting: no nausea/vomiting    Complications: none    Transfer of care protocol was followed      Last vitals:   Visit Vitals  /80 (BP Location: Right arm, Patient Position: Lying)   Pulse 99   Temp 36.4 °C (97.5 °F) (Temporal)   Resp 18   Ht 5' 8" (1.727 m)   Wt 75.5 kg (166 lb 7.2 oz)   SpO2 100%   BMI 25.31 kg/m²     "

## 2022-07-13 NOTE — CARE UPDATE
"RAPID RESPONSE NURSE CHART REVIEW        Chart Reviewed: 07/13/2022, 12:05 AM    MRN: [id number]  Bed: 51730/79328 A    Dx: Biloma of transplanted liver    Romeo Larson has a past medical history of Adjustment and management of vascular access device, Cholangiocarcinoma, Fever of unknown origin, Hiccups, Hilar cholangiocarcinoma, Hypercholesteremia, and Hypertension.    Last VS: /77 (BP Location: Right arm, Patient Position: Lying)   Pulse (!) 133   Temp 99.6 °F (37.6 °C) (Oral)   Resp 18   Ht 5' 8" (1.727 m)   Wt 75.8 kg (167 lb)   SpO2 95%   BMI 25.39 kg/m²     24H Vital Sign Range:  Temp:  [97.3 °F (36.3 °C)-99.6 °F (37.6 °C)]   Pulse:  []   Resp:  [16-20]   BP: (120-132)/(77-98)   SpO2:  [95 %-100 %]     Level of Consciousness (AVPU): alert    Recent Labs     07/10/22  0840 07/11/22  0834 07/12/22  1814   WBC 6.73 7.08 7.49   HGB 11.2* 11.5* 13.1*   HCT 34.1* 36.1* 40.3    159 195       Recent Labs     07/10/22  0840 07/11/22  0834 07/12/22  1814   * 132* 128*   K 5.4* 4.9 5.8*   CL 96 94* 94*   CO2 28 28 24   CREATININE 1.4 1.3 1.3   * 143* 156*   MG  --   --  1.5*        No results for input(s): PH, PCO2, PO2, HCO3, POCSATURATED, BE in the last 72 hours.     OXYGEN:        O2 Device (Oxygen Therapy): room air    MEWS score: 4    bedside RNKatharina contacted. Tachy 130's, EKG done, BP stable, asymptomatic. No other concerns verbalized at this time. Instructed to call [phone] for further concerns or assistance.    Zonia Minor RN      "

## 2022-07-13 NOTE — PLAN OF CARE
Pt arrived to IR room 189 for PTC, no acute distress noted. Orders, consents and labs reviewed on chart. Anesthesia at bedside.

## 2022-07-13 NOTE — NURSING
Pt arrived to MPU 4 for pre-op. Pt oriented to unit and staff. Plan of care reviewed with patient, patient verbalizes understanding. Comfort measures utilized. Fall risk reviewed with patient, fall risk interventions maintained. Pt resting comfortably. Denies pain/discomfort. Will continue to monitor. See flow sheets for monitoring, medication administration, and updates. Pt awaiting anesthesia consent

## 2022-07-13 NOTE — PLAN OF CARE
Pt aaox4; independent and ambulatory. Pt endorses abd pain 7/10-unrelieved c oxy. Abd soft but tender to palpation.Chevron incision c staples; DEBBIE; no s/s of infection noted. Dilaudid ordered for break-through pain. Pt tmax 100-tylenol given. Cont c broad spectrum abx. Tachy 120's-EKG showing NST-PA aware. K+ 5.8-Lasix IVP administered-awaiting available telemetry box  to monitor centrally. Mag 1.5-replaced c 2g IVPB. Pt c howard drain x2-both draining minimal amount of thick green drainage. Pt reports outpt has decreased. Awaiting CT of abdomen. Plans for IR cx for poss PTC drain placement in AM.

## 2022-07-13 NOTE — ANESTHESIA PREPROCEDURE EVALUATION
07/13/2022  Pre-operative evaluation for * No procedures listed *    Romeo Larson is a 41 y.o. male  s/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 Postop course c/b recurrent bile leak. OR take back 6/23 for bile duct repair (small leak found near biliary anastomosis, unable to clearly identify source), cultures grew enterococcus faecalis. OR take back 7/4 for ex lap, washout of biloma, bile duct unable to be assessed due to adhesions, OR cultures with lactobacillus species. Patient discharged home with 1 drain in place. Patient presented for outpatient follow up, drain found to be bilious, and patient reported feeling weak, decreased appeitie. Admitted, started broad spectrum abx. Getting CT abd/pelvis W PO/IV contrast followed by IR for PTC drain.       TTE 4/2022  · The stress echo portion of this study is negative for myocardial ischemia.  · The ECG portion of this study is negative for myocardial ischemia.  · During stress, the following significant arrhythmias were observed: rare PACs.  · The patient reached the end of the protocol.  · Normal left ventricular size with normal systolic function. The estimated ejection fraction is 65%.  · Normal right ventricular size with normal right ventricular systolic function.  · Normal left ventricular diastolic function.  · The estimated PA systolic pressure is 26 mmHg.  · Normal central venous pressure (3 mmHg).         Patient Active Problem List   Diagnosis    Hypercholesterolemia    Diastolic hypertension    Hyperbilirubinemia    Obstructive jaundice    Biliary obstruction    Cholangitis    Hilar cholangiocarcinoma    Immunodeficiency secondary to neoplasm    Immunodeficiency secondary to chemotherapy    Chemotherapy induced neutropenia    Elevated liver enzymes    Macrocytic anemia    Adjustment and  management of vascular access device    Tachycardia    S/P liver transplant    Prophylactic immunotherapy    At risk for opportunistic infections    Type 2 diabetes mellitus with hyperglycemia    Adrenal corticosteroid causing adverse effect in therapeutic use    Long-term use of immunosuppressant medication    Biloma following surgery    Leukocytosis    Biloma of transplanted liver    Malnutrition of mild degree    Urinary retention    Bile leak       Review of patient's allergies indicates:  No Known Allergies    No current facility-administered medications on file prior to encounter.     Current Outpatient Medications on File Prior to Encounter   Medication Sig Dispense Refill    amoxicillin-clavulanate 875-125mg (AUGMENTIN) 875-125 mg per tablet Take 1 tablet by mouth every 12 (twelve) hours. 60 tablet 1    aspirin 81 MG Chew CHEW 1 tablet (81 mg total) by mouth once daily. 30 tablet 11    blood sugar diagnostic Strp 1 each by Misc.(Non-Drug; Combo Route) route 3 (three) times daily. 100 each 5    blood-glucose meter Misc Use as instructed 1 each 0    calcium carbonate-vitamin D3 600 mg-20 mcg (800 unit) Tab Take 1 tablet by mouth once daily at 6am. 30 tablet 5    carvediloL (COREG) 3.125 MG tablet Take 1 tablet (3.125 mg total) by mouth 2 (two) times daily. 60 tablet 11    fluconazole (DIFLUCAN) 200 MG Tab Take 1 tablet (200 mg total) by mouth once daily. STOP 7/20/22 30 tablet 0    insulin aspart U-100 (NOVOLOG) 100 unit/mL (3 mL) InPn pen Inject 7 Units into the skin 3 (three) times daily with meals. + sliding scale.  MDD 36 units/d 15 mL 5    lancets 30 gauge Misc 1 each by Misc.(Non-Drug; Combo Route) route 3 (three) times daily. 100 each 5    methocarbamoL (ROBAXIN) 500 MG Tab Take 1 tablet (500 mg total) by mouth 4 (four) times daily. 120 tablet 1    oxyCODONE (ROXICODONE) 10 mg Tab immediate release tablet Take 1-1.5 tablets (10-15 mg total) by mouth every 4 (four) hours as needed  "for Pain. 50 tablet 0    pantoprazole (PROTONIX) 40 MG tablet Take 1 tablet (40 mg total) by mouth once daily. 30 tablet 5    pen needle, diabetic 32 gauge x 5/32" Ndle 1 each by Misc.(Non-Drug; Combo Route) route 3 (three) times daily. 100 each 5    predniSONE (DELTASONE) 5 MG tablet Take by mouth once daily: 20 mg 6/27-7/3; 15 mg 7/4-7/10; 10 mg 7/11-7/17; 5 mg 7/18-7/24; STOP 7/25/22 75 tablet 0    sulfamethoxazole-trimethoprim 400-80mg (BACTRIM,SEPTRA) 400-80 mg per tablet Take 1 tablet by mouth every morning. STOP 12/18/22 30 tablet 5    tacrolimus (PROGRAF) 1 MG Cap Take 1 capsule (1 mg total) by mouth every 12 (twelve) hours. 60 capsule 11    tamsulosin (FLOMAX) 0.4 mg Cap Take 2 capsules (0.8 mg total) by mouth once daily. 60 capsule 11    valGANciclovir (VALCYTE) 450 mg Tab Take 1 tablet (450 mg total) by mouth once daily. STOP 9/19/22 30 tablet 2    [DISCONTINUED] baclofen (LIORESAL) 10 MG tablet TAKE 1 TABLET(10 MG) BY MOUTH THREE TIMES DAILY AS NEEDED FOR HICCUPS (Patient not taking: No sig reported) 90 tablet 0    [DISCONTINUED] famotidine (PEPCID) 20 MG tablet Take 1 tablet (20 mg total) by mouth every evening. STOP 7/24/22 30 tablet 0    [DISCONTINUED] losartan (COZAAR) 50 MG tablet Take 1 tablet (50 mg total) by mouth once daily. 90 tablet 3    [DISCONTINUED] metFORMIN (GLUCOPHAGE) 500 MG tablet Take 1 tablet (500 mg total) by mouth 2 (two) times daily with meals. 60 tablet 3    [DISCONTINUED] mycophenolate (CELLCEPT) 250 mg Cap Take 4 capsules (1,000 mg total) by mouth 2 (two) times daily. 240 capsule 11       Past Surgical History:   Procedure Laterality Date    DIAGNOSTIC ULTRASOUND N/A 6/21/2022    Procedure: ULTRASOUND, DIAGNOSTIC;  Surgeon: Sinan Cline MD;  Location: [facility] OR [address] [place] [address];  Service: Transplant;  Laterality: N/A;    ENDOSCOPIC ULTRASOUND OF UPPER GASTROINTESTINAL TRACT N/A 10/20/2021    Procedure: ULTRASOUND, UPPER GI TRACT, ENDOSCOPIC;  Surgeon: Valeriy Sotelo MD; "  Location: [facility] ENDO (2ND FLR);  Service: Endoscopy;  Laterality: N/A;  wife to email vacc card-inst email-tb    ERCP N/A 10/20/2021    Procedure: ERCP (ENDOSCOPIC RETROGRADE CHOLANGIOPANCREATOGRAPHY);  Surgeon: Valeriy Sotelo MD;  Location: [facility] ENDO (2ND FLR);  Service: Endoscopy;  Laterality: N/A;    ERCP N/A 11/4/2021    Procedure: ERCP (ENDOSCOPIC RETROGRADE CHOLANGIOPANCREATOGRAPHY);  Surgeon: Vlaeriy Sotelo MD;  Location: [facility] ENDO (2ND FLR);  Service: Endoscopy;  Laterality: N/A;    ERCP N/A 11/4/2021    Procedure: ERCP (ENDOSCOPIC RETROGRADE CHOLANGIOPANCREATOGRAPHY);  Surgeon: Roselia Pereyra MD;  Location: [facility] ENDO (2ND FLR);  Service: Endoscopy;  Laterality: N/A;  pt vaccinated, instructed to bring card to procedure, instructions sent portal-MG    ERCP N/A 1/14/2022    Procedure: ERCP (ENDOSCOPIC RETROGRADE CHOLANGIOPANCREATOGRAPHY);  Surgeon: Roselia Pereyra MD;  Location: [facility] (2ND FLR);  Service: Endoscopy;  Laterality: N/A;  inst portal-right chest port-tb  Pt requested at home COVID testing, Pt instructed at home RAPID to be done morning of procedure, Pt instructed to call endoscopy scheuding if there is a positive result, Pt informed if a positive     ERCP N/A 3/31/2022    Procedure: ERCP (ENDOSCOPIC RETROGRADE CHOLANGIOPANCREATOGRAPHY);  Surgeon: Julio Cesar Alonzo MD;  Location: [facility] (2ND FLR);  Service: Endoscopy;  Laterality: N/A;  3/16: port L chest wall. pt to bring covid vaccination card. Instructions sent via portal.-SC  3/18/22-Updated instructions sent via portal re:new date/time-DS    EXPLORATORY LAPAROTOMY AFTER LIVER TRANSPLANTATION N/A 6/23/2022    Procedure: LAPAROTOMY, EXPLORATORY, AFTER LIVER TRANSPLANT;  Surgeon: Julio Cesar Jara MD;  [facility];  Service: Transplant;  Laterality: N/A;    INSERTION OF TUNNELED CENTRAL VENOUS CATHETER (CVC) WITH SUBCUTANEOUS PORT N/A 11/24/2021    Procedure: INSERTION, PORT-A-CATH;  Surgeon: Prem RICH  MD Yahaira;  Location: [facility] CATH LAB;  Service: Radiology;  Laterality: N/A;    LIVER TRANSPLANT N/A 6/21/2022    Procedure: TRANSPLANT, LIVER;  Surgeon: Sinan Cline MD;  Location: [facility] OR 2ND FLR;  Service: Transplant;  Laterality: N/A;    REPAIR OF BILE DUCT N/A 6/23/2022    Procedure: REPAIR, BILE DUCT;  Surgeon: Julio Cesar Jara MD;  Location: [facility] OR 2ND FLR;  Service: Transplant;  Laterality: N/A;    ROBOT-ASSISTED LAPAROSCOPIC LYMPHADENECTOMY USING DA МАРИНА XI  6/17/2022    Procedure: XI ROBOTIC LYMPHADENECTOMY - Portal;  Surgeon: Julio Cesar Jara MD;  Location: [facility] OR 2ND FLR;  Service: Transplant;;    TONSILLECTOMY      XI ROBOTIC LAPAROSCOPY, EXPLORATORY N/A 6/17/2022    Procedure: XI ROBOTIC LAPAROSCOPY,EXPLORATORY;  Surgeon: Julio Cesar Jara MD;  Location: [facility] OR [facility] FLR;  Service: Transplant;  Laterality: N/A;       Social History     Socioeconomic History    Marital status:    Tobacco Use    Smoking status: Never Smoker    Smokeless tobacco: Never Used   Substance and Sexual Activity    Alcohol use: Not Currently    Drug use: Never    Sexual activity: Yes         CBC:   Recent Labs     07/12/22 1814 07/13/22  0541   WBC 7.49 10.51   RBC 4.15* 4.14*   HGB 13.1* 13.2*   HCT 40.3 39.5*    196   MCV 97 95   MCH 31.6* 31.9*   MCHC 32.5 33.4       CMP:   Recent Labs     07/12/22  1814 07/13/22  0052 07/13/22  0541   * 124* 124*   K 5.8* 5.2* 5.2*   CL 94* 91* 91*   CO2 24 24 26   BUN 23* 23* 22*   CREATININE 1.3 1.3 1.3   * 219* 161*   MG 1.5*  --  2.0   CALCIUM 9.9 9.3 9.1   ALBUMIN 3.1*  --  2.6*   PROT 6.7  --  5.9*   ALKPHOS 307*  --  234*   ALT 77*  --  54*   AST 18  --  13   BILITOT 1.3*  --  1.7*       INR  Recent Labs     07/12/22 1814   INR 1.0         2D Echo:  No results found for this or any previous visit.    Pre-op Assessment          Review of Systems      Physical Exam  General: Well nourished and Cooperative    Airway:  Mallampati: II   Mouth Opening:  Normal  TM Distance: Normal  Tongue: Normal  Neck ROM: Normal ROM    Chest/Lungs:  Clear to auscultation, Normal Respiratory Rate    Heart:  Rate: Normal  Rhythm: Regular Rhythm  Sounds: Normal        Anesthesia Plan  Type of Anesthesia, risks & benefits discussed:    Anesthesia Type: Gen ETT  Intra-op Monitoring Plan: Standard ASA Monitors  Post Op Pain Control Plan: multimodal analgesia and IV/PO Opioids PRN  Induction:  IV  Airway Plan: Direct and Video, Post-Induction  Informed Consent: Informed consent signed with the Patient and all parties understand the risks and agree with anesthesia plan.  All questions answered.   ASA Score: 3    Ready For Surgery From Anesthesia Perspective.     .

## 2022-07-13 NOTE — H&P
Inpatient Radiology Pre-procedure Note    History of Present Illness:  Romeo Larson is a 41 y.o. male s/p living 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 for cholangiocarcinoma c/b biliary leak with bile duct anastomosis on 6/23 and washout of bilioma on 6/2. Pt has bilateral lower abdominal drains with high-bilious output. IR consulted for PTC.      Admission H&P reviewed.  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    Hypercholesteremia     Hypertension      Past Surgical History:   Procedure Laterality Date    DIAGNOSTIC ULTRASOUND N/A 6/21/2022    Procedure: ULTRASOUND, DIAGNOSTIC;  Surgeon: Sinan Cline MD;  Location: [facility] OR 2ND FLR;  Service: Transplant;  Laterality: N/A;    ENDOSCOPIC ULTRASOUND OF UPPER GASTROINTESTINAL TRACT N/A 10/20/2021    Procedure: ULTRASOUND, UPPER GI TRACT, ENDOSCOPIC;  Surgeon: Valeriy Sotelo MD;  Location: [facility] (2ND FLR);  Service: Endoscopy;  Laterality: N/A;  wife to email vacc card-inst email-tb    ERCP N/A 10/20/2021    Procedure: ERCP (ENDOSCOPIC RETROGRADE CHOLANGIOPANCREATOGRAPHY);  Surgeon: Valeriy Sotelo MD;  Location: [facility] (2ND FLR);  Service: Endoscopy;  Laterality: N/A;    ERCP N/A 11/4/2021    Procedure: ERCP (ENDOSCOPIC RETROGRADE CHOLANGIOPANCREATOGRAPHY);  Surgeon: Valeriy Sotelo MD;  Location: [facility] ENDO (2ND FLR);  Service: Endoscopy;  Laterality: N/A;    ERCP N/A 11/4/2021    Procedure: ERCP (ENDOSCOPIC RETROGRADE CHOLANGIOPANCREATOGRAPHY);  Surgeon: Roselia Pereyra MD;  Location: [facility] ENDO (2ND FLR);  Service: Endoscopy;  Laterality: N/A;  pt vaccinated, instructed to bring card to procedure, instructions sent portal-MG    ERCP N/A 1/14/2022    Procedure: ERCP (ENDOSCOPIC RETROGRADE CHOLANGIOPANCREATOGRAPHY);  Surgeon: Roselia Pereyra MD;  Location: [facility] ENDO ([facility]  FLR);  Service: Endoscopy;  Laterality: N/A;  inst portal-right chest port-tb  Pt requested at home COVID testing, Pt instructed at home RAPID to be done morning of procedure, Pt instructed to call endoscopy scheuding if there is a positive result, Pt informed if a positive     ERCP N/A 3/31/2022    Procedure: ERCP (ENDOSCOPIC RETROGRADE CHOLANGIOPANCREATOGRAPHY);  Surgeon: Julio Cesar Alonzo MD;  Location: [facility] ENDO (2ND FLR);  Service: Endoscopy;  Laterality: N/A;  3/16: port L chest wall. pt to bring covid vaccination card. Instructions sent via portal.-SC  3/18/22-Updated instructions sent via portal re:new date/time-DS    EXPLORATORY LAPAROTOMY AFTER LIVER TRANSPLANTATION N/A 6/23/2022    Procedure: LAPAROTOMY, EXPLORATORY, AFTER LIVER TRANSPLANT;  Surgeon: Julio Cesar Jara MD;  [facility];  Service: Transplant;  Laterality: N/A;    INSERTION OF TUNNELED CENTRAL VENOUS CATHETER (CVC) WITH SUBCUTANEOUS PORT N/A 11/24/2021    Procedure: INSERTION, PORT-A-CATH;  Surgeon: Prem Syed MD;  Location: [facility] CATH LAB;  Service: Radiology;  Laterality: N/A;    LIVER TRANSPLANT N/A 6/21/2022    Procedure: TRANSPLANT, LIVER;  Surgeon: Sinan Cline MD;  [address]  Service: Transplant;  Laterality: N/A;    REPAIR OF BILE DUCT N/A 6/23/2022    Procedure: REPAIR, BILE DUCT;  Surgeon: Julio Cesar Jara MD;  [facility];  Service: Transplant;  Laterality: N/A;    ROBOT-ASSISTED LAPAROSCOPIC LYMPHADENECTOMY USING DA МАРИНА XI  6/17/2022    Procedure: XI ROBOTIC LYMPHADENECTOMY - Portal;  Surgeon: Julio Cesar Jara MD;  [facility];  Service: Transplant;;    TONSILLECTOMY      XI ROBOTIC LAPAROSCOPY, EXPLORATORY N/A 6/17/2022    Procedure: XI ROBOTIC LAPAROSCOPY,EXPLORATORY;  Surgeon: Julio Cesar Jara MD;  [facility];  Service: Transplant;  Laterality: N/A;       Review of Systems:   As documented in primary team H&P    Home Meds:   Prior to Admission  "medications    Medication Sig Start Date End Date Taking? Authorizing Provider   amoxicillin-clavulanate 875-125mg (AUGMENTIN) 875-125 mg per tablet Take 1 tablet by mouth every 12 (twelve) hours. 6/28/22   Abida Arenas NP   aspirin 81 MG Chew CHEW 1 tablet (81 mg total) by mouth once daily. 6/28/22 6/28/23  Abida Arenas NP   blood sugar diagnostic Strp 1 each by Misc.(Non-Drug; Combo Route) route 3 (three) times daily. 6/28/22   Abida Arenas NP   blood-glucose meter Misc Use as instructed 6/28/22   Abida Arenas NP   calcium carbonate-vitamin D3 600 mg-20 mcg (800 unit) Tab Take 1 tablet by mouth once daily at 6am. 6/28/22   Abida Arenas NP   carvediloL (COREG) 3.125 MG tablet Take 1 tablet (3.125 mg total) by mouth 2 (two) times daily. 6/28/22 6/28/23  Abida Arenas NP   fluconazole (DIFLUCAN) 200 MG Tab Take 1 tablet (200 mg total) by mouth once daily. STOP 7/20/22 6/20/22 7/28/22  Sinan Cline MD   insulin aspart U-100 (NOVOLOG) 100 unit/mL (3 mL) InPn pen Inject 7 Units into the skin 3 (three) times daily with meals. + sliding scale.  MDD 36 units/d 7/8/22   Sinan Cline MD   lancets 30 gauge Misc 1 each by Misc.(Non-Drug; Combo Route) route 3 (three) times daily. 6/28/22   Abida Arenas NP   methocarbamoL (ROBAXIN) 500 MG Tab Take 1 tablet (500 mg total) by mouth 4 (four) times daily. 6/28/22   Abida Arenas NP   oxyCODONE (ROXICODONE) 10 mg Tab immediate release tablet Take 1-1.5 tablets (10-15 mg total) by mouth every 4 (four) hours as needed for Pain. 6/28/22   Abida J. Redwantz, NP   pantoprazole (PROTONIX) 40 MG tablet Take 1 tablet (40 mg total) by mouth once daily. 7/9/22   Sinan Cline MD   pen needle, diabetic 32 gauge x 5/32" Ndle 1 each by Misc.(Non-Drug; Combo Route) route 3 (three) times daily. 6/28/22   Abida Arenas, NP   predniSONE (DELTASONE) 5 MG tablet Take by mouth once daily: 20 mg 6/27-7/3; 15 mg 7/4-7/10; 10 mg 7/11-7/17; 5 mg 7/18-7/24; STOP " 7/25/22 6/24/22   Sinan Cline MD   sulfamethoxazole-trimethoprim 400-80mg (BACTRIM,SEPTRA) 400-80 mg per tablet Take 1 tablet by mouth every morning. STOP 12/18/22 6/21/22 12/18/22  Sinan Cline MD   tacrolimus (PROGRAF) 1 MG Cap Take 1 capsule (1 mg total) by mouth every 12 (twelve) hours. 7/11/22 7/11/23  Sinan Cline MD   tamsulosin (FLOMAX) 0.4 mg Cap Take 2 capsules (0.8 mg total) by mouth once daily. 7/9/22 7/9/23  Sinan Cline MD   valGANciclovir (VALCYTE) 450 mg Tab Take 1 tablet (450 mg total) by mouth once daily. STOP 9/19/22 6/21/22 9/19/22  Sinan Cline MD   baclofen (LIORESAL) 10 MG tablet TAKE 1 TABLET(10 MG) BY MOUTH THREE TIMES DAILY AS NEEDED FOR HICCUPS  Patient not taking: No sig reported 4/6/22 6/17/22  Young Wesley MD   famotidine (PEPCID) 20 MG tablet Take 1 tablet (20 mg total) by mouth every evening. STOP 7/24/22 6/21/22 7/8/22  Sinan Cline MD   losartan (COZAAR) 50 MG tablet Take 1 tablet (50 mg total) by mouth once daily. 3/21/22 6/24/22  Pravin Maria MD   metFORMIN (GLUCOPHAGE) 500 MG tablet Take 1 tablet (500 mg total) by mouth 2 (two) times daily with meals. 7/8/22 7/8/22  Sinan Cline MD   mycophenolate (CELLCEPT) 250 mg Cap Take 4 capsules (1,000 mg total) by mouth 2 (two) times daily. 6/24/22 7/8/22  Sinan Cline MD     Scheduled Meds:    aspirin  81 mg Oral Daily    calcium-vitamin D3  1 tablet Oral Daily    carvediloL  3.125 mg Oral BID    ceFEPime (MAXIPIME) IVPB  1 g Intravenous Q8H    fluconazole  200 mg Oral Daily    insulin aspart U-100  4 Units Subcutaneous TID WM    methocarbamoL  500 mg Oral QID    pantoprazole  40 mg Oral Daily    predniSONE  10 mg Oral Daily    sodium zirconium cyclosilicate  10 g Oral Once    tacrolimus  1 mg Oral BID    tamsulosin  0.8 mg Oral Daily    valGANciclovir  450 mg Oral Daily    vancomycin (VANCOCIN) IVPB  1,000 mg Intravenous Q12H     Continuous Infusions:    sodium chloride 0.9% 75 mL/hr at 07/13/22 0017     PRN  Meds:acetaminophen, aluminum & magnesium hydroxide-simethicone, dextrose 10%, dextrose 10%, glucagon (human recombinant), glucose, glucose, HYDROmorphone, insulin aspart U-100, iohexol, melatonin, ondansetron, oxyCODONE, sodium chloride 0.9%, Pharmacy to dose Vancomycin consult **AND** vancomycin - pharmacy to dose  Anticoagulants/Antiplatelets: aspirin    Allergies: Review of patient's allergies indicates:  No Known Allergies  Sedation Hx: have not been any systemic reactions    Labs:  Recent Labs   Lab 07/12/22 1814   INR 1.0       Recent Labs   Lab 07/13/22  0541   WBC 10.51   HGB 13.2*   HCT 39.5*   MCV 95         Recent Labs   Lab 07/11/22  0834 07/12/22 1814 07/13/22  0541   *   < > 161*   *   < > 124*   K 4.9   < > 5.2*   CL 94*   < > 91*   CO2 28   < > 26   BUN 24*   < > 22*   CREATININE 1.3   < > 1.3   CALCIUM 9.8   < > 9.1   MG  --    < > 2.0   *   < > 54*   AST 25   < > 13   ALBUMIN 3.1*   < > 2.6*   BILITOT 0.7   < > 1.7*   BILIDIR 0.4*  --   --     < > = values in this interval not displayed.         Vitals:  Temp: 98.8 °F (37.1 °C) (07/13/22 0800)  Pulse: 98 (07/13/22 0800)  Resp: 16 (07/13/22 0919)  BP: 117/77 (07/13/22 0800)  SpO2: 95 % (07/13/22 0800)     Physical Exam:  ASA: per anesthesia  Mallampati: per anesthesia    General: no acute distress  Mental Status: alert and oriented to person, place and time  HEENT: normocephalic, atraumatic  Chest: unlabored breathing  Heart: regular heart rate  Abdomen: nondistended  Extremity: moves all extremities    Plan:   PTC with possible drain placement scheduled for today. Keep pt NPO. Hold nonvital anticoagulants.    Sedation Plan: per anesthesia    Carolyn Rivas MD MSCR  PGY-4 Radiology Resident

## 2022-07-13 NOTE — SUBJECTIVE & OBJECTIVE
Scheduled Meds:   aspirin  81 mg Oral Daily    calcium-vitamin D3  1 tablet Oral Daily    carvediloL  3.125 mg Oral BID    ceFEPime (MAXIPIME) IVPB  1 g Intravenous Q8H    fluconazole  200 mg Oral Daily    insulin aspart U-100  4 Units Subcutaneous TID WM    methocarbamoL  500 mg Oral QID    pantoprazole  40 mg Oral Daily    predniSONE  10 mg Oral Daily    sodium zirconium cyclosilicate  10 g Oral Once    tacrolimus  1 mg Oral BID    tamsulosin  0.8 mg Oral Daily    valGANciclovir  450 mg Oral Daily    vancomycin (VANCOCIN) IVPB  1,000 mg Intravenous Q12H     Continuous Infusions:  PRN Meds:acetaminophen, aluminum & magnesium hydroxide-simethicone, dextrose 10%, dextrose 10%, glucagon (human recombinant), glucose, glucose, HYDROmorphone, insulin aspart U-100, iohexol, melatonin, ondansetron, oxyCODONE, sodium chloride 0.9%, Pharmacy to dose Vancomycin consult **AND** vancomycin - pharmacy to dose    Review of Systems   Constitutional:  Positive for appetite change. Negative for chills, fatigue and fever.   HENT: Negative.     Eyes: Negative.    Respiratory:  Negative for cough, chest tightness, shortness of breath, wheezing and stridor.    Cardiovascular:  Negative for chest pain, palpitations and leg swelling.   Gastrointestinal:  Positive for abdominal pain. Negative for nausea and vomiting.   Endocrine: Negative.    Genitourinary:  Negative for difficulty urinating.   Musculoskeletal:  Negative for arthralgias, myalgias and neck pain.   Skin:  Positive for wound.   Allergic/Immunologic: Positive for immunocompromised state.   Neurological:  Negative for tremors, syncope, weakness and numbness.   Psychiatric/Behavioral:  Negative for confusion and decreased concentration.    Objective:     Vital Signs (Most Recent):  Temp: 98.8 °F (37.1 °C) (07/13/22 0800)  Pulse: 98 (07/13/22 0800)  Resp: 16 (07/13/22 0919)  BP: 117/77 (07/13/22 0800)  SpO2: 95 % (07/13/22 0800) Vital Signs (24h Range):  Temp:  [97.3 °F (36.3  °C)-100 °F (37.8 °C)] 98.8 °F (37.1 °C)  Pulse:  [] 98  Resp:  [16-20] 16  SpO2:  [94 %-100 %] 95 %  BP: (112-132)/(77-98) 117/77     Weight: 75.5 kg (166 lb 7.2 oz)  Body mass index is 25.31 kg/m².    Intake/Output - Last 3 Shifts         07/11 0700  07/12 0659 07/12 0700 07/13 0659 07/13 0700  07/14 0659    P.O.  180     I.V. (mL/kg)  270.6 (3.6)     IV Piggyback  298.5     Total Intake(mL/kg)  749.1 (9.9)     Urine (mL/kg/hr)  0     Drains  185     Total Output  185     Net  +564.1            Urine Occurrence  2 x             Physical Exam  Vitals and nursing note reviewed.   Constitutional:       General: He is not in acute distress.     Appearance: Normal appearance. He is ill-appearing. He is not toxic-appearing.   Eyes:      General:         Right eye: No discharge.         Left eye: No discharge.      Extraocular Movements: Extraocular movements intact.      Conjunctiva/sclera: Conjunctivae normal.   Cardiovascular:      Rate and Rhythm: Normal rate and regular rhythm.      Pulses: Normal pulses.      Heart sounds: Normal heart sounds, S1 normal and S2 normal.   Pulmonary:      Effort: Pulmonary effort is normal. No respiratory distress.      Breath sounds: Normal breath sounds. No stridor. No wheezing, rhonchi or rales.   Abdominal:      General: Bowel sounds are decreased.      Palpations: Abdomen is soft.      Tenderness: There is abdominal tenderness in the right lower quadrant, suprapubic area and left lower quadrant. There is no guarding.          Comments: Chevron incision CDI  R abdominal SHRUTHI drain in place with serousang output  L abdominal SHRUTHI drain in place with bilious output     Musculoskeletal:      Cervical back: Neck supple.      Right lower leg: No edema.      Left lower leg: No edema.   Skin:     General: Skin is warm and dry.      Capillary Refill: Capillary refill takes less than 2 seconds.   Neurological:      Mental Status: He is alert and oriented to person, place, and time.    Psychiatric:         Mood and Affect: Mood normal.         Behavior: Behavior normal. Behavior is cooperative.         Thought Content: Thought content normal.         Judgment: Judgment normal.       Laboratory:  Immunosuppressants           Stop Route Frequency     tacrolimus capsule 1 mg         -- Oral 2 times daily          CBC:   Recent Labs   Lab 07/13/22  0541   WBC 10.51   RBC 4.14*   HGB 13.2*   HCT 39.5*      MCV 95   MCH 31.9*   MCHC 33.4     CMP:   Recent Labs   Lab 07/13/22  0541   *   CALCIUM 9.1   ALBUMIN 2.6*   PROT 5.9*   *   K 5.2*   CO2 26   CL 91*   BUN 22*   CREATININE 1.3   ALKPHOS 234*   ALT 54*   AST 13   BILITOT 1.7*     Coagulation:   Recent Labs   Lab 07/12/22  1814   INR 1.0     Labs within the past 24 hours have been reviewed.    Diagnostic Results:  CT Abd/Pelvis with contrast pending

## 2022-07-13 NOTE — PLAN OF CARE
8F biliary drain placement completed, pt tolerated well. No apparent distress noted. Dressing applied CDI. Patient transferred to PACU, accompanied by IR and Anesthesia teams, report given at bedside.

## 2022-07-13 NOTE — HOSPITAL COURSE
Patient states he is feeling good. Reports having issues with pain overnight at PTC drain site. PRN oxycodone-acetaminophen 10-325mg PO q4h PRN and scheduled lidocaine patches ordered. PTC drain place with a total of 60ml of bilious output within the past 24 hours. Nursing communication order placed to flush PTC drain q12hrs with 30ml of sterile saline. Left side abdominal SHRUTHI drain with bilious output with a total of 95ml within past 24 hours. R side abdominal SHRUTHI drain with more serous-serosanguinous drainage today with a total of 15 ml within last 24 hours. Patient reports having issues with appetite/nutrition as well as weakness. Remeron ordered to start tonight. Consult to dietician placed. Boost supplements ordered to come with every meal. Will give some albumin today. PT ordered for muscle strengthening exercises. Discontinuing IV cefepime and Vancomycin and switching to PO cefpodoxime today.  All VSS. Will continue to monitor closely.

## 2022-07-13 NOTE — PROGRESS NOTES
Pharmacokinetic Initial Assessment: IV Vancomycin    Assessment/Plan:    Initiate intravenous vancomycin with loading dose of 1500 mg once with subsequent doses when random concentrations are less than 20 mcg/mL.  - Mr. Larson's baseline renal function is ~0.7-0.9. He's had daily outpatient labs showing a slowing rising Scr. Scr on admit today was 1.3.   - Will pulse dose for now given elevated Scr.  - Desired empiric serum trough concentration is 10 to 20 mcg/mL.  - Draw vancomycin random level on 7/13 at 0430 with AM labs.     Pharmacy will continue to follow and monitor vancomycin.      Please contact pharmacy at extension [phone] with any questions regarding this assessment.     Thank you for the consult,   Di Cazares       Patient brief summary:  Romeo Larson is a 41 y.o. male initiated on antimicrobial therapy with IV Vancomycin for treatment of suspected intra-abdominal infection    Actual Body Weight:   75.8 kg    Renal Function:   Estimated Creatinine Clearance: 70.9 mL/min (based on SCr of 1.3 mg/dL).     Dialysis Method (if applicable):  N/A

## 2022-07-13 NOTE — PROGRESS NOTES
[facility] - Transplant Stepdown  Liver Transplant  Progress Note    Patient Name: Romeo Larson  MRN: [id number]  Admission Date: 7/12/2022  Hospital Length of Stay: 1 days  Code Status: Full Code  Primary Care Provider: Pravin Maria MD  Post-Operative Day: 22    ORGAN:   RIGHT LIVER LOBE (SEGS 5,6,7,8) WITHOUT MIDDLE HEPATIC VEIN  Disease Etiology: Primary Liver Malignancy: Cholangiocarcinoma (CH-CA)  Donor Type:   Living  CDC High Risk:     Donor CMV Status:   Donor CMV Status:   Donor HBcAB:     Donor HCV Status:     Donor HBV GUSTABO:   Donor HCV GUSTABO:   Whole or Partial:   Biliary Anastomosis: Vick-en-Y  Arterial Anatomy: Standard  Subjective:     History of Present Illness:  Romeo Larson is a 41yr old male s/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 Post operative course significant for recurrent bile leak. OR take back 6/23 for bile duct repair (small leak found near biliary anastomosis, unable to clearly identify source), cultures grew enterococcus faecalis. OR take back 7/4 for ex lap, washout of biloma, bile duct unable to be assessed due to adhesions, OR cultures with lactobacillus species. Patient was discharged home with 1 drain in place. Patient presented for outpatient follow up, drain found to be bilious, and patient reported feeling weak, decreased appeitie. Decision made to admit patient for further interventions. Patient denies fever, chest pain, SOB, change in bowel function. He reports abdominal/suprapubic pain and urinary retention. Last voided at 1500. He remains on Flomax. Plan to start broad spectrum antibiotics, obtain CT abd/pelvis W PO/IV contrast, IR consult for PTC drain.       Hospital Course:  NAEON. Patient reports lower abdominal and suprapubic pain and pressure. Left side abdominal SHRUTHI drain with bilious output with a total of 100ml since admit yesterday evening. R side abdominal SHRUTHI drain with more serosanguinous drainage today with a total of  85ml since admit yesterday evening. Currently on IV cefepime and Vancomycin. CT abdomen/pelvis with contrast ordered for this morning. IR consulted for PTC drain placement after CT scan. All VSS. Will continue to monitor closely.       Scheduled Meds:   aspirin  81 mg Oral Daily    calcium-vitamin D3  1 tablet Oral Daily    carvediloL  3.125 mg Oral BID    ceFEPime (MAXIPIME) IVPB  1 g Intravenous Q8H    fluconazole  200 mg Oral Daily    insulin aspart U-100  4 Units Subcutaneous TID WM    methocarbamoL  500 mg Oral QID    pantoprazole  40 mg Oral Daily    predniSONE  10 mg Oral Daily    sodium zirconium cyclosilicate  10 g Oral Once    tacrolimus  1 mg Oral BID    tamsulosin  0.8 mg Oral Daily    valGANciclovir  450 mg Oral Daily    vancomycin (VANCOCIN) IVPB  1,000 mg Intravenous Q12H     Continuous Infusions:  PRN Meds:acetaminophen, aluminum & magnesium hydroxide-simethicone, dextrose 10%, dextrose 10%, glucagon (human recombinant), glucose, glucose, HYDROmorphone, insulin aspart U-100, iohexol, melatonin, ondansetron, oxyCODONE, sodium chloride 0.9%, Pharmacy to dose Vancomycin consult **AND** vancomycin - pharmacy to dose    Review of Systems   Constitutional:  Positive for appetite change. Negative for chills, fatigue and fever.   HENT: Negative.     Eyes: Negative.    Respiratory:  Negative for cough, chest tightness, shortness of breath, wheezing and stridor.    Cardiovascular:  Negative for chest pain, palpitations and leg swelling.   Gastrointestinal:  Positive for abdominal pain. Negative for nausea and vomiting.   Endocrine: Negative.    Genitourinary:  Negative for difficulty urinating.   Musculoskeletal:  Negative for arthralgias, myalgias and neck pain.   Skin:  Positive for wound.   Allergic/Immunologic: Positive for immunocompromised state.   Neurological:  Negative for tremors, syncope, weakness and numbness.   Psychiatric/Behavioral:  Negative for confusion and decreased  concentration.    Objective:     Vital Signs (Most Recent):  Temp: 98.8 °F (37.1 °C) (07/13/22 0800)  Pulse: 98 (07/13/22 0800)  Resp: 16 (07/13/22 0919)  BP: 117/77 (07/13/22 0800)  SpO2: 95 % (07/13/22 0800) Vital Signs (24h Range):  Temp:  [97.3 °F (36.3 °C)-100 °F (37.8 °C)] 98.8 °F (37.1 °C)  Pulse:  [] 98  Resp:  [16-20] 16  SpO2:  [94 %-100 %] 95 %  BP: (112-132)/(77-98) 117/77     Weight: 75.5 kg (166 lb 7.2 oz)  Body mass index is 25.31 kg/m².    Intake/Output - Last 3 Shifts         07/11 0700 07/12 0659 07/12 0700 07/13 0659 07/13 0700 07/14 0659    P.O.  180     I.V. (mL/kg)  270.6 (3.6)     IV Piggyback  298.5     Total Intake(mL/kg)  749.1 (9.9)     Urine (mL/kg/hr)  0     Drains  185     Total Output  185     Net  +564.1            Urine Occurrence  2 x             Physical Exam  Vitals and nursing note reviewed.   Constitutional:       General: He is not in acute distress.     Appearance: Normal appearance. He is ill-appearing. He is not toxic-appearing.   Eyes:      General:         Right eye: No discharge.         Left eye: No discharge.      Extraocular Movements: Extraocular movements intact.      Conjunctiva/sclera: Conjunctivae normal.   Cardiovascular:      Rate and Rhythm: Normal rate and regular rhythm.      Pulses: Normal pulses.      Heart sounds: Normal heart sounds, S1 normal and S2 normal.   Pulmonary:      Effort: Pulmonary effort is normal. No respiratory distress.      Breath sounds: Normal breath sounds. No stridor. No wheezing, rhonchi or rales.   Abdominal:      General: Bowel sounds are decreased.      Palpations: Abdomen is soft.      Tenderness: There is abdominal tenderness in the right lower quadrant, suprapubic area and left lower quadrant. There is no guarding.          Comments: Chevron incision CDI  R abdominal SHRUTHI drain in place with serousang output  L abdominal SHRUTHI drain in place with bilious output     Musculoskeletal:      Cervical back: Neck supple.       Right lower leg: No edema.      Left lower leg: No edema.   Skin:     General: Skin is warm and dry.      Capillary Refill: Capillary refill takes less than 2 seconds.   Neurological:      Mental Status: He is alert and oriented to person, place, and time.   Psychiatric:         Mood and Affect: Mood normal.         Behavior: Behavior normal. Behavior is cooperative.         Thought Content: Thought content normal.         Judgment: Judgment normal.       Laboratory:  Immunosuppressants           Stop Route Frequency     tacrolimus capsule 1 mg         -- Oral 2 times daily          CBC:   Recent Labs   Lab 07/13/22  0541   WBC 10.51   RBC 4.14*   HGB 13.2*   HCT 39.5*      MCV 95   MCH 31.9*   MCHC 33.4     CMP:   Recent Labs   Lab 07/13/22  0541   *   CALCIUM 9.1   ALBUMIN 2.6*   PROT 5.9*   *   K 5.2*   CO2 26   CL 91*   BUN 22*   CREATININE 1.3   ALKPHOS 234*   ALT 54*   AST 13   BILITOT 1.7*     Coagulation:   Recent Labs   Lab 07/12/22  1814   INR 1.0     Labs within the past 24 hours have been reviewed.    Diagnostic Results:  CT Abd/Pelvis with contrast pending      Assessment/Plan:     * Biloma of transplanted liver  - Persistent bile leak post op, unable to be adequately identifed in surgery  - Keeping drain empty- right drain placed 6/21/22, drain on left placed during recent hospitalization. Both w bilious output  - Start broad spectrum antibiotics on admit: cefepime, vanc  - Right SHRUTHI drain with serousang output this morning  - IR consult for PTC  - CT abd/pelvis with PO/IV contrast pending; will follow up    Bile leak  - See Biloma of transplanted liver       Urinary retention  - hx of urinary retention last hospitalization  - remains on Flomax  - nurse comm to bladder scan, if >250, straight cath  - leave campos in place if urine return >250    -Patient reports not having any issues with voiding at this time; will continue to monitor       Malnutrition of mild degree  - supplements  ordered  - consider dietician consult    Long-term use of immunosuppressant medication  - Maintenance IS with prograf. cont to check tacrolimus level daily. Assess for toxicity and adjust level as needed    Type 2 diabetes mellitus with hyperglycemia  - Endocrine consulted for DM management.     At risk for opportunistic infections  - continue bactrim for PCP prophylaxis  - continue valcyte for CMV prophylaxis  - continue fluconazole for fungal prophylaxis    Prophylactic immunotherapy  - See long-term use of immunosuppressant medication    S/P liver transplant  - s/p living liver txp 6/21/22  - See biloma of transplanted liver      VTE Risk Mitigation (From admission, onward)         Ordered     IP VTE HIGH RISK PATIENT  Once         07/12/22 1751     Place sequential compression device  Until discontinued         07/12/22 1751                The patients clinical status was discussed at multidisplinary rounds, involving transplant surgery, transplant medicine, pharmacy, nursing, nutrition, and social work    Discharge Planning:  Not applicable for discharge at this time.     Hannah Young NP  Liver Transplant  [facility] - Transplant Step[facility]

## 2022-07-13 NOTE — ANESTHESIA PROCEDURE NOTES
Intubation    Date/Time: 7/13/2022 3:15 PM  Performed by: Pravin Noyola CRNA  Authorized by: MANJEET Spain MD     Intubation:     Induction:  Intravenous    Intubated:  Postinduction    Mask Ventilation:  N/a    Attempts:  1    Attempted By:  CRNA    Method of Intubation:  Direct    Blade:  Lauren 4    Laryngeal View Grade: Grade IIA - cords partially seen      Difficult Airway Encountered?: No      Complications:  None    Airway Device:  Oral endotracheal tube    Airway Device Size:  7.5    Style/Cuff Inflation:  Cuffed    Inflation Amount (mL):  5    Tube secured:  23    Secured at:  The lips    Placement Verified By:  Capnometry    Complicating Factors:  None    Findings Post-Intubation:  BS equal bilateral and atraumatic/condition of teeth unchanged

## 2022-07-13 NOTE — PLAN OF CARE
Patient is AAOx4.  Afebrile  RA  Telemetry monitoring is in place.   CT scan performed.  Biliary drain placed in IR.   NS d/c/d.   Call light is within reach.   Instructed to call for assistance.  Nonskid socks when oob.   WCTM

## 2022-07-13 NOTE — PROGRESS NOTES
Social Work Admit Note    ABENA met with patient and patients wife, Shell, at bedside to assess needs. Patient is a 41 y.o.  male, admitted for a biloma on his liver. Patient received liver transplant from a living donor on (6/21/22). Pt has had several recent admissions for same.    Pt admitted on 7/13/22. At this time, patient and wife present as alert/oriented x4, pleasant, communicative and asking and answering questions appropriately.     Household/Family Systems     Patient resides with his wife and 3 children, ages 16, 13 and 12, at:     [address] [address].     Support system includes his wife, father, mother and brother.     Patients primary caregiver is his wife, Shell Larson, [phone] and secondary caregiver is his father, Bradley Larson, [phone].  Confirmed patients contact information is [phone] (home);   Telephone Information:   Mobile [phone]     During admission, patient's caregiver plans to stay in the room with the patient. Confirmed patient and patients caregivers have access to reliable transportation.    Cognitive Status/Learning     Patient reports reading ability at college level. He denies difficulty with seeing or hearing.    Vocation/Disability     Working for Income: Patient reports working remotely and expressed hope that he would be able to log in to his work computer in a week to check on work and to resume work when medically cleared.    Adherence     Patient reports a high level of adherence to patients health care regimen.  Adherence counseling and education provided. Patient verbalizes understanding.    Substance Use    Patient reports the following substance usage.    Tobacco: none  Alcohol: none  Illicit Drugs/Non-prescribed Medications: none  Patient states clear understanding of the potential impact of substance use.  Substance abstinence/cessation counseling, education and resources provided and reviewed.     Services  Utilizing/ADLS    Infusion Service: Prior to admission, patient utilizing? no  Home Health: Prior to admission, patient utilizing? no  DME: Prior to admission, no  Pulmonary/Cardiac Rehab: Prior to admission, no  Dialysis:  Prior to admission, no  Transplant Specialty Pharmacy:  Prior to admission, yes    Prior to admission, patient reports being independent with ADLS but was not driving.    Insurance/Medications    Insured by   Payer/Plan Subscr  Sex Relation Sub. Ins. ID Effective Group Num   1. [employer] - ÁNGELA* OSCAR LARSON 1980 Male Self [id number]                                    [id number]      Primary Insurance (for UNOS reporting): [employer]  Secondary Insurance (for UNOS reporting): none    Patient reports he is able to obtain and afford medications at this time and at time of discharge.    Living Will/Healthcare Power of     Patient states he does not have a LW and/or HCPA.  provided education regarding LW and HCPA and the completion of forms. Patient's spouse, Shell Larson will be his legal next of kin in the absence of HCPA according to [place] law.    Coping/Mental Health    Patient shared that he is coping well with the support of his family. Patient denies mental health difficulties. Pt is frustrated with continued admissions.    Discharge Planning    At time of discharge, patient plans to return to his house in [place] under the care of his wife. Patients wife, Shell and his father Bradley will transport patient. Per rounds today, patient date of discharge is to be determined. Patient verbalized understanding and his caregivers are involved in treatment planning and discharge process.      Additional Concerns    Patient is being followed for needs, education, resources, information, emotional support, supportive counseling, and for supportive and skilled discharge plan of care.  providing ongoing psychosocial support, education, resources  and d/c planning as needed.  SW remains available.  remains available.

## 2022-07-13 NOTE — PROGRESS NOTES
Pharmacokinetic Assessment Follow Up: IV Vancomycin    Vancomycin serum concentration assessment(s):    The random level was drawn correctly and can be used to guide therapy at this time. The measurement is within the desired definitive target range of 15 to 20 mcg/mL.    Vancomycin Regimen Plan:    Change regimen to Vancomycin 1000 mg IV every 12 hours with next serum trough concentration measured at 2100 prior to 4th dose on 7/14/22    Drug levels (last 3 results):  Recent Labs   Lab Result Units 07/13/22  0541   Vancomycin, Random ug/mL 15.8       Pharmacy will continue to follow and monitor vancomycin.    Please contact pharmacy at extension [phone] for questions regarding this assessment.    Thank you for the consult,   Jose Alfredo Linton       Patient brief summary:  Romeo Larson is a 41 y.o. male initiated on antimicrobial therapy with IV Vancomycin for treatment of intra-abdominal infection    The patient's current regimen is vancomycin 1000 mg IV q12    Drug Allergies:   Review of patient's allergies indicates:  No Known Allergies    Actual Body Weight:   75.5 kg    Renal Function:   Estimated Creatinine Clearance: 70.9 mL/min (based on SCr of 1.3 mg/dL).,     Dialysis Method (if applicable):  N/A    CBC (last 72 hours):  Recent Labs   Lab Result Units 07/11/22  0834 07/12/22 1814 07/13/22  0541   WBC K/uL 7.08 7.49 10.51   Hemoglobin g/dL 11.5* 13.1* 13.2*   Hematocrit % 36.1* 40.3 39.5*   Platelets K/uL 159 195 196   Gran % % 83.0* 89.2* 88.6*   Lymph % % 9.7* 7.3* 7.1*   Mono % % 3.4* 2.3* 2.9*   Eosinophil % % 2.8 0.5 0.6   Basophil % % 0.4 0.3 0.2   Differential Method  Automated Automated Automated       Metabolic Panel (last 72 hours):  Recent Labs   Lab Result Units 07/11/22  0834 07/12/22 1814 07/13/22 0052 07/13/22  0541   Sodium mmol/L 132* 128* 124* 124*   Potassium mmol/L 4.9 5.8* 5.2* 5.2*   Chloride mmol/L 94* 94* 91* 91*   CO2 mmol/L 28 24 24 26   Glucose mg/dL 143* 156* 219* 161*   BUN mg/dL  24* 23* 23* 22*   Creatinine mg/dL 1.3 1.3 1.3 1.3   Albumin g/dL 3.1* 3.1*  --  2.6*   Total Bilirubin mg/dL 0.7 1.3*  --  1.7*   Alkaline Phosphatase U/L 344* 307*  --  234*   AST U/L 25 18  --  13   ALT U/L 121* 77*  --  54*   Magnesium mg/dL  --  1.5*  --  2.0       Vancomycin Administrations:  vancomycin given in the last 96 hours                   vancomycin 1.5 g in dextrose 5 % 250 mL IVPB (ready to mix) (mg) 1,500 mg New Bag 07/12/22 2027                Microbiologic Results:  Microbiology Results (last 7 days)     Procedure Component Value Units Date/Time    Blood culture [[id number]] Collected: 07/13/22 0836    Order Status: Sent Specimen: Blood from Peripheral, Right Hand Updated: 07/13/22 0855    Blood culture [[id number]] Collected: 07/13/22 0832    Order Status: Sent Specimen: Blood from Antecubital, Right Arm Updated: 07/13/22 0855

## 2022-07-13 NOTE — NURSING TRANSFER
Nursing Transfer Note      7/13/2022     Reason patient is being transferred: post procedure    Transfer To: [id number]    Transfer via stretcher    Transfer with cardiac monitoring    Transported by PCT    Medicines sent: none    Any special needs or follow-up needed: routine    Chart send with patient: Yes    Notified: spouse    Patient reassessed at: [id number]

## 2022-07-13 NOTE — ASSESSMENT & PLAN NOTE
- hx of urinary retention last hospitalization  - remains on Flomax  - nurse comm to bladder scan, if >250, straight cath  - leave campos in place if urine return >250    -Patient reports not having any issues with voiding at this time; will continue to monitor

## 2022-07-13 NOTE — ANESTHESIA POSTPROCEDURE EVALUATION
Anesthesia Post Evaluation    Patient: Romeo Larson    Procedure(s) Performed: * No procedures listed *    Final Anesthesia Type: general      Patient location during evaluation: PACU  Patient participation: Yes- Able to Participate  Level of consciousness: awake and alert  Post-procedure vital signs: reviewed and stable  Pain management: adequate  Airway patency: patent    PONV status at discharge: No PONV  Anesthetic complications: no      Cardiovascular status: blood pressure returned to baseline  Respiratory status: unassisted, spontaneous ventilation and room air  Hydration status: euvolemic            Vitals Value Taken Time   /75 07/13/22 1732   Temp 36.7 °C (98 °F) 07/13/22 1715   Pulse 96 07/13/22 1747   Resp 23 07/13/22 1747   SpO2 98 % 07/13/22 1747   Vitals shown include unvalidated device data.      Event Time   Out of Recovery 18:09:00         Pain/Moe Score: Pain Rating Prior to Med Admin: 7 (7/13/2022  9:19 AM)  Pain Rating Post Med Admin: 7 (7/13/2022  9:49 AM)  Moe Score: 10 (7/13/2022  4:18 PM)

## 2022-07-13 NOTE — ASSESSMENT & PLAN NOTE
- Persistent bile leak post op, unable to be adequately identifed in surgery  - Keeping drain empty- right drain placed 6/21/22, drain on left placed during recent hospitalization. Both w bilious output  - Start broad spectrum antibiotics on admit: cefepime, vanc  - Right SHRUTHI drain with serousang output this morning  - IR consult for PTC  - CT abd/pelvis with PO/IV contrast pending; will follow up

## 2022-07-13 NOTE — PROCEDURES
Radiology Post-Procedure Note    Pre Op Diagnosis: Biloma    Post Op Diagnosis: Same    Procedure: Percutaneous transhepatic cholangiography    Procedure performed by: Rigo Hall MD, Berenice Velez MD, Vicki Villagran MD    Written Informed Consent Obtained: Yes    Specimen Removed: NO    Estimated Blood Loss: Minimal    Findings:    General anesthesia provided by Anesthesiology. Please see anesthesia record.     Under Ultrasound guidance, a peripheral duct in the right hepatic lobe was accessed using a 22 gauge Chiba needle and contrast was injected confirming position in the biliary tree. A microwire was advanced into the common bile duct and the system was upsized using an Accustick introducer set. An Amplatz wire was then advanced into the small bowel and the tract was serially dilated. An 8.0 internal/external biliary drainage catheter was advanced over the wire and the pigtail was formed in the small bowel. Cholangiogram was performed demonstrating appropriate position of the pigtail and the catheter side holes. The catheter was secured to the skin using nonabsorbable suture. The catheter was left to gravity drainage and dressed with a sterile dressing.    There were no immediate complications.  Please see Imaging report for further details.      Berenice Velez MD  Fellow, Dept. Of Interventional Radiology  [facility]

## 2022-07-14 LAB
ALBUMIN SERPL BCP-MCNC: 2.2 G/DL (ref 3.5–5.2)
ALP SERPL-CCNC: 185 U/L (ref 55–135)
ALT SERPL W/O P-5'-P-CCNC: 127 U/L (ref 10–44)
ANION GAP SERPL CALC-SCNC: 7 MMOL/L (ref 8–16)
AST SERPL-CCNC: 79 U/L (ref 10–40)
BASOPHILS # BLD AUTO: 0.01 K/UL (ref 0–0.2)
BASOPHILS NFR BLD: 0.1 % (ref 0–1.9)
BILIRUB SERPL-MCNC: 1.2 MG/DL (ref 0.1–1)
BUN SERPL-MCNC: 22 MG/DL (ref 6–20)
CALCIUM SERPL-MCNC: 8.8 MG/DL (ref 8.7–10.5)
CHLORIDE SERPL-SCNC: 95 MMOL/L (ref 95–110)
CO2 SERPL-SCNC: 23 MMOL/L (ref 23–29)
CREAT SERPL-MCNC: 0.9 MG/DL (ref 0.5–1.4)
DIFFERENTIAL METHOD: ABNORMAL
EOSINOPHIL # BLD AUTO: 0.1 K/UL (ref 0–0.5)
EOSINOPHIL NFR BLD: 0.8 % (ref 0–8)
ERYTHROCYTE [DISTWIDTH] IN BLOOD BY AUTOMATED COUNT: 14.6 % (ref 11.5–14.5)
EST. GFR  (AFRICAN AMERICAN): >60 ML/MIN/1.73 M^2
EST. GFR  (NON AFRICAN AMERICAN): >60 ML/MIN/1.73 M^2
GLUCOSE SERPL-MCNC: 153 MG/DL (ref 70–110)
HCT VFR BLD AUTO: 33.4 % (ref 40–54)
HGB BLD-MCNC: 10.2 G/DL (ref 14–18)
IMM GRANULOCYTES # BLD AUTO: 0.05 K/UL (ref 0–0.04)
IMM GRANULOCYTES NFR BLD AUTO: 0.5 % (ref 0–0.5)
LYMPHOCYTES # BLD AUTO: 0.6 K/UL (ref 1–4.8)
LYMPHOCYTES NFR BLD: 5.9 % (ref 18–48)
MAGNESIUM SERPL-MCNC: 2 MG/DL (ref 1.6–2.6)
MCH RBC QN AUTO: 31.5 PG (ref 27–31)
MCHC RBC AUTO-ENTMCNC: 30.5 G/DL (ref 32–36)
MCV RBC AUTO: 103 FL (ref 82–98)
MONOCYTES # BLD AUTO: 0.3 K/UL (ref 0.3–1)
MONOCYTES NFR BLD: 3.5 % (ref 4–15)
NEUTROPHILS # BLD AUTO: 8.3 K/UL (ref 1.8–7.7)
NEUTROPHILS NFR BLD: 89.2 % (ref 38–73)
NRBC BLD-RTO: 0 /100 WBC
PLATELET # BLD AUTO: 155 K/UL (ref 150–450)
PMV BLD AUTO: 9.9 FL (ref 9.2–12.9)
POCT GLUCOSE: 149 MG/DL (ref 70–110)
POCT GLUCOSE: 190 MG/DL (ref 70–110)
POCT GLUCOSE: 211 MG/DL (ref 70–110)
POCT GLUCOSE: 287 MG/DL (ref 70–110)
POTASSIUM SERPL-SCNC: 5.2 MMOL/L (ref 3.5–5.1)
PROT SERPL-MCNC: 5.5 G/DL (ref 6–8.4)
RBC # BLD AUTO: 3.24 M/UL (ref 4.6–6.2)
SODIUM SERPL-SCNC: 125 MMOL/L (ref 136–145)
TACROLIMUS BLD-MCNC: 6.2 NG/ML (ref 5–15)
VANCOMYCIN TROUGH SERPL-MCNC: 12.3 UG/ML (ref 10–22)
WBC # BLD AUTO: 9.3 K/UL (ref 3.9–12.7)

## 2022-07-14 PROCEDURE — 36415 COLL VENOUS BLD VENIPUNCTURE: CPT | Performed by: SURGERY

## 2022-07-14 PROCEDURE — 25000003 PHARM REV CODE 250

## 2022-07-14 PROCEDURE — 80053 COMPREHEN METABOLIC PANEL: CPT

## 2022-07-14 PROCEDURE — 85025 COMPLETE CBC W/AUTO DIFF WBC: CPT

## 2022-07-14 PROCEDURE — 20600001 HC STEP DOWN PRIVATE ROOM

## 2022-07-14 PROCEDURE — 80197 ASSAY OF TACROLIMUS: CPT

## 2022-07-14 PROCEDURE — 83735 ASSAY OF MAGNESIUM: CPT

## 2022-07-14 PROCEDURE — 36415 COLL VENOUS BLD VENIPUNCTURE: CPT

## 2022-07-14 PROCEDURE — 63600175 PHARM REV CODE 636 W HCPCS

## 2022-07-14 PROCEDURE — 63600175 PHARM REV CODE 636 W HCPCS: Performed by: PHYSICIAN ASSISTANT

## 2022-07-14 PROCEDURE — 99233 PR SUBSEQUENT HOSPITAL CARE,LEVL III: ICD-10-PCS | Mod: 24,,,

## 2022-07-14 PROCEDURE — 99233 SBSQ HOSP IP/OBS HIGH 50: CPT | Mod: 24,,,

## 2022-07-14 PROCEDURE — 80202 ASSAY OF VANCOMYCIN: CPT | Performed by: SURGERY

## 2022-07-14 PROCEDURE — 63600175 PHARM REV CODE 636 W HCPCS: Performed by: NURSE PRACTITIONER

## 2022-07-14 PROCEDURE — 25000003 PHARM REV CODE 250: Performed by: SURGERY

## 2022-07-14 PROCEDURE — 63600175 PHARM REV CODE 636 W HCPCS: Performed by: SURGERY

## 2022-07-14 RX ORDER — IBUPROFEN 200 MG
24 TABLET ORAL
Status: DISCONTINUED | OUTPATIENT
Start: 2022-07-14 | End: 2022-07-16 | Stop reason: HOSPADM

## 2022-07-14 RX ORDER — INSULIN ASPART 100 [IU]/ML
0-5 INJECTION, SOLUTION INTRAVENOUS; SUBCUTANEOUS
Status: DISCONTINUED | OUTPATIENT
Start: 2022-07-14 | End: 2022-07-16 | Stop reason: HOSPADM

## 2022-07-14 RX ORDER — IBUPROFEN 200 MG
16 TABLET ORAL
Status: DISCONTINUED | OUTPATIENT
Start: 2022-07-14 | End: 2022-07-16 | Stop reason: HOSPADM

## 2022-07-14 RX ORDER — GLUCAGON 1 MG
1 KIT INJECTION
Status: DISCONTINUED | OUTPATIENT
Start: 2022-07-14 | End: 2022-07-16 | Stop reason: HOSPADM

## 2022-07-14 RX ADMIN — VALGANCICLOVIR HYDROCHLORIDE 450 MG: 450 TABLET ORAL at 08:07

## 2022-07-14 RX ADMIN — OXYCODONE HYDROCHLORIDE 10 MG: 10 TABLET ORAL at 04:07

## 2022-07-14 RX ADMIN — HYDROMORPHONE HYDROCHLORIDE 0.5 MG: 1 INJECTION, SOLUTION INTRAMUSCULAR; INTRAVENOUS; SUBCUTANEOUS at 09:07

## 2022-07-14 RX ADMIN — HYDROMORPHONE HYDROCHLORIDE 0.5 MG: 1 INJECTION, SOLUTION INTRAMUSCULAR; INTRAVENOUS; SUBCUTANEOUS at 01:07

## 2022-07-14 RX ADMIN — ACETAMINOPHEN 650 MG: 325 TABLET ORAL at 04:07

## 2022-07-14 RX ADMIN — Medication 1 TABLET: at 08:07

## 2022-07-14 RX ADMIN — METHOCARBAMOL 500 MG: 500 TABLET ORAL at 12:07

## 2022-07-14 RX ADMIN — METHOCARBAMOL 500 MG: 500 TABLET ORAL at 08:07

## 2022-07-14 RX ADMIN — TAMSULOSIN HYDROCHLORIDE 0.8 MG: 0.4 CAPSULE ORAL at 08:07

## 2022-07-14 RX ADMIN — OXYCODONE HYDROCHLORIDE 10 MG: 10 TABLET ORAL at 09:07

## 2022-07-14 RX ADMIN — INSULIN ASPART 3 UNITS: 100 INJECTION, SOLUTION INTRAVENOUS; SUBCUTANEOUS at 09:07

## 2022-07-14 RX ADMIN — VANCOMYCIN HYDROCHLORIDE 1000 MG: 1 INJECTION, POWDER, LYOPHILIZED, FOR SOLUTION INTRAVENOUS at 09:07

## 2022-07-14 RX ADMIN — CARVEDILOL 3.12 MG: 3.12 TABLET, FILM COATED ORAL at 08:07

## 2022-07-14 RX ADMIN — CEFEPIME 1 G: 1 INJECTION, POWDER, FOR SOLUTION INTRAMUSCULAR; INTRAVENOUS at 09:07

## 2022-07-14 RX ADMIN — PANTOPRAZOLE SODIUM 40 MG: 40 TABLET, DELAYED RELEASE ORAL at 08:07

## 2022-07-14 RX ADMIN — FLUCONAZOLE 200 MG: 200 TABLET ORAL at 08:07

## 2022-07-14 RX ADMIN — CEFEPIME 1 G: 1 INJECTION, POWDER, FOR SOLUTION INTRAMUSCULAR; INTRAVENOUS at 04:07

## 2022-07-14 RX ADMIN — METHOCARBAMOL 500 MG: 500 TABLET ORAL at 09:07

## 2022-07-14 RX ADMIN — VANCOMYCIN HYDROCHLORIDE 1000 MG: 1 INJECTION, POWDER, LYOPHILIZED, FOR SOLUTION INTRAVENOUS at 10:07

## 2022-07-14 RX ADMIN — OXYCODONE HYDROCHLORIDE 10 MG: 10 TABLET ORAL at 12:07

## 2022-07-14 RX ADMIN — TACROLIMUS 1.5 MG: 1 CAPSULE ORAL at 05:07

## 2022-07-14 RX ADMIN — TACROLIMUS 1 MG: 1 CAPSULE ORAL at 08:07

## 2022-07-14 RX ADMIN — INSULIN ASPART 2 UNITS: 100 INJECTION, SOLUTION INTRAVENOUS; SUBCUTANEOUS at 04:07

## 2022-07-14 RX ADMIN — OXYCODONE HYDROCHLORIDE 10 MG: 10 TABLET ORAL at 08:07

## 2022-07-14 RX ADMIN — HYDROMORPHONE HYDROCHLORIDE 0.5 MG: 1 INJECTION, SOLUTION INTRAMUSCULAR; INTRAVENOUS; SUBCUTANEOUS at 11:07

## 2022-07-14 RX ADMIN — INSULIN ASPART 3 UNITS: 100 INJECTION, SOLUTION INTRAVENOUS; SUBCUTANEOUS at 12:07

## 2022-07-14 RX ADMIN — INSULIN ASPART 3 UNITS: 100 INJECTION, SOLUTION INTRAVENOUS; SUBCUTANEOUS at 11:07

## 2022-07-14 RX ADMIN — CEFEPIME 1 G: 1 INJECTION, POWDER, FOR SOLUTION INTRAMUSCULAR; INTRAVENOUS at 12:07

## 2022-07-14 RX ADMIN — ASPIRIN 81 MG CHEWABLE TABLET 81 MG: 81 TABLET CHEWABLE at 08:07

## 2022-07-14 RX ADMIN — PREDNISONE 10 MG: 10 TABLET ORAL at 08:07

## 2022-07-14 RX ADMIN — METHOCARBAMOL 500 MG: 500 TABLET ORAL at 05:07

## 2022-07-14 RX ADMIN — CARVEDILOL 3.12 MG: 3.12 TABLET, FILM COATED ORAL at 09:07

## 2022-07-14 NOTE — Clinical Note
Next appt Visit date not found   Last appt 22    Refill Requested / Last Refill Info:  clonazePAM (KlonoPIN) 0.5 MG tablet 15 tablet 0 3/31/2022     Si/2 tab to 1 tab nightly as needed for anxiety      Refill unable to be completed per standing protocol due to: Non-Protocol Medication    Orders pended, and routed to provider for approval.   Please route any notes back to your nursing pool via patient call NOT Rx Auth.   Thank you, Refill Center Staff Please cancel pump removal this Saturday and move it to this Wednesday 1/5. Please schedule CBC, CMP, CA 19-9, POC creatinine and CT C/A/P at [place] on 1/18 in the morning. RTC on 1/19 to see me then get cis/gem. CBC, CMP, CA 19-9 at [place] on 1/25, see NP on 1/26 then get cis/gem

## 2022-07-14 NOTE — PLAN OF CARE
NAEON  VSS  Room air, sats >92%  AOX4  Wife at bedside  Pt up independent  JPX2, right biliary drainage, left serosanguinous drainage- drained Q4 or as needed  Bili drain in place  Chevron incision DEBBIE w/ staples  LFA PIV  ACHS, SSI and scheduled given  IV abx given  PRNs given for pain  K remains slightly elevated, Cr improving  Safety/fall precautions maintained  Bed locked in lowest position  Call light within reach  WCTM

## 2022-07-14 NOTE — ASSESSMENT & PLAN NOTE
- Persistent bile leak post op, unable to be adequately identifed in surgery  - Keeping drain empty- right drain placed 6/21/22, drain on left placed during recent hospitalization. Both w bilious output  - Continue broad spectrum antibiotics: cefepime, vanc  - PTC drained placed on 7/15. Total of 165ml bilious drainage post drain placement

## 2022-07-14 NOTE — HPI
Reason for Consult: Management of T2DM, Hyperglycemia      Surgical Procedure and Date: Liver Transplant 6/21/2022; LAPAROTOMY, EXPLORATORY (N/A) on 7/4; IR for drain placement 7/13/22     Diabetes diagnosis year: 2022     Home Diabetes Medications:  Novolog 7 units TID with meals   Lab Results   Component Value Date    HGBA1C 5.8 (H) 06/20/2022          How often checking glucose at home?  3-4x daily    BG readings on regimen: 100s   Hypoglycemia on the regimen?  No  Missed doses on regimen?  No     Diabetes Complications include:     Hyperglycemia      Complicating diabetes co morbidities:   Glucocorticoid Use     HPI:   Patient is a 41 y.o. male with a diagnosis of well controlled type 2 DM. 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 Post operative course significant for recurrent bile leak. Patient presented for outpatient follow up, drain placed last hospitalization found to have bilious output, and patient reported feeling weak with decreased appeitie. Admitted for further interventions. Endocrinology consulted for BG/ DM management.

## 2022-07-14 NOTE — SUBJECTIVE & OBJECTIVE
Scheduled Meds:   aspirin  81 mg Oral Daily    calcium-vitamin D3  1 tablet Oral Daily    carvediloL  3.125 mg Oral BID    ceFEPime (MAXIPIME) IVPB  1 g Intravenous Q8H    fluconazole  200 mg Oral Daily    insulin aspart U-100  3-5 Units Subcutaneous TIDWM    methocarbamoL  500 mg Oral QID    pantoprazole  40 mg Oral Daily    predniSONE  10 mg Oral Daily    tacrolimus  1.5 mg Oral BID    tamsulosin  0.8 mg Oral Daily    valGANciclovir  450 mg Oral Daily    vancomycin (VANCOCIN) IVPB  1,000 mg Intravenous Q12H     Continuous Infusions:  PRN Meds:acetaminophen, aluminum & magnesium hydroxide-simethicone, dextrose 10%, dextrose 10%, fentaNYL, glucagon (human recombinant), glucose, glucose, HYDROmorphone, HYDROmorphone, insulin aspart U-100, iohexol, melatonin, ondansetron, ondansetron, oxyCODONE, oxyCODONE, sodium chloride 0.9%, Pharmacy to dose Vancomycin consult **AND** vancomycin - pharmacy to dose    Review of Systems   Constitutional:  Positive for appetite change. Negative for chills, fatigue and fever.   HENT: Negative.     Eyes: Negative.    Respiratory:  Negative for cough, chest tightness, shortness of breath, wheezing and stridor.    Cardiovascular:  Negative for chest pain, palpitations and leg swelling.   Gastrointestinal:  Positive for abdominal pain. Negative for nausea and vomiting.   Endocrine: Negative.    Genitourinary:  Negative for difficulty urinating.   Musculoskeletal:  Negative for arthralgias, myalgias and neck pain.   Skin:  Positive for wound.   Allergic/Immunologic: Positive for immunocompromised state.   Neurological:  Negative for dizziness, tremors, syncope, weakness and numbness.   Psychiatric/Behavioral:  Negative for confusion and decreased concentration.    Objective:     Vital Signs (Most Recent):  Temp: 98.9 °F (37.2 °C) (07/14/22 0752)  Pulse: 94 (07/14/22 1142)  Resp: 17 (07/14/22 1201)  BP: 110/75 (07/14/22 0752)  SpO2: 98 % (07/14/22 0752) Vital Signs (24h Range):  Temp:  [97.3  °F (36.3 °C)-99.6 °F (37.6 °C)] 98.9 °F (37.2 °C)  Pulse:  [] 94  Resp:  [16-25] 17  SpO2:  [93 %-100 %] 98 %  BP: (105-120)/(65-86) 110/75     Weight: 75.1 kg (165 lb 9.1 oz)  Body mass index is 25.17 kg/m².    Intake/Output - Last 3 Shifts         07/12 0700 07/13 0659 07/13 0700 07/14 0659 07/14 0700  07/15 0659    P.O. 180 1480     I.V. (mL/kg) 270.6 (3.6)      IV Piggyback 298.5      Total Intake(mL/kg) 749.1 (9.9) 1480 (19.7)     Urine (mL/kg/hr) 0 300 (0.2)     Drains 185 210 80    Total Output 185 510 80    Net +564.1 +970 -80           Urine Occurrence 2 x 4 x     Stool Occurrence  0 x             Physical Exam  Vitals and nursing note reviewed.   Constitutional:       General: He is not in acute distress.     Appearance: Normal appearance. He is not ill-appearing or toxic-appearing.   Eyes:      General:         Right eye: No discharge.         Left eye: No discharge.      Extraocular Movements: Extraocular movements intact.      Conjunctiva/sclera: Conjunctivae normal.   Cardiovascular:      Rate and Rhythm: Normal rate and regular rhythm.      Pulses: Normal pulses.      Heart sounds: Normal heart sounds, S1 normal and S2 normal.   Pulmonary:      Effort: Pulmonary effort is normal. No respiratory distress.      Breath sounds: Normal breath sounds. No stridor. No wheezing, rhonchi or rales.   Abdominal:      General: Bowel sounds are decreased.      Palpations: Abdomen is soft.      Tenderness: There is abdominal tenderness in the right lower quadrant, suprapubic area and left lower quadrant. There is no guarding.          Comments: Chevron incision CDI  R abdominal SHRUTHI drain in place with serousang output  L abdominal SHRUTHI drain in place with bilious output  PTC drain with bilious output     Musculoskeletal:      Cervical back: Neck supple.      Right lower leg: No edema.      Left lower leg: No edema.   Skin:     General: Skin is warm and dry.      Capillary Refill: Capillary refill takes less  than 2 seconds.   Neurological:      Mental Status: He is alert and oriented to person, place, and time.   Psychiatric:         Mood and Affect: Mood normal.         Speech: Speech normal.         Behavior: Behavior normal. Behavior is cooperative.         Thought Content: Thought content normal.         Judgment: Judgment normal.       Laboratory:  Immunosuppressants           Stop Route Frequency     tacrolimus capsule 1.5 mg         -- Oral 2 times daily          CBC:   Recent Labs   Lab 07/14/22 0718   WBC 9.30   RBC 3.24*   HGB 10.2*   HCT 33.4*      *   MCH 31.5*   MCHC 30.5*     CMP:   Recent Labs   Lab 07/14/22 0718   *   CALCIUM 8.8   ALBUMIN 2.2*   PROT 5.5*   *   K 5.2*   CO2 23   CL 95   BUN 22*   CREATININE 0.9   ALKPHOS 185*   *   AST 79*   BILITOT 1.2*     Cardiac Markers: No results for input(s): CKMB, TROPONINT, MYOGLOBIN in the last 168 hours.  Labs within the past 24 hours have been reviewed.

## 2022-07-14 NOTE — SUBJECTIVE & OBJECTIVE
Interval HPI:   Overnight events: In PACU following IR for drain placement. BG at or slightly above goal with prn correction scale. Prednisone 10 mg daily.   Eating: Diet diabetic [facility]; 2000 Calorie  Nausea: No  Hypoglycemia and intervention: No  Fever: No  TPN and/or TF: No    PMH, PSH, FH, SH  reviewed       Review of Systems  Constitutional: Negative for weight changes.  Eyes: Negative for visual disturbance.  Respiratory: Negative for cough.   Cardiovascular: Negative for chest pain.  Gastrointestinal: Negative for nausea.  Endocrine: Negative for polyuria, polydipsia.  Musculoskeletal: Negative for back pain.  Skin: Negative for rash.  Neurological: Negative for syncope.  Psychiatric/Behavioral: Negative for depression.      Current Medications and/or Treatments Impacting Glycemic Control  Immunotherapy:    Immunosuppressants           Stop Route Frequency     tacrolimus capsule 1 mg         -- Oral 2 times daily          Steroids:   Hormones (From admission, onward)                Start     Stop Route Frequency Ordered    07/13/22 0900  predniSONE tablet 10 mg         -- Oral Daily 07/12/22 1751    07/12/22 1751  melatonin tablet 6 mg         -- Oral Nightly PRN 07/12/22 1751          Pressors:    Autonomic Drugs (From admission, onward)                None          Hyperglycemia/Diabetes Medications:   Antihyperglycemics (From admission, onward)                Start     Stop Route Frequency Ordered    07/12/22 1750  insulin aspart U-100 pen 0-5 Units         -- SubQ Before meals & nightly PRN 07/12/22 1750    07/12/22 1715  insulin aspart U-100 pen 4 Units         -- SubQ 3 times daily with meals 07/12/22 1622             PHYSICAL EXAMINATION:  Vitals:    07/13/22 1925   BP: 109/76   Pulse: 101   Resp: 18   Temp: 98.9 °F (37.2 °C)     Body mass index is 25.31 kg/m².    Physical Exam  Constitutional: Well developed, well nourished, NAD.  ENT: External ears no masses with nose patent; normal  hearing.  Neck: Supple; trachea midline.  Cardiovascular: Normal heart sounds, no LE edema. DP +2 bilaterally.  Lungs: Normal effort; lungs anterior bilaterally clear to auscultation.  Abdomen: Soft, no masses, no hernias.  MS: No clubbing or cyanosis of nails noted; unable to assess gait.  Skin: No rashes, lesions, or ulcers; no nodules. Injection sites are ok. No lipo hypertropthy or atrophy. Chevron incision. Drains.   Psychiatric: Good judgement and insight; normal mood and affect.  Neurological: Cranial nerves are grossly intact.   Foot: nails in good condition, no amputations noted.

## 2022-07-14 NOTE — CARE UPDATE
BG goal 140-180    -A1C   Lab Results   Component Value Date    HGBA1C 5.8 (H) 06/20/2022         -HOME REGIMEN: novolog 7 units with meals     -INPATIENT REGIMEN: novolog 3-5 units with meals.    -GLUCOSE TREND FOR THE PAST 24HRS: 149-245    -NO HYPOGYCEMIAS NOTED     -TOLERATING 25% OF PO DIET     -Diet: Diet diabetic [facility]; 2000 Calorie    -Steroids - prednisone 10 mg daily       Remains in TSU, NAEON. BG reasonably well controlled with scheduled insulin and prn correction scale.       Plan:  BG monitoring ac/hs and low dose correction scale.   Continue novolog 3-5 units with meals.     Discharge planning:tbd     Endocrine to continue to follow    ** Please call Endocrine for any BG related issues **

## 2022-07-14 NOTE — CONSULTS
[facility] - Transplant Stepdown  Endocrinology  Diabetes Consult Note    Consult Requested by: Sinan Cline MD   Reason for admit: Biloma of transplanted liver    HISTORY OF PRESENT ILLNESS:  Reason for Consult: Management of T2DM, Hyperglycemia      Surgical Procedure and Date: Liver Transplant 6/21/2022; LAPAROTOMY, EXPLORATORY (N/A) on 7/4; IR for drain placement 7/13/22     Diabetes diagnosis year: 2022     Home Diabetes Medications:  Novolog 7 units TID with meals   Lab Results   Component Value Date    HGBA1C 5.8 (H) 06/20/2022          How often checking glucose at home?  3-4x daily    BG readings on regimen: 100s   Hypoglycemia on the regimen?  No  Missed doses on regimen?  No     Diabetes Complications include:     Hyperglycemia      Complicating diabetes co morbidities:   Glucocorticoid Use     HPI:   Patient is a 41 y.o. male with a diagnosis of well controlled type 2 DM. 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 Post operative course significant for recurrent bile leak. Patient presented for outpatient follow up, drain placed last hospitalization found to have bilious output, and patient reported feeling weak with decreased appeitie. Admitted for further interventions. Endocrinology consulted for BG/ DM management.       Interval HPI:   Overnight events: In PACU following IR for drain placement. BG at or slightly above goal with prn correction scale. Prednisone 10 mg daily.   Eating: Diet diabetic [facility]; 2000 Calorie  Nausea: No  Hypoglycemia and intervention: No  Fever: No  TPN and/or TF: No    PMH, PSH, FH, SH  reviewed       Review of Systems  Constitutional: Negative for weight changes.  Eyes: Negative for visual disturbance.  Respiratory: Negative for cough.   Cardiovascular: Negative for chest pain.  Gastrointestinal: Negative for nausea.  Endocrine: Negative for polyuria, polydipsia.  Musculoskeletal: Negative for back pain.  Skin:  Negative for rash.  Neurological: Negative for syncope.  Psychiatric/Behavioral: Negative for depression.      Current Medications and/or Treatments Impacting Glycemic Control  Immunotherapy:    Immunosuppressants           Stop Route Frequency     tacrolimus capsule 1 mg         -- Oral 2 times daily          Steroids:   Hormones (From admission, onward)                Start     Stop Route Frequency Ordered    07/13/22 0900  predniSONE tablet 10 mg         -- Oral Daily 07/12/22 1751    07/12/22 1751  melatonin tablet 6 mg         -- Oral Nightly PRN 07/12/22 1751          Pressors:    Autonomic Drugs (From admission, onward)                None          Hyperglycemia/Diabetes Medications:   Antihyperglycemics (From admission, onward)                Start     Stop Route Frequency Ordered    07/12/22 1750  insulin aspart U-100 pen 0-5 Units         -- SubQ Before meals & nightly PRN 07/12/22 1750    07/12/22 1715  insulin aspart U-100 pen 4 Units         -- SubQ 3 times daily with meals 07/12/22 1622             PHYSICAL EXAMINATION:  Vitals:    07/13/22 1925   BP: 109/76   Pulse: 101   Resp: 18   Temp: 98.9 °F (37.2 °C)     Body mass index is 25.31 kg/m².    Physical Exam  Constitutional: Well developed, well nourished, NAD.  ENT: External ears no masses with nose patent; normal hearing.  Neck: Supple; trachea midline.  Cardiovascular: Normal heart sounds, no LE edema. DP +2 bilaterally.  Lungs: Normal effort; lungs anterior bilaterally clear to auscultation.  Abdomen: Soft, no masses, no hernias.  MS: No clubbing or cyanosis of nails noted; unable to assess gait.  Skin: No rashes, lesions, or ulcers; no nodules. Injection sites are ok. No lipo hypertropthy or atrophy. Chevron incision. Drains.   Psychiatric: Good judgement and insight; normal mood and affect.  Neurological: Cranial nerves are grossly intact.   Foot: nails in good condition, no amputations noted.        Labs Reviewed and Include   Recent Labs   Lab  07/13/22  0541   *   CALCIUM 9.1   ALBUMIN 2.6*   PROT 5.9*   *   K 5.2*   CO2 26   CL 91*   BUN 22*   CREATININE 1.3   ALKPHOS 234*   ALT 54*   AST 13   BILITOT 1.7*     Lab Results   Component Value Date    WBC 10.51 07/13/2022    HGB 13.2 (L) 07/13/2022    HCT 39.5 (L) 07/13/2022    MCV 95 07/13/2022     07/13/2022     No results for input(s): TSH, FREET4 in the last 168 hours.  Lab Results   Component Value Date    HGBA1C 5.8 (H) 06/20/2022       Nutritional status:   Body mass index is 25.31 kg/m².  Lab Results   Component Value Date    ALBUMIN 2.6 (L) 07/13/2022    ALBUMIN 3.1 (L) 07/12/2022    ALBUMIN 3.1 (L) 07/11/2022     No results found for: PREALBUMIN    Estimated Creatinine Clearance: 70.9 mL/min (based on SCr of 1.3 mg/dL).    Accu-Checks  Recent Labs     07/12/22  2051 07/13/22  0818 07/13/22  1249 07/13/22  1835 07/13/22 2003   POCTGLUCOSE 144* 198* 245* 171* 163*        ASSESSMENT and PLAN    * Biloma of transplanted liver  Managed per primary.         Type 2 diabetes mellitus with hyperglycemia  BG goal 140-180    BG monitoring ac/hs and low dose correction scale.   Start novolog 3-5 units with meals.     S/P liver transplant  avoid hypoglycemia        Adrenal corticosteroid causing adverse effect in therapeutic use  Glucocorticoids markedly increase prandial glucoses. Expect the steroid taper will help glucose control.         Prophylactic immunotherapy  May increase insulin resistance.             Plan discussed with patient at bedside.     Brinda Zuniga NP  Endocrinology  [facility] - Transplant Stepdown

## 2022-07-14 NOTE — ASSESSMENT & PLAN NOTE
BG goal 140-180    BG monitoring ac/hs and low dose correction scale.   Start novolog 3-5 units with meals.

## 2022-07-14 NOTE — PROGRESS NOTES
[facility] - Transplant Stepdown  Liver Transplant  Progress Note    Patient Name: Romeo Larson  MRN: [id number]  Admission Date: 7/12/2022  Hospital Length of Stay: 2 days  Code Status: Full Code  Primary Care Provider: Pravin Maria MD  Post-Operative Day: 23    ORGAN:   RIGHT LIVER LOBE (SEGS 5,6,7,8) WITHOUT MIDDLE HEPATIC VEIN  Disease Etiology: Primary Liver Malignancy: Cholangiocarcinoma (CH-CA)  Donor Type:   Living  CDC High Risk:     Donor CMV Status:   Donor CMV Status:   Donor HBcAB:     Donor HCV Status:     Donor HBV GUSATBO:   Donor HCV GUSTABO:   Whole or Partial:   Biliary Anastomosis: Vick-en-Y  Arterial Anatomy: Standard  Subjective:     History of Present Illness:  Romeo Larson is a 41yr old male s/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 Post operative course significant for recurrent bile leak. OR take back 6/23 for bile duct repair (small leak found near biliary anastomosis, unable to clearly identify source), cultures grew enterococcus faecalis. OR take back 7/4 for ex lap, washout of biloma, bile duct unable to be assessed due to adhesions, OR cultures with lactobacillus species. Patient was discharged home with 1 drain in place. Patient presented for outpatient follow up, drain found to be bilious, and patient reported feeling weak, decreased appeitie. Decision made to admit patient for further interventions. Patient denies fever, chest pain, SOB, change in bowel function. He reports abdominal/suprapubic pain and urinary retention. Last voided at 1500. He remains on Flomax. Plan to start broad spectrum antibiotics, obtain CT abd/pelvis W PO/IV contrast, IR consult for PTC drain.       Hospital Course:  NAEON. Patient states he feels better today than yesterday. PTC drain placed yesterday in IR. 165ml of bilious output post drain placement. Left side abdominal SHRUTHI drain with bilious output with a total of 45ml within past 24 hours. R side abdominal SHRUTHI  drain with more serous-serosanguinous drainage today with a total of 80ml within last 24 hours. Will continue IV cefepime and Vancomycin. Encourage patient to increase PO intake.  All VSS. Will continue to monitor closely.       Scheduled Meds:   aspirin  81 mg Oral Daily    calcium-vitamin D3  1 tablet Oral Daily    carvediloL  3.125 mg Oral BID    ceFEPime (MAXIPIME) IVPB  1 g Intravenous Q8H    fluconazole  200 mg Oral Daily    insulin aspart U-100  3-5 Units Subcutaneous TIDWM    methocarbamoL  500 mg Oral QID    pantoprazole  40 mg Oral Daily    predniSONE  10 mg Oral Daily    tacrolimus  1.5 mg Oral BID    tamsulosin  0.8 mg Oral Daily    valGANciclovir  450 mg Oral Daily    vancomycin (VANCOCIN) IVPB  1,000 mg Intravenous Q12H     Continuous Infusions:  PRN Meds:acetaminophen, aluminum & magnesium hydroxide-simethicone, dextrose 10%, dextrose 10%, fentaNYL, glucagon (human recombinant), glucose, glucose, HYDROmorphone, HYDROmorphone, insulin aspart U-100, iohexol, melatonin, ondansetron, ondansetron, oxyCODONE, oxyCODONE, sodium chloride 0.9%, Pharmacy to dose Vancomycin consult **AND** vancomycin - pharmacy to dose    Review of Systems   Constitutional:  Positive for appetite change. Negative for chills, fatigue and fever.   HENT: Negative.     Eyes: Negative.    Respiratory:  Negative for cough, chest tightness, shortness of breath, wheezing and stridor.    Cardiovascular:  Negative for chest pain, palpitations and leg swelling.   Gastrointestinal:  Positive for abdominal pain. Negative for nausea and vomiting.   Endocrine: Negative.    Genitourinary:  Negative for difficulty urinating.   Musculoskeletal:  Negative for arthralgias, myalgias and neck pain.   Skin:  Positive for wound.   Allergic/Immunologic: Positive for immunocompromised state.   Neurological:  Negative for dizziness, tremors, syncope, weakness and numbness.   Psychiatric/Behavioral:  Negative for confusion and decreased  concentration.    Objective:     Vital Signs (Most Recent):  Temp: 98.9 °F (37.2 °C) (07/14/22 0752)  Pulse: 94 (07/14/22 1142)  Resp: 17 (07/14/22 1201)  BP: 110/75 (07/14/22 0752)  SpO2: 98 % (07/14/22 0752) Vital Signs (24h Range):  Temp:  [97.3 °F (36.3 °C)-99.6 °F (37.6 °C)] 98.9 °F (37.2 °C)  Pulse:  [] 94  Resp:  [16-25] 17  SpO2:  [93 %-100 %] 98 %  BP: (105-120)/(65-86) 110/75     Weight: 75.1 kg (165 lb 9.1 oz)  Body mass index is 25.17 kg/m².    Intake/Output - Last 3 Shifts         07/12 0700 07/13 0659 07/13 0700 07/14 0659 07/14 0700  07/15 0659    P.O. 180 1480     I.V. (mL/kg) 270.6 (3.6)      IV Piggyback 298.5      Total Intake(mL/kg) 749.1 (9.9) 1480 (19.7)     Urine (mL/kg/hr) 0 300 (0.2)     Drains 185 210 80    Total Output 185 510 80    Net +564.1 +970 -80           Urine Occurrence 2 x 4 x     Stool Occurrence  0 x             Physical Exam  Vitals and nursing note reviewed.   Constitutional:       General: He is not in acute distress.     Appearance: Normal appearance. He is not ill-appearing or toxic-appearing.   Eyes:      General:         Right eye: No discharge.         Left eye: No discharge.      Extraocular Movements: Extraocular movements intact.      Conjunctiva/sclera: Conjunctivae normal.   Cardiovascular:      Rate and Rhythm: Normal rate and regular rhythm.      Pulses: Normal pulses.      Heart sounds: Normal heart sounds, S1 normal and S2 normal.   Pulmonary:      Effort: Pulmonary effort is normal. No respiratory distress.      Breath sounds: Normal breath sounds. No stridor. No wheezing, rhonchi or rales.   Abdominal:      General: Bowel sounds are decreased.      Palpations: Abdomen is soft.      Tenderness: There is abdominal tenderness in the right lower quadrant, suprapubic area and left lower quadrant. There is no guarding.          Comments: Chevron incision CDI  R abdominal SHRUTHI drain in place with serousang output  L abdominal SHRUTHI drain in place with bilious  output  PTC drain with bilious output     Musculoskeletal:      Cervical back: Neck supple.      Right lower leg: No edema.      Left lower leg: No edema.   Skin:     General: Skin is warm and dry.      Capillary Refill: Capillary refill takes less than 2 seconds.   Neurological:      Mental Status: He is alert and oriented to person, place, and time.   Psychiatric:         Mood and Affect: Mood normal.         Speech: Speech normal.         Behavior: Behavior normal. Behavior is cooperative.         Thought Content: Thought content normal.         Judgment: Judgment normal.       Laboratory:  Immunosuppressants           Stop Route Frequency     tacrolimus capsule 1.5 mg         -- Oral 2 times daily          CBC:   Recent Labs   Lab 07/14/22 0718   WBC 9.30   RBC 3.24*   HGB 10.2*   HCT 33.4*      *   MCH 31.5*   MCHC 30.5*     CMP:   Recent Labs   Lab 07/14/22 0718   *   CALCIUM 8.8   ALBUMIN 2.2*   PROT 5.5*   *   K 5.2*   CO2 23   CL 95   BUN 22*   CREATININE 0.9   ALKPHOS 185*   *   AST 79*   BILITOT 1.2*     Cardiac Markers: No results for input(s): CKMB, TROPONINT, MYOGLOBIN in the last 168 hours.  Labs within the past 24 hours have been reviewed.          Assessment/Plan:     * Biloma of transplanted liver  - Persistent bile leak post op, unable to be adequately identifed in surgery  - Keeping drain empty- right drain placed 6/21/22, drain on left placed during recent hospitalization. Both w bilious output  - Continue broad spectrum antibiotics: cefepime, vanc  - PTC drained placed on 7/15. Total of 165ml bilious drainage post drain placement      Bile leak  - See Biloma of transplanted liver       Urinary retention  - hx of urinary retention last hospitalization  - remains on Flomax  - nurse comm to bladder scan, if >250, straight cath  - leave campos in place if urine return >250    -Patient reports not having any issues with voiding at this time; will continue to  monitor       Malnutrition of mild degree  - supplements ordered  - consider dietician consult  - encouraged more PO intake     Long-term use of immunosuppressant medication  - Maintenance IS with prograf. cont to check tacrolimus level daily. Assess for toxicity and adjust level as needed    Adrenal corticosteroid causing adverse effect in therapeutic use  - Endocrine consulted; appreciate assistance       Type 2 diabetes mellitus with hyperglycemia  - Endocrine consulted for DM management.     At risk for opportunistic infections  - continue bactrim for PCP prophylaxis  - continue valcyte for CMV prophylaxis  - continue fluconazole for fungal prophylaxis    Prophylactic immunotherapy  - See long-term use of immunosuppressant medication    S/P liver transplant  - s/p living liver txp 6/21/22  - See biloma of transplanted liver        VTE Risk Mitigation (From admission, onward)         Ordered     IP VTE HIGH RISK PATIENT  Once         07/12/22 1751     Place sequential compression device  Until discontinued         07/12/22 1751                The patients clinical status was discussed at multidisplinary rounds, involving transplant surgery, transplant medicine, pharmacy, nursing, nutrition, and social work    Discharge Planning:  Not applicable for discharge at this time.     Hannah Young NP  Liver Transplant  [facility] - Transplant Stepdown

## 2022-07-14 NOTE — PLAN OF CARE
Pt aaox4 o/n. Pt independent and ambulatory. VSS o/n; HR improved. Pt endorses abd pain and pain at new drain site-relieved c oxy. PTC drain placed yesterday-100cc bilious outpt noted. SHRUTHI drains x2 to bulb sxn-minimal outpt o/n. Drain 1 remains serous sang; drain 2 remains c greenish outpt. Bld cx NGTD-cont c cefepime/vanc. Pt instructed to call c needs/concerns.

## 2022-07-15 DIAGNOSIS — Z94.4 LIVER REPLACED BY TRANSPLANT: Primary | ICD-10-CM

## 2022-07-15 LAB
ALBUMIN SERPL BCP-MCNC: 2 G/DL (ref 3.5–5.2)
ALP SERPL-CCNC: 204 U/L (ref 55–135)
ALT SERPL W/O P-5'-P-CCNC: 82 U/L (ref 10–44)
ANION GAP SERPL CALC-SCNC: 8 MMOL/L (ref 8–16)
AST SERPL-CCNC: 34 U/L (ref 10–40)
BASOPHILS # BLD AUTO: 0.01 K/UL (ref 0–0.2)
BASOPHILS NFR BLD: 0.2 % (ref 0–1.9)
BILIRUB SERPL-MCNC: 0.9 MG/DL (ref 0.1–1)
BUN SERPL-MCNC: 19 MG/DL (ref 6–20)
CALCIUM SERPL-MCNC: 8.6 MG/DL (ref 8.7–10.5)
CHLORIDE SERPL-SCNC: 93 MMOL/L (ref 95–110)
CO2 SERPL-SCNC: 24 MMOL/L (ref 23–29)
CREAT SERPL-MCNC: 0.9 MG/DL (ref 0.5–1.4)
DIFFERENTIAL METHOD: ABNORMAL
EOSINOPHIL # BLD AUTO: 0 K/UL (ref 0–0.5)
EOSINOPHIL NFR BLD: 0.5 % (ref 0–8)
ERYTHROCYTE [DISTWIDTH] IN BLOOD BY AUTOMATED COUNT: 14.2 % (ref 11.5–14.5)
EST. GFR  (AFRICAN AMERICAN): >60 ML/MIN/1.73 M^2
EST. GFR  (NON AFRICAN AMERICAN): >60 ML/MIN/1.73 M^2
GLUCOSE SERPL-MCNC: 177 MG/DL (ref 70–110)
HCT VFR BLD AUTO: 29.1 % (ref 40–54)
HGB BLD-MCNC: 9.4 G/DL (ref 14–18)
IMM GRANULOCYTES # BLD AUTO: 0.05 K/UL (ref 0–0.04)
IMM GRANULOCYTES NFR BLD AUTO: 0.8 % (ref 0–0.5)
LYMPHOCYTES # BLD AUTO: 0.4 K/UL (ref 1–4.8)
LYMPHOCYTES NFR BLD: 5.9 % (ref 18–48)
MAGNESIUM SERPL-MCNC: 1.6 MG/DL (ref 1.6–2.6)
MCH RBC QN AUTO: 32.1 PG (ref 27–31)
MCHC RBC AUTO-ENTMCNC: 32.3 G/DL (ref 32–36)
MCV RBC AUTO: 99 FL (ref 82–98)
MONOCYTES # BLD AUTO: 0.3 K/UL (ref 0.3–1)
MONOCYTES NFR BLD: 4.1 % (ref 4–15)
NEUTROPHILS # BLD AUTO: 5.4 K/UL (ref 1.8–7.7)
NEUTROPHILS NFR BLD: 88.5 % (ref 38–73)
NRBC BLD-RTO: 0 /100 WBC
PLATELET # BLD AUTO: 122 K/UL (ref 150–450)
PMV BLD AUTO: 10.2 FL (ref 9.2–12.9)
POCT GLUCOSE: 146 MG/DL (ref 70–110)
POTASSIUM SERPL-SCNC: 5.4 MMOL/L (ref 3.5–5.1)
PROT SERPL-MCNC: 5.4 G/DL (ref 6–8.4)
RBC # BLD AUTO: 2.93 M/UL (ref 4.6–6.2)
SODIUM SERPL-SCNC: 125 MMOL/L (ref 136–145)
TACROLIMUS BLD-MCNC: 8.2 NG/ML (ref 5–15)
WBC # BLD AUTO: 6.11 K/UL (ref 3.9–12.7)

## 2022-07-15 PROCEDURE — 63600175 PHARM REV CODE 636 W HCPCS: Performed by: PHYSICIAN ASSISTANT

## 2022-07-15 PROCEDURE — 25000003 PHARM REV CODE 250: Performed by: SURGERY

## 2022-07-15 PROCEDURE — 63600175 PHARM REV CODE 636 W HCPCS: Mod: JG | Performed by: PHYSICIAN ASSISTANT

## 2022-07-15 PROCEDURE — 80053 COMPREHEN METABOLIC PANEL: CPT

## 2022-07-15 PROCEDURE — 99233 SBSQ HOSP IP/OBS HIGH 50: CPT | Mod: 24,,,

## 2022-07-15 PROCEDURE — 36415 COLL VENOUS BLD VENIPUNCTURE: CPT

## 2022-07-15 PROCEDURE — 99233 PR SUBSEQUENT HOSPITAL CARE,LEVL III: ICD-10-PCS | Mod: 24,,,

## 2022-07-15 PROCEDURE — 63600175 PHARM REV CODE 636 W HCPCS

## 2022-07-15 PROCEDURE — P9047 ALBUMIN (HUMAN), 25%, 50ML: HCPCS | Mod: JG | Performed by: PHYSICIAN ASSISTANT

## 2022-07-15 PROCEDURE — 85025 COMPLETE CBC W/AUTO DIFF WBC: CPT

## 2022-07-15 PROCEDURE — 83735 ASSAY OF MAGNESIUM: CPT

## 2022-07-15 PROCEDURE — 20600001 HC STEP DOWN PRIVATE ROOM

## 2022-07-15 PROCEDURE — 25000003 PHARM REV CODE 250

## 2022-07-15 PROCEDURE — 63600175 PHARM REV CODE 636 W HCPCS: Performed by: SURGERY

## 2022-07-15 PROCEDURE — 80197 ASSAY OF TACROLIMUS: CPT

## 2022-07-15 RX ORDER — POLYETHYLENE GLYCOL 3350 17 G/17G
17 POWDER, FOR SOLUTION ORAL DAILY
Status: DISCONTINUED | OUTPATIENT
Start: 2022-07-15 | End: 2022-07-16 | Stop reason: HOSPADM

## 2022-07-15 RX ORDER — ALBUMIN HUMAN 250 G/1000ML
25 SOLUTION INTRAVENOUS EVERY 6 HOURS
Status: COMPLETED | OUTPATIENT
Start: 2022-07-15 | End: 2022-07-16

## 2022-07-15 RX ORDER — CEFPODOXIME PROXETIL 200 MG/1
200 TABLET, FILM COATED ORAL EVERY 12 HOURS
Status: DISCONTINUED | OUTPATIENT
Start: 2022-07-15 | End: 2022-07-16 | Stop reason: HOSPADM

## 2022-07-15 RX ORDER — LIDOCAINE 50 MG/G
1 PATCH TOPICAL
Status: DISCONTINUED | OUTPATIENT
Start: 2022-07-15 | End: 2022-07-16 | Stop reason: HOSPADM

## 2022-07-15 RX ORDER — HYDROCODONE BITARTRATE AND ACETAMINOPHEN 500; 5 MG/1; MG/1
TABLET ORAL
Status: DISCONTINUED | OUTPATIENT
Start: 2022-07-15 | End: 2022-07-16 | Stop reason: HOSPADM

## 2022-07-15 RX ORDER — OXYCODONE HYDROCHLORIDE 5 MG/1
5 TABLET ORAL EVERY 4 HOURS PRN
Status: DISCONTINUED | OUTPATIENT
Start: 2022-07-15 | End: 2022-07-15

## 2022-07-15 RX ORDER — OXYCODONE AND ACETAMINOPHEN 10; 325 MG/1; MG/1
1 TABLET ORAL EVERY 4 HOURS PRN
Status: DISCONTINUED | OUTPATIENT
Start: 2022-07-15 | End: 2022-07-16 | Stop reason: HOSPADM

## 2022-07-15 RX ORDER — MIRTAZAPINE 15 MG/1
15 TABLET, FILM COATED ORAL NIGHTLY
Status: DISCONTINUED | OUTPATIENT
Start: 2022-07-15 | End: 2022-07-16 | Stop reason: HOSPADM

## 2022-07-15 RX ORDER — INSULIN ASPART 100 [IU]/ML
5 INJECTION, SOLUTION INTRAVENOUS; SUBCUTANEOUS
Status: DISCONTINUED | OUTPATIENT
Start: 2022-07-15 | End: 2022-07-16

## 2022-07-15 RX ADMIN — CARVEDILOL 3.12 MG: 3.12 TABLET, FILM COATED ORAL at 08:07

## 2022-07-15 RX ADMIN — CEFPODOXIME PROXETIL 200 MG: 200 TABLET, FILM COATED ORAL at 11:07

## 2022-07-15 RX ADMIN — VALGANCICLOVIR HYDROCHLORIDE 450 MG: 450 TABLET ORAL at 08:07

## 2022-07-15 RX ADMIN — CEFPODOXIME PROXETIL 200 MG: 200 TABLET, FILM COATED ORAL at 08:07

## 2022-07-15 RX ADMIN — TACROLIMUS 1.5 MG: 1 CAPSULE ORAL at 05:07

## 2022-07-15 RX ADMIN — OXYCODONE AND ACETAMINOPHEN 1 TABLET: 10; 325 TABLET ORAL at 08:07

## 2022-07-15 RX ADMIN — INSULIN ASPART 2 UNITS: 100 INJECTION, SOLUTION INTRAVENOUS; SUBCUTANEOUS at 11:07

## 2022-07-15 RX ADMIN — PANTOPRAZOLE SODIUM 40 MG: 40 TABLET, DELAYED RELEASE ORAL at 08:07

## 2022-07-15 RX ADMIN — Medication 6 MG: at 08:07

## 2022-07-15 RX ADMIN — OXYCODONE HYDROCHLORIDE 15 MG: 10 TABLET ORAL at 04:07

## 2022-07-15 RX ADMIN — METHOCARBAMOL 500 MG: 500 TABLET ORAL at 08:07

## 2022-07-15 RX ADMIN — SODIUM ZIRCONIUM CYCLOSILICATE 10 G: 5 POWDER, FOR SUSPENSION ORAL at 09:07

## 2022-07-15 RX ADMIN — PREDNISONE 10 MG: 10 TABLET ORAL at 08:07

## 2022-07-15 RX ADMIN — TACROLIMUS 1.5 MG: 1 CAPSULE ORAL at 08:07

## 2022-07-15 RX ADMIN — OXYCODONE HYDROCHLORIDE 15 MG: 10 TABLET ORAL at 11:07

## 2022-07-15 RX ADMIN — HYDROMORPHONE HYDROCHLORIDE 0.5 MG: 1 INJECTION, SOLUTION INTRAMUSCULAR; INTRAVENOUS; SUBCUTANEOUS at 08:07

## 2022-07-15 RX ADMIN — TAMSULOSIN HYDROCHLORIDE 0.8 MG: 0.4 CAPSULE ORAL at 08:07

## 2022-07-15 RX ADMIN — METHOCARBAMOL 500 MG: 500 TABLET ORAL at 12:07

## 2022-07-15 RX ADMIN — Medication 1 TABLET: at 08:07

## 2022-07-15 RX ADMIN — CEFEPIME 1 G: 1 INJECTION, POWDER, FOR SOLUTION INTRAMUSCULAR; INTRAVENOUS at 04:07

## 2022-07-15 RX ADMIN — INSULIN ASPART 1 UNITS: 100 INJECTION, SOLUTION INTRAVENOUS; SUBCUTANEOUS at 08:07

## 2022-07-15 RX ADMIN — LIDOCAINE 1 PATCH: 50 PATCH CUTANEOUS at 09:07

## 2022-07-15 RX ADMIN — HYDROMORPHONE HYDROCHLORIDE 0.5 MG: 1 INJECTION, SOLUTION INTRAMUSCULAR; INTRAVENOUS; SUBCUTANEOUS at 05:07

## 2022-07-15 RX ADMIN — ALBUMIN (HUMAN) 25 G: 12.5 SOLUTION INTRAVENOUS at 06:07

## 2022-07-15 RX ADMIN — OXYCODONE HYDROCHLORIDE 10 MG: 10 TABLET ORAL at 08:07

## 2022-07-15 RX ADMIN — DOCUSATE SODIUM 50 MG: 50 CAPSULE, LIQUID FILLED ORAL at 09:07

## 2022-07-15 RX ADMIN — METHOCARBAMOL 500 MG: 500 TABLET ORAL at 04:07

## 2022-07-15 RX ADMIN — OXYCODONE HYDROCHLORIDE 10 MG: 10 TABLET ORAL at 01:07

## 2022-07-15 RX ADMIN — ALBUMIN (HUMAN) 25 G: 12.5 SOLUTION INTRAVENOUS at 11:07

## 2022-07-15 RX ADMIN — OXYCODONE AND ACETAMINOPHEN 1 TABLET: 10; 325 TABLET ORAL at 02:07

## 2022-07-15 RX ADMIN — INSULIN ASPART 5 UNITS: 100 INJECTION, SOLUTION INTRAVENOUS; SUBCUTANEOUS at 11:07

## 2022-07-15 RX ADMIN — MIRTAZAPINE 15 MG: 15 TABLET, FILM COATED ORAL at 08:07

## 2022-07-15 RX ADMIN — INSULIN ASPART 3 UNITS: 100 INJECTION, SOLUTION INTRAVENOUS; SUBCUTANEOUS at 08:07

## 2022-07-15 RX ADMIN — POLYETHYLENE GLYCOL 3350 17 G: 17 POWDER, FOR SOLUTION ORAL at 09:07

## 2022-07-15 RX ADMIN — INSULIN ASPART 5 UNITS: 100 INJECTION, SOLUTION INTRAVENOUS; SUBCUTANEOUS at 05:07

## 2022-07-15 RX ADMIN — ASPIRIN 81 MG CHEWABLE TABLET 81 MG: 81 TABLET CHEWABLE at 08:07

## 2022-07-15 RX ADMIN — ACETAMINOPHEN 650 MG: 325 TABLET ORAL at 12:07

## 2022-07-15 RX ADMIN — FLUCONAZOLE 200 MG: 200 TABLET ORAL at 08:07

## 2022-07-15 NOTE — PLAN OF CARE
NAEON  VSS  Room air, sats >92%  AOX4  Wife at bedside  Pt up independent  JPX2, right biliary drainage, left serosanguinous drainage- drained Q4 or as needed  Bili drain in place, flushed w/ 30cc sterile water  Chevron incision DEBBIE w/ staples  LFA PIV  ACHS, SSI and scheduled given  IV abx given  PRNs given for pain  Albumin x2  Lokelma x1 for elevated K+  Safety/fall precautions maintained  Bed locked in lowest position  Call light within reach  WCTM

## 2022-07-15 NOTE — PHYSICIAN QUERY
PT Name: Romeo Larson  MR #: [id number]    DOCUMENTATION CLARIFICATION     CDS/: Magdaleno Ely Jr, RN CCDS             Contact information:[email or web address]     This form is a permanent document in the medical record.     Query Date: July 15, 2022    Dear Provider,  By submitting this query, we are merely seeking further clarification of documentation. Please utilize your independent clinical judgment when addressing the question(s) below.    The medical record contains the following:  Supporting Clinical Findings Location in Medical Record   Pre-operative Diagnosis: bile leak  Post-operative Diagnosis: same    Procedure(s) Perfomed: Exploration of abdomen and drainage opf biloam and washout    The bile duct / vick anastomosis was not assessed, due to adhesions.  The hepatic artery was not evaluated due to adhesions. The liver itself was covered by adhesions and not visible.     Copious warm irrigation was used ot clean out the abdomen of bilious fluid.     Adhesions to the abdominal were taken down and the biloma was identified, the wall was made up of omentum, the superior aspect was diaphragm, lateral aspect was stomach. This was cleaned out and cultured.      The original drain on the right side was left in place.     2 drains on the left side were placed - the upper drain straight into the biloma cavity and the lower drain to the pelvis bilaterally    After the above exploration, all identifiable bleeding sites were addressed using electrocautery, argon beam coagulation, suture ligatures, and topical hemostatic agents as appropriate.  A needle biopsy of the liver was not obtained.    7/4 Op Note      7/4 Op Note    7/4 Op Note               Please clarify if the    Abdominal Adhesions    (as it relates to the    Exploration of the Abdomen Procedure   ) :      [ x ] Abdominal Adhesions were an Incidental finding, not clinically significant   [  ] Abdominal Adhesions were clinically significant    [   ] Other (please specify):    [  ] Clinically Undetermined       Please document in your progress notes daily for the duration of treatment until resolved and include in your discharge summary.

## 2022-07-15 NOTE — DISCHARGE SUMMARY
[facility] - Transplant Stepdown  Liver Transplant  Discharge Summary      Patient Name: Romeo Larson  MRN: [id number]  Admission Date: 7/12/2022  Hospital Length of Stay: 4 days  Discharge Date and Time:  07/16/2022 11:58 AM  Attending Physician: Sinan Cline MD   Discharging Provider: KENIA Santiago  Primary Care Provider: Pravin Maria MD  Post-Operative Day: 25     ORGAN:   RIGHT LIVER LOBE (SEGS 5,6,7,8) WITHOUT MIDDLE HEPATIC VEIN  Disease Etiology: Primary Liver Malignancy: Cholangiocarcinoma (CH-CA)  Donor Type:   Living  CDC High Risk:     Donor CMV Status:   Donor CMV Status:   Donor HBcAB:     Donor HCV Status:     Whole or Partial:   Biliary Anastomosis: Vick-en-Y  Arterial Anatomy: Standard    HPI:   Romeo Larson is a 41yr old male s/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 Post operative course significant for recurrent bile leak. OR take back 6/23 for bile duct repair (small leak found near biliary anastomosis, unable to clearly identify source), cultures grew enterococcus faecalis. OR take back 7/4 for ex lap, washout of biloma, bile duct unable to be assessed due to adhesions, OR cultures with lactobacillus species. Patient was discharged home with 1 drain in place. Patient presented for outpatient follow up, drain found to be bilious, and patient reported feeling weak, decreased appeitie. Decision made to admit patient for further interventions. Patient denies fever, chest pain, SOB, change in bowel function. He reports abdominal/suprapubic pain and urinary retention. Last voided at 1500. He remains on Flomax. Plan to start broad spectrum antibiotics, obtain CT abd/pelvis W PO/IV contrast, IR consult for PTC drain.       * No surgery found *     Hospital Course:    Patient admitted on 7/12/22 from outpatient clinic with bilious drainage in both SHRUTHI drains with symptoms of abdominal/suprapubic pain, weakness, and decreased appetite. PTC drain  placed on 7/13 with bilious drainage output. Drain being flushed [id number] with sterile slaine 30ml flushes. Decreased amount of drainage from left and right SHRUTHI drains. Right drain with small amount of bilious drainage and left SHRUTHI drainage now with scant serous drainage. LFTs continuing to trend back down. While inpatient, patient reports having issues with appetite/nutrition as well as weakness. Remeron started inpatient for appetite stimulant.  On day of discharge, patient states he feels good. He understands being discharged with 3 drains (PTC, R SHRUTHI, L SHRUTHI). Patient being discharged on PO cefpodoxime.  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]  Yes    Urinary retention [R33.9] 07/12/2022 Yes    Bile leak [K83.9]  Unknown    Malnutrition of mild degree [E44.1] 07/05/2022 Yes    Long-term use of immunosuppressant medication [Z79.899] 06/25/2022 Not Applicable    Type 2 diabetes mellitus with hyperglycemia [E11.65] 06/22/2022 Yes    Adrenal corticosteroid causing adverse effect in therapeutic use [T38.0X5A] 06/22/2022 Yes    S/P liver transplant [Z94.4] 06/21/2022 Not Applicable    Prophylactic immunotherapy [Z29.8] 06/21/2022 Not Applicable    At risk for opportunistic infections [Z91.89] 06/21/2022 Yes      Problems Resolved During this Admission:       Consults (From admission, onward)        Status Ordering Provider     Inpatient consult to Registered Dietitian/Nutritionist  Once        Provider:  (Not yet assigned)    Completed RACHELLE DENISE     Consult to Endocrinology  Once        Provider:  (Not yet assigned)    Completed RACHELLE DENISE     Inpatient consult to Interventional Radiology  Once        Provider:  (Not yet assigned)    Completed KOFI NEGRON          Pending Diagnostic  Studies:     Procedure Component Value Units Date/Time    G6PD,Quantitative [[id number]] Collected: 07/16/22 0633    Order Status: Sent Lab Status: In process Updated: 07/16/22 0709    Specimen: Blood         Significant Diagnostic Studies: Labs:   BMP:   Recent Labs   Lab 07/15/22  0704 07/16/22  0633   * 235*   * 127*   K 5.4* 4.6   CL 93* 92*   CO2 24 25   BUN 19 20   CREATININE 0.9 0.8   CALCIUM 8.6* 8.8   MG 1.6 1.5*   , CMP   Recent Labs   Lab 07/15/22  0704 07/16/22  0633   * 127*   K 5.4* 4.6   CL 93* 92*   CO2 24 25   * 235*   BUN 19 20   CREATININE 0.9 0.8   CALCIUM 8.6* 8.8   PROT 5.4* 5.6*   ALBUMIN 2.0* 2.5*   BILITOT 0.9 0.8   ALKPHOS 204* 216*   AST 34 25   ALT 82* 61*   ANIONGAP 8 10   ESTGFRAFRICA >60.0 >60.0   EGFRNONAA >60.0 >60.0   , CBC   Recent Labs   Lab 07/15/22  0704 07/16/22  0633   WBC 6.11 4.05   HGB 9.4* 8.3*   HCT 29.1* 25.4*   * 94*   , A1C:   Recent Labs   Lab 03/29/22  0807 06/20/22  1517   HGBA1C 6.8* 5.8*    and All labs within the past 24 hours have been reviewed    The patients clinical status was discussed at multidisplinary rounds, involving transplant surgery, transplant medicine, pharmacy, nursing, nutrition, and social work    Discharged Condition: stable    Disposition: Home or Self Care    Follow Up:    Patient Instructions:      Ambulatory referral/consult to Physical/Occupational Therapy   Standing Status: Future   Referral Priority: Routine Referral Type: Physical Medicine   Referral Reason: Specialty Services Required   Requested Specialty: Physical Therapy   Number of Visits Requested: 1     Diet Adult Regular     No driving until:     Pelvic Rest     Lifting restrictions     Notify your health care provider if you experience any of the following:     Notify your health care provider if you experience any of the following:  increased confusion or weakness     Notify your health care provider if you experience any of the following:   "persistent dizziness, light-headedness, or visual disturbances     Notify your health care provider if you experience any of the following:  worsening rash     Notify your health care provider if you experience any of the following:  severe persistent headache     Notify your health care provider if you experience any of the following:  difficulty breathing or increased cough     Notify your health care provider if you experience any of the following:  redness, tenderness, or signs of infection (pain, swelling, redness, odor or green/yellow discharge around incision site)     Notify your health care provider if you experience any of the following:  severe uncontrolled pain     Notify your health care provider if you experience any of the following:  persistent nausea and vomiting or diarrhea     Notify your health care provider if you experience any of the following:  temperature >100.4     Shower on day dressing removed (No bath)     Medications:  Reconciled Home Medications:      Medication List      START taking these medications    cefpodoxime 200 MG tablet  Commonly known as: VANTIN  Take 1 tablet (200 mg total) by mouth every 12 (twelve) hours. for 11 doses     mirtazapine 15 MG tablet  Commonly known as: REMERON  Take 1 tablet (15 mg total) by mouth nightly.        CHANGE how you take these medications    insulin aspart U-100 100 unit/mL (3 mL) Inpn pen  Commonly known as: NovoLOG  Inject 5 Units into the skin 3 (three) times daily with meals. + sliding scale.  MDD 30 units/d  What changed:   · how much to take  · additional instructions        CONTINUE taking these medications    aspirin 81 MG Chew  CHEW 1 tablet (81 mg total) by mouth once daily.     BD ULTRA-FINE KAREN PEN NEEDLE 32 gauge x 5/32" Ndle  Generic drug: pen needle, diabetic  1 each by Misc.(Non-Drug; Combo Route) route 3 (three) times daily.     calcium carbonate-vitamin D3 600 mg-20 mcg (800 unit) Tab  Take 1 tablet by mouth once daily at " 6am.     carvediloL 3.125 MG tablet  Commonly known as: COREG  Take 1 tablet (3.125 mg total) by mouth 2 (two) times daily.     fluconazole 200 MG Tab  Commonly known as: DIFLUCAN  Take 1 tablet (200 mg total) by mouth once daily. STOP 7/20/22     methocarbamoL 500 MG Tab  Commonly known as: ROBAXIN  Take 1 tablet (500 mg total) by mouth 4 (four) times daily.     oxyCODONE 10 mg Tab immediate release tablet  Commonly known as: ROXICODONE  Take 1-1.5 tablets (10-15 mg total) by mouth every 4 (four) hours as needed for Pain.     pantoprazole 40 MG tablet  Commonly known as: PROTONIX  Take 1 tablet (40 mg total) by mouth once daily.     predniSONE 5 MG tablet  Commonly known as: DELTASONE  Take by mouth once daily: 20 mg 6/27-7/3; 15 mg 7/4-7/10; 10 mg 7/11-7/17; 5 mg 7/18-7/24; STOP 7/25/22     tacrolimus 1 MG Cap  Commonly known as: PROGRAF  Take 1 capsule (1 mg total) by mouth every 12 (twelve) hours.     tamsulosin 0.4 mg Cap  Commonly known as: FLOMAX  Take 2 capsules (0.8 mg total) by mouth once daily.     TRUE METRIX GLUCOSE METER Misc  Generic drug: blood-glucose meter  Use as instructed     TRUE METRIX GLUCOSE TEST STRIP Strp  Generic drug: blood sugar diagnostic  1 each by Misc.(Non-Drug; Combo Route) route 3 (three) times daily.     TRUEPLUS LANCETS 30 gauge Misc  Generic drug: lancets  1 each by Misc.(Non-Drug; Combo Route) route 3 (three) times daily.     valGANciclovir 450 mg Tab  Commonly known as: VALCYTE  Take 1 tablet (450 mg total) by mouth once daily. STOP 9/19/22        STOP taking these medications    amoxicillin-clavulanate 875-125mg 875-125 mg per tablet  Commonly known as: AUGMENTIN     baclofen 10 MG tablet  Commonly known as: LIORESAL     famotidine 20 MG tablet  Commonly known as: PEPCID     losartan 50 MG tablet  Commonly known as: COZAAR     metFORMIN 500 MG tablet  Commonly known as: GLUCOPHAGE     mycophenolate 250 mg Cap  Commonly known as: CELLCEPT     sulfamethoxazole-trimethoprim  400-80mg 400-80 mg per tablet  Commonly known as: BACTRIM,SEPTRA          Time spent caring for patient (Greater than 1/2 spent in direct face-to-face contact): > 30 minutes    KENIA Santiago  Liver Transplant  [facility]

## 2022-07-15 NOTE — PROGRESS NOTES
[facility] - [facility]plant Step[facility]  Liver Transplant  Progress Note    Patient Name: Romeo Larson  MRN: [id number]  Admission Date: 7/12/2022  Hospital Length of Stay: 3 days  Code Status: Full Code  Primary Care Provider: Pravin Maria MD  Post-Operative Day: 24    ORGAN:   RIGHT LIVER LOBE (SEGS 5,6,7,8) WITHOUT MIDDLE HEPATIC VEIN  Disease Etiology: Primary Liver Malignancy: Cholangiocarcinoma (CH-CA)  Donor Type:   Living  Biliary Anastomosis: Vick-en-Y  Arterial Anatomy: Standard  Subjective:     History of Present Illness:  Romeo Larson is a 41yr old male s/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 Post operative course significant for recurrent bile leak. OR take back 6/23 for bile duct repair (small leak found near biliary anastomosis, unable to clearly identify source), cultures grew enterococcus faecalis. OR take back 7/4 for ex lap, washout of biloma, bile duct unable to be assessed due to adhesions, OR cultures with lactobacillus species. Patient was discharged home with 1 drain in place. Patient presented for outpatient follow up, drain found to be bilious, and patient reported feeling weak, decreased appeitie. Decision made to admit patient for further interventions. Patient denies fever, chest pain, SOB, change in bowel function. He reports abdominal/suprapubic pain and urinary retention. Last voided at 1500. He remains on Flomax. Plan to start broad spectrum antibiotics, obtain CT abd/pelvis W PO/IV contrast, IR consult for PTC drain.       Hospital Course:  Patient states he is feeling good. Reports having issues with pain overnight at PTC drain site. PRN oxycodone-acetaminophen 10-325mg PO q4h PRN and scheduled lidocaine patches ordered. PTC drain place with a total of 60ml of bilious output within the past 24 hours. Nursing communication order placed to flush PTC drain q12hrs with 30ml of sterile saline. Left side abdominal SHRUTHI drain with bilious output  with a total of 95ml within past 24 hours. R side abdominal SHRUTHI drain with more serous-serosanguinous drainage today with a total of 15 ml within last 24 hours. Patient reports having issues with appetite/nutrition as well as weakness. Remeron ordered to start tonight. Consult to dietician placed. Boost supplements ordered to come with every meal. Will give some albumin today. PT ordered for muscle strengthening exercises. Discontinuing IV cefepime and Vancomycin and switching to PO cefpodoxime today.  All VSS. Will continue to monitor closely.       Scheduled Meds:   albumin human 25%  25 g Intravenous Q6H    aspirin  81 mg Oral Daily    calcium-vitamin D3  1 tablet Oral Daily    carvediloL  3.125 mg Oral BID    cefpodoxime  200 mg Oral Q12H    docusate sodium  50 mg Oral Daily    fluconazole  200 mg Oral Daily    insulin aspart U-100  5 Units Subcutaneous TIDWM    LIDOcaine  1 patch Transdermal Q24H    methocarbamoL  500 mg Oral QID    mirtazapine  15 mg Oral Nightly    pantoprazole  40 mg Oral Daily    polyethylene glycol  17 g Oral Daily    predniSONE  10 mg Oral Daily    tacrolimus  1.5 mg Oral BID    tamsulosin  0.8 mg Oral Daily    valGANciclovir  450 mg Oral Daily     Continuous Infusions:  PRN Meds:sodium chloride, acetaminophen, aluminum & magnesium hydroxide-simethicone, dextrose 10%, dextrose 10%, glucagon (human recombinant), glucose, glucose, insulin aspart U-100, melatonin, ondansetron, ondansetron, oxyCODONE, oxyCODONE-acetaminophen, sodium chloride 0.9%    Review of Systems   Constitutional:  Positive for appetite change. Negative for chills, fatigue and fever.   HENT: Negative.     Eyes: Negative.    Respiratory:  Negative for cough, chest tightness, shortness of breath, wheezing and stridor.    Cardiovascular:  Negative for chest pain, palpitations and leg swelling.   Gastrointestinal:  Positive for abdominal pain. Negative for nausea and vomiting.   Endocrine: Negative.     Genitourinary:  Negative for difficulty urinating.   Musculoskeletal:  Negative for arthralgias, myalgias and neck pain.   Skin:  Positive for wound.   Allergic/Immunologic: Positive for immunocompromised state.   Neurological:  Positive for weakness. Negative for dizziness, tremors, syncope and numbness.   Psychiatric/Behavioral:  Negative for confusion and decreased concentration.    Objective:     Vital Signs (Most Recent):  Temp: 98.6 °F (37 °C) (07/15/22 0830)  Pulse: 86 (07/15/22 0830)  Resp: 18 (07/15/22 0826)  BP: 129/87 (07/15/22 0830)  SpO2: 97 % (07/15/22 0830) Vital Signs (24h Range):  Temp:  [98 °F (36.7 °C)-99 °F (37.2 °C)] 98.6 °F (37 °C)  Pulse:  [63-94] 86  Resp:  [16-20] 18  SpO2:  [96 %-98 %] 97 %  BP: (102-129)/(66-87) 129/87     Weight: 75.1 kg (165 lb 9.1 oz)  Body mass index is 25.17 kg/m².    Intake/Output - Last 3 Shifts         07/13 0700  07/14 0659 07/14 0700  07/15 0659 07/15 0700  07/16 0659    P.O. 1480 1320     I.V. (mL/kg)       IV Piggyback  910.8     Total Intake(mL/kg) 1480 (19.7) 2230.8 (29.7)     Urine (mL/kg/hr) 300 (0.2) 700 (0.4)     Drains 210 250     Stool  0     Total Output 510 950     Net +970 +1280.8            Urine Occurrence 4 x 4 x     Stool Occurrence 0 x 0 x             Physical Exam  Vitals and nursing note reviewed.   Constitutional:       General: He is not in acute distress.     Appearance: Normal appearance. He is not ill-appearing or toxic-appearing.   Eyes:      General:         Right eye: No discharge.         Left eye: No discharge.      Extraocular Movements: Extraocular movements intact.      Conjunctiva/sclera: Conjunctivae normal.   Cardiovascular:      Rate and Rhythm: Normal rate and regular rhythm.      Pulses: Normal pulses.      Heart sounds: Normal heart sounds, S1 normal and S2 normal.   Pulmonary:      Effort: Pulmonary effort is normal. No respiratory distress.      Breath sounds: Normal breath sounds. No stridor. No wheezing, rhonchi or  rales.   Abdominal:      General: Bowel sounds are decreased.      Palpations: Abdomen is soft.      Tenderness: There is abdominal tenderness in the right lower quadrant, suprapubic area and left lower quadrant. There is no guarding.          Comments: Chevron incision CDI  R abdominal SHRUTHI drain in place with serousang output  L abdominal SHRUTHI drain in place with bilious output  PTC drain with bilious output     Musculoskeletal:      Cervical back: Neck supple.      Right lower leg: No edema.      Left lower leg: No edema.   Skin:     General: Skin is warm and dry.      Capillary Refill: Capillary refill takes less than 2 seconds.   Neurological:      Mental Status: He is alert and oriented to person, place, and time.   Psychiatric:         Attention and Perception: Attention normal.         Mood and Affect: Mood normal.         Speech: Speech normal.         Behavior: Behavior normal. Behavior is cooperative.         Thought Content: Thought content normal.         Judgment: Judgment normal.       Laboratory:  Immunosuppressants           Stop Route Frequency     tacrolimus capsule 1.5 mg         -- Oral 2 times daily          CBC:   Recent Labs   Lab 07/15/22  0704   WBC 6.11   RBC 2.93*   HGB 9.4*   HCT 29.1*   *   MCV 99*   MCH 32.1*   MCHC 32.3     CMP:   Recent Labs   Lab 07/15/22  0704   *   CALCIUM 8.6*   ALBUMIN 2.0*   PROT 5.4*   *   K 5.4*   CO2 24   CL 93*   BUN 19   CREATININE 0.9   ALKPHOS 204*   ALT 82*   AST 34   BILITOT 0.9     Coagulation:   Recent Labs   Lab 07/12/22  1814   INR 1.0     Labs within the past 24 hours have been reviewed.        Assessment/Plan:     * Biloma of transplanted liver  - Persistent bile leak post op, unable to be adequately identifed in surgery  - Keeping drain empty- right drain placed 6/21/22, drain on left placed during recent hospitalization. Both w bilious output  - Discontinuing IV cefepime and vanc. Start PO cefpodoxime today.   - PTC drained  placed on 7/15. 60ml of bilious drainage output within last 24 hours.  - Nursing communication order placed to have PTC drain flushed q12h with 30ml of sterile saline.  - Will continue to monitor      Bile leak  - See Biloma of transplanted liver       Urinary retention  - hx of urinary retention last hospitalization  - remains on Flomax  - nurse comm to bladder scan, if >250, straight cath  - leave campos in place if urine return >250    -Patient reports not having any issues with voiding at this time; will continue to monitor       Malnutrition of mild degree  - Boost supplements ordered with every meal  -  dietician consulted today  - start remeron tonight for appetite stimulate.   - 25% albumin q6h for 3 doses today  - encouraged patient to increase protein intake     Long-term use of immunosuppressant medication  - Maintenance IS with prograf. cont to check tacrolimus level daily. Assess for toxicity and adjust level as needed    Adrenal corticosteroid causing adverse effect in therapeutic use  - Endocrine consulted; appreciate assistance       Type 2 diabetes mellitus with hyperglycemia  - Endocrine consulted for DM management.     At risk for opportunistic infections  - continue bactrim for PCP prophylaxis  - continue valcyte for CMV prophylaxis  - continue fluconazole for fungal prophylaxis    Prophylactic immunotherapy  - See long-term use of immunosuppressant medication    S/P liver transplant  - s/p living liver txp 6/21/22  - See biloma of transplanted liver        VTE Risk Mitigation (From admission, onward)         Ordered     IP VTE HIGH RISK PATIENT  Once         07/12/22 1751     Place sequential compression device  Until discontinued         07/12/22 1751                The patients clinical status was discussed at multidisplinary rounds, involving transplant surgery, transplant medicine, pharmacy, nursing, nutrition, and social work    Discharge Planning:  Not applicable for discharge at this  time.     Hannah Young NP  Liver Transplant  [facility] - [facility]

## 2022-07-15 NOTE — ASSESSMENT & PLAN NOTE
- Persistent bile leak post op, unable to be adequately identifed in surgery  - Keeping drain empty- right drain placed 6/21/22, drain on left placed during recent hospitalization. Both w bilious output  - Discontinuing IV cefepime and vanc. Start PO cefpodoxime today.   - PTC drained placed on 7/15. 60ml of bilious drainage output within last 24 hours.  - Nursing communication order placed to have PTC drain flushed q12h with 30ml of sterile saline.  - Will continue to monitor

## 2022-07-15 NOTE — SUBJECTIVE & OBJECTIVE
Scheduled Meds:   albumin human 25%  25 g Intravenous Q6H    aspirin  81 mg Oral Daily    calcium-vitamin D3  1 tablet Oral Daily    carvediloL  3.125 mg Oral BID    cefpodoxime  200 mg Oral Q12H    docusate sodium  50 mg Oral Daily    fluconazole  200 mg Oral Daily    insulin aspart U-100  5 Units Subcutaneous TIDWM    LIDOcaine  1 patch Transdermal Q24H    methocarbamoL  500 mg Oral QID    mirtazapine  15 mg Oral Nightly    pantoprazole  40 mg Oral Daily    polyethylene glycol  17 g Oral Daily    predniSONE  10 mg Oral Daily    tacrolimus  1.5 mg Oral BID    tamsulosin  0.8 mg Oral Daily    valGANciclovir  450 mg Oral Daily     Continuous Infusions:  PRN Meds:sodium chloride, acetaminophen, aluminum & magnesium hydroxide-simethicone, dextrose 10%, dextrose 10%, glucagon (human recombinant), glucose, glucose, insulin aspart U-100, melatonin, ondansetron, ondansetron, oxyCODONE, oxyCODONE-acetaminophen, sodium chloride 0.9%    Review of Systems   Constitutional:  Positive for appetite change. Negative for chills, fatigue and fever.   HENT: Negative.     Eyes: Negative.    Respiratory:  Negative for cough, chest tightness, shortness of breath, wheezing and stridor.    Cardiovascular:  Negative for chest pain, palpitations and leg swelling.   Gastrointestinal:  Positive for abdominal pain. Negative for nausea and vomiting.   Endocrine: Negative.    Genitourinary:  Negative for difficulty urinating.   Musculoskeletal:  Negative for arthralgias, myalgias and neck pain.   Skin:  Positive for wound.   Allergic/Immunologic: Positive for immunocompromised state.   Neurological:  Positive for weakness. Negative for dizziness, tremors, syncope and numbness.   Psychiatric/Behavioral:  Negative for confusion and decreased concentration.    Objective:     Vital Signs (Most Recent):  Temp: 98.6 °F (37 °C) (07/15/22 0830)  Pulse: 86 (07/15/22 0830)  Resp: 18 (07/15/22 0826)  BP: 129/87 (07/15/22 0830)  SpO2: 97 % (07/15/22 0830)  Vital Signs (24h Range):  Temp:  [98 °F (36.7 °C)-99 °F (37.2 °C)] 98.6 °F (37 °C)  Pulse:  [63-94] 86  Resp:  [16-20] 18  SpO2:  [96 %-98 %] 97 %  BP: (102-129)/(66-87) 129/87     Weight: 75.1 kg (165 lb 9.1 oz)  Body mass index is 25.17 kg/m².    Intake/Output - Last 3 Shifts         07/13 0700 07/14 0659 07/14 0700  07/15 0659 07/15 0700 07/16 0659    P.O. 1480 1320     I.V. (mL/kg)       IV Piggyback  910.8     Total Intake(mL/kg) 1480 (19.7) 2230.8 (29.7)     Urine (mL/kg/hr) 300 (0.2) 700 (0.4)     Drains 210 250     Stool  0     Total Output 510 950     Net +970 +1280.8            Urine Occurrence 4 x 4 x     Stool Occurrence 0 x 0 x             Physical Exam  Vitals and nursing note reviewed.   Constitutional:       General: He is not in acute distress.     Appearance: Normal appearance. He is not ill-appearing or toxic-appearing.   Eyes:      General:         Right eye: No discharge.         Left eye: No discharge.      Extraocular Movements: Extraocular movements intact.      Conjunctiva/sclera: Conjunctivae normal.   Cardiovascular:      Rate and Rhythm: Normal rate and regular rhythm.      Pulses: Normal pulses.      Heart sounds: Normal heart sounds, S1 normal and S2 normal.   Pulmonary:      Effort: Pulmonary effort is normal. No respiratory distress.      Breath sounds: Normal breath sounds. No stridor. No wheezing, rhonchi or rales.   Abdominal:      General: Bowel sounds are decreased.      Palpations: Abdomen is soft.      Tenderness: There is abdominal tenderness in the right lower quadrant, suprapubic area and left lower quadrant. There is no guarding.          Comments: Chevron incision CDI  R abdominal SHRUTHI drain in place with serousang output  L abdominal SHRUTHI drain in place with bilious output  PTC drain with bilious output     Musculoskeletal:      Cervical back: Neck supple.      Right lower leg: No edema.      Left lower leg: No edema.   Skin:     General: Skin is warm and dry.       Capillary Refill: Capillary refill takes less than 2 seconds.   Neurological:      Mental Status: He is alert and oriented to person, place, and time.   Psychiatric:         Attention and Perception: Attention normal.         Mood and Affect: Mood normal.         Speech: Speech normal.         Behavior: Behavior normal. Behavior is cooperative.         Thought Content: Thought content normal.         Judgment: Judgment normal.       Laboratory:  Immunosuppressants           Stop Route Frequency     tacrolimus capsule 1.5 mg         -- Oral 2 times daily          CBC:   Recent Labs   Lab 07/15/22  0704   WBC 6.11   RBC 2.93*   HGB 9.4*   HCT 29.1*   *   MCV 99*   MCH 32.1*   MCHC 32.3     CMP:   Recent Labs   Lab 07/15/22  0704   *   CALCIUM 8.6*   ALBUMIN 2.0*   PROT 5.4*   *   K 5.4*   CO2 24   CL 93*   BUN 19   CREATININE 0.9   ALKPHOS 204*   ALT 82*   AST 34   BILITOT 0.9     Coagulation:   Recent Labs   Lab 07/12/22  1814   INR 1.0     Labs within the past 24 hours have been reviewed.

## 2022-07-15 NOTE — CARE UPDATE
BG goal 140-180    -A1C   Lab Results   Component Value Date    HGBA1C 5.8 (H) 06/20/2022         -HOME REGIMEN: novolog 7 units with meals     -INPATIENT REGIMEN: novolog 3-5 units with meals.    -GLUCOSE TREND FOR THE PAST 24HRS: 177-211    -NO HYPOGYCEMIAS NOTED     -TOLERATING 25% OF PO DIET     -Diet: Diet diabetic [facility]; 2000 Calorie; Low Potassium; Fluid - 1200mL    -Steroids - prednisone 10 mg daily       Remains in TSU, NAEON. BG above goal with scheduled insulin and prn correction scale.       Plan:  BG monitoring ac/hs and low dose correction scale.   Change to novolog 5 units with meals.     Discharge planning:tbd     Endocrine to continue to follow    ** Please call Endocrine for any BG related issues **

## 2022-07-15 NOTE — PROGRESS NOTES
Pharmacokinetic Assessment Follow Up: IV Vancomycin    Vancomycin serum concentration assessment(s):    The trough level was drawn correctly and can be used to guide therapy at this time. The measurement is below the desired definitive target range of 15 to 20 mcg/mL.    Vancomycin Regimen Plan:    Change regimen to Vancomycin 1250 mg IV every 12 hours with next serum trough concentration measured at 2100 prior to 4th dose on 7/16/22    Drug levels (last 3 results):  Recent Labs   Lab Result Units 07/13/22  0541 07/14/22  2033   Vancomycin, Random ug/mL 15.8  --    Vancomycin-Trough ug/mL  --  12.3       Pharmacy will continue to follow and monitor vancomycin.    Please contact pharmacy at extension [phone] for questions regarding this assessment.    Thank you for the consult,   Jose Alfredo Linton       Patient brief summary:  Romeo Larson is a 41 y.o. male initiated on antimicrobial therapy with IV Vancomycin for treatment of intra-abdominal infection    The patient's current regimen is 1250 mg IV q12    Drug Allergies:   Review of patient's allergies indicates:  No Known Allergies    Actual Body Weight:   75 kg    Renal Function:   Estimated Creatinine Clearance: 102.4 mL/min (based on SCr of 0.9 mg/dL).,     Dialysis Method (if applicable):  N/A    CBC (last 72 hours):  Recent Labs   Lab Result Units 07/12/22 1814 07/13/22  0541 07/14/22  0718 07/15/22  0704   WBC K/uL 7.49 10.51 9.30 6.11   Hemoglobin g/dL 13.1* 13.2* 10.2* 9.4*   Hematocrit % 40.3 39.5* 33.4* 29.1*   Platelets K/uL 195 196 155 122*   Gran % % 89.2* 88.6* 89.2* 88.5*   Lymph % % 7.3* 7.1* 5.9* 5.9*   Mono % % 2.3* 2.9* 3.5* 4.1   Eosinophil % % 0.5 0.6 0.8 0.5   Basophil % % 0.3 0.2 0.1 0.2   Differential Method  Automated Automated Automated Automated       Metabolic Panel (last 72 hours):  Recent Labs   Lab Result Units 07/12/22  1814 07/13/22  0052 07/13/22  0541 07/13/22  1828 07/14/22  0718 07/15/22  0704   Sodium mmol/L 128* 124* 124*  --   125* 125*   Potassium mmol/L 5.8* 5.2* 5.2*  --  5.2* 5.4*   Chloride mmol/L 94* 91* 91*  --  95 93*   CO2 mmol/L 24 24 26  --  23 24   Glucose mg/dL 156* 219* 161*  --  153* 177*   Glucose, UA   --   --   --  1+*  --   --    BUN mg/dL 23* 23* 22*  --  22* 19   Creatinine mg/dL 1.3 1.3 1.3  --  0.9 0.9   Albumin g/dL 3.1*  --  2.6*  --  2.2* 2.0*   Total Bilirubin mg/dL 1.3*  --  1.7*  --  1.2* 0.9   Alkaline Phosphatase U/L 307*  --  234*  --  185* 204*   AST U/L 18  --  13  --  79* 34   ALT U/L 77*  --  54*  --  127* 82*   Magnesium mg/dL 1.5*  --  2.0  --  2.0 1.6       Vancomycin Administrations:  vancomycin given in the last 96 hours                   vancomycin in dextrose 5 % 1 gram/250 mL IVPB 1,000 mg (mg) 1,000 mg New Bag 07/14/22 2200     1,000 mg New Bag  0933     1,000 mg New Bag 07/13/22 2136     1,000 mg New Bag  1014    vancomycin 1.5 g in dextrose 5 % 250 mL IVPB (ready to mix) (mg) 1,500 mg New Bag 07/12/22 2234                Microbiologic Results:  Microbiology Results (last 7 days)     Procedure Component Value Units Date/Time    Blood culture [[id number]] Collected: 07/13/22 0832    Order Status: Completed Specimen: Blood from Antecubital, Right Arm Updated: 07/14/22 1012     Blood Culture, Routine No Growth to date      No Growth to date    Blood culture [[id number]] Collected: 07/13/22 0836    Order Status: Completed Specimen: Blood from Peripheral, Right Hand Updated: 07/14/22 1012     Blood Culture, Routine No Growth to date      No Growth to date

## 2022-07-15 NOTE — ASSESSMENT & PLAN NOTE
- Boost supplements ordered with every meal  -  dietician consulted today  - start remeron tonight for appetite stimulate.   - 25% albumin q6h for 3 doses today  - encouraged patient to increase protein intake

## 2022-07-15 NOTE — PROGRESS NOTES
Social Work Potential Weekend Discharge Note    Social work presented to patients bedside for continuity of care and any discharge needs. Patient and patients caregiver, his wife Shell ([phone]) both presented alert and oriented x 4 and asking and answering questions appropriately. Patient reported being interested in outpatient physical therapy and patients JOSEPH put in orders for patient. Social work let [facility] (ph: [phone]) know that patient was interested in receiving physical therapy at this location. Patients wife will be transporting the patient back home at the time of discharge. Patient and patients caregiver with no other questions or concerns at this time. Social work to continue to follow for psychosocial needs, resources and continuity of care.

## 2022-07-15 NOTE — PROGRESS NOTES
Therapy with Vancomycin complete and/or consult discontinued by provider.  Pharmacy will sign off, please re-consult as needed.     Thank you for the consult,   Messi Naranjo  [id number]

## 2022-07-16 ENCOUNTER — PATIENT MESSAGE (OUTPATIENT)
Dept: ENDOCRINOLOGY | Facility: HOSPITAL | Age: 42
End: 2022-07-16
Payer: COMMERCIAL

## 2022-07-16 VITALS
WEIGHT: 165.56 LBS | OXYGEN SATURATION: 96 % | HEIGHT: 68 IN | TEMPERATURE: 99 F | RESPIRATION RATE: 18 BRPM | SYSTOLIC BLOOD PRESSURE: 130 MMHG | HEART RATE: 94 BPM | BODY MASS INDEX: 25.09 KG/M2 | DIASTOLIC BLOOD PRESSURE: 83 MMHG

## 2022-07-16 LAB
ALBUMIN SERPL BCP-MCNC: 2.5 G/DL (ref 3.5–5.2)
ALP SERPL-CCNC: 216 U/L (ref 55–135)
ALT SERPL W/O P-5'-P-CCNC: 61 U/L (ref 10–44)
ANION GAP SERPL CALC-SCNC: 10 MMOL/L (ref 8–16)
AST SERPL-CCNC: 25 U/L (ref 10–40)
BASOPHILS # BLD AUTO: 0.01 K/UL (ref 0–0.2)
BASOPHILS NFR BLD: 0.2 % (ref 0–1.9)
BILIRUB SERPL-MCNC: 0.8 MG/DL (ref 0.1–1)
BUN SERPL-MCNC: 20 MG/DL (ref 6–20)
CALCIUM SERPL-MCNC: 8.8 MG/DL (ref 8.7–10.5)
CHLORIDE SERPL-SCNC: 92 MMOL/L (ref 95–110)
CO2 SERPL-SCNC: 25 MMOL/L (ref 23–29)
CREAT SERPL-MCNC: 0.8 MG/DL (ref 0.5–1.4)
DIFFERENTIAL METHOD: ABNORMAL
EOSINOPHIL # BLD AUTO: 0 K/UL (ref 0–0.5)
EOSINOPHIL NFR BLD: 1 % (ref 0–8)
ERYTHROCYTE [DISTWIDTH] IN BLOOD BY AUTOMATED COUNT: 14.2 % (ref 11.5–14.5)
EST. GFR  (AFRICAN AMERICAN): >60 ML/MIN/1.73 M^2
EST. GFR  (NON AFRICAN AMERICAN): >60 ML/MIN/1.73 M^2
GLUCOSE SERPL-MCNC: 235 MG/DL (ref 70–110)
HCT VFR BLD AUTO: 25.4 % (ref 40–54)
HGB BLD-MCNC: 8.3 G/DL (ref 14–18)
IMM GRANULOCYTES # BLD AUTO: 0.03 K/UL (ref 0–0.04)
IMM GRANULOCYTES NFR BLD AUTO: 0.7 % (ref 0–0.5)
LYMPHOCYTES # BLD AUTO: 0.3 K/UL (ref 1–4.8)
LYMPHOCYTES NFR BLD: 7.2 % (ref 18–48)
MAGNESIUM SERPL-MCNC: 1.5 MG/DL (ref 1.6–2.6)
MCH RBC QN AUTO: 31.6 PG (ref 27–31)
MCHC RBC AUTO-ENTMCNC: 32.7 G/DL (ref 32–36)
MCV RBC AUTO: 97 FL (ref 82–98)
MONOCYTES # BLD AUTO: 0.3 K/UL (ref 0.3–1)
MONOCYTES NFR BLD: 7.9 % (ref 4–15)
NEUTROPHILS # BLD AUTO: 3.4 K/UL (ref 1.8–7.7)
NEUTROPHILS NFR BLD: 83 % (ref 38–73)
NRBC BLD-RTO: 0 /100 WBC
PLATELET # BLD AUTO: 94 K/UL (ref 150–450)
PMV BLD AUTO: 9.4 FL (ref 9.2–12.9)
POCT GLUCOSE: 114 MG/DL (ref 70–110)
POCT GLUCOSE: 202 MG/DL (ref 70–110)
POCT GLUCOSE: 218 MG/DL (ref 70–110)
POCT GLUCOSE: 243 MG/DL (ref 70–110)
POCT GLUCOSE: <20 MG/DL (ref 70–110)
POTASSIUM SERPL-SCNC: 4.6 MMOL/L (ref 3.5–5.1)
PROT SERPL-MCNC: 5.6 G/DL (ref 6–8.4)
RBC # BLD AUTO: 2.63 M/UL (ref 4.6–6.2)
SODIUM SERPL-SCNC: 127 MMOL/L (ref 136–145)
TACROLIMUS BLD-MCNC: 12.7 NG/ML (ref 5–15)
WBC # BLD AUTO: 4.05 K/UL (ref 3.9–12.7)

## 2022-07-16 PROCEDURE — 99238 PR HOSPITAL DISCHARGE DAY,<30 MIN: ICD-10-PCS | Mod: ,,, | Performed by: NURSE PRACTITIONER

## 2022-07-16 PROCEDURE — 99238 HOSP IP/OBS DSCHRG MGMT 30/<: CPT | Mod: ,,, | Performed by: NURSE PRACTITIONER

## 2022-07-16 PROCEDURE — P9047 ALBUMIN (HUMAN), 25%, 50ML: HCPCS | Mod: JG | Performed by: PHYSICIAN ASSISTANT

## 2022-07-16 PROCEDURE — 36415 COLL VENOUS BLD VENIPUNCTURE: CPT

## 2022-07-16 PROCEDURE — 99232 PR SUBSEQUENT HOSPITAL CARE,LEVL II: ICD-10-PCS | Mod: ,,, | Performed by: NURSE PRACTITIONER

## 2022-07-16 PROCEDURE — 63600175 PHARM REV CODE 636 W HCPCS: Mod: JG | Performed by: PHYSICIAN ASSISTANT

## 2022-07-16 PROCEDURE — 63600175 PHARM REV CODE 636 W HCPCS

## 2022-07-16 PROCEDURE — 25000003 PHARM REV CODE 250: Performed by: SURGERY

## 2022-07-16 PROCEDURE — 99232 SBSQ HOSP IP/OBS MODERATE 35: CPT | Mod: ,,, | Performed by: NURSE PRACTITIONER

## 2022-07-16 PROCEDURE — 82955 ASSAY OF G6PD ENZYME: CPT

## 2022-07-16 PROCEDURE — 25000003 PHARM REV CODE 250

## 2022-07-16 PROCEDURE — 83735 ASSAY OF MAGNESIUM: CPT

## 2022-07-16 PROCEDURE — 85025 COMPLETE CBC W/AUTO DIFF WBC: CPT

## 2022-07-16 PROCEDURE — 80053 COMPREHEN METABOLIC PANEL: CPT

## 2022-07-16 PROCEDURE — 80197 ASSAY OF TACROLIMUS: CPT

## 2022-07-16 RX ORDER — INSULIN ASPART 100 [IU]/ML
5 INJECTION, SOLUTION INTRAVENOUS; SUBCUTANEOUS
Qty: 15 ML | Refills: 5 | Status: SHIPPED | OUTPATIENT
Start: 2022-07-16 | End: 2022-07-27

## 2022-07-16 RX ORDER — TACROLIMUS 1 MG/1
1 CAPSULE ORAL 2 TIMES DAILY
Status: DISCONTINUED | OUTPATIENT
Start: 2022-07-17 | End: 2022-07-16 | Stop reason: HOSPADM

## 2022-07-16 RX ORDER — CEFPODOXIME PROXETIL 200 MG/1
200 TABLET, FILM COATED ORAL EVERY 12 HOURS
Qty: 11 TABLET | Refills: 0 | Status: ON HOLD | OUTPATIENT
Start: 2022-07-16 | End: 2022-07-22 | Stop reason: HOSPADM

## 2022-07-16 RX ORDER — MIRTAZAPINE 15 MG/1
15 TABLET, FILM COATED ORAL NIGHTLY
Qty: 30 TABLET | Refills: 11 | Status: SHIPPED | OUTPATIENT
Start: 2022-07-16 | End: 2022-11-03

## 2022-07-16 RX ORDER — INSULIN ASPART 100 [IU]/ML
7 INJECTION, SOLUTION INTRAVENOUS; SUBCUTANEOUS
Status: DISCONTINUED | OUTPATIENT
Start: 2022-07-16 | End: 2022-07-16 | Stop reason: HOSPADM

## 2022-07-16 RX ADMIN — TACROLIMUS 1.5 MG: 1 CAPSULE ORAL at 08:07

## 2022-07-16 RX ADMIN — OXYCODONE HYDROCHLORIDE 15 MG: 10 TABLET ORAL at 02:07

## 2022-07-16 RX ADMIN — Medication 1 TABLET: at 09:07

## 2022-07-16 RX ADMIN — DOCUSATE SODIUM 50 MG: 50 CAPSULE, LIQUID FILLED ORAL at 09:07

## 2022-07-16 RX ADMIN — FLUCONAZOLE 200 MG: 200 TABLET ORAL at 09:07

## 2022-07-16 RX ADMIN — INSULIN ASPART 7 UNITS: 100 INJECTION, SOLUTION INTRAVENOUS; SUBCUTANEOUS at 12:07

## 2022-07-16 RX ADMIN — ALBUMIN (HUMAN) 25 G: 12.5 SOLUTION INTRAVENOUS at 12:07

## 2022-07-16 RX ADMIN — VALGANCICLOVIR HYDROCHLORIDE 450 MG: 450 TABLET ORAL at 09:07

## 2022-07-16 RX ADMIN — CARVEDILOL 3.12 MG: 3.12 TABLET, FILM COATED ORAL at 09:07

## 2022-07-16 RX ADMIN — ASPIRIN 81 MG CHEWABLE TABLET 81 MG: 81 TABLET CHEWABLE at 09:07

## 2022-07-16 RX ADMIN — CEFPODOXIME PROXETIL 200 MG: 200 TABLET, FILM COATED ORAL at 09:07

## 2022-07-16 RX ADMIN — INSULIN ASPART 7 UNITS: 100 INJECTION, SOLUTION INTRAVENOUS; SUBCUTANEOUS at 09:07

## 2022-07-16 RX ADMIN — PANTOPRAZOLE SODIUM 40 MG: 40 TABLET, DELAYED RELEASE ORAL at 09:07

## 2022-07-16 RX ADMIN — TAMSULOSIN HYDROCHLORIDE 0.8 MG: 0.4 CAPSULE ORAL at 09:07

## 2022-07-16 RX ADMIN — METHOCARBAMOL 500 MG: 500 TABLET ORAL at 02:07

## 2022-07-16 RX ADMIN — INSULIN ASPART 2 UNITS: 100 INJECTION, SOLUTION INTRAVENOUS; SUBCUTANEOUS at 09:07

## 2022-07-16 RX ADMIN — LIDOCAINE 1 PATCH: 50 PATCH CUTANEOUS at 09:07

## 2022-07-16 RX ADMIN — METHOCARBAMOL 500 MG: 500 TABLET ORAL at 09:07

## 2022-07-16 RX ADMIN — PREDNISONE 10 MG: 10 TABLET ORAL at 09:07

## 2022-07-16 NOTE — PLAN OF CARE
Recommendations    1. Continue Diabetic diet    2. Continue appetite stimulant      3. Continue Boost glucose control TID      4. D/c Boost Plus      5. RD to monitor and follow    Goals: Meet % EEN, EPN by RD f/u date  Nutrition Goal Status: new  Communication of RD Recs:  (POC)

## 2022-07-16 NOTE — RESPIRATORY THERAPY
RAPID RESPONSE RESPIRATORY CHART CHECK       Chart check completed, call [phone] for further concerns or assistance.

## 2022-07-16 NOTE — CONSULTS
[facility] - Transplant Stepdown  Adult Nutrition  Consult Note    SUMMARY     Recommendations    1. Continue Diabetic diet    2. Continue appetite stimulant      3. Continue Boost glucose control TID      4. D/c Boost Plus      5. RD to monitor and follow    Goals: Meet % EEN, EPN by RD f/u date  Nutrition Goal Status: new  Communication of RD Recs:  (POC)    Assessment and Plan    Moderate malnutrition  Nutrition Problem  Moderate Protein-Calorie Malnutrition  Malnutrition in the context of Acute illness/injury    Related to (etiology):   Inadequate energy intake    Signs and Symptoms (as evidenced by):   Energy Intake: <75% of estimated energy requirement for >1 month  Weight Loss: 13% x 1 month    Interventions/Recommendations (treatment strategy):  Collaboration of nutrition care with other providers  ONS    Nutrition Diagnosis Status:   New    Malnutrition Assessment  Weight Loss (Malnutrition):  (13% x 1 month)  Energy Intake (Malnutrition): less than 75% for greater than or equal to 1 month   Orbital Region (Subcutaneous Fat Loss): well nourished  Upper Arm Region (Subcutaneous Fat Loss): well nourished   [place] Region (Muscle Loss): well nourished  Clavicle Bone Region (Muscle Loss): well nourished  Clavicle and Acromion Bone Region (Muscle Loss): well nourished  Dorsal Hand (Muscle Loss): well nourished  Anterior Thigh Region (Muscle Loss): well nourished  Posterior Calf Region (Muscle Loss): well nourished     Reason for Assessment    Reason For Assessment: consult  Diagnosis:  (Biloma of transplanted liver)  Relevant Medical History: s/p liver tx (6/21), mild malnutrition, bile leak, T2DM  Interdisciplinary Rounds: did not attend    General Information Comments:   Pt with [facility] s/p liver tx (6/21) admitted for bile leak, s/p bile duct repair (6/23). Pt stated decreased appetite. Started on appetite stimulant today. Pt starting to drink Boost to increase nutrition intake. Clarified to pt that he  "should be taking Boost glucose control instead of regular Boost. Taken pt's food preference and made note for kitchen. Per wt hx, noted significant wt loss of 13%  (25lb) x 1 month. NFPE completed today, pt appears physically nourished but still meet the criteria for moderate malnutrition due to decreased PO intake and wt loss.     Nutrition Discharge Planning: pending medical course    Nutrition Risk Screen    Nutrition Risk Screen: no indicators present    Anthropometrics    Temp: 98.6 °F (37 °C)  Height Method: Stated  Height: 5' 8" (172.7 cm)  Height (inches): 68 in  Weight Method: Standard Scale  Weight: 75.1 kg (165 lb 9.1 oz)  Weight (lb): 165.57 lb  Ideal Body Weight (IBW), Male: 154 lb  % Ideal Body Weight, Male (lb): 108.44 %  BMI (Calculated): 25.2     Lab/Procedures/Meds    Pertinent Labs Reviewed: reviewed  Pertinent Labs Comments: Na 125, K 5.2, glucose 153, BUN 22, , AST 79,   Pertinent Medications Reviewed: reviewed  Pertinent Medications Comments: mirtazapine, albumin, insulin aspart, polyethylene glycol, docusate, tacrolimus, calcium-vit D3, pantoprazole, prednisone    Estimated/Assessed Needs    Weight Used For Calorie Calculations: 75 kg (165 lb 5.5 oz)  Energy Calorie Requirements (kcal): 1875-2250kcal  Energy Need Method: Kcal/kg (25-30)  Protein Requirements: 90-112g (1.2-1.5g/kg)  Weight Used For Protein Calculations: 75 kg (165 lb 5.5 oz)  Fluid Requirements (mL): 1ml/kcal or per MD  Estimated Fluid Requirement Method: RDA Method  RDA Method (mL): 1875    Nutrition Prescription Ordered    Current Diet Order: Diebetic/Low K  Oral Nutrition Supplement: Boost glucose control TID    Evaluation of Received Nutrient/Fluid Intake    I/O: +2.8L since admit  Energy Calories Required: not meeting needs  Protein Required: not meeting needs  Comments: LBM 7/10  Tolerance: tolerating    Nutrition Risk    Level of Risk/Frequency of Follow-up: low     Monitor and Evaluation    Food and " Nutrient Intake: energy intake, food and beverage intake  Food and Nutrient Adminstration: diet order  Knowledge/Beliefs/Attitudes: beliefs and attitudes, food and nutrition knowledge/skill  Physical Activity and Function: nutrition-related ADLs and IADLs  Anthropometric Measurements: height/length, weight, weight change, body mass index  Biochemical Data, Medical Tests and Procedures: electrolyte and renal panel, gastrointestinal profile, glucose/endocrine profile, inflammatory profile, lipid profile  Nutrition-Focused Physical Findings: overall appearance     Nutrition Follow-Up    RD Follow-up?: Yes     Heidy MORALES

## 2022-07-16 NOTE — PLAN OF CARE
VVS   AAOX4   BILI TUBE FLUSHED WITH 30 ML AND NEW DRESSING PLACED   PT GIVEN PRN MEDS AS ORDERED   FATHER AT BEDSIDE ALL NIGHT   SIDE RAILS UP X 2 CALL LIGHT WITH IN REACH, BED LOW , VERBAL UNDERSTANDING NOTED TO CALL PRN

## 2022-07-16 NOTE — ASSESSMENT & PLAN NOTE
BG goal 140-180    BG monitoring ac/hs and low dose correction scale.   Increase to novolog 7 units with meals.       Discharge plans- steriods tapering. novolog 5 units with meals plus low dose correction scale 150/50. When patient stops prednisone stop scheduled insulin and just do correction scale. If patient continues with needs off steroids will likely start ddp4i; No history of pancreatitis or medullary thyroid CA.

## 2022-07-16 NOTE — PROGRESS NOTES
"Discharge Medication Note:    Hospital Course:  Admitted for bilious drains and further work up started on cef/vanc and continued outpatient fluc. Went to IR and PTC drained placed, abx deescalated to cefpodoxime (EOT: 7/22).  Of note, elevated K so Bactrim held.  G6PD pending.  Will need to decide PCP prophylaxis as outpatient.           Met with Romeo Larson at discharge to review discharge medications and to update the blue medication card.           Medication List      START taking these medications    cefpodoxime 200 MG tablet  Commonly known as: VANTIN  Take 1 tablet (200 mg total) by mouth every 12 (twelve) hours. for 11 doses     mirtazapine 15 MG tablet  Commonly known as: REMERON  Take 1 tablet (15 mg total) by mouth nightly.        CHANGE how you take these medications    insulin aspart U-100 100 unit/mL (3 mL) Inpn pen  Commonly known as: NovoLOG  Inject 5 Units into the skin 3 (three) times daily with meals. + sliding scale.  MDD 30 units/d  What changed:   · how much to take  · additional instructions        CONTINUE taking these medications    aspirin 81 MG Chew  CHEW 1 tablet (81 mg total) by mouth once daily.     BD ULTRA-FINE KAREN PEN NEEDLE 32 gauge x 5/32" Ndle  Generic drug: pen needle, diabetic  1 each by Misc.(Non-Drug; Combo Route) route 3 (three) times daily.     calcium carbonate-vitamin D3 600 mg-20 mcg (800 unit) Tab  Take 1 tablet by mouth once daily at 6am.     carvediloL 3.125 MG tablet  Commonly known as: COREG  Take 1 tablet (3.125 mg total) by mouth 2 (two) times daily.     fluconazole 200 MG Tab  Commonly known as: DIFLUCAN  Take 1 tablet (200 mg total) by mouth once daily. STOP 7/20/22     methocarbamoL 500 MG Tab  Commonly known as: ROBAXIN  Take 1 tablet (500 mg total) by mouth 4 (four) times daily.     oxyCODONE 10 mg Tab immediate release tablet  Commonly known as: ROXICODONE  Take 1-1.5 tablets (10-15 mg total) by mouth every 4 (four) hours as needed for Pain.   "   pantoprazole 40 MG tablet  Commonly known as: PROTONIX  Take 1 tablet (40 mg total) by mouth once daily.     predniSONE 5 MG tablet  Commonly known as: DELTASONE  Take by mouth once daily: 20 mg 6/27-7/3; 15 mg 7/4-7/10; 10 mg 7/11-7/17; 5 mg 7/18-7/24; STOP 7/25/22     tacrolimus 1 MG Cap  Commonly known as: PROGRAF  Take 1 capsule (1 mg total) by mouth every 12 (twelve) hours.     tamsulosin 0.4 mg Cap  Commonly known as: FLOMAX  Take 2 capsules (0.8 mg total) by mouth once daily.     TRUE METRIX GLUCOSE METER Misc  Generic drug: blood-glucose meter  Use as instructed     TRUE METRIX GLUCOSE TEST STRIP Strp  Generic drug: blood sugar diagnostic  1 each by Misc.(Non-Drug; Combo Route) route 3 (three) times daily.     TRUEPLUS LANCETS 30 gauge Misc  Generic drug: lancets  1 each by Misc.(Non-Drug; Combo Route) route 3 (three) times daily.     valGANciclovir 450 mg Tab  Commonly known as: VALCYTE  Take 1 tablet (450 mg total) by mouth once daily. STOP 9/19/22        STOP taking these medications    amoxicillin-clavulanate 875-125mg 875-125 mg per tablet  Commonly known as: AUGMENTIN     baclofen 10 MG tablet  Commonly known as: LIORESAL     famotidine 20 MG tablet  Commonly known as: PEPCID     losartan 50 MG tablet  Commonly known as: COZAAR     metFORMIN 500 MG tablet  Commonly known as: GLUCOPHAGE     mycophenolate 250 mg Cap  Commonly known as: CELLCEPT     sulfamethoxazole-trimethoprim 400-80mg 400-80 mg per tablet  Commonly known as: BACTRIM,SEPTRA           Where to Get Your Medications      These medications were sent to [facility] [address] [address]    Hours: Mon-Fri 7a-7p, Sat-Sun 10a-4p Phone: [phone]   · cefpodoxime 200 MG tablet  · insulin aspart U-100 100 unit/mL (3 mL) Inpn pen  · mirtazapine 15 MG tablet            The following medications have been placed on HOLD and should be restarted in the outpatient setting (when appropriate): MMF, Bactrim (G6PD  pending)    Romeo Larson verbalized understanding and had the opportunity to ask questions.

## 2022-07-16 NOTE — PROGRESS NOTES
Pt, wife, and father given dc paperwork.  All verbalized understanding and had no questions.  Pic, tele dc'd.  To flush Bili drain with 10cc BID at home - wife instructed on how to do so.  Drsg to bili bag changed prior to dc.  Awaiting transport to car.  Pain meds given to aide in pain on ride home

## 2022-07-16 NOTE — SUBJECTIVE & OBJECTIVE
"Interval HPI:   Overnight events: Remains in TSU. NAEON. Discharge today. BG at or above goal with scheduled insulin and prn correction scale.    Eating: Diet diabetic [facility]; 2000 Calorie; Low Potassium; Fluid - 1200mL    100%  Nausea: No  Hypoglycemia and intervention: No  Fever: No  TPN and/or TF: No    /83   Pulse 94   Temp 98.7 °F (37.1 °C)   Resp 20   Ht 5' 8" (1.727 m)   Wt 75.1 kg (165 lb 9.1 oz)   SpO2 96%   BMI 25.17 kg/m²     Labs Reviewed and Include    Recent Labs   Lab 07/16/22  0633   *   CALCIUM 8.8   ALBUMIN 2.5*   PROT 5.6*   *   K 4.6   CO2 25   CL 92*   BUN 20   CREATININE 0.8   ALKPHOS 216*   ALT 61*   AST 25   BILITOT 0.8     Lab Results   Component Value Date    WBC 4.05 07/16/2022    HGB 8.3 (L) 07/16/2022    HCT 25.4 (L) 07/16/2022    MCV 97 07/16/2022    PLT 94 (L) 07/16/2022     No results for input(s): TSH, FREET4 in the last 168 hours.  Lab Results   Component Value Date    HGBA1C 5.8 (H) 06/20/2022       Nutritional status:   Body mass index is 25.17 kg/m².  Lab Results   Component Value Date    ALBUMIN 2.5 (L) 07/16/2022    ALBUMIN 2.0 (L) 07/15/2022    ALBUMIN 2.2 (L) 07/14/2022     No results found for: PREALBUMIN    Estimated Creatinine Clearance: 115.2 mL/min (based on SCr of 0.8 mg/dL).    Accu-Checks  Recent Labs     07/13/22 2003 07/14/22  0754 07/14/22  1156 07/14/22  1639 07/14/22  2115 07/15/22  1135 07/15/22  1136 07/15/22  1732 07/15/22  2049 07/16/22  0920   POCTGLUCOSE 163* 149* 287* 211* 190* <20* 243* 146* 218* 202*       Current Medications and/or Treatments Impacting Glycemic Control  Immunotherapy:    Immunosuppressants           Stop Route Frequency     tacrolimus capsule 1 mg         -- Oral 2 times daily          Steroids:   Hormones (From admission, onward)                Start     Stop Route Frequency Ordered    07/13/22 0900  predniSONE tablet 10 mg         -- Oral Daily 07/12/22 1751 07/12/22 1751  melatonin tablet 6 mg  "        -- Oral Nightly PRN 07/12/22 1751          Pressors:    Autonomic Drugs (From admission, onward)                None          Hyperglycemia/Diabetes Medications:   Antihyperglycemics (From admission, onward)                Start     Stop Route Frequency Ordered    07/16/22 0715  insulin aspart U-100 pen 7 Units         -- SubQ 3 times daily with meals 07/16/22 0638    07/14/22 1629  insulin aspart U-100 pen 0-5 Units         -- SubQ Before meals & nightly PRN 07/14/22 1520

## 2022-07-16 NOTE — CARE UPDATE
BG goal 140-180    -A1C   Lab Results   Component Value Date    HGBA1C 5.8 (H) 06/20/2022         -HOME REGIMEN: novolog 7 units with meals     -INPATIENT REGIMEN: novolog 5 units with meals.    -GLUCOSE TREND FOR THE PAST 24HRS: 200s    -NO HYPOGYCEMIAS NOTED     -TOLERATING 25% OF PO DIET     -Diet: Diet diabetic [facility]; 2000 Calorie; Low Potassium; Fluid - 1200mL    -Steroids - prednisone 10 mg daily       Remains in TSU, NAEON. BG above goal with scheduled insulin and prn correction scale.       Plan:  BG monitoring ac/hs and low dose correction scale.   Increase to novolog 7 units with meals.     Discharge planning:tbd     Endocrine to continue to follow    ** Please call Endocrine for any BG related issues **

## 2022-07-16 NOTE — PROGRESS NOTES
"[facility] - [facility] Step[facility]  Endocrinology  Progress Note    Admit Date: 7/12/2022     Reason for Consult: Management of T2DM, Hyperglycemia      Surgical Procedure and Date: Liver Transplant 6/21/2022; LAPAROTOMY, EXPLORATORY (N/A) on 7/4; IR for drain placement 7/13/22     Diabetes diagnosis year: 2022     Home Diabetes Medications:  Novolog 7 units TID with meals   Lab Results   Component Value Date    HGBA1C 5.8 (H) 06/20/2022          How often checking glucose at home?  3-4x daily    BG readings on regimen: 100s   Hypoglycemia on the regimen?  No  Missed doses on regimen?  No     Diabetes Complications include:     Hyperglycemia      Complicating diabetes co morbidities:   Glucocorticoid Use     HPI:   Patient is a 41 y.o. male with a diagnosis of well controlled type 2 DM. 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 Post operative course significant for recurrent bile leak. Patient presented for outpatient follow up, drain placed last hospitalization found to have bilious output, and patient reported feeling weak with decreased appeitie. Admitted for further interventions. Endocrinology consulted for BG/ DM management.       Interval HPI:   Overnight events: Remains in TSU. NAEON. Discharge today. BG at or above goal with scheduled insulin and prn correction scale.    Eating: Diet diabetic [facility]; 2000 Calorie; Low Potassium; Fluid - 1200mL    100%  Nausea: No  Hypoglycemia and intervention: No  Fever: No  TPN and/or TF: No    /83   Pulse 94   Temp 98.7 °F (37.1 °C)   Resp 20   Ht 5' 8" (1.727 m)   Wt 75.1 kg (165 lb 9.1 oz)   SpO2 96%   BMI 25.17 kg/m²     Labs Reviewed and Include    Recent Labs   Lab 07/16/22  0633   *   CALCIUM 8.8   ALBUMIN 2.5*   PROT 5.6*   *   K 4.6   CO2 25   CL 92*   BUN 20   CREATININE 0.8   ALKPHOS 216*   ALT 61*   AST 25   BILITOT 0.8     Lab Results   Component Value Date    WBC 4.05 " 07/16/2022    HGB 8.3 (L) 07/16/2022    HCT 25.4 (L) 07/16/2022    MCV 97 07/16/2022    PLT 94 (L) 07/16/2022     No results for input(s): TSH, FREET4 in the last 168 hours.  Lab Results   Component Value Date    HGBA1C 5.8 (H) 06/20/2022       Nutritional status:   Body mass index is 25.17 kg/m².  Lab Results   Component Value Date    ALBUMIN 2.5 (L) 07/16/2022    ALBUMIN 2.0 (L) 07/15/2022    ALBUMIN 2.2 (L) 07/14/2022     No results found for: PREALBUMIN    Estimated Creatinine Clearance: 115.2 mL/min (based on SCr of 0.8 mg/dL).    Accu-Checks  Recent Labs     07/13/22 2003 07/14/22  0754 07/14/22  1156 07/14/22  1639 07/14/22  2115 07/15/22  1135 07/15/22  1136 07/15/22  1732 07/15/22  2049 07/16/22  0920   POCTGLUCOSE 163* 149* 287* 211* 190* <20* 243* 146* 218* 202*       Current Medications and/or Treatments Impacting Glycemic Control  Immunotherapy:    Immunosuppressants           Stop Route Frequency     tacrolimus capsule 1 mg         -- Oral 2 times daily          Steroids:   Hormones (From admission, onward)                Start     Stop Route Frequency Ordered    07/13/22 0900  predniSONE tablet 10 mg         -- Oral Daily 07/12/22 1751    07/12/22 1751  melatonin tablet 6 mg         -- Oral Nightly PRN 07/12/22 1751          Pressors:    Autonomic Drugs (From admission, onward)                None          Hyperglycemia/Diabetes Medications:   Antihyperglycemics (From admission, onward)                Start     Stop Route Frequency Ordered    07/16/22 0715  insulin aspart U-100 pen 7 Units         -- SubQ 3 times daily with meals 07/16/22 0638    07/14/22 1629  insulin aspart U-100 pen 0-5 Units         -- SubQ Before meals & nightly PRN 07/14/22 1529            ASSESSMENT and PLAN    * Biloma of transplanted liver  Managed per primary.         Type 2 diabetes mellitus with hyperglycemia  BG goal 140-180    BG monitoring ac/hs and low dose correction scale.   Increase to novolog 7 units with meals.        Discharge plans- steriods tapering. novolog 5 units with meals plus low dose correction scale 150/50. When patient stops prednisone stop scheduled insulin and just do correction scale. If patient continues with needs off steroids will likely start [id number]; No history of pancreatitis or medullary thyroid CA.     S/P liver transplant  avoid hypoglycemia        Adrenal corticosteroid causing adverse effect in therapeutic use  Glucocorticoids markedly increase prandial glucoses. Expect the steroid taper will help glucose control.         Prophylactic immunotherapy  May increase insulin resistance.             Brinda Zuniga NP  Endocrinology  [facility] - [facility]

## 2022-07-18 ENCOUNTER — HOSPITAL ENCOUNTER (INPATIENT)
Facility: HOSPITAL | Age: 42
LOS: 9 days | Discharge: HOME | DRG: 919 | End: 2022-07-27
Attending: EMERGENCY MEDICINE | Admitting: TRANSPLANT SURGERY
Payer: COMMERCIAL

## 2022-07-18 ENCOUNTER — TELEPHONE (OUTPATIENT)
Dept: TRANSPLANT | Facility: CLINIC | Age: 42
End: 2022-07-18
Payer: COMMERCIAL

## 2022-07-18 DIAGNOSIS — A41.9 SEPSIS: ICD-10-CM

## 2022-07-18 DIAGNOSIS — K83.9 BILE LEAK: ICD-10-CM

## 2022-07-18 DIAGNOSIS — R00.0 TACHYCARDIA: ICD-10-CM

## 2022-07-18 DIAGNOSIS — Z94.4 S/P LIVER TRANSPLANT: ICD-10-CM

## 2022-07-18 DIAGNOSIS — B49 FUNGEMIA: ICD-10-CM

## 2022-07-18 DIAGNOSIS — E11.65 TYPE 2 DIABETES MELLITUS WITH HYPERGLYCEMIA, WITHOUT LONG-TERM CURRENT USE OF INSULIN: ICD-10-CM

## 2022-07-18 DIAGNOSIS — D64.9 ANEMIA, UNSPECIFIED TYPE: ICD-10-CM

## 2022-07-18 DIAGNOSIS — Z79.60 LONG-TERM USE OF IMMUNOSUPPRESSANT MEDICATION: ICD-10-CM

## 2022-07-18 DIAGNOSIS — Z91.89 AT RISK FOR OPPORTUNISTIC INFECTIONS: ICD-10-CM

## 2022-07-18 DIAGNOSIS — B99.9 INTRA-ABDOMINAL INFECTION: ICD-10-CM

## 2022-07-18 DIAGNOSIS — A41.9 SEPSIS, DUE TO UNSPECIFIED ORGANISM, UNSPECIFIED WHETHER ACUTE ORGAN DYSFUNCTION PRESENT: Primary | ICD-10-CM

## 2022-07-18 DIAGNOSIS — Z94.4 LIVER TRANSPLANT RECIPIENT: ICD-10-CM

## 2022-07-18 DIAGNOSIS — Z29.89 PROPHYLACTIC IMMUNOTHERAPY: ICD-10-CM

## 2022-07-18 DIAGNOSIS — K83.1 BILIARY OBSTRUCTION: ICD-10-CM

## 2022-07-18 LAB
ALBUMIN SERPL BCP-MCNC: 2.6 G/DL (ref 3.5–5.2)
ALP SERPL-CCNC: 317 U/L (ref 55–135)
ALT SERPL W/O P-5'-P-CCNC: 104 U/L (ref 10–44)
ANION GAP SERPL CALC-SCNC: 10 MMOL/L (ref 8–16)
ANION GAP SERPL CALC-SCNC: 11 MMOL/L (ref 8–16)
AST SERPL-CCNC: 58 U/L (ref 10–40)
BACTERIA #/AREA URNS AUTO: NORMAL /HPF
BACTERIA BLD CULT: NORMAL
BACTERIA BLD CULT: NORMAL
BASOPHILS # BLD AUTO: 0.01 K/UL (ref 0–0.2)
BASOPHILS NFR BLD: 0.2 % (ref 0–1.9)
BILIRUB SERPL-MCNC: 1.3 MG/DL (ref 0.1–1)
BILIRUB UR QL STRIP: NEGATIVE
BUN SERPL-MCNC: 16 MG/DL (ref 6–20)
BUN SERPL-MCNC: 22 MG/DL (ref 6–20)
CALCIUM SERPL-MCNC: 8.9 MG/DL (ref 8.7–10.5)
CALCIUM SERPL-MCNC: 9.2 MG/DL (ref 8.7–10.5)
CHLORIDE SERPL-SCNC: 91 MMOL/L (ref 95–110)
CHLORIDE SERPL-SCNC: 92 MMOL/L (ref 95–110)
CLARITY UR REFRACT.AUTO: CLEAR
CO2 SERPL-SCNC: 24 MMOL/L (ref 23–29)
CO2 SERPL-SCNC: 24 MMOL/L (ref 23–29)
COLOR UR AUTO: YELLOW
CREAT SERPL-MCNC: 1.1 MG/DL (ref 0.5–1.4)
CREAT SERPL-MCNC: 1.3 MG/DL (ref 0.5–1.4)
DIFFERENTIAL METHOD: ABNORMAL
EOSINOPHIL # BLD AUTO: 0 K/UL (ref 0–0.5)
EOSINOPHIL NFR BLD: 0 % (ref 0–8)
ERYTHROCYTE [DISTWIDTH] IN BLOOD BY AUTOMATED COUNT: 14.5 % (ref 11.5–14.5)
EST. GFR  (AFRICAN AMERICAN): >60 ML/MIN/1.73 M^2
EST. GFR  (AFRICAN AMERICAN): >60 ML/MIN/1.73 M^2
EST. GFR  (NON AFRICAN AMERICAN): >60 ML/MIN/1.73 M^2
EST. GFR  (NON AFRICAN AMERICAN): >60 ML/MIN/1.73 M^2
G6PD RBC-CCNT: 13.9 U/G HB (ref 8–11.9)
GLUCOSE SERPL-MCNC: 163 MG/DL (ref 70–110)
GLUCOSE SERPL-MCNC: 332 MG/DL (ref 70–110)
GLUCOSE UR QL STRIP: ABNORMAL
HCT VFR BLD AUTO: 29.2 % (ref 40–54)
HGB BLD-MCNC: 9.5 G/DL (ref 14–18)
HGB UR QL STRIP: NEGATIVE
HYPOCHROMIA BLD QL SMEAR: ABNORMAL
IMM GRANULOCYTES # BLD AUTO: 0.07 K/UL (ref 0–0.04)
IMM GRANULOCYTES NFR BLD AUTO: 1.1 % (ref 0–0.5)
KETONES UR QL STRIP: NEGATIVE
LACTATE SERPL-SCNC: 1.1 MMOL/L (ref 0.5–2.2)
LACTATE SERPL-SCNC: 2 MMOL/L (ref 0.5–2.2)
LEUKOCYTE ESTERASE UR QL STRIP: NEGATIVE
LIPASE SERPL-CCNC: 9 U/L (ref 4–60)
LYMPHOCYTES # BLD AUTO: 0.2 K/UL (ref 1–4.8)
LYMPHOCYTES NFR BLD: 3.6 % (ref 18–48)
MAGNESIUM SERPL-MCNC: 1.2 MG/DL (ref 1.6–2.6)
MCH RBC QN AUTO: 30.4 PG (ref 27–31)
MCHC RBC AUTO-ENTMCNC: 32.5 G/DL (ref 32–36)
MCV RBC AUTO: 94 FL (ref 82–98)
MICROSCOPIC COMMENT: NORMAL
MONOCYTES # BLD AUTO: 0.5 K/UL (ref 0.3–1)
MONOCYTES NFR BLD: 7.8 % (ref 4–15)
NEUTROPHILS # BLD AUTO: 5.6 K/UL (ref 1.8–7.7)
NEUTROPHILS NFR BLD: 87.3 % (ref 38–73)
NITRITE UR QL STRIP: NEGATIVE
NRBC BLD-RTO: 0 /100 WBC
PH UR STRIP: 6 [PH] (ref 5–8)
PHOSPHATE SERPL-MCNC: 2.5 MG/DL (ref 2.7–4.5)
PLATELET # BLD AUTO: 107 K/UL (ref 150–450)
PLATELET BLD QL SMEAR: ABNORMAL
PMV BLD AUTO: 9.5 FL (ref 9.2–12.9)
POCT GLUCOSE: 235 MG/DL (ref 70–110)
POCT GLUCOSE: 403 MG/DL (ref 70–110)
POTASSIUM SERPL-SCNC: 4 MMOL/L (ref 3.5–5.1)
POTASSIUM SERPL-SCNC: 4.8 MMOL/L (ref 3.5–5.1)
PROT SERPL-MCNC: 6 G/DL (ref 6–8.4)
PROT UR QL STRIP: NEGATIVE
RBC # BLD AUTO: 3.12 M/UL (ref 4.6–6.2)
RBC #/AREA URNS AUTO: 0 /HPF (ref 0–4)
SARS-COV-2 RDRP RESP QL NAA+PROBE: NEGATIVE
SODIUM SERPL-SCNC: 126 MMOL/L (ref 136–145)
SODIUM SERPL-SCNC: 126 MMOL/L (ref 136–145)
SP GR UR STRIP: >1.03 (ref 1–1.03)
SQUAMOUS #/AREA URNS AUTO: 0 /HPF
TROPONIN I SERPL DL<=0.01 NG/ML-MCNC: <0.006 NG/ML (ref 0–0.03)
URN SPEC COLLECT METH UR: ABNORMAL
WBC # BLD AUTO: 6.45 K/UL (ref 3.9–12.7)
WBC #/AREA URNS AUTO: 1 /HPF (ref 0–5)
YEAST UR QL AUTO: NORMAL

## 2022-07-18 PROCEDURE — 99285 EMERGENCY DEPT VISIT HI MDM: CPT | Mod: CS,,, | Performed by: EMERGENCY MEDICINE

## 2022-07-18 PROCEDURE — 80048 BASIC METABOLIC PNL TOTAL CA: CPT | Mod: XB | Performed by: PHYSICIAN ASSISTANT

## 2022-07-18 PROCEDURE — 63600175 PHARM REV CODE 636 W HCPCS: Performed by: INTERNAL MEDICINE

## 2022-07-18 PROCEDURE — 84100 ASSAY OF PHOSPHORUS: CPT | Performed by: INTERNAL MEDICINE

## 2022-07-18 PROCEDURE — 25000003 PHARM REV CODE 250: Performed by: INTERNAL MEDICINE

## 2022-07-18 PROCEDURE — U0002 COVID-19 LAB TEST NON-CDC: HCPCS | Performed by: EMERGENCY MEDICINE

## 2022-07-18 PROCEDURE — 83735 ASSAY OF MAGNESIUM: CPT | Performed by: INTERNAL MEDICINE

## 2022-07-18 PROCEDURE — 93010 EKG 12-LEAD: ICD-10-PCS | Mod: ,,, | Performed by: INTERNAL MEDICINE

## 2022-07-18 PROCEDURE — 84484 ASSAY OF TROPONIN QUANT: CPT | Performed by: INTERNAL MEDICINE

## 2022-07-18 PROCEDURE — 99285 PR EMERGENCY DEPT VISIT,LEVEL V: ICD-10-PCS | Mod: CS,,, | Performed by: EMERGENCY MEDICINE

## 2022-07-18 PROCEDURE — 63600175 PHARM REV CODE 636 W HCPCS: Performed by: PHYSICIAN ASSISTANT

## 2022-07-18 PROCEDURE — 83690 ASSAY OF LIPASE: CPT | Performed by: INTERNAL MEDICINE

## 2022-07-18 PROCEDURE — 36415 COLL VENOUS BLD VENIPUNCTURE: CPT | Performed by: INTERNAL MEDICINE

## 2022-07-18 PROCEDURE — 87186 SC STD MICRODIL/AGAR DIL: CPT | Performed by: INTERNAL MEDICINE

## 2022-07-18 PROCEDURE — 20600001 HC STEP DOWN PRIVATE ROOM

## 2022-07-18 PROCEDURE — 99291 CRITICAL CARE FIRST HOUR: CPT | Mod: 25

## 2022-07-18 PROCEDURE — 99223 1ST HOSP IP/OBS HIGH 75: CPT | Mod: 24,,, | Performed by: PHYSICIAN ASSISTANT

## 2022-07-18 PROCEDURE — 25000003 PHARM REV CODE 250: Performed by: PHYSICIAN ASSISTANT

## 2022-07-18 PROCEDURE — 81001 URINALYSIS AUTO W/SCOPE: CPT | Performed by: INTERNAL MEDICINE

## 2022-07-18 PROCEDURE — 85025 COMPLETE CBC W/AUTO DIFF WBC: CPT | Performed by: INTERNAL MEDICINE

## 2022-07-18 PROCEDURE — 25500020 PHARM REV CODE 255: Performed by: EMERGENCY MEDICINE

## 2022-07-18 PROCEDURE — 83605 ASSAY OF LACTIC ACID: CPT | Performed by: INTERNAL MEDICINE

## 2022-07-18 PROCEDURE — 87040 BLOOD CULTURE FOR BACTERIA: CPT | Mod: 59 | Performed by: INTERNAL MEDICINE

## 2022-07-18 PROCEDURE — 80053 COMPREHEN METABOLIC PANEL: CPT | Performed by: INTERNAL MEDICINE

## 2022-07-18 PROCEDURE — 93005 ELECTROCARDIOGRAM TRACING: CPT

## 2022-07-18 PROCEDURE — 93010 ELECTROCARDIOGRAM REPORT: CPT | Mod: ,,, | Performed by: INTERNAL MEDICINE

## 2022-07-18 PROCEDURE — 96365 THER/PROPH/DIAG IV INF INIT: CPT

## 2022-07-18 PROCEDURE — 99223 PR INITIAL HOSPITAL CARE,LEVL III: ICD-10-PCS | Mod: 24,,, | Performed by: PHYSICIAN ASSISTANT

## 2022-07-18 PROCEDURE — P9047 ALBUMIN (HUMAN), 25%, 50ML: HCPCS | Mod: JG | Performed by: PHYSICIAN ASSISTANT

## 2022-07-18 PROCEDURE — 87106 FUNGI IDENTIFICATION YEAST: CPT | Performed by: INTERNAL MEDICINE

## 2022-07-18 RX ORDER — TALC
8 POWDER (GRAM) TOPICAL NIGHTLY PRN
Status: DISCONTINUED | OUTPATIENT
Start: 2022-07-18 | End: 2022-07-27 | Stop reason: HOSPADM

## 2022-07-18 RX ORDER — ONDANSETRON 2 MG/ML
4 INJECTION INTRAMUSCULAR; INTRAVENOUS EVERY 6 HOURS PRN
Status: DISCONTINUED | OUTPATIENT
Start: 2022-07-18 | End: 2022-07-27 | Stop reason: HOSPADM

## 2022-07-18 RX ORDER — GLUCAGON 1 MG
1 KIT INJECTION
Status: DISCONTINUED | OUTPATIENT
Start: 2022-07-18 | End: 2022-07-27 | Stop reason: HOSPADM

## 2022-07-18 RX ORDER — CEFEPIME HYDROCHLORIDE 1 G/50ML
1 INJECTION, SOLUTION INTRAVENOUS
Status: DISCONTINUED | OUTPATIENT
Start: 2022-07-18 | End: 2022-07-21

## 2022-07-18 RX ORDER — MAGNESIUM SULFATE HEPTAHYDRATE 40 MG/ML
2 INJECTION, SOLUTION INTRAVENOUS
Status: COMPLETED | OUTPATIENT
Start: 2022-07-18 | End: 2022-07-18

## 2022-07-18 RX ORDER — MIRTAZAPINE 15 MG/1
15 TABLET, FILM COATED ORAL NIGHTLY
Status: DISCONTINUED | OUTPATIENT
Start: 2022-07-18 | End: 2022-07-27 | Stop reason: HOSPADM

## 2022-07-18 RX ORDER — PREDNISONE 5 MG/1
5 TABLET ORAL DAILY
Status: DISCONTINUED | OUTPATIENT
Start: 2022-07-18 | End: 2022-07-27

## 2022-07-18 RX ORDER — METHOCARBAMOL 500 MG/1
500 TABLET, FILM COATED ORAL 4 TIMES DAILY
Status: DISCONTINUED | OUTPATIENT
Start: 2022-07-18 | End: 2022-07-27 | Stop reason: HOSPADM

## 2022-07-18 RX ORDER — NAPROXEN SODIUM 220 MG/1
81 TABLET, FILM COATED ORAL DAILY
Status: DISCONTINUED | OUTPATIENT
Start: 2022-07-19 | End: 2022-07-27 | Stop reason: HOSPADM

## 2022-07-18 RX ORDER — VANCOMYCIN HCL IN 5 % DEXTROSE 1G/250ML
1000 PLASTIC BAG, INJECTION (ML) INTRAVENOUS
Status: DISCONTINUED | OUTPATIENT
Start: 2022-07-19 | End: 2022-07-21

## 2022-07-18 RX ORDER — PANTOPRAZOLE SODIUM 40 MG/1
40 TABLET, DELAYED RELEASE ORAL DAILY
Status: DISCONTINUED | OUTPATIENT
Start: 2022-07-18 | End: 2022-07-27 | Stop reason: HOSPADM

## 2022-07-18 RX ORDER — IBUPROFEN 200 MG
16 TABLET ORAL
Status: DISCONTINUED | OUTPATIENT
Start: 2022-07-18 | End: 2022-07-27 | Stop reason: HOSPADM

## 2022-07-18 RX ORDER — HEPARIN SODIUM 5000 [USP'U]/ML
5000 INJECTION, SOLUTION INTRAVENOUS; SUBCUTANEOUS EVERY 8 HOURS
Status: DISCONTINUED | OUTPATIENT
Start: 2022-07-18 | End: 2022-07-27 | Stop reason: HOSPADM

## 2022-07-18 RX ORDER — IBUPROFEN 200 MG
24 TABLET ORAL
Status: DISCONTINUED | OUTPATIENT
Start: 2022-07-18 | End: 2022-07-27 | Stop reason: HOSPADM

## 2022-07-18 RX ORDER — FLUCONAZOLE 200 MG/1
200 TABLET ORAL DAILY
Status: DISCONTINUED | OUTPATIENT
Start: 2022-07-19 | End: 2022-07-21

## 2022-07-18 RX ORDER — TACROLIMUS 1 MG/1
1 CAPSULE ORAL 2 TIMES DAILY
Status: DISCONTINUED | OUTPATIENT
Start: 2022-07-18 | End: 2022-07-22

## 2022-07-18 RX ORDER — FLUCONAZOLE 200 MG/1
200 TABLET ORAL DAILY
Status: DISCONTINUED | OUTPATIENT
Start: 2022-07-18 | End: 2022-07-18

## 2022-07-18 RX ORDER — ACETAMINOPHEN 325 MG/1
650 TABLET ORAL EVERY 8 HOURS PRN
Status: DISCONTINUED | OUTPATIENT
Start: 2022-07-18 | End: 2022-07-27 | Stop reason: HOSPADM

## 2022-07-18 RX ORDER — CEFEPIME HYDROCHLORIDE 1 G/50ML
2 INJECTION, SOLUTION INTRAVENOUS
Status: COMPLETED | OUTPATIENT
Start: 2022-07-18 | End: 2022-07-18

## 2022-07-18 RX ORDER — FLUCONAZOLE 2 MG/ML
200 INJECTION, SOLUTION INTRAVENOUS
Status: COMPLETED | OUTPATIENT
Start: 2022-07-18 | End: 2022-07-18

## 2022-07-18 RX ORDER — ALBUMIN HUMAN 250 G/1000ML
25 SOLUTION INTRAVENOUS EVERY 8 HOURS
Status: COMPLETED | OUTPATIENT
Start: 2022-07-18 | End: 2022-07-19

## 2022-07-18 RX ORDER — VALGANCICLOVIR 450 MG/1
450 TABLET, FILM COATED ORAL DAILY
Status: DISCONTINUED | OUTPATIENT
Start: 2022-07-18 | End: 2022-07-27 | Stop reason: HOSPADM

## 2022-07-18 RX ORDER — ALBUMIN HUMAN 250 G/1000ML
25 SOLUTION INTRAVENOUS EVERY 8 HOURS
Status: DISCONTINUED | OUTPATIENT
Start: 2022-07-18 | End: 2022-07-18

## 2022-07-18 RX ORDER — INSULIN ASPART 100 [IU]/ML
0-5 INJECTION, SOLUTION INTRAVENOUS; SUBCUTANEOUS
Status: DISCONTINUED | OUTPATIENT
Start: 2022-07-18 | End: 2022-07-27 | Stop reason: HOSPADM

## 2022-07-18 RX ORDER — SODIUM CHLORIDE 0.9 % (FLUSH) 0.9 %
3 SYRINGE (ML) INJECTION
Status: DISCONTINUED | OUTPATIENT
Start: 2022-07-18 | End: 2022-07-27 | Stop reason: HOSPADM

## 2022-07-18 RX ORDER — OXYCODONE HYDROCHLORIDE 10 MG/1
10 TABLET ORAL EVERY 4 HOURS PRN
Status: DISCONTINUED | OUTPATIENT
Start: 2022-07-18 | End: 2022-07-27 | Stop reason: HOSPADM

## 2022-07-18 RX ORDER — ACETAMINOPHEN 500 MG
1000 TABLET ORAL
Status: COMPLETED | OUTPATIENT
Start: 2022-07-18 | End: 2022-07-18

## 2022-07-18 RX ADMIN — INSULIN ASPART 5 UNITS: 100 INJECTION, SOLUTION INTRAVENOUS; SUBCUTANEOUS at 05:07

## 2022-07-18 RX ADMIN — METHOCARBAMOL 500 MG: 500 TABLET ORAL at 05:07

## 2022-07-18 RX ADMIN — TACROLIMUS 1 MG: 1 CAPSULE ORAL at 10:07

## 2022-07-18 RX ADMIN — ALBUMIN (HUMAN) 25 G: 12.5 SOLUTION INTRAVENOUS at 08:07

## 2022-07-18 RX ADMIN — METHOCARBAMOL 500 MG: 500 TABLET ORAL at 02:07

## 2022-07-18 RX ADMIN — ACETAMINOPHEN 650 MG: 325 TABLET ORAL at 10:07

## 2022-07-18 RX ADMIN — MIRTAZAPINE 15 MG: 15 TABLET, FILM COATED ORAL at 08:07

## 2022-07-18 RX ADMIN — ACETAMINOPHEN 1000 MG: 500 TABLET ORAL at 10:07

## 2022-07-18 RX ADMIN — PANTOPRAZOLE SODIUM 40 MG: 40 TABLET, DELAYED RELEASE ORAL at 11:07

## 2022-07-18 RX ADMIN — PREDNISONE 5 MG: 5 TABLET ORAL at 11:07

## 2022-07-18 RX ADMIN — VALGANCICLOVIR HYDROCHLORIDE 450 MG: 450 TABLET ORAL at 11:07

## 2022-07-18 RX ADMIN — MAGNESIUM SULFATE HEPTAHYDRATE 2 G: 40 INJECTION, SOLUTION INTRAVENOUS at 02:07

## 2022-07-18 RX ADMIN — OXYCODONE HYDROCHLORIDE 10 MG: 10 TABLET ORAL at 08:07

## 2022-07-18 RX ADMIN — IOHEXOL 100 ML: 350 INJECTION, SOLUTION INTRAVENOUS at 12:07

## 2022-07-18 RX ADMIN — METHOCARBAMOL 500 MG: 500 TABLET ORAL at 08:07

## 2022-07-18 RX ADMIN — MAGNESIUM SULFATE HEPTAHYDRATE 2 G: 40 INJECTION, SOLUTION INTRAVENOUS at 05:07

## 2022-07-18 RX ADMIN — TACROLIMUS 1 MG: 1 CAPSULE ORAL at 05:07

## 2022-07-18 RX ADMIN — VANCOMYCIN HYDROCHLORIDE 2000 MG: 500 INJECTION, POWDER, LYOPHILIZED, FOR SOLUTION INTRAVENOUS at 03:07

## 2022-07-18 RX ADMIN — CEFEPIME HYDROCHLORIDE 2 G: 2 INJECTION, SOLUTION INTRAVENOUS at 12:07

## 2022-07-18 RX ADMIN — FLUCONAZOLE 200 MG: 2 INJECTION, SOLUTION INTRAVENOUS at 10:07

## 2022-07-18 RX ADMIN — CEFEPIME 1 G: 1 INJECTION, POWDER, FOR SOLUTION INTRAMUSCULAR; INTRAVENOUS at 08:07

## 2022-07-18 RX ADMIN — INSULIN ASPART 1 UNITS: 100 INJECTION, SOLUTION INTRAVENOUS; SUBCUTANEOUS at 08:07

## 2022-07-18 NOTE — ASSESSMENT & PLAN NOTE
- Pt presented with fever, tachycardia and hypotension despite cefpodoxime  - Hypotension improving with IVF  - Likely 2/2 biliary source  - PTC drain and SHRUTHI x 2 in place  - Consult Infectious disease  - blood cultures, CXR, UA and CT a/p pending  - Start Vanc/cefepime and continue diflucan  - Cont IVF

## 2022-07-18 NOTE — NURSING
Pt arrived to [id number].  VSS.  No signs of evident distress or complaint of pain.  Right SHRUTHI, left SHRUTHI and bili tube present.  Scant drainage noted to Left SHRUTHI.  Dressed with gauze and medipore tape.  Right subclavian port a cath dressing CDI  Left AC 20g PIV dressing CDI.  Family at bedside.  Oriented to room, call light and plan of care.    Bed in lowest locked position.  Call light within reach.  Instructed to call for assistance.  Pt. Expressed understanding.

## 2022-07-18 NOTE — SUBJECTIVE & OBJECTIVE
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    Hypercholesteremia     Hypertension        Past Surgical History:   Procedure Laterality Date    DIAGNOSTIC ULTRASOUND N/A 6/21/2022    Procedure: ULTRASOUND, DIAGNOSTIC;  Surgeon: Sinan Cline MD;  Location: [facility] OR [address] [place] [address];  Service: Transplant;  Laterality: N/A;    ENDOSCOPIC ULTRASOUND OF UPPER GASTROINTESTINAL TRACT N/A 10/20/2021    Procedure: ULTRASOUND, UPPER GI TRACT, ENDOSCOPIC;  Surgeon: Valeriy Sotelo MD;  [facility]  Service: Endoscopy;  Laterality: N/A;  wife to email vacc card-inst email-tb    ERCP N/A 10/20/2021    Procedure: ERCP (ENDOSCOPIC RETROGRADE CHOLANGIOPANCREATOGRAPHY);  Surgeon: Valeriy Sotelo MD;  [address]  Service: Endoscopy;  Laterality: N/A;    ERCP N/A 11/4/2021    Procedure: ERCP (ENDOSCOPIC RETROGRADE CHOLANGIOPANCREATOGRAPHY);  Surgeon: Valeriy Sotelo MD;  [address]  Service: Endoscopy;  Laterality: N/A;    ERCP N/A 11/4/2021    Procedure: ERCP (ENDOSCOPIC RETROGRADE CHOLANGIOPANCREATOGRAPHY);  Surgeon: Roselia Pereyra MD;  [facility]  Service: Endoscopy;  Laterality: N/A;  pt vaccinated, instructed to bring card to procedure, instructions sent portal-MG    ERCP N/A 1/14/2022    Procedure: ERCP (ENDOSCOPIC RETROGRADE CHOLANGIOPANCREATOGRAPHY);  Surgeon: Roselia Pereyra MD;  [facility]  Service: Endoscopy;  Laterality: N/A;  inst portal-right chest port-tb  Pt requested at home COVID testing, Pt instructed at home RAPID to be done morning of procedure, Pt instructed to call endoscopy scheuding if there is a positive result, Pt informed if a positive     ERCP N/A 3/31/2022    Procedure: ERCP (ENDOSCOPIC RETROGRADE CHOLANGIOPANCREATOGRAPHY);   Surgeon: Julio Cesar Alonzo MD;  Location: [facility] ENDO (2ND FLR);  Service: Endoscopy;  Laterality: N/A;  3/16: port L chest wall. pt to bring covid vaccination card. Instructions sent via portal.-SC  3/18/22-Updated instructions sent via portal re:new date/time-DS    EXPLORATORY LAPAROTOMY AFTER LIVER TRANSPLANTATION N/A 6/23/2022    Procedure: LAPAROTOMY, EXPLORATORY, AFTER LIVER TRANSPLANT;  Surgeon: Julio Cesar Jara MD;  Location: [facility] OR 2ND FLR;  Service: Transplant;  Laterality: N/A;    INSERTION OF TUNNELED CENTRAL VENOUS CATHETER (CVC) WITH SUBCUTANEOUS PORT N/A 11/24/2021    Procedure: INSERTION, PORT-A-CATH;  Surgeon: Prem Syed MD;  Location: [facility] CATH LAB;  Service: Radiology;  Laterality: N/A;    LIVER TRANSPLANT N/A 6/21/2022    Procedure: TRANSPLANT, LIVER;  Surgeon: Sinan Cline MD;  [facility];  Service: Transplant;  Laterality: N/A;    REPAIR OF BILE DUCT N/A 6/23/2022    Procedure: REPAIR, BILE DUCT;  Surgeon: Julio Cesar Jara MD;  [facility];  Service: Transplant;  Laterality: N/A;    ROBOT-ASSISTED LAPAROSCOPIC LYMPHADENECTOMY USING DA МАРИНА XI  6/17/2022    Procedure: XI ROBOTIC LYMPHADENECTOMY - Portal;  Surgeon: Julio Cesar Jara MD;  [facility];  Service: Transplant;;    TONSILLECTOMY      XI ROBOTIC LAPAROSCOPY, EXPLORATORY N/A 6/17/2022    Procedure: XI ROBOTIC LAPAROSCOPY,EXPLORATORY;  Surgeon: Julio Cesar Jara MD;  [facility];  Service: Transplant;  Laterality: N/A;       Review of patient's allergies indicates:  No Known Allergies    Family History       Problem Relation (Age of Onset)    Hyperlipidemia Father    No Known Problems Mother, Sister, Brother          Tobacco Use    Smoking status: Never Smoker    Smokeless tobacco: Never Used   Substance and Sexual Activity    Alcohol use: Not Currently    Drug use: Never    Sexual activity: Yes       (Not in a hospital admission)      Review of Systems   Constitutional:  Positive for fever.  Negative for activity change, appetite change, chills and diaphoresis.   HENT:  Negative for congestion, sinus pressure, sinus pain, sore throat and trouble swallowing.    Eyes:  Negative for visual disturbance.   Respiratory:  Negative for chest tightness, shortness of breath and stridor.    Cardiovascular:  Negative for chest pain, palpitations and leg swelling.   Gastrointestinal:  Negative for abdominal distention, abdominal pain, constipation, diarrhea, nausea and vomiting.   Genitourinary:  Negative for decreased urine volume, difficulty urinating, dysuria and flank pain.   Musculoskeletal:  Negative for neck pain and neck stiffness.   Skin:  Positive for wound. Negative for color change and rash.   Allergic/Immunologic: Positive for immunocompromised state.   Neurological:  Negative for dizziness, tremors, syncope, light-headedness and headaches.   Psychiatric/Behavioral:  Negative for agitation, confusion, decreased concentration and hallucinations. The patient is not nervous/anxious.    Objective:     Vital Signs (Most Recent):  Temp: (!) 101.4 °F (38.6 °C) (07/18/22 0921)  Pulse: (!) 119 (07/18/22 0932)  Resp: (!) 24 (07/18/22 0921)  BP: 91/63 (07/18/22 0932)  SpO2: 98 % (07/18/22 0932)   Vital Signs (24h Range):  Temp:  [101.4 °F (38.6 °C)] 101.4 °F (38.6 °C)  Pulse:  [119-130] 119  Resp:  [24] 24  SpO2:  [97 %-98 %] 98 %  BP: (88-91)/(55-63) 91/63     Weight: 77.1 kg (170 lb)  Body mass index is 25.85 kg/m².    No intake or output data in the 24 hours ending 07/18/22 1044    Physical Exam  Vitals and nursing note reviewed.   Constitutional:       General: He is not in acute distress.     Appearance: He is not diaphoretic.   HENT:      Head: Normocephalic and atraumatic.   Eyes:      General:         Right eye: No discharge.         Left eye: No discharge.   Cardiovascular:      Rate and Rhythm: Regular rhythm. Tachycardia present.      Heart sounds: Normal heart sounds.   Pulmonary:      Effort:  Pulmonary effort is normal.      Breath sounds: Normal breath sounds. No wheezing or rales.   Abdominal:      General: Bowel sounds are normal. There is no distension.      Palpations: Abdomen is soft.      Tenderness: There is no abdominal tenderness. There is no guarding.       Musculoskeletal:      Cervical back: Normal range of motion and neck supple.      Right lower leg: No edema.      Left lower leg: No edema.   Skin:     General: Skin is warm and dry.      Capillary Refill: Capillary refill takes 2 to 3 seconds.   Neurological:      Mental Status: He is alert and oriented to person, place, and time.      Cranial Nerves: No cranial nerve deficit.   Psychiatric:         Thought Content: Thought content normal.         Judgment: Judgment normal.       Laboratory:  CBC:   Recent Labs   Lab 07/18/22  0940   WBC 6.45   RBC 3.12*   HGB 9.5*   HCT 29.2*   *   MCV 94   MCH 30.4   MCHC 32.5     CMP:   Recent Labs   Lab 07/16/22  0633   *   CALCIUM 8.8   ALBUMIN 2.5*   PROT 5.6*   *   K 4.6   CO2 25   CL 92*   BUN 20   CREATININE 0.8   ALKPHOS 216*   ALT 61*   AST 25   BILITOT 0.8     Labs within the past 24 hours have been reviewed.    Diagnostic Results: pending

## 2022-07-18 NOTE — ED TRIAGE NOTES
Reports recent liver transplant on 6/21. Woke up this AM w/ fever 101.4. denies chills, fatigue, body aches. Reports taking tylenol this AM @ 5:15. Staples in place, liver transplant incision clean, dry. No foul smells or drainage. Reports on abx    LOC: The patient is awake, alert and aware of environment with an appropriate affect, the patient is oriented x 3 and speaking appropriately.  APPEARANCE: Patient resting comfortably and in no acute distress, patient is clean and well groomed, patient's clothing is properly fastened.  SKIN: The skin is warm and dry, color consistent with ethnicity, patient has normal skin turgor and moist mucus membranes, liver transplant scar.  MUSCULOSKELETAL: Patient moving all extremities spontaneously, no obvious swelling or deformities noted.  RESPIRATORY: Airway is open and patent, respirations are spontaneous, patient has a normal effort and rate, no accessory muscle use noted,   CARDIAC: Patient has a normal rate and regular rhythm, no periphreal edema noted, capillary refill < 3 seconds.  ABDOMEN: Soft and non tender to palpation, no distention noted, normoactive bowel sounds present in all four quadrants.  NEUROLOGIC:  facial expression is symmetrical, patient moving all extremities spontaneously, normal sensation in all extremities when touched with a finger.  Follows all commands appropriately.

## 2022-07-18 NOTE — ED PROVIDER NOTES
Encounter date: 9:24 AM 07/18/2022    Source of History   Patient/family member    Chief Complaint   Pt presents with:   Fever (Liver xplant June 21, fever)      History Of Present Illness   Romeo Larson is a 41 y.o. 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  Patient states that he has been doing decently well on his home antibiotics.  He states that he woke up with a fever of 101 this morning around 5:00 a.m..  He has had no increased abdominal pain.  He notes that his SHRUTHI drains have not had any increased output but still notes ongoing drainage from his biliary system.  He denies cough, dysuria, rash.  He states that he has been taking Tylenol for his fever.  Denies any recent falls or trauma.  He denies any purulent drainage from his surgical site.    This is the extent to the patients complaints today here in the emergency department.    Review Of Systems   As per HPI and below:  Constitutional: + fever.   HEENT: No voice change. No sore throat .    Eyes:  No visual disturbances. No eye pain.   Respiratory:  No shortness of breath. No wheezing. No cough.   Cardio:  No chest pain. No leg swelling. No palpitations.   GI:  No abdominal pain. No nausea or vomiting. No diarrhea.   :  No blood in urine. No difficulty urinating.   MSK:  No back pain. No neck pain.   Skin: No rash.   Neurologic: No headache. No dizziness.       Review of patient's allergies indicates:  No Known Allergies    No current facility-administered medications on file prior to encounter.     Current Outpatient Medications on File Prior to Encounter   Medication Sig Dispense Refill    aspirin 81 MG Chew CHEW 1 tablet (81 mg total) by mouth once daily. 30 tablet 11    blood sugar diagnostic Strp 1 each by Misc.(Non-Drug; Combo Route) route 3 (three) times daily. 100 each 5  "   blood-glucose meter Misc Use as instructed 1 each 0    calcium carbonate-vitamin D3 600 mg-20 mcg (800 unit) Tab Take 1 tablet by mouth once daily at 6am. 30 tablet 5    carvediloL (COREG) 3.125 MG tablet Take 1 tablet (3.125 mg total) by mouth 2 (two) times daily. 60 tablet 11    cefpodoxime (VANTIN) 200 MG tablet Take 1 tablet (200 mg total) by mouth every 12 (twelve) hours. for 11 doses 11 tablet 0    fluconazole (DIFLUCAN) 200 MG Tab Take 1 tablet (200 mg total) by mouth once daily. STOP 7/20/22 30 tablet 0    insulin aspart U-100 (NOVOLOG) 100 unit/mL (3 mL) InPn pen Inject 5 Units into the skin 3 (three) times daily with meals. + sliding scale.  MDD 30 units/d 15 mL 5    lancets 30 gauge Misc 1 each by Misc.(Non-Drug; Combo Route) route 3 (three) times daily. 100 each 5    methocarbamoL (ROBAXIN) 500 MG Tab Take 1 tablet (500 mg total) by mouth 4 (four) times daily. 120 tablet 1    mirtazapine (REMERON) 15 MG tablet Take 1 tablet (15 mg total) by mouth nightly. 30 tablet 11    oxyCODONE (ROXICODONE) 10 mg Tab immediate release tablet Take 1-1.5 tablets (10-15 mg total) by mouth every 4 (four) hours as needed for Pain. 50 tablet 0    pantoprazole (PROTONIX) 40 MG tablet Take 1 tablet (40 mg total) by mouth once daily. 30 tablet 5    pen needle, diabetic 32 gauge x 5/32" Ndle 1 each by Misc.(Non-Drug; Combo Route) route 3 (three) times daily. 100 each 5    predniSONE (DELTASONE) 5 MG tablet Take by mouth once daily: 20 mg 6/27-7/3; 15 mg 7/4-7/10; 10 mg 7/11-7/17; 5 mg 7/18-7/24; STOP 7/25/22 75 tablet 0    tacrolimus (PROGRAF) 1 MG Cap Take 1 capsule (1 mg total) by mouth every 12 (twelve) hours. 60 capsule 11    tamsulosin (FLOMAX) 0.4 mg Cap Take 2 capsules (0.8 mg total) by mouth once daily. 60 capsule 11    valGANciclovir (VALCYTE) 450 mg Tab Take 1 tablet (450 mg total) by mouth once daily. STOP 9/19/22 30 tablet 2    [DISCONTINUED] baclofen (LIORESAL) 10 MG tablet TAKE 1 TABLET(10 MG) " BY MOUTH THREE TIMES DAILY AS NEEDED FOR HICCUPS (Patient not taking: No sig reported) 90 tablet 0    [DISCONTINUED] famotidine (PEPCID) 20 MG tablet Take 1 tablet (20 mg total) by mouth every evening. STOP 7/24/22 30 tablet 0    [DISCONTINUED] losartan (COZAAR) 50 MG tablet Take 1 tablet (50 mg total) by mouth once daily. 90 tablet 3    [DISCONTINUED] metFORMIN (GLUCOPHAGE) 500 MG tablet Take 1 tablet (500 mg total) by mouth 2 (two) times daily with meals. 60 tablet 3    [DISCONTINUED] mycophenolate (CELLCEPT) 250 mg Cap Take 4 capsules (1,000 mg total) by mouth 2 (two) times daily. 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    Hypercholesteremia     Hypertension      Past Surgical History:   Procedure Laterality Date    DIAGNOSTIC ULTRASOUND N/A 6/21/2022    Procedure: ULTRASOUND, DIAGNOSTIC;  Surgeon: Sinan Cline MD;  [facility];  Service: Transplant;  Laterality: N/A;    ENDOSCOPIC ULTRASOUND OF UPPER GASTROINTESTINAL TRACT N/A 10/20/2021    Procedure: ULTRASOUND, UPPER GI TRACT, ENDOSCOPIC;  Surgeon: Valeriy Sotelo MD;  [facility]  Service: Endoscopy;  Laterality: N/A;  wife to email vacc card-inst email-tb    ERCP N/A 10/20/2021    Procedure: ERCP (ENDOSCOPIC RETROGRADE CHOLANGIOPANCREATOGRAPHY);  Surgeon: Valeriy Sotelo MD;  [address]  Service: Endoscopy;  Laterality: N/A;    ERCP N/A 11/4/2021    Procedure: ERCP (ENDOSCOPIC RETROGRADE CHOLANGIOPANCREATOGRAPHY);  Surgeon: Valeriy Sotelo MD;  Location: [facility] ENDO ([facility] FLR);  Service: Endoscopy;  Laterality: N/A;    ERCP N/A 11/4/2021    Procedure: ERCP (ENDOSCOPIC RETROGRADE CHOLANGIOPANCREATOGRAPHY);  Surgeon: Roselia Pereyra MD;  Location: [facility]  ENDO (2ND FLR);  Service: Endoscopy;  Laterality: N/A;  pt vaccinated, instructed to bring card to procedure, instructions sent portal-MG    ERCP N/A 1/14/2022    Procedure: ERCP (ENDOSCOPIC RETROGRADE CHOLANGIOPANCREATOGRAPHY);  Surgeon: Roselia Pereyra MD;  Location: [facility] ENDO (2ND FLR);  Service: Endoscopy;  Laterality: N/A;  inst portal-right chest port-tb  Pt requested at home COVID testing, Pt instructed at home RAPID to be done morning of procedure, Pt instructed to call endoscopy scheuding if there is a positive result, Pt informed if a positive     ERCP N/A 3/31/2022    Procedure: ERCP (ENDOSCOPIC RETROGRADE CHOLANGIOPANCREATOGRAPHY);  Surgeon: Julio Cesar Alonzo MD;  [facility]  Service: Endoscopy;  Laterality: N/A;  3/16: port L chest wall. pt to bring covid vaccination card. Instructions sent via portal.-SC  3/18/22-Updated instructions sent via portal re:new date/time-DS    EXPLORATORY LAPAROTOMY AFTER LIVER TRANSPLANTATION N/A 6/23/2022    Procedure: LAPAROTOMY, EXPLORATORY, AFTER LIVER TRANSPLANT;  Surgeon: Julio Cesar Jara MD;  Location: [address];  Service: Transplant;  Laterality: N/A;    INSERTION OF TUNNELED CENTRAL VENOUS CATHETER (CVC) WITH SUBCUTANEOUS PORT N/A 11/24/2021    Procedure: INSERTION, PORT-A-CATH;  Surgeon: Prem Syed MD;  Location: [facility] CATH LAB;  Service: Radiology;  Laterality: N/A;    LIVER TRANSPLANT N/A 6/21/2022    Procedure: TRANSPLANT, LIVER;  Surgeon: Sinan Cline MD;  Location: [address];  Service: Transplant;  Laterality: N/A;    REPAIR OF BILE DUCT N/A 6/23/2022    Procedure: REPAIR, BILE DUCT;  Surgeon: Julio Cesar Jara MD;  [facility];  Service: Transplant;  Laterality: N/A;    ROBOT-ASSISTED LAPAROSCOPIC LYMPHADENECTOMY USING DA МАРИНА XI  6/17/2022    Procedure: XI ROBOTIC LYMPHADENECTOMY - Portal;  Surgeon: Julio Cesar Jara MD;  Location: [facility] OR [address] [place] [address];  Service: Transplant;;    TONSILLECTOMY      XI ROBOTIC  "LAPAROSCOPY, EXPLORATORY N/A 6/17/2022    Procedure: XI ROBOTIC LAPAROSCOPY,EXPLORATORY;  Surgeon: Julio Cesar Jara MD;  Location: [facility] OR [address] [place] [address];  Service: Transplant;  Laterality: N/A;       Social History     Socioeconomic History    Marital status:    Tobacco Use    Smoking status: Never Smoker    Smokeless tobacco: Never Used   Substance and Sexual Activity    Alcohol use: Not Currently    Drug use: Never    Sexual activity: Yes       Family History   Problem Relation Age of Onset    No Known Problems Mother     Hyperlipidemia Father     No Known Problems Sister     No Known Problems Brother        Physical Exam     Vitals:    07/18/22 0921 07/18/22 0932 07/18/22 1056 07/18/22 1107   BP: (!) 88/55 91/63 90/61 95/60   BP Location:    Right arm   Patient Position:    Lying   Pulse: (!) 130 (!) 119 94 93   Resp: (!) 24  16 16   Temp: (!) 101.4 °F (38.6 °C)   99 °F (37.2 °C)   TempSrc: Oral   Oral   SpO2: 97% 98% 98% 98%   Weight: 77.1 kg (170 lb)      Height: 5' 8" (1.727 m)        Physical Exam:   Nursing note and vitals reviewed.  Appearance:  Nontoxic, well-appearing male in no acute respiratory distress.  Making purposeful movements.  Speaking in full sentences.  Skin:  Good turgor.  No abrasions.  No ecchymoses.  Large surgical scar on abdomen with staples in place and no dehiscence or purulent drainage.  He has 3 drain sites with no crepitus, at overlying cellulitis or erythema.  Eyes: No conjunctival injection. EOMI, PERRL.  Head: NC/AT  ENT: Oropharynx clear.    Chest: CTAB. No increased work of breathing, bilateral chest rise.  Cardiovascular:  Tachycardic yet regular rhythm.  Normal equal bilateral radial pulses.  Abdomen: Soft.  Not distended.  Nontender.  No guarding.  No rebound. No Masses.  Surgical scar as mentioned in skin exam.  Musculoskeletal: Good range of motion all joints.  No deformities.  Neck supple, full range of motion, no obvious deformity.  No tenderness to palpation of " midline cervical through lumbar spine.  Neurologic: Moves all extremities.  Normal sensation.  No facial droop.  Normal speech.    Mental Status:  Alert and oriented x 3.  Appropriate, conversant.      Results and Medications    Procedures    Labs Reviewed   CBC W/ AUTO DIFFERENTIAL - Abnormal; Notable for the following components:       Result Value    RBC 3.12 (*)     Hemoglobin 9.5 (*)     Hematocrit 29.2 (*)     Platelets 107 (*)     Immature Granulocytes 1.1 (*)     Immature Grans (Abs) 0.07 (*)     Lymph # 0.2 (*)     Gran % 87.3 (*)     Lymph % 3.6 (*)     All other components within normal limits   COMPREHENSIVE METABOLIC PANEL - Abnormal; Notable for the following components:    Sodium 126 (*)     Chloride 91 (*)     Glucose 163 (*)     BUN 22 (*)     Albumin 2.6 (*)     Total Bilirubin 1.3 (*)     Alkaline Phosphatase 317 (*)     AST 58 (*)      (*)     All other components within normal limits   MAGNESIUM - Abnormal; Notable for the following components:    Magnesium 1.2 (*)     All other components within normal limits   PHOSPHORUS - Abnormal; Notable for the following components:    Phosphorus 2.5 (*)     All other components within normal limits   CULTURE, BLOOD   CULTURE, BLOOD   LACTIC ACID, PLASMA   LIPASE   TROPONIN I   SARS-COV-2 RNA AMPLIFICATION, QUAL   URINALYSIS, REFLEX TO URINE CULTURE   LACTIC ACID, PLASMA   POCT GLUCOSE MONITORING CONTINUOUS       Imaging Results          X-Ray Chest AP Portable (Final result)  Result time 07/18/22 11:01:54    Final result by Julio Cesar Jones MD (07/18/22 11:01:54)                 Impression:      See above      Electronically signed by: Julio Cesar Jones MD  Date:    07/18/2022  Time:    11:01             Narrative:    EXAMINATION:  XR CHEST AP PORTABLE    CLINICAL HISTORY:  Sepsis;    TECHNIQUE:  Single frontal view of the chest was performed.    COMPARISON:  N 06/24/2022 one    FINDINGS:  Right-sided central venous catheter in the SVC.  Heart size  normal.  The right hemidiaphragm is slightly elevated.  No significant airspace consolidation or pleural effusion identified.  Biliary catheter identified                                    Medications   vancomycin 2 g in dextrose 5 % 500 mL IVPB (has no administration in time range)   cefepime in dextrose 5 % IVPB 2 g (has no administration in time range)   aspirin chewable tablet 81 mg (has no administration in time range)   methocarbamoL tablet 500 mg (has no administration in time range)   mirtazapine tablet 15 mg (has no administration in time range)   oxyCODONE immediate release tablet 10 mg (has no administration in time range)   pantoprazole EC tablet 40 mg (40 mg Oral Given 7/18/22 1146)   predniSONE tablet 5 mg (5 mg Oral Given 7/18/22 1146)   tacrolimus capsule 1 mg (1 mg Oral Given 7/18/22 1055)   valGANciclovir tablet 450 mg (450 mg Oral Given 7/18/22 1146)   glucose chewable tablet 16 g (has no administration in time range)   glucose chewable tablet 24 g (has no administration in time range)   glucagon (human recombinant) injection 1 mg (has no administration in time range)   dextrose 10% bolus 125 mL (has no administration in time range)   dextrose 10% bolus 250 mL (has no administration in time range)   insulin aspart U-100 pen 0-5 Units (has no administration in time range)   vancomycin - pharmacy to dose (has no administration in time range)   fluconazole tablet 200 mg (has no administration in time range)   cefepime in dextrose 5 % 1 gram/50 mL IVPB 1 g (has no administration in time range)   magnesium sulfate 2g in water 50mL IVPB (premix) (has no administration in time range)   sodium chloride 0.9% flush 3 mL (has no administration in time range)   melatonin tablet 9 mg (has no administration in time range)   acetaminophen tablet 650 mg (has no administration in time range)   heparin (porcine) injection 5,000 Units (has no administration in time range)   ondansetron injection 4 mg (has no  administration in time range)   albumin human 25% bottle 25 g (has no administration in time range)   lactated ringers bolus 2,313 mL (2,313 mLs Intravenous Bolus from Bag 7/18/22 1019)   fluconazole (DIFLUCAN) IVPB 200 mg (0 mg Intravenous Stopped 7/18/22 1140)   acetaminophen tablet 1,000 mg (1,000 mg Oral Given 7/18/22 1026)       MDM, Impression and Plan   Previous Records:  I decided to obtain old medical records which showed:   Stress ECHO on 4/5/2022 shows   · The stress echo portion of this study is negative for myocardial ischemia.  · The ECG portion of this study is negative for myocardial ischemia.  · During stress, the following significant arrhythmias were observed: rare PACs.  · The patient reached the end of the protocol.  · Normal left ventricular size with normal systolic function. The estimated ejection fraction is 65%.  · Normal right ventricular size with normal right ventricular systolic function.  · Normal left ventricular diastolic function.  · The estimated PA systolic pressure is 26 mmHg.  · Normal central venous pressure (3 mmHg).    Admittted on 7/12/2022 and discharged on 7/16/2022    s/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 Post operative course significant for recurrent bile leak. OR take back 6/23 for bile duct repair (small leak found near biliary anastomosis, unable to clearly identify source), cultures grew enterococcus faecalis. OR take back 7/4 for ex lap, washout of biloma, bile duct unable to be assessed due to adhesions, OR cultures with lactobacillus species. Patient was discharged home with 1 drain in place.    Patient admitted on 7/12/22 from outpatient clinic with bilious drainage in both SHRUTHI drains with symptoms of abdominal/suprapubic pain, weakness, and decreased appetite. PTC drain placed on 7/13 with bilious drainage output. Drain being flushed [id number] with sterile slaine 30ml flushes. Decreased amount of drainage from  left and right SHRUTHI drains. Right drain with small amount of bilious drainage and left SHRUTHI drainage now with scant serous drainage. LFTs continuing to trend back down. While inpatient, patient reports having issues with appetite/nutrition as well as weakness. Remeron started inpatient for appetite stimulant.  On day of discharge, patient states he feels good. He understands being discharged with 3 drains (PTC, R SHRUTHI, L SHRUTHI). Patient being discharged on PO cefpodoxime.  Patient is to follow up with labs on 7/18/ and clinic on 719/2022  Initial Assessment:   Urgent evaluation of 41 y.o. male with history as above who presents the ED with chief complaint of fever which began at 5:00 a.m. the morning of arrival.  He is noted to be febrile to 101 and tachycardic making concern for intra-infection versus sepsis likely.  He denies any chest pain, new rash, dysuria.  Code sepsis called on arrival, blood cultures were ordered and he was started on the 30 cc/kg fluid bolus with noted improvement in his blood pressure and tachycardia.  His cultures were reviewed and he was started on broad-spectrum antibiotics as well as coverage for fungal infection due to his intra-abdominal surgeries.  I called and discussed the case with liver transplant surgery who was agreeable with seeing the patient  Differential Diagnosis:   -sepsis versus intra-abdominal infection more likely due to recent history  -COVID versus pneumonia will obtain chest x-ray  -unlikely UTI but will obtain urinalysis  Independently Interpreted Test(s):   EKG:  I independently reviewed and interpreted the EKG and my findings are as follows:   Please see ED course.  EKG shows normal sinus rhythm with ventricular rate of 99 beats per minute.  No ST segment elevations or depressions.  No STEMI.  Noted T-wave inversions in V1 and V3 which have been seen on previous EKGs.  IMAGING:  I have ordered and independently interpreted X-rays and my findings are as follow:  Please  see ED course.  Chest x-ray with  No focal pneumonia, pneumothorax.     Clinical Tests:   Lab Tests: Ordered and Reviewed  Radiological Study: Ordered and Reviewed  Medical Tests: Ordered and Reviewed    ED Management:    Patient defervesced (with tylenol) with noted improvement in his blood pressure with IV fluids.  Hemoglobin 9.5 near baseline.  His creatinine is near baseline.  I called discussed the case with liver transplant surgery who agreed to admit the patient with pending labs, blood cultures, ct abdomen and pelvis after IV antibiotics were started.     At 12:00 PM,  I attest a sepsis perfusion exam was performed within 6 hours of sepsis, severe sepsis, or septic shock presentation, following fluid resuscitation.    Please see ED course for discussion of labs.     I called and discussed the case with:  Liver transplant surgery         Final diagnoses:  [A41.9] Sepsis (Primary)  [D64.9] Anemia, unspecified type  [Z94.4] Liver transplant recipient  [B99.9] Intra-abdominal infection          ED Disposition Condition    Admit              ED Course as of 07/18/22 2118 Mon Jul 18, 2022   1009 Paged liver transplant surgery []   1011 Spoke to liver transplant who was agreeable with current plan in the ED.  They state they will see the patient and most likely admit the patient. [HM]   1240 SARS-CoV-2 RNA, Amplification, Qual: Negative []      ED Course User Index  [HM] Maine Saab MD           Attending Attestation:   Physician Attestation Statement for Resident:  As the supervising MD   Physician Attestation Statement: I have personally seen and examined this patient.   I agree with the above history. -:   As the supervising MD I agree with the above PE.    As the supervising MD I agree with the above treatment, course, plan, and disposition.   -: 40 yo M s/p kali Tx, discharged from the hospital 2 days ago presents with fever  No URI symptoms/no headache/cp/cough/sob/abd pain/vomiitng/diarrhea/urinary  changes/skin rash    DDX: drug reaction vs post op intraabd infection, no symptoms of pneumonia/UTi, no sob/chest pain to suggest PE, no LE edema/calf pain to suggest DVT    Hypotensive, febrile, tachycardic on arrival  Broad spectrum antibiotics, IVF resuscitation  I have reviewed and agree with the residents interpretation of the following: lab data.  I have reviewed the following: old records at this facility.        Attending Critical Care:   Critical Care Times:   ==============================================================  · Total Critical Care Time - exclusive of procedural time: 30 minutes.  ==============================================================  Critical care reasons: severe sepsis, hypoNam hypoMg.   Critical care was time spent personally by me on the following activities: obtaining history from patient or relative, examination of patient, review of old charts, ordering lab, x-rays, and/or EKG, development of treatment plan with patient or relative, ordering and performing treatments and interventions, evaluation of patient's response to treatment and discussion with consultants.   Critical Care Condition: potentially life-threatening               Maine Saab MD   Emergency Resident   [phone] (spectra)    Please note that this dictation was completed with computer voicerecognition software.  Quite often unanticipated grammatical, syntax, homophones, and other interpretive errors are inadvertently transcribed by the computer software.  Please disregard these errors.  Please excuse any errors that have escaped final proofreading.       Maine Saab MD  Resident  07/18/22 1200       Maine Saab MD  Resident  07/18/22 1200       Tosha Llamas MD  07/18/22 0055

## 2022-07-18 NOTE — ASSESSMENT & PLAN NOTE
- continue prograf and steroid taper per protocol  - will check daily prograf levels, monitor for toxic side effects, and adjust for therapeutic dose  - cellcept on hold for infection

## 2022-07-18 NOTE — Clinical Note
Is this patient a high probability for COVID-19?: No   Diagnosis: Sepsis [954419]   Admitting Provider:: SHAYLA MAJANO [[id number]]   Future Attending Provider: SHAYLA MAJANO [[id number]]   Reason for IP Medical Treatment  (Clinical interventions that can only be accomplished in the IP setting? ) :: sepsis   Estimated Length of Stay:: 2 midnights   I certify that Inpatient services for greater than or equal to 2 midnights are medically necessary:: Yes   Plans for Post-Acute care--if anticipated (pick the single best option):: A. No post acute care anticipated at this time

## 2022-07-18 NOTE — H&P
[facility] - Emergency Dept  Liver Transplant  History & Physical    Patient Name: Romeo Larosn  MRN: [id number]  Admission Date: 7/18/2022  Code Status: Prior  Primary Care Provider: Pravin Maria MD  Post-Operative Day: 27     ORGAN:   RIGHT LIVER LOBE (SEGS 5,6,7,8) WITHOUT MIDDLE HEPATIC VEIN  Disease Etiology: Primary Liver Malignancy: Cholangiocarcinoma (CH-CA)  Donor Type:   Living  CDC High Risk:     Donor CMV Status:   Donor CMV Status:   Donor HBcAB:     Donor HCV Status:     Donor HBV GUSTABO:   Donor HCV GUSTABO:   Whole or Partial:   Biliary Anastomosis: Vick-en-Y  Arterial Anatomy: Standard    Subjective:     History of Present Illness:  Romeo Larson is a 41yr old male s/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 Post operative course significant for recurrent bile leak. OR take back 6/23 for bile duct repair (small leak found near biliary anastomosis, unable to clearly identify source), cultures grew enterococcus faecalis. OR take back 7/4 for ex lap, washout of biloma, bile duct unable to be assessed due to adhesions, OR cultures with lactobacillus species. Patient was discharged home with 1 drain in place. Patient presented for outpatient follow up, drain found to be bilious and he was readmitted 7/12/22. He was placed on BS IV ABX and  underwent PTC drain (internal/external) placement on 7/13 with bilious drainage output. Drain being flushed [id number] with sterile saline 30ml flushes. Decreased amount of drainage from left and right SHRUTHI drains prior to discharge. LFTs trended down. He was transitioned to PO cefpoxime and remained afebrile. He was discharged on 7/15/22.     Mr Larson now presents with fever 101 since early this morning. Denies any other symptoms. Reports minimal drainage from SHRUTHI drains and continued bilious drainage from PTC drain. He is flushing PTC drain as directed. Site c/d/i. Incision c/d/i. Denies abdominal pain, nausea/ vomiting, chest pain,  sob, diarrhea, and dysuria. Pt hypotensive and tachycardic on arrival to ED with BP 80/50s and HR 120s. Febrile to 101.5. Blood pressure improved to 90/60s with HR 90s. He was give IV cefepime/vanc and LR bolus. Labs notable for elevated LFTs and hyponatremia.. Plan to admit for further management. Pt d/w Dr Godwin.          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    Hypercholesteremia     Hypertension        Past Surgical History:   Procedure Laterality Date    DIAGNOSTIC ULTRASOUND N/A 6/21/2022    Procedure: ULTRASOUND, DIAGNOSTIC;  Surgeon: Sinan Cline MD;  Location: [facility] OR 2ND FLR;  Service: Transplant;  Laterality: N/A;    ENDOSCOPIC ULTRASOUND OF UPPER GASTROINTESTINAL TRACT N/A 10/20/2021    Procedure: ULTRASOUND, UPPER GI TRACT, ENDOSCOPIC;  Surgeon: Valeriy Sotelo MD;  Location: [facility] (2ND FLR);  Service: Endoscopy;  Laterality: N/A;  wife to email vacc card-inst email-tb    ERCP N/A 10/20/2021    Procedure: ERCP (ENDOSCOPIC RETROGRADE CHOLANGIOPANCREATOGRAPHY);  Surgeon: Valeriy Sotelo MD;  Location: [facility] ENDO (2ND FLR);  Service: Endoscopy;  Laterality: N/A;    ERCP N/A 11/4/2021    Procedure: ERCP (ENDOSCOPIC RETROGRADE CHOLANGIOPANCREATOGRAPHY);  Surgeon: Valeriy Sotelo MD;  Location: [facility] ENDO (2ND FLR);  Service: Endoscopy;  Laterality: N/A;    ERCP N/A 11/4/2021    Procedure: ERCP (ENDOSCOPIC RETROGRADE CHOLANGIOPANCREATOGRAPHY);  Surgeon: Roselia Pereyra MD;  Location: [facility] ENDO (2ND FLR);  Service: Endoscopy;  Laterality: N/A;  pt vaccinated, instructed to bring card to procedure, instructions sent portal-MG    ERCP N/A 1/14/2022    Procedure: ERCP (ENDOSCOPIC RETROGRADE CHOLANGIOPANCREATOGRAPHY);  Surgeon: Roselia Pereyra MD;  Location: [facility] ENDO (2ND FLR);  Service: Endoscopy;   Laterality: N/A;  inst portal-right chest port-tb  Pt requested at home COVID testing, Pt instructed at home RAPID to be done morning of procedure, Pt instructed to call endoscopy scheuding if there is a positive result, Pt informed if a positive     ERCP N/A 3/31/2022    Procedure: ERCP (ENDOSCOPIC RETROGRADE CHOLANGIOPANCREATOGRAPHY);  Surgeon: Julio Cesar Alonzo MD;  Location: [facility] (2ND FLR);  Service: Endoscopy;  Laterality: N/A;  3/16: port L chest wall. pt to bring covid vaccination card. Instructions sent via portal.-SC  3/18/22-Updated instructions sent via portal re:new date/time-DS    EXPLORATORY LAPAROTOMY AFTER LIVER TRANSPLANTATION N/A 6/23/2022    Procedure: LAPAROTOMY, EXPLORATORY, AFTER LIVER TRANSPLANT;  Surgeon: Julio Cesar Jara MD;  [facility];  Service: Transplant;  Laterality: N/A;    INSERTION OF TUNNELED CENTRAL VENOUS CATHETER (CVC) WITH SUBCUTANEOUS PORT N/A 11/24/2021    Procedure: INSERTION, PORT-A-CATH;  Surgeon: Prem Syed MD;  Location: [facility] CATH LAB;  Service: Radiology;  Laterality: N/A;    LIVER TRANSPLANT N/A 6/21/2022    Procedure: TRANSPLANT, LIVER;  Surgeon: Sinan Cline MD;  [address]  Service: Transplant;  Laterality: N/A;    REPAIR OF BILE DUCT N/A 6/23/2022    Procedure: REPAIR, BILE DUCT;  Surgeon: Julio Cesar Jara MD;  [facility];  Service: Transplant;  Laterality: N/A;    ROBOT-ASSISTED LAPAROSCOPIC LYMPHADENECTOMY USING DA МАРИНА XI  6/17/2022    Procedure: XI ROBOTIC LYMPHADENECTOMY - Portal;  Surgeon: Julio Cesar Jara MD;  [facility];  Service: Transplant;;    TONSILLECTOMY      XI ROBOTIC LAPAROSCOPY, EXPLORATORY N/A 6/17/2022    Procedure: XI ROBOTIC LAPAROSCOPY,EXPLORATORY;  Surgeon: Julio Cesar Jara MD;  [facility];  Service: Transplant;  Laterality: N/A;       Review of patient's allergies indicates:  No Known Allergies    Family History       Problem Relation (Age of Onset)     Hyperlipidemia Father    No Known Problems Mother, Sister, Brother          Tobacco Use    Smoking status: Never Smoker    Smokeless tobacco: Never Used   Substance and Sexual Activity    Alcohol use: Not Currently    Drug use: Never    Sexual activity: Yes       (Not in a hospital admission)      Review of Systems   Constitutional:  Positive for fever. Negative for activity change, appetite change, chills and diaphoresis.   HENT:  Negative for congestion, sinus pressure, sinus pain, sore throat and trouble swallowing.    Eyes:  Negative for visual disturbance.   Respiratory:  Negative for chest tightness, shortness of breath and stridor.    Cardiovascular:  Negative for chest pain, palpitations and leg swelling.   Gastrointestinal:  Negative for abdominal distention, abdominal pain, constipation, diarrhea, nausea and vomiting.   Genitourinary:  Negative for decreased urine volume, difficulty urinating, dysuria and flank pain.   Musculoskeletal:  Negative for neck pain and neck stiffness.   Skin:  Positive for wound. Negative for color change and rash.   Allergic/Immunologic: Positive for immunocompromised state.   Neurological:  Negative for dizziness, tremors, syncope, light-headedness and headaches.   Psychiatric/Behavioral:  Negative for agitation, confusion, decreased concentration and hallucinations. The patient is not nervous/anxious.    Objective:     Vital Signs (Most Recent):  Temp: (!) 101.4 °F (38.6 °C) (07/18/22 0921)  Pulse: (!) 119 (07/18/22 0932)  Resp: (!) 24 (07/18/22 0921)  BP: 91/63 (07/18/22 0932)  SpO2: 98 % (07/18/22 0932)   Vital Signs (24h Range):  Temp:  [101.4 °F (38.6 °C)] 101.4 °F (38.6 °C)  Pulse:  [119-130] 119  Resp:  [24] 24  SpO2:  [97 %-98 %] 98 %  BP: (88-91)/(55-63) 91/63     Weight: 77.1 kg (170 lb)  Body mass index is 25.85 kg/m².    No intake or output data in the 24 hours ending 07/18/22 1044    Physical Exam  Vitals and nursing note reviewed.   Constitutional:        General: He is not in acute distress.     Appearance: He is not diaphoretic.   HENT:      Head: Normocephalic and atraumatic.   Eyes:      General:         Right eye: No discharge.         Left eye: No discharge.   Cardiovascular:      Rate and Rhythm: Regular rhythm. Tachycardia present.      Heart sounds: Normal heart sounds.   Pulmonary:      Effort: Pulmonary effort is normal.      Breath sounds: Normal breath sounds. No wheezing or rales.   Abdominal:      General: Bowel sounds are normal. There is no distension.      Palpations: Abdomen is soft.      Tenderness: There is no abdominal tenderness. There is no guarding.       Musculoskeletal:      Cervical back: Normal range of motion and neck supple.      Right lower leg: No edema.      Left lower leg: No edema.   Skin:     General: Skin is warm and dry.      Capillary Refill: Capillary refill takes 2 to 3 seconds.   Neurological:      Mental Status: He is alert and oriented to person, place, and time.      Cranial Nerves: No cranial nerve deficit.   Psychiatric:         Thought Content: Thought content normal.         Judgment: Judgment normal.       Laboratory:  CBC:   Recent Labs   Lab 07/18/22  0940   WBC 6.45   RBC 3.12*   HGB 9.5*   HCT 29.2*   *   MCV 94   MCH 30.4   MCHC 32.5     CMP:   Recent Labs   Lab 07/16/22  0633   *   CALCIUM 8.8   ALBUMIN 2.5*   PROT 5.6*   *   K 4.6   CO2 25   CL 92*   BUN 20   CREATININE 0.8   ALKPHOS 216*   ALT 61*   AST 25   BILITOT 0.8     Labs within the past 24 hours have been reviewed.    Diagnostic Results: pending    Assessment/Plan:     * Sepsis  - Pt presented with fever, tachycardia and hypotension despite cefpodoxime  - lactate 2.0  - Hypotension improving with IVF  - Likely 2/2 biliary source  - PTC drain and SHRUTHI x 2 in place  - Consult Infectious disease  - blood cultures, CXR, UA and CT a/p pending  - Start Vanc/cefepime and continue diflucan  - Cont IVF & f/up lactate  - Monitor      Bile  "leak  - s/p PTC (internalized) on 7/13/22  - PTC to gravity with bilious output. SHRUTHI x 2 with scant output no longer draining per patient  - Cont flushing PTC  - Start IV BS ABX  - See "sepsis"      Long-term use of immunosuppressant medication  - see "prophylactic immunotherapy"      Type 2 diabetes mellitus with hyperglycemia  - cont sliding scale insulin      Hyponatremia        - Na 126        - low Na diet, 1L fluid restriction        - repeat labs this evening      At risk for opportunistic infections  - cont OI prophylaxis per protocol  - send CMV PCR      Prophylactic immunotherapy  - continue prograf and steroid taper per protocol  - will check daily prograf levels, monitor for toxic side effects, and adjust for therapeutic dose  - cellcept on hold for infection      S/P liver transplant  - chevron incision with staples in place - 15 days out from take back  - SHRUTHI x 2 with scant output  - Trend LFTs daily  - See "bile leak"                  Hannah Garcia PA-C  Liver Transplant  [facility] - Emergency Dept      "

## 2022-07-18 NOTE — HPI
Romeo Larson is a 41yr old male s/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 Post operative course significant for recurrent bile leak. OR take back 6/23 for bile duct repair (small leak found near biliary anastomosis, unable to clearly identify source), cultures grew enterococcus faecalis. OR take back 7/4 for ex lap, washout of biloma, bile duct unable to be assessed due to adhesions, OR cultures with lactobacillus species. Patient was discharged home with 1 drain in place. Patient presented for outpatient follow up, drain found to be bilious and he was readmitted 7/12/22. He was placed on BS IV ABX and  underwent PTC drain (internal/external) placement on 7/13 with bilious drainage output. Drain being flushed [id number] with sterile saline 30ml flushes. Decreased amount of drainage from left and right SHRUTHI drains prior to discharge. LFTs trended down. He was transitioned to PO cefpoxime and remained afebrile. He was discharged on 7/15/22.     Mr Larson now presents with fever 101 since early this morning. Denies any other symptoms. Reports minimal drainage from SHRUTHI drains and continued bilious drainage from PTC drain. He is flushing PTC drain as directed. Site c/d/i. Incision c/d/i. Denies abdominal pain, nausea/ vomiting, chest pain, sob, diarrhea, and dysuria. Pt hypotensive and tachycardic on arrival to ED with BP 80/50s and . Febrile to 101.5. Blood pressure improved to 90/60s. He was give IV cefepime/vanc and LR bolus. Labs and imaging reviewed. Plan to admit for further management. Pt d/w Dr Godwin.

## 2022-07-18 NOTE — ASSESSMENT & PLAN NOTE
"- s/p PTC (internalized) on 7/13/22  - PTC to gravity with bilious output. SHRUTHI x 2 with scant output no longer draining per patient  - Cont flushing PTC  - Start IV BS ABX  - See "sepsis"    "

## 2022-07-18 NOTE — TELEPHONE ENCOUNTER
Returned call woke up with 101.5 fever this morning, he is here for labs.  I advised that he go to the ER to be assessed.  Wife will communicate to patient.    ----- Message from Kevin Ji sent at 7/18/2022  8:08 AM CDT -----  Regarding: call back  Pt's wife call to speak with Steph in regards to pt having a fever requesting call back    Call

## 2022-07-19 PROBLEM — D64.9 ANEMIA: Status: ACTIVE | Noted: 2022-04-26

## 2022-07-19 LAB
ALBUMIN SERPL BCP-MCNC: 2.7 G/DL (ref 3.5–5.2)
ALP SERPL-CCNC: 211 U/L (ref 55–135)
ALT SERPL W/O P-5'-P-CCNC: 71 U/L (ref 10–44)
ANION GAP SERPL CALC-SCNC: 8 MMOL/L (ref 8–16)
AST SERPL-CCNC: 27 U/L (ref 10–40)
BASOPHILS # BLD AUTO: 0 K/UL (ref 0–0.2)
BASOPHILS NFR BLD: 0 % (ref 0–1.9)
BILIRUB SERPL-MCNC: 0.8 MG/DL (ref 0.1–1)
BUN SERPL-MCNC: 13 MG/DL (ref 6–20)
CALCIUM SERPL-MCNC: 8.8 MG/DL (ref 8.7–10.5)
CHLORIDE SERPL-SCNC: 96 MMOL/L (ref 95–110)
CO2 SERPL-SCNC: 28 MMOL/L (ref 23–29)
CREAT SERPL-MCNC: 0.8 MG/DL (ref 0.5–1.4)
DIFFERENTIAL METHOD: ABNORMAL
EOSINOPHIL # BLD AUTO: 0 K/UL (ref 0–0.5)
EOSINOPHIL NFR BLD: 0.3 % (ref 0–8)
ERYTHROCYTE [DISTWIDTH] IN BLOOD BY AUTOMATED COUNT: 14.3 % (ref 11.5–14.5)
EST. GFR  (AFRICAN AMERICAN): >60 ML/MIN/1.73 M^2
EST. GFR  (NON AFRICAN AMERICAN): >60 ML/MIN/1.73 M^2
FUNGUS SPEC CULT: ABNORMAL
FUNGUS SPEC CULT: NORMAL
GLUCOSE SERPL-MCNC: 137 MG/DL (ref 70–110)
HCT VFR BLD AUTO: 24.1 % (ref 40–54)
HGB BLD-MCNC: 7.5 G/DL (ref 14–18)
IMM GRANULOCYTES # BLD AUTO: 0.01 K/UL (ref 0–0.04)
IMM GRANULOCYTES NFR BLD AUTO: 0.3 % (ref 0–0.5)
LYMPHOCYTES # BLD AUTO: 0.3 K/UL (ref 1–4.8)
LYMPHOCYTES NFR BLD: 9.8 % (ref 18–48)
MAGNESIUM SERPL-MCNC: 1.9 MG/DL (ref 1.6–2.6)
MCH RBC QN AUTO: 31.1 PG (ref 27–31)
MCHC RBC AUTO-ENTMCNC: 31.9 G/DL (ref 32–36)
MCV RBC AUTO: 98 FL (ref 82–98)
MONOCYTES # BLD AUTO: 0.3 K/UL (ref 0.3–1)
MONOCYTES NFR BLD: 8.8 % (ref 4–15)
NEUTROPHILS # BLD AUTO: 2.6 K/UL (ref 1.8–7.7)
NEUTROPHILS NFR BLD: 80.8 % (ref 38–73)
NRBC BLD-RTO: 0 /100 WBC
PHOSPHATE SERPL-MCNC: 3.9 MG/DL (ref 2.7–4.5)
PLATELET # BLD AUTO: 86 K/UL (ref 150–450)
PMV BLD AUTO: 9.9 FL (ref 9.2–12.9)
POCT GLUCOSE: 135 MG/DL (ref 70–110)
POCT GLUCOSE: 188 MG/DL (ref 70–110)
POCT GLUCOSE: 217 MG/DL (ref 70–110)
POCT GLUCOSE: 247 MG/DL (ref 70–110)
POTASSIUM SERPL-SCNC: 3.7 MMOL/L (ref 3.5–5.1)
PROT SERPL-MCNC: 5.4 G/DL (ref 6–8.4)
RBC # BLD AUTO: 2.44 M/UL (ref 4.6–6.2)
SODIUM SERPL-SCNC: 132 MMOL/L (ref 136–145)
TACROLIMUS BLD-MCNC: 5.9 NG/ML (ref 5–15)
WBC # BLD AUTO: 3.26 K/UL (ref 3.9–12.7)

## 2022-07-19 PROCEDURE — 20600001 HC STEP DOWN PRIVATE ROOM

## 2022-07-19 PROCEDURE — 99233 PR SUBSEQUENT HOSPITAL CARE,LEVL III: ICD-10-PCS | Mod: 24,,, | Performed by: PHYSICIAN ASSISTANT

## 2022-07-19 PROCEDURE — 25000003 PHARM REV CODE 250: Performed by: TRANSPLANT SURGERY

## 2022-07-19 PROCEDURE — 80053 COMPREHEN METABOLIC PANEL: CPT | Performed by: PHYSICIAN ASSISTANT

## 2022-07-19 PROCEDURE — 84100 ASSAY OF PHOSPHORUS: CPT | Performed by: PHYSICIAN ASSISTANT

## 2022-07-19 PROCEDURE — 99233 SBSQ HOSP IP/OBS HIGH 50: CPT | Mod: 24,,, | Performed by: PHYSICIAN ASSISTANT

## 2022-07-19 PROCEDURE — 63600175 PHARM REV CODE 636 W HCPCS: Performed by: TRANSPLANT SURGERY

## 2022-07-19 PROCEDURE — 80197 ASSAY OF TACROLIMUS: CPT | Performed by: PHYSICIAN ASSISTANT

## 2022-07-19 PROCEDURE — 63600175 PHARM REV CODE 636 W HCPCS: Performed by: PHYSICIAN ASSISTANT

## 2022-07-19 PROCEDURE — 25000003 PHARM REV CODE 250: Performed by: PHYSICIAN ASSISTANT

## 2022-07-19 PROCEDURE — 83735 ASSAY OF MAGNESIUM: CPT | Performed by: PHYSICIAN ASSISTANT

## 2022-07-19 PROCEDURE — 85025 COMPLETE CBC W/AUTO DIFF WBC: CPT | Performed by: PHYSICIAN ASSISTANT

## 2022-07-19 PROCEDURE — P9047 ALBUMIN (HUMAN), 25%, 50ML: HCPCS | Mod: JG | Performed by: PHYSICIAN ASSISTANT

## 2022-07-19 RX ORDER — LIDOCAINE HYDROCHLORIDE 10 MG/ML
1 INJECTION INFILTRATION; PERINEURAL ONCE
Status: COMPLETED | OUTPATIENT
Start: 2022-07-19 | End: 2022-07-19

## 2022-07-19 RX ADMIN — ASPIRIN 81 MG CHEWABLE TABLET 81 MG: 81 TABLET CHEWABLE at 07:07

## 2022-07-19 RX ADMIN — METHOCARBAMOL 500 MG: 500 TABLET ORAL at 07:07

## 2022-07-19 RX ADMIN — TACROLIMUS 1 MG: 1 CAPSULE ORAL at 07:07

## 2022-07-19 RX ADMIN — CEFEPIME 1 G: 1 INJECTION, POWDER, FOR SOLUTION INTRAMUSCULAR; INTRAVENOUS at 12:07

## 2022-07-19 RX ADMIN — ACETAMINOPHEN 650 MG: 325 TABLET ORAL at 07:07

## 2022-07-19 RX ADMIN — INSULIN ASPART 2 UNITS: 100 INJECTION, SOLUTION INTRAVENOUS; SUBCUTANEOUS at 12:07

## 2022-07-19 RX ADMIN — OXYCODONE HYDROCHLORIDE 10 MG: 10 TABLET ORAL at 07:07

## 2022-07-19 RX ADMIN — PANTOPRAZOLE SODIUM 40 MG: 40 TABLET, DELAYED RELEASE ORAL at 07:07

## 2022-07-19 RX ADMIN — MIRTAZAPINE 15 MG: 15 TABLET, FILM COATED ORAL at 07:07

## 2022-07-19 RX ADMIN — CEFEPIME 1 G: 1 INJECTION, POWDER, FOR SOLUTION INTRAMUSCULAR; INTRAVENOUS at 07:07

## 2022-07-19 RX ADMIN — ACETAMINOPHEN 650 MG: 325 TABLET ORAL at 08:07

## 2022-07-19 RX ADMIN — METHOCARBAMOL 500 MG: 500 TABLET ORAL at 06:07

## 2022-07-19 RX ADMIN — PREDNISONE 5 MG: 5 TABLET ORAL at 07:07

## 2022-07-19 RX ADMIN — METHOCARBAMOL 500 MG: 500 TABLET ORAL at 12:07

## 2022-07-19 RX ADMIN — VANCOMYCIN HYDROCHLORIDE 1000 MG: 1 INJECTION, POWDER, LYOPHILIZED, FOR SOLUTION INTRAVENOUS at 03:07

## 2022-07-19 RX ADMIN — LIDOCAINE HYDROCHLORIDE 1 ML: 10 INJECTION, SOLUTION INFILTRATION; PERINEURAL at 12:07

## 2022-07-19 RX ADMIN — FLUCONAZOLE 200 MG: 200 TABLET ORAL at 07:07

## 2022-07-19 RX ADMIN — INSULIN ASPART 2 UNITS: 100 INJECTION, SOLUTION INTRAVENOUS; SUBCUTANEOUS at 06:07

## 2022-07-19 RX ADMIN — CEFEPIME 1 G: 1 INJECTION, POWDER, FOR SOLUTION INTRAMUSCULAR; INTRAVENOUS at 04:07

## 2022-07-19 RX ADMIN — TACROLIMUS 1 MG: 1 CAPSULE ORAL at 06:07

## 2022-07-19 RX ADMIN — ALBUMIN (HUMAN) 25 G: 12.5 SOLUTION INTRAVENOUS at 05:07

## 2022-07-19 RX ADMIN — OXYCODONE HYDROCHLORIDE 10 MG: 10 TABLET ORAL at 04:07

## 2022-07-19 RX ADMIN — OXYCODONE HYDROCHLORIDE 10 MG: 10 TABLET ORAL at 03:07

## 2022-07-19 RX ADMIN — VALGANCICLOVIR HYDROCHLORIDE 450 MG: 450 TABLET ORAL at 07:07

## 2022-07-19 NOTE — H&P
Inpatient Radiology Pre-procedure Note    History of Present Illness:  Romeo Larson is a 41 y.o. male who presents for septic shock. He's currently HD stable. Biliary drainage internal/external established on 7/13, and the external tube appeared clotted and pt stated he was not educated to flush the tube regularly. IR was consulted for biliary catheter exchange.  Admission H&P reviewed.  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    Hypercholesteremia     Hypertension      Past Surgical History:   Procedure Laterality Date    DIAGNOSTIC ULTRASOUND N/A 6/21/2022    Procedure: ULTRASOUND, DIAGNOSTIC;  Surgeon: Sinan Cline MD;  Location: [facility] OR 2ND FLR;  Service: Transplant;  Laterality: N/A;    ENDOSCOPIC ULTRASOUND OF UPPER GASTROINTESTINAL TRACT N/A 10/20/2021    Procedure: ULTRASOUND, UPPER GI TRACT, ENDOSCOPIC;  Surgeon: Valeriy Sotelo MD;  Location: [facility] (2ND FLR);  Service: Endoscopy;  Laterality: N/A;  wife to email vacc card-inst email-tb    ERCP N/A 10/20/2021    Procedure: ERCP (ENDOSCOPIC RETROGRADE CHOLANGIOPANCREATOGRAPHY);  Surgeon: Valeriy Sotelo MD;  Location: [facility] ENDO (2ND FLR);  Service: Endoscopy;  Laterality: N/A;    ERCP N/A 11/4/2021    Procedure: ERCP (ENDOSCOPIC RETROGRADE CHOLANGIOPANCREATOGRAPHY);  Surgeon: Valeriy Sotelo MD;  Location: [facility] ENDO (2ND FLR);  Service: Endoscopy;  Laterality: N/A;    ERCP N/A 11/4/2021    Procedure: ERCP (ENDOSCOPIC RETROGRADE CHOLANGIOPANCREATOGRAPHY);  Surgeon: Roselia Pereyra MD;  Location: [facility] ENDO (2ND FLR);  Service: Endoscopy;  Laterality: N/A;  pt vaccinated, instructed to bring card to procedure, instructions sent portal-MG    ERCP N/A 1/14/2022    Procedure: ERCP (ENDOSCOPIC RETROGRADE CHOLANGIOPANCREATOGRAPHY);  Surgeon: Roselia Pereyra MD;   Location: [facility] ENDO (2ND FLR);  Service: Endoscopy;  Laterality: N/A;  inst portal-right chest port-tb  Pt requested at home COVID testing, Pt instructed at home RAPID to be done morning of procedure, Pt instructed to call endoscopy scheuding if there is a positive result, Pt informed if a positive     ERCP N/A 3/31/2022    Procedure: ERCP (ENDOSCOPIC RETROGRADE CHOLANGIOPANCREATOGRAPHY);  Surgeon: Julio Cesar Alonzo MD;  Location: [facility] ENDO (2ND FLR);  Service: Endoscopy;  Laterality: N/A;  3/16: port L chest wall. pt to bring covid vaccination card. Instructions sent via portal.-SC  3/18/22-Updated instructions sent via portal re:new date/time-DS    EXPLORATORY LAPAROTOMY AFTER LIVER TRANSPLANTATION N/A 6/23/2022    Procedure: LAPAROTOMY, EXPLORATORY, AFTER LIVER TRANSPLANT;  Surgeon: Julio Cesar Jara MD;  [facility];  Service: Transplant;  Laterality: N/A;    INSERTION OF TUNNELED CENTRAL VENOUS CATHETER (CVC) WITH SUBCUTANEOUS PORT N/A 11/24/2021    Procedure: INSERTION, PORT-A-CATH;  Surgeon: Prem Syed MD;  Location: [facility] CATH LAB;  Service: Radiology;  Laterality: N/A;    LIVER TRANSPLANT N/A 6/21/2022    Procedure: TRANSPLANT, LIVER;  Surgeon: Sinan Cline MD;  [facility];  Service: Transplant;  Laterality: N/A;    REPAIR OF BILE DUCT N/A 6/23/2022    Procedure: REPAIR, BILE DUCT;  Surgeon: Julio Cesar Jara MD;  [facility];  Service: Transplant;  Laterality: N/A;    ROBOT-ASSISTED LAPAROSCOPIC LYMPHADENECTOMY USING DA МАРИНА XI  6/17/2022    Procedure: XI ROBOTIC LYMPHADENECTOMY - Portal;  Surgeon: Julio Cesar Jara MD;  [facility];  Service: Transplant;;    TONSILLECTOMY      XI ROBOTIC LAPAROSCOPY, EXPLORATORY N/A 6/17/2022    Procedure: XI ROBOTIC LAPAROSCOPY,EXPLORATORY;  Surgeon: Julio Cesar Jara MD;  [facility];  Service: Transplant;  Laterality: N/A;       Review of Systems:   As documented in primary team H&P    Home Meds:  "  Prior to Admission medications    Medication Sig Start Date End Date Taking? Authorizing Provider   aspirin 81 MG Chew CHEW 1 tablet (81 mg total) by mouth once daily. 6/28/22 6/28/23  Abida Arenas NP   blood sugar diagnostic Strp 1 each by Misc.(Non-Drug; Combo Route) route 3 (three) times daily. 6/28/22   Abida Arenas NP   blood-glucose meter Misc Use as instructed 6/28/22   Abida Arenas NP   calcium carbonate-vitamin D3 600 mg-20 mcg (800 unit) Tab Take 1 tablet by mouth once daily at 6am. 6/28/22   Abida Arenas NP   carvediloL (COREG) 3.125 MG tablet Take 1 tablet (3.125 mg total) by mouth 2 (two) times daily. 6/28/22 6/28/23  Abida Arenas NP   cefpodoxime (VANTIN) 200 MG tablet Take 1 tablet (200 mg total) by mouth every 12 (twelve) hours. for 11 doses 7/16/22 7/22/22  Sinan Cline MD   fluconazole (DIFLUCAN) 200 MG Tab Take 1 tablet (200 mg total) by mouth once daily. STOP 7/20/22 6/20/22 7/28/22  Sinan Cline MD   insulin aspart U-100 (NOVOLOG) 100 unit/mL (3 mL) InPn pen Inject 5 Units into the skin 3 (three) times daily with meals. + sliding scale.  MDD 30 units/d 7/16/22   Brinda Serrato MD   lancets 30 gauge Misc 1 each by Misc.(Non-Drug; Combo Route) route 3 (three) times daily. 6/28/22   Abida Arenas NP   methocarbamoL (ROBAXIN) 500 MG Tab Take 1 tablet (500 mg total) by mouth 4 (four) times daily. 6/28/22   Abida Arenas NP   mirtazapine (REMERON) 15 MG tablet Take 1 tablet (15 mg total) by mouth nightly. 7/16/22 7/16/23  Sinan Cline MD   oxyCODONE (ROXICODONE) 10 mg Tab immediate release tablet Take 1-1.5 tablets (10-15 mg total) by mouth every 4 (four) hours as needed for Pain. 6/28/22   Abida Arenas NP   pantoprazole (PROTONIX) 40 MG tablet Take 1 tablet (40 mg total) by mouth once daily. 7/9/22   Sinan Cline MD   pen needle, diabetic 32 gauge x 5/32" Ndle 1 each by Misc.(Non-Drug; Combo Route) route 3 (three) times daily. 6/28/22   Abida Arenas, " NP   predniSONE (DELTASONE) 5 MG tablet Take by mouth once daily: 20 mg 6/27-7/3; 15 mg 7/4-7/10; 10 mg 7/11-7/17; 5 mg 7/18-7/24; STOP 7/25/22 6/24/22   Sinan Cline MD   tacrolimus (PROGRAF) 1 MG Cap Take 1 capsule (1 mg total) by mouth every 12 (twelve) hours. 7/11/22 7/11/23  Sinan Cline MD   tamsulosin (FLOMAX) 0.4 mg Cap Take 2 capsules (0.8 mg total) by mouth once daily. 7/9/22 7/9/23  Sinan Cline MD   valGANciclovir (VALCYTE) 450 mg Tab Take 1 tablet (450 mg total) by mouth once daily. STOP 9/19/22 6/21/22 9/19/22  Sinan Cline MD   baclofen (LIORESAL) 10 MG tablet TAKE 1 TABLET(10 MG) BY MOUTH THREE TIMES DAILY AS NEEDED FOR HICCUPS  Patient not taking: No sig reported 4/6/22 6/17/22  Young Wesley MD   famotidine (PEPCID) 20 MG tablet Take 1 tablet (20 mg total) by mouth every evening. STOP 7/24/22 6/21/22 7/8/22  Sinan Cline MD   losartan (COZAAR) 50 MG tablet Take 1 tablet (50 mg total) by mouth once daily. 3/21/22 6/24/22  Pravin Maria MD   metFORMIN (GLUCOPHAGE) 500 MG tablet Take 1 tablet (500 mg total) by mouth 2 (two) times daily with meals. 7/8/22 7/8/22  Sinan Cline MD   mycophenolate (CELLCEPT) 250 mg Cap Take 4 capsules (1,000 mg total) by mouth 2 (two) times daily. 6/24/22 7/8/22  Sinan Cline MD     Scheduled Meds:    aspirin  81 mg Oral Daily    ceFEPime (MAXIPIME) IVPB  1 g Intravenous Q8H    fluconazole  200 mg Oral Daily    heparin (porcine)  5,000 Units Subcutaneous Q8H    LIDOcaine HCL 10 mg/ml (1%)  1 mL Other Once    methocarbamoL  500 mg Oral QID    mirtazapine  15 mg Oral Nightly    pantoprazole  40 mg Oral Daily    predniSONE  5 mg Oral Daily    tacrolimus  1 mg Oral BID    valGANciclovir  450 mg Oral Daily    vancomycin (VANCOCIN) IVPB  1,000 mg Intravenous Q12H     Continuous Infusions:   PRN Meds:acetaminophen, dextrose 10%, dextrose 10%, glucagon (human recombinant), glucose, glucose, insulin aspart U-100, melatonin, ondansetron, oxyCODONE, sodium  chloride 0.9%, Pharmacy to dose Vancomycin consult **AND** vancomycin - pharmacy to dose  Anticoagulants/Antiplatelets: Heparin    Allergies: Review of patient's allergies indicates:  No Known Allergies  Sedation Hx: have not been any systemic reactions    Labs:  Recent Labs   Lab 07/12/22 1814   INR 1.0       Recent Labs   Lab 07/19/22 0519   WBC 3.26*   HGB 7.5*   HCT 24.1*   MCV 98   PLT 86*      Recent Labs   Lab 07/19/22 0519   *   *   K 3.7   CL 96   CO2 28   BUN 13   CREATININE 0.8   CALCIUM 8.8   MG 1.9   ALT 71*   AST 27   ALBUMIN 2.7*   BILITOT 0.8         Vitals:  Temp: 99 °F (37.2 °C) (07/19/22 0750)  Pulse: 76 (07/19/22 0750)  Resp: 18 (07/19/22 0750)  BP: (!) 149/95 (07/19/22 0750)  SpO2: 97 % (07/19/22 0750)     Physical Exam:  ASA: 3  Mallampati: 3    General: no acute distress  Mental Status: alert and oriented to person, place and time  HEENT: normocephalic, atraumatic  Chest: unlabored breathing  Heart: regular heart rate  Abdomen: nondistended  Extremity: moves all extremities    Plan:   1. NPO MN  2. Hold heparin at least 6 hrs prior to procedure  3. Plan for biliary catheter exchange on 7/20/22  Sedation Plan: carley Villagran MD PhD  Interventional Radiology  PGY-2

## 2022-07-19 NOTE — PROGRESS NOTES
[facility] - Transplant Stepdown  Liver Transplant  Progress Note    Patient Name: Romeo Larson  MRN: [id number]  Admission Date: 7/18/2022  Hospital Length of Stay: 1 days  Code Status: Full Code  Primary Care Provider: Pravin Maria MD  Post-Operative Day: 28    ORGAN:   RIGHT LIVER LOBE (SEGS 5,6,7,8) WITHOUT MIDDLE HEPATIC VEIN  Disease Etiology: Primary Liver Malignancy: Cholangiocarcinoma (CH-CA)  Donor Type:   Living  CDC High Risk:     Donor CMV Status:   Donor CMV Status:   Donor HBcAB:     Donor HCV Status:     Donor HBV GUSTABO:   Donor HCV GUSTABO:   Whole or Partial:   Biliary Anastomosis: Vick-en-Y  Arterial Anatomy: Standard  Subjective:     History of Present Illness:  Romeo Larson is a 41yr old male s/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 Post operative course significant for recurrent bile leak. OR take back 6/23 for bile duct repair (small leak found near biliary anastomosis, unable to clearly identify source), cultures grew enterococcus faecalis. OR take back 7/4 for ex lap, washout of biloma, bile duct unable to be assessed due to adhesions, OR cultures with lactobacillus species. Patient was discharged home with 1 drain in place. Patient presented for outpatient follow up, drain found to be bilious and he was readmitted 7/12/22. He was placed on BS IV ABX and  underwent PTC drain (internal/external) placement on 7/13 with bilious drainage output. Drain being flushed [id number] with sterile saline 30ml flushes. Decreased amount of drainage from left and right SHRUTHI drains prior to discharge. LFTs trended down. He was transitioned to PO cefpoxime and remained afebrile. He was discharged on 7/15/22.     Mr Larson now presents with fever 101 since early this morning. Denies any other symptoms. Reports minimal drainage from SHRUTHI drains and continued bilious drainage from PTC drain. He is flushing PTC drain as directed. Site c/d/i. Incision c/d/i. Denies abdominal pain,  nausea/ vomiting, chest pain, sob, diarrhea, and dysuria. Pt hypotensive and tachycardic on arrival to ED with BP 80/50s and . Febrile to 101.5. Blood pressure improved to 90/60s. He was give IV cefepime/vanc and LR bolus. Plan to admit for further management. Pt d/w Dr Godwin.        Interval history:   NAEON. Vital signs improved with IVF and IV ABX. Pt feeling better today. Cont vanc/cefepime. IR consulted for cholangiogram +/- tube exchange. 2 SHRUTHI drains without output. CT a/p reviewed by Dr Godwin. Will remove right SHRUTHI drain. LFTs improving. Cont to  monitor.        Scheduled Meds:   aspirin  81 mg Oral Daily    ceFEPime (MAXIPIME) IVPB  1 g Intravenous Q8H    fluconazole  200 mg Oral Daily    heparin (porcine)  5,000 Units Subcutaneous Q8H    LIDOcaine HCL 10 mg/ml (1%)  1 mL Other Once    methocarbamoL  500 mg Oral QID    mirtazapine  15 mg Oral Nightly    pantoprazole  40 mg Oral Daily    predniSONE  5 mg Oral Daily    tacrolimus  1 mg Oral BID    valGANciclovir  450 mg Oral Daily    vancomycin (VANCOCIN) IVPB  1,000 mg Intravenous Q12H     Continuous Infusions:  PRN Meds:acetaminophen, dextrose 10%, dextrose 10%, glucagon (human recombinant), glucose, glucose, insulin aspart U-100, melatonin, ondansetron, oxyCODONE, sodium chloride 0.9%, Pharmacy to dose Vancomycin consult **AND** vancomycin - pharmacy to dose    Review of Systems   Constitutional:  Positive for fever. Negative for activity change, appetite change, chills and diaphoresis.   HENT:  Negative for congestion, sinus pressure, sinus pain, sore throat and trouble swallowing.    Eyes:  Negative for visual disturbance.   Respiratory:  Negative for chest tightness, shortness of breath and stridor.    Cardiovascular:  Negative for chest pain, palpitations and leg swelling.   Gastrointestinal:  Negative for abdominal distention, abdominal pain, constipation, diarrhea, nausea and vomiting.   Genitourinary:  Negative for decreased urine  volume, difficulty urinating, dysuria and flank pain.   Musculoskeletal:  Negative for neck pain and neck stiffness.   Skin:  Positive for wound. Negative for color change and rash.   Allergic/Immunologic: Positive for immunocompromised state.   Neurological:  Negative for dizziness, tremors, syncope, light-headedness and headaches.   Psychiatric/Behavioral:  Negative for agitation, confusion, decreased concentration and hallucinations. The patient is not nervous/anxious.    Objective:     Vital Signs (Most Recent):  Temp: 99 °F (37.2 °C) (07/19/22 0750)  Pulse: 76 (07/19/22 0750)  Resp: 18 (07/19/22 0750)  BP: (!) 149/95 (07/19/22 0750)  SpO2: 97 % (07/19/22 0750)   Vital Signs (24h Range):  Temp:  [97.8 °F (36.6 °C)-99 °F (37.2 °C)] 99 °F (37.2 °C)  Pulse:  [70-94] 76  Resp:  [16-18] 18  SpO2:  [97 %-100 %] 97 %  BP: ()/(60-95) 149/95     Weight: 79.8 kg (175 lb 14.8 oz)  Body mass index is 26.75 kg/m².    Intake/Output - Last 3 Shifts         07/17 0700 07/18 0659 07/18 0700 07/19 0659 07/19 0700 07/20 0659    P.O.  360     IV Piggyback  50     Total Intake(mL/kg)  410 (5.1)     Urine (mL/kg/hr)  0     Drains  375     Stool  0     Total Output  375     Net  +35            Urine Occurrence  1 x     Stool Occurrence  1 x             Physical Exam  Vitals and nursing note reviewed.   Constitutional:       General: He is not in acute distress.     Appearance: He is not diaphoretic.   HENT:      Head: Normocephalic and atraumatic.   Eyes:      General:         Right eye: No discharge.         Left eye: No discharge.   Cardiovascular:      Rate and Rhythm: Normal rate and regular rhythm.      Heart sounds: Normal heart sounds.   Pulmonary:      Effort: Pulmonary effort is normal.      Breath sounds: Normal breath sounds. No wheezing or rales.   Abdominal:      General: Bowel sounds are normal. There is no distension.      Palpations: Abdomen is soft.      Tenderness: There is no abdominal tenderness. There is  "no guarding.       Musculoskeletal:      Cervical back: Normal range of motion and neck supple.      Right lower leg: No edema.      Left lower leg: No edema.   Skin:     General: Skin is warm and dry.      Capillary Refill: Capillary refill takes 2 to 3 seconds.   Neurological:      Mental Status: He is alert and oriented to person, place, and time.      Cranial Nerves: No cranial nerve deficit.   Psychiatric:         Thought Content: Thought content normal.         Judgment: Judgment normal.       Laboratory:  Immunosuppressants           Stop Route Frequency     tacrolimus capsule 1 mg         -- Oral 2 times daily          CBC:   Recent Labs   Lab 07/19/22  0519   WBC 3.26*   RBC 2.44*   HGB 7.5*   HCT 24.1*   PLT 86*   MCV 98   MCH 31.1*   MCHC 31.9*     CMP:   Recent Labs   Lab 07/19/22 0519   *   CALCIUM 8.8   ALBUMIN 2.7*   PROT 5.4*   *   K 3.7   CO2 28   CL 96   BUN 13   CREATININE 0.8   ALKPHOS 211*   ALT 71*   AST 27   BILITOT 0.8     Labs within the past 24 hours have been reviewed.    Diagnostic Results:  I have personally reviewed all pertinent imaging studies.      Assessment/Plan:     * Sepsis  - Pt presented with fever, tachycardia and hypotension despite cefpodoxime  - Hypotension improving with IVF  - Likely 2/2 biliary source  - PTC drain to gravity and SHRUTHI x 2 in place  - blood cultures, CXR, UA and CT a/p reviewed  - Cont Vanc/cefepime and continue diflucan  - IR consult for cholangiogram with possible tube exchange/upsize      Bile leak  - s/p PTC (internalized) on 7/13/22  - PTC to gravity with bilious output. SHRUTHI x 2 with scant output no longer draining per patient  - Cont flushing PTC  - IR consulted for cholangiogram with possible tube exchange  - See "sepsis"      Long-term use of immunosuppressant medication  - see "prophylactic immunotherapy"      Type 2 diabetes mellitus with hyperglycemia  - cont sliding scale insulin      At risk for opportunistic infections  - cont OI " "prophylaxis per protocol  - send CMV PCR      Prophylactic immunotherapy  - continue prograf and steroid taper per protocol  - will check daily prograf levels, monitor for toxic side effects, and adjust for therapeutic dose  - cellcept on hold for infection      S/P liver transplant  - chevron incision with staples in place - 15 days out from take back  - SHRUTHI x 2 with scant output - remove the right drain today  - Trend LFTs daily  - See "bile leak"    Hypercholesterolemia            VTE Risk Mitigation (From admission, onward)         Ordered     heparin (porcine) injection 5,000 Units  Every 8 hours         07/18/22 1100     IP VTE HIGH RISK PATIENT  Once         07/18/22 1100     Place sequential compression device  Until discontinued         07/18/22 1100                The patients clinical status was discussed at multidisplinary rounds, involving transplant surgery, transplant medicine, pharmacy, nursing, nutrition, and social work    Discharge Planning: Not a candidate for discharge at this time.        Hannah Garcia PA-C  Liver Transplant  [facility] - [facility]  "

## 2022-07-19 NOTE — ASSESSMENT & PLAN NOTE
"- s/p PTC (internalized) on 7/13/22  - PTC to gravity with bilious output. SHRUTHI x 2 with scant output no longer draining per patient  - Cont flushing PTC  - IR consulted for cholangiogram with possible tube exchange  - See "sepsis"    "

## 2022-07-19 NOTE — PLAN OF CARE
41 year-old male admitted 7/18/22 Fever, hypotension, tachycardia. Pt has hx of living donor liver txp 6/21/22, cholangiocarcinoma  -AAOx4, ambulates independently  -Rt chest wall port accessed  -20 G Lt AC  -Cefepime IVPB Q8  -Vancomycin IVPB Q12  -Chevron with staples DEBBIE  -1 SHRUTHI to Lt Abd  -rt sided SHRUTHI drain removed today at bedside  -PTC drain to Rt abd-green/brown bile  -Accuchecks AC/HS, slides @ 201  -low Na Diet, 2gm   -NPO at midnight  -IR 7/20 for tube exchange  -PRN Oxy 5mg Q4  -spouse at bedside, pt sitting up in chair, wheels locked, non-skid socks in place, call light in place

## 2022-07-19 NOTE — ASSESSMENT & PLAN NOTE
- Pt presented with fever, tachycardia and hypotension despite cefpodoxime  - Hypotension improving with IVF  - Likely 2/2 biliary source  - PTC drain to gravity and SHRUTHI x 2 in place  - blood cultures, CXR, UA and CT a/p reviewed  - Cont Vanc/cefepime and continue diflucan  - IR consult for cholangiogram with possible tube exchange/upsize

## 2022-07-19 NOTE — SUBJECTIVE & OBJECTIVE
Scheduled Meds:   aspirin  81 mg Oral Daily    ceFEPime (MAXIPIME) IVPB  1 g Intravenous Q8H    fluconazole  200 mg Oral Daily    heparin (porcine)  5,000 Units Subcutaneous Q8H    LIDOcaine HCL 10 mg/ml (1%)  1 mL Other Once    methocarbamoL  500 mg Oral QID    mirtazapine  15 mg Oral Nightly    pantoprazole  40 mg Oral Daily    predniSONE  5 mg Oral Daily    tacrolimus  1 mg Oral BID    valGANciclovir  450 mg Oral Daily    vancomycin (VANCOCIN) IVPB  1,000 mg Intravenous Q12H     Continuous Infusions:  PRN Meds:acetaminophen, dextrose 10%, dextrose 10%, glucagon (human recombinant), glucose, glucose, insulin aspart U-100, melatonin, ondansetron, oxyCODONE, sodium chloride 0.9%, Pharmacy to dose Vancomycin consult **AND** vancomycin - pharmacy to dose    Review of Systems   Constitutional:  Positive for fever. Negative for activity change, appetite change, chills and diaphoresis.   HENT:  Negative for congestion, sinus pressure, sinus pain, sore throat and trouble swallowing.    Eyes:  Negative for visual disturbance.   Respiratory:  Negative for chest tightness, shortness of breath and stridor.    Cardiovascular:  Negative for chest pain, palpitations and leg swelling.   Gastrointestinal:  Negative for abdominal distention, abdominal pain, constipation, diarrhea, nausea and vomiting.   Genitourinary:  Negative for decreased urine volume, difficulty urinating, dysuria and flank pain.   Musculoskeletal:  Negative for neck pain and neck stiffness.   Skin:  Positive for wound. Negative for color change and rash.   Allergic/Immunologic: Positive for immunocompromised state.   Neurological:  Negative for dizziness, tremors, syncope, light-headedness and headaches.   Psychiatric/Behavioral:  Negative for agitation, confusion, decreased concentration and hallucinations. The patient is not nervous/anxious.    Objective:     Vital Signs (Most Recent):  Temp: 99 °F (37.2 °C) (07/19/22 0750)  Pulse: 76 (07/19/22 0750)  Resp:  18 (07/19/22 0750)  BP: (!) 149/95 (07/19/22 0750)  SpO2: 97 % (07/19/22 0750)   Vital Signs (24h Range):  Temp:  [97.8 °F (36.6 °C)-99 °F (37.2 °C)] 99 °F (37.2 °C)  Pulse:  [70-94] 76  Resp:  [16-18] 18  SpO2:  [97 %-100 %] 97 %  BP: ()/(60-95) 149/95     Weight: 79.8 kg (175 lb 14.8 oz)  Body mass index is 26.75 kg/m².    Intake/Output - Last 3 Shifts         07/17 0700 07/18 0659 07/18 0700 07/19 0659 07/19 0700 07/20 0659    P.O.  360     IV Piggyback  50     Total Intake(mL/kg)  410 (5.1)     Urine (mL/kg/hr)  0     Drains  375     Stool  0     Total Output  375     Net  +35            Urine Occurrence  1 x     Stool Occurrence  1 x             Physical Exam  Vitals and nursing note reviewed.   Constitutional:       General: He is not in acute distress.     Appearance: He is not diaphoretic.   HENT:      Head: Normocephalic and atraumatic.   Eyes:      General:         Right eye: No discharge.         Left eye: No discharge.   Cardiovascular:      Rate and Rhythm: Normal rate and regular rhythm.      Heart sounds: Normal heart sounds.   Pulmonary:      Effort: Pulmonary effort is normal.      Breath sounds: Normal breath sounds. No wheezing or rales.   Abdominal:      General: Bowel sounds are normal. There is no distension.      Palpations: Abdomen is soft.      Tenderness: There is no abdominal tenderness. There is no guarding.       Musculoskeletal:      Cervical back: Normal range of motion and neck supple.      Right lower leg: No edema.      Left lower leg: No edema.   Skin:     General: Skin is warm and dry.      Capillary Refill: Capillary refill takes 2 to 3 seconds.   Neurological:      Mental Status: He is alert and oriented to person, place, and time.      Cranial Nerves: No cranial nerve deficit.   Psychiatric:         Thought Content: Thought content normal.         Judgment: Judgment normal.       Laboratory:  Immunosuppressants           Stop Route Frequency     tacrolimus capsule 1 mg          -- Oral 2 times daily          CBC:   Recent Labs   Lab 07/19/22 0519   WBC 3.26*   RBC 2.44*   HGB 7.5*   HCT 24.1*   PLT 86*   MCV 98   MCH 31.1*   MCHC 31.9*     CMP:   Recent Labs   Lab 07/19/22 0519   *   CALCIUM 8.8   ALBUMIN 2.7*   PROT 5.4*   *   K 3.7   CO2 28   CL 96   BUN 13   CREATININE 0.8   ALKPHOS 211*   ALT 71*   AST 27   BILITOT 0.8     Labs within the past 24 hours have been reviewed.    Diagnostic Results:  I have personally reviewed all pertinent imaging studies.

## 2022-07-19 NOTE — HOSPITAL COURSE
Pt admitted with sepsis. PTC in place with bilious output. SHRUTHI drains also in place with minimal output. Infectious work-up ordered on admit. Broad spec IV anbx ordered. Initially on vanc/cefepime but ID consulted and changed to Unasyn d/t history of enterrococcus. Vital signs improved with IVF and IV anbx. CT A/P 7/18, reviewed by surgeon, R SHRUTHI removed based on CT findings. IR consulted, had repeat cholangiogram w PTC upsize 7/20. Blood cx from 7/18 with Candida glabrata, started on Micafungin per ID. PTC clamped 7/21. LFTs gradually increasing with PTC clamped so PTC reopened to gravity 7/23. Remaining SHRUTHI drain removed 7/22. LFTs now trending down after PTC reopened. Patient remains afebrile. Per ID, will need at least 2 weeks of IV Micafungin, E.O.T date at least 8/1 (needs f/u imaging prior to stopping). Also needs PO amox/clav at least 2 weeks per ID.     Interval note: Pt continued to have bilious leakage from around PTC insertion site. Now s/p repeat cholangiogram with upsize of PTC on 7/25. Plan to cap tomorrow morning and monitor for any recurrent symptoms of cholangitis.  LFTs/Tbili continue to trend down. Pt afebrile and reports feeling well. VSS. Continue to monitor.

## 2022-07-19 NOTE — ASSESSMENT & PLAN NOTE
"- chevron incision with staples in place - 15 days out from take back  - SHRUTHI x 2 with scant output - remove the right drain today  - Trend LFTs daily  - See "bile leak"  "

## 2022-07-19 NOTE — PLAN OF CARE
Pt aaox4. VSS on RA. Pt admitted yesterday for fever. Pt remained afebrile overnight. Blood cx NGTD. CT A/P in Epic. IV cefepime and vanc administered per order. Albumin administered. AC/HS accuchecks monitored. R CW port accessed. Bilateral SHRUTHI drains with minimal to no output. R bili bag draining dark green output. Chevron incision CDI, pt paints with betadine. NSR on telemetry. Mag replaced yesterday. VS and assessments in flowsheets.

## 2022-07-19 NOTE — PROGRESS NOTES
Social Work Admit Note    ABENA met with patient and patients dad, Bradley, at bedside to assess needs. Patient is a 41 y.o.  male, admitted for a biloma on his liver. Patient received liver transplant from a living donor on (6/21/22). Pt has had several recent admissions for same.    Pt admitted on 7/18/22. At this time, patient and father present as alert/oriented x4, pleasant, communicative and asking and answering questions appropriately.     Household/Family Systems     Patient resides with his wife and 3 children, ages 16, 13 and 12, at:     [address] [address].     Support system includes his wife, father, mother and brother.     Patients primary caregiver is his wife, Shell Larson, [phone] and secondary caregiver is his father, Bradley Larson, [phone].  Confirmed patients contact information is [phone] (home);   Telephone Information:   Mobile [phone]     During admission, patient's caregiver (wife) plans to stay in the room with the patient. Confirmed patient and patients caregivers have access to reliable transportation.    Cognitive Status/Learning     Patient reports reading ability at college level. He denies difficulty with seeing or hearing.    Vocation/Disability     Working for Income: Patient reports working remotely and expressed hope that he would be able to log in to his work computer in a week to check on work and to resume work when medically cleared.    Adherence     Patient reports a high level of adherence to patients health care regimen.  Adherence counseling and education provided. Patient verbalizes understanding.    Substance Use    Patient reports the following substance usage.    Tobacco: none  Alcohol: none  Illicit Drugs/Non-prescribed Medications: none  Patient states clear understanding of the potential impact of substance use.  Substance abstinence/cessation counseling, education and resources provided and reviewed.     Services  Utilizing/ADLS    Infusion Service: Prior to admission, patient utilizing? no  Home Health: Prior to admission, patient utilizing? no  DME: Prior to admission, no  Pulmonary/Cardiac Rehab: Prior to admission, no  Dialysis:  Prior to admission, no  Transplant Specialty Pharmacy:  Prior to admission, yes    Pt referred to physical therapy at: [facility] the [facility] (ph: )      Prior to admission, patient reports being independent with ADLS but was not driving.    Insurance/Medications    Insured by   Payer/Plan Subscr  Sex Relation Sub. Ins. ID Effective Group Num   1. [employer] - ÁNGELA* OSCAR LARSON 1980 Male Self [id number]                                   [address] [address]      Primary Insurance (for UNOS reporting): [employer]  Secondary Insurance (for UNOS reporting): none    Patient reports he is able to obtain and afford medications at this time and at time of discharge.    Living Will/Healthcare Power of     Patient states he does not have a LW and/or HCPA.  provided education regarding LW and HCPA and the completion of forms. Patient's spouse, Shell Larson will be his legal next of kin in the absence of HCPA according to [place] law.    Coping/Mental Health    Patient shared that he is coping well with the support of his family. Patient denies mental health difficulties. Pt is frustrated with continued admissions.    Discharge Planning    At time of discharge, patient plans to return to his house in [place] under the care of his wife. Patients wife, Shell and his father Bradley will transport patient. Per rounds today, patient date of discharge is to be determined. Patient verbalized understanding and his caregivers are involved in treatment planning and discharge process.      Additional Concerns    Patient is being followed for needs, education, resources, information, emotional support, supportive counseling, and for supportive and  skilled discharge plan of care.  providing ongoing psychosocial support, education, resources and d/c planning as needed.  SW remains available.  remains available.

## 2022-07-19 NOTE — PROGRESS NOTES
Pharmacokinetic Initial Assessment: IV Vancomycin    Assessment/Plan:    Initiate intravenous vancomycin with loading dose of 2000 mg once followed by a maintenance dose of vancomycin 1000mg IV every 12 hours  Desired empiric serum trough concentration is 15 to 20 mcg/mL  Draw vancomycin trough level 60 min prior to fourth dose on 7/20 at approximately 1400  Pharmacy will continue to follow and monitor vancomycin.      Please contact pharmacy at extension [phone] with any questions regarding this assessment.     Thank you for the consult,   Larry Rios       Patient brief summary:  Romeo Larson is a 41 y.o. male initiated on antimicrobial therapy with IV Vancomycin for treatment of suspected sepsis/intra-abdominal     Drug Allergies:   Review of patient's allergies indicates:  No Known Allergies    Actual Body Weight:   77.1 kg    Renal Function:   Estimated Creatinine Clearance: 83.8 mL/min (based on SCr of 1.1 mg/dL).,     CBC (last 72 hours):  Recent Labs   Lab Result Units 07/16/22  0633 07/18/22  0940   WBC K/uL 4.05 6.45   Hemoglobin g/dL 8.3* 9.5*   Hematocrit % 25.4* 29.2*   Platelets K/uL 94* 107*   Gran % % 83.0* 87.3*   Lymph % % 7.2* 3.6*   Mono % % 7.9 7.8   Eosinophil % % 1.0 0.0   Basophil % % 0.2 0.2   Differential Method  Automated Automated       Metabolic Panel (last 72 hours):  Recent Labs   Lab Result Units 07/16/22  0633 07/18/22  0940 07/18/22  1706 07/18/22  1800   Sodium mmol/L 127* 126*  --  126*   Potassium mmol/L 4.6 4.0  --  4.8   Chloride mmol/L 92* 91*  --  92*   CO2 mmol/L 25 24  --  24   Glucose mg/dL 235* 163*  --  332*   Glucose, UA   --   --  3+*  --    BUN mg/dL 20 22*  --  16   Creatinine mg/dL 0.8 1.3  --  1.1   Albumin g/dL 2.5* 2.6*  --   --    Total Bilirubin mg/dL 0.8 1.3*  --   --    Alkaline Phosphatase U/L 216* 317*  --   --    AST U/L 25 58*  --   --    ALT U/L 61* 104*  --   --    Magnesium mg/dL 1.5* 1.2*  --   --    Phosphorus mg/dL  --  2.5*  --   --        Drug  levels (last 3 results):  No results for input(s): VANCOMYCINRA, VANCORANDOM, VANCOMYCINPE, VANCOPEAK, VANCOMYCINTR, VANCOTROUGH in the last 72 hours.    Microbiologic Results:  Microbiology Results (last 7 days)     Procedure Component Value Units Date/Time    Blood culture x two cultures. Draw prior to antibiotics. [[id number]] Collected: 07/18/22 1016    Order Status: Completed Specimen: Blood from Line, Port A Cath Updated: 07/18/22 1745     Blood Culture, Routine No Growth to date    Narrative:      Aerobic and anaerobic    Blood culture x two cultures. Draw prior to antibiotics. [[id number]] Collected: 07/18/22 0941    Order Status: Completed Specimen: Blood from Peripheral, Antecubital, Right Updated: 07/18/22 1745     Blood Culture, Routine No Growth to date    Narrative:      Aerobic and anaerobic

## 2022-07-20 LAB
ALBUMIN SERPL BCP-MCNC: 2.9 G/DL (ref 3.5–5.2)
ALP SERPL-CCNC: 216 U/L (ref 55–135)
ALT SERPL W/O P-5'-P-CCNC: 52 U/L (ref 10–44)
ANION GAP SERPL CALC-SCNC: 8 MMOL/L (ref 8–16)
AST SERPL-CCNC: 19 U/L (ref 10–40)
BASOPHILS # BLD AUTO: 0.01 K/UL (ref 0–0.2)
BASOPHILS NFR BLD: 0.3 % (ref 0–1.9)
BILIRUB SERPL-MCNC: 0.7 MG/DL (ref 0.1–1)
BUN SERPL-MCNC: 11 MG/DL (ref 6–20)
CALCIUM SERPL-MCNC: 8.8 MG/DL (ref 8.7–10.5)
CHLORIDE SERPL-SCNC: 96 MMOL/L (ref 95–110)
CMV DNA SPEC QL NAA+PROBE: NOT DETECTED
CO2 SERPL-SCNC: 27 MMOL/L (ref 23–29)
CREAT SERPL-MCNC: 0.8 MG/DL (ref 0.5–1.4)
CYTOMEGALOVIRUS LOG (IU/ML): NOT DETECTED LOGIU/ML
CYTOMEGALOVIRUS PCR, QUANT: NOT DETECTED IU/ML
DIFFERENTIAL METHOD: ABNORMAL
EOSINOPHIL # BLD AUTO: 0 K/UL (ref 0–0.5)
EOSINOPHIL NFR BLD: 0 % (ref 0–8)
ERYTHROCYTE [DISTWIDTH] IN BLOOD BY AUTOMATED COUNT: 14.3 % (ref 11.5–14.5)
EST. GFR  (AFRICAN AMERICAN): >60 ML/MIN/1.73 M^2
EST. GFR  (NON AFRICAN AMERICAN): >60 ML/MIN/1.73 M^2
GLUCOSE SERPL-MCNC: 115 MG/DL (ref 70–110)
HCT VFR BLD AUTO: 24 % (ref 40–54)
HGB BLD-MCNC: 7.6 G/DL (ref 14–18)
IMM GRANULOCYTES # BLD AUTO: 0.02 K/UL (ref 0–0.04)
IMM GRANULOCYTES NFR BLD AUTO: 0.7 % (ref 0–0.5)
LYMPHOCYTES # BLD AUTO: 0.3 K/UL (ref 1–4.8)
LYMPHOCYTES NFR BLD: 10.5 % (ref 18–48)
MAGNESIUM SERPL-MCNC: 1.4 MG/DL (ref 1.6–2.6)
MCH RBC QN AUTO: 29.8 PG (ref 27–31)
MCHC RBC AUTO-ENTMCNC: 31.7 G/DL (ref 32–36)
MCV RBC AUTO: 94 FL (ref 82–98)
MONOCYTES # BLD AUTO: 0.2 K/UL (ref 0.3–1)
MONOCYTES NFR BLD: 7.2 % (ref 4–15)
NEUTROPHILS # BLD AUTO: 2.5 K/UL (ref 1.8–7.7)
NEUTROPHILS NFR BLD: 81.3 % (ref 38–73)
NRBC BLD-RTO: 0 /100 WBC
PHOSPHATE SERPL-MCNC: 4.2 MG/DL (ref 2.7–4.5)
PLATELET # BLD AUTO: 85 K/UL (ref 150–450)
PMV BLD AUTO: 9.5 FL (ref 9.2–12.9)
POCT GLUCOSE: 138 MG/DL (ref 70–110)
POCT GLUCOSE: 139 MG/DL (ref 70–110)
POCT GLUCOSE: 237 MG/DL (ref 70–110)
POTASSIUM SERPL-SCNC: 3.8 MMOL/L (ref 3.5–5.1)
PROT SERPL-MCNC: 5.6 G/DL (ref 6–8.4)
RBC # BLD AUTO: 2.55 M/UL (ref 4.6–6.2)
SODIUM SERPL-SCNC: 131 MMOL/L (ref 136–145)
TACROLIMUS BLD-MCNC: 7.2 NG/ML (ref 5–15)
WBC # BLD AUTO: 3.04 K/UL (ref 3.9–12.7)

## 2022-07-20 PROCEDURE — 83735 ASSAY OF MAGNESIUM: CPT | Performed by: PHYSICIAN ASSISTANT

## 2022-07-20 PROCEDURE — 84100 ASSAY OF PHOSPHORUS: CPT | Performed by: PHYSICIAN ASSISTANT

## 2022-07-20 PROCEDURE — 85025 COMPLETE CBC W/AUTO DIFF WBC: CPT | Performed by: PHYSICIAN ASSISTANT

## 2022-07-20 PROCEDURE — 63600175 PHARM REV CODE 636 W HCPCS: Performed by: PHYSICIAN ASSISTANT

## 2022-07-20 PROCEDURE — 36415 COLL VENOUS BLD VENIPUNCTURE: CPT

## 2022-07-20 PROCEDURE — 63600175 PHARM REV CODE 636 W HCPCS: Performed by: TRANSPLANT SURGERY

## 2022-07-20 PROCEDURE — 25000003 PHARM REV CODE 250: Performed by: PHYSICIAN ASSISTANT

## 2022-07-20 PROCEDURE — 87040 BLOOD CULTURE FOR BACTERIA: CPT

## 2022-07-20 PROCEDURE — 99233 SBSQ HOSP IP/OBS HIGH 50: CPT | Mod: ,,, | Performed by: NURSE PRACTITIONER

## 2022-07-20 PROCEDURE — 63600175 PHARM REV CODE 636 W HCPCS: Performed by: STUDENT IN AN ORGANIZED HEALTH CARE EDUCATION/TRAINING PROGRAM

## 2022-07-20 PROCEDURE — 25000003 PHARM REV CODE 250: Performed by: TRANSPLANT SURGERY

## 2022-07-20 PROCEDURE — 25500020 PHARM REV CODE 255: Performed by: TRANSPLANT SURGERY

## 2022-07-20 PROCEDURE — 80197 ASSAY OF TACROLIMUS: CPT | Performed by: PHYSICIAN ASSISTANT

## 2022-07-20 PROCEDURE — 99233 PR SUBSEQUENT HOSPITAL CARE,LEVL III: ICD-10-PCS | Mod: ,,, | Performed by: NURSE PRACTITIONER

## 2022-07-20 PROCEDURE — 81003 URINALYSIS AUTO W/O SCOPE: CPT

## 2022-07-20 PROCEDURE — 20600001 HC STEP DOWN PRIVATE ROOM

## 2022-07-20 PROCEDURE — 80053 COMPREHEN METABOLIC PANEL: CPT | Performed by: PHYSICIAN ASSISTANT

## 2022-07-20 RX ORDER — CEFAZOLIN SODIUM 1 G/50ML
SOLUTION INTRAVENOUS
Status: COMPLETED | OUTPATIENT
Start: 2022-07-20 | End: 2022-07-20

## 2022-07-20 RX ORDER — HEPARIN 100 UNIT/ML
5 SYRINGE INTRAVENOUS
Status: DISCONTINUED | OUTPATIENT
Start: 2022-07-20 | End: 2022-07-27 | Stop reason: HOSPADM

## 2022-07-20 RX ORDER — MIDAZOLAM HYDROCHLORIDE 1 MG/ML
INJECTION INTRAMUSCULAR; INTRAVENOUS CODE/TRAUMA/SEDATION MEDICATION
Status: COMPLETED | OUTPATIENT
Start: 2022-07-20 | End: 2022-07-20

## 2022-07-20 RX ORDER — FENTANYL CITRATE 50 UG/ML
INJECTION, SOLUTION INTRAMUSCULAR; INTRAVENOUS CODE/TRAUMA/SEDATION MEDICATION
Status: COMPLETED | OUTPATIENT
Start: 2022-07-20 | End: 2022-07-20

## 2022-07-20 RX ADMIN — METHOCARBAMOL 500 MG: 500 TABLET ORAL at 05:07

## 2022-07-20 RX ADMIN — PREDNISONE 5 MG: 5 TABLET ORAL at 08:07

## 2022-07-20 RX ADMIN — INSULIN ASPART 2 UNITS: 100 INJECTION, SOLUTION INTRAVENOUS; SUBCUTANEOUS at 05:07

## 2022-07-20 RX ADMIN — PANTOPRAZOLE SODIUM 40 MG: 40 TABLET, DELAYED RELEASE ORAL at 08:07

## 2022-07-20 RX ADMIN — OXYCODONE HYDROCHLORIDE 10 MG: 10 TABLET ORAL at 05:07

## 2022-07-20 RX ADMIN — IOHEXOL 20 ML: 300 INJECTION, SOLUTION INTRAVENOUS at 09:07

## 2022-07-20 RX ADMIN — METHOCARBAMOL 500 MG: 500 TABLET ORAL at 08:07

## 2022-07-20 RX ADMIN — VANCOMYCIN HYDROCHLORIDE 1000 MG: 1 INJECTION, POWDER, LYOPHILIZED, FOR SOLUTION INTRAVENOUS at 03:07

## 2022-07-20 RX ADMIN — VALGANCICLOVIR HYDROCHLORIDE 450 MG: 450 TABLET ORAL at 08:07

## 2022-07-20 RX ADMIN — CEFEPIME 1 G: 1 INJECTION, POWDER, FOR SOLUTION INTRAMUSCULAR; INTRAVENOUS at 05:07

## 2022-07-20 RX ADMIN — FLUCONAZOLE 200 MG: 200 TABLET ORAL at 08:07

## 2022-07-20 RX ADMIN — FENTANYL CITRATE 50 MCG: 50 INJECTION, SOLUTION INTRAMUSCULAR; INTRAVENOUS at 09:07

## 2022-07-20 RX ADMIN — ASPIRIN 81 MG CHEWABLE TABLET 81 MG: 81 TABLET CHEWABLE at 08:07

## 2022-07-20 RX ADMIN — CEFEPIME 1 G: 1 INJECTION, POWDER, FOR SOLUTION INTRAMUSCULAR; INTRAVENOUS at 07:07

## 2022-07-20 RX ADMIN — VANCOMYCIN HYDROCHLORIDE 1000 MG: 1 INJECTION, POWDER, LYOPHILIZED, FOR SOLUTION INTRAVENOUS at 02:07

## 2022-07-20 RX ADMIN — CEFAZOLIN SODIUM 1 G: 1 SOLUTION INTRAVENOUS at 09:07

## 2022-07-20 RX ADMIN — FENTANYL CITRATE 25 MCG: 50 INJECTION, SOLUTION INTRAMUSCULAR; INTRAVENOUS at 09:07

## 2022-07-20 RX ADMIN — MIDAZOLAM HYDROCHLORIDE 2 MG: 1 INJECTION, SOLUTION INTRAMUSCULAR; INTRAVENOUS at 09:07

## 2022-07-20 RX ADMIN — TACROLIMUS 1 MG: 1 CAPSULE ORAL at 07:07

## 2022-07-20 RX ADMIN — OXYCODONE HYDROCHLORIDE 10 MG: 10 TABLET ORAL at 11:07

## 2022-07-20 RX ADMIN — CEFEPIME 1 G: 1 INJECTION, POWDER, FOR SOLUTION INTRAMUSCULAR; INTRAVENOUS at 11:07

## 2022-07-20 RX ADMIN — MIRTAZAPINE 15 MG: 15 TABLET, FILM COATED ORAL at 08:07

## 2022-07-20 RX ADMIN — ACETAMINOPHEN 650 MG: 325 TABLET ORAL at 07:07

## 2022-07-20 RX ADMIN — TACROLIMUS 1 MG: 1 CAPSULE ORAL at 05:07

## 2022-07-20 RX ADMIN — METHOCARBAMOL 500 MG: 500 TABLET ORAL at 12:07

## 2022-07-20 NOTE — PLAN OF CARE
AAOx4, VSS, BP: 120's- 130's, HR 80's, O2 >97%, afebrile, no pain. B, hgb 7.5, hct 24.1, ast 27, alt 71. Vanc 1 gram in 250 ml @ 166.67 ml/hr, cefepime 1 gram in 50 ml @ 100 ml/hr. Pt is scheduled for choliangio and possible tube exchange today, NPO since midnight. Pt independent, in bed, non slip socks on, call bell within reach, no acute events overnight.

## 2022-07-20 NOTE — SEDATION DOCUMENTATION
Pt arrived to IR Dept  for  Biliary drain exchange . Pt oriented to unit and staff. Plan of care reviewed with patient, patient verbalizes understanding. Comfort measures utilized. Pt safely transferred from stretcher to procedural table. Fall risk reviewed with patient, fall risk interventions maintained. Safety strap applied, positioner pillows utilized to minimize pressure points. Blankets applied. Pt prepped and draped utilizing standard sterile technique. Patient placed on continuous monitoring, as required by sedation policy. Timeouts completed utilizing standard universal time-out, per department and facility policy. RN to remain at bedside, continuous monitoring maintained. Pt resting comfortably. Denies pain/discomfort. Will continue to monitor. See flow sheets for monitoring, medication administration, and updates.

## 2022-07-20 NOTE — SUBJECTIVE & OBJECTIVE
Scheduled Meds:   aspirin  81 mg Oral Daily    ceFEPime (MAXIPIME) IVPB  1 g Intravenous Q8H    fluconazole  200 mg Oral Daily    heparin (porcine)  5,000 Units Subcutaneous Q8H    methocarbamoL  500 mg Oral QID    mirtazapine  15 mg Oral Nightly    pantoprazole  40 mg Oral Daily    predniSONE  5 mg Oral Daily    tacrolimus  1 mg Oral BID    valGANciclovir  450 mg Oral Daily    vancomycin (VANCOCIN) IVPB  1,000 mg Intravenous Q12H     Continuous Infusions:  PRN Meds:acetaminophen, dextrose 10%, dextrose 10%, glucagon (human recombinant), glucose, glucose, insulin aspart U-100, melatonin, ondansetron, oxyCODONE, sodium chloride 0.9%, Pharmacy to dose Vancomycin consult **AND** vancomycin - pharmacy to dose    Review of Systems   Constitutional:  Negative for activity change, appetite change, chills, diaphoresis and fever.   HENT:  Negative for congestion, sinus pressure, sinus pain, sore throat and trouble swallowing.    Eyes:  Negative for visual disturbance.   Respiratory:  Negative for chest tightness, shortness of breath and stridor.    Cardiovascular:  Negative for chest pain, palpitations and leg swelling.   Gastrointestinal:  Negative for abdominal distention, abdominal pain, constipation, diarrhea, nausea and vomiting.   Genitourinary:  Negative for decreased urine volume, difficulty urinating, dysuria and flank pain.   Musculoskeletal:  Negative for neck pain and neck stiffness.   Skin:  Positive for wound. Negative for color change and rash.   Allergic/Immunologic: Positive for immunocompromised state.   Neurological:  Negative for dizziness, tremors, syncope, light-headedness and headaches.   Psychiatric/Behavioral:  Negative for agitation, confusion, decreased concentration and hallucinations. The patient is not nervous/anxious.    Objective:     Vital Signs (Most Recent):  Temp: 97.5 °F (36.4 °C) (07/20/22 1015)  Pulse: 66 (07/20/22 1045)  Resp: (!) 21 (07/20/22 1045)  BP: (!) 147/97 (07/20/22  1045)  SpO2: 98 % (07/20/22 1045)   Vital Signs (24h Range):  Temp:  [97.5 °F (36.4 °C)-99.7 °F (37.6 °C)] 97.5 °F (36.4 °C)  Pulse:  [66-91] 66  Resp:  [16-23] 21  SpO2:  [95 %-99 %] 98 %  BP: (122-155)/(72-98) 147/97     Weight: 79.9 kg (176 lb 2.4 oz)  Body mass index is 26.78 kg/m².    Intake/Output - Last 3 Shifts         07/18 0700  07/19 0659 07/19 0700 07/20 0659 07/20 0700 07/21 0659    P.O. 360 1220 50    I.V. (mL/kg)  96.1 (1.2)     IV Piggyback 50 647.6     Total Intake(mL/kg) 410 (5.1) 1963.7 (24.6) 50 (0.6)    Urine (mL/kg/hr) 0 0 (0)     Drains 375 290     Stool 0 0     Total Output 375 290     Net +35 +1673.7 +50           Urine Occurrence 1 x 5 x     Stool Occurrence 1 x 2 x             Physical Exam  Vitals and nursing note reviewed.   Constitutional:       General: He is not in acute distress.     Appearance: He is not diaphoretic.   HENT:      Head: Normocephalic and atraumatic.   Eyes:      General:         Right eye: No discharge.         Left eye: No discharge.   Cardiovascular:      Rate and Rhythm: Normal rate and regular rhythm.      Heart sounds: Normal heart sounds.   Pulmonary:      Effort: Pulmonary effort is normal.      Breath sounds: Normal breath sounds. No wheezing or rales.   Abdominal:      General: Bowel sounds are normal. There is no distension.      Palpations: Abdomen is soft.      Tenderness: There is no abdominal tenderness. There is no guarding.       Musculoskeletal:      Cervical back: Normal range of motion and neck supple.      Right lower leg: No edema.      Left lower leg: No edema.   Skin:     General: Skin is warm and dry.      Capillary Refill: Capillary refill takes 2 to 3 seconds.   Neurological:      Mental Status: He is alert and oriented to person, place, and time.      Cranial Nerves: No cranial nerve deficit.   Psychiatric:         Thought Content: Thought content normal.         Judgment: Judgment normal.       Laboratory:  Immunosuppressants            Stop Route Frequency     tacrolimus capsule 1 mg         -- Oral 2 times daily          CBC:   Recent Labs   Lab 07/20/22  0413   WBC 3.04*   RBC 2.55*   HGB 7.6*   HCT 24.0*   PLT 85*   MCV 94   MCH 29.8   MCHC 31.7*     CMP:   Recent Labs   Lab 07/20/22 0413   *   CALCIUM 8.8   ALBUMIN 2.9*   PROT 5.6*   *   K 3.8   CO2 27   CL 96   BUN 11   CREATININE 0.8   ALKPHOS 216*   ALT 52*   AST 19   BILITOT 0.7     Coagulation: No results for input(s): PT, INR, APTT in the last 168 hours.  Labs within the past 24 hours have been reviewed.    Diagnostic Results:  US Liver Transplant Post:  Results for orders placed during the hospital encounter of 06/18/22    US Doppler Liver Transplant Post (xpd)    Narrative  EXAMINATION:  US DOPPLER LIVER TRANSPLANT POST (XPD)    CLINICAL HISTORY:  S/p LTX;    TECHNIQUE:  Limited abdominal ultrasound of the transplant liver with Doppler evaluation.  Color and spectral Doppler were performed.    COMPARISON:  Ultrasound 06/23/2022, 06/22/2022    FINDINGS:  Patient is status post orthotopic liver transplant of the right hepatic lobe on 06/21/2022.  The patient returned to the OR on 06/23/2022 for bile duct repair.    The liver demonstrates homogeneous echotexture.  No focal hepatic lesions are seen.    Small fluid collection posterior to the allograft measuring 2.1 x 1.7 x 2.0 cm.  Partially imaged surgical drain adjacent to the allograft.  Right pleural effusion.    The common duct is not dilated, measuring 4 mm.  No dilated intrahepatic radicles are seen.    Color flow and spectral waveform analysis was performed.    Main portal vein, anterior branch of the right portal vein, and posterior branch of the right portal vein are patent.    Middle hepatic vein velocity measures 17 cm/s (previously 12 cm/s).    Right hepatic vein velocity measures 36 cm/s (previously 48 cm/s).    IVC piggyback velocity measures 196 cm/s (previously 62 cm/s).    Visualized main hepatic artery  velocity measures 82 cm/s (previously 147 cm/s). Extrahepatic main hepatic artery is obscured by overlying bowel gas.    Main hepatic artery resistive index measures 0.69 (previously 0.63) with normal waveform.    Right hepatic artery anterior branch resistive index measures 0.59 (previously 0.63), with normal waveform.    Right hepatic artery posterior branch resistive index measures 0.61 (previously 0.67), with normal waveform.    Impression  Elevated velocities at the IVC anastomosis.  Attention on follow-up imaging is recommended.    Otherwise satisfactory Doppler evaluation of the liver allograft, noting that the extrahepatic MHA was obscured by bowel gas.    Small peritransplant fluid collection and right pleural effusion.    This report was flagged in Epic as abnormal.    Electronically signed by resident: Brad Espitia  Date:    06/27/2022  Time:    08:16    Electronically signed by: Venkat Mahajan  Date:    06/27/2022  Time:    08:41    Debility/Functional status: No debility noted.

## 2022-07-20 NOTE — PROGRESS NOTES
AAOx4. VSS. Cefepime given IVPB. Vancomycin given IVPB. PRN oxy 10mg given x2 for c/o pain. Patient went to IR today for bili drain exchange - 100cc output noted - bili drain flushed w/ 10cc NS. LLQ SHRUTHI drain w/ minimal output (5cc) - tan in color. Chevron incision - DEBBIE/staples intact - cleaned w/ betadine. R chest wall port deaccessed d/t not pulling back. Re-accessed today 7/20 - heparin locked. Patient up ad pradeep - ambulates in room and bathroom independently. Bed in low position. Non-skid socks on. Call light within reach.

## 2022-07-20 NOTE — PROGRESS NOTES
[facility] - Transplant Stepdown  Liver Transplant  Progress Note    Patient Name: Romeo Larson  MRN: [id number]  Admission Date: 7/18/2022  Hospital Length of Stay: 2 days  Code Status: Full Code  Primary Care Provider: Pravin Maria MD  Post-Operative Day: 29    ORGAN:   RIGHT LIVER LOBE (SEGS 5,6,7,8) WITHOUT MIDDLE HEPATIC VEIN  Disease Etiology: Primary Liver Malignancy: Cholangiocarcinoma (CH-CA)  Donor Type:   Living  Biliary Anastomosis: Vick-en-Y  Arterial Anatomy: Standard  Subjective:     History of Present Illness:  Romeo Larson is a 41yr old male s/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 Post operative course significant for recurrent bile leak. OR take back 6/23 for bile duct repair (small leak found near biliary anastomosis, unable to clearly identify source), cultures grew enterococcus faecalis. OR take back 7/4 for ex lap, washout of biloma, bile duct unable to be assessed due to adhesions, OR cultures with lactobacillus species. Patient was discharged home with 1 drain in place. Patient presented for outpatient follow up, drain found to be bilious and he was readmitted 7/12/22. He was placed on BS IV ABX and  underwent PTC drain (internal/external) placement on 7/13 with bilious drainage output. Drain being flushed [id number] with sterile saline 30ml flushes. Decreased amount of drainage from left and right SHRUTHI drains prior to discharge. LFTs trended down. He was transitioned to PO cefpoxime and remained afebrile. He was discharged on 7/15/22.     Mr Larson now presents with fever 101 since early this morning. Denies any other symptoms. Reports minimal drainage from SHRUTHI drains and continued bilious drainage from PTC drain. He is flushing PTC drain as directed. Site c/d/i. Incision c/d/i. Denies abdominal pain, nausea/ vomiting, chest pain, sob, diarrhea, and dysuria. Pt hypotensive and tachycardic on arrival to ED with BP 80/50s and . Febrile to 101.5.  Blood pressure improved to 90/60s. He was give IV cefepime/vanc and LR bolus. Labs and imaging reviewed. Plan to admit for further management. Pt d/w Dr Godwin.        Interval History: no acute events overnight. Feels well today. Tmax 99.7. On cefepime/vanc. IR consulted, plan for cholangiogram and likely PTC tube exchange. Remains with L SHRUTHI with minimal output. LFTs stable. Infectious workup NGTD.       Scheduled Meds:   aspirin  81 mg Oral Daily    ceFEPime (MAXIPIME) IVPB  1 g Intravenous Q8H    fluconazole  200 mg Oral Daily    heparin (porcine)  5,000 Units Subcutaneous Q8H    methocarbamoL  500 mg Oral QID    mirtazapine  15 mg Oral Nightly    pantoprazole  40 mg Oral Daily    predniSONE  5 mg Oral Daily    tacrolimus  1 mg Oral BID    valGANciclovir  450 mg Oral Daily    vancomycin (VANCOCIN) IVPB  1,000 mg Intravenous Q12H     Continuous Infusions:  PRN Meds:acetaminophen, dextrose 10%, dextrose 10%, glucagon (human recombinant), glucose, glucose, insulin aspart U-100, melatonin, ondansetron, oxyCODONE, sodium chloride 0.9%, Pharmacy to dose Vancomycin consult **AND** vancomycin - pharmacy to dose    Review of Systems   Constitutional:  Negative for activity change, appetite change, chills, diaphoresis and fever.   HENT:  Negative for congestion, sinus pressure, sinus pain, sore throat and trouble swallowing.    Eyes:  Negative for visual disturbance.   Respiratory:  Negative for chest tightness, shortness of breath and stridor.    Cardiovascular:  Negative for chest pain, palpitations and leg swelling.   Gastrointestinal:  Negative for abdominal distention, abdominal pain, constipation, diarrhea, nausea and vomiting.   Genitourinary:  Negative for decreased urine volume, difficulty urinating, dysuria and flank pain.   Musculoskeletal:  Negative for neck pain and neck stiffness.   Skin:  Positive for wound. Negative for color change and rash.   Allergic/Immunologic: Positive for immunocompromised  state.   Neurological:  Negative for dizziness, tremors, syncope, light-headedness and headaches.   Psychiatric/Behavioral:  Negative for agitation, confusion, decreased concentration and hallucinations. The patient is not nervous/anxious.    Objective:     Vital Signs (Most Recent):  Temp: 97.5 °F (36.4 °C) (07/20/22 1015)  Pulse: 66 (07/20/22 1045)  Resp: (!) 21 (07/20/22 1045)  BP: (!) 147/97 (07/20/22 1045)  SpO2: 98 % (07/20/22 1045)   Vital Signs (24h Range):  Temp:  [97.5 °F (36.4 °C)-99.7 °F (37.6 °C)] 97.5 °F (36.4 °C)  Pulse:  [66-91] 66  Resp:  [16-23] 21  SpO2:  [95 %-99 %] 98 %  BP: (122-155)/(72-98) 147/97     Weight: 79.9 kg (176 lb 2.4 oz)  Body mass index is 26.78 kg/m².    Intake/Output - Last 3 Shifts         07/18 0700  07/19 0659 07/19 0700  07/20 0659 07/20 0700  07/21 0659    P.O. 360 1220 50    I.V. (mL/kg)  96.1 (1.2)     IV Piggyback 50 647.6     Total Intake(mL/kg) 410 (5.1) 1963.7 (24.6) 50 (0.6)    Urine (mL/kg/hr) 0 0 (0)     Drains 375 290     Stool 0 0     Total Output 375 290     Net +35 +1673.7 +50           Urine Occurrence 1 x 5 x     Stool Occurrence 1 x 2 x             Physical Exam  Vitals and nursing note reviewed.   Constitutional:       General: He is not in acute distress.     Appearance: He is not diaphoretic.   HENT:      Head: Normocephalic and atraumatic.   Eyes:      General:         Right eye: No discharge.         Left eye: No discharge.   Cardiovascular:      Rate and Rhythm: Normal rate and regular rhythm.      Heart sounds: Normal heart sounds.   Pulmonary:      Effort: Pulmonary effort is normal.      Breath sounds: Normal breath sounds. No wheezing or rales.   Abdominal:      General: Bowel sounds are normal. There is no distension.      Palpations: Abdomen is soft.      Tenderness: There is no abdominal tenderness. There is no guarding.       Musculoskeletal:      Cervical back: Normal range of motion and neck supple.      Right lower leg: No edema.      Left  lower leg: No edema.   Skin:     General: Skin is warm and dry.      Capillary Refill: Capillary refill takes 2 to 3 seconds.   Neurological:      Mental Status: He is alert and oriented to person, place, and time.      Cranial Nerves: No cranial nerve deficit.   Psychiatric:         Thought Content: Thought content normal.         Judgment: Judgment normal.       Laboratory:  Immunosuppressants           Stop Route Frequency     tacrolimus capsule 1 mg         -- Oral 2 times daily          CBC:   Recent Labs   Lab 07/20/22  0413   WBC 3.04*   RBC 2.55*   HGB 7.6*   HCT 24.0*   PLT 85*   MCV 94   MCH 29.8   MCHC 31.7*     CMP:   Recent Labs   Lab 07/20/22  0413   *   CALCIUM 8.8   ALBUMIN 2.9*   PROT 5.6*   *   K 3.8   CO2 27   CL 96   BUN 11   CREATININE 0.8   ALKPHOS 216*   ALT 52*   AST 19   BILITOT 0.7     Coagulation: No results for input(s): PT, INR, APTT in the last 168 hours.  Labs within the past 24 hours have been reviewed.    Diagnostic Results:  US Liver Transplant Post:  Results for orders placed during the hospital encounter of 06/18/22    US Doppler Liver Transplant Post (xpd)    Narrative  EXAMINATION:  US DOPPLER LIVER TRANSPLANT POST (XPD)    CLINICAL HISTORY:  S/p LTX;    TECHNIQUE:  Limited abdominal ultrasound of the transplant liver with Doppler evaluation.  Color and spectral Doppler were performed.    COMPARISON:  Ultrasound 06/23/2022, 06/22/2022    FINDINGS:  Patient is status post orthotopic liver transplant of the right hepatic lobe on 06/21/2022.  The patient returned to the OR on 06/23/2022 for bile duct repair.    The liver demonstrates homogeneous echotexture.  No focal hepatic lesions are seen.    Small fluid collection posterior to the allograft measuring 2.1 x 1.7 x 2.0 cm.  Partially imaged surgical drain adjacent to the allograft.  Right pleural effusion.    The common duct is not dilated, measuring 4 mm.  No dilated intrahepatic radicles are seen.    Color flow and  spectral waveform analysis was performed.    Main portal vein, anterior branch of the right portal vein, and posterior branch of the right portal vein are patent.    Middle hepatic vein velocity measures 17 cm/s (previously 12 cm/s).    Right hepatic vein velocity measures 36 cm/s (previously 48 cm/s).    IVC piggyback velocity measures 196 cm/s (previously 62 cm/s).    Visualized main hepatic artery velocity measures 82 cm/s (previously 147 cm/s). Extrahepatic main hepatic artery is obscured by overlying bowel gas.    Main hepatic artery resistive index measures 0.69 (previously 0.63) with normal waveform.    Right hepatic artery anterior branch resistive index measures 0.59 (previously 0.63), with normal waveform.    Right hepatic artery posterior branch resistive index measures 0.61 (previously 0.67), with normal waveform.    Impression  Elevated velocities at the IVC anastomosis.  Attention on follow-up imaging is recommended.    Otherwise satisfactory Doppler evaluation of the liver allograft, noting that the extrahepatic MHA was obscured by bowel gas.    Small peritransplant fluid collection and right pleural effusion.    This report was flagged in Epic as abnormal.    Electronically signed by resident: Brad Espitia  Date:    06/27/2022  Time:    08:16    Electronically signed by: Venkat Mahajan  Date:    06/27/2022  Time:    08:41    Debility/Functional status: No debility noted.    Assessment/Plan:     * Sepsis  - Pt presented with fever, tachycardia and hypotension despite cefpodoxime  - Hypotension improving with IVF  - Likely 2/2 biliary source  - PTC drain to gravity and SHRUTHI x 2 in place (R SHRUTHI d/c'd 7/19)  - blood cultures NGTD, CXR, UA and CT a/p reviewed  - Cont Vanc/cefepime and continue diflucan  - IR consult for cholangiogram with possible tube exchange/upsize today      Bile leak  - s/p PTC (internalized) on 7/13/22  - PTC to gravity with bilious output. SHRUTHI x 2 with scant output no longer  "draining per patient  - Cont flushing PTC  - IR consulted for cholangiogram with possible tube exchange  - See "sepsis"      Long-term use of immunosuppressant medication  - see "prophylactic immunotherapy"      Type 2 diabetes mellitus with hyperglycemia  - cont sliding scale insulin      At risk for opportunistic infections  - cont OI prophylaxis per protocol  - CMV PCR pending 7/19    Prophylactic immunotherapy  - continue prograf and steroid taper per protocol  - will check daily prograf levels, monitor for toxic side effects, and adjust for therapeutic dose  - cellcept on hold for infection    Liver transplant recipient  - chevron incision with staples in place - 15 days out from take back  - SHRUTHI x 2 with scant output - remove the right drain today  - Trend LFTs daily  - See "bile leak"    Anemia  - Chronic, likely multifactorial.  - continue to monitor    Hypercholesterolemia            VTE Risk Mitigation (From admission, onward)         Ordered     heparin (porcine) injection 5,000 Units  Every 8 hours         07/18/22 1100     IP VTE HIGH RISK PATIENT  Once         07/18/22 1100     Place sequential compression device  Until discontinued         07/18/22 1100                The patients clinical status was discussed at multidisplinary rounds, involving transplant surgery, transplant medicine, pharmacy, nursing, nutrition, and social work    Discharge Planning: not stable for dc at this time.     Jeanine Nixon, PEE  Liver Transplant  [facility] - Transplant Stepdown  "

## 2022-07-20 NOTE — ASSESSMENT & PLAN NOTE
- Pt presented with fever, tachycardia and hypotension despite cefpodoxime  - Hypotension improving with IVF  - Likely 2/2 biliary source  - PTC drain to gravity and SHRUTHI x 2 in place (R SHRUTHI d/c'd 7/19)  - blood cultures NGTD, CXR, UA and CT a/p reviewed  - Cont Vanc/cefepime and continue diflucan  - IR consult for cholangiogram with possible tube exchange/upsize today

## 2022-07-20 NOTE — PROCEDURES
"  Pre Op Diagnosis: Biliary leak  Post Op Diagnosis: Same    Procedure: Biliary drain exchnage    Procedure performed by: Hattie    Written Informed Consent Obtained: Yes  Specimen Removed: YES existing catheter  Estimated Blood Loss: Minimal    Findings:   Successful exchange and upsize of right sided int/external biliary drain. upsized to 10Fr. No biliary leak visualized. Capping trial can be attempted upon discharge.    Patient tolerated procedure well.    Erik Kuhn MD ([facility])  Interventional Radiology  [phone]        "

## 2022-07-20 NOTE — CARE UPDATE
Patient fully recovered from procedure. Report called to GASPER Lozano on TSU. B/P=150/100 at this time patient states he did not have any of his B/P meds due to planned surgery. Blake states she will look into getting him the missed doses. Patient to transport back to room shortly.

## 2022-07-21 PROBLEM — B49 FUNGEMIA: Status: ACTIVE | Noted: 2022-07-21

## 2022-07-21 LAB
ALBUMIN SERPL BCP-MCNC: 3 G/DL (ref 3.5–5.2)
ALP SERPL-CCNC: 284 U/L (ref 55–135)
ALT SERPL W/O P-5'-P-CCNC: 49 U/L (ref 10–44)
ANION GAP SERPL CALC-SCNC: 10 MMOL/L (ref 8–16)
AST SERPL-CCNC: 20 U/L (ref 10–40)
BASOPHILS # BLD AUTO: 0.01 K/UL (ref 0–0.2)
BASOPHILS NFR BLD: 0.2 % (ref 0–1.9)
BILIRUB SERPL-MCNC: 0.8 MG/DL (ref 0.1–1)
BILIRUB UR QL STRIP: NEGATIVE
BUN SERPL-MCNC: 8 MG/DL (ref 6–20)
CALCIUM SERPL-MCNC: 9.4 MG/DL (ref 8.7–10.5)
CHLORIDE SERPL-SCNC: 97 MMOL/L (ref 95–110)
CLARITY UR REFRACT.AUTO: CLEAR
CO2 SERPL-SCNC: 25 MMOL/L (ref 23–29)
COLOR UR AUTO: YELLOW
CREAT SERPL-MCNC: 0.8 MG/DL (ref 0.5–1.4)
DIFFERENTIAL METHOD: ABNORMAL
EOSINOPHIL # BLD AUTO: 0 K/UL (ref 0–0.5)
EOSINOPHIL NFR BLD: 0 % (ref 0–8)
ERYTHROCYTE [DISTWIDTH] IN BLOOD BY AUTOMATED COUNT: 13.9 % (ref 11.5–14.5)
EST. GFR  (AFRICAN AMERICAN): >60 ML/MIN/1.73 M^2
EST. GFR  (NON AFRICAN AMERICAN): >60 ML/MIN/1.73 M^2
GLUCOSE SERPL-MCNC: 160 MG/DL (ref 70–110)
GLUCOSE UR QL STRIP: ABNORMAL
HCT VFR BLD AUTO: 28.5 % (ref 40–54)
HGB BLD-MCNC: 9.1 G/DL (ref 14–18)
HGB UR QL STRIP: NEGATIVE
IMM GRANULOCYTES # BLD AUTO: 0.03 K/UL (ref 0–0.04)
IMM GRANULOCYTES NFR BLD AUTO: 0.6 % (ref 0–0.5)
KETONES UR QL STRIP: NEGATIVE
LEUKOCYTE ESTERASE UR QL STRIP: NEGATIVE
LYMPHOCYTES # BLD AUTO: 0.6 K/UL (ref 1–4.8)
LYMPHOCYTES NFR BLD: 12.6 % (ref 18–48)
MAGNESIUM SERPL-MCNC: 1.3 MG/DL (ref 1.6–2.6)
MCH RBC QN AUTO: 30.5 PG (ref 27–31)
MCHC RBC AUTO-ENTMCNC: 31.9 G/DL (ref 32–36)
MCV RBC AUTO: 96 FL (ref 82–98)
MONOCYTES # BLD AUTO: 0.3 K/UL (ref 0.3–1)
MONOCYTES NFR BLD: 6.5 % (ref 4–15)
NEUTROPHILS # BLD AUTO: 3.9 K/UL (ref 1.8–7.7)
NEUTROPHILS NFR BLD: 80.1 % (ref 38–73)
NITRITE UR QL STRIP: NEGATIVE
NRBC BLD-RTO: 0 /100 WBC
PH UR STRIP: 6 [PH] (ref 5–8)
PHOSPHATE SERPL-MCNC: 3.6 MG/DL (ref 2.7–4.5)
PLATELET # BLD AUTO: 118 K/UL (ref 150–450)
PMV BLD AUTO: 9.6 FL (ref 9.2–12.9)
POCT GLUCOSE: 129 MG/DL (ref 70–110)
POCT GLUCOSE: 134 MG/DL (ref 70–110)
POCT GLUCOSE: 145 MG/DL (ref 70–110)
POCT GLUCOSE: 172 MG/DL (ref 70–110)
POCT GLUCOSE: 216 MG/DL (ref 70–110)
POTASSIUM SERPL-SCNC: 4 MMOL/L (ref 3.5–5.1)
PROT SERPL-MCNC: 6.2 G/DL (ref 6–8.4)
PROT UR QL STRIP: NEGATIVE
RBC # BLD AUTO: 2.98 M/UL (ref 4.6–6.2)
SODIUM SERPL-SCNC: 132 MMOL/L (ref 136–145)
SP GR UR STRIP: 1.01 (ref 1–1.03)
TACROLIMUS BLD-MCNC: 6.9 NG/ML (ref 5–15)
URN SPEC COLLECT METH UR: ABNORMAL
VANCOMYCIN TROUGH SERPL-MCNC: 7.4 UG/ML (ref 10–22)
WBC # BLD AUTO: 4.92 K/UL (ref 3.9–12.7)

## 2022-07-21 PROCEDURE — 80053 COMPREHEN METABOLIC PANEL: CPT | Performed by: PHYSICIAN ASSISTANT

## 2022-07-21 PROCEDURE — 84100 ASSAY OF PHOSPHORUS: CPT | Performed by: PHYSICIAN ASSISTANT

## 2022-07-21 PROCEDURE — 20600001 HC STEP DOWN PRIVATE ROOM

## 2022-07-21 PROCEDURE — 80202 ASSAY OF VANCOMYCIN: CPT | Performed by: TRANSPLANT SURGERY

## 2022-07-21 PROCEDURE — 25000003 PHARM REV CODE 250: Performed by: TRANSPLANT SURGERY

## 2022-07-21 PROCEDURE — 80197 ASSAY OF TACROLIMUS: CPT | Performed by: PHYSICIAN ASSISTANT

## 2022-07-21 PROCEDURE — 63600175 PHARM REV CODE 636 W HCPCS: Performed by: NURSE PRACTITIONER

## 2022-07-21 PROCEDURE — 25000003 PHARM REV CODE 250: Performed by: PHYSICIAN ASSISTANT

## 2022-07-21 PROCEDURE — 99233 PR SUBSEQUENT HOSPITAL CARE,LEVL III: ICD-10-PCS | Mod: 24,,, | Performed by: NURSE PRACTITIONER

## 2022-07-21 PROCEDURE — 99233 SBSQ HOSP IP/OBS HIGH 50: CPT | Mod: 24,,, | Performed by: NURSE PRACTITIONER

## 2022-07-21 PROCEDURE — 83735 ASSAY OF MAGNESIUM: CPT | Performed by: PHYSICIAN ASSISTANT

## 2022-07-21 PROCEDURE — 63600175 PHARM REV CODE 636 W HCPCS: Performed by: TRANSPLANT SURGERY

## 2022-07-21 PROCEDURE — 99223 PR INITIAL HOSPITAL CARE,LEVL III: ICD-10-PCS | Mod: ,,, | Performed by: INTERNAL MEDICINE

## 2022-07-21 PROCEDURE — 99223 1ST HOSP IP/OBS HIGH 75: CPT | Mod: ,,, | Performed by: INTERNAL MEDICINE

## 2022-07-21 PROCEDURE — 63600175 PHARM REV CODE 636 W HCPCS: Performed by: PHYSICIAN ASSISTANT

## 2022-07-21 PROCEDURE — 85025 COMPLETE CBC W/AUTO DIFF WBC: CPT | Performed by: PHYSICIAN ASSISTANT

## 2022-07-21 PROCEDURE — 25000003 PHARM REV CODE 250: Performed by: NURSE PRACTITIONER

## 2022-07-21 RX ORDER — MAGNESIUM SULFATE HEPTAHYDRATE 40 MG/ML
2 INJECTION, SOLUTION INTRAVENOUS
Status: COMPLETED | OUTPATIENT
Start: 2022-07-21 | End: 2022-07-21

## 2022-07-21 RX ADMIN — OXYCODONE HYDROCHLORIDE 10 MG: 10 TABLET ORAL at 02:07

## 2022-07-21 RX ADMIN — METHOCARBAMOL 500 MG: 500 TABLET ORAL at 12:07

## 2022-07-21 RX ADMIN — VANCOMYCIN HYDROCHLORIDE 1000 MG: 1 INJECTION, POWDER, LYOPHILIZED, FOR SOLUTION INTRAVENOUS at 02:07

## 2022-07-21 RX ADMIN — INSULIN ASPART 2 UNITS: 100 INJECTION, SOLUTION INTRAVENOUS; SUBCUTANEOUS at 05:07

## 2022-07-21 RX ADMIN — VANCOMYCIN HYDROCHLORIDE 1500 MG: 1.5 INJECTION, POWDER, LYOPHILIZED, FOR SOLUTION INTRAVENOUS at 12:07

## 2022-07-21 RX ADMIN — MICAFUNGIN SODIUM 100 MG: 100 INJECTION, POWDER, LYOPHILIZED, FOR SOLUTION INTRAVENOUS at 10:07

## 2022-07-21 RX ADMIN — MIRTAZAPINE 15 MG: 15 TABLET, FILM COATED ORAL at 08:07

## 2022-07-21 RX ADMIN — PANTOPRAZOLE SODIUM 40 MG: 40 TABLET, DELAYED RELEASE ORAL at 08:07

## 2022-07-21 RX ADMIN — METHOCARBAMOL 500 MG: 500 TABLET ORAL at 08:07

## 2022-07-21 RX ADMIN — CEFEPIME 1 G: 1 INJECTION, POWDER, FOR SOLUTION INTRAMUSCULAR; INTRAVENOUS at 11:07

## 2022-07-21 RX ADMIN — MAGNESIUM SULFATE HEPTAHYDRATE 2 G: 40 INJECTION, SOLUTION INTRAVENOUS at 09:07

## 2022-07-21 RX ADMIN — AMPICILLIN AND SULBACTAM 3 G: 2; 1 INJECTION, POWDER, FOR SOLUTION INTRAMUSCULAR; INTRAVENOUS at 08:07

## 2022-07-21 RX ADMIN — ASPIRIN 81 MG CHEWABLE TABLET 81 MG: 81 TABLET CHEWABLE at 08:07

## 2022-07-21 RX ADMIN — OXYCODONE HYDROCHLORIDE 10 MG: 10 TABLET ORAL at 08:07

## 2022-07-21 RX ADMIN — CEFEPIME 1 G: 1 INJECTION, POWDER, FOR SOLUTION INTRAMUSCULAR; INTRAVENOUS at 04:07

## 2022-07-21 RX ADMIN — TACROLIMUS 1 MG: 1 CAPSULE ORAL at 08:07

## 2022-07-21 RX ADMIN — MAGNESIUM SULFATE HEPTAHYDRATE 2 G: 40 INJECTION, SOLUTION INTRAVENOUS at 11:07

## 2022-07-21 RX ADMIN — METHOCARBAMOL 500 MG: 500 TABLET ORAL at 05:07

## 2022-07-21 RX ADMIN — FLUCONAZOLE 200 MG: 200 TABLET ORAL at 08:07

## 2022-07-21 RX ADMIN — VALGANCICLOVIR HYDROCHLORIDE 450 MG: 450 TABLET ORAL at 08:07

## 2022-07-21 RX ADMIN — OXYCODONE HYDROCHLORIDE 10 MG: 10 TABLET ORAL at 09:07

## 2022-07-21 RX ADMIN — TACROLIMUS 1 MG: 1 CAPSULE ORAL at 05:07

## 2022-07-21 RX ADMIN — HEPARIN SODIUM 5000 UNITS: 5000 INJECTION INTRAVENOUS; SUBCUTANEOUS at 08:07

## 2022-07-21 RX ADMIN — AMPICILLIN AND SULBACTAM 3 G: 2; 1 INJECTION, POWDER, FOR SOLUTION INTRAMUSCULAR; INTRAVENOUS at 02:07

## 2022-07-21 RX ADMIN — PREDNISONE 5 MG: 5 TABLET ORAL at 08:07

## 2022-07-21 NOTE — ASSESSMENT & PLAN NOTE
"- chevron incision with staples in place - 15 days out from take back  - SHRUTHI x 2 with scant output - remove the right drain today  - Trend LFTs daily  - See "bile leak"  - PTC drain clamped 7/21/22  - cont to flush w 10cc NS twice daily  "

## 2022-07-21 NOTE — ASSESSMENT & PLAN NOTE
- Blood cx 7/18 growing yeast, ID consulted, changed Fluc to Prudence 7/21  - no plan for further w/u at this time  - pt denies change in vision, no plan for Ophthalmology

## 2022-07-21 NOTE — ASSESSMENT & PLAN NOTE
40 y/o M h/o cholangiocarcinoma s/p living 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 (vick-en-y biliary anastomosis, steroid induction, CMV D-R+, mmf/tacro/pred) post-op course notable for bile leak, s/p OR take back 6/23 for bile duct repair (small leak fond near biliary anastomosis), cultures grew E faecalis, s/p exalt 7/4 washout of biome, OR cultures with lactobacillus sp.  pt discharged with drain now readmitted for bilious drainage 7/12/22.  S/p PTC drain 7/13 (blood cultures negative 7/13) discharged 7/15/22 now presented 7/18 with fever s/p biliary catheter exchange 7/20/22. ID consulted for 7/18 blood cultures growing yeast  - suspect translocation in setting of drain malfunction now improved with catheter exchange  - transition to micafungin and f/u yeast  - stop cefepime stop vancomycin - not active against enterococcus or lactobacillus and transition to amp/sulb  - discussed with team, will follow

## 2022-07-21 NOTE — PROGRESS NOTES
Pharmacokinetic Assessment Follow Up: IV Vancomycin    Vancomycin serum concentration assessment(s):    The trough level was drawn correctly and can be used to guide therapy at this time. The measurement is below the desired definitive target range of 15 to 20 mcg/mL.    Vancomycin Regimen Plan:    Change regimen to Vancomycin 1500 mg IV every 12 hours with next serum trough concentration measured at 1200 on 7/22    Drug levels (last 3 results):  Recent Labs   Lab Result Units 07/21/22  0209   Vancomycin-Trough ug/mL 7.4*       Pharmacy will continue to follow and monitor vancomycin.    Please contact pharmacy at extension [phone] for questions regarding this assessment.    Thank you for the consult,   Ruth Anderson       Patient brief summary:  Romeo Larson is a 41 y.o. male initiated on antimicrobial therapy with IV Vancomycin for treatment of sepsis/intra-abdominal      Drug Allergies:   Review of patient's allergies indicates:  No Known Allergies    Actual Body Weight:   73.2    Renal Function:   Estimated Creatinine Clearance: 115.2 mL/min (based on SCr of 0.8 mg/dL).     Dialysis Method (if applicable):  N/A    CBC (last 72 hours):  Recent Labs   Lab Result Units 07/18/22  0940 07/19/22  0519 07/20/22  0413 07/21/22  0446   WBC K/uL 6.45 3.26* 3.04* 4.92   Hemoglobin g/dL 9.5* 7.5* 7.6* 9.1*   Hematocrit % 29.2* 24.1* 24.0* 28.5*   Platelets K/uL 107* 86* 85* 118*   Gran % % 87.3* 80.8* 81.3* 80.1*   Lymph % % 3.6* 9.8* 10.5* 12.6*   Mono % % 7.8 8.8 7.2 6.5   Eosinophil % % 0.0 0.3 0.0 0.0   Basophil % % 0.2 0.0 0.3 0.2   Differential Method  Automated Automated Automated Automated       Metabolic Panel (last 72 hours):  Recent Labs   Lab Result Units 07/18/22  0940 07/18/22  1706 07/18/22  1800 07/19/22  0519 07/20/22  0413 07/20/22  2303 07/21/22  0446   Sodium mmol/L 126*  --  126* 132* 131*  --  132*   Potassium mmol/L 4.0  --  4.8 3.7 3.8  --  4.0   Chloride mmol/L 91*  --  92* 96 96  --  97   CO2 mmol/L  24  --  24 28 27  --  25   Glucose mg/dL 163*  --  332* 137* 115*  --  160*   Glucose, UA   --  3+*  --   --   --  1+*  --    BUN mg/dL 22*  --  16 13 11  --  8   Creatinine mg/dL 1.3  --  1.1 0.8 0.8  --  0.8   Albumin g/dL 2.6*  --   --  2.7* 2.9*  --  3.0*   Total Bilirubin mg/dL 1.3*  --   --  0.8 0.7  --  0.8   Alkaline Phosphatase U/L 317*  --   --  211* 216*  --  284*   AST U/L 58*  --   --  27 19  --  20   ALT U/L 104*  --   --  71* 52*  --  49*   Magnesium mg/dL 1.2*  --   --  1.9 1.4*  --  1.3*   Phosphorus mg/dL 2.5*  --   --  3.9 4.2  --  3.6       Vancomycin Administrations:  vancomycin given in the last 96 hours                   vancomycin in dextrose 5 % 1 gram/250 mL IVPB 1,000 mg (mg) 1,000 mg New Bag 07/21/22 0259     1,000 mg New Bag 07/20/22 1410      Restarted  0411     1,000 mg New Bag  0318     1,000 mg New Bag 07/19/22 1523     1,000 mg New Bag  0327    vancomycin 2 g in dextrose 5 % 500 mL IVPB (mg) 2,000 mg New Bag 07/18/22 1515                Microbiologic Results:  Microbiology Results (last 7 days)     Procedure Component Value Units Date/Time    Blood culture [[id number]] Collected: 07/20/22 2027    Order Status: Completed Specimen: Blood Updated: 07/21/22 0345     Blood Culture, Routine No Growth to date    Blood culture [[id number]] Collected: 07/20/22 2027    Order Status: Completed Specimen: Blood Updated: 07/21/22 0345     Blood Culture, Routine No Growth to date    Blood culture x two cultures. Draw prior to antibiotics. [[id number]] Collected: 07/18/22 0941    Order Status: Completed Specimen: Blood from Peripheral, Antecubital, Right Updated: 07/20/22 1743     Blood Culture, Routine Gram stain kalina bottle: yeast      Results called to and read back by:Blake Taveras RN 07/20/2022  17:42    Narrative:      Aerobic and anaerobic    Blood culture x two cultures. Draw prior to antibiotics. [[id number]] Collected: 07/18/22 1016    Order Status: Completed Specimen: Blood from Line, Port A  Cath Updated: 07/20/22 1212     Blood Culture, Routine No Growth to date      No Growth to date      No Growth to date    Narrative:      Aerobic and anaerobic

## 2022-07-21 NOTE — ASSESSMENT & PLAN NOTE
- Pt presented with fever, tachycardia and hypotension despite cefpodoxime  - Hypotension improving with IVF  - Likely 2/2 biliary source  - PTC drain to gravity and SHRUTHI x 2 in place (R SHRUTHI d/c'd 7/19)  - blood cultures NGTD, CXR, UA and CT a/p reviewed  - Cont Vanc/cefepime and continue diflucan  - IR consult for cholangiogram with tube exchange/upsize 7/20/22  - PTC clamped 7/21  - ID consulted 7/21- yeast in blood- changed fluc to Prudence and Vanc/cefepime to Unasyn, apprec ID recs

## 2022-07-21 NOTE — PLAN OF CARE
AAOx3, w/o c/o pain. Tmax 100.6- see previous note regarding temp. Repeat infectious workup done. Pt remains on cefepime and vanc. Bg monitored achs. Telemetry monitor in place (NSR). Left howard with minimal tan output. Rt bili bag with green/brown output-will be flushed with 10cc NS two x day per orders. Chevron with staples. Pt able to position self independently.  Pt in lowest position, side rails up x2, non-skid foot wear in place, call light within reach, pt verbalized understanding to call RN when needed. Hand hygiene practiced per protocol.  Will continue to monitor.

## 2022-07-21 NOTE — PROGRESS NOTES
[facility] - Transplant Stepdown  Liver Transplant  Progress Note    Patient Name: Romeo Larson  MRN: [id number]  Admission Date: 7/18/2022  Hospital Length of Stay: 3 days  Code Status: Full Code  Primary Care Provider: Pravin Maria MD  Post-Operative Day: 30    ORGAN:   RIGHT LIVER LOBE (SEGS 5,6,7,8) WITHOUT MIDDLE HEPATIC VEIN  Disease Etiology: Primary Liver Malignancy: Cholangiocarcinoma (CH-CA)  Donor Type:   Living  CDC High Risk:     Donor CMV Status:   Donor CMV Status:   Donor HBcAB:     Donor HCV Status:     Donor HBV GUSTABO:   Donor HCV GUSTABO:   Whole or Partial: Partial Liver  Biliary Anastomosis: Vick-en-Y  Arterial Anatomy: Standard  Subjective:     History of Present Illness:  Romeo Larson is a 41yr old male s/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 Post operative course significant for recurrent bile leak. OR take back 6/23 for bile duct repair (small leak found near biliary anastomosis, unable to clearly identify source), cultures grew enterococcus faecalis. OR take back 7/4 for ex lap, washout of biloma, bile duct unable to be assessed due to adhesions, OR cultures with lactobacillus species. Patient was discharged home with 1 drain in place. Patient presented for outpatient follow up, drain found to be bilious and he was readmitted 7/12/22. He was placed on BS IV ABX and  underwent PTC drain (internal/external) placement on 7/13 with bilious drainage output. Drain being flushed [id number] with sterile saline 30ml flushes. Decreased amount of drainage from left and right SHRUTHI drains prior to discharge. LFTs trended down. He was transitioned to PO cefpoxime and remained afebrile. He was discharged on 7/15/22.     Mr Larson now presents with fever 101 since early this morning. Denies any other symptoms. Reports minimal drainage from SHRUTHI drains and continued bilious drainage from PTC drain. He is flushing PTC drain as directed. Site c/d/i. Incision c/d/i. Denies  abdominal pain, nausea/ vomiting, chest pain, sob, diarrhea, and dysuria. Pt hypotensive and tachycardic on arrival to ED with BP 80/50s and . Febrile to 101.5. Blood pressure improved to 90/60s. He was give IV cefepime/vanc and LR bolus. Labs and imaging reviewed. Plan to admit for further management. Pt d/w Dr Godwin.          Hospital Course:  Interval History: no acute events overnight. T max overnight 100.6. Pt had repeat cholangiogram w PTC drain changed 7/20. ID consulted. Blood cx fr 7/18 growing yeast. Fluc changed to Micafungin. Given hx of enterococcus, ID changed Vanc/Cefepime changed to Unasyn. Clamped PTC 7/21/22. Remains with L SHRUTHI with minimal output. Pt requested since PTC clamped, to keep SHRUTHI till tomorrow to assess for change in output. LFTs stable. Infectious workup NGTD. VSS. Monitor.      Scheduled Meds:   ampicillin-sulbactim (UNASYN) IVPB  3 g Intravenous Q6H    aspirin  81 mg Oral Daily    heparin (porcine)  5,000 Units Subcutaneous Q8H    methocarbamoL  500 mg Oral QID    micafungin (MYCAMINE) IVPB  100 mg Intravenous Q24H    mirtazapine  15 mg Oral Nightly    pantoprazole  40 mg Oral Daily    predniSONE  5 mg Oral Daily    tacrolimus  1 mg Oral BID    valGANciclovir  450 mg Oral Daily     Continuous Infusions:  PRN Meds:acetaminophen, dextrose 10%, dextrose 10%, glucagon (human recombinant), glucose, glucose, heparin, porcine (PF), insulin aspart U-100, melatonin, ondansetron, oxyCODONE, sodium chloride 0.9%, Pharmacy to dose Vancomycin consult **AND** vancomycin - pharmacy to dose    Review of Systems   Constitutional:  Negative for activity change, appetite change, chills, diaphoresis and fever.   HENT:  Negative for congestion, sinus pressure, sinus pain, sore throat and trouble swallowing.    Eyes:  Negative for visual disturbance.   Respiratory:  Negative for chest tightness, shortness of breath and stridor.    Cardiovascular:  Negative for chest pain, palpitations and  leg swelling.   Gastrointestinal:  Negative for abdominal distention, abdominal pain, constipation, diarrhea, nausea and vomiting.   Genitourinary:  Negative for decreased urine volume, difficulty urinating, dysuria and flank pain.   Musculoskeletal:  Negative for neck pain and neck stiffness.   Skin:  Positive for wound. Negative for color change and rash.   Allergic/Immunologic: Positive for immunocompromised state.   Neurological:  Negative for dizziness, tremors, syncope, light-headedness and headaches.   Psychiatric/Behavioral:  Negative for agitation, confusion, decreased concentration and hallucinations. The patient is not nervous/anxious.    Objective:     Vital Signs (Most Recent):  Temp: 98.9 °F (37.2 °C) (07/21/22 1053)  Pulse: 79 (07/21/22 1448)  Resp: 18 (07/21/22 1053)  BP: 113/70 (07/21/22 1053)  SpO2: 96 % (07/21/22 1053)   Vital Signs (24h Range):  Temp:  [97.5 °F (36.4 °C)-100.6 °F (38.1 °C)] 98.9 °F (37.2 °C)  Pulse:  [79-98] 79  Resp:  [18] 18  SpO2:  [96 %-98 %] 96 %  BP: (113-149)/(70-95) 113/70     Weight: 73.2 kg (161 lb 6 oz)  Body mass index is 24.54 kg/m².    Intake/Output - Last 3 Shifts         07/19 0700 07/20 0659 07/20 0700 07/21 0659 07/21 0700  07/22 0659    P.O. 1220 840 240    I.V. (mL/kg) 96.1 (1.2) 0 (0)     Other  0     IV Piggyback 647.6 349.3     Total Intake(mL/kg) 1963.7 (24.6) 1189.3 (16.2) 240 (3.3)    Urine (mL/kg/hr) 0 (0) 900 (0.5) 0 (0)    Emesis/NG output  0     Drains 290 280 80    Other  0     Stool 0 0     Blood  0     Total Output 290 1180 80    Net +1673.7 +9.3 +160           Urine Occurrence 5 x 2 x 2 x    Stool Occurrence 2 x 1 x     Emesis Occurrence  0 x             Physical Exam  Vitals and nursing note reviewed.   Constitutional:       General: He is not in acute distress.     Appearance: He is not diaphoretic.   HENT:      Head: Normocephalic and atraumatic.   Eyes:      General:         Right eye: No discharge.         Left eye: No discharge.    Cardiovascular:      Rate and Rhythm: Normal rate and regular rhythm.      Heart sounds: Normal heart sounds.   Pulmonary:      Effort: Pulmonary effort is normal.      Breath sounds: Normal breath sounds. No wheezing or rales.   Abdominal:      General: Bowel sounds are normal. There is no distension.      Palpations: Abdomen is soft.      Tenderness: There is no abdominal tenderness. There is no guarding.       Musculoskeletal:      Cervical back: Normal range of motion and neck supple.      Right lower leg: No edema.      Left lower leg: No edema.   Skin:     General: Skin is warm and dry.      Capillary Refill: Capillary refill takes 2 to 3 seconds.   Neurological:      Mental Status: He is alert and oriented to person, place, and time.      Cranial Nerves: No cranial nerve deficit.   Psychiatric:         Mood and Affect: Mood normal.         Thought Content: Thought content normal.         Judgment: Judgment normal.       Laboratory:  Immunosuppressants           Stop Route Frequency     tacrolimus capsule 1 mg         -- Oral 2 times daily          CBC:   Recent Labs   Lab 07/21/22  0446   WBC 4.92   RBC 2.98*   HGB 9.1*   HCT 28.5*   *   MCV 96   MCH 30.5   MCHC 31.9*       CMP:   Recent Labs   Lab 07/21/22  0446   *   CALCIUM 9.4   ALBUMIN 3.0*   PROT 6.2   *   K 4.0   CO2 25   CL 97   BUN 8   CREATININE 0.8   ALKPHOS 284*   ALT 49*   AST 20   BILITOT 0.8       Coagulation: No results for input(s): PT, INR, APTT in the last 168 hours.  Labs within the past 24 hours have been reviewed.    Diagnostic Results:  US Liver Transplant Post:  Results for orders placed during the hospital encounter of 06/18/22    US Doppler Liver Transplant Post (xpd)    Narrative  EXAMINATION:  US DOPPLER LIVER TRANSPLANT POST (XPD)    CLINICAL HISTORY:  S/p LTX;    TECHNIQUE:  Limited abdominal ultrasound of the transplant liver with Doppler evaluation.  Color and spectral Doppler were  performed.    COMPARISON:  Ultrasound 06/23/2022, 06/22/2022    FINDINGS:  Patient is status post orthotopic liver transplant of the right hepatic lobe on 06/21/2022.  The patient returned to the OR on 06/23/2022 for bile duct repair.    The liver demonstrates homogeneous echotexture.  No focal hepatic lesions are seen.    Small fluid collection posterior to the allograft measuring 2.1 x 1.7 x 2.0 cm.  Partially imaged surgical drain adjacent to the allograft.  Right pleural effusion.    The common duct is not dilated, measuring 4 mm.  No dilated intrahepatic radicles are seen.    Color flow and spectral waveform analysis was performed.    Main portal vein, anterior branch of the right portal vein, and posterior branch of the right portal vein are patent.    Middle hepatic vein velocity measures 17 cm/s (previously 12 cm/s).    Right hepatic vein velocity measures 36 cm/s (previously 48 cm/s).    IVC piggyback velocity measures 196 cm/s (previously 62 cm/s).    Visualized main hepatic artery velocity measures 82 cm/s (previously 147 cm/s). Extrahepatic main hepatic artery is obscured by overlying bowel gas.    Main hepatic artery resistive index measures 0.69 (previously 0.63) with normal waveform.    Right hepatic artery anterior branch resistive index measures 0.59 (previously 0.63), with normal waveform.    Right hepatic artery posterior branch resistive index measures 0.61 (previously 0.67), with normal waveform.    Impression  Elevated velocities at the IVC anastomosis.  Attention on follow-up imaging is recommended.    Otherwise satisfactory Doppler evaluation of the liver allograft, noting that the extrahepatic MHA was obscured by bowel gas.    Small peritransplant fluid collection and right pleural effusion.    This report was flagged in Epic as abnormal.    Electronically signed by resident: Brad Espitia  Date:    06/27/2022  Time:    08:16    Electronically signed by: Venkat  "Zaina  Date:    06/27/2022  Time:    08:41    Debility/Functional status: No debility noted.    Assessment/Plan:     * Sepsis  - Pt presented with fever, tachycardia and hypotension despite cefpodoxime  - Hypotension improving with IVF  - Likely 2/2 biliary source  - PTC drain to gravity and SHRUTHI x 2 in place (R SHRUTHI d/c'd 7/19)  - blood cultures NGTD, CXR, UA and CT a/p reviewed  - Cont Vanc/cefepime and continue diflucan  - IR consult for cholangiogram with tube exchange/upsize 7/20/22  - PTC clamped 7/21  - ID consulted 7/21- yeast in blood- changed fluc to Prudence and Vanc/cefepime to Unasyn, apprec ID recs      Fungemia  - Blood cx 7/18 growing yeast, ID consulted, changed Fluc to Prudence 7/21  - no plan for further w/u at this time  - pt denies change in vision, no plan for Ophthalmology      Bile leak  - s/p PTC (internalized) on 7/13/22  - PTC to gravity with bilious output. SHRUTHI x 2 with scant output no longer draining per patient  - Cont flushing PTC  - IR consulted for cholangiogram with tube exchange 7/20/22  - clamped PTC 7/21/22 and cont to flush   - See "sepsis"      Long-term use of immunosuppressant medication  - see "prophylactic immunotherapy"      Type 2 diabetes mellitus with hyperglycemia  - cont sliding scale insulin      At risk for opportunistic infections  - cont OI prophylaxis per protocol  - CMV PCR 7/19 not detected    Prophylactic immunotherapy  - continue prograf and steroid taper per protocol  - will check daily prograf levels, monitor for toxic side effects, and adjust for therapeutic dose  - cellcept on hold for infection    S/P liver transplant  - chevron incision with staples in place - 15 days out from take back  - SHRUTHI x 2 with scant output - remove the right drain today  - Trend LFTs daily  - See "bile leak"  - PTC drain clamped 7/21/22  - cont to flush w 10cc NS twice daily    Anemia  - Chronic, likely multifactorial.  - continue to monitor    Hypercholesterolemia            VTE Risk " Mitigation (From admission, onward)         Ordered     heparin, porcine (PF) 100 unit/mL injection flush 500 Units  As needed (PRN)         07/20/22 1605     heparin (porcine) injection 5,000 Units  Every 8 hours         07/18/22 1100     IP VTE HIGH RISK PATIENT  Once         07/18/22 1100     Place sequential compression device  Until discontinued         07/18/22 1100                The patients clinical status was discussed at multidisplinary rounds, involving transplant surgery, transplant medicine, pharmacy, nursing, nutrition, and social work    Discharge Planning: Discussed plan of care.  No plan for discharge today.      Abida Arenas, NP  Liver Transplant  [facility] - Transplant Stepdown

## 2022-07-21 NOTE — CONSULTS
[facility] - Transplant Stepdown  Infectious Disease  Consult Note    Patient Name: Romeo Larson  MRN: [id number]  Admission Date: 7/18/2022  Hospital Length of Stay: 3 days  Attending Physician: Santi Godwin MD  Primary Care Provider: Pravin Maria MD     Isolation Status: No active isolations    Patient information was obtained from patient and past medical records.      Inpatient consult to Infectious Diseases  Consult performed by: Caio Brown MD  Consult ordered by: Abida Arenas NP  Reason for consult: fungemia        Assessment/Plan:     Fungemia  42 y/o M h/o cholangiocarcinoma s/p living 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 (vick-en-y biliary anastomosis, steroid induction, CMV D-R+, mmf/tacro/pred) post-op course notable for bile leak, s/p OR take back 6/23 for bile duct repair (small leak fond near biliary anastomosis), cultures grew E faecalis, s/p exalt 7/4 washout of biome, OR cultures with lactobacillus sp.  pt discharged with drain now readmitted for bilious drainage 7/12/22.  S/p PTC drain 7/13 (blood cultures negative 7/13) discharged 7/15/22 now presented 7/18 with fever s/p biliary catheter exchange 7/20/22. ID consulted for 7/18 blood cultures growing yeast  - suspect translocation in setting of drain malfunction now improved with catheter exchange  - transition to micafungin and f/u yeast  - stop cefepime stop vancomycin - not active against enterococcus or lactobacillus and transition to amp/sulb  - discussed with team, will follow        Thank you for your consult. I will follow-up with patient. Please contact us if you have any additional questions.    Caio Brown MD  Infectious Disease  [facility] - Transplant [facility]    Subjective:     Principal Problem: Sepsis    HPI: 42 y/o M h/o cholangiocarcinoma s/p living 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 (vick-en-y biliary anastomosis, steroid induction, CMV D-R+, mmf/tacro/pred) post-op course notable for bile leak, s/p OR take back 6/23 for bile duct  repair (small leak fond near biliary anastomosis), cultures grew E faecalis, s/p exalt 7/4 washout of biome, OR cultures with lactobacillus sp.  pt discharged with drain now readmitted for bilious drainage 7/12/22.  S/p PTC drain 7/13 (blood cultures negative 7/13) discharged 7/15/22 now presented 7/18 with fever s/p binary catheter exchange 7/20/22. 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 OR [address] [place] [address];  Service: Transplant;  Laterality: N/A;    ENDOSCOPIC ULTRASOUND OF UPPER GASTROINTESTINAL TRACT N/A 10/20/2021    Procedure: ULTRASOUND, UPPER GI TRACT, ENDOSCOPIC;  Surgeon: Valeriy Sotelo MD;  Location: [address]);  Service: Endoscopy;  Laterality: N/A;  wife to email vacc card-inst email-tb    ERCP N/A 10/20/2021    Procedure: ERCP (ENDOSCOPIC RETROGRADE CHOLANGIOPANCREATOGRAPHY);  Surgeon: Valeriy Sotelo MD;  Location: [address]);  Service: Endoscopy;  Laterality: N/A;    ERCP N/A 11/4/2021    Procedure: ERCP (ENDOSCOPIC RETROGRADE CHOLANGIOPANCREATOGRAPHY);  Surgeon: Valeriy Sotelo MD;  Location: [address]);  Service: Endoscopy;  Laterality: N/A;    ERCP N/A 11/4/2021    Procedure: ERCP (ENDOSCOPIC RETROGRADE CHOLANGIOPANCREATOGRAPHY);  Surgeon: Roselia Pereyra MD;  [facility]  Service: Endoscopy;  Laterality: N/A;  pt vaccinated, instructed to bring card to procedure, instructions sent portal-MG    ERCP N/A 1/14/2022    Procedure: ERCP (ENDOSCOPIC RETROGRADE CHOLANGIOPANCREATOGRAPHY);  Surgeon:  Roselia Pereyra MD;  Location: [facility] ENDO (2ND FLR);  Service: Endoscopy;  Laterality: N/A;  inst portal-right chest port-tb  Pt requested at home COVID testing, Pt instructed at home RAPID to be done morning of procedure, Pt instructed to call endoscopy scheuding if there is a positive result, Pt informed if a positive     ERCP N/A 3/31/2022    Procedure: ERCP (ENDOSCOPIC RETROGRADE CHOLANGIOPANCREATOGRAPHY);  Surgeon: Julio Cesar Alonzo MD;  Location: [facility] ENDO (2ND FLR);  Service: Endoscopy;  Laterality: N/A;  3/16: port L chest wall. pt to bring covid vaccination card. Instructions sent via portal.-SC  3/18/22-Updated instructions sent via portal re:new date/time-DS    EXPLORATORY LAPAROTOMY AFTER LIVER TRANSPLANTATION N/A 6/23/2022    Procedure: LAPAROTOMY, EXPLORATORY, AFTER LIVER TRANSPLANT;  Surgeon: Julio Cesar Jara MD;  [facility];  Service: Transplant;  Laterality: N/A;    INSERTION OF TUNNELED CENTRAL VENOUS CATHETER (CVC) WITH SUBCUTANEOUS PORT N/A 11/24/2021    Procedure: INSERTION, PORT-A-CATH;  Surgeon: Prem Syed MD;  Location: [facility] CATH LAB;  Service: Radiology;  Laterality: N/A;    LIVER TRANSPLANT N/A 6/21/2022    Procedure: TRANSPLANT, LIVER;  Surgeon: Sinan Cline MD;  [facility];  Service: Transplant;  Laterality: N/A;    REPAIR OF BILE DUCT N/A 6/23/2022    Procedure: REPAIR, BILE DUCT;  Surgeon: Julio Cesar Jara MD;  [facility];  Service: Transplant;  Laterality: N/A;    ROBOT-ASSISTED LAPAROSCOPIC LYMPHADENECTOMY USING DA МАРИНА XI  6/17/2022    Procedure: XI ROBOTIC LYMPHADENECTOMY - Portal;  Surgeon: Julio Cesar Jara MD;  [facility];  Service: Transplant;;    TONSILLECTOMY      XI ROBOTIC LAPAROSCOPY, EXPLORATORY N/A 6/17/2022    Procedure: XI ROBOTIC LAPAROSCOPY,EXPLORATORY;  Surgeon: Julio Cesar Jara MD;  [facility];  Service: Transplant;  Laterality: N/A;       Review of patient's allergies indicates:  No Known  "Allergies    Medications:  Medications Prior to Admission   Medication Sig    aspirin 81 MG Chew CHEW 1 tablet (81 mg total) by mouth once daily.    blood sugar diagnostic Strp 1 each by Misc.(Non-Drug; Combo Route) route 3 (three) times daily.    blood-glucose meter Misc Use as instructed    calcium carbonate-vitamin D3 600 mg-20 mcg (800 unit) Tab Take 1 tablet by mouth once daily at 6am.    carvediloL (COREG) 3.125 MG tablet Take 1 tablet (3.125 mg total) by mouth 2 (two) times daily.    cefpodoxime (VANTIN) 200 MG tablet Take 1 tablet (200 mg total) by mouth every 12 (twelve) hours. for 11 doses    fluconazole (DIFLUCAN) 200 MG Tab Take 1 tablet (200 mg total) by mouth once daily. STOP 7/20/22    insulin aspart U-100 (NOVOLOG) 100 unit/mL (3 mL) InPn pen Inject 5 Units into the skin 3 (three) times daily with meals. + sliding scale.  MDD 30 units/d    lancets 30 gauge Misc 1 each by Misc.(Non-Drug; Combo Route) route 3 (three) times daily.    methocarbamoL (ROBAXIN) 500 MG Tab Take 1 tablet (500 mg total) by mouth 4 (four) times daily.    mirtazapine (REMERON) 15 MG tablet Take 1 tablet (15 mg total) by mouth nightly.    oxyCODONE (ROXICODONE) 10 mg Tab immediate release tablet Take 1-1.5 tablets (10-15 mg total) by mouth every 4 (four) hours as needed for Pain.    pantoprazole (PROTONIX) 40 MG tablet Take 1 tablet (40 mg total) by mouth once daily.    pen needle, diabetic 32 gauge x 5/32" Ndle 1 each by Misc.(Non-Drug; Combo Route) route 3 (three) times daily.    predniSONE (DELTASONE) 5 MG tablet Take by mouth once daily: 20 mg 6/27-7/3; 15 mg 7/4-7/10; 10 mg 7/11-7/17; 5 mg 7/18-7/24; STOP 7/25/22    tacrolimus (PROGRAF) 1 MG Cap Take 1 capsule (1 mg total) by mouth every 12 (twelve) hours.    tamsulosin (FLOMAX) 0.4 mg Cap Take 2 capsules (0.8 mg total) by mouth once daily.    valGANciclovir (VALCYTE) 450 mg Tab Take 1 tablet (450 mg total) by mouth once daily. STOP 9/19/22     Antibiotics " "(From admission, onward)                Start     Stop Route Frequency Ordered    07/21/22 1430  ampicillin-sulbactam 3 g in sodium chloride 0.9 % 100 mL IVPB (ready to mix system)         -- IV Every 6 hours (non-standard times) 07/21/22 1323    07/18/22 1135  vancomycin - pharmacy to dose  (vancomycin IVPB)        "And" Linked Group Details    -- IV pharmacy to manage frequency 07/18/22 1035          Antifungals (From admission, onward)                Start     Stop Route Frequency Ordered    07/21/22 1030  micafungin 100 mg in sodium chloride 0.9 % 100 mL IVPB (ready to mix system)         -- IV Every 24 hours (non-standard times) 07/21/22 0943          Antivirals (From admission, onward)          Stop Route Frequency     valGANciclovir         -- Oral Daily             Immunization History   Administered Date(s) Administered    COVID-19, MRNA, LN-S, PF (MODERNA FULL 0.5 ML DOSE) 07/17/2021, 08/14/2021    Hepatitis B (recombinant) Adjuvanted, 2 dose 06/15/2022    Pneumococcal Conjugate - 13 Valent 06/15/2022    Tdap 06/15/2022    Zoster Recombinant 06/15/2022       Family History       Problem Relation (Age of Onset)    Hyperlipidemia Father    No Known Problems Mother, Sister, Brother          Social History     Socioeconomic History    Marital status:    Tobacco Use    Smoking status: Never Smoker    Smokeless tobacco: Never Used   Substance and Sexual Activity    Alcohol use: Not Currently    Drug use: Never    Sexual activity: Yes     Review of Systems   Constitutional:  Positive for fatigue and fever. Negative for activity change, appetite change and chills.   HENT:  Negative for congestion, dental problem, mouth sores and sinus pressure.    Eyes:  Negative for pain, redness and visual disturbance.   Respiratory:  Negative for cough, shortness of breath and wheezing.    Cardiovascular:  Negative for chest pain and leg swelling.   Gastrointestinal:  Negative for abdominal distention, " abdominal pain, diarrhea, nausea and vomiting.   Endocrine: Negative for polyuria.   Genitourinary:  Negative for decreased urine volume, dysuria and frequency.   Musculoskeletal:  Negative for joint swelling.   Skin:  Negative for color change.   Allergic/Immunologic: Negative for food allergies.   Neurological:  Negative for dizziness, weakness and headaches.   Hematological:  Negative for adenopathy.   Psychiatric/Behavioral:  Negative for agitation and confusion. The patient is not nervous/anxious.    Objective:     Vital Signs (Most Recent):  Temp: 99.4 °F (37.4 °C) (07/21/22 1553)  Pulse: 90 (07/21/22 1553)  Resp: 18 (07/21/22 1553)  BP: 124/81 (07/21/22 1553)  SpO2: 96 % (07/21/22 1553) Vital Signs (24h Range):  Temp:  [97.5 °F (36.4 °C)-100.6 °F (38.1 °C)] 99.4 °F (37.4 °C)  Pulse:  [79-98] 90  Resp:  [18] 18  SpO2:  [96 %-98 %] 96 %  BP: (113-149)/(70-95) 124/81     Weight: 73.2 kg (161 lb 6 oz)  Body mass index is 24.54 kg/m².    Estimated Creatinine Clearance: 115.2 mL/min (based on SCr of 0.8 mg/dL).    Physical Exam  Constitutional:       Appearance: He is well-developed.   HENT:      Head: Normocephalic and atraumatic.   Eyes:      Conjunctiva/sclera: Conjunctivae normal.   Cardiovascular:      Rate and Rhythm: Normal rate and regular rhythm.      Heart sounds: Normal heart sounds. No murmur heard.  Pulmonary:      Effort: Pulmonary effort is normal. No respiratory distress.      Breath sounds: Normal breath sounds. No wheezing.   Abdominal:      General: Bowel sounds are normal. There is no distension.      Palpations: Abdomen is soft.      Tenderness: There is no abdominal tenderness.      Comments: Cheveron c/d/I, R PTC in place   Musculoskeletal:         General: No tenderness. Normal range of motion.      Cervical back: Neck supple.   Lymphadenopathy:      Cervical: No cervical adenopathy.   Skin:     General: Skin is warm and dry.      Findings: No rash.   Neurological:      Mental Status: He is  alert and oriented to person, place, and time.      Coordination: Coordination normal.   Psychiatric:         Behavior: Behavior normal.       Significant Labs: CBC:   Recent Labs   Lab 07/20/22  0413 07/21/22  0446   WBC 3.04* 4.92   HGB 7.6* 9.1*   HCT 24.0* 28.5*   PLT 85* 118*     CMP:   Recent Labs   Lab 07/20/22 0413 07/21/22 0446   * 132*   K 3.8 4.0   CL 96 97   CO2 27 25   * 160*   BUN 11 8   CREATININE 0.8 0.8   CALCIUM 8.8 9.4   PROT 5.6* 6.2   ALBUMIN 2.9* 3.0*   BILITOT 0.7 0.8   ALKPHOS 216* 284*   AST 19 20   ALT 52* 49*   ANIONGAP 8 10   EGFRNONAA >60.0 >60.0       Significant Imaging: I have reviewed all pertinent imaging results/findings within the past 24 hours.

## 2022-07-21 NOTE — PROGRESS NOTES
AAOx4. VSS. T-max 99.4. Patient up ad pradeep - ambulates in room and bathroom independently. Mag 1.3 - IV mag replacements given x2. PRN oxy 10mg given x1 for c/o pain. Blood cultures 7/18 - (+) yeast - ID consulted - patient started on micafungin IVPB. Repeat blood cultures done 7/20 - NGTD. Bili bag to R side - 80cc brown/green output noted - flushed w/ 10cc NS - bag removed and drain capped per verbal order from MANJEET Arenas NP. LLQ SHRUTHI drain w/ minimal tan output noted - plan for removal per NP. Cefepime and vancomycin given IVPB - now d/c'd - patient to start on unasyn IVPB. Chevron incision - DEBBIE/staples - cleaned w/ betadine. Bed in low position. Call light within reach.

## 2022-07-21 NOTE — SUBJECTIVE & OBJECTIVE
Scheduled Meds:   ampicillin-sulbactim (UNASYN) IVPB  3 g Intravenous Q6H    aspirin  81 mg Oral Daily    heparin (porcine)  5,000 Units Subcutaneous Q8H    methocarbamoL  500 mg Oral QID    micafungin (MYCAMINE) IVPB  100 mg Intravenous Q24H    mirtazapine  15 mg Oral Nightly    pantoprazole  40 mg Oral Daily    predniSONE  5 mg Oral Daily    tacrolimus  1 mg Oral BID    valGANciclovir  450 mg Oral Daily     Continuous Infusions:  PRN Meds:acetaminophen, dextrose 10%, dextrose 10%, glucagon (human recombinant), glucose, glucose, heparin, porcine (PF), insulin aspart U-100, melatonin, ondansetron, oxyCODONE, sodium chloride 0.9%, Pharmacy to dose Vancomycin consult **AND** vancomycin - pharmacy to dose    Review of Systems   Constitutional:  Negative for activity change, appetite change, chills, diaphoresis and fever.   HENT:  Negative for congestion, sinus pressure, sinus pain, sore throat and trouble swallowing.    Eyes:  Negative for visual disturbance.   Respiratory:  Negative for chest tightness, shortness of breath and stridor.    Cardiovascular:  Negative for chest pain, palpitations and leg swelling.   Gastrointestinal:  Negative for abdominal distention, abdominal pain, constipation, diarrhea, nausea and vomiting.   Genitourinary:  Negative for decreased urine volume, difficulty urinating, dysuria and flank pain.   Musculoskeletal:  Negative for neck pain and neck stiffness.   Skin:  Positive for wound. Negative for color change and rash.   Allergic/Immunologic: Positive for immunocompromised state.   Neurological:  Negative for dizziness, tremors, syncope, light-headedness and headaches.   Psychiatric/Behavioral:  Negative for agitation, confusion, decreased concentration and hallucinations. The patient is not nervous/anxious.    Objective:     Vital Signs (Most Recent):  Temp: 98.9 °F (37.2 °C) (07/21/22 1053)  Pulse: 79 (07/21/22 1448)  Resp: 18 (07/21/22 1053)  BP: 113/70 (07/21/22 1053)  SpO2: 96 %  (07/21/22 1053)   Vital Signs (24h Range):  Temp:  [97.5 °F (36.4 °C)-100.6 °F (38.1 °C)] 98.9 °F (37.2 °C)  Pulse:  [79-98] 79  Resp:  [18] 18  SpO2:  [96 %-98 %] 96 %  BP: (113-149)/(70-95) 113/70     Weight: 73.2 kg (161 lb 6 oz)  Body mass index is 24.54 kg/m².    Intake/Output - Last 3 Shifts         07/19 0700 07/20 0659 07/20 0700 07/21 0659 07/21 0700 07/22 0659    P.O. 1220 840 240    I.V. (mL/kg) 96.1 (1.2) 0 (0)     Other  0     IV Piggyback 647.6 349.3     Total Intake(mL/kg) 1963.7 (24.6) 1189.3 (16.2) 240 (3.3)    Urine (mL/kg/hr) 0 (0) 900 (0.5) 0 (0)    Emesis/NG output  0     Drains 290 280 80    Other  0     Stool 0 0     Blood  0     Total Output 290 1180 80    Net +1673.7 +9.3 +160           Urine Occurrence 5 x 2 x 2 x    Stool Occurrence 2 x 1 x     Emesis Occurrence  0 x             Physical Exam  Vitals and nursing note reviewed.   Constitutional:       General: He is not in acute distress.     Appearance: He is not diaphoretic.   HENT:      Head: Normocephalic and atraumatic.   Eyes:      General:         Right eye: No discharge.         Left eye: No discharge.   Cardiovascular:      Rate and Rhythm: Normal rate and regular rhythm.      Heart sounds: Normal heart sounds.   Pulmonary:      Effort: Pulmonary effort is normal.      Breath sounds: Normal breath sounds. No wheezing or rales.   Abdominal:      General: Bowel sounds are normal. There is no distension.      Palpations: Abdomen is soft.      Tenderness: There is no abdominal tenderness. There is no guarding.       Musculoskeletal:      Cervical back: Normal range of motion and neck supple.      Right lower leg: No edema.      Left lower leg: No edema.   Skin:     General: Skin is warm and dry.      Capillary Refill: Capillary refill takes 2 to 3 seconds.   Neurological:      Mental Status: He is alert and oriented to person, place, and time.      Cranial Nerves: No cranial nerve deficit.   Psychiatric:         Mood and Affect:  Mood normal.         Thought Content: Thought content normal.         Judgment: Judgment normal.       Laboratory:  Immunosuppressants           Stop Route Frequency     tacrolimus capsule 1 mg         -- Oral 2 times daily          CBC:   Recent Labs   Lab 07/21/22  0446   WBC 4.92   RBC 2.98*   HGB 9.1*   HCT 28.5*   *   MCV 96   MCH 30.5   MCHC 31.9*       CMP:   Recent Labs   Lab 07/21/22  0446   *   CALCIUM 9.4   ALBUMIN 3.0*   PROT 6.2   *   K 4.0   CO2 25   CL 97   BUN 8   CREATININE 0.8   ALKPHOS 284*   ALT 49*   AST 20   BILITOT 0.8       Coagulation: No results for input(s): PT, INR, APTT in the last 168 hours.  Labs within the past 24 hours have been reviewed.    Diagnostic Results:  US Liver Transplant Post:  Results for orders placed during the hospital encounter of 06/18/22    US Doppler Liver Transplant Post (xpd)    Narrative  EXAMINATION:  US DOPPLER LIVER TRANSPLANT POST (XPD)    CLINICAL HISTORY:  S/p LTX;    TECHNIQUE:  Limited abdominal ultrasound of the transplant liver with Doppler evaluation.  Color and spectral Doppler were performed.    COMPARISON:  Ultrasound 06/23/2022, 06/22/2022    FINDINGS:  Patient is status post orthotopic liver transplant of the right hepatic lobe on 06/21/2022.  The patient returned to the OR on 06/23/2022 for bile duct repair.    The liver demonstrates homogeneous echotexture.  No focal hepatic lesions are seen.    Small fluid collection posterior to the allograft measuring 2.1 x 1.7 x 2.0 cm.  Partially imaged surgical drain adjacent to the allograft.  Right pleural effusion.    The common duct is not dilated, measuring 4 mm.  No dilated intrahepatic radicles are seen.    Color flow and spectral waveform analysis was performed.    Main portal vein, anterior branch of the right portal vein, and posterior branch of the right portal vein are patent.    Middle hepatic vein velocity measures 17 cm/s (previously 12 cm/s).    Right hepatic vein  velocity measures 36 cm/s (previously 48 cm/s).    IVC piggyback velocity measures 196 cm/s (previously 62 cm/s).    Visualized main hepatic artery velocity measures 82 cm/s (previously 147 cm/s). Extrahepatic main hepatic artery is obscured by overlying bowel gas.    Main hepatic artery resistive index measures 0.69 (previously 0.63) with normal waveform.    Right hepatic artery anterior branch resistive index measures 0.59 (previously 0.63), with normal waveform.    Right hepatic artery posterior branch resistive index measures 0.61 (previously 0.67), with normal waveform.    Impression  Elevated velocities at the IVC anastomosis.  Attention on follow-up imaging is recommended.    Otherwise satisfactory Doppler evaluation of the liver allograft, noting that the extrahepatic MHA was obscured by bowel gas.    Small peritransplant fluid collection and right pleural effusion.    This report was flagged in Epic as abnormal.    Electronically signed by resident: Brad Espitia  Date:    06/27/2022  Time:    08:16    Electronically signed by: Venkat Mahajan  Date:    06/27/2022  Time:    08:41    Debility/Functional status: No debility noted.

## 2022-07-21 NOTE — NURSING
Notified OBED Cerrato that pt's temp was 100.6 tonight. Blood cx x2, ua with reflex to urine cx, chest xray ordered. Tylenol given. Will continue to monitor.     2300:Notified OBED Cerrato that pt's temp is 100.2 after tylenol that was given @1930. Pt feeling comfortable. No new orders from PA at this time.

## 2022-07-21 NOTE — ASSESSMENT & PLAN NOTE
"- s/p PTC (internalized) on 7/13/22  - PTC to gravity with bilious output. SHRUTHI x 2 with scant output no longer draining per patient  - Cont flushing PTC  - IR consulted for cholangiogram with tube exchange 7/20/22  - clamped PTC 7/21/22 and cont to flush   - See "sepsis"    "

## 2022-07-21 NOTE — HPI
42 y/o M h/o cholangiocarcinoma s/p living 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 (vick-en-y biliary anastomosis, steroid induction, CMV D-R+, mmf/tacro/pred) post-op course notable for bile leak, s/p OR take back 6/23 for bile duct repair (small leak fond near biliary anastomosis), cultures grew E faecalis, s/p exalt 7/4 washout of biome, OR cultures with lactobacillus sp.  pt discharged with drain now readmitted for bilious drainage 7/12/22.  S/p PTC drain 7/13 (blood cultures negative 7/13) discharged 7/15/22 now presented 7/18 with fever s/p binary catheter exchange 7/20/22. ID consulted for 7/18 blood cultures growing yeast

## 2022-07-21 NOTE — SUBJECTIVE & OBJECTIVE
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 OR [address] [place] [address];  Service: Transplant;  Laterality: N/A;    ENDOSCOPIC ULTRASOUND OF UPPER GASTROINTESTINAL TRACT N/A 10/20/2021    Procedure: ULTRASOUND, UPPER GI TRACT, ENDOSCOPIC;  Surgeon: Valeriy Sotelo MD;  [facility]  Service: Endoscopy;  Laterality: N/A;  wife to email vacc card-inst email-tb    ERCP N/A 10/20/2021    Procedure: ERCP (ENDOSCOPIC RETROGRADE CHOLANGIOPANCREATOGRAPHY);  Surgeon: Valeriy Sotelo MD;  [address]  Service: Endoscopy;  Laterality: N/A;    ERCP N/A 11/4/2021    Procedure: ERCP (ENDOSCOPIC RETROGRADE CHOLANGIOPANCREATOGRAPHY);  Surgeon: Valeriy Sotelo MD;  [address]  Service: Endoscopy;  Laterality: N/A;    ERCP N/A 11/4/2021    Procedure: ERCP (ENDOSCOPIC RETROGRADE CHOLANGIOPANCREATOGRAPHY);  Surgeon: Roselia Pereyra MD;  [facility]  Service: Endoscopy;  Laterality: N/A;  pt vaccinated, instructed to bring card to procedure, instructions sent portal-MG    ERCP N/A 1/14/2022    Procedure: ERCP (ENDOSCOPIC RETROGRADE CHOLANGIOPANCREATOGRAPHY);  Surgeon: Roselia Pereyra MD;  [facility]  Service: Endoscopy;  Laterality: N/A;  inst portal-right chest port-tb  Pt requested at home COVID testing, Pt instructed at home RAPID to be done morning of procedure, Pt instructed to call endoscopy scheuding if there is a positive result, Pt informed if a positive     ERCP N/A 3/31/2022    Procedure: ERCP (ENDOSCOPIC RETROGRADE CHOLANGIOPANCREATOGRAPHY);   Surgeon: Julio Cesar Alonzo MD;  Location: [facility] ENDO (2ND FLR);  Service: Endoscopy;  Laterality: N/A;  3/16: port L chest wall. pt to bring covid vaccination card. Instructions sent via portal.-SC  3/18/22-Updated instructions sent via portal re:new date/time-DS    EXPLORATORY LAPAROTOMY AFTER LIVER TRANSPLANTATION N/A 6/23/2022    Procedure: LAPAROTOMY, EXPLORATORY, AFTER LIVER TRANSPLANT;  Surgeon: Julio Cesar Jara MD;  Location: [facility] OR 2ND FLR;  Service: Transplant;  Laterality: N/A;    INSERTION OF TUNNELED CENTRAL VENOUS CATHETER (CVC) WITH SUBCUTANEOUS PORT N/A 11/24/2021    Procedure: INSERTION, PORT-A-CATH;  Surgeon: Prem Syed MD;  Location: [facility] CATH LAB;  Service: Radiology;  Laterality: N/A;    LIVER TRANSPLANT N/A 6/21/2022    Procedure: TRANSPLANT, LIVER;  Surgeon: Sinan Cline MD;  Location: [facility] OR [facility] FLR;  Service: Transplant;  Laterality: N/A;    REPAIR OF BILE DUCT N/A 6/23/2022    Procedure: REPAIR, BILE DUCT;  Surgeon: Julio Cesar Jara MD;  Location: [facility] OR [facility] FLR;  Service: Transplant;  Laterality: N/A;    ROBOT-ASSISTED LAPAROSCOPIC LYMPHADENECTOMY USING DA МАРИНА XI  6/17/2022    Procedure: XI ROBOTIC LYMPHADENECTOMY - Portal;  Surgeon: Julio Cesar Jara MD;  Location: [facility] OR 2ND FLR;  Service: Transplant;;    TONSILLECTOMY      XI ROBOTIC LAPAROSCOPY, EXPLORATORY N/A 6/17/2022    Procedure: XI ROBOTIC LAPAROSCOPY,EXPLORATORY;  Surgeon: Julio Cesar Jara MD;  Location: [facility] OR [facility] FLR;  Service: Transplant;  Laterality: N/A;       Review of patient's allergies indicates:  No Known Allergies    Medications:  Medications Prior to Admission   Medication Sig    aspirin 81 MG Chew CHEW 1 tablet (81 mg total) by mouth once daily.    blood sugar diagnostic Strp 1 each by Misc.(Non-Drug; Combo Route) route 3 (three) times daily.    blood-glucose meter Misc Use as instructed    calcium carbonate-vitamin D3 600 mg-20 mcg (800 unit) Tab Take 1 tablet by mouth once daily at 6am.    carvediloL (COREG) 3.125 MG  "tablet Take 1 tablet (3.125 mg total) by mouth 2 (two) times daily.    cefpodoxime (VANTIN) 200 MG tablet Take 1 tablet (200 mg total) by mouth every 12 (twelve) hours. for 11 doses    fluconazole (DIFLUCAN) 200 MG Tab Take 1 tablet (200 mg total) by mouth once daily. STOP 7/20/22    insulin aspart U-100 (NOVOLOG) 100 unit/mL (3 mL) InPn pen Inject 5 Units into the skin 3 (three) times daily with meals. + sliding scale.  MDD 30 units/d    lancets 30 gauge Misc 1 each by Misc.(Non-Drug; Combo Route) route 3 (three) times daily.    methocarbamoL (ROBAXIN) 500 MG Tab Take 1 tablet (500 mg total) by mouth 4 (four) times daily.    mirtazapine (REMERON) 15 MG tablet Take 1 tablet (15 mg total) by mouth nightly.    oxyCODONE (ROXICODONE) 10 mg Tab immediate release tablet Take 1-1.5 tablets (10-15 mg total) by mouth every 4 (four) hours as needed for Pain.    pantoprazole (PROTONIX) 40 MG tablet Take 1 tablet (40 mg total) by mouth once daily.    pen needle, diabetic 32 gauge x 5/32" Ndle 1 each by Misc.(Non-Drug; Combo Route) route 3 (three) times daily.    predniSONE (DELTASONE) 5 MG tablet Take by mouth once daily: 20 mg 6/27-7/3; 15 mg 7/4-7/10; 10 mg 7/11-7/17; 5 mg 7/18-7/24; STOP 7/25/22    tacrolimus (PROGRAF) 1 MG Cap Take 1 capsule (1 mg total) by mouth every 12 (twelve) hours.    tamsulosin (FLOMAX) 0.4 mg Cap Take 2 capsules (0.8 mg total) by mouth once daily.    valGANciclovir (VALCYTE) 450 mg Tab Take 1 tablet (450 mg total) by mouth once daily. STOP 9/19/22     Antibiotics (From admission, onward)                Start     Stop Route Frequency Ordered    07/21/22 1430  ampicillin-sulbactam 3 g in sodium chloride 0.9 % 100 mL IVPB (ready to mix system)         -- IV Every 6 hours (non-standard times) 07/21/22 1323    07/18/22 1135  vancomycin - pharmacy to dose  (vancomycin IVPB)        "And" Linked Group Details    -- IV pharmacy to manage frequency 07/18/22 1035          Antifungals (From admission, onward) "                Start     Stop Route Frequency Ordered    07/21/22 1030  micafungin 100 mg in sodium chloride 0.9 % 100 mL IVPB (ready to mix system)         -- IV Every 24 hours (non-standard times) 07/21/22 0943          Antivirals (From admission, onward)          Stop Route Frequency     valGANciclovir         -- Oral Daily             Immunization History   Administered Date(s) Administered    COVID-19, MRNA, LN-S, PF (MODERNA FULL 0.5 ML DOSE) 07/17/2021, 08/14/2021    Hepatitis B (recombinant) Adjuvanted, 2 dose 06/15/2022    Pneumococcal Conjugate - 13 Valent 06/15/2022    Tdap 06/15/2022    Zoster Recombinant 06/15/2022       Family History       Problem Relation (Age of Onset)    Hyperlipidemia Father    No Known Problems Mother, Sister, Brother          Social History     Socioeconomic History    Marital status:    Tobacco Use    Smoking status: Never Smoker    Smokeless tobacco: Never Used   Substance and Sexual Activity    Alcohol use: Not Currently    Drug use: Never    Sexual activity: Yes     Review of Systems   Constitutional:  Positive for fatigue and fever. Negative for activity change, appetite change and chills.   HENT:  Negative for congestion, dental problem, mouth sores and sinus pressure.    Eyes:  Negative for pain, redness and visual disturbance.   Respiratory:  Negative for cough, shortness of breath and wheezing.    Cardiovascular:  Negative for chest pain and leg swelling.   Gastrointestinal:  Negative for abdominal distention, abdominal pain, diarrhea, nausea and vomiting.   Endocrine: Negative for polyuria.   Genitourinary:  Negative for decreased urine volume, dysuria and frequency.   Musculoskeletal:  Negative for joint swelling.   Skin:  Negative for color change.   Allergic/Immunologic: Negative for food allergies.   Neurological:  Negative for dizziness, weakness and headaches.   Hematological:  Negative for adenopathy.   Psychiatric/Behavioral:  Negative for agitation and  confusion. The patient is not nervous/anxious.    Objective:     Vital Signs (Most Recent):  Temp: 99.4 °F (37.4 °C) (07/21/22 1553)  Pulse: 90 (07/21/22 1553)  Resp: 18 (07/21/22 1553)  BP: 124/81 (07/21/22 1553)  SpO2: 96 % (07/21/22 1553) Vital Signs (24h Range):  Temp:  [97.5 °F (36.4 °C)-100.6 °F (38.1 °C)] 99.4 °F (37.4 °C)  Pulse:  [79-98] 90  Resp:  [18] 18  SpO2:  [96 %-98 %] 96 %  BP: (113-149)/(70-95) 124/81     Weight: 73.2 kg (161 lb 6 oz)  Body mass index is 24.54 kg/m².    Estimated Creatinine Clearance: 115.2 mL/min (based on SCr of 0.8 mg/dL).    Physical Exam  Constitutional:       Appearance: He is well-developed.   HENT:      Head: Normocephalic and atraumatic.   Eyes:      Conjunctiva/sclera: Conjunctivae normal.   Cardiovascular:      Rate and Rhythm: Normal rate and regular rhythm.      Heart sounds: Normal heart sounds. No murmur heard.  Pulmonary:      Effort: Pulmonary effort is normal. No respiratory distress.      Breath sounds: Normal breath sounds. No wheezing.   Abdominal:      General: Bowel sounds are normal. There is no distension.      Palpations: Abdomen is soft.      Tenderness: There is no abdominal tenderness.      Comments: Cheveron c/d/I, R PTC in place   Musculoskeletal:         General: No tenderness. Normal range of motion.      Cervical back: Neck supple.   Lymphadenopathy:      Cervical: No cervical adenopathy.   Skin:     General: Skin is warm and dry.      Findings: No rash.   Neurological:      Mental Status: He is alert and oriented to person, place, and time.      Coordination: Coordination normal.   Psychiatric:         Behavior: Behavior normal.       Significant Labs: CBC:   Recent Labs   Lab 07/20/22 0413 07/21/22  0446   WBC 3.04* 4.92   HGB 7.6* 9.1*   HCT 24.0* 28.5*   PLT 85* 118*     CMP:   Recent Labs   Lab 07/20/22  0413 07/21/22  0446   * 132*   K 3.8 4.0   CL 96 97   CO2 27 25   * 160*   BUN 11 8   CREATININE 0.8 0.8   CALCIUM 8.8 9.4    PROT 5.6* 6.2   ALBUMIN 2.9* 3.0*   BILITOT 0.7 0.8   ALKPHOS 216* 284*   AST 19 20   ALT 52* 49*   ANIONGAP 8 10   EGFRNONAA >60.0 >60.0       Significant Imaging: I have reviewed all pertinent imaging results/findings within the past 24 hours.

## 2022-07-22 ENCOUNTER — PATIENT OUTREACH (OUTPATIENT)
Dept: ADMINISTRATIVE | Facility: HOSPITAL | Age: 42
End: 2022-07-22
Payer: COMMERCIAL

## 2022-07-22 ENCOUNTER — PATIENT MESSAGE (OUTPATIENT)
Dept: ADMINISTRATIVE | Facility: HOSPITAL | Age: 42
End: 2022-07-22
Payer: COMMERCIAL

## 2022-07-22 DIAGNOSIS — Z94.4 LIVER REPLACED BY TRANSPLANT: Primary | ICD-10-CM

## 2022-07-22 LAB
ALBUMIN SERPL BCP-MCNC: 2.7 G/DL (ref 3.5–5.2)
ALP SERPL-CCNC: 271 U/L (ref 55–135)
ALT SERPL W/O P-5'-P-CCNC: 60 U/L (ref 10–44)
ANION GAP SERPL CALC-SCNC: 8 MMOL/L (ref 8–16)
AST SERPL-CCNC: 42 U/L (ref 10–40)
BASOPHILS # BLD AUTO: 0.01 K/UL (ref 0–0.2)
BASOPHILS NFR BLD: 0.3 % (ref 0–1.9)
BILIRUB SERPL-MCNC: 0.7 MG/DL (ref 0.1–1)
BUN SERPL-MCNC: 9 MG/DL (ref 6–20)
CALCIUM SERPL-MCNC: 8.9 MG/DL (ref 8.7–10.5)
CHLORIDE SERPL-SCNC: 100 MMOL/L (ref 95–110)
CO2 SERPL-SCNC: 28 MMOL/L (ref 23–29)
CREAT SERPL-MCNC: 0.8 MG/DL (ref 0.5–1.4)
DIFFERENTIAL METHOD: ABNORMAL
EOSINOPHIL # BLD AUTO: 0 K/UL (ref 0–0.5)
EOSINOPHIL NFR BLD: 0.3 % (ref 0–8)
ERYTHROCYTE [DISTWIDTH] IN BLOOD BY AUTOMATED COUNT: 14 % (ref 11.5–14.5)
EST. GFR  (AFRICAN AMERICAN): >60 ML/MIN/1.73 M^2
EST. GFR  (NON AFRICAN AMERICAN): >60 ML/MIN/1.73 M^2
GLUCOSE SERPL-MCNC: 114 MG/DL (ref 70–110)
HCT VFR BLD AUTO: 28.7 % (ref 40–54)
HGB BLD-MCNC: 9.1 G/DL (ref 14–18)
IMM GRANULOCYTES # BLD AUTO: 0.02 K/UL (ref 0–0.04)
IMM GRANULOCYTES NFR BLD AUTO: 0.5 % (ref 0–0.5)
LYMPHOCYTES # BLD AUTO: 0.5 K/UL (ref 1–4.8)
LYMPHOCYTES NFR BLD: 11.3 % (ref 18–48)
MAGNESIUM SERPL-MCNC: 1.6 MG/DL (ref 1.6–2.6)
MCH RBC QN AUTO: 30.4 PG (ref 27–31)
MCHC RBC AUTO-ENTMCNC: 31.7 G/DL (ref 32–36)
MCV RBC AUTO: 96 FL (ref 82–98)
MONOCYTES # BLD AUTO: 0.2 K/UL (ref 0.3–1)
MONOCYTES NFR BLD: 6 % (ref 4–15)
NEUTROPHILS # BLD AUTO: 3.3 K/UL (ref 1.8–7.7)
NEUTROPHILS NFR BLD: 81.6 % (ref 38–73)
NRBC BLD-RTO: 0 /100 WBC
PHOSPHATE SERPL-MCNC: 4.2 MG/DL (ref 2.7–4.5)
PLATELET # BLD AUTO: 109 K/UL (ref 150–450)
PMV BLD AUTO: 9.7 FL (ref 9.2–12.9)
POCT GLUCOSE: 120 MG/DL (ref 70–110)
POCT GLUCOSE: 158 MG/DL (ref 70–110)
POCT GLUCOSE: 191 MG/DL (ref 70–110)
POTASSIUM SERPL-SCNC: 4.4 MMOL/L (ref 3.5–5.1)
PROT SERPL-MCNC: 5.9 G/DL (ref 6–8.4)
RBC # BLD AUTO: 2.99 M/UL (ref 4.6–6.2)
SODIUM SERPL-SCNC: 136 MMOL/L (ref 136–145)
TACROLIMUS BLD-MCNC: 6.9 NG/ML (ref 5–15)
WBC # BLD AUTO: 3.98 K/UL (ref 3.9–12.7)

## 2022-07-22 PROCEDURE — 99233 PR SUBSEQUENT HOSPITAL CARE,LEVL III: ICD-10-PCS | Mod: 24,,, | Performed by: NURSE PRACTITIONER

## 2022-07-22 PROCEDURE — 25000003 PHARM REV CODE 250: Performed by: NURSE PRACTITIONER

## 2022-07-22 PROCEDURE — 25000003 PHARM REV CODE 250: Performed by: PHYSICIAN ASSISTANT

## 2022-07-22 PROCEDURE — 99222 PR INITIAL HOSPITAL CARE,LEVL II: ICD-10-PCS | Mod: ,,, | Performed by: NURSE PRACTITIONER

## 2022-07-22 PROCEDURE — 63600175 PHARM REV CODE 636 W HCPCS: Performed by: TRANSPLANT SURGERY

## 2022-07-22 PROCEDURE — 63600175 PHARM REV CODE 636 W HCPCS: Mod: JG | Performed by: NURSE PRACTITIONER

## 2022-07-22 PROCEDURE — 83735 ASSAY OF MAGNESIUM: CPT | Performed by: PHYSICIAN ASSISTANT

## 2022-07-22 PROCEDURE — 80197 ASSAY OF TACROLIMUS: CPT | Performed by: PHYSICIAN ASSISTANT

## 2022-07-22 PROCEDURE — 25000003 PHARM REV CODE 250: Performed by: TRANSPLANT SURGERY

## 2022-07-22 PROCEDURE — 84100 ASSAY OF PHOSPHORUS: CPT | Performed by: PHYSICIAN ASSISTANT

## 2022-07-22 PROCEDURE — 99222 1ST HOSP IP/OBS MODERATE 55: CPT | Mod: ,,, | Performed by: NURSE PRACTITIONER

## 2022-07-22 PROCEDURE — 80053 COMPREHEN METABOLIC PANEL: CPT | Performed by: PHYSICIAN ASSISTANT

## 2022-07-22 PROCEDURE — 99233 PR SUBSEQUENT HOSPITAL CARE,LEVL III: ICD-10-PCS | Mod: ,,, | Performed by: INTERNAL MEDICINE

## 2022-07-22 PROCEDURE — 99233 SBSQ HOSP IP/OBS HIGH 50: CPT | Mod: 24,,, | Performed by: NURSE PRACTITIONER

## 2022-07-22 PROCEDURE — 36415 COLL VENOUS BLD VENIPUNCTURE: CPT | Performed by: PHYSICIAN ASSISTANT

## 2022-07-22 PROCEDURE — 20600001 HC STEP DOWN PRIVATE ROOM

## 2022-07-22 PROCEDURE — 99233 SBSQ HOSP IP/OBS HIGH 50: CPT | Mod: ,,, | Performed by: INTERNAL MEDICINE

## 2022-07-22 PROCEDURE — 85025 COMPLETE CBC W/AUTO DIFF WBC: CPT | Performed by: PHYSICIAN ASSISTANT

## 2022-07-22 PROCEDURE — 63600175 PHARM REV CODE 636 W HCPCS: Performed by: PHYSICIAN ASSISTANT

## 2022-07-22 RX ORDER — INSULIN ASPART 100 [IU]/ML
4 INJECTION, SOLUTION INTRAVENOUS; SUBCUTANEOUS
Status: DISCONTINUED | OUTPATIENT
Start: 2022-07-22 | End: 2022-07-25

## 2022-07-22 RX ORDER — AMOXICILLIN AND CLAVULANATE POTASSIUM 875; 125 MG/1; MG/1
1 TABLET, FILM COATED ORAL EVERY 12 HOURS
Qty: 24 TABLET | Refills: 0 | Status: SHIPPED | OUTPATIENT
Start: 2022-07-23 | End: 2022-08-02 | Stop reason: ALTCHOICE

## 2022-07-22 RX ORDER — SULFAMETHOXAZOLE AND TRIMETHOPRIM 400; 80 MG/1; MG/1
1 TABLET ORAL DAILY
Qty: 30 TABLET | Refills: 4 | Status: SHIPPED | OUTPATIENT
Start: 2022-07-23 | End: 2022-12-24

## 2022-07-22 RX ORDER — AMOXICILLIN AND CLAVULANATE POTASSIUM 875; 125 MG/1; MG/1
1 TABLET, FILM COATED ORAL EVERY 12 HOURS
Status: DISCONTINUED | OUTPATIENT
Start: 2022-07-23 | End: 2022-07-27 | Stop reason: HOSPADM

## 2022-07-22 RX ORDER — SULFAMETHOXAZOLE AND TRIMETHOPRIM 400; 80 MG/1; MG/1
1 TABLET ORAL DAILY
Status: DISCONTINUED | OUTPATIENT
Start: 2022-07-23 | End: 2022-07-27 | Stop reason: HOSPADM

## 2022-07-22 RX ORDER — TACROLIMUS 1 MG/1
1 CAPSULE ORAL ONCE
Status: COMPLETED | OUTPATIENT
Start: 2022-07-22 | End: 2022-07-22

## 2022-07-22 RX ORDER — LIDOCAINE HYDROCHLORIDE 10 MG/ML
1 INJECTION INFILTRATION; PERINEURAL ONCE
Status: COMPLETED | OUTPATIENT
Start: 2022-07-22 | End: 2022-07-22

## 2022-07-22 RX ORDER — TACROLIMUS 1 MG/1
2 CAPSULE ORAL 2 TIMES DAILY
Status: DISCONTINUED | OUTPATIENT
Start: 2022-07-22 | End: 2022-07-27 | Stop reason: HOSPADM

## 2022-07-22 RX ORDER — MAGNESIUM SULFATE HEPTAHYDRATE 40 MG/ML
2 INJECTION, SOLUTION INTRAVENOUS ONCE
Status: COMPLETED | OUTPATIENT
Start: 2022-07-22 | End: 2022-07-22

## 2022-07-22 RX ADMIN — HEPARIN SODIUM 5000 UNITS: 5000 INJECTION INTRAVENOUS; SUBCUTANEOUS at 02:07

## 2022-07-22 RX ADMIN — LIDOCAINE HYDROCHLORIDE 1 ML: 10 INJECTION, SOLUTION INFILTRATION; PERINEURAL at 07:07

## 2022-07-22 RX ADMIN — TACROLIMUS 1 MG: 1 CAPSULE ORAL at 02:07

## 2022-07-22 RX ADMIN — AMPICILLIN AND SULBACTAM 3 G: 2; 1 INJECTION, POWDER, FOR SOLUTION INTRAMUSCULAR; INTRAVENOUS at 09:07

## 2022-07-22 RX ADMIN — METHOCARBAMOL 500 MG: 500 TABLET ORAL at 09:07

## 2022-07-22 RX ADMIN — TACROLIMUS 1 MG: 1 CAPSULE ORAL at 07:07

## 2022-07-22 RX ADMIN — MICAFUNGIN SODIUM 100 MG: 100 INJECTION, POWDER, LYOPHILIZED, FOR SOLUTION INTRAVENOUS at 12:07

## 2022-07-22 RX ADMIN — PREDNISONE 5 MG: 5 TABLET ORAL at 09:07

## 2022-07-22 RX ADMIN — AMPICILLIN AND SULBACTAM 3 G: 2; 1 INJECTION, POWDER, FOR SOLUTION INTRAMUSCULAR; INTRAVENOUS at 01:07

## 2022-07-22 RX ADMIN — MIRTAZAPINE 15 MG: 15 TABLET, FILM COATED ORAL at 09:07

## 2022-07-22 RX ADMIN — HEPARIN SODIUM 5000 UNITS: 5000 INJECTION INTRAVENOUS; SUBCUTANEOUS at 09:07

## 2022-07-22 RX ADMIN — HEPARIN SODIUM 5000 UNITS: 5000 INJECTION INTRAVENOUS; SUBCUTANEOUS at 06:07

## 2022-07-22 RX ADMIN — MAGNESIUM SULFATE HEPTAHYDRATE 2 G: 40 INJECTION, SOLUTION INTRAVENOUS at 07:07

## 2022-07-22 RX ADMIN — PANTOPRAZOLE SODIUM 40 MG: 40 TABLET, DELAYED RELEASE ORAL at 09:07

## 2022-07-22 RX ADMIN — METHOCARBAMOL 500 MG: 500 TABLET ORAL at 06:07

## 2022-07-22 RX ADMIN — OXYCODONE HYDROCHLORIDE 10 MG: 10 TABLET ORAL at 12:07

## 2022-07-22 RX ADMIN — METHOCARBAMOL 500 MG: 500 TABLET ORAL at 12:07

## 2022-07-22 RX ADMIN — TACROLIMUS 2 MG: 1 CAPSULE ORAL at 06:07

## 2022-07-22 RX ADMIN — ASPIRIN 81 MG CHEWABLE TABLET 81 MG: 81 TABLET CHEWABLE at 09:07

## 2022-07-22 RX ADMIN — OXYCODONE HYDROCHLORIDE 10 MG: 10 TABLET ORAL at 07:07

## 2022-07-22 RX ADMIN — ACETAMINOPHEN 650 MG: 325 TABLET ORAL at 09:07

## 2022-07-22 RX ADMIN — VALGANCICLOVIR HYDROCHLORIDE 450 MG: 450 TABLET ORAL at 09:07

## 2022-07-22 RX ADMIN — AMPICILLIN AND SULBACTAM 3 G: 2; 1 INJECTION, POWDER, FOR SOLUTION INTRAMUSCULAR; INTRAVENOUS at 02:07

## 2022-07-22 NOTE — ASSESSMENT & PLAN NOTE
- Blood cx 7/18 growing yeast, ID consulted, changed Fluc to Prudence 7/21 tentative plan for 2 weeks  - no plan for further w/u at this time  - pt denies change in vision, no plan for Ophthalmology

## 2022-07-22 NOTE — SUBJECTIVE & OBJECTIVE
Interval History: feeling better today, wife at bedside    Review of Systems   Constitutional:  Positive for fatigue and fever. Negative for activity change, appetite change and chills.   HENT:  Negative for congestion, dental problem, mouth sores and sinus pressure.    Eyes:  Negative for pain, redness and visual disturbance.   Respiratory:  Negative for cough, shortness of breath and wheezing.    Cardiovascular:  Negative for chest pain and leg swelling.   Gastrointestinal:  Negative for abdominal distention, abdominal pain, diarrhea, nausea and vomiting.   Endocrine: Negative for polyuria.   Genitourinary:  Negative for decreased urine volume, dysuria and frequency.   Musculoskeletal:  Negative for joint swelling.   Skin:  Negative for color change.   Allergic/Immunologic: Negative for food allergies.   Neurological:  Negative for dizziness, weakness and headaches.   Hematological:  Negative for adenopathy.   Psychiatric/Behavioral:  Negative for agitation and confusion. The patient is not nervous/anxious.    Objective:     Vital Signs (Most Recent):  Temp: 99.1 °F (37.3 °C) (07/22/22 1215)  Pulse: 93 (07/22/22 1215)  Resp: 16 (07/22/22 1229)  BP: 130/80 (07/22/22 1215)  SpO2: 99 % (07/22/22 1215) Vital Signs (24h Range):  Temp:  [98 °F (36.7 °C)-99.4 °F (37.4 °C)] 99.1 °F (37.3 °C)  Pulse:  [79-93] 93  Resp:  [16-18] 16  SpO2:  [96 %-100 %] 99 %  BP: (122-130)/(75-88) 130/80     Weight: 73.1 kg (161 lb 3.2 oz)  Body mass index is 24.51 kg/m².    Estimated Creatinine Clearance: 115.2 mL/min (based on SCr of 0.8 mg/dL).    Physical Exam    Significant Labs: CBC:   Recent Labs   Lab 07/21/22  0446 07/22/22  0543   WBC 4.92 3.98   HGB 9.1* 9.1*   HCT 28.5* 28.7*   * 109*     CMP:   Recent Labs   Lab 07/21/22  0446 07/22/22  0543   * 136   K 4.0 4.4   CL 97 100   CO2 25 28   * 114*   BUN 8 9   CREATININE 0.8 0.8   CALCIUM 9.4 8.9   PROT 6.2 5.9*   ALBUMIN 3.0* 2.7*   BILITOT 0.8 0.7   ALKPHOS 284*  271*   AST 20 42*   ALT 49* 60*   ANIONGAP 10 8   EGFRNONAA >60.0 >60.0       Significant Imaging: I have reviewed all pertinent imaging results/findings within the past 24 hours.

## 2022-07-22 NOTE — ASSESSMENT & PLAN NOTE
Infection may elevate BG readings  On IV antibiotics  Managed per primary team  Avoid hypoglycemia

## 2022-07-22 NOTE — PROGRESS NOTES
Routine Dilated Eye Exam  (Diabetic Retinopathy screening)     Non-compliant report chart audits for Eye Exam for Patients with Diabetes     Outreach to patient in reference to a routine Eye Exam      Chart review completed - Care Everywhere and Media reports - updates requested and reviewed.        ACTIVE PORTAL MESSAGE SENT OR LETTER COMMUNICATION MAILED

## 2022-07-22 NOTE — SUBJECTIVE & OBJECTIVE
Interval HPI:   Overnight events: No acute events overnight. Patient on the TSU in room 79567/07626 A. Blood glucose stable. BG at goal on current insulin regimen (SSI and prandial insulin). Steroid use- Prednisone 5 mg.    Renal function- Normal   Vasopressors-  None       Diet Adult Regular (IDDSI Level 7)     Eatin%  Nausea: No  Hypoglycemia and intervention: No  Fever: No  TPN and/or TF: No    PMH, PSH, FH, SH updated and reviewed     ROS:  Review of Systems  Constitutional: Negative for weight changes.  Eyes: Negative for visual disturbance.  Respiratory: Negative for cough.   Cardiovascular: Negative for chest pain.  Gastrointestinal: Negative for nausea.  Endocrine: Negative for polyuria, polydipsia.  Musculoskeletal: Negative for back pain.  Skin: Negative for rash.  Neurological: Negative for syncope.  Psychiatric/Behavioral: Negative for depression.    Current Medications and/or Treatments Impacting Glycemic Control  Immunotherapy:    Immunosuppressants           Stop Route Frequency     tacrolimus capsule 2 mg         -- Oral 2 times daily          Steroids:   Hormones (From admission, onward)                Start     Stop Route Frequency Ordered    22 1158  melatonin tablet 9 mg         -- Oral Nightly PRN 22 1100    22 1145  predniSONE tablet 5 mg         -- Oral Daily 22 1034          Pressors:    Autonomic Drugs (From admission, onward)                None          Hyperglycemia/Diabetes Medications:   Antihyperglycemics (From admission, onward)                Start     Stop Route Frequency Ordered    22 1745  insulin aspart U-100 pen 4 Units         -- SubQ 3 times daily with meals 22 1642    22 1135  insulin aspart U-100 pen 0-5 Units         -- SubQ Before meals & nightly PRN 22 1035             PHYSICAL EXAMINATION:  Vitals:    22 1645   BP: 127/80   Pulse: 90   Resp: 18   Temp: 99.5 °F (37.5 °C)     Body mass index is 24.51  kg/m².    Physical Exam  Constitutional: Well developed, well nourished, NAD.  ENT: External ears no masses with nose patent; normal hearing.  Neck: Supple; trachea midline.  Cardiovascular: Normal heart sounds, no LE edema. DP +2 bilaterally.  Lungs: Normal effort; lungs anterior bilaterally clear to auscultation.  Abdomen: Soft, no masses, no hernias.  MS: No clubbing or cyanosis of nails noted; unable to assess gait.  Skin: No rashes, lesions, or ulcers; no nodules. Injection sites are ok. No lipo hypertropthy or atrophy.  Psychiatric: Good judgement and insight; normal mood and affect.  Neurological: Cranial nerves are grossly intact.   Foot: Nails in good condition, no amputations noted.

## 2022-07-22 NOTE — ASSESSMENT & PLAN NOTE
40 y/o M h/o cholangiocarcinoma s/p living 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 (vick-en-y biliary anastomosis, steroid induction, CMV D-R+, mmf/tacro/pred) post-op course notable for bile leak, s/p OR take back 6/23 for bile duct repair (small leak fond near biliary anastomosis), cultures grew E faecalis, s/p exalt 7/4 washout of biome, OR cultures with lactobacillus sp.  pt discharged with drain now readmitted for bilious drainage 7/12/22.  S/p PTC drain 7/13 (blood cultures negative 7/13) discharged 7/15/22 now presented 7/18 with fever s/p biliary catheter exchange 7/20/22. ID consulted for 7/18 blood cultures growing yeast  - suspect translocation in setting of drain malfunction now improved with catheter exchange  - transition to micafungin and f/u yeast   - continue amp/sulb for now, can likely transition to amox/clav upon discharge  - discussed with team, will follow

## 2022-07-22 NOTE — ASSESSMENT & PLAN NOTE
- Pt presented with fever, tachycardia and hypotension despite cefpodoxime  - Hypotension improving with IVF  - Likely 2/2 biliary source  - PTC drain to gravity and SHRUTHI x 2 in place (R SHRUTHI d/c'd 7/19)  - blood cultures NGTD, CXR, UA and CT a/p reviewed  - Cont Vanc/cefepime and continue diflucan  - IR consult for cholangiogram with tube exchange/upsize 7/20/22  - PTC clamped 7/21  - ID consulted 7/21- yeast in blood- changed fluc to Prudence 7/21.  Vanc/cefepime changed to Unasyn 7/21, apprec ID recs

## 2022-07-22 NOTE — SUBJECTIVE & OBJECTIVE
Scheduled Meds:   ampicillin-sulbactim (UNASYN) IVPB  3 g Intravenous Q6H    aspirin  81 mg Oral Daily    heparin (porcine)  5,000 Units Subcutaneous Q8H    methocarbamoL  500 mg Oral QID    micafungin (MYCAMINE) IVPB  100 mg Intravenous Q24H    mirtazapine  15 mg Oral Nightly    pantoprazole  40 mg Oral Daily    predniSONE  5 mg Oral Daily    tacrolimus  1 mg Oral BID    valGANciclovir  450 mg Oral Daily     Continuous Infusions:  PRN Meds:acetaminophen, dextrose 10%, dextrose 10%, glucagon (human recombinant), glucose, glucose, heparin, porcine (PF), insulin aspart U-100, melatonin, ondansetron, oxyCODONE, sodium chloride 0.9%    Review of Systems   Constitutional:  Negative for activity change, appetite change, chills, diaphoresis and fever.   HENT:  Negative for congestion, sinus pressure, sinus pain, sore throat and trouble swallowing.    Eyes:  Negative for visual disturbance.   Respiratory:  Negative for chest tightness, shortness of breath and stridor.    Cardiovascular:  Negative for chest pain, palpitations and leg swelling.   Gastrointestinal:  Negative for abdominal distention, abdominal pain, constipation, diarrhea, nausea and vomiting.   Genitourinary:  Negative for decreased urine volume, difficulty urinating, dysuria and flank pain.   Musculoskeletal:  Negative for neck pain and neck stiffness.   Skin:  Positive for wound. Negative for color change and rash.   Allergic/Immunologic: Positive for immunocompromised state.   Neurological:  Negative for dizziness, tremors, syncope, light-headedness and headaches.   Psychiatric/Behavioral:  Negative for agitation, confusion, decreased concentration and hallucinations. The patient is not nervous/anxious.    Objective:     Vital Signs (Most Recent):  Temp: 99.1 °F (37.3 °C) (07/22/22 1215)  Pulse: 93 (07/22/22 1215)  Resp: 16 (07/22/22 1229)  BP: 130/80 (07/22/22 1215)  SpO2: 99 % (07/22/22 1215)   Vital Signs (24h Range):  Temp:  [98 °F (36.7 °C)-99.4 °F  (37.4 °C)] 99.1 °F (37.3 °C)  Pulse:  [79-93] 93  Resp:  [16-18] 16  SpO2:  [96 %-100 %] 99 %  BP: (122-130)/(75-88) 130/80     Weight: 73.1 kg (161 lb 3.2 oz)  Body mass index is 24.51 kg/m².    Intake/Output - Last 3 Shifts         07/20 0700 07/21 0659 07/21 0700 07/22 0659 07/22 0700 07/23 0659    P.O. 840 1080     I.V. (mL/kg) 0 (0)      Other 0      IV Piggyback 349.3      Total Intake(mL/kg) 1189.3 (16.2) 1080 (14.8)     Urine (mL/kg/hr) 900 (0.5) 700 (0.4)     Emesis/NG output 0      Drains 280 80     Other 0      Stool 0      Blood 0      Total Output 1180 780     Net +9.3 +300            Urine Occurrence 2 x 3 x     Stool Occurrence 1 x  0 x    Emesis Occurrence 0 x              Physical Exam  Vitals and nursing note reviewed.   Constitutional:       General: He is not in acute distress.     Appearance: He is not diaphoretic.   HENT:      Head: Normocephalic and atraumatic.   Eyes:      General:         Right eye: No discharge.         Left eye: No discharge.   Cardiovascular:      Rate and Rhythm: Normal rate and regular rhythm.      Heart sounds: Normal heart sounds.   Pulmonary:      Effort: Pulmonary effort is normal.      Breath sounds: Normal breath sounds. No wheezing or rales.   Abdominal:      General: Bowel sounds are normal. There is no distension.      Palpations: Abdomen is soft.      Tenderness: There is no abdominal tenderness. There is no guarding.       Musculoskeletal:      Cervical back: Normal range of motion and neck supple.      Right lower leg: No edema.      Left lower leg: No edema.   Skin:     General: Skin is warm and dry.      Capillary Refill: Capillary refill takes 2 to 3 seconds.   Neurological:      Mental Status: He is alert and oriented to person, place, and time.      Cranial Nerves: No cranial nerve deficit.   Psychiatric:         Mood and Affect: Mood normal.         Behavior: Behavior normal.         Thought Content: Thought content normal.         Judgment:  Judgment normal.       Laboratory:  Immunosuppressants           Stop Route Frequency     tacrolimus capsule 1 mg         -- Oral 2 times daily          CBC:   Recent Labs   Lab 07/22/22  0543   WBC 3.98   RBC 2.99*   HGB 9.1*   HCT 28.7*   *   MCV 96   MCH 30.4   MCHC 31.7*       CMP:   Recent Labs   Lab 07/22/22  0543   *   CALCIUM 8.9   ALBUMIN 2.7*   PROT 5.9*      K 4.4   CO2 28      BUN 9   CREATININE 0.8   ALKPHOS 271*   ALT 60*   AST 42*   BILITOT 0.7       Coagulation: No results for input(s): PT, INR, APTT in the last 168 hours.  Labs within the past 24 hours have been reviewed.    Diagnostic Results:  US Liver Transplant Post:  Results for orders placed during the hospital encounter of 06/18/22    US Doppler Liver Transplant Post (xpd)    Narrative  EXAMINATION:  US DOPPLER LIVER TRANSPLANT POST (XPD)    CLINICAL HISTORY:  S/p LTX;    TECHNIQUE:  Limited abdominal ultrasound of the transplant liver with Doppler evaluation.  Color and spectral Doppler were performed.    COMPARISON:  Ultrasound 06/23/2022, 06/22/2022    FINDINGS:  Patient is status post orthotopic liver transplant of the right hepatic lobe on 06/21/2022.  The patient returned to the OR on 06/23/2022 for bile duct repair.    The liver demonstrates homogeneous echotexture.  No focal hepatic lesions are seen.    Small fluid collection posterior to the allograft measuring 2.1 x 1.7 x 2.0 cm.  Partially imaged surgical drain adjacent to the allograft.  Right pleural effusion.    The common duct is not dilated, measuring 4 mm.  No dilated intrahepatic radicles are seen.    Color flow and spectral waveform analysis was performed.    Main portal vein, anterior branch of the right portal vein, and posterior branch of the right portal vein are patent.    Middle hepatic vein velocity measures 17 cm/s (previously 12 cm/s).    Right hepatic vein velocity measures 36 cm/s (previously 48 cm/s).    IVC piggyback velocity measures  196 cm/s (previously 62 cm/s).    Visualized main hepatic artery velocity measures 82 cm/s (previously 147 cm/s). Extrahepatic main hepatic artery is obscured by overlying bowel gas.    Main hepatic artery resistive index measures 0.69 (previously 0.63) with normal waveform.    Right hepatic artery anterior branch resistive index measures 0.59 (previously 0.63), with normal waveform.    Right hepatic artery posterior branch resistive index measures 0.61 (previously 0.67), with normal waveform.    Impression  Elevated velocities at the IVC anastomosis.  Attention on follow-up imaging is recommended.    Otherwise satisfactory Doppler evaluation of the liver allograft, noting that the extrahepatic MHA was obscured by bowel gas.    Small peritransplant fluid collection and right pleural effusion.    This report was flagged in Epic as abnormal.    Electronically signed by resident: Brad Espitia  Date:    06/27/2022  Time:    08:16    Electronically signed by: Venkat Mahajan  Date:    06/27/2022  Time:    08:41    Debility/Functional status: No debility noted.

## 2022-07-22 NOTE — ASSESSMENT & PLAN NOTE
Endocrinology consulted for BG management.   BG goal 140-180      - Novolog (aspart) insulin 4 Units SQ TIDWM and prn for BG excursions LDC (150/50) SSI (20% reducation due to prandial BG below goal)   - BG checks AC/HS  - Hypoglycemia protocol in place  - If blood glucose greater than 300, please ask patient not to eat food or drink anything other than water until correctional insulin has brought it back below 250    ** Please notify Endocrine for any change and/or advance in diet**  ** Please call Endocrine for any BG related issues **    Discharge Planning:   TBD. Please notify endocrinology prior to discharge.

## 2022-07-22 NOTE — PROGRESS NOTES
[facility] - Transplant Stepdown  Infectious Disease  Progress Note    Patient Name: Romeo Larson  MRN: [id number]  Admission Date: 7/18/2022  Length of Stay: 4 days  Attending Physician: Santi Godwin MD  Primary Care Provider: Pravin Maria MD    Isolation Status: No active isolations  Assessment/Plan:      Fungemia  42 y/o M h/o cholangiocarcinoma s/p living 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 (vick-en-y biliary anastomosis, steroid induction, CMV D-R+, mmf/tacro/pred) post-op course notable for bile leak, s/p OR take back 6/23 for bile duct repair (small leak fond near biliary anastomosis), cultures grew E faecalis, s/p exalt 7/4 washout of biome, OR cultures with lactobacillus sp.  pt discharged with drain now readmitted for bilious drainage 7/12/22.  S/p PTC drain 7/13 (blood cultures negative 7/13) discharged 7/15/22 now presented 7/18 with fever s/p biliary catheter exchange 7/20/22. ID consulted for 7/18 blood cultures growing yeast  - suspect translocation in setting of drain malfunction now improved with catheter exchange  - transition to micafungin and f/u yeast   - continue amp/sulb for now, can likely transition to amox/clav upon discharge  - discussed with team, will follow        Anticipated Disposition: pending    Thank you for your consult. I will follow-up with patient. Please contact us if you have any additional questions.    Caio Brown MD  Infectious Disease  [facility] - Transplant Stepdown    Subjective:     Principal Problem:Sepsis    HPI: 42 y/o M h/o cholangiocarcinoma s/p living 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 (vick-en-y biliary anastomosis, steroid induction, CMV D-R+, mmf/tacro/pred) post-op course notable for bile leak, s/p OR take back 6/23 for bile duct repair (small leak fond near biliary anastomosis), cultures grew E faecalis, s/p exalt 7/4 washout of biome, OR cultures with lactobacillus sp.  pt discharged with drain now readmitted for bilious drainage 7/12/22.  S/p PTC drain 7/13 (blood cultures  negative 7/13) discharged 7/15/22 now presented 7/18 with fever s/p binary catheter exchange 7/20/22. ID consulted for 7/18 blood cultures growing yeast    Interval History: feeling better today, wife at bedside    Review of Systems   Constitutional:  Positive for fatigue and fever. Negative for activity change, appetite change and chills.   HENT:  Negative for congestion, dental problem, mouth sores and sinus pressure.    Eyes:  Negative for pain, redness and visual disturbance.   Respiratory:  Negative for cough, shortness of breath and wheezing.    Cardiovascular:  Negative for chest pain and leg swelling.   Gastrointestinal:  Negative for abdominal distention, abdominal pain, diarrhea, nausea and vomiting.   Endocrine: Negative for polyuria.   Genitourinary:  Negative for decreased urine volume, dysuria and frequency.   Musculoskeletal:  Negative for joint swelling.   Skin:  Negative for color change.   Allergic/Immunologic: Negative for food allergies.   Neurological:  Negative for dizziness, weakness and headaches.   Hematological:  Negative for adenopathy.   Psychiatric/Behavioral:  Negative for agitation and confusion. The patient is not nervous/anxious.    Objective:     Vital Signs (Most Recent):  Temp: 99.1 °F (37.3 °C) (07/22/22 1215)  Pulse: 93 (07/22/22 1215)  Resp: 16 (07/22/22 1229)  BP: 130/80 (07/22/22 1215)  SpO2: 99 % (07/22/22 1215) Vital Signs (24h Range):  Temp:  [98 °F (36.7 °C)-99.4 °F (37.4 °C)] 99.1 °F (37.3 °C)  Pulse:  [79-93] 93  Resp:  [16-18] 16  SpO2:  [96 %-100 %] 99 %  BP: (122-130)/(75-88) 130/80     Weight: 73.1 kg (161 lb 3.2 oz)  Body mass index is 24.51 kg/m².    Estimated Creatinine Clearance: 115.2 mL/min (based on SCr of 0.8 mg/dL).    Physical Exam    Significant Labs: CBC:   Recent Labs   Lab 07/21/22  0446 07/22/22  0543   WBC 4.92 3.98   HGB 9.1* 9.1*   HCT 28.5* 28.7*   * 109*     CMP:   Recent Labs   Lab 07/21/22  0446 07/22/22  0543   * 136   K 4.0 4.4    CL 97 100   CO2 25 28   * 114*   BUN 8 9   CREATININE 0.8 0.8   CALCIUM 9.4 8.9   PROT 6.2 5.9*   ALBUMIN 3.0* 2.7*   BILITOT 0.8 0.7   ALKPHOS 284* 271*   AST 20 42*   ALT 49* 60*   ANIONGAP 10 8   EGFRNONAA >60.0 >60.0       Significant Imaging: I have reviewed all pertinent imaging results/findings within the past 24 hours.

## 2022-07-22 NOTE — PROGRESS NOTES
[facility]  Liver Transplant  Progress Note    Patient Name: Romeo Larson  MRN: [id number]  Admission Date: 7/18/2022  Hospital Length of Stay: 4 days  Code Status: Full Code  Primary Care Provider: Pravin Maria MD  Post-Operative Day: 31    ORGAN:   RIGHT LIVER LOBE (SEGS 5,6,7,8) WITHOUT MIDDLE HEPATIC VEIN  Disease Etiology: Primary Liver Malignancy: Cholangiocarcinoma (CH-CA)  Donor Type:   Living  CDC High Risk:     Donor CMV Status:   Donor CMV Status:   Donor HBcAB:     Donor HCV Status:     Donor HBV GUSTABO:   Donor HCV GUSTABO:   Whole or Partial: Partial Liver  Biliary Anastomosis: Vick-en-Y  Arterial Anatomy: Standard  Subjective:     History of Present Illness:  Romeo Larson is a 41yr old male s/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 Post operative course significant for recurrent bile leak. OR take back 6/23 for bile duct repair (small leak found near biliary anastomosis, unable to clearly identify source), cultures grew enterococcus faecalis. OR take back 7/4 for ex lap, washout of biloma, bile duct unable to be assessed due to adhesions, OR cultures with lactobacillus species. Patient was discharged home with 1 drain in place. Patient presented for outpatient follow up, drain found to be bilious and he was readmitted 7/12/22. He was placed on BS IV ABX and  underwent PTC drain (internal/external) placement on 7/13 with bilious drainage output. Drain being flushed [id number] with sterile saline 30ml flushes. Decreased amount of drainage from left and right SHRUTHI drains prior to discharge. LFTs trended down. He was transitioned to PO cefpoxime and remained afebrile. He was discharged on 7/15/22.     Mr Larson now presents with fever 101 since early this morning. Denies any other symptoms. Reports minimal drainage from SHRUTHI drains and continued bilious drainage from PTC drain. He is flushing PTC drain as directed. Site c/d/i. Incision c/d/i. Denies  abdominal pain, nausea/ vomiting, chest pain, sob, diarrhea, and dysuria. Pt hypotensive and tachycardic on arrival to ED with BP 80/50s and . Febrile to 101.5. Blood pressure improved to 90/60s. He was give IV cefepime/vanc and LR bolus. Labs and imaging reviewed. Plan to admit for further management. Pt d/w Dr Godwin.          Interval History: no acute events overnight. PTC clamped 7/21. Pt having minimal bilious oozing at PTC drain site, denies increased pain. T max 99.4.  SHRUTHI drain w change in output. Drain removed 7/22/22. LFTs sl bump up, t bili 0.7. Pt w good renal function. Will push Prograf, goal 8-10. Pt had repeat cholangiogram w PTC upsize 7/20. ID following, abx changed to Unasyn 7/21. Ok to change Augmentin today for tentative 2 weeks. Blood cx fr 7/18 growing yeast. Fluc changed to Micafungin 7/21.  Tentative plan for 2 weeks of Prudence per R chest wall ana cristina cath. Electrolytes replaced. VSS. HHC ordered. Monitor.      Procedure Note:  Left SHRUTHI drain removed.  Site cleansed with betadine. Suture holding SHRUTHI drain removed.  Line removed without complication, no resistance.  SHRUTHI drain tip intact. Dressing applied and secured with paper tape.   Patient tolerated procedure w/o complications.        Scheduled Meds:   ampicillin-sulbactim (UNASYN) IVPB  3 g Intravenous Q6H    aspirin  81 mg Oral Daily    heparin (porcine)  5,000 Units Subcutaneous Q8H    methocarbamoL  500 mg Oral QID    micafungin (MYCAMINE) IVPB  100 mg Intravenous Q24H    mirtazapine  15 mg Oral Nightly    pantoprazole  40 mg Oral Daily    predniSONE  5 mg Oral Daily    tacrolimus  1 mg Oral BID    valGANciclovir  450 mg Oral Daily     Continuous Infusions:  PRN Meds:acetaminophen, dextrose 10%, dextrose 10%, glucagon (human recombinant), glucose, glucose, heparin, porcine (PF), insulin aspart U-100, melatonin, ondansetron, oxyCODONE, sodium chloride 0.9%    Review of Systems   Constitutional:  Negative for activity change,  appetite change, chills, diaphoresis and fever.   HENT:  Negative for congestion, sinus pressure, sinus pain, sore throat and trouble swallowing.    Eyes:  Negative for visual disturbance.   Respiratory:  Negative for chest tightness, shortness of breath and stridor.    Cardiovascular:  Negative for chest pain, palpitations and leg swelling.   Gastrointestinal:  Negative for abdominal distention, abdominal pain, constipation, diarrhea, nausea and vomiting.   Genitourinary:  Negative for decreased urine volume, difficulty urinating, dysuria and flank pain.   Musculoskeletal:  Negative for neck pain and neck stiffness.   Skin:  Positive for wound. Negative for color change and rash.   Allergic/Immunologic: Positive for immunocompromised state.   Neurological:  Negative for dizziness, tremors, syncope, light-headedness and headaches.   Psychiatric/Behavioral:  Negative for agitation, confusion, decreased concentration and hallucinations. The patient is not nervous/anxious.    Objective:     Vital Signs (Most Recent):  Temp: 99.1 °F (37.3 °C) (07/22/22 1215)  Pulse: 93 (07/22/22 1215)  Resp: 16 (07/22/22 1229)  BP: 130/80 (07/22/22 1215)  SpO2: 99 % (07/22/22 1215)   Vital Signs (24h Range):  Temp:  [98 °F (36.7 °C)-99.4 °F (37.4 °C)] 99.1 °F (37.3 °C)  Pulse:  [79-93] 93  Resp:  [16-18] 16  SpO2:  [96 %-100 %] 99 %  BP: (122-130)/(75-88) 130/80     Weight: 73.1 kg (161 lb 3.2 oz)  Body mass index is 24.51 kg/m².    Intake/Output - Last 3 Shifts         07/20 0700 07/21 0659 07/21 0700 07/22 0659 07/22 0700 07/23 0659    P.O. 840 1080     I.V. (mL/kg) 0 (0)      Other 0      IV Piggyback 349.3      Total Intake(mL/kg) 1189.3 (16.2) 1080 (14.8)     Urine (mL/kg/hr) 900 (0.5) 700 (0.4)     Emesis/NG output 0      Drains 280 80     Other 0      Stool 0      Blood 0      Total Output 1180 780     Net +9.3 +300            Urine Occurrence 2 x 3 x     Stool Occurrence 1 x  0 x    Emesis Occurrence 0 x              Physical  Exam  Vitals and nursing note reviewed.   Constitutional:       General: He is not in acute distress.     Appearance: He is not diaphoretic.   HENT:      Head: Normocephalic and atraumatic.   Eyes:      General:         Right eye: No discharge.         Left eye: No discharge.   Cardiovascular:      Rate and Rhythm: Normal rate and regular rhythm.      Heart sounds: Normal heart sounds.   Pulmonary:      Effort: Pulmonary effort is normal.      Breath sounds: Normal breath sounds. No wheezing or rales.   Abdominal:      General: Bowel sounds are normal. There is no distension.      Palpations: Abdomen is soft.      Tenderness: There is no abdominal tenderness. There is no guarding.       Musculoskeletal:      Cervical back: Normal range of motion and neck supple.      Right lower leg: No edema.      Left lower leg: No edema.   Skin:     General: Skin is warm and dry.      Capillary Refill: Capillary refill takes 2 to 3 seconds.   Neurological:      Mental Status: He is alert and oriented to person, place, and time.      Cranial Nerves: No cranial nerve deficit.   Psychiatric:         Mood and Affect: Mood normal.         Behavior: Behavior normal.         Thought Content: Thought content normal.         Judgment: Judgment normal.       Laboratory:  Immunosuppressants           Stop Route Frequency     tacrolimus capsule 1 mg         -- Oral 2 times daily          CBC:   Recent Labs   Lab 07/22/22  0543   WBC 3.98   RBC 2.99*   HGB 9.1*   HCT 28.7*   *   MCV 96   MCH 30.4   MCHC 31.7*       CMP:   Recent Labs   Lab 07/22/22  0543   *   CALCIUM 8.9   ALBUMIN 2.7*   PROT 5.9*      K 4.4   CO2 28      BUN 9   CREATININE 0.8   ALKPHOS 271*   ALT 60*   AST 42*   BILITOT 0.7       Coagulation: No results for input(s): PT, INR, APTT in the last 168 hours.  Labs within the past 24 hours have been reviewed.    Diagnostic Results:  US Liver Transplant Post:  Results for orders placed during the  hospital encounter of 06/18/22    US Doppler Liver Transplant Post (xpd)    Narrative  EXAMINATION:  US DOPPLER LIVER TRANSPLANT POST (XPD)    CLINICAL HISTORY:  S/p LTX;    TECHNIQUE:  Limited abdominal ultrasound of the transplant liver with Doppler evaluation.  Color and spectral Doppler were performed.    COMPARISON:  Ultrasound 06/23/2022, 06/22/2022    FINDINGS:  Patient is status post orthotopic liver transplant of the right hepatic lobe on 06/21/2022.  The patient returned to the OR on 06/23/2022 for bile duct repair.    The liver demonstrates homogeneous echotexture.  No focal hepatic lesions are seen.    Small fluid collection posterior to the allograft measuring 2.1 x 1.7 x 2.0 cm.  Partially imaged surgical drain adjacent to the allograft.  Right pleural effusion.    The common duct is not dilated, measuring 4 mm.  No dilated intrahepatic radicles are seen.    Color flow and spectral waveform analysis was performed.    Main portal vein, anterior branch of the right portal vein, and posterior branch of the right portal vein are patent.    Middle hepatic vein velocity measures 17 cm/s (previously 12 cm/s).    Right hepatic vein velocity measures 36 cm/s (previously 48 cm/s).    IVC piggyback velocity measures 196 cm/s (previously 62 cm/s).    Visualized main hepatic artery velocity measures 82 cm/s (previously 147 cm/s). Extrahepatic main hepatic artery is obscured by overlying bowel gas.    Main hepatic artery resistive index measures 0.69 (previously 0.63) with normal waveform.    Right hepatic artery anterior branch resistive index measures 0.59 (previously 0.63), with normal waveform.    Right hepatic artery posterior branch resistive index measures 0.61 (previously 0.67), with normal waveform.    Impression  Elevated velocities at the IVC anastomosis.  Attention on follow-up imaging is recommended.    Otherwise satisfactory Doppler evaluation of the liver allograft, noting that the extrahepatic MHA  "was obscured by bowel gas.    Small peritransplant fluid collection and right pleural effusion.    This report was flagged in Epic as abnormal.    Electronically signed by resident: Brad Espitia  Date:    06/27/2022  Time:    08:16    Electronically signed by: Venkat Mahajan  Date:    06/27/2022  Time:    08:41    Debility/Functional status: No debility noted.    Assessment/Plan:     * Sepsis  - Pt presented with fever, tachycardia and hypotension despite cefpodoxime  - Hypotension improving with IVF  - Likely 2/2 biliary source  - PTC drain to gravity and SHRUTHI x 2 in place (R SHRUTHI d/c'd 7/19)  - blood cultures NGTD, CXR, UA and CT a/p reviewed  - Cont Vanc/cefepime and continue diflucan  - IR consult for cholangiogram with tube exchange/upsize 7/20/22  - PTC clamped 7/21  - ID consulted 7/21- yeast in blood- changed fluc to Prudence 7/21.  Vanc/cefepime changed to Unasyn 7/21, apprec ID recs      Intra-abdominal infection  - see sepsis      Fungemia  - Blood cx 7/18 growing yeast, ID consulted, changed Fluc to Prudence 7/21 tentative plan for 2 weeks  - no plan for further w/u at this time  - pt denies change in vision, no plan for Ophthalmology      Bile leak  - s/p PTC (internalized) on 7/13/22  - PTC to gravity with bilious output. SHRUTHI x 2 with scant output no longer draining per patient  - Cont flushing PTC  - IR consulted for cholangiogram with tube exchange 7/20/22  - clamped PTC 7/21/22 and cont to flush   - See "sepsis"      Long-term use of immunosuppressant medication  - see "prophylactic immunotherapy"      Type 2 diabetes mellitus with hyperglycemia  - cont sliding scale insulin      At risk for opportunistic infections  - cont OI prophylaxis per protocol  - CMV PCR 7/19 not detected    Prophylactic immunotherapy  - continue prograf and steroid taper per protocol  - will check daily prograf levels, monitor for toxic side effects, and adjust for therapeutic dose  - cellcept on hold for infection    Liver " "transplant recipient  - chevron incision with staples in place - 15 days out from take back  - SHRUTHI x 2 with scant output - remove the right drain today  - Trend LFTs daily  - See "bile leak"  - PTC drain clamped 7/21/22  - cont to flush w 10cc NS twice daily    Anemia  - Chronic, likely multifactorial.  - continue to monitor    Hypercholesterolemia            VTE Risk Mitigation (From admission, onward)         Ordered     heparin, porcine (PF) 100 unit/mL injection flush 500 Units  As needed (PRN)         07/20/22 1605     heparin (porcine) injection 5,000 Units  Every 8 hours         07/18/22 1100     IP VTE HIGH RISK PATIENT  Once         07/18/22 1100     Place sequential compression device  Until discontinued         07/18/22 1100                The patients clinical status was discussed at multidisplinary rounds, involving transplant surgery, transplant medicine, pharmacy, nursing, nutrition, and social work    Discharge Planning: Discussed plan of care.  No plan for discharge today.    PharmD Review: fluconazole stops 7/25, monitor and adjust tac as needed [7/11/2022 sfa]      Abida Arenas NP  Liver Transplant  [facility]  "

## 2022-07-22 NOTE — PROGRESS NOTES
Weekend Discharge Note:     presented to patient bedside to discuss discharge plans, as well as assess any concerns or needs. Patient was alert and oriented x4, calm and communicative. Patient's wife is currently at bedside. Patient reports mild disappointment of current recovery and readmission to hospital, however is coping adequately with support from family. Patient reports his birthday is Monday and he is hoping to be home. Patient may potentially discharge over the weekend with spouse transporting patient home. Patient has needs for home infusion services for IV antibiotics and home health for port care. [facility] contacted [facility] with [facility] per patient choice. [facility] referral sent and patient will have teaching at bedside. [facility] with [facility] notes they will need to be contacted the day of discharge for delivery at [phone]. [facility] also [facility]. Patient to be set up with services upon first availibility. Patient reports agreeing with the discharge plan and has no psychosocial concerns. Patient and caregiver verbalize understanding and are involved in treatment planning and discharge process. Patient nor caregiver had any further concerns or questions at this time. [facility] remains available and will continue to follow, providing psychosocial support, education and assistance as needed.

## 2022-07-22 NOTE — PLAN OF CARE
Patient remaining free from injury and ambulating in room with steady gait.  Patient reports pain well controlled.  R side bili tube clamped.  Thick yellow drainage noted and dressing changed x 2.  Patient reports drainage slowed down this afternoon.  Patient t max 99.5.  Cheveron with staples healing with no s/s of infection.  Patient taught about home ABX and hope to dc with home health tomorrow.

## 2022-07-22 NOTE — CONSULTS
[facility] - [facility]plant [facility]  Endocrinology  Diabetes Consult Note    Consult Requested by: Santi Godwin MD   Reason for admit: Sepsis    HISTORY OF PRESENT ILLNESS:  Reason for Consult: Management of T2DM, Hyperglycemia      Surgical Procedure and Date: Liver Transplant 2022     Diabetes diagnosis year:      Home Diabetes Medications:  Novolog 5 units TID with meals + SSI LDC (150/50)     How often checking glucose at home?  3-4x daily    BG readings on regimen: 100-200's   Hypoglycemia on the regimen?  No  Missed doses on regimen?  No     Diabetes Complications include:     Hyperglycemia      Complicating diabetes co morbidities:   Glucocorticoid Use     HPI:   Patient is a 41 y.o. male with a diagnosis of well controlled type 2 DM. 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 Post operative course significant for recurrent bile leak. Patient presented for outpatient follow up, drain placed last hospitalization found to have bilious output, and patient reported feeling weak with decreased appeitie. Admitted for further interventions. Endocrinology consulted for BG/ DM management.     Lab Results   Component Value Date    HGBA1C 5.8 (H) 2022           Interval HPI:   Overnight events: No acute events overnight. Patient on the TSU in room 67776/25235 A. Blood glucose stable. BG at goal on current insulin regimen (SSI and prandial insulin). Steroid use- Prednisone 5 mg.    Renal function- Normal   Vasopressors-  None       Diet Adult Regular (IDDSI Level 7)     Eatin%  Nausea: No  Hypoglycemia and intervention: No  Fever: No  TPN and/or TF: No    PMH, PSH, FH, SH updated and reviewed     ROS:  Review of Systems  Constitutional: Negative for weight changes.  Eyes: Negative for visual disturbance.  Respiratory: Negative for cough.   Cardiovascular: Negative for chest pain.  Gastrointestinal: Negative for nausea.  Endocrine: Negative for  polyuria, polydipsia.  Musculoskeletal: Negative for back pain.  Skin: Negative for rash.  Neurological: Negative for syncope.  Psychiatric/Behavioral: Negative for depression.    Current Medications and/or Treatments Impacting Glycemic Control  Immunotherapy:    Immunosuppressants           Stop Route Frequency     tacrolimus capsule 2 mg         -- Oral 2 times daily          Steroids:   Hormones (From admission, onward)                Start     Stop Route Frequency Ordered    07/18/22 1158  melatonin tablet 9 mg         -- Oral Nightly PRN 07/18/22 1100    07/18/22 1145  predniSONE tablet 5 mg         -- Oral Daily 07/18/22 1034          Pressors:    Autonomic Drugs (From admission, onward)                None          Hyperglycemia/Diabetes Medications:   Antihyperglycemics (From admission, onward)                Start     Stop Route Frequency Ordered    07/22/22 1745  insulin aspart U-100 pen 4 Units         -- SubQ 3 times daily with meals 07/22/22 1642    07/18/22 1135  insulin aspart U-100 pen 0-5 Units         -- SubQ Before meals & nightly PRN 07/18/22 1035             PHYSICAL EXAMINATION:  Vitals:    07/22/22 1645   BP: 127/80   Pulse: 90   Resp: 18   Temp: 99.5 °F (37.5 °C)     Body mass index is 24.51 kg/m².    Physical Exam  Constitutional: Well developed, well nourished, NAD.  ENT: External ears no masses with nose patent; normal hearing.  Neck: Supple; trachea midline.  Cardiovascular: Normal heart sounds, no LE edema. DP +2 bilaterally.  Lungs: Normal effort; lungs anterior bilaterally clear to auscultation.  Abdomen: Soft, no masses, no hernias.  MS: No clubbing or cyanosis of nails noted; unable to assess gait.  Skin: No rashes, lesions, or ulcers; no nodules. Injection sites are ok. No lipo hypertropthy or atrophy.  Psychiatric: Good judgement and insight; normal mood and affect.  Neurological: Cranial nerves are grossly intact.   Foot: Nails in good condition, no amputations noted.        Labs  Reviewed and Include   Recent Labs   Lab 07/22/22  0543   *   CALCIUM 8.9   ALBUMIN 2.7*   PROT 5.9*      K 4.4   CO2 28      BUN 9   CREATININE 0.8   ALKPHOS 271*   ALT 60*   AST 42*   BILITOT 0.7     Lab Results   Component Value Date    WBC 3.98 07/22/2022    HGB 9.1 (L) 07/22/2022    HCT 28.7 (L) 07/22/2022    MCV 96 07/22/2022     (L) 07/22/2022     No results for input(s): TSH, FREET4 in the last 168 hours.  Lab Results   Component Value Date    HGBA1C 5.8 (H) 06/20/2022       Nutritional status:   Body mass index is 24.51 kg/m².  Lab Results   Component Value Date    ALBUMIN 2.7 (L) 07/22/2022    ALBUMIN 3.0 (L) 07/21/2022    ALBUMIN 2.9 (L) 07/20/2022     No results found for: PREALBUMIN    Estimated Creatinine Clearance: 115.2 mL/min (based on SCr of 0.8 mg/dL).    Accu-Checks  Recent Labs     07/20/22  1134 07/20/22  1713 07/20/22  2257 07/21/22  0810 07/21/22  1129 07/21/22  1702 07/21/22  2038 07/22/22  0803 07/22/22  1233 07/22/22  1653   POCTGLUCOSE 138* 237* 145* 129* 172* 216* 134* 120* 191* 158*        ASSESSMENT and PLAN    * Sepsis  Infection may elevate BG readings  On IV antibiotics  Managed per primary team  Avoid hypoglycemia      Type 2 diabetes mellitus with hyperglycemia  Endocrinology consulted for BG management.   BG goal 140-180      - Novolog (aspart) insulin 4 Units SQ TIDWM and prn for BG excursions LDC (150/50) SSI (20% reducation due to prandial BG below goal)   - BG checks AC/HS  - Hypoglycemia protocol in place  - If blood glucose greater than 300, please ask patient not to eat food or drink anything other than water until correctional insulin has brought it back below 250    ** Please notify Endocrine for any change and/or advance in diet**  ** Please call Endocrine for any BG related issues **    Discharge Planning:   TBD. Please notify endocrinology prior to discharge.        Liver transplant recipient  Managed per primary team  Avoid  hypoglycemia        Long-term use of immunosuppressant medication  On steroid therapy per transplant team; may elevate BG readings            Plan discussed with patient, family, and RN at bedside.        Brad Singleton, DNP, FNP  Endocrinology  [facility]

## 2022-07-22 NOTE — HPI
Reason for Consult: Management of T2DM, Hyperglycemia      Surgical Procedure and Date: Liver Transplant 6/21/2022     Diabetes diagnosis year: 2022     Home Diabetes Medications:  Novolog 5 units TID with meals + SSI LDC (150/50)     How often checking glucose at home?  3-4x daily    BG readings on regimen: 100-200's   Hypoglycemia on the regimen?  No  Missed doses on regimen?  No     Diabetes Complications include:     Hyperglycemia      Complicating diabetes co morbidities:   Glucocorticoid Use     HPI:   Patient is a 41 y.o. male with a diagnosis of well controlled type 2 DM. 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 Post operative course significant for recurrent bile leak. Patient presented for outpatient follow up, drain placed last hospitalization found to have bilious output, and patient reported feeling weak with decreased appeitie. Admitted for further interventions. Endocrinology consulted for BG/ DM management.     Lab Results   Component Value Date    HGBA1C 5.8 (H) 06/20/2022

## 2022-07-23 LAB
ALBUMIN SERPL BCP-MCNC: 2.7 G/DL (ref 3.5–5.2)
ALP SERPL-CCNC: 326 U/L (ref 55–135)
ALT SERPL W/O P-5'-P-CCNC: 123 U/L (ref 10–44)
ANION GAP SERPL CALC-SCNC: 9 MMOL/L (ref 8–16)
AST SERPL-CCNC: 71 U/L (ref 10–40)
BACTERIA BLD CULT: NORMAL
BASOPHILS # BLD AUTO: 0.02 K/UL (ref 0–0.2)
BASOPHILS NFR BLD: 0.5 % (ref 0–1.9)
BILIRUB SERPL-MCNC: 0.6 MG/DL (ref 0.1–1)
BUN SERPL-MCNC: 10 MG/DL (ref 6–20)
CALCIUM SERPL-MCNC: 9.2 MG/DL (ref 8.7–10.5)
CHLORIDE SERPL-SCNC: 99 MMOL/L (ref 95–110)
CO2 SERPL-SCNC: 26 MMOL/L (ref 23–29)
CREAT SERPL-MCNC: 0.8 MG/DL (ref 0.5–1.4)
DIFFERENTIAL METHOD: ABNORMAL
EOSINOPHIL # BLD AUTO: 0 K/UL (ref 0–0.5)
EOSINOPHIL NFR BLD: 0 % (ref 0–8)
ERYTHROCYTE [DISTWIDTH] IN BLOOD BY AUTOMATED COUNT: 14 % (ref 11.5–14.5)
EST. GFR  (AFRICAN AMERICAN): >60 ML/MIN/1.73 M^2
EST. GFR  (NON AFRICAN AMERICAN): >60 ML/MIN/1.73 M^2
GLUCOSE SERPL-MCNC: 131 MG/DL (ref 70–110)
HCT VFR BLD AUTO: 28.3 % (ref 40–54)
HGB BLD-MCNC: 8.7 G/DL (ref 14–18)
IMM GRANULOCYTES # BLD AUTO: 0.02 K/UL (ref 0–0.04)
IMM GRANULOCYTES NFR BLD AUTO: 0.5 % (ref 0–0.5)
LYMPHOCYTES # BLD AUTO: 0.4 K/UL (ref 1–4.8)
LYMPHOCYTES NFR BLD: 10.4 % (ref 18–48)
MAGNESIUM SERPL-MCNC: 1.5 MG/DL (ref 1.6–2.6)
MCH RBC QN AUTO: 29.9 PG (ref 27–31)
MCHC RBC AUTO-ENTMCNC: 30.7 G/DL (ref 32–36)
MCV RBC AUTO: 97 FL (ref 82–98)
MONOCYTES # BLD AUTO: 0.3 K/UL (ref 0.3–1)
MONOCYTES NFR BLD: 7.6 % (ref 4–15)
NEUTROPHILS # BLD AUTO: 3.1 K/UL (ref 1.8–7.7)
NEUTROPHILS NFR BLD: 81 % (ref 38–73)
NRBC BLD-RTO: 0 /100 WBC
PHOSPHATE SERPL-MCNC: 4.4 MG/DL (ref 2.7–4.5)
PLATELET # BLD AUTO: 92 K/UL (ref 150–450)
PMV BLD AUTO: 10 FL (ref 9.2–12.9)
POCT GLUCOSE: 118 MG/DL (ref 70–110)
POCT GLUCOSE: 132 MG/DL (ref 70–110)
POCT GLUCOSE: 148 MG/DL (ref 70–110)
POTASSIUM SERPL-SCNC: 4.8 MMOL/L (ref 3.5–5.1)
PROT SERPL-MCNC: 6 G/DL (ref 6–8.4)
RBC # BLD AUTO: 2.91 M/UL (ref 4.6–6.2)
SODIUM SERPL-SCNC: 134 MMOL/L (ref 136–145)
TACROLIMUS BLD-MCNC: 9 NG/ML (ref 5–15)
WBC # BLD AUTO: 3.84 K/UL (ref 3.9–12.7)

## 2022-07-23 PROCEDURE — 25000003 PHARM REV CODE 250: Performed by: PHYSICIAN ASSISTANT

## 2022-07-23 PROCEDURE — 84100 ASSAY OF PHOSPHORUS: CPT | Performed by: PHYSICIAN ASSISTANT

## 2022-07-23 PROCEDURE — 99232 PR SUBSEQUENT HOSPITAL CARE,LEVL II: ICD-10-PCS | Mod: ,,, | Performed by: NURSE PRACTITIONER

## 2022-07-23 PROCEDURE — 36415 COLL VENOUS BLD VENIPUNCTURE: CPT | Performed by: PHYSICIAN ASSISTANT

## 2022-07-23 PROCEDURE — 63600175 PHARM REV CODE 636 W HCPCS: Performed by: NURSE PRACTITIONER

## 2022-07-23 PROCEDURE — 20600001 HC STEP DOWN PRIVATE ROOM

## 2022-07-23 PROCEDURE — 25000003 PHARM REV CODE 250: Performed by: NURSE PRACTITIONER

## 2022-07-23 PROCEDURE — 83735 ASSAY OF MAGNESIUM: CPT | Performed by: PHYSICIAN ASSISTANT

## 2022-07-23 PROCEDURE — 80197 ASSAY OF TACROLIMUS: CPT | Performed by: PHYSICIAN ASSISTANT

## 2022-07-23 PROCEDURE — 25000003 PHARM REV CODE 250: Performed by: TRANSPLANT SURGERY

## 2022-07-23 PROCEDURE — 63600175 PHARM REV CODE 636 W HCPCS: Mod: JG | Performed by: NURSE PRACTITIONER

## 2022-07-23 PROCEDURE — 63600175 PHARM REV CODE 636 W HCPCS: Performed by: PHYSICIAN ASSISTANT

## 2022-07-23 PROCEDURE — 63600175 PHARM REV CODE 636 W HCPCS: Performed by: TRANSPLANT SURGERY

## 2022-07-23 PROCEDURE — 85025 COMPLETE CBC W/AUTO DIFF WBC: CPT | Performed by: PHYSICIAN ASSISTANT

## 2022-07-23 PROCEDURE — 99232 SBSQ HOSP IP/OBS MODERATE 35: CPT | Mod: ,,, | Performed by: NURSE PRACTITIONER

## 2022-07-23 PROCEDURE — 80053 COMPREHEN METABOLIC PANEL: CPT | Performed by: PHYSICIAN ASSISTANT

## 2022-07-23 RX ORDER — MAGNESIUM SULFATE HEPTAHYDRATE 40 MG/ML
2 INJECTION, SOLUTION INTRAVENOUS ONCE
Status: COMPLETED | OUTPATIENT
Start: 2022-07-23 | End: 2022-07-23

## 2022-07-23 RX ORDER — LANOLIN ALCOHOL/MO/W.PET/CERES
400 CREAM (GRAM) TOPICAL 2 TIMES DAILY
Status: DISCONTINUED | OUTPATIENT
Start: 2022-07-23 | End: 2022-07-27 | Stop reason: HOSPADM

## 2022-07-23 RX ADMIN — AMOXICILLIN AND CLAVULANATE POTASSIUM 1 TABLET: 875; 125 TABLET, FILM COATED ORAL at 08:07

## 2022-07-23 RX ADMIN — TACROLIMUS 2 MG: 1 CAPSULE ORAL at 08:07

## 2022-07-23 RX ADMIN — INSULIN ASPART 4 UNITS: 100 INJECTION, SOLUTION INTRAVENOUS; SUBCUTANEOUS at 08:07

## 2022-07-23 RX ADMIN — INSULIN ASPART 4 UNITS: 100 INJECTION, SOLUTION INTRAVENOUS; SUBCUTANEOUS at 04:07

## 2022-07-23 RX ADMIN — MIRTAZAPINE 15 MG: 15 TABLET, FILM COATED ORAL at 08:07

## 2022-07-23 RX ADMIN — PANTOPRAZOLE SODIUM 40 MG: 40 TABLET, DELAYED RELEASE ORAL at 08:07

## 2022-07-23 RX ADMIN — ASPIRIN 81 MG CHEWABLE TABLET 81 MG: 81 TABLET CHEWABLE at 08:07

## 2022-07-23 RX ADMIN — OXYCODONE HYDROCHLORIDE 10 MG: 10 TABLET ORAL at 04:07

## 2022-07-23 RX ADMIN — PREDNISONE 5 MG: 5 TABLET ORAL at 08:07

## 2022-07-23 RX ADMIN — METHOCARBAMOL 500 MG: 500 TABLET ORAL at 08:07

## 2022-07-23 RX ADMIN — Medication 400 MG: at 09:07

## 2022-07-23 RX ADMIN — VALGANCICLOVIR HYDROCHLORIDE 450 MG: 450 TABLET ORAL at 08:07

## 2022-07-23 RX ADMIN — METHOCARBAMOL 500 MG: 500 TABLET ORAL at 11:07

## 2022-07-23 RX ADMIN — MAGNESIUM SULFATE HEPTAHYDRATE 2 G: 40 INJECTION, SOLUTION INTRAVENOUS at 09:07

## 2022-07-23 RX ADMIN — ACETAMINOPHEN 650 MG: 325 TABLET ORAL at 11:07

## 2022-07-23 RX ADMIN — METHOCARBAMOL 500 MG: 500 TABLET ORAL at 04:07

## 2022-07-23 RX ADMIN — INSULIN ASPART 4 UNITS: 100 INJECTION, SOLUTION INTRAVENOUS; SUBCUTANEOUS at 12:07

## 2022-07-23 RX ADMIN — TACROLIMUS 2 MG: 1 CAPSULE ORAL at 05:07

## 2022-07-23 RX ADMIN — SULFAMETHOXAZOLE AND TRIMETHOPRIM 1 TABLET: 400; 80 TABLET ORAL at 08:07

## 2022-07-23 RX ADMIN — HEPARIN SODIUM 5000 UNITS: 5000 INJECTION INTRAVENOUS; SUBCUTANEOUS at 08:07

## 2022-07-23 RX ADMIN — Medication 400 MG: at 08:07

## 2022-07-23 RX ADMIN — MICAFUNGIN SODIUM 100 MG: 100 INJECTION, POWDER, LYOPHILIZED, FOR SOLUTION INTRAVENOUS at 08:07

## 2022-07-23 NOTE — PLAN OF CARE
Pt AAO x 4 throughout shift. Pt up independently throughout shift. BG monitored AC/HS per order. Pt bili drain connected to a drainage bag. Mag replaced with PO and IV replacement this shift. Possible d/c tomorrow.

## 2022-07-23 NOTE — ASSESSMENT & PLAN NOTE
Endocrinology consulted for BG management.   BG goal 140-180    - Novolog (aspart) insulin 4 Units SQ TIDWM and prn for BG excursions LDC (150/50) SSI   - BG checks AC/HS  - Hypoglycemia protocol in place    ** Please notify Endocrine for any change and/or advance in diet**  ** Please call Endocrine for any BG related issues **    Discharge Planning:   TBD. Please notify endocrinology prior to discharge.

## 2022-07-23 NOTE — PLAN OF CARE
Pt aaox4 vswnl and  no c/o pain, bed in low position and callbell within reach. Chevron incision phoebe with staples. Rt bili drain calmped and leaking at insertion site with drsg change done. Accu ck at [phone]. Telemetry maintained NSR. Possible d/c in am with home antibiotics. Pt ambulates independently

## 2022-07-23 NOTE — SUBJECTIVE & OBJECTIVE
Scheduled Meds:   amoxicillin-clavulanate 875-125mg  1 tablet Oral Q12H    aspirin  81 mg Oral Daily    heparin (porcine)  5,000 Units Subcutaneous Q8H    insulin aspart U-100  4 Units Subcutaneous TIDWM    magnesium oxide  400 mg Oral BID    methocarbamoL  500 mg Oral QID    micafungin (MYCAMINE) IVPB  100 mg Intravenous Q24H    mirtazapine  15 mg Oral Nightly    pantoprazole  40 mg Oral Daily    predniSONE  5 mg Oral Daily    sulfamethoxazole-trimethoprim 400-80mg  1 tablet Oral Daily    tacrolimus  2 mg Oral BID    valGANciclovir  450 mg Oral Daily     Continuous Infusions:  PRN Meds:acetaminophen, dextrose 10%, dextrose 10%, glucagon (human recombinant), glucose, glucose, heparin, porcine (PF), insulin aspart U-100, melatonin, ondansetron, oxyCODONE, sodium chloride 0.9%    Review of Systems   Constitutional:  Negative for activity change, appetite change, chills, diaphoresis and fever.   HENT:  Negative for congestion, sinus pressure, sinus pain, sore throat and trouble swallowing.    Eyes:  Negative for visual disturbance.   Respiratory:  Negative for chest tightness, shortness of breath and stridor.    Cardiovascular:  Negative for chest pain, palpitations and leg swelling.   Gastrointestinal:  Negative for abdominal distention, abdominal pain, constipation, diarrhea, nausea and vomiting.   Genitourinary:  Negative for decreased urine volume, difficulty urinating, dysuria and flank pain.   Musculoskeletal:  Negative for neck pain and neck stiffness.   Skin:  Positive for wound. Negative for color change and rash.   Allergic/Immunologic: Positive for immunocompromised state.   Neurological:  Negative for dizziness, tremors, syncope, light-headedness and headaches.   Psychiatric/Behavioral:  Negative for agitation, confusion, decreased concentration and hallucinations. The patient is not nervous/anxious.    Objective:     Vital Signs (Most Recent):  Temp: 98.9 °F (37.2 °C) (07/23/22 1217)  Pulse: 81 (07/23/22  1426)  Resp: 18 (07/23/22 1643)  BP: 118/80 (07/23/22 1217)  SpO2: 98 % (07/23/22 1217)   Vital Signs (24h Range):  Temp:  [98.5 °F (36.9 °C)-99.7 °F (37.6 °C)] 98.9 °F (37.2 °C)  Pulse:  [76-89] 81  Resp:  [16-18] 18  SpO2:  [96 %-98 %] 98 %  BP: (115-121)/(77-86) 118/80     Weight: 73.1 kg (161 lb 3.6 oz)  Body mass index is 24.51 kg/m².    Intake/Output - Last 3 Shifts         07/21 0700 07/22 0659 07/22 0700 07/23 0659 07/23 0700 07/24 0659    P.O. 1080 1640 980    I.V. (mL/kg)       Other       IV Piggyback       Total Intake(mL/kg) 1080 (14.8) 1640 (22.4) 980 (13.4)    Urine (mL/kg/hr) 700 (0.4)  0 (0)    Emesis/NG output   0    Drains 80  100    Other   0    Stool   0    Blood       Total Output 780  100    Net +300 +1640 +880           Urine Occurrence 3 x 5 x 4 x    Stool Occurrence  1 x 0 x    Emesis Occurrence   0 x            Physical Exam  Vitals and nursing note reviewed.   Constitutional:       General: He is not in acute distress.     Appearance: He is not diaphoretic.   HENT:      Head: Normocephalic and atraumatic.   Eyes:      General:         Right eye: No discharge.         Left eye: No discharge.   Cardiovascular:      Rate and Rhythm: Normal rate and regular rhythm.      Heart sounds: Normal heart sounds.   Pulmonary:      Effort: Pulmonary effort is normal.      Breath sounds: Normal breath sounds. No wheezing or rales.   Abdominal:      General: Bowel sounds are normal. There is no distension.      Palpations: Abdomen is soft.      Tenderness: There is no abdominal tenderness. There is no guarding.       Musculoskeletal:      Cervical back: Normal range of motion and neck supple.      Right lower leg: No edema.      Left lower leg: No edema.   Skin:     General: Skin is warm and dry.      Capillary Refill: Capillary refill takes 2 to 3 seconds.   Neurological:      Mental Status: He is alert and oriented to person, place, and time.      Cranial Nerves: No cranial nerve deficit.    Psychiatric:         Mood and Affect: Mood normal.         Behavior: Behavior normal.         Thought Content: Thought content normal.         Judgment: Judgment normal.       Laboratory:  Immunosuppressants           Stop Route Frequency     tacrolimus capsule 2 mg         -- Oral 2 times daily          CBC:   Recent Labs   Lab 07/23/22  0656   WBC 3.84*   RBC 2.91*   HGB 8.7*   HCT 28.3*   PLT 92*   MCV 97   MCH 29.9   MCHC 30.7*       CMP:   Recent Labs   Lab 07/23/22  0656   *   CALCIUM 9.2   ALBUMIN 2.7*   PROT 6.0   *   K 4.8   CO2 26   CL 99   BUN 10   CREATININE 0.8   ALKPHOS 326*   *   AST 71*   BILITOT 0.6       Coagulation: No results for input(s): PT, INR, APTT in the last 168 hours.  Labs within the past 24 hours have been reviewed.    Diagnostic Results:  US Liver Transplant Post:  Results for orders placed during the hospital encounter of 06/18/22    US Doppler Liver Transplant Post (xpd)    Narrative  EXAMINATION:  US DOPPLER LIVER TRANSPLANT POST (XPD)    CLINICAL HISTORY:  S/p LTX;    TECHNIQUE:  Limited abdominal ultrasound of the transplant liver with Doppler evaluation.  Color and spectral Doppler were performed.    COMPARISON:  Ultrasound 06/23/2022, 06/22/2022    FINDINGS:  Patient is status post orthotopic liver transplant of the right hepatic lobe on 06/21/2022.  The patient returned to the OR on 06/23/2022 for bile duct repair.    The liver demonstrates homogeneous echotexture.  No focal hepatic lesions are seen.    Small fluid collection posterior to the allograft measuring 2.1 x 1.7 x 2.0 cm.  Partially imaged surgical drain adjacent to the allograft.  Right pleural effusion.    The common duct is not dilated, measuring 4 mm.  No dilated intrahepatic radicles are seen.    Color flow and spectral waveform analysis was performed.    Main portal vein, anterior branch of the right portal vein, and posterior branch of the right portal vein are patent.    Middle hepatic  vein velocity measures 17 cm/s (previously 12 cm/s).    Right hepatic vein velocity measures 36 cm/s (previously 48 cm/s).    IVC piggyback velocity measures 196 cm/s (previously 62 cm/s).    Visualized main hepatic artery velocity measures 82 cm/s (previously 147 cm/s). Extrahepatic main hepatic artery is obscured by overlying bowel gas.    Main hepatic artery resistive index measures 0.69 (previously 0.63) with normal waveform.    Right hepatic artery anterior branch resistive index measures 0.59 (previously 0.63), with normal waveform.    Right hepatic artery posterior branch resistive index measures 0.61 (previously 0.67), with normal waveform.    Impression  Elevated velocities at the IVC anastomosis.  Attention on follow-up imaging is recommended.    Otherwise satisfactory Doppler evaluation of the liver allograft, noting that the extrahepatic MHA was obscured by bowel gas.    Small peritransplant fluid collection and right pleural effusion.    This report was flagged in Epic as abnormal.    Electronically signed by resident: Brad Espitia  Date:    06/27/2022  Time:    08:16    Electronically signed by: Venkat Mahajan  Date:    06/27/2022  Time:    08:41    Debility/Functional status: No debility noted.

## 2022-07-23 NOTE — SUBJECTIVE & OBJECTIVE
"Interval HPI:   Overnight events: No acute events overnight. Patient on the TSU in room 36012/42924 A. Blood glucose stable. BG at and below goal on current insulin regimen (SSI and prandial insulin). Steroid use- Prednisone 5 mg.    Renal function- Normal   Vasopressors-  None       Diet Adult Regular (IDDSI Level 7)     Eatin%  Nausea: No  Hypoglycemia and intervention: No  Fever: No  TPN and/or TF: No    /83 (BP Location: Right arm, Patient Position: Sitting)   Pulse 89   Temp 98.8 °F (37.1 °C) (Oral)   Resp 18   Ht 5' 8" (1.727 m)   Wt 73.1 kg (161 lb 3.6 oz)   SpO2 98%   BMI 24.51 kg/m²     Labs Reviewed and Include    Recent Labs   Lab 22  0656   *   CALCIUM 9.2   ALBUMIN 2.7*   PROT 6.0   *   K 4.8   CO2 26   CL 99   BUN 10   CREATININE 0.8   ALKPHOS 326*   *   AST 71*   BILITOT 0.6     Lab Results   Component Value Date    WBC 3.84 (L) 2022    HGB 8.7 (L) 2022    HCT 28.3 (L) 2022    MCV 97 2022    PLT 92 (L) 2022     No results for input(s): TSH, FREET4 in the last 168 hours.  Lab Results   Component Value Date    HGBA1C 5.8 (H) 2022       Nutritional status:   Body mass index is 24.51 kg/m².  Lab Results   Component Value Date    ALBUMIN 2.7 (L) 2022    ALBUMIN 2.7 (L) 2022    ALBUMIN 3.0 (L) 2022     No results found for: PREALBUMIN    Estimated Creatinine Clearance: 115.2 mL/min (based on SCr of 0.8 mg/dL).    Accu-Checks  Recent Labs     22  1713 22  2257 22  0810 22  1129 22  1702 22  2038 22  0803 22  1233 22  1653 22  2207   POCTGLUCOSE 237* 145* 129* 172* 216* 134* 120* 191* 158* 132*       Current Medications and/or Treatments Impacting Glycemic Control  Immunotherapy:    Immunosuppressants           Stop Route Frequency     tacrolimus capsule 2 mg         -- Oral 2 times daily          Steroids:   Hormones (From admission, onward)          "       Start     Stop Route Frequency Ordered    07/18/22 1158  melatonin tablet 9 mg         -- Oral Nightly PRN 07/18/22 1100    07/18/22 1145  predniSONE tablet 5 mg         -- Oral Daily 07/18/22 1034          Pressors:    Autonomic Drugs (From admission, onward)                None          Hyperglycemia/Diabetes Medications:   Antihyperglycemics (From admission, onward)                Start     Stop Route Frequency Ordered    07/22/22 1745  insulin aspart U-100 pen 4 Units         -- SubQ 3 times daily with meals 07/22/22 1642    07/18/22 1135  insulin aspart U-100 pen 0-5 Units         -- SubQ Before meals & nightly PRN 07/18/22 1035

## 2022-07-23 NOTE — ASSESSMENT & PLAN NOTE
"- chevron incision with staples in place - 15 days out from take back  - SHRUTHI x 2 with scant output - remove the right drain today  - Trend LFTs daily  - See "bile leak"  - PTC drain clamped 7/21/22  - cont to flush w 10cc NS twice daily  - LFTs cont to trend up today, PTC unclamped and placed to gravity, cont to follow closely  "

## 2022-07-23 NOTE — PROGRESS NOTES
[facility] - Transplant Stepdown  Liver Transplant  Progress Note    Patient Name: Romeo Larson  MRN: [id number]  Admission Date: 7/18/2022  Hospital Length of Stay: 5 days  Code Status: Full Code  Primary Care Provider: Pravin Maria MD  Post-Operative Day: 32    ORGAN:   RIGHT LIVER LOBE (SEGS 5,6,7,8) WITHOUT MIDDLE HEPATIC VEIN  Disease Etiology: Primary Liver Malignancy: Cholangiocarcinoma (CH-CA)  Donor Type:   Living  CDC High Risk:     Donor CMV Status:   Donor CMV Status:   Donor HBcAB:     Donor HCV Status:     Donor HBV GUSTABO:   Donor HCV GUSTABO:   Whole or Partial: Partial Liver  Biliary Anastomosis: Vick-en-Y  Arterial Anatomy: Standard  Subjective:     History of Present Illness:  Romeo Larson is a 41yr old male s/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 Post operative course significant for recurrent bile leak. OR take back 6/23 for bile duct repair (small leak found near biliary anastomosis, unable to clearly identify source), cultures grew enterococcus faecalis. OR take back 7/4 for ex lap, washout of biloma, bile duct unable to be assessed due to adhesions, OR cultures with lactobacillus species. Patient was discharged home with 1 drain in place. Patient presented for outpatient follow up, drain found to be bilious and he was readmitted 7/12/22. He was placed on BS IV ABX and  underwent PTC drain (internal/external) placement on 7/13 with bilious drainage output. Drain being flushed [id number] with sterile saline 30ml flushes. Decreased amount of drainage from left and right SHRUTHI drains prior to discharge. LFTs trended down. He was transitioned to PO cefpoxime and remained afebrile. He was discharged on 7/15/22.     Mr Larson now presents with fever 101 since early this morning. Denies any other symptoms. Reports minimal drainage from SHRUTHI drains and continued bilious drainage from PTC drain. He is flushing PTC drain as directed. Site c/d/i. Incision c/d/i. Denies  abdominal pain, nausea/ vomiting, chest pain, sob, diarrhea, and dysuria. Pt hypotensive and tachycardic on arrival to ED with BP 80/50s and . Febrile to 101.5. Blood pressure improved to 90/60s. He was give IV cefepime/vanc and LR bolus. Labs and imaging reviewed. Plan to admit for further management. Pt d/w Dr Godwin.          Hospital Course:  Hospital course: Pt had repeat cholangiogram w PTC upsize 7/20. PTC clamped 7/21.     Interval History: no acute events overnight. LFTs trending up, t bili normal. PTC was draining at site but drainage had decreased. Since LFTs up, PTC unclamped and placed to gravity. Prograf level 9 today. Pt denies increased pain. T max 99.7.  SHRUTHI drain removed 7/22/22.. Pt w good renal function. ID following, abx changed to Unasyn 7/21. And  de escalated to Augmentin 7/23/22 for tentative 2 weeks. Blood cx fr 7/18 growing candida glabrata, susceptibility pending. Fluc changed to Micafungin 7/21.  Tentative plan for 2 weeks of Prudence per R chest wall ana cristina cath. Electrolytes replaced. VSS. HHC ordered. Monitor.      Scheduled Meds:   amoxicillin-clavulanate 875-125mg  1 tablet Oral Q12H    aspirin  81 mg Oral Daily    heparin (porcine)  5,000 Units Subcutaneous Q8H    insulin aspart U-100  4 Units Subcutaneous TIDWM    magnesium oxide  400 mg Oral BID    methocarbamoL  500 mg Oral QID    micafungin (MYCAMINE) IVPB  100 mg Intravenous Q24H    mirtazapine  15 mg Oral Nightly    pantoprazole  40 mg Oral Daily    predniSONE  5 mg Oral Daily    sulfamethoxazole-trimethoprim 400-80mg  1 tablet Oral Daily    tacrolimus  2 mg Oral BID    valGANciclovir  450 mg Oral Daily     Continuous Infusions:  PRN Meds:acetaminophen, dextrose 10%, dextrose 10%, glucagon (human recombinant), glucose, glucose, heparin, porcine (PF), insulin aspart U-100, melatonin, ondansetron, oxyCODONE, sodium chloride 0.9%    Review of Systems   Constitutional:  Negative for activity change, appetite change,  chills, diaphoresis and fever.   HENT:  Negative for congestion, sinus pressure, sinus pain, sore throat and trouble swallowing.    Eyes:  Negative for visual disturbance.   Respiratory:  Negative for chest tightness, shortness of breath and stridor.    Cardiovascular:  Negative for chest pain, palpitations and leg swelling.   Gastrointestinal:  Negative for abdominal distention, abdominal pain, constipation, diarrhea, nausea and vomiting.   Genitourinary:  Negative for decreased urine volume, difficulty urinating, dysuria and flank pain.   Musculoskeletal:  Negative for neck pain and neck stiffness.   Skin:  Positive for wound. Negative for color change and rash.   Allergic/Immunologic: Positive for immunocompromised state.   Neurological:  Negative for dizziness, tremors, syncope, light-headedness and headaches.   Psychiatric/Behavioral:  Negative for agitation, confusion, decreased concentration and hallucinations. The patient is not nervous/anxious.    Objective:     Vital Signs (Most Recent):  Temp: 98.9 °F (37.2 °C) (07/23/22 1217)  Pulse: 81 (07/23/22 1426)  Resp: 18 (07/23/22 1643)  BP: 118/80 (07/23/22 1217)  SpO2: 98 % (07/23/22 1217)   Vital Signs (24h Range):  Temp:  [98.5 °F (36.9 °C)-99.7 °F (37.6 °C)] 98.9 °F (37.2 °C)  Pulse:  [76-89] 81  Resp:  [16-18] 18  SpO2:  [96 %-98 %] 98 %  BP: (115-121)/(77-86) 118/80     Weight: 73.1 kg (161 lb 3.6 oz)  Body mass index is 24.51 kg/m².    Intake/Output - Last 3 Shifts         07/21 0700 07/22 0659 07/22 0700 07/23 0659 07/23 0700 07/24 0659    P.O. 1080 1640 980    I.V. (mL/kg)       Other       IV Piggyback       Total Intake(mL/kg) 1080 (14.8) 1640 (22.4) 980 (13.4)    Urine (mL/kg/hr) 700 (0.4)  0 (0)    Emesis/NG output   0    Drains 80  100    Other   0    Stool   0    Blood       Total Output 780  100    Net +300 +1640 +880           Urine Occurrence 3 x 5 x 4 x    Stool Occurrence  1 x 0 x    Emesis Occurrence   0 x            Physical Exam  Vitals  and nursing note reviewed.   Constitutional:       General: He is not in acute distress.     Appearance: He is not diaphoretic.   HENT:      Head: Normocephalic and atraumatic.   Eyes:      General:         Right eye: No discharge.         Left eye: No discharge.   Cardiovascular:      Rate and Rhythm: Normal rate and regular rhythm.      Heart sounds: Normal heart sounds.   Pulmonary:      Effort: Pulmonary effort is normal.      Breath sounds: Normal breath sounds. No wheezing or rales.   Abdominal:      General: Bowel sounds are normal. There is no distension.      Palpations: Abdomen is soft.      Tenderness: There is no abdominal tenderness. There is no guarding.       Musculoskeletal:      Cervical back: Normal range of motion and neck supple.      Right lower leg: No edema.      Left lower leg: No edema.   Skin:     General: Skin is warm and dry.      Capillary Refill: Capillary refill takes 2 to 3 seconds.   Neurological:      Mental Status: He is alert and oriented to person, place, and time.      Cranial Nerves: No cranial nerve deficit.   Psychiatric:         Mood and Affect: Mood normal.         Behavior: Behavior normal.         Thought Content: Thought content normal.         Judgment: Judgment normal.       Laboratory:  Immunosuppressants           Stop Route Frequency     tacrolimus capsule 2 mg         -- Oral 2 times daily          CBC:   Recent Labs   Lab 07/23/22  0656   WBC 3.84*   RBC 2.91*   HGB 8.7*   HCT 28.3*   PLT 92*   MCV 97   MCH 29.9   MCHC 30.7*       CMP:   Recent Labs   Lab 07/23/22  0656   *   CALCIUM 9.2   ALBUMIN 2.7*   PROT 6.0   *   K 4.8   CO2 26   CL 99   BUN 10   CREATININE 0.8   ALKPHOS 326*   *   AST 71*   BILITOT 0.6       Coagulation: No results for input(s): PT, INR, APTT in the last 168 hours.  Labs within the past 24 hours have been reviewed.    Diagnostic Results:  US Liver Transplant Post:  Results for orders placed during the hospital encounter  of 06/18/22    US Doppler Liver Transplant Post (xpd)    Narrative  EXAMINATION:  US DOPPLER LIVER TRANSPLANT POST (XPD)    CLINICAL HISTORY:  S/p LTX;    TECHNIQUE:  Limited abdominal ultrasound of the transplant liver with Doppler evaluation.  Color and spectral Doppler were performed.    COMPARISON:  Ultrasound 06/23/2022, 06/22/2022    FINDINGS:  Patient is status post orthotopic liver transplant of the right hepatic lobe on 06/21/2022.  The patient returned to the OR on 06/23/2022 for bile duct repair.    The liver demonstrates homogeneous echotexture.  No focal hepatic lesions are seen.    Small fluid collection posterior to the allograft measuring 2.1 x 1.7 x 2.0 cm.  Partially imaged surgical drain adjacent to the allograft.  Right pleural effusion.    The common duct is not dilated, measuring 4 mm.  No dilated intrahepatic radicles are seen.    Color flow and spectral waveform analysis was performed.    Main portal vein, anterior branch of the right portal vein, and posterior branch of the right portal vein are patent.    Middle hepatic vein velocity measures 17 cm/s (previously 12 cm/s).    Right hepatic vein velocity measures 36 cm/s (previously 48 cm/s).    IVC piggyback velocity measures 196 cm/s (previously 62 cm/s).    Visualized main hepatic artery velocity measures 82 cm/s (previously 147 cm/s). Extrahepatic main hepatic artery is obscured by overlying bowel gas.    Main hepatic artery resistive index measures 0.69 (previously 0.63) with normal waveform.    Right hepatic artery anterior branch resistive index measures 0.59 (previously 0.63), with normal waveform.    Right hepatic artery posterior branch resistive index measures 0.61 (previously 0.67), with normal waveform.    Impression  Elevated velocities at the IVC anastomosis.  Attention on follow-up imaging is recommended.    Otherwise satisfactory Doppler evaluation of the liver allograft, noting that the extrahepatic MHA was obscured by bowel  "gas.    Small peritransplant fluid collection and right pleural effusion.    This report was flagged in Epic as abnormal.    Electronically signed by resident: Brad Espitia  Date:    06/27/2022  Time:    08:16    Electronically signed by: Venkat Mahajan  Date:    06/27/2022  Time:    08:41    Debility/Functional status: No debility noted.    Assessment/Plan:     * Sepsis  - Pt presented with fever, tachycardia and hypotension despite cefpodoxime  - Hypotension improving with IVF  - Likely 2/2 biliary source  - PTC drain to gravity and SHRUTHI x 2 in place (R SHRUTHI d/c'd 7/19)  - blood cultures NGTD, CXR, UA and CT a/p reviewed  - Cont Vanc/cefepime and continue diflucan  - IR consult for cholangiogram with tube exchange/upsize 7/20/22  - PTC clamped 7/21  - ID consulted 7/21- yeast in blood- changed fluc to Prudence 7/21.  Vanc/cefepime changed to Unasyn 7/21, apprec ID recs      Intra-abdominal infection  - see sepsis      Fungemia  - Blood cx 7/18 growing yeast, ID consulted, changed Fluc to Prudence 7/21 tentative plan for 2 weeks, susceptibilities pending  - no plan for further w/u at this time  - pt denies change in vision, no plan for Ophthalmology      Bile leak  - s/p PTC (internalized) on 7/13/22  - PTC to gravity with bilious output. SHRUTHI x 2 with scant output no longer draining per patient  - Cont flushing PTC  - IR consulted for cholangiogram with tube exchange 7/20/22  - clamped PTC 7/21/22 and cont to flush   - PTC to gravity 7/23  - See "sepsis"      Long-term use of immunosuppressant medication  - see "prophylactic immunotherapy"      Type 2 diabetes mellitus with hyperglycemia  - cont sliding scale insulin  - endo consulted 7/22      At risk for opportunistic infections  - cont OI prophylaxis per protocol  - CMV PCR 7/19 not detected    Prophylactic immunotherapy  - continue prograf and steroid taper per protocol  - will check daily prograf levels, monitor for toxic side effects, and adjust for therapeutic dose  - " "cellcept on hold for infection    S/P liver transplant  - chevron incision with staples in place - 15 days out from take back  - SHRUTHI x 2 with scant output - remove the right drain today  - Trend LFTs daily  - See "bile leak"  - PTC drain clamped 7/21/22  - cont to flush w 10cc NS twice daily  - LFTs cont to trend up today, PTC unclamped and placed to gravity, cont to follow closely    Anemia  - Chronic, likely multifactorial.  - continue to monitor    Hypercholesterolemia            VTE Risk Mitigation (From admission, onward)         Ordered     heparin, porcine (PF) 100 unit/mL injection flush 500 Units  As needed (PRN)         07/20/22 1605     heparin (porcine) injection 5,000 Units  Every 8 hours         07/18/22 1100     IP VTE HIGH RISK PATIENT  Once         07/18/22 1100     Place sequential compression device  Until discontinued         07/18/22 1100                The patients clinical status was discussed at multidisplinary rounds, involving transplant surgery, transplant medicine, pharmacy, nursing, nutrition, and social work    Discharge Planning: Discussed plan of care.  No plan for discharge today.    PharmD Review: fluconazole stops 7/25, monitor and adjust tac as needed [7/11/2022 sfa]      Abida Arenas, NP  Liver Transplant  [facility] - Transplant Stepdown  "

## 2022-07-23 NOTE — NURSING
Justin drain placed to gravity per NP verbal order. Drain flushed with 10cc NS per order prior to drain being connected to bag. Bllious drainage noted to tubing once connected.

## 2022-07-23 NOTE — PROGRESS NOTES
"[facility] - [facility]  Endocrinology  Progress Note    Admit Date: 2022     Reason for Consult: Management of T2DM, Hyperglycemia      Surgical Procedure and Date: Liver Transplant 2022     Diabetes diagnosis year:      Home Diabetes Medications:  Novolog 5 units TID with meals + SSI LDC (150/50)     How often checking glucose at home?  3-4x daily    BG readings on regimen: 100-200's   Hypoglycemia on the regimen?  No  Missed doses on regimen?  No     Diabetes Complications include:     Hyperglycemia      Complicating diabetes co morbidities:   Glucocorticoid Use     HPI:   Patient is a 41 y.o. male with a diagnosis of well controlled type 2 DM. 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 Post operative course significant for recurrent bile leak. Patient presented for outpatient follow up, drain placed last hospitalization found to have bilious output, and patient reported feeling weak with decreased appeitie. Admitted for further interventions. Endocrinology consulted for BG/ DM management.     Lab Results   Component Value Date    HGBA1C 5.8 (H) 2022           Interval HPI:   Overnight events: No acute events overnight. Patient on the TSU in room 55080/28358 A. Blood glucose stable. BG at and below goal on current insulin regimen (SSI and prandial insulin). Steroid use- Prednisone 5 mg.    Renal function- Normal   Vasopressors-  None       Diet Adult Regular (IDDSI Level 7)     Eatin%  Nausea: No  Hypoglycemia and intervention: No  Fever: No  TPN and/or TF: No    /83 (BP Location: Right arm, Patient Position: Sitting)   Pulse 89   Temp 98.8 °F (37.1 °C) (Oral)   Resp 18   Ht 5' 8" (1.727 m)   Wt 73.1 kg (161 lb 3.6 oz)   SpO2 98%   BMI 24.51 kg/m²     Labs Reviewed and Include    Recent Labs   Lab 22  0656   *   CALCIUM 9.2   ALBUMIN 2.7*   PROT 6.0   *   K 4.8   CO2 26   CL 99   BUN 10 "   CREATININE 0.8   ALKPHOS 326*   *   AST 71*   BILITOT 0.6     Lab Results   Component Value Date    WBC 3.84 (L) 07/23/2022    HGB 8.7 (L) 07/23/2022    HCT 28.3 (L) 07/23/2022    MCV 97 07/23/2022    PLT 92 (L) 07/23/2022     No results for input(s): TSH, FREET4 in the last 168 hours.  Lab Results   Component Value Date    HGBA1C 5.8 (H) 06/20/2022       Nutritional status:   Body mass index is 24.51 kg/m².  Lab Results   Component Value Date    ALBUMIN 2.7 (L) 07/23/2022    ALBUMIN 2.7 (L) 07/22/2022    ALBUMIN 3.0 (L) 07/21/2022     No results found for: PREALBUMIN    Estimated Creatinine Clearance: 115.2 mL/min (based on SCr of 0.8 mg/dL).    Accu-Checks  Recent Labs     07/20/22  1713 07/20/22  2257 07/21/22  0810 07/21/22  1129 07/21/22  1702 07/21/22  2038 07/22/22  0803 07/22/22  1233 07/22/22  1653 07/22/22  2207   POCTGLUCOSE 237* 145* 129* 172* 216* 134* 120* 191* 158* 132*       Current Medications and/or Treatments Impacting Glycemic Control  Immunotherapy:    Immunosuppressants           Stop Route Frequency     tacrolimus capsule 2 mg         -- Oral 2 times daily          Steroids:   Hormones (From admission, onward)                Start     Stop Route Frequency Ordered    07/18/22 1158  melatonin tablet 9 mg         -- Oral Nightly PRN 07/18/22 1100    07/18/22 1145  predniSONE tablet 5 mg         -- Oral Daily 07/18/22 1034          Pressors:    Autonomic Drugs (From admission, onward)                None          Hyperglycemia/Diabetes Medications:   Antihyperglycemics (From admission, onward)                Start     Stop Route Frequency Ordered    07/22/22 1745  insulin aspart U-100 pen 4 Units         -- SubQ 3 times daily with meals 07/22/22 1642    07/18/22 1135  insulin aspart U-100 pen 0-5 Units         -- SubQ Before meals & nightly PRN 07/18/22 1035            ASSESSMENT and PLAN    * Sepsis  Infection may elevate BG readings  On IV antibiotics  Managed per primary team  Avoid  hypoglycemia      Type 2 diabetes mellitus with hyperglycemia  Endocrinology consulted for BG management.   BG goal 140-180    - Novolog (aspart) insulin 4 Units SQ TIDWM and prn for BG excursions LDC (150/50) SSI   - BG checks AC/HS  - Hypoglycemia protocol in place    ** Please notify Endocrine for any change and/or advance in diet**  ** Please call Endocrine for any BG related issues **    Discharge Planning:   TBD. Please notify endocrinology prior to discharge.        Liver transplant recipient  Managed per primary team  Avoid hypoglycemia        Long-term use of immunosuppressant medication  On steroid therapy per transplant team; may elevate BG readings             Brad Singleton, DNP, FNP  Endocrinology  [facility]

## 2022-07-23 NOTE — ASSESSMENT & PLAN NOTE
"- s/p PTC (internalized) on 7/13/22  - PTC to gravity with bilious output. SHRUTHI x 2 with scant output no longer draining per patient  - Cont flushing PTC  - IR consulted for cholangiogram with tube exchange 7/20/22  - clamped PTC 7/21/22 and cont to flush   - PTC to gravity 7/23  - See "sepsis"    "

## 2022-07-23 NOTE — ASSESSMENT & PLAN NOTE
- Blood cx 7/18 growing yeast, ID consulted, changed Fluc to Prudence 7/21 tentative plan for 2 weeks, susceptibilities pending  - no plan for further w/u at this time  - pt denies change in vision, no plan for Ophthalmology

## 2022-07-24 LAB
ALBUMIN SERPL BCP-MCNC: 2.8 G/DL (ref 3.5–5.2)
ALP SERPL-CCNC: 320 U/L (ref 55–135)
ALT SERPL W/O P-5'-P-CCNC: 99 U/L (ref 10–44)
ANION GAP SERPL CALC-SCNC: 11 MMOL/L (ref 8–16)
AST SERPL-CCNC: 33 U/L (ref 10–40)
BASOPHILS # BLD AUTO: 0.01 K/UL (ref 0–0.2)
BASOPHILS NFR BLD: 0.3 % (ref 0–1.9)
BILIRUB SERPL-MCNC: 0.5 MG/DL (ref 0.1–1)
BUN SERPL-MCNC: 10 MG/DL (ref 6–20)
CALCIUM SERPL-MCNC: 9.2 MG/DL (ref 8.7–10.5)
CHLORIDE SERPL-SCNC: 100 MMOL/L (ref 95–110)
CO2 SERPL-SCNC: 26 MMOL/L (ref 23–29)
CREAT SERPL-MCNC: 0.8 MG/DL (ref 0.5–1.4)
DIFFERENTIAL METHOD: ABNORMAL
EOSINOPHIL # BLD AUTO: 0 K/UL (ref 0–0.5)
EOSINOPHIL NFR BLD: 0.3 % (ref 0–8)
ERYTHROCYTE [DISTWIDTH] IN BLOOD BY AUTOMATED COUNT: 13.9 % (ref 11.5–14.5)
EST. GFR  (AFRICAN AMERICAN): >60 ML/MIN/1.73 M^2
EST. GFR  (NON AFRICAN AMERICAN): >60 ML/MIN/1.73 M^2
GLUCOSE SERPL-MCNC: 110 MG/DL (ref 70–110)
HCT VFR BLD AUTO: 28.2 % (ref 40–54)
HGB BLD-MCNC: 8.8 G/DL (ref 14–18)
IMM GRANULOCYTES # BLD AUTO: 0.01 K/UL (ref 0–0.04)
IMM GRANULOCYTES NFR BLD AUTO: 0.3 % (ref 0–0.5)
LYMPHOCYTES # BLD AUTO: 0.5 K/UL (ref 1–4.8)
LYMPHOCYTES NFR BLD: 12.6 % (ref 18–48)
MAGNESIUM SERPL-MCNC: 1.5 MG/DL (ref 1.6–2.6)
MCH RBC QN AUTO: 29.7 PG (ref 27–31)
MCHC RBC AUTO-ENTMCNC: 31.2 G/DL (ref 32–36)
MCV RBC AUTO: 95 FL (ref 82–98)
MONOCYTES # BLD AUTO: 0.3 K/UL (ref 0.3–1)
MONOCYTES NFR BLD: 8 % (ref 4–15)
NEUTROPHILS # BLD AUTO: 3.1 K/UL (ref 1.8–7.7)
NEUTROPHILS NFR BLD: 78.5 % (ref 38–73)
NRBC BLD-RTO: 0 /100 WBC
PHOSPHATE SERPL-MCNC: 4.3 MG/DL (ref 2.7–4.5)
PLATELET # BLD AUTO: 119 K/UL (ref 150–450)
PMV BLD AUTO: 9.8 FL (ref 9.2–12.9)
POCT GLUCOSE: 123 MG/DL (ref 70–110)
POCT GLUCOSE: 125 MG/DL (ref 70–110)
POCT GLUCOSE: 137 MG/DL (ref 70–110)
POCT GLUCOSE: 154 MG/DL (ref 70–110)
POCT GLUCOSE: 174 MG/DL (ref 70–110)
POTASSIUM SERPL-SCNC: 4.5 MMOL/L (ref 3.5–5.1)
PROT SERPL-MCNC: 6 G/DL (ref 6–8.4)
RBC # BLD AUTO: 2.96 M/UL (ref 4.6–6.2)
SODIUM SERPL-SCNC: 137 MMOL/L (ref 136–145)
TACROLIMUS BLD-MCNC: 9.5 NG/ML (ref 5–15)
WBC # BLD AUTO: 3.88 K/UL (ref 3.9–12.7)

## 2022-07-24 PROCEDURE — 63600175 PHARM REV CODE 636 W HCPCS: Mod: JG | Performed by: NURSE PRACTITIONER

## 2022-07-24 PROCEDURE — 25000003 PHARM REV CODE 250: Performed by: NURSE PRACTITIONER

## 2022-07-24 PROCEDURE — 85025 COMPLETE CBC W/AUTO DIFF WBC: CPT | Performed by: PHYSICIAN ASSISTANT

## 2022-07-24 PROCEDURE — 83735 ASSAY OF MAGNESIUM: CPT | Performed by: PHYSICIAN ASSISTANT

## 2022-07-24 PROCEDURE — 63600175 PHARM REV CODE 636 W HCPCS: Performed by: PHYSICIAN ASSISTANT

## 2022-07-24 PROCEDURE — 63600175 PHARM REV CODE 636 W HCPCS: Performed by: TRANSPLANT SURGERY

## 2022-07-24 PROCEDURE — 80197 ASSAY OF TACROLIMUS: CPT | Performed by: PHYSICIAN ASSISTANT

## 2022-07-24 PROCEDURE — 25000003 PHARM REV CODE 250: Performed by: PHYSICIAN ASSISTANT

## 2022-07-24 PROCEDURE — 80053 COMPREHEN METABOLIC PANEL: CPT | Performed by: PHYSICIAN ASSISTANT

## 2022-07-24 PROCEDURE — 20600001 HC STEP DOWN PRIVATE ROOM

## 2022-07-24 PROCEDURE — 84100 ASSAY OF PHOSPHORUS: CPT | Performed by: PHYSICIAN ASSISTANT

## 2022-07-24 PROCEDURE — 36415 COLL VENOUS BLD VENIPUNCTURE: CPT | Performed by: PHYSICIAN ASSISTANT

## 2022-07-24 PROCEDURE — 25000003 PHARM REV CODE 250: Performed by: TRANSPLANT SURGERY

## 2022-07-24 RX ADMIN — ASPIRIN 81 MG CHEWABLE TABLET 81 MG: 81 TABLET CHEWABLE at 08:07

## 2022-07-24 RX ADMIN — MICAFUNGIN SODIUM 100 MG: 100 INJECTION, POWDER, LYOPHILIZED, FOR SOLUTION INTRAVENOUS at 10:07

## 2022-07-24 RX ADMIN — METHOCARBAMOL 500 MG: 500 TABLET ORAL at 09:07

## 2022-07-24 RX ADMIN — VALGANCICLOVIR HYDROCHLORIDE 450 MG: 450 TABLET ORAL at 08:07

## 2022-07-24 RX ADMIN — TACROLIMUS 2 MG: 1 CAPSULE ORAL at 05:07

## 2022-07-24 RX ADMIN — METHOCARBAMOL 500 MG: 500 TABLET ORAL at 02:07

## 2022-07-24 RX ADMIN — AMOXICILLIN AND CLAVULANATE POTASSIUM 1 TABLET: 875; 125 TABLET, FILM COATED ORAL at 09:07

## 2022-07-24 RX ADMIN — PREDNISONE 5 MG: 5 TABLET ORAL at 08:07

## 2022-07-24 RX ADMIN — OXYCODONE HYDROCHLORIDE 10 MG: 10 TABLET ORAL at 09:07

## 2022-07-24 RX ADMIN — Medication 400 MG: at 08:07

## 2022-07-24 RX ADMIN — SULFAMETHOXAZOLE AND TRIMETHOPRIM 1 TABLET: 400; 80 TABLET ORAL at 08:07

## 2022-07-24 RX ADMIN — METHOCARBAMOL 500 MG: 500 TABLET ORAL at 05:07

## 2022-07-24 RX ADMIN — TACROLIMUS 2 MG: 1 CAPSULE ORAL at 08:07

## 2022-07-24 RX ADMIN — METHOCARBAMOL 500 MG: 500 TABLET ORAL at 08:07

## 2022-07-24 RX ADMIN — PANTOPRAZOLE SODIUM 40 MG: 40 TABLET, DELAYED RELEASE ORAL at 08:07

## 2022-07-24 RX ADMIN — AMOXICILLIN AND CLAVULANATE POTASSIUM 1 TABLET: 875; 125 TABLET, FILM COATED ORAL at 08:07

## 2022-07-24 RX ADMIN — OXYCODONE HYDROCHLORIDE 10 MG: 10 TABLET ORAL at 06:07

## 2022-07-24 RX ADMIN — Medication 400 MG: at 09:07

## 2022-07-24 RX ADMIN — OXYCODONE HYDROCHLORIDE 10 MG: 10 TABLET ORAL at 05:07

## 2022-07-24 RX ADMIN — INSULIN ASPART 4 UNITS: 100 INJECTION, SOLUTION INTRAVENOUS; SUBCUTANEOUS at 12:07

## 2022-07-24 RX ADMIN — MIRTAZAPINE 15 MG: 15 TABLET, FILM COATED ORAL at 09:07

## 2022-07-24 RX ADMIN — HEPARIN SODIUM 5000 UNITS: 5000 INJECTION INTRAVENOUS; SUBCUTANEOUS at 06:07

## 2022-07-24 RX ADMIN — INSULIN ASPART 4 UNITS: 100 INJECTION, SOLUTION INTRAVENOUS; SUBCUTANEOUS at 08:07

## 2022-07-24 RX ADMIN — INSULIN ASPART 4 UNITS: 100 INJECTION, SOLUTION INTRAVENOUS; SUBCUTANEOUS at 05:07

## 2022-07-24 NOTE — ASSESSMENT & PLAN NOTE
"- s/p PTC (internalized) on 7/13/22  - PTC to gravity with bilious output. SHRUTHI x 2 with scant output no longer draining per patient  - Cont flushing PTC  - IR consulted for cholangiogram with tube exchange 7/20/22  - clamped PTC 7/21/22 and cont to flush   - PTC to gravity 7/23, 190 bilious drainage, also leaking at drain site  - See "sepsis"    "

## 2022-07-24 NOTE — PLAN OF CARE
Pt is AAOx4, afebrile, and VSS. Pt denies pain overnight. Chevron incision phoebe with staples. Rt bili drain unclamped and leaking at insertion. Bili drain produced 60cc of green thin drainage overnight.  Blood glucose 200 @ bedtime , corrected w/ PRN insulin aspart. Telemetry maintained NSR. Possible d/c in am with home antibiotics. Pt is in bed in the lowest position, wheels locked, and call light in reach.

## 2022-07-24 NOTE — PLAN OF CARE
ID plan of caare    42 y/o M h/o cholangiocarcinoma s/p living 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 (vick-en-y biliary anastomosis, steroid induction, CMV D-R+, mmf/tacro/pred) post-op course notable for bile leak, s/p OR take back 6/23 for bile duct repair (small leak fond near biliary anastomosis), cultures grew E faecalis, s/p exlap 7/4 washout of biome, OR cultures with lactobacillus sp.  pt discharged with drain readmitted for bilious drainage 7/12/22.  S/p PTC drain 7/13 (blood cultures negative 7/13) discharged 7/15/22 now presented 7/18 with fever s/p biliary catheter exchange 7/20/22. ID consulted for 7/18 blood cultures growing C. Glabrata, of note patient was on fluconazole outpatient prior to admission  - suspect translocation in setting of drain malfunction now improved with catheter exchange  - continue micafungin at least 2 weeks and f/u final susceptibilities of C glabrata  - continue amox/clav at least 2 weeks and f/u repeat imaging  - discussed with team, f/u with me in clinic - may be virtual if no other appts same day    Outpatient Antibiotic Therapy Plan:    Please send referral to [facility].    1) Infection: c glabrata fungemia    2) Discharge Antibiotics:    Intravenous antibiotics:   micafungin    Oral antibiotics:   amoxclav    3) Therapy Duration:  At least 2 weeks    Estimated end date of IV antibiotics:   At least 8/1 and f/u repeat CT a/p  4) Outpatient Weekly Labs:    Order the following labs to be drawn on Mondays:    CBC   CMP       5) Fax Lab Results to Infectious Diseases Provider: Hand    [facility] Fax Number: [phone]    6) Outpatient Infectious Diseases Follow-up     Follow-up appointment will be arranged by the ID clinic and will be found in the patient's appointments tab.     Prior to discharge, please ensure the patient's follow-up has been scheduled.     If there is still no follow-up scheduled prior to discharge, please send an [facility]  message to Megan Meredith in Infectious Diseases.

## 2022-07-24 NOTE — PROGRESS NOTES
[facility] - [facility]  Liver Transplant  Progress Note    Patient Name: Romeo Larson  MRN: [id number]  Admission Date: 7/18/2022  Hospital Length of Stay: 6 days  Code Status: Full Code  Primary Care Provider: Pravin Maria MD  Post-Operative Day: 33    ORGAN:   RIGHT LIVER LOBE (SEGS 5,6,7,8) WITHOUT MIDDLE HEPATIC VEIN  Disease Etiology: Primary Liver Malignancy: Cholangiocarcinoma (CH-CA)  Donor Type:   Living  CDC High Risk:     Donor CMV Status:   Donor CMV Status:   Donor HBcAB:     Donor HCV Status:     Donor HBV GUSTABO:   Donor HCV GUSTABO:   Whole or Partial: Partial Liver  Biliary Anastomosis: Vick-en-Y  Arterial Anatomy: Standard  Subjective:     History of Present Illness:  Romeo Larson is a 41yr old male s/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 Post operative course significant for recurrent bile leak. OR take back 6/23 for bile duct repair (small leak found near biliary anastomosis, unable to clearly identify source), cultures grew enterococcus faecalis. OR take back 7/4 for ex lap, washout of biloma, bile duct unable to be assessed due to adhesions, OR cultures with lactobacillus species. Patient was discharged home with 1 drain in place. Patient presented for outpatient follow up, drain found to be bilious and he was readmitted 7/12/22. He was placed on BS IV ABX and  underwent PTC drain (internal/external) placement on 7/13 with bilious drainage output. Drain being flushed [id number] with sterile saline 30ml flushes. Decreased amount of drainage from left and right SHRUTHI drains prior to discharge. LFTs trended down. He was transitioned to PO cefpoxime and remained afebrile. He was discharged on 7/15/22.     Mr Larson now presents with fever 101 since early this morning. Denies any other symptoms. Reports minimal drainage from SHRUTHI drains and continued bilious drainage from PTC drain. He is flushing PTC drain as directed. Site c/d/i. Incision c/d/i. Denies  abdominal pain, nausea/ vomiting, chest pain, sob, diarrhea, and dysuria. Pt hypotensive and tachycardic on arrival to ED with BP 80/50s and . Febrile to 101.5. Blood pressure improved to 90/60s. He was give IV cefepime/vanc and LR bolus. Labs and imaging reviewed. Plan to admit for further management. Pt d/w Dr Godwin.          Hospital Course:  Hospital course: Pt had repeat cholangiogram w PTC upsize 7/20. PTC clamped 7/21.     Interval History: no acute events overnight. LFTs trended up, t bili normal. PTC placed to gravity 7/7/23. LFTs better today. PTC recorded output 190. Prograf increased. Pt denies increased pain. T max 98.9.  SHRUTHI drain removed 7/22/22.. Pt w good renal function. ID following, abx changed to Unasyn 7/21, de escalated to Augmentin 7/23/22 for tentative 2 weeks. Blood cx fr 7/18 growing candida glabrata, susceptibility pending. Fluc changed to Micafungin 7/21.  Tentative plan for 2 weeks of Prudence per R chest wall ana cristina cath. Electrolytes replaced. VSS. HHC ordered. Monitor.      Scheduled Meds:   amoxicillin-clavulanate 875-125mg  1 tablet Oral Q12H    aspirin  81 mg Oral Daily    heparin (porcine)  5,000 Units Subcutaneous Q8H    insulin aspart U-100  4 Units Subcutaneous TIDWM    magnesium oxide  400 mg Oral BID    methocarbamoL  500 mg Oral QID    micafungin (MYCAMINE) IVPB  100 mg Intravenous Q24H    mirtazapine  15 mg Oral Nightly    pantoprazole  40 mg Oral Daily    predniSONE  5 mg Oral Daily    sulfamethoxazole-trimethoprim 400-80mg  1 tablet Oral Daily    tacrolimus  2 mg Oral BID    valGANciclovir  450 mg Oral Daily     Continuous Infusions:  PRN Meds:acetaminophen, dextrose 10%, dextrose 10%, glucagon (human recombinant), glucose, glucose, heparin, porcine (PF), insulin aspart U-100, melatonin, ondansetron, oxyCODONE, sodium chloride 0.9%    Review of Systems   Constitutional:  Negative for activity change, appetite change, chills, diaphoresis and fever.   HENT:   Negative for congestion, sinus pressure, sinus pain, sore throat and trouble swallowing.    Eyes:  Negative for visual disturbance.   Respiratory:  Negative for chest tightness, shortness of breath and stridor.    Cardiovascular:  Negative for chest pain, palpitations and leg swelling.   Gastrointestinal:  Negative for abdominal distention, abdominal pain, constipation, diarrhea, nausea and vomiting.   Genitourinary:  Negative for decreased urine volume, difficulty urinating, dysuria and flank pain.   Musculoskeletal:  Negative for neck pain and neck stiffness.   Skin:  Positive for wound. Negative for color change and rash.   Allergic/Immunologic: Positive for immunocompromised state.   Neurological:  Negative for dizziness, tremors, syncope, light-headedness and headaches.   Psychiatric/Behavioral:  Negative for agitation, confusion, decreased concentration and hallucinations. The patient is not nervous/anxious.    Objective:     Vital Signs (Most Recent):  Temp: 97.5 °F (36.4 °C) (07/24/22 0836)  Pulse: 94 (07/24/22 0836)  Resp: 17 (07/24/22 0836)  BP: (!) 129/93 (07/24/22 0836)  SpO2: 100 % (07/24/22 0836)   Vital Signs (24h Range):  Temp:  [97.5 °F (36.4 °C)-98.9 °F (37.2 °C)] 97.5 °F (36.4 °C)  Pulse:  [] 94  Resp:  [16-20] 17  SpO2:  [97 %-100 %] 100 %  BP: (118-155)/() 129/93     Weight: 78.6 kg (173 lb 4.5 oz)  Body mass index is 26.35 kg/m².    Intake/Output - Last 3 Shifts         07/22 0700 07/23 0659 07/23 0700 07/24 0659 07/24 0700 07/25 0659    P.O. 1640 1780     I.V. (mL/kg)  0 (0)     Other  0     Total Intake(mL/kg) 1640 (22.4) 1780 (22.6)     Urine (mL/kg/hr)  0 (0)     Emesis/NG output  0     Drains  190     Other  0     Stool  0     Total Output  190     Net +1640 +1590            Urine Occurrence 5 x 7 x     Stool Occurrence 1 x 1 x 0 x    Emesis Occurrence  0 x             Physical Exam  Vitals and nursing note reviewed.   Constitutional:       General: He is not in acute  distress.     Appearance: He is not diaphoretic.   HENT:      Head: Normocephalic and atraumatic.   Eyes:      General:         Right eye: No discharge.         Left eye: No discharge.   Cardiovascular:      Rate and Rhythm: Normal rate and regular rhythm.      Heart sounds: Normal heart sounds.   Pulmonary:      Effort: Pulmonary effort is normal.      Breath sounds: Normal breath sounds. No wheezing or rales.   Abdominal:      General: Bowel sounds are normal. There is no distension.      Palpations: Abdomen is soft.      Tenderness: There is no abdominal tenderness. There is no guarding.       Musculoskeletal:      Cervical back: Normal range of motion and neck supple.      Right lower leg: No edema.      Left lower leg: No edema.   Skin:     General: Skin is warm and dry.      Capillary Refill: Capillary refill takes 2 to 3 seconds.   Neurological:      Mental Status: He is alert and oriented to person, place, and time.      Cranial Nerves: No cranial nerve deficit.   Psychiatric:         Mood and Affect: Mood normal.         Behavior: Behavior normal.         Thought Content: Thought content normal.         Judgment: Judgment normal.       Laboratory:  Immunosuppressants           Stop Route Frequency     tacrolimus capsule 2 mg         -- Oral 2 times daily          CBC:   Recent Labs   Lab 07/24/22  0645   WBC 3.88*   RBC 2.96*   HGB 8.8*   HCT 28.2*   *   MCV 95   MCH 29.7   MCHC 31.2*       CMP:   Recent Labs   Lab 07/24/22  0645      CALCIUM 9.2   ALBUMIN 2.8*   PROT 6.0      K 4.5   CO2 26      BUN 10   CREATININE 0.8   ALKPHOS 320*   ALT 99*   AST 33   BILITOT 0.5       Coagulation: No results for input(s): PT, INR, APTT in the last 168 hours.  Labs within the past 24 hours have been reviewed.    Diagnostic Results:  US Liver Transplant Post:  Results for orders placed during the hospital encounter of 06/18/22    US Doppler Liver Transplant Post  (xpd)    Narrative  EXAMINATION:  US DOPPLER LIVER TRANSPLANT POST (XPD)    CLINICAL HISTORY:  S/p LTX;    TECHNIQUE:  Limited abdominal ultrasound of the transplant liver with Doppler evaluation.  Color and spectral Doppler were performed.    COMPARISON:  Ultrasound 06/23/2022, 06/22/2022    FINDINGS:  Patient is status post orthotopic liver transplant of the right hepatic lobe on 06/21/2022.  The patient returned to the OR on 06/23/2022 for bile duct repair.    The liver demonstrates homogeneous echotexture.  No focal hepatic lesions are seen.    Small fluid collection posterior to the allograft measuring 2.1 x 1.7 x 2.0 cm.  Partially imaged surgical drain adjacent to the allograft.  Right pleural effusion.    The common duct is not dilated, measuring 4 mm.  No dilated intrahepatic radicles are seen.    Color flow and spectral waveform analysis was performed.    Main portal vein, anterior branch of the right portal vein, and posterior branch of the right portal vein are patent.    Middle hepatic vein velocity measures 17 cm/s (previously 12 cm/s).    Right hepatic vein velocity measures 36 cm/s (previously 48 cm/s).    IVC piggyback velocity measures 196 cm/s (previously 62 cm/s).    Visualized main hepatic artery velocity measures 82 cm/s (previously 147 cm/s). Extrahepatic main hepatic artery is obscured by overlying bowel gas.    Main hepatic artery resistive index measures 0.69 (previously 0.63) with normal waveform.    Right hepatic artery anterior branch resistive index measures 0.59 (previously 0.63), with normal waveform.    Right hepatic artery posterior branch resistive index measures 0.61 (previously 0.67), with normal waveform.    Impression  Elevated velocities at the IVC anastomosis.  Attention on follow-up imaging is recommended.    Otherwise satisfactory Doppler evaluation of the liver allograft, noting that the extrahepatic MHA was obscured by bowel gas.    Small peritransplant fluid collection  "and right pleural effusion.    This report was flagged in Epic as abnormal.    Electronically signed by resident: Brad Espitia  Date:    06/27/2022  Time:    08:16    Electronically signed by: Venkat Mahajan  Date:    06/27/2022  Time:    08:41    Debility/Functional status: No debility noted.    Assessment/Plan:     * Sepsis  - Pt presented with fever, tachycardia and hypotension despite cefpodoxime  - Hypotension improving with IVF  - Likely 2/2 biliary source  - PTC drain to gravity and SRHUTHI x 2 in place (R SHRUTHI d/c'd 7/19)  - blood cultures NGTD, CXR, UA and CT a/p reviewed  - Cont Vanc/cefepime and continue diflucan  - IR consult for cholangiogram with tube exchange/upsize 7/20/22  - PTC clamped 7/21  - ID consulted 7/21- yeast in blood- changed fluc to Prudence 7/21.  Vanc/cefepime changed to Unasyn 7/21, apprec ID recs      Intra-abdominal infection  - see sepsis      Fungemia  - Blood cx 7/18 growing yeast, ID consulted, changed Fluc to Prudence 7/21 tentative plan for 2 weeks, susceptibilities pending  - no plan for further w/u at this time  - pt denies change in vision, no plan for Ophthalmology      Bile leak  - s/p PTC (internalized) on 7/13/22  - PTC to gravity with bilious output. SHRUTHI x 2 with scant output no longer draining per patient  - Cont flushing PTC  - IR consulted for cholangiogram with tube exchange 7/20/22  - clamped PTC 7/21/22 and cont to flush   - PTC to gravity 7/23, 190 bilious drainage, also leaking at drain site  - See "sepsis"      Long-term use of immunosuppressant medication  - see "prophylactic immunotherapy"      Type 2 diabetes mellitus with hyperglycemia  - cont sliding scale insulin  - endo consulted 7/22      At risk for opportunistic infections  - cont OI prophylaxis per protocol  - CMV PCR 7/19 not detected    Prophylactic immunotherapy  - continue prograf and steroid taper per protocol  - will check daily prograf levels, monitor for toxic side effects, and adjust for therapeutic " "dose  - cellcept on hold for infection    S/P liver transplant  - chevron incision with staples in place - 15 days out from take back  - SHRUTHI x 2 with scant output - remove the right drain today  - Trend LFTs daily  - See "bile leak"  - PTC drain clamped 7/21/22  - cont to flush w 10cc NS twice daily  - LFTs trend up coincidentally after clamping PTC  - PTC to gravity 7/23, LFTs better today  - consider liver Bx    Anemia  - Chronic, likely multifactorial.  - continue to monitor    Hypercholesterolemia            VTE Risk Mitigation (From admission, onward)         Ordered     heparin, porcine (PF) 100 unit/mL injection flush 500 Units  As needed (PRN)         07/20/22 1605     heparin (porcine) injection 5,000 Units  Every 8 hours         07/18/22 1100     IP VTE HIGH RISK PATIENT  Once         07/18/22 1100     Place sequential compression device  Until discontinued         07/18/22 1100                The patients clinical status was discussed at multidisplinary rounds, involving transplant surgery, transplant medicine, pharmacy, nursing, nutrition, and social work    Discharge Planning: Discussed plan of care.  No plan for discharge today.    PharmD Review: fluconazole stops 7/25, monitor and adjust tac as needed [7/11/2022 sfa]      Abida Arenas, NP  Liver Transplant  [facility]  "

## 2022-07-24 NOTE — ASSESSMENT & PLAN NOTE
"- chevron incision with staples in place - 15 days out from take back  - SHRUTHI x 2 with scant output - remove the right drain today  - Trend LFTs daily  - See "bile leak"  - PTC drain clamped 7/21/22  - cont to flush w 10cc NS twice daily  - LFTs trend up coincidentally after clamping PTC  - PTC to gravity 7/23, LFTs better today  - consider liver Bx  "

## 2022-07-24 NOTE — CARE UPDATE
Care Update:     No acute events overnight. Patient on the TSU in room 72307/57050 A. Blood glucose stable. BG at goal on current insulin regimen (SSI ). Steroid use- Prednisone 5 mg.    Renal function- Normal   Vasopressors-  None       Diet Adult Regular (IDDSI Level 7)     POCT Glucose   Date Value Ref Range Status   07/24/2022 137 (H) 70 - 110 mg/dL Final   07/24/2022 174 (H) 70 - 110 mg/dL Final   07/23/2022 148 (H) 70 - 110 mg/dL Final   07/23/2022 118 (H) 70 - 110 mg/dL Final   07/22/2022 132 (H) 70 - 110 mg/dL Final   07/22/2022 158 (H) 70 - 110 mg/dL Final   07/22/2022 191 (H) 70 - 110 mg/dL Final   07/22/2022 120 (H) 70 - 110 mg/dL Final   07/21/2022 134 (H) 70 - 110 mg/dL Final   07/21/2022 216 (H) 70 - 110 mg/dL Final   07/21/2022 172 (H) 70 - 110 mg/dL Final     Lab Results   Component Value Date    HGBA1C 5.8 (H) 06/20/2022       Endocrinology consulted for BG management.   BG goal 140-180    Diabetes Medications             insulin aspart U-100 (NOVOLOG) 100 unit/mL (3 mL) InPn pen Inject 5 Units into the skin 3 (three) times daily with meals. + sliding scale.  MDD 30 units/d      Hospital Medications   BG checks AC/HS            glucagon (human recombinant) injection 1 mg 1 mg, Intramuscular, As needed (PRN), Turn patient on their side, give IM, and NOTIFY MD IMMEDIATELY.<BR><BR>Feed the patient as soon as patient awakens and is able to swallow.    glucose chewable tablet 16 g 16 g, Oral, As needed (PRN), (16 grams = #  four 4gm glucose tablets)    glucose chewable tablet 24 g 24 g, Oral, As needed (PRN), (24 grams = # six 4gm glucose tablets)    insulin aspart U-100 pen 0-5 Units 0-5 Units, Subcutaneous, Before meals &amp; nightly PRN, **LOW CORRECTION DOSE**<BR>Blood Glucose<BR>mg/dL                  Pre-meal                2200<BR>151-200                0 unit                      0 unit<BR>201-250                2 units                    1 unit<BR>251-300                3 units                     1 unit<BR>301-350                4 units                    2 units<BR>&gt;350                     5 units                    3 units<BR>Administer subcutaneously if needed at times designated by monitoring schedule. <BR>DO NOT HOLD correction dose insulin for patients who are  NPO.<BR>&quot;HIGH ALERT MEDICATION&quot; - Administer with meals or TF/TPN.    insulin aspart U-100 pen 4 Units 4 Units, Subcutaneous, 3 times daily with meals, Administer subcutaneously with meals. HOLD prandial (mealtime) insulin if patient is NPO, unable to eat, or if Blood Glucose less than 70 mg/dL.<BR><BR>If patient ate prior to BG check, administer scheduled Novolog only (do not cover with correction dose at this time).<BR>&quot;HIGH ALERT MEDICATION&quot; - Administer with meals or TF/TPN.            ** Please notify Endocrine for any change and/or advance in diet**  ** Please call Endocrine for any BG related issues **    Discharge Planning:   TBD. Please notify endocrinology prior to discharge.      Brad Singleton DNP, FNP-C  Department of Endocrinology  Inpatient Glycemic Management

## 2022-07-24 NOTE — SUBJECTIVE & OBJECTIVE
Scheduled Meds:   amoxicillin-clavulanate 875-125mg  1 tablet Oral Q12H    aspirin  81 mg Oral Daily    heparin (porcine)  5,000 Units Subcutaneous Q8H    insulin aspart U-100  4 Units Subcutaneous TIDWM    magnesium oxide  400 mg Oral BID    methocarbamoL  500 mg Oral QID    micafungin (MYCAMINE) IVPB  100 mg Intravenous Q24H    mirtazapine  15 mg Oral Nightly    pantoprazole  40 mg Oral Daily    predniSONE  5 mg Oral Daily    sulfamethoxazole-trimethoprim 400-80mg  1 tablet Oral Daily    tacrolimus  2 mg Oral BID    valGANciclovir  450 mg Oral Daily     Continuous Infusions:  PRN Meds:acetaminophen, dextrose 10%, dextrose 10%, glucagon (human recombinant), glucose, glucose, heparin, porcine (PF), insulin aspart U-100, melatonin, ondansetron, oxyCODONE, sodium chloride 0.9%    Review of Systems   Constitutional:  Negative for activity change, appetite change, chills, diaphoresis and fever.   HENT:  Negative for congestion, sinus pressure, sinus pain, sore throat and trouble swallowing.    Eyes:  Negative for visual disturbance.   Respiratory:  Negative for chest tightness, shortness of breath and stridor.    Cardiovascular:  Negative for chest pain, palpitations and leg swelling.   Gastrointestinal:  Negative for abdominal distention, abdominal pain, constipation, diarrhea, nausea and vomiting.   Genitourinary:  Negative for decreased urine volume, difficulty urinating, dysuria and flank pain.   Musculoskeletal:  Negative for neck pain and neck stiffness.   Skin:  Positive for wound. Negative for color change and rash.   Allergic/Immunologic: Positive for immunocompromised state.   Neurological:  Negative for dizziness, tremors, syncope, light-headedness and headaches.   Psychiatric/Behavioral:  Negative for agitation, confusion, decreased concentration and hallucinations. The patient is not nervous/anxious.    Objective:     Vital Signs (Most Recent):  Temp: 97.5 °F (36.4 °C) (07/24/22 0836)  Pulse: 94 (07/24/22  0836)  Resp: 17 (07/24/22 0836)  BP: (!) 129/93 (07/24/22 0836)  SpO2: 100 % (07/24/22 0836)   Vital Signs (24h Range):  Temp:  [97.5 °F (36.4 °C)-98.9 °F (37.2 °C)] 97.5 °F (36.4 °C)  Pulse:  [] 94  Resp:  [16-20] 17  SpO2:  [97 %-100 %] 100 %  BP: (118-155)/() 129/93     Weight: 78.6 kg (173 lb 4.5 oz)  Body mass index is 26.35 kg/m².    Intake/Output - Last 3 Shifts         07/22 0700 07/23 0659 07/23 0700 07/24 0659 07/24 0700 07/25 0659    P.O. 1640 1780     I.V. (mL/kg)  0 (0)     Other  0     Total Intake(mL/kg) 1640 (22.4) 1780 (22.6)     Urine (mL/kg/hr)  0 (0)     Emesis/NG output  0     Drains  190     Other  0     Stool  0     Total Output  190     Net +1640 +1590            Urine Occurrence 5 x 7 x     Stool Occurrence 1 x 1 x 0 x    Emesis Occurrence  0 x             Physical Exam  Vitals and nursing note reviewed.   Constitutional:       General: He is not in acute distress.     Appearance: He is not diaphoretic.   HENT:      Head: Normocephalic and atraumatic.   Eyes:      General:         Right eye: No discharge.         Left eye: No discharge.   Cardiovascular:      Rate and Rhythm: Normal rate and regular rhythm.      Heart sounds: Normal heart sounds.   Pulmonary:      Effort: Pulmonary effort is normal.      Breath sounds: Normal breath sounds. No wheezing or rales.   Abdominal:      General: Bowel sounds are normal. There is no distension.      Palpations: Abdomen is soft.      Tenderness: There is no abdominal tenderness. There is no guarding.       Musculoskeletal:      Cervical back: Normal range of motion and neck supple.      Right lower leg: No edema.      Left lower leg: No edema.   Skin:     General: Skin is warm and dry.      Capillary Refill: Capillary refill takes 2 to 3 seconds.   Neurological:      Mental Status: He is alert and oriented to person, place, and time.      Cranial Nerves: No cranial nerve deficit.   Psychiatric:         Mood and Affect: Mood normal.          Behavior: Behavior normal.         Thought Content: Thought content normal.         Judgment: Judgment normal.       Laboratory:  Immunosuppressants           Stop Route Frequency     tacrolimus capsule 2 mg         -- Oral 2 times daily          CBC:   Recent Labs   Lab 07/24/22  0645   WBC 3.88*   RBC 2.96*   HGB 8.8*   HCT 28.2*   *   MCV 95   MCH 29.7   MCHC 31.2*       CMP:   Recent Labs   Lab 07/24/22  0645      CALCIUM 9.2   ALBUMIN 2.8*   PROT 6.0      K 4.5   CO2 26      BUN 10   CREATININE 0.8   ALKPHOS 320*   ALT 99*   AST 33   BILITOT 0.5       Coagulation: No results for input(s): PT, INR, APTT in the last 168 hours.  Labs within the past 24 hours have been reviewed.    Diagnostic Results:  US Liver Transplant Post:  Results for orders placed during the hospital encounter of 06/18/22    US Doppler Liver Transplant Post (xpd)    Narrative  EXAMINATION:  US DOPPLER LIVER TRANSPLANT POST (XPD)    CLINICAL HISTORY:  S/p LTX;    TECHNIQUE:  Limited abdominal ultrasound of the transplant liver with Doppler evaluation.  Color and spectral Doppler were performed.    COMPARISON:  Ultrasound 06/23/2022, 06/22/2022    FINDINGS:  Patient is status post orthotopic liver transplant of the right hepatic lobe on 06/21/2022.  The patient returned to the OR on 06/23/2022 for bile duct repair.    The liver demonstrates homogeneous echotexture.  No focal hepatic lesions are seen.    Small fluid collection posterior to the allograft measuring 2.1 x 1.7 x 2.0 cm.  Partially imaged surgical drain adjacent to the allograft.  Right pleural effusion.    The common duct is not dilated, measuring 4 mm.  No dilated intrahepatic radicles are seen.    Color flow and spectral waveform analysis was performed.    Main portal vein, anterior branch of the right portal vein, and posterior branch of the right portal vein are patent.    Middle hepatic vein velocity measures 17 cm/s (previously 12  cm/s).    Right hepatic vein velocity measures 36 cm/s (previously 48 cm/s).    IVC piggyback velocity measures 196 cm/s (previously 62 cm/s).    Visualized main hepatic artery velocity measures 82 cm/s (previously 147 cm/s). Extrahepatic main hepatic artery is obscured by overlying bowel gas.    Main hepatic artery resistive index measures 0.69 (previously 0.63) with normal waveform.    Right hepatic artery anterior branch resistive index measures 0.59 (previously 0.63), with normal waveform.    Right hepatic artery posterior branch resistive index measures 0.61 (previously 0.67), with normal waveform.    Impression  Elevated velocities at the IVC anastomosis.  Attention on follow-up imaging is recommended.    Otherwise satisfactory Doppler evaluation of the liver allograft, noting that the extrahepatic MHA was obscured by bowel gas.    Small peritransplant fluid collection and right pleural effusion.    This report was flagged in Epic as abnormal.    Electronically signed by resident: Brad Espitia  Date:    06/27/2022  Time:    08:16    Electronically signed by: Venkat Mahajan  Date:    06/27/2022  Time:    08:41    Debility/Functional status: No debility noted.

## 2022-07-25 ENCOUNTER — TELEPHONE (OUTPATIENT)
Dept: ENDOCRINOLOGY | Facility: HOSPITAL | Age: 42
End: 2022-07-25
Payer: COMMERCIAL

## 2022-07-25 ENCOUNTER — PATIENT MESSAGE (OUTPATIENT)
Dept: ENDOCRINOLOGY | Facility: HOSPITAL | Age: 42
End: 2022-07-25
Payer: COMMERCIAL

## 2022-07-25 LAB
ALBUMIN SERPL BCP-MCNC: 2.9 G/DL (ref 3.5–5.2)
ALP SERPL-CCNC: 292 U/L (ref 55–135)
ALT SERPL W/O P-5'-P-CCNC: 71 U/L (ref 10–44)
ANION GAP SERPL CALC-SCNC: 9 MMOL/L (ref 8–16)
AST SERPL-CCNC: 22 U/L (ref 10–40)
BACTERIA BLD CULT: NORMAL
BACTERIA BLD CULT: NORMAL
BASOPHILS # BLD AUTO: 0.03 K/UL (ref 0–0.2)
BASOPHILS NFR BLD: 0.6 % (ref 0–1.9)
BILIRUB SERPL-MCNC: 0.6 MG/DL (ref 0.1–1)
BUN SERPL-MCNC: 10 MG/DL (ref 6–20)
CALCIUM SERPL-MCNC: 9.3 MG/DL (ref 8.7–10.5)
CHLORIDE SERPL-SCNC: 101 MMOL/L (ref 95–110)
CO2 SERPL-SCNC: 24 MMOL/L (ref 23–29)
COMMENT: NORMAL
CREAT SERPL-MCNC: 0.8 MG/DL (ref 0.5–1.4)
DIFFERENTIAL METHOD: ABNORMAL
EOSINOPHIL # BLD AUTO: 0 K/UL (ref 0–0.5)
EOSINOPHIL NFR BLD: 0 % (ref 0–8)
ERYTHROCYTE [DISTWIDTH] IN BLOOD BY AUTOMATED COUNT: 14 % (ref 11.5–14.5)
EST. GFR  (AFRICAN AMERICAN): >60 ML/MIN/1.73 M^2
EST. GFR  (NON AFRICAN AMERICAN): >60 ML/MIN/1.73 M^2
FINAL PATHOLOGIC DIAGNOSIS: NORMAL
GLUCOSE SERPL-MCNC: 96 MG/DL (ref 70–110)
GROSS: NORMAL
HCT VFR BLD AUTO: 33.2 % (ref 40–54)
HGB BLD-MCNC: 10.5 G/DL (ref 14–18)
IMM GRANULOCYTES # BLD AUTO: 0.02 K/UL (ref 0–0.04)
IMM GRANULOCYTES NFR BLD AUTO: 0.4 % (ref 0–0.5)
LYMPHOCYTES # BLD AUTO: 0.5 K/UL (ref 1–4.8)
LYMPHOCYTES NFR BLD: 9.6 % (ref 18–48)
Lab: NORMAL
MAGNESIUM SERPL-MCNC: 1.4 MG/DL (ref 1.6–2.6)
MCH RBC QN AUTO: 29.7 PG (ref 27–31)
MCHC RBC AUTO-ENTMCNC: 31.6 G/DL (ref 32–36)
MCV RBC AUTO: 94 FL (ref 82–98)
MICROSCOPIC EXAM: NORMAL
MONOCYTES # BLD AUTO: 0.4 K/UL (ref 0.3–1)
MONOCYTES NFR BLD: 7.2 % (ref 4–15)
NEUTROPHILS # BLD AUTO: 4.4 K/UL (ref 1.8–7.7)
NEUTROPHILS NFR BLD: 82.2 % (ref 38–73)
NRBC BLD-RTO: 0 /100 WBC
PHOSPHATE SERPL-MCNC: 4.5 MG/DL (ref 2.7–4.5)
PLATELET # BLD AUTO: 171 K/UL (ref 150–450)
PMV BLD AUTO: 9.7 FL (ref 9.2–12.9)
POCT GLUCOSE: 103 MG/DL (ref 70–110)
POCT GLUCOSE: 126 MG/DL (ref 70–110)
POCT GLUCOSE: 155 MG/DL (ref 70–110)
POTASSIUM SERPL-SCNC: 4.8 MMOL/L (ref 3.5–5.1)
PROT SERPL-MCNC: 6.4 G/DL (ref 6–8.4)
RBC # BLD AUTO: 3.53 M/UL (ref 4.6–6.2)
SODIUM SERPL-SCNC: 134 MMOL/L (ref 136–145)
TACROLIMUS BLD-MCNC: 8.7 NG/ML (ref 5–15)
TACROLIMUS BLD-MCNC: 8.8 NG/ML (ref 5–15)
WBC # BLD AUTO: 5.39 K/UL (ref 3.9–12.7)

## 2022-07-25 PROCEDURE — 99232 SBSQ HOSP IP/OBS MODERATE 35: CPT | Mod: ,,, | Performed by: NURSE PRACTITIONER

## 2022-07-25 PROCEDURE — 36415 COLL VENOUS BLD VENIPUNCTURE: CPT | Performed by: TRANSPLANT SURGERY

## 2022-07-25 PROCEDURE — 80197 ASSAY OF TACROLIMUS: CPT | Performed by: PHYSICIAN ASSISTANT

## 2022-07-25 PROCEDURE — 84100 ASSAY OF PHOSPHORUS: CPT | Performed by: PHYSICIAN ASSISTANT

## 2022-07-25 PROCEDURE — 99233 PR SUBSEQUENT HOSPITAL CARE,LEVL III: ICD-10-PCS | Mod: 24,,, | Performed by: CLINICAL NURSE SPECIALIST

## 2022-07-25 PROCEDURE — 63600175 PHARM REV CODE 636 W HCPCS: Performed by: TRANSPLANT SURGERY

## 2022-07-25 PROCEDURE — 25000003 PHARM REV CODE 250: Performed by: PHYSICIAN ASSISTANT

## 2022-07-25 PROCEDURE — 63600175 PHARM REV CODE 636 W HCPCS: Performed by: STUDENT IN AN ORGANIZED HEALTH CARE EDUCATION/TRAINING PROGRAM

## 2022-07-25 PROCEDURE — 63600175 PHARM REV CODE 636 W HCPCS: Mod: JG | Performed by: NURSE PRACTITIONER

## 2022-07-25 PROCEDURE — 63600175 PHARM REV CODE 636 W HCPCS: Performed by: PHYSICIAN ASSISTANT

## 2022-07-25 PROCEDURE — 20600001 HC STEP DOWN PRIVATE ROOM

## 2022-07-25 PROCEDURE — 63600175 PHARM REV CODE 636 W HCPCS: Performed by: CLINICAL NURSE SPECIALIST

## 2022-07-25 PROCEDURE — 25000003 PHARM REV CODE 250: Performed by: NURSE PRACTITIONER

## 2022-07-25 PROCEDURE — 85025 COMPLETE CBC W/AUTO DIFF WBC: CPT | Performed by: TRANSPLANT SURGERY

## 2022-07-25 PROCEDURE — 80053 COMPREHEN METABOLIC PANEL: CPT | Performed by: PHYSICIAN ASSISTANT

## 2022-07-25 PROCEDURE — 25500020 PHARM REV CODE 255: Performed by: TRANSPLANT SURGERY

## 2022-07-25 PROCEDURE — 99232 PR SUBSEQUENT HOSPITAL CARE,LEVL II: ICD-10-PCS | Mod: ,,, | Performed by: NURSE PRACTITIONER

## 2022-07-25 PROCEDURE — 83735 ASSAY OF MAGNESIUM: CPT | Performed by: PHYSICIAN ASSISTANT

## 2022-07-25 PROCEDURE — 36415 COLL VENOUS BLD VENIPUNCTURE: CPT | Performed by: PHYSICIAN ASSISTANT

## 2022-07-25 PROCEDURE — 99233 SBSQ HOSP IP/OBS HIGH 50: CPT | Mod: 24,,, | Performed by: CLINICAL NURSE SPECIALIST

## 2022-07-25 PROCEDURE — 80197 ASSAY OF TACROLIMUS: CPT | Mod: 91 | Performed by: TRANSPLANT SURGERY

## 2022-07-25 PROCEDURE — 25000003 PHARM REV CODE 250: Performed by: TRANSPLANT SURGERY

## 2022-07-25 RX ORDER — MAGNESIUM SULFATE HEPTAHYDRATE 40 MG/ML
2 INJECTION, SOLUTION INTRAVENOUS
Status: COMPLETED | OUTPATIENT
Start: 2022-07-25 | End: 2022-07-25

## 2022-07-25 RX ORDER — FENTANYL CITRATE 50 UG/ML
INJECTION, SOLUTION INTRAMUSCULAR; INTRAVENOUS CODE/TRAUMA/SEDATION MEDICATION
Status: COMPLETED | OUTPATIENT
Start: 2022-07-25 | End: 2022-07-25

## 2022-07-25 RX ADMIN — METHOCARBAMOL 500 MG: 500 TABLET ORAL at 09:07

## 2022-07-25 RX ADMIN — OXYCODONE HYDROCHLORIDE 10 MG: 10 TABLET ORAL at 06:07

## 2022-07-25 RX ADMIN — Medication 400 MG: at 09:07

## 2022-07-25 RX ADMIN — IOHEXOL 15 ML: 300 INJECTION, SOLUTION INTRAVENOUS at 04:07

## 2022-07-25 RX ADMIN — Medication 400 MG: at 10:07

## 2022-07-25 RX ADMIN — TACROLIMUS 2 MG: 1 CAPSULE ORAL at 10:07

## 2022-07-25 RX ADMIN — MAGNESIUM SULFATE HEPTAHYDRATE 2 G: 40 INJECTION, SOLUTION INTRAVENOUS at 02:07

## 2022-07-25 RX ADMIN — MIRTAZAPINE 15 MG: 15 TABLET, FILM COATED ORAL at 09:07

## 2022-07-25 RX ADMIN — METHOCARBAMOL 500 MG: 500 TABLET ORAL at 06:07

## 2022-07-25 RX ADMIN — OXYCODONE HYDROCHLORIDE 10 MG: 10 TABLET ORAL at 09:07

## 2022-07-25 RX ADMIN — METHOCARBAMOL 500 MG: 500 TABLET ORAL at 02:07

## 2022-07-25 RX ADMIN — PREDNISONE 5 MG: 5 TABLET ORAL at 10:07

## 2022-07-25 RX ADMIN — PANTOPRAZOLE SODIUM 40 MG: 40 TABLET, DELAYED RELEASE ORAL at 10:07

## 2022-07-25 RX ADMIN — MAGNESIUM SULFATE HEPTAHYDRATE 2 G: 40 INJECTION, SOLUTION INTRAVENOUS at 12:07

## 2022-07-25 RX ADMIN — TACROLIMUS 2 MG: 1 CAPSULE ORAL at 06:07

## 2022-07-25 RX ADMIN — MICAFUNGIN SODIUM 100 MG: 100 INJECTION, POWDER, LYOPHILIZED, FOR SOLUTION INTRAVENOUS at 11:07

## 2022-07-25 RX ADMIN — SULFAMETHOXAZOLE AND TRIMETHOPRIM 1 TABLET: 400; 80 TABLET ORAL at 10:07

## 2022-07-25 RX ADMIN — ASPIRIN 81 MG CHEWABLE TABLET 81 MG: 81 TABLET CHEWABLE at 10:07

## 2022-07-25 RX ADMIN — FENTANYL CITRATE 50 MCG: 50 INJECTION, SOLUTION INTRAMUSCULAR; INTRAVENOUS at 04:07

## 2022-07-25 RX ADMIN — METHOCARBAMOL 500 MG: 500 TABLET ORAL at 10:07

## 2022-07-25 RX ADMIN — AMOXICILLIN AND CLAVULANATE POTASSIUM 1 TABLET: 875; 125 TABLET, FILM COATED ORAL at 10:07

## 2022-07-25 RX ADMIN — VALGANCICLOVIR HYDROCHLORIDE 450 MG: 450 TABLET ORAL at 10:07

## 2022-07-25 RX ADMIN — AMOXICILLIN AND CLAVULANATE POTASSIUM 1 TABLET: 875; 125 TABLET, FILM COATED ORAL at 09:07

## 2022-07-25 RX ADMIN — SITAGLIPTIN 100 MG: 50 TABLET, FILM COATED ORAL at 10:07

## 2022-07-25 NOTE — PLAN OF CARE
Biliary tube change complete. Pt tolerated well. VSS. No signs or symptoms of distress noted. Pt will be transferred to [facility]. Report called to floor RN.

## 2022-07-25 NOTE — CARE UPDATE
Patient fully recovered from procedure. VS stable, report called to [facility] [place]. Patient waiting to be transported back to room.

## 2022-07-25 NOTE — SUBJECTIVE & OBJECTIVE
"Interval HPI:   Overnight events: No acute events overnight. Patient on the TSU in room 49450/99777 A. Blood glucose stable. BG at goal on current insulin regimen (SSI and prandial insulin). Steroid use- Prednisone 5 mg.    Renal function- Normal   Vasopressors-  None       Diet NPO     Eating:   NPO  Nausea: No  Hypoglycemia and intervention: No  Fever: No  TPN and/or TF: No    BP (!) 141/88 (BP Location: Right arm, Patient Position: Lying)   Pulse 90   Temp 98.4 °F (36.9 °C) (Oral)   Resp 18   Ht 5' 8" (1.727 m)   Wt 77.4 kg (170 lb 10.2 oz)   SpO2 96%   BMI 25.95 kg/m²     Labs Reviewed and Include    Recent Labs   Lab 07/25/22  0647   GLU 96   CALCIUM 9.3   ALBUMIN 2.9*   PROT 6.4   *   K 4.8   CO2 24      BUN 10   CREATININE 0.8   ALKPHOS 292*   ALT 71*   AST 22   BILITOT 0.6     Lab Results   Component Value Date    WBC 5.39 07/25/2022    HGB 10.5 (L) 07/25/2022    HCT 33.2 (L) 07/25/2022    MCV 94 07/25/2022     07/25/2022     No results for input(s): TSH, FREET4 in the last 168 hours.  Lab Results   Component Value Date    HGBA1C 5.8 (H) 06/20/2022       Nutritional status:   Body mass index is 25.95 kg/m².  Lab Results   Component Value Date    ALBUMIN 2.9 (L) 07/25/2022    ALBUMIN 2.8 (L) 07/24/2022    ALBUMIN 2.7 (L) 07/23/2022     No results found for: PREALBUMIN    Estimated Creatinine Clearance: 114 mL/min (based on SCr of 0.8 mg/dL).    Accu-Checks  Recent Labs     07/22/22  1653 07/22/22  2207 07/23/22  1210 07/23/22  1642 07/24/22  0035 07/24/22  0837 07/24/22  1153 07/24/22  1647 07/24/22  2145 07/25/22  0904   POCTGLUCOSE 158* 132* 118* 148* 174* 137* 123* 154* 125* 103       Current Medications and/or Treatments Impacting Glycemic Control  Immunotherapy:    Immunosuppressants           Stop Route Frequency     tacrolimus capsule 2 mg         -- Oral 2 times daily          Steroids:   Hormones (From admission, onward)                Start     Stop Route Frequency Ordered "    07/18/22 1158  melatonin tablet 9 mg         -- Oral Nightly PRN 07/18/22 1100    07/18/22 1145  predniSONE tablet 5 mg         -- Oral Daily 07/18/22 1034          Pressors:    Autonomic Drugs (From admission, onward)                None          Hyperglycemia/Diabetes Medications:   Antihyperglycemics (From admission, onward)                Start     Stop Route Frequency Ordered    07/25/22 1045  SITagliptin tablet 100 mg        Question:  Is the patient competent?  Answer:  Yes    -- Oral Daily 07/25/22 0932    07/18/22 1135  insulin aspart U-100 pen 0-5 Units         -- SubQ Before meals & nightly PRN 07/18/22 1035

## 2022-07-25 NOTE — PLAN OF CARE
Pt is AAOx4, afebrile, and VSS. Pt pain controlled with 1 dose of PRN PO Oxycodone overnight. Chevron incision phoebe with staples. Rt bili drain unclamped and leaking at insertion. Bili drain produced   green thin drainage overnight, see flowsheets for output.   Telemetry maintained NSR. Pt NPO since midnight for poss procedure in the am pending labs. Pt is in bed in the lowest position, wheels locked, and call light in reach.

## 2022-07-25 NOTE — PROGRESS NOTES
[facility]  Liver Transplant  Progress Note    Patient Name: Romeo Larson  MRN: [id number]  Admission Date: 7/18/2022  Hospital Length of Stay: 7 days  Code Status: Full Code  Primary Care Provider: Pravin Maria MD  Post-Operative Day: 34    ORGAN:   RIGHT LIVER LOBE (SEGS 5,6,7,8) WITHOUT MIDDLE HEPATIC VEIN  Disease Etiology: Primary Liver Malignancy: Cholangiocarcinoma (CH-CA)  Donor Type:   Living  CDC High Risk:     Donor CMV Status:   Donor CMV Status:   Donor HBcAB:     Donor HCV Status:     Donor HBV GUSTABO:   Donor HCV GUSTABO:   Whole or Partial: Partial Liver  Biliary Anastomosis: Vick-en-Y  Arterial Anatomy: Standard  Subjective:     History of Present Illness:  Romeo Larson is a 41yr old male s/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 Post operative course significant for recurrent bile leak. OR take back 6/23 for bile duct repair (small leak found near biliary anastomosis, unable to clearly identify source), cultures grew enterococcus faecalis. OR take back 7/4 for ex lap, washout of biloma, bile duct unable to be assessed due to adhesions, OR cultures with lactobacillus species. Patient was discharged home with 1 drain in place. Patient presented for outpatient follow up, drain found to be bilious and he was readmitted 7/12/22. He was placed on BS IV ABX and  underwent PTC drain (internal/external) placement on 7/13 with bilious drainage output. Drain being flushed [id number] with sterile saline 30ml flushes. Decreased amount of drainage from left and right SHRUTHI drains prior to discharge. LFTs trended down. He was transitioned to PO cefpoxime and remained afebrile. He was discharged on 7/15/22.     Mr Larson now presents with fever 101 since early this morning. Denies any other symptoms. Reports minimal drainage from SHRUTHI drains and continued bilious drainage from PTC drain. He is flushing PTC drain as directed. Site c/d/i. Incision c/d/i. Denies  abdominal pain, nausea/ vomiting, chest pain, sob, diarrhea, and dysuria. Pt hypotensive and tachycardic on arrival to ED with BP 80/50s and . Febrile to 101.5. Blood pressure improved to 90/60s. He was give IV cefepime/vanc and LR bolus. Labs and imaging reviewed. Plan to admit for further management. Pt d/w Dr Godwin.          Hospital Course:  Pt admitted with sepsis. PTC in place with bilious output. SHRUTHI drains also in place with minimal output. Infectious work-up ordered on admit. Broad spec IV anbx ordered. Initially on vanc/cefepime but ID consulted and changed to Unasyn d/t history of enterrococcus. Vital signs improved with IVF and IV anbx. CT A/P 7/18, reviewed by surgeon, R SHRUTHI removed based on CT findings. IR consulted, had repeat cholangiogram w PTC upsize 7/20. Blood cx from 7/18 with Candida glabrata, started on Micafungin per ID. PTC clamped 7/21. LFTs gradually increasing with PTC clamped so PTC reopened to gravity 7/23. Remaining SHRUTHI drain removed 7/22. LFTs now trending down after PTC reopened. Patient remains afebrile. Per ID, will need at least 2 weeks of IV Micafungin, E.O.T date at least 8/1 (needs f/u imaging prior to stopping). Also needs PO amox/clav at least 2 weeks per ID.     Interval note: NAEON. Pt with 400 cc bilious output in PTC past 24 hrs. However, pt continues to have bilious leakage from around PTC insertion site. Discussed with IR, will plan on cholangiogram with possible upsize of PTC. LFTs/Tbili continue to trend down. Pt afebrile and reports feeling well. VSS. Continue to monitor. Awaiting susceptibilites of Candida.       Scheduled Meds:   amoxicillin-clavulanate 875-125mg  1 tablet Oral Q12H    aspirin  81 mg Oral Daily    heparin (porcine)  5,000 Units Subcutaneous Q8H    magnesium oxide  400 mg Oral BID    methocarbamoL  500 mg Oral QID    micafungin (MYCAMINE) IVPB  100 mg Intravenous Q24H    mirtazapine  15 mg Oral Nightly    pantoprazole  40 mg Oral Daily     predniSONE  5 mg Oral Daily    SITagliptin  100 mg Oral Daily    sulfamethoxazole-trimethoprim 400-80mg  1 tablet Oral Daily    tacrolimus  2 mg Oral BID    valGANciclovir  450 mg Oral Daily     Continuous Infusions:  PRN Meds:acetaminophen, dextrose 10%, dextrose 10%, glucagon (human recombinant), glucose, glucose, heparin, porcine (PF), insulin aspart U-100, melatonin, ondansetron, oxyCODONE, sodium chloride 0.9%    Review of Systems   Constitutional:  Negative for activity change, appetite change, chills, diaphoresis and fever.   HENT:  Negative for congestion, sinus pressure, sinus pain, sore throat and trouble swallowing.    Eyes:  Negative for visual disturbance.   Respiratory:  Negative for chest tightness, shortness of breath and stridor.    Cardiovascular:  Negative for chest pain, palpitations and leg swelling.   Gastrointestinal:  Negative for abdominal distention, abdominal pain, constipation, diarrhea, nausea and vomiting.   Genitourinary:  Negative for decreased urine volume, difficulty urinating, dysuria and flank pain.   Musculoskeletal:  Negative for neck pain and neck stiffness.   Skin:  Positive for wound. Negative for color change and rash.   Allergic/Immunologic: Positive for immunocompromised state.   Neurological:  Negative for dizziness, tremors, syncope, light-headedness and headaches.   Psychiatric/Behavioral:  Negative for agitation, confusion, decreased concentration and hallucinations. The patient is not nervous/anxious.    Objective:     Vital Signs (Most Recent):  Temp: 98.2 °F (36.8 °C) (07/25/22 1244)  Pulse: 103 (07/25/22 1244)  Resp: 18 (07/25/22 1244)  BP: 124/86 (07/25/22 1244)  SpO2: 97 % (07/25/22 1244) Vital Signs (24h Range):  Temp:  [98.2 °F (36.8 °C)-98.9 °F (37.2 °C)] 98.2 °F (36.8 °C)  Pulse:  [] 103  Resp:  [16-20] 18  SpO2:  [96 %-100 %] 97 %  BP: (122-141)/(83-92) 124/86     Weight: 77.4 kg (170 lb 10.2 oz)  Body mass index is 25.95  kg/m².    Intake/Output - Last 3 Shifts         07/23 0700 07/24 0659 07/24 0700 07/25 0659 07/25 0700 07/26 0659    P.O. 1780 1392     I.V. (mL/kg) 0 (0) 0 (0)     Other 0 0     Total Intake(mL/kg) 1780 (22.6) 1392 (18)     Urine (mL/kg/hr) 0 (0) 0 (0)     Emesis/NG output 0 0     Drains 190 400     Other 0 0     Stool 0 0     Blood  0     Total Output 190 400     Net +1590 +992            Urine Occurrence 7 x 7 x     Stool Occurrence 1 x 1 x     Emesis Occurrence 0 x 0 x             Physical Exam  Vitals and nursing note reviewed.   Constitutional:       General: He is not in acute distress.     Appearance: He is not diaphoretic.   HENT:      Head: Normocephalic and atraumatic.   Eyes:      General:         Right eye: No discharge.         Left eye: No discharge.   Cardiovascular:      Rate and Rhythm: Normal rate and regular rhythm.      Heart sounds: Normal heart sounds.   Pulmonary:      Effort: Pulmonary effort is normal.      Breath sounds: Normal breath sounds. No wheezing or rales.   Abdominal:      General: Bowel sounds are normal. There is no distension.      Palpations: Abdomen is soft.      Tenderness: There is no abdominal tenderness. There is no guarding.       Musculoskeletal:      Cervical back: Normal range of motion and neck supple.      Right lower leg: No edema.      Left lower leg: No edema.   Skin:     General: Skin is warm and dry.      Capillary Refill: Capillary refill takes 2 to 3 seconds.   Neurological:      Mental Status: He is alert and oriented to person, place, and time.      Cranial Nerves: No cranial nerve deficit.   Psychiatric:         Mood and Affect: Mood normal.         Behavior: Behavior normal.         Thought Content: Thought content normal.         Judgment: Judgment normal.       Laboratory:  Immunosuppressants           Stop Route Frequency     tacrolimus capsule 2 mg         -- Oral 2 times daily          CBC:   Recent Labs   Lab 07/25/22  0959   WBC 5.39   RBC 3.53*  "  HGB 10.5*   HCT 33.2*      MCV 94   MCH 29.7   MCHC 31.6*     BMP:   Recent Labs   Lab 07/25/22  0647   GLU 96   *   K 4.8      CO2 24   BUN 10   CREATININE 0.8   CALCIUM 9.3     CMP:   Recent Labs   Lab 07/25/22  0647   GLU 96   CALCIUM 9.3   ALBUMIN 2.9*   PROT 6.4   *   K 4.8   CO2 24      BUN 10   CREATININE 0.8   ALKPHOS 292*   ALT 71*   AST 22   BILITOT 0.6     Labs within the past 24 hours have been reviewed.    Diagnostic Results:  I have personally reviewed all pertinent imaging studies.    Debility/Functional status: No debility noted.    Assessment/Plan:     * Sepsis  - Pt presented with fever, tachycardia and hypotension despite cefpodoxime  - Hypotension improving with IVF  - Likely 2/2 biliary source  - PTC drain to gravity and SHRUTHI x 2 in place (R SHRUTHI d/c'd 7/19)  - blood cultures NGTD, CXR, UA and CT a/p reviewed  - Cont Vanc/cefepime and continue diflucan  - IR consult for cholangiogram with tube exchange/upsize 7/20/22  - PTC clamped 7/21  - ID consulted 7/21- yeast in blood- changed fluc to Prudence 7/21.  Vanc/cefepime changed to Unasyn 7/21, apprec ID recs  - Now afebrile. Plan for 2 weeks IV Prudence and 2 weeks PO augmentin      Intra-abdominal infection  - see sepsis      Fungemia  - Blood cx 7/18 growing Candida glabrata, ID consulted, changed Fluc to Prudence 7/21 tentative plan for 2 weeks, susceptibilities pending  - no plan for further w/u at this time  - pt denies change in vision, no plan for Ophthalmology      Bile leak  - s/p PTC (internalized) on 7/13/22  - PTC to gravity with bilious output. SHRUTHI x 2 with scant output no longer draining per patient  - Cont flushing PTC  - IR consulted for cholangiogram with tube exchange 7/20/22  - clamped PTC 7/21/22 and cont to flush   - LFTs increasing after clamping PTC, PTC opened to gravity 7/23  - Leakage around insertion site of PTC. IR reconsulted 7/25 for cholangio and possible upsize PTC  - See "sepsis"      Long-term use " "of immunosuppressant medication  - see "prophylactic immunotherapy"      Type 2 diabetes mellitus with hyperglycemia  - cont sliding scale insulin  - endo consulted 7/22      At risk for opportunistic infections  - cont OI prophylaxis per protocol  - CMV PCR 7/19 not detected    Prophylactic immunotherapy  - continue prograf and steroid taper per protocol  - will check daily prograf levels, monitor for toxic side effects, and adjust for therapeutic dose  - cellcept on hold for infection    S/P liver transplant  - chevron incision with staples in place - 15 days out from take back  - SHRUTHI x 2 with scant output - remove the right drain today  - Trend LFTs daily  - See "bile leak"  - PTC drain clamped 7/21/22  - cont to flush w 10cc NS twice daily  - LFTs trend up coincidentally after clamping PTC  - PTC to gravity 7/23, LFTs better today  - IR reconsulted 7/25 d/t bilious leakage around insertion site. Plan for repeat cholangiogram and possible PTC upsize    Anemia  - Chronic, likely multifactorial.  - continue to monitor    Hypercholesterolemia            VTE Risk Mitigation (From admission, onward)         Ordered     heparin, porcine (PF) 100 unit/mL injection flush 500 Units  As needed (PRN)         07/20/22 1605     heparin (porcine) injection 5,000 Units  Every 8 hours         07/18/22 1100     IP VTE HIGH RISK PATIENT  Once         07/18/22 1100     Place sequential compression device  Until discontinued         07/18/22 1100                The patients clinical status was discussed at multidisplinary rounds, involving transplant surgery, transplant medicine, pharmacy, nursing, nutrition, and social work    Discharge Planning:  Not suitable for discharge at this time    Michael Sahu NP  Liver Transplant  [facility]  "

## 2022-07-25 NOTE — PROGRESS NOTES
"[facility]  Endocrinology  Progress Note    Admit Date: 7/18/2022     Reason for Consult: Management of T2DM, Hyperglycemia      Surgical Procedure and Date: Liver Transplant 6/21/2022     Diabetes diagnosis year: 2022     Home Diabetes Medications:  Novolog 5 units TID with meals + SSI LDC (150/50)     How often checking glucose at home?  3-4x daily    BG readings on regimen: 100-200's   Hypoglycemia on the regimen?  No  Missed doses on regimen?  No     Diabetes Complications include:     Hyperglycemia      Complicating diabetes co morbidities:   Glucocorticoid Use     HPI:   Patient is a 41 y.o. male with a diagnosis of well controlled type 2 DM. 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 Post operative course significant for recurrent bile leak. Patient presented for outpatient follow up, drain placed last hospitalization found to have bilious output, and patient reported feeling weak with decreased appeitie. Admitted for further interventions. Endocrinology consulted for BG/ DM management.     Lab Results   Component Value Date    HGBA1C 5.8 (H) 06/20/2022           Interval HPI:   Overnight events: No acute events overnight. Patient on the TSU in room 10794/67252 A. Blood glucose stable. BG at goal on current insulin regimen (SSI and prandial insulin). Steroid use- Prednisone 5 mg.    Renal function- Normal   Vasopressors-  None       Diet NPO     Eating:   NPO  Nausea: No  Hypoglycemia and intervention: No  Fever: No  TPN and/or TF: No    BP (!) 141/88 (BP Location: Right arm, Patient Position: Lying)   Pulse 90   Temp 98.4 °F (36.9 °C) (Oral)   Resp 18   Ht 5' 8" (1.727 m)   Wt 77.4 kg (170 lb 10.2 oz)   SpO2 96%   BMI 25.95 kg/m²     Labs Reviewed and Include    Recent Labs   Lab 07/25/22  0647   GLU 96   CALCIUM 9.3   ALBUMIN 2.9*   PROT 6.4   *   K 4.8   CO2 24      BUN 10   CREATININE 0.8   ALKPHOS 292*   ALT 71* "   AST 22   BILITOT 0.6     Lab Results   Component Value Date    WBC 5.39 07/25/2022    HGB 10.5 (L) 07/25/2022    HCT 33.2 (L) 07/25/2022    MCV 94 07/25/2022     07/25/2022     No results for input(s): TSH, FREET4 in the last 168 hours.  Lab Results   Component Value Date    HGBA1C 5.8 (H) 06/20/2022       Nutritional status:   Body mass index is 25.95 kg/m².  Lab Results   Component Value Date    ALBUMIN 2.9 (L) 07/25/2022    ALBUMIN 2.8 (L) 07/24/2022    ALBUMIN 2.7 (L) 07/23/2022     No results found for: PREALBUMIN    Estimated Creatinine Clearance: 114 mL/min (based on SCr of 0.8 mg/dL).    Accu-Checks  Recent Labs     07/22/22  1653 07/22/22  2207 07/23/22  1210 07/23/22  1642 07/24/22  0035 07/24/22  0837 07/24/22  1153 07/24/22  1647 07/24/22  2145 07/25/22  0904   POCTGLUCOSE 158* 132* 118* 148* 174* 137* 123* 154* 125* 103       Current Medications and/or Treatments Impacting Glycemic Control  Immunotherapy:    Immunosuppressants           Stop Route Frequency     tacrolimus capsule 2 mg         -- Oral 2 times daily          Steroids:   Hormones (From admission, onward)                Start     Stop Route Frequency Ordered    07/18/22 1158  melatonin tablet 9 mg         -- Oral Nightly PRN 07/18/22 1100    07/18/22 1145  predniSONE tablet 5 mg         -- Oral Daily 07/18/22 1034          Pressors:    Autonomic Drugs (From admission, onward)                None          Hyperglycemia/Diabetes Medications:   Antihyperglycemics (From admission, onward)                Start     Stop Route Frequency Ordered    07/25/22 1045  SITagliptin tablet 100 mg        Question:  Is the patient competent?  Answer:  Yes    -- Oral Daily 07/25/22 0932    07/18/22 1135  insulin aspart U-100 pen 0-5 Units         -- SubQ Before meals & nightly PRN 07/18/22 1035            ASSESSMENT and PLAN    * Sepsis  Infection may elevate BG readings  On IV antibiotics  Managed per primary team  Avoid hypoglycemia      Type 2  diabetes mellitus with hyperglycemia  Endocrinology consulted for BG management.   BG goal 140-180    - D/C Novolog (aspart) insulin 4 Units SQ TIDWM  - Continue Novolog LDC SSI (150/50) prn for BG excursions.   - Start Januvia 100 mg QD to prevent prandial excursions. (No history of pancreatitis or medullary thyroid CA).    - BG checks AC/HS  - Hypoglycemia protocol in place    ** Please notify Endocrine for any change and/or advance in diet**  ** Please call Endocrine for any BG related issues **    Discharge Planning:   - D/C Insulin  - Januvia 100 mg QD  - BG checks before breakfast and before dinner  - Insurance approved diabetes testing supplies  - Logs given, send BG logs in 4 days        S/P liver transplant  Managed per primary team  Avoid hypoglycemia        Long-term use of immunosuppressant medication  On steroid therapy per transplant team; may elevate BG readings             Brad Singleton, DNP, FNP  Endocrinology  [facility]

## 2022-07-25 NOTE — ASSESSMENT & PLAN NOTE
- Blood cx 7/18 growing Candida glabrata, ID consulted, changed Fluc to Prudence 7/21 tentative plan for 2 weeks, susceptibilities pending  - no plan for further w/u at this time  - pt denies change in vision, no plan for Ophthalmology

## 2022-07-25 NOTE — PROCEDURES
"  Pre Op Diagnosis: Biliary obstruction  Post Op Diagnosis: Same    Procedure: Biliary drain exchange    Procedure performed by: Hattie    Written Informed Consent Obtained: Yes  Specimen Removed: YES existing drain  Estimated Blood Loss: Minimal    Findings:   Successful upsizing of right sided int/ext biliary drain. 10fr drain exchange for 12fr Drain.    Patient tolerated procedure well. Drain can be capped in 2 days.     Erik Kuhn MD ([facility])  Interventional Radiology  [phone]        "

## 2022-07-25 NOTE — PLAN OF CARE
Patient had drain upsized in IR this afternoon to #12 PTC. No leakage around drain noted. Possible discharge home tomorrow. Dad at bedside all day

## 2022-07-25 NOTE — ASSESSMENT & PLAN NOTE
"- s/p PTC (internalized) on 7/13/22  - PTC to gravity with bilious output. SHRUTHI x 2 with scant output no longer draining per patient  - Cont flushing PTC  - IR consulted for cholangiogram with tube exchange 7/20/22  - clamped PTC 7/21/22 and cont to flush   - LFTs increasing after clamping PTC, PTC opened to gravity 7/23  - Leakage around insertion site of PTC. IR reconsulted 7/25 for cholangio and possible upsize PTC  - See "sepsis"    "

## 2022-07-25 NOTE — ASSESSMENT & PLAN NOTE
"- chevron incision with staples in place - 15 days out from take back  - SHRUTHI x 2 with scant output - remove the right drain today  - Trend LFTs daily  - See "bile leak"  - PTC drain clamped 7/21/22  - cont to flush w 10cc NS twice daily  - LFTs trend up coincidentally after clamping PTC  - PTC to gravity 7/23, LFTs better today  - IR reconsulted 7/25 d/t bilious leakage around insertion site. Plan for repeat cholangiogram and possible PTC upsize  "

## 2022-07-25 NOTE — SUBJECTIVE & OBJECTIVE
Scheduled Meds:   amoxicillin-clavulanate 875-125mg  1 tablet Oral Q12H    aspirin  81 mg Oral Daily    heparin (porcine)  5,000 Units Subcutaneous Q8H    magnesium oxide  400 mg Oral BID    methocarbamoL  500 mg Oral QID    micafungin (MYCAMINE) IVPB  100 mg Intravenous Q24H    mirtazapine  15 mg Oral Nightly    pantoprazole  40 mg Oral Daily    predniSONE  5 mg Oral Daily    SITagliptin  100 mg Oral Daily    sulfamethoxazole-trimethoprim 400-80mg  1 tablet Oral Daily    tacrolimus  2 mg Oral BID    valGANciclovir  450 mg Oral Daily     Continuous Infusions:  PRN Meds:acetaminophen, dextrose 10%, dextrose 10%, glucagon (human recombinant), glucose, glucose, heparin, porcine (PF), insulin aspart U-100, melatonin, ondansetron, oxyCODONE, sodium chloride 0.9%    Review of Systems   Constitutional:  Negative for activity change, appetite change, chills, diaphoresis and fever.   HENT:  Negative for congestion, sinus pressure, sinus pain, sore throat and trouble swallowing.    Eyes:  Negative for visual disturbance.   Respiratory:  Negative for chest tightness, shortness of breath and stridor.    Cardiovascular:  Negative for chest pain, palpitations and leg swelling.   Gastrointestinal:  Negative for abdominal distention, abdominal pain, constipation, diarrhea, nausea and vomiting.   Genitourinary:  Negative for decreased urine volume, difficulty urinating, dysuria and flank pain.   Musculoskeletal:  Negative for neck pain and neck stiffness.   Skin:  Positive for wound. Negative for color change and rash.   Allergic/Immunologic: Positive for immunocompromised state.   Neurological:  Negative for dizziness, tremors, syncope, light-headedness and headaches.   Psychiatric/Behavioral:  Negative for agitation, confusion, decreased concentration and hallucinations. The patient is not nervous/anxious.    Objective:     Vital Signs (Most Recent):  Temp: 98.2 °F (36.8 °C) (07/25/22 1244)  Pulse: 103 (07/25/22 1244)  Resp: 18  (07/25/22 1244)  BP: 124/86 (07/25/22 1244)  SpO2: 97 % (07/25/22 1244) Vital Signs (24h Range):  Temp:  [98.2 °F (36.8 °C)-98.9 °F (37.2 °C)] 98.2 °F (36.8 °C)  Pulse:  [] 103  Resp:  [16-20] 18  SpO2:  [96 %-100 %] 97 %  BP: (122-141)/(83-92) 124/86     Weight: 77.4 kg (170 lb 10.2 oz)  Body mass index is 25.95 kg/m².    Intake/Output - Last 3 Shifts         07/23 0700 07/24 0659 07/24 0700 07/25 0659 07/25 0700 07/26 0659    P.O. 1780 1392     I.V. (mL/kg) 0 (0) 0 (0)     Other 0 0     Total Intake(mL/kg) 1780 (22.6) 1392 (18)     Urine (mL/kg/hr) 0 (0) 0 (0)     Emesis/NG output 0 0     Drains 190 400     Other 0 0     Stool 0 0     Blood  0     Total Output 190 400     Net +1590 +992            Urine Occurrence 7 x 7 x     Stool Occurrence 1 x 1 x     Emesis Occurrence 0 x 0 x             Physical Exam  Vitals and nursing note reviewed.   Constitutional:       General: He is not in acute distress.     Appearance: He is not diaphoretic.   HENT:      Head: Normocephalic and atraumatic.   Eyes:      General:         Right eye: No discharge.         Left eye: No discharge.   Cardiovascular:      Rate and Rhythm: Normal rate and regular rhythm.      Heart sounds: Normal heart sounds.   Pulmonary:      Effort: Pulmonary effort is normal.      Breath sounds: Normal breath sounds. No wheezing or rales.   Abdominal:      General: Bowel sounds are normal. There is no distension.      Palpations: Abdomen is soft.      Tenderness: There is no abdominal tenderness. There is no guarding.       Musculoskeletal:      Cervical back: Normal range of motion and neck supple.      Right lower leg: No edema.      Left lower leg: No edema.   Skin:     General: Skin is warm and dry.      Capillary Refill: Capillary refill takes 2 to 3 seconds.   Neurological:      Mental Status: He is alert and oriented to person, place, and time.      Cranial Nerves: No cranial nerve deficit.   Psychiatric:         Mood and Affect: Mood  normal.         Behavior: Behavior normal.         Thought Content: Thought content normal.         Judgment: Judgment normal.       Laboratory:  Immunosuppressants           Stop Route Frequency     tacrolimus capsule 2 mg         -- Oral 2 times daily          CBC:   Recent Labs   Lab 07/25/22  0959   WBC 5.39   RBC 3.53*   HGB 10.5*   HCT 33.2*      MCV 94   MCH 29.7   MCHC 31.6*     BMP:   Recent Labs   Lab 07/25/22  0647   GLU 96   *   K 4.8      CO2 24   BUN 10   CREATININE 0.8   CALCIUM 9.3     CMP:   Recent Labs   Lab 07/25/22  0647   GLU 96   CALCIUM 9.3   ALBUMIN 2.9*   PROT 6.4   *   K 4.8   CO2 24      BUN 10   CREATININE 0.8   ALKPHOS 292*   ALT 71*   AST 22   BILITOT 0.6     Labs within the past 24 hours have been reviewed.    Diagnostic Results:  I have personally reviewed all pertinent imaging studies.    Debility/Functional status: No debility noted.

## 2022-07-25 NOTE — TELEPHONE ENCOUNTER
Patient needs a follow-up appointment in the discharge clinic in 1-2 weeks.    Lab Results   Component Value Date    HGBA1C 5.8 (H) 06/20/2022       POCT Glucose   Date Value Ref Range Status   07/25/2022 103 70 - 110 mg/dL Final   07/24/2022 125 (H) 70 - 110 mg/dL Final   07/24/2022 154 (H) 70 - 110 mg/dL Final   07/24/2022 123 (H) 70 - 110 mg/dL Final   07/24/2022 137 (H) 70 - 110 mg/dL Final   07/24/2022 174 (H) 70 - 110 mg/dL Final   07/23/2022 148 (H) 70 - 110 mg/dL Final   07/23/2022 118 (H) 70 - 110 mg/dL Final   07/22/2022 132 (H) 70 - 110 mg/dL Final       Diabetes Medications             insulin aspart U-100 (NOVOLOG) 100 unit/mL (3 mL) InPn pen Inject 5 Units into the skin 3 (three) times daily with meals. + sliding scale.  MDD 30 units/d      Hospital Medications             glucagon (human recombinant) injection 1 mg 1 mg, Intramuscular, As needed (PRN), Turn patient on their side, give IM, and NOTIFY MD IMMEDIATELY.<BR><BR>Feed the patient as soon as patient awakens and is able to swallow.    glucose chewable tablet 16 g 16 g, Oral, As needed (PRN), (16 grams = #  four 4gm glucose tablets)    glucose chewable tablet 24 g 24 g, Oral, As needed (PRN), (24 grams = # six 4gm glucose tablets)    insulin aspart U-100 pen 0-5 Units 0-5 Units, Subcutaneous, Before meals &amp; nightly PRN, **LOW CORRECTION DOSE**<BR>Blood Glucose<BR>mg/dL                  Pre-meal                2200<BR>151-200                0 unit                      0 unit<BR>201-250                2 units                    1 unit<BR>251-300                3 units                    1 unit<BR>301-350                4 units                    2 units<BR>&gt;350                     5 units                    3 units<BR>Administer subcutaneously if needed at times designated by monitoring schedule. <BR>DO NOT HOLD correction dose insulin for patients who are  NPO.<BR>&quot;HIGH ALERT MEDICATION&quot; - Administer with meals or TF/TPN.     SITagliptin tablet 100 mg 100 mg, Oral, Daily          Thanks,    Brad Singleton DNP, FNP-C  Department of Endocrinology  Inpatient Glycemic Management

## 2022-07-25 NOTE — H&P
Inpatient Radiology Pre-procedure Note    History of Present Illness:  Romeo Larson is a 42 y.o. male who presents for cholangiogram and possible upsize.  Admission H&P reviewed.  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    Hypercholesteremia     Hypertension      Past Surgical History:   Procedure Laterality Date    DIAGNOSTIC ULTRASOUND N/A 6/21/2022    Procedure: ULTRASOUND, DIAGNOSTIC;  Surgeon: Sinan Cline MD;  [facility];  Service: Transplant;  Laterality: N/A;    ENDOSCOPIC ULTRASOUND OF UPPER GASTROINTESTINAL TRACT N/A 10/20/2021    Procedure: ULTRASOUND, UPPER GI TRACT, ENDOSCOPIC;  Surgeon: Valeriy Sotelo MD;  [facility]  Service: Endoscopy;  Laterality: N/A;  wife to email vacc card-inst email-tb    ERCP N/A 10/20/2021    Procedure: ERCP (ENDOSCOPIC RETROGRADE CHOLANGIOPANCREATOGRAPHY);  Surgeon: Valeriy Sotelo MD;  [facility]  Service: Endoscopy;  Laterality: N/A;    ERCP N/A 11/4/2021    Procedure: ERCP (ENDOSCOPIC RETROGRADE CHOLANGIOPANCREATOGRAPHY);  Surgeon: Valeriy Sotelo MD;  [facility]  Service: Endoscopy;  Laterality: N/A;    ERCP N/A 11/4/2021    Procedure: ERCP (ENDOSCOPIC RETROGRADE CHOLANGIOPANCREATOGRAPHY);  Surgeon: Roselia Pereyra MD;  [facility]  Service: Endoscopy;  Laterality: N/A;  pt vaccinated, instructed to bring card to procedure, instructions sent portal-MG    ERCP N/A 1/14/2022    Procedure: ERCP (ENDOSCOPIC RETROGRADE CHOLANGIOPANCREATOGRAPHY);  Surgeon: Roselia Pereyra MD;  Location: [facility] ENDO ([facility] FLR);  Service: Endoscopy;  Laterality: N/A;  inst portal-right chest port-tb  Pt requested at home COVID testing, Pt instructed at home RAPID to be done morning of procedure, Pt  instructed to call endoscopy scheuding if there is a positive result, Pt informed if a positive     ERCP N/A 3/31/2022    Procedure: ERCP (ENDOSCOPIC RETROGRADE CHOLANGIOPANCREATOGRAPHY);  Surgeon: Julio Cesar Alonzo MD;  Location: [facility] ENDO (2ND FLR);  Service: Endoscopy;  Laterality: N/A;  3/16: port L chest wall. pt to bring covid vaccination card. Instructions sent via portal.-SC  3/18/22-Updated instructions sent via portal re:new date/time-DS    EXPLORATORY LAPAROTOMY AFTER LIVER TRANSPLANTATION N/A 6/23/2022    Procedure: LAPAROTOMY, EXPLORATORY, AFTER LIVER TRANSPLANT;  Surgeon: Julio Cesar Jara MD;  Location: [facility] OR [facility] FLR;  Service: Transplant;  Laterality: N/A;    INSERTION OF TUNNELED CENTRAL VENOUS CATHETER (CVC) WITH SUBCUTANEOUS PORT N/A 11/24/2021    Procedure: INSERTION, PORT-A-CATH;  Surgeon: Prem Syed MD;  Location: [facility] CATH LAB;  Service: Radiology;  Laterality: N/A;    LIVER TRANSPLANT N/A 6/21/2022    Procedure: TRANSPLANT, LIVER;  Surgeon: Sinan Cline MD;  [facility];  Service: Transplant;  Laterality: N/A;    REPAIR OF BILE DUCT N/A 6/23/2022    Procedure: REPAIR, BILE DUCT;  Surgeon: Julio Cesar Jara MD;  [facility];  Service: Transplant;  Laterality: N/A;    ROBOT-ASSISTED LAPAROSCOPIC LYMPHADENECTOMY USING DA МАРИНА XI  6/17/2022    Procedure: XI ROBOTIC LYMPHADENECTOMY - Portal;  Surgeon: Julio Cesar Jara MD;  [facility];  Service: Transplant;;    TONSILLECTOMY      XI ROBOTIC LAPAROSCOPY, EXPLORATORY N/A 6/17/2022    Procedure: XI ROBOTIC LAPAROSCOPY,EXPLORATORY;  Surgeon: Julio Cesar Jara MD;  [facility];  Service: Transplant;  Laterality: N/A;       Review of Systems:   As documented in primary team H&P    Home Meds:   Prior to Admission medications    Medication Sig Start Date End Date Taking? Authorizing Provider   amoxicillin-clavulanate 875-125mg (AUGMENTIN) 875-125 mg per tablet Take 1 tablet by mouth every 12 (twelve)  "hours. for 12 days 7/23/22 8/4/22  Santi Godwin MD   aspirin 81 MG Chew CHEW 1 tablet (81 mg total) by mouth once daily. 6/28/22 6/28/23  Abida Arenas NP   blood sugar diagnostic Strp 1 each by Misc.(Non-Drug; Combo Route) route 3 (three) times daily. 6/28/22   Abida Arenas NP   blood-glucose meter Misc Use as instructed 6/28/22   Abida Arenas NP   calcium carbonate-vitamin D3 600 mg-20 mcg (800 unit) Tab Take 1 tablet by mouth once daily at 6am. 6/28/22   Abida Arenas NP   carvediloL (COREG) 3.125 MG tablet Take 1 tablet (3.125 mg total) by mouth 2 (two) times daily. 6/28/22 6/28/23  Abida Arenas NP   insulin aspart U-100 (NOVOLOG) 100 unit/mL (3 mL) InPn pen Inject 5 Units into the skin 3 (three) times daily with meals. + sliding scale.  MDD 30 units/d 7/16/22   Brinda Serrato MD   lancets 30 gauge Misc 1 each by Misc.(Non-Drug; Combo Route) route 3 (three) times daily. 6/28/22   Abida Arenas NP   methocarbamoL (ROBAXIN) 500 MG Tab Take 1 tablet (500 mg total) by mouth 4 (four) times daily. 6/28/22   Abida Arenas NP   micafungin sodium (MICAFUNGIN 100 MG/100 ML NS, READY TO MIX SYSTEM,) Inject 100 mLs (100 mg total) into the vein once daily. Stop: 8/4/22 for 12 days 7/22/22 8/3/22  Santi Godwin MD   mirtazapine (REMERON) 15 MG tablet Take 1 tablet (15 mg total) by mouth nightly. 7/16/22 7/16/23  Sinan Cline MD   oxyCODONE (ROXICODONE) 10 mg Tab immediate release tablet Take 1-1.5 tablets (10-15 mg total) by mouth every 4 (four) hours as needed for Pain. 6/28/22   Abida Arenas NP   pantoprazole (PROTONIX) 40 MG tablet Take 1 tablet (40 mg total) by mouth once daily. 7/9/22   Sinan Cline MD   pen needle, diabetic 32 gauge x 5/32" Ndle 1 each by Misc.(Non-Drug; Combo Route) route 3 (three) times daily. 6/28/22   Abida Arenas NP   predniSONE (DELTASONE) 5 MG tablet Take by mouth once daily: 20 mg 6/27-7/3; 15 mg 7/4-7/10; 10 mg 7/11-7/17; 5 mg 7/18-7/24; STOP " 7/25/22 6/24/22   Sinan Cline MD   sulfamethoxazole-trimethoprim 400-80mg (BACTRIM,SEPTRA) 400-80 mg per tablet Take 1 tablet by mouth once daily. Stop: 12/18/22 7/23/22 12/18/22  Santi Godwin MD   tacrolimus (PROGRAF) 1 MG Cap Take 1 capsule (1 mg total) by mouth every 12 (twelve) hours. 7/11/22 7/11/23  Sinan Cline MD   tamsulosin (FLOMAX) 0.4 mg Cap Take 2 capsules (0.8 mg total) by mouth once daily. 7/9/22 7/9/23  Sinan Cline MD   valGANciclovir (VALCYTE) 450 mg Tab Take 1 tablet (450 mg total) by mouth once daily. STOP 9/19/22 6/21/22 9/19/22  Sinan Cline MD   baclofen (LIORESAL) 10 MG tablet TAKE 1 TABLET(10 MG) BY MOUTH THREE TIMES DAILY AS NEEDED FOR HICCUPS  Patient not taking: No sig reported 4/6/22 6/17/22  Young Wesley MD   famotidine (PEPCID) 20 MG tablet Take 1 tablet (20 mg total) by mouth every evening. STOP 7/24/22 6/21/22 7/8/22  Sinan Cline MD   losartan (COZAAR) 50 MG tablet Take 1 tablet (50 mg total) by mouth once daily. 3/21/22 6/24/22  Pravin Maria MD   metFORMIN (GLUCOPHAGE) 500 MG tablet Take 1 tablet (500 mg total) by mouth 2 (two) times daily with meals. 7/8/22 7/8/22  Sinan Cline MD   mycophenolate (CELLCEPT) 250 mg Cap Take 4 capsules (1,000 mg total) by mouth 2 (two) times daily. 6/24/22 7/8/22  Sinan Cline MD     Scheduled Meds:    amoxicillin-clavulanate 875-125mg  1 tablet Oral Q12H    aspirin  81 mg Oral Daily    heparin (porcine)  5,000 Units Subcutaneous Q8H    magnesium oxide  400 mg Oral BID    magnesium sulfate IVPB  2 g Intravenous Q2H    methocarbamoL  500 mg Oral QID    micafungin (MYCAMINE) IVPB  100 mg Intravenous Q24H    mirtazapine  15 mg Oral Nightly    pantoprazole  40 mg Oral Daily    predniSONE  5 mg Oral Daily    SITagliptin  100 mg Oral Daily    sulfamethoxazole-trimethoprim 400-80mg  1 tablet Oral Daily    tacrolimus  2 mg Oral BID    valGANciclovir  450 mg Oral Daily     Continuous Infusions:   PRN Meds:acetaminophen, dextrose  10%, dextrose 10%, glucagon (human recombinant), glucose, glucose, heparin, porcine (PF), insulin aspart U-100, melatonin, ondansetron, oxyCODONE, sodium chloride 0.9%  Anticoagulants/Antiplatelets: no anticoagulation    Allergies: Review of patient's allergies indicates:  No Known Allergies  Sedation Hx: have not been any systemic reactions    Labs:  No results for input(s): INR in the last 168 hours.    Invalid input(s):  PT,  PTT    Recent Labs   Lab 07/25/22  0959   WBC 5.39   HGB 10.5*   HCT 33.2*   MCV 94         Recent Labs   Lab 07/25/22  0647   GLU 96   *   K 4.8      CO2 24   BUN 10   CREATININE 0.8   CALCIUM 9.3   MG 1.4*   ALT 71*   AST 22   ALBUMIN 2.9*   BILITOT 0.6         Vitals:  Temp: 98.2 °F (36.8 °C) (07/25/22 1244)  Pulse: 103 (07/25/22 1244)  Resp: 18 (07/25/22 1244)  BP: 124/86 (07/25/22 1244)  SpO2: 97 % (07/25/22 1244)     Physical Exam:  ASA: 2  Mallampati: 2    General: no acute distress  Mental Status: alert and oriented to person, place and time  HEENT: normocephalic, atraumatic  Chest: unlabored breathing  Heart: regular heart rate  Abdomen: nondistended  Extremity: moves all extremities    Plan: cholangiogram with possible upsize  Sedation Plan: local and fentanyl    Brad Espitia M.D.  PGY-V Radiology

## 2022-07-25 NOTE — ASSESSMENT & PLAN NOTE
- Pt presented with fever, tachycardia and hypotension despite cefpodoxime  - Hypotension improving with IVF  - Likely 2/2 biliary source  - PTC drain to gravity and SHRUTHI x 2 in place (R SHRUTHI d/c'd 7/19)  - blood cultures NGTD, CXR, UA and CT a/p reviewed  - Cont Vanc/cefepime and continue diflucan  - IR consult for cholangiogram with tube exchange/upsize 7/20/22  - PTC clamped 7/21  - ID consulted 7/21- yeast in blood- changed fluc to Prudence 7/21.  Vanc/cefepime changed to Unasyn 7/21, apprec ID recs  - Now afebrile. Plan for 2 weeks IV Prudence and 2 weeks PO augmentin

## 2022-07-25 NOTE — PLAN OF CARE
Pt arrived to Interventional Radiology room 190 via stretcher for biliary drain check/change.  Plan of care reviewed with patient, patient verbalized understanding.  Name verified using two identifiers.  Allergies verified.  Labs, orders and consent reviewed on chart.  Pt oriented to unit and staff.  See flow sheet for documentation, monitoring and medication administration. Will continue to monitor.

## 2022-07-26 LAB
ALBUMIN SERPL BCP-MCNC: 3 G/DL (ref 3.5–5.2)
ALP SERPL-CCNC: 334 U/L (ref 55–135)
ALT SERPL W/O P-5'-P-CCNC: 60 U/L (ref 10–44)
ANION GAP SERPL CALC-SCNC: 9 MMOL/L (ref 8–16)
AST SERPL-CCNC: 33 U/L (ref 10–40)
BASOPHILS # BLD AUTO: 0.04 K/UL (ref 0–0.2)
BASOPHILS NFR BLD: 0.6 % (ref 0–1.9)
BILIRUB SERPL-MCNC: 0.8 MG/DL (ref 0.1–1)
BUN SERPL-MCNC: 15 MG/DL (ref 6–20)
CALCIUM SERPL-MCNC: 9.4 MG/DL (ref 8.7–10.5)
CHLORIDE SERPL-SCNC: 100 MMOL/L (ref 95–110)
CO2 SERPL-SCNC: 23 MMOL/L (ref 23–29)
CREAT SERPL-MCNC: 0.9 MG/DL (ref 0.5–1.4)
DIFFERENTIAL METHOD: ABNORMAL
EOSINOPHIL # BLD AUTO: 0 K/UL (ref 0–0.5)
EOSINOPHIL NFR BLD: 0.6 % (ref 0–8)
ERYTHROCYTE [DISTWIDTH] IN BLOOD BY AUTOMATED COUNT: 14.3 % (ref 11.5–14.5)
EST. GFR  (AFRICAN AMERICAN): >60 ML/MIN/1.73 M^2
EST. GFR  (NON AFRICAN AMERICAN): >60 ML/MIN/1.73 M^2
FUNGUS SPEC CULT: NORMAL
GLUCOSE SERPL-MCNC: 95 MG/DL (ref 70–110)
HCT VFR BLD AUTO: 30.6 % (ref 40–54)
HGB BLD-MCNC: 10.1 G/DL (ref 14–18)
IMM GRANULOCYTES # BLD AUTO: 0.05 K/UL (ref 0–0.04)
IMM GRANULOCYTES NFR BLD AUTO: 0.7 % (ref 0–0.5)
LYMPHOCYTES # BLD AUTO: 0.6 K/UL (ref 1–4.8)
LYMPHOCYTES NFR BLD: 9.1 % (ref 18–48)
MAGNESIUM SERPL-MCNC: 1.6 MG/DL (ref 1.6–2.6)
MCH RBC QN AUTO: 30.8 PG (ref 27–31)
MCHC RBC AUTO-ENTMCNC: 33 G/DL (ref 32–36)
MCV RBC AUTO: 93 FL (ref 82–98)
MONOCYTES # BLD AUTO: 0.6 K/UL (ref 0.3–1)
MONOCYTES NFR BLD: 8.4 % (ref 4–15)
NEUTROPHILS # BLD AUTO: 5.5 K/UL (ref 1.8–7.7)
NEUTROPHILS NFR BLD: 80.6 % (ref 38–73)
NRBC BLD-RTO: 0 /100 WBC
PHOSPHATE SERPL-MCNC: 4.9 MG/DL (ref 2.7–4.5)
PLATELET # BLD AUTO: 155 K/UL (ref 150–450)
PLATELET BLD QL SMEAR: ABNORMAL
PMV BLD AUTO: 9.7 FL (ref 9.2–12.9)
POCT GLUCOSE: 124 MG/DL (ref 70–110)
POCT GLUCOSE: 99 MG/DL (ref 70–110)
POTASSIUM SERPL-SCNC: 4.9 MMOL/L (ref 3.5–5.1)
PROT SERPL-MCNC: 6.6 G/DL (ref 6–8.4)
RBC # BLD AUTO: 3.28 M/UL (ref 4.6–6.2)
SODIUM SERPL-SCNC: 132 MMOL/L (ref 136–145)
TACROLIMUS BLD-MCNC: 8.5 NG/ML (ref 5–15)
WBC # BLD AUTO: 6.78 K/UL (ref 3.9–12.7)

## 2022-07-26 PROCEDURE — 63600175 PHARM REV CODE 636 W HCPCS: Mod: JG | Performed by: NURSE PRACTITIONER

## 2022-07-26 PROCEDURE — 25000003 PHARM REV CODE 250: Performed by: NURSE PRACTITIONER

## 2022-07-26 PROCEDURE — 84100 ASSAY OF PHOSPHORUS: CPT | Performed by: PHYSICIAN ASSISTANT

## 2022-07-26 PROCEDURE — 25000003 PHARM REV CODE 250: Performed by: PHYSICIAN ASSISTANT

## 2022-07-26 PROCEDURE — 20600001 HC STEP DOWN PRIVATE ROOM

## 2022-07-26 PROCEDURE — 36415 COLL VENOUS BLD VENIPUNCTURE: CPT | Performed by: PHYSICIAN ASSISTANT

## 2022-07-26 PROCEDURE — 99233 PR SUBSEQUENT HOSPITAL CARE,LEVL III: ICD-10-PCS | Mod: 24,,, | Performed by: PHYSICIAN ASSISTANT

## 2022-07-26 PROCEDURE — 25000003 PHARM REV CODE 250: Performed by: TRANSPLANT SURGERY

## 2022-07-26 PROCEDURE — 80197 ASSAY OF TACROLIMUS: CPT | Performed by: PHYSICIAN ASSISTANT

## 2022-07-26 PROCEDURE — 99233 SBSQ HOSP IP/OBS HIGH 50: CPT | Mod: 24,,, | Performed by: PHYSICIAN ASSISTANT

## 2022-07-26 PROCEDURE — 85025 COMPLETE CBC W/AUTO DIFF WBC: CPT | Performed by: PHYSICIAN ASSISTANT

## 2022-07-26 PROCEDURE — 63600175 PHARM REV CODE 636 W HCPCS: Performed by: PHYSICIAN ASSISTANT

## 2022-07-26 PROCEDURE — 80053 COMPREHEN METABOLIC PANEL: CPT | Performed by: PHYSICIAN ASSISTANT

## 2022-07-26 PROCEDURE — 83735 ASSAY OF MAGNESIUM: CPT | Performed by: PHYSICIAN ASSISTANT

## 2022-07-26 PROCEDURE — 63600175 PHARM REV CODE 636 W HCPCS: Performed by: TRANSPLANT SURGERY

## 2022-07-26 RX ORDER — URSODIOL 300 MG/1
300 CAPSULE ORAL 2 TIMES DAILY
Status: DISCONTINUED | OUTPATIENT
Start: 2022-07-26 | End: 2022-07-27 | Stop reason: HOSPADM

## 2022-07-26 RX ADMIN — OXYCODONE HYDROCHLORIDE 10 MG: 10 TABLET ORAL at 02:07

## 2022-07-26 RX ADMIN — TACROLIMUS 2 MG: 1 CAPSULE ORAL at 05:07

## 2022-07-26 RX ADMIN — URSODIOL 300 MG: 300 CAPSULE ORAL at 07:07

## 2022-07-26 RX ADMIN — VALGANCICLOVIR HYDROCHLORIDE 450 MG: 450 TABLET ORAL at 08:07

## 2022-07-26 RX ADMIN — URSODIOL 300 MG: 300 CAPSULE ORAL at 12:07

## 2022-07-26 RX ADMIN — PANTOPRAZOLE SODIUM 40 MG: 40 TABLET, DELAYED RELEASE ORAL at 08:07

## 2022-07-26 RX ADMIN — OXYCODONE HYDROCHLORIDE 10 MG: 10 TABLET ORAL at 07:07

## 2022-07-26 RX ADMIN — ASPIRIN 81 MG CHEWABLE TABLET 81 MG: 81 TABLET CHEWABLE at 08:07

## 2022-07-26 RX ADMIN — SULFAMETHOXAZOLE AND TRIMETHOPRIM 1 TABLET: 400; 80 TABLET ORAL at 08:07

## 2022-07-26 RX ADMIN — MIRTAZAPINE 15 MG: 15 TABLET, FILM COATED ORAL at 07:07

## 2022-07-26 RX ADMIN — SITAGLIPTIN 100 MG: 50 TABLET, FILM COATED ORAL at 09:07

## 2022-07-26 RX ADMIN — Medication 400 MG: at 08:07

## 2022-07-26 RX ADMIN — METHOCARBAMOL 500 MG: 500 TABLET ORAL at 12:07

## 2022-07-26 RX ADMIN — PREDNISONE 5 MG: 5 TABLET ORAL at 09:07

## 2022-07-26 RX ADMIN — Medication 400 MG: at 07:07

## 2022-07-26 RX ADMIN — MICAFUNGIN SODIUM 100 MG: 100 INJECTION, POWDER, LYOPHILIZED, FOR SOLUTION INTRAVENOUS at 11:07

## 2022-07-26 RX ADMIN — AMOXICILLIN AND CLAVULANATE POTASSIUM 1 TABLET: 875; 125 TABLET, FILM COATED ORAL at 07:07

## 2022-07-26 RX ADMIN — METHOCARBAMOL 500 MG: 500 TABLET ORAL at 09:07

## 2022-07-26 RX ADMIN — METHOCARBAMOL 500 MG: 500 TABLET ORAL at 07:07

## 2022-07-26 RX ADMIN — METHOCARBAMOL 500 MG: 500 TABLET ORAL at 05:07

## 2022-07-26 RX ADMIN — TACROLIMUS 2 MG: 1 CAPSULE ORAL at 08:07

## 2022-07-26 RX ADMIN — AMOXICILLIN AND CLAVULANATE POTASSIUM 1 TABLET: 875; 125 TABLET, FILM COATED ORAL at 09:07

## 2022-07-26 NOTE — PLAN OF CARE
Pt AAOx4. VSS, afebrile, on room air. Pt w/o complaints of pain. Pt on regular diet. Blood glucose monitoring ACHS. IV Micafungin continued. Pt ambulating in room independently, non-skid socks on pt when ambulating. Pt OOBTC throughout shift. Chair wheels locked, call light within pt reach. Pt & family updated on plan of care.

## 2022-07-26 NOTE — ASSESSMENT & PLAN NOTE
- Pt presented with fever, tachycardia and hypotension despite cefpodoxime  - Hypotension improving with IVF  - Likely 2/2 biliary source  - PTC drain to gravity and SHRUTHI x 2 in place (R SHRUTHI d/c'd 7/19)  - blood cultures NGTD, CXR, UA and CT a/p reviewed  - Cont Vanc/cefepime and continue diflucan  - IR consult for cholangiogram with tube exchange/upsize 7/20/22  - PTC clamped 7/21  - ID consulted 7/21- yeast in blood- changed fluc to Prudence 7/21.  Vanc/cefepime changed to Unasyn 7/21, apprec ID recs  - Now afebrile. Plan for 2 weeks IV Prudence and 2 weeks PO augmentin  - IR re-consulted given leakage around drain site, up-sized again 7/25

## 2022-07-26 NOTE — SUBJECTIVE & OBJECTIVE
Scheduled Meds:   amoxicillin-clavulanate 875-125mg  1 tablet Oral Q12H    aspirin  81 mg Oral Daily    heparin (porcine)  5,000 Units Subcutaneous Q8H    magnesium oxide  400 mg Oral BID    methocarbamoL  500 mg Oral QID    micafungin (MYCAMINE) IVPB  100 mg Intravenous Q24H    mirtazapine  15 mg Oral Nightly    pantoprazole  40 mg Oral Daily    predniSONE  5 mg Oral Daily    SITagliptin  100 mg Oral Daily    sulfamethoxazole-trimethoprim 400-80mg  1 tablet Oral Daily    tacrolimus  2 mg Oral BID    ursodioL  300 mg Oral BID    valGANciclovir  450 mg Oral Daily     Continuous Infusions:  PRN Meds:acetaminophen, dextrose 10%, dextrose 10%, glucagon (human recombinant), glucose, glucose, heparin, porcine (PF), insulin aspart U-100, melatonin, ondansetron, oxyCODONE, sodium chloride 0.9%    Review of Systems   Constitutional:  Negative for activity change, appetite change, chills, diaphoresis and fever.   HENT:  Negative for congestion, sinus pressure, sinus pain, sore throat and trouble swallowing.    Eyes:  Negative for visual disturbance.   Respiratory:  Negative for chest tightness, shortness of breath and stridor.    Cardiovascular:  Negative for chest pain, palpitations and leg swelling.   Gastrointestinal:  Negative for abdominal distention, abdominal pain, constipation, diarrhea, nausea and vomiting.   Genitourinary:  Negative for decreased urine volume, difficulty urinating, dysuria and flank pain.   Musculoskeletal:  Negative for neck pain and neck stiffness.   Skin:  Positive for wound. Negative for color change and rash.   Allergic/Immunologic: Positive for immunocompromised state.   Neurological:  Negative for dizziness, tremors, syncope, light-headedness and headaches.   Psychiatric/Behavioral:  Negative for agitation, confusion, decreased concentration and hallucinations. The patient is not nervous/anxious.    Objective:     Vital Signs (Most Recent):  Temp: 98.8 °F (37.1 °C) (07/26/22 1147)  Pulse: 97  (07/26/22 1147)  Resp: 16 (07/26/22 1147)  BP: 127/77 (07/26/22 1147)  SpO2: (!) 94 % (07/26/22 1147)   Vital Signs (24h Range):  Temp:  [97.5 °F (36.4 °C)-99.5 °F (37.5 °C)] 98.8 °F (37.1 °C)  Pulse:  [] 97  Resp:  [14-20] 16  SpO2:  [94 %-99 %] 94 %  BP: (109-138)/(72-92) 127/77     Weight: 77.4 kg (170 lb 10.2 oz)  Body mass index is 25.95 kg/m².    Intake/Output - Last 3 Shifts         07/24 0700 07/25 0659 07/25 0700 07/26 0659 07/26 0700 07/27 0659    P.O. 1392 120     I.V. (mL/kg) 0 (0)      Other 0      Total Intake(mL/kg) 1392 (18) 120 (1.6)     Urine (mL/kg/hr) 0 (0) 0 (0)     Emesis/NG output 0 0     Drains 400 250     Other 0 0     Stool 0 0     Blood 0      Total Output 400 250     Net +992 -130            Urine Occurrence 7 x 7 x     Stool Occurrence 1 x 0 x     Emesis Occurrence 0 x 0 x             Physical Exam  Vitals and nursing note reviewed.   Constitutional:       General: He is not in acute distress.     Appearance: He is not diaphoretic.   HENT:      Head: Normocephalic and atraumatic.   Eyes:      General:         Right eye: No discharge.         Left eye: No discharge.   Cardiovascular:      Rate and Rhythm: Normal rate and regular rhythm.      Heart sounds: Normal heart sounds.   Pulmonary:      Effort: Pulmonary effort is normal.      Breath sounds: Normal breath sounds. No wheezing or rales.   Abdominal:      General: Bowel sounds are normal. There is no distension.      Palpations: Abdomen is soft.      Tenderness: There is no abdominal tenderness. There is no guarding.          Comments: PTC in place, draining bilious fluid   Musculoskeletal:      Cervical back: Normal range of motion and neck supple.      Right lower leg: No edema.      Left lower leg: No edema.   Skin:     General: Skin is warm and dry.      Capillary Refill: Capillary refill takes 2 to 3 seconds.   Neurological:      Mental Status: He is alert and oriented to person, place, and time.      Cranial Nerves: No  cranial nerve deficit.   Psychiatric:         Mood and Affect: Mood normal.         Behavior: Behavior normal.         Thought Content: Thought content normal.         Judgment: Judgment normal.       Laboratory:  Immunosuppressants           Stop Route Frequency     tacrolimus capsule 2 mg         -- Oral 2 times daily          CBC:   Recent Labs   Lab 07/26/22  0700   WBC 6.78   RBC 3.28*   HGB 10.1*   HCT 30.6*      MCV 93   MCH 30.8   MCHC 33.0     CMP:   Recent Labs   Lab 07/26/22  0700   GLU 95   CALCIUM 9.4   ALBUMIN 3.0*   PROT 6.6   *   K 4.9   CO2 23      BUN 15   CREATININE 0.9   ALKPHOS 334*   ALT 60*   AST 33   BILITOT 0.8     Labs within the past 24 hours have been reviewed.    Diagnostic Results:  None    Debility/Functional status: No debility noted.

## 2022-07-26 NOTE — PLAN OF CARE
Patient AAO X 4, VSS. Gave PRN for pain. [employer] with staples DEBBIE. SHRUTHI sites are all D/C. Patient went yesterday to IR to upsize PTC and cholangiogram. Patient blood cx + for candida. IV micafungin administered and amoxacillin PO continued X 2 Weeks. PTC output 250 bilious drainage. Right chest port assess but no blood returned.   Up ambulatory, bed locked at lowest setting, yellow socks on, call bell in reach. Remains free from falls.

## 2022-07-26 NOTE — DISCHARGE SUMMARY
[facility] - Transplant Stepdown  Liver Transplant  Discharge Summary      Patient Name: Romeo Larson  MRN: [id number]  Admission Date: 7/18/2022  Hospital Length of Stay: 9 days  Discharge Date and Time:  07/27/2022 1:22 PM  Attending Physician: Braydon Iglesias MD   Discharging Provider: Michael Sahu NP  Primary Care Provider: Pravin Maria MD  Post-Operative Day: 36     ORGAN:   RIGHT LIVER LOBE (SEGS 5,6,7,8) WITHOUT MIDDLE HEPATIC VEIN  Disease Etiology: Primary Liver Malignancy: Cholangiocarcinoma (CH-CA)  Donor Type:   Living  [facility] High Risk:     Donor CMV Status:   Donor CMV Status:   Donor HBcAB:     Donor HCV Status:     Whole or Partial: Partial Liver  Biliary Anastomosis: Vick-en-Y  Arterial Anatomy: Standard    HPI:   Romeo Larson is a 41yr old male s/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 Post operative course significant for recurrent bile leak. OR take back 6/23 for bile duct repair (small leak found near biliary anastomosis, unable to clearly identify source), cultures grew enterococcus faecalis. OR take back 7/4 for ex lap, washout of biloma, bile duct unable to be assessed due to adhesions, OR cultures with lactobacillus species. Patient was discharged home with 1 drain in place. Patient presented for outpatient follow up, drain found to be bilious and he was readmitted 7/12/22. He was placed on BS IV ABX and  underwent PTC drain (internal/external) placement on 7/13 with bilious drainage output. Drain being flushed [id number] with sterile saline 30ml flushes. Decreased amount of drainage from left and right SHRUTHI drains prior to discharge. LFTs trended down. He was transitioned to PO cefpoxime and remained afebrile. He was discharged on 7/15/22.     Mr Larson now presents with fever 101 since early this morning. Denies any other symptoms. Reports minimal drainage from SHRUTHI drains and continued bilious drainage from PTC drain. He is flushing PTC  drain as directed. Site c/d/i. Incision c/d/i. Denies abdominal pain, nausea/ vomiting, chest pain, sob, diarrhea, and dysuria. Pt hypotensive and tachycardic on arrival to ED with BP 80/50s and . Febrile to 101.5. Blood pressure improved to 90/60s. He was give IV cefepime/vanc and LR bolus. Labs and imaging reviewed. Plan to admit for further management. Pt d/w Dr Godwin.        Hospital Course:    Pt admitted with sepsis. PTC in place prior to admission with bilious output. SHRUTHI drains also in place with minimal output. Infectious work-up ordered on admit. Broad spec IV anbx ordered. Initially on vanc/cefepime but ID consulted and changed to Unasyn d/t history of enterrococcus. Vital signs improved with IVF and IV anbx. CT A/P 7/18, reviewed by surgeon, R SHRUTHI removed based on CT findings. IR consulted, had repeat cholangiogram w PTC upsize 7/20. Blood cx from 7/18 with Candida glabrata, started on Micafungin per ID. PTC clamped 7/21. LFTs gradually increasing with PTC clamped so PTC reopened to gravity 7/23. Remaining SHRUTHI drain removed 7/22. LFTs now trending down after PTC reopened. Bilious leakage noted around PTC insertion site. IR reconsulted 7/25. Patient with repeat cholangiogram and PTC upsize to 12 Fr. Patient tolerated well and leakage around PTC insertion site subsided. Per IR, Capping trial can be attempted in 2 days. PTC capped 7/27 AM. IR recommends routine exchange in 4 weeks. Patient remains afebrile. Per ID, will need at least 2 weeks of IV Micafungin, E.O.T date at least 8/1 (needs f/u imaging prior to stopping). Also needs PO amox/clav at least 2 weeks per ID. Awaiting Candida susceptibilities, will follow outpt.  The patient is now stable for discharge.  Discharge instructions and education have been discussed with the patient at bedside. All questions have been answered. PharmD has educated patient. Transplant coordinator has been notified of discharge planning. The patient will follow-up for  outpt labs tomorrow 7/28. He will f/u with infectious disease on Friday 7/29. The patient has been taught how to flush PTC drain every 6 hours with normal saline and how to record drain output. Patient instructed that if fever occurs at home, he should uncap his PTC drain and notify his coordinator immediately.        Goals of Care Treatment Preferences:  Code Status: Full Code      Final Active Diagnoses:    Diagnosis Date Noted POA    PRINCIPAL PROBLEM:  Sepsis [A41.9] 07/18/2022 Yes    Fungemia [B49] 07/21/2022 Yes    Intra-abdominal infection [B99.9]  Yes    Bile leak [K83.9]  Yes    Long-term use of immunosuppressant medication [Z79.899] 06/25/2022 Not Applicable    Type 2 diabetes mellitus with hyperglycemia [E11.65] 06/22/2022 Yes    S/P liver transplant [Z94.4] 06/21/2022 Not Applicable    Prophylactic immunotherapy [Z29.8] 06/21/2022 Not Applicable    At risk for opportunistic infections [Z91.89] 06/21/2022 Yes    Anemia [D64.9] 04/26/2022 Yes      Problems Resolved During this Admission:       Consults (From admission, onward)        Status Ordering Provider     Inpatient consult to Interventional Radiology  Once        Provider:  (Not yet assigned)    Completed AARON HART     Inpatient consult to Endocrinology  Once        Provider:  (Not yet assigned)    Completed KOFI NEGRON     Inpatient consult to Infectious Diseases  Once        Provider:  (Not yet assigned)    Completed KOFI NEGRON     Inpatient consult to Interventional Radiology  Once        Provider:  (Not yet assigned)    Completed RACHELLE GERONIMO     Inpatient consult to Liver Transplant  Once        Provider:  (Not yet assigned)    Acknowledged RIC WOLF          Pending Diagnostic Studies:     None        Significant Diagnostic Studies: Labs:   BMP:   Recent Labs   Lab 07/26/22  0700 07/27/22  0725   GLU 95 99   * 131*   K 4.9 4.3    97   CO2 23 25   BUN 15 17   CREATININE 0.9 1.0    CALCIUM 9.4 9.7   MG 1.6 1.6   , CMP   Recent Labs   Lab 07/26/22  0700 07/27/22  0725   * 131*   K 4.9 4.3    97   CO2 23 25   GLU 95 99   BUN 15 17   CREATININE 0.9 1.0   CALCIUM 9.4 9.7   PROT 6.6 6.7   ALBUMIN 3.0* 3.2*   BILITOT 0.8 0.8   ALKPHOS 334* 411*   AST 33 39   ALT 60* 79*   ANIONGAP 9 9   ESTGFRAFRICA >60.0 >60.0   EGFRNONAA >60.0 >60.0    and CBC   Recent Labs   Lab 07/26/22  0700 07/27/22  0725   WBC 6.78 5.41   HGB 10.1* 10.4*   HCT 30.6* 32.5*    166       The patients clinical status was discussed at multidisplinary rounds, involving transplant surgery, transplant medicine, pharmacy, nursing, nutrition, and social work    Discharged Condition: stable    Disposition: Home or Self Care    Follow Up: see above    Patient Instructions:      Ambulatory referral/consult to Home Health   Standing Status: Future   Referral Priority: Routine Referral Type: Home Health Care   Referral Reason: Specialty Services Required   Requested Specialty: Home Health Services   Number of Visits Requested: 1     Diet diabetic     Notify your health care provider if you experience any of the following:  temperature >100.4     Notify your health care provider if you experience any of the following:  persistent nausea and vomiting or diarrhea     Notify your health care provider if you experience any of the following:  severe uncontrolled pain     Notify your health care provider if you experience any of the following:  redness, tenderness, or signs of infection (pain, swelling, redness, odor or green/yellow discharge around incision site)     Notify your health care provider if you experience any of the following:  difficulty breathing or increased cough     Notify your health care provider if you experience any of the following:  severe persistent headache     Notify your health care provider if you experience any of the following:  worsening rash     Notify your health care provider if you  "experience any of the following:  persistent dizziness, light-headedness, or visual disturbances     Notify your health care provider if you experience any of the following:  increased confusion or weakness     Notify your health care provider if you experience any of the following:   Order Comments: For any concerning signs or symptoms. If you have not received your scheduled follow-up within 24 hours of your discharge or for any questions or concerns, please call [phone] for further assistance.     Activity as tolerated     Medications:  Reconciled Home Medications:      Medication List      START taking these medications    amoxicillin-clavulanate 875-125mg 875-125 mg per tablet  Commonly known as: AUGMENTIN  Take 1 tablet by mouth every 12 (twelve) hours. for 12 days     MICAFUNGIN 100 MG/100 ML NS (READY TO MIX SYSTEM)  Inject 100 mLs (100 mg total) into the vein once daily. Stop: 8/4/22 for 12 days     sulfamethoxazole-trimethoprim 400-80mg 400-80 mg per tablet  Commonly known as: BACTRIM,SEPTRA  Take 1 tablet by mouth once daily. Stop: 12/18/22     ursodioL 250 mg Tab  Commonly known as: ACTIGALL  Take 1 tablet (250 mg total) by mouth 2 (two) times daily.        CHANGE how you take these medications    tacrolimus 1 MG Cap  Commonly known as: PROGRAF  Take 2 capsules (2 mg total) by mouth every 12 (twelve) hours.  What changed: how much to take        CONTINUE taking these medications    aspirin 81 MG Chew  CHEW 1 tablet (81 mg total) by mouth once daily.     BD ULTRA-FINE KAREN PEN NEEDLE 32 gauge x 5/32" Ndle  Generic drug: pen needle, diabetic  1 each by Misc.(Non-Drug; Combo Route) route 3 (three) times daily.     calcium carbonate-vitamin D3 600 mg-20 mcg (800 unit) Tab  Take 1 tablet by mouth once daily at 6am.     methocarbamoL 500 MG Tab  Commonly known as: ROBAXIN  Take 1 tablet (500 mg total) by mouth 4 (four) times daily.     mirtazapine 15 MG tablet  Commonly known as: REMERON  Take 1 tablet " (15 mg total) by mouth nightly.     oxyCODONE 10 mg Tab immediate release tablet  Commonly known as: ROXICODONE  Take 1-1.5 tablets (10-15 mg total) by mouth every 4 (four) hours as needed for Pain.     pantoprazole 40 MG tablet  Commonly known as: PROTONIX  Take 1 tablet (40 mg total) by mouth once daily.     TRUE METRIX GLUCOSE METER Misc  Generic drug: blood-glucose meter  Use as instructed     TRUE METRIX GLUCOSE TEST STRIP Strp  Generic drug: blood sugar diagnostic  1 each by Misc.(Non-Drug; Combo Route) route 3 (three) times daily.     TRUEPLUS LANCETS 30 gauge Misc  Generic drug: lancets  1 each by Misc.(Non-Drug; Combo Route) route 3 (three) times daily.     valGANciclovir 450 mg Tab  Commonly known as: VALCYTE  Take 1 tablet (450 mg total) by mouth once daily. STOP 9/19/22        STOP taking these medications    baclofen 10 MG tablet  Commonly known as: LIORESAL     carvediloL 3.125 MG tablet  Commonly known as: COREG     cefpodoxime 200 MG tablet  Commonly known as: VANTIN     famotidine 20 MG tablet  Commonly known as: PEPCID     fluconazole 200 MG Tab  Commonly known as: DIFLUCAN     insulin aspart U-100 100 unit/mL (3 mL) Inpn pen  Commonly known as: NovoLOG     losartan 50 MG tablet  Commonly known as: COZAAR     metFORMIN 500 MG tablet  Commonly known as: GLUCOPHAGE     mycophenolate 250 mg Cap  Commonly known as: CELLCEPT     predniSONE 5 MG tablet  Commonly known as: DELTASONE     tamsulosin 0.4 mg Cap  Commonly known as: FLOMAX          Time spent caring for patient (Greater than 1/2 spent in direct face-to-face contact): > 30 minutes    Michael Sahu NP  Liver Transplant  [facility] - Transplant Stepdown

## 2022-07-26 NOTE — CARE UPDATE
BG goal 140-180    -A1C    Lab Results   Component Value Date    HGBA1C 5.8 (H) 06/20/2022         -HOME REGIMEN:  Novolog 5 units TID with meals + SSI LDC (150/50)        -INPATIENT REGIMEN: januvia 100 mg daily     -GLUCOSE TREND FOR THE PAST 24HRS:     -NO HYPOGYCEMIAS NOTED         -Diet: Diet Adult Regular (IDDSI Level 7)    -Steroids - prednisone 5 mg daily       Remains in TSU, NAEON. BG well controlled with januvia.       Plan:  Continue januvia 100 mg daily.   BG monitoring ac/hs and low dose correction scale.     Discharge planning: tbd     Endocrine to continue to follow    ** Please call Endocrine for any BG related issues **

## 2022-07-26 NOTE — ASSESSMENT & PLAN NOTE
"- s/p PTC (internalized) on 7/13/22  - PTC to gravity with bilious output. SHRUTHI x 2 with scant output no longer draining per patient  - Cont flushing PTC  - IR consulted for cholangiogram with tube exchange 7/20/22  - clamped PTC 7/21/22 and cont to flush   - LFTs increasing after clamping PTC, PTC opened to gravity 7/23  - Leakage around insertion site of PTC. IR reconsulted 7/25 now s/p drain upsize on 7/25  - Plan to cap ptc tomorrow morning and monitor for any issues  - actigall started  - See "sepsis"    "

## 2022-07-26 NOTE — PROGRESS NOTES
[facility] - [facility]  Liver Transplant  Progress Note    Patient Name: Romeo Larson  MRN: [id number]  Admission Date: 7/18/2022  Hospital Length of Stay: 8 days  Code Status: Full Code  Primary Care Provider: Pravin Maria MD  Post-Operative Day: 35    ORGAN:   RIGHT LIVER LOBE (SEGS 5,6,7,8) WITHOUT MIDDLE HEPATIC VEIN  Disease Etiology: Primary Liver Malignancy: Cholangiocarcinoma (CH-CA)  Donor Type:   Living  CDC High Risk:     Donor CMV Status:   Donor CMV Status:   Donor HBcAB:     Donor HCV Status:     Donor HBV GUSTABO:   Donor HCV GUSTABO:   Whole or Partial: Partial Liver  Biliary Anastomosis: Vick-en-Y  Arterial Anatomy: Standard  Subjective:     History of Present Illness:  Romeo Larson is a 41yr old male s/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 Post operative course significant for recurrent bile leak. OR take back 6/23 for bile duct repair (small leak found near biliary anastomosis, unable to clearly identify source), cultures grew enterococcus faecalis. OR take back 7/4 for ex lap, washout of biloma, bile duct unable to be assessed due to adhesions, OR cultures with lactobacillus species. Patient was discharged home with 1 drain in place. Patient presented for outpatient follow up, drain found to be bilious and he was readmitted 7/12/22. He was placed on BS IV ABX and  underwent PTC drain (internal/external) placement on 7/13 with bilious drainage output. Drain being flushed [id number] with sterile saline 30ml flushes. Decreased amount of drainage from left and right SHRUTHI drains prior to discharge. LFTs trended down. He was transitioned to PO cefpoxime and remained afebrile. He was discharged on 7/15/22.     Mr Larson now presents with fever 101 since early this morning. Denies any other symptoms. Reports minimal drainage from SHRUTHI drains and continued bilious drainage from PTC drain. He is flushing PTC drain as directed. Site c/d/i. Incision c/d/i. Denies  abdominal pain, nausea/ vomiting, chest pain, sob, diarrhea, and dysuria. Pt hypotensive and tachycardic on arrival to ED with BP 80/50s and . Febrile to 101.5. Blood pressure improved to 90/60s. He was give IV cefepime/vanc and LR bolus. Labs and imaging reviewed. Plan to admit for further management. Pt d/w Dr Godwin.          Hospital Course:  Pt admitted with sepsis. PTC in place with bilious output. SHRUTHI drains also in place with minimal output. Infectious work-up ordered on admit. Broad spec IV anbx ordered. Initially on vanc/cefepime but ID consulted and changed to Unasyn d/t history of enterrococcus. Vital signs improved with IVF and IV anbx. CT A/P 7/18, reviewed by surgeon, R SHRUTHI removed based on CT findings. IR consulted, had repeat cholangiogram w PTC upsize 7/20. Blood cx from 7/18 with Candida glabrata, started on Micafungin per ID. PTC clamped 7/21. LFTs gradually increasing with PTC clamped so PTC reopened to gravity 7/23. Remaining SHRUTHI drain removed 7/22. LFTs now trending down after PTC reopened. Patient remains afebrile. Per ID, will need at least 2 weeks of IV Micafungin, E.O.T date at least 8/1 (needs f/u imaging prior to stopping). Also needs PO amox/clav at least 2 weeks per ID.     Interval note: Pt continued to have bilious leakage from around PTC insertion site. Now s/p repeat cholangiogram with upsize of PTC on 7/25. Plan to cap tomorrow morning and monitor for any recurrent symptoms of cholangitis.  LFTs/Tbili continue to trend down. Pt afebrile and reports feeling well. VSS. Continue to monitor.       Scheduled Meds:   amoxicillin-clavulanate 875-125mg  1 tablet Oral Q12H    aspirin  81 mg Oral Daily    heparin (porcine)  5,000 Units Subcutaneous Q8H    magnesium oxide  400 mg Oral BID    methocarbamoL  500 mg Oral QID    micafungin (MYCAMINE) IVPB  100 mg Intravenous Q24H    mirtazapine  15 mg Oral Nightly    pantoprazole  40 mg Oral Daily    predniSONE  5 mg Oral Daily     SITagliptin  100 mg Oral Daily    sulfamethoxazole-trimethoprim 400-80mg  1 tablet Oral Daily    tacrolimus  2 mg Oral BID    ursodioL  300 mg Oral BID    valGANciclovir  450 mg Oral Daily     Continuous Infusions:  PRN Meds:acetaminophen, dextrose 10%, dextrose 10%, glucagon (human recombinant), glucose, glucose, heparin, porcine (PF), insulin aspart U-100, melatonin, ondansetron, oxyCODONE, sodium chloride 0.9%    Review of Systems   Constitutional:  Negative for activity change, appetite change, chills, diaphoresis and fever.   HENT:  Negative for congestion, sinus pressure, sinus pain, sore throat and trouble swallowing.    Eyes:  Negative for visual disturbance.   Respiratory:  Negative for chest tightness, shortness of breath and stridor.    Cardiovascular:  Negative for chest pain, palpitations and leg swelling.   Gastrointestinal:  Negative for abdominal distention, abdominal pain, constipation, diarrhea, nausea and vomiting.   Genitourinary:  Negative for decreased urine volume, difficulty urinating, dysuria and flank pain.   Musculoskeletal:  Negative for neck pain and neck stiffness.   Skin:  Positive for wound. Negative for color change and rash.   Allergic/Immunologic: Positive for immunocompromised state.   Neurological:  Negative for dizziness, tremors, syncope, light-headedness and headaches.   Psychiatric/Behavioral:  Negative for agitation, confusion, decreased concentration and hallucinations. The patient is not nervous/anxious.    Objective:     Vital Signs (Most Recent):  Temp: 98.8 °F (37.1 °C) (07/26/22 1147)  Pulse: 97 (07/26/22 1147)  Resp: 16 (07/26/22 1147)  BP: 127/77 (07/26/22 1147)  SpO2: (!) 94 % (07/26/22 1147)   Vital Signs (24h Range):  Temp:  [97.5 °F (36.4 °C)-99.5 °F (37.5 °C)] 98.8 °F (37.1 °C)  Pulse:  [] 97  Resp:  [14-20] 16  SpO2:  [94 %-99 %] 94 %  BP: (109-138)/(72-92) 127/77     Weight: 77.4 kg (170 lb 10.2 oz)  Body mass index is 25.95 kg/m².    Intake/Output -  Last 3 Shifts         07/24 0700 07/25 0659 07/25 0700  07/26 0659 07/26 0700 07/27 0659    P.O. 1392 120     I.V. (mL/kg) 0 (0)      Other 0      Total Intake(mL/kg) 1392 (18) 120 (1.6)     Urine (mL/kg/hr) 0 (0) 0 (0)     Emesis/NG output 0 0     Drains 400 250     Other 0 0     Stool 0 0     Blood 0      Total Output 400 250     Net +992 -130            Urine Occurrence 7 x 7 x     Stool Occurrence 1 x 0 x     Emesis Occurrence 0 x 0 x             Physical Exam  Vitals and nursing note reviewed.   Constitutional:       General: He is not in acute distress.     Appearance: He is not diaphoretic.   HENT:      Head: Normocephalic and atraumatic.   Eyes:      General:         Right eye: No discharge.         Left eye: No discharge.   Cardiovascular:      Rate and Rhythm: Normal rate and regular rhythm.      Heart sounds: Normal heart sounds.   Pulmonary:      Effort: Pulmonary effort is normal.      Breath sounds: Normal breath sounds. No wheezing or rales.   Abdominal:      General: Bowel sounds are normal. There is no distension.      Palpations: Abdomen is soft.      Tenderness: There is no abdominal tenderness. There is no guarding.          Comments: PTC in place, draining bilious fluid   Musculoskeletal:      Cervical back: Normal range of motion and neck supple.      Right lower leg: No edema.      Left lower leg: No edema.   Skin:     General: Skin is warm and dry.      Capillary Refill: Capillary refill takes 2 to 3 seconds.   Neurological:      Mental Status: He is alert and oriented to person, place, and time.      Cranial Nerves: No cranial nerve deficit.   Psychiatric:         Mood and Affect: Mood normal.         Behavior: Behavior normal.         Thought Content: Thought content normal.         Judgment: Judgment normal.       Laboratory:  Immunosuppressants           Stop Route Frequency     tacrolimus capsule 2 mg         -- Oral 2 times daily          CBC:   Recent Labs   Lab 07/26/22  0700   WBC  "6.78   RBC 3.28*   HGB 10.1*   HCT 30.6*      MCV 93   MCH 30.8   MCHC 33.0     CMP:   Recent Labs   Lab 07/26/22  0700   GLU 95   CALCIUM 9.4   ALBUMIN 3.0*   PROT 6.6   *   K 4.9   CO2 23      BUN 15   CREATININE 0.9   ALKPHOS 334*   ALT 60*   AST 33   BILITOT 0.8     Labs within the past 24 hours have been reviewed.    Diagnostic Results:  None    Debility/Functional status: No debility noted.    Assessment/Plan:     * Sepsis  - Pt presented with fever, tachycardia and hypotension despite cefpodoxime  - Hypotension improving with IVF  - Likely 2/2 biliary source  - PTC drain to gravity and SHRUTHI x 2 in place (R SHRUTHI d/c'd 7/19)  - blood cultures NGTD, CXR, UA and CT a/p reviewed  - Cont Vanc/cefepime and continue diflucan  - IR consult for cholangiogram with tube exchange/upsize 7/20/22  - PTC clamped 7/21  - ID consulted 7/21- yeast in blood- changed fluc to Prudence 7/21.  Vanc/cefepime changed to Unasyn 7/21, apprec ID recs  - Now afebrile. Plan for 2 weeks IV Prudence and 2 weeks PO augmentin  - IR re-consulted given leakage around drain site, up-sized again 7/25      Intra-abdominal infection  - see sepsis      Fungemia  - Blood cx 7/18 growing Candida glabrata, ID consulted, changed Fluc to Prudence 7/21 tentative plan for 2 weeks, susceptibilities pending  - no plan for further w/u at this time  - pt denies change in vision, no plan for Ophthalmology      Bile leak  - s/p PTC (internalized) on 7/13/22  - PTC to gravity with bilious output. SHRUTHI x 2 with scant output no longer draining per patient  - Cont flushing PTC  - IR consulted for cholangiogram with tube exchange 7/20/22  - clamped PTC 7/21/22 and cont to flush   - LFTs increasing after clamping PTC, PTC opened to gravity 7/23  - Leakage around insertion site of PTC. IR reconsulted 7/25 now s/p drain upsize on 7/25  - Plan to cap ptc tomorrow morning and monitor for any issues  - actigall started  - See "sepsis"      Long-term use of " "immunosuppressant medication  - see "prophylactic immunotherapy"      Type 2 diabetes mellitus with hyperglycemia  - cont sliding scale insulin  - endo consulted 7/22      At risk for opportunistic infections  - cont OI prophylaxis per protocol  - CMV PCR 7/19 not detected    Prophylactic immunotherapy  - continue prograf and steroid taper per protocol  - will check daily prograf levels, monitor for toxic side effects, and adjust for therapeutic dose  - cellcept on hold for infection    Liver transplant recipient  - chevron incision with staples in place - 15 days out from take back  - SHRUTHI x 2 with scant output - remove the right drain today  - Trend LFTs daily  - See "bile leak"  - PTC drain clamped 7/21/22  - cont to flush w 10cc NS twice daily  - LFTs trend up coincidentally after clamping PTC  - PTC to gravity 7/23, LFTs better today  - IR reconsulted 7/25 d/t bilious leakage around insertion site. Plan for repeat cholangiogram and possible PTC upsize    Anemia  - Chronic, likely multifactorial.  - continue to monitor    Hypercholesterolemia            VTE Risk Mitigation (From admission, onward)         Ordered     heparin, porcine (PF) 100 unit/mL injection flush 500 Units  As needed (PRN)         07/20/22 1605     heparin (porcine) injection 5,000 Units  Every 8 hours         07/18/22 1100     IP VTE HIGH RISK PATIENT  Once         07/18/22 1100     Place sequential compression device  Until discontinued         07/18/22 1100                The patients clinical status was discussed at multidisplinary rounds, involving transplant surgery, transplant medicine, pharmacy, nursing, nutrition, and social work    Discharge Planning: tentative d/c Wednesday/thursday   PharmD Review: fluconazole stops 7/25, monitor and adjust tac as needed [7/11/2022 [facility]]      Ana Nettles PA-C  Liver Transplant  [facility] - [facility]  "

## 2022-07-27 VITALS
HEART RATE: 95 BPM | HEIGHT: 68 IN | BODY MASS INDEX: 26.06 KG/M2 | SYSTOLIC BLOOD PRESSURE: 116 MMHG | WEIGHT: 171.94 LBS | RESPIRATION RATE: 17 BRPM | OXYGEN SATURATION: 99 % | DIASTOLIC BLOOD PRESSURE: 53 MMHG | TEMPERATURE: 99 F

## 2022-07-27 LAB
ALBUMIN SERPL BCP-MCNC: 3.2 G/DL (ref 3.5–5.2)
ALP SERPL-CCNC: 411 U/L (ref 55–135)
ALT SERPL W/O P-5'-P-CCNC: 79 U/L (ref 10–44)
ANION GAP SERPL CALC-SCNC: 9 MMOL/L (ref 8–16)
AST SERPL-CCNC: 39 U/L (ref 10–40)
BASOPHILS # BLD AUTO: 0.03 K/UL (ref 0–0.2)
BASOPHILS NFR BLD: 0.6 % (ref 0–1.9)
BILIRUB SERPL-MCNC: 0.8 MG/DL (ref 0.1–1)
BUN SERPL-MCNC: 17 MG/DL (ref 6–20)
CALCIUM SERPL-MCNC: 9.7 MG/DL (ref 8.7–10.5)
CHLORIDE SERPL-SCNC: 97 MMOL/L (ref 95–110)
CO2 SERPL-SCNC: 25 MMOL/L (ref 23–29)
CREAT SERPL-MCNC: 1 MG/DL (ref 0.5–1.4)
DIFFERENTIAL METHOD: ABNORMAL
EOSINOPHIL # BLD AUTO: 0 K/UL (ref 0–0.5)
EOSINOPHIL NFR BLD: 0.2 % (ref 0–8)
ERYTHROCYTE [DISTWIDTH] IN BLOOD BY AUTOMATED COUNT: 14.3 % (ref 11.5–14.5)
EST. GFR  (AFRICAN AMERICAN): >60 ML/MIN/1.73 M^2
EST. GFR  (NON AFRICAN AMERICAN): >60 ML/MIN/1.73 M^2
GLUCOSE SERPL-MCNC: 99 MG/DL (ref 70–110)
HCT VFR BLD AUTO: 32.5 % (ref 40–54)
HGB BLD-MCNC: 10.4 G/DL (ref 14–18)
IMM GRANULOCYTES # BLD AUTO: 0.01 K/UL (ref 0–0.04)
IMM GRANULOCYTES NFR BLD AUTO: 0.2 % (ref 0–0.5)
LYMPHOCYTES # BLD AUTO: 0.7 K/UL (ref 1–4.8)
LYMPHOCYTES NFR BLD: 12 % (ref 18–48)
MAGNESIUM SERPL-MCNC: 1.6 MG/DL (ref 1.6–2.6)
MCH RBC QN AUTO: 30.1 PG (ref 27–31)
MCHC RBC AUTO-ENTMCNC: 32 G/DL (ref 32–36)
MCV RBC AUTO: 94 FL (ref 82–98)
MONOCYTES # BLD AUTO: 0.4 K/UL (ref 0.3–1)
MONOCYTES NFR BLD: 7.2 % (ref 4–15)
NEUTROPHILS # BLD AUTO: 4.3 K/UL (ref 1.8–7.7)
NEUTROPHILS NFR BLD: 79.8 % (ref 38–73)
NRBC BLD-RTO: 0 /100 WBC
PHOSPHATE SERPL-MCNC: 5 MG/DL (ref 2.7–4.5)
PLATELET # BLD AUTO: 166 K/UL (ref 150–450)
PMV BLD AUTO: 10 FL (ref 9.2–12.9)
POCT GLUCOSE: 112 MG/DL (ref 70–110)
POTASSIUM SERPL-SCNC: 4.3 MMOL/L (ref 3.5–5.1)
PROT SERPL-MCNC: 6.7 G/DL (ref 6–8.4)
RBC # BLD AUTO: 3.45 M/UL (ref 4.6–6.2)
SODIUM SERPL-SCNC: 131 MMOL/L (ref 136–145)
TACROLIMUS BLD-MCNC: 9.2 NG/ML (ref 5–15)
WBC # BLD AUTO: 5.41 K/UL (ref 3.9–12.7)

## 2022-07-27 PROCEDURE — 99239 HOSP IP/OBS DSCHRG MGMT >30: CPT | Mod: 24,,, | Performed by: CLINICAL NURSE SPECIALIST

## 2022-07-27 PROCEDURE — 83735 ASSAY OF MAGNESIUM: CPT | Performed by: PHYSICIAN ASSISTANT

## 2022-07-27 PROCEDURE — 25000003 PHARM REV CODE 250: Performed by: TRANSPLANT SURGERY

## 2022-07-27 PROCEDURE — 25000003 PHARM REV CODE 250: Performed by: PHYSICIAN ASSISTANT

## 2022-07-27 PROCEDURE — 80053 COMPREHEN METABOLIC PANEL: CPT | Performed by: PHYSICIAN ASSISTANT

## 2022-07-27 PROCEDURE — 80197 ASSAY OF TACROLIMUS: CPT | Performed by: PHYSICIAN ASSISTANT

## 2022-07-27 PROCEDURE — 84100 ASSAY OF PHOSPHORUS: CPT | Performed by: PHYSICIAN ASSISTANT

## 2022-07-27 PROCEDURE — 25000003 PHARM REV CODE 250: Performed by: NURSE PRACTITIONER

## 2022-07-27 PROCEDURE — 99232 SBSQ HOSP IP/OBS MODERATE 35: CPT | Mod: ,,, | Performed by: NURSE PRACTITIONER

## 2022-07-27 PROCEDURE — 63600175 PHARM REV CODE 636 W HCPCS: Performed by: TRANSPLANT SURGERY

## 2022-07-27 PROCEDURE — 36415 COLL VENOUS BLD VENIPUNCTURE: CPT | Performed by: PHYSICIAN ASSISTANT

## 2022-07-27 PROCEDURE — 99232 PR SUBSEQUENT HOSPITAL CARE,LEVL II: ICD-10-PCS | Mod: ,,, | Performed by: NURSE PRACTITIONER

## 2022-07-27 PROCEDURE — 85025 COMPLETE CBC W/AUTO DIFF WBC: CPT | Performed by: PHYSICIAN ASSISTANT

## 2022-07-27 PROCEDURE — 63600175 PHARM REV CODE 636 W HCPCS: Mod: JG | Performed by: NURSE PRACTITIONER

## 2022-07-27 PROCEDURE — 63600175 PHARM REV CODE 636 W HCPCS: Performed by: PHYSICIAN ASSISTANT

## 2022-07-27 PROCEDURE — 99239 PR HOSPITAL DISCHARGE DAY,>30 MIN: ICD-10-PCS | Mod: 24,,, | Performed by: CLINICAL NURSE SPECIALIST

## 2022-07-27 RX ORDER — URSODIOL 250 MG/1
250 TABLET, FILM COATED ORAL 2 TIMES DAILY
Qty: 60 TABLET | Refills: 2 | Status: ON HOLD | OUTPATIENT
Start: 2022-07-27 | End: 2022-08-26 | Stop reason: HOSPADM

## 2022-07-27 RX ORDER — TACROLIMUS 1 MG/1
2 CAPSULE ORAL EVERY 12 HOURS
Qty: 120 CAPSULE | Refills: 11 | Status: SHIPPED | OUTPATIENT
Start: 2022-07-27 | End: 2022-08-03 | Stop reason: DRUGHIGH

## 2022-07-27 RX ORDER — URSODIOL 250 MG/1
250 TABLET, FILM COATED ORAL 2 TIMES DAILY
Qty: 60 TABLET | Refills: 2 | Status: SHIPPED | OUTPATIENT
Start: 2022-07-27 | End: 2022-10-31 | Stop reason: SDUPTHER

## 2022-07-27 RX ADMIN — Medication 400 MG: at 08:07

## 2022-07-27 RX ADMIN — MICAFUNGIN SODIUM 100 MG: 100 INJECTION, POWDER, LYOPHILIZED, FOR SOLUTION INTRAVENOUS at 01:07

## 2022-07-27 RX ADMIN — URSODIOL 300 MG: 300 CAPSULE ORAL at 08:07

## 2022-07-27 RX ADMIN — SITAGLIPTIN 100 MG: 50 TABLET, FILM COATED ORAL at 08:07

## 2022-07-27 RX ADMIN — PREDNISONE 5 MG: 5 TABLET ORAL at 08:07

## 2022-07-27 RX ADMIN — TACROLIMUS 2 MG: 1 CAPSULE ORAL at 08:07

## 2022-07-27 RX ADMIN — AMOXICILLIN AND CLAVULANATE POTASSIUM 1 TABLET: 875; 125 TABLET, FILM COATED ORAL at 08:07

## 2022-07-27 RX ADMIN — PANTOPRAZOLE SODIUM 40 MG: 40 TABLET, DELAYED RELEASE ORAL at 08:07

## 2022-07-27 RX ADMIN — ASPIRIN 81 MG CHEWABLE TABLET 81 MG: 81 TABLET CHEWABLE at 08:07

## 2022-07-27 RX ADMIN — SULFAMETHOXAZOLE AND TRIMETHOPRIM 1 TABLET: 400; 80 TABLET ORAL at 08:07

## 2022-07-27 RX ADMIN — METHOCARBAMOL 500 MG: 500 TABLET ORAL at 08:07

## 2022-07-27 RX ADMIN — VALGANCICLOVIR HYDROCHLORIDE 450 MG: 450 TABLET ORAL at 08:07

## 2022-07-27 RX ADMIN — OXYCODONE HYDROCHLORIDE 10 MG: 10 TABLET ORAL at 05:07

## 2022-07-27 RX ADMIN — OXYCODONE HYDROCHLORIDE 10 MG: 10 TABLET ORAL at 03:07

## 2022-07-27 NOTE — PROGRESS NOTES
Discharge Note       presented to patient bedside to discuss discharge plans, as well as assess any concerns or needs.     Patient was alert and oriented x 4 and communicative. Patient's mother was at bedside and shared that patient's father is on his way and will drive patient home upon discharge. Patient is coping with support from family and reports he has a high level of support from everyone.      [facility] will provide IV Antibiotics at home this evening and [facility] will start services for skilled nursing at home.  Patient will also resume outpatient PT.     Patient reports agreeing with the discharge plan and has no other psychosocial concerns. Patient and caregiver verbalize understanding and are involved in treatment planning and discharge process. Patient nor caregiver had any further concerns or questions at this time.     SW remains available and will continue to follow, providing psychosocial support, education and discharge planning as indicated. SW remains available as needed.

## 2022-07-27 NOTE — SUBJECTIVE & OBJECTIVE
"Interval HPI:   Overnight events: Remains in TSU. Tentative discharge today. BG well controlled with januvia. Prednisone 5 mg daily.   Eating:  Diet Adult Regular (IDDSI Level 7)   100%  Nausea: No  Hypoglycemia and intervention: No  Fever: No  TPN and/or TF: No      /79 (BP Location: Right arm, Patient Position: Sitting)   Pulse 89   Temp 98.6 °F (37 °C) (Oral)   Resp 18   Ht 5' 8" (1.727 m)   Wt 78 kg (171 lb 15.3 oz)   SpO2 98%   BMI 26.15 kg/m²     Labs Reviewed and Include    Recent Labs   Lab 07/27/22  0725   GLU 99   CALCIUM 9.7   ALBUMIN 3.2*   PROT 6.7   *   K 4.3   CO2 25   CL 97   BUN 17   CREATININE 1.0   ALKPHOS 411*   ALT 79*   AST 39   BILITOT 0.8     Lab Results   Component Value Date    WBC 5.41 07/27/2022    HGB 10.4 (L) 07/27/2022    HCT 32.5 (L) 07/27/2022    MCV 94 07/27/2022     07/27/2022     No results for input(s): TSH, FREET4 in the last 168 hours.  Lab Results   Component Value Date    HGBA1C 5.8 (H) 06/20/2022       Nutritional status:   Body mass index is 26.15 kg/m².  Lab Results   Component Value Date    ALBUMIN 3.2 (L) 07/27/2022    ALBUMIN 3.0 (L) 07/26/2022    ALBUMIN 2.9 (L) 07/25/2022     No results found for: PREALBUMIN    Estimated Creatinine Clearance: 91.2 mL/min (based on SCr of 1 mg/dL).    Accu-Checks  Recent Labs     07/24/22  1647 07/24/22  2145 07/25/22  0904 07/25/22  1818 07/25/22  2118 07/26/22  0833 07/26/22  1747 07/27/22  0824   POCTGLUCOSE 154* 125* 103 126* 155* 99 124* 112*       Current Medications and/or Treatments Impacting Glycemic Control  Immunotherapy:    Immunosuppressants           Stop Route Frequency     tacrolimus capsule 2 mg         -- Oral 2 times daily          Steroids:   Hormones (From admission, onward)                Start     Stop Route Frequency Ordered    07/18/22 1158  melatonin tablet 9 mg         -- Oral Nightly PRN 07/18/22 1100          Pressors:    Autonomic Drugs (From admission, onward)                None "          Hyperglycemia/Diabetes Medications:   Antihyperglycemics (From admission, onward)                Start     Stop Route Frequency Ordered    07/25/22 1045  SITagliptin tablet 100 mg        Question:  Is the patient competent?  Answer:  Yes    -- Oral Daily 07/25/22 0932    07/18/22 1135  insulin aspart U-100 pen 0-5 Units         -- SubQ Before meals & nightly PRN 07/18/22 1038

## 2022-07-27 NOTE — PLAN OF CARE
AAOx4, VSS, afebrile overnight; continue PO amoxicillin  RUQ biliary Drain remains CDI, refer to flowsheet for total output overnight; plan to cap drain during day today 7/27  Sleeping well overnight, non skid socks worn, call light within reach, will cont to monitor

## 2022-07-27 NOTE — PROGRESS NOTES
"Discharge Medication Note:    Hospital Course:  Mr Larson is s/p liver transplant from 6/21/22 with a post op course significant for recurrent bile leak, who presents with fever and continuous bilious drainage from PTC drain.  This admission PTC was upsized, most recently on 7/25.  Will begin capping trial for 2 days (PTC capped 7/27) with routine exchange scheduled for 8/1.  From an infectious standpoint - 7/18 blood culture with Candida glabrata - per ID will discharge pt with micafungin and augmentin STOP 8/4/22 and will follow up with ID outpt.  This admission pt completed course of fluconazole and prednisone, and coreg and tamsulosin were stopped.  Endo consulted and pt transitioned off of insulin - will not discharge pt with any antihyperglycemic agents, pt to monitor glucoses at home for 1 week and send in glucose log to reassess.    Met with Romeo Yingp at discharge to review discharge medications and to update the blue medication card.           Medication List      START taking these medications    amoxicillin-clavulanate 875-125mg 875-125 mg per tablet  Commonly known as: AUGMENTIN  Take 1 tablet by mouth every 12 (twelve) hours. for 12 days     MICAFUNGIN 100 MG/100 ML NS (READY TO MIX SYSTEM)  Inject 100 mLs (100 mg total) into the vein once daily. Stop: 8/4/22 for 12 days     sulfamethoxazole-trimethoprim 400-80mg 400-80 mg per tablet  Commonly known as: BACTRIM,SEPTRA  Take 1 tablet by mouth once daily. Stop: 12/18/22     ursodioL 250 mg Tab  Commonly known as: ACTIGALL  Take 1 tablet (250 mg total) by mouth 2 (two) times daily.        CHANGE how you take these medications    tacrolimus 1 MG Cap  Commonly known as: PROGRAF  Take 2 capsules (2 mg total) by mouth every 12 (twelve) hours.  What changed: how much to take        CONTINUE taking these medications    aspirin 81 MG Chew  CHEW 1 tablet (81 mg total) by mouth once daily.     BD ULTRA-FINE KAREN PEN NEEDLE 32 gauge x 5/32" Ndle  Generic drug: pen " needle, diabetic  1 each by Misc.(Non-Drug; Combo Route) route 3 (three) times daily.     calcium carbonate-vitamin D3 600 mg-20 mcg (800 unit) Tab  Take 1 tablet by mouth once daily at 6am.     methocarbamoL 500 MG Tab  Commonly known as: ROBAXIN  Take 1 tablet (500 mg total) by mouth 4 (four) times daily.     mirtazapine 15 MG tablet  Commonly known as: REMERON  Take 1 tablet (15 mg total) by mouth nightly.     oxyCODONE 10 mg Tab immediate release tablet  Commonly known as: ROXICODONE  Take 1-1.5 tablets (10-15 mg total) by mouth every 4 (four) hours as needed for Pain.     pantoprazole 40 MG tablet  Commonly known as: PROTONIX  Take 1 tablet (40 mg total) by mouth once daily.     TRUE METRIX GLUCOSE METER Misc  Generic drug: blood-glucose meter  Use as instructed     TRUE METRIX GLUCOSE TEST STRIP Strp  Generic drug: blood sugar diagnostic  1 each by Misc.(Non-Drug; Combo Route) route 3 (three) times daily.     TRUEPLUS LANCETS 30 gauge Misc  Generic drug: lancets  1 each by Misc.(Non-Drug; Combo Route) route 3 (three) times daily.     valGANciclovir 450 mg Tab  Commonly known as: VALCYTE  Take 1 tablet (450 mg total) by mouth once daily. STOP 9/19/22        STOP taking these medications    baclofen 10 MG tablet  Commonly known as: LIORESAL     carvediloL 3.125 MG tablet  Commonly known as: COREG     cefpodoxime 200 MG tablet  Commonly known as: VANTIN     famotidine 20 MG tablet  Commonly known as: PEPCID     fluconazole 200 MG Tab  Commonly known as: DIFLUCAN     insulin aspart U-100 100 unit/mL (3 mL) Inpn pen  Commonly known as: NovoLOG     losartan 50 MG tablet  Commonly known as: COZAAR     metFORMIN 500 MG tablet  Commonly known as: GLUCOPHAGE     mycophenolate 250 mg Cap  Commonly known as: CELLCEPT     predniSONE 5 MG tablet  Commonly known as: DELTASONE     tamsulosin 0.4 mg Cap  Commonly known as: FLOMAX           Where to Get Your Medications      These medications were sent [facility]  [place] [address] [address] [facility] [address]    Hours: Mon-Fri 7a-7p, Sat-Sun 10a-4p Phone: [phone]   · amoxicillin-clavulanate 875-125mg 875-125 mg per tablet  · sulfamethoxazole-trimethoprim 400-80mg 400-80 mg per tablet  · tacrolimus 1 MG Cap  · ursodioL 250 mg Tab     Information about where to get these medications is not yet available    Ask your nurse or doctor about these medications  · MICAFUNGIN 100 MG/100 ML NS (READY TO MIX SYSTEM)            The following medications have been placed on HOLD and should be restarted in the outpatient setting (when appropriate): MMF (infection)    Romeo Larson verbalized understanding and had the opportunity to ask questions.

## 2022-07-27 NOTE — ASSESSMENT & PLAN NOTE
Endocrinology consulted for BG management.   BG goal 140-180    - Continue Novolog LDC SSI (150/50) prn for BG excursions.   - Januvia 100 mg QD to prevent prandial excursions. (No history of pancreatitis or medullary thyroid CA).    - BG checks AC/HS  - Hypoglycemia protocol in place    ** Please notify Endocrine for any change and/or advance in diet**  ** Please call Endocrine for any BG related issues **    Discharge Planning:   Trial off januvia. BG monitoring and logs in 1 week; will either stop BG checks or resume januvia if needed.

## 2022-07-27 NOTE — PLAN OF CARE
Pt d/c from TSU. VSS. PIV d/c. Dressing on R chest port a cath changed & dressing on R biliary tube changed. Pharmacist met w/ pt prior to d/c, blue card updated, prescriptions delivered to bedside prior to d/c. AVS reviewed w/ pt, upcoming labs/appointments reviewed, pt verbalizes understanding. Pt transported off unit via wheelchair w/ pt belongings.

## 2022-07-28 ENCOUNTER — CONFERENCE (OUTPATIENT)
Dept: TRANSPLANT | Facility: CLINIC | Age: 42
End: 2022-07-28
Payer: COMMERCIAL

## 2022-07-28 ENCOUNTER — LAB VISIT (OUTPATIENT)
Dept: LAB | Facility: HOSPITAL | Age: 42
End: 2022-07-28
Attending: SURGERY
Payer: COMMERCIAL

## 2022-07-28 ENCOUNTER — PATIENT MESSAGE (OUTPATIENT)
Dept: TRANSPLANT | Facility: CLINIC | Age: 42
End: 2022-07-28
Payer: COMMERCIAL

## 2022-07-28 DIAGNOSIS — Z94.4 LIVER REPLACED BY TRANSPLANT: ICD-10-CM

## 2022-07-28 LAB
ALBUMIN SERPL BCP-MCNC: 3.4 G/DL (ref 3.5–5.2)
ALP SERPL-CCNC: 440 U/L (ref 55–135)
ALT SERPL W/O P-5'-P-CCNC: 78 U/L (ref 10–44)
ANION GAP SERPL CALC-SCNC: 10 MMOL/L (ref 8–16)
AST SERPL-CCNC: 39 U/L (ref 10–40)
BACTERIA BLD CULT: ABNORMAL
BASOPHILS # BLD AUTO: 0.04 K/UL (ref 0–0.2)
BASOPHILS NFR BLD: 0.5 % (ref 0–1.9)
BILIRUB DIRECT SERPL-MCNC: 0.3 MG/DL (ref 0.1–0.3)
BILIRUB SERPL-MCNC: 0.7 MG/DL (ref 0.1–1)
BUN SERPL-MCNC: 20 MG/DL (ref 6–20)
CALCIUM SERPL-MCNC: 9.9 MG/DL (ref 8.7–10.5)
CHLORIDE SERPL-SCNC: 101 MMOL/L (ref 95–110)
CO2 SERPL-SCNC: 26 MMOL/L (ref 23–29)
CREAT SERPL-MCNC: 1.3 MG/DL (ref 0.5–1.4)
DIFFERENTIAL METHOD: ABNORMAL
EOSINOPHIL # BLD AUTO: 0 K/UL (ref 0–0.5)
EOSINOPHIL NFR BLD: 0.1 % (ref 0–8)
ERYTHROCYTE [DISTWIDTH] IN BLOOD BY AUTOMATED COUNT: 14.4 % (ref 11.5–14.5)
EST. GFR  (AFRICAN AMERICAN): >60 ML/MIN/1.73 M^2
EST. GFR  (NON AFRICAN AMERICAN): >60 ML/MIN/1.73 M^2
GLUCOSE SERPL-MCNC: 115 MG/DL (ref 70–110)
HCT VFR BLD AUTO: 36.3 % (ref 40–54)
HGB BLD-MCNC: 11.3 G/DL (ref 14–18)
IMM GRANULOCYTES # BLD AUTO: 0.02 K/UL (ref 0–0.04)
IMM GRANULOCYTES NFR BLD AUTO: 0.3 % (ref 0–0.5)
LYMPHOCYTES # BLD AUTO: 1 K/UL (ref 1–4.8)
LYMPHOCYTES NFR BLD: 13.7 % (ref 18–48)
MCH RBC QN AUTO: 30.1 PG (ref 27–31)
MCHC RBC AUTO-ENTMCNC: 31.1 G/DL (ref 32–36)
MCV RBC AUTO: 97 FL (ref 82–98)
MONOCYTES # BLD AUTO: 0.5 K/UL (ref 0.3–1)
MONOCYTES NFR BLD: 6.8 % (ref 4–15)
NEUTROPHILS # BLD AUTO: 5.7 K/UL (ref 1.8–7.7)
NEUTROPHILS NFR BLD: 78.6 % (ref 38–73)
NRBC BLD-RTO: 0 /100 WBC
PLATELET # BLD AUTO: 225 K/UL (ref 150–450)
PMV BLD AUTO: 9.7 FL (ref 9.2–12.9)
POTASSIUM SERPL-SCNC: 5.2 MMOL/L (ref 3.5–5.1)
PROT SERPL-MCNC: 7.2 G/DL (ref 6–8.4)
RBC # BLD AUTO: 3.75 M/UL (ref 4.6–6.2)
SODIUM SERPL-SCNC: 137 MMOL/L (ref 136–145)
WBC # BLD AUTO: 7.3 K/UL (ref 3.9–12.7)

## 2022-07-28 PROCEDURE — G0180 PR HOME HEALTH MD CERTIFICATION: ICD-10-PCS | Mod: ,,, | Performed by: SURGERY

## 2022-07-28 PROCEDURE — 80197 ASSAY OF TACROLIMUS: CPT | Performed by: SURGERY

## 2022-07-28 PROCEDURE — G0180 MD CERTIFICATION HHA PATIENT: HCPCS | Mod: ,,, | Performed by: SURGERY

## 2022-07-28 PROCEDURE — 82248 BILIRUBIN DIRECT: CPT | Performed by: SURGERY

## 2022-07-28 PROCEDURE — 80053 COMPREHEN METABOLIC PANEL: CPT | Performed by: SURGERY

## 2022-07-28 PROCEDURE — 36415 COLL VENOUS BLD VENIPUNCTURE: CPT | Mod: PO | Performed by: SURGERY

## 2022-07-28 PROCEDURE — 85025 COMPLETE CBC W/AUTO DIFF WBC: CPT | Performed by: SURGERY

## 2022-07-28 NOTE — PROGRESS NOTES
"[facility] - Transplant Stepdown  Endocrinology  Progress Note    Admit Date: 7/18/2022     Reason for Consult: Management of T2DM, Hyperglycemia      Surgical Procedure and Date: Liver Transplant 6/21/2022     Diabetes diagnosis year: 2022     Home Diabetes Medications:  Novolog 5 units TID with meals + SSI LDC (150/50)     How often checking glucose at home?  3-4x daily    BG readings on regimen: 100-200's   Hypoglycemia on the regimen?  No  Missed doses on regimen?  No     Diabetes Complications include:     Hyperglycemia      Complicating diabetes co morbidities:   Glucocorticoid Use     HPI:   Patient is a 41 y.o. male with a diagnosis of well controlled type 2 DM. 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 Post operative course significant for recurrent bile leak. Patient presented for outpatient follow up, drain placed last hospitalization found to have bilious output, and patient reported feeling weak with decreased appeitie. Admitted for further interventions. Endocrinology consulted for BG/ DM management.     Lab Results   Component Value Date    HGBA1C 5.8 (H) 06/20/2022           Interval HPI:   Overnight events: Remains in TSU. Tentative discharge today. BG well controlled with januvia. Prednisone 5 mg daily.   Eating:  Diet Adult Regular (IDDSI Level 7)   100%  Nausea: No  Hypoglycemia and intervention: No  Fever: No  TPN and/or TF: No      /79 (BP Location: Right arm, Patient Position: Sitting)   Pulse 89   Temp 98.6 °F (37 °C) (Oral)   Resp 18   Ht 5' 8" (1.727 m)   Wt 78 kg (171 lb 15.3 oz)   SpO2 98%   BMI 26.15 kg/m²     Labs Reviewed and Include    Recent Labs   Lab 07/27/22  0725   GLU 99   CALCIUM 9.7   ALBUMIN 3.2*   PROT 6.7   *   K 4.3   CO2 25   CL 97   BUN 17   CREATININE 1.0   ALKPHOS 411*   ALT 79*   AST 39   BILITOT 0.8     Lab Results   Component Value Date    WBC 5.41 07/27/2022    HGB 10.4 (L) 07/27/2022    HCT " 32.5 (L) 07/27/2022    MCV 94 07/27/2022     07/27/2022     No results for input(s): TSH, FREET4 in the last 168 hours.  Lab Results   Component Value Date    HGBA1C 5.8 (H) 06/20/2022       Nutritional status:   Body mass index is 26.15 kg/m².  Lab Results   Component Value Date    ALBUMIN 3.2 (L) 07/27/2022    ALBUMIN 3.0 (L) 07/26/2022    ALBUMIN 2.9 (L) 07/25/2022     No results found for: PREALBUMIN    Estimated Creatinine Clearance: 91.2 mL/min (based on SCr of 1 mg/dL).    Accu-Checks  Recent Labs     07/24/22  1647 07/24/22  2145 07/25/22  0904 07/25/22  1818 07/25/22  2118 07/26/22  0833 07/26/22  1747 07/27/22  0824   POCTGLUCOSE 154* 125* 103 126* 155* 99 124* 112*       Current Medications and/or Treatments Impacting Glycemic Control  Immunotherapy:    Immunosuppressants           Stop Route Frequency     tacrolimus capsule 2 mg         -- Oral 2 times daily          Steroids:   Hormones (From admission, onward)                Start     Stop Route Frequency Ordered    07/18/22 1158  melatonin tablet 9 mg         -- Oral Nightly PRN 07/18/22 1100          Pressors:    Autonomic Drugs (From admission, onward)                None          Hyperglycemia/Diabetes Medications:   Antihyperglycemics (From admission, onward)                Start     Stop Route Frequency Ordered    07/25/22 1045  SITagliptin tablet 100 mg        Question:  Is the patient competent?  Answer:  Yes    -- Oral Daily 07/25/22 0932    07/18/22 1135  insulin aspart U-100 pen 0-5 Units         -- SubQ Before meals & nightly PRN 07/18/22 1035            ASSESSMENT and PLAN    Type 2 diabetes mellitus with hyperglycemia  Endocrinology consulted for BG management.   BG goal 140-180    - Continue Novolog LDC SSI (150/50) prn for BG excursions.   - Januvia 100 mg QD to prevent prandial excursions. (No history of pancreatitis or medullary thyroid CA).    - BG checks AC/HS  - Hypoglycemia protocol in place    ** Please notify Endocrine  for any change and/or advance in diet**  ** Please call Endocrine for any BG related issues **    Discharge Planning:   Trial off januvia. BG monitoring and logs in 1 week; will either stop BG checks or resume januvia if needed.       S/P liver transplant  Managed per primary team  Avoid hypoglycemia        Prophylactic immunotherapy  May increase insulin resistance.             Brinda Zuniga NP  Endocrinology  [facility] - Transplant Stepdown

## 2022-07-28 NOTE — TELEPHONE ENCOUNTER
Pathology Conference 7/28/22    Explant:   Cholangiocarcinoma of the perihilar bile ducts, well-moderately   1.2cm tumor foci  2 negative lymph nodes  Invasion of perhiliar soft tissue  Margins negative   Macro steatosis

## 2022-07-29 ENCOUNTER — TELEPHONE (OUTPATIENT)
Dept: INFECTIOUS DISEASES | Facility: CLINIC | Age: 42
End: 2022-07-29
Payer: COMMERCIAL

## 2022-07-29 ENCOUNTER — TELEPHONE (OUTPATIENT)
Dept: TRANSPLANT | Facility: CLINIC | Age: 42
End: 2022-07-29
Payer: COMMERCIAL

## 2022-07-29 ENCOUNTER — PATIENT MESSAGE (OUTPATIENT)
Dept: TRANSPLANT | Facility: CLINIC | Age: 42
End: 2022-07-29
Payer: COMMERCIAL

## 2022-07-29 DIAGNOSIS — Z94.4 LIVER REPLACED BY TRANSPLANT: Primary | ICD-10-CM

## 2022-07-29 LAB — TACROLIMUS BLD-MCNC: 9.1 NG/ML (ref 5–15)

## 2022-07-29 NOTE — TELEPHONE ENCOUNTER
Called Brittney with [employer],  · Gave verbal orders to extend iv micafungin 2 more weeks.   · She verbalized understanding

## 2022-07-29 NOTE — TELEPHONE ENCOUNTER
Labs reviewed by . patient notified and instructed via [email or web address] as per VOR[facility] :     Your labs have been resulted and reviewed by ; no medicine changes made. Repeat labs on Monday 8/1/22.  Please follow a low potassium diet (your potassium level was slightly elevated) : avoid foods such as : oranges/ bananas, potatoes, tomatoes, beans, cabbage, nuts, avacados, sports drinks (like Gatorade etc...). thanks.

## 2022-08-01 ENCOUNTER — LAB VISIT (OUTPATIENT)
Dept: LAB | Facility: HOSPITAL | Age: 42
End: 2022-08-01
Attending: SURGERY
Payer: COMMERCIAL

## 2022-08-01 DIAGNOSIS — Z94.4 LIVER REPLACED BY TRANSPLANT: ICD-10-CM

## 2022-08-01 LAB
ALBUMIN SERPL BCP-MCNC: 3.6 G/DL (ref 3.5–5.2)
ALP SERPL-CCNC: 403 U/L (ref 55–135)
ALT SERPL W/O P-5'-P-CCNC: 58 U/L (ref 10–44)
ANION GAP SERPL CALC-SCNC: 13 MMOL/L (ref 8–16)
AST SERPL-CCNC: 30 U/L (ref 10–40)
BASOPHILS # BLD AUTO: 0.03 K/UL (ref 0–0.2)
BASOPHILS NFR BLD: 0.9 % (ref 0–1.9)
BILIRUB DIRECT SERPL-MCNC: 0.3 MG/DL (ref 0.1–0.3)
BILIRUB SERPL-MCNC: 0.6 MG/DL (ref 0.1–1)
BUN SERPL-MCNC: 17 MG/DL (ref 6–20)
CALCIUM SERPL-MCNC: 10.1 MG/DL (ref 8.7–10.5)
CHLORIDE SERPL-SCNC: 100 MMOL/L (ref 95–110)
CO2 SERPL-SCNC: 26 MMOL/L (ref 23–29)
CREAT SERPL-MCNC: 1.2 MG/DL (ref 0.5–1.4)
DIFFERENTIAL METHOD: ABNORMAL
EOSINOPHIL # BLD AUTO: 0 K/UL (ref 0–0.5)
EOSINOPHIL NFR BLD: 0.3 % (ref 0–8)
ERYTHROCYTE [DISTWIDTH] IN BLOOD BY AUTOMATED COUNT: 14.8 % (ref 11.5–14.5)
EST. GFR  (NO RACE VARIABLE): >60 ML/MIN/1.73 M^2
GLUCOSE SERPL-MCNC: 106 MG/DL (ref 70–110)
HCT VFR BLD AUTO: 36.3 % (ref 40–54)
HGB BLD-MCNC: 11.7 G/DL (ref 14–18)
IMM GRANULOCYTES # BLD AUTO: 0.01 K/UL (ref 0–0.04)
IMM GRANULOCYTES NFR BLD AUTO: 0.3 % (ref 0–0.5)
LYMPHOCYTES # BLD AUTO: 0.8 K/UL (ref 1–4.8)
LYMPHOCYTES NFR BLD: 24 % (ref 18–48)
MCH RBC QN AUTO: 31 PG (ref 27–31)
MCHC RBC AUTO-ENTMCNC: 32.2 G/DL (ref 32–36)
MCV RBC AUTO: 96 FL (ref 82–98)
MONOCYTES # BLD AUTO: 0.2 K/UL (ref 0.3–1)
MONOCYTES NFR BLD: 6.6 % (ref 4–15)
NEUTROPHILS # BLD AUTO: 2.2 K/UL (ref 1.8–7.7)
NEUTROPHILS NFR BLD: 67.9 % (ref 38–73)
NRBC BLD-RTO: 0 /100 WBC
PLATELET # BLD AUTO: 190 K/UL (ref 150–450)
PMV BLD AUTO: 9.4 FL (ref 9.2–12.9)
POTASSIUM SERPL-SCNC: 4.9 MMOL/L (ref 3.5–5.1)
PROT SERPL-MCNC: 7.2 G/DL (ref 6–8.4)
RBC # BLD AUTO: 3.77 M/UL (ref 4.6–6.2)
SODIUM SERPL-SCNC: 139 MMOL/L (ref 136–145)
WBC # BLD AUTO: 3.17 K/UL (ref 3.9–12.7)

## 2022-08-01 PROCEDURE — 85025 COMPLETE CBC W/AUTO DIFF WBC: CPT | Performed by: SURGERY

## 2022-08-01 PROCEDURE — 82248 BILIRUBIN DIRECT: CPT | Performed by: SURGERY

## 2022-08-01 PROCEDURE — 80197 ASSAY OF TACROLIMUS: CPT | Performed by: SURGERY

## 2022-08-01 PROCEDURE — 36415 COLL VENOUS BLD VENIPUNCTURE: CPT | Mod: PO | Performed by: SURGERY

## 2022-08-01 PROCEDURE — 80053 COMPREHEN METABOLIC PANEL: CPT | Performed by: SURGERY

## 2022-08-01 NOTE — DISCHARGE SUMMARY
[facility] - Surgery ([facility])  Heart Transplant  Discharge Summary      Patient Name: Romeo Larson  MRN: [id number]  Admission Date: 6/17/2022  Hospital Length of Stay: 0 days  Discharge Date and Time: 08/01/2022 9:26 AM  Attending Physician: No att. providers found   Discharging Provider: Julio Cesar Jara MD  Primary Care Provider: Pravin Maria MD     HPI: 42 M s/p staging robotic exploration for cholangiocarcinoma.    Procedure(s) (LRB):  XI ROBOTIC LAPAROSCOPY,EXPLORATORY (N/A)  XI ROBOTIC LYMPHADENECTOMY - Portal     Hospital Course: Robotic staging procedure uneventful. No post op issues.        Significant Diagnostic Studies:     Pending Diagnostic Studies:     None        There are no hospital problems to display for this patient.     Discharged Condition: good    Disposition: Home or Self Care    Follow Up:    Patient Instructions:      No dressing needed     Activity as tolerated     Medications:  Reconciled Home Medications:      Medication List      STOP taking these medications    baclofen 10 MG tablet  Commonly known as: LIORESAL     losartan 50 MG tablet  Commonly known as: COZAAR     metFORMIN 500 MG tablet  Commonly known as: GLUCOPHAGE     ondansetron 8 MG Tbdl  Commonly known as: ZOFRAN-ODT     promethazine 12.5 MG Tab  Commonly known as: PHENERGAN            Julio Cesar Jara MD  Heart Transplant  [facility] ([facility])

## 2022-08-02 ENCOUNTER — OFFICE VISIT (OUTPATIENT)
Dept: TRANSPLANT | Facility: CLINIC | Age: 42
End: 2022-08-02
Payer: COMMERCIAL

## 2022-08-02 ENCOUNTER — PATIENT MESSAGE (OUTPATIENT)
Dept: TRANSPLANT | Facility: CLINIC | Age: 42
End: 2022-08-02

## 2022-08-02 VITALS
RESPIRATION RATE: 16 BRPM | WEIGHT: 159.63 LBS | TEMPERATURE: 97 F | SYSTOLIC BLOOD PRESSURE: 127 MMHG | DIASTOLIC BLOOD PRESSURE: 97 MMHG | HEIGHT: 68 IN | BODY MASS INDEX: 24.19 KG/M2 | OXYGEN SATURATION: 100 % | HEART RATE: 115 BPM

## 2022-08-02 DIAGNOSIS — Z94.4 LIVER REPLACED BY TRANSPLANT: Primary | ICD-10-CM

## 2022-08-02 DIAGNOSIS — K83.9 BILE LEAK: ICD-10-CM

## 2022-08-02 LAB — TACROLIMUS BLD-MCNC: 9.5 NG/ML (ref 5–15)

## 2022-08-02 PROCEDURE — 1111F PR DISCHARGE MEDS RECONCILED W/ CURRENT OUTPATIENT MED LIST: ICD-10-PCS | Mod: CPTII,S$GLB,, | Performed by: TRANSPLANT SURGERY

## 2022-08-02 PROCEDURE — 1111F DSCHRG MED/CURRENT MED MERGE: CPT | Mod: CPTII,S$GLB,, | Performed by: TRANSPLANT SURGERY

## 2022-08-02 PROCEDURE — 3074F PR MOST RECENT SYSTOLIC BLOOD PRESSURE < 130 MM HG: ICD-10-PCS | Mod: CPTII,S$GLB,, | Performed by: TRANSPLANT SURGERY

## 2022-08-02 PROCEDURE — 3008F PR BODY MASS INDEX (BMI) DOCUMENTED: ICD-10-PCS | Mod: CPTII,S$GLB,, | Performed by: TRANSPLANT SURGERY

## 2022-08-02 PROCEDURE — 3008F BODY MASS INDEX DOCD: CPT | Mod: CPTII,S$GLB,, | Performed by: TRANSPLANT SURGERY

## 2022-08-02 PROCEDURE — 3044F PR MOST RECENT HEMOGLOBIN A1C LEVEL <7.0%: ICD-10-PCS | Mod: CPTII,S$GLB,, | Performed by: TRANSPLANT SURGERY

## 2022-08-02 PROCEDURE — 4010F ACE/ARB THERAPY RXD/TAKEN: CPT | Mod: CPTII,S$GLB,, | Performed by: TRANSPLANT SURGERY

## 2022-08-02 PROCEDURE — 3044F HG A1C LEVEL LT 7.0%: CPT | Mod: CPTII,S$GLB,, | Performed by: TRANSPLANT SURGERY

## 2022-08-02 PROCEDURE — 3080F DIAST BP >= 90 MM HG: CPT | Mod: CPTII,S$GLB,, | Performed by: TRANSPLANT SURGERY

## 2022-08-02 PROCEDURE — 99215 PR OFFICE/OUTPT VISIT, EST, LEVL V, 40-54 MIN: ICD-10-PCS | Mod: 24,S$GLB,, | Performed by: TRANSPLANT SURGERY

## 2022-08-02 PROCEDURE — 99215 OFFICE O/P EST HI 40 MIN: CPT | Mod: 24,S$GLB,, | Performed by: TRANSPLANT SURGERY

## 2022-08-02 PROCEDURE — 99999 PR PBB SHADOW E&M-EST. PATIENT-LVL III: CPT | Mod: PBBFAC,,,

## 2022-08-02 PROCEDURE — 3080F PR MOST RECENT DIASTOLIC BLOOD PRESSURE >= 90 MM HG: ICD-10-PCS | Mod: CPTII,S$GLB,, | Performed by: TRANSPLANT SURGERY

## 2022-08-02 PROCEDURE — 3074F SYST BP LT 130 MM HG: CPT | Mod: CPTII,S$GLB,, | Performed by: TRANSPLANT SURGERY

## 2022-08-02 PROCEDURE — 4010F PR ACE/ARB THEARPY RXD/TAKEN: ICD-10-PCS | Mod: CPTII,S$GLB,, | Performed by: TRANSPLANT SURGERY

## 2022-08-02 PROCEDURE — 99999 PR PBB SHADOW E&M-EST. PATIENT-LVL III: ICD-10-PCS | Mod: PBBFAC,,,

## 2022-08-02 RX ORDER — TACROLIMUS 0.5 MG/1
0.5 CAPSULE ORAL EVERY 12 HOURS
Qty: 60 CAPSULE | Refills: 11 | Status: SHIPPED | OUTPATIENT
Start: 2022-08-02 | End: 2022-08-04 | Stop reason: DRUGHIGH

## 2022-08-02 RX ORDER — CARVEDILOL 6.25 MG/1
3.12 TABLET ORAL 2 TIMES DAILY WITH MEALS
Qty: 30 TABLET | Refills: 11 | Status: SHIPPED | OUTPATIENT
Start: 2022-08-02 | End: 2023-03-27 | Stop reason: SDUPTHER

## 2022-08-02 RX ORDER — AMOXICILLIN AND CLAVULANATE POTASSIUM 875; 125 MG/1; MG/1
1 TABLET, FILM COATED ORAL EVERY 12 HOURS
Qty: 24 TABLET | Refills: 1 | Status: ON HOLD | OUTPATIENT
Start: 2022-08-02 | End: 2022-08-26 | Stop reason: HOSPADM

## 2022-08-02 RX ORDER — DRONABINOL 2.5 MG/1
2.5 CAPSULE ORAL
Qty: 60 CAPSULE | Refills: 5 | Status: CANCELLED | OUTPATIENT
Start: 2022-08-02

## 2022-08-02 RX ORDER — TRAZODONE HYDROCHLORIDE 50 MG/1
TABLET ORAL
Qty: 30 TABLET | Refills: 11 | Status: SHIPPED | OUTPATIENT
Start: 2022-08-02 | End: 2022-08-02 | Stop reason: CLARIF

## 2022-08-02 NOTE — PROGRESS NOTES
Transplant Surgery  Liver Transplant Recipient Follow-up    Original Referring Physician: Pravin Lacey  Current Corresponding Physician: Pravin Maria    Chief Complaint: Romeo is here for follow up of his liver transplant performed 6/21/2022 for the primary diagnosis (UNOS) of Primary Liver Malignancy: Cholangiocarcinoma (CH-CA)    ORGAN: RIGHT LIVER LOBE (SEGS 5,6,7,8) WITHOUT MIDDLE HEPATIC VEIN  Whole or Partial: partial liver  Donor Type: living  PHS Increased Risk:   Donor CMV Status:   Donor HCV Status:   Donor HBcAb:   Donor HBV GUSTABO:   Donor HCV GUSTABO:     Biliary Anastomosis: vick-en-y  Arterial Anatomy: standard  IVC reconstruction: hepatic vein confluence piggyback  Portal vein status: patent    Subjective:     History of Present Illness: He has had the following complications since transplant: .  The noted complications need to be addressed more thoroughly today.    Interval History: Currently, he is doing adequately.  Current complaints include tremor and sleep disturbance.  Romeo is here for management of his immunosuppression medication.    External provider notes reviewed: Yes    Review of Systems   Constitutional: Positive for appetite change. Negative for activity change.   HENT: Negative for congestion and tinnitus.    Eyes: Negative for redness and visual disturbance.   Respiratory: Negative for cough and shortness of breath.    Cardiovascular: Negative for chest pain and palpitations.   Gastrointestinal: Negative for abdominal distention and abdominal pain.   Endocrine: Negative for polydipsia and polyuria.   Genitourinary: Negative for decreased urine volume and dysuria.   Musculoskeletal: Negative for arthralgias and back pain.   Skin: Negative for pallor and rash.   Allergic/Immunologic: Positive for immunocompromised state. Negative for environmental allergies.   Neurological: Negative for tremors and headaches.   Hematological: Negative for adenopathy. Does not  bruise/bleed easily.   Psychiatric/Behavioral: Negative for behavioral problems and confusion.     Objective:     Physical Exam  Abdominal:           Lab Results   Component Value Date    BILITOT 0.6 08/01/2022    AST 30 08/01/2022    ALT 58 (H) 08/01/2022    ALKPHOS 403 (H) 08/01/2022    CREATININE 1.2 08/01/2022    ALBUMIN 3.6 08/01/2022     Lab Results   Component Value Date    WBC 3.17 (L) 08/01/2022    HGB 11.7 (L) 08/01/2022    HCT 36.3 (L) 08/01/2022    HCT 25 (L) 06/23/2022     08/01/2022     Lab Results   Component Value Date    TACROLIMUS 9.5 08/01/2022       Diagnostics:  The following labs have been reviewed: CBC  CMP  TACROLIMUS LEVEL  The following radiology images have been independently reviewed and interpreted:     Assessment/Plan:          · S/P liver transplant.  · Chronic immunosuppressive medications for rejection prophylaxis supratherapeutic.  Plan: severe tremor, decrease tac to 1.5mg BID, recheck labs Th, cellcept on hold while treating infection.  · Continue monitoring symptoms, labs and drug levels for drug-related toxicity and side effects.  · Incision: staples in place; wound clean, dry, and intact Remove staples in clinic  · Femoral arterial line site: no complications evident   · Restart BP medication  · Path report reviewed with patient, cholangiocarcinoma    Additional testing to be completed according to Written Order Guidelines for Post-Liver and Combined Liver/Kidney Transplant Follow-up (LI-09)    Interpretation of tests and discussion of patient management involves all members of the multidisciplinary transplant team  Patient advised that it is recommended that all transplanted patients, and their close contacts and household members receive Covid vaccination.  Julio Cesar Jara MD       [facility] Patient Status  Functional Status: 80% - Normal activity with effort: some symptoms of disease  Physical Capacity: No Limitations

## 2022-08-02 NOTE — PROGRESS NOTES
STAPLE REMOVAL NOTE    Staples removed from liver transplant incision, steri-strips applied with Benzoin per Dr. Jara's order.  Patient tolerated procedure well.  Skin dry and intact.  Patient instructed to shower with back to water spray and to pat dry incision and to let the steri-strips wear off on their own.  Patient instructed to report any redness, warmth, or drainage from incision to transplant coordinators.  All questions answered.

## 2022-08-03 RX ORDER — TACROLIMUS 1 MG/1
1 CAPSULE ORAL EVERY 12 HOURS
Qty: 60 CAPSULE | Refills: 11 | Status: SHIPPED | OUTPATIENT
Start: 2022-08-03 | End: 2022-08-04

## 2022-08-04 ENCOUNTER — PATIENT MESSAGE (OUTPATIENT)
Dept: TRANSPLANT | Facility: CLINIC | Age: 42
End: 2022-08-04
Payer: COMMERCIAL

## 2022-08-04 ENCOUNTER — TELEPHONE (OUTPATIENT)
Dept: TRANSPLANT | Facility: CLINIC | Age: 42
End: 2022-08-04
Payer: COMMERCIAL

## 2022-08-04 ENCOUNTER — LAB VISIT (OUTPATIENT)
Dept: LAB | Facility: HOSPITAL | Age: 42
End: 2022-08-04
Attending: SURGERY
Payer: COMMERCIAL

## 2022-08-04 DIAGNOSIS — Z94.4 LIVER REPLACED BY TRANSPLANT: Primary | ICD-10-CM

## 2022-08-04 DIAGNOSIS — Z94.4 LIVER REPLACED BY TRANSPLANT: ICD-10-CM

## 2022-08-04 LAB
ALBUMIN SERPL BCP-MCNC: 3.7 G/DL (ref 3.5–5.2)
ALP SERPL-CCNC: 361 U/L (ref 55–135)
ALT SERPL W/O P-5'-P-CCNC: 46 U/L (ref 10–44)
ANION GAP SERPL CALC-SCNC: 8 MMOL/L (ref 8–16)
AST SERPL-CCNC: 26 U/L (ref 10–40)
BASOPHILS # BLD AUTO: 0.06 K/UL (ref 0–0.2)
BASOPHILS NFR BLD: 1.6 % (ref 0–1.9)
BILIRUB DIRECT SERPL-MCNC: 0.2 MG/DL (ref 0.1–0.3)
BILIRUB SERPL-MCNC: 0.4 MG/DL (ref 0.1–1)
BUN SERPL-MCNC: 13 MG/DL (ref 6–20)
CALCIUM SERPL-MCNC: 9.9 MG/DL (ref 8.7–10.5)
CHLORIDE SERPL-SCNC: 101 MMOL/L (ref 95–110)
CO2 SERPL-SCNC: 29 MMOL/L (ref 23–29)
CREAT SERPL-MCNC: 1.3 MG/DL (ref 0.5–1.4)
DIFFERENTIAL METHOD: ABNORMAL
EOSINOPHIL # BLD AUTO: 0 K/UL (ref 0–0.5)
EOSINOPHIL NFR BLD: 1.1 % (ref 0–8)
ERYTHROCYTE [DISTWIDTH] IN BLOOD BY AUTOMATED COUNT: 14.6 % (ref 11.5–14.5)
EST. GFR  (NO RACE VARIABLE): >60 ML/MIN/1.73 M^2
GLUCOSE SERPL-MCNC: 111 MG/DL (ref 70–110)
HCT VFR BLD AUTO: 35.7 % (ref 40–54)
HGB BLD-MCNC: 11.2 G/DL (ref 14–18)
IMM GRANULOCYTES # BLD AUTO: 0.02 K/UL (ref 0–0.04)
IMM GRANULOCYTES NFR BLD AUTO: 0.5 % (ref 0–0.5)
LYMPHOCYTES # BLD AUTO: 0.7 K/UL (ref 1–4.8)
LYMPHOCYTES NFR BLD: 19.5 % (ref 18–48)
MCH RBC QN AUTO: 29.3 PG (ref 27–31)
MCHC RBC AUTO-ENTMCNC: 31.4 G/DL (ref 32–36)
MCV RBC AUTO: 94 FL (ref 82–98)
MONOCYTES # BLD AUTO: 0.3 K/UL (ref 0.3–1)
MONOCYTES NFR BLD: 7 % (ref 4–15)
NEUTROPHILS # BLD AUTO: 2.6 K/UL (ref 1.8–7.7)
NEUTROPHILS NFR BLD: 70.3 % (ref 38–73)
NRBC BLD-RTO: 0 /100 WBC
PLATELET # BLD AUTO: 206 K/UL (ref 150–450)
PMV BLD AUTO: 9.1 FL (ref 9.2–12.9)
POTASSIUM SERPL-SCNC: 4.7 MMOL/L (ref 3.5–5.1)
PROT SERPL-MCNC: 7.4 G/DL (ref 6–8.4)
RBC # BLD AUTO: 3.82 M/UL (ref 4.6–6.2)
SODIUM SERPL-SCNC: 138 MMOL/L (ref 136–145)
TACROLIMUS BLD-MCNC: 10.9 NG/ML (ref 5–15)
WBC # BLD AUTO: 3.74 K/UL (ref 3.9–12.7)

## 2022-08-04 PROCEDURE — 82248 BILIRUBIN DIRECT: CPT | Performed by: SURGERY

## 2022-08-04 PROCEDURE — 80197 ASSAY OF TACROLIMUS: CPT | Performed by: SURGERY

## 2022-08-04 PROCEDURE — 80053 COMPREHEN METABOLIC PANEL: CPT | Performed by: SURGERY

## 2022-08-04 PROCEDURE — 36415 COLL VENOUS BLD VENIPUNCTURE: CPT | Mod: PO | Performed by: SURGERY

## 2022-08-04 PROCEDURE — 85025 COMPLETE CBC W/AUTO DIFF WBC: CPT | Performed by: SURGERY

## 2022-08-04 RX ORDER — TACROLIMUS 1 MG/1
1 CAPSULE ORAL EVERY 12 HOURS
Qty: 60 CAPSULE | Refills: 11 | Status: SHIPPED | OUTPATIENT
Start: 2022-08-04 | End: 2022-08-19 | Stop reason: DRUGHIGH

## 2022-08-04 NOTE — TELEPHONE ENCOUNTER
Patient notified and instructed via [employer]:    Your labs from today have been reviewed by ; he would like you to reduce your Prograf dose to 1mg twice daily and repeat labs on Monday 8/8/22.        Re: the CT scan for 8/10/22: he would like you to have that done here at the [facility] as scheduled. Thanks.

## 2022-08-08 ENCOUNTER — LAB VISIT (OUTPATIENT)
Dept: LAB | Facility: HOSPITAL | Age: 42
End: 2022-08-08
Attending: SURGERY
Payer: COMMERCIAL

## 2022-08-08 DIAGNOSIS — Z94.4 LIVER REPLACED BY TRANSPLANT: ICD-10-CM

## 2022-08-08 LAB
ALBUMIN SERPL BCP-MCNC: 3.5 G/DL (ref 3.5–5.2)
ALP SERPL-CCNC: 304 U/L (ref 55–135)
ALT SERPL W/O P-5'-P-CCNC: 52 U/L (ref 10–44)
ANION GAP SERPL CALC-SCNC: 10 MMOL/L (ref 8–16)
AST SERPL-CCNC: 32 U/L (ref 10–40)
BASOPHILS # BLD AUTO: 0.03 K/UL (ref 0–0.2)
BASOPHILS NFR BLD: 0.8 % (ref 0–1.9)
BILIRUB DIRECT SERPL-MCNC: 0.2 MG/DL (ref 0.1–0.3)
BILIRUB SERPL-MCNC: 0.5 MG/DL (ref 0.1–1)
BUN SERPL-MCNC: 12 MG/DL (ref 6–20)
CALCIUM SERPL-MCNC: 10 MG/DL (ref 8.7–10.5)
CHLORIDE SERPL-SCNC: 101 MMOL/L (ref 95–110)
CO2 SERPL-SCNC: 27 MMOL/L (ref 23–29)
CREAT SERPL-MCNC: 1 MG/DL (ref 0.5–1.4)
DIFFERENTIAL METHOD: ABNORMAL
EOSINOPHIL # BLD AUTO: 0 K/UL (ref 0–0.5)
EOSINOPHIL NFR BLD: 0.5 % (ref 0–8)
ERYTHROCYTE [DISTWIDTH] IN BLOOD BY AUTOMATED COUNT: 14.6 % (ref 11.5–14.5)
EST. GFR  (NO RACE VARIABLE): >60 ML/MIN/1.73 M^2
FUNGUS SPEC CULT: NORMAL
FUNGUS SPEC CULT: NORMAL
GLUCOSE SERPL-MCNC: 106 MG/DL (ref 70–110)
HCT VFR BLD AUTO: 35.7 % (ref 40–54)
HGB BLD-MCNC: 11.6 G/DL (ref 14–18)
IMM GRANULOCYTES # BLD AUTO: 0.01 K/UL (ref 0–0.04)
IMM GRANULOCYTES NFR BLD AUTO: 0.3 % (ref 0–0.5)
LYMPHOCYTES # BLD AUTO: 0.8 K/UL (ref 1–4.8)
LYMPHOCYTES NFR BLD: 22.1 % (ref 18–48)
MCH RBC QN AUTO: 30.9 PG (ref 27–31)
MCHC RBC AUTO-ENTMCNC: 32.5 G/DL (ref 32–36)
MCV RBC AUTO: 95 FL (ref 82–98)
MONOCYTES # BLD AUTO: 0.3 K/UL (ref 0.3–1)
MONOCYTES NFR BLD: 7.1 % (ref 4–15)
NEUTROPHILS # BLD AUTO: 2.5 K/UL (ref 1.8–7.7)
NEUTROPHILS NFR BLD: 69.2 % (ref 38–73)
NRBC BLD-RTO: 0 /100 WBC
PLATELET # BLD AUTO: 179 K/UL (ref 150–450)
PMV BLD AUTO: 9 FL (ref 9.2–12.9)
POTASSIUM SERPL-SCNC: 4.4 MMOL/L (ref 3.5–5.1)
PROT SERPL-MCNC: 7 G/DL (ref 6–8.4)
RBC # BLD AUTO: 3.75 M/UL (ref 4.6–6.2)
SODIUM SERPL-SCNC: 138 MMOL/L (ref 136–145)
WBC # BLD AUTO: 3.66 K/UL (ref 3.9–12.7)

## 2022-08-08 PROCEDURE — 82248 BILIRUBIN DIRECT: CPT | Performed by: SURGERY

## 2022-08-08 PROCEDURE — 87536 HIV-1 QUANT&REVRSE TRNSCRPJ: CPT | Performed by: SURGERY

## 2022-08-08 PROCEDURE — 85025 COMPLETE CBC W/AUTO DIFF WBC: CPT | Performed by: SURGERY

## 2022-08-08 PROCEDURE — 87517 HEPATITIS B DNA QUANT: CPT | Performed by: SURGERY

## 2022-08-08 PROCEDURE — 80053 COMPREHEN METABOLIC PANEL: CPT | Performed by: SURGERY

## 2022-08-08 PROCEDURE — 80197 ASSAY OF TACROLIMUS: CPT | Performed by: SURGERY

## 2022-08-08 PROCEDURE — 87522 HEPATITIS C REVRS TRNSCRPJ: CPT | Performed by: SURGERY

## 2022-08-09 ENCOUNTER — TELEPHONE (OUTPATIENT)
Dept: TRANSPLANT | Facility: CLINIC | Age: 42
End: 2022-08-09
Payer: COMMERCIAL

## 2022-08-09 ENCOUNTER — PATIENT MESSAGE (OUTPATIENT)
Dept: TRANSPLANT | Facility: CLINIC | Age: 42
End: 2022-08-09
Payer: COMMERCIAL

## 2022-08-09 DIAGNOSIS — Z94.4 LIVER REPLACED BY TRANSPLANT: Primary | ICD-10-CM

## 2022-08-09 LAB — TACROLIMUS BLD-MCNC: 9.6 NG/ML (ref 5–15)

## 2022-08-09 NOTE — TELEPHONE ENCOUNTER
----- Message from Sinan Cline MD sent at 8/9/2022  1:10 PM CDT -----  Results ok. No action needed

## 2022-08-09 NOTE — TELEPHONE ENCOUNTER
Patient notified and instructed via [employer]:    Labs reviewed by ; results ok, no medicine changes made. Repeat labs due on Monday 8/15/22.

## 2022-08-10 ENCOUNTER — HOSPITAL ENCOUNTER (OUTPATIENT)
Dept: RADIOLOGY | Facility: HOSPITAL | Age: 42
Discharge: HOME | End: 2022-08-10
Attending: SURGERY
Payer: COMMERCIAL

## 2022-08-10 ENCOUNTER — PATIENT MESSAGE (OUTPATIENT)
Dept: INFECTIOUS DISEASES | Facility: CLINIC | Age: 42
End: 2022-08-10
Payer: COMMERCIAL

## 2022-08-10 ENCOUNTER — EXTERNAL HOME HEALTH (OUTPATIENT)
Dept: HOME HEALTH SERVICES | Facility: HOSPITAL | Age: 42
End: 2022-08-10
Payer: COMMERCIAL

## 2022-08-10 DIAGNOSIS — K83.9 BILE LEAK: ICD-10-CM

## 2022-08-10 DIAGNOSIS — Z94.4 LIVER REPLACED BY TRANSPLANT: ICD-10-CM

## 2022-08-10 LAB
HCV RNA SERPL NAA+PROBE-ACNC: NORMAL IU/ML
HIV1 RNA # PLAS NAA DL=20: NORMAL COPIES/ML

## 2022-08-10 PROCEDURE — 74160 CT ABDOMEN WITH CONTRAST: ICD-10-PCS | Mod: 26,,, | Performed by: STUDENT IN AN ORGANIZED HEALTH CARE EDUCATION/TRAINING PROGRAM

## 2022-08-10 PROCEDURE — 74160 CT ABDOMEN W/CONTRAST: CPT | Mod: TC

## 2022-08-10 PROCEDURE — 25500020 PHARM REV CODE 255: Performed by: SURGERY

## 2022-08-10 PROCEDURE — 74160 CT ABDOMEN W/CONTRAST: CPT | Mod: 26,,, | Performed by: STUDENT IN AN ORGANIZED HEALTH CARE EDUCATION/TRAINING PROGRAM

## 2022-08-10 RX ADMIN — IOHEXOL 75 ML: 350 INJECTION, SOLUTION INTRAVENOUS at 04:08

## 2022-08-10 NOTE — OP NOTE
Operative Report    Date of Procedure: 6/21/2022    Surgeon: Santi Godwin MD  First Assistant: Roberto Carrillo MD    Pre-operative Diagnosis: Allograft liver for transplantation  Post-operative Diagnosis: Same    Procedure(s) Performed:   Back Table Preparation of Liver with vascular reconstruction of segment 5 and 8 hepatic vein branches with third-party donor iliac vein (UNOS ID [id number])    Anesthesia: Not applicable  Estimated Blood Loss: Not applicable  Fluids Administered: Not applicable    Findings: Estimated steatosis 0-10%, normal segment 5-8 partial liver graft without MHV with signifiicant segment 5 and 8 branches.  Drains: Not applicable  Specimens: Core biopsy of allograft liver      Preamble  Indications: This report describes only the backbench preparation of the liver prior to transplantation.  The transplant operation itself is described in a separate report.    ABO Confirmation: Immediately following arrival of the donor organ and prior to implantation, a formal ABO confirmation was done according to hospital and UNOS policies.  I confirmed the UNOS ID number of the donor organ and the donor and recipient ABO types, directly verifying these data by comparison with the UNOS [employer] report.  This confirmation was personally done by an attending surgeon and circulating nurse, and is officially documented elsewhere.    Time-Out: A complete time out was carried out prior to the procedure, with confirmation of patient identity, correct procedure, correct operative site, appropriate antibiotic prophylaxis, review of any known allergies, and presence of all needed equipment.    Procedure in Detail    The right lobe segmental liver graft was received from the living donor operative field and placed in a basin filled with slush followed by 3 sterile bags. The liver was flushed via the right portal vein with approximately  2 liters HTK solution until the effluent was clear. Additional flush was done through  "the hepatic artery with a syringe and angiocath, and also retrograde via the right hepatic vein.    Vascular reconstruction of the hepatic venous outflow was then carried out.  The allograft segment 8 vein was anastomosed end-to-side to a donor iliac vein (UNOS ID [id number], timeout separately documented) using 5-0 polypropylene. The external iliac vein was trimmed to length and anastomosed end-to-end to the segment 5 vein with 5-0 polypropylene. The common iliac end was then trimmed to length, and joined to the left side of the right iliac vein with 5-0 polypropylene in a "pair of pants" configuration. This common outflow was then extended with a ~1.5 cm long ring of donor IVC from the same donor, also with 5-0 sutures.    The allograft was then packaged according to UNOS policy and maintained at ice temperature until being brought to the recipient operative field for implantation.    Santi Godwin MD    "

## 2022-08-11 ENCOUNTER — PATIENT MESSAGE (OUTPATIENT)
Dept: TRANSPLANT | Facility: CLINIC | Age: 42
End: 2022-08-11
Payer: COMMERCIAL

## 2022-08-11 ENCOUNTER — TELEPHONE (OUTPATIENT)
Dept: TRANSPLANT | Facility: CLINIC | Age: 42
End: 2022-08-11
Payer: COMMERCIAL

## 2022-08-11 NOTE — TELEPHONE ENCOUNTER
CT scan report reviewed with  by phone; results were fine per KRISTINA Sousa.  Patient notified and informed.

## 2022-08-12 ENCOUNTER — TELEPHONE (OUTPATIENT)
Dept: INFECTIOUS DISEASES | Facility: CLINIC | Age: 42
End: 2022-08-12
Payer: COMMERCIAL

## 2022-08-12 ENCOUNTER — OFFICE VISIT (OUTPATIENT)
Dept: INFECTIOUS DISEASES | Facility: CLINIC | Age: 42
End: 2022-08-12
Payer: COMMERCIAL

## 2022-08-12 DIAGNOSIS — K83.9 BILE LEAK: Primary | ICD-10-CM

## 2022-08-12 LAB
HBV DNA SERPL NAA+PROBE-ACNC: <10 IU/ML
HBV DNA SERPL NAA+PROBE-LOG IU: <1 LOG (10) IU/ML
HBV DNA SERPL QL NAA+PROBE: NOT DETECTED

## 2022-08-12 PROCEDURE — 4010F ACE/ARB THERAPY RXD/TAKEN: CPT | Mod: CPTII,95,, | Performed by: INTERNAL MEDICINE

## 2022-08-12 PROCEDURE — 3044F PR MOST RECENT HEMOGLOBIN A1C LEVEL <7.0%: ICD-10-PCS | Mod: CPTII,95,, | Performed by: INTERNAL MEDICINE

## 2022-08-12 PROCEDURE — 3044F HG A1C LEVEL LT 7.0%: CPT | Mod: CPTII,95,, | Performed by: INTERNAL MEDICINE

## 2022-08-12 PROCEDURE — 1111F PR DISCHARGE MEDS RECONCILED W/ CURRENT OUTPATIENT MED LIST: ICD-10-PCS | Mod: CPTII,95,, | Performed by: INTERNAL MEDICINE

## 2022-08-12 PROCEDURE — 1111F DSCHRG MED/CURRENT MED MERGE: CPT | Mod: CPTII,95,, | Performed by: INTERNAL MEDICINE

## 2022-08-12 PROCEDURE — 99215 PR OFFICE/OUTPT VISIT, EST, LEVL V, 40-54 MIN: ICD-10-PCS | Mod: 95,,, | Performed by: INTERNAL MEDICINE

## 2022-08-12 PROCEDURE — 99215 OFFICE O/P EST HI 40 MIN: CPT | Mod: 95,,, | Performed by: INTERNAL MEDICINE

## 2022-08-12 PROCEDURE — 4010F PR ACE/ARB THEARPY RXD/TAKEN: ICD-10-PCS | Mod: CPTII,95,, | Performed by: INTERNAL MEDICINE

## 2022-08-12 NOTE — PROGRESS NOTES
Infectious Diseases Clinic Note    Subjective:       Patient ID: Romeo Larson is a 42 y.o. male.    Chief Complaint: No chief complaint on file.    HPI     The patient location is: [place]  The chief complaint leading to consultation is: f/u    Visit type: audiovisual    Face to Face time with patient: 6  23 minutes of total time spent on the encounter, which includes face to face time and non-face to face time preparing to see the patient (eg, review of tests), Obtaining and/or reviewing separately obtained history, Documenting clinical information in the electronic or other health record, Independently interpreting results (not separately reported) and communicating results to the patient/family/caregiver, or Care coordination (not separately reported).         Each patient to whom he or she provides medical services by telemedicine is:  (1) informed of the relationship between the physician and patient and the respective role of any other health care provider with respect to management of the patient; and (2) notified that he or she may decline to receive medical services by telemedicine and may withdraw from such care at any time.    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    Hypercholesteremia     Hypertension        Social History     Socioeconomic History    Marital status:    Tobacco Use    Smoking status: Never Smoker    Smokeless tobacco: Never Used   Substance and Sexual Activity    Alcohol use: Not Currently    Drug use: Never    Sexual activity: Yes         Current Outpatient Medications:     amoxicillin-clavulanate 875-125mg (AUGMENTIN) 875-125 mg per tablet, Take 1 tablet by mouth every 12 (twelve)  "hours., Disp: 24 tablet, Rfl: 1    aspirin 81 MG Chew, CHEW 1 tablet (81 mg total) by mouth once daily., Disp: 30 tablet, Rfl: 11    blood sugar diagnostic Strp, 1 each by Misc.(Non-Drug; Combo Route) route 3 (three) times daily., Disp: 100 each, Rfl: 5    blood-glucose meter Misc, Use as instructed, Disp: 1 each, Rfl: 0    calcium carbonate-vitamin D3 600 mg-20 mcg (800 unit) Tab, Take 1 tablet by mouth once daily at 6am., Disp: 30 tablet, Rfl: 5    carvediloL (COREG) 6.25 MG tablet, Take 0.5 tablets (3.125 mg total) by mouth 2 (two) times daily with meals., Disp: 30 tablet, Rfl: 11    lancets 30 gauge Misc, 1 each by Misc.(Non-Drug; Combo Route) route 3 (three) times daily., Disp: 100 each, Rfl: 5    methocarbamoL (ROBAXIN) 500 MG Tab, Take 1 tablet (500 mg total) by mouth 4 (four) times daily., Disp: 120 tablet, Rfl: 1    mirtazapine (REMERON) 15 MG tablet, Take 1 tablet (15 mg total) by mouth nightly., Disp: 30 tablet, Rfl: 11    oxyCODONE (ROXICODONE) 10 mg Tab immediate release tablet, Take 1-1.5 tablets (10-15 mg total) by mouth every 4 (four) hours as needed for Pain., Disp: 50 tablet, Rfl: 0    pantoprazole (PROTONIX) 40 MG tablet, Take 1 tablet (40 mg total) by mouth once daily., Disp: 30 tablet, Rfl: 5    pen needle, diabetic 32 gauge x 5/32" Ndle, 1 each by Misc.(Non-Drug; Combo Route) route 3 (three) times daily., Disp: 100 each, Rfl: 5    sulfamethoxazole-trimethoprim 400-80mg (BACTRIM,SEPTRA) 400-80 mg per tablet, Take 1 tablet by mouth once daily. Stop: 12/18/22, Disp: 30 tablet, Rfl: 4    tacrolimus (PROGRAF) 1 MG Cap, Take 1 capsule (1 mg total) by mouth every 12 (twelve) hours., Disp: 60 capsule, Rfl: 11    ursodioL (ACTIGALL) 250 mg Tab, Take 1 tablet (250 mg total) by mouth 2 (two) times a day., Disp: 60 tablet, Rfl: 2    ursodioL (ACTIGALL) 250 mg Tab, Take 1 tablet (250 mg total) by mouth 2 (two) times daily., Disp: 60 tablet, Rfl: 2    valGANciclovir (VALCYTE) 450 mg Tab, Take " 1 tablet (450 mg total) by mouth once daily. STOP 9/19/22, Disp: 30 tablet, Rfl: 2    Review of Systems   All other systems reviewed and are negative.        Past Surgical History:   Procedure Laterality Date    DIAGNOSTIC ULTRASOUND N/A 6/21/2022    Procedure: ULTRASOUND, DIAGNOSTIC;  Surgeon: Sinan Cline MD;  [facility];  Service: Transplant;  Laterality: N/A;    ENDOSCOPIC ULTRASOUND OF UPPER GASTROINTESTINAL TRACT N/A 10/20/2021    Procedure: ULTRASOUND, UPPER GI TRACT, ENDOSCOPIC;  Surgeon: Valeriy Sotelo MD;  [facility]  Service: Endoscopy;  Laterality: N/A;  wife to email vacc card-inst email-tb    ERCP N/A 10/20/2021    Procedure: ERCP (ENDOSCOPIC RETROGRADE CHOLANGIOPANCREATOGRAPHY);  Surgeon: Valeriy Sotelo MD;  [facility]  Service: Endoscopy;  Laterality: N/A;    ERCP N/A 11/4/2021    Procedure: ERCP (ENDOSCOPIC RETROGRADE CHOLANGIOPANCREATOGRAPHY);  Surgeon: Valeriy Sotelo MD;  [facility]  Service: Endoscopy;  Laterality: N/A;    ERCP N/A 11/4/2021    Procedure: ERCP (ENDOSCOPIC RETROGRADE CHOLANGIOPANCREATOGRAPHY);  Surgeon: Roselia Pereyra MD;  [facility]  Service: Endoscopy;  Laterality: N/A;  pt vaccinated, instructed to bring card to procedure, instructions sent portal-MG    ERCP N/A 1/14/2022    Procedure: ERCP (ENDOSCOPIC RETROGRADE CHOLANGIOPANCREATOGRAPHY);  Surgeon: Roselia Pereyra MD;  [facility]  Service: Endoscopy;  Laterality: N/A;  inst portal-right chest port-tb  Pt requested at home COVID testing, Pt instructed at home RAPID to be done morning of procedure, Pt instructed to call endoscopy scheuding if there is a positive result, Pt informed if a positive     ERCP N/A 3/31/2022    Procedure: ERCP (ENDOSCOPIC RETROGRADE CHOLANGIOPANCREATOGRAPHY);  Surgeon: Julio Cesar Alonzo MD;  Location: [facility] ENDO ([facility] FLR);  Service: Endoscopy;  Laterality: N/A;   3/16: port L chest wall. pt to bring covid vaccination card. Instructions sent via portal.-SC  3/18/22-Updated instructions sent via portal re:new date/time-DS    EXPLORATORY LAPAROTOMY AFTER LIVER TRANSPLANTATION N/A 6/23/2022    Procedure: LAPAROTOMY, EXPLORATORY, AFTER LIVER TRANSPLANT;  Surgeon: Julio Cesar Jara MD;  Location: [facility] OR [facility];  Service: Transplant;  Laterality: N/A;    INSERTION OF TUNNELED CENTRAL VENOUS CATHETER (CVC) WITH SUBCUTANEOUS PORT N/A 11/24/2021    Procedure: INSERTION, PORT-A-CATH;  Surgeon: Prem Syed MD;  Location: [facility] CATH LAB;  Service: Radiology;  Laterality: N/A;    LIVER TRANSPLANT N/A 6/21/2022    Procedure: TRANSPLANT, LIVER;  Surgeon: Sinan Cline MD;  Location: [facility] OR [facility] FLR;  Service: Transplant;  Laterality: N/A;    REPAIR OF BILE DUCT N/A 6/23/2022    Procedure: REPAIR, BILE DUCT;  Surgeon: Julio Cesar Jara MD;  Location: [facility] OR 2ND FLR;  Service: Transplant;  Laterality: N/A;    ROBOT-ASSISTED LAPAROSCOPIC LYMPHADENECTOMY USING DA МАРИНА XI  6/17/2022    Procedure: XI ROBOTIC LYMPHADENECTOMY - Portal;  Surgeon: Julio Cesar Jara MD;  Location: [facility] OR 2[facility] FLR;  Service: Transplant;;    TONSILLECTOMY      XI ROBOTIC LAPAROSCOPY, EXPLORATORY N/A 6/17/2022    Procedure: XI ROBOTIC LAPAROSCOPY,EXPLORATORY;  Surgeon: Julio Cesar Jara MD;  Location: [facility] OR [facility]LR;  Service: Transplant;  Laterality: N/A;        Reviewed records today as well as relevant labs, cultures, and imaging  No toxicities from antimicrobials  Extremely medically complex  Patient is high risk for infectious complications given immunosuppresison       Objective:      There were no vitals filed for this visit.  Physical Exam  Neurological:      Mental Status: He is alert.   Psychiatric:         Mood and Affect: Mood normal.             Assessment/Plan:   41 y/o M h/o cholangiocarcinoma s/p living 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 (vick-en-y biliary anastomosis, steroid induction, CMV D-R+, mmf/tacro/pred)  post-op course notable for bile leak, s/p OR take back 6/23 for bile duct repair (small leak fond near biliary anastomosis), cultures grew E faecalis, s/p exlap 7/4 washout of bioloma, OR cultures with lactobacillus sp.  pt discharged with drain readmitted for bilious drainage 7/12/22.  S/p PTC drain 7/13 (blood cultures negative 7/13) discharged 7/15/22 now presented 7/18 with fever s/p biliary catheter exchange 7/20/22 found to have 7/18 blood cultures growing C. Glabrata, of note patient was on fluconazole outpatient prior to admission.  At the time suspected translocation in setting of drain malfunction improved with catheter exchange s/p 2 weeks of micafungin and amoxclav.  He is here for f/u doing very well with 8/10 CT with improvement with minimal residual fluid and no new collections - can stop amox/clav, micafungin and pull line. Will f/u with transplant team next week for possible PTC removal    Discussed care with transplant team/provider

## 2022-08-15 ENCOUNTER — LAB VISIT (OUTPATIENT)
Dept: LAB | Facility: HOSPITAL | Age: 42
End: 2022-08-15
Attending: SURGERY
Payer: COMMERCIAL

## 2022-08-15 DIAGNOSIS — Z94.4 LIVER REPLACED BY TRANSPLANT: ICD-10-CM

## 2022-08-15 LAB
ALBUMIN SERPL BCP-MCNC: 3.8 G/DL (ref 3.5–5.2)
ALP SERPL-CCNC: 187 U/L (ref 55–135)
ALT SERPL W/O P-5'-P-CCNC: 33 U/L (ref 10–44)
ANION GAP SERPL CALC-SCNC: 7 MMOL/L (ref 8–16)
AST SERPL-CCNC: 26 U/L (ref 10–40)
BASOPHILS # BLD AUTO: 0.04 K/UL (ref 0–0.2)
BASOPHILS NFR BLD: 0.9 % (ref 0–1.9)
BILIRUB DIRECT SERPL-MCNC: 0.2 MG/DL (ref 0.1–0.3)
BILIRUB SERPL-MCNC: 0.6 MG/DL (ref 0.1–1)
BUN SERPL-MCNC: 19 MG/DL (ref 6–20)
CALCIUM SERPL-MCNC: 9.9 MG/DL (ref 8.7–10.5)
CHLORIDE SERPL-SCNC: 102 MMOL/L (ref 95–110)
CO2 SERPL-SCNC: 27 MMOL/L (ref 23–29)
CREAT SERPL-MCNC: 1 MG/DL (ref 0.5–1.4)
DIFFERENTIAL METHOD: ABNORMAL
EOSINOPHIL # BLD AUTO: 0 K/UL (ref 0–0.5)
EOSINOPHIL NFR BLD: 0.2 % (ref 0–8)
ERYTHROCYTE [DISTWIDTH] IN BLOOD BY AUTOMATED COUNT: 15.2 % (ref 11.5–14.5)
EST. GFR  (NO RACE VARIABLE): >60 ML/MIN/1.73 M^2
GLUCOSE SERPL-MCNC: 118 MG/DL (ref 70–110)
HCT VFR BLD AUTO: 35.1 % (ref 40–54)
HGB BLD-MCNC: 11.5 G/DL (ref 14–18)
IMM GRANULOCYTES # BLD AUTO: 0.01 K/UL (ref 0–0.04)
IMM GRANULOCYTES NFR BLD AUTO: 0.2 % (ref 0–0.5)
LYMPHOCYTES # BLD AUTO: 0.8 K/UL (ref 1–4.8)
LYMPHOCYTES NFR BLD: 18.4 % (ref 18–48)
MCH RBC QN AUTO: 30.3 PG (ref 27–31)
MCHC RBC AUTO-ENTMCNC: 32.8 G/DL (ref 32–36)
MCV RBC AUTO: 93 FL (ref 82–98)
MONOCYTES # BLD AUTO: 0.4 K/UL (ref 0.3–1)
MONOCYTES NFR BLD: 8.2 % (ref 4–15)
NEUTROPHILS # BLD AUTO: 3.1 K/UL (ref 1.8–7.7)
NEUTROPHILS NFR BLD: 72.1 % (ref 38–73)
NRBC BLD-RTO: 0 /100 WBC
PLATELET # BLD AUTO: 166 K/UL (ref 150–450)
PMV BLD AUTO: 9.1 FL (ref 9.2–12.9)
POTASSIUM SERPL-SCNC: 4.5 MMOL/L (ref 3.5–5.1)
PROT SERPL-MCNC: 7.2 G/DL (ref 6–8.4)
RBC # BLD AUTO: 3.79 M/UL (ref 4.6–6.2)
SODIUM SERPL-SCNC: 136 MMOL/L (ref 136–145)
WBC # BLD AUTO: 4.29 K/UL (ref 3.9–12.7)

## 2022-08-15 PROCEDURE — 80197 ASSAY OF TACROLIMUS: CPT | Performed by: SURGERY

## 2022-08-15 PROCEDURE — 80053 COMPREHEN METABOLIC PANEL: CPT | Performed by: SURGERY

## 2022-08-15 PROCEDURE — 82248 BILIRUBIN DIRECT: CPT | Performed by: SURGERY

## 2022-08-15 PROCEDURE — 36415 COLL VENOUS BLD VENIPUNCTURE: CPT | Mod: PO | Performed by: SURGERY

## 2022-08-15 PROCEDURE — 85025 COMPLETE CBC W/AUTO DIFF WBC: CPT | Performed by: SURGERY

## 2022-08-16 ENCOUNTER — OFFICE VISIT (OUTPATIENT)
Dept: TRANSPLANT | Facility: CLINIC | Age: 42
End: 2022-08-16
Payer: COMMERCIAL

## 2022-08-16 VITALS
TEMPERATURE: 97 F | HEART RATE: 99 BPM | WEIGHT: 164.25 LBS | RESPIRATION RATE: 16 BRPM | OXYGEN SATURATION: 98 % | HEIGHT: 69 IN | DIASTOLIC BLOOD PRESSURE: 98 MMHG | BODY MASS INDEX: 24.33 KG/M2 | SYSTOLIC BLOOD PRESSURE: 136 MMHG

## 2022-08-16 DIAGNOSIS — Z51.81 ENCOUNTER FOR THERAPEUTIC DRUG MONITORING: Primary | ICD-10-CM

## 2022-08-16 DIAGNOSIS — Z94.4 LIVER REPLACED BY TRANSPLANT: ICD-10-CM

## 2022-08-16 DIAGNOSIS — Z79.899 ENCOUNTER FOR LONG-TERM (CURRENT) USE OF OTHER MEDICATIONS: ICD-10-CM

## 2022-08-16 DIAGNOSIS — Z94.4 LIVER REPLACED BY TRANSPLANT: Primary | ICD-10-CM

## 2022-08-16 LAB — TACROLIMUS BLD-MCNC: 5.9 NG/ML (ref 5–15)

## 2022-08-16 PROCEDURE — 1160F PR REVIEW ALL MEDS BY PRESCRIBER/CLIN PHARMACIST DOCUMENTED: ICD-10-PCS | Mod: CPTII,S$GLB,, | Performed by: TRANSPLANT SURGERY

## 2022-08-16 PROCEDURE — 1159F PR MEDICATION LIST DOCUMENTED IN MEDICAL RECORD: ICD-10-PCS | Mod: CPTII,S$GLB,, | Performed by: TRANSPLANT SURGERY

## 2022-08-16 PROCEDURE — 3008F BODY MASS INDEX DOCD: CPT | Mod: CPTII,S$GLB,, | Performed by: TRANSPLANT SURGERY

## 2022-08-16 PROCEDURE — 99213 OFFICE O/P EST LOW 20 MIN: CPT | Mod: 24,S$GLB,, | Performed by: TRANSPLANT SURGERY

## 2022-08-16 PROCEDURE — 1111F DSCHRG MED/CURRENT MED MERGE: CPT | Mod: CPTII,S$GLB,, | Performed by: TRANSPLANT SURGERY

## 2022-08-16 PROCEDURE — 4010F PR ACE/ARB THEARPY RXD/TAKEN: ICD-10-PCS | Mod: CPTII,S$GLB,, | Performed by: TRANSPLANT SURGERY

## 2022-08-16 PROCEDURE — 1111F PR DISCHARGE MEDS RECONCILED W/ CURRENT OUTPATIENT MED LIST: ICD-10-PCS | Mod: CPTII,S$GLB,, | Performed by: TRANSPLANT SURGERY

## 2022-08-16 PROCEDURE — 99999 PR PBB SHADOW E&M-EST. PATIENT-LVL V: CPT | Mod: PBBFAC,,,

## 2022-08-16 PROCEDURE — 3075F SYST BP GE 130 - 139MM HG: CPT | Mod: CPTII,S$GLB,, | Performed by: TRANSPLANT SURGERY

## 2022-08-16 PROCEDURE — 3080F DIAST BP >= 90 MM HG: CPT | Mod: CPTII,S$GLB,, | Performed by: TRANSPLANT SURGERY

## 2022-08-16 PROCEDURE — 3075F PR MOST RECENT SYSTOLIC BLOOD PRESS GE 130-139MM HG: ICD-10-PCS | Mod: CPTII,S$GLB,, | Performed by: TRANSPLANT SURGERY

## 2022-08-16 PROCEDURE — 3044F PR MOST RECENT HEMOGLOBIN A1C LEVEL <7.0%: ICD-10-PCS | Mod: CPTII,S$GLB,, | Performed by: TRANSPLANT SURGERY

## 2022-08-16 PROCEDURE — 3044F HG A1C LEVEL LT 7.0%: CPT | Mod: CPTII,S$GLB,, | Performed by: TRANSPLANT SURGERY

## 2022-08-16 PROCEDURE — 99213 PR OFFICE/OUTPT VISIT, EST, LEVL III, 20-29 MIN: ICD-10-PCS | Mod: 24,S$GLB,, | Performed by: TRANSPLANT SURGERY

## 2022-08-16 PROCEDURE — 1159F MED LIST DOCD IN RCRD: CPT | Mod: CPTII,S$GLB,, | Performed by: TRANSPLANT SURGERY

## 2022-08-16 PROCEDURE — 1160F RVW MEDS BY RX/DR IN RCRD: CPT | Mod: CPTII,S$GLB,, | Performed by: TRANSPLANT SURGERY

## 2022-08-16 PROCEDURE — 3008F PR BODY MASS INDEX (BMI) DOCUMENTED: ICD-10-PCS | Mod: CPTII,S$GLB,, | Performed by: TRANSPLANT SURGERY

## 2022-08-16 PROCEDURE — 3080F PR MOST RECENT DIASTOLIC BLOOD PRESSURE >= 90 MM HG: ICD-10-PCS | Mod: CPTII,S$GLB,, | Performed by: TRANSPLANT SURGERY

## 2022-08-16 PROCEDURE — 99999 PR PBB SHADOW E&M-EST. PATIENT-LVL V: ICD-10-PCS | Mod: PBBFAC,,,

## 2022-08-16 PROCEDURE — 4010F ACE/ARB THERAPY RXD/TAKEN: CPT | Mod: CPTII,S$GLB,, | Performed by: TRANSPLANT SURGERY

## 2022-08-16 NOTE — PROGRESS NOTES
Transplant Surgery  Liver Transplant Recipient Follow-up    Original Referring Physician: Pravin Lacey  Current Corresponding Physician: Pravin Maria    Chief Complaint: Romeo is here for follow up of his liver transplant performed 6/21/2022 for the primary diagnosis (UNOS) of Primary Liver Malignancy: Cholangiocarcinoma (CH-CA)    ORGAN: RIGHT LIVER LOBE (SEGS 5,6,7,8) WITHOUT MIDDLE HEPATIC VEIN  Whole or Partial: partial liver  Donor Type: living  PHS Increased Risk:   Donor CMV Status:   Donor HCV Status:   Donor HBcAb:   Donor HBV GUSTABO:   Donor HCV GUSTABO:     Biliary Anastomosis: vick-en-y  Arterial Anatomy: standard  IVC reconstruction: hepatic vein confluence piggyback  Portal vein status: patent    Subjective:     History of Present Illness: He has had the following complications since transplant: bile leak.  The noted complications is well controlled with a PTC which has been capped for ~ 2 weeks.    Interval History: Currently, he is doing well.  Current complaints include none.  Romeo is here for management of his immunosuppression medication.    External provider notes reviewed:NA    Review of Systems  Objective:     Physical Exam  Vitals reviewed.   Abdominal:           Lab Results   Component Value Date    BILITOT 0.6 08/15/2022    AST 26 08/15/2022    ALT 33 08/15/2022    ALKPHOS 187 (H) 08/15/2022    CREATININE 1.0 08/15/2022    ALBUMIN 3.8 08/15/2022     Lab Results   Component Value Date    WBC 4.29 08/15/2022    HGB 11.5 (L) 08/15/2022    HCT 35.1 (L) 08/15/2022    HCT 25 (L) 06/23/2022     08/15/2022     Lab Results   Component Value Date    TACROLIMUS 5.9 08/15/2022       Diagnostics:  The following labs have been reviewed: CBC  CMP  TACROLIMUS LEVEL  The following radiology images have been independently reviewed and interpreted: Liver US  CT Abd/Pelvis  Cholangiogram    Assessment/Plan:          · S/P liver transplant.  · Continue with PTC  · Chronic  immunosuppressive medications for rejection prophylaxis subtherapeutic.  Plan: increase tacro to 1.5 and 1 mg.  · Continue monitoring symptoms, labs and drug levels for drug-related toxicity and side effects.  · Incision: staples out, wound well-healed, no evidence of hernia  · Femoral arterial line site: no complications evident    Additional testing to be completed according to Written Order Guidelines for Post-Liver and Combined Liver/Kidney Transplant Follow-up (LI-09)    Interpretation of tests and discussion of patient management involves all members of the multidisciplinary transplant team  Patient advised that it is recommended that all transplanted patients, and their close contacts and household members receive Covid vaccination.  Braydon Iglesias MD       [facility] Patient Status  Functional Status: 60% - Requires occasional assistance but is able to care for needs  Physical Capacity: No Limitations

## 2022-08-17 ENCOUNTER — PATIENT MESSAGE (OUTPATIENT)
Dept: TRANSPLANT | Facility: CLINIC | Age: 42
End: 2022-08-17
Payer: COMMERCIAL

## 2022-08-17 DIAGNOSIS — K83.8 COMMON BILE DUCT LEAK OF TRANSPLANTED LIVER: Primary | ICD-10-CM

## 2022-08-17 DIAGNOSIS — T86.49 COMMON BILE DUCT LEAK OF TRANSPLANTED LIVER: Primary | ICD-10-CM

## 2022-08-17 RX ORDER — TACROLIMUS 0.5 MG/1
0.5 CAPSULE ORAL EVERY MORNING
Qty: 30 CAPSULE | Refills: 11 | Status: SHIPPED | OUTPATIENT
Start: 2022-08-17 | End: 2022-08-19 | Stop reason: DRUGHIGH

## 2022-08-18 ENCOUNTER — DOCUMENT SCAN (OUTPATIENT)
Dept: HOME HEALTH SERVICES | Facility: HOSPITAL | Age: 42
End: 2022-08-18
Payer: COMMERCIAL

## 2022-08-18 ENCOUNTER — PATIENT MESSAGE (OUTPATIENT)
Dept: TRANSPLANT | Facility: CLINIC | Age: 42
End: 2022-08-18
Payer: COMMERCIAL

## 2022-08-18 ENCOUNTER — LAB VISIT (OUTPATIENT)
Dept: LAB | Facility: HOSPITAL | Age: 42
End: 2022-08-18
Attending: SURGERY
Payer: COMMERCIAL

## 2022-08-18 DIAGNOSIS — Z94.4 LIVER REPLACED BY TRANSPLANT: Primary | ICD-10-CM

## 2022-08-18 DIAGNOSIS — Z94.4 LIVER REPLACED BY TRANSPLANT: ICD-10-CM

## 2022-08-18 LAB
ALBUMIN SERPL BCP-MCNC: 3.8 G/DL (ref 3.5–5.2)
ALP SERPL-CCNC: 209 U/L (ref 55–135)
ALT SERPL W/O P-5'-P-CCNC: 83 U/L (ref 10–44)
ANION GAP SERPL CALC-SCNC: 10 MMOL/L (ref 8–16)
AST SERPL-CCNC: 59 U/L (ref 10–40)
BASOPHILS # BLD AUTO: 0.03 K/UL (ref 0–0.2)
BASOPHILS NFR BLD: 0.8 % (ref 0–1.9)
BILIRUB DIRECT SERPL-MCNC: 0.4 MG/DL (ref 0.1–0.3)
BILIRUB SERPL-MCNC: 1 MG/DL (ref 0.1–1)
BUN SERPL-MCNC: 15 MG/DL (ref 6–20)
CALCIUM SERPL-MCNC: 10.1 MG/DL (ref 8.7–10.5)
CHLORIDE SERPL-SCNC: 104 MMOL/L (ref 95–110)
CO2 SERPL-SCNC: 24 MMOL/L (ref 23–29)
CREAT SERPL-MCNC: 1 MG/DL (ref 0.5–1.4)
DIFFERENTIAL METHOD: ABNORMAL
EOSINOPHIL # BLD AUTO: 0 K/UL (ref 0–0.5)
EOSINOPHIL NFR BLD: 0.3 % (ref 0–8)
ERYTHROCYTE [DISTWIDTH] IN BLOOD BY AUTOMATED COUNT: 15.1 % (ref 11.5–14.5)
EST. GFR  (NO RACE VARIABLE): >60 ML/MIN/1.73 M^2
GLUCOSE SERPL-MCNC: 112 MG/DL (ref 70–110)
HCT VFR BLD AUTO: 34.9 % (ref 40–54)
HGB BLD-MCNC: 11.5 G/DL (ref 14–18)
IMM GRANULOCYTES # BLD AUTO: 0.01 K/UL (ref 0–0.04)
IMM GRANULOCYTES NFR BLD AUTO: 0.3 % (ref 0–0.5)
LYMPHOCYTES # BLD AUTO: 0.7 K/UL (ref 1–4.8)
LYMPHOCYTES NFR BLD: 16.6 % (ref 18–48)
MCH RBC QN AUTO: 30.9 PG (ref 27–31)
MCHC RBC AUTO-ENTMCNC: 33 G/DL (ref 32–36)
MCV RBC AUTO: 94 FL (ref 82–98)
MONOCYTES # BLD AUTO: 0.3 K/UL (ref 0.3–1)
MONOCYTES NFR BLD: 7.7 % (ref 4–15)
NEUTROPHILS # BLD AUTO: 2.9 K/UL (ref 1.8–7.7)
NEUTROPHILS NFR BLD: 74.3 % (ref 38–73)
NRBC BLD-RTO: 0 /100 WBC
PLATELET # BLD AUTO: 169 K/UL (ref 150–450)
PMV BLD AUTO: 9.3 FL (ref 9.2–12.9)
POTASSIUM SERPL-SCNC: 4.3 MMOL/L (ref 3.5–5.1)
PROT SERPL-MCNC: 7.1 G/DL (ref 6–8.4)
RBC # BLD AUTO: 3.72 M/UL (ref 4.6–6.2)
SODIUM SERPL-SCNC: 138 MMOL/L (ref 136–145)
WBC # BLD AUTO: 3.92 K/UL (ref 3.9–12.7)

## 2022-08-18 PROCEDURE — 36415 COLL VENOUS BLD VENIPUNCTURE: CPT | Mod: PO | Performed by: SURGERY

## 2022-08-18 PROCEDURE — 85025 COMPLETE CBC W/AUTO DIFF WBC: CPT | Performed by: SURGERY

## 2022-08-18 PROCEDURE — 82248 BILIRUBIN DIRECT: CPT | Performed by: SURGERY

## 2022-08-18 PROCEDURE — 80197 ASSAY OF TACROLIMUS: CPT | Performed by: SURGERY

## 2022-08-18 PROCEDURE — 80053 COMPREHEN METABOLIC PANEL: CPT | Performed by: SURGERY

## 2022-08-18 NOTE — TELEPHONE ENCOUNTER
Patient notified and instructed via [facility] as per KRISTINA Nino:    Your labs have been reviewed by  (the Prograf level is not resulted yet, we will review that tomorrow).  But your Liver tests are going up, so  wants you to re-start the Cellcept at 500mg twice daily (do you have any?), thanks.

## 2022-08-19 ENCOUNTER — TELEPHONE (OUTPATIENT)
Dept: ADMINISTRATIVE | Facility: HOSPITAL | Age: 42
End: 2022-08-19
Payer: COMMERCIAL

## 2022-08-19 ENCOUNTER — TELEPHONE (OUTPATIENT)
Dept: INTERVENTIONAL RADIOLOGY/VASCULAR | Facility: CLINIC | Age: 42
End: 2022-08-19
Payer: COMMERCIAL

## 2022-08-19 ENCOUNTER — PATIENT MESSAGE (OUTPATIENT)
Dept: TRANSPLANT | Facility: CLINIC | Age: 42
End: 2022-08-19
Payer: COMMERCIAL

## 2022-08-19 LAB — TACROLIMUS BLD-MCNC: 5.6 NG/ML (ref 5–15)

## 2022-08-19 RX ORDER — MYCOPHENOLATE MOFETIL 250 MG/1
500 CAPSULE ORAL 2 TIMES DAILY
Qty: 120 CAPSULE | Refills: 3 | Status: ON HOLD | OUTPATIENT
Start: 2022-08-19 | End: 2022-09-16 | Stop reason: HOSPADM

## 2022-08-19 NOTE — TELEPHONE ENCOUNTER
Spoke to pt on phone, Pt is scheduled on 8/25/2022  for IR procedure. Pt given preop instructions, pt is to stop ASA on 8/20, verbally understood. Pt aware and confirmed, Thanks

## 2022-08-19 NOTE — TELEPHONE ENCOUNTER
Patient notified and instructed via [facility]:    Your labs have been reviewed by ; he would like you to increase the Prograf dose to 2mg twice a day, and repeat labs on Monday 8/22/22. Thanks.

## 2022-08-20 RX ORDER — TACROLIMUS 1 MG/1
2 CAPSULE ORAL EVERY 12 HOURS
Qty: 120 CAPSULE | Refills: 11 | Status: ON HOLD | OUTPATIENT
Start: 2022-08-20 | End: 2022-08-26 | Stop reason: SDUPTHER

## 2022-08-22 ENCOUNTER — LAB VISIT (OUTPATIENT)
Dept: LAB | Facility: HOSPITAL | Age: 42
End: 2022-08-22
Attending: SURGERY
Payer: COMMERCIAL

## 2022-08-22 DIAGNOSIS — Z94.4 LIVER REPLACED BY TRANSPLANT: ICD-10-CM

## 2022-08-22 LAB
ACID FAST MOD KINY STN SPEC: NORMAL
ACID FAST MOD KINY STN SPEC: NORMAL
ALBUMIN SERPL BCP-MCNC: 3.7 G/DL (ref 3.5–5.2)
ALP SERPL-CCNC: 227 U/L (ref 55–135)
ALT SERPL W/O P-5'-P-CCNC: 147 U/L (ref 10–44)
ANION GAP SERPL CALC-SCNC: 9 MMOL/L (ref 8–16)
AST SERPL-CCNC: 69 U/L (ref 10–40)
BASOPHILS # BLD AUTO: 0.02 K/UL (ref 0–0.2)
BASOPHILS NFR BLD: 0.6 % (ref 0–1.9)
BILIRUB DIRECT SERPL-MCNC: 0.3 MG/DL (ref 0.1–0.3)
BILIRUB SERPL-MCNC: 1 MG/DL (ref 0.1–1)
BUN SERPL-MCNC: 12 MG/DL (ref 6–20)
CALCIUM SERPL-MCNC: 10 MG/DL (ref 8.7–10.5)
CHLORIDE SERPL-SCNC: 106 MMOL/L (ref 95–110)
CO2 SERPL-SCNC: 27 MMOL/L (ref 23–29)
CREAT SERPL-MCNC: 1 MG/DL (ref 0.5–1.4)
DIFFERENTIAL METHOD: ABNORMAL
EOSINOPHIL # BLD AUTO: 0 K/UL (ref 0–0.5)
EOSINOPHIL NFR BLD: 0.3 % (ref 0–8)
ERYTHROCYTE [DISTWIDTH] IN BLOOD BY AUTOMATED COUNT: 15.3 % (ref 11.5–14.5)
EST. GFR  (NO RACE VARIABLE): >60 ML/MIN/1.73 M^2
GLUCOSE SERPL-MCNC: 120 MG/DL (ref 70–110)
HCT VFR BLD AUTO: 37.3 % (ref 40–54)
HGB BLD-MCNC: 11.7 G/DL (ref 14–18)
IMM GRANULOCYTES # BLD AUTO: 0.02 K/UL (ref 0–0.04)
IMM GRANULOCYTES NFR BLD AUTO: 0.6 % (ref 0–0.5)
LYMPHOCYTES # BLD AUTO: 0.7 K/UL (ref 1–4.8)
LYMPHOCYTES NFR BLD: 20.1 % (ref 18–48)
MCH RBC QN AUTO: 30.2 PG (ref 27–31)
MCHC RBC AUTO-ENTMCNC: 31.4 G/DL (ref 32–36)
MCV RBC AUTO: 96 FL (ref 82–98)
MONOCYTES # BLD AUTO: 0.3 K/UL (ref 0.3–1)
MONOCYTES NFR BLD: 8.4 % (ref 4–15)
MYCOBACTERIUM SPEC QL CULT: NORMAL
MYCOBACTERIUM SPEC QL CULT: NORMAL
NEUTROPHILS # BLD AUTO: 2.5 K/UL (ref 1.8–7.7)
NEUTROPHILS NFR BLD: 70 % (ref 38–73)
NRBC BLD-RTO: 0 /100 WBC
PLATELET # BLD AUTO: 168 K/UL (ref 150–450)
PMV BLD AUTO: 9.5 FL (ref 9.2–12.9)
POTASSIUM SERPL-SCNC: 4.7 MMOL/L (ref 3.5–5.1)
PROT SERPL-MCNC: 7.1 G/DL (ref 6–8.4)
RBC # BLD AUTO: 3.87 M/UL (ref 4.6–6.2)
SODIUM SERPL-SCNC: 142 MMOL/L (ref 136–145)
WBC # BLD AUTO: 3.59 K/UL (ref 3.9–12.7)

## 2022-08-22 PROCEDURE — 80053 COMPREHEN METABOLIC PANEL: CPT | Performed by: SURGERY

## 2022-08-22 PROCEDURE — 80197 ASSAY OF TACROLIMUS: CPT | Performed by: SURGERY

## 2022-08-22 PROCEDURE — 82248 BILIRUBIN DIRECT: CPT | Performed by: SURGERY

## 2022-08-22 PROCEDURE — 85025 COMPLETE CBC W/AUTO DIFF WBC: CPT | Performed by: SURGERY

## 2022-08-22 PROCEDURE — 36415 COLL VENOUS BLD VENIPUNCTURE: CPT | Mod: PO | Performed by: SURGERY

## 2022-08-23 ENCOUNTER — TELEPHONE (OUTPATIENT)
Dept: TRANSPLANT | Facility: CLINIC | Age: 42
End: 2022-08-23
Payer: COMMERCIAL

## 2022-08-23 ENCOUNTER — PATIENT MESSAGE (OUTPATIENT)
Dept: TRANSPLANT | Facility: CLINIC | Age: 42
End: 2022-08-23
Payer: COMMERCIAL

## 2022-08-23 DIAGNOSIS — R79.89 ELEVATED LFTS: ICD-10-CM

## 2022-08-23 DIAGNOSIS — Z94.4 LIVER REPLACED BY TRANSPLANT: Primary | ICD-10-CM

## 2022-08-23 LAB — TACROLIMUS BLD-MCNC: 12.7 NG/ML (ref 5–15)

## 2022-08-23 NOTE — TELEPHONE ENCOUNTER
Patient notified and instructed via [facility]:    Your labs have been reviewed by :  your liver tests are still elevated. So he ordered an ultrasound for tomorrow (it is scheduled in [place]).  Repeat labs on Thursday 8/25/22 ( I will put them in here at main campus if you want since you are coming anyway for the cholangiogram). No medicine changes made at this time. Thanks.

## 2022-08-24 ENCOUNTER — TELEPHONE (OUTPATIENT)
Dept: TRANSPLANT | Facility: CLINIC | Age: 42
End: 2022-08-24
Payer: COMMERCIAL

## 2022-08-24 ENCOUNTER — TELEPHONE (OUTPATIENT)
Dept: INTERVENTIONAL RADIOLOGY/VASCULAR | Facility: HOSPITAL | Age: 42
End: 2022-08-24
Payer: COMMERCIAL

## 2022-08-24 ENCOUNTER — HOSPITAL ENCOUNTER (OUTPATIENT)
Dept: RADIOLOGY | Facility: HOSPITAL | Age: 42
Discharge: HOME | End: 2022-08-24
Attending: SURGERY
Payer: COMMERCIAL

## 2022-08-24 ENCOUNTER — HOSPITAL ENCOUNTER (OUTPATIENT)
Facility: HOSPITAL | Age: 42
Discharge: HOME | End: 2022-08-26
Attending: TRANSPLANT SURGERY | Admitting: TRANSPLANT SURGERY
Payer: COMMERCIAL

## 2022-08-24 DIAGNOSIS — I74.8 HEPATIC ARTERY THROMBOSIS OF TRANSPLANTED LIVER: ICD-10-CM

## 2022-08-24 DIAGNOSIS — Z29.89 PROPHYLACTIC IMMUNOTHERAPY: ICD-10-CM

## 2022-08-24 DIAGNOSIS — T86.49 HEPATIC ARTERY THROMBOSIS OF TRANSPLANTED LIVER: ICD-10-CM

## 2022-08-24 DIAGNOSIS — R79.89 ELEVATED LFTS: Primary | ICD-10-CM

## 2022-08-24 DIAGNOSIS — E11.65 TYPE 2 DIABETES MELLITUS WITH HYPERGLYCEMIA, WITHOUT LONG-TERM CURRENT USE OF INSULIN: ICD-10-CM

## 2022-08-24 DIAGNOSIS — Z79.60 LONG-TERM USE OF IMMUNOSUPPRESSANT MEDICATION: ICD-10-CM

## 2022-08-24 DIAGNOSIS — Z91.89 AT RISK FOR OPPORTUNISTIC INFECTIONS: ICD-10-CM

## 2022-08-24 DIAGNOSIS — Z94.4 S/P LIVER TRANSPLANT: ICD-10-CM

## 2022-08-24 DIAGNOSIS — Z94.4 LIVER REPLACED BY TRANSPLANT: ICD-10-CM

## 2022-08-24 DIAGNOSIS — K83.9 BILE LEAK: ICD-10-CM

## 2022-08-24 PROBLEM — R00.0 TACHYCARDIA: Status: RESOLVED | Noted: 2022-06-18 | Resolved: 2022-08-24

## 2022-08-24 PROBLEM — K83.09 CHOLANGITIS: Status: RESOLVED | Noted: 2021-11-04 | Resolved: 2022-08-24

## 2022-08-24 PROBLEM — D72.829 LEUKOCYTOSIS: Status: RESOLVED | Noted: 2022-07-04 | Resolved: 2022-08-24

## 2022-08-24 PROBLEM — T81.89XA BILOMA FOLLOWING SURGERY: Status: RESOLVED | Noted: 2022-07-04 | Resolved: 2022-08-24

## 2022-08-24 PROBLEM — K83.1 OBSTRUCTIVE JAUNDICE: Status: RESOLVED | Noted: 2021-10-15 | Resolved: 2022-08-24

## 2022-08-24 PROBLEM — B49 FUNGEMIA: Status: RESOLVED | Noted: 2022-07-21 | Resolved: 2022-08-24

## 2022-08-24 PROBLEM — R33.9 URINARY RETENTION: Status: RESOLVED | Noted: 2022-07-12 | Resolved: 2022-08-24

## 2022-08-24 PROBLEM — A41.9 SEPSIS: Status: RESOLVED | Noted: 2022-07-18 | Resolved: 2022-08-24

## 2022-08-24 PROBLEM — K66.8 BILOMA FOLLOWING SURGERY: Status: RESOLVED | Noted: 2022-07-04 | Resolved: 2022-08-24

## 2022-08-24 PROBLEM — T38.0X5A ADRENAL CORTICOSTEROID CAUSING ADVERSE EFFECT IN THERAPEUTIC USE: Status: RESOLVED | Noted: 2022-06-22 | Resolved: 2022-08-24

## 2022-08-24 PROCEDURE — 93976 US DOPPLER LIVER TRANSPLANT POST (XPD): ICD-10-PCS | Mod: 26,,, | Performed by: RADIOLOGY

## 2022-08-24 PROCEDURE — G0378 HOSPITAL OBSERVATION PER HR: HCPCS

## 2022-08-24 PROCEDURE — 93976 VASCULAR STUDY: CPT | Mod: 26,,, | Performed by: RADIOLOGY

## 2022-08-24 PROCEDURE — 76705 ECHO EXAM OF ABDOMEN: CPT | Mod: 26,59,, | Performed by: RADIOLOGY

## 2022-08-24 PROCEDURE — 76705 US DOPPLER LIVER TRANSPLANT POST (XPD): ICD-10-PCS | Mod: 26,59,, | Performed by: RADIOLOGY

## 2022-08-24 PROCEDURE — 96372 THER/PROPH/DIAG INJ SC/IM: CPT | Performed by: PHYSICIAN ASSISTANT

## 2022-08-24 PROCEDURE — 63600175 PHARM REV CODE 636 W HCPCS: Performed by: PHYSICIAN ASSISTANT

## 2022-08-24 PROCEDURE — 76705 ECHO EXAM OF ABDOMEN: CPT | Mod: TC,PO

## 2022-08-24 PROCEDURE — 99223 1ST HOSP IP/OBS HIGH 75: CPT | Mod: 24,,, | Performed by: PHYSICIAN ASSISTANT

## 2022-08-24 PROCEDURE — 20600001 HC STEP DOWN PRIVATE ROOM

## 2022-08-24 PROCEDURE — 99223 PR INITIAL HOSPITAL CARE,LEVL III: ICD-10-PCS | Mod: 24,,, | Performed by: PHYSICIAN ASSISTANT

## 2022-08-24 PROCEDURE — 93976 VASCULAR STUDY: CPT | Mod: TC,PO

## 2022-08-24 PROCEDURE — 25500020 PHARM REV CODE 255: Performed by: TRANSPLANT SURGERY

## 2022-08-24 PROCEDURE — G0379 DIRECT REFER HOSPITAL OBSERV: HCPCS

## 2022-08-24 PROCEDURE — 25000003 PHARM REV CODE 250: Performed by: PHYSICIAN ASSISTANT

## 2022-08-24 RX ORDER — CARVEDILOL 3.12 MG/1
3.12 TABLET ORAL 2 TIMES DAILY WITH MEALS
Status: DISCONTINUED | OUTPATIENT
Start: 2022-08-24 | End: 2022-08-26 | Stop reason: HOSPADM

## 2022-08-24 RX ORDER — SULFAMETHOXAZOLE AND TRIMETHOPRIM 400; 80 MG/1; MG/1
1 TABLET ORAL DAILY
Status: DISCONTINUED | OUTPATIENT
Start: 2022-08-25 | End: 2022-08-26 | Stop reason: HOSPADM

## 2022-08-24 RX ORDER — MYCOPHENOLATE MOFETIL 250 MG/1
500 CAPSULE ORAL 2 TIMES DAILY
Status: DISCONTINUED | OUTPATIENT
Start: 2022-08-24 | End: 2022-08-26 | Stop reason: HOSPADM

## 2022-08-24 RX ORDER — HEPARIN SODIUM 5000 [USP'U]/ML
5000 INJECTION, SOLUTION INTRAVENOUS; SUBCUTANEOUS EVERY 8 HOURS
Status: DISCONTINUED | OUTPATIENT
Start: 2022-08-24 | End: 2022-08-26 | Stop reason: HOSPADM

## 2022-08-24 RX ORDER — PANTOPRAZOLE SODIUM 40 MG/1
40 TABLET, DELAYED RELEASE ORAL DAILY
Status: DISCONTINUED | OUTPATIENT
Start: 2022-08-25 | End: 2022-08-26 | Stop reason: HOSPADM

## 2022-08-24 RX ORDER — ONDANSETRON 8 MG/1
8 TABLET, ORALLY DISINTEGRATING ORAL EVERY 8 HOURS PRN
Status: DISCONTINUED | OUTPATIENT
Start: 2022-08-24 | End: 2022-08-26 | Stop reason: HOSPADM

## 2022-08-24 RX ORDER — IBUPROFEN 200 MG
24 TABLET ORAL
Status: DISCONTINUED | OUTPATIENT
Start: 2022-08-24 | End: 2022-08-26 | Stop reason: HOSPADM

## 2022-08-24 RX ORDER — TACROLIMUS 1 MG/1
2 CAPSULE ORAL 2 TIMES DAILY
Status: DISCONTINUED | OUTPATIENT
Start: 2022-08-24 | End: 2022-08-25

## 2022-08-24 RX ORDER — SODIUM CHLORIDE 0.9 % (FLUSH) 0.9 %
10 SYRINGE (ML) INJECTION
Status: DISCONTINUED | OUTPATIENT
Start: 2022-08-24 | End: 2022-08-26 | Stop reason: HOSPADM

## 2022-08-24 RX ORDER — ACETAMINOPHEN 325 MG/1
650 TABLET ORAL EVERY 8 HOURS PRN
Status: DISCONTINUED | OUTPATIENT
Start: 2022-08-24 | End: 2022-08-26 | Stop reason: HOSPADM

## 2022-08-24 RX ORDER — AMOXICILLIN AND CLAVULANATE POTASSIUM 875; 125 MG/1; MG/1
1 TABLET, FILM COATED ORAL EVERY 12 HOURS
Status: CANCELLED | OUTPATIENT
Start: 2022-08-24

## 2022-08-24 RX ORDER — VALGANCICLOVIR 450 MG/1
450 TABLET, FILM COATED ORAL DAILY
Status: DISCONTINUED | OUTPATIENT
Start: 2022-08-25 | End: 2022-08-26 | Stop reason: HOSPADM

## 2022-08-24 RX ORDER — URSODIOL 250 MG/1
250 TABLET, FILM COATED ORAL 2 TIMES DAILY
Status: DISCONTINUED | OUTPATIENT
Start: 2022-08-24 | End: 2022-08-26 | Stop reason: HOSPADM

## 2022-08-24 RX ORDER — TALC
6 POWDER (GRAM) TOPICAL NIGHTLY PRN
Status: DISCONTINUED | OUTPATIENT
Start: 2022-08-24 | End: 2022-08-26 | Stop reason: HOSPADM

## 2022-08-24 RX ORDER — MIRTAZAPINE 15 MG/1
15 TABLET, FILM COATED ORAL NIGHTLY
Status: DISCONTINUED | OUTPATIENT
Start: 2022-08-24 | End: 2022-08-26 | Stop reason: HOSPADM

## 2022-08-24 RX ORDER — IBUPROFEN 200 MG
16 TABLET ORAL
Status: DISCONTINUED | OUTPATIENT
Start: 2022-08-24 | End: 2022-08-26 | Stop reason: HOSPADM

## 2022-08-24 RX ORDER — GLUCAGON 1 MG
1 KIT INJECTION
Status: DISCONTINUED | OUTPATIENT
Start: 2022-08-24 | End: 2022-08-26 | Stop reason: HOSPADM

## 2022-08-24 RX ORDER — INSULIN ASPART 100 [IU]/ML
0-5 INJECTION, SOLUTION INTRAVENOUS; SUBCUTANEOUS
Status: DISCONTINUED | OUTPATIENT
Start: 2022-08-24 | End: 2022-08-26 | Stop reason: HOSPADM

## 2022-08-24 RX ADMIN — MIRTAZAPINE 15 MG: 15 TABLET, FILM COATED ORAL at 09:08

## 2022-08-24 RX ADMIN — URSODIOL 250 MG: 250 TABLET, FILM COATED ORAL at 09:08

## 2022-08-24 RX ADMIN — MYCOPHENOLATE MOFETIL 500 MG: 250 CAPSULE ORAL at 09:08

## 2022-08-24 RX ADMIN — HEPARIN SODIUM 5000 UNITS: 5000 INJECTION INTRAVENOUS; SUBCUTANEOUS at 09:08

## 2022-08-24 RX ADMIN — TACROLIMUS 2 MG: 1 CAPSULE ORAL at 09:08

## 2022-08-24 RX ADMIN — IOHEXOL 100 ML: 350 INJECTION, SOLUTION INTRAVENOUS at 08:08

## 2022-08-24 NOTE — SUBJECTIVE & OBJECTIVE
Scheduled Meds:   carvediloL  3.125 mg Oral BID WM    heparin (porcine)  5,000 Units Subcutaneous Q8H    mirtazapine  15 mg Oral Nightly    mycophenolate  500 mg Oral BID    [START ON 8/25/2022] pantoprazole  40 mg Oral Daily    [START ON 8/25/2022] sulfamethoxazole-trimethoprim 400-80mg  1 tablet Oral Daily    tacrolimus  2 mg Oral BID    ursodioL  250 mg Oral BID    [START ON 8/25/2022] valGANciclovir  450 mg Oral Daily     Continuous Infusions:  PRN Meds:acetaminophen, dextrose 10%, dextrose 10%, glucagon (human recombinant), glucose, glucose, insulin aspart U-100, melatonin, ondansetron, sodium chloride 0.9%    Review of Systems   Constitutional:  Negative for activity change, appetite change, chills and fever.   HENT:  Negative for facial swelling and trouble swallowing.    Respiratory:  Negative for cough, shortness of breath, wheezing and stridor.    Cardiovascular:  Negative for chest pain, palpitations and leg swelling.   Gastrointestinal:  Negative for abdominal distention, abdominal pain, constipation, diarrhea, nausea and vomiting.   Genitourinary:  Negative for decreased urine volume, difficulty urinating and dysuria.   Musculoskeletal:  Negative for neck pain and neck stiffness.   Allergic/Immunologic: Positive for immunocompromised state.   Neurological:  Negative for dizziness, tremors, weakness and light-headedness.   Psychiatric/Behavioral:  Negative for agitation, behavioral problems, confusion and decreased concentration.    Objective:     Vital Signs (Most Recent):  Temp: 98.4 °F (36.9 °C) (08/24/22 1730)  Pulse: 104 (08/24/22 1730)  Resp: 16 (08/24/22 1730)  BP: (!) 119/97 (08/24/22 1730)  SpO2: 98 % (08/24/22 1730)   Vital Signs (24h Range):  Temp:  [98.4 °F (36.9 °C)] 98.4 °F (36.9 °C)  Pulse:  [104] 104  Resp:  [16] 16  SpO2:  [98 %] 98 %  BP: (119)/(97) 119/97     Weight: 74.6 kg (164 lb 7.4 oz)  Body mass index is 24.64 kg/m².    Intake/Output - Last 3 Shifts       None            Physical  Exam  Vitals and nursing note reviewed.   Constitutional:       General: He is not in acute distress.  HENT:      Head: Normocephalic and atraumatic.   Eyes:      General: No scleral icterus.        Right eye: No discharge.         Left eye: No discharge.   Pulmonary:      Effort: Pulmonary effort is normal. No respiratory distress.      Breath sounds: No wheezing or rales.   Abdominal:      General: Bowel sounds are normal. There is no distension.      Tenderness: There is no abdominal tenderness. There is no guarding.      Comments: Well healed chevron incision  R PTC drain / capped / no s/s/i   Musculoskeletal:         General: No swelling.      Right lower leg: No edema.      Left lower leg: No edema.   Skin:     General: Skin is warm and dry.      Capillary Refill: Capillary refill takes less than 2 seconds.   Neurological:      General: No focal deficit present.      Mental Status: He is alert and oriented to person, place, and time.   Psychiatric:         Mood and Affect: Mood normal.         Behavior: Behavior normal.         Thought Content: Thought content normal.       Laboratory:  Immunosuppressants           Stop Route Frequency     mycophenolate capsule 500 mg         -- Oral 2 times daily     tacrolimus capsule 2 mg         -- Oral 2 times daily          CBC:   Recent Labs   Lab 08/22/22  0804   WBC 3.59*   RBC 3.87*   HGB 11.7*   HCT 37.3*      MCV 96   MCH 30.2   MCHC 31.4*     CMP:   Recent Labs   Lab 08/22/22  0804   *   CALCIUM 10.0   ALBUMIN 3.7   PROT 7.1      K 4.7   CO2 27      BUN 12   CREATININE 1.0   ALKPHOS 227*   *   AST 69*   BILITOT 1.0     Coagulation: No results for input(s): PT, INR, APTT in the last 168 hours.  Labs within the past 24 hours have been reviewed.    Diagnostic Results:  I have personally reviewed all pertinent imaging studies.    Debility/Functional status: No debility noted.

## 2022-08-24 NOTE — TELEPHONE ENCOUNTER
"Spoke to [facility] charge nurse Laurita; she reported she has a bed ready for patient but patient to stop in at the admit office to check in.   Called and spoke to patient; he reported he is "turning onto [address] and will be there shortly", he was instructed to check in at the admit office upon arrival, he voiced understanding.  "

## 2022-08-24 NOTE — PROGRESS NOTES
[facility] - Transplant Stepdown  Liver Transplant  Progress Note    Patient Name: Romeo Larson  MRN: [id number]  Admission Date: 8/24/2022  Hospital Length of Stay: 0 days  Code Status: Full Code  Primary Care Provider: Pravin Maria MD  Post-Operative Day: 64    ORGAN:   RIGHT LIVER LOBE (SEGS 5,6,7,8) WITHOUT MIDDLE HEPATIC VEIN  Disease Etiology: Primary Liver Malignancy: Cholangiocarcinoma (CH-CA)  Donor Type:   Living  CDC High Risk:     Donor CMV Status:   Donor CMV Status:   Donor HBcAB:     Donor HCV Status:     Donor HBV GUSTABO:   Donor HCV GUSTABO:   Whole or Partial: Partial Liver  Biliary Anastomosis: Vick-en-Y  Arterial Anatomy: Standard  Subjective:     History of Present Illness:  Romeo Larson is a 41yr old male s/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 Post operative course significant for recurrent bile leak. OR take back 6/23 for bile duct repair (small leak found near biliary anastomosis, unable to clearly identify source), cultures grew enterococcus faecalis. OR take back 7/4 for ex lap, washout of biloma, bile duct unable to be assessed due to adhesions, OR cultures with lactobacillus species. Underwent PTC drain (internal/external) placement on 7/13. Most recently admitted 7/18-7/27 with sepsis. CT A/P 7/18, reviewed by surgeon, R SHRUTHI removed based on CT findings. IR consulted, had repeat cholangiogram w PTC upsize 7/20. Blood cx from 7/18 with Candida glabrata, started on Micafungin per ID (EOT 8/12). Also completed course of Augmentin 8/12 (see ID note). PTC clamped 7/21. LFTs gradually increasing with PTC clamped so PTC reopened to gravity 7/23. Remaining SHRUTHI drain removed 7/22. LFTs initally trended down after PTC reopened. Bilious leakage noted around PTC insertion site. Patient with repeat cholangiogram and PTC upsize to 12 Fr on 7/25. PTC capped 7/27 AM. At that time IR recommended routine exchange in 4 weeks (~7/24/22).     Patient presents as a  "direct admission to LTS on 8/24 due to elevated LFTs and concern for hepatic artery thrombosis. LFTs noted to be elevated prompting a liver US 8/24. Liver US 8/24 showed "nonvisualization of the posterior branch of the right hepatic artery" concerning for thrombosis. Per surgeon, plan for STAT CTA and IR consult for cholangiogram 8/25 (originally scheduled outpatient). D/w Dr. Godwin.         Hospital Course:  No notes on file    Scheduled Meds:   carvediloL  3.125 mg Oral BID WM    heparin (porcine)  5,000 Units Subcutaneous Q8H    mirtazapine  15 mg Oral Nightly    mycophenolate  500 mg Oral BID    [START ON 8/25/2022] pantoprazole  40 mg Oral Daily    [START ON 8/25/2022] sulfamethoxazole-trimethoprim 400-80mg  1 tablet Oral Daily    tacrolimus  2 mg Oral BID    ursodioL  250 mg Oral BID    [START ON 8/25/2022] valGANciclovir  450 mg Oral Daily     Continuous Infusions:  PRN Meds:acetaminophen, dextrose 10%, dextrose 10%, glucagon (human recombinant), glucose, glucose, insulin aspart U-100, melatonin, ondansetron, sodium chloride 0.9%    Review of Systems   Constitutional:  Negative for activity change, appetite change, chills and fever.   HENT:  Negative for facial swelling and trouble swallowing.    Respiratory:  Negative for cough, shortness of breath, wheezing and stridor.    Cardiovascular:  Negative for chest pain, palpitations and leg swelling.   Gastrointestinal:  Negative for abdominal distention, abdominal pain, constipation, diarrhea, nausea and vomiting.   Genitourinary:  Negative for decreased urine volume, difficulty urinating and dysuria.   Musculoskeletal:  Negative for neck pain and neck stiffness.   Allergic/Immunologic: Positive for immunocompromised state.   Neurological:  Negative for dizziness, tremors, weakness and light-headedness.   Psychiatric/Behavioral:  Negative for agitation, behavioral problems, confusion and decreased concentration.    Objective:     Vital Signs (Most " Recent):  Temp: 98.4 °F (36.9 °C) (08/24/22 1730)  Pulse: 104 (08/24/22 1730)  Resp: 16 (08/24/22 1730)  BP: (!) 119/97 (08/24/22 1730)  SpO2: 98 % (08/24/22 1730)   Vital Signs (24h Range):  Temp:  [98.4 °F (36.9 °C)] 98.4 °F (36.9 °C)  Pulse:  [104] 104  Resp:  [16] 16  SpO2:  [98 %] 98 %  BP: (119)/(97) 119/97     Weight: 74.6 kg (164 lb 7.4 oz)  Body mass index is 24.64 kg/m².    Intake/Output - Last 3 Shifts       None            Physical Exam  Vitals and nursing note reviewed.   Constitutional:       General: He is not in acute distress.  HENT:      Head: Normocephalic and atraumatic.   Eyes:      General: No scleral icterus.        Right eye: No discharge.         Left eye: No discharge.   Pulmonary:      Effort: Pulmonary effort is normal. No respiratory distress.      Breath sounds: No wheezing or rales.   Abdominal:      General: Bowel sounds are normal. There is no distension.      Tenderness: There is no abdominal tenderness. There is no guarding.      Comments: Well healed chevron incision  R PTC drain / capped / no s/s/i   Musculoskeletal:         General: No swelling.      Right lower leg: No edema.      Left lower leg: No edema.   Skin:     General: Skin is warm and dry.      Capillary Refill: Capillary refill takes less than 2 seconds.   Neurological:      General: No focal deficit present.      Mental Status: He is alert and oriented to person, place, and time.   Psychiatric:         Mood and Affect: Mood normal.         Behavior: Behavior normal.         Thought Content: Thought content normal.       Laboratory:  Immunosuppressants           Stop Route Frequency     mycophenolate capsule 500 mg         -- Oral 2 times daily     tacrolimus capsule 2 mg         -- Oral 2 times daily          CBC:   Recent Labs   Lab 08/22/22  0804   WBC 3.59*   RBC 3.87*   HGB 11.7*   HCT 37.3*      MCV 96   MCH 30.2   MCHC 31.4*     CMP:   Recent Labs   Lab 08/22/22  0804   *   CALCIUM 10.0   ALBUMIN  "3.7   PROT 7.1      K 4.7   CO2 27      BUN 12   CREATININE 1.0   ALKPHOS 227*   *   AST 69*   BILITOT 1.0     Coagulation: No results for input(s): PT, INR, APTT in the last 168 hours.  Labs within the past 24 hours have been reviewed.    Diagnostic Results:  I have personally reviewed all pertinent imaging studies.    Debility/Functional status: No debility noted.    Assessment/Plan:     * Elevated LFTs  - LFTs noted to be increasing since 8/18 labs  - Liver US 8/24 showed "nonvisualization of the posterior branch of the right hepatic artery" concerning for stenosis or thrombosis.  - Plan for STAT CTA on admit  - IR consulted for cholangiogram tomorrow, 8/25 that was originally scheduled as an outpatient. NPO at midnight.  - Monitor with daily labs.    S/P liver transplant  - S/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  - OR take back 6/23 for bile duct repair (small leak found near biliary anastomosis, unable to clearly identify source), cultures grew enterococcus faecalis.  - OR take back 7/4 for ex lap, washout of biloma, bile duct unable to be assessed due to adhesions, OR cultures with lactobacillus species.  - Underwent PTC drain (internal/external) placement on 7/13.   - See HPI. Drain has been upsized. Currently open. LFTs now trending up.  - See "elevated LFTs."  - Monitor with daily labs.       Bile leak  - See "elevated LFTs."      Long-term use of immunosuppressant medication  - See "prophylactic immunotherapy."      Type 2 diabetes mellitus with hyperglycemia  - Diabetic diet  - SSI      At risk for opportunistic infections  - Valcyte for CMV ppx  - Bactrim for PCP ppx      Prophylactic immunotherapy  - Continue Prograf. Monitor trough daily and adjust dose as needed to achieve therapeutic level.  - Continue Cellcept.  - Continue steroids.          VTE Risk Mitigation (From admission, onward)         Ordered     heparin (porcine) " injection 5,000 Units  Every 8 hours         08/24/22 1735     IP VTE LOW RISK PATIENT  Once         08/24/22 1735                The patients clinical status was discussed at multidisplinary rounds, involving transplant surgery, transplant medicine, pharmacy, nursing, nutrition, and social work        Francine Urbina PA-C  Liver Transplant  [facility] - [facility]

## 2022-08-24 NOTE — TELEPHONE ENCOUNTER
KRISTINA Cagle: admit patient for urgent CTA due to abnormal finding on U/S.   reported he will call (on LTS service) and will call the patient.     Spoke to Licha in Admit department and reservation entered.  Spoke to JOSEPH(Francine) with report.  Spoke to patient who confirmed that he spoke to . patient awaiting his wife to return home and will go directly to [facility] for admit.

## 2022-08-24 NOTE — HPI
"Romeo Lasron is a 41yr old male s/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 Post operative course significant for recurrent bile leak. OR take back 6/23 for bile duct repair (small leak found near biliary anastomosis, unable to clearly identify source), cultures grew enterococcus faecalis. OR take back 7/4 for ex lap, washout of biloma, bile duct unable to be assessed due to adhesions, OR cultures with lactobacillus species. Underwent PTC drain (internal/external) placement on 7/13. Most recently admitted 7/18-7/27 with sepsis. CT A/P 7/18, reviewed by surgeon, R SHRUTHI removed based on CT findings. IR consulted, had repeat cholangiogram w PTC upsize 7/20. Blood cx from 7/18 with Candida glabrata, started on Micafungin per ID (EOT 8/12). Also completed course of Augmentin 8/12 (see ID note). PTC clamped 7/21. LFTs gradually increasing with PTC clamped so PTC reopened to gravity 7/23. Remaining SHRUTHI drain removed 7/22. LFTs initally trended down after PTC reopened. Bilious leakage noted around PTC insertion site. Patient with repeat cholangiogram and PTC upsize to 12 Fr on 7/25. PTC capped 7/27 AM. At that time IR recommended routine exchange in 4 weeks (~7/24/22).     Patient presents as a direct admission to LTS on 8/24 due to elevated LFTs and concern for hepatic artery thrombosis. LFTs noted to be elevated prompting a liver US 8/24. Liver US 8/24 showed "nonvisualization of the posterior branch of the right hepatic artery" concerning for thrombosis. Per surgeon, plan for STAT CTA and IR consult for cholangiogram 8/25 (originally scheduled outpatient). D/w Dr. Godwin.     "

## 2022-08-24 NOTE — PROGRESS NOTES
Patient admitted to [facility] room 87359. Patient oriented to room and call light use. No acute distress noted. WCTM

## 2022-08-24 NOTE — ASSESSMENT & PLAN NOTE
"- LFTs noted to be increasing since 8/18 labs  - Liver US 8/24 showed "nonvisualization of the posterior branch of the right hepatic artery" concerning for stenosis or thrombosis.  - Plan for STAT CTA on admit  - IR consulted for cholangiogram tomorrow, 8/25 that was originally scheduled as an outpatient. NPO at midnight.  - Monitor with daily labs.  "

## 2022-08-24 NOTE — ASSESSMENT & PLAN NOTE
"- S/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  - OR take back 6/23 for bile duct repair (small leak found near biliary anastomosis, unable to clearly identify source), cultures grew enterococcus faecalis.  - OR take back 7/4 for ex lap, washout of biloma, bile duct unable to be assessed due to adhesions, OR cultures with lactobacillus species.  - Underwent PTC drain (internal/external) placement on 7/13.   - See HPI. Drain has been upsized. Currently open. LFTs now trending up.  - See "elevated LFTs."  - Monitor with daily labs.     "

## 2022-08-24 NOTE — ASSESSMENT & PLAN NOTE
- Continue Prograf. Monitor trough daily and adjust dose as needed to achieve therapeutic level.  - Continue Cellcept.  - Continue steroids.

## 2022-08-25 LAB
ALBUMIN SERPL BCP-MCNC: 3.4 G/DL (ref 3.5–5.2)
ALP SERPL-CCNC: 216 U/L (ref 55–135)
ALT SERPL W/O P-5'-P-CCNC: 98 U/L (ref 10–44)
ANION GAP SERPL CALC-SCNC: 8 MMOL/L (ref 8–16)
AST SERPL-CCNC: 55 U/L (ref 10–40)
BASOPHILS # BLD AUTO: 0.02 K/UL (ref 0–0.2)
BASOPHILS NFR BLD: 0.7 % (ref 0–1.9)
BILIRUB SERPL-MCNC: 0.7 MG/DL (ref 0.1–1)
BUN SERPL-MCNC: 18 MG/DL (ref 6–20)
CALCIUM SERPL-MCNC: 9.3 MG/DL (ref 8.7–10.5)
CHLORIDE SERPL-SCNC: 107 MMOL/L (ref 95–110)
CO2 SERPL-SCNC: 25 MMOL/L (ref 23–29)
CREAT SERPL-MCNC: 1.3 MG/DL (ref 0.5–1.4)
DIFFERENTIAL METHOD: ABNORMAL
EOSINOPHIL # BLD AUTO: 0 K/UL (ref 0–0.5)
EOSINOPHIL NFR BLD: 0.4 % (ref 0–8)
ERYTHROCYTE [DISTWIDTH] IN BLOOD BY AUTOMATED COUNT: 15.5 % (ref 11.5–14.5)
EST. GFR  (NO RACE VARIABLE): >60 ML/MIN/1.73 M^2
GLUCOSE SERPL-MCNC: 135 MG/DL (ref 70–110)
HCT VFR BLD AUTO: 31.9 % (ref 40–54)
HGB BLD-MCNC: 10.9 G/DL (ref 14–18)
IMM GRANULOCYTES # BLD AUTO: 0.01 K/UL (ref 0–0.04)
IMM GRANULOCYTES NFR BLD AUTO: 0.4 % (ref 0–0.5)
INR PPP: 1 (ref 0.8–1.2)
LYMPHOCYTES # BLD AUTO: 0.5 K/UL (ref 1–4.8)
LYMPHOCYTES NFR BLD: 18.7 % (ref 18–48)
MAGNESIUM SERPL-MCNC: 1.4 MG/DL (ref 1.6–2.6)
MCH RBC QN AUTO: 30.6 PG (ref 27–31)
MCHC RBC AUTO-ENTMCNC: 34.2 G/DL (ref 32–36)
MCV RBC AUTO: 90 FL (ref 82–98)
MONOCYTES # BLD AUTO: 0.3 K/UL (ref 0.3–1)
MONOCYTES NFR BLD: 9.5 % (ref 4–15)
NEUTROPHILS # BLD AUTO: 2 K/UL (ref 1.8–7.7)
NEUTROPHILS NFR BLD: 70.3 % (ref 38–73)
NRBC BLD-RTO: 0 /100 WBC
PHOSPHATE SERPL-MCNC: 4.9 MG/DL (ref 2.7–4.5)
PLATELET # BLD AUTO: 133 K/UL (ref 150–450)
PMV BLD AUTO: 8.9 FL (ref 9.2–12.9)
POCT GLUCOSE: 115 MG/DL (ref 70–110)
POCT GLUCOSE: 287 MG/DL (ref 70–110)
POTASSIUM SERPL-SCNC: 4.6 MMOL/L (ref 3.5–5.1)
PROT SERPL-MCNC: 6.6 G/DL (ref 6–8.4)
PROTHROMBIN TIME: 10.2 SEC (ref 9–12.5)
RBC # BLD AUTO: 3.56 M/UL (ref 4.6–6.2)
SODIUM SERPL-SCNC: 140 MMOL/L (ref 136–145)
TACROLIMUS BLD-MCNC: 12.1 NG/ML (ref 5–15)
WBC # BLD AUTO: 2.84 K/UL (ref 3.9–12.7)

## 2022-08-25 PROCEDURE — 80197 ASSAY OF TACROLIMUS: CPT | Performed by: PHYSICIAN ASSISTANT

## 2022-08-25 PROCEDURE — 99219 PR INITIAL OBSERVATION CARE,LEVL II: CPT | Mod: 24,,,

## 2022-08-25 PROCEDURE — 63600175 PHARM REV CODE 636 W HCPCS: Performed by: RADIOLOGY

## 2022-08-25 PROCEDURE — 88307 PR  SURG PATH,LEVEL V: ICD-10-PCS | Mod: 26,,, | Performed by: PATHOLOGY

## 2022-08-25 PROCEDURE — 88313 PR  SPECIAL STAINS,GROUP II: ICD-10-PCS | Mod: 26,,, | Performed by: PATHOLOGY

## 2022-08-25 PROCEDURE — 25000003 PHARM REV CODE 250: Performed by: RADIOLOGY

## 2022-08-25 PROCEDURE — 63600175 PHARM REV CODE 636 W HCPCS: Performed by: PHYSICIAN ASSISTANT

## 2022-08-25 PROCEDURE — 36415 COLL VENOUS BLD VENIPUNCTURE: CPT | Performed by: PHYSICIAN ASSISTANT

## 2022-08-25 PROCEDURE — 85025 COMPLETE CBC W/AUTO DIFF WBC: CPT | Performed by: PHYSICIAN ASSISTANT

## 2022-08-25 PROCEDURE — 85610 PROTHROMBIN TIME: CPT | Performed by: PHYSICIAN ASSISTANT

## 2022-08-25 PROCEDURE — 80053 COMPREHEN METABOLIC PANEL: CPT | Performed by: PHYSICIAN ASSISTANT

## 2022-08-25 PROCEDURE — 88307 TISSUE EXAM BY PATHOLOGIST: CPT | Performed by: PATHOLOGY

## 2022-08-25 PROCEDURE — 94761 N-INVAS EAR/PLS OXIMETRY MLT: CPT

## 2022-08-25 PROCEDURE — 88313 SPECIAL STAINS GROUP 2: CPT | Mod: 26,,, | Performed by: PATHOLOGY

## 2022-08-25 PROCEDURE — 63600175 PHARM REV CODE 636 W HCPCS

## 2022-08-25 PROCEDURE — 88307 TISSUE EXAM BY PATHOLOGIST: CPT | Mod: 26,,, | Performed by: PATHOLOGY

## 2022-08-25 PROCEDURE — 99219 PR INITIAL OBSERVATION CARE,LEVL II: ICD-10-PCS | Mod: 24,,,

## 2022-08-25 PROCEDURE — 25000003 PHARM REV CODE 250: Performed by: PHYSICIAN ASSISTANT

## 2022-08-25 PROCEDURE — G0378 HOSPITAL OBSERVATION PER HR: HCPCS

## 2022-08-25 PROCEDURE — 88313 SPECIAL STAINS GROUP 2: CPT | Mod: 59 | Performed by: PATHOLOGY

## 2022-08-25 PROCEDURE — 20600001 HC STEP DOWN PRIVATE ROOM

## 2022-08-25 PROCEDURE — 83735 ASSAY OF MAGNESIUM: CPT | Performed by: PHYSICIAN ASSISTANT

## 2022-08-25 PROCEDURE — 84100 ASSAY OF PHOSPHORUS: CPT | Performed by: PHYSICIAN ASSISTANT

## 2022-08-25 RX ORDER — LIDOCAINE HYDROCHLORIDE 10 MG/ML
INJECTION INFILTRATION; PERINEURAL CODE/TRAUMA/SEDATION MEDICATION
Status: COMPLETED | OUTPATIENT
Start: 2022-08-25 | End: 2022-08-25

## 2022-08-25 RX ORDER — FENTANYL CITRATE 50 UG/ML
INJECTION, SOLUTION INTRAMUSCULAR; INTRAVENOUS CODE/TRAUMA/SEDATION MEDICATION
Status: COMPLETED | OUTPATIENT
Start: 2022-08-25 | End: 2022-08-25

## 2022-08-25 RX ORDER — MAGNESIUM SULFATE HEPTAHYDRATE 40 MG/ML
2 INJECTION, SOLUTION INTRAVENOUS ONCE
Status: COMPLETED | OUTPATIENT
Start: 2022-08-25 | End: 2022-08-25

## 2022-08-25 RX ORDER — TACROLIMUS 1 MG/1
1 CAPSULE ORAL EVERY EVENING
Status: DISCONTINUED | OUTPATIENT
Start: 2022-08-25 | End: 2022-08-26 | Stop reason: HOSPADM

## 2022-08-25 RX ORDER — TACROLIMUS 1 MG/1
2 CAPSULE ORAL EVERY MORNING
Status: DISCONTINUED | OUTPATIENT
Start: 2022-08-26 | End: 2022-08-26 | Stop reason: HOSPADM

## 2022-08-25 RX ORDER — MIDAZOLAM HYDROCHLORIDE 1 MG/ML
INJECTION INTRAMUSCULAR; INTRAVENOUS CODE/TRAUMA/SEDATION MEDICATION
Status: COMPLETED | OUTPATIENT
Start: 2022-08-25 | End: 2022-08-25

## 2022-08-25 RX ADMIN — FENTANYL CITRATE 50 MCG: 50 INJECTION, SOLUTION INTRAMUSCULAR; INTRAVENOUS at 11:08

## 2022-08-25 RX ADMIN — TACROLIMUS 1 MG: 1 CAPSULE ORAL at 05:08

## 2022-08-25 RX ADMIN — MYCOPHENOLATE MOFETIL 500 MG: 250 CAPSULE ORAL at 08:08

## 2022-08-25 RX ADMIN — MAGNESIUM SULFATE 2 G: 2 INJECTION INTRAVENOUS at 01:08

## 2022-08-25 RX ADMIN — MIRTAZAPINE 15 MG: 15 TABLET, FILM COATED ORAL at 08:08

## 2022-08-25 RX ADMIN — URSODIOL 250 MG: 250 TABLET, FILM COATED ORAL at 08:08

## 2022-08-25 RX ADMIN — CEFTRIAXONE 1 G: 2 INJECTION, SOLUTION INTRAVENOUS at 11:08

## 2022-08-25 RX ADMIN — MIDAZOLAM HYDROCHLORIDE 2 MG: 1 INJECTION, SOLUTION INTRAMUSCULAR; INTRAVENOUS at 11:08

## 2022-08-25 RX ADMIN — VALGANCICLOVIR HYDROCHLORIDE 450 MG: 450 TABLET ORAL at 08:08

## 2022-08-25 RX ADMIN — TACROLIMUS 2 MG: 1 CAPSULE ORAL at 08:08

## 2022-08-25 RX ADMIN — PANTOPRAZOLE SODIUM 40 MG: 40 TABLET, DELAYED RELEASE ORAL at 08:08

## 2022-08-25 RX ADMIN — LIDOCAINE HYDROCHLORIDE 5 ML: 10 INJECTION, SOLUTION INFILTRATION; PERINEURAL at 11:08

## 2022-08-25 RX ADMIN — SULFAMETHOXAZOLE AND TRIMETHOPRIM 1 TABLET: 400; 80 TABLET ORAL at 08:08

## 2022-08-25 RX ADMIN — ACETAMINOPHEN 650 MG: 325 TABLET ORAL at 08:08

## 2022-08-25 RX ADMIN — CARVEDILOL 3.12 MG: 3.12 TABLET, FILM COATED ORAL at 08:08

## 2022-08-25 NOTE — PROGRESS NOTES
Admit Note     SW met with wife to assess patients needs due to patient not being in the room.  Patient is a 42 y.o.  male, admitted Elevated LFTs.     Patient admitted from home on 8/24/2022 .  At this time, patients caregiver presents as alert and oriented x 4 and asking and answering questions appropriately.      Household/Family Systems (as reported by patients caregiver)     Patient resides with patient's wife and child(benji), age(s) 12,13 and 16, at [address] [place] [address].  Support system includes his wife and parents.  Patient does have dependents that are in need of being cared for. Patient's three children  are being cared for by patient's parents.     Patients primary caregiver is Shell Yingp, patients wife, phone number [phone].  Confirmed patients contact information is [phone] (home);   Telephone Information:   Mobile [phone]   .    During admission, patient's caregiver plans to stay in patient's room.  Confirmed patient and patients caregivers do have access to reliable transportation.    Cognitive Status/Learning     Patients caregiver reports patients reading ability as college and states patient does not have difficulty with reading.  Patients caregiver reports patient learns best by written instructions.   Needed: No.   Highest education level: Associate/Bachelor Degree    Vocation/Disability (as reported by patients caregiver)    Working for Income: yes  If yes, working activity level: Working Full Time  Patient is employed as sales representitive for [employer] . Has been working from home at this time however went into work for a few hours one day.    Adherence     Patients caregiver reports patient has a high level of adherence to patients health care regimen.  Adherence counseling and education provided.  Patient's caregiver verbalizes understanding.    Substance Use    Patients caregiver reports patients substance usage as the  following:    Tobacco: none, patient denies any use.  Alcohol: none, patient denies any use.  Illicit Drugs/Non-prescribed Medications: none, patient denies any use.  Patients caregiver states clear understanding of the potential impact of substance use.  Substance abstinence/cessation counseling, education and resources provided and reviewed.     Services Utilizing/ADLS (as reported by patients caregiver)    Infusion Service: Prior to admission, patient utilizing? Had infusion service via [facility] in the past.  Home Health: Prior to admission, patient [facility]? [facility]. Has referral for outpatient PT but patient has not been going as he feels stronger at this time, according to wife.  DME: Prior to admission, no  Pulmonary/Cardiac Rehab: Prior to admission, no  Dialysis:  Prior to admission, no  Transplant Specialty Pharmacy:  Prior to admission, yes; [facility].    Prior to admission, patients caregiver reports patient was independent with ADLS and was driving.  Patients caregiver reports patient is able to care for self at this time..  Patients caregiver reports patient indicates a willingness to care for self once medically cleared to do so.    Insurance/Medications    Insured by   Payer/Plan Subscr  Sex Relation Sub. Ins. ID Effective Group Num   1. BLUE ANNIE BLOSCAR MARTINEZ 1980 Male Self [id number]                                    [address] [id number]      Primary Insurance (for UNOS reporting): Private Insurance  Secondary Insurance (for UNOS reporting): None    Patients caregiver reports patient is able to obtain and afford medications at this time and at time of discharge.    Living Will/Healthcare Power of     Patients caregiver reports patient does not have a LW and/or HCPA.   provided education regarding LW and HCPA and the completion of forms. In the absence of the above documents, patient's wife, Shell Larson is the patient's  legal next of kin.    Coping/Mental Health (as reported by patients caregiver)    Patient is coping adequately with the aid of  family members. Patients caregiver is coping adequately with the aid of  family members.     Discharge Planning (as reported by patients caregiver)    At time of discharge, patient plans to return to patient's home under the care of his wife.  Patients wife will transport patient.  Per rounds today, expected discharge date has not been medically determined at this time. Patients caretaker verbalizes understanding and is involved in treatment planning and discharge process.    Additional Concerns    Patient's caretaker denies additional needs and/or concerns at this time.  SW will assist as needed.

## 2022-08-25 NOTE — PROGRESS NOTES
"Pt back in room 75815 from IR. No acute distress noted.     1340: Pt requesting to not be put back on tele right away, would like to "hold off for a little while". Education provided, verbalized understanding. JUSTIN Young NP aware.   "

## 2022-08-25 NOTE — PLAN OF CARE
[facility]  CTA DONE PA NOTIFIED  PT NPO AFTER MIDNIGHT   NO ACUTE DISTRESS OR CONCERNS VOICED OVER NIGHT     Problem: Adult Inpatient Plan of Care  Goal: Plan of Care Review  Outcome: Ongoing, Progressing  Goal: Patient-Specific Goal (Individualized)  Outcome: Ongoing, Progressing  Goal: Absence of Hospital-Acquired Illness or Injury  Outcome: Ongoing, Progressing  Goal: Optimal Comfort and Wellbeing  Outcome: Ongoing, Progressing  Goal: Readiness for Transition of Care  Outcome: Ongoing, Progressing     Problem: Diabetes Comorbidity  Goal: Blood Glucose Level Within Targeted Range  Outcome: Ongoing, Progressing     Problem: Infection  Goal: Absence of Infection Signs and Symptoms  Outcome: Ongoing, Progressing

## 2022-08-25 NOTE — SUBJECTIVE & OBJECTIVE
Scheduled Meds:   carvediloL  3.125 mg Oral BID WM    heparin (porcine)  5,000 Units Subcutaneous Q8H    mirtazapine  15 mg Oral Nightly    mycophenolate  500 mg Oral BID    pantoprazole  40 mg Oral Daily    sulfamethoxazole-trimethoprim 400-80mg  1 tablet Oral Daily    tacrolimus  2 mg Oral BID    ursodioL  250 mg Oral BID    valGANciclovir  450 mg Oral Daily     Continuous Infusions:  PRN Meds:acetaminophen, dextrose 10%, dextrose 10%, glucagon (human recombinant), glucose, glucose, insulin aspart U-100, melatonin, ondansetron, sodium chloride 0.9%    Review of Systems   Constitutional:  Negative for activity change, appetite change, chills and fever.   HENT:  Negative for facial swelling and trouble swallowing.    Eyes:  Negative for discharge.   Respiratory:  Negative for cough, chest tightness, shortness of breath, wheezing and stridor.    Cardiovascular:  Negative for chest pain, palpitations and leg swelling.   Gastrointestinal:  Negative for abdominal distention, abdominal pain, constipation, diarrhea, nausea and vomiting.   Genitourinary:  Negative for decreased urine volume, difficulty urinating and dysuria.   Musculoskeletal:  Negative for neck pain and neck stiffness.   Allergic/Immunologic: Positive for immunocompromised state.   Neurological:  Negative for dizziness, tremors, weakness and light-headedness.   Psychiatric/Behavioral:  Negative for agitation, behavioral problems, confusion and decreased concentration.    Objective:     Vital Signs (Most Recent):  Temp: 98.4 °F (36.9 °C) (08/25/22 0837)  Pulse: 77 (08/25/22 0837)  Resp: 16 (08/25/22 0837)  BP: 139/89 (08/25/22 0837)  SpO2: 99 % (08/25/22 0837) Vital Signs (24h Range):  Temp:  [98.1 °F (36.7 °C)-98.4 °F (36.9 °C)] 98.4 °F (36.9 °C)  Pulse:  [] 77  Resp:  [16-20] 16  SpO2:  [98 %-100 %] 99 %  BP: (119-152)/(89-99) 139/89     Weight: 74.6 kg (164 lb 7.4 oz)  Body mass index is 24.64 kg/m².    Intake/Output - Last 3 Shifts       None             Physical Exam  Vitals and nursing note reviewed.   Constitutional:       General: He is not in acute distress.     Appearance: Normal appearance. He is not ill-appearing, toxic-appearing or diaphoretic.   HENT:      Head: Normocephalic and atraumatic.   Eyes:      General: No scleral icterus.        Right eye: No discharge.         Left eye: No discharge.   Cardiovascular:      Rate and Rhythm: Normal rate and regular rhythm.      Pulses: Normal pulses.      Heart sounds: Normal heart sounds, S1 normal and S2 normal.   Pulmonary:      Effort: Pulmonary effort is normal. No respiratory distress.      Breath sounds: Normal breath sounds. No stridor. No wheezing, rhonchi or rales.   Abdominal:      General: A surgical scar is present. Bowel sounds are normal. There is no distension.      Palpations: Abdomen is soft.      Tenderness: There is no abdominal tenderness. There is no guarding or rebound.      Comments: Well healed chevron incision  R PTC drain / capped / no s/s/i   Musculoskeletal:         General: No swelling.      Cervical back: Neck supple.      Right lower leg: No edema.      Left lower leg: No edema.   Skin:     General: Skin is warm and dry.      Capillary Refill: Capillary refill takes less than 2 seconds.   Neurological:      General: No focal deficit present.      Mental Status: He is alert and oriented to person, place, and time.   Psychiatric:         Attention and Perception: Attention normal.         Mood and Affect: Mood normal.         Speech: Speech normal.         Behavior: Behavior normal. Behavior is cooperative.         Thought Content: Thought content normal.       Laboratory:  Immunosuppressants           Stop Route Frequency     mycophenolate capsule 500 mg         -- Oral 2 times daily     tacrolimus capsule 2 mg         -- Oral 2 times daily          CBC:   Recent Labs   Lab 08/25/22  0657   WBC 2.84*   RBC 3.56*   HGB 10.9*   HCT 31.9*   *   MCV 90   MCH 30.6    MCHC 34.2     CMP:   Recent Labs   Lab 08/25/22  0657   *   CALCIUM 9.3   ALBUMIN 3.4*   PROT 6.6      K 4.6   CO2 25      BUN 18   CREATININE 1.3   ALKPHOS 216*   ALT 98*   AST 55*   BILITOT 0.7     Coagulation:   Recent Labs   Lab 08/25/22  0657   INR 1.0     Labs within the past 24 hours have been reviewed.    Diagnostic Results:  CT Abd/Pelvis: Results for orders placed during the hospital encounter of 07/04/22    CT Abdomen Pelvis  Without Contrast    Narrative  EXAMINATION:  CT ABDOMEN PELVIS WITHOUT CONTRAST    CLINICAL HISTORY:  Abdominal abscess/infection suspected;    TECHNIQUE:  The patient was surveyed from the lung bases through the pelvis after the administration of  cc Omni 350 IV contrast.  Oral contrast was administered. The data was reconstructed for coronal, sagittal, and axial images.    COMPARISON:  None.    FINDINGS:  CHEST:    Lungs/Pleura: Platelike atelectasis is present in both lower lobes.    Heart: The visualized portions of the heart are normal. No pericardial effusion.  The tip of a central line terminates within the right atrium.    Thoracic soft tissues: Unremarkable    ABDOMEN:    Liver: Postsurgical changes from recent liver transplant.  The gas and fluid collection seen adjacent to the liver on 07/03/2022 is no longer seen; a drain has been placed in this region in the interval    Gallbladder/Bile ducts: The gallbladder is absent.  Mild pneumobilia present, likely sequela of recent liver transplant surgery.    Spleen:Unremarkable.    Stomach: Unremarkable.    Pancreas: Unremarkable.    Adrenals: Unremarkable.    Renal/Ureters: The kidneys are normal in size and location. No hydronephrosis.  The ureters are normal in course and caliber. Air is present in the bladder, likely from recent Rocha catheter placement.    Reproductive: Unremarkable.    Bowel: The visualized loops of small and large bowel show no evidence of obstruction or inflammation.  The appendix is  dilated up to 12 mm but otherwise appears normal.  It measured 9 mm on 06/09/2022.    Peritoneum: Trace amount of intraperitoneal free air, likely the result of recent exploratory laparotomy.  No intraperitoneal free fluid.  No focal areas of peritoneal thickening.    Lymph Nodes: No pathologic soraida enlargement in the abdomen or pelvis.    Aorta: The abdominal aorta is normal in course and caliber.  No significant atherosclerotic calcifications.    Bones: Degenerative changes of the spine.  No acute fractures or osseous destructive lesions.    Soft Tissues: Staples in place from recent ex lap.  Surgical drains in place.  Trace amount of air present in the abdominal musculature and subcutaneous fat, this is likely the result of recent exploratory laparotomy.    This examination is limited due to lack of intravenous contrast.    Impression  Status post liver transplant.  Interval resolution of the subhepatic gas and fluid collection when compared to 07/03/2022.    Electronically signed by resident: Melissa Brown  Date:    07/08/2022  Time:    09:03    Electronically signed by: Rachel Chavez  Date:    07/08/2022  Time:    10:26    US Liver Transplant Post:  Results for orders placed during the hospital encounter of 08/24/22    US Doppler Liver Transplant Post (xpd)    Narrative  EXAMINATION:  US DOPPLER LIVER TRANSPLANT POST (XPD)    CLINICAL HISTORY:  Liver transplant status    TECHNIQUE:  Ultrasound interrogation of the patient's transplant liver and hepatic/mesenteric vasculature (including the portal venous system, hepatic arterial and venous system, IVC, splenic artery and vein, and SMV) was performed utilizing grayscale, color-flow, and Doppler imaging.    COMPARISON:  Hepatic Doppler-06/27/2022    FINDINGS:  Soft tissues of the abdomen:    The patient is status post right hepatic lobe transplantation on 06/21/2022.  The hepatic allograft measures 15.4 cm in maximal craniocaudad dimension.  There is diffuse  increased attenuation of the ultrasound beam by the hepatic allograft, new when compared to the prior study.  There is a wedge-shaped geographic area of relative hypoechogenicity noted along the anteromedial aspect of the hepatic allograft.  No intraparenchymal fluid collection or perihepatic fluid collection noted on today's examination.  The common bile duct measures 5 mm.  No intrahepatic biliary dilatation.  There is a biliary drain present.  No ascites.    Hepatic and mesenteric vasculature:    The main portal vein and right portal vein are patent and show hepatopetal flow.  Peak systolic velocities within the main portal vein measure 106 cm/s at the level of the portal venous anastomosis.  No portal vein thrombosis.    The main transplant hepatic artery demonstrates resistive indices of 0.54 (previously 0.69).  No tardus parvus waveforms.  Peak systolic velocities within the main hepatic artery measure 66 cm/s (previously 82 cm/s).  The anterior branch of the right hepatic artery shows resistive index of 0.52 (previously 0.58).  No tardus parvus waveforms.  The posterior branch of the right hepatic artery was not visualized despite multiple attempts, and thrombosis of the posterior branch of the right hepatic artery cannot be excluded on the current study.    The IVC is patent.  The right hepatic vein and middle hepatic vein are patent and show normal color flow without evidence of thrombosis.    Impression  1. Status post right hepatic lobe transplantation with interval development of diffuse increased attenuation of the ultrasound beam by the liver, new when compared to the prior study.  2. Nonvisualization of the posterior branch of the right hepatic artery on today's examination.  The anterior branch of the right hepatic artery and main hepatic artery remain patent with normal waveforms and color flow.  Thrombosis of the posterior branch of the right hepatic artery is suspected.  Consider additional  evaluation with CTA of the abdomen.  3. Biliary drain.  No intrahepatic or extrahepatic biliary dilatation appreciated.  This report was flagged in Epic as abnormal.    Dr. Cline was notified of the imaging findings at 14:30 on 08/24/2022      Electronically signed by: Dwayne Almodovar MD  Date:    08/24/2022  Time:    14:32

## 2022-08-25 NOTE — NURSING
Patient arrived from procedure via bed with procedure RN Josie.  Bedside report received .  Patient A/O x 4 . Denies pain. Procedure area dressing CDI.  Recovery process explain to patient .  Patient understands he will be here for one hour.  During that time he will receive several VS, pain, and procedure site assessments.

## 2022-08-25 NOTE — PLAN OF CARE
Patient AAOx3, no distress noted, respirations even and unlabored, will continue to monitor. VSS. Acceptance of education, consents signed, H/P done. Labs reviewed. Patient in IR Room 190 for cholangiogram and transjugular liver biopsy procedure. Warm blankets applied to patient. Patient prepped and draped in sterile fashion.

## 2022-08-25 NOTE — HOSPITAL COURSE
BARBARA. Patient states he is feeling good this morning. Denies any nausea, vomiting, diarrhea, or abdominal pain. PTC drain in place and is capped. Plan for IR cholangiogram and liver biopsy today. All VSS. Will continue to monitor.

## 2022-08-25 NOTE — NURSING
Patient transport request placed . Patient awaiting transporter to bring him to his room via stretcher.

## 2022-08-25 NOTE — PLAN OF CARE
Transjugular liver biopsy and cholangiogram/gordy tube exchange procedure completed. Patient tolerated well. Patient drowsy from sedation, no distress noted, respirations even and unlabored, will continue to monitor. VSS. Right/Left procedure site clean, dry, and intact; no bleeding or hematoma noted. Patient to be transferred to MPU for 1 hour for post-procedural recovery per MD. Report to be given at bedside to RN. Report given to primary RNTeetee.

## 2022-08-25 NOTE — ASSESSMENT & PLAN NOTE
"- LFTs noted to be increasing since 8/18 labs  - Liver US 8/24 showed "nonvisualization of the posterior branch of the right hepatic artery" concerning for stenosis or thrombosis.  - CTA 8/24 without evidence of hepatic artery thrombosis. Arteries are small, but patent per Radiologist.   - IR consulted for cholangiogram today, 8/25 that was originally scheduled as an outpatient.  Also asked IR for liver biopsy due to elevated LFTs and CTA showing no evidence of hepatic artery thrombosis.  - Monitor with daily labs.  "

## 2022-08-25 NOTE — PROCEDURES
Radiology Post-Procedure Note    Pre Op Diagnosis: Elevated LFTs    Post Op Diagnosis: Same    Procedure: Transjugular liver biposy    Procedure performed by: Rigo Hall MD    Written Informed Consent Obtained: Yes    Specimen Removed: YES 4 x 18 gauge cores    Estimated Blood Loss: Minimal    Findings: Local anesthesia and moderate sedation were used.    A right-sided transjugular approach was used to performed hepatic venography, pressure measurements and liver biopsy.  Hepatic wedge pressure was 12, free hepatic vein pressure 4, right atrial pressure 2 indicating a transhepatic gradient of 8.  4 random specimens of the right hepatic lobe were obtained and sent to pathology for further evaluation.    Hemostasis of the right internal jugular vein was achieved using manual pressure and there was no hematoma.    The patient tolerated the procedure well and there were no complications.  Please see Imaging report for further details.    Rigo Hall M.D.  Interventional Radiology  Department of Radiology  Pager: [phone]

## 2022-08-25 NOTE — PLAN OF CARE
Pt admitted 8/24 with elevated LFTs and possible hepatic artery thrombosis. Pt s/p 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, significant post-op history of recurrent bile leaks. Pt AAOx4, VSS on bedside monitor, afebrile. NSR on tele. Pt on RA. CTA overnight showed arteries were small but patent. Pt to IR today for cholangiogram and biopsy. R bili drain exchanged today, draining to gravity x24hrs. Drain to be clamped tomorrow (8/26) @ noon. Drainage is serosanguineous/bilious. Regular diet, accuchecks AC/HS. Pt is up independently to toilet, remains free from falls/injuries so far this shift. Nonskid socks on, call bell within reach, bed locked and in lowest position. Pt spent most of shift up in recliner, wheels locked. Pt verbalized understanding to call for any needs/assistance. Wife at bedside today. Mag rider x1 for Mg 1.4.

## 2022-08-25 NOTE — PROGRESS NOTES
Pt escorted to IR via stretcher for biopsy and cholangiogram. Consents obtained and witnessed this am. No apparent signs of distress noted.

## 2022-08-25 NOTE — H&P
Inpatient Radiology Pre-procedure Note    History of Present Illness:  Romeo Larson is a 42 y.o. male with hx of cholangiocarcinoma s/p liver transplant on 6/21/2022 (vick-en-y biliary anastomosis), his post operative course significant for recurrent bile leak underwent PTC draining placement (internal/external) on 7/13 and upsized on 7/25 which been capped ever since. PT is due for routine exchange today as OP but he was admitted on 8/24 for elevated LFT. US liver doppler showed no visualization of the posterior branch of the hepatic artery but CTA confirmed patency of the main hepatic artery as well as all the branches.  IR consulted for liver biopsy and cholangiogram for routine cholangiogram.        Admission H&P reviewed.    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    Hypercholesteremia     Hypertension      Past Surgical History:   Procedure Laterality Date    DIAGNOSTIC ULTRASOUND N/A 6/21/2022    Procedure: ULTRASOUND, DIAGNOSTIC;  Surgeon: Sinan Cline MD;  Location: [facility] OR 2ND FLR;  Service: Transplant;  Laterality: N/A;    ENDOSCOPIC ULTRASOUND OF UPPER GASTROINTESTINAL TRACT N/A 10/20/2021    Procedure: ULTRASOUND, UPPER GI TRACT, ENDOSCOPIC;  Surgeon: Valeriy Sotelo MD;  Location: [facility] ENDO (2ND FLR);  Service: Endoscopy;  Laterality: N/A;  wife to email vacc card-inst email-tb    ERCP N/A 10/20/2021    Procedure: ERCP (ENDOSCOPIC RETROGRADE CHOLANGIOPANCREATOGRAPHY);  Surgeon: Valeriy Sotelo MD;  Location: [facility] ENDO (2ND FLR);  Service: Endoscopy;  Laterality: N/A;    ERCP N/A 11/4/2021    Procedure: ERCP (ENDOSCOPIC RETROGRADE CHOLANGIOPANCREATOGRAPHY);  Surgeon: Valeriy Sotelo MD;  Location: [facility] ENDO (2ND FLR);  Service: Endoscopy;  Laterality: N/A;    ERCP N/A 11/4/2021    Procedure: ERCP (ENDOSCOPIC  RETROGRADE CHOLANGIOPANCREATOGRAPHY);  Surgeon: Roselia Pereyra MD;  Location: [facility] ENDO (2ND FLR);  Service: Endoscopy;  Laterality: N/A;  pt vaccinated, instructed to bring card to procedure, instructions sent portal-MG    ERCP N/A 1/14/2022    Procedure: ERCP (ENDOSCOPIC RETROGRADE CHOLANGIOPANCREATOGRAPHY);  Surgeon: Roselia Pereyra MD;  Location: [facility] ENDO (2ND FLR);  Service: Endoscopy;  Laterality: N/A;  inst portal-right chest port-tb  Pt requested at home COVID testing, Pt instructed at home RAPID to be done morning of procedure, Pt instructed to call endoscopy scheuding if there is a positive result, Pt informed if a positive     ERCP N/A 3/31/2022    Procedure: ERCP (ENDOSCOPIC RETROGRADE CHOLANGIOPANCREATOGRAPHY);  Surgeon: Julio Cesar Alonzo MD;  Location: [facility] ENDO (2ND FLR);  Service: Endoscopy;  Laterality: N/A;  3/16: port L chest wall. pt to bring covid vaccination card. Instructions sent via portal.-SC  3/18/22-Updated instructions sent via portal re:new date/time-DS    EXPLORATORY LAPAROTOMY AFTER LIVER TRANSPLANTATION N/A 6/23/2022    Procedure: LAPAROTOMY, EXPLORATORY, AFTER LIVER TRANSPLANT;  Surgeon: Julio Cesar Jara MD;  [facility];  Service: Transplant;  Laterality: N/A;    INSERTION OF TUNNELED CENTRAL VENOUS CATHETER (CVC) WITH SUBCUTANEOUS PORT N/A 11/24/2021    Procedure: INSERTION, PORT-A-CATH;  Surgeon: Prem Syed MD;  Location: [facility] CATH LAB;  Service: Radiology;  Laterality: N/A;    LIVER TRANSPLANT N/A 6/21/2022    Procedure: TRANSPLANT, LIVER;  Surgeon: Sinan Cline MD;  [facility];  Service: Transplant;  Laterality: N/A;    REPAIR OF BILE DUCT N/A 6/23/2022    Procedure: REPAIR, BILE DUCT;  Surgeon: Julio Cesar Jara MD;  [facility];  Service: Transplant;  Laterality: N/A;    ROBOT-ASSISTED LAPAROSCOPIC LYMPHADENECTOMY USING DA МАРИНА XI  6/17/2022    Procedure: XI ROBOTIC LYMPHADENECTOMY - Portal;  Surgeon: Julio Cesar Jara MD;   [facility];  Service: Transplant;;    TONSILLECTOMY      XI ROBOTIC LAPAROSCOPY, EXPLORATORY N/A 6/17/2022    Procedure: XI ROBOTIC LAPAROSCOPY,EXPLORATORY;  Surgeon: Julio Cesar Jara MD;  [facility];  Service: Transplant;  Laterality: N/A;       Review of Systems:   As documented in primary team H&P.    Home Meds:   Prior to Admission medications    Medication Sig Start Date End Date Taking? Authorizing Provider   amoxicillin-clavulanate 875-125mg (AUGMENTIN) 875-125 mg per tablet Take 1 tablet by mouth every 12 (twelve) hours. 8/2/22   Julio Cesar Jara MD   aspirin 81 MG Chew CHEW 1 tablet (81 mg total) by mouth once daily. 6/28/22 6/28/23  Abida Arenas NP   blood sugar diagnostic Strp 1 each by Misc.(Non-Drug; Combo Route) route 3 (three) times daily. 6/28/22   Abida Arenas NP   blood-glucose meter Misc Use as instructed 6/28/22   Abida Arenas NP   calcium carbonate-vitamin D3 600 mg-20 mcg (800 unit) Tab Take 1 tablet by mouth once daily at 6am. 6/28/22   Abida Arenas NP   carvediloL (COREG) 6.25 MG tablet Take 0.5 tablets (3.125 mg total) by mouth 2 (two) times daily with meals. 8/2/22   Julio Cesar Jara MD   lancets 30 gauge Misc 1 each by Misc.(Non-Drug; Combo Route) route 3 (three) times daily. 6/28/22   Abida Arenas NP   methocarbamoL (ROBAXIN) 500 MG Tab Take 1 tablet (500 mg total) by mouth 4 (four) times daily.  Patient not taking: Reported on 8/16/2022 6/28/22   Abida Arenas NP   mirtazapine (REMERON) 15 MG tablet Take 1 tablet (15 mg total) by mouth nightly. 7/16/22 7/16/23  Sinan Cilne MD   mycophenolate (CELLCEPT) 250 mg Cap Take 2 capsules (500 mg total) by mouth 2 (two) times daily. 8/19/22 8/19/23  Sinan Cline MD   oxyCODONE (ROXICODONE) 10 mg Tab immediate release tablet Take 1-1.5 tablets (10-15 mg total) by mouth every 4 (four) hours as needed for Pain. 6/28/22   Abida Arenas NP   pantoprazole (PROTONIX) 40 MG tablet Take 1 tablet  "(40 mg total) by mouth once daily. 7/9/22   Sinan Cline MD   pen needle, diabetic 32 gauge x 5/32" Ndle 1 each by Misc.(Non-Drug; Combo Route) route 3 (three) times daily. 6/28/22   Abida Arenas, NP   sulfamethoxazole-trimethoprim 400-80mg (BACTRIM,SEPTRA) 400-80 mg per tablet Take 1 tablet by mouth once daily. Stop: 12/18/22 7/23/22 12/18/22  Santi Godwin MD   tacrolimus (PROGRAF) 1 MG Cap Take 2 capsules (2 mg total) by mouth every 12 (twelve) hours. 8/20/22   Sinan Cline MD   ursodioL (ACTIGALL) 250 mg Tab Take 1 tablet (250 mg total) by mouth 2 (two) times a day. 7/27/22   Braydon Iglesias MD   ursodioL (ACTIGALL) 250 mg Tab Take 1 tablet (250 mg total) by mouth 2 (two) times daily. 7/27/22   Braydon Iglesias MD   valGANciclovir (VALCYTE) 450 mg Tab Take 1 tablet (450 mg total) by mouth once daily. STOP 9/19/22 6/21/22 9/19/22  Sinan Cline MD   baclofen (LIORESAL) 10 MG tablet TAKE 1 TABLET(10 MG) BY MOUTH THREE TIMES DAILY AS NEEDED FOR HICCUPS  Patient not taking: No sig reported 4/6/22 6/17/22  Young Wesley MD   famotidine (PEPCID) 20 MG tablet Take 1 tablet (20 mg total) by mouth every evening. STOP 7/24/22 6/21/22 7/8/22  Sinan Cline MD   insulin aspart U-100 (NOVOLOG) 100 unit/mL (3 mL) InPn pen Inject 5 Units into the skin 3 (three) times daily with meals. + sliding scale.  MDD 30 units/d 7/16/22 7/27/22  Brinda Serrato MD   losartan (COZAAR) 50 MG tablet Take 1 tablet (50 mg total) by mouth once daily. 3/21/22 6/24/22  Pravin Maria MD   metFORMIN (GLUCOPHAGE) 500 MG tablet Take 1 tablet (500 mg total) by mouth 2 (two) times daily with meals. 7/8/22 7/8/22  Sinan Cline MD   tamsulosin (FLOMAX) 0.4 mg Cap Take 2 capsules (0.8 mg total) by mouth once daily. 7/9/22 7/27/22  Sinan Cline MD     Scheduled Meds:    carvediloL  3.125 mg Oral BID WM    heparin (porcine)  5,000 Units Subcutaneous Q8H    mirtazapine  15 mg Oral Nightly    mycophenolate  500 mg Oral BID    " pantoprazole  40 mg Oral Daily    sulfamethoxazole-trimethoprim 400-80mg  1 tablet Oral Daily    tacrolimus  2 mg Oral BID    ursodioL  250 mg Oral BID    valGANciclovir  450 mg Oral Daily     Continuous Infusions:   PRN Meds:acetaminophen, dextrose 10%, dextrose 10%, glucagon (human recombinant), glucose, glucose, insulin aspart U-100, melatonin, ondansetron, sodium chloride 0.9%  Anticoagulants/Antiplatelets: Heparin( lovonox held the day of the procedure)   Allergies: Review of patient's allergies indicates:  No Known Allergies  Sedation Hx: have not been any systemic reactions    Labs:  Recent Labs   Lab 08/25/22 0657   INR 1.0       Recent Labs   Lab 08/25/22 0657   WBC 2.84*   HGB 10.9*   HCT 31.9*   MCV 90   *      Recent Labs   Lab 08/22/22 0804 08/25/22 0657   * 135*    140   K 4.7 4.6    107   CO2 27 25   BUN 12 18   CREATININE 1.0 1.3   CALCIUM 10.0 9.3   MG  --  1.4*   * 98*   AST 69* 55*   ALBUMIN 3.7 3.4*   BILITOT 1.0 0.7   BILIDIR 0.3  --          Vitals:  Temp: 98.4 °F (36.9 °C) (08/25/22 0837)  Pulse: 77 (08/25/22 0837)  Resp: 16 (08/25/22 0837)  BP: 139/89 (08/25/22 0837)  SpO2: 99 % (08/25/22 0837)     Physical Exam:  ASA: III  Mallampati: II    General: no acute distress  Mental Status: alert and oriented to person, place and time  HEENT: normocephalic, atraumatic  Chest: unlabored breathing  Heart: regular heart rate  Abdomen: nondistended  Extremity: moves all extremities      Plan:  Sedation Plan: up to moderate.  Patient will undergo transjugular liver biopsy and Cholangiogram with possible intervention.          Yoanna Bauer MD  Radiology Resident PGY- 2  [facility]

## 2022-08-25 NOTE — PROCEDURES
Radiology Post-Procedure Note    Pre Op Diagnosis: Bile leak    Post Op Diagnosis: Same    Procedure: Cholangiogram, cholangioplasty and upsize of int/ext biliary drain    Procedure performed by: Rafael BOSE, Rigo    Written Informed Consent Obtained: Yes  Specimen Removed: NO  Estimated Blood Loss: Minimal    Findings:   Sheath cholangiogram demonstrates resolution of leak. There has been interval development of anastomotic stricture w sluggish flow. Case d/w Dr. Godwin, decision made to perform cholangioplasty. Cholangioplasty w 8 mm balloon catheter at anastomosis performed. Subsequent cholangiogram demonstrates improved antegrade flow. New 14 int/ext biliary drain placed. No complications. See dictation.    Keep open to bag for 24-48 hours. Capping trial can be considered at this time. Repeat cholangiogram in 2-3 weeks recommended.    Patient tolerated procedure well.    Rigo Hall M.D.  Interventional Radiology  Department of Radiology  Pager: [phone]

## 2022-08-25 NOTE — CONSULTS
Radiology Consult    Romeo Larson is a 42 y.o. male with hx of cholangiocarcinoma s/p liver transplant on 6/21/2022 (vick-en-y biliary anastomosis), his post operative course significant for recurrent bile leak underwent PTC draining placement (internal/external) on 7/13 and upsized on 7/25 which been capped ever since. PT is due for routine exchange today as OP but he was admitted on 8/24 for elevated LFT. US liver doppler showed no visualization of the posterior branch of the hepatic artery but CTA confirmed patency of the main hepatic artery as well as all the branches.  IR consulted for liver biopsy and cholangiogram for routine cholangiogram.      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    Hypercholesteremia     Hypertension      Past Surgical History:   Procedure Laterality Date    DIAGNOSTIC ULTRASOUND N/A 6/21/2022    Procedure: ULTRASOUND, DIAGNOSTIC;  Surgeon: Sinan Cline MD;  Location: [facility] OR 2ND FLR;  Service: Transplant;  Laterality: N/A;    ENDOSCOPIC ULTRASOUND OF UPPER GASTROINTESTINAL TRACT N/A 10/20/2021    Procedure: ULTRASOUND, UPPER GI TRACT, ENDOSCOPIC;  Surgeon: Valeriy Sotelo MD;  Location: [facility] (2ND FLR);  Service: Endoscopy;  Laterality: N/A;  wife to email vacc card-inst email-tb    ERCP N/A 10/20/2021    Procedure: ERCP (ENDOSCOPIC RETROGRADE CHOLANGIOPANCREATOGRAPHY);  Surgeon: Valeriy Sotelo MD;  Location: [facility] ENDO (2ND FLR);  Service: Endoscopy;  Laterality: N/A;    ERCP N/A 11/4/2021    Procedure: ERCP (ENDOSCOPIC RETROGRADE CHOLANGIOPANCREATOGRAPHY);  Surgeon: Valeriy Sotelo MD;  Location: [facility] ENDO (2ND FLR);  Service: Endoscopy;  Laterality: N/A;    ERCP N/A 11/4/2021    Procedure: ERCP (ENDOSCOPIC RETROGRADE CHOLANGIOPANCREATOGRAPHY);  Surgeon: Roselia Pereyra MD;   [facility]  Service: Endoscopy;  Laterality: N/A;  pt vaccinated, instructed to bring card to procedure, instructions sent portal-MG    ERCP N/A 1/14/2022    Procedure: ERCP (ENDOSCOPIC RETROGRADE CHOLANGIOPANCREATOGRAPHY);  Surgeon: Roselia Pereyra MD;  [facility]  Service: Endoscopy;  Laterality: N/A;  inst portal-right chest port-tb  Pt requested at home COVID testing, Pt instructed at home RAPID to be done morning of procedure, Pt instructed to call endoscopy scheuding if there is a positive result, Pt informed if a positive     ERCP N/A 3/31/2022    Procedure: ERCP (ENDOSCOPIC RETROGRADE CHOLANGIOPANCREATOGRAPHY);  Surgeon: Julio Cesar Alonzo MD;  [facility]  Service: Endoscopy;  Laterality: N/A;  3/16: port L chest wall. pt to bring covid vaccination card. Instructions sent via portal.-SC  3/18/22-Updated instructions sent via portal re:new date/time-DS    EXPLORATORY LAPAROTOMY AFTER LIVER TRANSPLANTATION N/A 6/23/2022    Procedure: LAPAROTOMY, EXPLORATORY, AFTER LIVER TRANSPLANT;  Surgeon: Julio Cesar Jara MD;  Location: [address];  Service: Transplant;  Laterality: N/A;    INSERTION OF TUNNELED CENTRAL VENOUS CATHETER (CVC) WITH SUBCUTANEOUS PORT N/A 11/24/2021    Procedure: INSERTION, PORT-A-CATH;  Surgeon: Prem Syed MD;  Location: [facility] CATH LAB;  Service: Radiology;  Laterality: N/A;    LIVER TRANSPLANT N/A 6/21/2022    Procedure: TRANSPLANT, LIVER;  Surgeon: Sinan Cline MD;  [address]  Service: Transplant;  Laterality: N/A;    REPAIR OF BILE DUCT N/A 6/23/2022    Procedure: REPAIR, BILE DUCT;  Surgeon: Julio Cesar Jara MD;  [facility];  Service: Transplant;  Laterality: N/A;    ROBOT-ASSISTED LAPAROSCOPIC LYMPHADENECTOMY USING DA МАРИНА XI  6/17/2022    Procedure: XI ROBOTIC LYMPHADENECTOMY - Portal;  Surgeon: Julio Cesar Jara MD;  [facility];  Service: Transplant;;    TONSILLECTOMY       XI ROBOTIC LAPAROSCOPY, EXPLORATORY N/A 6/17/2022    Procedure: XI ROBOTIC LAPAROSCOPY,EXPLORATORY;  Surgeon: Julio Cesar Jara MD;  Location: [facility] OR [address] [place] [address];  Service: Transplant;  Laterality: N/A;       Discussed with primary team, Dr. Godwin     Imaging reviewed with Radiology staff, Dr. Kuhn.     Procedure: Transjugular liver biopsy and Cholangiogram with possible intervention     Scheduled Meds:    carvediloL  3.125 mg Oral BID WM    heparin (porcine)  5,000 Units Subcutaneous Q8H    mirtazapine  15 mg Oral Nightly    mycophenolate  500 mg Oral BID    pantoprazole  40 mg Oral Daily    sulfamethoxazole-trimethoprim 400-80mg  1 tablet Oral Daily    tacrolimus  2 mg Oral BID    ursodioL  250 mg Oral BID    valGANciclovir  450 mg Oral Daily     Continuous Infusions:   PRN Meds:acetaminophen, dextrose 10%, dextrose 10%, glucagon (human recombinant), glucose, glucose, insulin aspart U-100, melatonin, ondansetron, sodium chloride 0.9%    Allergies: Review of patient's allergies indicates:  No Known Allergies    Labs:  Recent Labs   Lab 08/25/22  0657   INR 1.0       Recent Labs   Lab 08/22/22  0804   WBC 3.59*   HGB 11.7*   HCT 37.3*   MCV 96         Recent Labs   Lab 08/22/22  0804   *      K 4.7      CO2 27   BUN 12   CREATININE 1.0   CALCIUM 10.0   *   AST 69*   ALBUMIN 3.7   BILITOT 1.0   BILIDIR 0.3         Vitals (Most Recent):  Temp: 98.1 °F (36.7 °C) (08/25/22 0000)  Pulse: 79 (08/25/22 0623)  Resp: 17 (08/25/22 0355)  BP: (!) 142/99 (08/25/22 0355)  SpO2: 100 % (08/25/22 0355)    Plan:   1. NPO  2. Hold anticoagulants.  3. Will scheduled for 8/15/2022    Yoanna Bauer MD  Radiology Resident PGY- 2  [facility]

## 2022-08-25 NOTE — PROGRESS NOTES
[facility] - Transplant Stepdown  Liver Transplant  Progress Note    Patient Name: Romeo Larson  MRN: [id number]  Admission Date: 8/24/2022  Hospital Length of Stay: 1 days  Code Status: Full Code  Primary Care Provider: Pravin Maria MD  Post-Operative Day: 65    ORGAN:   RIGHT LIVER LOBE (SEGS 5,6,7,8) WITHOUT MIDDLE HEPATIC VEIN  Disease Etiology: Primary Liver Malignancy: Cholangiocarcinoma (CH-CA)  Donor Type:   Living  CDC High Risk:     Donor CMV Status:   Donor CMV Status:   Donor HBcAB:     Donor HCV Status:     Donor HBV GUSTABO:   Donor HCV GUSTABO:   Whole or Partial: Partial Liver  Biliary Anastomosis: Vick-en-Y  Arterial Anatomy: Standard  Subjective:     History of Present Illness:  Romeo Larson is a 41yr old male s/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 Post operative course significant for recurrent bile leak. OR take back 6/23 for bile duct repair (small leak found near biliary anastomosis, unable to clearly identify source), cultures grew enterococcus faecalis. OR take back 7/4 for ex lap, washout of biloma, bile duct unable to be assessed due to adhesions, OR cultures with lactobacillus species. Underwent PTC drain (internal/external) placement on 7/13. Most recently admitted 7/18-7/27 with sepsis. CT A/P 7/18, reviewed by surgeon, R SHRUTHI removed based on CT findings. IR consulted, had repeat cholangiogram w PTC upsize 7/20. Blood cx from 7/18 with Candida glabrata, started on Micafungin per ID (EOT 8/12). Also completed course of Augmentin 8/12 (see ID note). PTC clamped 7/21. LFTs gradually increasing with PTC clamped so PTC reopened to gravity 7/23. Remaining SHRUTHI drain removed 7/22. LFTs initally trended down after PTC reopened. Bilious leakage noted around PTC insertion site. Patient with repeat cholangiogram and PTC upsize to 12 Fr on 7/25. PTC capped 7/27 AM. At that time IR recommended routine exchange in 4 weeks (~7/24/22).     Patient presents as a  "direct admission to LTS on 8/24 due to elevated LFTs and concern for hepatic artery thrombosis. LFTs noted to be elevated prompting a liver US 8/24. Liver US 8/24 showed "nonvisualization of the posterior branch of the right hepatic artery" concerning for thrombosis. Per surgeon, plan for STAT CTA and IR consult for cholangiogram 8/25 (originally scheduled outpatient). D/w Dr. Godwin.         Hospital Course:  NAEON. Patient states he is feeling good this morning. Denies any nausea, vomiting, diarrhea, or abdominal pain. PTC drain in place and is capped. Plan for IR cholangiogram and liver biopsy today. All VSS. Will continue to monitor.       Scheduled Meds:   carvediloL  3.125 mg Oral BID WM    heparin (porcine)  5,000 Units Subcutaneous Q8H    mirtazapine  15 mg Oral Nightly    mycophenolate  500 mg Oral BID    pantoprazole  40 mg Oral Daily    sulfamethoxazole-trimethoprim 400-80mg  1 tablet Oral Daily    tacrolimus  2 mg Oral BID    ursodioL  250 mg Oral BID    valGANciclovir  450 mg Oral Daily     Continuous Infusions:  PRN Meds:acetaminophen, dextrose 10%, dextrose 10%, glucagon (human recombinant), glucose, glucose, insulin aspart U-100, melatonin, ondansetron, sodium chloride 0.9%    Review of Systems   Constitutional:  Negative for activity change, appetite change, chills and fever.   HENT:  Negative for facial swelling and trouble swallowing.    Eyes:  Negative for discharge.   Respiratory:  Negative for cough, chest tightness, shortness of breath, wheezing and stridor.    Cardiovascular:  Negative for chest pain, palpitations and leg swelling.   Gastrointestinal:  Negative for abdominal distention, abdominal pain, constipation, diarrhea, nausea and vomiting.   Genitourinary:  Negative for decreased urine volume, difficulty urinating and dysuria.   Musculoskeletal:  Negative for neck pain and neck stiffness.   Allergic/Immunologic: Positive for immunocompromised state.   Neurological:  Negative " for dizziness, tremors, weakness and light-headedness.   Psychiatric/Behavioral:  Negative for agitation, behavioral problems, confusion and decreased concentration.    Objective:     Vital Signs (Most Recent):  Temp: 98.4 °F (36.9 °C) (08/25/22 0837)  Pulse: 77 (08/25/22 0837)  Resp: 16 (08/25/22 0837)  BP: 139/89 (08/25/22 0837)  SpO2: 99 % (08/25/22 0837) Vital Signs (24h Range):  Temp:  [98.1 °F (36.7 °C)-98.4 °F (36.9 °C)] 98.4 °F (36.9 °C)  Pulse:  [] 77  Resp:  [16-20] 16  SpO2:  [98 %-100 %] 99 %  BP: (119-152)/(89-99) 139/89     Weight: 74.6 kg (164 lb 7.4 oz)  Body mass index is 24.64 kg/m².    Intake/Output - Last 3 Shifts       None            Physical Exam  Vitals and nursing note reviewed.   Constitutional:       General: He is not in acute distress.     Appearance: Normal appearance. He is not ill-appearing, toxic-appearing or diaphoretic.   HENT:      Head: Normocephalic and atraumatic.   Eyes:      General: No scleral icterus.        Right eye: No discharge.         Left eye: No discharge.   Cardiovascular:      Rate and Rhythm: Normal rate and regular rhythm.      Pulses: Normal pulses.      Heart sounds: Normal heart sounds, S1 normal and S2 normal.   Pulmonary:      Effort: Pulmonary effort is normal. No respiratory distress.      Breath sounds: Normal breath sounds. No stridor. No wheezing, rhonchi or rales.   Abdominal:      General: A surgical scar is present. Bowel sounds are normal. There is no distension.      Palpations: Abdomen is soft.      Tenderness: There is no abdominal tenderness. There is no guarding or rebound.      Comments: Well healed chevron incision  R PTC drain / capped / no s/s/i   Musculoskeletal:         General: No swelling.      Cervical back: Neck supple.      Right lower leg: No edema.      Left lower leg: No edema.   Skin:     General: Skin is warm and dry.      Capillary Refill: Capillary refill takes less than 2 seconds.   Neurological:      General: No focal  deficit present.      Mental Status: He is alert and oriented to person, place, and time.   Psychiatric:         Attention and Perception: Attention normal.         Mood and Affect: Mood normal.         Speech: Speech normal.         Behavior: Behavior normal. Behavior is cooperative.         Thought Content: Thought content normal.       Laboratory:  Immunosuppressants           Stop Route Frequency     mycophenolate capsule 500 mg         -- Oral 2 times daily     tacrolimus capsule 2 mg         -- Oral 2 times daily          CBC:   Recent Labs   Lab 08/25/22  0657   WBC 2.84*   RBC 3.56*   HGB 10.9*   HCT 31.9*   *   MCV 90   MCH 30.6   MCHC 34.2     CMP:   Recent Labs   Lab 08/25/22  0657   *   CALCIUM 9.3   ALBUMIN 3.4*   PROT 6.6      K 4.6   CO2 25      BUN 18   CREATININE 1.3   ALKPHOS 216*   ALT 98*   AST 55*   BILITOT 0.7     Coagulation:   Recent Labs   Lab 08/25/22  0657   INR 1.0     Labs within the past 24 hours have been reviewed.    Diagnostic Results:  CT Abd/Pelvis: Results for orders placed during the hospital encounter of 07/04/22    CT Abdomen Pelvis  Without Contrast    Narrative  EXAMINATION:  CT ABDOMEN PELVIS WITHOUT CONTRAST    CLINICAL HISTORY:  Abdominal abscess/infection suspected;    TECHNIQUE:  The patient was surveyed from the lung bases through the pelvis after the administration of  cc Omni 350 IV contrast.  Oral contrast was administered. The data was reconstructed for coronal, sagittal, and axial images.    COMPARISON:  None.    FINDINGS:  CHEST:    Lungs/Pleura: Platelike atelectasis is present in both lower lobes.    Heart: The visualized portions of the heart are normal. No pericardial effusion.  The tip of a central line terminates within the right atrium.    Thoracic soft tissues: Unremarkable    ABDOMEN:    Liver: Postsurgical changes from recent liver transplant.  The gas and fluid collection seen adjacent to the liver on 07/03/2022 is no longer  seen; a drain has been placed in this region in the interval    Gallbladder/Bile ducts: The gallbladder is absent.  Mild pneumobilia present, likely sequela of recent liver transplant surgery.    Spleen:Unremarkable.    Stomach: Unremarkable.    Pancreas: Unremarkable.    Adrenals: Unremarkable.    Renal/Ureters: The kidneys are normal in size and location. No hydronephrosis.  The ureters are normal in course and caliber. Air is present in the bladder, likely from recent Rocha catheter placement.    Reproductive: Unremarkable.    Bowel: The visualized loops of small and large bowel show no evidence of obstruction or inflammation.  The appendix is dilated up to 12 mm but otherwise appears normal.  It measured 9 mm on 06/09/2022.    Peritoneum: Trace amount of intraperitoneal free air, likely the result of recent exploratory laparotomy.  No intraperitoneal free fluid.  No focal areas of peritoneal thickening.    Lymph Nodes: No pathologic soraida enlargement in the abdomen or pelvis.    Aorta: The abdominal aorta is normal in course and caliber.  No significant atherosclerotic calcifications.    Bones: Degenerative changes of the spine.  No acute fractures or osseous destructive lesions.    Soft Tissues: Staples in place from recent ex lap.  Surgical drains in place.  Trace amount of air present in the abdominal musculature and subcutaneous fat, this is likely the result of recent exploratory laparotomy.    This examination is limited due to lack of intravenous contrast.    Impression  Status post liver transplant.  Interval resolution of the subhepatic gas and fluid collection when compared to 07/03/2022.    Electronically signed by resident: Melissa Brown  Date:    07/08/2022  Time:    09:03    Electronically signed by: Rachel Chavez  Date:    07/08/2022  Time:    10:26    US Liver Transplant Post:  Results for orders placed during the hospital encounter of 08/24/22    US Doppler Liver Transplant Post  (xpd)    Narrative  EXAMINATION:  US DOPPLER LIVER TRANSPLANT POST (XPD)    CLINICAL HISTORY:  Liver transplant status    TECHNIQUE:  Ultrasound interrogation of the patient's transplant liver and hepatic/mesenteric vasculature (including the portal venous system, hepatic arterial and venous system, IVC, splenic artery and vein, and SMV) was performed utilizing grayscale, color-flow, and Doppler imaging.    COMPARISON:  Hepatic Doppler-06/27/2022    FINDINGS:  Soft tissues of the abdomen:    The patient is status post right hepatic lobe transplantation on 06/21/2022.  The hepatic allograft measures 15.4 cm in maximal craniocaudad dimension.  There is diffuse increased attenuation of the ultrasound beam by the hepatic allograft, new when compared to the prior study.  There is a wedge-shaped geographic area of relative hypoechogenicity noted along the anteromedial aspect of the hepatic allograft.  No intraparenchymal fluid collection or perihepatic fluid collection noted on today's examination.  The common bile duct measures 5 mm.  No intrahepatic biliary dilatation.  There is a biliary drain present.  No ascites.    Hepatic and mesenteric vasculature:    The main portal vein and right portal vein are patent and show hepatopetal flow.  Peak systolic velocities within the main portal vein measure 106 cm/s at the level of the portal venous anastomosis.  No portal vein thrombosis.    The main transplant hepatic artery demonstrates resistive indices of 0.54 (previously 0.69).  No tardus parvus waveforms.  Peak systolic velocities within the main hepatic artery measure 66 cm/s (previously 82 cm/s).  The anterior branch of the right hepatic artery shows resistive index of 0.52 (previously 0.58).  No tardus parvus waveforms.  The posterior branch of the right hepatic artery was not visualized despite multiple attempts, and thrombosis of the posterior branch of the right hepatic artery cannot be excluded on the current  "study.    The IVC is patent.  The right hepatic vein and middle hepatic vein are patent and show normal color flow without evidence of thrombosis.    Impression  1. Status post right hepatic lobe transplantation with interval development of diffuse increased attenuation of the ultrasound beam by the liver, new when compared to the prior study.  2. Nonvisualization of the posterior branch of the right hepatic artery on today's examination.  The anterior branch of the right hepatic artery and main hepatic artery remain patent with normal waveforms and color flow.  Thrombosis of the posterior branch of the right hepatic artery is suspected.  Consider additional evaluation with CTA of the abdomen.  3. Biliary drain.  No intrahepatic or extrahepatic biliary dilatation appreciated.  This report was flagged in Epic as abnormal.    Dr. Cline was notified of the imaging findings at 14:30 on 08/24/2022      Electronically signed by: Dwayne Almodovar MD  Date:    08/24/2022  Time:    14:32      Assessment/Plan:     * Elevated LFTs  - LFTs noted to be increasing since 8/18 labs  - Liver US 8/24 showed "nonvisualization of the posterior branch of the right hepatic artery" concerning for stenosis or thrombosis.  - CTA 8/24 without evidence of hepatic artery thrombosis. Arteries are small, but patent per Radiologist.   - IR consulted for cholangiogram today, 8/25 that was originally scheduled as an outpatient.  Also asked IR for liver biopsy due to elevated LFTs and CTA showing no evidence of hepatic artery thrombosis.  - Monitor with daily labs.    Bile leak  - See "elevated LFTs."      Long-term use of immunosuppressant medication  - See "prophylactic immunotherapy."      Type 2 diabetes mellitus with hyperglycemia  - Diabetic Diet  - SSI  - Currently NPO for IR procedure today     At risk for opportunistic infections  - Valcyte for CMV ppx  - Bactrim for PCP ppx      Prophylactic immunotherapy  - Continue Prograf. Monitor trough " "daily and adjust dose as needed to achieve therapeutic level.  - Continue Cellcept.  - Continue steroids.      S/P liver transplant  - S/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  - OR take back 6/23 for bile duct repair (small leak found near biliary anastomosis, unable to clearly identify source), cultures grew enterococcus faecalis.  - OR take back 7/4 for ex lap, washout of biloma, bile duct unable to be assessed due to adhesions, OR cultures with lactobacillus species.  - Underwent PTC drain (internal/external) placement on 7/13.   - See HPI. Drain has been upsized. Currently open. LFTs now trending up.  - See "elevated LFTs."  - Monitor with daily labs.         VTE Risk Mitigation (From admission, onward)         Ordered     heparin (porcine) injection 5,000 Units  Every 8 hours         08/24/22 1735     IP VTE LOW RISK PATIENT  Once         08/24/22 1735                The patients clinical status was discussed at multidisplinary rounds, involving transplant surgery, transplant medicine, pharmacy, nursing, nutrition, and social work    Discharge Planning:  Not a candidate at this time.    Hannah Young, RADHA  Liver Transplant  [facility] - Transplant Stepdown  "

## 2022-08-26 ENCOUNTER — TELEPHONE (OUTPATIENT)
Dept: TRANSPLANT | Facility: HOSPITAL | Age: 42
End: 2022-08-26
Payer: COMMERCIAL

## 2022-08-26 VITALS
OXYGEN SATURATION: 98 % | HEART RATE: 97 BPM | WEIGHT: 164.44 LBS | RESPIRATION RATE: 18 BRPM | SYSTOLIC BLOOD PRESSURE: 127 MMHG | DIASTOLIC BLOOD PRESSURE: 86 MMHG | TEMPERATURE: 99 F | HEIGHT: 69 IN | BODY MASS INDEX: 24.36 KG/M2

## 2022-08-26 DIAGNOSIS — Z94.4 LIVER REPLACED BY TRANSPLANT: Primary | ICD-10-CM

## 2022-08-26 DIAGNOSIS — K83.1 BILIARY OBSTRUCTION: ICD-10-CM

## 2022-08-26 DIAGNOSIS — K83.1 BILE DUCT STRICTURE: Primary | ICD-10-CM

## 2022-08-26 LAB
ALBUMIN SERPL BCP-MCNC: 3.4 G/DL (ref 3.5–5.2)
ALP SERPL-CCNC: 219 U/L (ref 55–135)
ALT SERPL W/O P-5'-P-CCNC: 95 U/L (ref 10–44)
ANION GAP SERPL CALC-SCNC: 10 MMOL/L (ref 8–16)
AST SERPL-CCNC: 55 U/L (ref 10–40)
BASOPHILS # BLD AUTO: 0.02 K/UL (ref 0–0.2)
BASOPHILS NFR BLD: 0.7 % (ref 0–1.9)
BILIRUB SERPL-MCNC: 1.5 MG/DL (ref 0.1–1)
BUN SERPL-MCNC: 17 MG/DL (ref 6–20)
CALCIUM SERPL-MCNC: 9.1 MG/DL (ref 8.7–10.5)
CHLORIDE SERPL-SCNC: 102 MMOL/L (ref 95–110)
CO2 SERPL-SCNC: 24 MMOL/L (ref 23–29)
CREAT SERPL-MCNC: 0.9 MG/DL (ref 0.5–1.4)
DIFFERENTIAL METHOD: ABNORMAL
EOSINOPHIL # BLD AUTO: 0 K/UL (ref 0–0.5)
EOSINOPHIL NFR BLD: 0.4 % (ref 0–8)
ERYTHROCYTE [DISTWIDTH] IN BLOOD BY AUTOMATED COUNT: 15.4 % (ref 11.5–14.5)
EST. GFR  (NO RACE VARIABLE): >60 ML/MIN/1.73 M^2
GLUCOSE SERPL-MCNC: 142 MG/DL (ref 70–110)
HCT VFR BLD AUTO: 32.6 % (ref 40–54)
HGB BLD-MCNC: 10.9 G/DL (ref 14–18)
IMM GRANULOCYTES # BLD AUTO: 0.01 K/UL (ref 0–0.04)
IMM GRANULOCYTES NFR BLD AUTO: 0.4 % (ref 0–0.5)
LYMPHOCYTES # BLD AUTO: 0.3 K/UL (ref 1–4.8)
LYMPHOCYTES NFR BLD: 10.3 % (ref 18–48)
MAGNESIUM SERPL-MCNC: 1.6 MG/DL (ref 1.6–2.6)
MCH RBC QN AUTO: 31.1 PG (ref 27–31)
MCHC RBC AUTO-ENTMCNC: 33.4 G/DL (ref 32–36)
MCV RBC AUTO: 93 FL (ref 82–98)
MONOCYTES # BLD AUTO: 0.3 K/UL (ref 0.3–1)
MONOCYTES NFR BLD: 9.6 % (ref 4–15)
NEUTROPHILS # BLD AUTO: 2.2 K/UL (ref 1.8–7.7)
NEUTROPHILS NFR BLD: 78.6 % (ref 38–73)
NRBC BLD-RTO: 0 /100 WBC
PHOSPHATE SERPL-MCNC: 4.4 MG/DL (ref 2.7–4.5)
PLATELET # BLD AUTO: 100 K/UL (ref 150–450)
PMV BLD AUTO: 9.4 FL (ref 9.2–12.9)
POCT GLUCOSE: 198 MG/DL (ref 70–110)
POTASSIUM SERPL-SCNC: 4.6 MMOL/L (ref 3.5–5.1)
PROT SERPL-MCNC: 6.7 G/DL (ref 6–8.4)
RBC # BLD AUTO: 3.51 M/UL (ref 4.6–6.2)
SODIUM SERPL-SCNC: 136 MMOL/L (ref 136–145)
TACROLIMUS BLD-MCNC: 10 NG/ML (ref 5–15)
WBC # BLD AUTO: 2.82 K/UL (ref 3.9–12.7)

## 2022-08-26 PROCEDURE — 80053 COMPREHEN METABOLIC PANEL: CPT | Performed by: PHYSICIAN ASSISTANT

## 2022-08-26 PROCEDURE — 84100 ASSAY OF PHOSPHORUS: CPT | Performed by: PHYSICIAN ASSISTANT

## 2022-08-26 PROCEDURE — 83735 ASSAY OF MAGNESIUM: CPT | Performed by: PHYSICIAN ASSISTANT

## 2022-08-26 PROCEDURE — 36415 COLL VENOUS BLD VENIPUNCTURE: CPT | Performed by: PHYSICIAN ASSISTANT

## 2022-08-26 PROCEDURE — 96374 THER/PROPH/DIAG INJ IV PUSH: CPT

## 2022-08-26 PROCEDURE — G0378 HOSPITAL OBSERVATION PER HR: HCPCS

## 2022-08-26 PROCEDURE — 99217 PR OBSERVATION CARE DISCHARGE: CPT | Mod: 24,,,

## 2022-08-26 PROCEDURE — 63600175 PHARM REV CODE 636 W HCPCS

## 2022-08-26 PROCEDURE — 99217 PR OBSERVATION CARE DISCHARGE: ICD-10-PCS | Mod: 24,,,

## 2022-08-26 PROCEDURE — 25000003 PHARM REV CODE 250: Performed by: PHYSICIAN ASSISTANT

## 2022-08-26 PROCEDURE — 63600175 PHARM REV CODE 636 W HCPCS: Performed by: PHYSICIAN ASSISTANT

## 2022-08-26 PROCEDURE — 85025 COMPLETE CBC W/AUTO DIFF WBC: CPT | Performed by: PHYSICIAN ASSISTANT

## 2022-08-26 PROCEDURE — 80197 ASSAY OF TACROLIMUS: CPT | Performed by: PHYSICIAN ASSISTANT

## 2022-08-26 RX ORDER — TACROLIMUS 1 MG/1
CAPSULE ORAL
Qty: 90 CAPSULE | Refills: 11 | Status: ON HOLD | OUTPATIENT
Start: 2022-08-26 | End: 2022-09-17 | Stop reason: SDUPTHER

## 2022-08-26 RX ORDER — MAGNESIUM SULFATE HEPTAHYDRATE 40 MG/ML
2 INJECTION, SOLUTION INTRAVENOUS ONCE
Status: COMPLETED | OUTPATIENT
Start: 2022-08-26 | End: 2022-08-26

## 2022-08-26 RX ORDER — TACROLIMUS 1 MG/1
CAPSULE ORAL
Qty: 120 CAPSULE | Refills: 11 | Status: SHIPPED | OUTPATIENT
Start: 2022-08-26 | End: 2022-08-26 | Stop reason: SDUPTHER

## 2022-08-26 RX ADMIN — MAGNESIUM SULFATE 2 G: 2 INJECTION INTRAVENOUS at 08:08

## 2022-08-26 RX ADMIN — TACROLIMUS 2 MG: 1 CAPSULE ORAL at 08:08

## 2022-08-26 RX ADMIN — PANTOPRAZOLE SODIUM 40 MG: 40 TABLET, DELAYED RELEASE ORAL at 08:08

## 2022-08-26 RX ADMIN — MYCOPHENOLATE MOFETIL 500 MG: 250 CAPSULE ORAL at 08:08

## 2022-08-26 RX ADMIN — URSODIOL 250 MG: 250 TABLET, FILM COATED ORAL at 08:08

## 2022-08-26 RX ADMIN — VALGANCICLOVIR HYDROCHLORIDE 450 MG: 450 TABLET ORAL at 08:08

## 2022-08-26 RX ADMIN — CARVEDILOL 3.12 MG: 3.12 TABLET, FILM COATED ORAL at 08:08

## 2022-08-26 RX ADMIN — SULFAMETHOXAZOLE AND TRIMETHOPRIM 1 TABLET: 400; 80 TABLET ORAL at 08:08

## 2022-08-26 NOTE — PLAN OF CARE
Patient AAO X 4, VSS. Denies pain, N/V. Gave PRN tylenol for headache. Right side port deassessed. Patient went for cholangiogram and liver bx yesterday. R bilil drain output 20cc overnight. Drain to be clamed today at noon. Accuchecks AC/HS. Up ambulatory, bed locked at lowest setting, yellow socks on, call bell in reach. Remains free from fall.

## 2022-08-26 NOTE — DISCHARGE SUMMARY
[facility] - Transplant Stepdown  Liver Transplant  Discharge Summary      Patient Name: Romeo Larson  MRN: [id number]  Admission Date: 8/24/2022  Hospital Length of Stay: 2 days  Discharge Date and Time:  08/26/2022 11:55 AM  Attending Physician: Santi Godwin MD   Discharging Provider: Hannah Young NP  Primary Care Provider: Pravin Maria MD  Post-Operative Day: 66     ORGAN:   RIGHT LIVER LOBE (SEGS 5,6,7,8) WITHOUT MIDDLE HEPATIC VEIN  Disease Etiology: Primary Liver Malignancy: Cholangiocarcinoma (CH-CA)  Donor Type:   Living  CDC High Risk:     Donor CMV Status:   Donor CMV Status:   Donor HBcAB:     Donor HCV Status:     Whole or Partial: Partial Liver  Biliary Anastomosis: Vick-en-Y  Arterial Anatomy: Standard    HPI:   Romeo Larson is a 41yr old male s/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 Post operative course significant for recurrent bile leak. OR take back 6/23 for bile duct repair (small leak found near biliary anastomosis, unable to clearly identify source), cultures grew enterococcus faecalis. OR take back 7/4 for ex lap, washout of biloma, bile duct unable to be assessed due to adhesions, OR cultures with lactobacillus species. Underwent PTC drain (internal/external) placement on 7/13. Most recently admitted 7/18-7/27 with sepsis. CT A/P 7/18, reviewed by surgeon, R SHRUTHI removed based on CT findings. IR consulted, had repeat cholangiogram w PTC upsize 7/20. Blood cx from 7/18 with Candida glabrata, started on Micafungin per ID (EOT 8/12). Also completed course of Augmentin 8/12 (see ID note). PTC clamped 7/21. LFTs gradually increasing with PTC clamped so PTC reopened to gravity 7/23. Remaining SHRUTHI drain removed 7/22. LFTs initally trended down after PTC reopened. Bilious leakage noted around PTC insertion site. Patient with repeat cholangiogram and PTC upsize to 12 Fr on 7/25. PTC capped 7/27 AM. At that time IR recommended routine exchange in 4 weeks  "(~7/24/22).     Patient presents as a direct admission to LTS on 8/24 due to elevated LFTs and concern for hepatic artery thrombosis. LFTs noted to be elevated prompting a liver US 8/24. Liver US 8/24 showed "nonvisualization of the posterior branch of the right hepatic artery" concerning for thrombosis. Per surgeon, plan for STAT CTA and IR consult for cholangiogram 8/25 (originally scheduled outpatient). D/w Dr. Godwin.         * No surgery found *     Hospital Course:    Patient directly admitted for elevated LFTs and concern for hepatic artery thrombosis which was ruled out by a CTA. CTA findings with all arteries patent. Patient underwent cholangiogram on 8/25 with findings of "Sheath cholangiogram demonstrates resolution of leak. There has been interval development of anastomotic stricture w sluggish flow. Case d/w Dr. Godwin, decision made to perform cholangioplasty. Cholangioplasty w 8 mm balloon catheter at anastomosis performed. Subsequent cholangiogram demonstrates improved antegrade flow. New 14 int/ext biliary drain placed. No complications.". A transjugular liver biopsy was also done on 8/25 with biopsy results of no evidence of acute cellular rejection. On day of discharge, patient states he feels great and is ready to go home. He verbalizes understanding of being discharged with a biliary drain which will be capped. Orders for skilled nursing to come and change biliary tube dressing every week or sooner if dressing becomes soiled. Patient's transplant coordinator updated on patient's discharge and need to set up an outpatient cholangiogram in 2 weeks. He verbalizes understanding of this plan to repeat a cholangiogram in 2 weeks. He also verbalizes understanding of getting labs done on Monday, 8/28/. He denies any nausea, vomiting, diarrhea, or abdominal pain. Biliary drain in place and is capped. All VSS. Patient agrees to be discharged from the hospital today.       Goals of Care Treatment " Preferences:  Code Status: Full Code      Final Active Diagnoses:    Diagnosis Date Noted POA    PRINCIPAL PROBLEM:  Elevated LFTs [R79.89] 08/24/2022 Yes    Bile leak [K83.9]  Yes    Long-term use of immunosuppressant medication [Z79.899] 06/25/2022 Not Applicable    Type 2 diabetes mellitus with hyperglycemia [E11.65] 06/22/2022 Yes    Prophylactic immunotherapy [Z29.8] 06/21/2022 Not Applicable    S/P liver transplant [Z94.4] 06/21/2022 Not Applicable    At risk for opportunistic infections [Z91.89] 06/21/2022 Yes      Problems Resolved During this Admission:       Consults (From admission, onward)        Status Ordering Provider     Inpatient consult to Interventional Radiology  Once        Provider:  (Not yet assigned)    TESSA Gabriel          Pending Diagnostic Studies:     None        Significant Diagnostic Studies: Labs:   CMP   Recent Labs   Lab 08/25/22  0657 08/26/22  0659    136   K 4.6 4.6    102   CO2 25 24   * 142*   BUN 18 17   CREATININE 1.3 0.9   CALCIUM 9.3 9.1   PROT 6.6 6.7   ALBUMIN 3.4* 3.4*   BILITOT 0.7 1.5*   ALKPHOS 216* 219*   AST 55* 55*   ALT 98* 95*   ANIONGAP 8 10   , CBC   Recent Labs   Lab 08/25/22  0657 08/26/22  0659   WBC 2.84* 2.82*   HGB 10.9* 10.9*   HCT 31.9* 32.6*   * 100*   , INR   Lab Results   Component Value Date    INR 1.0 08/25/2022    INR 1.0 07/12/2022    INR 1.1 06/28/2022    and All labs within the past 24 hours have been reviewed    The patients clinical status was discussed at multidisplinary rounds, involving transplant surgery, transplant medicine, pharmacy, nursing, nutrition, and social work    Discharged Condition: good    Disposition: Home or Self Care    Follow Up:    Patient Instructions:      Ambulatory referral/consult to Home Health   Standing Status: Future   Referral Priority: Routine Referral Type: Home Health Care   Referral Reason: Specialty Services Required   Requested Specialty: Home Health  Services   Number of Visits Requested: 1     Diet Adult Regular     Lifting restrictions   Order Comments: Do not lift anything greater than 10 pounds for 4 weeks     Notify your health care provider if you experience any of the following:  temperature >100.4     Notify your health care provider if you experience any of the following:  persistent nausea and vomiting or diarrhea     Notify your health care provider if you experience any of the following:  severe uncontrolled pain     Notify your health care provider if you experience any of the following:  redness, tenderness, or signs of infection (pain, swelling, redness, odor or green/yellow discharge around incision site)     Notify your health care provider if you experience any of the following:  difficulty breathing or increased cough     Notify your health care provider if you experience any of the following:  severe persistent headache     Notify your health care provider if you experience any of the following:  worsening rash     Notify your health care provider if you experience any of the following:  persistent dizziness, light-headedness, or visual disturbances     Notify your health care provider if you experience any of the following:  increased confusion or weakness     Notify your health care provider if you experience any of the following:   Order Comments: Any other concerning signs or symptoms     Change dressing (specify)   Order Comments: Dressing change: change dressing at least once per week. Change dressing if get soiled or wet.     Medications:  Reconciled Home Medications:      Medication List      CHANGE how you take these medications    tacrolimus 1 MG Cap  Commonly known as: PROGRAF  Take by mouth 2 capsules (2mg) in the AM and 1 capsules (1 mg) in the PM.  What changed:   · how much to take  · how to take this  · when to take this  · additional instructions     ursodioL 250 mg Tab  Commonly known as: ACTIGALL  Take 1 tablet (250 mg  "total) by mouth 2 (two) times a day.  What changed: Another medication with the same name was removed. Continue taking this medication, and follow the directions you see here.        CONTINUE taking these medications    aspirin 81 MG Chew  CHEW 1 tablet (81 mg total) by mouth once daily.     BD ULTRA-FINE KAREN PEN NEEDLE 32 gauge x 5/32" Ndle  Generic drug: pen needle, diabetic  1 each by Misc.(Non-Drug; Combo Route) route 3 (three) times daily.     calcium carbonate-vitamin D3 600 mg-20 mcg (800 unit) Tab  Take 1 tablet by mouth once daily at 6am.     carvediloL 6.25 MG tablet  Commonly known as: COREG  Take 0.5 tablets (3.125 mg total) by mouth 2 (two) times daily with meals.     mirtazapine 15 MG tablet  Commonly known as: REMERON  Take 1 tablet (15 mg total) by mouth nightly.     mycophenolate 250 mg Cap  Commonly known as: CELLCEPT  Take 2 capsules (500 mg total) by mouth 2 (two) times daily.     pantoprazole 40 MG tablet  Commonly known as: PROTONIX  Take 1 tablet (40 mg total) by mouth once daily.     sulfamethoxazole-trimethoprim 400-80mg 400-80 mg per tablet  Commonly known as: BACTRIM,SEPTRA  Take 1 tablet by mouth once daily. Stop: 12/18/22     TRUE METRIX GLUCOSE METER Misc  Generic drug: blood-glucose meter  Use as instructed     TRUE METRIX GLUCOSE TEST STRIP Strp  Generic drug: blood sugar diagnostic  1 each by Misc.(Non-Drug; Combo Route) route 3 (three) times daily.     TRUEPLUS LANCETS 30 gauge Misc  Generic drug: lancets  1 each by Misc.(Non-Drug; Combo Route) route 3 (three) times daily.     valGANciclovir 450 mg Tab  Commonly known as: VALCYTE  Take 1 tablet (450 mg total) by mouth once daily. STOP 9/19/22        STOP taking these medications    amoxicillin-clavulanate 875-125mg 875-125 mg per tablet  Commonly known as: AUGMENTIN     baclofen 10 MG tablet  Commonly known as: LIORESAL     famotidine 20 MG tablet  Commonly known as: PEPCID     insulin aspart U-100 100 unit/mL (3 mL) Inpn " pen  Commonly known as: NovoLOG     losartan 50 MG tablet  Commonly known as: COZAAR     metFORMIN 500 MG tablet  Commonly known as: GLUCOPHAGE     methocarbamoL 500 MG Tab  Commonly known as: ROBAXIN     oxyCODONE 10 mg Tab immediate release tablet  Commonly known as: ROXICODONE     tamsulosin 0.4 mg Cap  Commonly known as: FLOMAX          Time spent caring for patient (Greater than 1/2 spent in direct face-to-face contact): > 30 minutes    Hannah Young NP  Liver Transplant  [facility] - Transplant Stepdown

## 2022-08-26 NOTE — TELEPHONE ENCOUNTER
[facility] Liver Pathology Conference Note    Patient:  Romeo Larson  MRN:   [id number]  YOB: 1980  Date of Transplant:  6/21/22  Native Diagnosis: Primary Liver Malignancy: Cholangiocarcinoma (CH-CA)    Discussion/Plan:    Presenter: Hannah Young NP    Reason for presenting: elevated liver function    Concerns for Pathologists:     Lab Results  WBC (K/uL)   Date Value   08/26/2022 2.82 (L)   08/25/2022 2.84 (L)   08/22/2022 3.59 (L)     PLT (K/uL)   Date Value   08/26/2022 100 (L)   08/25/2022 133 (L)   08/22/2022 168     TACROLIMUS (ng/mL)   Date Value   08/26/2022 10.0   08/25/2022 12.1   08/22/2022 12.7     INR (no units)   Date Value   08/25/2022 1.0   07/12/2022 1.0   06/28/2022 1.1     AST (U/L)   Date Value   08/26/2022 55 (H)     ALT (U/L)   Date Value   08/26/2022 95 (H)   08/25/2022 98 (H)   08/22/2022 147 (H)     BILITOT (mg/dL)   Date Value   08/26/2022 1.5 (H)   08/25/2022 0.7   08/22/2022 1.0     ALKPHOS (U/L)   Date Value   08/26/2022 219 (H)   08/25/2022 216 (H)   08/22/2022 227 (H)     CREATININE (mg/dL)   Date Value   08/26/2022 0.9   08/25/2022 1.3   08/22/2022 1.0     AFP (ng/mL)   Date Value   03/24/2022 3.5     CMVIGGINTERP (no units)   Date Value   03/24/2022 Reactive (A)

## 2022-08-26 NOTE — PROGRESS NOTES
Discharge Note       spoke to patient and his wife, Shell to discuss discharge plans, as well as assess any concerns or needs.     Patient was alert and oriented x 4 and communicative. Patient will discharge to home today with no needs.  Patient's wife will transport patient upon discharge. Patient is coping well with support from family.      Patient reports agreeing with the discharge plan and has no other psychosocial concerns. Patient and caregiver verbalize understanding and are involved in treatment planning and discharge process. Patient nor caregiver had any further concerns or questions at this time.     SW remains available and will continue to follow, providing psychosocial support, education and discharge planning as indicated. SW remains available as needed.

## 2022-08-26 NOTE — PROGRESS NOTES
"Discharge Medication Note:    Hospital Course: Mr. Romeo Larson is a 41 yo s/p living donor liver transplant for cholangiocarcinoma on 6/21/2022. Admitted for concern for hepatic artery thrombosis on US 8/24 AM and mildly elevated LFT. CTA 8/24 PM revealed patent flow. Cholangiogram 8/25 (originally scheduled outpatient) revealed resolution of bile leak, anastomotic stricture s/p cholangioplasty w/ balloon catheter. Liver biopsy 8/25, patho read pending; LFT stable so no empiric intervention. Patient is stable to be discharged.     · Immunos: tacro goal trough 8-10 current dose 2mg + 1mg,  mg BID  · Valcyte (CMV -/+) until 9/19, Bactrim until 12/28      Met with Romeo Larson at discharge to review discharge medications and to update the blue medication card.           Medication List      CHANGE how you take these medications    tacrolimus 1 MG Cap  Commonly known as: PROGRAF  Take by mouth 2 capsules (2mg) in the AM and 1 capsules (1 mg) in the PM.  What changed:   · how much to take  · how to take this  · when to take this  · additional instructions     ursodioL 250 mg Tab  Commonly known as: ACTIGALL  Take 1 tablet (250 mg total) by mouth 2 (two) times a day.  What changed: Another medication with the same name was removed. Continue taking this medication, and follow the directions you see here.        CONTINUE taking these medications    aspirin 81 MG Chew  CHEW 1 tablet (81 mg total) by mouth once daily.     BD ULTRA-FINE KAREN PEN NEEDLE 32 gauge x 5/32" Ndle  Generic drug: pen needle, diabetic  1 each by Misc.(Non-Drug; Combo Route) route 3 (three) times daily.     calcium carbonate-vitamin D3 600 mg-20 mcg (800 unit) Tab  Take 1 tablet by mouth once daily at 6am.     carvediloL 6.25 MG tablet  Commonly known as: COREG  Take 0.5 tablets (3.125 mg total) by mouth 2 (two) times daily with meals.     mirtazapine 15 MG tablet  Commonly known as: REMERON  Take 1 tablet (15 mg total) by mouth nightly.   " "  mycophenolate 250 mg Cap  Commonly known as: CELLCEPT  Take 2 capsules (500 mg total) by mouth 2 (two) times daily.     pantoprazole 40 MG tablet  Commonly known as: PROTONIX  Take 1 tablet (40 mg total) by mouth once daily.     sulfamethoxazole-trimethoprim 400-80mg 400-80 mg per tablet  Commonly known as: BACTRIM,SEPTRA  Take 1 tablet by mouth once daily. Stop: 12/18/22     TRUE METRIX GLUCOSE METER Misc  Generic drug: blood-glucose meter  Use as instructed     TRUE METRIX GLUCOSE TEST STRIP Strp  Generic drug: blood sugar diagnostic  1 each by Misc.(Non-Drug; Combo Route) route 3 (three) times daily.     TRUEPLUS LANCETS 30 gauge Misc  Generic drug: lancets  1 each by Misc.(Non-Drug; Combo Route) route 3 (three) times daily.     valGANciclovir 450 mg Tab  Commonly known as: VALCYTE  Take 1 tablet (450 mg total) by mouth once daily. STOP 9/19/22        STOP taking these medications    amoxicillin-clavulanate 875-125mg 875-125 mg per tablet  Commonly known as: AUGMENTIN     baclofen 10 MG tablet  Commonly known as: LIORESAL     famotidine 20 MG tablet  Commonly known as: PEPCID     insulin aspart U-100 100 unit/mL (3 mL) Inpn pen  Commonly known as: NovoLOG     losartan 50 MG tablet  Commonly known as: COZAAR     metFORMIN 500 MG tablet  Commonly known as: GLUCOPHAGE     methocarbamoL 500 MG Tab  Commonly known as: ROBAXIN     oxyCODONE 10 mg Tab immediate release tablet  Commonly known as: ROXICODONE     tamsulosin 0.4 mg Cap  Commonly known as: FLOMAX           Where to Get Your Medications      These medications were sent to [facility] [address] [address]    Hours: Mon-Fri 7a-7p, Sat-Sun 10a-4p Phone: [phone]   · tacrolimus 1 MG Cap            The following medications have been placed on HOLD and should be restarted in the outpatient setting (when appropriate): juan m Larson verbalized understanding and had the opportunity to ask questions.    Wendy "Karen" " Scot  PGY2 Solid Organ Transplant Pharmacy Resident

## 2022-08-26 NOTE — PROGRESS NOTES
AVS printout provided to pt and reviewed at bedside. Questions encouraged and answered accordingly. Pt verbalized understanding of AVS. R TCP drain clamped. PIV removed. VSS on bedside monitor. Tele removed by pt. Updated blue card obtained by pt. Pt to  prescription meds from pharmacy downstairs. Pt ambulated off the unit with his belongings. No apparent signs of distress were noted.

## 2022-08-29 ENCOUNTER — DOCUMENT SCAN (OUTPATIENT)
Dept: HOME HEALTH SERVICES | Facility: HOSPITAL | Age: 42
End: 2022-08-29
Payer: COMMERCIAL

## 2022-08-29 ENCOUNTER — PATIENT MESSAGE (OUTPATIENT)
Dept: TRANSPLANT | Facility: CLINIC | Age: 42
End: 2022-08-29
Payer: COMMERCIAL

## 2022-08-29 ENCOUNTER — TELEPHONE (OUTPATIENT)
Dept: TRANSPLANT | Facility: CLINIC | Age: 42
End: 2022-08-29
Payer: COMMERCIAL

## 2022-08-29 ENCOUNTER — LAB VISIT (OUTPATIENT)
Dept: LAB | Facility: HOSPITAL | Age: 42
End: 2022-08-29
Attending: SURGERY
Payer: COMMERCIAL

## 2022-08-29 DIAGNOSIS — Z94.4 LIVER REPLACED BY TRANSPLANT: Primary | ICD-10-CM

## 2022-08-29 DIAGNOSIS — Z94.4 LIVER REPLACED BY TRANSPLANT: ICD-10-CM

## 2022-08-29 LAB
ALBUMIN SERPL BCP-MCNC: 3.8 G/DL (ref 3.5–5.2)
ALP SERPL-CCNC: 193 U/L (ref 55–135)
ALT SERPL W/O P-5'-P-CCNC: 57 U/L (ref 10–44)
ANION GAP SERPL CALC-SCNC: 7 MMOL/L (ref 8–16)
AST SERPL-CCNC: 28 U/L (ref 10–40)
BASOPHILS # BLD AUTO: 0.03 K/UL (ref 0–0.2)
BASOPHILS NFR BLD: 0.8 % (ref 0–1.9)
BILIRUB DIRECT SERPL-MCNC: 0.4 MG/DL (ref 0.1–0.3)
BILIRUB SERPL-MCNC: 1 MG/DL (ref 0.1–1)
BUN SERPL-MCNC: 17 MG/DL (ref 6–20)
CALCIUM SERPL-MCNC: 10 MG/DL (ref 8.7–10.5)
CHLORIDE SERPL-SCNC: 103 MMOL/L (ref 95–110)
CO2 SERPL-SCNC: 25 MMOL/L (ref 23–29)
CREAT SERPL-MCNC: 1.1 MG/DL (ref 0.5–1.4)
DIFFERENTIAL METHOD: ABNORMAL
EOSINOPHIL # BLD AUTO: 0 K/UL (ref 0–0.5)
EOSINOPHIL NFR BLD: 0.5 % (ref 0–8)
ERYTHROCYTE [DISTWIDTH] IN BLOOD BY AUTOMATED COUNT: 15.2 % (ref 11.5–14.5)
EST. GFR  (NO RACE VARIABLE): >60 ML/MIN/1.73 M^2
FINAL PATHOLOGIC DIAGNOSIS: NORMAL
GLUCOSE SERPL-MCNC: 123 MG/DL (ref 70–110)
GROSS: NORMAL
HCT VFR BLD AUTO: 33.8 % (ref 40–54)
HGB BLD-MCNC: 11.5 G/DL (ref 14–18)
IMM GRANULOCYTES # BLD AUTO: 0.03 K/UL (ref 0–0.04)
IMM GRANULOCYTES NFR BLD AUTO: 0.8 % (ref 0–0.5)
LYMPHOCYTES # BLD AUTO: 0.6 K/UL (ref 1–4.8)
LYMPHOCYTES NFR BLD: 16.3 % (ref 18–48)
Lab: NORMAL
MCH RBC QN AUTO: 31.7 PG (ref 27–31)
MCHC RBC AUTO-ENTMCNC: 34 G/DL (ref 32–36)
MCV RBC AUTO: 93 FL (ref 82–98)
MONOCYTES # BLD AUTO: 0.3 K/UL (ref 0.3–1)
MONOCYTES NFR BLD: 8.3 % (ref 4–15)
NEUTROPHILS # BLD AUTO: 2.8 K/UL (ref 1.8–7.7)
NEUTROPHILS NFR BLD: 73.3 % (ref 38–73)
NRBC BLD-RTO: 0 /100 WBC
PLATELET # BLD AUTO: 139 K/UL (ref 150–450)
PMV BLD AUTO: 9.1 FL (ref 9.2–12.9)
POTASSIUM SERPL-SCNC: 4.8 MMOL/L (ref 3.5–5.1)
PROT SERPL-MCNC: 7.4 G/DL (ref 6–8.4)
RBC # BLD AUTO: 3.63 M/UL (ref 4.6–6.2)
SODIUM SERPL-SCNC: 135 MMOL/L (ref 136–145)
SUPPLEMENTAL DIAGNOSIS: NORMAL
TACROLIMUS BLD-MCNC: 7.3 NG/ML (ref 5–15)
WBC # BLD AUTO: 3.86 K/UL (ref 3.9–12.7)

## 2022-08-29 PROCEDURE — 36415 COLL VENOUS BLD VENIPUNCTURE: CPT | Performed by: SURGERY

## 2022-08-29 PROCEDURE — 82248 BILIRUBIN DIRECT: CPT | Performed by: SURGERY

## 2022-08-29 PROCEDURE — 80053 COMPREHEN METABOLIC PANEL: CPT | Performed by: SURGERY

## 2022-08-29 PROCEDURE — 80197 ASSAY OF TACROLIMUS: CPT | Performed by: SURGERY

## 2022-08-29 PROCEDURE — 85025 COMPLETE CBC W/AUTO DIFF WBC: CPT | Performed by: SURGERY

## 2022-08-29 NOTE — TELEPHONE ENCOUNTER
Patient notified and instructed via [facility] as per KRISTINA Nino:    Your labs have been reviewed by ; results ok, no medicine changes made. Repeat labs due 9/6/22.

## 2022-09-01 ENCOUNTER — CONFERENCE (OUTPATIENT)
Dept: TRANSPLANT | Facility: CLINIC | Age: 42
End: 2022-09-01
Payer: COMMERCIAL

## 2022-09-01 NOTE — TELEPHONE ENCOUNTER
Pathology Conference 9/1/22    Biopsy from 8/25/22 reviewed  No ACR  Steatohepatitis with min steatosis   Ductular reaction, cholangitis - possibly from cholangiogram with tube exchange   Resulted

## 2022-09-02 ENCOUNTER — PATIENT MESSAGE (OUTPATIENT)
Dept: TRANSPLANT | Facility: CLINIC | Age: 42
End: 2022-09-02
Payer: COMMERCIAL

## 2022-09-06 ENCOUNTER — LAB VISIT (OUTPATIENT)
Dept: LAB | Facility: HOSPITAL | Age: 42
End: 2022-09-06
Attending: SURGERY
Payer: COMMERCIAL

## 2022-09-06 DIAGNOSIS — Z94.4 LIVER REPLACED BY TRANSPLANT: ICD-10-CM

## 2022-09-06 LAB
ALBUMIN SERPL BCP-MCNC: 3.8 G/DL (ref 3.5–5.2)
ALP SERPL-CCNC: 163 U/L (ref 55–135)
ALT SERPL W/O P-5'-P-CCNC: 37 U/L (ref 10–44)
ANION GAP SERPL CALC-SCNC: 5 MMOL/L (ref 8–16)
AST SERPL-CCNC: 29 U/L (ref 10–40)
BASOPHILS # BLD AUTO: 0.03 K/UL (ref 0–0.2)
BASOPHILS NFR BLD: 0.8 % (ref 0–1.9)
BILIRUB DIRECT SERPL-MCNC: 0.3 MG/DL (ref 0.1–0.3)
BILIRUB SERPL-MCNC: 0.7 MG/DL (ref 0.1–1)
BUN SERPL-MCNC: 18 MG/DL (ref 6–20)
CALCIUM SERPL-MCNC: 9.8 MG/DL (ref 8.7–10.5)
CHLORIDE SERPL-SCNC: 102 MMOL/L (ref 95–110)
CO2 SERPL-SCNC: 26 MMOL/L (ref 23–29)
CREAT SERPL-MCNC: 1.1 MG/DL (ref 0.5–1.4)
DIFFERENTIAL METHOD: ABNORMAL
EOSINOPHIL # BLD AUTO: 0 K/UL (ref 0–0.5)
EOSINOPHIL NFR BLD: 0.8 % (ref 0–8)
ERYTHROCYTE [DISTWIDTH] IN BLOOD BY AUTOMATED COUNT: 15.2 % (ref 11.5–14.5)
EST. GFR  (NO RACE VARIABLE): >60 ML/MIN/1.73 M^2
GLUCOSE SERPL-MCNC: 113 MG/DL (ref 70–110)
HCT VFR BLD AUTO: 33.6 % (ref 40–54)
HGB BLD-MCNC: 11.3 G/DL (ref 14–18)
IMM GRANULOCYTES # BLD AUTO: 0.01 K/UL (ref 0–0.04)
IMM GRANULOCYTES NFR BLD AUTO: 0.3 % (ref 0–0.5)
LYMPHOCYTES # BLD AUTO: 0.8 K/UL (ref 1–4.8)
LYMPHOCYTES NFR BLD: 20.7 % (ref 18–48)
MCH RBC QN AUTO: 31.2 PG (ref 27–31)
MCHC RBC AUTO-ENTMCNC: 33.6 G/DL (ref 32–36)
MCV RBC AUTO: 93 FL (ref 82–98)
MONOCYTES # BLD AUTO: 0.3 K/UL (ref 0.3–1)
MONOCYTES NFR BLD: 8.3 % (ref 4–15)
NEUTROPHILS # BLD AUTO: 2.5 K/UL (ref 1.8–7.7)
NEUTROPHILS NFR BLD: 69.1 % (ref 38–73)
NRBC BLD-RTO: 0 /100 WBC
PLATELET # BLD AUTO: 168 K/UL (ref 150–450)
PMV BLD AUTO: 8.6 FL (ref 9.2–12.9)
POTASSIUM SERPL-SCNC: 4.4 MMOL/L (ref 3.5–5.1)
PROT SERPL-MCNC: 6.8 G/DL (ref 6–8.4)
RBC # BLD AUTO: 3.62 M/UL (ref 4.6–6.2)
SODIUM SERPL-SCNC: 133 MMOL/L (ref 136–145)
WBC # BLD AUTO: 3.63 K/UL (ref 3.9–12.7)

## 2022-09-06 PROCEDURE — 80053 COMPREHEN METABOLIC PANEL: CPT | Performed by: SURGERY

## 2022-09-06 PROCEDURE — 85025 COMPLETE CBC W/AUTO DIFF WBC: CPT | Performed by: SURGERY

## 2022-09-06 PROCEDURE — 82248 BILIRUBIN DIRECT: CPT | Performed by: SURGERY

## 2022-09-06 PROCEDURE — 80197 ASSAY OF TACROLIMUS: CPT | Performed by: SURGERY

## 2022-09-06 PROCEDURE — 36415 COLL VENOUS BLD VENIPUNCTURE: CPT | Mod: PO | Performed by: SURGERY

## 2022-09-07 ENCOUNTER — TELEPHONE (OUTPATIENT)
Dept: TRANSPLANT | Facility: CLINIC | Age: 42
End: 2022-09-07
Payer: COMMERCIAL

## 2022-09-07 ENCOUNTER — DOCUMENT SCAN (OUTPATIENT)
Dept: HOME HEALTH SERVICES | Facility: HOSPITAL | Age: 42
End: 2022-09-07
Payer: COMMERCIAL

## 2022-09-07 ENCOUNTER — PATIENT MESSAGE (OUTPATIENT)
Dept: TRANSPLANT | Facility: CLINIC | Age: 42
End: 2022-09-07
Payer: COMMERCIAL

## 2022-09-07 DIAGNOSIS — Z94.4 LIVER REPLACED BY TRANSPLANT: Primary | ICD-10-CM

## 2022-09-07 LAB — TACROLIMUS BLD-MCNC: 7.9 NG/ML (ref 5–15)

## 2022-09-07 NOTE — TELEPHONE ENCOUNTER
Patient notified and instructed via [facility]:    Your labs have been reviewed by : results were ok. No medicine changes made, repeat labs due on Monday 9/12/22. Thanks.

## 2022-09-07 NOTE — TELEPHONE ENCOUNTER
Patient notified and instructed via [facility]:    Your labs have been reviewed by : results were ok. No medicine changes made, repeat labs due on Monday 9/12/22. Thanks.

## 2022-09-07 NOTE — TELEPHONE ENCOUNTER
----- Message from Sinan Cline MD sent at 9/7/2022  2:21 PM CDT -----  Results ok. No action needed

## 2022-09-08 ENCOUNTER — TELEPHONE (OUTPATIENT)
Dept: INTERVENTIONAL RADIOLOGY/VASCULAR | Facility: CLINIC | Age: 42
End: 2022-09-08
Payer: COMMERCIAL

## 2022-09-13 ENCOUNTER — TELEPHONE (OUTPATIENT)
Dept: TRANSPLANT | Facility: CLINIC | Age: 42
End: 2022-09-13
Payer: COMMERCIAL

## 2022-09-13 ENCOUNTER — PATIENT MESSAGE (OUTPATIENT)
Dept: TRANSPLANT | Facility: CLINIC | Age: 42
End: 2022-09-13
Payer: COMMERCIAL

## 2022-09-13 NOTE — TELEPHONE ENCOUNTER
Pt advised via portal of elevated but stable liver tests and that no medication changes are needed. Per MD recommendation, requested pt repeat labs Monday 9/19/22 prior to cholangiogram.  ----- Message from Sinan Cline MD sent at 9/13/2022  1:28 PM CDT -----  Results ok. No action needed

## 2022-09-14 ENCOUNTER — NURSE TRIAGE (OUTPATIENT)
Dept: ADMINISTRATIVE | Facility: CLINIC | Age: 42
End: 2022-09-14
Payer: COMMERCIAL

## 2022-09-14 ENCOUNTER — HOSPITAL ENCOUNTER (INPATIENT)
Facility: HOSPITAL | Age: 42
LOS: 3 days | Discharge: HOME | DRG: 441 | End: 2022-09-17
Attending: EMERGENCY MEDICINE | Admitting: SURGERY
Payer: COMMERCIAL

## 2022-09-14 DIAGNOSIS — R50.9 FEVER, UNKNOWN ORIGIN: ICD-10-CM

## 2022-09-14 DIAGNOSIS — R17 ELEVATED BILIRUBIN: ICD-10-CM

## 2022-09-14 DIAGNOSIS — T86.49 CHOLANGITIS OF TRANSPLANTED LIVER: Primary | ICD-10-CM

## 2022-09-14 DIAGNOSIS — I10 HYPERTENSION, UNSPECIFIED TYPE: ICD-10-CM

## 2022-09-14 DIAGNOSIS — R00.0 TACHYCARDIA: ICD-10-CM

## 2022-09-14 DIAGNOSIS — D61.818 PANCYTOPENIA: ICD-10-CM

## 2022-09-14 DIAGNOSIS — K83.09 CHOLANGITIS OF TRANSPLANTED LIVER: Primary | ICD-10-CM

## 2022-09-14 DIAGNOSIS — Z79.60 LONG-TERM USE OF IMMUNOSUPPRESSANT MEDICATION: ICD-10-CM

## 2022-09-14 DIAGNOSIS — Z91.89 AT RISK FOR OPPORTUNISTIC INFECTIONS: ICD-10-CM

## 2022-09-14 DIAGNOSIS — R74.8 ELEVATED LIVER ENZYMES: ICD-10-CM

## 2022-09-14 DIAGNOSIS — Z29.89 PROPHYLACTIC IMMUNOTHERAPY: ICD-10-CM

## 2022-09-14 DIAGNOSIS — R50.9 FEVER: ICD-10-CM

## 2022-09-14 DIAGNOSIS — Z94.4 S/P LIVER TRANSPLANT: ICD-10-CM

## 2022-09-14 LAB
ALBUMIN SERPL BCP-MCNC: 3.7 G/DL (ref 3.5–5.2)
ALP SERPL-CCNC: 177 U/L (ref 55–135)
ALT SERPL W/O P-5'-P-CCNC: 58 U/L (ref 10–44)
ANION GAP SERPL CALC-SCNC: 7 MMOL/L (ref 8–16)
AST SERPL-CCNC: 34 U/L (ref 10–40)
BASOPHILS # BLD AUTO: 0.01 K/UL (ref 0–0.2)
BASOPHILS NFR BLD: 0.3 % (ref 0–1.9)
BILIRUB SERPL-MCNC: 1.7 MG/DL (ref 0.1–1)
BILIRUB UR QL STRIP: NEGATIVE
BUN SERPL-MCNC: 15 MG/DL (ref 6–20)
CALCIUM SERPL-MCNC: 9.7 MG/DL (ref 8.7–10.5)
CHLORIDE SERPL-SCNC: 101 MMOL/L (ref 95–110)
CLARITY UR REFRACT.AUTO: CLEAR
CO2 SERPL-SCNC: 23 MMOL/L (ref 23–29)
COLOR UR AUTO: COLORLESS
CREAT SERPL-MCNC: 1.2 MG/DL (ref 0.5–1.4)
DIFFERENTIAL METHOD: ABNORMAL
EOSINOPHIL # BLD AUTO: 0 K/UL (ref 0–0.5)
EOSINOPHIL NFR BLD: 0 % (ref 0–8)
ERYTHROCYTE [DISTWIDTH] IN BLOOD BY AUTOMATED COUNT: 15.1 % (ref 11.5–14.5)
EST. GFR  (NO RACE VARIABLE): >60 ML/MIN/1.73 M^2
GLUCOSE SERPL-MCNC: 164 MG/DL (ref 70–110)
GLUCOSE UR QL STRIP: NEGATIVE
HCT VFR BLD AUTO: 31.6 % (ref 40–54)
HGB BLD-MCNC: 10.7 G/DL (ref 14–18)
HGB UR QL STRIP: NEGATIVE
IMM GRANULOCYTES # BLD AUTO: 0.02 K/UL (ref 0–0.04)
IMM GRANULOCYTES NFR BLD AUTO: 0.6 % (ref 0–0.5)
KETONES UR QL STRIP: NEGATIVE
LACTATE SERPL-SCNC: 1 MMOL/L (ref 0.5–2.2)
LEUKOCYTE ESTERASE UR QL STRIP: NEGATIVE
LYMPHOCYTES # BLD AUTO: 0.3 K/UL (ref 1–4.8)
LYMPHOCYTES NFR BLD: 9.2 % (ref 18–48)
MCH RBC QN AUTO: 31 PG (ref 27–31)
MCHC RBC AUTO-ENTMCNC: 33.9 G/DL (ref 32–36)
MCV RBC AUTO: 92 FL (ref 82–98)
MONOCYTES # BLD AUTO: 0.2 K/UL (ref 0.3–1)
MONOCYTES NFR BLD: 6 % (ref 4–15)
NEUTROPHILS # BLD AUTO: 2.9 K/UL (ref 1.8–7.7)
NEUTROPHILS NFR BLD: 83.9 % (ref 38–73)
NITRITE UR QL STRIP: NEGATIVE
NRBC BLD-RTO: 0 /100 WBC
PH UR STRIP: 6 [PH] (ref 5–8)
PLATELET # BLD AUTO: 90 K/UL (ref 150–450)
PMV BLD AUTO: 9 FL (ref 9.2–12.9)
POTASSIUM SERPL-SCNC: 4.1 MMOL/L (ref 3.5–5.1)
PROCALCITONIN SERPL IA-MCNC: 0.17 NG/ML
PROT SERPL-MCNC: 6.7 G/DL (ref 6–8.4)
PROT UR QL STRIP: NEGATIVE
RBC # BLD AUTO: 3.45 M/UL (ref 4.6–6.2)
SODIUM SERPL-SCNC: 131 MMOL/L (ref 136–145)
SP GR UR STRIP: 1.01 (ref 1–1.03)
URN SPEC COLLECT METH UR: ABNORMAL
WBC # BLD AUTO: 3.48 K/UL (ref 3.9–12.7)

## 2022-09-14 PROCEDURE — 81003 URINALYSIS AUTO W/O SCOPE: CPT | Performed by: EMERGENCY MEDICINE

## 2022-09-14 PROCEDURE — 93005 ELECTROCARDIOGRAM TRACING: CPT

## 2022-09-14 PROCEDURE — 99285 EMERGENCY DEPT VISIT HI MDM: CPT | Mod: 25

## 2022-09-14 PROCEDURE — 96365 THER/PROPH/DIAG IV INF INIT: CPT

## 2022-09-14 PROCEDURE — 84145 PROCALCITONIN (PCT): CPT | Performed by: EMERGENCY MEDICINE

## 2022-09-14 PROCEDURE — 99285 PR EMERGENCY DEPT VISIT,LEVEL V: ICD-10-PCS | Mod: CS,,, | Performed by: EMERGENCY MEDICINE

## 2022-09-14 PROCEDURE — 96361 HYDRATE IV INFUSION ADD-ON: CPT | Mod: 59

## 2022-09-14 PROCEDURE — 83605 ASSAY OF LACTIC ACID: CPT | Performed by: EMERGENCY MEDICINE

## 2022-09-14 PROCEDURE — 63600175 PHARM REV CODE 636 W HCPCS: Performed by: EMERGENCY MEDICINE

## 2022-09-14 PROCEDURE — 25000003 PHARM REV CODE 250

## 2022-09-14 PROCEDURE — 63600175 PHARM REV CODE 636 W HCPCS

## 2022-09-14 PROCEDURE — 94761 N-INVAS EAR/PLS OXIMETRY MLT: CPT

## 2022-09-14 PROCEDURE — 80053 COMPREHEN METABOLIC PANEL: CPT | Performed by: EMERGENCY MEDICINE

## 2022-09-14 PROCEDURE — 96375 TX/PRO/DX INJ NEW DRUG ADDON: CPT | Mod: 59

## 2022-09-14 PROCEDURE — 93010 ELECTROCARDIOGRAM REPORT: CPT | Mod: ,,, | Performed by: INTERNAL MEDICINE

## 2022-09-14 PROCEDURE — 85025 COMPLETE CBC W/AUTO DIFF WBC: CPT | Performed by: EMERGENCY MEDICINE

## 2022-09-14 PROCEDURE — 87040 BLOOD CULTURE FOR BACTERIA: CPT | Performed by: EMERGENCY MEDICINE

## 2022-09-14 PROCEDURE — 12000002 HC ACUTE/MED SURGE SEMI-PRIVATE ROOM

## 2022-09-14 PROCEDURE — 93010 EKG 12-LEAD: ICD-10-PCS | Mod: ,,, | Performed by: INTERNAL MEDICINE

## 2022-09-14 PROCEDURE — 99285 EMERGENCY DEPT VISIT HI MDM: CPT | Mod: CS,,, | Performed by: EMERGENCY MEDICINE

## 2022-09-14 RX ORDER — TACROLIMUS 1 MG/1
2 CAPSULE ORAL EVERY MORNING
Status: DISCONTINUED | OUTPATIENT
Start: 2022-09-15 | End: 2022-09-16

## 2022-09-14 RX ORDER — URSODIOL 250 MG/1
250 TABLET, FILM COATED ORAL 2 TIMES DAILY
Status: DISCONTINUED | OUTPATIENT
Start: 2022-09-15 | End: 2022-09-17 | Stop reason: HOSPADM

## 2022-09-14 RX ORDER — ONDANSETRON 8 MG/1
8 TABLET, ORALLY DISINTEGRATING ORAL EVERY 8 HOURS PRN
Status: DISCONTINUED | OUTPATIENT
Start: 2022-09-15 | End: 2022-09-17 | Stop reason: HOSPADM

## 2022-09-14 RX ORDER — PREDNISONE 20 MG/1
20 TABLET ORAL ONCE
Status: DISCONTINUED | OUTPATIENT
Start: 2022-09-15 | End: 2022-09-15

## 2022-09-14 RX ORDER — FAMOTIDINE 20 MG/1
20 TABLET, FILM COATED ORAL NIGHTLY
Status: DISCONTINUED | OUTPATIENT
Start: 2022-09-15 | End: 2022-09-15

## 2022-09-14 RX ORDER — CETIRIZINE HYDROCHLORIDE 10 MG/1
10 TABLET ORAL ONCE
Status: DISCONTINUED | OUTPATIENT
Start: 2022-09-15 | End: 2022-09-17 | Stop reason: HOSPADM

## 2022-09-14 RX ORDER — SODIUM CHLORIDE 0.9 % (FLUSH) 0.9 %
10 SYRINGE (ML) INJECTION
Status: DISCONTINUED | OUTPATIENT
Start: 2022-09-15 | End: 2022-09-17 | Stop reason: HOSPADM

## 2022-09-14 RX ORDER — CARVEDILOL 3.12 MG/1
3.12 TABLET ORAL 2 TIMES DAILY WITH MEALS
Status: DISCONTINUED | OUTPATIENT
Start: 2022-09-14 | End: 2022-09-17 | Stop reason: HOSPADM

## 2022-09-14 RX ORDER — VALGANCICLOVIR 450 MG/1
450 TABLET, FILM COATED ORAL DAILY
Status: DISCONTINUED | OUTPATIENT
Start: 2022-09-15 | End: 2022-09-17 | Stop reason: HOSPADM

## 2022-09-14 RX ORDER — SULFAMETHOXAZOLE AND TRIMETHOPRIM 400; 80 MG/1; MG/1
1 TABLET ORAL DAILY
Status: DISCONTINUED | OUTPATIENT
Start: 2022-09-15 | End: 2022-09-17 | Stop reason: HOSPADM

## 2022-09-14 RX ORDER — VANCOMYCIN HCL IN 5 % DEXTROSE 1G/250ML
1000 PLASTIC BAG, INJECTION (ML) INTRAVENOUS
Status: CANCELLED | OUTPATIENT
Start: 2022-09-14 | End: 2022-09-14

## 2022-09-14 RX ORDER — HEPARIN SODIUM 5000 [USP'U]/ML
5000 INJECTION, SOLUTION INTRAVENOUS; SUBCUTANEOUS EVERY 8 HOURS
Status: DISCONTINUED | OUTPATIENT
Start: 2022-09-15 | End: 2022-09-17 | Stop reason: HOSPADM

## 2022-09-14 RX ORDER — TACROLIMUS 1 MG/1
1 CAPSULE ORAL EVERY EVENING
Status: DISCONTINUED | OUTPATIENT
Start: 2022-09-15 | End: 2022-09-16

## 2022-09-14 RX ORDER — TALC
6 POWDER (GRAM) TOPICAL NIGHTLY PRN
Status: DISCONTINUED | OUTPATIENT
Start: 2022-09-15 | End: 2022-09-17 | Stop reason: HOSPADM

## 2022-09-14 RX ORDER — ACETAMINOPHEN 325 MG/1
650 TABLET ORAL EVERY 8 HOURS PRN
Status: DISCONTINUED | OUTPATIENT
Start: 2022-09-15 | End: 2022-09-17 | Stop reason: HOSPADM

## 2022-09-14 RX ORDER — MIRTAZAPINE 15 MG/1
15 TABLET, FILM COATED ORAL NIGHTLY
Status: DISCONTINUED | OUTPATIENT
Start: 2022-09-15 | End: 2022-09-17 | Stop reason: HOSPADM

## 2022-09-14 RX ORDER — MYCOPHENOLATE MOFETIL 250 MG/1
500 CAPSULE ORAL 2 TIMES DAILY
Status: DISCONTINUED | OUTPATIENT
Start: 2022-09-15 | End: 2022-09-15

## 2022-09-14 RX ORDER — MORPHINE SULFATE 4 MG/ML
4 INJECTION, SOLUTION INTRAMUSCULAR; INTRAVENOUS
Status: COMPLETED | OUTPATIENT
Start: 2022-09-14 | End: 2022-09-14

## 2022-09-14 RX ADMIN — MORPHINE SULFATE 4 MG: 4 INJECTION INTRAVENOUS at 09:09

## 2022-09-14 RX ADMIN — PIPERACILLIN SODIUM AND TAZOBACTAM SODIUM 4.5 G: 4; .5 INJECTION, POWDER, LYOPHILIZED, FOR SOLUTION INTRAVENOUS at 11:09

## 2022-09-14 RX ADMIN — VANCOMYCIN HYDROCHLORIDE 1750 MG: 500 INJECTION, POWDER, LYOPHILIZED, FOR SOLUTION INTRAVENOUS at 11:09

## 2022-09-14 RX ADMIN — SODIUM CHLORIDE, SODIUM LACTATE, POTASSIUM CHLORIDE, AND CALCIUM CHLORIDE 2217 ML: .6; .31; .03; .02 INJECTION, SOLUTION INTRAVENOUS at 09:09

## 2022-09-14 NOTE — TELEPHONE ENCOUNTER
Spoke with patient states he is a liver transplant patient. Current temp 101.0.  States fever started today.  Advised nearest ER for evaluation.  Care coordination information given.  Patient states he prefers to go to [facility] for evaluation.    On call provider Dr. Viveros notified that patient in route to [facility] per patient's request.   Reason for Disposition   Patient sounds very sick or weak to the triager  (Exception: mild weakness and hasn't taken fever medicine)    Additional Information   Negative: Shock suspected (e.g., cold/pale/clammy skin, too weak to stand, low BP, rapid pulse)   Negative: Difficult to awaken or acting confused (e.g., disoriented, slurred speech)   Negative: [1] Difficulty breathing AND [2] bluish lips, tongue or face   Negative: New-onset rash with multiple purple (or blood-colored) spots or dots   Negative: Sounds like a life-threatening emergency to the triager   Negative: [1] Headache AND [2] stiff neck (can't touch chin to chest)   Negative: Difficulty breathing   Negative: IV Drug Use (IVDU)   Negative: [1] Drinking very little AND [2] dehydration suspected (e.g., no urine > 12 hours, very dry mouth, very lightheaded)    Protocols used: Fever-A-AH

## 2022-09-15 PROBLEM — R74.8 ELEVATED LIVER ENZYMES: Status: RESOLVED | Noted: 2022-04-26 | Resolved: 2022-09-15

## 2022-09-15 PROBLEM — T86.49 CHOLANGITIS OF TRANSPLANTED LIVER: Status: ACTIVE | Noted: 2022-09-15

## 2022-09-15 PROBLEM — K83.09 CHOLANGITIS OF TRANSPLANTED LIVER: Status: ACTIVE | Noted: 2022-09-15

## 2022-09-15 PROBLEM — R50.9 FEVER, UNKNOWN ORIGIN: Status: RESOLVED | Noted: 2022-04-26 | Resolved: 2022-09-15

## 2022-09-15 PROBLEM — R17 ELEVATED BILIRUBIN: Status: ACTIVE | Noted: 2022-09-15

## 2022-09-15 PROBLEM — D61.818 PANCYTOPENIA: Status: ACTIVE | Noted: 2022-09-15

## 2022-09-15 LAB
ALBUMIN SERPL BCP-MCNC: 3.6 G/DL (ref 3.5–5.2)
ALP SERPL-CCNC: 165 U/L (ref 55–135)
ALT SERPL W/O P-5'-P-CCNC: 57 U/L (ref 10–44)
ANION GAP SERPL CALC-SCNC: 8 MMOL/L (ref 8–16)
AST SERPL-CCNC: 34 U/L (ref 10–40)
BASOPHILS # BLD AUTO: 0.01 K/UL (ref 0–0.2)
BASOPHILS NFR BLD: 0.3 % (ref 0–1.9)
BILIRUB SERPL-MCNC: 1.7 MG/DL (ref 0.1–1)
BUN SERPL-MCNC: 12 MG/DL (ref 6–20)
CALCIUM SERPL-MCNC: 9.6 MG/DL (ref 8.7–10.5)
CHLORIDE SERPL-SCNC: 100 MMOL/L (ref 95–110)
CO2 SERPL-SCNC: 23 MMOL/L (ref 23–29)
CREAT SERPL-MCNC: 1.2 MG/DL (ref 0.5–1.4)
DIFFERENTIAL METHOD: ABNORMAL
EOSINOPHIL # BLD AUTO: 0 K/UL (ref 0–0.5)
EOSINOPHIL NFR BLD: 0 % (ref 0–8)
ERYTHROCYTE [DISTWIDTH] IN BLOOD BY AUTOMATED COUNT: 14.7 % (ref 11.5–14.5)
EST. GFR  (NO RACE VARIABLE): >60 ML/MIN/1.73 M^2
GLUCOSE SERPL-MCNC: 209 MG/DL (ref 70–110)
HCT VFR BLD AUTO: 31.3 % (ref 40–54)
HGB BLD-MCNC: 10.9 G/DL (ref 14–18)
IMM GRANULOCYTES # BLD AUTO: 0.01 K/UL (ref 0–0.04)
IMM GRANULOCYTES NFR BLD AUTO: 0.3 % (ref 0–0.5)
LACTATE SERPL-SCNC: 0.9 MMOL/L (ref 0.5–2.2)
LYMPHOCYTES # BLD AUTO: 0.2 K/UL (ref 1–4.8)
LYMPHOCYTES NFR BLD: 7.5 % (ref 18–48)
MAGNESIUM SERPL-MCNC: 1.2 MG/DL (ref 1.6–2.6)
MCH RBC QN AUTO: 30.5 PG (ref 27–31)
MCHC RBC AUTO-ENTMCNC: 34.8 G/DL (ref 32–36)
MCV RBC AUTO: 88 FL (ref 82–98)
MONOCYTES # BLD AUTO: 0.1 K/UL (ref 0.3–1)
MONOCYTES NFR BLD: 2.6 % (ref 4–15)
NEUTROPHILS # BLD AUTO: 2.7 K/UL (ref 1.8–7.7)
NEUTROPHILS NFR BLD: 89.3 % (ref 38–73)
NRBC BLD-RTO: 0 /100 WBC
PHOSPHATE SERPL-MCNC: 2.9 MG/DL (ref 2.7–4.5)
PLATELET # BLD AUTO: 74 K/UL (ref 150–450)
PMV BLD AUTO: 8.6 FL (ref 9.2–12.9)
POTASSIUM SERPL-SCNC: 4.4 MMOL/L (ref 3.5–5.1)
PROT SERPL-MCNC: 6.7 G/DL (ref 6–8.4)
RBC # BLD AUTO: 3.57 M/UL (ref 4.6–6.2)
SARS-COV-2 RDRP RESP QL NAA+PROBE: NEGATIVE
SODIUM SERPL-SCNC: 131 MMOL/L (ref 136–145)
TACROLIMUS BLD-MCNC: 9.7 NG/ML (ref 5–15)
WBC # BLD AUTO: 3.07 K/UL (ref 3.9–12.7)

## 2022-09-15 PROCEDURE — 25000003 PHARM REV CODE 250: Performed by: CLINICAL NURSE SPECIALIST

## 2022-09-15 PROCEDURE — 83605 ASSAY OF LACTIC ACID: CPT | Performed by: EMERGENCY MEDICINE

## 2022-09-15 PROCEDURE — 99223 1ST HOSP IP/OBS HIGH 75: CPT | Mod: ,,, | Performed by: INTERNAL MEDICINE

## 2022-09-15 PROCEDURE — 83735 ASSAY OF MAGNESIUM: CPT

## 2022-09-15 PROCEDURE — 63600175 PHARM REV CODE 636 W HCPCS

## 2022-09-15 PROCEDURE — 25000003 PHARM REV CODE 250

## 2022-09-15 PROCEDURE — 99223 PR INITIAL HOSPITAL CARE,LEVL III: ICD-10-PCS | Mod: ,,, | Performed by: INTERNAL MEDICINE

## 2022-09-15 PROCEDURE — 63600175 PHARM REV CODE 636 W HCPCS: Performed by: CLINICAL NURSE SPECIALIST

## 2022-09-15 PROCEDURE — 63600175 PHARM REV CODE 636 W HCPCS: Performed by: INTERNAL MEDICINE

## 2022-09-15 PROCEDURE — 63600175 PHARM REV CODE 636 W HCPCS: Performed by: SURGERY

## 2022-09-15 PROCEDURE — 63600175 PHARM REV CODE 636 W HCPCS: Performed by: EMERGENCY MEDICINE

## 2022-09-15 PROCEDURE — 99233 SBSQ HOSP IP/OBS HIGH 50: CPT | Mod: 24,,, | Performed by: CLINICAL NURSE SPECIALIST

## 2022-09-15 PROCEDURE — 63600175 PHARM REV CODE 636 W HCPCS: Performed by: RADIOLOGY

## 2022-09-15 PROCEDURE — 99223 1ST HOSP IP/OBS HIGH 75: CPT | Mod: 24,,,

## 2022-09-15 PROCEDURE — 20600001 HC STEP DOWN PRIVATE ROOM

## 2022-09-15 PROCEDURE — 84100 ASSAY OF PHOSPHORUS: CPT

## 2022-09-15 PROCEDURE — U0002 COVID-19 LAB TEST NON-CDC: HCPCS | Performed by: EMERGENCY MEDICINE

## 2022-09-15 PROCEDURE — 85025 COMPLETE CBC W/AUTO DIFF WBC: CPT

## 2022-09-15 PROCEDURE — 99223 PR INITIAL HOSPITAL CARE,LEVL III: ICD-10-PCS | Mod: 24,,,

## 2022-09-15 PROCEDURE — 25500020 PHARM REV CODE 255: Performed by: SURGERY

## 2022-09-15 PROCEDURE — 80053 COMPREHEN METABOLIC PANEL: CPT

## 2022-09-15 PROCEDURE — 80197 ASSAY OF TACROLIMUS: CPT

## 2022-09-15 PROCEDURE — 99233 PR SUBSEQUENT HOSPITAL CARE,LEVL III: ICD-10-PCS | Mod: 24,,, | Performed by: CLINICAL NURSE SPECIALIST

## 2022-09-15 RX ORDER — VANCOMYCIN HCL IN 5 % DEXTROSE 1G/250ML
15 PLASTIC BAG, INJECTION (ML) INTRAVENOUS
Status: DISCONTINUED | OUTPATIENT
Start: 2022-09-15 | End: 2022-09-16

## 2022-09-15 RX ORDER — CEFEPIME HYDROCHLORIDE 1 G/50ML
2 INJECTION, SOLUTION INTRAVENOUS
Status: DISCONTINUED | OUTPATIENT
Start: 2022-09-15 | End: 2022-09-16

## 2022-09-15 RX ORDER — FENTANYL CITRATE 50 UG/ML
INJECTION, SOLUTION INTRAMUSCULAR; INTRAVENOUS
Status: DISCONTINUED | OUTPATIENT
Start: 2022-09-15 | End: 2022-09-17 | Stop reason: HOSPADM

## 2022-09-15 RX ORDER — MAGNESIUM SULFATE HEPTAHYDRATE 40 MG/ML
2 INJECTION, SOLUTION INTRAVENOUS
Status: COMPLETED | OUTPATIENT
Start: 2022-09-15 | End: 2022-09-15

## 2022-09-15 RX ORDER — PANTOPRAZOLE SODIUM 40 MG/1
40 TABLET, DELAYED RELEASE ORAL DAILY
Status: DISCONTINUED | OUTPATIENT
Start: 2022-09-15 | End: 2022-09-17 | Stop reason: HOSPADM

## 2022-09-15 RX ORDER — CEFEPIME HYDROCHLORIDE 1 G/50ML
1 INJECTION, SOLUTION INTRAVENOUS
Status: DISCONTINUED | OUTPATIENT
Start: 2022-09-15 | End: 2022-09-15

## 2022-09-15 RX ORDER — PREDNISONE 20 MG/1
60 TABLET ORAL
Status: COMPLETED | OUTPATIENT
Start: 2022-09-15 | End: 2022-09-15

## 2022-09-15 RX ORDER — VANCOMYCIN HCL IN 5 % DEXTROSE 1G/250ML
15 PLASTIC BAG, INJECTION (ML) INTRAVENOUS ONCE
Status: COMPLETED | OUTPATIENT
Start: 2022-09-15 | End: 2022-09-15

## 2022-09-15 RX ADMIN — URSODIOL 250 MG: 250 TABLET ORAL at 01:09

## 2022-09-15 RX ADMIN — VANCOMYCIN HYDROCHLORIDE 1000 MG: 1 INJECTION, POWDER, LYOPHILIZED, FOR SOLUTION INTRAVENOUS at 10:09

## 2022-09-15 RX ADMIN — ACETAMINOPHEN 650 MG: 325 TABLET ORAL at 02:09

## 2022-09-15 RX ADMIN — PANTOPRAZOLE SODIUM 40 MG: 40 TABLET, DELAYED RELEASE ORAL at 10:09

## 2022-09-15 RX ADMIN — MAGNESIUM SULFATE 2 G: 2 INJECTION INTRAVENOUS at 10:09

## 2022-09-15 RX ADMIN — MAGNESIUM SULFATE 2 G: 2 INJECTION INTRAVENOUS at 12:09

## 2022-09-15 RX ADMIN — CARVEDILOL 3.12 MG: 3.12 TABLET, FILM COATED ORAL at 12:09

## 2022-09-15 RX ADMIN — CARVEDILOL 3.12 MG: 3.12 TABLET, FILM COATED ORAL at 06:09

## 2022-09-15 RX ADMIN — IOHEXOL 20 ML: 300 INJECTION, SOLUTION INTRAVENOUS at 05:09

## 2022-09-15 RX ADMIN — VALGANCICLOVIR 450 MG: 450 TABLET, FILM COATED ORAL at 09:09

## 2022-09-15 RX ADMIN — TACROLIMUS 2 MG: 1 CAPSULE ORAL at 09:09

## 2022-09-15 RX ADMIN — PREDNISONE 60 MG: 20 TABLET ORAL at 12:09

## 2022-09-15 RX ADMIN — FENTANYL CITRATE 75 MCG: 50 INJECTION, SOLUTION INTRAMUSCULAR; INTRAVENOUS at 05:09

## 2022-09-15 RX ADMIN — TACROLIMUS 1 MG: 1 CAPSULE ORAL at 12:09

## 2022-09-15 RX ADMIN — FENTANYL CITRATE 25 MCG: 50 INJECTION, SOLUTION INTRAMUSCULAR; INTRAVENOUS at 05:09

## 2022-09-15 RX ADMIN — FAMOTIDINE 20 MG: 20 TABLET ORAL at 12:09

## 2022-09-15 RX ADMIN — URSODIOL 250 MG: 250 TABLET ORAL at 09:09

## 2022-09-15 RX ADMIN — TACROLIMUS 1 MG: 1 CAPSULE ORAL at 06:09

## 2022-09-15 RX ADMIN — CEFEPIME HYDROCHLORIDE 2 G: 2 INJECTION, SOLUTION INTRAVENOUS at 03:09

## 2022-09-15 RX ADMIN — VANCOMYCIN HYDROCHLORIDE 1000 MG: 1 INJECTION, POWDER, LYOPHILIZED, FOR SOLUTION INTRAVENOUS at 09:09

## 2022-09-15 RX ADMIN — SULFAMETHOXAZOLE AND TRIMETHOPRIM 1 TABLET: 400; 80 TABLET ORAL at 09:09

## 2022-09-15 RX ADMIN — CARVEDILOL 3.12 MG: 3.12 TABLET, FILM COATED ORAL at 09:09

## 2022-09-15 NOTE — PROGRESS NOTES
[facility] - Transplant Stepdown  Liver Transplant  Progress Note    Patient Name: Romeo Larson  MRN: [id number]  Admission Date: 9/14/2022  Hospital Length of Stay: 1 days  Code Status: Full Code  Primary Care Provider: Pravin Maria MD  Post-Operative Day: 86    ORGAN:   RIGHT LIVER LOBE (SEGS 5,6,7,8) WITHOUT MIDDLE HEPATIC VEIN  Disease Etiology: Primary Liver Malignancy: Cholangiocarcinoma (CH-CA)  Donor Type:   Living  CDC High Risk:     Donor CMV Status:   Donor CMV Status:   Donor HBcAB:     Donor HCV Status:     Donor HBV GUSTABO:   Donor HCV GUSTABO:   Whole or Partial: Partial Liver  Biliary Anastomosis: Vick-en-Y  Arterial Anatomy: Standard  Subjective:     History of Present Illness:  Romeo Larson is a 42yr old male s/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 Post operative course significant for recurrent bile leak. OR take back 6/23 for bile duct repair (small leak found near biliary anastomosis, unable to clearly identify source), cultures grew enterococcus faecalis. OR take back 7/4 for ex lap, washout of biloma, bile duct unable to be assessed due to adhesions, OR cultures with lactobacillus species. Underwent PTC drain (internal/external) placement on 7/13. Admitted 7/18-7/27 with sepsis. Started on vanc/cefepime, transitioned to unasyn 2/2 hx of enterococcus. BC positive for Candida glabrata, completed a course of natividad and augmentin (EOT 8/12) and f/u with ID. PTC was upsized and capped 7/20, SHRUTHI out 7/22. Last admit 8/24 for HA thrombosis concern r/o by CTA. Last cholangiogram 8/25 with cholangioplasty and new 14fr biliary drain placement that was capped on dc, skilled nursing sent for dressing changes weekly/as needed.     Patient presented to ED 9/14 with c/o fever. Denied any other sx including CP, SOB, NVD, changes in urine patterns, abdominal pain. Reported tmax of 102 at home brought down with tylenol. Tmax in . Vital signs otherwise stable. Labs in  ED notable for increased LFTs including t bili from 0.8 to 1.7. UA clean, CXR w/o acute CP process, cultures pending. Patient received one dose of zosyn and one dose of vanc in ED as well as 1 L LR. Liver US pending. Uncapped biliary tube now draining to gravity. Cont to monitor while awaiting US results. D/w Dr. Cline      Hospital Course:  Patient admitted for fevers. Tmax 103 in ED. Infectious work-up ordered on admit and patient given Vanc/Zosyn in ED. Patient's PTC drain opened to gravity on admit, previously clamped.     Interval note: BARBARA, Pt afebrile this AM. Liver US done this morning, reviewed with surgeon. IR consulted for repeat cholangiogram later today, pt NPO for now. Continue cefepime/vanc inpatient, ID consult placed. Pt reports feeling well. Tolerating diet, no nausea noted. VSS. Continue to monitor.       Scheduled Meds:   carvediloL  3.125 mg Oral BID WM    ceFEPime (MAXIPIME) IVPB  1 g Intravenous Q8H    cetirizine  10 mg Oral Once    heparin (porcine)  5,000 Units Subcutaneous Q8H    magnesium sulfate IVPB  2 g Intravenous Q2H    mirtazapine  15 mg Oral Nightly    pantoprazole  40 mg Oral Daily    sulfamethoxazole-trimethoprim 400-80mg  1 tablet Oral Daily    tacrolimus  1 mg Oral Daily PM    tacrolimus  2 mg Oral Daily AM    ursodioL  250 mg Oral BID    valGANciclovir  450 mg Oral Daily    vancomycin (VANCOCIN) IVPB  15 mg/kg Intravenous Q12H     Continuous Infusions:  PRN Meds:acetaminophen, melatonin, ondansetron, sodium chloride 0.9%, Pharmacy to dose Vancomycin consult **AND** vancomycin - pharmacy to dose    Review of Systems   Constitutional:  Positive for fever. Negative for activity change and appetite change.   HENT:  Negative for facial swelling and trouble swallowing.    Respiratory:  Negative for cough, chest tightness and shortness of breath.    Cardiovascular:  Negative for chest pain, palpitations and leg swelling.   Gastrointestinal:  Negative for abdominal  distention, abdominal pain, diarrhea, nausea and vomiting.   Genitourinary:  Negative for decreased urine volume, difficulty urinating and dysuria.   Skin:  Negative for wound.   Allergic/Immunologic: Positive for immunocompromised state.   Neurological:  Negative for dizziness, tremors, weakness and light-headedness.   Psychiatric/Behavioral:  Negative for confusion.    All other systems reviewed and are negative.  Objective:     Vital Signs (Most Recent):  Temp: 97.9 °F (36.6 °C) (09/15/22 1128)  Pulse: 87 (09/15/22 1128)  Resp: 18 (09/15/22 1128)  BP: 112/71 (09/15/22 1128)  SpO2: 98 % (09/15/22 1128) Vital Signs (24h Range):  Temp:  [97.9 °F (36.6 °C)-103 °F (39.4 °C)] 97.9 °F (36.6 °C)  Pulse:  [] 87  Resp:  [16-18] 18  SpO2:  [96 %-98 %] 98 %  BP: (112-143)/(71-91) 112/71     Weight: 73.9 kg (162 lb 14.7 oz)  Body mass index is 24.77 kg/m².    Intake/Output - Last 3 Shifts         09/13 0700  09/14 0659 09/14 0700  09/15 0659 09/15 0700  09/16 0659    I.V. (mL/kg)   57.1 (0.8)    IV Piggyback   749.1    Total Intake(mL/kg)   806.3 (10.9)    Drains  75     Stool  0     Total Output  75     Net  -75 +806.3           Stool Occurrence  0 x 0 x            Physical Exam  Vitals and nursing note reviewed.   Constitutional:       General: He is not in acute distress.  HENT:      Head: Normocephalic.   Eyes:      General: No scleral icterus.  Cardiovascular:      Rate and Rhythm: Normal rate and regular rhythm.      Pulses: Normal pulses.      Heart sounds: Normal heart sounds, S1 normal and S2 normal.   Pulmonary:      Effort: Pulmonary effort is normal. No respiratory distress.      Breath sounds: Normal breath sounds. No wheezing or rales.   Abdominal:      General: A surgical scar is present. Bowel sounds are normal. There is no distension.      Palpations: Abdomen is soft.      Tenderness: There is no abdominal tenderness. There is no guarding or rebound.      Comments: Well healed chevron incision  R PTC  drain / uncapped to gravity drainage / no s/s/i   Musculoskeletal:         General: No swelling.      Cervical back: Normal range of motion.      Right lower leg: No edema.      Left lower leg: No edema.   Skin:     General: Skin is warm and dry.      Coloration: Skin is not jaundiced.   Neurological:      Mental Status: He is alert and oriented to person, place, and time.      Motor: No weakness.   Psychiatric:         Attention and Perception: Attention normal.         Mood and Affect: Mood normal.         Speech: Speech normal.         Behavior: Behavior normal. Behavior is cooperative.         Thought Content: Thought content normal.       Laboratory:  Immunosuppressants           Stop Route Frequency     tacrolimus capsule 2 mg         -- Oral Every morning     tacrolimus capsule 1 mg         -- Oral Every evening          CBC:   Recent Labs   Lab 09/15/22  0521   WBC 3.07*   RBC 3.57*   HGB 10.9*   HCT 31.3*   PLT 74*   MCV 88   MCH 30.5   MCHC 34.8     BMP:   Recent Labs   Lab 09/15/22  0521   *   *   K 4.4      CO2 23   BUN 12   CREATININE 1.2   CALCIUM 9.6     CMP:   Recent Labs   Lab 09/15/22  0521   *   CALCIUM 9.6   ALBUMIN 3.6   PROT 6.7   *   K 4.4   CO2 23      BUN 12   CREATININE 1.2   ALKPHOS 165*   ALT 57*   AST 34   BILITOT 1.7*     Labs within the past 24 hours have been reviewed.    Diagnostic Results:  US Liver Transplant Post:  Results for orders placed during the hospital encounter of 09/14/22    US Doppler Liver Transplant Post (xpd)    Narrative  EXAMINATION:  US DOPPLER LIVER TRANSPLANT POST (XPD)    CLINICAL HISTORY:  Hx of Liver transplant; concern for biliary leak;    TECHNIQUE:  Limited abdominal ultrasound of the transplant liver with Doppler evaluation.  Color and spectral Doppler were performed.    COMPARISON:  Ultrasound 08/24/2022, 06/22/2022    CT 08/24/2022    CT 08/10/2022    FINDINGS:  Patient is status post right hepatic transplant  06/21/2022.  Liver demonstrates a wedge-shaped geographic area of relative hypoechogenicity along the anteromedial edge of the liver similar to prior.  No new focal lesions appreciated.  No fluid collections.    The common duct is not dilated, measuring 4 mm.  No dilated intrahepatic radicles are seen.    Color flow and spectral waveform analysis was performed.    Anastomosis site of the main hepatic artery demonstrates a peak systolic velocity 203 cm/sec (previously 66 cm/sec and as high as 247 cm/sec 06/22/2022 ultrasound).    Main hepatic artery demonstrates resistive index 0.69 (previously 0.69) with normal waveform.    Anterior branch of the right hepatic artery demonstrates resistive index 0.52 (previously 0.52) with normal waveform.    Posterior branch of the right hepatic artery demonstrates resistive index 0.53 (previously not visualized) with normal waveform.    The main and right branches of the portal vein demonstrate anterograde flow and are otherwise unremarkable.    Right and middle hepatic vein confluence piggybacked to IVC.  Middle hepatic vein is not definitively visualized on today's exam.  Velocity at the IVC confluence anastomosis is 197 cm/sec (previously as high as 202 cm/sec).    Biliary drain in place.    Impression  Status post right hepatic lobe transplantation.   Persistent, stable elevated velocity at the IVC confluence anastomosis.    Nonvisualization of the middle hepatic vein noting no thrombosed vein is visualized in the expected region.  The right hepatic vein is prominent with multiple branches.  Continued follow-up is recommended.    Otherwise satisfactory Doppler evaluation.    No intra or extrahepatic biliary dilatation.  Biliary drain remains in place.    Stable wedge shaped hypoechoic area along the anteromedial aspect of the liver, likely postsurgical in nature.    Electronically signed by resident: Irvin Harkins  Date:    09/15/2022  Time:    08:16    Electronically signed  "by: Abel Melton MD  Date:    09/15/2022  Time:    08:52    Debility/Functional status: No debility noted.    Assessment/Plan:     Cholangitis of transplanted liver  - tmax 102 at home, 103 at [facility]   - One time zosyn/vanc in ED  - Continue cefepime/vanc while inpatient  - PTC drain in place, capped prior to admit. Uncapped now to gravity drainage  - IR consulted for repeat cholangiogram 9/15  - cont to monitor       Elevated bilirubin  - Tbili elevated liver on admit to 1.7 (previously 1)  - Liver US 9/15 reviewed with surgeon  - PTC tube uncapped on admit, to gravity drainage  - IR consulted 9/15 for repeat cholangiogram with possible intervention  - continue kartik      Pancytopenia  - chronic     Elevated LFTs  - US pending      Long-term use of immunosuppressant medication  - see "prophylactic immunotherapy"      At risk for opportunistic infections  - cont OI prophylaxis per protocol       Prophylactic immunotherapy  - cont tacro, monitor levels for toxicity and therapeutic effects   - cellcept held for fever       S/P liver transplant  - s/p living 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 for cholangiocarcinoma (vick-en-y biliary anastomosis)  - post op with several issues relating to bile leak/bilomas requiring multiple biliary tube upsizings since transplant, infections with enterococcus/lactobacillus/candida   - presents with fever and elevated Tbili   - see "fever" and "elevated bilirubin"   - cont to monitor with daily CMP     Hypertension  - cont home meds           VTE Risk Mitigation (From admission, onward)         Ordered     heparin (porcine) injection 5,000 Units  Every 8 hours         09/14/22 2331     IP VTE HIGH RISK PATIENT  Once         09/14/22 2331     Place sequential compression device  Until discontinued         09/14/22 2331                The patients clinical status was discussed at multidisplinary rounds, involving transplant surgery, transplant medicine, pharmacy, nursing, nutrition, and social " work    Discharge Planning:  Not suitable for discharge at this time    Michael Sahu, NP  Liver Transplant  [facility] - [facility]

## 2022-09-15 NOTE — ASSESSMENT & PLAN NOTE
- tmax 102 at home, 103 at [facility]   - One time zosyn/vanc in ED  - Continue cefepime/vanc while inpatient  - PTC drain in place, capped prior to admit. Uncapped now to gravity drainage  - IR consulted for repeat cholangiogram 9/15  - cont to monitor

## 2022-09-15 NOTE — PROGRESS NOTES
Admit Note     Met with patient and father to assess needs. Patient is a 42 y.o.  male, admitted for  fever. Patient is liver post transplant on 6/21/22 .      Patient admitted directly on 9/14/2022 .  At this time, patient presents as alert and oriented x 4, pleasant, calm, communicative, and asking and answering questions appropriately.  At this time, patients caregiver presents as pleasant and calm.    Household/Family Systems     Patient resides with patient's wife and child(benji), age(s) 12,13 and 16, at   [address] [address].    Support system includes his wife and parents. Patient does have dependents that are in need of being cared for. Patient's three children are being cared for by patient's wife and parents.     Patients primary caregiver is Shell Larson, patients wife, phone number [phone].  Confirmed patients contact information is [phone] (home);       Telephone Information:   Mobile [phone]       During admission, patient's caregiver plans to stay in patient's room.  Confirmed patient and patients caregivers do have access to reliable transportation.    Cognitive Status/Learning     Patients caregiver reports patients reading ability as college and states patient does not have difficulty with reading.  Patients caregiver reports patient learns best by written instructions.   Needed: No.   Highest education level: Associate/Bachelor Degree    Vocation/Disability     Working for Income: yes  If yes, working activity level: Working Full Time  Patient is employed as sales representitive for [employer]     Adherence     Patient reports a high level of adherence to patients health care regimen.  Adherence counseling and education provided. Patient verbalizes understanding.    Substance Use    Patient reports the following substance usage.    Tobacco: none, patient denies any use.  Alcohol: none, patient denies any use.  Illicit Drugs/Non-prescribed  Medications: none, patient denies any use.  Patient states clear understanding of the potential impact of substance use.  Substance abstinence/cessation counseling, education and resources provided and reviewed.     Services Utilizing/ADLS    Infusion Service: Prior to admission, patient utilizing? Had infusion service via [facility] in the past.  Home Health: Prior to admission, patient utilizing? yes, [facility]  for SN. Has referral for outpatient PT but patient has not started services  DME: Prior to admission, no  Pulmonary/Cardiac Rehab: Prior to admission, no  Dialysis:  Prior to admission, no  Transplant Specialty Pharmacy:  Prior to admission, yes; [facility].    Prior to admission, patient reports patient was independent with ADLS and was driving.  Patient reports patient is able to care for self at this time.    Insurance/Medications    Insured by   Payer/Plan Subscr  Sex Relation Sub. Ins. ID Effective Group Num   1. BLUE CROSS BL* [employer]NE 1980 Male Self [id number]                                   [address] [address]      Primary Insurance (for UNOS reporting): Private Insurance  Secondary Insurance (for UNOS reporting): None    Patient reports patient is able to obtain and afford medications at this time and at time of discharge.    Living Will/Healthcare Power of     Patient states patient does not have a LW and/or HCPA.   provided education regarding LW and HCPA and the completion of forms.    Coping/Mental Health    Patient is coping adequately with the aid of  family members and friends.  Patient denies mental health difficulties.     Discharge Planning    At time of discharge, patient plans to return to patient's home under the care of self and wife. Patients father will transport patient.  Per rounds today, expected discharge date has not been medically determined at this time. Patient and patients caregiver  verbalize understanding and are  involved in treatment planning and discharge process.    Additional Concerns     providing ongoing psychosocial support, education, resources and d/c planning as needed.  SW remains available. Patient's caregiver verbalizes understanding and agreement with information reviewed,  availability and how to access available resources as needed. Patient denies additional needs and/or concerns at this time. Patient verbalizes understanding and agreement with information reviewed, social work availability, and how to access available resources as needed.

## 2022-09-15 NOTE — ASSESSMENT & PLAN NOTE
- tmax 102 at home, 103 at [facility]   - started on zosyn and vanc  - LFTs elevated, liver US pending   - cont to monitor

## 2022-09-15 NOTE — H&P
Vascular and Interventional Radiology   Consult History & Physical      Date: 9/15/2022   Primary team: Networked reference to record PCT , Sinan Cline MD   Room/bed: 65020/01110 A    Inpatient consult to Interventional Radiology  Consult performed by: Melissa Brown MD  Consult ordered by: Michael Sahu NP         Reason for Consult:   Fever, recent biliary drain upsize    History of Present Illness:  Romeo Larson is a 42 y.o. male with a history of  cholangiocarcinoma s/p liver transplant on 6/21/2022 (vick-en-y biliary anastomosis), his post operative course significant for recurrent bile leak underwent PTC draining placement (internal/external) on 7/13, upsized on 7/25. 8/25 patient underwent cholangioplasty with drain upsizing.  Patient currently admitted to the hospital with fevers.      IR consulted for  cholangiogram with possible intervention.    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    Hypercholesteremia     Hypertension        Past Surgical History:  Past Surgical History:   Procedure Laterality Date    DIAGNOSTIC ULTRASOUND N/A 6/21/2022    Procedure: ULTRASOUND, DIAGNOSTIC;  Surgeon: Sinan Cline MD;  Location: [facility] OR [address] [place] [address];  Service: Transplant;  Laterality: N/A;    ENDOSCOPIC ULTRASOUND OF UPPER GASTROINTESTINAL TRACT N/A 10/20/2021    Procedure: ULTRASOUND, UPPER GI TRACT, ENDOSCOPIC;  Surgeon: Valeriy Sotelo MD;  [facility]  Service: Endoscopy;  Laterality: N/A;  wife to email vacc card-inst email-tb    ERCP N/A 10/20/2021    Procedure: ERCP (ENDOSCOPIC RETROGRADE CHOLANGIOPANCREATOGRAPHY);  Surgeon: Valeriy Sotelo MD;  [facility]  Service: Endoscopy;  Laterality: N/A;    ERCP N/A 11/4/2021    Procedure: ERCP (ENDOSCOPIC RETROGRADE  CHOLANGIOPANCREATOGRAPHY);  Surgeon: Valeriy Sotelo MD;  [facility]  Service: Endoscopy;  Laterality: N/A;    ERCP N/A 11/4/2021    Procedure: ERCP (ENDOSCOPIC RETROGRADE CHOLANGIOPANCREATOGRAPHY);  Surgeon: Roselia Pereyra MD;  [facility]  Service: Endoscopy;  Laterality: N/A;  pt vaccinated, instructed to bring card to procedure, instructions sent portal-MG    ERCP N/A 1/14/2022    Procedure: ERCP (ENDOSCOPIC RETROGRADE CHOLANGIOPANCREATOGRAPHY);  Surgeon: Roselia Pereyra MD;  [facility]  Service: Endoscopy;  Laterality: N/A;  inst portal-right chest port-tb  Pt requested at home COVID testing, Pt instructed at home RAPID to be done morning of procedure, Pt instructed to call endoscopy scheuding if there is a positive result, Pt informed if a positive     ERCP N/A 3/31/2022    Procedure: ERCP (ENDOSCOPIC RETROGRADE CHOLANGIOPANCREATOGRAPHY);  Surgeon: Julio Cesar Alonzo MD;  [facility]  Service: Endoscopy;  Laterality: N/A;  3/16: port L chest wall. pt to bring covid vaccination card. Instructions sent via portal.-SC  3/18/22-Updated instructions sent via portal re:new date/time-DS    EXPLORATORY LAPAROTOMY AFTER LIVER TRANSPLANTATION N/A 6/23/2022    Procedure: LAPAROTOMY, EXPLORATORY, AFTER LIVER TRANSPLANT;  Surgeon: Julio Cesar Jara MD;  Location: [facility] OR [address] [place] [address];  Service: Transplant;  Laterality: N/A;    INSERTION OF TUNNELED CENTRAL VENOUS CATHETER (CVC) WITH SUBCUTANEOUS PORT N/A 11/24/2021    Procedure: INSERTION, PORT-A-CATH;  Surgeon: Prem Syed MD;  Location: [facility] CATH LAB;  Service: Radiology;  Laterality: N/A;    LIVER TRANSPLANT N/A 6/21/2022    Procedure: TRANSPLANT, LIVER;  Surgeon: Sinan Cline MD;  [facility];  Service: Transplant;  Laterality: N/A;    REPAIR OF BILE DUCT N/A 6/23/2022    Procedure: REPAIR, BILE DUCT;  Surgeon: Julio Cesar Jara MD;  Location: [facility] OR [address] [place] [address];  Service:  "Transplant;  Laterality: N/A;    ROBOT-ASSISTED LAPAROSCOPIC LYMPHADENECTOMY USING DA МАРИНА XI  6/17/2022    Procedure: XI ROBOTIC LYMPHADENECTOMY - Portal;  Surgeon: Julio Cesar Jara MD;  Location: [facility] OR 2ND FLR;  Service: Transplant;;    TONSILLECTOMY      XI ROBOTIC LAPAROSCOPY, EXPLORATORY N/A 6/17/2022    Procedure: XI ROBOTIC LAPAROSCOPY,EXPLORATORY;  Surgeon: Julio Cesar Jara MD;  Location: [facility] OR 2ND FLR;  Service: Transplant;  Laterality: N/A;        Sedation History:    No known adverse reactions.     Social History:  Social History     Tobacco Use    Smoking status: Never    Smokeless tobacco: Never   Substance Use Topics    Alcohol use: Not Currently    Drug use: Never        Home Medications:   Prior to Admission medications    Medication Sig Start Date End Date Taking? Authorizing Provider   aspirin 81 MG Chew CHEW 1 tablet (81 mg total) by mouth once daily. 6/28/22 6/28/23  Abida Arenas NP   blood sugar diagnostic Strp 1 each by Misc.(Non-Drug; Combo Route) route 3 (three) times daily. 6/28/22   Abida Arenas NP   blood-glucose meter Misc Use as instructed 6/28/22   Abida Arenas NP   calcium carbonate-vitamin D3 600 mg-20 mcg (800 unit) Tab Take 1 tablet by mouth once daily at 6am. 6/28/22   Abida Arenas NP   carvediloL (COREG) 6.25 MG tablet Take 0.5 tablets (3.125 mg total) by mouth 2 (two) times daily with meals. 8/2/22   Julio Cesar Jara MD   lancets 30 gauge Misc 1 each by Misc.(Non-Drug; Combo Route) route 3 (three) times daily. 6/28/22   Abida Arenas NP   mirtazapine (REMERON) 15 MG tablet Take 1 tablet (15 mg total) by mouth nightly. 7/16/22 7/16/23  Sinan Cline MD   mycophenolate (CELLCEPT) 250 mg Cap Take 2 capsules (500 mg total) by mouth 2 (two) times daily. 8/19/22 8/19/23  Sinan Cline MD   pantoprazole (PROTONIX) 40 MG tablet Take 1 tablet (40 mg total) by mouth once daily. 7/9/22   Sinan Cline MD   pen needle, diabetic 32 gauge x 5/32" Ndle 1 each by " Misc.(Non-Drug; Combo Route) route 3 (three) times daily. 6/28/22   Abida Arenas, NP   sulfamethoxazole-trimethoprim 400-80mg (BACTRIM,SEPTRA) 400-80 mg per tablet Take 1 tablet by mouth once daily. Stop: 12/18/22 7/23/22 12/18/22  Santi Godwin MD   tacrolimus (PROGRAF) 1 MG Cap Take 2 capsules (2 mg total) every morning AND 1 capsule (1 mg total) every evening. 8/26/22   Santi Godwin MD   ursodioL (ACTIGALL) 250 mg Tab Take 1 tablet (250 mg total) by mouth 2 (two) times a day. 7/27/22   Braydon Iglesias MD   valGANciclovir (VALCYTE) 450 mg Tab Take 1 tablet (450 mg total) by mouth once daily. STOP 9/19/22 6/21/22 9/19/22  Sinan Cline MD   baclofen (LIORESAL) 10 MG tablet TAKE 1 TABLET(10 MG) BY MOUTH THREE TIMES DAILY AS NEEDED FOR HICCUPS  Patient not taking: No sig reported 4/6/22 6/17/22  Young Wesley MD   famotidine (PEPCID) 20 MG tablet Take 1 tablet (20 mg total) by mouth every evening. STOP 7/24/22 6/21/22 7/8/22  Sinan Cline MD   insulin aspart U-100 (NOVOLOG) 100 unit/mL (3 mL) InPn pen Inject 5 Units into the skin 3 (three) times daily with meals. + sliding scale.  MDD 30 units/d 7/16/22 7/27/22  Brinda Serrato MD   losartan (COZAAR) 50 MG tablet Take 1 tablet (50 mg total) by mouth once daily. 3/21/22 6/24/22  Pravin Maria MD   metFORMIN (GLUCOPHAGE) 500 MG tablet Take 1 tablet (500 mg total) by mouth 2 (two) times daily with meals. 7/8/22 7/8/22  Sinan Cline MD   tamsulosin (FLOMAX) 0.4 mg Cap Take 2 capsules (0.8 mg total) by mouth once daily. 7/9/22 7/27/22  Sinan Cline MD       Inpatient Medications:    Current Facility-Administered Medications:     acetaminophen tablet 650 mg, 650 mg, Oral, Q8H PRN, Francine Jaramillo PA-C, 650 mg at 09/15/22 0236    carvediloL tablet 3.125 mg, 3.125 mg, Oral, BID WM, Francine Jaramillo PA-C, 3.125 mg at 09/15/22 0008    cetirizine tablet 10 mg, 10 mg, Oral, Once, Saran Miguel MD    famotidine tablet 20 mg, 20 mg, Oral,  QHS, Francine Jaramillo PA-C, 20 mg at 09/15/22 0011    heparin (porcine) injection 5,000 Units, 5,000 Units, Subcutaneous, Q8H, Francine Jaramillo PA-C    magnesium sulfate 2g in water 50mL IVPB (premix), 2 g, Intravenous, Q2H, Michael Sahu, RADHA    melatonin tablet 6 mg, 6 mg, Oral, Nightly PRN, Francine Jaramillo PA-C    mirtazapine tablet 15 mg, 15 mg, Oral, Nightly, Francine Jaramillo PA-C    ondansetron disintegrating tablet 8 mg, 8 mg, Oral, Q8H PRN, Francine Jaramillo PA-C    sodium chloride 0.9% flush 10 mL, 10 mL, Intravenous, PRN, Francine Jaramillo PA-C    sulfamethoxazole-trimethoprim 400-80mg per tablet 1 tablet, 1 tablet, Oral, Daily, Francine Jaramillo PA-C    tacrolimus capsule 1 mg, 1 mg, Oral, Daily PM, Francine Jaramillo PA-C, 1 mg at 09/15/22 0009    tacrolimus capsule 2 mg, 2 mg, Oral, Daily AM, Francine Jaramillo PA-C    ursodioL tablet 250 mg, 250 mg, Oral, BID, Francine Jaramillo PA-C, 250 mg at 09/15/22 0123    valGANciclovir tablet 450 mg, 450 mg, Oral, Daily, Francine Jaramillo PA-C     Anticoagulants/Antiplatelets:   Heparin prophylactic dose    Allergies:   Review of patient's allergies indicates:   Allergen Reactions    Zosyn [piperacillin-tazobactam] Swelling     Lip swelling       Review of Systems:   As documented in primary provider H&P.    Vital Signs:  Temp: 98.6 °F (37 °C) (09/15/22 0518)  Pulse: 73 (09/15/22 0724)  Resp: 18 (09/15/22 0518)  BP: 128/79 (09/15/22 0518)  SpO2: 96 % (09/15/22 0518)    Temp:  [98.6 °F (37 °C)-103 °F (39.4 °C)]   Pulse:  []   Resp:  [16-18]   BP: (128-143)/(79-91)   SpO2:  [96 %-98 %]      Physical Exam:  No acute distress, laying comfortably in bed, pleasant and cooperative  Regular rate and rhythm  Breathing unlabored  Abdomen benign  Extremities warm and well perfused    Sedation Exam:  ASA: III - Patient appears to have severe systemic disease not posing a constant  threat to life  Mallampati score: III (soft and hard palate and base of uvula visible)    Laboratory:  Lab Results   Component Value Date    INR 1.0 08/25/2022       Lab Results   Component Value Date    WBC 3.07 (L) 09/15/2022    HGB 10.9 (L) 09/15/2022    HCT 31.3 (L) 09/15/2022    MCV 88 09/15/2022    PLT 74 (L) 09/15/2022      Lab Results   Component Value Date     (H) 09/15/2022     (L) 09/15/2022    K 4.4 09/15/2022     09/15/2022    CO2 23 09/15/2022    BUN 12 09/15/2022    CREATININE 1.2 09/15/2022    CALCIUM 9.6 09/15/2022    MG 1.2 (L) 09/15/2022    ALT 57 (H) 09/15/2022    AST 34 09/15/2022    ALBUMIN 3.6 09/15/2022    BILITOT 1.7 (H) 09/15/2022    BILIDIR 0.3 09/12/2022       Imaging:   Reviewed.      ASSESSMENT/PLAN/RECOMMENDATIONS:       Sedation Plan: Up to moderate    Plan:    Keep NPO.  Hold anticoagulants/ antiplatelets..   Patient will undergo cholangiogram with possible intervention - plan for 9/15/22.       Melissa Brown MD  PGY-II  Diagnostic and Interventional Radiology  [facility]

## 2022-09-15 NOTE — PROGRESS NOTES
Pharmacokinetic Initial Assessment: IV Vancomycin    Assessment/Plan:    Initiate intravenous vancomycin maintenance dose of 1000 mg every 12 hours. Obtain vancomycin trough 30 minutes prior to the third dose at 0930 on 09/16/2022. Desired empiric serum trough concentration is 15 to 20 mcg/mL    Pharmacy will continue to follow and monitor vancomycin.      Please contact pharmacy at extension [phone] with any questions regarding this assessment.     Thank you for the consult,   Jennifer Rosasley       Patient brief summary:  Romeo Larson is a 42 y.o. male initiated on antimicrobial therapy with IV Vancomycin for treatment of suspected intra-abdominal infection    Drug Allergies:   Review of patient's allergies indicates:   Allergen Reactions    Zosyn [piperacillin-tazobactam] Swelling     Lip swelling       Actual Body Weight:   73.9 kg    Renal Function:   Estimated Creatinine Clearance: 76 mL/min (based on SCr of 1.2 mg/dL).,     Dialysis Method (if applicable):  N/A    CBC (last 72 hours):  Recent Labs   Lab Result Units 09/14/22  2053 09/15/22  0521   WBC K/uL 3.48* 3.07*   Hemoglobin g/dL 10.7* 10.9*   Hematocrit % 31.6* 31.3*   Platelets K/uL 90* 74*   Gran % % 83.9* 89.3*   Lymph % % 9.2* 7.5*   Mono % % 6.0 2.6*   Eosinophil % % 0.0 0.0   Basophil % % 0.3 0.3   Differential Method  Automated Automated       Metabolic Panel (last 72 hours):  Recent Labs   Lab Result Units 09/14/22  2053 09/14/22  2145 09/15/22  0521   Sodium mmol/L 131*  --  131*   Potassium mmol/L 4.1  --  4.4   Chloride mmol/L 101  --  100   CO2 mmol/L 23  --  23   Glucose mg/dL 164*  --  209*   Glucose, UA   --  Negative  --    BUN mg/dL 15  --  12   Creatinine mg/dL 1.2  --  1.2   Albumin g/dL 3.7  --  3.6   Total Bilirubin mg/dL 1.7*  --  1.7*   Alkaline Phosphatase U/L 177*  --  165*   AST U/L 34  --  34   ALT U/L 58*  --  57*   Magnesium mg/dL  --   --  1.2*   Phosphorus mg/dL  --   --  2.9       Drug levels (last 3 results):  No results for  input(s): VANCOMYCINRA, VANCORANDOM, VANCOMYCINPE, VANCOPEAK, VANCOMYCINTR, VANCOTROUGH in the last 72 hours.    Microbiologic Results:  Microbiology Results (last 7 days)       Procedure Component Value Units Date/Time    Blood culture x two cultures. Draw prior to antibiotics. [[id number]] Collected: 09/14/22 2115    Order Status: Completed Specimen: Blood from Line, Port A Cath Updated: 09/15/22 0515     Blood Culture, Routine No Growth to date    Narrative:      Aerobic and anaerobic    Blood culture x two cultures. Draw prior to antibiotics. [[id number]] Collected: 09/14/22 2144    Order Status: Completed Specimen: Blood from Line, Port A Cath Updated: 09/15/22 0515     Blood Culture, Routine No Growth to date    Narrative:      Aerobic and anaerobic

## 2022-09-15 NOTE — ASSESSMENT & PLAN NOTE
"- s/p living 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 for cholangiocarcinoma (vick-en-y biliary anastomosis)  - post op with several issues relating to bile leak/bilomas requiring multiple biliary tube upsizings since transplant, infections with enterococcus/lactobacillus/candida   - presents with fever and elevated Tbili   - see "fever" and "elevated bilirubin"   - cont to monitor with daily CMP   "

## 2022-09-15 NOTE — H&P
[facility] - Transplant Stepdown  Liver Transplant  History & Physical    Patient Name: Romeo Larson  MRN: [id number]  Admission Date: 9/14/2022  Code Status: Full Code  Primary Care Provider: Pravin Maria MD  Post-Operative Day: 86     ORGAN:   RIGHT LIVER LOBE (SEGS 5,6,7,8) WITHOUT MIDDLE HEPATIC VEIN  Disease Etiology: Primary Liver Malignancy: Cholangiocarcinoma (CH-CA)  Donor Type:   Living  CDC High Risk:     Donor CMV Status:   Donor CMV Status:   Donor HBcAB:     Donor HCV Status:     Donor HBV GUSTABO:   Donor HCV GUSTABO:   Whole or Partial: Partial Liver  Biliary Anastomosis: Vick-en-Y  Arterial Anatomy: Standard    Subjective:     History of Present Illness:  Romeo Larson is a 42yr old male s/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 Post operative course significant for recurrent bile leak. OR take back 6/23 for bile duct repair (small leak found near biliary anastomosis, unable to clearly identify source), cultures grew enterococcus faecalis. OR take back 7/4 for ex lap, washout of biloma, bile duct unable to be assessed due to adhesions, OR cultures with lactobacillus species. Underwent PTC drain (internal/external) placement on 7/13. Admitted 7/18-7/27 with sepsis. Started on vanc/cefepime, transitioned to unasyn 2/2 hx of enterococcus. BC positive for Candida glabrata, completed a course of natividad and augmentin (EOT 8/12) and f/u with ID. PTC was upsized and capped 7/20, SHRUTHI out 7/22. Last admit 8/24 for HA thrombosis concern r/o by CTA. Last cholangiogram 8/25 with cholangioplasty and new 14fr biliary drain placement that was capped on dc, skilled nursing sent for dressing changes weekly/as needed.     Patient presented to ED 9/14 with c/o fever. Denied any other sx including CP, SOB, NVD, changes in urine patterns, abdominal pain. Reported tmax of 102 at home brought down with tylenol. Tmax in . Vital signs otherwise stable. Labs in ED notable for increased  LFTs including t bili from 0.8 to 1.7. UA clean, CXR w/o acute CP process, cultures pending. Patient received one dose of zosyn and one dose of vanc in ED as well as 1 L LR. Liver US pending. Uncapped biliary tube now draining to gravity. Cont to monitor while awaiting US results. D/w Dr. 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    Hypercholesteremia     Hypertension        Past Surgical History:   Procedure Laterality Date    DIAGNOSTIC ULTRASOUND N/A 6/21/2022    Procedure: ULTRASOUND, DIAGNOSTIC;  Surgeon: Sinan Cline MD;  Location: [facility] OR 2ND FLR;  Service: Transplant;  Laterality: N/A;    ENDOSCOPIC ULTRASOUND OF UPPER GASTROINTESTINAL TRACT N/A 10/20/2021    Procedure: ULTRASOUND, UPPER GI TRACT, ENDOSCOPIC;  Surgeon: Valeriy Sotelo MD;  Location: [facility] (2ND FLR);  Service: Endoscopy;  Laterality: N/A;  wife to email vacc card-inst email-tb    ERCP N/A 10/20/2021    Procedure: ERCP (ENDOSCOPIC RETROGRADE CHOLANGIOPANCREATOGRAPHY);  Surgeon: Valeriy Sotelo MD;  Location: [facility] (2ND FLR);  Service: Endoscopy;  Laterality: N/A;    ERCP N/A 11/4/2021    Procedure: ERCP (ENDOSCOPIC RETROGRADE CHOLANGIOPANCREATOGRAPHY);  Surgeon: Valeriy Sotelo MD;  Location: [facility] (2ND FLR);  Service: Endoscopy;  Laterality: N/A;    ERCP N/A 11/4/2021    Procedure: ERCP (ENDOSCOPIC RETROGRADE CHOLANGIOPANCREATOGRAPHY);  Surgeon: Roselia Pereyra MD;  Location: [facility] ENDO (2ND FLR);  Service: Endoscopy;  Laterality: N/A;  pt vaccinated, instructed to bring card to procedure, instructions sent portal-MG    ERCP N/A 1/14/2022    Procedure: ERCP (ENDOSCOPIC RETROGRADE CHOLANGIOPANCREATOGRAPHY);  Surgeon: Roselia Pereyra MD;  Location: [facility] ENDO (2ND FLR);  Service: Endoscopy;  Laterality: N/A;  inst  portal-right chest port-tb  Pt requested at home COVID testing, Pt instructed at home RAPID to be done morning of procedure, Pt instructed to call endoscopy scheuding if there is a positive result, Pt informed if a positive     ERCP N/A 3/31/2022    Procedure: ERCP (ENDOSCOPIC RETROGRADE CHOLANGIOPANCREATOGRAPHY);  Surgeon: Julio Cesar Alonzo MD;  Location: [facility] (2ND FLR);  Service: Endoscopy;  Laterality: N/A;  3/16: port L chest wall. pt to bring covid vaccination card. Instructions sent via portal.-SC  3/18/22-Updated instructions sent via portal re:new date/time-DS    EXPLORATORY LAPAROTOMY AFTER LIVER TRANSPLANTATION N/A 6/23/2022    Procedure: LAPAROTOMY, EXPLORATORY, AFTER LIVER TRANSPLANT;  Surgeon: Julio Cesar Jara MD;  [facility];  Service: Transplant;  Laterality: N/A;    INSERTION OF TUNNELED CENTRAL VENOUS CATHETER (CVC) WITH SUBCUTANEOUS PORT N/A 11/24/2021    Procedure: INSERTION, PORT-A-CATH;  Surgeon: Prem Syed MD;  Location: [facility] CATH LAB;  Service: Radiology;  Laterality: N/A;    LIVER TRANSPLANT N/A 6/21/2022    Procedure: TRANSPLANT, LIVER;  Surgeon: Sinan Cline MD;  [facility];  Service: Transplant;  Laterality: N/A;    REPAIR OF BILE DUCT N/A 6/23/2022    Procedure: REPAIR, BILE DUCT;  Surgeon: Julio Cesar Jara MD;  [facility];  Service: Transplant;  Laterality: N/A;    ROBOT-ASSISTED LAPAROSCOPIC LYMPHADENECTOMY USING DA МАРИНА XI  6/17/2022    Procedure: XI ROBOTIC LYMPHADENECTOMY - Portal;  Surgeon: Julio Cesar Jara MD;  [facility];  Service: Transplant;;    TONSILLECTOMY      XI ROBOTIC LAPAROSCOPY, EXPLORATORY N/A 6/17/2022    Procedure: XI ROBOTIC LAPAROSCOPY,EXPLORATORY;  Surgeon: Julio Cesar Jara MD;  [facility];  Service: Transplant;  Laterality: N/A;       Review of patient's allergies indicates:   Allergen Reactions    Zosyn [piperacillin-tazobactam] Swelling     Lip swelling       Family History        Problem Relation (Age of Onset)    Hyperlipidemia Father    No Known Problems Mother, Sister, Brother          Tobacco Use    Smoking status: Never    Smokeless tobacco: Never   Substance and Sexual Activity    Alcohol use: Not Currently    Drug use: Never    Sexual activity: Yes       (Not in a hospital admission)      Review of Systems   Constitutional:  Positive for chills and fever. Negative for activity change and appetite change.   HENT:  Negative for facial swelling and trouble swallowing.    Respiratory:  Negative for cough, chest tightness and shortness of breath.    Cardiovascular:  Negative for chest pain, palpitations and leg swelling.   Gastrointestinal:  Negative for abdominal distention, abdominal pain, diarrhea, nausea and vomiting.   Genitourinary:  Negative for decreased urine volume, difficulty urinating and dysuria.   Skin:  Negative for wound.   Allergic/Immunologic: Positive for immunocompromised state.   Neurological:  Negative for dizziness, tremors, weakness and light-headedness.   Psychiatric/Behavioral:  Negative for confusion.    All other systems reviewed and are negative.  Objective:     Vital Signs (Most Recent):  Temp: 100.2 °F (37.9 °C) (09/14/22 2308)  Pulse: 107 (09/15/22 0008)  Resp: 18 (09/14/22 2156)  BP: 130/88 (09/15/22 0008)  SpO2: 98 % (09/15/22 0008) Vital Signs (24h Range):  Temp:  [99.4 °F (37.4 °C)-100.2 °F (37.9 °C)] 100.2 °F (37.9 °C)  Pulse:  [] 107  Resp:  [16-18] 18  SpO2:  [98 %] 98 %  BP: (130-138)/(83-91) 130/88     Weight: 73.9 kg (162 lb 14.7 oz)  Body mass index is 24.77 kg/m².    No intake or output data in the 24 hours ending 09/15/22 0050    Physical Exam  Vitals and nursing note reviewed.   Constitutional:       General: He is not in acute distress.  HENT:      Head: Normocephalic.   Eyes:      General: No scleral icterus.  Cardiovascular:      Rate and Rhythm: Normal rate and regular rhythm.      Pulses: Normal pulses.      Heart sounds: Normal  "heart sounds, S1 normal and S2 normal.   Pulmonary:      Effort: Pulmonary effort is normal. No respiratory distress.      Breath sounds: Normal breath sounds. No wheezing or rales.   Abdominal:      General: A surgical scar is present. Bowel sounds are normal. There is no distension.      Palpations: Abdomen is soft.      Tenderness: There is no abdominal tenderness. There is no guarding or rebound.      Comments: Well healed chevron incision  R PTC drain / capped / no s/s/i   Musculoskeletal:         General: No swelling.      Cervical back: Normal range of motion.      Right lower leg: No edema.      Left lower leg: No edema.   Skin:     General: Skin is warm and dry.      Coloration: Skin is not jaundiced.   Neurological:      Mental Status: He is alert and oriented to person, place, and time.      Motor: No weakness.   Psychiatric:         Attention and Perception: Attention normal.         Mood and Affect: Mood normal.         Speech: Speech normal.         Behavior: Behavior normal. Behavior is cooperative.         Thought Content: Thought content normal.       Laboratory:  CBC:   Recent Labs   Lab 09/14/22 2053   WBC 3.48*   RBC 3.45*   HGB 10.7*   HCT 31.6*   PLT 90*   MCV 92   MCH 31.0   MCHC 33.9     CMP:   Recent Labs   Lab 09/14/22 2053   *   CALCIUM 9.7   ALBUMIN 3.7   PROT 6.7   *   K 4.1   CO2 23      BUN 15   CREATININE 1.2   ALKPHOS 177*   ALT 58*   AST 34   BILITOT 1.7*       Labs within the past 24 hours have been reviewed.    Diagnostic Results:  I have personally reviewed all pertinent imaging studies.  CXR w/o acute CP process   Liver US pending     Assessment/Plan:     * Fever  - tmax 102 at home, 103 at [facility]   - started on zosyn and vanc  - LFTs elevated, liver US pending   - cont to monitor       Pancytopenia  - chronic     Elevated LFTs  - US pending      Long-term use of immunosuppressant medication  - see "prophylactic immunotherapy"      At risk for opportunistic " "infections  - cont OI prophylaxis per protocol       Prophylactic immunotherapy  - cont tacro, monitor levels for toxicity and therapeutic effects   - cellcept held for fever       S/P liver transplant  - s/p living 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 for cholangiocarcinoma (vick-en-y biliary anastomosis)  - post op with several issues relating to bile leak/bilomas requiring multiple biliary tube upsizings since transplant, infections with enterococcus/lactobacillus/candida   - presents with fever and elevated LFTs   - see "fever" and "elevated LFTs"   - cont to monitor with daily CMP     Elevated liver enzymes  - liver US pending   - biliary tube uncapped and flowing to gravity      Hypertension  - cont home meds               Francine Jaramillo PA-C  Liver Transplant  [facility] - Transplant Stepdown      "

## 2022-09-15 NOTE — SUBJECTIVE & OBJECTIVE
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  Service: Endoscopy;  Laterality: N/A;  wife to email vacc card-inst email-tb    ERCP N/A 10/20/2021    Procedure: ERCP (ENDOSCOPIC RETROGRADE CHOLANGIOPANCREATOGRAPHY);  Surgeon: Valeriy Sotelo MD;  [address]  Service: Endoscopy;  Laterality: N/A;    ERCP N/A 11/4/2021    Procedure: ERCP (ENDOSCOPIC RETROGRADE CHOLANGIOPANCREATOGRAPHY);  Surgeon: Valeriy Sotelo MD;  [address]  Service: Endoscopy;  Laterality: N/A;    ERCP N/A 11/4/2021    Procedure: ERCP (ENDOSCOPIC RETROGRADE CHOLANGIOPANCREATOGRAPHY);  Surgeon: Roselia Pereyra MD;  [facility]  Service: Endoscopy;  Laterality: N/A;  pt vaccinated, instructed to bring card to procedure, instructions sent portal-MG    ERCP N/A 1/14/2022    Procedure: ERCP (ENDOSCOPIC RETROGRADE CHOLANGIOPANCREATOGRAPHY);  Surgeon: Roselia Pereyra MD;  [facility]  Service: Endoscopy;  Laterality: N/A;  inst portal-right chest port-tb  Pt requested at home COVID testing, Pt instructed at home RAPID to be done morning of procedure, Pt instructed to call endoscopy scheuding if there is a positive result, Pt informed if a positive     ERCP N/A 3/31/2022    Procedure: ERCP (ENDOSCOPIC RETROGRADE CHOLANGIOPANCREATOGRAPHY);   Surgeon: Julio Cesar Alonzo MD;  Location: [facility] ENDO (2ND FLR);  Service: Endoscopy;  Laterality: N/A;  3/16: port L chest wall. pt to bring covid vaccination card. Instructions sent via portal.-SC  3/18/22-Updated instructions sent via portal re:new date/time-DS    EXPLORATORY LAPAROTOMY AFTER LIVER TRANSPLANTATION N/A 6/23/2022    Procedure: LAPAROTOMY, EXPLORATORY, AFTER LIVER TRANSPLANT;  Surgeon: Julio Cesar Jara MD;  Location: [facility] OR 2ND FLR;  Service: Transplant;  Laterality: N/A;    INSERTION OF TUNNELED CENTRAL VENOUS CATHETER (CVC) WITH SUBCUTANEOUS PORT N/A 11/24/2021    Procedure: INSERTION, PORT-A-CATH;  Surgeon: Prem Syed MD;  Location: [facility] CATH LAB;  Service: Radiology;  Laterality: N/A;    LIVER TRANSPLANT N/A 6/21/2022    Procedure: TRANSPLANT, LIVER;  Surgeon: Sinan Cline MD;  Location: [facility] OR [facility] FLR;  Service: Transplant;  Laterality: N/A;    REPAIR OF BILE DUCT N/A 6/23/2022    Procedure: REPAIR, BILE DUCT;  Surgeon: Julio Cesar Jara MD;  Location: [facility] OR [facility] FLR;  Service: Transplant;  Laterality: N/A;    ROBOT-ASSISTED LAPAROSCOPIC LYMPHADENECTOMY USING DA МАРИНА XI  6/17/2022    Procedure: XI ROBOTIC LYMPHADENECTOMY - Portal;  Surgeon: Julio Cesar Jara MD;  Location: [facility] OR 2ND FLR;  Service: Transplant;;    TONSILLECTOMY      XI ROBOTIC LAPAROSCOPY, EXPLORATORY N/A 6/17/2022    Procedure: XI ROBOTIC LAPAROSCOPY,EXPLORATORY;  Surgeon: Julio Cesar Jara MD;  Location: [facility] OR [facility] FLR;  Service: Transplant;  Laterality: N/A;       Review of patient's allergies indicates:   Allergen Reactions    Zosyn [piperacillin-tazobactam] Swelling     Lip swelling       Family History       Problem Relation (Age of Onset)    Hyperlipidemia Father    No Known Problems Mother, Sister, Brother          Tobacco Use    Smoking status: Never    Smokeless tobacco: Never   Substance and Sexual Activity    Alcohol use: Not Currently    Drug use: Never    Sexual activity: Yes       (Not in a hospital  admission)      Review of Systems   Constitutional:  Positive for chills and fever. Negative for activity change and appetite change.   HENT:  Negative for facial swelling and trouble swallowing.    Respiratory:  Negative for cough, chest tightness and shortness of breath.    Cardiovascular:  Negative for chest pain, palpitations and leg swelling.   Gastrointestinal:  Negative for abdominal distention, abdominal pain, diarrhea, nausea and vomiting.   Genitourinary:  Negative for decreased urine volume, difficulty urinating and dysuria.   Skin:  Negative for wound.   Allergic/Immunologic: Positive for immunocompromised state.   Neurological:  Negative for dizziness, tremors, weakness and light-headedness.   Psychiatric/Behavioral:  Negative for confusion.    All other systems reviewed and are negative.  Objective:     Vital Signs (Most Recent):  Temp: 100.2 °F (37.9 °C) (09/14/22 2308)  Pulse: 107 (09/15/22 0008)  Resp: 18 (09/14/22 2156)  BP: 130/88 (09/15/22 0008)  SpO2: 98 % (09/15/22 0008) Vital Signs (24h Range):  Temp:  [99.4 °F (37.4 °C)-100.2 °F (37.9 °C)] 100.2 °F (37.9 °C)  Pulse:  [] 107  Resp:  [16-18] 18  SpO2:  [98 %] 98 %  BP: (130-138)/(83-91) 130/88     Weight: 73.9 kg (162 lb 14.7 oz)  Body mass index is 24.77 kg/m².    No intake or output data in the 24 hours ending 09/15/22 0050    Physical Exam  Vitals and nursing note reviewed.   Constitutional:       General: He is not in acute distress.  HENT:      Head: Normocephalic.   Eyes:      General: No scleral icterus.  Cardiovascular:      Rate and Rhythm: Normal rate and regular rhythm.      Pulses: Normal pulses.      Heart sounds: Normal heart sounds, S1 normal and S2 normal.   Pulmonary:      Effort: Pulmonary effort is normal. No respiratory distress.      Breath sounds: Normal breath sounds. No wheezing or rales.   Abdominal:      General: A surgical scar is present. Bowel sounds are normal. There is no distension.      Palpations: Abdomen  is soft.      Tenderness: There is no abdominal tenderness. There is no guarding or rebound.      Comments: Well healed chevron incision  R PTC drain / capped / no s/s/i   Musculoskeletal:         General: No swelling.      Cervical back: Normal range of motion.      Right lower leg: No edema.      Left lower leg: No edema.   Skin:     General: Skin is warm and dry.      Coloration: Skin is not jaundiced.   Neurological:      Mental Status: He is alert and oriented to person, place, and time.      Motor: No weakness.   Psychiatric:         Attention and Perception: Attention normal.         Mood and Affect: Mood normal.         Speech: Speech normal.         Behavior: Behavior normal. Behavior is cooperative.         Thought Content: Thought content normal.       Laboratory:  CBC:   Recent Labs   Lab 09/14/22 2053   WBC 3.48*   RBC 3.45*   HGB 10.7*   HCT 31.6*   PLT 90*   MCV 92   MCH 31.0   MCHC 33.9     CMP:   Recent Labs   Lab 09/14/22 2053   *   CALCIUM 9.7   ALBUMIN 3.7   PROT 6.7   *   K 4.1   CO2 23      BUN 15   CREATININE 1.2   ALKPHOS 177*   ALT 58*   AST 34   BILITOT 1.7*       Labs within the past 24 hours have been reviewed.    Diagnostic Results:  I have personally reviewed all pertinent imaging studies.  CXR w/o acute CP process   Liver US pending

## 2022-09-15 NOTE — FIRST PROVIDER EVALUATION
"Medical screening examination initiated.  I have conducted a focused provider triage encounter, findings are as follows:    Brief history of present illness:  42 male history of cholangiocarcinoma s/p liver transplant 6/22/22 here for fever 101, mild jaundice.  No abdominal pain, nausea or vomiting.    Scheduled for cholangiogram on Monday    Vitals:    09/14/22 1953   BP: 138/83   Pulse: (!) 121   Resp: 16   Temp: 99.4 °F (37.4 °C)   TempSrc: Oral   Weight: 73.9 kg (163 lb)   Height: 5' 8" (1.727 m)       Pertinent physical exam:  + mild jaundice, abd soft- nt    Brief workup plan:  labs,   Preliminary workup initiated; this workup will be continued and followed by the physician or advanced practice provider that is assigned to the patient when roomed.  "

## 2022-09-15 NOTE — ED TRIAGE NOTES
This is a 41 yo male with hx of biliary cancer who is now 2 months s/p liver transplant. Patient felt somewhat malasied today after work and found his temperature 100.2F. He knows to come to the ED for a fever so here he is. Denies headache, URI symptoms, nausea, vomiting, abdominal pain, urinary symptoms. Denies any symptoms except for malaise or fever. Hx of infection of transplant.

## 2022-09-15 NOTE — SUBJECTIVE & OBJECTIVE
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  Service: Endoscopy;  Laterality: N/A;  wife to email vacc card-inst email-tb    ERCP N/A 10/20/2021    Procedure: ERCP (ENDOSCOPIC RETROGRADE CHOLANGIOPANCREATOGRAPHY);  Surgeon: Valeriy Sotelo MD;  [address]  Service: Endoscopy;  Laterality: N/A;    ERCP N/A 11/4/2021    Procedure: ERCP (ENDOSCOPIC RETROGRADE CHOLANGIOPANCREATOGRAPHY);  Surgeon: Valeriy Sotelo MD;  [address]  Service: Endoscopy;  Laterality: N/A;    ERCP N/A 11/4/2021    Procedure: ERCP (ENDOSCOPIC RETROGRADE CHOLANGIOPANCREATOGRAPHY);  Surgeon: Roselia Pereyra MD;  [facility]  Service: Endoscopy;  Laterality: N/A;  pt vaccinated, instructed to bring card to procedure, instructions sent portal-MG    ERCP N/A 1/14/2022    Procedure: ERCP (ENDOSCOPIC RETROGRADE CHOLANGIOPANCREATOGRAPHY);  Surgeon: Roselia Pereyra MD;  [facility]  Service: Endoscopy;  Laterality: N/A;  inst portal-right chest port-tb  Pt requested at home COVID testing, Pt instructed at home RAPID to be done morning of procedure, Pt instructed to call endoscopy scheuding if there is a positive result, Pt informed if a positive     ERCP N/A 3/31/2022    Procedure: ERCP (ENDOSCOPIC RETROGRADE CHOLANGIOPANCREATOGRAPHY);   Surgeon: Julio Cesar Alonzo MD;  Location: [facility] ENDO (2ND FLR);  Service: Endoscopy;  Laterality: N/A;  3/16: port L chest wall. pt to bring covid vaccination card. Instructions sent via portal.-SC  3/18/22-Updated instructions sent via portal re:new date/time-DS    EXPLORATORY LAPAROTOMY AFTER LIVER TRANSPLANTATION N/A 6/23/2022    Procedure: LAPAROTOMY, EXPLORATORY, AFTER LIVER TRANSPLANT;  Surgeon: Julio Cesar Jara MD;  Location: [facility] OR 2ND FLR;  Service: Transplant;  Laterality: N/A;    INSERTION OF TUNNELED CENTRAL VENOUS CATHETER (CVC) WITH SUBCUTANEOUS PORT N/A 11/24/2021    Procedure: INSERTION, PORT-A-CATH;  Surgeon: Prem Syed MD;  Location: [facility] CATH LAB;  Service: Radiology;  Laterality: N/A;    LIVER TRANSPLANT N/A 6/21/2022    Procedure: TRANSPLANT, LIVER;  Surgeon: Sinan Cline MD;  Location: [facility] OR [facility] FLR;  Service: Transplant;  Laterality: N/A;    REPAIR OF BILE DUCT N/A 6/23/2022    Procedure: REPAIR, BILE DUCT;  Surgeon: Juilo Cesar Jara MD;  Location: [facility] OR [facility] FLR;  Service: Transplant;  Laterality: N/A;    ROBOT-ASSISTED LAPAROSCOPIC LYMPHADENECTOMY USING DA МАРИНА XI  6/17/2022    Procedure: XI ROBOTIC LYMPHADENECTOMY - Portal;  Surgeon: Julio Cesar Jara MD;  Location: [facility] OR 2ND FLR;  Service: Transplant;;    TONSILLECTOMY      XI ROBOTIC LAPAROSCOPY, EXPLORATORY N/A 6/17/2022    Procedure: XI ROBOTIC LAPAROSCOPY,EXPLORATORY;  Surgeon: Julio Cesar Jara MD;  Location: [facility] OR [facility] FLR;  Service: Transplant;  Laterality: N/A;       Review of patient's allergies indicates:   Allergen Reactions    Zosyn [piperacillin-tazobactam] Swelling     Lip swelling       Medications:  Medications Prior to Admission   Medication Sig    aspirin 81 MG Chew CHEW 1 tablet (81 mg total) by mouth once daily.    blood sugar diagnostic Strp 1 each by Misc.(Non-Drug; Combo Route) route 3 (three) times daily.    blood-glucose meter Misc Use as instructed    calcium carbonate-vitamin D3 600 mg-20 mcg (800 unit) Tab Take 1  "tablet by mouth once daily at 6am.    carvediloL (COREG) 6.25 MG tablet Take 0.5 tablets (3.125 mg total) by mouth 2 (two) times daily with meals.    lancets 30 gauge Misc 1 each by Misc.(Non-Drug; Combo Route) route 3 (three) times daily.    mirtazapine (REMERON) 15 MG tablet Take 1 tablet (15 mg total) by mouth nightly.    mycophenolate (CELLCEPT) 250 mg Cap Take 2 capsules (500 mg total) by mouth 2 (two) times daily.    pantoprazole (PROTONIX) 40 MG tablet Take 1 tablet (40 mg total) by mouth once daily.    pen needle, diabetic 32 gauge x 5/32" Ndle 1 each by Misc.(Non-Drug; Combo Route) route 3 (three) times daily.    sulfamethoxazole-trimethoprim 400-80mg (BACTRIM,SEPTRA) 400-80 mg per tablet Take 1 tablet by mouth once daily. Stop: 12/18/22    tacrolimus (PROGRAF) 1 MG Cap Take 2 capsules (2 mg total) every morning AND 1 capsule (1 mg total) every evening.    ursodioL (ACTIGALL) 250 mg Tab Take 1 tablet (250 mg total) by mouth 2 (two) times a day.    valGANciclovir (VALCYTE) 450 mg Tab Take 1 tablet (450 mg total) by mouth once daily. STOP 9/19/22     Antibiotics (From admission, onward)      Start     Stop Route Frequency Ordered    09/15/22 2200  vancomycin in dextrose 5 % 1 gram/250 mL IVPB 1,000 mg         -- IV Every 12 hours (non-standard times) 09/15/22 1013    09/15/22 1500  cefepime in dextrose 5 % IVPB 2 g         -- IV Every 8 hours (non-standard times) 09/15/22 1339    09/15/22 1112  vancomycin - pharmacy to dose  (vancomycin IVPB)        See Hyperspace for full Linked Orders Report.    -- IV pharmacy to manage frequency 09/15/22 1013    09/15/22 0900  sulfamethoxazole-trimethoprim 400-80mg per tablet 1 tablet         -- Oral Daily 09/14/22 2331          Antifungals (From admission, onward)      None          Antivirals (From admission, onward)          Stop Route Frequency     valGANciclovir         -- Oral Daily             Immunization History   Administered Date(s) Administered    COVID-19, " MRNA, LN-S, PF (MODERNA FULL 0.5 ML DOSE) 07/17/2021, 08/14/2021    Hepatitis B (recombinant) Adjuvanted, 2 dose 06/15/2022    Pneumococcal Conjugate - 13 Valent 06/15/2022    Tdap 06/15/2022    Zoster Recombinant 06/15/2022       Family History       Problem Relation (Age of Onset)    Hyperlipidemia Father    No Known Problems Mother, Sister, Brother          Social History     Socioeconomic History    Marital status:    Tobacco Use    Smoking status: Never    Smokeless tobacco: Never   Substance and Sexual Activity    Alcohol use: Not Currently    Drug use: Never    Sexual activity: Yes     Review of Systems   Constitutional:  Positive for fever. Negative for activity change and chills.   HENT:  Negative for congestion and sore throat.    Eyes:  Negative for redness and visual disturbance.   Respiratory:  Negative for cough and shortness of breath.    Cardiovascular:  Negative for chest pain.   Gastrointestinal:  Negative for abdominal pain, constipation, diarrhea, nausea and vomiting.   Genitourinary:  Negative for dysuria and flank pain.   Musculoskeletal:  Negative for arthralgias, back pain and myalgias.   Skin:  Negative for rash and wound.   Allergic/Immunologic: Positive for immunocompromised state. Negative for food allergies.   Neurological:  Negative for dizziness and headaches.   Hematological:  Negative for adenopathy. Does not bruise/bleed easily.   Psychiatric/Behavioral:  Negative for behavioral problems. The patient is not nervous/anxious.    Objective:     Vital Signs (Most Recent):  Temp: 97.9 °F (36.6 °C) (09/15/22 1128)  Pulse: 87 (09/15/22 1128)  Resp: 18 (09/15/22 1128)  BP: 112/71 (09/15/22 1128)  SpO2: 98 % (09/15/22 1128) Vital Signs (24h Range):  Temp:  [97.9 °F (36.6 °C)-103 °F (39.4 °C)] 97.9 °F (36.6 °C)  Pulse:  [] 87  Resp:  [16-18] 18  SpO2:  [96 %-98 %] 98 %  BP: (112-143)/(71-91) 112/71     Weight: 73.9 kg (162 lb 14.7 oz)  Body mass index is 24.77 kg/m².    Estimated  Creatinine Clearance: 76 mL/min (based on SCr of 1.2 mg/dL).    Physical Exam  Vitals and nursing note reviewed.   Constitutional:       General: He is not in acute distress.     Appearance: He is normal weight. He is not ill-appearing.   HENT:      Head: Normocephalic.      Right Ear: External ear normal.      Left Ear: External ear normal.      Nose: Nose normal.      Mouth/Throat:      Mouth: Mucous membranes are moist.      Pharynx: Oropharynx is clear.   Eyes:      General: No scleral icterus.     Extraocular Movements: Extraocular movements intact.      Conjunctiva/sclera: Conjunctivae normal.   Cardiovascular:      Rate and Rhythm: Normal rate and regular rhythm.      Pulses: Normal pulses.      Heart sounds: S1 normal and S2 normal. No murmur heard.  Pulmonary:      Effort: Pulmonary effort is normal. No respiratory distress.      Breath sounds: Normal breath sounds. No wheezing.   Abdominal:      General: A surgical scar is present. Bowel sounds are normal. There is no distension.      Palpations: Abdomen is soft.      Tenderness: There is no abdominal tenderness. There is no guarding or rebound.      Comments: Well healed chevron incision. R SHRUTHI drain with bilious output.    Musculoskeletal:         General: No swelling.      Cervical back: Neck supple.      Right lower leg: No edema.      Left lower leg: No edema.   Skin:     General: Skin is warm and dry.      Coloration: Skin is not jaundiced.      Findings: No rash.   Neurological:      Mental Status: He is alert and oriented to person, place, and time. Mental status is at baseline.      Motor: No weakness.   Psychiatric:         Attention and Perception: Attention normal.         Mood and Affect: Mood normal.         Speech: Speech normal.         Behavior: Behavior normal. Behavior is cooperative.         Thought Content: Thought content normal.         Judgment: Judgment normal.       Significant Labs:   Microbiology Results (last 7 days)        Procedure Component Value Units Date/Time    Blood culture x two cultures. Draw prior to antibiotics. [[id number]] Collected: 09/14/22 2115    Order Status: Completed Specimen: Blood from Line, Port A Cath Updated: 09/15/22 0515     Blood Culture, Routine No Growth to date    Narrative:      Aerobic and anaerobic    Blood culture x two cultures. Draw prior to antibiotics. [[id number]] Collected: 09/14/22 2144    Order Status: Completed Specimen: Blood from Line, Port A Cath Updated: 09/15/22 0515     Blood Culture, Routine No Growth to date    Narrative:      Aerobic and anaerobic          Pathology Results  (Last 10 years)                 08/25/22 1123  Specimen to Pathology, Radiology Liver biopsy Edited Result - FINAL    Narrative:  Transplant liver biopsy   R/O rejection   Release to patient->Immediate       06/21/22 2346  Specimen to Pathology, Surgery Liver Final result    Narrative:  Pre-op Diagnosis: Organ transplant candidate [Z76.82]   Hilar cholangiocarcinoma [C24.0]   Procedure(s):   TRANSPLANT, LIVER   ULTRASOUND, DIAGNOSTIC   Number of specimens:1   Name of specimens: 1) old liver (permanent)   Which provider would you like to cc?->AMNA BRUNSON   Release to patient->Immediate   Specimen total (fresh, frozen, permanent):->1       06/17/22 0850  Specimen to Pathology, Surgery Liver Final result    Narrative:  Pre-op Diagnosis: Hilar cholangiocarcinoma [C24.0]   Procedure(s):   XI ROBOTIC LAPAROSCOPY,EXPLORATORY   XI ROBOTIC LYMPHADENECTOMY - Portal   Number of specimens: 1   Name of specimens: 1. Anna-Portal Tissue - Permanent;   Which provider would you like to cc?->KALEE BANGURA   Release to patient->Immediate   Specimen total (fresh, frozen, permanent):->1       01/14/22 0956  Specimen to Pathology, Surgery Gastrointestinal tract Final result    Narrative:  Pre-op Diagnosis: Biliary stricture [K83.1]   Procedure(s):   ERCP (ENDOSCOPIC RETROGRADE CHOLANGIOPANCREATOGRAPHY)   Number of specimens: 1   Name  of specimens: Jar 1:  Bile duct stricture biopsy   Release to patient->Immediate   Specimen total (fresh, frozen, permanent):->1       11/04/21 1003  Specimen to Pathology, Surgery Pancreas and Bile ducts Final result    Narrative:  Pre-op Diagnosis: Cholangitis [K83.09]   Procedure(s):   ERCP (ENDOSCOPIC RETROGRADE CHOLANGIOPANCREATOGRAPHY)   ULTRASOUND, ENDOSCOPIC, WITH FINE NEEDLE ASPIRATION   Number of specimens: 1   Name of specimens: Jar 1:  Bile duct stricture biopsy   Release to patient->Immediate   Specimen total (fresh, frozen, permanent):->1       10/20/21 1017  Specimen to Pathology, Surgery Pancreas and Bile ducts Final result    Narrative:  Pre-op Diagnosis: Obstructive jaundice [K83.1]   Procedure(s):   ULTRASOUND, UPPER GI TRACT, ENDOSCOPIC   ERCP (ENDOSCOPIC RETROGRADE CHOLANGIOPANCREATOGRAPHY)   Number of specimens: 1   Name of specimens: Jar 1:  Bifurcation stricture biopsy   Release to patient->Immediate   Specimen total (fresh, frozen, permanent):->1             All pertinent labs within the past 24 hours have been reviewed.  Recent Lab Results  (Last 5 results in the past 24 hours)        09/15/22  0521   09/15/22  0148   09/15/22  0021   09/14/22  2145   09/14/22  2144        Albumin 3.6               Alkaline Phosphatase 165               ALT 57               Anion Gap 8               Appearance, UA       Clear         AST 34               Baso # 0.01               Basophil % 0.3               Bilirubin (UA)       Negative         BILIRUBIN TOTAL 1.7  Comment: For infants and newborns, interpretation of results should be based  on gestational age, weight and in agreement with clinical  observations.    Premature Infant recommended reference ranges:  Up to 24 hours.............<8.0 mg/dL  Up to 48 hours............<12.0 mg/dL  3-5 days..................<15.0 mg/dL  6-29 days.................<15.0 mg/dL                 Blood Culture, Routine         No Growth to date  [P]       BUN 12                Calcium 9.6               Chloride 100               CO2 23               Color, UA       Colorless         Creatinine 1.2               Differential Method Automated               eGFR >60.0               Eos # 0.0               Eosinophil % 0.0               Glucose 209               Glucose, UA       Negative         Gran # (ANC) 2.7               Gran % 89.3               Hematocrit 31.3               Hemoglobin 10.9               Immature Grans (Abs) 0.01  Comment: Mild elevation in immature granulocytes is non specific and   can be seen in a variety of conditions including stress response,   acute inflammation, trauma and pregnancy. Correlation with other   laboratory and clinical findings is essential.                 Immature Granulocytes 0.3               Ketones, UA       Negative         Lactate, Rommel   0.9  Comment: Falsely low lactic acid results can be found in samples   containing >=13.0 mg/dL total bilirubin and/or >=3.5 mg/dL   direct bilirubin.               Leukocytes, UA       Negative         Lymph # 0.2               Lymph % 7.5               Magnesium 1.2               MCH 30.5               MCHC 34.8               MCV 88               Mono # 0.1               Mono % 2.6               MPV 8.6               NITRITE UA       Negative         nRBC 0               Occult Blood UA       Negative         pH, UA       6.0         Phosphorus 2.9               Platelets 74               Potassium 4.4               PROTEIN TOTAL 6.7               Protein, UA       Negative  Comment: Recommend a 24 hour urine protein or a urine   protein/creatinine ratio if globulin induced proteinuria is  clinically suspected.           RBC 3.57               RDW 14.7               SARS-CoV-2 RNA, Amplification, Qual     Negative  Comment: This test utilizes isothermal nucleic acid amplification   technology to detect the SARS-CoV-2 RdRp nucleic acid segment.   The analytical sensitivity (limit of detection) is 125  genome   equivalents/mL.     A POSITIVE result implies infection with the SARS-CoV-2 virus;  the patient is presumed to be contagious.    A NEGATIVE result means that SARS-CoV-2 nucleic acids are not  present above the limit of detection. A NEGATIVE result should be   treated as presumptive. It does not rule out the possibility of   COVID-19 and should not be the sole basis for treatment decisions.   If COVID-19 is strongly suspected based on clinical and exposure   history, re-testing using an alternate molecular assay should be   considered.       This test is only for use under the Food and Drug   Administration s Emergency Use Authorization (EUA).   Commercial kits are provided by "[employer].   Performance characteristics of the EUA have been independently  verified by [facility] Department of  Pathology and Laboratory Medicine.   _________________________________________________________________  The ID NOW COVID-19 Letter of Authorization, along with the   authorized Fact Sheet for Healthcare Providers, the authorized Fact  Sheet for Patients, and authorized labeling are available on the FDA   website:  www.fda.gov/MedicalDevices/Safety/EmergencySituations/ngi633046.htm             Sodium 131               Specific [place], UA       1.010         Specimen UA       Urine, Clean Catch         Tacrolimus Lvl 9.7  Comment: Testing performed by a chemiluminescent microparticle   immunoassay on the [employer] System.                 WBC 3.07                                       [P] - Preliminary Result               Significant Imaging: I have reviewed all pertinent imaging results/findings within the past 24 hours.

## 2022-09-15 NOTE — HOSPITAL COURSE
Patient admitted for fevers. Tmax 103 in ED. Infectious work-up ordered on admit and patient given Vanc/Zosyn in ED. Patient's PTC drain opened to gravity on admit, previously clamped.     Interval note: BARBARA Pt remains afebrile overnight and this morning. Patient states he is feeling good this morning. Liver enzymes WNL. T bili down to 0.8 from 1.7.  Continue cefepime/vanc inpatient, ID consulted. Blood cultures NGTD thus far. Tolerating diet, no nausea/ vomiting/ or diarrhea noted. VSS. Continue to monitor.

## 2022-09-15 NOTE — PLAN OF CARE
Pt. Arrived to [facility] rm [id number] via stretcher. VSS--temp of 103--PRN tylenol administered, spo2 >94% on room air. Skin CDI. RUQ biliary bag unclamped per orders from TANIA CLAY--draining to gravity. Liver Ultrasound pending. Family at bedside--attentive to pt. Bed in low locked position, call light within reach, pt instructed to call for assistance as needed, WCTM.

## 2022-09-15 NOTE — ED NOTES
General: Awake and alert.  Neck: Supple  Respiratory: Nonlabored respirations. No audible wheeze. Speaking in full sentences. Port noted to right upper chest.  Cardiac: Well-perfused. No acrocyanosis.  Abdomen: Supple  Neurological: Moves all extremities symmetrically and equally. Answers questions appropriately.

## 2022-09-15 NOTE — ASSESSMENT & PLAN NOTE
42 year old male with history of cholangiocarcinoma s/p living 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 (vick-en-y biliary anastomosis, steroid induction, CMV D-R+, mmf/tacro/pred) post-op course notable for bile leak, s/p OR take back 6/23 for bile duct repair, cultures grew E faecalis, s/p exlap 7/4 washout of bioloma with OR cultures with lactobacillus sp. s/p PTC drain on 7/13, c/b C glabrata (resistant to fluconazole) fungemia, s/p new drain placement on 8/25, HTN, HLD; admitted on 9/14/2022 with 1 day of fever. At ED, patient developed lip edema with pip-tazo (allergy profile updated). ID consulted for antibiotic assistance.      Recommendations:  - Follow up IR Cholangiogram findings. If culture obtained, please send routine and anaerobic cultures, fungal culture, AFB smear/culture, and surgical pathology.   - Continue vancomycin.   - Stop pip-tazo and start cefepime 2 g IV q8h.   - Continue prophylactic valganciclovir and Bactrim.

## 2022-09-15 NOTE — PROVIDER PROGRESS NOTES - EMERGENCY DEPT.
Encounter Date: 9/14/2022    ED Physician Progress Notes         EKG - STEMI Decision  Initial Reading: No STEMI present.  Response: No Action Needed.

## 2022-09-15 NOTE — NURSING
Per NP patient to be NPO from this time.  Spoke to patient who states understanding.  More info to come on rounds.      Patient leaving unit at this time for US at this time via wheelchair with transport assist.  Independent & ambulatory. Telemetry monitoring continues

## 2022-09-15 NOTE — PLAN OF CARE
Pt arrived to [facility] for cholangiogram. Pt oriented to unit and staff. Plan of care reviewed with patient, patient verbalizes understanding. Comfort measures utilized. Pt safely transferred from stretcher to procedural table. Fall risk reviewed with patient, fall risk interventions maintained. Safety strap applied, positioner pillows utilized to minimize pressure points. Blankets applied. Pt prepped and draped utilizing standard sterile technique. Patient placed on continuous monitoring, as required by sedation policy. Timeouts completed utilizing standard universal time-out, per department and facility policy. RN to remain at bedside, continuous monitoring maintained. Pt resting comfortably. Denies pain/discomfort. Will continue to monitor. See flow sheets for monitoring, medication administration, and updates.

## 2022-09-15 NOTE — ED PROVIDER NOTES
Encounter Date: 9/14/2022       History     Chief Complaint   Patient presents with    Fever     Pt reports fever this afternoon of 101, took tylenol, and now afebrile. Pt reports liver transplant in June. Denies any other complaints.     Pt is a 43yo  M presenting to the ED with subjective fevers at 101.0 at home without any other symptoms. Pt is s/p liver transplant on 06/21/22 by Dr Cline. Pt took 1 500mg tylenol at home that broke the fever but he was advised to present to the ED as a precaution for oadhj-hq-ghwe disease or acute sepsis. Pt denies any sick contacts or ingestion of any raw foods. Pt denies chills, cough, SOB, chest pain, surgical site pain, and areas of increased warmth or erythema on his skin. Pt denies any wounds on his skin. Pt currently takes cellcept and prograf and has not missed a dose. Pt has not had any change in appetite or activity level, and he denies any urinary or bowel symptoms.     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    Hypercholesteremia     Hypertension      Past Surgical History:   Procedure Laterality Date    DIAGNOSTIC ULTRASOUND N/A 6/21/2022    Procedure: ULTRASOUND, DIAGNOSTIC;  Surgeon: Sinan Cline MD;  Location: [facility] OR [address] [place] [address];  Service: Transplant;  Laterality: N/A;    ENDOSCOPIC ULTRASOUND OF UPPER GASTROINTESTINAL TRACT N/A 10/20/2021    Procedure: ULTRASOUND, UPPER GI TRACT, ENDOSCOPIC;  Surgeon: Valeriy Sotelo MD;  Location: [address]);  Service: Endoscopy;  Laterality: N/A;  wife to email vacc card-inst email-tb    ERCP N/A 10/20/2021    Procedure: ERCP (ENDOSCOPIC RETROGRADE CHOLANGIOPANCREATOGRAPHY);  Surgeon: Valeriy Sotelo MD;  Location: [address]);  Service: Endoscopy;  Laterality: N/A;     ERCP N/A 11/4/2021    Procedure: ERCP (ENDOSCOPIC RETROGRADE CHOLANGIOPANCREATOGRAPHY);  Surgeon: Valeriy Sotelo MD;  Location: [facility] ENDO (2ND FLR);  Service: Endoscopy;  Laterality: N/A;    ERCP N/A 11/4/2021    Procedure: ERCP (ENDOSCOPIC RETROGRADE CHOLANGIOPANCREATOGRAPHY);  Surgeon: Roselia Pereyra MD;  Location: [facility] ENDO (2ND FLR);  Service: Endoscopy;  Laterality: N/A;  pt vaccinated, instructed to bring card to procedure, instructions sent portal-MG    ERCP N/A 1/14/2022    Procedure: ERCP (ENDOSCOPIC RETROGRADE CHOLANGIOPANCREATOGRAPHY);  Surgeon: Roselia Pereyra MD;  Location: [facility] ENDO (2ND FLR);  Service: Endoscopy;  Laterality: N/A;  inst portal-right chest port-tb  Pt requested at home COVID testing, Pt instructed at home RAPID to be done morning of procedure, Pt instructed to call endoscopy scheuding if there is a positive result, Pt informed if a positive     ERCP N/A 3/31/2022    Procedure: ERCP (ENDOSCOPIC RETROGRADE CHOLANGIOPANCREATOGRAPHY);  Surgeon: Julio Cesar Alonzo MD;  Location: [facility] END[facility] (2ND FLR);  Service: Endoscopy;  Laterality: N/A;  3/16: port L chest wall. pt to bring covid vaccination card. Instructions sent via portal.-SC  3/18/22-Updated instructions sent via portal re:new date/time-DS    EXPLORATORY LAPAROTOMY AFTER LIVER TRANSPLANTATION N/A 6/23/2022    Procedure: LAPAROTOMY, EXPLORATORY, AFTER LIVER TRANSPLANT;  Surgeon: Julio Cesar Jara MD;  [facility];  Service: Transplant;  Laterality: N/A;    INSERTION OF TUNNELED CENTRAL VENOUS CATHETER (CVC) WITH SUBCUTANEOUS PORT N/A 11/24/2021    Procedure: INSERTION, PORT-A-CATH;  Surgeon: Prem Syed MD;  Location: [facility] CATH LAB;  Service: Radiology;  Laterality: N/A;    LIVER TRANSPLANT N/A 6/21/2022    Procedure: TRANSPLANT, LIVER;  Surgeon: Sinan Cline MD;  [facility];  Service: Transplant;  Laterality: N/A;    REPAIR OF BILE DUCT N/A 6/23/2022    Procedure: REPAIR, BILE DUCT;  Surgeon:  Julio Cesar Jara MD;  [facility];  Service: Transplant;  Laterality: N/A;    ROBOT-ASSISTED LAPAROSCOPIC LYMPHADENECTOMY USING DA МАРИНА XI  6/17/2022    Procedure: XI ROBOTIC LYMPHADENECTOMY - Portal;  Surgeon: Julio Cesar Jara MD;  Location: [facility] OR 2ND FLR;  Service: Transplant;;    TONSILLECTOMY      XI ROBOTIC LAPAROSCOPY, EXPLORATORY N/A 6/17/2022    Procedure: XI ROBOTIC LAPAROSCOPY,EXPLORATORY;  Surgeon: Julio Cesar Jara MD;  Location: [facility] OR [facility] FLR;  Service: Transplant;  Laterality: N/A;     Family History   Problem Relation Age of Onset    No Known Problems Mother     Hyperlipidemia Father     No Known Problems Sister     No Known Problems Brother      Social History     Tobacco Use    Smoking status: Never    Smokeless tobacco: Never   Substance Use Topics    Alcohol use: Not Currently    Drug use: Never     Review of Systems   Constitutional:  Positive for fever. Negative for activity change, appetite change, chills, fatigue and unexpected weight change.   HENT:  Negative for trouble swallowing and voice change.    Eyes: Negative.    Respiratory:  Negative for cough, chest tightness, shortness of breath and wheezing.    Cardiovascular:  Negative for chest pain, palpitations and leg swelling.   Gastrointestinal:  Negative for abdominal distention, abdominal pain, anal bleeding, blood in stool, constipation, diarrhea, nausea, rectal pain and vomiting.   Endocrine: Negative.    Genitourinary: Negative.    Musculoskeletal:  Negative for back pain, neck pain and neck stiffness.   Skin:  Negative for pallor, rash and wound.   Allergic/Immunologic: Negative.    Neurological:  Negative for dizziness, seizures, syncope, weakness, light-headedness, numbness and headaches.   Hematological: Negative.    Psychiatric/Behavioral:  Negative for confusion and sleep disturbance. The patient is not nervous/anxious.    All other systems reviewed and are negative.    Physical Exam     Initial Vitals [09/14/22 1953]    BP Pulse Resp Temp SpO2   138/83 (!) 121 16 99.4 °F (37.4 °C) 98 %      MAP       --         Physical Exam    Constitutional: He appears well-developed and well-nourished. He is not diaphoretic. No distress.   HENT:   Head: Normocephalic and atraumatic.   Mouth/Throat: No oropharyngeal exudate.   Eyes: EOM are normal. Pupils are equal, round, and reactive to light.   Cardiovascular:  Regular rhythm, normal heart sounds and intact distal pulses.           No murmur heard.  Tachycardia   Pulmonary/Chest: Breath sounds normal. No stridor. No respiratory distress. He has no wheezes. He has no rhonchi. He has no rales.   Abdominal: Abdomen is soft. Bowel sounds are normal. He exhibits no distension. There is no abdominal tenderness. There is no rebound.   Musculoskeletal:         General: No tenderness or edema. Normal range of motion.     Lymphadenopathy:     He has no cervical adenopathy.   Neurological: He is alert and oriented to person, place, and time. He has normal strength.   Skin: Skin is warm and dry. Capillary refill takes less than 2 seconds. No rash noted. No erythema. No pallor.   Port under R clavicle without erythema, pain or induration.    Psychiatric: He has a normal mood and affect. Thought content normal.       ED Course   Procedures  Labs Reviewed   CBC W/ AUTO DIFFERENTIAL - Abnormal; Notable for the following components:       Result Value    WBC 3.48 (*)     RBC 3.45 (*)     Hemoglobin 10.7 (*)     Hematocrit 31.6 (*)     RDW 15.1 (*)     Platelets 90 (*)     MPV 9.0 (*)     Immature Granulocytes 0.6 (*)     Lymph # 0.3 (*)     Mono # 0.2 (*)     Gran % 83.9 (*)     Lymph % 9.2 (*)     All other components within normal limits   COMPREHENSIVE METABOLIC PANEL - Abnormal; Notable for the following components:    Sodium 131 (*)     Glucose 164 (*)     Total Bilirubin 1.7 (*)     Alkaline Phosphatase 177 (*)     ALT 58 (*)     Anion Gap 7 (*)     All other components within normal limits  "  URINALYSIS, REFLEX TO URINE CULTURE - Abnormal; Notable for the following components:    Color, UA Colorless (*)     All other components within normal limits    Narrative:     Specimen Source->Urine   CULTURE, BLOOD   CULTURE, BLOOD   LACTIC ACID, PLASMA   PROCALCITONIN   LACTIC ACID, PLASMA          Imaging Results              X-Ray Chest AP Portable (Final result)  Result time 09/14/22 20:34:52      Final result by Alli Orosco MD (09/14/22 20:34:52)                   Impression:      No convincing acute cardiopulmonary finding or detrimental change when compared with 07/20/2022.      Electronically signed by: Alli Orosco MD  Date:    09/14/2022  Time:    20:34               Narrative:    EXAMINATION:  XR CHEST AP PORTABLE    CLINICAL HISTORY:  Provided history is "Sepsis;  ".    TECHNIQUE:  One view of the chest.    COMPARISON:  07/20/2022.    FINDINGS:  Right hemidiaphragm is elevated similar to prior study.  Probable biliary drains overlie the upper abdomen.  Right-sided Port-A-Cath overlies the SVC.  Cardiac silhouette is stable.  No large focal consolidation or detrimental change in lung aeration.                                       Medications   piperacillin-tazobactam 4.5 g in sodium chloride 0.9% 100 mL IVPB (ready to mix system) (4.5 g Intravenous New Bag 9/14/22 2308)   vancomycin 1.75 g in 5 % dextrose 500 mL IVPB (has no administration in time range)   lactated ringers bolus 2,217 mL (0 mLs Intravenous Stopped 9/14/22 2305)   morphine injection 4 mg (4 mg Intravenous Given 9/14/22 2156)     Medical Decision Making:   History:   Old Medical Records: I decided to obtain old medical records.  Old Records Summarized: records from clinic visits.       <> Summary of Records: Pt s/p liver transplant 06/21/22 on cellcept and prograf  Initial Assessment:   Pt presenting to the ED with subjective fevers at home. Developed fevers at 102.3 with rigors in the ED. Pt is s/p liver transplant with " "history of biliary leak. Concerns of sepsis vs sbmtb-cy-vhnu disease. Pt without chills, abdominal pain, graft site pain, surgical site pain is not concerning for jvnwm-rv-chua disease but workup must be done to rule out.  Differential Diagnosis:   1. Wvnxb-yk-urgj  2. Biliary leak  3. Sepsis  Clinical Tests:   Lab Tests: Ordered and Reviewed  The following lab test(s) were unremarkable: CBC and CMP       <> Summary of Lab: Anemia with mild elevations in alk phos. Neutropenia noted  Radiological Study: Ordered and Reviewed  Medical Tests: Ordered and Reviewed  Sepsis Perfusion Assessment: "I attest a sepsis perfusion exam was performed within 6 hours of sepsis, severe sepsis, or septic shock presentation, following fluid resuscitation."  ED Management:  Pt received broad spectrum antibiotics including vanc and zosyn for sepsis concerns. Consulted liver transplant for evaluation. Deemed necessary for admission to evaluate for biliary leak.                         Clinical Impression:   Final diagnoses:  [R50.9] Fever  [R00.0] Tachycardia        ED Disposition Condition    Admit Stable                Saran Miguel MD  Resident  09/14/22 5783    "

## 2022-09-15 NOTE — PROCEDURES
Radiology Post-Procedure Note    Pre Op Diagnosis: Biliary stricture, cholangitis    Post Op Diagnosis: Same    Procedure: Cholangiogram, cholangioplasty and biliary drain exchange    Procedure performed by: Rafael BOSE, Rigo    Written Informed Consent Obtained: Yes  Specimen Removed: NO  Estimated Blood Loss: Minimal    Findings:   Sheath cholangiogram shows persistent anastomotic stricture. Cholangioplasty with 10 mm balloon catheter performed. No complications. New 14 Fr Biliary drain placed. No complications. See dictation.    Patient tolerated procedure well.    Rigo Hall M.D.  Interventional Radiology  Department of Radiology  Pager: [phone]

## 2022-09-15 NOTE — HPI
Mr. 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 s/p PTC drain on 7/13, c/b C glabrata (resistant to fluconazole) fungemia, s/p new drain placement on 8/25, HTN, HLD; admitted on 9/14/2022 with 1 day of fever. No other reported symptoms. Pertinent work up with stable WBC (no leukocytosis), CMP with elevated Tbili, ALP, and ALT. Lactate normal. Patient was empirically treated with vancomycin and pip-tazo. He developed left lower lip edema during pip-tazo infusion, resolved with discontinuing pip-tazo and received Benadryl and Prednisone. ID consulted for antibiotic assistance.

## 2022-09-15 NOTE — ASSESSMENT & PLAN NOTE
"- s/p living 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 for cholangiocarcinoma (vick-en-y biliary anastomosis)  - post op with several issues relating to bile leak/bilomas requiring multiple biliary tube upsizings since transplant, infections with enterococcus/lactobacillus/candida   - presents with fever and elevated LFTs   - see "fever" and "elevated LFTs"   - cont to monitor with daily CMP   "

## 2022-09-15 NOTE — ASSESSMENT & PLAN NOTE
- Tbili elevated liver on admit to 1.7 (previously 1)  - Liver US 9/15 reviewed with surgeon  - PTC tube uncapped on admit, to gravity drainage  - IR consulted 9/15 for repeat cholangiogram with possible intervention  - continue kartik

## 2022-09-15 NOTE — SUBJECTIVE & OBJECTIVE
Scheduled Meds:   carvediloL  3.125 mg Oral BID WM    ceFEPime (MAXIPIME) IVPB  1 g Intravenous Q8H    cetirizine  10 mg Oral Once    heparin (porcine)  5,000 Units Subcutaneous Q8H    magnesium sulfate IVPB  2 g Intravenous Q2H    mirtazapine  15 mg Oral Nightly    pantoprazole  40 mg Oral Daily    sulfamethoxazole-trimethoprim 400-80mg  1 tablet Oral Daily    tacrolimus  1 mg Oral Daily PM    tacrolimus  2 mg Oral Daily AM    ursodioL  250 mg Oral BID    valGANciclovir  450 mg Oral Daily    vancomycin (VANCOCIN) IVPB  15 mg/kg Intravenous Q12H     Continuous Infusions:  PRN Meds:acetaminophen, melatonin, ondansetron, sodium chloride 0.9%, Pharmacy to dose Vancomycin consult **AND** vancomycin - pharmacy to dose    Review of Systems   Constitutional:  Positive for fever. Negative for activity change and appetite change.   HENT:  Negative for facial swelling and trouble swallowing.    Respiratory:  Negative for cough, chest tightness and shortness of breath.    Cardiovascular:  Negative for chest pain, palpitations and leg swelling.   Gastrointestinal:  Negative for abdominal distention, abdominal pain, diarrhea, nausea and vomiting.   Genitourinary:  Negative for decreased urine volume, difficulty urinating and dysuria.   Skin:  Negative for wound.   Allergic/Immunologic: Positive for immunocompromised state.   Neurological:  Negative for dizziness, tremors, weakness and light-headedness.   Psychiatric/Behavioral:  Negative for confusion.    All other systems reviewed and are negative.  Objective:     Vital Signs (Most Recent):  Temp: 97.9 °F (36.6 °C) (09/15/22 1128)  Pulse: 87 (09/15/22 1128)  Resp: 18 (09/15/22 1128)  BP: 112/71 (09/15/22 1128)  SpO2: 98 % (09/15/22 1128) Vital Signs (24h Range):  Temp:  [97.9 °F (36.6 °C)-103 °F (39.4 °C)] 97.9 °F (36.6 °C)  Pulse:  [] 87  Resp:  [16-18] 18  SpO2:  [96 %-98 %] 98 %  BP: (112-143)/(71-91) 112/71     Weight: 73.9 kg (162 lb 14.7 oz)  Body mass index is  24.77 kg/m².    Intake/Output - Last 3 Shifts         09/13 0700  09/14 0659 09/14 0700  09/15 0659 09/15 0700 09/16 0659    I.V. (mL/kg)   57.1 (0.8)    IV Piggyback   749.1    Total Intake(mL/kg)   806.3 (10.9)    Drains  75     Stool  0     Total Output  75     Net  -75 +806.3           Stool Occurrence  0 x 0 x            Physical Exam  Vitals and nursing note reviewed.   Constitutional:       General: He is not in acute distress.  HENT:      Head: Normocephalic.   Eyes:      General: No scleral icterus.  Cardiovascular:      Rate and Rhythm: Normal rate and regular rhythm.      Pulses: Normal pulses.      Heart sounds: Normal heart sounds, S1 normal and S2 normal.   Pulmonary:      Effort: Pulmonary effort is normal. No respiratory distress.      Breath sounds: Normal breath sounds. No wheezing or rales.   Abdominal:      General: A surgical scar is present. Bowel sounds are normal. There is no distension.      Palpations: Abdomen is soft.      Tenderness: There is no abdominal tenderness. There is no guarding or rebound.      Comments: Well healed chevron incision  R PTC drain / uncapped to gravity drainage / no s/s/i   Musculoskeletal:         General: No swelling.      Cervical back: Normal range of motion.      Right lower leg: No edema.      Left lower leg: No edema.   Skin:     General: Skin is warm and dry.      Coloration: Skin is not jaundiced.   Neurological:      Mental Status: He is alert and oriented to person, place, and time.      Motor: No weakness.   Psychiatric:         Attention and Perception: Attention normal.         Mood and Affect: Mood normal.         Speech: Speech normal.         Behavior: Behavior normal. Behavior is cooperative.         Thought Content: Thought content normal.       Laboratory:  Immunosuppressants           Stop Route Frequency     tacrolimus capsule 2 mg         -- Oral Every morning     tacrolimus capsule 1 mg         -- Oral Every evening          CBC:   Recent  Labs   Lab 09/15/22  0521   WBC 3.07*   RBC 3.57*   HGB 10.9*   HCT 31.3*   PLT 74*   MCV 88   MCH 30.5   MCHC 34.8     BMP:   Recent Labs   Lab 09/15/22  0521   *   *   K 4.4      CO2 23   BUN 12   CREATININE 1.2   CALCIUM 9.6     CMP:   Recent Labs   Lab 09/15/22  0521   *   CALCIUM 9.6   ALBUMIN 3.6   PROT 6.7   *   K 4.4   CO2 23      BUN 12   CREATININE 1.2   ALKPHOS 165*   ALT 57*   AST 34   BILITOT 1.7*     Labs within the past 24 hours have been reviewed.    Diagnostic Results:  US Liver Transplant Post:  Results for orders placed during the hospital encounter of 09/14/22    US Doppler Liver Transplant Post (xpd)    Narrative  EXAMINATION:  US DOPPLER LIVER TRANSPLANT POST (XPD)    CLINICAL HISTORY:  Hx of Liver transplant; concern for biliary leak;    TECHNIQUE:  Limited abdominal ultrasound of the transplant liver with Doppler evaluation.  Color and spectral Doppler were performed.    COMPARISON:  Ultrasound 08/24/2022, 06/22/2022    CT 08/24/2022    CT 08/10/2022    FINDINGS:  Patient is status post right hepatic transplant 06/21/2022.  Liver demonstrates a wedge-shaped geographic area of relative hypoechogenicity along the anteromedial edge of the liver similar to prior.  No new focal lesions appreciated.  No fluid collections.    The common duct is not dilated, measuring 4 mm.  No dilated intrahepatic radicles are seen.    Color flow and spectral waveform analysis was performed.    Anastomosis site of the main hepatic artery demonstrates a peak systolic velocity 203 cm/sec (previously 66 cm/sec and as high as 247 cm/sec 06/22/2022 ultrasound).    Main hepatic artery demonstrates resistive index 0.69 (previously 0.69) with normal waveform.    Anterior branch of the right hepatic artery demonstrates resistive index 0.52 (previously 0.52) with normal waveform.    Posterior branch of the right hepatic artery demonstrates resistive index 0.53 (previously not visualized)  with normal waveform.    The main and right branches of the portal vein demonstrate anterograde flow and are otherwise unremarkable.    Right and middle hepatic vein confluence piggybacked to IVC.  Middle hepatic vein is not definitively visualized on today's exam.  Velocity at the IVC confluence anastomosis is 197 cm/sec (previously as high as 202 cm/sec).    Biliary drain in place.    Impression  Status post right hepatic lobe transplantation.   Persistent, stable elevated velocity at the IVC confluence anastomosis.    Nonvisualization of the middle hepatic vein noting no thrombosed vein is visualized in the expected region.  The right hepatic vein is prominent with multiple branches.  Continued follow-up is recommended.    Otherwise satisfactory Doppler evaluation.    No intra or extrahepatic biliary dilatation.  Biliary drain remains in place.    Stable wedge shaped hypoechoic area along the anteromedial aspect of the liver, likely postsurgical in nature.    Electronically signed by resident: Irvin Harkins  Date:    09/15/2022  Time:    08:16    Electronically signed by: Abel Melton MD  Date:    09/15/2022  Time:    08:52    Debility/Functional status: No debility noted.

## 2022-09-15 NOTE — PLAN OF CARE
- Off unit to IR for drain exchange at this time  - Vanc & cefepime IVPB per orders  - Awaiting results of cultures  - Mag on AM labs 1.2, 2x mag rider replacement  - NSR on continuous telemetry monitoring  - See flowsheet for biliary drain output    Problem: Adult Inpatient Plan of Care  Goal: Plan of Care Review  Outcome: Ongoing, Progressing  Goal: Patient-Specific Goal (Individualized)  Outcome: Ongoing, Progressing  Goal: Absence of Hospital-Acquired Illness or Injury  Outcome: Ongoing, Progressing  Goal: Optimal Comfort and Wellbeing  Outcome: Ongoing, Progressing     Problem: Infection  Goal: Absence of Infection Signs and Symptoms  Outcome: Ongoing, Progressing     Problem: Adjustment to Transplant (Liver Transplant)  Goal: Optimal Coping with Organ Transplant  Outcome: Ongoing, Progressing     Problem: Donor Organ Function (Liver Transplant)  Goal: Effective Organ Function  Outcome: Ongoing, Progressing     Problem: Fluid and Electrolyte Imbalance (Liver Transplant)  Goal: Fluid and Electrolyte Balance  Outcome: Ongoing, Progressing     Problem: Infection (Liver Transplant)  Goal: Absence of Infection Signs and Symptoms  Outcome: Ongoing, Progressing

## 2022-09-15 NOTE — PROVIDER PROGRESS NOTES - EMERGENCY DEPT.
I was called at the bedside as the patient developed left lower lip edema on zosyn. Patient comfortable, no skin rash, No known allergies, no difficulty breathing or swallowing. Post oropharynx/tongue no edema. Zosyn stopped. Cetirizine, prednisone

## 2022-09-15 NOTE — NURSING
Spoke to Endocrine team re: dose of insulin confirmation prior to breakfast.  Per NP administer TOTAL 32 units with breakfast.  Orders changed per Endocrine team

## 2022-09-15 NOTE — CONSULTS
[facility]   Infectious Diseases  Consult Note    Patient Name: Romeo Larson  MRN: [id number]  Admission Date: 9/14/2022  Hospital Length of Stay: 1 days  Attending Physician: Sinan Cline MD  Primary Care Provider: Pravin Maria MD     Isolation Status: No active isolations    Patient information was obtained from patient, past medical records and ER records.      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 s/p PTC drain on 7/13, c/b C glabrata (resistant to fluconazole) fungemia, s/p new drain placement on 8/25, HTN, HLD; admitted on 9/14/2022 with 1 day of fever. At ED, patient developed lip edema with pip-tazo (allergy profile updated). ID consulted for antibiotic assistance.      Recommendations:  - Follow up IR Cholangiogram findings. If culture obtained, please send routine and anaerobic cultures, fungal culture, AFB smear/culture, and surgical pathology.   - Continue vancomycin.   - Stop pip-tazo and start cefepime 2 g IV q8h.   - Continue prophylactic valganciclovir and Bactrim.         Thank you for your consult. I will follow-up with patient. Please contact us if you have any additional questions.    Yefri Dubon MD  Infectious Diseases  [facility]     Subjective:     Principal Problem: Fever    HPI: Mr. 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 s/p PTC drain on 7/13, c/b C glabrata (resistant to fluconazole) fungemia, s/p new drain placement on 8/25, HTN, HLD; admitted on 9/14/2022 with 1 day of fever. No other reported symptoms. Pertinent work up with stable WBC (no leukocytosis), CMP with elevated Tbili, ALP, and ALT. Lactate normal. Patient was empirically treated with vancomycin and pip-tazo. He developed left lower lip edema during pip-tazo infusion, resolved with discontinuing pip-tazo and received Benadryl and Prednisone. IR consulted and plans for repeat cholangiogram. ID consulted for antibiotic assistance.      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    Hypercholesteremia     Hypertension        Past Surgical History:   Procedure Laterality Date    DIAGNOSTIC ULTRASOUND N/A 6/21/2022    Procedure: ULTRASOUND, DIAGNOSTIC;  Surgeon: Sinan Cline MD;  Location: [facility] OR [address] [place] [address];  Service: Transplant;  Laterality: N/A;    ENDOSCOPIC ULTRASOUND OF UPPER GASTROINTESTINAL TRACT N/A 10/20/2021    Procedure: ULTRASOUND, UPPER GI TRACT, ENDOSCOPIC;  Surgeon: Valeriy Sotelo MD;  Location: [facility] ([address] [place] [address]);  Service: Endoscopy;  Laterality: N/A;  wife to email vacc card-inst email-tb    ERCP N/A 10/20/2021    Procedure: ERCP (ENDOSCOPIC RETROGRADE CHOLANGIOPANCREATOGRAPHY);  Surgeon: Valeriy Sotelo MD;  Location: [address]);  Service: Endoscopy;  Laterality: N/A;    ERCP N/A 11/4/2021    Procedure: ERCP (ENDOSCOPIC RETROGRADE CHOLANGIOPANCREATOGRAPHY);  Surgeon: Valeriy Sotelo,  MD;  [facility]  Service: Endoscopy;  Laterality: N/A;    ERCP N/A 11/4/2021    Procedure: ERCP (ENDOSCOPIC RETROGRADE CHOLANGIOPANCREATOGRAPHY);  Surgeon: Roselia Pereyra MD;  [facility]  Service: Endoscopy;  Laterality: N/A;  pt vaccinated, instructed to bring card to procedure, instructions sent portal-MG    ERCP N/A 1/14/2022    Procedure: ERCP (ENDOSCOPIC RETROGRADE CHOLANGIOPANCREATOGRAPHY);  Surgeon: Roselia Pereyra MD;  [facility]  Service: Endoscopy;  Laterality: N/A;  inst portal-right chest port-tb  Pt requested at home COVID testing, Pt instructed at home RAPID to be done morning of procedure, Pt instructed to call endoscopy scheuding if there is a positive result, Pt informed if a positive     ERCP N/A 3/31/2022    Procedure: ERCP (ENDOSCOPIC RETROGRADE CHOLANGIOPANCREATOGRAPHY);  Surgeon: Julio Cesar Alonzo MD;  [facility]  Service: Endoscopy;  Laterality: N/A;  3/16: port L chest wall. pt to bring covid vaccination card. Instructions sent via portal.-SC  3/18/22-Updated instructions sent via portal re:new date/time-DS    EXPLORATORY LAPAROTOMY AFTER LIVER TRANSPLANTATION N/A 6/23/2022    Procedure: LAPAROTOMY, EXPLORATORY, AFTER LIVER TRANSPLANT;  Surgeon: Julio Cesar Jara MD;  [address]  Service: Transplant;  Laterality: N/A;    INSERTION OF TUNNELED CENTRAL VENOUS CATHETER (CVC) WITH SUBCUTANEOUS PORT N/A 11/24/2021    Procedure: INSERTION, PORT-A-CATH;  Surgeon: Prem Syed MD;  Location: [facility] [place] CATH LAB;  Service: Radiology;  Laterality: N/A;    LIVER TRANSPLANT N/A 6/21/2022    Procedure: TRANSPLANT, LIVER;  Surgeon: Sinan Cline MD;  [facility];  Service: Transplant;  Laterality: N/A;    REPAIR OF BILE DUCT N/A 6/23/2022    Procedure: REPAIR, BILE DUCT;  Surgeon: Julio Cesar Jara MD;  [facility];  Service: Transplant;  Laterality: N/A;    ROBOT-ASSISTED  "LAPAROSCOPIC LYMPHADENECTOMY USING DA МАРИНА XI  6/17/2022    Procedure: XI ROBOTIC LYMPHADENECTOMY - Portal;  Surgeon: Julio Cesar Jara MD;  Location: [facility] OR 2ND FLR;  Service: Transplant;;    TONSILLECTOMY      XI ROBOTIC LAPAROSCOPY, EXPLORATORY N/A 6/17/2022    Procedure: XI ROBOTIC LAPAROSCOPY,EXPLORATORY;  Surgeon: Julio Cesar Jara MD;  Location: [facility] OR 2ND FLR;  Service: Transplant;  Laterality: N/A;       Review of patient's allergies indicates:   Allergen Reactions    Zosyn [piperacillin-tazobactam] Swelling     Lip swelling       Medications:  Medications Prior to Admission   Medication Sig    aspirin 81 MG Chew CHEW 1 tablet (81 mg total) by mouth once daily.    blood sugar diagnostic Strp 1 each by Misc.(Non-Drug; Combo Route) route 3 (three) times daily.    blood-glucose meter Misc Use as instructed    calcium carbonate-vitamin D3 600 mg-20 mcg (800 unit) Tab Take 1 tablet by mouth once daily at 6am.    carvediloL (COREG) 6.25 MG tablet Take 0.5 tablets (3.125 mg total) by mouth 2 (two) times daily with meals.    lancets 30 gauge Misc 1 each by Misc.(Non-Drug; Combo Route) route 3 (three) times daily.    mirtazapine (REMERON) 15 MG tablet Take 1 tablet (15 mg total) by mouth nightly.    mycophenolate (CELLCEPT) 250 mg Cap Take 2 capsules (500 mg total) by mouth 2 (two) times daily.    pantoprazole (PROTONIX) 40 MG tablet Take 1 tablet (40 mg total) by mouth once daily.    pen needle, diabetic 32 gauge x 5/32" Ndle 1 each by Misc.(Non-Drug; Combo Route) route 3 (three) times daily.    sulfamethoxazole-trimethoprim 400-80mg (BACTRIM,SEPTRA) 400-80 mg per tablet Take 1 tablet by mouth once daily. Stop: 12/18/22    tacrolimus (PROGRAF) 1 MG Cap Take 2 capsules (2 mg total) every morning AND 1 capsule (1 mg total) every evening.    ursodioL (ACTIGALL) 250 mg Tab Take 1 tablet (250 mg total) by mouth 2 (two) times a day.    valGANciclovir (VALCYTE) 450 mg Tab Take 1 tablet (450 mg total) by mouth " once daily. STOP 9/19/22     Antibiotics (From admission, onward)      Start     Stop Route Frequency Ordered    09/15/22 2200  vancomycin in dextrose 5 % 1 gram/250 mL IVPB 1,000 mg         -- IV Every 12 hours (non-standard times) 09/15/22 1013    09/15/22 1500  cefepime in dextrose 5 % IVPB 2 g         -- IV Every 8 hours (non-standard times) 09/15/22 1339    09/15/22 1112  vancomycin - pharmacy to dose  (vancomycin IVPB)        See Hyperspace for full Linked Orders Report.    -- IV pharmacy to manage frequency 09/15/22 1013    09/15/22 0900  sulfamethoxazole-trimethoprim 400-80mg per tablet 1 tablet         -- Oral Daily 09/14/22 2331          Antifungals (From admission, onward)      None          Antivirals (From admission, onward)          Stop Route Frequency     valGANciclovir         -- Oral Daily             Immunization History   Administered Date(s) Administered    COVID-19, MRNA, LN-S, PF (MODERNA FULL 0.5 ML DOSE) 07/17/2021, 08/14/2021    Hepatitis B (recombinant) Adjuvanted, 2 dose 06/15/2022    Pneumococcal Conjugate - 13 Valent 06/15/2022    Tdap 06/15/2022    Zoster Recombinant 06/15/2022       Family History       Problem Relation (Age of Onset)    Hyperlipidemia Father    No Known Problems Mother, Sister, Brother          Social History     Socioeconomic History    Marital status:    Tobacco Use    Smoking status: Never    Smokeless tobacco: Never   Substance and Sexual Activity    Alcohol use: Not Currently    Drug use: Never    Sexual activity: Yes     Review of Systems   Constitutional:  Positive for fever. Negative for activity change and chills.   HENT:  Negative for congestion and sore throat.    Eyes:  Negative for redness and visual disturbance.   Respiratory:  Negative for cough and shortness of breath.    Cardiovascular:  Negative for chest pain.   Gastrointestinal:  Negative for abdominal pain, constipation, diarrhea, nausea and vomiting.   Genitourinary:  Negative  for dysuria and flank pain.   Musculoskeletal:  Negative for arthralgias, back pain and myalgias.   Skin:  Negative for rash and wound.   Allergic/Immunologic: Positive for immunocompromised state. Negative for food allergies.   Neurological:  Negative for dizziness and headaches.   Hematological:  Negative for adenopathy. Does not bruise/bleed easily.   Psychiatric/Behavioral:  Negative for behavioral problems. The patient is not nervous/anxious.    Objective:     Vital Signs (Most Recent):  Temp: 97.9 °F (36.6 °C) (09/15/22 1128)  Pulse: 87 (09/15/22 1128)  Resp: 18 (09/15/22 1128)  BP: 112/71 (09/15/22 1128)  SpO2: 98 % (09/15/22 1128) Vital Signs (24h Range):  Temp:  [97.9 °F (36.6 °C)-103 °F (39.4 °C)] 97.9 °F (36.6 °C)  Pulse:  [] 87  Resp:  [16-18] 18  SpO2:  [96 %-98 %] 98 %  BP: (112-143)/(71-91) 112/71     Weight: 73.9 kg (162 lb 14.7 oz)  Body mass index is 24.77 kg/m².    Estimated Creatinine Clearance: 76 mL/min (based on SCr of 1.2 mg/dL).    Physical Exam  Vitals and nursing note reviewed.   Constitutional:       General: He is not in acute distress.     Appearance: He is normal weight. He is not ill-appearing.   HENT:      Head: Normocephalic.      Right Ear: External ear normal.      Left Ear: External ear normal.      Nose: Nose normal.      Mouth/Throat:      Mouth: Mucous membranes are moist.      Pharynx: Oropharynx is clear.   Eyes:      General: No scleral icterus.     Extraocular Movements: Extraocular movements intact.      Conjunctiva/sclera: Conjunctivae normal.   Cardiovascular:      Rate and Rhythm: Normal rate and regular rhythm.      Pulses: Normal pulses.      Heart sounds: S1 normal and S2 normal. No murmur heard.  Pulmonary:      Effort: Pulmonary effort is normal. No respiratory distress.      Breath sounds: Normal breath sounds. No wheezing.   Abdominal:      General: A surgical scar is present. Bowel sounds are normal. There is no distension.      Palpations: Abdomen is  soft.      Tenderness: There is no abdominal tenderness. There is no guarding or rebound.      Comments: Well healed chevron incision. R SHRUTHI drain with bilious output.    Musculoskeletal:         General: No swelling.      Cervical back: Neck supple.      Right lower leg: No edema.      Left lower leg: No edema.   Skin:     General: Skin is warm and dry.      Coloration: Skin is not jaundiced.      Findings: No rash.   Neurological:      Mental Status: He is alert and oriented to person, place, and time. Mental status is at baseline.      Motor: No weakness.   Psychiatric:         Attention and Perception: Attention normal.         Mood and Affect: Mood normal.         Speech: Speech normal.         Behavior: Behavior normal. Behavior is cooperative.         Thought Content: Thought content normal.         Judgment: Judgment normal.       Significant Labs:   Microbiology Results (last 7 days)       Procedure Component Value Units Date/Time    Blood culture x two cultures. Draw prior to antibiotics. [[id number]] Collected: 09/14/22 2115    Order Status: Completed Specimen: Blood from Line, Port A Cath Updated: 09/15/22 0515     Blood Culture, Routine No Growth to date    Narrative:      Aerobic and anaerobic    Blood culture x two cultures. Draw prior to antibiotics. [[id number]] Collected: 09/14/22 2144    Order Status: Completed Specimen: Blood from Line, Port A Cath Updated: 09/15/22 0515     Blood Culture, Routine No Growth to date    Narrative:      Aerobic and anaerobic          Pathology Results  (Last 10 years)                 08/25/22 1123  Specimen to Pathology, Radiology Liver biopsy Edited Result - FINAL    Narrative:  Transplant liver biopsy   R/O rejection   Release to patient->Immediate       06/21/22 2346  Specimen to Pathology, Surgery Liver Final result    Narrative:  Pre-op Diagnosis: Organ transplant candidate [Z76.82]   Hilar cholangiocarcinoma [C24.0]   Procedure(s):   TRANSPLANT, LIVER    ULTRASOUND, DIAGNOSTIC   Number of specimens:1   Name of specimens: 1) old liver (permanent)   Which provider would you like to cc?->AMNA BRUNSON   Release to patient->Immediate   Specimen total (fresh, frozen, permanent):->1       06/17/22 0850  Specimen to Pathology, Surgery Liver Final result    Narrative:  Pre-op Diagnosis: Hilar cholangiocarcinoma [C24.0]   Procedure(s):   XI ROBOTIC LAPAROSCOPY,EXPLORATORY   XI ROBOTIC LYMPHADENECTOMY - Portal   Number of specimens: 1   Name of specimens: 1. Anna-Portal Tissue - Permanent;   Which provider would you like to cc?->KALEE BANGURA   Release to patient->Immediate   Specimen total (fresh, frozen, permanent):->1       01/14/22 0956  Specimen to Pathology, Surgery Gastrointestinal tract Final result    Narrative:  Pre-op Diagnosis: Biliary stricture [K83.1]   Procedure(s):   ERCP (ENDOSCOPIC RETROGRADE CHOLANGIOPANCREATOGRAPHY)   Number of specimens: 1   Name of specimens: Jar 1:  Bile duct stricture biopsy   Release to patient->Immediate   Specimen total (fresh, frozen, permanent):->1       11/04/21 1003  Specimen to Pathology, Surgery Pancreas and Bile ducts Final result    Narrative:  Pre-op Diagnosis: Cholangitis [K83.09]   Procedure(s):   ERCP (ENDOSCOPIC RETROGRADE CHOLANGIOPANCREATOGRAPHY)   ULTRASOUND, ENDOSCOPIC, WITH FINE NEEDLE ASPIRATION   Number of specimens: 1   Name of specimens: Jar 1:  Bile duct stricture biopsy   Release to patient->Immediate   Specimen total (fresh, frozen, permanent):->1       10/20/21 1017  Specimen to Pathology, Surgery Pancreas and Bile ducts Final result    Narrative:  Pre-op Diagnosis: Obstructive jaundice [K83.1]   Procedure(s):   ULTRASOUND, UPPER GI TRACT, ENDOSCOPIC   ERCP (ENDOSCOPIC RETROGRADE CHOLANGIOPANCREATOGRAPHY)   Number of specimens: 1   Name of specimens: Jar 1:  Bifurcation stricture biopsy   Release to patient->Immediate   Specimen total (fresh, frozen, permanent):->1             All pertinent labs within  the past 24 hours have been reviewed.  Recent Lab Results  (Last 5 results in the past 24 hours)        09/15/22  0521   09/15/22  0148   09/15/22  0021   09/14/22  2145   09/14/22  2144        Albumin 3.6               Alkaline Phosphatase 165               ALT 57               Anion Gap 8               Appearance, UA       Clear         AST 34               Baso # 0.01               Basophil % 0.3               Bilirubin (UA)       Negative         BILIRUBIN TOTAL 1.7  Comment: For infants and newborns, interpretation of results should be based  on gestational age, weight and in agreement with clinical  observations.    Premature Infant recommended reference ranges:  Up to 24 hours.............<8.0 mg/dL  Up to 48 hours............<12.0 mg/dL  3-5 days..................<15.0 mg/dL  6-29 days.................<15.0 mg/dL                 Blood Culture, Routine         No Growth to date  [P]       BUN 12               Calcium 9.6               Chloride 100               CO2 23               Color, UA       Colorless         Creatinine 1.2               Differential Method Automated               eGFR >60.0               Eos # 0.0               Eosinophil % 0.0               Glucose 209               Glucose, UA       Negative         Gran # (ANC) 2.7               Gran % 89.3               Hematocrit 31.3               Hemoglobin 10.9               Immature Grans (Abs) 0.01  Comment: Mild elevation in immature granulocytes is non specific and   can be seen in a variety of conditions including stress response,   acute inflammation, trauma and pregnancy. Correlation with other   laboratory and clinical findings is essential.                 Immature Granulocytes 0.3               Ketones, UA       Negative         Lactate, Rommel   0.9  Comment: Falsely low lactic acid results can be found in samples   containing >=13.0 mg/dL total bilirubin and/or >=3.5 mg/dL   direct bilirubin.               Leukocytes, UA        Negative         Lymph # 0.2               Lymph % 7.5               Magnesium 1.2               MCH 30.5               MCHC 34.8               MCV 88               Mono # 0.1               Mono % 2.6               MPV 8.6               NITRITE UA       Negative         nRBC 0               Occult Blood UA       Negative         pH, UA       6.0         Phosphorus 2.9               Platelets 74               Potassium 4.4               PROTEIN TOTAL 6.7               Protein, UA       Negative  Comment: Recommend a 24 hour urine protein or a urine   protein/creatinine ratio if globulin induced proteinuria is  clinically suspected.           RBC 3.57               RDW 14.7               SARS-CoV-2 RNA, Amplification, Qual     Negative  Comment: This test utilizes isothermal nucleic acid amplification   technology to detect the SARS-CoV-2 RdRp nucleic acid segment.   The analytical sensitivity (limit of detection) is 125 genome   equivalents/mL.     A POSITIVE result implies infection with the SARS-CoV-2 virus;  the patient is presumed to be contagious.    A NEGATIVE result means that SARS-CoV-2 nucleic acids are not  present above the limit of detection. A NEGATIVE result should be   treated as presumptive. It does not rule out the possibility of   COVID-19 and should not be the sole basis for treatment decisions.   If COVID-19 is strongly suspected based on clinical and exposure   history, re-testing using an alternate molecular assay should be   considered.       This test is only for use under the Food and Drug   Administration s Emergency Use Authorization (EUA).   Commercial kits are provided by [employer].   Performance characteristics of the EUA have been independently  verified by [facility] Department of  Pathology and Laboratory Medicine.   _________________________________________________________________  The ID NOW COVID-19 Letter of Authorization, along with the   authorized Fact  Sheet for Healthcare Providers, the authorized Fact  Sheet for Patients, and authorized labeling are available on the FDA   website:  www.fda.gov/MedicalDevices/Safety/EmergencySituations/qxo154421.htm             Sodium 131               Specific Foster, UA       1.010         Specimen UA       Urine, Clean Catch         Tacrolimus Lvl 9.7  Comment: Testing performed by a chemiluminescent microparticle   immunoassay on the [employer] i System.                 WBC 3.07                                       [P] - Preliminary Result               Significant Imaging: I have reviewed all pertinent imaging results/findings within the past 24 hours.

## 2022-09-15 NOTE — PLAN OF CARE
Procedure complete. Pt tolerated well. VSS. Site CDI. Pt transferred [place] [id number] and report to be given bedside.

## 2022-09-15 NOTE — HPI
Romeo Larson is a 42yr old male s/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 Post operative course significant for recurrent bile leak. OR take back 6/23 for bile duct repair (small leak found near biliary anastomosis, unable to clearly identify source), cultures grew enterococcus faecalis. OR take back 7/4 for ex lap, washout of biloma, bile duct unable to be assessed due to adhesions, OR cultures with lactobacillus species. Underwent PTC drain (internal/external) placement on 7/13. Admitted 7/18-7/27 with sepsis. Started on vanc/cefepime, transitioned to unasyn 2/2 hx of enterococcus. BC positive for Candida glabrata, completed a course of natividad and augmentin (EOT 8/12) and f/u with ID. PTC was upsized and capped 7/20, SHRUTHI out 7/22. Last admit 8/24 for HA thrombosis concern r/o by CTA. Last cholangiogram 8/25 with cholangioplasty and new 14fr biliary drain placement that was capped on dc, skilled nursing sent for dressing changes weekly/as needed.     Patient presented to ED 9/14 with c/o fever. Denied any other sx including CP, SOB, NVD, changes in urine patterns, abdominal pain. Reported tmax of 102 at home brought down with tylenol. Tmax in . Vital signs otherwise stable. Labs in ED notable for increased LFTs including t bili from 0.8 to 1.7. UA clean, CXR w/o acute CP process, cultures pending. Patient received one dose of zosyn and one dose of vanc in ED as well as 1 L LR. Liver US pending. Uncapped biliary tube now draining to gravity. Cont to monitor while awaiting US results. D/w Dr. Cline

## 2022-09-16 LAB
ALBUMIN SERPL BCP-MCNC: 3.3 G/DL (ref 3.5–5.2)
ALP SERPL-CCNC: 129 U/L (ref 55–135)
ALT SERPL W/O P-5'-P-CCNC: 42 U/L (ref 10–44)
ANION GAP SERPL CALC-SCNC: 8 MMOL/L (ref 8–16)
AST SERPL-CCNC: 21 U/L (ref 10–40)
BASOPHILS # BLD AUTO: 0.01 K/UL (ref 0–0.2)
BASOPHILS NFR BLD: 0.3 % (ref 0–1.9)
BILIRUB SERPL-MCNC: 0.8 MG/DL (ref 0.1–1)
BUN SERPL-MCNC: 24 MG/DL (ref 6–20)
CALCIUM SERPL-MCNC: 9.5 MG/DL (ref 8.7–10.5)
CHLORIDE SERPL-SCNC: 101 MMOL/L (ref 95–110)
CO2 SERPL-SCNC: 26 MMOL/L (ref 23–29)
CREAT SERPL-MCNC: 1.2 MG/DL (ref 0.5–1.4)
DIFFERENTIAL METHOD: ABNORMAL
EOSINOPHIL # BLD AUTO: 0 K/UL (ref 0–0.5)
EOSINOPHIL NFR BLD: 0.3 % (ref 0–8)
ERYTHROCYTE [DISTWIDTH] IN BLOOD BY AUTOMATED COUNT: 15.1 % (ref 11.5–14.5)
EST. GFR  (NO RACE VARIABLE): >60 ML/MIN/1.73 M^2
GLUCOSE SERPL-MCNC: 147 MG/DL (ref 70–110)
HCT VFR BLD AUTO: 30.3 % (ref 40–54)
HGB BLD-MCNC: 10 G/DL (ref 14–18)
IMM GRANULOCYTES # BLD AUTO: 0.01 K/UL (ref 0–0.04)
IMM GRANULOCYTES NFR BLD AUTO: 0.3 % (ref 0–0.5)
LYMPHOCYTES # BLD AUTO: 0.6 K/UL (ref 1–4.8)
LYMPHOCYTES NFR BLD: 14.5 % (ref 18–48)
MAGNESIUM SERPL-MCNC: 1.8 MG/DL (ref 1.6–2.6)
MCH RBC QN AUTO: 29.9 PG (ref 27–31)
MCHC RBC AUTO-ENTMCNC: 33 G/DL (ref 32–36)
MCV RBC AUTO: 91 FL (ref 82–98)
MONOCYTES # BLD AUTO: 0.3 K/UL (ref 0.3–1)
MONOCYTES NFR BLD: 7.7 % (ref 4–15)
NEUTROPHILS # BLD AUTO: 3 K/UL (ref 1.8–7.7)
NEUTROPHILS NFR BLD: 76.9 % (ref 38–73)
NRBC BLD-RTO: 0 /100 WBC
PHOSPHATE SERPL-MCNC: 4.2 MG/DL (ref 2.7–4.5)
PLATELET # BLD AUTO: 99 K/UL (ref 150–450)
PMV BLD AUTO: 9.3 FL (ref 9.2–12.9)
POTASSIUM SERPL-SCNC: 4.2 MMOL/L (ref 3.5–5.1)
PROT SERPL-MCNC: 6.3 G/DL (ref 6–8.4)
RBC # BLD AUTO: 3.34 M/UL (ref 4.6–6.2)
SODIUM SERPL-SCNC: 135 MMOL/L (ref 136–145)
TACROLIMUS BLD-MCNC: 8.8 NG/ML (ref 5–15)
VANCOMYCIN TROUGH SERPL-MCNC: 13 UG/ML (ref 10–22)
WBC # BLD AUTO: 3.92 K/UL (ref 3.9–12.7)

## 2022-09-16 PROCEDURE — 85025 COMPLETE CBC W/AUTO DIFF WBC: CPT

## 2022-09-16 PROCEDURE — 25000003 PHARM REV CODE 250: Performed by: SURGERY

## 2022-09-16 PROCEDURE — 63600175 PHARM REV CODE 636 W HCPCS

## 2022-09-16 PROCEDURE — 80202 ASSAY OF VANCOMYCIN: CPT | Performed by: SURGERY

## 2022-09-16 PROCEDURE — 99233 SBSQ HOSP IP/OBS HIGH 50: CPT | Mod: ,,, | Performed by: INTERNAL MEDICINE

## 2022-09-16 PROCEDURE — 80197 ASSAY OF TACROLIMUS: CPT

## 2022-09-16 PROCEDURE — 63600175 PHARM REV CODE 636 W HCPCS: Performed by: INTERNAL MEDICINE

## 2022-09-16 PROCEDURE — 25000003 PHARM REV CODE 250: Performed by: CLINICAL NURSE SPECIALIST

## 2022-09-16 PROCEDURE — 84100 ASSAY OF PHOSPHORUS: CPT

## 2022-09-16 PROCEDURE — 83735 ASSAY OF MAGNESIUM: CPT

## 2022-09-16 PROCEDURE — 80053 COMPREHEN METABOLIC PANEL: CPT

## 2022-09-16 PROCEDURE — 25000003 PHARM REV CODE 250

## 2022-09-16 PROCEDURE — 99233 PR SUBSEQUENT HOSPITAL CARE,LEVL III: ICD-10-PCS | Mod: 24,,,

## 2022-09-16 PROCEDURE — 94761 N-INVAS EAR/PLS OXIMETRY MLT: CPT

## 2022-09-16 PROCEDURE — 99233 PR SUBSEQUENT HOSPITAL CARE,LEVL III: ICD-10-PCS | Mod: ,,, | Performed by: INTERNAL MEDICINE

## 2022-09-16 PROCEDURE — 63600175 PHARM REV CODE 636 W HCPCS: Performed by: SURGERY

## 2022-09-16 PROCEDURE — 20600001 HC STEP DOWN PRIVATE ROOM

## 2022-09-16 PROCEDURE — 99233 SBSQ HOSP IP/OBS HIGH 50: CPT | Mod: 24,,,

## 2022-09-16 RX ORDER — AMOXICILLIN AND CLAVULANATE POTASSIUM 875; 125 MG/1; MG/1
1 TABLET, FILM COATED ORAL EVERY 12 HOURS
Status: DISCONTINUED | OUTPATIENT
Start: 2022-09-16 | End: 2022-09-17 | Stop reason: HOSPADM

## 2022-09-16 RX ORDER — TACROLIMUS 1 MG/1
1 CAPSULE ORAL 2 TIMES DAILY
Status: DISCONTINUED | OUTPATIENT
Start: 2022-09-16 | End: 2022-09-17 | Stop reason: HOSPADM

## 2022-09-16 RX ORDER — NAPROXEN SODIUM 220 MG/1
81 TABLET, FILM COATED ORAL DAILY
Status: DISCONTINUED | OUTPATIENT
Start: 2022-09-16 | End: 2022-09-17 | Stop reason: HOSPADM

## 2022-09-16 RX ADMIN — TACROLIMUS 2 MG: 1 CAPSULE ORAL at 08:09

## 2022-09-16 RX ADMIN — URSODIOL 250 MG: 250 TABLET ORAL at 08:09

## 2022-09-16 RX ADMIN — VALGANCICLOVIR 450 MG: 450 TABLET, FILM COATED ORAL at 08:09

## 2022-09-16 RX ADMIN — ASPIRIN 81 MG: 81 TABLET, CHEWABLE ORAL at 11:09

## 2022-09-16 RX ADMIN — CARVEDILOL 3.12 MG: 3.12 TABLET, FILM COATED ORAL at 08:09

## 2022-09-16 RX ADMIN — TACROLIMUS 1 MG: 1 CAPSULE ORAL at 05:09

## 2022-09-16 RX ADMIN — CEFEPIME HYDROCHLORIDE 2 G: 2 INJECTION, SOLUTION INTRAVENOUS at 02:09

## 2022-09-16 RX ADMIN — CARVEDILOL 3.12 MG: 3.12 TABLET, FILM COATED ORAL at 05:09

## 2022-09-16 RX ADMIN — PANTOPRAZOLE SODIUM 40 MG: 40 TABLET, DELAYED RELEASE ORAL at 08:09

## 2022-09-16 RX ADMIN — CEFEPIME HYDROCHLORIDE 2 G: 2 INJECTION, SOLUTION INTRAVENOUS at 10:09

## 2022-09-16 RX ADMIN — AMOXICILLIN AND CLAVULANATE POTASSIUM 1 TABLET: 875; 125 TABLET, FILM COATED ORAL at 08:09

## 2022-09-16 RX ADMIN — SULFAMETHOXAZOLE AND TRIMETHOPRIM 1 TABLET: 400; 80 TABLET ORAL at 08:09

## 2022-09-16 RX ADMIN — VANCOMYCIN HYDROCHLORIDE 1000 MG: 1 INJECTION, POWDER, LYOPHILIZED, FOR SOLUTION INTRAVENOUS at 11:09

## 2022-09-16 NOTE — ASSESSMENT & PLAN NOTE
- Tbili elevated liver on admit to 1.7 (previously 1)  - Liver US 9/15 reviewed with surgeon  - PTC tube uncapped on admit, to gravity drainage  -repeat IR cholangiogram on 9/15  with cholangioplasty and PTC drain exchanged. PTC drain to gravity with a total of 225ml of bilious output in the past 24 hours.  - T bili down to 0.8 from 1.7 this morning 9/16   - continue kartik

## 2022-09-16 NOTE — PROGRESS NOTES
[facility] - Transplant Stepdown  Liver Transplant  Progress Note    Patient Name: Romeo Larson  MRN: [id number]  Admission Date: 9/14/2022  Hospital Length of Stay: 2 days  Code Status: Full Code  Primary Care Provider: Pravin Maria MD  Post-Operative Day: 87    ORGAN:   RIGHT LIVER LOBE (SEGS 5,6,7,8) WITHOUT MIDDLE HEPATIC VEIN  Disease Etiology: Primary Liver Malignancy: Cholangiocarcinoma (CH-CA)  Donor Type:   Living  Donor CMV Status:   Donor CMV Status:   Whole or Partial: Partial Liver  Biliary Anastomosis: Vick-en-Y  Arterial Anatomy: Standard  Subjective:     History of Present Illness:  Romeo Larson is a 42yr old male s/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 Post operative course significant for recurrent bile leak. OR take back 6/23 for bile duct repair (small leak found near biliary anastomosis, unable to clearly identify source), cultures grew enterococcus faecalis. OR take back 7/4 for ex lap, washout of biloma, bile duct unable to be assessed due to adhesions, OR cultures with lactobacillus species. Underwent PTC drain (internal/external) placement on 7/13. Admitted 7/18-7/27 with sepsis. Started on vanc/cefepime, transitioned to unasyn 2/2 hx of enterococcus. BC positive for Candida glabrata, completed a course of natividad and augmentin (EOT 8/12) and f/u with ID. PTC was upsized and capped 7/20, SHRUTHI out 7/22. Last admit 8/24 for HA thrombosis concern r/o by CTA. Last cholangiogram 8/25 with cholangioplasty and new 14fr biliary drain placement that was capped on dc, skilled nursing sent for dressing changes weekly/as needed.     Patient presented to ED 9/14 with c/o fever. Denied any other sx including CP, SOB, NVD, changes in urine patterns, abdominal pain. Reported tmax of 102 at home brought down with tylenol. Tmax in . Vital signs otherwise stable. Labs in ED notable for increased LFTs including t bili from 0.8 to 1.7. UA clean, CXR w/o acute CP  process, cultures pending. Patient received one dose of zosyn and one dose of vanc in ED as well as 1 L LR. Liver US pending. Uncapped biliary tube now draining to gravity. Cont to monitor while awaiting US results. D/w Dr. Cline      [facility] Course:  Patient admitted for fevers. Tmax 103 in ED. Infectious work-up ordered on admit and patient given Vanc/Zosyn in ED. Patient's PTC drain opened to gravity on admit, previously clamped.     Interval note: BARBARA Pt remains afebrile overnight and this morning. Patient states he is feeling good this morning. Liver enzymes WNL. T bili down to 0.8 from 1.7.  Continue cefepime/vanc inpatient, ID consulted. Blood cultures NGTD thus far. Tolerating diet, no nausea/ vomiting/ or diarrhea noted. VSS. Continue to monitor.       Scheduled Meds:   carvediloL  3.125 mg Oral BID WM    ceFEPime (MAXIPIME) IVPB  2 g Intravenous Q8H    cetirizine  10 mg Oral Once    heparin (porcine)  5,000 Units Subcutaneous Q8H    mirtazapine  15 mg Oral Nightly    pantoprazole  40 mg Oral Daily    sulfamethoxazole-trimethoprim 400-80mg  1 tablet Oral Daily    tacrolimus  1 mg Oral Daily PM    tacrolimus  2 mg Oral Daily AM    ursodioL  250 mg Oral BID    valGANciclovir  450 mg Oral Daily    vancomycin (VANCOCIN) IVPB  15 mg/kg Intravenous Q12H     Continuous Infusions:  PRN Meds:acetaminophen, fentaNYL, melatonin, ondansetron, sodium chloride 0.9%, Pharmacy to dose Vancomycin consult **AND** vancomycin - pharmacy to dose    Review of Systems   Constitutional:  Negative for activity change, appetite change and fever.   HENT:  Negative for facial swelling and trouble swallowing.    Respiratory:  Negative for cough, chest tightness and shortness of breath.    Cardiovascular:  Negative for chest pain, palpitations and leg swelling.   Gastrointestinal:  Negative for abdominal distention, abdominal pain, diarrhea, nausea and vomiting.   Genitourinary:  Negative for decreased urine volume,  difficulty urinating and dysuria.   Musculoskeletal:  Negative for arthralgias and myalgias.   Skin:  Negative for wound.   Allergic/Immunologic: Positive for immunocompromised state.   Neurological:  Negative for dizziness, tremors, weakness and light-headedness.   Psychiatric/Behavioral:  Negative for agitation, behavioral problems, confusion and decreased concentration.    All other systems reviewed and are negative.  Objective:     Vital Signs (Most Recent):  Temp: 97.7 °F (36.5 °C) (09/16/22 0756)  Pulse: 83 (09/16/22 0756)  Resp: 14 (09/16/22 0756)  BP: 114/73 (09/16/22 0756)  SpO2: 99 % (09/16/22 0756) Vital Signs (24h Range):  Temp:  [97.6 °F (36.4 °C)-98 °F (36.7 °C)] 97.7 °F (36.5 °C)  Pulse:  [70-98] 83  Resp:  [14-18] 14  SpO2:  [98 %-100 %] 99 %  BP: (107-121)/(71-84) 114/73     Weight: 74.7 kg (164 lb 10.9 oz)  Body mass index is 25.04 kg/m².    Intake/Output - Last 3 Shifts         09/14 0700  09/15 0659 09/15 0700  09/16 0659 09/16 0700  09/17 0659    P.O.  460     I.V. (mL/kg)  85.9 (1.2)     IV Piggyback  1047.9     Total Intake(mL/kg)  1593.9 (21.3)     Urine (mL/kg/hr)  0 (0)     Drains 75 400     Stool 0 0     Total Output 75 400     Net -75 +1193.9            Urine Occurrence  4 x     Stool Occurrence 0 x 0 x             Physical Exam  Vitals and nursing note reviewed.   Constitutional:       General: He is not in acute distress.  HENT:      Head: Normocephalic.   Eyes:      General: No scleral icterus.  Cardiovascular:      Rate and Rhythm: Normal rate and regular rhythm.      Pulses: Normal pulses.      Heart sounds: Normal heart sounds, S1 normal and S2 normal.   Pulmonary:      Effort: Pulmonary effort is normal. No respiratory distress.      Breath sounds: Normal breath sounds. No wheezing or rales.   Abdominal:      General: A surgical scar is present. Bowel sounds are normal. There is no distension.      Palpations: Abdomen is soft.      Tenderness: There is no abdominal tenderness.  There is no guarding or rebound.      Comments: Well healed chevron incision  R PTC drain / uncapped to gravity drainage / no s/s/i   Musculoskeletal:         General: No swelling.      Cervical back: Normal range of motion.      Right lower leg: No edema.      Left lower leg: No edema.   Skin:     General: Skin is warm and dry.      Capillary Refill: Capillary refill takes less than 2 seconds.      Coloration: Skin is not jaundiced.   Neurological:      Mental Status: He is alert and oriented to person, place, and time.      Motor: No weakness.   Psychiatric:         Attention and Perception: Attention normal.         Mood and Affect: Mood normal.         Speech: Speech normal.         Behavior: Behavior normal. Behavior is cooperative.         Thought Content: Thought content normal.       Laboratory:  Immunosuppressants           Stop Route Frequency     tacrolimus capsule 2 mg         -- Oral Every morning     tacrolimus capsule 1 mg         -- Oral Every evening          CBC:   Recent Labs   Lab 09/16/22  0529   WBC 3.92   RBC 3.34*   HGB 10.0*   HCT 30.3*   PLT 99*   MCV 91   MCH 29.9   MCHC 33.0     CMP:   Recent Labs   Lab 09/16/22  0529   *   CALCIUM 9.5   ALBUMIN 3.3*   PROT 6.3   *   K 4.2   CO2 26      BUN 24*   CREATININE 1.2   ALKPHOS 129   ALT 42   AST 21   BILITOT 0.8     Labs within the past 24 hours have been reviewed.    Diagnostic Results:  US Liver Transplant Post:  Results for orders placed during the hospital encounter of 09/14/22    US Doppler Liver Transplant Post (xpd)    Narrative  EXAMINATION:  US DOPPLER LIVER TRANSPLANT POST (XPD)    CLINICAL HISTORY:  Hx of Liver transplant; concern for biliary leak;    TECHNIQUE:  Limited abdominal ultrasound of the transplant liver with Doppler evaluation.  Color and spectral Doppler were performed.    COMPARISON:  Ultrasound 08/24/2022, 06/22/2022    CT 08/24/2022    CT 08/10/2022    FINDINGS:  Patient is status post right hepatic  transplant 06/21/2022.  Liver demonstrates a wedge-shaped geographic area of relative hypoechogenicity along the anteromedial edge of the liver similar to prior.  No new focal lesions appreciated.  No fluid collections.    The common duct is not dilated, measuring 4 mm.  No dilated intrahepatic radicles are seen.    Color flow and spectral waveform analysis was performed.    Anastomosis site of the main hepatic artery demonstrates a peak systolic velocity 203 cm/sec (previously 66 cm/sec and as high as 247 cm/sec 06/22/2022 ultrasound).    Main hepatic artery demonstrates resistive index 0.69 (previously 0.69) with normal waveform.    Anterior branch of the right hepatic artery demonstrates resistive index 0.52 (previously 0.52) with normal waveform.    Posterior branch of the right hepatic artery demonstrates resistive index 0.53 (previously not visualized) with normal waveform.    The main and right branches of the portal vein demonstrate anterograde flow and are otherwise unremarkable.    Right and middle hepatic vein confluence piggybacked to IVC.  Middle hepatic vein is not definitively visualized on today's exam.  Velocity at the IVC confluence anastomosis is 197 cm/sec (previously as high as 202 cm/sec).    Biliary drain in place.    Impression  Status post right hepatic lobe transplantation.   Persistent, stable elevated velocity at the IVC confluence anastomosis.    Nonvisualization of the middle hepatic vein noting no thrombosed vein is visualized in the expected region.  The right hepatic vein is prominent with multiple branches.  Continued follow-up is recommended.    Otherwise satisfactory Doppler evaluation.    No intra or extrahepatic biliary dilatation.  Biliary drain remains in place.    Stable wedge shaped hypoechoic area along the anteromedial aspect of the liver, likely postsurgical in nature.    Electronically signed by resident: Irvin Harkins  Date:    09/15/2022  Time:    08:16    Electronically  "signed by: Abel Melton MD  Date:    09/15/2022  Time:    08:52    Debility/Functional status: No debility noted.    Assessment/Plan:     Cholangitis of transplanted liver  - tmax 102 at home, 103 at [facility]   - One time zosyn/vanc in ED  - Continue cefepime/vanc while inpatient  - PTC drain in place, capped prior to admit. Uncapped now to gravity drainage  - IR consulted for repeat cholangiogram 9/15. repeat IR cholangiogram on 9/15  with cholangioplasty and PTC drain exchanged. PTC drain to gravity with a total of 225ml of bilious output in the past 24 hours 9/16.  - T bili down to 0.8 from 1.7 this morning   - BC NGTD thus far; will follow up  - cont to monitor       Elevated bilirubin  - Tbili elevated liver on admit to 1.7 (previously 1)  - Liver US 9/15 reviewed with surgeon  - PTC tube uncapped on admit, to gravity drainage  -repeat IR cholangiogram on 9/15  with cholangioplasty and PTC drain exchanged. PTC drain to gravity with a total of 225ml of bilious output in the past 24 hours.  - T bili down to 0.8 from 1.7 this morning 9/16   - continue kartik      Pancytopenia  - chronic     Elevated LFTs  - US pending      Long-term use of immunosuppressant medication  - see "prophylactic immunotherapy"      At risk for opportunistic infections  - cont OI prophylaxis per protocol       Prophylactic immunotherapy  - cont tacro, monitor levels for toxicity and therapeutic effects   - cellcept held for fever       S/P liver transplant  - s/p living 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 for cholangiocarcinoma (vick-en-y biliary anastomosis)  - post op with several issues relating to bile leak/bilomas requiring multiple biliary tube upsizings since transplant, infections with enterococcus/lactobacillus/candida   - presents with fever and elevated Tbili   - see "fever" and "elevated bilirubin"   - cont to monitor with daily CMP   - LFTs WNL. T bili down to 0.8 from 1.7 today 9/16  - patient afebrile overnight and this morning. Patient " states he feels good.    Hypertension  - cont home meds           VTE Risk Mitigation (From admission, onward)         Ordered     heparin (porcine) injection 5,000 Units  Every 8 hours         09/14/22 2331     IP VTE HIGH RISK PATIENT  Once         09/14/22 2331     Place sequential compression device  Until discontinued         09/14/22 2331                The patients clinical status was discussed at multidisplinary rounds, involving transplant surgery, transplant medicine, pharmacy, nursing, nutrition, and social work    Discharge Planning:  Not a candidate for discharge at this time.     PharmD Review: MMF off due to WBC - cont to monitor and add back when appropriate; per ID - can stop augmentin (last visit with Dr. Brown on 8/12) [ 8/15/2022]       Hannah Young, NP  Liver Transplant  [facility] - Transplant Step[facility]

## 2022-09-16 NOTE — SUBJECTIVE & OBJECTIVE
Interval History: Patient had cholangiogram yesterday. He feels well and denies any fever or new complaint.     Review of Systems   Constitutional:  Negative for chills and fever.   HENT:  Negative for congestion and sore throat.    Respiratory:  Negative for cough and shortness of breath.    Cardiovascular:  Negative for chest pain.   Gastrointestinal:  Negative for abdominal pain, constipation, diarrhea, nausea and vomiting.   Genitourinary:  Negative for dysuria.   Musculoskeletal:  Negative for arthralgias and back pain.   Skin:  Negative for rash and wound.   Allergic/Immunologic: Positive for immunocompromised state. Negative for food allergies.   Neurological:  Negative for dizziness and headaches.   Psychiatric/Behavioral:  Negative for sleep disturbance.    Objective:     Vital Signs (Most Recent):  Temp: 98.5 °F (36.9 °C) (09/16/22 1123)  Pulse: 91 (09/16/22 1132)  Resp: 18 (09/16/22 1123)  BP: 112/83 (09/16/22 1123)  SpO2: 99 % (09/16/22 1123) Vital Signs (24h Range):  Temp:  [97.6 °F (36.4 °C)-98.5 °F (36.9 °C)] 98.5 °F (36.9 °C)  Pulse:  [70-98] 91  Resp:  [14-18] 18  SpO2:  [98 %-100 %] 99 %  BP: (107-121)/(71-84) 112/83     Weight: 74.7 kg (164 lb 10.9 oz)  Body mass index is 25.04 kg/m².    Estimated Creatinine Clearance: 76 mL/min (based on SCr of 1.2 mg/dL).    Physical Exam  Vitals and nursing note reviewed.   Constitutional:       General: He is not in acute distress.     Appearance: He is normal weight. He is not ill-appearing.   HENT:      Head: Normocephalic.      Right Ear: External ear normal.      Left Ear: External ear normal.      Nose: Nose normal.      Mouth/Throat:      Mouth: Mucous membranes are moist.      Pharynx: Oropharynx is clear.   Eyes:      General: No scleral icterus.     Extraocular Movements: Extraocular movements intact.      Conjunctiva/sclera: Conjunctivae normal.   Cardiovascular:      Rate and Rhythm: Normal rate and regular rhythm.      Pulses: Normal pulses.       Heart sounds: S1 normal and S2 normal. No murmur heard.  Pulmonary:      Effort: Pulmonary effort is normal. No respiratory distress.      Breath sounds: Normal breath sounds. No wheezing.   Abdominal:      General: A surgical scar is present. Bowel sounds are normal. There is no distension.      Palpations: Abdomen is soft.      Tenderness: There is no abdominal tenderness. There is no guarding or rebound.      Comments: Well healed chevron incision. R SHRUTHI drain with bilious output.    Musculoskeletal:         General: No swelling.      Cervical back: Neck supple.      Right lower leg: No edema.      Left lower leg: No edema.   Skin:     General: Skin is warm and dry.      Coloration: Skin is not jaundiced.      Findings: No rash.   Neurological:      Mental Status: He is alert and oriented to person, place, and time. Mental status is at baseline.      Motor: No weakness.   Psychiatric:         Attention and Perception: Attention normal.         Mood and Affect: Mood normal.         Speech: Speech normal.         Behavior: Behavior normal. Behavior is cooperative.         Thought Content: Thought content normal.         Judgment: Judgment normal.       Significant Labs: All pertinent labs within the past 24 hours have been reviewed.  Recent Lab Results         09/16/22  1041   09/16/22  0529        Albumin   3.3       Alkaline Phosphatase   129       ALT   42       Anion Gap   8       AST   21       Baso #   0.01       Basophil %   0.3       BILIRUBIN TOTAL   0.8  Comment: For infants and newborns, interpretation of results should be based  on gestational age, weight and in agreement with clinical  observations.    Premature Infant recommended reference ranges:  Up to 24 hours.............<8.0 mg/dL  Up to 48 hours............<12.0 mg/dL  3-5 days..................<15.0 mg/dL  6-29 days.................<15.0 mg/dL         BUN   24       Calcium   9.5       Chloride   101       CO2   26       Creatinine   1.2        Differential Method   Automated       eGFR   >60.0       Eos #   0.0       Eosinophil %   0.3       Glucose   147       Gran # (ANC)   3.0       Gran %   76.9       Hematocrit   30.3       Hemoglobin   10.0       Immature Grans (Abs)   0.01  Comment: Mild elevation in immature granulocytes is non specific and   can be seen in a variety of conditions including stress response,   acute inflammation, trauma and pregnancy. Correlation with other   laboratory and clinical findings is essential.         Immature Granulocytes   0.3       Lymph #   0.6       Lymph %   14.5       Magnesium   1.8       MCH   29.9       MCHC   33.0       MCV   91       Mono #   0.3       Mono %   7.7       MPV   9.3       nRBC   0       Phosphorus   4.2       Platelets   99       Potassium   4.2       PROTEIN TOTAL   6.3       RBC   3.34       RDW   15.1       Sodium   135       Tacrolimus Lvl   8.8  Comment: Testing performed by a chemiluminescent microparticle   immunoassay on the [employer].         Vancomycin-Trough 13.0         WBC   3.92               Significant Imaging: I have reviewed all pertinent imaging results/findings within the past 24 hours.

## 2022-09-16 NOTE — ASSESSMENT & PLAN NOTE
"- s/p living 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 for cholangiocarcinoma (vick-en-y biliary anastomosis)  - post op with several issues relating to bile leak/bilomas requiring multiple biliary tube upsizings since transplant, infections with enterococcus/lactobacillus/candida   - presents with fever and elevated Tbili   - see "fever" and "elevated bilirubin"   - cont to monitor with daily CMP   - LFTs WNL. T bili down to 0.8 from 1.7 today 9/16  - patient afebrile overnight and this morning. Patient states he feels good.  "

## 2022-09-16 NOTE — ASSESSMENT & PLAN NOTE
42 year old male with history of cholangiocarcinoma s/p living 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 (vick-en-y biliary anastomosis, steroid induction, CMV D-R+, mmf/tacro/pred) post-op course notable for bile leak, s/p OR take back 6/23 for bile duct repair, cultures grew E faecalis, s/p exlap 7/4 washout of bioloma with OR cultures with lactobacillus sp. s/p PTC drain on 7/13, c/b C glabrata (resistant to fluconazole) fungemia, s/p new drain placement on 8/25, HTN, HLD; admitted on 9/14/2022 with 1 day of fever. At ED, patient developed lip edema with pip-tazo (allergy profile updated). ID consulted for antibiotic assistance.      Recommendations:  - If discharge, can stop vancomycin and cefepime. Start Augmentin 875 mg BID for 1 week course, end date: 9/22/22.   - Continue prophylactic valganciclovir and Bactrim.

## 2022-09-16 NOTE — PROGRESS NOTES
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 s/p PTC drain on 7/13, c/b C glabrata (resistant to fluconazole) fungemia, s/p new drain placement on 8/25, HTN, HLD; admitted on 9/14/2022 with 1 day of fever. At ED, patient developed lip edema with pip-tazo (allergy profile updated). ID consulted for antibiotic assistance.      Recommendations:  - If discharge, can stop vancomycin and cefepime. Start Augmentin 875 mg BID for 1 week course, end date: 9/22/22.   - Continue prophylactic valganciclovir and Bactrim.         Anticipated Disposition: Stable. Discharge planning per primary.     Thank you for your consult. I will sign off. Please contact us if you have any additional questions.    Yefri Dubon MD  Infectious Diseases  [facility]     Subjective:     Principal Problem:Fever    HPI: Mr. 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 s/p PTC drain on 7/13, c/b C  glabrata (resistant to fluconazole) fungemia, s/p new drain placement on 8/25, HTN, HLD; admitted on 9/14/2022 with 1 day of fever. No other reported symptoms. Pertinent work up with stable WBC (no leukocytosis), CMP with elevated Tbili, ALP, and ALT. Lactate normal. Patient was empirically treated with vancomycin and pip-tazo. He developed left lower lip edema during pip-tazo infusion, resolved with discontinuing pip-tazo and received Benadryl and Prednisone. ID consulted for antibiotic assistance.    Interval History: Patient had cholangiogram yesterday. He feels well and denies any fever or new complaint.     Review of Systems   Constitutional:  Negative for chills and fever.   HENT:  Negative for congestion and sore throat.    Respiratory:  Negative for cough and shortness of breath.    Cardiovascular:  Negative for chest pain.   Gastrointestinal:  Negative for abdominal pain, constipation, diarrhea, nausea and vomiting.   Genitourinary:  Negative for dysuria.   Musculoskeletal:  Negative for arthralgias and back pain.   Skin:  Negative for rash and wound.   Allergic/Immunologic: Positive for immunocompromised state. Negative for food allergies.   Neurological:  Negative for dizziness and headaches.   Psychiatric/Behavioral:  Negative for sleep disturbance.    Objective:     Vital Signs (Most Recent):  Temp: 98.5 °F (36.9 °C) (09/16/22 1123)  Pulse: 91 (09/16/22 1132)  Resp: 18 (09/16/22 1123)  BP: 112/83 (09/16/22 1123)  SpO2: 99 % (09/16/22 1123) Vital Signs (24h Range):  Temp:  [97.6 °F (36.4 °C)-98.5 °F (36.9 °C)] 98.5 °F (36.9 °C)  Pulse:  [70-98] 91  Resp:  [14-18] 18  SpO2:  [98 %-100 %] 99 %  BP: (107-121)/(71-84) 112/83     Weight: 74.7 kg (164 lb 10.9 oz)  Body mass index is 25.04 kg/m².    Estimated Creatinine Clearance: 76 mL/min (based on SCr of 1.2 mg/dL).    Physical Exam  Vitals and nursing note reviewed.   Constitutional:       General: He is not in acute distress.     Appearance: He is normal  weight. He is not ill-appearing.   HENT:      Head: Normocephalic.      Right Ear: External ear normal.      Left Ear: External ear normal.      Nose: Nose normal.      Mouth/Throat:      Mouth: Mucous membranes are moist.      Pharynx: Oropharynx is clear.   Eyes:      General: No scleral icterus.     Extraocular Movements: Extraocular movements intact.      Conjunctiva/sclera: Conjunctivae normal.   Cardiovascular:      Rate and Rhythm: Normal rate and regular rhythm.      Pulses: Normal pulses.      Heart sounds: S1 normal and S2 normal. No murmur heard.  Pulmonary:      Effort: Pulmonary effort is normal. No respiratory distress.      Breath sounds: Normal breath sounds. No wheezing.   Abdominal:      General: A surgical scar is present. Bowel sounds are normal. There is no distension.      Palpations: Abdomen is soft.      Tenderness: There is no abdominal tenderness. There is no guarding or rebound.      Comments: Well healed chevron incision. R SHRUTHI drain with bilious output.    Musculoskeletal:         General: No swelling.      Cervical back: Neck supple.      Right lower leg: No edema.      Left lower leg: No edema.   Skin:     General: Skin is warm and dry.      Coloration: Skin is not jaundiced.      Findings: No rash.   Neurological:      Mental Status: He is alert and oriented to person, place, and time. Mental status is at baseline.      Motor: No weakness.   Psychiatric:         Attention and Perception: Attention normal.         Mood and Affect: Mood normal.         Speech: Speech normal.         Behavior: Behavior normal. Behavior is cooperative.         Thought Content: Thought content normal.         Judgment: Judgment normal.       Significant Labs: All pertinent labs within the past 24 hours have been reviewed.  Recent Lab Results         09/16/22  1041   09/16/22  0529        Albumin   3.3       Alkaline Phosphatase   129       ALT   42       Anion Gap   8       AST   21       Baso #   0.01        Basophil %   0.3       BILIRUBIN TOTAL   0.8  Comment: For infants and newborns, interpretation of results should be based  on gestational age, weight and in agreement with clinical  observations.    Premature Infant recommended reference ranges:  Up to 24 hours.............<8.0 mg/dL  Up to 48 hours............<12.0 mg/dL  3-5 days..................<15.0 mg/dL  6-29 days.................<15.0 mg/dL         BUN   24       Calcium   9.5       Chloride   101       CO2   26       Creatinine   1.2       Differential Method   Automated       eGFR   >60.0       Eos #   0.0       Eosinophil %   0.3       Glucose   147       Gran # (ANC)   3.0       Gran %   76.9       Hematocrit   30.3       Hemoglobin   10.0       Immature Grans (Abs)   0.01  Comment: Mild elevation in immature granulocytes is non specific and   can be seen in a variety of conditions including stress response,   acute inflammation, trauma and pregnancy. Correlation with other   laboratory and clinical findings is essential.         Immature Granulocytes   0.3       Lymph #   0.6       Lymph %   14.5       Magnesium   1.8       MCH   29.9       MCHC   33.0       MCV   91       Mono #   0.3       Mono %   7.7       MPV   9.3       nRBC   0       Phosphorus   4.2       Platelets   99       Potassium   4.2       PROTEIN TOTAL   6.3       RBC   3.34       RDW   15.1       Sodium   135       Tacrolimus Lvl   8.8  Comment: Testing performed by a chemiluminescent microparticle   immunoassay on the [employer] i System.         Vancomycin-Trough 13.0         WBC   3.92               Significant Imaging: I have reviewed all pertinent imaging results/findings within the past 24 hours.

## 2022-09-16 NOTE — ASSESSMENT & PLAN NOTE
- tmax 102 at home, 103 at [facility]   - One time zosyn/vanc in ED  - Continue cefepime/vanc while inpatient  - PTC drain in place, capped prior to admit. Uncapped now to gravity drainage  - IR consulted for repeat cholangiogram 9/15. repeat IR cholangiogram on 9/15  with cholangioplasty and PTC drain exchanged. PTC drain to gravity with a total of 225ml of bilious output in the past 24 hours 9/16.  - T bili down to 0.8 from 1.7 this morning   - BC NGTD thus far; will follow up  - cont to monitor

## 2022-09-16 NOTE — PROGRESS NOTES
Discharge Note:     presented to patient bedside to discuss discharge plans, as well as assess any concerns or needs. Patient was alert and oriented x4, pleasant and polite. Patient's caregivers are not at bedside at this time and patient is able to care for self. Patient has support from his wife, Shell, and parents, Sonia and Bradley, as needed.     Patient may discharge home over the weekend. Patient's wife, Shell (phone: [phone]) or parents will transport him home. Patient has [facility] for SN and will resume. SW notified [facility] of expected discharge date.     Patient reports agreeing with the discharge plan and has no psychosocial concerns. Patient and caregiver verbalize understanding and are involved in treatment planning and discharge process. Patient nor caregiver had any further concerns or questions at this time. SW remains available and will continue to follow, providing psychosocial support, education and assistance as needed.

## 2022-09-16 NOTE — SUBJECTIVE & OBJECTIVE
Scheduled Meds:   carvediloL  3.125 mg Oral BID WM    ceFEPime (MAXIPIME) IVPB  2 g Intravenous Q8H    cetirizine  10 mg Oral Once    heparin (porcine)  5,000 Units Subcutaneous Q8H    mirtazapine  15 mg Oral Nightly    pantoprazole  40 mg Oral Daily    sulfamethoxazole-trimethoprim 400-80mg  1 tablet Oral Daily    tacrolimus  1 mg Oral Daily PM    tacrolimus  2 mg Oral Daily AM    ursodioL  250 mg Oral BID    valGANciclovir  450 mg Oral Daily    vancomycin (VANCOCIN) IVPB  15 mg/kg Intravenous Q12H     Continuous Infusions:  PRN Meds:acetaminophen, fentaNYL, melatonin, ondansetron, sodium chloride 0.9%, Pharmacy to dose Vancomycin consult **AND** vancomycin - pharmacy to dose    Review of Systems   Constitutional:  Negative for activity change, appetite change and fever.   HENT:  Negative for facial swelling and trouble swallowing.    Respiratory:  Negative for cough, chest tightness and shortness of breath.    Cardiovascular:  Negative for chest pain, palpitations and leg swelling.   Gastrointestinal:  Negative for abdominal distention, abdominal pain, diarrhea, nausea and vomiting.   Genitourinary:  Negative for decreased urine volume, difficulty urinating and dysuria.   Musculoskeletal:  Negative for arthralgias and myalgias.   Skin:  Negative for wound.   Allergic/Immunologic: Positive for immunocompromised state.   Neurological:  Negative for dizziness, tremors, weakness and light-headedness.   Psychiatric/Behavioral:  Negative for agitation, behavioral problems, confusion and decreased concentration.    All other systems reviewed and are negative.  Objective:     Vital Signs (Most Recent):  Temp: 97.7 °F (36.5 °C) (09/16/22 0756)  Pulse: 83 (09/16/22 0756)  Resp: 14 (09/16/22 0756)  BP: 114/73 (09/16/22 0756)  SpO2: 99 % (09/16/22 0756) Vital Signs (24h Range):  Temp:  [97.6 °F (36.4 °C)-98 °F (36.7 °C)] 97.7 °F (36.5 °C)  Pulse:  [70-98] 83  Resp:  [14-18] 14  SpO2:  [98 %-100 %] 99 %  BP: (107-121)/(71-84)  114/73     Weight: 74.7 kg (164 lb 10.9 oz)  Body mass index is 25.04 kg/m².    Intake/Output - Last 3 Shifts         09/14 0700  09/15 0659 09/15 0700 09/16 0659 09/16 0700  09/17 0659    P.O.  460     I.V. (mL/kg)  85.9 (1.2)     IV Piggyback  1047.9     Total Intake(mL/kg)  1593.9 (21.3)     Urine (mL/kg/hr)  0 (0)     Drains 75 400     Stool 0 0     Total Output 75 400     Net -75 +1193.9            Urine Occurrence  4 x     Stool Occurrence 0 x 0 x             Physical Exam  Vitals and nursing note reviewed.   Constitutional:       General: He is not in acute distress.  HENT:      Head: Normocephalic.   Eyes:      General: No scleral icterus.  Cardiovascular:      Rate and Rhythm: Normal rate and regular rhythm.      Pulses: Normal pulses.      Heart sounds: Normal heart sounds, S1 normal and S2 normal.   Pulmonary:      Effort: Pulmonary effort is normal. No respiratory distress.      Breath sounds: Normal breath sounds. No wheezing or rales.   Abdominal:      General: A surgical scar is present. Bowel sounds are normal. There is no distension.      Palpations: Abdomen is soft.      Tenderness: There is no abdominal tenderness. There is no guarding or rebound.      Comments: Well healed chevron incision  R PTC drain / uncapped to gravity drainage / no s/s/i   Musculoskeletal:         General: No swelling.      Cervical back: Normal range of motion.      Right lower leg: No edema.      Left lower leg: No edema.   Skin:     General: Skin is warm and dry.      Capillary Refill: Capillary refill takes less than 2 seconds.      Coloration: Skin is not jaundiced.   Neurological:      Mental Status: He is alert and oriented to person, place, and time.      Motor: No weakness.   Psychiatric:         Attention and Perception: Attention normal.         Mood and Affect: Mood normal.         Speech: Speech normal.         Behavior: Behavior normal. Behavior is cooperative.         Thought Content: Thought content normal.        Laboratory:  Immunosuppressants           Stop Route Frequency     tacrolimus capsule 2 mg         -- Oral Every morning     tacrolimus capsule 1 mg         -- Oral Every evening          CBC:   Recent Labs   Lab 09/16/22  0529   WBC 3.92   RBC 3.34*   HGB 10.0*   HCT 30.3*   PLT 99*   MCV 91   MCH 29.9   MCHC 33.0     CMP:   Recent Labs   Lab 09/16/22  0529   *   CALCIUM 9.5   ALBUMIN 3.3*   PROT 6.3   *   K 4.2   CO2 26      BUN 24*   CREATININE 1.2   ALKPHOS 129   ALT 42   AST 21   BILITOT 0.8     Labs within the past 24 hours have been reviewed.    Diagnostic Results:  US Liver Transplant Post:  Results for orders placed during the hospital encounter of 09/14/22    US Doppler Liver Transplant Post (xpd)    Narrative  EXAMINATION:  US DOPPLER LIVER TRANSPLANT POST (XPD)    CLINICAL HISTORY:  Hx of Liver transplant; concern for biliary leak;    TECHNIQUE:  Limited abdominal ultrasound of the transplant liver with Doppler evaluation.  Color and spectral Doppler were performed.    COMPARISON:  Ultrasound 08/24/2022, 06/22/2022    CT 08/24/2022    CT 08/10/2022    FINDINGS:  Patient is status post right hepatic transplant 06/21/2022.  Liver demonstrates a wedge-shaped geographic area of relative hypoechogenicity along the anteromedial edge of the liver similar to prior.  No new focal lesions appreciated.  No fluid collections.    The common duct is not dilated, measuring 4 mm.  No dilated intrahepatic radicles are seen.    Color flow and spectral waveform analysis was performed.    Anastomosis site of the main hepatic artery demonstrates a peak systolic velocity 203 cm/sec (previously 66 cm/sec and as high as 247 cm/sec 06/22/2022 ultrasound).    Main hepatic artery demonstrates resistive index 0.69 (previously 0.69) with normal waveform.    Anterior branch of the right hepatic artery demonstrates resistive index 0.52 (previously 0.52) with normal waveform.    Posterior branch of the right  hepatic artery demonstrates resistive index 0.53 (previously not visualized) with normal waveform.    The main and right branches of the portal vein demonstrate anterograde flow and are otherwise unremarkable.    Right and middle hepatic vein confluence piggybacked to IVC.  Middle hepatic vein is not definitively visualized on today's exam.  Velocity at the IVC confluence anastomosis is 197 cm/sec (previously as high as 202 cm/sec).    Biliary drain in place.    Impression  Status post right hepatic lobe transplantation.   Persistent, stable elevated velocity at the IVC confluence anastomosis.    Nonvisualization of the middle hepatic vein noting no thrombosed vein is visualized in the expected region.  The right hepatic vein is prominent with multiple branches.  Continued follow-up is recommended.    Otherwise satisfactory Doppler evaluation.    No intra or extrahepatic biliary dilatation.  Biliary drain remains in place.    Stable wedge shaped hypoechoic area along the anteromedial aspect of the liver, likely postsurgical in nature.    Electronically signed by resident: Irvin Harkins  Date:    09/15/2022  Time:    08:16    Electronically signed by: Abel Melton MD  Date:    09/15/2022  Time:    08:52    Debility/Functional status: No debility noted.

## 2022-09-17 VITALS
TEMPERATURE: 98 F | HEIGHT: 68 IN | RESPIRATION RATE: 16 BRPM | HEART RATE: 77 BPM | DIASTOLIC BLOOD PRESSURE: 68 MMHG | SYSTOLIC BLOOD PRESSURE: 126 MMHG | WEIGHT: 164.25 LBS | BODY MASS INDEX: 24.89 KG/M2 | OXYGEN SATURATION: 95 %

## 2022-09-17 LAB
ALBUMIN SERPL BCP-MCNC: 3.3 G/DL (ref 3.5–5.2)
ALP SERPL-CCNC: 147 U/L (ref 55–135)
ALT SERPL W/O P-5'-P-CCNC: 37 U/L (ref 10–44)
ANION GAP SERPL CALC-SCNC: 8 MMOL/L (ref 8–16)
AST SERPL-CCNC: 21 U/L (ref 10–40)
BASOPHILS # BLD AUTO: 0.01 K/UL (ref 0–0.2)
BASOPHILS NFR BLD: 0.4 % (ref 0–1.9)
BILIRUB SERPL-MCNC: 0.6 MG/DL (ref 0.1–1)
BUN SERPL-MCNC: 23 MG/DL (ref 6–20)
CALCIUM SERPL-MCNC: 9.5 MG/DL (ref 8.7–10.5)
CHLORIDE SERPL-SCNC: 103 MMOL/L (ref 95–110)
CO2 SERPL-SCNC: 25 MMOL/L (ref 23–29)
CREAT SERPL-MCNC: 1 MG/DL (ref 0.5–1.4)
DIFFERENTIAL METHOD: ABNORMAL
EOSINOPHIL # BLD AUTO: 0 K/UL (ref 0–0.5)
EOSINOPHIL NFR BLD: 0.8 % (ref 0–8)
ERYTHROCYTE [DISTWIDTH] IN BLOOD BY AUTOMATED COUNT: 15.1 % (ref 11.5–14.5)
EST. GFR  (NO RACE VARIABLE): >60 ML/MIN/1.73 M^2
GLUCOSE SERPL-MCNC: 121 MG/DL (ref 70–110)
HCT VFR BLD AUTO: 30.3 % (ref 40–54)
HGB BLD-MCNC: 10.1 G/DL (ref 14–18)
IMM GRANULOCYTES # BLD AUTO: 0.01 K/UL (ref 0–0.04)
IMM GRANULOCYTES NFR BLD AUTO: 0.4 % (ref 0–0.5)
LYMPHOCYTES # BLD AUTO: 0.6 K/UL (ref 1–4.8)
LYMPHOCYTES NFR BLD: 25.8 % (ref 18–48)
MAGNESIUM SERPL-MCNC: 1.6 MG/DL (ref 1.6–2.6)
MCH RBC QN AUTO: 30.1 PG (ref 27–31)
MCHC RBC AUTO-ENTMCNC: 33.3 G/DL (ref 32–36)
MCV RBC AUTO: 90 FL (ref 82–98)
MONOCYTES # BLD AUTO: 0.2 K/UL (ref 0.3–1)
MONOCYTES NFR BLD: 8.5 % (ref 4–15)
NEUTROPHILS # BLD AUTO: 1.6 K/UL (ref 1.8–7.7)
NEUTROPHILS NFR BLD: 64.1 % (ref 38–73)
NRBC BLD-RTO: 0 /100 WBC
PHOSPHATE SERPL-MCNC: 4.4 MG/DL (ref 2.7–4.5)
PLATELET # BLD AUTO: 115 K/UL (ref 150–450)
PMV BLD AUTO: 9.2 FL (ref 9.2–12.9)
POTASSIUM SERPL-SCNC: 4.1 MMOL/L (ref 3.5–5.1)
PROT SERPL-MCNC: 6.3 G/DL (ref 6–8.4)
RBC # BLD AUTO: 3.36 M/UL (ref 4.6–6.2)
SODIUM SERPL-SCNC: 136 MMOL/L (ref 136–145)
TACROLIMUS BLD-MCNC: 8.5 NG/ML (ref 5–15)
WBC # BLD AUTO: 2.48 K/UL (ref 3.9–12.7)

## 2022-09-17 PROCEDURE — 25000003 PHARM REV CODE 250: Performed by: CLINICAL NURSE SPECIALIST

## 2022-09-17 PROCEDURE — 83735 ASSAY OF MAGNESIUM: CPT

## 2022-09-17 PROCEDURE — 63600175 PHARM REV CODE 636 W HCPCS: Performed by: SURGERY

## 2022-09-17 PROCEDURE — 25000003 PHARM REV CODE 250

## 2022-09-17 PROCEDURE — 85025 COMPLETE CBC W/AUTO DIFF WBC: CPT

## 2022-09-17 PROCEDURE — 99239 PR HOSPITAL DISCHARGE DAY,>30 MIN: ICD-10-PCS | Mod: 24,,,

## 2022-09-17 PROCEDURE — 99239 HOSP IP/OBS DSCHRG MGMT >30: CPT | Mod: 24,,,

## 2022-09-17 PROCEDURE — 80053 COMPREHEN METABOLIC PANEL: CPT

## 2022-09-17 PROCEDURE — 25000003 PHARM REV CODE 250: Performed by: SURGERY

## 2022-09-17 PROCEDURE — 80197 ASSAY OF TACROLIMUS: CPT

## 2022-09-17 PROCEDURE — 84100 ASSAY OF PHOSPHORUS: CPT

## 2022-09-17 PROCEDURE — 94761 N-INVAS EAR/PLS OXIMETRY MLT: CPT

## 2022-09-17 RX ORDER — HEPARIN 100 UNIT/ML
5 SYRINGE INTRAVENOUS
Status: DISCONTINUED | OUTPATIENT
Start: 2022-09-17 | End: 2022-09-17 | Stop reason: HOSPADM

## 2022-09-17 RX ORDER — AMOXICILLIN AND CLAVULANATE POTASSIUM 875; 125 MG/1; MG/1
1 TABLET, FILM COATED ORAL EVERY 12 HOURS
Qty: 14 TABLET | Refills: 0 | COMMUNITY
Start: 2022-09-17 | End: 2022-11-03

## 2022-09-17 RX ORDER — TACROLIMUS 1 MG/1
1 CAPSULE ORAL EVERY 12 HOURS
Qty: 60 CAPSULE | Refills: 11 | Status: SHIPPED | OUTPATIENT
Start: 2022-09-17 | End: 2022-10-31

## 2022-09-17 RX ADMIN — TACROLIMUS 1 MG: 1 CAPSULE ORAL at 08:09

## 2022-09-17 RX ADMIN — AMOXICILLIN AND CLAVULANATE POTASSIUM 1 TABLET: 875; 125 TABLET, FILM COATED ORAL at 08:09

## 2022-09-17 RX ADMIN — HEPARIN 500 UNITS: 100 SYRINGE at 12:09

## 2022-09-17 RX ADMIN — ASPIRIN 81 MG: 81 TABLET, CHEWABLE ORAL at 08:09

## 2022-09-17 RX ADMIN — SULFAMETHOXAZOLE AND TRIMETHOPRIM 1 TABLET: 400; 80 TABLET ORAL at 08:09

## 2022-09-17 RX ADMIN — URSODIOL 250 MG: 250 TABLET ORAL at 08:09

## 2022-09-17 RX ADMIN — VALGANCICLOVIR 450 MG: 450 TABLET, FILM COATED ORAL at 08:09

## 2022-09-17 RX ADMIN — CARVEDILOL 3.12 MG: 3.12 TABLET, FILM COATED ORAL at 08:09

## 2022-09-17 RX ADMIN — PANTOPRAZOLE SODIUM 40 MG: 40 TABLET, DELAYED RELEASE ORAL at 08:09

## 2022-09-17 NOTE — PROGRESS NOTES
Pt given dc paperwork. Verbalized understanding and had no questions. Denies pain.  P Port hep locked and deaccessed. RUQ bili drain clamped  and bag removed.  Pt given empty bags in case needed at home. Pt independently ambulatory around room and halls. Walking off unit with father.

## 2022-09-17 NOTE — DISCHARGE SUMMARY
[facility] - Transplant Stepdown  Liver Transplant  Discharge Summary      Patient Name: Romeo Larson  MRN: [id number]  Admission Date: 9/14/2022  Hospital Length of Stay: 3 days  Discharge Date and Time:  09/17/2022 12:16 PM  Attending Physician: Sinan Cline MD   Discharging Provider: Hannah Young NP  Primary Care Provider: Pravin Maria MD  Post-Operative Day: 88     ORGAN:   RIGHT LIVER LOBE (SEGS 5,6,7,8) WITHOUT MIDDLE HEPATIC VEIN  Disease Etiology: Primary Liver Malignancy: Cholangiocarcinoma (CH-CA)  Donor Type:   Living  CDC High Risk:     Donor CMV Status:   Donor CMV Status:   Donor HBcAB:     Donor HCV Status:     Whole or Partial: Partial Liver  Biliary Anastomosis: Vick-en-Y  Arterial Anatomy: Standard    HPI:   Romeo Larson is a 42yr old male s/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 Post operative course significant for recurrent bile leak. OR take back 6/23 for bile duct repair (small leak found near biliary anastomosis, unable to clearly identify source), cultures grew enterococcus faecalis. OR take back 7/4 for ex lap, washout of biloma, bile duct unable to be assessed due to adhesions, OR cultures with lactobacillus species. Underwent PTC drain (internal/external) placement on 7/13. Admitted 7/18-7/27 with sepsis. Started on vanc/cefepime, transitioned to unasyn 2/2 hx of enterococcus. BC positive for Candida glabrata, completed a course of natividad and augmentin (EOT 8/12) and f/u with ID. PTC was upsized and capped 7/20, SHRUTHI out 7/22. Last admit 8/24 for HA thrombosis concern r/o by CTA. Last cholangiogram 8/25 with cholangioplasty and new 14fr biliary drain placement that was capped on dc, skilled nursing sent for dressing changes weekly/as needed.     Patient presented to ED 9/14 with c/o fever. Denied any other sx including CP, SOB, NVD, changes in urine patterns, abdominal pain. Reported tmax of 102 at home brought down with tylenol. Tmax in ED  103. Vital signs otherwise stable. Labs in ED notable for increased LFTs including t bili from 0.8 to 1.7. UA clean, CXR w/o acute CP process, cultures pending. Patient received one dose of zosyn and one dose of vanc in ED as well as 1 L LR. Liver US pending. Uncapped biliary tube now draining to gravity. Cont to monitor while awaiting US results. D/w Dr. Cline      * No surgery found *     Hospital Course:    Patient admitted for fevers. Tmax 103 in ED. Infectious work-up ordered on admit and patient given Vanc/Zosyn in ED. Patient's PTC drain opened to gravity on admit, previously clamped. Repeat cholangiogram with cholangioplasty and PTC drain exchanged on 9/15. Stricture still seen on cholangiogram, but improved from prior cholangiogram. NGTD on blood cultures from 9/14. On day of discharge, patient states he feels great and is ready to be discharged home. Patient's PTC drain capped prior to discharge. Will go home on Augmentin 875 PO BID for 1 week with end date 9/22/22. Patient verbalizes understanding of follow up appointment with Dr. Nobles on 9/26/22. Patient will need repeat cholangiogram appointment set up. All VSS.     Interval note: BARBARA Pt remains afebrile overnight and this morning. Patient states he is feeling good this morning. Liver enzymes WNL. T bili down to 0.8 from 1.7.  Continue cefepime/vanc inpatient, ID consulted. Blood cultures NGTD thus far. Tolerating diet, no nausea/ vomiting/ or diarrhea noted. VSS. Continue to monitor.       Goals of Care Treatment Preferences:  Code Status: Full Code      Final Active Diagnoses:    Diagnosis Date Noted POA    PRINCIPAL PROBLEM:  Fever [R50.9] 04/26/2022 Yes    Pancytopenia [D61.818] 09/15/2022 Yes    Elevated bilirubin [R17] 09/15/2022 Yes    Cholangitis of transplanted liver [T86.49, K83.09] 09/15/2022 Yes    Long-term use of immunosuppressant medication [Z79.899] 06/25/2022 Not Applicable    S/P liver transplant [Z94.4] 06/21/2022 Not  Applicable    Prophylactic immunotherapy [Z29.8] 06/21/2022 Not Applicable    At risk for opportunistic infections [Z91.89] 06/21/2022 Yes    Hypertension [I10] 10/15/2021 Yes      Problems Resolved During this Admission:    Diagnosis Date Noted Date Resolved POA    Elevated liver enzymes [R74.8] 04/26/2022 09/15/2022 Yes       Consults (From admission, onward)        Status Ordering Provider     Inpatient consult to Interventional Radiology  Once        Provider:  (Not yet assigned)    Completed AARON HART     Inpatient consult to Infectious Diseases  Once        Provider:  (Not yet assigned)    Completed TESSA ABDI     Inpatient consult to Liver Transplant  Once        Provider:  (Not yet assigned)    Acknowledged CHAPIS BALDERAS          Pending Diagnostic Studies:     None        Significant Diagnostic Studies: Labs:   CMP   Recent Labs   Lab 09/16/22  0529 09/17/22  0631   * 136   K 4.2 4.1    103   CO2 26 25   * 121*   BUN 24* 23*   CREATININE 1.2 1.0   CALCIUM 9.5 9.5   PROT 6.3 6.3   ALBUMIN 3.3* 3.3*   BILITOT 0.8 0.6   ALKPHOS 129 147*   AST 21 21   ALT 42 37   ANIONGAP 8 8   , CBC   Recent Labs   Lab 09/16/22  0529 09/17/22  0631   WBC 3.92 2.48*   HGB 10.0* 10.1*   HCT 30.3* 30.3*   PLT 99* 115*    and All labs within the past 24 hours have been reviewed  Microbiology:   Blood Culture   Lab Results   Component Value Date    LABBLOO No Growth to date 09/14/2022    LABBLOO No Growth to date 09/14/2022    LABBLOO No Growth to date 09/14/2022       The patients clinical status was discussed at multidisplinary rounds, involving transplant surgery, transplant medicine, pharmacy, nursing, nutrition, and social work    Discharged Condition: good    Disposition: Home or Self Care    Follow Up:    Patient Instructions:      Ambulatory referral/consult to Home Health   Standing Status: Future   Referral Priority: Routine Referral Type: Home Health Care   Referral  Reason: Specialty Services Required   Requested Specialty: Home Health Services   Number of Visits Requested: 1     Diet diabetic     Notify your health care provider if you experience any of the following:  temperature >100.4     Notify your health care provider if you experience any of the following:  persistent nausea and vomiting or diarrhea     Notify your health care provider if you experience any of the following:  severe uncontrolled pain     Notify your health care provider if you experience any of the following:  redness, tenderness, or signs of infection (pain, swelling, redness, odor or green/yellow discharge around incision site)     Notify your health care provider if you experience any of the following:  difficulty breathing or increased cough     Notify your health care provider if you experience any of the following:  severe persistent headache     Notify your health care provider if you experience any of the following:  worsening rash     Notify your health care provider if you experience any of the following:  persistent dizziness, light-headedness, or visual disturbances     Notify your health care provider if you experience any of the following:  increased confusion or weakness     Notify your health care provider if you experience any of the following:   Order Comments: Any other concerning signs or symptoms     Activity as tolerated     Medications:  Reconciled Home Medications:      Medication List      START taking these medications    amoxicillin-clavulanate 875-125mg 875-125 mg per tablet  Commonly known as: AUGMENTIN  Take 1 tablet by mouth every 12 (twelve) hours.        CHANGE how you take these medications    tacrolimus 1 MG Cap  Commonly known as: PROGRAF  Take 1 capsule (1 mg total) by mouth every 12 (twelve) hours.  What changed: See the new instructions.        CONTINUE taking these medications    aspirin 81 MG Chew  CHEW 1 tablet (81 mg total) by mouth once daily.     BD  "ULTRA-FINE KAREN PEN NEEDLE 32 gauge x 5/32" Ndle  Generic drug: pen needle, diabetic  1 each by Misc.(Non-Drug; Combo Route) route 3 (three) times daily.     calcium carbonate-vitamin D3 600 mg-20 mcg (800 unit) Tab  Take 1 tablet by mouth once daily at 6am.     carvediloL 6.25 MG tablet  Commonly known as: COREG  Take 0.5 tablets (3.125 mg total) by mouth 2 (two) times daily with meals.     mirtazapine 15 MG tablet  Commonly known as: REMERON  Take 1 tablet (15 mg total) by mouth nightly.     pantoprazole 40 MG tablet  Commonly known as: PROTONIX  Take 1 tablet (40 mg total) by mouth once daily.     sulfamethoxazole-trimethoprim 400-80mg 400-80 mg per tablet  Commonly known as: BACTRIM,SEPTRA  Take 1 tablet by mouth once daily. Stop: 12/18/22     TRUE METRIX GLUCOSE METER Misc  Generic drug: blood-glucose meter  Use as instructed     TRUE METRIX GLUCOSE TEST STRIP Strp  Generic drug: blood sugar diagnostic  1 each by Misc.(Non-Drug; Combo Route) route 3 (three) times daily.     TRUEPLUS LANCETS 30 gauge Misc  Generic drug: lancets  1 each by Misc.(Non-Drug; Combo Route) route 3 (three) times daily.     ursodioL 250 mg Tab  Commonly known as: ACTIGALL  Take 1 tablet (250 mg total) by mouth 2 (two) times a day.        STOP taking these medications    baclofen 10 MG tablet  Commonly known as: LIORESAL     famotidine 20 MG tablet  Commonly known as: PEPCID     insulin aspart U-100 100 unit/mL (3 mL) Inpn pen  Commonly known as: NovoLOG     losartan 50 MG tablet  Commonly known as: COZAAR     metFORMIN 500 MG tablet  Commonly known as: GLUCOPHAGE     mycophenolate 250 mg Cap  Commonly known as: CELLCEPT     tamsulosin 0.4 mg Cap  Commonly known as: FLOMAX     valGANciclovir 450 mg Tab  Commonly known as: VALCYTE          Time spent caring for patient (Greater than 1/2 spent in direct face-to-face contact): > 30 minutes    Hannah Young NP  Liver Transplant  [facility] - Transplant Step[facility]  "

## 2022-09-19 ENCOUNTER — LAB VISIT (OUTPATIENT)
Dept: LAB | Facility: HOSPITAL | Age: 42
End: 2022-09-19
Attending: SURGERY
Payer: COMMERCIAL

## 2022-09-19 ENCOUNTER — PATIENT MESSAGE (OUTPATIENT)
Dept: TRANSPLANT | Facility: CLINIC | Age: 42
End: 2022-09-19
Payer: COMMERCIAL

## 2022-09-19 DIAGNOSIS — Z94.4 LIVER REPLACED BY TRANSPLANT: ICD-10-CM

## 2022-09-19 LAB
ALBUMIN SERPL BCP-MCNC: 3.8 G/DL (ref 3.5–5.2)
ALP SERPL-CCNC: 184 U/L (ref 55–135)
ALT SERPL W/O P-5'-P-CCNC: 45 U/L (ref 10–44)
ANION GAP SERPL CALC-SCNC: 9 MMOL/L (ref 8–16)
AST SERPL-CCNC: 30 U/L (ref 10–40)
BACTERIA BLD CULT: NORMAL
BACTERIA BLD CULT: NORMAL
BASOPHILS # BLD AUTO: 0.01 K/UL (ref 0–0.2)
BASOPHILS NFR BLD: 0.4 % (ref 0–1.9)
BILIRUB DIRECT SERPL-MCNC: 0.3 MG/DL (ref 0.1–0.3)
BILIRUB SERPL-MCNC: 0.7 MG/DL (ref 0.1–1)
BUN SERPL-MCNC: 19 MG/DL (ref 6–20)
CALCIUM SERPL-MCNC: 10.3 MG/DL (ref 8.7–10.5)
CHLORIDE SERPL-SCNC: 101 MMOL/L (ref 95–110)
CO2 SERPL-SCNC: 26 MMOL/L (ref 23–29)
CREAT SERPL-MCNC: 1.1 MG/DL (ref 0.5–1.4)
DIFFERENTIAL METHOD: ABNORMAL
EOSINOPHIL # BLD AUTO: 0 K/UL (ref 0–0.5)
EOSINOPHIL NFR BLD: 1.1 % (ref 0–8)
ERYTHROCYTE [DISTWIDTH] IN BLOOD BY AUTOMATED COUNT: 15.3 % (ref 11.5–14.5)
EST. GFR  (NO RACE VARIABLE): >60 ML/MIN/1.73 M^2
GLUCOSE SERPL-MCNC: 118 MG/DL (ref 70–110)
HCT VFR BLD AUTO: 34.8 % (ref 40–54)
HGB BLD-MCNC: 11.2 G/DL (ref 14–18)
IMM GRANULOCYTES # BLD AUTO: 0.01 K/UL (ref 0–0.04)
IMM GRANULOCYTES NFR BLD AUTO: 0.4 % (ref 0–0.5)
LYMPHOCYTES # BLD AUTO: 0.6 K/UL (ref 1–4.8)
LYMPHOCYTES NFR BLD: 21.6 % (ref 18–48)
MCH RBC QN AUTO: 30 PG (ref 27–31)
MCHC RBC AUTO-ENTMCNC: 32.2 G/DL (ref 32–36)
MCV RBC AUTO: 93 FL (ref 82–98)
MONOCYTES # BLD AUTO: 0.2 K/UL (ref 0.3–1)
MONOCYTES NFR BLD: 7.2 % (ref 4–15)
NEUTROPHILS # BLD AUTO: 1.8 K/UL (ref 1.8–7.7)
NEUTROPHILS NFR BLD: 69.3 % (ref 38–73)
NRBC BLD-RTO: 0 /100 WBC
PLATELET # BLD AUTO: 149 K/UL (ref 150–450)
PMV BLD AUTO: 9.4 FL (ref 9.2–12.9)
POTASSIUM SERPL-SCNC: 4.9 MMOL/L (ref 3.5–5.1)
PROT SERPL-MCNC: 6.9 G/DL (ref 6–8.4)
RBC # BLD AUTO: 3.73 M/UL (ref 4.6–6.2)
SODIUM SERPL-SCNC: 136 MMOL/L (ref 136–145)
WBC # BLD AUTO: 2.64 K/UL (ref 3.9–12.7)

## 2022-09-19 PROCEDURE — 80197 ASSAY OF TACROLIMUS: CPT | Performed by: SURGERY

## 2022-09-19 PROCEDURE — 85025 COMPLETE CBC W/AUTO DIFF WBC: CPT | Performed by: SURGERY

## 2022-09-19 PROCEDURE — 80053 COMPREHEN METABOLIC PANEL: CPT | Performed by: SURGERY

## 2022-09-19 PROCEDURE — 82248 BILIRUBIN DIRECT: CPT | Performed by: SURGERY

## 2022-09-19 PROCEDURE — 36415 COLL VENOUS BLD VENIPUNCTURE: CPT | Mod: PO | Performed by: SURGERY

## 2022-09-20 ENCOUNTER — TELEPHONE (OUTPATIENT)
Dept: TRANSPLANT | Facility: CLINIC | Age: 42
End: 2022-09-20
Payer: COMMERCIAL

## 2022-09-20 ENCOUNTER — TELEPHONE (OUTPATIENT)
Dept: HEMATOLOGY/ONCOLOGY | Facility: CLINIC | Age: 42
End: 2022-09-20
Payer: COMMERCIAL

## 2022-09-20 ENCOUNTER — PATIENT MESSAGE (OUTPATIENT)
Dept: TRANSPLANT | Facility: CLINIC | Age: 42
End: 2022-09-20
Payer: COMMERCIAL

## 2022-09-20 DIAGNOSIS — C22.1 CHOLANGIOCARCINOMA: Primary | ICD-10-CM

## 2022-09-20 DIAGNOSIS — Z94.4 LIVER REPLACED BY TRANSPLANT: Primary | ICD-10-CM

## 2022-09-20 LAB — TACROLIMUS BLD-MCNC: 8.5 NG/ML (ref 5–15)

## 2022-09-20 NOTE — TELEPHONE ENCOUNTER
----- Message from Brinda Serrato MD sent at 9/20/2022  2:13 PM CDT -----  Results ok. No action needed.

## 2022-09-20 NOTE — TELEPHONE ENCOUNTER
Patient notified and instructed via [employer]:    Your Prograf level resulted and labs reviewed by ; results ok. No medicine changes made repeat labs due on Monday 9/26/22.

## 2022-09-20 NOTE — TELEPHONE ENCOUNTER
"----- Message from Clinton Rowe sent at 9/20/2022  3:13 PM CDT -----  Reschedule Existing Appointment        Appt Date: 9/26    Type of appt: NFL    Physician: Du    Reason for rescheduling? Requesting to change location to [facility] and time to 10 am      Appt Date: 9/27    Type of appt: F/u    Physician: Du    Reason for rescheduling? Requesting to r/s for 9/26 @ 11 am        Caller: Self    Contact Preference: [phone] (home)               Additional Information:  "Thank you for all that you do for our patients"     "

## 2022-09-26 ENCOUNTER — OFFICE VISIT (OUTPATIENT)
Dept: HEMATOLOGY/ONCOLOGY | Facility: CLINIC | Age: 42
End: 2022-09-26
Payer: COMMERCIAL

## 2022-09-26 ENCOUNTER — DOCUMENT SCAN (OUTPATIENT)
Dept: HOME HEALTH SERVICES | Facility: HOSPITAL | Age: 42
End: 2022-09-26
Payer: COMMERCIAL

## 2022-09-26 ENCOUNTER — LAB VISIT (OUTPATIENT)
Dept: LAB | Facility: HOSPITAL | Age: 42
End: 2022-09-26
Attending: INTERNAL MEDICINE
Payer: COMMERCIAL

## 2022-09-26 VITALS
SYSTOLIC BLOOD PRESSURE: 121 MMHG | WEIGHT: 168.31 LBS | BODY MASS INDEX: 25.51 KG/M2 | HEART RATE: 88 BPM | TEMPERATURE: 98 F | RESPIRATION RATE: 18 BRPM | OXYGEN SATURATION: 100 % | HEIGHT: 68 IN | DIASTOLIC BLOOD PRESSURE: 87 MMHG

## 2022-09-26 DIAGNOSIS — R79.89 ELEVATED LFTS: ICD-10-CM

## 2022-09-26 DIAGNOSIS — C22.1 CHOLANGIOCARCINOMA: ICD-10-CM

## 2022-09-26 DIAGNOSIS — Z94.4 LIVER REPLACED BY TRANSPLANT: ICD-10-CM

## 2022-09-26 DIAGNOSIS — C24.0 HILAR CHOLANGIOCARCINOMA: Primary | ICD-10-CM

## 2022-09-26 DIAGNOSIS — Z94.4 S/P LIVER TRANSPLANT: ICD-10-CM

## 2022-09-26 DIAGNOSIS — C24.0 HILAR CHOLANGIOCARCINOMA: ICD-10-CM

## 2022-09-26 DIAGNOSIS — Z79.60 LONG-TERM USE OF IMMUNOSUPPRESSANT MEDICATION: ICD-10-CM

## 2022-09-26 DIAGNOSIS — I10 ESSENTIAL HYPERTENSION: ICD-10-CM

## 2022-09-26 LAB
ALBUMIN SERPL BCP-MCNC: 3.8 G/DL (ref 3.5–5.2)
ALP SERPL-CCNC: 179 U/L (ref 55–135)
ALT SERPL W/O P-5'-P-CCNC: 45 U/L (ref 10–44)
ANION GAP SERPL CALC-SCNC: 10 MMOL/L (ref 8–16)
AST SERPL-CCNC: 27 U/L (ref 10–40)
BASOPHILS # BLD AUTO: 0.03 K/UL (ref 0–0.2)
BASOPHILS NFR BLD: 0.5 % (ref 0–1.9)
BILIRUB DIRECT SERPL-MCNC: 0.4 MG/DL (ref 0.1–0.3)
BILIRUB SERPL-MCNC: 0.9 MG/DL (ref 0.1–1)
BUN SERPL-MCNC: 16 MG/DL (ref 6–20)
CALCIUM SERPL-MCNC: 9.7 MG/DL (ref 8.7–10.5)
CANCER AG19-9 SERPL-ACNC: 4.9 U/ML (ref 0–40)
CHLORIDE SERPL-SCNC: 103 MMOL/L (ref 95–110)
CO2 SERPL-SCNC: 25 MMOL/L (ref 23–29)
CREAT SERPL-MCNC: 1.2 MG/DL (ref 0.5–1.4)
DIFFERENTIAL METHOD: ABNORMAL
EOSINOPHIL # BLD AUTO: 0 K/UL (ref 0–0.5)
EOSINOPHIL NFR BLD: 0.5 % (ref 0–8)
ERYTHROCYTE [DISTWIDTH] IN BLOOD BY AUTOMATED COUNT: 14.1 % (ref 11.5–14.5)
EST. GFR  (NO RACE VARIABLE): >60 ML/MIN/1.73 M^2
GLUCOSE SERPL-MCNC: 122 MG/DL (ref 70–110)
HCT VFR BLD AUTO: 32.8 % (ref 40–54)
HGB BLD-MCNC: 10.8 G/DL (ref 14–18)
IMM GRANULOCYTES # BLD AUTO: 0.03 K/UL (ref 0–0.04)
IMM GRANULOCYTES NFR BLD AUTO: 0.5 % (ref 0–0.5)
INR PPP: 1 (ref 0.8–1.2)
LYMPHOCYTES # BLD AUTO: 0.7 K/UL (ref 1–4.8)
LYMPHOCYTES NFR BLD: 11.9 % (ref 18–48)
MAGNESIUM SERPL-MCNC: 1.5 MG/DL (ref 1.6–2.6)
MCH RBC QN AUTO: 29.8 PG (ref 27–31)
MCHC RBC AUTO-ENTMCNC: 32.9 G/DL (ref 32–36)
MCV RBC AUTO: 91 FL (ref 82–98)
MONOCYTES # BLD AUTO: 0.7 K/UL (ref 0.3–1)
MONOCYTES NFR BLD: 11.7 % (ref 4–15)
NEUTROPHILS # BLD AUTO: 4.6 K/UL (ref 1.8–7.7)
NEUTROPHILS NFR BLD: 74.9 % (ref 38–73)
NRBC BLD-RTO: 0 /100 WBC
PLATELET # BLD AUTO: 136 K/UL (ref 150–450)
PMV BLD AUTO: 8.5 FL (ref 9.2–12.9)
POTASSIUM SERPL-SCNC: 4.9 MMOL/L (ref 3.5–5.1)
PROT SERPL-MCNC: 6.9 G/DL (ref 6–8.4)
PROTHROMBIN TIME: 10.3 SEC (ref 9–12.5)
RBC # BLD AUTO: 3.62 M/UL (ref 4.6–6.2)
SODIUM SERPL-SCNC: 138 MMOL/L (ref 136–145)
WBC # BLD AUTO: 6.15 K/UL (ref 3.9–12.7)

## 2022-09-26 PROCEDURE — 80197 ASSAY OF TACROLIMUS: CPT | Performed by: SURGERY

## 2022-09-26 PROCEDURE — 1160F RVW MEDS BY RX/DR IN RCRD: CPT | Mod: CPTII,S$GLB,, | Performed by: INTERNAL MEDICINE

## 2022-09-26 PROCEDURE — 82248 BILIRUBIN DIRECT: CPT | Mod: PO | Performed by: SURGERY

## 2022-09-26 PROCEDURE — 3079F PR MOST RECENT DIASTOLIC BLOOD PRESSURE 80-89 MM HG: ICD-10-PCS | Mod: CPTII,S$GLB,, | Performed by: INTERNAL MEDICINE

## 2022-09-26 PROCEDURE — 4010F PR ACE/ARB THEARPY RXD/TAKEN: ICD-10-PCS | Mod: CPTII,S$GLB,, | Performed by: INTERNAL MEDICINE

## 2022-09-26 PROCEDURE — 4010F ACE/ARB THERAPY RXD/TAKEN: CPT | Mod: CPTII,S$GLB,, | Performed by: INTERNAL MEDICINE

## 2022-09-26 PROCEDURE — 3074F PR MOST RECENT SYSTOLIC BLOOD PRESSURE < 130 MM HG: ICD-10-PCS | Mod: CPTII,S$GLB,, | Performed by: INTERNAL MEDICINE

## 2022-09-26 PROCEDURE — 99214 OFFICE O/P EST MOD 30 MIN: CPT | Mod: S$GLB,,, | Performed by: INTERNAL MEDICINE

## 2022-09-26 PROCEDURE — 3074F SYST BP LT 130 MM HG: CPT | Mod: CPTII,S$GLB,, | Performed by: INTERNAL MEDICINE

## 2022-09-26 PROCEDURE — 99999 PR PBB SHADOW E&M-EST. PATIENT-LVL V: CPT | Mod: PBBFAC,,, | Performed by: INTERNAL MEDICINE

## 2022-09-26 PROCEDURE — 85025 COMPLETE CBC W/AUTO DIFF WBC: CPT | Mod: 91,PO | Performed by: INTERNAL MEDICINE

## 2022-09-26 PROCEDURE — 80053 COMPREHEN METABOLIC PANEL: CPT | Mod: 91,PO | Performed by: INTERNAL MEDICINE

## 2022-09-26 PROCEDURE — 3008F BODY MASS INDEX DOCD: CPT | Mod: CPTII,S$GLB,, | Performed by: INTERNAL MEDICINE

## 2022-09-26 PROCEDURE — 86301 IMMUNOASSAY TUMOR CA 19-9: CPT | Performed by: INTERNAL MEDICINE

## 2022-09-26 PROCEDURE — 3044F HG A1C LEVEL LT 7.0%: CPT | Mod: CPTII,S$GLB,, | Performed by: INTERNAL MEDICINE

## 2022-09-26 PROCEDURE — 3008F PR BODY MASS INDEX (BMI) DOCUMENTED: ICD-10-PCS | Mod: CPTII,S$GLB,, | Performed by: INTERNAL MEDICINE

## 2022-09-26 PROCEDURE — 83735 ASSAY OF MAGNESIUM: CPT | Mod: PO | Performed by: INTERNAL MEDICINE

## 2022-09-26 PROCEDURE — 85610 PROTHROMBIN TIME: CPT | Mod: PO | Performed by: SURGERY

## 2022-09-26 PROCEDURE — 99214 PR OFFICE/OUTPT VISIT, EST, LEVL IV, 30-39 MIN: ICD-10-PCS | Mod: S$GLB,,, | Performed by: INTERNAL MEDICINE

## 2022-09-26 PROCEDURE — 1111F PR DISCHARGE MEDS RECONCILED W/ CURRENT OUTPATIENT MED LIST: ICD-10-PCS | Mod: CPTII,S$GLB,, | Performed by: INTERNAL MEDICINE

## 2022-09-26 PROCEDURE — 1159F PR MEDICATION LIST DOCUMENTED IN MEDICAL RECORD: ICD-10-PCS | Mod: CPTII,S$GLB,, | Performed by: INTERNAL MEDICINE

## 2022-09-26 PROCEDURE — 1111F DSCHRG MED/CURRENT MED MERGE: CPT | Mod: CPTII,S$GLB,, | Performed by: INTERNAL MEDICINE

## 2022-09-26 PROCEDURE — 99999 PR PBB SHADOW E&M-EST. PATIENT-LVL V: ICD-10-PCS | Mod: PBBFAC,,, | Performed by: INTERNAL MEDICINE

## 2022-09-26 PROCEDURE — 1159F MED LIST DOCD IN RCRD: CPT | Mod: CPTII,S$GLB,, | Performed by: INTERNAL MEDICINE

## 2022-09-26 PROCEDURE — 1160F PR REVIEW ALL MEDS BY PRESCRIBER/CLIN PHARMACIST DOCUMENTED: ICD-10-PCS | Mod: CPTII,S$GLB,, | Performed by: INTERNAL MEDICINE

## 2022-09-26 PROCEDURE — 3044F PR MOST RECENT HEMOGLOBIN A1C LEVEL <7.0%: ICD-10-PCS | Mod: CPTII,S$GLB,, | Performed by: INTERNAL MEDICINE

## 2022-09-26 PROCEDURE — 3079F DIAST BP 80-89 MM HG: CPT | Mod: CPTII,S$GLB,, | Performed by: INTERNAL MEDICINE

## 2022-09-26 NOTE — PROGRESS NOTES
"                                                         PROGRESS NOTE    Subjective:       Patient ID: Romeo Larson is a 42 y.o. male.    Chief Complaint: follow up for hilar cholangiocarcinoma    Diagnosis:  Yp T2N0M0 hilar cholangiocarcinoma, s/p neoadjuvant chemoradiation, chemotherapy and liver transplant on 6/21/2022    Oncologic History:  1. Mr Larson is a 42 yo man who presents today for further management of suspected hilar cholangiocarcinoma. He presented with dark urine, abdominal pain and jaundice since August 2021. He presented to outside hospital ER with elevated bilirubin. MRCP was performed demonstrating bi-lobar intrahepatic bile duct dilation and a ~2cm ill defined mass at the hilum concerning for hilar cholangiocarcinoma. He was referred to the advanced endoscopy group at [facility]. ERCP confirmed severe, malignant appearing stricture involving right and left main hepatic ducts. Biliary stents were placed to relieve obstruction. EUS was performed. It showed an irregular hypoechoic mass where the hepatic duct bifurcates into the right and left hepatic ducts. The mass measured 11.4 mm by 11.3 mm in maximal cross-sectional diameter. FNA sampling of hilar lymph nodes was negative for metastatic disease. Bile duct biopsy showed "rare groups of glandular epithelium with mild atypia, strips of benign glandular epithelium intermixed with blood clot, and focal fibrous tissue with chronic inflammation."  CT C/A/P 10/27/21:  1. Intrahepatic biliary dilatation despite the presence of 2 biliary drains.  The patient is recent comparison MRI a revealed a possible central hepatic mass, concerning for either cholangiocarcinoma or hepatocellular carcinoma.  This is, however, not definitively demonstrated on today's exam.  There are enlarged lymph nodes present in the vicinity of the ana cristina hepatis and celiac axis as detailed above.  2. Scattered pulmonary nodules measuring up to 6 mm.  3. Other findings as detailed " "above.  He presents today for further management. Seen by Dr Jara and considered a candidate for liver transplant. Seen by Dr Cunningham last Friday. Scheduled for PET this Wednesday. 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   Discussed neoadjuvant chemoradiation with 5FU followed by neoadjuvant cis/gem prior to liver transplant.   2. Hospitalized 11/3/21-11/5/21 for fever 2/2 acute cholangitis. Repeat ERCP biopsy on 11/4/21 again non-diagnostic. Biopsy of the celiac lymph node was negative.   3. PET scan 11/3/21: "1. Wedge-shaped geographic hypermetabolic activity within the right lobe of the liver in a similar distribution to the intrahepatic biliary dilatation.  This could relate to inflammation from multiple etiologies including biliary stasis, cholangitis, and obstructive dilatation.  Neoplastic involvement would be difficult to exclude.  The liver is poorly evaluated given background activity. 2. Hypermetabolic lymphadenopathy is noted in a periportal and celiac distribution.  Infectious inflammatory and neoplastic etiologies are on the differential.  Index nodes have been measured. 3. Multiple pulmonary nodules are noted too small to categorize by PET-CT fusion as evidence seen on a prior CT of 10/27/2021.  CT for follow-up of size stability is necessary."  4. Completed chemoradiation with 5FU from 11/29/21-1/5/2022. Started cis/gem on 1/19/22. Completed neoadjuvant chemotherapy  5. Underwent living donor liver transplant on 6/21/2022. Pathology showed ypT2N0 well to moderately differentiated cholangiocarcinoma of the perihilar bile ducts, with invasion into the perihilar soft tissue. No LVI or PNI. Surgical margins are negative for carcinoma. Two lymph nodes, negative for carcinoma (0/2).     Interval History:   Mr Larson returns for follow up. He underwent living donor liver transplant on 6/21/2022. Pathology showed ypT2N0 well to moderately differentiated cholangiocarcinoma of the perihilar bile ducts, with " invasion into the perihilar soft tissue. No LVI or PNI. Surgical margins are negative for carcinoma. Two lymph nodes, negative for carcinoma (0/2). He had some problems after transplant that required surgery and procedures. But is feeling better.     ECO    ROS:   A ten-point system review is obtained and negative except for what was stated in the Interval History.     Physical Examination:   Vital signs reviewed.   General: well hydrated, well developed, in no acute distress  HEENT: normocephalic, PERRLA, EOMI, anicteric sclerae, oropharynx clear  Neck: supple, no JVD, thyromegaly, cervical or supraclavicular lymphadenopathy  Lungs: clear breath sounds bilaterally, no wheezing, rales, or rhonchi  Chest: port site clean  Heart: RRR, no M/R/G  Abdomen: soft, no tenderness, non-distended, no hepatosplenomegaly, mass, or hernia. Biliary drain in place. BS present  Extremities: no clubbing, cyanosis, or edema  Skin: no rash, ulcer, or open wounds  Neuro: alert and oriented x 4, no focal neuro deficit  Psych: pleasant and appropriate mood and affect    Objective:     Laboratory Data:  Labs reviewed.     Imaging Data:  CT abdomen 8/10/22:  1. Interval improvement of fluid/air foci along biliary catheter, minimal residual air foci and fluid at this site.  2. Interval removal of multiple surgical drains in the upper abdomen.  No new organized fluid collection.  3. Postsurgical changes of orthotopic liver transplant    CT chest 22:  There are few small micro nodules right upper lobe unchanged from the prior study.  These are most likely infectious inflammatory.  Follow-up CT due to a primary neoplasm diagnosis may be warranted in 1 year but the risk of metastatic lung disease is thought to be low.    Assessment and Plan:     1. Hilar cholangiocarcinoma    2. S/P liver transplant    3. Long-term use of immunosuppressant medication    4. Essential hypertension      1.  - Mr Larson is a 40 yo man with likely stage I  hilar cholangiocarcinoma. Biopsy non-diagnostic but clinical picture most consistent with hilar cholangiocarcinoma. He is a candidate for liver transplant. Completed chemoradiation with 5FU from 11/29/21-1/5/2022. Started cis/gem on 1/19/22. Completed neoadjuvant chemotherapy  - Underwent living donor liver transplant on 6/21/2022. Pathology showed ypT2N0 well to moderately differentiated cholangiocarcinoma of the perihilar bile ducts, with invasion into the perihilar soft tissue. No LVI or PNI. Surgical margins are negative for carcinoma. Two lymph nodes, negative for carcinoma (0/2).   - doing well  - f/u on CA 19-9 result  - return in 3 months with surveillance scan. Will do labs every 3 months and scan every 6 months    2.3  - f/u with transplant team    4.  - BP controlled  - c/w current medication      Follow-up:     RTC in 3 months  Knows to call in the interval if any problems arise.    Electronically signed by Young Wesley    Route Chart for Scheduling    Med Onc Chart Routing      Follow up with physician 3 months. see me in January with CBC, CMP, CA 19-9, CT chest/abdomen/pelvis done at [employer] [facility] 1-2 days prior, see me then flush port   Follow up with JOSEPH    Infusion scheduling note    Injection scheduling note    Labs CA 19-9, CBC and CMP   Lab interval:  in 3 months   Imaging CT chest abdomen pelvis   in 3 months   Pharmacy appointment    Other referrals          Treatment Plan Information   OP GEMCITABINE CISPLATIN Q3W   Young [facility] MD   Upcoming Treatment Dates - OP GEMCITABINE CISPLATIN Q3W    6/1/2022       Chemotherapy       gemcitabine (GEMZAR) 1,615 mg in sodium chloride 0.9% 250 mL chemo infusion       CISplatin (PLATINOL) 20 mg/m2 = 40 mg in sodium chloride 0.9% 500 mL chemo infusion       Pre-Hydration       sodium chloride 0.9% 1,000 mL with magnesium sulfate 1 g, potassium chloride 20 mEq infusion       Post-Hydration       sodium chloride 0.9% bolus 500 mL       Antiemetics        palonosetron injection 0.25 mg       dexamethasone injection 8 mg       aprepitant (CINVANTI) injection 130 mg  6/8/2022       Chemotherapy       gemcitabine (GEMZAR) 1,615 mg in sodium chloride 0.9% 250 mL chemo infusion       CISplatin (PLATINOL) 20 mg/m2 = 40 mg in sodium chloride 0.9% 500 mL chemo infusion       Pre-Hydration       sodium chloride 0.9% 1,000 mL with magnesium sulfate 1 g, potassium chloride 20 mEq infusion       Post-Hydration       sodium chloride 0.9% bolus 500 mL       Antiemetics       palonosetron injection 0.25 mg       dexamethasone injection 8 mg       aprepitant (CINVANTI) injection 130 mg  6/9/2022       Growth Factor       pegfilgrastim-cbqv injection 6 mg  6/22/2022       Chemotherapy       gemcitabine (GEMZAR) 1,615 mg in sodium chloride 0.9% 250 mL chemo infusion       CISplatin (PLATINOL) 20 mg/m2 = 40 mg in sodium chloride 0.9% 500 mL chemo infusion       Pre-Hydration       sodium chloride 0.9% 1,000 mL with magnesium sulfate 1 g, potassium chloride 20 mEq infusion       Post-Hydration       sodium chloride 0.9% bolus 500 mL       Antiemetics       palonosetron injection 0.25 mg       dexamethasone injection 8 mg       aprepitant (CINVANTI) injection 130 mg    Supportive Plan Information  OP FILGRASTIM 480 MCG   Young [facility] MD   Upcoming Treatment Dates - OP FILGRASTIM 480 MCG    2/9/2022       Medications       filgrastim-sndz (ZARXIO) injection 480 mcg/0.8 mL (Preferred Regimen)  2/10/2022       Medications       filgrastim-sndz (ZARXIO) injection 480 mcg/0.8 mL (Preferred Regimen)  2/11/2022       Medications       filgrastim-sndz (ZARXIO) injection 480 mcg/0.8 mL (Preferred Regimen)  2/12/2022       Medications       filgrastim-sndz (ZARXIO) injection 480 mcg/0.8 mL (Preferred Regimen)    Therapy Plan Information  Flushes  heparin, porcine (PF) 100 unit/mL injection flush 500 Units  500 Units, Intravenous, On the 4th Fri of every 1 month  sodium chloride 0.9% flush 10  mL  10 mL, Intravenous, On the 4th Fri of every 1 month

## 2022-09-27 ENCOUNTER — TELEPHONE (OUTPATIENT)
Dept: TRANSPLANT | Facility: CLINIC | Age: 42
End: 2022-09-27
Payer: COMMERCIAL

## 2022-09-27 DIAGNOSIS — K83.1 BILIARY OBSTRUCTION: Primary | ICD-10-CM

## 2022-09-27 DIAGNOSIS — Z94.4 LIVER REPLACED BY TRANSPLANT: Primary | ICD-10-CM

## 2022-09-27 LAB — TACROLIMUS BLD-MCNC: 7.2 NG/ML (ref 5–15)

## 2022-09-27 NOTE — TELEPHONE ENCOUNTER
Patient notified and instructed via [employer]:    Labs reviewed by ; results ok. No medicine changes made. Repeat labs due 10/3/22.

## 2022-09-27 NOTE — TELEPHONE ENCOUNTER
----- Message from Sinan Cline MD sent at 9/27/2022  1:00 PM CDT -----  Results ok. No action needed

## 2022-10-03 ENCOUNTER — PATIENT MESSAGE (OUTPATIENT)
Dept: TRANSPLANT | Facility: CLINIC | Age: 42
End: 2022-10-03
Payer: COMMERCIAL

## 2022-10-03 ENCOUNTER — LAB VISIT (OUTPATIENT)
Dept: LAB | Facility: HOSPITAL | Age: 42
End: 2022-10-03
Attending: SURGERY
Payer: COMMERCIAL

## 2022-10-03 ENCOUNTER — TELEPHONE (OUTPATIENT)
Dept: INTERVENTIONAL RADIOLOGY/VASCULAR | Facility: HOSPITAL | Age: 42
End: 2022-10-03
Payer: COMMERCIAL

## 2022-10-03 DIAGNOSIS — Z94.4 LIVER REPLACED BY TRANSPLANT: Primary | ICD-10-CM

## 2022-10-03 DIAGNOSIS — Z94.4 LIVER REPLACED BY TRANSPLANT: ICD-10-CM

## 2022-10-03 LAB
ALBUMIN SERPL BCP-MCNC: 3.3 G/DL (ref 3.5–5.2)
ALP SERPL-CCNC: 264 U/L (ref 55–135)
ALT SERPL W/O P-5'-P-CCNC: 67 U/L (ref 10–44)
ANION GAP SERPL CALC-SCNC: 9 MMOL/L (ref 8–16)
AST SERPL-CCNC: 41 U/L (ref 10–40)
BASOPHILS # BLD AUTO: 0.01 K/UL (ref 0–0.2)
BASOPHILS NFR BLD: 0.3 % (ref 0–1.9)
BILIRUB DIRECT SERPL-MCNC: 0.3 MG/DL (ref 0.1–0.3)
BILIRUB SERPL-MCNC: 0.6 MG/DL (ref 0.1–1)
BUN SERPL-MCNC: 16 MG/DL (ref 6–20)
CALCIUM SERPL-MCNC: 9.4 MG/DL (ref 8.7–10.5)
CHLORIDE SERPL-SCNC: 105 MMOL/L (ref 95–110)
CO2 SERPL-SCNC: 24 MMOL/L (ref 23–29)
CREAT SERPL-MCNC: 1 MG/DL (ref 0.5–1.4)
DIFFERENTIAL METHOD: ABNORMAL
EOSINOPHIL # BLD AUTO: 0.1 K/UL (ref 0–0.5)
EOSINOPHIL NFR BLD: 2.4 % (ref 0–8)
ERYTHROCYTE [DISTWIDTH] IN BLOOD BY AUTOMATED COUNT: 14.5 % (ref 11.5–14.5)
EST. GFR  (NO RACE VARIABLE): >60 ML/MIN/1.73 M^2
GLUCOSE SERPL-MCNC: 125 MG/DL (ref 70–110)
HCT VFR BLD AUTO: 33.1 % (ref 40–54)
HGB BLD-MCNC: 10.5 G/DL (ref 14–18)
IMM GRANULOCYTES # BLD AUTO: 0 K/UL (ref 0–0.04)
IMM GRANULOCYTES NFR BLD AUTO: 0 % (ref 0–0.5)
LYMPHOCYTES # BLD AUTO: 0.5 K/UL (ref 1–4.8)
LYMPHOCYTES NFR BLD: 15 % (ref 18–48)
MCH RBC QN AUTO: 30.2 PG (ref 27–31)
MCHC RBC AUTO-ENTMCNC: 31.7 G/DL (ref 32–36)
MCV RBC AUTO: 95 FL (ref 82–98)
MONOCYTES # BLD AUTO: 0.4 K/UL (ref 0.3–1)
MONOCYTES NFR BLD: 12.7 % (ref 4–15)
NEUTROPHILS # BLD AUTO: 2.4 K/UL (ref 1.8–7.7)
NEUTROPHILS NFR BLD: 69.6 % (ref 38–73)
NRBC BLD-RTO: 0 /100 WBC
PLATELET # BLD AUTO: 142 K/UL (ref 150–450)
PMV BLD AUTO: 9.1 FL (ref 9.2–12.9)
POTASSIUM SERPL-SCNC: 4.6 MMOL/L (ref 3.5–5.1)
PROT SERPL-MCNC: 6.6 G/DL (ref 6–8.4)
RBC # BLD AUTO: 3.48 M/UL (ref 4.6–6.2)
SODIUM SERPL-SCNC: 138 MMOL/L (ref 136–145)
TACROLIMUS BLD-MCNC: 6 NG/ML (ref 5–15)
WBC # BLD AUTO: 3.39 K/UL (ref 3.9–12.7)

## 2022-10-03 PROCEDURE — 82248 BILIRUBIN DIRECT: CPT | Performed by: SURGERY

## 2022-10-03 PROCEDURE — 85025 COMPLETE CBC W/AUTO DIFF WBC: CPT | Performed by: SURGERY

## 2022-10-03 PROCEDURE — 36415 COLL VENOUS BLD VENIPUNCTURE: CPT | Performed by: SURGERY

## 2022-10-03 PROCEDURE — 80197 ASSAY OF TACROLIMUS: CPT | Performed by: SURGERY

## 2022-10-03 PROCEDURE — 80053 COMPREHEN METABOLIC PANEL: CPT | Performed by: SURGERY

## 2022-10-03 NOTE — TELEPHONE ENCOUNTER
Patient notified and instructed via [employer]:     has reviewed your labs from today, the liver tests are up a bit. So he wants you to increase the Prograf dose to 1.5mg twice daily and repeat labs on Thursday.  The 0.5 mg dose is a different pill, so I will send in Rx. To your local pharmacy for that one (so you will take 1 (1mg capsule) and 1 (0.5mg capsule) twice daily.  Thanks.

## 2022-10-04 ENCOUNTER — HOSPITAL ENCOUNTER (OUTPATIENT)
Dept: INTERVENTIONAL RADIOLOGY/VASCULAR | Facility: HOSPITAL | Age: 42
Discharge: HOME | End: 2022-10-04
Attending: SURGERY
Payer: COMMERCIAL

## 2022-10-04 VITALS
HEART RATE: 71 BPM | DIASTOLIC BLOOD PRESSURE: 81 MMHG | SYSTOLIC BLOOD PRESSURE: 120 MMHG | HEIGHT: 69 IN | BODY MASS INDEX: 24.73 KG/M2 | WEIGHT: 167 LBS | OXYGEN SATURATION: 100 % | TEMPERATURE: 98 F | RESPIRATION RATE: 20 BRPM

## 2022-10-04 DIAGNOSIS — K83.1 BILIARY OBSTRUCTION: ICD-10-CM

## 2022-10-04 PROCEDURE — A4550 SURGICAL TRAYS: HCPCS

## 2022-10-04 PROCEDURE — C1729 CATH, DRAINAGE: HCPCS

## 2022-10-04 PROCEDURE — 47536 EXCHANGE BILIARY DRG CATH: CPT | Mod: RT | Performed by: RADIOLOGY

## 2022-10-04 PROCEDURE — 63600175 PHARM REV CODE 636 W HCPCS: Performed by: RADIOLOGY

## 2022-10-04 PROCEDURE — 47536 IR CHOLANGIOGRAM THROUGH EXISTING CATH: ICD-10-PCS | Mod: ,,, | Performed by: RADIOLOGY

## 2022-10-04 RX ORDER — TACROLIMUS 0.5 MG/1
0.5 CAPSULE ORAL EVERY 12 HOURS
Qty: 60 CAPSULE | Refills: 11 | Status: SHIPPED | OUTPATIENT
Start: 2022-10-04 | End: 2022-10-22 | Stop reason: SDUPTHER

## 2022-10-04 RX ORDER — FENTANYL CITRATE 50 UG/ML
INJECTION, SOLUTION INTRAMUSCULAR; INTRAVENOUS
Status: COMPLETED | OUTPATIENT
Start: 2022-10-04 | End: 2022-10-04

## 2022-10-04 RX ORDER — SODIUM CHLORIDE 9 MG/ML
INJECTION, SOLUTION INTRAVENOUS CONTINUOUS
Status: DISCONTINUED | OUTPATIENT
Start: 2022-10-04 | End: 2022-10-05 | Stop reason: HOSPADM

## 2022-10-04 RX ORDER — LIDOCAINE HYDROCHLORIDE 10 MG/ML
1 INJECTION, SOLUTION EPIDURAL; INFILTRATION; INTRACAUDAL; PERINEURAL ONCE
Status: DISCONTINUED | OUTPATIENT
Start: 2022-10-04 | End: 2022-10-05 | Stop reason: HOSPADM

## 2022-10-04 RX ORDER — MIDAZOLAM HYDROCHLORIDE 1 MG/ML
INJECTION INTRAMUSCULAR; INTRAVENOUS
Status: COMPLETED | OUTPATIENT
Start: 2022-10-04 | End: 2022-10-04

## 2022-10-04 RX ADMIN — MIDAZOLAM HYDROCHLORIDE 0.5 MG: 1 INJECTION INTRAMUSCULAR; INTRAVENOUS at 09:10

## 2022-10-04 RX ADMIN — FENTANYL CITRATE 50 MCG: 50 INJECTION, SOLUTION INTRAMUSCULAR; INTRAVENOUS at 09:10

## 2022-10-04 RX ADMIN — MIDAZOLAM HYDROCHLORIDE 1 MG: 1 INJECTION INTRAMUSCULAR; INTRAVENOUS at 09:10

## 2022-10-04 RX ADMIN — VANCOMYCIN HYDROCHLORIDE 1000 MG: 1 INJECTION, POWDER, LYOPHILIZED, FOR SOLUTION INTRAVENOUS at 09:10

## 2022-10-04 NOTE — NURSING
Patient received s/p cholangiogram with tube removal in MPU bay 5. See VS flowsheet. Procedure site dressing is clean and dry, no evidence of bleeding or hematoma formation. Patient to recover for 1 hour. Fall precautions reviewed. Bed in lowest, locked position. Call light within reach.

## 2022-10-04 NOTE — PROCEDURES
Radiology Post-Procedure Note    Pre Op Diagnosis: h/o biliary obstruction  Post Op Diagnosis: Same    Procedure: Cholangiogram and tube removal    Procedure performed by: Santana Leone MD    Written Informed Consent Obtained: Yes  Specimen Removed: NO  Estimated Blood Loss: Minimal    Findings:   Cholangiogram demonstrates widely patent anastomosis with appropriate emptying into the small bowel.  No obstruction seen.  Sheath and wire removed.  No complications.    Patient tolerated procedure well.    Santana Leone MD  Diagnostic and Interventional Radiologist  Department of Radiology  Pager: [phone]

## 2022-10-04 NOTE — PLAN OF CARE
Procedure completed. Patient tolerated well; VSS. Site CDI. Patient to be transported to MPU for 1 hour recovery; report to be given at the bedside.

## 2022-10-04 NOTE — PLAN OF CARE
Pt ambulated on unit this morning by self.  Verbalized an understanding of procedure.  Oriented to unit and call bell provided.  Pt states wife will be here around 0900.  Will continue to monitor.

## 2022-10-04 NOTE — H&P
Radiology History & Physical      SUBJECTIVE:     Chief Complaint: IR cholangiogram through existing catheter.     History of Present Illness:  OSCAR PAYNE is a 42 y.o. 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 Patient underwent PTC on 7/13. His most recent cholangiogram was on 9/15 showing improved biliary anastomotic stricture. Patient presents today for cholangiogram with possible cholangioplasty.       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 OR [address] [place] [address];  Service: Transplant;  Laterality: N/A;    ENDOSCOPIC ULTRASOUND OF UPPER GASTROINTESTINAL TRACT N/A 10/20/2021    Procedure: ULTRASOUND, UPPER GI TRACT, ENDOSCOPIC;  Surgeon: Valeriy Sotelo MD;  [facility]  Service: Endoscopy;  Laterality: N/A;  wife to email vacc card-inst email-tb    ERCP N/A 10/20/2021    Procedure: ERCP (ENDOSCOPIC RETROGRADE CHOLANGIOPANCREATOGRAPHY);  Surgeon: Valeriy Sotelo MD;  [facility]  Service: Endoscopy;  Laterality: N/A;    ERCP N/A 11/4/2021    Procedure: ERCP (ENDOSCOPIC RETROGRADE CHOLANGIOPANCREATOGRAPHY);  Surgeon: Valeriy Sotelo MD;  [facility]  Service: Endoscopy;  Laterality: N/A;    ERCP N/A 11/4/2021    Procedure: ERCP (ENDOSCOPIC RETROGRADE CHOLANGIOPANCREATOGRAPHY);  Surgeon: Roselia Pereyra MD;  Location: [facility] ENDO (2ND FLR);  Service: Endoscopy;  Laterality: N/A;  pt vaccinated, instructed to bring card to procedure, instructions sent portal-MG    ERCP N/A 1/14/2022     Procedure: ERCP (ENDOSCOPIC RETROGRADE CHOLANGIOPANCREATOGRAPHY);  Surgeon: Roselia Pereyra MD;  Location: [facility] ENDO (2ND FLR);  Service: Endoscopy;  Laterality: N/A;  inst portal-right chest port-tb  Pt requested at home COVID testing, Pt instructed at home RAPID to be done morning of procedure, Pt instructed to call endoscopy scheuding if there is a positive result, Pt informed if a positive     ERCP N/A 3/31/2022    Procedure: ERCP (ENDOSCOPIC RETROGRADE CHOLANGIOPANCREATOGRAPHY);  Surgeon: Julio Cesar Alonzo MD;  Location: [facility] ENDO (2ND FLR);  Service: Endoscopy;  Laterality: N/A;  3/16: port L chest wall. pt to bring covid vaccination card. Instructions sent via portal.-SC  3/18/22-Updated instructions sent via portal re:new date/time-DS    EXPLORATORY LAPAROTOMY AFTER LIVER TRANSPLANTATION N/A 6/23/2022    Procedure: LAPAROTOMY, EXPLORATORY, AFTER LIVER TRANSPLANT;  Surgeon: Julio Cesar Jara MD;  [facility];  Service: Transplant;  Laterality: N/A;    INSERTION OF TUNNELED CENTRAL VENOUS CATHETER (CVC) WITH SUBCUTANEOUS PORT N/A 11/24/2021    Procedure: INSERTION, PORT-A-CATH;  Surgeon: Prem Syed MD;  Location: [facility] [place] CATH LAB;  Service: Radiology;  Laterality: N/A;    LIVER TRANSPLANT N/A 6/21/2022    Procedure: TRANSPLANT, LIVER;  Surgeon: Sinan Cline MD;  [facility];  Service: Transplant;  Laterality: N/A;    REPAIR OF BILE DUCT N/A 6/23/2022    Procedure: REPAIR, BILE DUCT;  Surgeon: Julio Cesar Jara MD;  [facility];  Service: Transplant;  Laterality: N/A;    ROBOT-ASSISTED LAPAROSCOPIC LYMPHADENECTOMY USING DA МАРИНА XI  6/17/2022    Procedure: XI ROBOTIC LYMPHADENECTOMY - Portal;  Surgeon: Julio Cesar Jara MD;  [facility];  Service: Transplant;;    TONSILLECTOMY      XI ROBOTIC LAPAROSCOPY, EXPLORATORY N/A 6/17/2022    Procedure: XI ROBOTIC LAPAROSCOPY,EXPLORATORY;  Surgeon: Julio Cesar Jara MD;  [facility];  Service: Transplant;   "Laterality: N/A;       Home Meds:   Prior to Admission medications    Medication Sig Start Date End Date Taking? Authorizing Provider   amoxicillin-clavulanate 875-125mg (AUGMENTIN) 875-125 mg per tablet Take 1 tablet by mouth every 12 (twelve) hours. 9/17/22  Yes Sinan Cline MD   aspirin 81 MG Chew CHEW 1 tablet (81 mg total) by mouth once daily. 6/28/22 6/28/23 Yes Abida Arenas NP   calcium carbonate-vitamin D3 600 mg-20 mcg (800 unit) Tab Take 1 tablet by mouth once daily at 6am. 6/28/22  Yes Abida Arenas NP   carvediloL (COREG) 6.25 MG tablet Take 0.5 tablets (3.125 mg total) by mouth 2 (two) times daily with meals. 8/2/22  Yes Julio Cesar Jara MD   sulfamethoxazole-trimethoprim 400-80mg (BACTRIM,SEPTRA) 400-80 mg per tablet Take 1 tablet by mouth once daily. Stop: 12/18/22 7/23/22 12/18/22 Yes Santi Godwin MD   tacrolimus (PROGRAF) 0.5 MG Cap Take 1 capsule (0.5 mg total) by mouth every 12 (twelve) hours. 10/4/22 10/4/23 Yes Sinan Cline MD   tacrolimus (PROGRAF) 1 MG Cap Take 1 capsule (1 mg total) by mouth every 12 (twelve) hours. 9/17/22  Yes Sinan Cline MD   ursodioL (ACTIGALL) 250 mg Tab Take 1 tablet (250 mg total) by mouth 2 (two) times a day. 7/27/22  Yes Braydon Iglesias MD   blood sugar diagnostic Strp 1 each by Misc.(Non-Drug; Combo Route) route 3 (three) times daily. 6/28/22   Abida Arenas NP   blood-glucose meter Misc Use as instructed 6/28/22   Abida Arenas NP   lancets 30 gauge Misc 1 each by Misc.(Non-Drug; Combo Route) route 3 (three) times daily. 6/28/22   Abida Arenas NP   mirtazapine (REMERON) 15 MG tablet Take 1 tablet (15 mg total) by mouth nightly. 7/16/22 7/16/23  Sinan Cline MD   pantoprazole (PROTONIX) 40 MG tablet Take 1 tablet (40 mg total) by mouth once daily. 7/9/22   Sinan Cline MD   pen needle, diabetic 32 gauge x 5/32" Ndle 1 each by Misc.(Non-Drug; Combo Route) route 3 (three) times daily. 6/28/22   Abida Arenas, RADHA   baclofen " (LIORESAL) 10 MG tablet TAKE 1 TABLET(10 MG) BY MOUTH THREE TIMES DAILY AS NEEDED FOR HICCUPS  Patient not taking: No sig reported 4/6/22 6/17/22  Young Wesley MD   famotidine (PEPCID) 20 MG tablet Take 1 tablet (20 mg total) by mouth every evening. STOP 7/24/22 6/21/22 7/8/22  Sinan Cline MD   insulin aspart U-100 (NOVOLOG) 100 unit/mL (3 mL) InPn pen Inject 5 Units into the skin 3 (three) times daily with meals. + sliding scale.  MDD 30 units/d 7/16/22 7/27/22  Brinda Serrato MD   losartan (COZAAR) 50 MG tablet Take 1 tablet (50 mg total) by mouth once daily. 3/21/22 6/24/22  Pravin Maria MD   metFORMIN (GLUCOPHAGE) 500 MG tablet Take 1 tablet (500 mg total) by mouth 2 (two) times daily with meals. 7/8/22 7/8/22  Sinan Cline MD   tamsulosin (FLOMAX) 0.4 mg Cap Take 2 capsules (0.8 mg total) by mouth once daily. 7/9/22 7/27/22  Sinan Cline MD     Anticoagulants/Antiplatelets: no anticoagulation    Allergies:   Review of patient's allergies indicates:   Allergen Reactions    Zosyn [piperacillin-tazobactam] Swelling     Lip swelling     Sedation History:  have not been any systemic reactions    Review of Systems:   Hematological: negative  no known coagulopathies  Respiratory: no cough, shortness of breath, or wheezing  no shortness of breath  Cardiovascular: no chest pain or dyspnea on exertion  no chest pain  Gastrointestinal: no abdominal pain, change in bowel habits, or black or bloody stools  no abdominal pain  Genito-Urinary: no dysuria, trouble voiding, or hematuria  no dysuria  Musculoskeletal: negative  Neurological: no TIA or stroke symptoms         OBJECTIVE:     Vital Signs (Most Recent)  Temp: 97.9 °F (36.6 °C) (10/04/22 0748)  Pulse: 82 (10/04/22 0748)  Resp: 16 (10/04/22 0748)  BP: (!) 121/92 (10/04/22 0753)  SpO2: 98 % (10/04/22 0748)    Physical Exam:  ASA: III  Mallampati: II    General: no acute distress  Mental Status: alert and oriented to person, place and time  HEENT:  normocephalic, atraumatic  Chest: unlabored breathing  Heart: regular heart rate  Abdomen: nondistended  Extremity: moves all extremities    Laboratory  Lab Results   Component Value Date    INR 1.0 09/26/2022       Lab Results   Component Value Date    WBC 3.39 (L) 10/03/2022    HGB 10.5 (L) 10/03/2022    HCT 33.1 (L) 10/03/2022    MCV 95 10/03/2022     (L) 10/03/2022      Lab Results   Component Value Date     (H) 10/03/2022     10/03/2022    K 4.6 10/03/2022     10/03/2022    CO2 24 10/03/2022    BUN 16 10/03/2022    CREATININE 1.0 10/03/2022    CALCIUM 9.4 10/03/2022    MG 1.5 (L) 09/26/2022    ALT 67 (H) 10/03/2022    AST 41 (H) 10/03/2022    ALBUMIN 3.3 (L) 10/03/2022    BILITOT 0.6 10/03/2022    BILIDIR 0.3 10/03/2022       ASSESSMENT/PLAN:     Sedation Plan: Up to Moderate.     Patient will undergo cholangiogram with possible cholangioplasty.    Evelina Ureña MD  Radiology, PGY II  [facility]

## 2022-10-04 NOTE — DISCHARGE SUMMARY
Radiology Discharge Summary      Hospital Course: No complications    Admit Date: 10/4/2022  Discharge Date: 10/04/2022     Instructions Given to Patient: Yes  Diet: Resume prior diet  Activity: activity as tolerated    Description of Condition on Discharge: Stable  Vital Signs (Most Recent): Temp: 97.9 °F (36.6 °C) (10/04/22 0748)  Pulse: 82 (10/04/22 0748)  Resp: 16 (10/04/22 0748)  BP: (!) 121/92 (10/04/22 0753)  SpO2: 98 % (10/04/22 0748)    Discharge Disposition: Home    Discharge Diagnosis: Biliary obstruction s/p cholangiogram and tube removal     Follow-up: KAYLA Leone MD  Diagnostic and Interventional Radiologist  Department of Radiology  Pager: [phone]

## 2022-10-04 NOTE — PLAN OF CARE
Pt arrived to IR Room 188 for cholangiogram/possible plasty. Pt oriented to unit and staff. Plan of care reviewed with patient, patient verbalizes understanding. Comfort measures utilized. Pt safely transferred from stretcher to procedural table. Fall risk reviewed with patient, fall risk interventions maintained. Safety strap applied, positioner pillows utilized to minimize pressure points. Blankets applied. Pt prepped and draped utilizing standard sterile technique. Patient placed on continuous monitoring, as required by sedation policy. Timeouts completed utilizing standard universal time-out, per department and facility policy. RN to remain at bedside, continuous monitoring maintained. Pt resting comfortably. Denies pain/discomfort. Will continue to monitor. See flow sheets for monitoring, medication administration, and updates.

## 2022-10-04 NOTE — NURSING
Procedure recovery complete. Patient is awake and alert. Denies pain. Patient tolerated PO fluids and snack with no N/V. Patient sent home with extra dressing supplies and told to expect some steady drainage in the first couple days. If heavy drainage persists after 3 days, patient is to call for IR dept for assistance. Patient and wife verbalize comfort with dressing changes and understanding of discharge instructions.

## 2022-10-04 NOTE — DISCHARGE INSTRUCTIONS
Please call with any questions or concerns.      Monday thru Friday 8:00 am - 4:30 pm    Interventional Radiology   [phone]    After Hours    Ask for the Radiology Intern on call  [phone]

## 2022-10-06 ENCOUNTER — LAB VISIT (OUTPATIENT)
Dept: LAB | Facility: HOSPITAL | Age: 42
End: 2022-10-06
Attending: SURGERY
Payer: COMMERCIAL

## 2022-10-06 DIAGNOSIS — Z94.4 LIVER REPLACED BY TRANSPLANT: ICD-10-CM

## 2022-10-06 LAB
ALBUMIN SERPL BCP-MCNC: 3.6 G/DL (ref 3.5–5.2)
ALP SERPL-CCNC: 247 U/L (ref 55–135)
ALT SERPL W/O P-5'-P-CCNC: 56 U/L (ref 10–44)
ANION GAP SERPL CALC-SCNC: 9 MMOL/L (ref 8–16)
AST SERPL-CCNC: 31 U/L (ref 10–40)
BASOPHILS # BLD AUTO: 0.02 K/UL (ref 0–0.2)
BASOPHILS NFR BLD: 0.6 % (ref 0–1.9)
BILIRUB DIRECT SERPL-MCNC: 0.3 MG/DL (ref 0.1–0.3)
BILIRUB SERPL-MCNC: 0.7 MG/DL (ref 0.1–1)
BUN SERPL-MCNC: 16 MG/DL (ref 6–20)
CALCIUM SERPL-MCNC: 10 MG/DL (ref 8.7–10.5)
CHLORIDE SERPL-SCNC: 104 MMOL/L (ref 95–110)
CO2 SERPL-SCNC: 25 MMOL/L (ref 23–29)
CREAT SERPL-MCNC: 1 MG/DL (ref 0.5–1.4)
DIFFERENTIAL METHOD: ABNORMAL
EOSINOPHIL # BLD AUTO: 0.2 K/UL (ref 0–0.5)
EOSINOPHIL NFR BLD: 5.7 % (ref 0–8)
ERYTHROCYTE [DISTWIDTH] IN BLOOD BY AUTOMATED COUNT: 14.2 % (ref 11.5–14.5)
EST. GFR  (NO RACE VARIABLE): >60 ML/MIN/1.73 M^2
GLUCOSE SERPL-MCNC: 89 MG/DL (ref 70–110)
HCT VFR BLD AUTO: 35.2 % (ref 40–54)
HGB BLD-MCNC: 11.2 G/DL (ref 14–18)
IMM GRANULOCYTES # BLD AUTO: 0.02 K/UL (ref 0–0.04)
IMM GRANULOCYTES NFR BLD AUTO: 0.6 % (ref 0–0.5)
LYMPHOCYTES # BLD AUTO: 0.8 K/UL (ref 1–4.8)
LYMPHOCYTES NFR BLD: 25.6 % (ref 18–48)
MCH RBC QN AUTO: 29.6 PG (ref 27–31)
MCHC RBC AUTO-ENTMCNC: 31.8 G/DL (ref 32–36)
MCV RBC AUTO: 93 FL (ref 82–98)
MONOCYTES # BLD AUTO: 0.6 K/UL (ref 0.3–1)
MONOCYTES NFR BLD: 19.2 % (ref 4–15)
NEUTROPHILS # BLD AUTO: 1.5 K/UL (ref 1.8–7.7)
NEUTROPHILS NFR BLD: 48.3 % (ref 38–73)
NRBC BLD-RTO: 0 /100 WBC
PLATELET # BLD AUTO: 167 K/UL (ref 150–450)
PMV BLD AUTO: 9.3 FL (ref 9.2–12.9)
POTASSIUM SERPL-SCNC: 4.3 MMOL/L (ref 3.5–5.1)
PROT SERPL-MCNC: 6.9 G/DL (ref 6–8.4)
RBC # BLD AUTO: 3.78 M/UL (ref 4.6–6.2)
SODIUM SERPL-SCNC: 138 MMOL/L (ref 136–145)
TACROLIMUS BLD-MCNC: 8.8 NG/ML (ref 5–15)
WBC # BLD AUTO: 3.17 K/UL (ref 3.9–12.7)

## 2022-10-06 PROCEDURE — 80197 ASSAY OF TACROLIMUS: CPT | Performed by: SURGERY

## 2022-10-06 PROCEDURE — 36415 COLL VENOUS BLD VENIPUNCTURE: CPT | Performed by: SURGERY

## 2022-10-06 PROCEDURE — 85025 COMPLETE CBC W/AUTO DIFF WBC: CPT | Performed by: SURGERY

## 2022-10-06 PROCEDURE — 82248 BILIRUBIN DIRECT: CPT | Performed by: SURGERY

## 2022-10-06 PROCEDURE — 80053 COMPREHEN METABOLIC PANEL: CPT | Performed by: SURGERY

## 2022-10-07 ENCOUNTER — TELEPHONE (OUTPATIENT)
Dept: TRANSPLANT | Facility: CLINIC | Age: 42
End: 2022-10-07
Payer: COMMERCIAL

## 2022-10-07 ENCOUNTER — PATIENT MESSAGE (OUTPATIENT)
Dept: TRANSPLANT | Facility: CLINIC | Age: 42
End: 2022-10-07
Payer: COMMERCIAL

## 2022-10-07 DIAGNOSIS — Z94.4 LIVER REPLACED BY TRANSPLANT: Primary | ICD-10-CM

## 2022-10-07 NOTE — TELEPHONE ENCOUNTER
Patient notified and instructed via [facility]:    Your labs have been reviewed by ; results were ok. No changes made.  Repeat labs due on Monday 10/10/22, thanks.   also said you will stay under surgeons' care for now due to your history of biliary issues. Thanks.

## 2022-10-10 ENCOUNTER — LAB VISIT (OUTPATIENT)
Dept: LAB | Facility: HOSPITAL | Age: 42
End: 2022-10-10
Attending: SURGERY
Payer: COMMERCIAL

## 2022-10-10 ENCOUNTER — TELEPHONE (OUTPATIENT)
Dept: TRANSPLANT | Facility: CLINIC | Age: 42
End: 2022-10-10
Payer: COMMERCIAL

## 2022-10-10 ENCOUNTER — PATIENT MESSAGE (OUTPATIENT)
Dept: TRANSPLANT | Facility: CLINIC | Age: 42
End: 2022-10-10
Payer: COMMERCIAL

## 2022-10-10 DIAGNOSIS — Z94.4 LIVER REPLACED BY TRANSPLANT: Primary | ICD-10-CM

## 2022-10-10 DIAGNOSIS — Z94.4 LIVER REPLACED BY TRANSPLANT: ICD-10-CM

## 2022-10-10 LAB
ALBUMIN SERPL BCP-MCNC: 3.8 G/DL (ref 3.5–5.2)
ALP SERPL-CCNC: 225 U/L (ref 55–135)
ALT SERPL W/O P-5'-P-CCNC: 34 U/L (ref 10–44)
ANION GAP SERPL CALC-SCNC: 8 MMOL/L (ref 8–16)
AST SERPL-CCNC: 21 U/L (ref 10–40)
BASOPHILS # BLD AUTO: 0.02 K/UL (ref 0–0.2)
BASOPHILS NFR BLD: 0.5 % (ref 0–1.9)
BILIRUB DIRECT SERPL-MCNC: 0.3 MG/DL (ref 0.1–0.3)
BILIRUB SERPL-MCNC: 0.7 MG/DL (ref 0.1–1)
BUN SERPL-MCNC: 16 MG/DL (ref 6–20)
CALCIUM SERPL-MCNC: 9.8 MG/DL (ref 8.7–10.5)
CHLORIDE SERPL-SCNC: 106 MMOL/L (ref 95–110)
CO2 SERPL-SCNC: 27 MMOL/L (ref 23–29)
CREAT SERPL-MCNC: 1.2 MG/DL (ref 0.5–1.4)
DIFFERENTIAL METHOD: ABNORMAL
EOSINOPHIL # BLD AUTO: 0.2 K/UL (ref 0–0.5)
EOSINOPHIL NFR BLD: 4.2 % (ref 0–8)
ERYTHROCYTE [DISTWIDTH] IN BLOOD BY AUTOMATED COUNT: 14.2 % (ref 11.5–14.5)
EST. GFR  (NO RACE VARIABLE): >60 ML/MIN/1.73 M^2
GLUCOSE SERPL-MCNC: 121 MG/DL (ref 70–110)
HCT VFR BLD AUTO: 36.9 % (ref 40–54)
HGB BLD-MCNC: 11.4 G/DL (ref 14–18)
IMM GRANULOCYTES # BLD AUTO: 0.02 K/UL (ref 0–0.04)
IMM GRANULOCYTES NFR BLD AUTO: 0.5 % (ref 0–0.5)
LYMPHOCYTES # BLD AUTO: 0.7 K/UL (ref 1–4.8)
LYMPHOCYTES NFR BLD: 18 % (ref 18–48)
MCH RBC QN AUTO: 29.4 PG (ref 27–31)
MCHC RBC AUTO-ENTMCNC: 30.9 G/DL (ref 32–36)
MCV RBC AUTO: 95 FL (ref 82–98)
MONOCYTES # BLD AUTO: 0.5 K/UL (ref 0.3–1)
MONOCYTES NFR BLD: 13.3 % (ref 4–15)
NEUTROPHILS # BLD AUTO: 2.6 K/UL (ref 1.8–7.7)
NEUTROPHILS NFR BLD: 63.5 % (ref 38–73)
NRBC BLD-RTO: 0 /100 WBC
PLATELET # BLD AUTO: 144 K/UL (ref 150–450)
PMV BLD AUTO: 8.9 FL (ref 9.2–12.9)
POTASSIUM SERPL-SCNC: 4.8 MMOL/L (ref 3.5–5.1)
PROT SERPL-MCNC: 7.1 G/DL (ref 6–8.4)
RBC # BLD AUTO: 3.88 M/UL (ref 4.6–6.2)
SODIUM SERPL-SCNC: 141 MMOL/L (ref 136–145)
TACROLIMUS BLD-MCNC: 7.5 NG/ML (ref 5–15)
WBC # BLD AUTO: 4.05 K/UL (ref 3.9–12.7)

## 2022-10-10 PROCEDURE — 82248 BILIRUBIN DIRECT: CPT | Performed by: SURGERY

## 2022-10-10 PROCEDURE — 36415 COLL VENOUS BLD VENIPUNCTURE: CPT | Performed by: SURGERY

## 2022-10-10 PROCEDURE — 80197 ASSAY OF TACROLIMUS: CPT | Performed by: SURGERY

## 2022-10-10 PROCEDURE — 85025 COMPLETE CBC W/AUTO DIFF WBC: CPT | Performed by: SURGERY

## 2022-10-10 PROCEDURE — 80053 COMPREHEN METABOLIC PANEL: CPT | Performed by: SURGERY

## 2022-10-10 NOTE — TELEPHONE ENCOUNTER
----- Message from Brinda Serrato MD sent at 10/10/2022  2:06 PM CDT -----  Results ok. No action needed.

## 2022-10-10 NOTE — TELEPHONE ENCOUNTER
Patient notified and instructed via [employer]:     Your labs have been reviewed by . results ok, no medicine changes made. Repeat labs due on Monday 10/17/22.  Also : she said you may go on your trip to [place] on Wednesday from our standpoint. Thanks.

## 2022-10-17 ENCOUNTER — IMMUNIZATION (OUTPATIENT)
Dept: INTERNAL MEDICINE | Facility: CLINIC | Age: 42
End: 2022-10-17
Payer: COMMERCIAL

## 2022-10-17 ENCOUNTER — LAB VISIT (OUTPATIENT)
Dept: LAB | Facility: HOSPITAL | Age: 42
End: 2022-10-17
Attending: SURGERY
Payer: COMMERCIAL

## 2022-10-17 ENCOUNTER — PATIENT MESSAGE (OUTPATIENT)
Dept: TRANSPLANT | Facility: CLINIC | Age: 42
End: 2022-10-17
Payer: COMMERCIAL

## 2022-10-17 ENCOUNTER — TELEPHONE (OUTPATIENT)
Dept: TRANSPLANT | Facility: CLINIC | Age: 42
End: 2022-10-17
Payer: COMMERCIAL

## 2022-10-17 DIAGNOSIS — Z94.4 LIVER REPLACED BY TRANSPLANT: Primary | ICD-10-CM

## 2022-10-17 DIAGNOSIS — Z94.4 LIVER REPLACED BY TRANSPLANT: ICD-10-CM

## 2022-10-17 LAB
ALBUMIN SERPL BCP-MCNC: 3.9 G/DL (ref 3.5–5.2)
ALP SERPL-CCNC: 156 U/L (ref 55–135)
ALT SERPL W/O P-5'-P-CCNC: 20 U/L (ref 10–44)
ANION GAP SERPL CALC-SCNC: 6 MMOL/L (ref 8–16)
AST SERPL-CCNC: 20 U/L (ref 10–40)
BASOPHILS # BLD AUTO: 0.02 K/UL (ref 0–0.2)
BASOPHILS NFR BLD: 0.6 % (ref 0–1.9)
BILIRUB DIRECT SERPL-MCNC: 0.4 MG/DL (ref 0.1–0.3)
BILIRUB SERPL-MCNC: 1.1 MG/DL (ref 0.1–1)
BUN SERPL-MCNC: 18 MG/DL (ref 6–20)
CALCIUM SERPL-MCNC: 9.8 MG/DL (ref 8.7–10.5)
CHLORIDE SERPL-SCNC: 104 MMOL/L (ref 95–110)
CO2 SERPL-SCNC: 27 MMOL/L (ref 23–29)
CREAT SERPL-MCNC: 1.1 MG/DL (ref 0.5–1.4)
DIFFERENTIAL METHOD: ABNORMAL
EOSINOPHIL # BLD AUTO: 0.1 K/UL (ref 0–0.5)
EOSINOPHIL NFR BLD: 2.7 % (ref 0–8)
ERYTHROCYTE [DISTWIDTH] IN BLOOD BY AUTOMATED COUNT: 14.3 % (ref 11.5–14.5)
EST. GFR  (NO RACE VARIABLE): >60 ML/MIN/1.73 M^2
GLUCOSE SERPL-MCNC: 120 MG/DL (ref 70–110)
HCT VFR BLD AUTO: 35.2 % (ref 40–54)
HGB BLD-MCNC: 11.4 G/DL (ref 14–18)
IMM GRANULOCYTES # BLD AUTO: 0 K/UL (ref 0–0.04)
IMM GRANULOCYTES NFR BLD AUTO: 0 % (ref 0–0.5)
LYMPHOCYTES # BLD AUTO: 0.8 K/UL (ref 1–4.8)
LYMPHOCYTES NFR BLD: 24.5 % (ref 18–48)
MCH RBC QN AUTO: 30.1 PG (ref 27–31)
MCHC RBC AUTO-ENTMCNC: 32.4 G/DL (ref 32–36)
MCV RBC AUTO: 93 FL (ref 82–98)
MONOCYTES # BLD AUTO: 0.5 K/UL (ref 0.3–1)
MONOCYTES NFR BLD: 14.8 % (ref 4–15)
NEUTROPHILS # BLD AUTO: 1.9 K/UL (ref 1.8–7.7)
NEUTROPHILS NFR BLD: 57.4 % (ref 38–73)
NRBC BLD-RTO: 0 /100 WBC
PLATELET # BLD AUTO: 117 K/UL (ref 150–450)
PMV BLD AUTO: 9.2 FL (ref 9.2–12.9)
POTASSIUM SERPL-SCNC: 4.7 MMOL/L (ref 3.5–5.1)
PROT SERPL-MCNC: 6.9 G/DL (ref 6–8.4)
RBC # BLD AUTO: 3.79 M/UL (ref 4.6–6.2)
SODIUM SERPL-SCNC: 137 MMOL/L (ref 136–145)
TACROLIMUS BLD-MCNC: 8.8 NG/ML (ref 5–15)
WBC # BLD AUTO: 3.31 K/UL (ref 3.9–12.7)

## 2022-10-17 PROCEDURE — 82248 BILIRUBIN DIRECT: CPT | Performed by: SURGERY

## 2022-10-17 PROCEDURE — 90686 FLU VACCINE (QUAD) GREATER THAN OR EQUAL TO 3YO PRESERVATIVE FREE IM: ICD-10-PCS | Mod: S$GLB,,, | Performed by: INTERNAL MEDICINE

## 2022-10-17 PROCEDURE — 90471 IMMUNIZATION ADMIN: CPT | Mod: S$GLB,,, | Performed by: INTERNAL MEDICINE

## 2022-10-17 PROCEDURE — 90471 FLU VACCINE (QUAD) GREATER THAN OR EQUAL TO 3YO PRESERVATIVE FREE IM: ICD-10-PCS | Mod: S$GLB,,, | Performed by: INTERNAL MEDICINE

## 2022-10-17 PROCEDURE — 90686 IIV4 VACC NO PRSV 0.5 ML IM: CPT | Mod: S$GLB,,, | Performed by: INTERNAL MEDICINE

## 2022-10-17 PROCEDURE — 85025 COMPLETE CBC W/AUTO DIFF WBC: CPT | Performed by: SURGERY

## 2022-10-17 PROCEDURE — 36415 COLL VENOUS BLD VENIPUNCTURE: CPT | Performed by: SURGERY

## 2022-10-17 PROCEDURE — 80053 COMPREHEN METABOLIC PANEL: CPT | Performed by: SURGERY

## 2022-10-17 PROCEDURE — 80197 ASSAY OF TACROLIMUS: CPT | Performed by: SURGERY

## 2022-10-17 NOTE — TELEPHONE ENCOUNTER
Patient notified and instructed via [facility]:    Your labs have been reviewed by ; results were ok. No medicine changes made. Repeat labs due Monday 10/24/22. Thanks.

## 2022-10-19 ENCOUNTER — PATIENT OUTREACH (OUTPATIENT)
Dept: ADMINISTRATIVE | Facility: HOSPITAL | Age: 42
End: 2022-10-19
Payer: COMMERCIAL

## 2022-10-19 ENCOUNTER — PATIENT MESSAGE (OUTPATIENT)
Dept: ADMINISTRATIVE | Facility: HOSPITAL | Age: 42
End: 2022-10-19
Payer: COMMERCIAL

## 2022-10-19 NOTE — PROGRESS NOTES
Uncontrolled BP REPORT  BP Readings from Last 3 Encounters:   10/04/22 120/81   09/26/22 121/87   09/17/22 126/68       Non-compliant report chart audits for HYPERTENSION MANAGEMENT     Outreach to patient in reference to hypertension management       BLOOD PRESSURE FOLLOW UP NEEDED WITH A CARE TEAM MEMBER    ACTIVE PORTAL MESSAGE OR LETTER SENT

## 2022-10-21 ENCOUNTER — DOCUMENT SCAN (OUTPATIENT)
Dept: HOME HEALTH SERVICES | Facility: HOSPITAL | Age: 42
End: 2022-10-21
Payer: COMMERCIAL

## 2022-10-24 ENCOUNTER — LAB VISIT (OUTPATIENT)
Dept: LAB | Facility: HOSPITAL | Age: 42
End: 2022-10-24
Attending: SURGERY
Payer: COMMERCIAL

## 2022-10-24 ENCOUNTER — PATIENT MESSAGE (OUTPATIENT)
Dept: TRANSPLANT | Facility: CLINIC | Age: 42
End: 2022-10-24
Payer: COMMERCIAL

## 2022-10-24 ENCOUNTER — TELEPHONE (OUTPATIENT)
Dept: TRANSPLANT | Facility: CLINIC | Age: 42
End: 2022-10-24
Payer: COMMERCIAL

## 2022-10-24 DIAGNOSIS — Z94.4 LIVER REPLACED BY TRANSPLANT: ICD-10-CM

## 2022-10-24 DIAGNOSIS — Z94.4 LIVER REPLACED BY TRANSPLANT: Primary | ICD-10-CM

## 2022-10-24 LAB
ALBUMIN SERPL BCP-MCNC: 3.8 G/DL (ref 3.5–5.2)
ALP SERPL-CCNC: 117 U/L (ref 55–135)
ALT SERPL W/O P-5'-P-CCNC: 22 U/L (ref 10–44)
ANION GAP SERPL CALC-SCNC: 8 MMOL/L (ref 8–16)
AST SERPL-CCNC: 22 U/L (ref 10–40)
BASOPHILS # BLD AUTO: 0.01 K/UL (ref 0–0.2)
BASOPHILS NFR BLD: 0.4 % (ref 0–1.9)
BILIRUB DIRECT SERPL-MCNC: 0.4 MG/DL (ref 0.1–0.3)
BILIRUB SERPL-MCNC: 1 MG/DL (ref 0.1–1)
BUN SERPL-MCNC: 17 MG/DL (ref 6–20)
CALCIUM SERPL-MCNC: 9.7 MG/DL (ref 8.7–10.5)
CHLORIDE SERPL-SCNC: 103 MMOL/L (ref 95–110)
CO2 SERPL-SCNC: 27 MMOL/L (ref 23–29)
CREAT SERPL-MCNC: 1.2 MG/DL (ref 0.5–1.4)
DIFFERENTIAL METHOD: ABNORMAL
EOSINOPHIL # BLD AUTO: 0.1 K/UL (ref 0–0.5)
EOSINOPHIL NFR BLD: 2.9 % (ref 0–8)
ERYTHROCYTE [DISTWIDTH] IN BLOOD BY AUTOMATED COUNT: 14.3 % (ref 11.5–14.5)
EST. GFR  (NO RACE VARIABLE): >60 ML/MIN/1.73 M^2
GLUCOSE SERPL-MCNC: 107 MG/DL (ref 70–110)
HCT VFR BLD AUTO: 35.1 % (ref 40–54)
HGB BLD-MCNC: 11.3 G/DL (ref 14–18)
IMM GRANULOCYTES # BLD AUTO: 0.01 K/UL (ref 0–0.04)
IMM GRANULOCYTES NFR BLD AUTO: 0.4 % (ref 0–0.5)
LYMPHOCYTES # BLD AUTO: 0.8 K/UL (ref 1–4.8)
LYMPHOCYTES NFR BLD: 31.4 % (ref 18–48)
MCH RBC QN AUTO: 29.9 PG (ref 27–31)
MCHC RBC AUTO-ENTMCNC: 32.2 G/DL (ref 32–36)
MCV RBC AUTO: 93 FL (ref 82–98)
MONOCYTES # BLD AUTO: 0.4 K/UL (ref 0.3–1)
MONOCYTES NFR BLD: 16.5 % (ref 4–15)
NEUTROPHILS # BLD AUTO: 1.2 K/UL (ref 1.8–7.7)
NEUTROPHILS NFR BLD: 48.4 % (ref 38–73)
NRBC BLD-RTO: 0 /100 WBC
PLATELET # BLD AUTO: 119 K/UL (ref 150–450)
PMV BLD AUTO: 9.5 FL (ref 9.2–12.9)
POTASSIUM SERPL-SCNC: 4.3 MMOL/L (ref 3.5–5.1)
PROT SERPL-MCNC: 6.7 G/DL (ref 6–8.4)
RBC # BLD AUTO: 3.78 M/UL (ref 4.6–6.2)
SODIUM SERPL-SCNC: 138 MMOL/L (ref 136–145)
TACROLIMUS BLD-MCNC: 11.4 NG/ML (ref 5–15)
WBC # BLD AUTO: 2.42 K/UL (ref 3.9–12.7)

## 2022-10-24 PROCEDURE — 80197 ASSAY OF TACROLIMUS: CPT | Performed by: SURGERY

## 2022-10-24 PROCEDURE — 36415 COLL VENOUS BLD VENIPUNCTURE: CPT | Mod: PO | Performed by: SURGERY

## 2022-10-24 PROCEDURE — 85025 COMPLETE CBC W/AUTO DIFF WBC: CPT | Performed by: SURGERY

## 2022-10-24 PROCEDURE — 82248 BILIRUBIN DIRECT: CPT | Performed by: SURGERY

## 2022-10-24 PROCEDURE — 80053 COMPREHEN METABOLIC PANEL: CPT | Performed by: SURGERY

## 2022-10-24 NOTE — TELEPHONE ENCOUNTER
Patient notified and instructed via [facility]:    Your labs have been reviewed by ; results ok, he would like you to reduce your Prograf dose to 1mg twice daily, repeat labs due on Monday 10/31/22. Thanks.

## 2022-10-25 DIAGNOSIS — D72.819 LEUKOPENIA, UNSPECIFIED TYPE: Primary | ICD-10-CM

## 2022-10-31 ENCOUNTER — PATIENT MESSAGE (OUTPATIENT)
Dept: TRANSPLANT | Facility: CLINIC | Age: 42
End: 2022-10-31
Payer: COMMERCIAL

## 2022-10-31 ENCOUNTER — LAB VISIT (OUTPATIENT)
Dept: LAB | Facility: HOSPITAL | Age: 42
End: 2022-10-31
Attending: SURGERY
Payer: COMMERCIAL

## 2022-10-31 DIAGNOSIS — Z94.4 LIVER REPLACED BY TRANSPLANT: Primary | ICD-10-CM

## 2022-10-31 DIAGNOSIS — D72.819 LEUKOPENIA, UNSPECIFIED TYPE: ICD-10-CM

## 2022-10-31 DIAGNOSIS — Z94.4 LIVER REPLACED BY TRANSPLANT: ICD-10-CM

## 2022-10-31 LAB
ALBUMIN SERPL BCP-MCNC: 3.9 G/DL (ref 3.5–5.2)
ALP SERPL-CCNC: 143 U/L (ref 55–135)
ALT SERPL W/O P-5'-P-CCNC: 19 U/L (ref 10–44)
ANION GAP SERPL CALC-SCNC: 8 MMOL/L (ref 8–16)
AST SERPL-CCNC: 17 U/L (ref 10–40)
BASOPHILS # BLD AUTO: 0.01 K/UL (ref 0–0.2)
BASOPHILS NFR BLD: 0.3 % (ref 0–1.9)
BILIRUB DIRECT SERPL-MCNC: 0.4 MG/DL (ref 0.1–0.3)
BILIRUB SERPL-MCNC: 1.1 MG/DL (ref 0.1–1)
BUN SERPL-MCNC: 18 MG/DL (ref 6–20)
CALCIUM SERPL-MCNC: 9.6 MG/DL (ref 8.7–10.5)
CHLORIDE SERPL-SCNC: 107 MMOL/L (ref 95–110)
CO2 SERPL-SCNC: 24 MMOL/L (ref 23–29)
CREAT SERPL-MCNC: 1.2 MG/DL (ref 0.5–1.4)
DIFFERENTIAL METHOD: ABNORMAL
EOSINOPHIL # BLD AUTO: 0.1 K/UL (ref 0–0.5)
EOSINOPHIL NFR BLD: 2.9 % (ref 0–8)
ERYTHROCYTE [DISTWIDTH] IN BLOOD BY AUTOMATED COUNT: 13.7 % (ref 11.5–14.5)
EST. GFR  (NO RACE VARIABLE): >60 ML/MIN/1.73 M^2
GLUCOSE SERPL-MCNC: 115 MG/DL (ref 70–110)
HCT VFR BLD AUTO: 36.5 % (ref 40–54)
HGB BLD-MCNC: 11.7 G/DL (ref 14–18)
IMM GRANULOCYTES # BLD AUTO: 0 K/UL (ref 0–0.04)
IMM GRANULOCYTES NFR BLD AUTO: 0 % (ref 0–0.5)
LYMPHOCYTES # BLD AUTO: 0.9 K/UL (ref 1–4.8)
LYMPHOCYTES NFR BLD: 27.7 % (ref 18–48)
MCH RBC QN AUTO: 29.6 PG (ref 27–31)
MCHC RBC AUTO-ENTMCNC: 32.1 G/DL (ref 32–36)
MCV RBC AUTO: 92 FL (ref 82–98)
MONOCYTES # BLD AUTO: 0.3 K/UL (ref 0.3–1)
MONOCYTES NFR BLD: 9.7 % (ref 4–15)
NEUTROPHILS # BLD AUTO: 1.8 K/UL (ref 1.8–7.7)
NEUTROPHILS NFR BLD: 59.4 % (ref 38–73)
NRBC BLD-RTO: 0 /100 WBC
PLATELET # BLD AUTO: 139 K/UL (ref 150–450)
PMV BLD AUTO: 9.2 FL (ref 9.2–12.9)
POTASSIUM SERPL-SCNC: 4.7 MMOL/L (ref 3.5–5.1)
PROT SERPL-MCNC: 6.6 G/DL (ref 6–8.4)
RBC # BLD AUTO: 3.95 M/UL (ref 4.6–6.2)
SODIUM SERPL-SCNC: 139 MMOL/L (ref 136–145)
TACROLIMUS BLD-MCNC: 14.6 NG/ML (ref 5–15)
WBC # BLD AUTO: 3.1 K/UL (ref 3.9–12.7)

## 2022-10-31 PROCEDURE — 85025 COMPLETE CBC W/AUTO DIFF WBC: CPT | Performed by: SURGERY

## 2022-10-31 PROCEDURE — 82248 BILIRUBIN DIRECT: CPT | Performed by: SURGERY

## 2022-10-31 PROCEDURE — 36415 COLL VENOUS BLD VENIPUNCTURE: CPT | Performed by: SURGERY

## 2022-10-31 PROCEDURE — 80053 COMPREHEN METABOLIC PANEL: CPT | Performed by: SURGERY

## 2022-10-31 PROCEDURE — 80197 ASSAY OF TACROLIMUS: CPT | Performed by: SURGERY

## 2022-10-31 NOTE — TELEPHONE ENCOUNTER
Labs reviewed with  by phone, with report from patient that he did not take his morning Prograf dose prior to lab draw.  TORB : reduce Prograf dose to 0.5mg bid. Repeat labs on Monday 11/7/22.     Patient notified and instructed via [facility]:    Per ; Please reduce Prograf dose to 0.5mg twice daily. Repeat labs on Monday 11/7/22. Thanks.

## 2022-11-02 LAB
CMV DNA SPEC QL NAA+PROBE: NOT DETECTED
CYTOMEGALOVIRUS LOG (IU/ML): NOT DETECTED LOGIU/ML
CYTOMEGALOVIRUS PCR, QUANT: NOT DETECTED IU/ML

## 2022-11-02 RX ORDER — URSODIOL 250 MG/1
250 TABLET, FILM COATED ORAL 2 TIMES DAILY
Qty: 60 TABLET | Refills: 2 | Status: SHIPPED | OUTPATIENT
Start: 2022-11-02 | End: 2022-11-27 | Stop reason: SDUPTHER

## 2022-11-02 RX ORDER — TACROLIMUS 0.5 MG/1
0.5 CAPSULE ORAL EVERY 12 HOURS
Qty: 60 CAPSULE | Refills: 11 | Status: SHIPPED | OUTPATIENT
Start: 2022-11-02 | End: 2022-11-03 | Stop reason: SDUPTHER

## 2022-11-03 ENCOUNTER — OFFICE VISIT (OUTPATIENT)
Dept: TRANSPLANT | Facility: CLINIC | Age: 42
End: 2022-11-03
Attending: INTERNAL MEDICINE
Payer: COMMERCIAL

## 2022-11-03 VITALS
SYSTOLIC BLOOD PRESSURE: 139 MMHG | HEART RATE: 79 BPM | RESPIRATION RATE: 16 BRPM | WEIGHT: 174.19 LBS | TEMPERATURE: 97 F | HEIGHT: 69 IN | DIASTOLIC BLOOD PRESSURE: 88 MMHG | OXYGEN SATURATION: 98 % | BODY MASS INDEX: 25.8 KG/M2

## 2022-11-03 DIAGNOSIS — Z94.4 S/P LIVER TRANSPLANT: ICD-10-CM

## 2022-11-03 DIAGNOSIS — Z29.89 PROPHYLACTIC IMMUNOTHERAPY: Primary | ICD-10-CM

## 2022-11-03 PROCEDURE — 99213 PR OFFICE/OUTPT VISIT, EST, LEVL III, 20-29 MIN: ICD-10-PCS | Mod: S$GLB,,, | Performed by: INTERNAL MEDICINE

## 2022-11-03 PROCEDURE — 4010F ACE/ARB THERAPY RXD/TAKEN: CPT | Mod: CPTII,S$GLB,, | Performed by: INTERNAL MEDICINE

## 2022-11-03 PROCEDURE — 3079F PR MOST RECENT DIASTOLIC BLOOD PRESSURE 80-89 MM HG: ICD-10-PCS | Mod: CPTII,S$GLB,, | Performed by: INTERNAL MEDICINE

## 2022-11-03 PROCEDURE — 4010F PR ACE/ARB THEARPY RXD/TAKEN: ICD-10-PCS | Mod: CPTII,S$GLB,, | Performed by: INTERNAL MEDICINE

## 2022-11-03 PROCEDURE — 3079F DIAST BP 80-89 MM HG: CPT | Mod: CPTII,S$GLB,, | Performed by: INTERNAL MEDICINE

## 2022-11-03 PROCEDURE — 3008F BODY MASS INDEX DOCD: CPT | Mod: CPTII,S$GLB,, | Performed by: INTERNAL MEDICINE

## 2022-11-03 PROCEDURE — 3075F SYST BP GE 130 - 139MM HG: CPT | Mod: CPTII,S$GLB,, | Performed by: INTERNAL MEDICINE

## 2022-11-03 PROCEDURE — 3044F HG A1C LEVEL LT 7.0%: CPT | Mod: CPTII,S$GLB,, | Performed by: INTERNAL MEDICINE

## 2022-11-03 PROCEDURE — 1160F PR REVIEW ALL MEDS BY PRESCRIBER/CLIN PHARMACIST DOCUMENTED: ICD-10-PCS | Mod: CPTII,S$GLB,, | Performed by: INTERNAL MEDICINE

## 2022-11-03 PROCEDURE — 1159F MED LIST DOCD IN RCRD: CPT | Mod: CPTII,S$GLB,, | Performed by: INTERNAL MEDICINE

## 2022-11-03 PROCEDURE — 3075F PR MOST RECENT SYSTOLIC BLOOD PRESS GE 130-139MM HG: ICD-10-PCS | Mod: CPTII,S$GLB,, | Performed by: INTERNAL MEDICINE

## 2022-11-03 PROCEDURE — 99999 PR PBB SHADOW E&M-EST. PATIENT-LVL IV: CPT | Mod: PBBFAC,,, | Performed by: INTERNAL MEDICINE

## 2022-11-03 PROCEDURE — 3044F PR MOST RECENT HEMOGLOBIN A1C LEVEL <7.0%: ICD-10-PCS | Mod: CPTII,S$GLB,, | Performed by: INTERNAL MEDICINE

## 2022-11-03 PROCEDURE — 1160F RVW MEDS BY RX/DR IN RCRD: CPT | Mod: CPTII,S$GLB,, | Performed by: INTERNAL MEDICINE

## 2022-11-03 PROCEDURE — 3008F PR BODY MASS INDEX (BMI) DOCUMENTED: ICD-10-PCS | Mod: CPTII,S$GLB,, | Performed by: INTERNAL MEDICINE

## 2022-11-03 PROCEDURE — 1159F PR MEDICATION LIST DOCUMENTED IN MEDICAL RECORD: ICD-10-PCS | Mod: CPTII,S$GLB,, | Performed by: INTERNAL MEDICINE

## 2022-11-03 PROCEDURE — 99999 PR PBB SHADOW E&M-EST. PATIENT-LVL IV: ICD-10-PCS | Mod: PBBFAC,,, | Performed by: INTERNAL MEDICINE

## 2022-11-03 PROCEDURE — 99213 OFFICE O/P EST LOW 20 MIN: CPT | Mod: S$GLB,,, | Performed by: INTERNAL MEDICINE

## 2022-11-03 NOTE — LETTER
November 10, 2022        Pravin Maria  [address] [facility] [address]  [place] [address]  Phone: [phone]  Fax: [phone]             [facility]  [address]  [facility] [address]  Phone: [phone]   Patient: OSCAR PAYNE   MR Number: [id number]   YOB: 1980   Date of Visit: 11/3/2022       Dear Dr. Pravin Maria    Thank you for referring OSCAR PAYNE to me for evaluation. Attached you will find relevant portions of my assessment and plan of care.    If you have questions, please do not hesitate to call me. I look forward to following OSCAR PAYNE along with you.    Sincerely,    Zoran Nobles MD    Enclosure    If you would like to receive this communication electronically, please contact [email or web address] or [phone] to request Chart[employer] Link access.    Chart[employer] Link is a tool which provides read-only access to select patient information with whom you have a relationship. Its easy to use and provides real time access to review your patients record including encounter summaries, notes, results, and demographic information.    If you feel you have received this communication in error or would no longer like to receive these types of communications, please e-mail [email or web address]

## 2022-11-05 NOTE — ASSESSMENT & PLAN NOTE
"- chevron incision with staples in place - 15 days out from take back  - SHRUTHI x 2 with scant output  - Trend LFTs daily  - See "bile leak"  " No - the patient is unable to be screened due to medical condition

## 2022-11-08 ENCOUNTER — LAB VISIT (OUTPATIENT)
Dept: LAB | Facility: HOSPITAL | Age: 42
End: 2022-11-08
Attending: INTERNAL MEDICINE
Payer: COMMERCIAL

## 2022-11-08 DIAGNOSIS — Z94.4 LIVER REPLACED BY TRANSPLANT: ICD-10-CM

## 2022-11-08 LAB
ALBUMIN SERPL BCP-MCNC: 4.1 G/DL (ref 3.5–5.2)
ALP SERPL-CCNC: 167 U/L (ref 55–135)
ALT SERPL W/O P-5'-P-CCNC: 21 U/L (ref 10–44)
ANION GAP SERPL CALC-SCNC: 8 MMOL/L (ref 8–16)
AST SERPL-CCNC: 20 U/L (ref 10–40)
BASOPHILS # BLD AUTO: 0.02 K/UL (ref 0–0.2)
BASOPHILS NFR BLD: 0.6 % (ref 0–1.9)
BILIRUB DIRECT SERPL-MCNC: 0.4 MG/DL (ref 0.1–0.3)
BILIRUB SERPL-MCNC: 1.3 MG/DL (ref 0.1–1)
BUN SERPL-MCNC: 21 MG/DL (ref 6–20)
CALCIUM SERPL-MCNC: 9.6 MG/DL (ref 8.7–10.5)
CHLORIDE SERPL-SCNC: 107 MMOL/L (ref 95–110)
CO2 SERPL-SCNC: 25 MMOL/L (ref 23–29)
CREAT SERPL-MCNC: 1.4 MG/DL (ref 0.5–1.4)
DIFFERENTIAL METHOD: ABNORMAL
EOSINOPHIL # BLD AUTO: 0.1 K/UL (ref 0–0.5)
EOSINOPHIL NFR BLD: 3.4 % (ref 0–8)
ERYTHROCYTE [DISTWIDTH] IN BLOOD BY AUTOMATED COUNT: 13.9 % (ref 11.5–14.5)
EST. GFR  (NO RACE VARIABLE): >60 ML/MIN/1.73 M^2
GLUCOSE SERPL-MCNC: 120 MG/DL (ref 70–110)
HCT VFR BLD AUTO: 36.3 % (ref 40–54)
HGB BLD-MCNC: 11.6 G/DL (ref 14–18)
IMM GRANULOCYTES # BLD AUTO: 0.01 K/UL (ref 0–0.04)
IMM GRANULOCYTES NFR BLD AUTO: 0.3 % (ref 0–0.5)
LYMPHOCYTES # BLD AUTO: 1 K/UL (ref 1–4.8)
LYMPHOCYTES NFR BLD: 27.7 % (ref 18–48)
MCH RBC QN AUTO: 29.6 PG (ref 27–31)
MCHC RBC AUTO-ENTMCNC: 32 G/DL (ref 32–36)
MCV RBC AUTO: 93 FL (ref 82–98)
MONOCYTES # BLD AUTO: 0.5 K/UL (ref 0.3–1)
MONOCYTES NFR BLD: 12.9 % (ref 4–15)
NEUTROPHILS # BLD AUTO: 1.9 K/UL (ref 1.8–7.7)
NEUTROPHILS NFR BLD: 55.1 % (ref 38–73)
NRBC BLD-RTO: 0 /100 WBC
PLATELET # BLD AUTO: 130 K/UL (ref 150–450)
PMV BLD AUTO: 9.3 FL (ref 9.2–12.9)
POTASSIUM SERPL-SCNC: 5 MMOL/L (ref 3.5–5.1)
PROT SERPL-MCNC: 7 G/DL (ref 6–8.4)
RBC # BLD AUTO: 3.92 M/UL (ref 4.6–6.2)
SODIUM SERPL-SCNC: 140 MMOL/L (ref 136–145)
TACROLIMUS BLD-MCNC: 4.8 NG/ML (ref 5–15)
WBC # BLD AUTO: 3.5 K/UL (ref 3.9–12.7)

## 2022-11-08 PROCEDURE — 80197 ASSAY OF TACROLIMUS: CPT | Performed by: INTERNAL MEDICINE

## 2022-11-08 PROCEDURE — 80053 COMPREHEN METABOLIC PANEL: CPT | Performed by: INTERNAL MEDICINE

## 2022-11-08 PROCEDURE — 85025 COMPLETE CBC W/AUTO DIFF WBC: CPT | Performed by: INTERNAL MEDICINE

## 2022-11-08 PROCEDURE — 36415 COLL VENOUS BLD VENIPUNCTURE: CPT | Performed by: INTERNAL MEDICINE

## 2022-11-08 PROCEDURE — 82248 BILIRUBIN DIRECT: CPT | Performed by: INTERNAL MEDICINE

## 2022-11-09 ENCOUNTER — PATIENT MESSAGE (OUTPATIENT)
Dept: TRANSPLANT | Facility: CLINIC | Age: 42
End: 2022-11-09
Payer: COMMERCIAL

## 2022-11-09 ENCOUNTER — TELEPHONE (OUTPATIENT)
Dept: TRANSPLANT | Facility: CLINIC | Age: 42
End: 2022-11-09
Payer: COMMERCIAL

## 2022-11-09 DIAGNOSIS — Z94.4 LIVER REPLACED BY TRANSPLANT: Primary | ICD-10-CM

## 2022-11-09 NOTE — TELEPHONE ENCOUNTER
Stable labs, no medication changes.  Next labs 11/14/22, [employer] message sent to notify pt.  ----- Message from Zoran Nobles MD sent at 11/9/2022  1:19 PM CST -----  Results reviewed. No action.

## 2022-11-10 NOTE — PROGRESS NOTES
Transplant Hepatology  Liver Transplant Recipient Follow-up    Transplant Date: 6/21/2022  UNOS Native Liver Dx: Primary Liver Malignancy: Cholangiocarcinoma (CH-CA)    OSCAR is here for follow up of his liver transplant.    ORGAN: RIGHT LIVER LOBE (SEGS 5,6,7,8) WITHOUT MIDDLE HEPATIC VEIN  Whole or Partial: partial liver  Donor Type: living  [facility] High Risk:   Donor CMV Status:   Donor HCV Status:   Donor HBcAb:   Donor HBV GUSTABO:   Donor HCV GUSTABO:   Biliary Anastomosis: vick-en-y  Arterial Anatomy: standard  IVC reconstruction: hepatic vein confluence piggyback  Portal vein status: patent    He has had the following complications since transplant: bile leak. The noted complications is well controlled.  The bile leak had required a PTC for several weeks which has subsequently been removed.    Subjective:     Interval History:  This 42-year-old gentleman underwent a liver transplant in June of this year for cholangiocarcinoma.  Other than a bile leak requiring a temporary PTC he is had no other complications.  Currently feels well and is gaining weight.  He has a mildly elevated alkaline phosphatase but otherwise enjoys excellent allograft function on tacrolimus monotherapy.    Review of Systems   Constitutional:  Negative for activity change, appetite change, chills, fatigue and unexpected weight change.   HENT:  Negative for congestion, facial swelling and tinnitus.    Eyes:  Negative for visual disturbance.   Respiratory:  Negative for cough, shortness of breath and wheezing.    Cardiovascular:  Negative for chest pain and palpitations.   Gastrointestinal:  Negative for abdominal distention.   Genitourinary:  Negative for dysuria.   Musculoskeletal:  Negative for arthralgias, joint swelling and myalgias.   Neurological:  Negative for syncope and headaches.   Hematological:  Does not bruise/bleed easily.   Psychiatric/Behavioral:  Negative for confusion.      Objective:     Physical Exam  Constitutional:        Appearance: He is well-developed.   Eyes:      General: No scleral icterus.  Cardiovascular:      Rate and Rhythm: Normal rate and regular rhythm.      Heart sounds: Normal heart sounds.   Pulmonary:      Effort: Pulmonary effort is normal. No respiratory distress.      Breath sounds: Normal breath sounds. No wheezing.   Abdominal:      General: Bowel sounds are normal. There is no distension.      Palpations: Abdomen is soft. There is no mass.      Tenderness: There is no abdominal tenderness. There is no rebound.       Musculoskeletal:         General: Normal range of motion.   Lymphadenopathy:      Cervical: No cervical adenopathy.   Skin:     General: Skin is warm and dry.   Neurological:      Mental Status: He is alert and oriented to person, place, and time.     Lab Results   Component Value Date    BILITOT 1.3 (H) 11/08/2022    AST 20 11/08/2022    ALT 21 11/08/2022    ALKPHOS 167 (H) 11/08/2022    CREATININE 1.4 11/08/2022    ALBUMIN 4.1 11/08/2022     Lab Results   Component Value Date    WBC 3.50 (L) 11/08/2022    HGB 11.6 (L) 11/08/2022    HCT 36.3 (L) 11/08/2022    HCT 25 (L) 06/23/2022     (L) 11/08/2022     Lab Results   Component Value Date    TACROLIMUS 4.8 (L) 11/08/2022       Assessment/Plan:     1. Prophylactic immunotherapy    2. S/P liver transplant        Patient advised that it is recommended that all transplant candidates, and their close contacts and household members receive Covid vaccination.    I have made no changes to his current immunosuppression.  We will continue to get laboratory follow-up every month.  I will have him return to clinic in 3 months' time.    Zoran Nobles MD           [facility] Patient Status  Functional Status: 90% - Able to carry on normal activity: minor symptoms of disease  Physical Capacity: No Limitations  Working for income: yes  New diabetes onset between last follow-up to the current follow-up: No  Did patient have any acute rejection episodes during the  follow-up period: No  Post transplant malignancy: No

## 2022-11-14 ENCOUNTER — LAB VISIT (OUTPATIENT)
Dept: LAB | Facility: HOSPITAL | Age: 42
End: 2022-11-14
Attending: INTERNAL MEDICINE
Payer: COMMERCIAL

## 2022-11-14 DIAGNOSIS — Z94.4 LIVER REPLACED BY TRANSPLANT: ICD-10-CM

## 2022-11-14 LAB
ALBUMIN SERPL BCP-MCNC: 4 G/DL (ref 3.5–5.2)
ALP SERPL-CCNC: 146 U/L (ref 55–135)
ALT SERPL W/O P-5'-P-CCNC: 20 U/L (ref 10–44)
ANION GAP SERPL CALC-SCNC: 7 MMOL/L (ref 8–16)
AST SERPL-CCNC: 19 U/L (ref 10–40)
BASOPHILS # BLD AUTO: 0.02 K/UL (ref 0–0.2)
BASOPHILS NFR BLD: 0.6 % (ref 0–1.9)
BILIRUB SERPL-MCNC: 1.1 MG/DL (ref 0.1–1)
BUN SERPL-MCNC: 16 MG/DL (ref 6–20)
CALCIUM SERPL-MCNC: 9.3 MG/DL (ref 8.7–10.5)
CHLORIDE SERPL-SCNC: 106 MMOL/L (ref 95–110)
CO2 SERPL-SCNC: 26 MMOL/L (ref 23–29)
CREAT SERPL-MCNC: 1.3 MG/DL (ref 0.5–1.4)
DIFFERENTIAL METHOD: ABNORMAL
EOSINOPHIL # BLD AUTO: 0.2 K/UL (ref 0–0.5)
EOSINOPHIL NFR BLD: 4.4 % (ref 0–8)
ERYTHROCYTE [DISTWIDTH] IN BLOOD BY AUTOMATED COUNT: 13.8 % (ref 11.5–14.5)
EST. GFR  (NO RACE VARIABLE): >60 ML/MIN/1.73 M^2
GLUCOSE SERPL-MCNC: 102 MG/DL (ref 70–110)
HCT VFR BLD AUTO: 34.6 % (ref 40–54)
HGB BLD-MCNC: 11.4 G/DL (ref 14–18)
IMM GRANULOCYTES # BLD AUTO: 0 K/UL (ref 0–0.04)
IMM GRANULOCYTES NFR BLD AUTO: 0 % (ref 0–0.5)
LYMPHOCYTES # BLD AUTO: 1.1 K/UL (ref 1–4.8)
LYMPHOCYTES NFR BLD: 31.8 % (ref 18–48)
MCH RBC QN AUTO: 30.3 PG (ref 27–31)
MCHC RBC AUTO-ENTMCNC: 32.9 G/DL (ref 32–36)
MCV RBC AUTO: 92 FL (ref 82–98)
MONOCYTES # BLD AUTO: 0.4 K/UL (ref 0.3–1)
MONOCYTES NFR BLD: 12.8 % (ref 4–15)
NEUTROPHILS # BLD AUTO: 1.7 K/UL (ref 1.8–7.7)
NEUTROPHILS NFR BLD: 50.4 % (ref 38–73)
NRBC BLD-RTO: 0 /100 WBC
PLATELET # BLD AUTO: 129 K/UL (ref 150–450)
PMV BLD AUTO: 9.4 FL (ref 9.2–12.9)
POTASSIUM SERPL-SCNC: 3.9 MMOL/L (ref 3.5–5.1)
PROT SERPL-MCNC: 6.8 G/DL (ref 6–8.4)
RBC # BLD AUTO: 3.76 M/UL (ref 4.6–6.2)
SODIUM SERPL-SCNC: 139 MMOL/L (ref 136–145)
WBC # BLD AUTO: 3.43 K/UL (ref 3.9–12.7)

## 2022-11-14 PROCEDURE — 80053 COMPREHEN METABOLIC PANEL: CPT | Performed by: INTERNAL MEDICINE

## 2022-11-14 PROCEDURE — 85025 COMPLETE CBC W/AUTO DIFF WBC: CPT | Performed by: INTERNAL MEDICINE

## 2022-11-14 PROCEDURE — 80197 ASSAY OF TACROLIMUS: CPT | Performed by: INTERNAL MEDICINE

## 2022-11-14 PROCEDURE — 36415 COLL VENOUS BLD VENIPUNCTURE: CPT | Mod: PO | Performed by: INTERNAL MEDICINE

## 2022-11-15 LAB — TACROLIMUS BLD-MCNC: 4 NG/ML (ref 5–15)

## 2022-11-16 ENCOUNTER — TELEPHONE (OUTPATIENT)
Dept: TRANSPLANT | Facility: CLINIC | Age: 42
End: 2022-11-16
Payer: COMMERCIAL

## 2022-11-16 ENCOUNTER — PATIENT MESSAGE (OUTPATIENT)
Dept: TRANSPLANT | Facility: CLINIC | Age: 42
End: 2022-11-16
Payer: COMMERCIAL

## 2022-11-16 NOTE — TELEPHONE ENCOUNTER
Stable labs, no medication changes.  Next labs 11/21/22, [employer] message sent to notify pt.  ----- Message from oZran Nobles MD sent at 11/16/2022  7:20 AM CST -----  Results reviewed. No action.

## 2022-11-20 ENCOUNTER — PATIENT MESSAGE (OUTPATIENT)
Dept: TRANSPLANT | Facility: CLINIC | Age: 42
End: 2022-11-20
Payer: COMMERCIAL

## 2022-11-21 ENCOUNTER — LAB VISIT (OUTPATIENT)
Dept: LAB | Facility: HOSPITAL | Age: 42
End: 2022-11-21
Attending: SURGERY
Payer: COMMERCIAL

## 2022-11-21 ENCOUNTER — PATIENT MESSAGE (OUTPATIENT)
Dept: TRANSPLANT | Facility: CLINIC | Age: 42
End: 2022-11-21
Payer: COMMERCIAL

## 2022-11-21 DIAGNOSIS — Z94.4 LIVER REPLACED BY TRANSPLANT: Primary | ICD-10-CM

## 2022-11-21 DIAGNOSIS — Z94.4 LIVER REPLACED BY TRANSPLANT: ICD-10-CM

## 2022-11-21 LAB
ALBUMIN SERPL BCP-MCNC: 4.1 G/DL (ref 3.5–5.2)
ALP SERPL-CCNC: 140 U/L (ref 55–135)
ALT SERPL W/O P-5'-P-CCNC: 22 U/L (ref 10–44)
ANION GAP SERPL CALC-SCNC: 9 MMOL/L (ref 8–16)
AST SERPL-CCNC: 19 U/L (ref 10–40)
BASOPHILS # BLD AUTO: 0.02 K/UL (ref 0–0.2)
BASOPHILS NFR BLD: 0.5 % (ref 0–1.9)
BILIRUB DIRECT SERPL-MCNC: 0.3 MG/DL (ref 0.1–0.3)
BILIRUB SERPL-MCNC: 1.1 MG/DL (ref 0.1–1)
BUN SERPL-MCNC: 16 MG/DL (ref 6–20)
CALCIUM SERPL-MCNC: 9.8 MG/DL (ref 8.7–10.5)
CHLORIDE SERPL-SCNC: 106 MMOL/L (ref 95–110)
CO2 SERPL-SCNC: 25 MMOL/L (ref 23–29)
CREAT SERPL-MCNC: 1.2 MG/DL (ref 0.5–1.4)
DIFFERENTIAL METHOD: ABNORMAL
EOSINOPHIL # BLD AUTO: 0.1 K/UL (ref 0–0.5)
EOSINOPHIL NFR BLD: 3.3 % (ref 0–8)
ERYTHROCYTE [DISTWIDTH] IN BLOOD BY AUTOMATED COUNT: 13.6 % (ref 11.5–14.5)
EST. GFR  (NO RACE VARIABLE): >60 ML/MIN/1.73 M^2
GLUCOSE SERPL-MCNC: 106 MG/DL (ref 70–110)
HCT VFR BLD AUTO: 38.8 % (ref 40–54)
HGB BLD-MCNC: 12.3 G/DL (ref 14–18)
IMM GRANULOCYTES # BLD AUTO: 0.01 K/UL (ref 0–0.04)
IMM GRANULOCYTES NFR BLD AUTO: 0.3 % (ref 0–0.5)
LYMPHOCYTES # BLD AUTO: 1 K/UL (ref 1–4.8)
LYMPHOCYTES NFR BLD: 28.2 % (ref 18–48)
MCH RBC QN AUTO: 30.2 PG (ref 27–31)
MCHC RBC AUTO-ENTMCNC: 31.7 G/DL (ref 32–36)
MCV RBC AUTO: 95 FL (ref 82–98)
MONOCYTES # BLD AUTO: 0.4 K/UL (ref 0.3–1)
MONOCYTES NFR BLD: 11.4 % (ref 4–15)
NEUTROPHILS # BLD AUTO: 2.1 K/UL (ref 1.8–7.7)
NEUTROPHILS NFR BLD: 56.3 % (ref 38–73)
NRBC BLD-RTO: 0 /100 WBC
PLATELET # BLD AUTO: 127 K/UL (ref 150–450)
PMV BLD AUTO: 9.2 FL (ref 9.2–12.9)
POTASSIUM SERPL-SCNC: 4.5 MMOL/L (ref 3.5–5.1)
PROT SERPL-MCNC: 7 G/DL (ref 6–8.4)
RBC # BLD AUTO: 4.07 M/UL (ref 4.6–6.2)
SODIUM SERPL-SCNC: 140 MMOL/L (ref 136–145)
TACROLIMUS BLD-MCNC: 4.2 NG/ML (ref 5–15)
WBC # BLD AUTO: 3.69 K/UL (ref 3.9–12.7)

## 2022-11-21 PROCEDURE — 85025 COMPLETE CBC W/AUTO DIFF WBC: CPT | Performed by: SURGERY

## 2022-11-21 PROCEDURE — 82248 BILIRUBIN DIRECT: CPT | Performed by: SURGERY

## 2022-11-21 PROCEDURE — 80053 COMPREHEN METABOLIC PANEL: CPT | Performed by: SURGERY

## 2022-11-21 PROCEDURE — 80197 ASSAY OF TACROLIMUS: CPT | Performed by: SURGERY

## 2022-11-21 PROCEDURE — 36415 COLL VENOUS BLD VENIPUNCTURE: CPT | Performed by: SURGERY

## 2022-11-21 NOTE — TELEPHONE ENCOUNTER
Patient notified and instructed via [employer]:     liked the picture.  Also , he reviewed your labs and would like you to increase the dose to 1mg in the mornings and 0.5mg in the evenings.  Repeat labs on Monday 11/28/22. Thanks.

## 2022-11-22 RX ORDER — TACROLIMUS 0.5 MG/1
CAPSULE ORAL
Qty: 90 CAPSULE | Refills: 11 | Status: SHIPPED | OUTPATIENT
Start: 2022-11-22 | End: 2022-12-04 | Stop reason: SDUPTHER

## 2022-11-28 ENCOUNTER — PATIENT MESSAGE (OUTPATIENT)
Dept: TRANSPLANT | Facility: CLINIC | Age: 42
End: 2022-11-28
Payer: COMMERCIAL

## 2022-11-28 ENCOUNTER — LAB VISIT (OUTPATIENT)
Dept: LAB | Facility: HOSPITAL | Age: 42
End: 2022-11-28
Attending: INTERNAL MEDICINE
Payer: COMMERCIAL

## 2022-11-28 DIAGNOSIS — C22.1 CHOLANGIOCARCINOMA: ICD-10-CM

## 2022-11-28 DIAGNOSIS — Z94.4 LIVER REPLACED BY TRANSPLANT: ICD-10-CM

## 2022-11-28 DIAGNOSIS — Z94.4 LIVER REPLACED BY TRANSPLANT: Primary | ICD-10-CM

## 2022-11-28 LAB
ALBUMIN SERPL BCP-MCNC: 4.1 G/DL (ref 3.5–5.2)
ALP SERPL-CCNC: 148 U/L (ref 55–135)
ALT SERPL W/O P-5'-P-CCNC: 23 U/L (ref 10–44)
ANION GAP SERPL CALC-SCNC: 7 MMOL/L (ref 8–16)
AST SERPL-CCNC: 21 U/L (ref 10–40)
BASOPHILS # BLD AUTO: 0.01 K/UL (ref 0–0.2)
BASOPHILS NFR BLD: 0.3 % (ref 0–1.9)
BILIRUB SERPL-MCNC: 1.4 MG/DL (ref 0.1–1)
BUN SERPL-MCNC: 17 MG/DL (ref 6–20)
CALCIUM SERPL-MCNC: 9.7 MG/DL (ref 8.7–10.5)
CHLORIDE SERPL-SCNC: 106 MMOL/L (ref 95–110)
CO2 SERPL-SCNC: 28 MMOL/L (ref 23–29)
CREAT SERPL-MCNC: 1.2 MG/DL (ref 0.5–1.4)
DIFFERENTIAL METHOD: ABNORMAL
EOSINOPHIL # BLD AUTO: 0.1 K/UL (ref 0–0.5)
EOSINOPHIL NFR BLD: 3.2 % (ref 0–8)
ERYTHROCYTE [DISTWIDTH] IN BLOOD BY AUTOMATED COUNT: 13.7 % (ref 11.5–14.5)
EST. GFR  (NO RACE VARIABLE): >60 ML/MIN/1.73 M^2
GLUCOSE SERPL-MCNC: 107 MG/DL (ref 70–110)
HCT VFR BLD AUTO: 39.6 % (ref 40–54)
HGB BLD-MCNC: 12.5 G/DL (ref 14–18)
IMM GRANULOCYTES # BLD AUTO: 0.01 K/UL (ref 0–0.04)
IMM GRANULOCYTES NFR BLD AUTO: 0.3 % (ref 0–0.5)
LYMPHOCYTES # BLD AUTO: 1.1 K/UL (ref 1–4.8)
LYMPHOCYTES NFR BLD: 31.3 % (ref 18–48)
MCH RBC QN AUTO: 29.5 PG (ref 27–31)
MCHC RBC AUTO-ENTMCNC: 31.6 G/DL (ref 32–36)
MCV RBC AUTO: 93 FL (ref 82–98)
MONOCYTES # BLD AUTO: 0.5 K/UL (ref 0.3–1)
MONOCYTES NFR BLD: 13.6 % (ref 4–15)
NEUTROPHILS # BLD AUTO: 1.8 K/UL (ref 1.8–7.7)
NEUTROPHILS NFR BLD: 51.3 % (ref 38–73)
NRBC BLD-RTO: 0 /100 WBC
PLATELET # BLD AUTO: 124 K/UL (ref 150–450)
PMV BLD AUTO: 8.8 FL (ref 9.2–12.9)
POTASSIUM SERPL-SCNC: 4.3 MMOL/L (ref 3.5–5.1)
PROT SERPL-MCNC: 7.1 G/DL (ref 6–8.4)
RBC # BLD AUTO: 4.24 M/UL (ref 4.6–6.2)
SODIUM SERPL-SCNC: 141 MMOL/L (ref 136–145)
TACROLIMUS BLD-MCNC: 4.4 NG/ML (ref 5–15)
WBC # BLD AUTO: 3.45 K/UL (ref 3.9–12.7)

## 2022-11-28 PROCEDURE — 85025 COMPLETE CBC W/AUTO DIFF WBC: CPT | Performed by: INTERNAL MEDICINE

## 2022-11-28 PROCEDURE — 80197 ASSAY OF TACROLIMUS: CPT | Performed by: INTERNAL MEDICINE

## 2022-11-28 PROCEDURE — 36415 COLL VENOUS BLD VENIPUNCTURE: CPT | Performed by: INTERNAL MEDICINE

## 2022-11-28 PROCEDURE — 80053 COMPREHEN METABOLIC PANEL: CPT | Performed by: INTERNAL MEDICINE

## 2022-12-05 ENCOUNTER — LAB VISIT (OUTPATIENT)
Dept: LAB | Facility: HOSPITAL | Age: 42
End: 2022-12-05
Attending: INTERNAL MEDICINE
Payer: COMMERCIAL

## 2022-12-05 DIAGNOSIS — Z94.4 LIVER REPLACED BY TRANSPLANT: ICD-10-CM

## 2022-12-05 DIAGNOSIS — C22.1 CHOLANGIOCARCINOMA: ICD-10-CM

## 2022-12-05 LAB
ALBUMIN SERPL BCP-MCNC: 3.8 G/DL (ref 3.5–5.2)
ALP SERPL-CCNC: 149 U/L (ref 55–135)
ALT SERPL W/O P-5'-P-CCNC: 23 U/L (ref 10–44)
ANION GAP SERPL CALC-SCNC: 7 MMOL/L (ref 8–16)
AST SERPL-CCNC: 21 U/L (ref 10–40)
BASOPHILS # BLD AUTO: 0.02 K/UL (ref 0–0.2)
BASOPHILS NFR BLD: 0.5 % (ref 0–1.9)
BILIRUB SERPL-MCNC: 1.2 MG/DL (ref 0.1–1)
BUN SERPL-MCNC: 18 MG/DL (ref 6–20)
CALCIUM SERPL-MCNC: 9.4 MG/DL (ref 8.7–10.5)
CANCER AG19-9 SERPL-ACNC: 7.4 U/ML (ref 0–40)
CEA SERPL-MCNC: <1.7 NG/ML (ref 0–5)
CHLORIDE SERPL-SCNC: 106 MMOL/L (ref 95–110)
CO2 SERPL-SCNC: 25 MMOL/L (ref 23–29)
CREAT SERPL-MCNC: 1.2 MG/DL (ref 0.5–1.4)
DIFFERENTIAL METHOD: ABNORMAL
EOSINOPHIL # BLD AUTO: 0.1 K/UL (ref 0–0.5)
EOSINOPHIL NFR BLD: 2.7 % (ref 0–8)
ERYTHROCYTE [DISTWIDTH] IN BLOOD BY AUTOMATED COUNT: 13.4 % (ref 11.5–14.5)
EST. GFR  (NO RACE VARIABLE): >60 ML/MIN/1.73 M^2
GLUCOSE SERPL-MCNC: 107 MG/DL (ref 70–110)
HCT VFR BLD AUTO: 37.7 % (ref 40–54)
HGB BLD-MCNC: 12.1 G/DL (ref 14–18)
IMM GRANULOCYTES # BLD AUTO: 0.01 K/UL (ref 0–0.04)
IMM GRANULOCYTES NFR BLD AUTO: 0.3 % (ref 0–0.5)
LYMPHOCYTES # BLD AUTO: 1.2 K/UL (ref 1–4.8)
LYMPHOCYTES NFR BLD: 31.8 % (ref 18–48)
MCH RBC QN AUTO: 30.2 PG (ref 27–31)
MCHC RBC AUTO-ENTMCNC: 32.1 G/DL (ref 32–36)
MCV RBC AUTO: 94 FL (ref 82–98)
MONOCYTES # BLD AUTO: 0.4 K/UL (ref 0.3–1)
MONOCYTES NFR BLD: 11.7 % (ref 4–15)
NEUTROPHILS # BLD AUTO: 2 K/UL (ref 1.8–7.7)
NEUTROPHILS NFR BLD: 53 % (ref 38–73)
NRBC BLD-RTO: 0 /100 WBC
PLATELET # BLD AUTO: 128 K/UL (ref 150–450)
PMV BLD AUTO: 8.8 FL (ref 9.2–12.9)
POTASSIUM SERPL-SCNC: 4.6 MMOL/L (ref 3.5–5.1)
PROT SERPL-MCNC: 6.6 G/DL (ref 6–8.4)
RBC # BLD AUTO: 4.01 M/UL (ref 4.6–6.2)
SODIUM SERPL-SCNC: 138 MMOL/L (ref 136–145)
TACROLIMUS BLD-MCNC: 6.3 NG/ML (ref 5–15)
WBC # BLD AUTO: 3.77 K/UL (ref 3.9–12.7)

## 2022-12-05 PROCEDURE — 85025 COMPLETE CBC W/AUTO DIFF WBC: CPT | Performed by: INTERNAL MEDICINE

## 2022-12-05 PROCEDURE — 86301 IMMUNOASSAY TUMOR CA 19-9: CPT | Performed by: INTERNAL MEDICINE

## 2022-12-05 PROCEDURE — 82378 CARCINOEMBRYONIC ANTIGEN: CPT | Performed by: INTERNAL MEDICINE

## 2022-12-05 PROCEDURE — 36415 COLL VENOUS BLD VENIPUNCTURE: CPT | Performed by: INTERNAL MEDICINE

## 2022-12-05 PROCEDURE — 80053 COMPREHEN METABOLIC PANEL: CPT | Performed by: INTERNAL MEDICINE

## 2022-12-05 PROCEDURE — 80197 ASSAY OF TACROLIMUS: CPT | Performed by: INTERNAL MEDICINE

## 2022-12-06 ENCOUNTER — TELEPHONE (OUTPATIENT)
Dept: TRANSPLANT | Facility: CLINIC | Age: 42
End: 2022-12-06
Payer: COMMERCIAL

## 2022-12-06 ENCOUNTER — PATIENT MESSAGE (OUTPATIENT)
Dept: TRANSPLANT | Facility: CLINIC | Age: 42
End: 2022-12-06
Payer: COMMERCIAL

## 2022-12-06 DIAGNOSIS — Z94.4 LIVER REPLACED BY TRANSPLANT: Primary | ICD-10-CM

## 2022-12-06 NOTE — TELEPHONE ENCOUNTER
----- Message from Zoran Nobles MD sent at 12/6/2022  1:48 PM CST -----  Results reviewed. No action.

## 2022-12-06 NOTE — TELEPHONE ENCOUNTER
Patient notified and instructed via [employer]:    Your labs have been reviewed by ; no changes made. Repeat labs due 12/19/22. Thanks.

## 2022-12-19 ENCOUNTER — PATIENT MESSAGE (OUTPATIENT)
Dept: TRANSPLANT | Facility: CLINIC | Age: 42
End: 2022-12-19
Payer: COMMERCIAL

## 2022-12-19 ENCOUNTER — LAB VISIT (OUTPATIENT)
Dept: LAB | Facility: HOSPITAL | Age: 42
End: 2022-12-19
Attending: INTERNAL MEDICINE
Payer: COMMERCIAL

## 2022-12-19 DIAGNOSIS — Z94.4 LIVER REPLACED BY TRANSPLANT: ICD-10-CM

## 2022-12-19 LAB
ALBUMIN SERPL BCP-MCNC: 3.9 G/DL (ref 3.5–5.2)
ALP SERPL-CCNC: 137 U/L (ref 55–135)
ALT SERPL W/O P-5'-P-CCNC: 21 U/L (ref 10–44)
ANION GAP SERPL CALC-SCNC: 8 MMOL/L (ref 8–16)
AST SERPL-CCNC: 20 U/L (ref 10–40)
BASOPHILS # BLD AUTO: 0.02 K/UL (ref 0–0.2)
BASOPHILS NFR BLD: 0.5 % (ref 0–1.9)
BILIRUB SERPL-MCNC: 1.2 MG/DL (ref 0.1–1)
BUN SERPL-MCNC: 21 MG/DL (ref 6–20)
CALCIUM SERPL-MCNC: 9.4 MG/DL (ref 8.7–10.5)
CHLORIDE SERPL-SCNC: 106 MMOL/L (ref 95–110)
CO2 SERPL-SCNC: 25 MMOL/L (ref 23–29)
CREAT SERPL-MCNC: 1.3 MG/DL (ref 0.5–1.4)
DIFFERENTIAL METHOD: ABNORMAL
EOSINOPHIL # BLD AUTO: 0.1 K/UL (ref 0–0.5)
EOSINOPHIL NFR BLD: 2.3 % (ref 0–8)
ERYTHROCYTE [DISTWIDTH] IN BLOOD BY AUTOMATED COUNT: 13.4 % (ref 11.5–14.5)
EST. GFR  (NO RACE VARIABLE): >60 ML/MIN/1.73 M^2
GLUCOSE SERPL-MCNC: 105 MG/DL (ref 70–110)
HCT VFR BLD AUTO: 39.9 % (ref 40–54)
HGB BLD-MCNC: 12.5 G/DL (ref 14–18)
IMM GRANULOCYTES # BLD AUTO: 0.01 K/UL (ref 0–0.04)
IMM GRANULOCYTES NFR BLD AUTO: 0.2 % (ref 0–0.5)
LYMPHOCYTES # BLD AUTO: 1.2 K/UL (ref 1–4.8)
LYMPHOCYTES NFR BLD: 27.4 % (ref 18–48)
MCH RBC QN AUTO: 29.2 PG (ref 27–31)
MCHC RBC AUTO-ENTMCNC: 31.3 G/DL (ref 32–36)
MCV RBC AUTO: 93 FL (ref 82–98)
MONOCYTES # BLD AUTO: 0.5 K/UL (ref 0.3–1)
MONOCYTES NFR BLD: 10.9 % (ref 4–15)
NEUTROPHILS # BLD AUTO: 2.6 K/UL (ref 1.8–7.7)
NEUTROPHILS NFR BLD: 58.7 % (ref 38–73)
NRBC BLD-RTO: 0 /100 WBC
PLATELET # BLD AUTO: 131 K/UL (ref 150–450)
PMV BLD AUTO: 9.2 FL (ref 9.2–12.9)
POTASSIUM SERPL-SCNC: 4.3 MMOL/L (ref 3.5–5.1)
PROT SERPL-MCNC: 6.5 G/DL (ref 6–8.4)
RBC # BLD AUTO: 4.28 M/UL (ref 4.6–6.2)
SODIUM SERPL-SCNC: 139 MMOL/L (ref 136–145)
TACROLIMUS BLD-MCNC: 8.4 NG/ML (ref 5–15)
WBC # BLD AUTO: 4.41 K/UL (ref 3.9–12.7)

## 2022-12-19 PROCEDURE — 80197 ASSAY OF TACROLIMUS: CPT | Performed by: INTERNAL MEDICINE

## 2022-12-19 PROCEDURE — 80053 COMPREHEN METABOLIC PANEL: CPT | Performed by: INTERNAL MEDICINE

## 2022-12-19 PROCEDURE — 85025 COMPLETE CBC W/AUTO DIFF WBC: CPT | Performed by: INTERNAL MEDICINE

## 2022-12-19 PROCEDURE — 36415 COLL VENOUS BLD VENIPUNCTURE: CPT | Performed by: INTERNAL MEDICINE

## 2022-12-21 ENCOUNTER — HOSPITAL ENCOUNTER (OUTPATIENT)
Dept: RADIOLOGY | Facility: HOSPITAL | Age: 42
Discharge: HOME | End: 2022-12-21
Attending: INTERNAL MEDICINE
Payer: COMMERCIAL

## 2022-12-21 ENCOUNTER — INFUSION (OUTPATIENT)
Dept: INFUSION THERAPY | Facility: HOSPITAL | Age: 42
End: 2022-12-21
Attending: INTERNAL MEDICINE
Payer: COMMERCIAL

## 2022-12-21 ENCOUNTER — PATIENT MESSAGE (OUTPATIENT)
Dept: TRANSPLANT | Facility: CLINIC | Age: 42
End: 2022-12-21
Payer: COMMERCIAL

## 2022-12-21 DIAGNOSIS — Z45.2 ADJUSTMENT AND MANAGEMENT OF VASCULAR ACCESS DEVICE: Primary | ICD-10-CM

## 2022-12-21 DIAGNOSIS — Z94.4 LIVER REPLACED BY TRANSPLANT: Primary | ICD-10-CM

## 2022-12-21 DIAGNOSIS — C24.0 HILAR CHOLANGIOCARCINOMA: ICD-10-CM

## 2022-12-21 PROCEDURE — A9698 NON-RAD CONTRAST MATERIALNOC: HCPCS | Mod: PO | Performed by: INTERNAL MEDICINE

## 2022-12-21 PROCEDURE — 74177 CT CHEST ABDOMEN PELVIS WITH CONTRAST (XPD): ICD-10-PCS | Mod: 26,,, | Performed by: RADIOLOGY

## 2022-12-21 PROCEDURE — 25500020 PHARM REV CODE 255: Mod: PO | Performed by: INTERNAL MEDICINE

## 2022-12-21 PROCEDURE — 71260 CT CHEST ABDOMEN PELVIS WITH CONTRAST (XPD): ICD-10-PCS | Mod: 26,,, | Performed by: RADIOLOGY

## 2022-12-21 PROCEDURE — 74177 CT ABD & PELVIS W/CONTRAST: CPT | Mod: TC,PO

## 2022-12-21 PROCEDURE — 63600175 PHARM REV CODE 636 W HCPCS: Mod: PN | Performed by: PHYSICIAN ASSISTANT

## 2022-12-21 PROCEDURE — 25000003 PHARM REV CODE 250: Mod: PN | Performed by: PHYSICIAN ASSISTANT

## 2022-12-21 PROCEDURE — 71260 CT THORAX DX C+: CPT | Mod: 26,,, | Performed by: RADIOLOGY

## 2022-12-21 PROCEDURE — 74177 CT ABD & PELVIS W/CONTRAST: CPT | Mod: 26,,, | Performed by: RADIOLOGY

## 2022-12-21 PROCEDURE — A4216 STERILE WATER/SALINE, 10 ML: HCPCS | Mod: PN | Performed by: PHYSICIAN ASSISTANT

## 2022-12-21 PROCEDURE — 71260 CT THORAX DX C+: CPT | Mod: TC,PO

## 2022-12-21 PROCEDURE — 96523 IRRIG DRUG DELIVERY DEVICE: CPT | Mod: PN

## 2022-12-21 RX ORDER — SODIUM CHLORIDE 0.9 % (FLUSH) 0.9 %
10 SYRINGE (ML) INJECTION
Status: DISCONTINUED | OUTPATIENT
Start: 2022-12-21 | End: 2022-12-21 | Stop reason: HOSPADM

## 2022-12-21 RX ORDER — HEPARIN 100 UNIT/ML
500 SYRINGE INTRAVENOUS
Status: CANCELLED | OUTPATIENT
Start: 2022-12-23

## 2022-12-21 RX ORDER — HEPARIN 100 UNIT/ML
500 SYRINGE INTRAVENOUS
Status: CANCELLED | OUTPATIENT
Start: 2022-12-21

## 2022-12-21 RX ORDER — SODIUM CHLORIDE 0.9 % (FLUSH) 0.9 %
10 SYRINGE (ML) INJECTION
Status: CANCELLED | OUTPATIENT
Start: 2022-12-21

## 2022-12-21 RX ORDER — SODIUM CHLORIDE 0.9 % (FLUSH) 0.9 %
10 SYRINGE (ML) INJECTION
Status: CANCELLED | OUTPATIENT
Start: 2022-12-23

## 2022-12-21 RX ORDER — HEPARIN 100 UNIT/ML
500 SYRINGE INTRAVENOUS
Status: DISCONTINUED | OUTPATIENT
Start: 2022-12-21 | End: 2022-12-21 | Stop reason: HOSPADM

## 2022-12-21 RX ADMIN — Medication 500 UNITS: at 08:12

## 2022-12-21 RX ADMIN — BARIUM SULFATE 900 ML: 20 SUSPENSION ORAL at 11:12

## 2022-12-21 RX ADMIN — IOHEXOL 75 ML: 350 INJECTION, SOLUTION INTRAVENOUS at 10:12

## 2022-12-21 RX ADMIN — Medication 10 ML: at 08:12

## 2022-12-21 NOTE — TELEPHONE ENCOUNTER
Patient notified and instructed via [facility]:    Per : Results reviewed. Reduce tacrolimus dose to 0.5 mg twice a day. Repeat labs due 1/9/2023. Thanks.

## 2022-12-21 NOTE — TELEPHONE ENCOUNTER
----- Message from Zoran Nobles MD sent at 12/20/2022  7:30 AM CST -----  Results reviewed. Reduce tac to 0.5 mg bid

## 2022-12-22 ENCOUNTER — OFFICE VISIT (OUTPATIENT)
Dept: HEMATOLOGY/ONCOLOGY | Facility: CLINIC | Age: 42
End: 2022-12-22
Payer: COMMERCIAL

## 2022-12-22 VITALS
DIASTOLIC BLOOD PRESSURE: 99 MMHG | BODY MASS INDEX: 27.23 KG/M2 | RESPIRATION RATE: 18 BRPM | OXYGEN SATURATION: 98 % | HEIGHT: 68 IN | HEART RATE: 85 BPM | WEIGHT: 179.69 LBS | SYSTOLIC BLOOD PRESSURE: 134 MMHG | TEMPERATURE: 99 F

## 2022-12-22 DIAGNOSIS — Z29.89 PROPHYLACTIC IMMUNOTHERAPY: ICD-10-CM

## 2022-12-22 DIAGNOSIS — C24.0 HILAR CHOLANGIOCARCINOMA: Primary | ICD-10-CM

## 2022-12-22 DIAGNOSIS — Z94.4 S/P LIVER TRANSPLANT: ICD-10-CM

## 2022-12-22 PROCEDURE — 1160F RVW MEDS BY RX/DR IN RCRD: CPT | Mod: CPTII,S$GLB,, | Performed by: INTERNAL MEDICINE

## 2022-12-22 PROCEDURE — 4010F ACE/ARB THERAPY RXD/TAKEN: CPT | Mod: CPTII,S$GLB,, | Performed by: INTERNAL MEDICINE

## 2022-12-22 PROCEDURE — 3044F PR MOST RECENT HEMOGLOBIN A1C LEVEL <7.0%: ICD-10-PCS | Mod: CPTII,S$GLB,, | Performed by: INTERNAL MEDICINE

## 2022-12-22 PROCEDURE — 3044F HG A1C LEVEL LT 7.0%: CPT | Mod: CPTII,S$GLB,, | Performed by: INTERNAL MEDICINE

## 2022-12-22 PROCEDURE — 99999 PR PBB SHADOW E&M-EST. PATIENT-LVL V: ICD-10-PCS | Mod: PBBFAC,,, | Performed by: INTERNAL MEDICINE

## 2022-12-22 PROCEDURE — 99999 PR PBB SHADOW E&M-EST. PATIENT-LVL V: CPT | Mod: PBBFAC,,, | Performed by: INTERNAL MEDICINE

## 2022-12-22 PROCEDURE — 3080F PR MOST RECENT DIASTOLIC BLOOD PRESSURE >= 90 MM HG: ICD-10-PCS | Mod: CPTII,S$GLB,, | Performed by: INTERNAL MEDICINE

## 2022-12-22 PROCEDURE — 1159F MED LIST DOCD IN RCRD: CPT | Mod: CPTII,S$GLB,, | Performed by: INTERNAL MEDICINE

## 2022-12-22 PROCEDURE — 4010F PR ACE/ARB THEARPY RXD/TAKEN: ICD-10-PCS | Mod: CPTII,S$GLB,, | Performed by: INTERNAL MEDICINE

## 2022-12-22 PROCEDURE — 1160F PR REVIEW ALL MEDS BY PRESCRIBER/CLIN PHARMACIST DOCUMENTED: ICD-10-PCS | Mod: CPTII,S$GLB,, | Performed by: INTERNAL MEDICINE

## 2022-12-22 PROCEDURE — 3075F SYST BP GE 130 - 139MM HG: CPT | Mod: CPTII,S$GLB,, | Performed by: INTERNAL MEDICINE

## 2022-12-22 PROCEDURE — 99214 OFFICE O/P EST MOD 30 MIN: CPT | Mod: S$GLB,,, | Performed by: INTERNAL MEDICINE

## 2022-12-22 PROCEDURE — 3080F DIAST BP >= 90 MM HG: CPT | Mod: CPTII,S$GLB,, | Performed by: INTERNAL MEDICINE

## 2022-12-22 PROCEDURE — 3008F PR BODY MASS INDEX (BMI) DOCUMENTED: ICD-10-PCS | Mod: CPTII,S$GLB,, | Performed by: INTERNAL MEDICINE

## 2022-12-22 PROCEDURE — 99214 PR OFFICE/OUTPT VISIT, EST, LEVL IV, 30-39 MIN: ICD-10-PCS | Mod: S$GLB,,, | Performed by: INTERNAL MEDICINE

## 2022-12-22 PROCEDURE — 3075F PR MOST RECENT SYSTOLIC BLOOD PRESS GE 130-139MM HG: ICD-10-PCS | Mod: CPTII,S$GLB,, | Performed by: INTERNAL MEDICINE

## 2022-12-22 PROCEDURE — 1159F PR MEDICATION LIST DOCUMENTED IN MEDICAL RECORD: ICD-10-PCS | Mod: CPTII,S$GLB,, | Performed by: INTERNAL MEDICINE

## 2022-12-22 PROCEDURE — 3008F BODY MASS INDEX DOCD: CPT | Mod: CPTII,S$GLB,, | Performed by: INTERNAL MEDICINE

## 2022-12-22 RX ORDER — TACROLIMUS 0.5 MG/1
CAPSULE ORAL
Qty: 60 CAPSULE | Refills: 11 | Status: SHIPPED | OUTPATIENT
Start: 2022-12-22 | End: 2023-01-24 | Stop reason: SDUPTHER

## 2022-12-22 NOTE — PROGRESS NOTES
"                                                         PROGRESS NOTE    Subjective:       Patient ID: OSCAR LARSON is a 42 y.o. male.    Chief Complaint: follow up for hilar cholangiocarcinoma    Diagnosis:  Yp T2N0M0 hilar cholangiocarcinoma, s/p neoadjuvant chemoradiation, chemotherapy and liver transplant on 6/21/2022    Oncologic History:  1. Mr Larson is a 42 yo man who presents today for further management of suspected hilar cholangiocarcinoma. He presented with dark urine, abdominal pain and jaundice since August 2021. He presented to outside hospital ER with elevated bilirubin. MRCP was performed demonstrating bi-lobar intrahepatic bile duct dilation and a ~2cm ill defined mass at the hilum concerning for hilar cholangiocarcinoma. [place] [facility] [place] confirmed severe, malignant appearing stricture involving right and left main hepatic ducts. Biliary stents were placed to relieve obstruction. EUS was performed. It showed an irregular hypoechoic mass where the hepatic duct bifurcates into the right and left hepatic ducts. The mass measured 11.4 mm by 11.3 mm in maximal cross-sectional diameter. FNA sampling of hilar lymph nodes was negative for metastatic disease. Bile duct biopsy showed "rare groups of glandular epithelium with mild atypia, strips of benign glandular epithelium intermixed with blood clot, and focal fibrous tissue with chronic inflammation."  CT C/A/P 10/27/21:  1. Intrahepatic biliary dilatation despite the presence of 2 biliary drains.  The patient is recent comparison MRI a revealed a possible central hepatic mass, concerning for either cholangiocarcinoma or hepatocellular carcinoma.  This is, however, not definitively demonstrated on today's exam.  There are enlarged lymph nodes present in the vicinity of the ana cristina hepatis and celiac axis as detailed above.  2. Scattered pulmonary nodules measuring up to 6 mm.  3. Other findings as detailed " "above.  He presents today for further management. Seen by Dr Jara and considered a candidate for liver transplant. Seen by Dr Cunningham last Friday. Scheduled for PET this Wednesday. 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   Discussed neoadjuvant chemoradiation with 5FU followed by neoadjuvant cis/gem prior to liver transplant.   2. Hospitalized 11/3/21-21 for fever 2/2 acute cholangitis. Repeat ERCP biopsy on 21 again non-diagnostic. Biopsy of the celiac lymph node was negative.   3. PET scan 11/3/21: "1. Wedge-shaped geographic hypermetabolic activity within the right lobe of the liver in a similar distribution to the intrahepatic biliary dilatation.  This could relate to inflammation from multiple etiologies including biliary stasis, cholangitis, and obstructive dilatation.  Neoplastic involvement would be difficult to exclude.  The liver is poorly evaluated given background activity. 2. Hypermetabolic lymphadenopathy is noted in a periportal and celiac distribution.  Infectious inflammatory and neoplastic etiologies are on the differential.  Index nodes have been measured. 3. Multiple pulmonary nodules are noted too small to categorize by PET-CT fusion as evidence seen on a prior CT of 10/27/2021.  CT for follow-up of size stability is necessary."  4. Completed chemoradiation with 5FU from . Started cis/gem on 22. Completed neoadjuvant chemotherapy  5. Underwent living donor liver transplant on 2022. Pathology showed ypT2N0 well to moderately differentiated cholangiocarcinoma of the perihilar bile ducts, with invasion into the perihilar soft tissue. No LVI or PNI. Surgical margins are negative for carcinoma. Two lymph nodes, negative for carcinoma (0/2).     Interval History:   Mr Larson returns for follow up. He is feeling well.     ECO    ROS:   A ten-point system review is obtained and negative except for what was stated in the Interval History.     Physical Examination: "   Vital signs reviewed.   General: well hydrated, well developed, in no acute distress  HEENT: normocephalic, PERRLA, EOMI, anicteric sclerae, oropharynx clear  Neck: supple, no JVD, thyromegaly, cervical or supraclavicular lymphadenopathy  Lungs: clear breath sounds bilaterally, no wheezing, rales, or rhonchi  Chest: port site clean  Heart: RRR, no M/R/G  Abdomen: soft, no tenderness, non-distended, no hepatosplenomegaly, mass, or hernia. Biliary drain in place. BS present  Extremities: no clubbing, cyanosis, or edema  Skin: no rash, ulcer, or open wounds  Neuro: alert and oriented x 4, no focal neuro deficit  Psych: pleasant and appropriate mood and affect    Objective:     Laboratory Data:  Labs reviewed. CA 19-9 normal    Imaging Data:  CT C/A/P 12/21/2022:  1. Stable appearance of the liver transplant again demonstrating heterogeneous parenchymal enhancement but overall slightly decreased periportal edema.  No ascites.  No new biliary ductal dilatation.  2. Similar focal narrowing of the main portal vein near the ana cristina hepatis.  3. Stable pulmonary nodules within the right lung dating back to at least 01/18/2022.     Assessment and Plan:     1. Hilar cholangiocarcinoma    2. S/P liver transplant    3. Prophylactic immunotherapy      1.  - Mr Larson is a 43 yo man with likely stage I hilar cholangiocarcinoma. Biopsy non-diagnostic but clinical picture most consistent with hilar cholangiocarcinoma. He is a candidate for liver transplant. Completed chemoradiation with 5FU from 11/29/21-1/5/2022. Started cis/gem on 1/19/22. Completed neoadjuvant chemotherapy  - Underwent living donor liver transplant on 6/21/2022. Pathology showed ypT2N0 well to moderately differentiated cholangiocarcinoma of the perihilar bile ducts, with invasion into the perihilar soft tissue. No LVI or PNI. Surgical margins are negative for carcinoma. Two lymph nodes, negative for carcinoma (0/2).   - doing well  - reviewed test results with  patient. CA 19-9 normal. DERRICK on CT scan  - return in 3 months with labs and surveillance scan  - flush port every 3 months    2.3  - f/u with transplant team    Follow-up:     RTC in 3 months  Knows to call in the interval if any problems arise.    Electronically signed by Young Wesley    Route Chart for Scheduling    Med Onc Chart Routing      Follow up with physician 3 months. see me in 3 months with CBC, CMP, CA 19-9, CT chest/abdomen/pelvis, port flush in [place] 1 day prior   Follow up with JOSEPH    Infusion scheduling note    Injection scheduling note flush port every 3 months   Labs CBC, CMP and CA 19-9   Lab interval:  in 3 months   Imaging CT chest abdomen pelvis   in 3 months   Pharmacy appointment    Other referrals        Treatment Plan Information   OP GEMCITABINE CISPLATIN Q3W   Young Wesley MD   Upcoming Treatment Dates - OP GEMCITABINE CISPLATIN Q3W    6/1/2022       Chemotherapy       gemcitabine (GEMZAR) 1,615 mg in sodium chloride 0.9% 250 mL chemo infusion       CISplatin (PLATINOL) 20 mg/m2 = 40 mg in sodium chloride 0.9% 500 mL chemo infusion       Pre-Hydration       sodium chloride 0.9% 1,000 mL with magnesium sulfate 1 g, potassium chloride 20 mEq infusion       Post-Hydration       sodium chloride 0.9% bolus 500 mL       Antiemetics       palonosetron injection 0.25 mg       dexamethasone injection 8 mg       aprepitant (CINVANTI) injection 130 mg  6/8/2022       Chemotherapy       gemcitabine (GEMZAR) 1,615 mg in sodium chloride 0.9% 250 mL chemo infusion       CISplatin (PLATINOL) 20 mg/m2 = 40 mg in sodium chloride 0.9% 500 mL chemo infusion       Pre-Hydration       sodium chloride 0.9% 1,000 mL with magnesium sulfate 1 g, potassium chloride 20 mEq infusion       Post-Hydration       sodium chloride 0.9% bolus 500 mL       Antiemetics       palonosetron injection 0.25 mg       dexamethasone injection 8 mg       aprepitant (CINVANTI) injection 130 mg  6/9/2022       Growth Factor        pegfilgrastim-cbqv injection 6 mg  6/22/2022       Chemotherapy       gemcitabine (GEMZAR) 1,615 mg in sodium chloride 0.9% 250 mL chemo infusion       CISplatin (PLATINOL) 20 mg/m2 = 40 mg in sodium chloride 0.9% 500 mL chemo infusion       Pre-Hydration       sodium chloride 0.9% 1,000 mL with magnesium sulfate 1 g, potassium chloride 20 mEq infusion       Post-Hydration       sodium chloride 0.9% bolus 500 mL       Antiemetics       palonosetron injection 0.25 mg       dexamethasone injection 8 mg       aprepitant (CINVANTI) injection 130 mg    Supportive Plan Information  OP FILGRASTIM 480 MCG   Young Wesley MD   Upcoming Treatment Dates - OP FILGRASTIM 480 MCG    2/9/2022       Medications       filgrastim-sndz (ZARXIO) injection 480 mcg/0.8 mL (Preferred Regimen)  2/10/2022       Medications       filgrastim-sndz (ZARXIO) injection 480 mcg/0.8 mL (Preferred Regimen)  2/11/2022       Medications       filgrastim-sndz (ZARXIO) injection 480 mcg/0.8 mL (Preferred Regimen)  2/12/2022       Medications       filgrastim-sndz (ZARXIO) injection 480 mcg/0.8 mL (Preferred Regimen)    Therapy Plan Information  Flushes  heparin, porcine (PF) 100 unit/mL injection flush 500 Units  500 Units, Intravenous, On the 4th Fri of every 1 month  sodium chloride 0.9% flush 10 mL  10 mL, Intravenous, On the 4th Fri of every 1 month

## 2023-01-03 ENCOUNTER — PATIENT MESSAGE (OUTPATIENT)
Dept: INFECTIOUS DISEASES | Facility: CLINIC | Age: 43
End: 2023-01-03
Payer: COMMERCIAL

## 2023-01-04 ENCOUNTER — IMMUNIZATION (OUTPATIENT)
Dept: PHARMACY | Facility: CLINIC | Age: 43
End: 2023-01-04
Payer: COMMERCIAL

## 2023-01-04 DIAGNOSIS — Z23 NEED FOR VACCINATION: Primary | ICD-10-CM

## 2023-01-09 ENCOUNTER — LAB VISIT (OUTPATIENT)
Dept: LAB | Facility: HOSPITAL | Age: 43
End: 2023-01-09
Attending: INTERNAL MEDICINE
Payer: COMMERCIAL

## 2023-01-09 DIAGNOSIS — Z94.4 LIVER REPLACED BY TRANSPLANT: ICD-10-CM

## 2023-01-09 LAB
ALBUMIN SERPL BCP-MCNC: 3.9 G/DL (ref 3.5–5.2)
ALP SERPL-CCNC: 152 U/L (ref 55–135)
ALT SERPL W/O P-5'-P-CCNC: 18 U/L (ref 10–44)
ANION GAP SERPL CALC-SCNC: 11 MMOL/L (ref 8–16)
AST SERPL-CCNC: 19 U/L (ref 10–40)
BASOPHILS # BLD AUTO: 0.03 K/UL (ref 0–0.2)
BASOPHILS NFR BLD: 0.7 % (ref 0–1.9)
BILIRUB SERPL-MCNC: 1.3 MG/DL (ref 0.1–1)
BUN SERPL-MCNC: 23 MG/DL (ref 6–20)
CALCIUM SERPL-MCNC: 9.5 MG/DL (ref 8.7–10.5)
CHLORIDE SERPL-SCNC: 104 MMOL/L (ref 95–110)
CO2 SERPL-SCNC: 27 MMOL/L (ref 23–29)
CREAT SERPL-MCNC: 1.2 MG/DL (ref 0.5–1.4)
DIFFERENTIAL METHOD: ABNORMAL
EOSINOPHIL # BLD AUTO: 0.1 K/UL (ref 0–0.5)
EOSINOPHIL NFR BLD: 3.1 % (ref 0–8)
ERYTHROCYTE [DISTWIDTH] IN BLOOD BY AUTOMATED COUNT: 13.3 % (ref 11.5–14.5)
EST. GFR  (NO RACE VARIABLE): >60 ML/MIN/1.73 M^2
GLUCOSE SERPL-MCNC: 100 MG/DL (ref 70–110)
HCT VFR BLD AUTO: 41.5 % (ref 40–54)
HGB BLD-MCNC: 12.9 G/DL (ref 14–18)
IMM GRANULOCYTES # BLD AUTO: 0.01 K/UL (ref 0–0.04)
IMM GRANULOCYTES NFR BLD AUTO: 0.2 % (ref 0–0.5)
LYMPHOCYTES # BLD AUTO: 1.2 K/UL (ref 1–4.8)
LYMPHOCYTES NFR BLD: 26 % (ref 18–48)
MCH RBC QN AUTO: 29 PG (ref 27–31)
MCHC RBC AUTO-ENTMCNC: 31.1 G/DL (ref 32–36)
MCV RBC AUTO: 93 FL (ref 82–98)
MONOCYTES # BLD AUTO: 0.5 K/UL (ref 0.3–1)
MONOCYTES NFR BLD: 9.8 % (ref 4–15)
NEUTROPHILS # BLD AUTO: 2.8 K/UL (ref 1.8–7.7)
NEUTROPHILS NFR BLD: 60.2 % (ref 38–73)
NRBC BLD-RTO: 0 /100 WBC
PLATELET # BLD AUTO: 130 K/UL (ref 150–450)
PMV BLD AUTO: 8.9 FL (ref 9.2–12.9)
POTASSIUM SERPL-SCNC: 4.6 MMOL/L (ref 3.5–5.1)
PROT SERPL-MCNC: 6.8 G/DL (ref 6–8.4)
RBC # BLD AUTO: 4.45 M/UL (ref 4.6–6.2)
SODIUM SERPL-SCNC: 142 MMOL/L (ref 136–145)
TACROLIMUS BLD-MCNC: 5 NG/ML (ref 5–15)
WBC # BLD AUTO: 4.57 K/UL (ref 3.9–12.7)

## 2023-01-09 PROCEDURE — 36415 COLL VENOUS BLD VENIPUNCTURE: CPT | Performed by: INTERNAL MEDICINE

## 2023-01-09 PROCEDURE — 80053 COMPREHEN METABOLIC PANEL: CPT | Performed by: INTERNAL MEDICINE

## 2023-01-09 PROCEDURE — 80197 ASSAY OF TACROLIMUS: CPT | Performed by: INTERNAL MEDICINE

## 2023-01-09 PROCEDURE — 85025 COMPLETE CBC W/AUTO DIFF WBC: CPT | Performed by: INTERNAL MEDICINE

## 2023-01-11 ENCOUNTER — PATIENT MESSAGE (OUTPATIENT)
Dept: TRANSPLANT | Facility: CLINIC | Age: 43
End: 2023-01-11
Payer: COMMERCIAL

## 2023-01-11 ENCOUNTER — TELEPHONE (OUTPATIENT)
Dept: TRANSPLANT | Facility: CLINIC | Age: 43
End: 2023-01-11
Payer: COMMERCIAL

## 2023-01-11 DIAGNOSIS — Z94.4 LIVER REPLACED BY TRANSPLANT: Primary | ICD-10-CM

## 2023-01-11 NOTE — TELEPHONE ENCOUNTER
----- Message from Zoran Nobles MD sent at 1/9/2023  2:06 PM CST -----  Results reviewed. No action.

## 2023-01-11 NOTE — TELEPHONE ENCOUNTER
Patient notified and instructed via [facility]:    Your labs have been reviewed by ; no changes made. Repeat labs due 2/6/23. Thanks.

## 2023-01-23 ENCOUNTER — PATIENT MESSAGE (OUTPATIENT)
Dept: TRANSPLANT | Facility: CLINIC | Age: 43
End: 2023-01-23
Payer: COMMERCIAL

## 2023-01-24 DIAGNOSIS — Z94.4 LIVER REPLACED BY TRANSPLANT: ICD-10-CM

## 2023-01-24 RX ORDER — TACROLIMUS 0.5 MG/1
CAPSULE ORAL
Qty: 60 CAPSULE | Refills: 11 | Status: SHIPPED | OUTPATIENT
Start: 2023-01-24 | End: 2023-03-22 | Stop reason: SDUPTHER

## 2023-01-24 RX ORDER — URSODIOL 250 MG/1
250 TABLET, FILM COATED ORAL 2 TIMES DAILY
Qty: 60 TABLET | Refills: 11 | Status: SHIPPED | OUTPATIENT
Start: 2023-01-24 | End: 2023-03-22 | Stop reason: SDUPTHER

## 2023-02-06 ENCOUNTER — LAB VISIT (OUTPATIENT)
Dept: LAB | Facility: HOSPITAL | Age: 43
End: 2023-02-06
Attending: INTERNAL MEDICINE
Payer: COMMERCIAL

## 2023-02-06 DIAGNOSIS — Z94.4 LIVER REPLACED BY TRANSPLANT: ICD-10-CM

## 2023-02-06 LAB
ALBUMIN SERPL BCP-MCNC: 4 G/DL (ref 3.5–5.2)
ALP SERPL-CCNC: 165 U/L (ref 55–135)
ALT SERPL W/O P-5'-P-CCNC: 29 U/L (ref 10–44)
ANION GAP SERPL CALC-SCNC: 11 MMOL/L (ref 8–16)
AST SERPL-CCNC: 23 U/L (ref 10–40)
BASOPHILS # BLD AUTO: 0.02 K/UL (ref 0–0.2)
BASOPHILS NFR BLD: 0.4 % (ref 0–1.9)
BILIRUB SERPL-MCNC: 1.1 MG/DL (ref 0.1–1)
BUN SERPL-MCNC: 15 MG/DL (ref 6–20)
CALCIUM SERPL-MCNC: 9.6 MG/DL (ref 8.7–10.5)
CHLORIDE SERPL-SCNC: 104 MMOL/L (ref 95–110)
CO2 SERPL-SCNC: 23 MMOL/L (ref 23–29)
CREAT SERPL-MCNC: 1.1 MG/DL (ref 0.5–1.4)
DIFFERENTIAL METHOD: ABNORMAL
EOSINOPHIL # BLD AUTO: 0.1 K/UL (ref 0–0.5)
EOSINOPHIL NFR BLD: 2.3 % (ref 0–8)
ERYTHROCYTE [DISTWIDTH] IN BLOOD BY AUTOMATED COUNT: 13.9 % (ref 11.5–14.5)
EST. GFR  (NO RACE VARIABLE): >60 ML/MIN/1.73 M^2
GLUCOSE SERPL-MCNC: 102 MG/DL (ref 70–110)
HCT VFR BLD AUTO: 42 % (ref 40–54)
HGB BLD-MCNC: 13.4 G/DL (ref 14–18)
IMM GRANULOCYTES # BLD AUTO: 0.02 K/UL (ref 0–0.04)
IMM GRANULOCYTES NFR BLD AUTO: 0.4 % (ref 0–0.5)
LYMPHOCYTES # BLD AUTO: 1.2 K/UL (ref 1–4.8)
LYMPHOCYTES NFR BLD: 25.6 % (ref 18–48)
MCH RBC QN AUTO: 29.1 PG (ref 27–31)
MCHC RBC AUTO-ENTMCNC: 31.9 G/DL (ref 32–36)
MCV RBC AUTO: 91 FL (ref 82–98)
MONOCYTES # BLD AUTO: 0.5 K/UL (ref 0.3–1)
MONOCYTES NFR BLD: 10.7 % (ref 4–15)
NEUTROPHILS # BLD AUTO: 2.9 K/UL (ref 1.8–7.7)
NEUTROPHILS NFR BLD: 60.6 % (ref 38–73)
NRBC BLD-RTO: 0 /100 WBC
PLATELET # BLD AUTO: 129 K/UL (ref 150–450)
PMV BLD AUTO: 8.9 FL (ref 9.2–12.9)
POTASSIUM SERPL-SCNC: 4.4 MMOL/L (ref 3.5–5.1)
PROT SERPL-MCNC: 6.8 G/DL (ref 6–8.4)
RBC # BLD AUTO: 4.61 M/UL (ref 4.6–6.2)
SODIUM SERPL-SCNC: 138 MMOL/L (ref 136–145)
TACROLIMUS BLD-MCNC: 4 NG/ML (ref 5–15)
WBC # BLD AUTO: 4.76 K/UL (ref 3.9–12.7)

## 2023-02-06 PROCEDURE — 80053 COMPREHEN METABOLIC PANEL: CPT | Performed by: INTERNAL MEDICINE

## 2023-02-06 PROCEDURE — 36415 COLL VENOUS BLD VENIPUNCTURE: CPT | Performed by: INTERNAL MEDICINE

## 2023-02-06 PROCEDURE — 80197 ASSAY OF TACROLIMUS: CPT | Performed by: INTERNAL MEDICINE

## 2023-02-06 PROCEDURE — 85025 COMPLETE CBC W/AUTO DIFF WBC: CPT | Performed by: INTERNAL MEDICINE

## 2023-02-07 ENCOUNTER — PATIENT MESSAGE (OUTPATIENT)
Dept: TRANSPLANT | Facility: CLINIC | Age: 43
End: 2023-02-07
Payer: COMMERCIAL

## 2023-02-08 ENCOUNTER — TELEPHONE (OUTPATIENT)
Dept: TRANSPLANT | Facility: CLINIC | Age: 43
End: 2023-02-08
Payer: COMMERCIAL

## 2023-02-08 ENCOUNTER — PATIENT MESSAGE (OUTPATIENT)
Dept: TRANSPLANT | Facility: CLINIC | Age: 43
End: 2023-02-08
Payer: COMMERCIAL

## 2023-02-08 DIAGNOSIS — Z94.4 LIVER REPLACED BY TRANSPLANT: ICD-10-CM

## 2023-02-08 DIAGNOSIS — C22.1 CHOLANGIOCARCINOMA: Primary | ICD-10-CM

## 2023-02-08 NOTE — TELEPHONE ENCOUNTER
----- Message from Zoarn Nobles MD sent at 2/8/2023 10:35 AM CST -----  Results reviewed. No action.

## 2023-02-08 NOTE — TELEPHONE ENCOUNTER
Patient notified and instructed via [facility]:    Labs reviewed by ; no changes made. Repeat labs due 3/6/23 . Thanks.

## 2023-02-19 ENCOUNTER — PATIENT MESSAGE (OUTPATIENT)
Dept: TRANSPLANT | Facility: CLINIC | Age: 43
End: 2023-02-19
Payer: COMMERCIAL

## 2023-03-06 ENCOUNTER — PATIENT MESSAGE (OUTPATIENT)
Dept: TRANSPLANT | Facility: CLINIC | Age: 43
End: 2023-03-06
Payer: COMMERCIAL

## 2023-03-06 ENCOUNTER — LAB VISIT (OUTPATIENT)
Dept: LAB | Facility: HOSPITAL | Age: 43
End: 2023-03-06
Attending: INTERNAL MEDICINE
Payer: COMMERCIAL

## 2023-03-06 DIAGNOSIS — C22.1 CHOLANGIOCARCINOMA: ICD-10-CM

## 2023-03-06 DIAGNOSIS — Z94.4 LIVER REPLACED BY TRANSPLANT: ICD-10-CM

## 2023-03-06 LAB
ALBUMIN SERPL BCP-MCNC: 4.1 G/DL (ref 3.5–5.2)
ALP SERPL-CCNC: 177 U/L (ref 55–135)
ALT SERPL W/O P-5'-P-CCNC: 36 U/L (ref 10–44)
ANION GAP SERPL CALC-SCNC: 7 MMOL/L (ref 8–16)
AST SERPL-CCNC: 32 U/L (ref 10–40)
BASOPHILS # BLD AUTO: 0.02 K/UL (ref 0–0.2)
BASOPHILS NFR BLD: 0.4 % (ref 0–1.9)
BILIRUB SERPL-MCNC: 1.6 MG/DL (ref 0.1–1)
BUN SERPL-MCNC: 20 MG/DL (ref 6–20)
CALCIUM SERPL-MCNC: 9.5 MG/DL (ref 8.7–10.5)
CANCER AG19-9 SERPL-ACNC: 3.2 U/ML (ref 0–40)
CEA SERPL-MCNC: 1.8 NG/ML (ref 0–5)
CHLORIDE SERPL-SCNC: 105 MMOL/L (ref 95–110)
CO2 SERPL-SCNC: 26 MMOL/L (ref 23–29)
CREAT SERPL-MCNC: 1.1 MG/DL (ref 0.5–1.4)
DIFFERENTIAL METHOD: ABNORMAL
EOSINOPHIL # BLD AUTO: 0.1 K/UL (ref 0–0.5)
EOSINOPHIL NFR BLD: 2.6 % (ref 0–8)
ERYTHROCYTE [DISTWIDTH] IN BLOOD BY AUTOMATED COUNT: 13.9 % (ref 11.5–14.5)
EST. GFR  (NO RACE VARIABLE): >60 ML/MIN/1.73 M^2
GLUCOSE SERPL-MCNC: 96 MG/DL (ref 70–110)
HCT VFR BLD AUTO: 43.1 % (ref 40–54)
HGB BLD-MCNC: 14.2 G/DL (ref 14–18)
IMM GRANULOCYTES # BLD AUTO: 0.01 K/UL (ref 0–0.04)
IMM GRANULOCYTES NFR BLD AUTO: 0.2 % (ref 0–0.5)
LYMPHOCYTES # BLD AUTO: 1.3 K/UL (ref 1–4.8)
LYMPHOCYTES NFR BLD: 26.9 % (ref 18–48)
MCH RBC QN AUTO: 29.6 PG (ref 27–31)
MCHC RBC AUTO-ENTMCNC: 32.9 G/DL (ref 32–36)
MCV RBC AUTO: 90 FL (ref 82–98)
MONOCYTES # BLD AUTO: 0.5 K/UL (ref 0.3–1)
MONOCYTES NFR BLD: 10.3 % (ref 4–15)
NEUTROPHILS # BLD AUTO: 2.8 K/UL (ref 1.8–7.7)
NEUTROPHILS NFR BLD: 59.6 % (ref 38–73)
NRBC BLD-RTO: 0 /100 WBC
PLATELET # BLD AUTO: 132 K/UL (ref 150–450)
PMV BLD AUTO: 8.9 FL (ref 9.2–12.9)
POTASSIUM SERPL-SCNC: 4.5 MMOL/L (ref 3.5–5.1)
PROT SERPL-MCNC: 6.8 G/DL (ref 6–8.4)
RBC # BLD AUTO: 4.8 M/UL (ref 4.6–6.2)
SODIUM SERPL-SCNC: 138 MMOL/L (ref 136–145)
TACROLIMUS BLD-MCNC: 5.7 NG/ML (ref 5–15)
WBC # BLD AUTO: 4.65 K/UL (ref 3.9–12.7)

## 2023-03-06 PROCEDURE — 36415 COLL VENOUS BLD VENIPUNCTURE: CPT | Performed by: INTERNAL MEDICINE

## 2023-03-06 PROCEDURE — 80053 COMPREHEN METABOLIC PANEL: CPT | Performed by: INTERNAL MEDICINE

## 2023-03-06 PROCEDURE — 82378 CARCINOEMBRYONIC ANTIGEN: CPT | Performed by: INTERNAL MEDICINE

## 2023-03-06 PROCEDURE — 86301 IMMUNOASSAY TUMOR CA 19-9: CPT | Performed by: INTERNAL MEDICINE

## 2023-03-06 PROCEDURE — 80197 ASSAY OF TACROLIMUS: CPT | Performed by: INTERNAL MEDICINE

## 2023-03-06 PROCEDURE — 85025 COMPLETE CBC W/AUTO DIFF WBC: CPT | Performed by: INTERNAL MEDICINE

## 2023-03-10 ENCOUNTER — PATIENT MESSAGE (OUTPATIENT)
Dept: TRANSPLANT | Facility: CLINIC | Age: 43
End: 2023-03-10
Payer: COMMERCIAL

## 2023-03-10 ENCOUNTER — TELEPHONE (OUTPATIENT)
Dept: TRANSPLANT | Facility: CLINIC | Age: 43
End: 2023-03-10
Payer: COMMERCIAL

## 2023-03-10 DIAGNOSIS — Z94.4 LIVER REPLACED BY TRANSPLANT: Primary | ICD-10-CM

## 2023-03-10 NOTE — TELEPHONE ENCOUNTER
Patient notified and instructed via [facility]:    Your labs have been reviewed by . No changes made. Repeat labs due 4/3/23. Thanks.

## 2023-03-10 NOTE — TELEPHONE ENCOUNTER
----- Message from Zoran Nobles MD sent at 3/7/2023  7:43 AM CST -----  Results reviewed. No action.

## 2023-03-20 ENCOUNTER — HOSPITAL ENCOUNTER (OUTPATIENT)
Dept: RADIOLOGY | Facility: HOSPITAL | Age: 43
Discharge: HOME | End: 2023-03-20
Attending: INTERNAL MEDICINE
Payer: COMMERCIAL

## 2023-03-20 ENCOUNTER — INFUSION (OUTPATIENT)
Dept: INFUSION THERAPY | Facility: HOSPITAL | Age: 43
End: 2023-03-20
Attending: PHYSICIAN ASSISTANT
Payer: COMMERCIAL

## 2023-03-20 ENCOUNTER — TELEPHONE (OUTPATIENT)
Dept: TRANSPLANT | Facility: CLINIC | Age: 43
End: 2023-03-20
Payer: COMMERCIAL

## 2023-03-20 DIAGNOSIS — C24.0 HILAR CHOLANGIOCARCINOMA: ICD-10-CM

## 2023-03-20 DIAGNOSIS — Z45.2 ADJUSTMENT AND MANAGEMENT OF VASCULAR ACCESS DEVICE: Primary | ICD-10-CM

## 2023-03-20 PROCEDURE — 71260 CT THORAX DX C+: CPT | Mod: 26,,, | Performed by: RADIOLOGY

## 2023-03-20 PROCEDURE — A9698 NON-RAD CONTRAST MATERIALNOC: HCPCS | Mod: PO | Performed by: INTERNAL MEDICINE

## 2023-03-20 PROCEDURE — 71260 CT THORAX DX C+: CPT | Mod: TC,PO

## 2023-03-20 PROCEDURE — 25500020 PHARM REV CODE 255: Mod: PO | Performed by: INTERNAL MEDICINE

## 2023-03-20 PROCEDURE — 96523 IRRIG DRUG DELIVERY DEVICE: CPT | Mod: PN

## 2023-03-20 PROCEDURE — 74177 CT ABD & PELVIS W/CONTRAST: CPT | Mod: 26,,, | Performed by: RADIOLOGY

## 2023-03-20 PROCEDURE — A4216 STERILE WATER/SALINE, 10 ML: HCPCS | Mod: PN | Performed by: PHYSICIAN ASSISTANT

## 2023-03-20 PROCEDURE — 74177 CT ABD & PELVIS W/CONTRAST: CPT | Mod: TC,PO

## 2023-03-20 PROCEDURE — 71260 CT CHEST ABDOMEN PELVIS WITH CONTRAST (XPD): ICD-10-PCS | Mod: 26,,, | Performed by: RADIOLOGY

## 2023-03-20 PROCEDURE — 63600175 PHARM REV CODE 636 W HCPCS: Mod: PN | Performed by: PHYSICIAN ASSISTANT

## 2023-03-20 PROCEDURE — 25000003 PHARM REV CODE 250: Mod: PN | Performed by: PHYSICIAN ASSISTANT

## 2023-03-20 PROCEDURE — 74177 CT CHEST ABDOMEN PELVIS WITH CONTRAST (XPD): ICD-10-PCS | Mod: 26,,, | Performed by: RADIOLOGY

## 2023-03-20 RX ORDER — HEPARIN 100 UNIT/ML
500 SYRINGE INTRAVENOUS
Status: CANCELLED | OUTPATIENT
Start: 2023-03-24

## 2023-03-20 RX ORDER — SODIUM CHLORIDE 0.9 % (FLUSH) 0.9 %
10 SYRINGE (ML) INJECTION
Status: DISCONTINUED | OUTPATIENT
Start: 2023-03-20 | End: 2023-03-20 | Stop reason: HOSPADM

## 2023-03-20 RX ORDER — SODIUM CHLORIDE 0.9 % (FLUSH) 0.9 %
10 SYRINGE (ML) INJECTION
Status: CANCELLED | OUTPATIENT
Start: 2023-03-24

## 2023-03-20 RX ORDER — HEPARIN 100 UNIT/ML
500 SYRINGE INTRAVENOUS
Status: DISCONTINUED | OUTPATIENT
Start: 2023-03-20 | End: 2023-03-20 | Stop reason: HOSPADM

## 2023-03-20 RX ADMIN — BARIUM SULFATE 900 ML: 20 SUSPENSION ORAL at 09:03

## 2023-03-20 RX ADMIN — Medication 500 UNITS: at 08:03

## 2023-03-20 RX ADMIN — IOHEXOL 75 ML: 350 INJECTION, SOLUTION INTRAVENOUS at 09:03

## 2023-03-20 RX ADMIN — Medication 10 ML: at 08:03

## 2023-03-23 ENCOUNTER — PATIENT MESSAGE (OUTPATIENT)
Dept: TRANSPLANT | Facility: CLINIC | Age: 43
End: 2023-03-23
Payer: COMMERCIAL

## 2023-03-23 ENCOUNTER — HOSPITAL ENCOUNTER (OUTPATIENT)
Dept: RADIOLOGY | Facility: HOSPITAL | Age: 43
Discharge: HOME | End: 2023-03-23
Attending: INTERNAL MEDICINE
Payer: COMMERCIAL

## 2023-03-23 DIAGNOSIS — Z94.4 LIVER REPLACED BY TRANSPLANT: ICD-10-CM

## 2023-03-23 PROCEDURE — 93976 VASCULAR STUDY: CPT | Mod: TC

## 2023-03-23 PROCEDURE — 76705 US DOPPLER LIVER TRANSPLANT POST (XPD): ICD-10-PCS | Mod: 26,XS,, | Performed by: RADIOLOGY

## 2023-03-23 PROCEDURE — 93976 VASCULAR STUDY: CPT | Mod: 26,,, | Performed by: RADIOLOGY

## 2023-03-23 PROCEDURE — 76705 ECHO EXAM OF ABDOMEN: CPT | Mod: 26,XS,, | Performed by: RADIOLOGY

## 2023-03-23 PROCEDURE — 93976 US DOPPLER LIVER TRANSPLANT POST (XPD): ICD-10-PCS | Mod: 26,,, | Performed by: RADIOLOGY

## 2023-03-24 ENCOUNTER — PATIENT MESSAGE (OUTPATIENT)
Dept: TRANSPLANT | Facility: CLINIC | Age: 43
End: 2023-03-24
Payer: COMMERCIAL

## 2023-03-24 ENCOUNTER — TELEPHONE (OUTPATIENT)
Dept: TRANSPLANT | Facility: CLINIC | Age: 43
End: 2023-03-24
Payer: COMMERCIAL

## 2023-03-24 NOTE — TELEPHONE ENCOUNTER
Patient notified and instructed via [facility]:    Your ultrasound was reviewed by , results ok . Thanks.

## 2023-03-24 NOTE — TELEPHONE ENCOUNTER
----- Message from Zoran Nobles MD sent at 3/24/2023  9:02 AM CDT -----  Results reviewed. No action.

## 2023-03-27 ENCOUNTER — OFFICE VISIT (OUTPATIENT)
Dept: HEMATOLOGY/ONCOLOGY | Facility: CLINIC | Age: 43
End: 2023-03-27
Payer: COMMERCIAL

## 2023-03-27 VITALS
HEIGHT: 68 IN | DIASTOLIC BLOOD PRESSURE: 101 MMHG | HEART RATE: 70 BPM | SYSTOLIC BLOOD PRESSURE: 146 MMHG | OXYGEN SATURATION: 100 % | WEIGHT: 186.06 LBS | BODY MASS INDEX: 28.2 KG/M2 | RESPIRATION RATE: 18 BRPM | TEMPERATURE: 98 F

## 2023-03-27 DIAGNOSIS — Z94.4 LIVER REPLACED BY TRANSPLANT: ICD-10-CM

## 2023-03-27 DIAGNOSIS — I10 ESSENTIAL HYPERTENSION: ICD-10-CM

## 2023-03-27 DIAGNOSIS — C24.0 HILAR CHOLANGIOCARCINOMA: Primary | ICD-10-CM

## 2023-03-27 PROCEDURE — 1160F PR REVIEW ALL MEDS BY PRESCRIBER/CLIN PHARMACIST DOCUMENTED: ICD-10-PCS | Mod: CPTII,S$GLB,, | Performed by: INTERNAL MEDICINE

## 2023-03-27 PROCEDURE — 3077F PR MOST RECENT SYSTOLIC BLOOD PRESSURE >= 140 MM HG: ICD-10-PCS | Mod: CPTII,S$GLB,, | Performed by: INTERNAL MEDICINE

## 2023-03-27 PROCEDURE — 3080F DIAST BP >= 90 MM HG: CPT | Mod: CPTII,S$GLB,, | Performed by: INTERNAL MEDICINE

## 2023-03-27 PROCEDURE — 99999 PR PBB SHADOW E&M-EST. PATIENT-LVL IV: ICD-10-PCS | Mod: PBBFAC,,, | Performed by: INTERNAL MEDICINE

## 2023-03-27 PROCEDURE — 99214 OFFICE O/P EST MOD 30 MIN: CPT | Mod: S$GLB,,, | Performed by: INTERNAL MEDICINE

## 2023-03-27 PROCEDURE — 99999 PR PBB SHADOW E&M-EST. PATIENT-LVL IV: CPT | Mod: PBBFAC,,, | Performed by: INTERNAL MEDICINE

## 2023-03-27 PROCEDURE — 1159F PR MEDICATION LIST DOCUMENTED IN MEDICAL RECORD: ICD-10-PCS | Mod: CPTII,S$GLB,, | Performed by: INTERNAL MEDICINE

## 2023-03-27 PROCEDURE — 3080F PR MOST RECENT DIASTOLIC BLOOD PRESSURE >= 90 MM HG: ICD-10-PCS | Mod: CPTII,S$GLB,, | Performed by: INTERNAL MEDICINE

## 2023-03-27 PROCEDURE — 3008F BODY MASS INDEX DOCD: CPT | Mod: CPTII,S$GLB,, | Performed by: INTERNAL MEDICINE

## 2023-03-27 PROCEDURE — 1160F RVW MEDS BY RX/DR IN RCRD: CPT | Mod: CPTII,S$GLB,, | Performed by: INTERNAL MEDICINE

## 2023-03-27 PROCEDURE — 1159F MED LIST DOCD IN RCRD: CPT | Mod: CPTII,S$GLB,, | Performed by: INTERNAL MEDICINE

## 2023-03-27 PROCEDURE — 3077F SYST BP >= 140 MM HG: CPT | Mod: CPTII,S$GLB,, | Performed by: INTERNAL MEDICINE

## 2023-03-27 PROCEDURE — 3008F PR BODY MASS INDEX (BMI) DOCUMENTED: ICD-10-PCS | Mod: CPTII,S$GLB,, | Performed by: INTERNAL MEDICINE

## 2023-03-27 PROCEDURE — 99214 PR OFFICE/OUTPT VISIT, EST, LEVL IV, 30-39 MIN: ICD-10-PCS | Mod: S$GLB,,, | Performed by: INTERNAL MEDICINE

## 2023-03-27 RX ORDER — CARVEDILOL 6.25 MG/1
6.25 TABLET ORAL 2 TIMES DAILY WITH MEALS
Qty: 60 TABLET | Refills: 11 | Status: SHIPPED | OUTPATIENT
Start: 2023-03-27 | End: 2023-04-24 | Stop reason: SDUPTHER

## 2023-03-27 NOTE — PROGRESS NOTES
"                                                         PROGRESS NOTE    Subjective:       Patient ID: OSCAR LARSON is a 42 y.o. male.    Chief Complaint: follow up for hilar cholangiocarcinoma    Diagnosis:  Yp T2N0M0 hilar cholangiocarcinoma, s/p neoadjuvant chemoradiation, chemotherapy and liver transplant on 6/21/2022    Oncologic History:  1. Mr Larson is a 40 yo man who presents today for further management of suspected hilar cholangiocarcinoma. He presented with dark urine, abdominal pain and jaundice since August 2021. He presented to outside hospital ER with elevated bilirubin. MRCP was performed demonstrating bi-lobar intrahepatic bile duct dilation and a ~2cm ill defined mass at the hilum concerning for hilar cholangiocarcinoma. He was referred to the advanced endoscopy group [place] [facility] [place]. ERCP confirmed severe, malignant appearing stricture involving right and left main hepatic ducts. Biliary stents were placed to relieve obstruction. EUS was performed. It showed an irregular hypoechoic mass where the hepatic duct bifurcates into the right and left hepatic ducts. The mass measured 11.4 mm by 11.3 mm in maximal cross-sectional diameter. FNA sampling of hilar lymph nodes was negative for metastatic disease. Bile duct biopsy showed "rare groups of glandular epithelium with mild atypia, strips of benign glandular epithelium intermixed with blood clot, and focal fibrous tissue with chronic inflammation."  CT C/A/P 10/27/21:  1. Intrahepatic biliary dilatation despite the presence of 2 biliary drains.  The patient is recent comparison MRI a revealed a possible central hepatic mass, concerning for either cholangiocarcinoma or hepatocellular carcinoma.  This is, however, not definitively demonstrated on today's exam.  There are enlarged lymph nodes present in the vicinity of the ana cristina hepatis and celiac axis as detailed above.  2. Scattered pulmonary nodules measuring up to 6 mm.  3. Other findings as detailed " "above.  He presents today for further management. Seen by Dr Jara and considered a candidate for liver transplant. Seen by Dr Cunningham last Friday. Scheduled for PET this Wednesday. 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   Discussed neoadjuvant chemoradiation with 5FU followed by neoadjuvant cis/gem prior to liver transplant.   2. Hospitalized 11/3/21-21 for fever 2/2 acute cholangitis. Repeat ERCP biopsy on 21 again non-diagnostic. Biopsy of the celiac lymph node was negative.   3. PET scan 11/3/21: "1. Wedge-shaped geographic hypermetabolic activity within the right lobe of the liver in a similar distribution to the intrahepatic biliary dilatation.  This could relate to inflammation from multiple etiologies including biliary stasis, cholangitis, and obstructive dilatation.  Neoplastic involvement would be difficult to exclude.  The liver is poorly evaluated given background activity. 2. Hypermetabolic lymphadenopathy is noted in a periportal and celiac distribution.  Infectious inflammatory and neoplastic etiologies are on the differential.  Index nodes have been measured. 3. Multiple pulmonary nodules are noted too small to categorize by PET-CT fusion as evidence seen on a prior CT of 10/27/2021.  CT for follow-up of size stability is necessary."  4. Completed chemoradiation with 5FU from . Started cis/gem on 22. Completed neoadjuvant chemotherapy  5. Underwent living donor liver transplant on 2022. Pathology showed ypT2N0 well to moderately differentiated cholangiocarcinoma of the perihilar bile ducts, with invasion into the perihilar soft tissue. No LVI or PNI. Surgical margins are negative for carcinoma. Two lymph nodes, negative for carcinoma (0/2).     Interval History:   Mr Larson returns for follow up. He is feeling well. Did not check BP at home. Taking 3.125 mg of coreg twice a day.     ECO    ROS:   A ten-point system review is obtained and negative except for what " was stated in the Interval History.     Physical Examination:   Vital signs reviewed.   General: well hydrated, well developed, in no acute distress  HEENT: normocephalic, PERRLA, EOMI, anicteric sclerae, oropharynx clear  Neck: supple, no JVD, thyromegaly, cervical or supraclavicular lymphadenopathy  Lungs: clear breath sounds bilaterally, no wheezing, rales, or rhonchi  Chest: port site clean  Heart: RRR, no M/R/G  Abdomen: soft, no tenderness, non-distended, no hepatosplenomegaly, mass, or hernia. Biliary drain in place. BS present  Extremities: no clubbing, cyanosis, or edema  Skin: no rash, ulcer, or open wounds  Neuro: alert and oriented x 4, no focal neuro deficit  Psych: pleasant and appropriate mood and affect    Objective:     Laboratory Data:  Labs reviewed. CA 19-9 normal at 8.8    Imaging Data:  CT C/A/P 3/20/23:  Within the chest; few small less than 6 mm pulmonary nodules remaining not significantly changed.     Abdomen and pelvis; postoperative changes of liver transplant, pneumobilia which can be attributed to a postoperative/post procedure basis.  Focal narrowing of the main portal vein near the ana cristina hepatis appearing similar to less prominent today than on prior exam.  The heterogeneous enhancement of the liver is in a similar distribution but slightly less prominent today than on the prior exam and no new lesions are seen suggesting metastatic disease.  No acute findings.     Assessment and Plan:     1. Hilar cholangiocarcinoma    2. Essential hypertension    3. Liver replaced by transplant      1.  - Mr Larson is a 43 yo man with stage I hilar cholangiocarcinoma. Biopsy non-diagnostic but clinical picture most consistent with hilar cholangiocarcinoma. He is a candidate for liver transplant. Completed chemoradiation with 5FU from 11/29/21-1/5/2022. Started cis/gem on 1/19/22. Completed neoadjuvant chemotherapy  - Underwent living donor liver transplant on 6/21/2022. Pathology showed ypT2N0 well to  moderately differentiated cholangiocarcinoma of the perihilar bile ducts, with invasion into the perihilar soft tissue. No LVI or PNI. Surgical margins are negative for carcinoma. Two lymph nodes, negative for carcinoma (0/2).   - doing well  - reviewed test results with patient. CA 19-9 normal. DERRICK on CT scan  - return in 3 months with labs and surveillance scan. Will draw port and get it accessed for CT in [place]    2.  - long discussion. /101 today  - increase coreg from 3.125 to 6.25 mg bid. Prescription sent  - refer to internal medicine  - asked patient to start checking BP at home daily    3.  - f/u with transplant team    Follow-up:     RTC in 3 months  Knows to call in the interval if any problems arise.    Electronically signed by Young Wesley    Route Chart for Scheduling    Med Onc Chart Routing  Urgent    Follow up with physician 3 months. urgent referral to internal medicine. please schedule. also see oncology primary care visit until patient can see internal medicine. see me in 3 months with labs (CBC, CMP, CA 19-9) drawn from port in [place], leave port accessed and get CT C/A/P in [place], then see me 1-2 days later   Follow up with JOSEPH    Infusion scheduling note    Injection scheduling note flush port in [place] every 3 months   Labs CBC, CMP and CA 19-9   Scheduling:  Preferred lab:  Lab interval:  in [place], nurse to draw from port   Imaging CT chest abdomen pelvis   in [place]   Pharmacy appointment    Other referrals Refer to Oncology Primary Care - see JOSEPH ASAP for blood pressure control Additional referrals needed         Treatment Plan Information   OP GEMCITABINE CISPLATIN Q3W   Young Wesley MD   Upcoming Treatment Dates - OP GEMCITABINE CISPLATIN Q3W    6/1/2022       Chemotherapy       gemcitabine (GEMZAR) 1,615 mg in sodium chloride 0.9% 250 mL chemo infusion       CISplatin (PLATINOL) 20 mg/m2 = 40 mg in sodium chloride 0.9% 500 mL chemo infusion        Pre-Hydration       sodium chloride 0.9% 1,000 mL with magnesium sulfate 1 g, potassium chloride 20 mEq infusion       Post-Hydration       sodium chloride 0.9% bolus 500 mL       Antiemetics       palonosetron injection 0.25 mg       dexamethasone injection 8 mg       aprepitant (CINVANTI) injection 130 mg  6/8/2022       Chemotherapy       gemcitabine (GEMZAR) 1,615 mg in sodium chloride 0.9% 250 mL chemo infusion       CISplatin (PLATINOL) 20 mg/m2 = 40 mg in sodium chloride 0.9% 500 mL chemo infusion       Pre-Hydration       sodium chloride 0.9% 1,000 mL with magnesium sulfate 1 g, potassium chloride 20 mEq infusion       Post-Hydration       sodium chloride 0.9% bolus 500 mL       Antiemetics       palonosetron injection 0.25 mg       dexamethasone injection 8 mg       aprepitant (CINVANTI) injection 130 mg  6/9/2022       Growth Factor       pegfilgrastim-cbqv injection 6 mg  6/22/2022       Chemotherapy       gemcitabine (GEMZAR) 1,615 mg in sodium chloride 0.9% 250 mL chemo infusion       CISplatin (PLATINOL) 20 mg/m2 = 40 mg in sodium chloride 0.9% 500 mL chemo infusion       Pre-Hydration       sodium chloride 0.9% 1,000 mL with magnesium sulfate 1 g, potassium chloride 20 mEq infusion       Post-Hydration       sodium chloride 0.9% bolus 500 mL       Antiemetics       palonosetron injection 0.25 mg       dexamethasone injection 8 mg       aprepitant (CINVANTI) injection 130 mg    Supportive Plan Information  OP FILGRASTIM 480 MCG   Young Wesley MD   Upcoming Treatment Dates - OP FILGRASTIM 480 MCG    2/9/2022       Medications       filgrastim-sndz (ZARXIO) injection 480 mcg/0.8 mL (Preferred Regimen)  2/10/2022       Medications       filgrastim-sndz (ZARXIO) injection 480 mcg/0.8 mL (Preferred Regimen)  2/11/2022       Medications       filgrastim-sndz (ZARXIO) injection 480 mcg/0.8 mL (Preferred Regimen)  2/12/2022       Medications       filgrastim-sndz (ZARXIO) injection 480 mcg/0.8 mL (Preferred  Regimen)    Therapy Plan Information  Flushes  heparin, porcine (PF) 100 unit/mL injection flush 500 Units  500 Units, Intravenous, On the 4th Fri of every 1 month  sodium chloride 0.9% flush 10 mL  10 mL, Intravenous, On the 4th Fri of every 1 month

## 2023-04-03 ENCOUNTER — LAB VISIT (OUTPATIENT)
Dept: LAB | Facility: HOSPITAL | Age: 43
End: 2023-04-03
Attending: INTERNAL MEDICINE
Payer: COMMERCIAL

## 2023-04-03 DIAGNOSIS — Z94.4 LIVER REPLACED BY TRANSPLANT: ICD-10-CM

## 2023-04-03 LAB
ALBUMIN SERPL BCP-MCNC: 4.3 G/DL (ref 3.5–5.2)
ALP SERPL-CCNC: 152 U/L (ref 55–135)
ALT SERPL W/O P-5'-P-CCNC: 20 U/L (ref 10–44)
ANION GAP SERPL CALC-SCNC: 9 MMOL/L (ref 8–16)
AST SERPL-CCNC: 20 U/L (ref 10–40)
BASOPHILS # BLD AUTO: 0.02 K/UL (ref 0–0.2)
BASOPHILS NFR BLD: 0.4 % (ref 0–1.9)
BILIRUB SERPL-MCNC: 1.8 MG/DL (ref 0.1–1)
BUN SERPL-MCNC: 17 MG/DL (ref 6–20)
CALCIUM SERPL-MCNC: 9.6 MG/DL (ref 8.7–10.5)
CHLORIDE SERPL-SCNC: 106 MMOL/L (ref 95–110)
CO2 SERPL-SCNC: 25 MMOL/L (ref 23–29)
CREAT SERPL-MCNC: 1.2 MG/DL (ref 0.5–1.4)
DIFFERENTIAL METHOD: ABNORMAL
EOSINOPHIL # BLD AUTO: 0.2 K/UL (ref 0–0.5)
EOSINOPHIL NFR BLD: 3.1 % (ref 0–8)
ERYTHROCYTE [DISTWIDTH] IN BLOOD BY AUTOMATED COUNT: 14 % (ref 11.5–14.5)
EST. GFR  (NO RACE VARIABLE): >60 ML/MIN/1.73 M^2
GLUCOSE SERPL-MCNC: 102 MG/DL (ref 70–110)
HCT VFR BLD AUTO: 45.3 % (ref 40–54)
HGB BLD-MCNC: 15 G/DL (ref 14–18)
IMM GRANULOCYTES # BLD AUTO: 0.02 K/UL (ref 0–0.04)
IMM GRANULOCYTES NFR BLD AUTO: 0.4 % (ref 0–0.5)
LYMPHOCYTES # BLD AUTO: 1.2 K/UL (ref 1–4.8)
LYMPHOCYTES NFR BLD: 23.4 % (ref 18–48)
MCH RBC QN AUTO: 30.4 PG (ref 27–31)
MCHC RBC AUTO-ENTMCNC: 33.1 G/DL (ref 32–36)
MCV RBC AUTO: 92 FL (ref 82–98)
MONOCYTES # BLD AUTO: 0.5 K/UL (ref 0.3–1)
MONOCYTES NFR BLD: 9.4 % (ref 4–15)
NEUTROPHILS # BLD AUTO: 3.3 K/UL (ref 1.8–7.7)
NEUTROPHILS NFR BLD: 63.3 % (ref 38–73)
NRBC BLD-RTO: 0 /100 WBC
PLATELET # BLD AUTO: 134 K/UL (ref 150–450)
PMV BLD AUTO: 9.2 FL (ref 9.2–12.9)
POTASSIUM SERPL-SCNC: 4.5 MMOL/L (ref 3.5–5.1)
PROT SERPL-MCNC: 7 G/DL (ref 6–8.4)
RBC # BLD AUTO: 4.94 M/UL (ref 4.6–6.2)
SODIUM SERPL-SCNC: 140 MMOL/L (ref 136–145)
WBC # BLD AUTO: 5.21 K/UL (ref 3.9–12.7)

## 2023-04-03 PROCEDURE — 36415 COLL VENOUS BLD VENIPUNCTURE: CPT | Performed by: INTERNAL MEDICINE

## 2023-04-03 PROCEDURE — 80053 COMPREHEN METABOLIC PANEL: CPT | Performed by: INTERNAL MEDICINE

## 2023-04-03 PROCEDURE — 85025 COMPLETE CBC W/AUTO DIFF WBC: CPT | Performed by: INTERNAL MEDICINE

## 2023-04-03 PROCEDURE — 80197 ASSAY OF TACROLIMUS: CPT | Performed by: INTERNAL MEDICINE

## 2023-04-04 LAB — TACROLIMUS BLD-MCNC: 8.8 NG/ML (ref 5–15)

## 2023-04-05 ENCOUNTER — TELEPHONE (OUTPATIENT)
Dept: TRANSPLANT | Facility: CLINIC | Age: 43
End: 2023-04-05
Payer: COMMERCIAL

## 2023-04-05 ENCOUNTER — PATIENT MESSAGE (OUTPATIENT)
Dept: TRANSPLANT | Facility: CLINIC | Age: 43
End: 2023-04-05
Payer: COMMERCIAL

## 2023-04-05 DIAGNOSIS — Z94.4 LIVER REPLACED BY TRANSPLANT: Primary | ICD-10-CM

## 2023-04-05 NOTE — TELEPHONE ENCOUNTER
Patient notified and instructed via [email or web address]:    Your labs have been reviewed by . no changes made. Repeat labs due 5/1/23. Thanks.

## 2023-04-05 NOTE — TELEPHONE ENCOUNTER
----- Message from Zoran Nobles MD sent at 4/5/2023  9:34 AM CDT -----  Results reviewed. No action.

## 2023-04-24 ENCOUNTER — OFFICE VISIT (OUTPATIENT)
Dept: HEMATOLOGY/ONCOLOGY | Facility: CLINIC | Age: 43
End: 2023-04-24
Payer: COMMERCIAL

## 2023-04-24 DIAGNOSIS — I10 ESSENTIAL HYPERTENSION: ICD-10-CM

## 2023-04-24 DIAGNOSIS — Z94.4 LIVER REPLACED BY TRANSPLANT: ICD-10-CM

## 2023-04-24 DIAGNOSIS — E13.9 SECONDARY DIABETES: ICD-10-CM

## 2023-04-24 DIAGNOSIS — C24.0 HILAR CHOLANGIOCARCINOMA: Primary | ICD-10-CM

## 2023-04-24 DIAGNOSIS — E78.5 HYPERLIPIDEMIA, UNSPECIFIED HYPERLIPIDEMIA TYPE: ICD-10-CM

## 2023-04-24 PROCEDURE — 99999 PR PBB SHADOW E&M-EST. PATIENT-LVL IV: ICD-10-PCS | Mod: PBBFAC,,, | Performed by: NURSE PRACTITIONER

## 2023-04-24 PROCEDURE — 3075F SYST BP GE 130 - 139MM HG: CPT | Mod: CPTII,S$GLB,, | Performed by: NURSE PRACTITIONER

## 2023-04-24 PROCEDURE — 99214 PR OFFICE/OUTPT VISIT, EST, LEVL IV, 30-39 MIN: ICD-10-PCS | Mod: S$GLB,,, | Performed by: NURSE PRACTITIONER

## 2023-04-24 PROCEDURE — 3075F PR MOST RECENT SYSTOLIC BLOOD PRESS GE 130-139MM HG: ICD-10-PCS | Mod: CPTII,S$GLB,, | Performed by: NURSE PRACTITIONER

## 2023-04-24 PROCEDURE — 3080F PR MOST RECENT DIASTOLIC BLOOD PRESSURE >= 90 MM HG: ICD-10-PCS | Mod: CPTII,S$GLB,, | Performed by: NURSE PRACTITIONER

## 2023-04-24 PROCEDURE — 3008F PR BODY MASS INDEX (BMI) DOCUMENTED: ICD-10-PCS | Mod: CPTII,S$GLB,, | Performed by: NURSE PRACTITIONER

## 2023-04-24 PROCEDURE — 99999 PR PBB SHADOW E&M-EST. PATIENT-LVL IV: CPT | Mod: PBBFAC,,, | Performed by: NURSE PRACTITIONER

## 2023-04-24 PROCEDURE — 3008F BODY MASS INDEX DOCD: CPT | Mod: CPTII,S$GLB,, | Performed by: NURSE PRACTITIONER

## 2023-04-24 PROCEDURE — 1159F MED LIST DOCD IN RCRD: CPT | Mod: CPTII,S$GLB,, | Performed by: NURSE PRACTITIONER

## 2023-04-24 PROCEDURE — 3080F DIAST BP >= 90 MM HG: CPT | Mod: CPTII,S$GLB,, | Performed by: NURSE PRACTITIONER

## 2023-04-24 PROCEDURE — 99214 OFFICE O/P EST MOD 30 MIN: CPT | Mod: S$GLB,,, | Performed by: NURSE PRACTITIONER

## 2023-04-24 PROCEDURE — 1159F PR MEDICATION LIST DOCUMENTED IN MEDICAL RECORD: ICD-10-PCS | Mod: CPTII,S$GLB,, | Performed by: NURSE PRACTITIONER

## 2023-04-25 VITALS
OXYGEN SATURATION: 100 % | BODY MASS INDEX: 27.17 KG/M2 | HEART RATE: 64 BPM | RESPIRATION RATE: 20 BRPM | DIASTOLIC BLOOD PRESSURE: 94 MMHG | HEIGHT: 69 IN | SYSTOLIC BLOOD PRESSURE: 130 MMHG | WEIGHT: 183.44 LBS

## 2023-04-25 NOTE — PROGRESS NOTES
Primary Care Oncology Visit    Subjective:   Patient ID: OSCAR LARSON is a 42 y.o. male.    Chief Complaint: No chief complaint on file.    OSCAR LARSON comes in today for a primary care/oncology visit for uncontrolled hypertension.  He is currently being followed by Dr. Wesley for a hx of T2N0M0 hilar cholangiocarcinoma, s/p neoadjuvant chemoradiation, chemotherapy and liver transplant on 6/21/2022.     He does not have a primary care provider currently or has not seen his primary care provider in the last 6 months.    Currently on coreg 6.25 mg bid for blood pressure.  This was increase by Dr. Wesley from 3.125 twice daily about 4 weeks ago.  Per pt, home blood pressures are 125-140/80s.  He has been following a low salt diet, has one cup of coffee in the am and a couple unsweet teas throughout the day, but mostly drinks a great deal of water.  Was on a statin and losartan, but taken off of these when the cholangiocarcinoma was discovered.  Had only been on a statin for a short time prior to this.  He walks a great deal for exercise and stays active.  Was treated as having diabetes while using steroids during chemo and pre-liver transplant.  No longer on metformin.  He is now on active surveillance.  He is generally feeling well today with no new complaints.    Oncologic History:  1. Mr Larson is a 40 yo man who presents today for further management of suspected hilar cholangiocarcinoma. He presented with dark urine, abdominal pain and jaundice since August 2021. He presented to outside hospital ER with elevated bilirubin. MRCP was performed demonstrating bi-lobar intrahepatic bile duct dilation and a ~2cm ill defined mass at the hilum concerning for hilar cholangiocarcinoma. He was referred to the advanced endoscopy group at [facility]. ERCP confirmed severe, malignant appearing stricture involving right and left main hepatic ducts. Biliary stents were placed to relieve obstruction. EUS was performed. It showed an  "irregular hypoechoic mass where the hepatic duct bifurcates into the right and left hepatic ducts. The mass measured 11.4 mm by 11.3 mm in maximal cross-sectional diameter. FNA sampling of hilar lymph nodes was negative for metastatic disease. Bile duct biopsy showed "rare groups of glandular epithelium with mild atypia, strips of benign glandular epithelium intermixed with blood clot, and focal fibrous tissue with chronic inflammation."  CT C/A/P 10/27/21:  1. Intrahepatic biliary dilatation despite the presence of 2 biliary drains.  The patient is recent comparison MRI a revealed a possible central hepatic mass, concerning for either cholangiocarcinoma or hepatocellular carcinoma.  This is, however, not definitively demonstrated on today's exam.  There are enlarged lymph nodes present in the vicinity of the ana cristina hepatis and celiac axis as detailed above.  2. Scattered pulmonary nodules measuring up to 6 mm.  3. Other findings as detailed above.  He presents today for further management. Seen by Dr Jara and considered a candidate for liver transplant. Seen by Dr Cunningham last Friday. Scheduled for PET this Wednesday. 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   Discussed neoadjuvant chemoradiation with 5FU followed by neoadjuvant cis/gem prior to liver transplant.   2. Hospitalized 11/3/21-11/5/21 for fever 2/2 acute cholangitis. Repeat ERCP biopsy on 11/4/21 again non-diagnostic. Biopsy of the celiac lymph node was negative.   3. PET scan 11/3/21: "1. Wedge-shaped geographic hypermetabolic activity within the right lobe of the liver in a similar distribution to the intrahepatic biliary dilatation.  This could relate to inflammation from multiple etiologies including biliary stasis, cholangitis, and obstructive dilatation.  Neoplastic involvement would be difficult to exclude.  The liver is poorly evaluated given background activity. 2. Hypermetabolic lymphadenopathy is noted in a periportal and celiac distribution.  " "Infectious inflammatory and neoplastic etiologies are on the differential.  Index nodes have been measured. 3. Multiple pulmonary nodules are noted too small to categorize by PET-CT fusion as evidence seen on a prior CT of 10/27/2021.  CT for follow-up of size stability is necessary."  4. Completed chemoradiation with 5FU from 11/29/21-1/5/2022. Started cis/gem on 1/19/22. Completed neoadjuvant chemotherapy  5. Underwent living donor liver transplant on 6/21/2022. Pathology showed ypT2N0 well to moderately differentiated cholangiocarcinoma of the perihilar bile ducts, with invasion into the perihilar soft tissue. No LVI or PNI. Surgical margins are negative for carcinoma      Recent Visits  Date Type Provider Dept   04/24/23 Office Visit Ethel Francisco NP [facility] Hematology Oncology 3rd Floor   Showing recent visits within past 30 days and meeting all other requirements  Future Appointments  No visits were found meeting these conditions.  Showing future appointments within next 7 days and meeting all other requirements    BP Readings from Last 5 Encounters:   04/24/23 (!) 130/94   03/27/23 (!) 146/101   12/22/22 (!) 134/99   11/03/22 139/88   10/04/22 120/81     Lab Results   Component Value Date    WBC 5.21 04/03/2023    HGB 15.0 04/03/2023    HCT 45.3 04/03/2023    MCV 92 04/03/2023     (L) 04/03/2023    CHOL 163 10/12/2021    TRIG 198 (H) 10/12/2021    HDL 22 (L) 10/12/2021    ALT 20 04/03/2023    AST 20 04/03/2023    TSH 0.835 03/24/2022    PSA 0.68 03/24/2022    HGBA1C 5.8 (H) 06/20/2022      Dexa results: Results for orders placed during the hospital encounter of 04/08/22    DXA Bone Density Spine And Hip    Narrative  EXAMINATION:  DEXA BONE DENSITY SPINE HIP    CLINICAL HISTORY:  Awaiting organ transplant status    TECHNIQUE:  DXA scanning was performed over the left hip and lumbar spine.  Review of the images confirms satisfactory positioning and technique.    COMPARISON:  None    FINDINGS:  The L1 " to L4 vertebral bone mineral density is equal to 1.076 g/cm squared with a T score of -0.1.    The left femoral neck bone mineral density is equal to 0.897 g/cm squared with a T score of -0.2.    The total hip bone mineral density is equal to 1.269 g/cm squared with a T score of 1.6.    There is a 1.9% risk of a major osteoporotic fracture and a less than 0.1% risk of hip fracture in the next 10 years (FRAX).    Impression  Bone density which is within the range of normal for the patient's age.    Consider FDA approved medical therapies in postmenopausal women and men aged 50 years and older, based on the following:    *A hip or vertebral (clinical or morphometric) fracture  *T score less than or equal to -2.5 at the femoral neck or spine after appropriate evaluation to exclude secondary causes.  *Low bone mass -- also known as osteopenia (T score between -1.0 and -2.5 at the femoral neck or spine) and a 10 year probability of hip fracture greater than or equal to 3% or a 10 year probability of major osteoporosis-related fracture greater than or equal to 20% based on the [place]-adapted WHO algorithm.  *Clinicians judgment and/or patient preference may indicate treatment for people with 10 year fracture probabilities is above or below these levels.      Electronically signed by: Tushar Cain MD  Date:    04/08/2022  Time:    15:36    Review of Systems   Constitutional:  Negative for chills, diaphoresis and fever.   HENT:  Negative for congestion, ear pain and hearing loss.    Eyes:  Negative for redness and visual disturbance.   Respiratory:  Negative for cough, chest tightness and shortness of breath.    Cardiovascular:  Negative for chest pain and leg swelling.   Gastrointestinal:  Negative for abdominal distention, abdominal pain, constipation and diarrhea.   Genitourinary:  Negative for difficulty urinating and dysuria.   Musculoskeletal:  Negative for gait problem and joint swelling.   Skin:  Negative for rash.    Neurological:  Negative for dizziness, light-headedness and headaches.   Psychiatric/Behavioral:  Negative for confusion. The patient is not nervous/anxious.      Review of patient's allergies indicates:   Allergen Reactions    Zosyn [piperacillin-tazobactam] Swelling     Lip swelling       Current Outpatient Medications   Medication Instructions    aspirin 81 MG Chew CHEW 1 tablet (81 mg total) by mouth once daily.    calcium carbonate-vitamin D3 600 mg-20 mcg (800 unit) Tab 1 tablet, Oral, Daily    carvediloL (COREG) 6.25 mg, Oral, 2 times daily with meals    pantoprazole (PROTONIX) 40 mg, Oral, Daily    tacrolimus (PROGRAF) 0.5 MG Cap Take 1 capsule (0.5 mg total) by mouth every morning AND 1 capsule (0.5 mg total) every evening.    ursodioL (ACTIGALL) 250 mg, Oral, 2 times daily       Patient Active Problem List    Diagnosis Date Noted    Pancytopenia 09/15/2022    Elevated bilirubin 09/15/2022    Cholangitis of transplanted liver 09/15/2022    Elevated LFTs 08/24/2022    Bile leak     Malnutrition of mild degree 07/05/2022    Long-term use of immunosuppressant medication 06/25/2022    Type 2 diabetes mellitus with hyperglycemia 06/22/2022    S/P liver transplant 06/21/2022    Prophylactic immunotherapy 06/21/2022    At risk for opportunistic infections 06/21/2022    Adjustment and management of vascular access device 05/18/2022    Fever 04/26/2022    Anemia 04/26/2022    Chemotherapy induced neutropenia 02/09/2022    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    Immunodeficiency secondary to chemotherapy 12/13/2021    Hilar cholangiocarcinoma 11/04/2021    Hypercholesterolemia 10/15/2021    Hypertension 10/15/2021    Hyperbilirubinemia 10/15/2021    Biliary obstruction 10/15/2021       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     Hypercholesteremia     Hypertension         Past Surgical History:   Procedure Laterality Date    DIAGNOSTIC ULTRASOUND N/A 6/21/2022    Procedure: ULTRASOUND, DIAGNOSTIC;  Surgeon: Sinan Cline MD;  [facility];  Service: Transplant;  Laterality: N/A;    ENDOSCOPIC ULTRASOUND OF UPPER GASTROINTESTINAL TRACT N/A 10/20/2021    Procedure: ULTRASOUND, UPPER GI TRACT, ENDOSCOPIC;  Surgeon: Valeriy Sotelo MD;  Location: [facility] ENDO (2ND FLR);  Service: Endoscopy;  Laterality: N/A;  wife to email vacc card-inst email-tb    ERCP N/A 10/20/2021    Procedure: ERCP (ENDOSCOPIC RETROGRADE CHOLANGIOPANCREATOGRAPHY);  Surgeon: Valeriy Sotelo MD;  Location: [facility] ENDO (2ND FLR);  Service: Endoscopy;  Laterality: N/A;    ERCP N/A 11/4/2021    Procedure: ERCP (ENDOSCOPIC RETROGRADE CHOLANGIOPANCREATOGRAPHY);  Surgeon: Valeriy Sotelo MD;  [facility]  Service: Endoscopy;  Laterality: N/A;    ERCP N/A 11/4/2021    Procedure: ERCP (ENDOSCOPIC RETROGRADE CHOLANGIOPANCREATOGRAPHY);  Surgeon: Roselia Pereyra MD;  Location: [facility] (2ND FLR);  Service: Endoscopy;  Laterality: N/A;  pt vaccinated, instructed to bring card to procedure, instructions sent portal-    ERCP N/A 1/14/2022    Procedure: ERCP (ENDOSCOPIC RETROGRADE CHOLANGIOPANCREATOGRAPHY);  Surgeon: Roselia Pereyra MD;  Location: [facility] (2ND FLR);  Service: Endoscopy;  Laterality: N/A;  inst portal-right chest port-tb  Pt requested at home COVID testing, Pt instructed at home RAPID to be done morning of procedure, Pt instructed to call endoscopy scheuding if there is a positive result, Pt informed if a positive     ERCP N/A 3/31/2022    Procedure: ERCP (ENDOSCOPIC RETROGRADE CHOLANGIOPANCREATOGRAPHY);  Surgeon: Julio Cesar Alonzo MD;  Location: [facility] ENDO (2ND FLR);  Service: Endoscopy;  Laterality: N/A;  3/16: port L chest wall. pt to bring covid vaccination card. Instructions sent via portal.-SC  3/18/22-Updated instructions  "sent via portal re:new date/time-DS    EXPLORATORY LAPAROTOMY AFTER LIVER TRANSPLANTATION N/A 6/23/2022    Procedure: LAPAROTOMY, EXPLORATORY, AFTER LIVER TRANSPLANT;  Surgeon: Julio Cesar Jara MD;  Location: [facility] OR 2ND FLR;  Service: Transplant;  Laterality: N/A;    INSERTION OF TUNNELED CENTRAL VENOUS CATHETER (CVC) WITH SUBCUTANEOUS PORT N/A 11/24/2021    Procedure: INSERTION, PORT-A-CATH;  Surgeon: Prem Syed MD;  Location: [facility] CATH LAB;  Service: Radiology;  Laterality: N/A;    LIVER TRANSPLANT N/A 6/21/2022    Procedure: TRANSPLANT, LIVER;  Surgeon: Sinan Cline MD;  Location: [facility] OR 2ND FLR;  Service: Transplant;  Laterality: N/A;    REPAIR OF BILE DUCT N/A 6/23/2022    Procedure: REPAIR, BILE DUCT;  Surgeon: Julio Cesar Jara MD;  Location: [facility] OR 2ND FLR;  Service: Transplant;  Laterality: N/A;    ROBOT-ASSISTED LAPAROSCOPIC LYMPHADENECTOMY USING DA МАРИНА XI  6/17/2022    Procedure: XI ROBOTIC LYMPHADENECTOMY - Portal;  Surgeon: Julio Cesar Jaar MD;  Location: [facility] OR 2ND FLR;  Service: Transplant;;    TONSILLECTOMY      XI ROBOTIC LAPAROSCOPY, EXPLORATORY N/A 6/17/2022    Procedure: XI ROBOTIC LAPAROSCOPY,EXPLORATORY;  Surgeon: Julio Cesar Jara MD;  Location: [facility] OR 2ND FLR;  Service: Transplant;  Laterality: N/A;        Objective:     Vitals:    04/24/23 1239 04/24/23 1300   BP: (!) 140/99 (!) 130/94   Pulse: 64    Resp: 20    SpO2: 100%    Weight: 83.2 kg (183 lb 6.8 oz)    Height: 5' 9" (1.753 m)    PainSc: 0-No pain        Body mass index is 27.09 kg/m².    Physical Exam  Constitutional:       General: He is not in acute distress.     Appearance: He is well-developed. He is not diaphoretic.   HENT:      Head: Normocephalic and atraumatic.   Eyes:      General: No scleral icterus.     Conjunctiva/sclera: Conjunctivae normal.   Cardiovascular:      Rate and Rhythm: Normal rate and regular rhythm.      Pulses: Normal pulses.      Heart sounds: Normal heart sounds.   Pulmonary:      Effort: Pulmonary effort " is normal. No respiratory distress.      Breath sounds: Normal breath sounds.   Abdominal:      General: Bowel sounds are normal. There is no distension.      Palpations: Abdomen is soft. There is no mass.      Tenderness: There is no abdominal tenderness. There is no guarding or rebound.      Hernia: No hernia is present.   Musculoskeletal:         General: No swelling or tenderness. Normal range of motion.      Cervical back: Normal range of motion and neck supple.      Right lower leg: No edema.      Left lower leg: No edema.   Skin:     General: Skin is warm and dry.   Neurological:      Mental Status: He is alert and oriented to person, place, and time.   Psychiatric:         Behavior: Behavior normal.       Assessment:     1. Hilar cholangiocarcinoma    2. Liver replaced by transplant    3. Essential hypertension    4. Secondary diabetes    5. Hyperlipidemia, unspecified hyperlipidemia type        Plan:   Intervention: education, labs ordered  Follow up: follow up with primary care oncology     Diagnoses and all orders for this visit:  1. Hilar cholangiocarcinoma (Primary)  Completed chemoradiation with 5FU from 11/29/21-1/5/2022. Started cis/gem on 1/19/22. Completed neoadjuvant chemotherapy  5. Underwent living donor liver transplant on 6/21/2022.   Followed by Dr. Wesley every 3 months with labs and surveillance scan    2. Liver replaced by transplant  Stable  On prograf  Followed by transplant team  Will consult with them prior to initiating any further medications    3. Essential hypertension  Diastolic elevated in clinic, wdl on home readings  Discussed lifestyle modifications: reduce salt, dec caffeine, increase exercise, stay well hydrated  Continue coreg 6.25 mg bid, monitor home bps 3 to 4 times per week and keep a log  Will follow up in one month, if still high in clinic and pending home log, can increase coreg dose    4. Secondary diabetes  Likely secondary to steroid use during chemo  Recent fasting  blood sugars have been wdl  Will check A1C  -     Hemoglobin A1C; Future; Expected date: 04/24/2023    5. Hyperlipidemia, unspecified hyperlipidemia type  -     Lipid Panel; Future; Expected date: 04/24/2023  -     TSH; Future; Expected date: 04/25/2023     Follow up in one month in [facility] and then will get patient further established with a primary care provider      Med Onc Chart Routing      Follow up with physician    Follow up with JOSEPH 4 weeks. in the prim/onc clinic (monday afternoon) with me   Infusion scheduling note    Injection scheduling note    Labs   Scheduling:  Preferred lab:  Lab interval:  Labs already linked to upcoming visit   Imaging    Pharmacy appointment    Other referrals       Total time of this visit, including time spent face to face with patient and/or via video/audio, and also in preparing for today's visit for MDM and documentation. (Medical Decision Making, including consideration of possible diagnoses, management options, complex medical record review, review of diagnostic tests and information, consideration and discussion of significant complications based on comorbidities, and discussion with providers involved with the care of the patient) is 30 minutes. Greater than 50% was spent face to face with the patient counseling and coordinating care.    Ethel Francisco NP    There are no Patient Instructions on file for this visit.

## 2023-05-01 ENCOUNTER — PATIENT MESSAGE (OUTPATIENT)
Dept: TRANSPLANT | Facility: CLINIC | Age: 43
End: 2023-05-01
Payer: COMMERCIAL

## 2023-05-01 ENCOUNTER — TELEPHONE (OUTPATIENT)
Dept: TRANSPLANT | Facility: CLINIC | Age: 43
End: 2023-05-01
Payer: COMMERCIAL

## 2023-05-01 ENCOUNTER — LAB VISIT (OUTPATIENT)
Dept: LAB | Facility: HOSPITAL | Age: 43
End: 2023-05-01
Attending: INTERNAL MEDICINE
Payer: COMMERCIAL

## 2023-05-01 DIAGNOSIS — E78.5 HYPERLIPIDEMIA, UNSPECIFIED HYPERLIPIDEMIA TYPE: ICD-10-CM

## 2023-05-01 DIAGNOSIS — Z94.4 LIVER REPLACED BY TRANSPLANT: ICD-10-CM

## 2023-05-01 DIAGNOSIS — E13.9 SECONDARY DIABETES: ICD-10-CM

## 2023-05-01 LAB
ALBUMIN SERPL BCP-MCNC: 4.2 G/DL (ref 3.5–5.2)
ALP SERPL-CCNC: 191 U/L (ref 55–135)
ALT SERPL W/O P-5'-P-CCNC: 89 U/L (ref 10–44)
ANION GAP SERPL CALC-SCNC: 8 MMOL/L (ref 8–16)
AST SERPL-CCNC: 50 U/L (ref 10–40)
BASOPHILS # BLD AUTO: 0.03 K/UL (ref 0–0.2)
BASOPHILS NFR BLD: 0.4 % (ref 0–1.9)
BILIRUB SERPL-MCNC: 2.7 MG/DL (ref 0.1–1)
BUN SERPL-MCNC: 17 MG/DL (ref 6–20)
CALCIUM SERPL-MCNC: 10 MG/DL (ref 8.7–10.5)
CHLORIDE SERPL-SCNC: 101 MMOL/L (ref 95–110)
CHOLEST SERPL-MCNC: 157 MG/DL (ref 120–199)
CHOLEST/HDLC SERPL: 3.6 {RATIO} (ref 2–5)
CO2 SERPL-SCNC: 28 MMOL/L (ref 23–29)
CREAT SERPL-MCNC: 1.1 MG/DL (ref 0.5–1.4)
DIFFERENTIAL METHOD: ABNORMAL
EOSINOPHIL # BLD AUTO: 0.1 K/UL (ref 0–0.5)
EOSINOPHIL NFR BLD: 1 % (ref 0–8)
ERYTHROCYTE [DISTWIDTH] IN BLOOD BY AUTOMATED COUNT: 13.4 % (ref 11.5–14.5)
EST. GFR  (NO RACE VARIABLE): >60 ML/MIN/1.73 M^2
ESTIMATED AVG GLUCOSE: 117 MG/DL (ref 68–131)
GLUCOSE SERPL-MCNC: 110 MG/DL (ref 70–110)
HBA1C MFR BLD: 5.7 % (ref 4–5.6)
HCT VFR BLD AUTO: 47.6 % (ref 40–54)
HDLC SERPL-MCNC: 44 MG/DL (ref 40–75)
HDLC SERPL: 28 % (ref 20–50)
HGB BLD-MCNC: 15.7 G/DL (ref 14–18)
IMM GRANULOCYTES # BLD AUTO: 0.03 K/UL (ref 0–0.04)
IMM GRANULOCYTES NFR BLD AUTO: 0.4 % (ref 0–0.5)
LDLC SERPL CALC-MCNC: 89.8 MG/DL (ref 63–159)
LYMPHOCYTES # BLD AUTO: 1.3 K/UL (ref 1–4.8)
LYMPHOCYTES NFR BLD: 19.2 % (ref 18–48)
MCH RBC QN AUTO: 30.8 PG (ref 27–31)
MCHC RBC AUTO-ENTMCNC: 33 G/DL (ref 32–36)
MCV RBC AUTO: 93 FL (ref 82–98)
MONOCYTES # BLD AUTO: 0.7 K/UL (ref 0.3–1)
MONOCYTES NFR BLD: 10.5 % (ref 4–15)
NEUTROPHILS # BLD AUTO: 4.7 K/UL (ref 1.8–7.7)
NEUTROPHILS NFR BLD: 68.5 % (ref 38–73)
NONHDLC SERPL-MCNC: 113 MG/DL
NRBC BLD-RTO: 0 /100 WBC
PLATELET # BLD AUTO: 134 K/UL (ref 150–450)
PMV BLD AUTO: 8.8 FL (ref 9.2–12.9)
POTASSIUM SERPL-SCNC: 4.5 MMOL/L (ref 3.5–5.1)
PROT SERPL-MCNC: 7.3 G/DL (ref 6–8.4)
RBC # BLD AUTO: 5.1 M/UL (ref 4.6–6.2)
SODIUM SERPL-SCNC: 137 MMOL/L (ref 136–145)
TACROLIMUS BLD-MCNC: 4.4 NG/ML (ref 5–15)
TRIGL SERPL-MCNC: 116 MG/DL (ref 30–150)
TSH SERPL DL<=0.005 MIU/L-ACNC: 0.63 UIU/ML (ref 0.4–4)
WBC # BLD AUTO: 6.87 K/UL (ref 3.9–12.7)

## 2023-05-01 PROCEDURE — 80053 COMPREHEN METABOLIC PANEL: CPT | Performed by: INTERNAL MEDICINE

## 2023-05-01 PROCEDURE — 84443 ASSAY THYROID STIM HORMONE: CPT | Performed by: NURSE PRACTITIONER

## 2023-05-01 PROCEDURE — 36415 COLL VENOUS BLD VENIPUNCTURE: CPT | Performed by: INTERNAL MEDICINE

## 2023-05-01 PROCEDURE — 85025 COMPLETE CBC W/AUTO DIFF WBC: CPT | Performed by: INTERNAL MEDICINE

## 2023-05-01 PROCEDURE — 80061 LIPID PANEL: CPT | Performed by: NURSE PRACTITIONER

## 2023-05-01 PROCEDURE — 80197 ASSAY OF TACROLIMUS: CPT | Performed by: INTERNAL MEDICINE

## 2023-05-01 PROCEDURE — 83036 HEMOGLOBIN GLYCOSYLATED A1C: CPT | Performed by: NURSE PRACTITIONER

## 2023-05-01 NOTE — TELEPHONE ENCOUNTER
"Returned patient call: he reported he noticed today's labs in [facility] with some elevations to the liver tests.  He said he has been feeling fine , "but yesterday and Saturday I felt a little crappy". Patient reported symptoms non-specific, can't really explain a particular symptom, he said.  Patient reported that he feels fine today.  Denied missing any doses of his medications.  Informed patient that message of lab tests elevations sent to  and await his review. Patient voiced understanding.       "

## 2023-05-02 ENCOUNTER — PATIENT MESSAGE (OUTPATIENT)
Dept: HEMATOLOGY/ONCOLOGY | Facility: CLINIC | Age: 43
End: 2023-05-02
Payer: COMMERCIAL

## 2023-05-03 ENCOUNTER — PATIENT MESSAGE (OUTPATIENT)
Dept: TRANSPLANT | Facility: CLINIC | Age: 43
End: 2023-05-03
Payer: COMMERCIAL

## 2023-05-03 DIAGNOSIS — R79.89 ELEVATED LFTS: ICD-10-CM

## 2023-05-03 DIAGNOSIS — C22.1 CHOLANGIOCARCINOMA: ICD-10-CM

## 2023-05-03 DIAGNOSIS — Z94.4 LIVER REPLACED BY TRANSPLANT: Primary | ICD-10-CM

## 2023-05-03 RX ORDER — TACROLIMUS 0.5 MG/1
CAPSULE ORAL
Qty: 120 CAPSULE | Refills: 11 | Status: CANCELLED | OUTPATIENT
Start: 2023-05-03

## 2023-05-03 NOTE — TELEPHONE ENCOUNTER
----- Message from Zoran Nobles MD sent at 5/3/2023  9:48 AM CDT -----  Results reviewed. Please increase tac to 1/1. Please get ultrasound doppler, contrast MRI, CA 19-9 and repeat labs next week.

## 2023-05-03 NOTE — TELEPHONE ENCOUNTER
Patient notified and instructed via [facility]:    Plan per : Results reviewed. Please increase tacrolimus dose  to 1mg twice daily. Will  get ultrasound doppler, contrast MRI, and lab test for CA 19-9 . Repeat labs next week.

## 2023-05-04 ENCOUNTER — PATIENT MESSAGE (OUTPATIENT)
Dept: TRANSPLANT | Facility: CLINIC | Age: 43
End: 2023-05-04
Payer: COMMERCIAL

## 2023-05-04 RX ORDER — TACROLIMUS 1 MG/1
1 CAPSULE ORAL EVERY 12 HOURS
Qty: 60 CAPSULE | Refills: 11 | Status: SHIPPED | OUTPATIENT
Start: 2023-05-04 | End: 2023-05-21 | Stop reason: SDUPTHER

## 2023-05-05 ENCOUNTER — TELEPHONE (OUTPATIENT)
Dept: TRANSPLANT | Facility: CLINIC | Age: 43
End: 2023-05-05
Payer: COMMERCIAL

## 2023-05-05 ENCOUNTER — LAB VISIT (OUTPATIENT)
Dept: LAB | Facility: HOSPITAL | Age: 43
End: 2023-05-05
Attending: INTERNAL MEDICINE
Payer: COMMERCIAL

## 2023-05-05 ENCOUNTER — PATIENT MESSAGE (OUTPATIENT)
Dept: TRANSPLANT | Facility: CLINIC | Age: 43
End: 2023-05-05
Payer: COMMERCIAL

## 2023-05-05 DIAGNOSIS — C22.1 CHOLANGIOCARCINOMA: ICD-10-CM

## 2023-05-05 DIAGNOSIS — Z94.4 LIVER REPLACED BY TRANSPLANT: ICD-10-CM

## 2023-05-05 LAB
ALBUMIN SERPL BCP-MCNC: 3.9 G/DL (ref 3.5–5.2)
ALP SERPL-CCNC: 173 U/L (ref 55–135)
ALT SERPL W/O P-5'-P-CCNC: 48 U/L (ref 10–44)
ANION GAP SERPL CALC-SCNC: 8 MMOL/L (ref 8–16)
AST SERPL-CCNC: 27 U/L (ref 10–40)
BASOPHILS # BLD AUTO: 0.02 K/UL (ref 0–0.2)
BASOPHILS NFR BLD: 0.5 % (ref 0–1.9)
BILIRUB DIRECT SERPL-MCNC: 0.4 MG/DL (ref 0.1–0.3)
BILIRUB SERPL-MCNC: 1 MG/DL (ref 0.1–1)
BUN SERPL-MCNC: 17 MG/DL (ref 6–20)
CALCIUM SERPL-MCNC: 9.5 MG/DL (ref 8.7–10.5)
CANCER AG19-9 SERPL-ACNC: 24.5 U/ML (ref 0–40)
CHLORIDE SERPL-SCNC: 105 MMOL/L (ref 95–110)
CO2 SERPL-SCNC: 26 MMOL/L (ref 23–29)
CREAT SERPL-MCNC: 1.1 MG/DL (ref 0.5–1.4)
DIFFERENTIAL METHOD: ABNORMAL
EOSINOPHIL # BLD AUTO: 0.1 K/UL (ref 0–0.5)
EOSINOPHIL NFR BLD: 2.8 % (ref 0–8)
ERYTHROCYTE [DISTWIDTH] IN BLOOD BY AUTOMATED COUNT: 13.1 % (ref 11.5–14.5)
EST. GFR  (NO RACE VARIABLE): >60 ML/MIN/1.73 M^2
GLUCOSE SERPL-MCNC: 108 MG/DL (ref 70–110)
HCT VFR BLD AUTO: 44.6 % (ref 40–54)
HGB BLD-MCNC: 14.9 G/DL (ref 14–18)
IMM GRANULOCYTES # BLD AUTO: 0.01 K/UL (ref 0–0.04)
IMM GRANULOCYTES NFR BLD AUTO: 0.2 % (ref 0–0.5)
LYMPHOCYTES # BLD AUTO: 1.2 K/UL (ref 1–4.8)
LYMPHOCYTES NFR BLD: 27.3 % (ref 18–48)
MCH RBC QN AUTO: 31 PG (ref 27–31)
MCHC RBC AUTO-ENTMCNC: 33.4 G/DL (ref 32–36)
MCV RBC AUTO: 93 FL (ref 82–98)
MONOCYTES # BLD AUTO: 0.5 K/UL (ref 0.3–1)
MONOCYTES NFR BLD: 10.7 % (ref 4–15)
NEUTROPHILS # BLD AUTO: 2.5 K/UL (ref 1.8–7.7)
NEUTROPHILS NFR BLD: 58.5 % (ref 38–73)
NRBC BLD-RTO: 0 /100 WBC
PLATELET # BLD AUTO: 128 K/UL (ref 150–450)
PMV BLD AUTO: 8.6 FL (ref 9.2–12.9)
POTASSIUM SERPL-SCNC: 4.6 MMOL/L (ref 3.5–5.1)
PROT SERPL-MCNC: 6.9 G/DL (ref 6–8.4)
RBC # BLD AUTO: 4.81 M/UL (ref 4.6–6.2)
SODIUM SERPL-SCNC: 139 MMOL/L (ref 136–145)
TACROLIMUS BLD-MCNC: 6.8 NG/ML (ref 5–15)
WBC # BLD AUTO: 4.29 K/UL (ref 3.9–12.7)

## 2023-05-05 PROCEDURE — 85025 COMPLETE CBC W/AUTO DIFF WBC: CPT | Performed by: INTERNAL MEDICINE

## 2023-05-05 PROCEDURE — 36415 COLL VENOUS BLD VENIPUNCTURE: CPT | Performed by: INTERNAL MEDICINE

## 2023-05-05 PROCEDURE — 86301 IMMUNOASSAY TUMOR CA 19-9: CPT | Performed by: INTERNAL MEDICINE

## 2023-05-05 PROCEDURE — 80197 ASSAY OF TACROLIMUS: CPT | Performed by: INTERNAL MEDICINE

## 2023-05-05 PROCEDURE — 80053 COMPREHEN METABOLIC PANEL: CPT | Performed by: INTERNAL MEDICINE

## 2023-05-05 PROCEDURE — 82248 BILIRUBIN DIRECT: CPT | Performed by: INTERNAL MEDICINE

## 2023-05-05 NOTE — TELEPHONE ENCOUNTER
Returned call patient has a billed that was not covered from  when he was first transplanted, because it was coded wrong.  I told his wife to send the bill and we would try to find out who could give us the right code so the bill could be paid.    ----- Message from Jennifer Nelson sent at 5/5/2023 10:06 AM CDT -----  Regarding: Patient call back  Who called:Brittney (wife)    What is the request in detail: Patient is requesting a call back. She states she needs a billing code to be corrected so her claim can be approved.   Please advise.    Can the clinic reply by [facility]? No    Best call back number: [phone]    Additional Information: N/A

## 2023-05-05 NOTE — TELEPHONE ENCOUNTER
"Returned call patient had question about his  has been increasing, still in normal range.  Let him know that Dr Nobles has not reviewed his labs yet, his imaging was stable.  He does follow with Dr Wesley so I told him he could reach out to her, that she could answer his questions.    ----- Message from Clinton Rowe sent at 5/5/2023 10:52 AM CDT -----  Consult/Advisory:          Name Of Caller: Self      Contact Preference?: [phone] (home)       What is the nature of the call?: Calling to speak w/ Tracey or Steph about results          Additional Notes:  "Thank you for all that you do for our patients"      "

## 2023-05-09 ENCOUNTER — TELEPHONE (OUTPATIENT)
Dept: TRANSPLANT | Facility: CLINIC | Age: 43
End: 2023-05-09
Payer: COMMERCIAL

## 2023-05-09 ENCOUNTER — PATIENT MESSAGE (OUTPATIENT)
Dept: TRANSPLANT | Facility: CLINIC | Age: 43
End: 2023-05-09
Payer: COMMERCIAL

## 2023-05-09 DIAGNOSIS — Z94.4 LIVER REPLACED BY TRANSPLANT: Primary | ICD-10-CM

## 2023-05-09 DIAGNOSIS — Z79.899 HIGH RISK MEDICATION USE: Primary | ICD-10-CM

## 2023-05-09 NOTE — TELEPHONE ENCOUNTER
Message sent to patient via [facility], and Lab Letter mailed to patient:    Labs reviewed by ; no changes made. Repeat labs due 6/5/23. Thanks.

## 2023-05-09 NOTE — TELEPHONE ENCOUNTER
----- Message from Zoran Nobles MD sent at 5/8/2023  1:08 PM CDT -----  Results reviewed. No action.

## 2023-05-11 DIAGNOSIS — Z94.4 S/P LIVER TRANSPLANT: ICD-10-CM

## 2023-05-11 DIAGNOSIS — Z94.4 S/P LIVER TRANSPLANT: Primary | ICD-10-CM

## 2023-05-11 DIAGNOSIS — Z94.4 LIVER REPLACED BY TRANSPLANT: ICD-10-CM

## 2023-05-11 RX ORDER — URSODIOL 250 MG/1
250 TABLET, FILM COATED ORAL 2 TIMES DAILY
Qty: 60 TABLET | Refills: 11 | Status: SHIPPED | OUTPATIENT
Start: 2023-05-11 | End: 2023-05-11 | Stop reason: SDUPTHER

## 2023-05-12 RX ORDER — URSODIOL 250 MG/1
250 TABLET, FILM COATED ORAL 2 TIMES DAILY
Qty: 60 TABLET | Refills: 11 | Status: SHIPPED | OUTPATIENT
Start: 2023-05-12 | End: 2023-06-05 | Stop reason: SDUPTHER

## 2023-05-17 ENCOUNTER — HOSPITAL ENCOUNTER (OUTPATIENT)
Dept: RADIOLOGY | Facility: HOSPITAL | Age: 43
Discharge: HOME | End: 2023-05-17
Attending: INTERNAL MEDICINE
Payer: COMMERCIAL

## 2023-05-17 DIAGNOSIS — C22.1 CHOLANGIOCARCINOMA: ICD-10-CM

## 2023-05-17 DIAGNOSIS — R79.89 ELEVATED LFTS: ICD-10-CM

## 2023-05-17 PROCEDURE — 76705 ECHO EXAM OF ABDOMEN: CPT | Mod: 26,XS,, | Performed by: RADIOLOGY

## 2023-05-17 PROCEDURE — 93976 US DOPPLER LIVER TRANSPLANT POST (XPD): ICD-10-PCS | Mod: 26,,, | Performed by: RADIOLOGY

## 2023-05-17 PROCEDURE — 93976 VASCULAR STUDY: CPT | Mod: 26,,, | Performed by: RADIOLOGY

## 2023-05-17 PROCEDURE — A9585 GADOBUTROL INJECTION: HCPCS | Mod: PO | Performed by: INTERNAL MEDICINE

## 2023-05-17 PROCEDURE — 93976 VASCULAR STUDY: CPT | Mod: TC,PO

## 2023-05-17 PROCEDURE — 74183 MRI ABD W/O CNTR FLWD CNTR: CPT | Mod: 26,,, | Performed by: RADIOLOGY

## 2023-05-17 PROCEDURE — 74183 MRI ABD W/O CNTR FLWD CNTR: CPT | Mod: TC,PO

## 2023-05-17 PROCEDURE — 74183 MRI ABDOMEN W WO CONTRAST: ICD-10-PCS | Mod: 26,,, | Performed by: RADIOLOGY

## 2023-05-17 PROCEDURE — 76705 US DOPPLER LIVER TRANSPLANT POST (XPD): ICD-10-PCS | Mod: 26,XS,, | Performed by: RADIOLOGY

## 2023-05-17 PROCEDURE — 25500020 PHARM REV CODE 255: Mod: PO | Performed by: INTERNAL MEDICINE

## 2023-05-17 RX ORDER — GADOBUTROL 604.72 MG/ML
8 INJECTION INTRAVENOUS
Status: COMPLETED | OUTPATIENT
Start: 2023-05-17 | End: 2023-05-17

## 2023-05-17 RX ADMIN — GADOBUTROL 8 ML: 604.72 INJECTION INTRAVENOUS at 03:05

## 2023-05-18 ENCOUNTER — TELEPHONE (OUTPATIENT)
Dept: TRANSPLANT | Facility: CLINIC | Age: 43
End: 2023-05-18
Payer: COMMERCIAL

## 2023-05-18 ENCOUNTER — PATIENT MESSAGE (OUTPATIENT)
Dept: TRANSPLANT | Facility: CLINIC | Age: 43
End: 2023-05-18
Payer: COMMERCIAL

## 2023-05-18 NOTE — TELEPHONE ENCOUNTER
Patient notified and instructed via [facility]:    Your MRI and Ultrasound results were reviewed by Dr. Nobles. Results were ok. No orders received.

## 2023-05-18 NOTE — TELEPHONE ENCOUNTER
----- Message from Zoran Nobles MD sent at 5/18/2023  2:14 PM CDT -----  Results reviewed. No action.

## 2023-05-22 ENCOUNTER — OFFICE VISIT (OUTPATIENT)
Dept: HEMATOLOGY/ONCOLOGY | Facility: CLINIC | Age: 43
End: 2023-05-22
Payer: COMMERCIAL

## 2023-05-22 VITALS
SYSTOLIC BLOOD PRESSURE: 140 MMHG | HEIGHT: 69 IN | TEMPERATURE: 99 F | DIASTOLIC BLOOD PRESSURE: 99 MMHG | RESPIRATION RATE: 18 BRPM | WEIGHT: 183.19 LBS | HEART RATE: 75 BPM | OXYGEN SATURATION: 97 % | BODY MASS INDEX: 27.13 KG/M2

## 2023-05-22 DIAGNOSIS — R73.03 PREDIABETES: ICD-10-CM

## 2023-05-22 DIAGNOSIS — C24.0 HILAR CHOLANGIOCARCINOMA: Primary | ICD-10-CM

## 2023-05-22 DIAGNOSIS — Z94.4 LIVER REPLACED BY TRANSPLANT: ICD-10-CM

## 2023-05-22 DIAGNOSIS — E78.5 HYPERLIPIDEMIA, UNSPECIFIED HYPERLIPIDEMIA TYPE: ICD-10-CM

## 2023-05-22 DIAGNOSIS — I10 ESSENTIAL HYPERTENSION: ICD-10-CM

## 2023-05-22 PROCEDURE — 3008F PR BODY MASS INDEX (BMI) DOCUMENTED: ICD-10-PCS | Mod: CPTII,S$GLB,, | Performed by: NURSE PRACTITIONER

## 2023-05-22 PROCEDURE — 1159F MED LIST DOCD IN RCRD: CPT | Mod: CPTII,S$GLB,, | Performed by: NURSE PRACTITIONER

## 2023-05-22 PROCEDURE — 1159F PR MEDICATION LIST DOCUMENTED IN MEDICAL RECORD: ICD-10-PCS | Mod: CPTII,S$GLB,, | Performed by: NURSE PRACTITIONER

## 2023-05-22 PROCEDURE — 99999 PR PBB SHADOW E&M-EST. PATIENT-LVL IV: CPT | Mod: PBBFAC,,, | Performed by: NURSE PRACTITIONER

## 2023-05-22 PROCEDURE — 3080F DIAST BP >= 90 MM HG: CPT | Mod: CPTII,S$GLB,, | Performed by: NURSE PRACTITIONER

## 2023-05-22 PROCEDURE — 3077F PR MOST RECENT SYSTOLIC BLOOD PRESSURE >= 140 MM HG: ICD-10-PCS | Mod: CPTII,S$GLB,, | Performed by: NURSE PRACTITIONER

## 2023-05-22 PROCEDURE — 99214 PR OFFICE/OUTPT VISIT, EST, LEVL IV, 30-39 MIN: ICD-10-PCS | Mod: S$GLB,,, | Performed by: NURSE PRACTITIONER

## 2023-05-22 PROCEDURE — 3077F SYST BP >= 140 MM HG: CPT | Mod: CPTII,S$GLB,, | Performed by: NURSE PRACTITIONER

## 2023-05-22 PROCEDURE — 3008F BODY MASS INDEX DOCD: CPT | Mod: CPTII,S$GLB,, | Performed by: NURSE PRACTITIONER

## 2023-05-22 PROCEDURE — 99999 PR PBB SHADOW E&M-EST. PATIENT-LVL IV: ICD-10-PCS | Mod: PBBFAC,,, | Performed by: NURSE PRACTITIONER

## 2023-05-22 PROCEDURE — 3080F PR MOST RECENT DIASTOLIC BLOOD PRESSURE >= 90 MM HG: ICD-10-PCS | Mod: CPTII,S$GLB,, | Performed by: NURSE PRACTITIONER

## 2023-05-22 PROCEDURE — 3044F PR MOST RECENT HEMOGLOBIN A1C LEVEL <7.0%: ICD-10-PCS | Mod: CPTII,S$GLB,, | Performed by: NURSE PRACTITIONER

## 2023-05-22 PROCEDURE — 99214 OFFICE O/P EST MOD 30 MIN: CPT | Mod: S$GLB,,, | Performed by: NURSE PRACTITIONER

## 2023-05-22 PROCEDURE — 3044F HG A1C LEVEL LT 7.0%: CPT | Mod: CPTII,S$GLB,, | Performed by: NURSE PRACTITIONER

## 2023-05-22 RX ORDER — CARVEDILOL 12.5 MG/1
12.5 TABLET ORAL 2 TIMES DAILY WITH MEALS
Qty: 60 TABLET | Refills: 11 | Status: SHIPPED | OUTPATIENT
Start: 2023-05-22 | End: 2023-06-25 | Stop reason: SDUPTHER

## 2023-05-22 RX ORDER — TACROLIMUS 1 MG/1
1 CAPSULE ORAL EVERY 12 HOURS
Qty: 60 CAPSULE | Refills: 11 | Status: SHIPPED | OUTPATIENT
Start: 2023-05-22 | End: 2023-11-10 | Stop reason: SDUPTHER

## 2023-05-22 NOTE — PROGRESS NOTES
Primary Care Oncology Visit    Subjective:   Patient ID: OSCAR LARSON is a 42 y.o. male.    Chief Complaint: Hilar cholangiocarcinoma    OSCAR LARSON comes in today for a primary care/oncology follow up for uncontrolled hypertension.  He is currently being followed by Dr. Wesley for a hx of T2N0M0 hilar cholangiocarcinoma, s/p neoadjuvant chemoradiation, chemotherapy and liver transplant on 6/21/2022.     He does not have a primary care provider currently or has not seen his primary care provider in the last 6 months.    Currently on coreg 6.25 mg bid for blood pressure.  This was increase by Dr. Wesley from 3.125 twice daily about 8 weeks ago.  Per pt, home blood pressures are 125-133/88-93.  He has been following a low salt diet, has one cup of coffee in the am and a couple unsweet teas throughout the day, but mostly drinks a great deal of water.  Was on a statin and losartan, but taken off of these when the cholangiocarcinoma was discovered.  Had only been on a statin for a short time prior to this.  He walks a great deal for exercise and stays active.  Was treated as having diabetes while using steroids during chemo and pre-liver transplant.  No longer on metformin.  He is now on active surveillance.  He is generally feeling well today with no new complaints.  Recent labs reviewed today TSH wdl,  lipid panel wdl, HgbA1c 5.7 (prediabetic)    Oncologic History:  1. Mr Larson is a 42 yo man who presents today for further management of suspected hilar cholangiocarcinoma. He presented with dark urine, abdominal pain and jaundice since August 2021. He presented to outside hospital ER with elevated bilirubin. MRCP was performed demonstrating bi-lobar intrahepatic bile duct dilation and a ~2cm ill defined mass at the hilum concerning for hilar cholangiocarcinoma. He was referred to the advanced endoscopy group at [facility]. ERCP confirmed severe, malignant appearing stricture involving right and left main hepatic ducts.  "Biliary stents were placed to relieve obstruction. EUS was performed. It showed an irregular hypoechoic mass where the hepatic duct bifurcates into the right and left hepatic ducts. The mass measured 11.4 mm by 11.3 mm in maximal cross-sectional diameter. FNA sampling of hilar lymph nodes was negative for metastatic disease. Bile duct biopsy showed "rare groups of glandular epithelium with mild atypia, strips of benign glandular epithelium intermixed with blood clot, and focal fibrous tissue with chronic inflammation."  CT C/A/P 10/27/21:  1. Intrahepatic biliary dilatation despite the presence of 2 biliary drains.  The patient is recent comparison MRI a revealed a possible central hepatic mass, concerning for either cholangiocarcinoma or hepatocellular carcinoma.  This is, however, not definitively demonstrated on today's exam.  There are enlarged lymph nodes present in the vicinity of the ana cristina hepatis and celiac axis as detailed above.  2. Scattered pulmonary nodules measuring up to 6 mm.  3. Other findings as detailed above.  He presents today for further management. Seen by Dr Jara and considered a candidate for liver transplant. Seen by Dr Cunningham last Friday. Scheduled for PET this Wednesday. 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   Discussed neoadjuvant chemoradiation with 5FU followed by neoadjuvant cis/gem prior to liver transplant.   2. Hospitalized 11/3/21-11/5/21 for fever 2/2 acute cholangitis. Repeat ERCP biopsy on 11/4/21 again non-diagnostic. Biopsy of the celiac lymph node was negative.   3. PET scan 11/3/21: "1. Wedge-shaped geographic hypermetabolic activity within the right lobe of the liver in a similar distribution to the intrahepatic biliary dilatation.  This could relate to inflammation from multiple etiologies including biliary stasis, cholangitis, and obstructive dilatation.  Neoplastic involvement would be difficult to exclude.  The liver is poorly evaluated given background activity. 2. " "Hypermetabolic lymphadenopathy is noted in a periportal and celiac distribution.  Infectious inflammatory and neoplastic etiologies are on the differential.  Index nodes have been measured. 3. Multiple pulmonary nodules are noted too small to categorize by PET-CT fusion as evidence seen on a prior CT of 10/27/2021.  CT for follow-up of size stability is necessary."  4. Completed chemoradiation with 5FU from 11/29/21-1/5/2022. Started cis/gem on 1/19/22. Completed neoadjuvant chemotherapy  5. Underwent living donor liver transplant on 6/21/2022. Pathology showed ypT2N0 well to moderately differentiated cholangiocarcinoma of the perihilar bile ducts, with invasion into the perihilar soft tissue. No LVI or PNI. Surgical margins are negative for carcinoma      Recent Visits  Date Type Provider Dept   04/24/23 Office Visit Ethel Francisco NP [facility] 3rd Floor   Showing recent visits within past 30 days and meeting all other requirements  Today's Visits  Date Type Provider Dept   05/22/23 Office Visit Ethel Francisco NP [facility] 3rd Floor   Showing today's visits and meeting all other requirements  Future Appointments  No visits were found meeting these conditions.  Showing future appointments within next 7 days and meeting all other requirements    BP Readings from Last 5 Encounters:   05/22/23 (!) 140/99   04/24/23 (!) 130/94   03/27/23 (!) 146/101   12/22/22 (!) 134/99   11/03/22 139/88     Lab Results   Component Value Date    WBC 4.29 05/05/2023    HGB 14.9 05/05/2023    HCT 44.6 05/05/2023    MCV 93 05/05/2023     (L) 05/05/2023    CHOL 157 05/01/2023    TRIG 116 05/01/2023    HDL 44 05/01/2023    ALT 48 (H) 05/05/2023    AST 27 05/05/2023    TSH 0.630 05/01/2023    PSA 0.68 03/24/2022    HGBA1C 5.7 (H) 05/01/2023      Dexa results: Results for orders placed during the hospital encounter of 04/08/22    DXA Bone Density Spine And Hip    Narrative  EXAMINATION:  DEXA " BONE DENSITY SPINE HIP    CLINICAL HISTORY:  Awaiting organ transplant status    TECHNIQUE:  DXA scanning was performed over the left hip and lumbar spine.  Review of the images confirms satisfactory positioning and technique.    COMPARISON:  None    FINDINGS:  The L1 to L4 vertebral bone mineral density is equal to 1.076 g/cm squared with a T score of -0.1.    The left femoral neck bone mineral density is equal to 0.897 g/cm squared with a T score of -0.2.    The total hip bone mineral density is equal to 1.269 g/cm squared with a T score of 1.6.    There is a 1.9% risk of a major osteoporotic fracture and a less than 0.1% risk of hip fracture in the next 10 years (FRAX).    Impression  Bone density which is within the range of normal for the patient's age.    Consider FDA approved medical therapies in postmenopausal women and men aged 50 years and older, based on the following:    *A hip or vertebral (clinical or morphometric) fracture  *T score less than or equal to -2.5 at the femoral neck or spine after appropriate evaluation to exclude secondary causes.  *Low bone mass -- also known as osteopenia (T score between -1.0 and -2.5 at the femoral neck or spine) and a 10 year probability of hip fracture greater than or equal to 3% or a 10 year probability of major osteoporosis-related fracture greater than or equal to 20% based on the [place]-adapted WHO algorithm.  *Clinicians judgment and/or patient preference may indicate treatment for people with 10 year fracture probabilities is above or below these levels.      Electronically signed by: Tushar Cain MD  Date:    04/08/2022  Time:    15:36    Review of Systems   Constitutional:  Negative for chills, diaphoresis and fever.   HENT:  Negative for congestion, ear pain and hearing loss.    Eyes:  Negative for redness and visual disturbance.   Respiratory:  Negative for cough, chest tightness and shortness of breath.    Cardiovascular:  Negative for chest pain and leg  swelling.   Gastrointestinal:  Negative for abdominal distention, abdominal pain, constipation and diarrhea.   Genitourinary:  Negative for difficulty urinating and dysuria.   Musculoskeletal:  Negative for gait problem and joint swelling.   Skin:  Negative for rash.   Neurological:  Negative for dizziness, light-headedness and headaches.   Psychiatric/Behavioral:  Negative for confusion. The patient is not nervous/anxious.      Review of patient's allergies indicates:   Allergen Reactions    Zosyn [piperacillin-tazobactam] Swelling     Lip swelling       Current Outpatient Medications   Medication Instructions    aspirin 81 MG Chew CHEW 1 tablet (81 mg total) by mouth once daily.    calcium carbonate-vitamin D3 600 mg-20 mcg (800 unit) Tab 1 tablet, Oral, Daily    carvediloL (COREG) 12.5 mg, Oral, 2 times daily with meals    pantoprazole (PROTONIX) 40 mg, Oral, Daily    tacrolimus (PROGRAF) 1 mg, Oral, Every 12 hours    ursodioL (ACTIGALL) 250 mg, Oral, 2 times daily       Patient Active Problem List    Diagnosis Date Noted    Pancytopenia 09/15/2022    Elevated bilirubin 09/15/2022    Cholangitis of transplanted liver 09/15/2022    Elevated LFTs 08/24/2022    Bile leak     Malnutrition of mild degree 07/05/2022    Long-term use of immunosuppressant medication 06/25/2022    Type 2 diabetes mellitus with hyperglycemia 06/22/2022    S/P liver transplant 06/21/2022    Prophylactic immunotherapy 06/21/2022    At risk for opportunistic infections 06/21/2022    Adjustment and management of vascular access device 05/18/2022    Fever 04/26/2022    Anemia 04/26/2022    Chemotherapy induced neutropenia 02/09/2022    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    Immunodeficiency secondary to chemotherapy 12/13/2021    Hilar cholangiocarcinoma 11/04/2021    Hypercholesterolemia 10/15/2021    Hypertension 10/15/2021    Hyperbilirubinemia 10/15/2021    Biliary obstruction 10/15/2021       Past Medical History:   Diagnosis  Date    Adjustment and management of vascular access device 5/18/2022    Cholangiocarcinoma     Fever of unknown origin 4/26/2022    Hiccups     Hilar cholangiocarcinoma 11/4/2021    Hypercholesteremia     Hypertension         Past Surgical History:   Procedure Laterality Date    DIAGNOSTIC ULTRASOUND N/A 6/21/2022    Procedure: ULTRASOUND, DIAGNOSTIC;  Surgeon: Sinan Cline MD;  [facility];  Service: Transplant;  Laterality: N/A;    ENDOSCOPIC ULTRASOUND OF UPPER GASTROINTESTINAL TRACT N/A 10/20/2021    Procedure: ULTRASOUND, UPPER GI TRACT, ENDOSCOPIC;  Surgeon: Valeriy Sotelo MD;  Location: [facility] (2ND FLR);  Service: Endoscopy;  Laterality: N/A;  wife to email vacc card-inst email-tb    ERCP N/A 10/20/2021    Procedure: ERCP (ENDOSCOPIC RETROGRADE CHOLANGIOPANCREATOGRAPHY);  Surgeon: Valeriy Sotelo MD;  [facility]  Service: Endoscopy;  Laterality: N/A;    ERCP N/A 11/4/2021    Procedure: ERCP (ENDOSCOPIC RETROGRADE CHOLANGIOPANCREATOGRAPHY);  Surgeon: Valeriy Sotelo MD;  [facility]  Service: Endoscopy;  Laterality: N/A;    ERCP N/A 11/4/2021    Procedure: ERCP (ENDOSCOPIC RETROGRADE CHOLANGIOPANCREATOGRAPHY);  Surgeon: Roselia Pereyra MD;  [facility]  Service: Endoscopy;  Laterality: N/A;  pt vaccinated, instructed to bring card to procedure, instructions sent portal-MG    ERCP N/A 1/14/2022    Procedure: ERCP (ENDOSCOPIC RETROGRADE CHOLANGIOPANCREATOGRAPHY);  Surgeon: Roselia Pereyra MD;  [facility]  Service: Endoscopy;  Laterality: N/A;  inst portal-right chest port-tb  Pt requested at home COVID testing, Pt instructed at home RAPID to be done morning of procedure, Pt instructed to call endoscopy scheuding if there is a positive result, Pt informed if a positive     ERCP N/A 3/31/2022    Procedure: ERCP (ENDOSCOPIC RETROGRADE CHOLANGIOPANCREATOGRAPHY);  Surgeon: Julio Cesar Alonzo MD;  Location:  "[facility] (2ND FLR);  Service: Endoscopy;  Laterality: N/A;  3/16: port L chest wall. pt to bring covid vaccination card. Instructions sent via portal.-SC  3/18/22-Updated instructions sent via portal re:new date/time-DS    EXPLORATORY LAPAROTOMY AFTER LIVER TRANSPLANTATION N/A 6/23/2022    Procedure: LAPAROTOMY, EXPLORATORY, AFTER LIVER TRANSPLANT;  Surgeon: Julio Cesar Jara MD;  [facility]: [facility] 2ND FLR;  Service: Transplant;  Laterality: N/A;    INSERTION OF TUNNELED CENTRAL VENOUS CATHETER (CVC) WITH SUBCUTANEOUS PORT N/A 11/24/2021    Procedure: INSERTION, PORT-A-CATH;  Surgeon: Prem Syed MD;  Location: [facility] CATH LAB;  Service: Radiology;  Laterality: N/A;    LIVER TRANSPLANT N/A 6/21/2022    Procedure: TRANSPLANT, LIVER;  Surgeon: Sinan Cline MD;  [facility]: [facility] 2ND FLR;  Service: Transplant;  Laterality: N/A;    REPAIR OF BILE DUCT N/A 6/23/2022    Procedure: REPAIR, BILE DUCT;  Surgeon: Julio Cesar Jara MD;  Location: [facility] OR 2ND FLR;  Service: Transplant;  Laterality: N/A;    ROBOT-ASSISTED LAPAROSCOPIC LYMPHADENECTOMY USING DA МАРИНА XI  6/17/2022    Procedure: XI ROBOTIC LYMPHADENECTOMY - Portal;  Surgeon: Julio Cesar Jara MD;  Location: [facility] OR 2ND FLR;  Service: Transplant;;    TONSILLECTOMY      XI ROBOTIC LAPAROSCOPY, EXPLORATORY N/A 6/17/2022    Procedure: XI ROBOTIC LAPAROSCOPY,EXPLORATORY;  Surgeon: Julio Cesar Jara MD;  Location: [facility] 2ND FLR;  Service: Transplant;  Laterality: N/A;        Objective:     Vitals:    05/22/23 1339   BP: (!) 140/99   Pulse: 75   Resp: 18   Temp: 98.7 °F (37.1 °C)   TempSrc: Oral   SpO2: 97%   Weight: 83.1 kg (183 lb 3.2 oz)   Height: 5' 9" (1.753 m)   PainSc: 0-No pain         Body mass index is 27.05 kg/m².    Physical Exam  Constitutional:       General: He is not in acute distress.     Appearance: He is well-developed. He is not diaphoretic.   HENT:      Head: Normocephalic and atraumatic.   Eyes:      General: No scleral icterus.     Conjunctiva/sclera: " Conjunctivae normal.   Cardiovascular:      Rate and Rhythm: Normal rate and regular rhythm.      Pulses: Normal pulses.      Heart sounds: Normal heart sounds.   Pulmonary:      Effort: Pulmonary effort is normal. No respiratory distress.      Breath sounds: Normal breath sounds.   Abdominal:      General: Bowel sounds are normal. There is no distension.      Palpations: Abdomen is soft. There is no mass.      Tenderness: There is no abdominal tenderness. There is no guarding or rebound.      Hernia: No hernia is present.   Musculoskeletal:         General: No swelling or tenderness. Normal range of motion.      Cervical back: Normal range of motion and neck supple.      Right lower leg: No edema.      Left lower leg: No edema.   Skin:     General: Skin is warm and dry.   Neurological:      Mental Status: He is alert and oriented to person, place, and time.   Psychiatric:         Behavior: Behavior normal.       Assessment:     1. Hilar cholangiocarcinoma    2. Liver replaced by transplant    3. Essential hypertension    4. Prediabetes        Plan:   Intervention: education, labs ordered  Follow up: follow up with primary care oncology     Diagnoses and all orders for this visit:  1. Hilar cholangiocarcinoma (Primary)  Completed chemoradiation with 5FU from 11/29/21-1/5/2022. Started cis/gem on 1/19/22. Completed neoadjuvant chemotherapy  5. Underwent living donor liver transplant on 6/21/2022.   Followed by Dr. Wesley every 3 months with labs and surveillance scan    2. Liver replaced by transplant  Stable  On prograf  Followed by transplant team  Will consult with them prior to initiating any further medications    3. Essential hypertension  BP elevated in clinic, better on home readings but still above goal  Discussed lifestyle modifications: reduce salt, dec caffeine, increase exercise, stay well hydrated  Increase coreg to 12.5 mg bid, monitor home bps 3 to 4 times per week and keep a log, monitor heart rate  Will  follow up in one month    4. Prediabetes  A1C 5.7  Discussed diet and lifestyle modifications, low carb diet  Offered referral to nutrition, pt will consider  No medications currently  Will continue to monitor    5. Hyperlipidemia, unspecified hyperlipidemia type  Lipid panel wdl  Continue to exercise  Avoid diet high in red meat and fried foods     Follow up in one month in [facility] and then will get patient further established with a primary care provider      Med Onc Chart Routing      Follow up with physician    Follow up with JOSEPH 1 month. blood pressure follow up in prim/onc clinic on monday afternoon   Infusion scheduling note    Injection scheduling note    Labs    Imaging    Pharmacy appointment    Other referrals           Total time of this visit, including time spent face to face with patient and/or via video/audio, and also in preparing for today's visit for MDM and documentation. (Medical Decision Making, including consideration of possible diagnoses, management options, complex medical record review, review of diagnostic tests and information, consideration and discussion of significant complications based on comorbidities, and discussion with providers involved with the care of the patient) is 30 minutes. Greater than 50% was spent face to face with the patient counseling and coordinating care.    Ethel Francisco NP    There are no Patient Instructions on file for this visit.

## 2023-05-22 NOTE — Clinical Note
Dr. Wesley,  Just had the pleasure of seeing Mr. Larson for a prim/onc follow up.  He is doing well.  He asked that I let you know that he just had a liver ultrasound and MRI abdomen with his transplant team in case you wanted to look at the images.    Best, Ethel Francisco, NP

## 2023-05-24 ENCOUNTER — PATIENT MESSAGE (OUTPATIENT)
Dept: TRANSPLANT | Facility: CLINIC | Age: 43
End: 2023-05-24
Payer: COMMERCIAL

## 2023-05-29 ENCOUNTER — PATIENT MESSAGE (OUTPATIENT)
Dept: ADMINISTRATIVE | Facility: HOSPITAL | Age: 43
End: 2023-05-29
Payer: COMMERCIAL

## 2023-05-29 ENCOUNTER — PATIENT OUTREACH (OUTPATIENT)
Dept: ADMINISTRATIVE | Facility: HOSPITAL | Age: 43
End: 2023-05-29
Payer: COMMERCIAL

## 2023-05-29 NOTE — PROGRESS NOTES
Uncontrolled BP REPORT  BP Readings from Last 3 Encounters:   05/22/23 (!) 140/99   04/24/23 (!) 130/94   03/27/23 (!) 146/101       Non-compliant report chart audits for HYPERTENSION MANAGEMENT     Outreach to patient in reference to hypertension management       BLOOD PRESSURE FOLLOW UP NEEDED WITH A CARE TEAM MEMBER    ACTIVE PORTAL MESSAGE OR LETTER SENT  Population Health Chart Review & Patient Outreach Details:     Reason for Outreach Encounter:     [x]  Non-Compliant Report   []  Payor Report ([employer], PHN, BCBS, MSSP, MCIP, C, etc.)   []  Pre-Visit Chart Review     Updates Requested / Reviewed:     []  Care Everywhere    []     []  External Sources ([facility], WikiYou, DIS, etc.)   []  Care Team Updated    Patient Outreach Method:    []  Telephone Outreach Completed   [] Successful   [] Left Voicemail   [] Unable to Contact (wrong number, no voicemail)  [x]  [facility] Portal Outreach Sent  []  Letter Outreach Mailed  []  Fax Sent for External Records  []  External Records Upload    Health Maintenance Topics Addressed and Outreach Outcomes / Actions Taken:        []      Breast Cancer Screening []  Mammo Scheduled      []  External Records Requested     []  Added Reminder to Complete to Upcoming Primary Care Appt Notes     []  Patient Declined     []  Patient Will Call Back to Schedule     []  Patient Will Schedule with External Provider / Order Routed if Applicable             []       Cervical Cancer Screening []  Pap Scheduled      []  External Records Requested     []  Added Reminder to Complete to Upcoming Primary Care Appt Notes     []  Patient Declined     []  Patient Will Call Back to Schedule     []  Patient Will Schedule with External Provider               []          Colorectal Cancer Screening []  Colonoscopy Case Request or Referral Placed     []  External Records Requested     []  Added Reminder to Complete to Upcoming Primary Care Appt Notes     []  Patient Declined     []  Patient  Will Call Back to Schedule     []  Patient Will Schedule with External Provider     []  Fit Kit Mailed (add the SmartPhrase under additional notes)     []  Reminded Patient to Complete Home Test             []      Diabetic Eye Exam []  Eye Camera Scheduled or Optometry Referral Placed     []  External Records Requested     []  Added Reminder to Complete to Upcoming Primary Care Appt Notes     []  Patient Declined     []  Patient Will Call Back to Schedule     []  Patient Will Schedule with External Provider             [x]      Blood Pressure Control []  Primary Care Follow Up Visit Scheduled     []  Remote Blood Pressure Reading Captured     []  Added Reminder to Complete to Upcoming Primary Care Appt Notes     []  Patient Declined     []  Patient Will Call Back / Patient Will Send Portal Message with Reading     []  Patient Will Call Back to Schedule Provider Visit             []       HbA1c & Other Labs []  Lab Appt Scheduled for Due Labs     []  Primary Care Follow Up Visit Scheduled      []  Reminded Patient to Complete Home Test     []  Added Reminder to Complete to Upcoming Primary Care Appt Notes     []  Patient Declined     []  Patient Will Call Back to Schedule     []  Patient Will Schedule with External Provider / Order Routed if Applicable           []    Schedule Primary Care Appt []  Primary Care Appt Scheduled     []  Patient Declined     []  Patient Will Call Back to Schedule     []  Pt Established with External Provider & Updated Care Team             []      Medication Adherence []  Primary Care Appointment Scheduled     []  Added Reminder to Upcoming Primary Care Appt Notes     []  Patient Reminded to  Prescription     []  Patient Declined, Provider Notified if Needed     []  Sent Provider Message to Review and/or Add Exclusion to Problem List             []      Osteoporosis Screening []  DXA Appointment Scheduled     []  External Records Requested     []  Added Reminder to Complete to  Upcoming Primary Care Appt Notes     []  Patient Declined     []  Patient Will Call Back to Schedule     []  Patient Will Schedule with External Provider / Order Routed if Applicable     Additional Care Coordinator Notes:         Further Action Needed If Patient Returns Outreach:

## 2023-06-05 ENCOUNTER — LAB VISIT (OUTPATIENT)
Dept: LAB | Facility: HOSPITAL | Age: 43
End: 2023-06-05
Attending: INTERNAL MEDICINE
Payer: COMMERCIAL

## 2023-06-05 DIAGNOSIS — Z94.4 S/P LIVER TRANSPLANT: ICD-10-CM

## 2023-06-05 DIAGNOSIS — Z94.4 LIVER REPLACED BY TRANSPLANT: ICD-10-CM

## 2023-06-05 LAB
ALBUMIN SERPL BCP-MCNC: 4.1 G/DL (ref 3.5–5.2)
ALP SERPL-CCNC: 152 U/L (ref 55–135)
ALT SERPL W/O P-5'-P-CCNC: 29 U/L (ref 10–44)
ANION GAP SERPL CALC-SCNC: 8 MMOL/L (ref 8–16)
AST SERPL-CCNC: 24 U/L (ref 10–40)
BASOPHILS # BLD AUTO: 0.02 K/UL (ref 0–0.2)
BASOPHILS NFR BLD: 0.5 % (ref 0–1.9)
BILIRUB SERPL-MCNC: 1.3 MG/DL (ref 0.1–1)
BUN SERPL-MCNC: 17 MG/DL (ref 6–20)
CALCIUM SERPL-MCNC: 9.7 MG/DL (ref 8.7–10.5)
CHLORIDE SERPL-SCNC: 107 MMOL/L (ref 95–110)
CO2 SERPL-SCNC: 25 MMOL/L (ref 23–29)
CREAT SERPL-MCNC: 1.1 MG/DL (ref 0.5–1.4)
DIFFERENTIAL METHOD: ABNORMAL
EOSINOPHIL # BLD AUTO: 0.1 K/UL (ref 0–0.5)
EOSINOPHIL NFR BLD: 3.4 % (ref 0–8)
ERYTHROCYTE [DISTWIDTH] IN BLOOD BY AUTOMATED COUNT: 13.1 % (ref 11.5–14.5)
EST. GFR  (NO RACE VARIABLE): >60 ML/MIN/1.73 M^2
GLUCOSE SERPL-MCNC: 117 MG/DL (ref 70–110)
HCT VFR BLD AUTO: 44.8 % (ref 40–54)
HGB BLD-MCNC: 14.9 G/DL (ref 14–18)
IMM GRANULOCYTES # BLD AUTO: 0.01 K/UL (ref 0–0.04)
IMM GRANULOCYTES NFR BLD AUTO: 0.2 % (ref 0–0.5)
LYMPHOCYTES # BLD AUTO: 1.2 K/UL (ref 1–4.8)
LYMPHOCYTES NFR BLD: 30 % (ref 18–48)
MCH RBC QN AUTO: 31.6 PG (ref 27–31)
MCHC RBC AUTO-ENTMCNC: 33.3 G/DL (ref 32–36)
MCV RBC AUTO: 95 FL (ref 82–98)
MONOCYTES # BLD AUTO: 0.4 K/UL (ref 0.3–1)
MONOCYTES NFR BLD: 10.4 % (ref 4–15)
NEUTROPHILS # BLD AUTO: 2.3 K/UL (ref 1.8–7.7)
NEUTROPHILS NFR BLD: 55.5 % (ref 38–73)
NRBC BLD-RTO: 0 /100 WBC
PLATELET # BLD AUTO: 136 K/UL (ref 150–450)
PMV BLD AUTO: 9.1 FL (ref 9.2–12.9)
POTASSIUM SERPL-SCNC: 4.7 MMOL/L (ref 3.5–5.1)
PROT SERPL-MCNC: 6.8 G/DL (ref 6–8.4)
RBC # BLD AUTO: 4.72 M/UL (ref 4.6–6.2)
SODIUM SERPL-SCNC: 140 MMOL/L (ref 136–145)
WBC # BLD AUTO: 4.13 K/UL (ref 3.9–12.7)

## 2023-06-05 PROCEDURE — 80197 ASSAY OF TACROLIMUS: CPT | Performed by: INTERNAL MEDICINE

## 2023-06-05 PROCEDURE — 80053 COMPREHEN METABOLIC PANEL: CPT | Performed by: INTERNAL MEDICINE

## 2023-06-05 PROCEDURE — 85025 COMPLETE CBC W/AUTO DIFF WBC: CPT | Performed by: INTERNAL MEDICINE

## 2023-06-05 PROCEDURE — 87517 HEPATITIS B DNA QUANT: CPT | Performed by: INTERNAL MEDICINE

## 2023-06-06 ENCOUNTER — PATIENT MESSAGE (OUTPATIENT)
Dept: HEMATOLOGY/ONCOLOGY | Facility: CLINIC | Age: 43
End: 2023-06-06
Payer: COMMERCIAL

## 2023-06-06 LAB — TACROLIMUS BLD-MCNC: 12 NG/ML (ref 5–15)

## 2023-06-06 RX ORDER — URSODIOL 250 MG/1
250 TABLET, FILM COATED ORAL 2 TIMES DAILY
Qty: 60 TABLET | Refills: 11 | Status: SHIPPED | OUTPATIENT
Start: 2023-06-06 | End: 2023-07-10 | Stop reason: SDUPTHER

## 2023-06-07 ENCOUNTER — PATIENT MESSAGE (OUTPATIENT)
Dept: TRANSPLANT | Facility: CLINIC | Age: 43
End: 2023-06-07
Payer: COMMERCIAL

## 2023-06-08 ENCOUNTER — TELEPHONE (OUTPATIENT)
Dept: TRANSPLANT | Facility: CLINIC | Age: 43
End: 2023-06-08
Payer: COMMERCIAL

## 2023-06-08 NOTE — TELEPHONE ENCOUNTER
Message received from patient via portal: took Prograf dose late on the night before labs .  informed.

## 2023-06-09 ENCOUNTER — TELEPHONE (OUTPATIENT)
Dept: TRANSPLANT | Facility: CLINIC | Age: 43
End: 2023-06-09
Payer: COMMERCIAL

## 2023-06-09 DIAGNOSIS — Z94.4 LIVER REPLACED BY TRANSPLANT: Primary | ICD-10-CM

## 2023-06-09 NOTE — TELEPHONE ENCOUNTER
----- Message from Zoran Nobles MD sent at 6/9/2023  9:12 AM CDT -----  Results reviewed. No action.

## 2023-06-13 ENCOUNTER — TELEPHONE (OUTPATIENT)
Dept: INTERNAL MEDICINE | Facility: CLINIC | Age: 43
End: 2023-06-13
Payer: COMMERCIAL

## 2023-06-13 NOTE — TELEPHONE ENCOUNTER
----- Message from Yves Moses MD sent at 6/12/2023  1:59 PM CDT -----  Regarding: FW: needs a new PCP  Please schedule pt to establish care      ----- Message -----  From: Ethel Francisco NP  Sent: 6/9/2023  11:37 AM CDT  To: Yves Moses MD  Subject: needs a new PCP                                  Dr. Moses,    Hey friend!  Hope all is well.  This very nice man needs a new primary care provider.  He has a history of hx of T2N0M0 hilar cholangiocarcinoma, s/p neoadjuvant chemoradiation, chemotherapy and liver transplant on 6/21/2022 and is now just on surveillance with oncology.  I have seen him once or twice for a primary care/oncology visit for blood pressure, but would like to transition him to a permanent PCP.  He would like to see someone at main campus.  Would you be willing to take him on.  I know your schedule is currently closed.      Best,  Ethel

## 2023-06-14 DIAGNOSIS — E11.9 TYPE 2 DIABETES MELLITUS WITHOUT COMPLICATION: ICD-10-CM

## 2023-06-16 ENCOUNTER — TELEPHONE (OUTPATIENT)
Dept: HEMATOLOGY/ONCOLOGY | Facility: CLINIC | Age: 43
End: 2023-06-16
Payer: COMMERCIAL

## 2023-06-19 ENCOUNTER — PATIENT OUTREACH (OUTPATIENT)
Dept: ADMINISTRATIVE | Facility: HOSPITAL | Age: 43
End: 2023-06-19
Payer: COMMERCIAL

## 2023-06-19 ENCOUNTER — PATIENT MESSAGE (OUTPATIENT)
Dept: ADMINISTRATIVE | Facility: HOSPITAL | Age: 43
End: 2023-06-19
Payer: COMMERCIAL

## 2023-06-19 NOTE — PROGRESS NOTES
WEEKLY BULK ORDER REPORT.  ORDER PLACED    SCHEDULED  Routine Dilated Eye Exam  (Diabetic Retinopathy screening)     Non-compliant report chart audits for Eye Exam for Patients with Diabetes     Outreach to patient in reference to a routine Eye Exam

## 2023-06-20 ENCOUNTER — PATIENT MESSAGE (OUTPATIENT)
Dept: HEMATOLOGY/ONCOLOGY | Facility: CLINIC | Age: 43
End: 2023-06-20
Payer: COMMERCIAL

## 2023-06-20 ENCOUNTER — OFFICE VISIT (OUTPATIENT)
Dept: INTERNAL MEDICINE | Facility: CLINIC | Age: 43
End: 2023-06-20
Payer: COMMERCIAL

## 2023-06-20 VITALS
HEIGHT: 69 IN | WEIGHT: 184.31 LBS | SYSTOLIC BLOOD PRESSURE: 110 MMHG | OXYGEN SATURATION: 96 % | BODY MASS INDEX: 27.3 KG/M2 | HEART RATE: 83 BPM | DIASTOLIC BLOOD PRESSURE: 72 MMHG

## 2023-06-20 DIAGNOSIS — R73.9 STEROID-INDUCED HYPERGLYCEMIA: ICD-10-CM

## 2023-06-20 DIAGNOSIS — C24.0 HILAR CHOLANGIOCARCINOMA: ICD-10-CM

## 2023-06-20 DIAGNOSIS — H53.9 VISION CHANGES: ICD-10-CM

## 2023-06-20 DIAGNOSIS — T38.0X5A STEROID-INDUCED HYPERGLYCEMIA: ICD-10-CM

## 2023-06-20 DIAGNOSIS — E78.00 HYPERCHOLESTEROLEMIA: ICD-10-CM

## 2023-06-20 DIAGNOSIS — Z76.89 ENCOUNTER TO ESTABLISH CARE: Primary | ICD-10-CM

## 2023-06-20 DIAGNOSIS — I10 ESSENTIAL HYPERTENSION: ICD-10-CM

## 2023-06-20 DIAGNOSIS — Z79.60 LONG-TERM USE OF IMMUNOSUPPRESSANT MEDICATION: ICD-10-CM

## 2023-06-20 DIAGNOSIS — Z91.89 AT RISK FOR OPPORTUNISTIC INFECTIONS: ICD-10-CM

## 2023-06-20 DIAGNOSIS — Z94.4 S/P LIVER TRANSPLANT: ICD-10-CM

## 2023-06-20 PROBLEM — E11.65 TYPE 2 DIABETES MELLITUS WITH HYPERGLYCEMIA: Status: RESOLVED | Noted: 2022-06-22 | Resolved: 2023-06-20

## 2023-06-20 PROBLEM — E44.1 MALNUTRITION OF MILD DEGREE: Status: RESOLVED | Noted: 2022-07-05 | Resolved: 2023-06-20

## 2023-06-20 PROCEDURE — 3078F DIAST BP <80 MM HG: CPT | Mod: CPTII,S$GLB,, | Performed by: INTERNAL MEDICINE

## 2023-06-20 PROCEDURE — 1159F PR MEDICATION LIST DOCUMENTED IN MEDICAL RECORD: ICD-10-PCS | Mod: CPTII,S$GLB,, | Performed by: INTERNAL MEDICINE

## 2023-06-20 PROCEDURE — 99999 PR PBB SHADOW E&M-EST. PATIENT-LVL IV: ICD-10-PCS | Mod: PBBFAC,,, | Performed by: INTERNAL MEDICINE

## 2023-06-20 PROCEDURE — 99214 OFFICE O/P EST MOD 30 MIN: CPT | Mod: S$GLB,,, | Performed by: INTERNAL MEDICINE

## 2023-06-20 PROCEDURE — 3074F PR MOST RECENT SYSTOLIC BLOOD PRESSURE < 130 MM HG: ICD-10-PCS | Mod: CPTII,S$GLB,, | Performed by: INTERNAL MEDICINE

## 2023-06-20 PROCEDURE — 3044F HG A1C LEVEL LT 7.0%: CPT | Mod: CPTII,S$GLB,, | Performed by: INTERNAL MEDICINE

## 2023-06-20 PROCEDURE — 99999 PR PBB SHADOW E&M-EST. PATIENT-LVL IV: CPT | Mod: PBBFAC,,, | Performed by: INTERNAL MEDICINE

## 2023-06-20 PROCEDURE — 3078F PR MOST RECENT DIASTOLIC BLOOD PRESSURE < 80 MM HG: ICD-10-PCS | Mod: CPTII,S$GLB,, | Performed by: INTERNAL MEDICINE

## 2023-06-20 PROCEDURE — 3044F PR MOST RECENT HEMOGLOBIN A1C LEVEL <7.0%: ICD-10-PCS | Mod: CPTII,S$GLB,, | Performed by: INTERNAL MEDICINE

## 2023-06-20 PROCEDURE — 3008F PR BODY MASS INDEX (BMI) DOCUMENTED: ICD-10-PCS | Mod: CPTII,S$GLB,, | Performed by: INTERNAL MEDICINE

## 2023-06-20 PROCEDURE — 3008F BODY MASS INDEX DOCD: CPT | Mod: CPTII,S$GLB,, | Performed by: INTERNAL MEDICINE

## 2023-06-20 PROCEDURE — 3074F SYST BP LT 130 MM HG: CPT | Mod: CPTII,S$GLB,, | Performed by: INTERNAL MEDICINE

## 2023-06-20 PROCEDURE — 99214 PR OFFICE/OUTPT VISIT, EST, LEVL IV, 30-39 MIN: ICD-10-PCS | Mod: S$GLB,,, | Performed by: INTERNAL MEDICINE

## 2023-06-20 PROCEDURE — 1159F MED LIST DOCD IN RCRD: CPT | Mod: CPTII,S$GLB,, | Performed by: INTERNAL MEDICINE

## 2023-06-20 NOTE — PROGRESS NOTES
Subjective:       Patient ID: OSCAR PAYNE is a 42 y.o. male.    Chief Complaint: Establish Care      HPI  OSCAR PAYNE is a 42 y.o. year old male with  hx of T2N0M0 hilar cholangiocarcinoma, s/p neoadjuvant chemoradiation, chemotherapy and liver transplant on 6/21/2022, HTN, HLD, steroid induced hyperglycemia, history of fungemia - candida glabrata.     Home blood pressures 120s/80s.   Blood pressure here in clinic 110/72    Review of Systems   Constitutional:  Negative for activity change, appetite change, chills, fatigue, fever and unexpected weight change.   HENT:  Negative for congestion, rhinorrhea and sore throat.    Eyes:  Negative for visual disturbance.   Respiratory:  Negative for shortness of breath.    Cardiovascular:  Negative for chest pain.   Gastrointestinal:  Negative for abdominal pain, diarrhea, nausea and vomiting.   Genitourinary:  Negative for difficulty urinating and dysuria.   Musculoskeletal:  Negative for arthralgias, back pain and myalgias.   Skin:  Negative for color change and rash.   Neurological:  Negative for dizziness, weakness and headaches.       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 Authorizing Provider   aspirin 81 MG Chew CHEW 1 tablet (81 mg total) by mouth once daily. 6/28/22 7/6/23  Abida Arenas NP   calcium carbonate-vitamin D3 600 mg-20 mcg (800 unit) Tab Take 1 tablet by mouth once daily at 6am.  Patient not taking: Reported on 5/22/2023 6/28/22   Abida Arenas NP   carvediloL (COREG) 12.5 MG tablet Take 1 tablet (12.5 mg total) by mouth 2 (two) times daily with meals. 5/22/23   Ethel Francisco NP   pantoprazole (PROTONIX) 40 MG tablet Take 1 tablet  "(40 mg total) by mouth once daily.  Patient not taking: Reported on 5/22/2023 7/9/22   Sinan Cline MD   tacrolimus (PROGRAF) 1 MG Cap Take 1 capsule (1 mg total) by mouth every 12 (twelve) hours. 5/22/23 5/21/24  Zoran Nobles MD   ursodioL (ACTIGALL) 250 mg Tab Take 1 tablet (250 mg total) by mouth 2 (two) times a day. 6/6/23   Zoran Nobles MD   baclofen (LIORESAL) 10 MG tablet TAKE 1 TABLET(10 MG) BY MOUTH THREE TIMES DAILY AS NEEDED FOR HICCUPS  Patient not taking: No sig reported 4/6/22 6/17/22  Young Wesley MD   famotidine (PEPCID) 20 MG tablet Take 1 tablet (20 mg total) by mouth every evening. STOP 7/24/22 6/21/22 7/8/22  Sinan Cline MD   insulin aspart U-100 (NOVOLOG) 100 unit/mL (3 mL) InPn pen Inject 5 Units into the skin 3 (three) times daily with meals. + sliding scale.  MDD 30 units/d 7/16/22 7/27/22  Brinda Serrato MD   losartan (COZAAR) 50 MG tablet Take 1 tablet (50 mg total) by mouth once daily. 3/21/22 6/24/22  Pravin Maria MD   metFORMIN (GLUCOPHAGE) 500 MG tablet Take 1 tablet (500 mg total) by mouth 2 (two) times daily with meals. 7/8/22 7/8/22  Sinan Cline MD   tamsulosin (FLOMAX) 0.4 mg Cap Take 2 capsules (0.8 mg total) by mouth once daily. 7/9/22 7/27/22  Sinan Cline MD        Past medical history, surgical history, and family medical history reviewed and updated as appropriate.    Medications and allergies reviewed.     Objective:          Vitals:    06/20/23 1259   BP: 110/72   BP Location: Right arm   Patient Position: Sitting   Pulse: 83   SpO2: 96%   Weight: 83.6 kg (184 lb 4.9 oz)   Height: 5' 9" (1.753 m)     Body mass index is 27.22 kg/m².  Physical Exam  Constitutional:       General: He is not in acute distress.     Appearance: He is well-developed.   HENT:      Head: Normocephalic and atraumatic.      Nose: Nose normal.   Eyes:      General: No scleral icterus.     Extraocular Movements: Extraocular movements intact.   Neck:      Thyroid: No thyromegaly.      " Vascular: No JVD.      Trachea: No tracheal deviation.   Cardiovascular:      Rate and Rhythm: Normal rate and regular rhythm.      Heart sounds: Normal heart sounds. No murmur heard.    No friction rub. No gallop.   Pulmonary:      Effort: Pulmonary effort is normal. No respiratory distress.      Breath sounds: Normal breath sounds. No wheezing or rales.   Abdominal:      General: There is no distension.      Palpations: Abdomen is soft. There is no mass.      Tenderness: There is no abdominal tenderness.      Comments: Post surgical changes, all well healed   Musculoskeletal:         General: No tenderness. Normal range of motion.      Cervical back: Normal range of motion and neck supple.   Lymphadenopathy:      Cervical: No cervical adenopathy.   Skin:     General: Skin is warm and dry.      Findings: No rash.   Neurological:      Mental Status: He is alert and oriented to person, place, and time.      Cranial Nerves: No cranial nerve deficit.      Deep Tendon Reflexes: Reflexes normal.   Psychiatric:         Behavior: Behavior normal.       Lab Results   Component Value Date    WBC 4.13 06/05/2023    HGB 14.9 06/05/2023    HCT 44.8 06/05/2023     (L) 06/05/2023    CHOL 157 05/01/2023    TRIG 116 05/01/2023    HDL 44 05/01/2023    ALT 29 06/05/2023    AST 24 06/05/2023     06/05/2023    K 4.7 06/05/2023     06/05/2023    CREATININE 1.1 06/05/2023    BUN 17 06/05/2023    CO2 25 06/05/2023    TSH 0.630 05/01/2023    PSA 0.68 03/24/2022    INR 1.0 09/26/2022    HGBA1C 5.7 (H) 05/01/2023       Assessment:       1. Encounter to establish care    2. Essential hypertension    3. Hypercholesterolemia    4. Hilar cholangiocarcinoma    5. S/P liver transplant    6. Long-term use of immunosuppressant medication    7. At risk for opportunistic infections    8. Steroid-induced hyperglycemia    9. Vision changes          Plan:     OSCAR was seen today for establish care.    Diagnoses and all orders for  this visit:    Encounter to establish care    Essential hypertension  Comments:  started by transplant team on coreg 6.25, recently increased to 12.5 mg bid. stable, no changes to current management  Orders:  -     Ambulatory referral/consult to Internal Medicine    Hypercholesterolemia  Comments:  lifestyle controlled, s/p hepatic transplant, not on statin currently.    Hilar cholangiocarcinoma  Comments:  diagnosed 2021, s/p radiation therapy, chemotherapy, liver transplant.     S/P liver transplant  Comments:  brother gave him 70% of liver, doing well; on ursodiol followed by hepatology    Long-term use of immunosuppressant medication  Comments:  on tacrolimus 1mg twice a day    At risk for opportunistic infections    Steroid-induced hyperglycemia  Comments:  was on prednisone post transplant, a1c at one time reached 6.8, has returned back down to 5.7, not on meds, will monitor  Orders:  -     Ambulatory referral/consult to Optometry; Future    Vision changes  -     Ambulatory referral/consult to Optometry; Future    Benign physical examination, no issues identified. Will obtain routine labwork and age appropriate health screenings.     Health maintenance reviewed with patient.     Follow up in about 6 months (around 12/20/2023).    Yves Moses MD  Internal Medicine / Primary Care  [facility]  6/20/2023

## 2023-06-24 ENCOUNTER — LAB VISIT (OUTPATIENT)
Dept: LAB | Facility: HOSPITAL | Age: 43
End: 2023-06-24
Attending: INTERNAL MEDICINE
Payer: COMMERCIAL

## 2023-06-24 DIAGNOSIS — C24.0 HILAR CHOLANGIOCARCINOMA: ICD-10-CM

## 2023-06-24 LAB
ALBUMIN SERPL BCP-MCNC: 4.1 G/DL (ref 3.5–5.2)
ALP SERPL-CCNC: 169 U/L (ref 55–135)
ALT SERPL W/O P-5'-P-CCNC: 38 U/L (ref 10–44)
ANION GAP SERPL CALC-SCNC: 11 MMOL/L (ref 8–16)
AST SERPL-CCNC: 25 U/L (ref 10–40)
BASOPHILS # BLD AUTO: 0.02 K/UL (ref 0–0.2)
BASOPHILS NFR BLD: 0.5 % (ref 0–1.9)
BILIRUB SERPL-MCNC: 1.3 MG/DL (ref 0.1–1)
BUN SERPL-MCNC: 17 MG/DL (ref 6–20)
CALCIUM SERPL-MCNC: 9.5 MG/DL (ref 8.7–10.5)
CANCER AG19-9 SERPL-ACNC: 7.5 U/ML (ref 0–40)
CHLORIDE SERPL-SCNC: 106 MMOL/L (ref 95–110)
CO2 SERPL-SCNC: 24 MMOL/L (ref 23–29)
CREAT SERPL-MCNC: 1.2 MG/DL (ref 0.5–1.4)
DIFFERENTIAL METHOD: ABNORMAL
EOSINOPHIL # BLD AUTO: 0.1 K/UL (ref 0–0.5)
EOSINOPHIL NFR BLD: 2.1 % (ref 0–8)
ERYTHROCYTE [DISTWIDTH] IN BLOOD BY AUTOMATED COUNT: 12.4 % (ref 11.5–14.5)
EST. GFR  (NO RACE VARIABLE): >60 ML/MIN/1.73 M^2
GLUCOSE SERPL-MCNC: 118 MG/DL (ref 70–110)
HCT VFR BLD AUTO: 45 % (ref 40–54)
HGB BLD-MCNC: 15.6 G/DL (ref 14–18)
IMM GRANULOCYTES # BLD AUTO: 0.01 K/UL (ref 0–0.04)
IMM GRANULOCYTES NFR BLD AUTO: 0.2 % (ref 0–0.5)
LYMPHOCYTES # BLD AUTO: 1.2 K/UL (ref 1–4.8)
LYMPHOCYTES NFR BLD: 26.8 % (ref 18–48)
MCH RBC QN AUTO: 32 PG (ref 27–31)
MCHC RBC AUTO-ENTMCNC: 34.7 G/DL (ref 32–36)
MCV RBC AUTO: 92 FL (ref 82–98)
MONOCYTES # BLD AUTO: 0.4 K/UL (ref 0.3–1)
MONOCYTES NFR BLD: 9.1 % (ref 4–15)
NEUTROPHILS # BLD AUTO: 2.6 K/UL (ref 1.8–7.7)
NEUTROPHILS NFR BLD: 61.3 % (ref 38–73)
NRBC BLD-RTO: 0 /100 WBC
PLATELET # BLD AUTO: 139 K/UL (ref 150–450)
PMV BLD AUTO: 8.4 FL (ref 9.2–12.9)
POTASSIUM SERPL-SCNC: 4.4 MMOL/L (ref 3.5–5.1)
PROT SERPL-MCNC: 7 G/DL (ref 6–8.4)
RBC # BLD AUTO: 4.88 M/UL (ref 4.6–6.2)
SODIUM SERPL-SCNC: 141 MMOL/L (ref 136–145)
WBC # BLD AUTO: 4.29 K/UL (ref 3.9–12.7)

## 2023-06-24 PROCEDURE — 85025 COMPLETE CBC W/AUTO DIFF WBC: CPT | Mod: PO | Performed by: INTERNAL MEDICINE

## 2023-06-24 PROCEDURE — 86301 IMMUNOASSAY TUMOR CA 19-9: CPT | Performed by: INTERNAL MEDICINE

## 2023-06-24 PROCEDURE — 36415 COLL VENOUS BLD VENIPUNCTURE: CPT | Mod: PO | Performed by: INTERNAL MEDICINE

## 2023-06-24 PROCEDURE — 80053 COMPREHEN METABOLIC PANEL: CPT | Mod: PO | Performed by: INTERNAL MEDICINE

## 2023-06-25 DIAGNOSIS — Z94.4 LIVER REPLACED BY TRANSPLANT: ICD-10-CM

## 2023-06-25 DIAGNOSIS — I10 ESSENTIAL HYPERTENSION: ICD-10-CM

## 2023-06-26 ENCOUNTER — INFUSION (OUTPATIENT)
Dept: INFUSION THERAPY | Facility: HOSPITAL | Age: 43
End: 2023-06-26
Attending: PHYSICIAN ASSISTANT
Payer: COMMERCIAL

## 2023-06-26 ENCOUNTER — HOSPITAL ENCOUNTER (OUTPATIENT)
Dept: RADIOLOGY | Facility: HOSPITAL | Age: 43
Discharge: HOME | End: 2023-06-26
Attending: INTERNAL MEDICINE
Payer: COMMERCIAL

## 2023-06-26 DIAGNOSIS — Z45.2 ADJUSTMENT AND MANAGEMENT OF VASCULAR ACCESS DEVICE: Primary | ICD-10-CM

## 2023-06-26 DIAGNOSIS — C24.0 HILAR CHOLANGIOCARCINOMA: ICD-10-CM

## 2023-06-26 PROCEDURE — 74177 CT CHEST ABDOMEN PELVIS WITH CONTRAST (XPD): ICD-10-PCS | Mod: 26,,, | Performed by: RADIOLOGY

## 2023-06-26 PROCEDURE — 25500020 PHARM REV CODE 255: Mod: PO | Performed by: INTERNAL MEDICINE

## 2023-06-26 PROCEDURE — 63600175 PHARM REV CODE 636 W HCPCS: Mod: PN | Performed by: PHYSICIAN ASSISTANT

## 2023-06-26 PROCEDURE — 96523 IRRIG DRUG DELIVERY DEVICE: CPT | Mod: PN

## 2023-06-26 PROCEDURE — 71260 CT THORAX DX C+: CPT | Mod: TC,PO

## 2023-06-26 PROCEDURE — 71260 CT THORAX DX C+: CPT | Mod: 26,,, | Performed by: RADIOLOGY

## 2023-06-26 PROCEDURE — 71260 CT CHEST ABDOMEN PELVIS WITH CONTRAST (XPD): ICD-10-PCS | Mod: 26,,, | Performed by: RADIOLOGY

## 2023-06-26 PROCEDURE — 74177 CT ABD & PELVIS W/CONTRAST: CPT | Mod: TC,PO

## 2023-06-26 PROCEDURE — A4216 STERILE WATER/SALINE, 10 ML: HCPCS | Mod: PN | Performed by: PHYSICIAN ASSISTANT

## 2023-06-26 PROCEDURE — 25000003 PHARM REV CODE 250: Mod: PN | Performed by: PHYSICIAN ASSISTANT

## 2023-06-26 PROCEDURE — A9698 NON-RAD CONTRAST MATERIALNOC: HCPCS | Mod: PO | Performed by: INTERNAL MEDICINE

## 2023-06-26 PROCEDURE — 74177 CT ABD & PELVIS W/CONTRAST: CPT | Mod: 26,,, | Performed by: RADIOLOGY

## 2023-06-26 RX ORDER — SODIUM CHLORIDE 0.9 % (FLUSH) 0.9 %
10 SYRINGE (ML) INJECTION
Status: CANCELLED | OUTPATIENT
Start: 2023-07-28

## 2023-06-26 RX ORDER — HEPARIN 100 UNIT/ML
500 SYRINGE INTRAVENOUS
Status: CANCELLED | OUTPATIENT
Start: 2023-07-28

## 2023-06-26 RX ORDER — HEPARIN 100 UNIT/ML
500 SYRINGE INTRAVENOUS
Status: DISCONTINUED | OUTPATIENT
Start: 2023-06-26 | End: 2023-06-26 | Stop reason: HOSPADM

## 2023-06-26 RX ORDER — SODIUM CHLORIDE 0.9 % (FLUSH) 0.9 %
10 SYRINGE (ML) INJECTION
Status: DISCONTINUED | OUTPATIENT
Start: 2023-06-26 | End: 2023-06-26 | Stop reason: HOSPADM

## 2023-06-26 RX ORDER — CARVEDILOL 12.5 MG/1
12.5 TABLET ORAL 2 TIMES DAILY WITH MEALS
Qty: 60 TABLET | Refills: 11 | Status: SHIPPED | OUTPATIENT
Start: 2023-06-26 | End: 2023-08-27 | Stop reason: SDUPTHER

## 2023-06-26 RX ADMIN — Medication 500 UNITS: at 03:06

## 2023-06-26 RX ADMIN — IOHEXOL 1000 ML: 9 SOLUTION ORAL at 02:06

## 2023-06-26 RX ADMIN — IOHEXOL 75 ML: 350 INJECTION, SOLUTION INTRAVENOUS at 02:06

## 2023-06-26 RX ADMIN — Medication 10 ML: at 03:06

## 2023-06-27 ENCOUNTER — OFFICE VISIT (OUTPATIENT)
Dept: HEMATOLOGY/ONCOLOGY | Facility: CLINIC | Age: 43
End: 2023-06-27
Attending: INTERNAL MEDICINE
Payer: COMMERCIAL

## 2023-06-27 VITALS
WEIGHT: 186.5 LBS | HEIGHT: 69 IN | HEART RATE: 77 BPM | SYSTOLIC BLOOD PRESSURE: 139 MMHG | RESPIRATION RATE: 18 BRPM | BODY MASS INDEX: 27.62 KG/M2 | DIASTOLIC BLOOD PRESSURE: 91 MMHG | OXYGEN SATURATION: 99 %

## 2023-06-27 DIAGNOSIS — I10 ESSENTIAL HYPERTENSION: ICD-10-CM

## 2023-06-27 DIAGNOSIS — Z79.60 LONG-TERM USE OF IMMUNOSUPPRESSANT MEDICATION: ICD-10-CM

## 2023-06-27 DIAGNOSIS — C24.0 HILAR CHOLANGIOCARCINOMA: Primary | ICD-10-CM

## 2023-06-27 DIAGNOSIS — Z94.4 S/P LIVER TRANSPLANT: ICD-10-CM

## 2023-06-27 PROCEDURE — 1159F PR MEDICATION LIST DOCUMENTED IN MEDICAL RECORD: ICD-10-PCS | Mod: CPTII,S$GLB,, | Performed by: INTERNAL MEDICINE

## 2023-06-27 PROCEDURE — 99999 PR PBB SHADOW E&M-EST. PATIENT-LVL IV: ICD-10-PCS | Mod: PBBFAC,,, | Performed by: INTERNAL MEDICINE

## 2023-06-27 PROCEDURE — 3075F PR MOST RECENT SYSTOLIC BLOOD PRESS GE 130-139MM HG: ICD-10-PCS | Mod: CPTII,S$GLB,, | Performed by: INTERNAL MEDICINE

## 2023-06-27 PROCEDURE — 3080F DIAST BP >= 90 MM HG: CPT | Mod: CPTII,S$GLB,, | Performed by: INTERNAL MEDICINE

## 2023-06-27 PROCEDURE — 1160F PR REVIEW ALL MEDS BY PRESCRIBER/CLIN PHARMACIST DOCUMENTED: ICD-10-PCS | Mod: CPTII,S$GLB,, | Performed by: INTERNAL MEDICINE

## 2023-06-27 PROCEDURE — 3066F NEPHROPATHY DOC TX: CPT | Mod: CPTII,S$GLB,, | Performed by: INTERNAL MEDICINE

## 2023-06-27 PROCEDURE — 3044F PR MOST RECENT HEMOGLOBIN A1C LEVEL <7.0%: ICD-10-PCS | Mod: CPTII,S$GLB,, | Performed by: INTERNAL MEDICINE

## 2023-06-27 PROCEDURE — 3008F BODY MASS INDEX DOCD: CPT | Mod: CPTII,S$GLB,, | Performed by: INTERNAL MEDICINE

## 2023-06-27 PROCEDURE — 3044F HG A1C LEVEL LT 7.0%: CPT | Mod: CPTII,S$GLB,, | Performed by: INTERNAL MEDICINE

## 2023-06-27 PROCEDURE — 1159F MED LIST DOCD IN RCRD: CPT | Mod: CPTII,S$GLB,, | Performed by: INTERNAL MEDICINE

## 2023-06-27 PROCEDURE — 1160F RVW MEDS BY RX/DR IN RCRD: CPT | Mod: CPTII,S$GLB,, | Performed by: INTERNAL MEDICINE

## 2023-06-27 PROCEDURE — 3061F PR NEG MICROALBUMINURIA RESULT DOCUMENTED/REVIEW: ICD-10-PCS | Mod: CPTII,S$GLB,, | Performed by: INTERNAL MEDICINE

## 2023-06-27 PROCEDURE — 3075F SYST BP GE 130 - 139MM HG: CPT | Mod: CPTII,S$GLB,, | Performed by: INTERNAL MEDICINE

## 2023-06-27 PROCEDURE — 99214 PR OFFICE/OUTPT VISIT, EST, LEVL IV, 30-39 MIN: ICD-10-PCS | Mod: S$GLB,,, | Performed by: INTERNAL MEDICINE

## 2023-06-27 PROCEDURE — 99214 OFFICE O/P EST MOD 30 MIN: CPT | Mod: S$GLB,,, | Performed by: INTERNAL MEDICINE

## 2023-06-27 PROCEDURE — 3066F PR DOCUMENTATION OF TREATMENT FOR NEPHROPATHY: ICD-10-PCS | Mod: CPTII,S$GLB,, | Performed by: INTERNAL MEDICINE

## 2023-06-27 PROCEDURE — 3080F PR MOST RECENT DIASTOLIC BLOOD PRESSURE >= 90 MM HG: ICD-10-PCS | Mod: CPTII,S$GLB,, | Performed by: INTERNAL MEDICINE

## 2023-06-27 PROCEDURE — 3008F PR BODY MASS INDEX (BMI) DOCUMENTED: ICD-10-PCS | Mod: CPTII,S$GLB,, | Performed by: INTERNAL MEDICINE

## 2023-06-27 PROCEDURE — 99999 PR PBB SHADOW E&M-EST. PATIENT-LVL IV: CPT | Mod: PBBFAC,,, | Performed by: INTERNAL MEDICINE

## 2023-06-27 PROCEDURE — 3061F NEG MICROALBUMINURIA REV: CPT | Mod: CPTII,S$GLB,, | Performed by: INTERNAL MEDICINE

## 2023-06-27 NOTE — PROGRESS NOTES
"                                                         PROGRESS NOTE    Subjective:       Patient ID: OSCAR LARSON is a 42 y.o. male.    Chief Complaint: follow up for hilar cholangiocarcinoma    Diagnosis:  Yp T2N0M0 hilar cholangiocarcinoma, s/p neoadjuvant chemoradiation, chemotherapy and liver transplant on 6/21/2022    Oncologic History:  1. Mr Larson is a 40 yo man who presents today for further management of suspected hilar cholangiocarcinoma. He presented with dark urine, abdominal pain and jaundice since August 2021. He presented to outside hospital ER with elevated bilirubin. MRCP was performed demonstrating bi-lobar intrahepatic bile duct dilation and a ~2cm ill defined mass at the hilum concerning for hilar cholangiocarcinoma. He was referred to the advanced endoscopy group at [facility]. ERCP confirmed severe, malignant appearing stricture involving right and left main hepatic ducts. Biliary stents were placed to relieve obstruction. EUS was performed. It showed an irregular hypoechoic mass where the hepatic duct bifurcates into the right and left hepatic ducts. The mass measured 11.4 mm by 11.3 mm in maximal cross-sectional diameter. FNA sampling of hilar lymph nodes was negative for metastatic disease. Bile duct biopsy showed "rare groups of glandular epithelium with mild atypia, strips of benign glandular epithelium intermixed with blood clot, and focal fibrous tissue with chronic inflammation."  CT C/A/P 10/27/21:  1. Intrahepatic biliary dilatation despite the presence of 2 biliary drains.  The patient is recent comparison MRI a revealed a possible central hepatic mass, concerning for either cholangiocarcinoma or hepatocellular carcinoma.  This is, however, not definitively demonstrated on today's exam.  There are enlarged lymph nodes present in the vicinity of the ana cristina hepatis and celiac axis as detailed above.  2. Scattered pulmonary nodules measuring up to 6 mm.  3. Other findings as detailed " "above.  He presents today for further management. Seen by Dr Jara and considered a candidate for liver transplant. Seen by Dr Cunningham last Friday. Scheduled for PET this Wednesday. 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   Discussed neoadjuvant chemoradiation with 5FU followed by neoadjuvant cis/gem prior to liver transplant.   2. Hospitalized 11/3/21-21 for fever 2/2 acute cholangitis. Repeat ERCP biopsy on 21 again non-diagnostic. Biopsy of the celiac lymph node was negative.   3. PET scan 11/3/21: "1. Wedge-shaped geographic hypermetabolic activity within the right lobe of the liver in a similar distribution to the intrahepatic biliary dilatation.  This could relate to inflammation from multiple etiologies including biliary stasis, cholangitis, and obstructive dilatation.  Neoplastic involvement would be difficult to exclude.  The liver is poorly evaluated given background activity. 2. Hypermetabolic lymphadenopathy is noted in a periportal and celiac distribution.  Infectious inflammatory and neoplastic etiologies are on the differential.  Index nodes have been measured. 3. Multiple pulmonary nodules are noted too small to categorize by PET-CT fusion as evidence seen on a prior CT of 10/27/2021.  CT for follow-up of size stability is necessary."  4. Completed chemoradiation with 5FU from . Started cis/gem on 22. Completed neoadjuvant chemotherapy  5. Underwent living donor liver transplant on 2022. Pathology showed ypT2N0 well to moderately differentiated cholangiocarcinoma of the perihilar bile ducts, with invasion into the perihilar soft tissue. No LVI or PNI. Surgical margins are negative for carcinoma. Two lymph nodes, negative for carcinoma (0/2).     Interval History:   Mr Larson returns for follow up. He is feeling well. Taking 12.5 mg of coreg twice a day. Saw Dr Moses recently.     ECO    ROS:   A ten-point system review is obtained and negative except for what was " stated in the Interval History.     Physical Examination:   Vital signs reviewed.   General: well hydrated, well developed, in no acute distress  HEENT: normocephalic, PERRLA, EOMI, anicteric sclerae  Neck: supple, no JVD, thyromegaly, cervical or supraclavicular lymphadenopathy  Lungs: clear breath sounds bilaterally, no wheezing, rales, or rhonchi  Chest: port site clean  Heart: RRR, no M/R/G  Abdomen: soft, no tenderness, non-distended, no hepatosplenomegaly, mass, or hernia. BS present  Extremities: no clubbing, cyanosis, or edema  Skin: no rash, ulcer, or open wounds  Neuro: alert and oriented x 4, no focal neuro deficit  Psych: pleasant and appropriate mood and affect    Objective:     Laboratory Data:  Labs reviewed. CA 19-9 normal     Imaging Data:  CT C/A/P 6/27/23:  Impression:     1. No adverse changes without evidence of metastatic disease identified within the chest, abdomen, or pelvis.  No acute process.  2. Stable heterogeneous appearance of the orthotopic liver transplant.  No discrete mass.  3. Stable pneumobilia in the postoperative setting.  4. Scattered pulmonary nodules are unchanged.     Assessment and Plan:     1. Hilar cholangiocarcinoma    2. S/P liver transplant    3. Long-term use of immunosuppressant medication    4. Essential hypertension      1.  - Mr Larson is a 41 yo man with stage I hilar cholangiocarcinoma. Biopsy non-diagnostic but clinical picture most consistent with hilar cholangiocarcinoma. He is a candidate for liver transplant. Completed chemoradiation with 5FU from 11/29/21-1/5/2022. Started cis/gem on 1/19/22. Completed neoadjuvant chemotherapy  - Underwent living donor liver transplant on 6/21/2022. Pathology showed ypT2N0 well to moderately differentiated cholangiocarcinoma of the perihilar bile ducts, with invasion into the perihilar soft tissue. No LVI or PNI. Surgical margins are negative for carcinoma. Two lymph nodes, negative for carcinoma (0/2).   - doing well  -  reviewed test results with patient. CA 19-9 normal. DERRICK on CT scan  - return in 3 months with labs and surveillance scan. Will draw from port and get it accessed for CT in [place]    2.3  - f/u with liver transplant team    4.  - BP controlled  - c/w current medication    Follow-up:     RTC in 3 months  Knows to call in the interval if any problems arise.    Electronically signed by Young Wesley    Route Chart for Scheduling    Med Onc Chart Routing      Follow up with physician 3 months. see me in 3 months with CBC, CMP, CA 19-9 drawn from port in [place], port accessed for CT C/A/P in [place]. then see me the next day   Follow up with JOSEPH    Infusion scheduling note    Injection scheduling note flush port in [place] every 3 month   Labs CBC, CMP and CA 19-9   Scheduling:  Preferred lab:  Lab interval:  every 3 months in [place]   Imaging CT chest abdomen pelvis   in 3 months in [place]   Pharmacy appointment    Other referrals        Treatment Plan Information   OP GEMCITABINE CISPLATIN Q3W   Young Wesley MD   Upcoming Treatment Dates - OP GEMCITABINE CISPLATIN Q3W    6/1/2022       Chemotherapy       gemcitabine (GEMZAR) 1,615 mg in sodium chloride 0.9% 250 mL chemo infusion       CISplatin (PLATINOL) 20 mg/m2 = 40 mg in sodium chloride 0.9% 500 mL chemo infusion       Pre-Hydration       sodium chloride 0.9% 1,000 mL with magnesium sulfate 1 g, potassium chloride 20 mEq infusion       Post-Hydration       sodium chloride 0.9% bolus 500 mL       Antiemetics       palonosetron injection 0.25 mg       dexamethasone injection 8 mg       aprepitant (CINVANTI) injection 130 mg  6/8/2022       Chemotherapy       gemcitabine (GEMZAR) 1,615 mg in sodium chloride 0.9% 250 mL chemo infusion       CISplatin (PLATINOL) 20 mg/m2 = 40 mg in sodium chloride 0.9% 500 mL chemo infusion       Pre-Hydration       sodium chloride 0.9% 1,000 mL with magnesium sulfate 1 g, potassium chloride 20 mEq infusion        Post-Hydration       sodium chloride 0.9% bolus 500 mL       Antiemetics       palonosetron injection 0.25 mg       dexamethasone injection 8 mg       aprepitant (CINVANTI) injection 130 mg  6/9/2022       Growth Factor       pegfilgrastim-cbqv injection 6 mg  6/22/2022       Chemotherapy       gemcitabine (GEMZAR) 1,615 mg in sodium chloride 0.9% 250 mL chemo infusion       CISplatin (PLATINOL) 20 mg/m2 = 40 mg in sodium chloride 0.9% 500 mL chemo infusion       Pre-Hydration       sodium chloride 0.9% 1,000 mL with magnesium sulfate 1 g, potassium chloride 20 mEq infusion       Post-Hydration       sodium chloride 0.9% bolus 500 mL       Antiemetics       palonosetron injection 0.25 mg       dexamethasone injection 8 mg       aprepitant (CINVANTI) injection 130 mg    Supportive Plan Information  OP FILGRASTIM 480 MCG   Young Wesley MD   Upcoming Treatment Dates - OP FILGRASTIM 480 MCG    2/9/2022       Medications       filgrastim-sndz (ZARXIO) injection 480 mcg/0.8 mL (Preferred Regimen)  2/10/2022       Medications       filgrastim-sndz (ZARXIO) injection 480 mcg/0.8 mL (Preferred Regimen)  2/11/2022       Medications       filgrastim-sndz (ZARXIO) injection 480 mcg/0.8 mL (Preferred Regimen)  2/12/2022       Medications       filgrastim-sndz (ZARXIO) injection 480 mcg/0.8 mL (Preferred Regimen)    Therapy Plan Information  Flushes  heparin, porcine (PF) 100 unit/mL injection flush 500 Units  500 Units, Intravenous, On the 4th Fri of every 1 month  sodium chloride 0.9% flush 10 mL  10 mL, Intravenous, On the 4th Fri of every 1 month

## 2023-07-05 ENCOUNTER — LAB VISIT (OUTPATIENT)
Dept: LAB | Facility: HOSPITAL | Age: 43
End: 2023-07-05
Attending: INTERNAL MEDICINE
Payer: COMMERCIAL

## 2023-07-05 DIAGNOSIS — Z94.4 LIVER REPLACED BY TRANSPLANT: ICD-10-CM

## 2023-07-05 LAB
ALBUMIN SERPL BCP-MCNC: 4.1 G/DL (ref 3.5–5.2)
ALP SERPL-CCNC: 196 U/L (ref 55–135)
ALT SERPL W/O P-5'-P-CCNC: 193 U/L (ref 10–44)
ANION GAP SERPL CALC-SCNC: 9 MMOL/L (ref 8–16)
AST SERPL-CCNC: 64 U/L (ref 10–40)
BASOPHILS # BLD AUTO: 0.02 K/UL (ref 0–0.2)
BASOPHILS NFR BLD: 0.4 % (ref 0–1.9)
BILIRUB SERPL-MCNC: 2 MG/DL (ref 0.1–1)
BUN SERPL-MCNC: 20 MG/DL (ref 6–20)
CALCIUM SERPL-MCNC: 10 MG/DL (ref 8.7–10.5)
CHLORIDE SERPL-SCNC: 105 MMOL/L (ref 95–110)
CO2 SERPL-SCNC: 25 MMOL/L (ref 23–29)
CREAT SERPL-MCNC: 1.2 MG/DL (ref 0.5–1.4)
DIFFERENTIAL METHOD: ABNORMAL
EOSINOPHIL # BLD AUTO: 0.1 K/UL (ref 0–0.5)
EOSINOPHIL NFR BLD: 2 % (ref 0–8)
ERYTHROCYTE [DISTWIDTH] IN BLOOD BY AUTOMATED COUNT: 12.8 % (ref 11.5–14.5)
EST. GFR  (NO RACE VARIABLE): >60 ML/MIN/1.73 M^2
GLUCOSE SERPL-MCNC: 114 MG/DL (ref 70–110)
HCT VFR BLD AUTO: 46.5 % (ref 40–54)
HGB BLD-MCNC: 15.6 G/DL (ref 14–18)
IMM GRANULOCYTES # BLD AUTO: 0.01 K/UL (ref 0–0.04)
IMM GRANULOCYTES NFR BLD AUTO: 0.2 % (ref 0–0.5)
LYMPHOCYTES # BLD AUTO: 1.6 K/UL (ref 1–4.8)
LYMPHOCYTES NFR BLD: 32 % (ref 18–48)
MCH RBC QN AUTO: 31.7 PG (ref 27–31)
MCHC RBC AUTO-ENTMCNC: 33.5 G/DL (ref 32–36)
MCV RBC AUTO: 95 FL (ref 82–98)
MONOCYTES # BLD AUTO: 0.6 K/UL (ref 0.3–1)
MONOCYTES NFR BLD: 11.9 % (ref 4–15)
NEUTROPHILS # BLD AUTO: 2.6 K/UL (ref 1.8–7.7)
NEUTROPHILS NFR BLD: 53.5 % (ref 38–73)
NRBC BLD-RTO: 0 /100 WBC
PLATELET # BLD AUTO: 133 K/UL (ref 150–450)
PMV BLD AUTO: 9 FL (ref 9.2–12.9)
POTASSIUM SERPL-SCNC: 4.7 MMOL/L (ref 3.5–5.1)
PROT SERPL-MCNC: 6.9 G/DL (ref 6–8.4)
RBC # BLD AUTO: 4.92 M/UL (ref 4.6–6.2)
SODIUM SERPL-SCNC: 139 MMOL/L (ref 136–145)
TACROLIMUS BLD-MCNC: 9.8 NG/ML (ref 5–15)
WBC # BLD AUTO: 4.94 K/UL (ref 3.9–12.7)

## 2023-07-05 PROCEDURE — 85025 COMPLETE CBC W/AUTO DIFF WBC: CPT | Performed by: INTERNAL MEDICINE

## 2023-07-05 PROCEDURE — 36415 COLL VENOUS BLD VENIPUNCTURE: CPT | Performed by: INTERNAL MEDICINE

## 2023-07-05 PROCEDURE — 80197 ASSAY OF TACROLIMUS: CPT | Performed by: INTERNAL MEDICINE

## 2023-07-05 PROCEDURE — 80053 COMPREHEN METABOLIC PANEL: CPT | Performed by: INTERNAL MEDICINE

## 2023-07-06 ENCOUNTER — TELEPHONE (OUTPATIENT)
Dept: TRANSPLANT | Facility: CLINIC | Age: 43
End: 2023-07-06
Payer: COMMERCIAL

## 2023-07-06 ENCOUNTER — PATIENT MESSAGE (OUTPATIENT)
Dept: TRANSPLANT | Facility: CLINIC | Age: 43
End: 2023-07-06
Payer: COMMERCIAL

## 2023-07-06 DIAGNOSIS — R79.89 ELEVATED LFTS: Primary | ICD-10-CM

## 2023-07-06 DIAGNOSIS — Z94.4 LIVER REPLACED BY TRANSPLANT: ICD-10-CM

## 2023-07-06 NOTE — TELEPHONE ENCOUNTER
Message sent to patient via [facility]:    Your labs have been reviewed by ; your liver tests are elevated. You will need an ultrasound and a liver biopsy scheduled : the  is working on that.    You will need labs twice a week for now (on Monday and Thursday). Thanks.

## 2023-07-06 NOTE — TELEPHONE ENCOUNTER
LFT's elevated from previous : TORB Dr. Nobles: get ultrasound and liver biopsy. Labs twice weekly.

## 2023-07-10 ENCOUNTER — HOSPITAL ENCOUNTER (OUTPATIENT)
Dept: RADIOLOGY | Facility: HOSPITAL | Age: 43
Discharge: HOME | End: 2023-07-10
Attending: INTERNAL MEDICINE
Payer: COMMERCIAL

## 2023-07-10 ENCOUNTER — TELEPHONE (OUTPATIENT)
Dept: TRANSPLANT | Facility: CLINIC | Age: 43
End: 2023-07-10
Payer: COMMERCIAL

## 2023-07-10 ENCOUNTER — PATIENT MESSAGE (OUTPATIENT)
Dept: TRANSPLANT | Facility: CLINIC | Age: 43
End: 2023-07-10
Payer: COMMERCIAL

## 2023-07-10 DIAGNOSIS — Z94.4 LIVER REPLACED BY TRANSPLANT: ICD-10-CM

## 2023-07-10 DIAGNOSIS — Z94.4 S/P LIVER TRANSPLANT: ICD-10-CM

## 2023-07-10 DIAGNOSIS — R79.89 ELEVATED LFTS: ICD-10-CM

## 2023-07-10 PROCEDURE — 76705 ECHO EXAM OF ABDOMEN: CPT | Mod: 26,59,, | Performed by: RADIOLOGY

## 2023-07-10 PROCEDURE — 93976 US DOPPLER LIVER TRANSPLANT POST (XPD): ICD-10-PCS | Mod: 26,,, | Performed by: RADIOLOGY

## 2023-07-10 PROCEDURE — 93976 VASCULAR STUDY: CPT | Mod: 26,,, | Performed by: RADIOLOGY

## 2023-07-10 PROCEDURE — 76705 US DOPPLER LIVER TRANSPLANT POST (XPD): ICD-10-PCS | Mod: 26,59,, | Performed by: RADIOLOGY

## 2023-07-10 PROCEDURE — 93976 VASCULAR STUDY: CPT | Mod: TC,PO

## 2023-07-10 NOTE — TELEPHONE ENCOUNTER
----- Message from Zoran Nobles MD sent at 7/10/2023  1:32 PM CDT -----  Please proceed with liver biopsy

## 2023-07-10 NOTE — TELEPHONE ENCOUNTER
Patient notified and instructed via [facility]:     reviewed your ultrasound,  but your lab results are still pending. He said continue plan for liver biopsy at this time. Thanks.

## 2023-07-11 ENCOUNTER — PATIENT MESSAGE (OUTPATIENT)
Dept: TRANSPLANT | Facility: CLINIC | Age: 43
End: 2023-07-11
Payer: COMMERCIAL

## 2023-07-11 ENCOUNTER — TELEPHONE (OUTPATIENT)
Dept: TRANSPLANT | Facility: CLINIC | Age: 43
End: 2023-07-11
Payer: COMMERCIAL

## 2023-07-11 DIAGNOSIS — Z94.4 LIVER REPLACED BY TRANSPLANT: Primary | ICD-10-CM

## 2023-07-11 RX ORDER — URSODIOL 250 MG/1
250 TABLET, FILM COATED ORAL 2 TIMES DAILY
Qty: 60 TABLET | Refills: 11 | Status: SHIPPED | OUTPATIENT
Start: 2023-07-11 | End: 2023-08-11 | Stop reason: SDUPTHER

## 2023-07-11 NOTE — TELEPHONE ENCOUNTER
Patient notified and instructed via [facility] : repeat labs tomorrow, may be able to cancel liver biopsy if LFTs continue to improve.

## 2023-07-11 NOTE — TELEPHONE ENCOUNTER
----- Message from Zoran Nobles MD sent at 7/11/2023  4:01 PM CDT -----  Repeat labs again. We may cancel if continued improvement  ----- Message -----  From: Steph Pink  Sent: 7/11/2023  10:57 AM CDT  To: Zoran Nobles MD    He is set up for liver biopsy on 7/13, does he still need it? Thanks.  ----- Message -----  From: Zoran Nobles MD  Sent: 7/11/2023  10:55 AM CDT  To: [facility] Post-Liver Transplant Clinical    Results reviewed. No action.

## 2023-07-13 ENCOUNTER — LAB VISIT (OUTPATIENT)
Dept: LAB | Facility: HOSPITAL | Age: 43
End: 2023-07-13
Attending: INTERNAL MEDICINE
Payer: COMMERCIAL

## 2023-07-13 ENCOUNTER — TELEPHONE (OUTPATIENT)
Dept: TRANSPLANT | Facility: CLINIC | Age: 43
End: 2023-07-13
Payer: COMMERCIAL

## 2023-07-13 ENCOUNTER — PATIENT MESSAGE (OUTPATIENT)
Dept: TRANSPLANT | Facility: CLINIC | Age: 43
End: 2023-07-13
Payer: COMMERCIAL

## 2023-07-13 DIAGNOSIS — Z94.4 LIVER REPLACED BY TRANSPLANT: ICD-10-CM

## 2023-07-13 DIAGNOSIS — Z94.4 LIVER REPLACED BY TRANSPLANT: Primary | ICD-10-CM

## 2023-07-13 DIAGNOSIS — C22.1 CHOLANGIOCARCINOMA: Primary | ICD-10-CM

## 2023-07-13 LAB
ALBUMIN SERPL BCP-MCNC: 4.2 G/DL (ref 3.5–5.2)
ALP SERPL-CCNC: 168 U/L (ref 55–135)
ALT SERPL W/O P-5'-P-CCNC: 30 U/L (ref 10–44)
ANION GAP SERPL CALC-SCNC: 9 MMOL/L (ref 8–16)
AST SERPL-CCNC: 19 U/L (ref 10–40)
BASOPHILS # BLD AUTO: 0.02 K/UL (ref 0–0.2)
BASOPHILS NFR BLD: 0.4 % (ref 0–1.9)
BILIRUB DIRECT SERPL-MCNC: 0.4 MG/DL (ref 0.1–0.3)
BILIRUB SERPL-MCNC: 1.4 MG/DL (ref 0.1–1)
BUN SERPL-MCNC: 26 MG/DL (ref 6–20)
CALCIUM SERPL-MCNC: 9.6 MG/DL (ref 8.7–10.5)
CHLORIDE SERPL-SCNC: 104 MMOL/L (ref 95–110)
CO2 SERPL-SCNC: 26 MMOL/L (ref 23–29)
CREAT SERPL-MCNC: 1.3 MG/DL (ref 0.5–1.4)
DIFFERENTIAL METHOD: ABNORMAL
EOSINOPHIL # BLD AUTO: 0.1 K/UL (ref 0–0.5)
EOSINOPHIL NFR BLD: 3.1 % (ref 0–8)
ERYTHROCYTE [DISTWIDTH] IN BLOOD BY AUTOMATED COUNT: 12 % (ref 11.5–14.5)
EST. GFR  (NO RACE VARIABLE): >60 ML/MIN/1.73 M^2
GLUCOSE SERPL-MCNC: 116 MG/DL (ref 70–110)
HCT VFR BLD AUTO: 44.4 % (ref 40–54)
HGB BLD-MCNC: 15 G/DL (ref 14–18)
IMM GRANULOCYTES # BLD AUTO: 0.01 K/UL (ref 0–0.04)
IMM GRANULOCYTES NFR BLD AUTO: 0.2 % (ref 0–0.5)
LYMPHOCYTES # BLD AUTO: 1.2 K/UL (ref 1–4.8)
LYMPHOCYTES NFR BLD: 26.8 % (ref 18–48)
MCH RBC QN AUTO: 31.8 PG (ref 27–31)
MCHC RBC AUTO-ENTMCNC: 33.8 G/DL (ref 32–36)
MCV RBC AUTO: 94 FL (ref 82–98)
MONOCYTES # BLD AUTO: 0.4 K/UL (ref 0.3–1)
MONOCYTES NFR BLD: 9.6 % (ref 4–15)
NEUTROPHILS # BLD AUTO: 2.7 K/UL (ref 1.8–7.7)
NEUTROPHILS NFR BLD: 59.9 % (ref 38–73)
NRBC BLD-RTO: 0 /100 WBC
PLATELET # BLD AUTO: 146 K/UL (ref 150–450)
PMV BLD AUTO: 8.8 FL (ref 9.2–12.9)
POTASSIUM SERPL-SCNC: 4.4 MMOL/L (ref 3.5–5.1)
PROT SERPL-MCNC: 7 G/DL (ref 6–8.4)
RBC # BLD AUTO: 4.71 M/UL (ref 4.6–6.2)
SODIUM SERPL-SCNC: 139 MMOL/L (ref 136–145)
TACROLIMUS BLD-MCNC: 10.3 NG/ML (ref 5–15)
WBC # BLD AUTO: 4.56 K/UL (ref 3.9–12.7)

## 2023-07-13 PROCEDURE — 36415 COLL VENOUS BLD VENIPUNCTURE: CPT | Performed by: INTERNAL MEDICINE

## 2023-07-13 PROCEDURE — 80197 ASSAY OF TACROLIMUS: CPT | Performed by: INTERNAL MEDICINE

## 2023-07-13 PROCEDURE — 82248 BILIRUBIN DIRECT: CPT | Performed by: INTERNAL MEDICINE

## 2023-07-13 PROCEDURE — 80053 COMPREHEN METABOLIC PANEL: CPT | Performed by: INTERNAL MEDICINE

## 2023-07-13 PROCEDURE — 85025 COMPLETE CBC W/AUTO DIFF WBC: CPT | Performed by: INTERNAL MEDICINE

## 2023-07-13 NOTE — TELEPHONE ENCOUNTER
Patient notified and instructed via [facility]:    Your labs have been reviewed by ; no changes made. Per : the liver biopsy can be cancelled.   Repeat labs due 8/7/23. You are due for a follow up clinic visit , an appointment will be sent to you. Thanks.

## 2023-07-13 NOTE — TELEPHONE ENCOUNTER
----- Message from Zoran Nobles MD sent at 7/13/2023 12:28 PM CDT -----  Results reviewed. Cancel biopsy

## 2023-07-27 ENCOUNTER — PATIENT MESSAGE (OUTPATIENT)
Dept: INTERNAL MEDICINE | Facility: CLINIC | Age: 43
End: 2023-07-27
Payer: COMMERCIAL

## 2023-07-27 NOTE — TELEPHONE ENCOUNTER
Spoke with patient, he reports having diarrhea last few days off and on, denies, nausea, vomiting, fever, sob, chest pain, abdominal pain    Reports trying Imodium with little relief, patient declined appointment for evaluation tomorrow    States he wants plan of care from PCP

## 2023-08-04 ENCOUNTER — PATIENT MESSAGE (OUTPATIENT)
Dept: TRANSPLANT | Facility: CLINIC | Age: 43
End: 2023-08-04
Payer: COMMERCIAL

## 2023-08-04 ENCOUNTER — TELEPHONE (OUTPATIENT)
Dept: TRANSPLANT | Facility: CLINIC | Age: 43
End: 2023-08-04
Payer: COMMERCIAL

## 2023-08-04 NOTE — TELEPHONE ENCOUNTER
"Message from patient via [facility]:    Labs rescheduled until later in the week next week due to "Head Cold".   "

## 2023-08-10 ENCOUNTER — LAB VISIT (OUTPATIENT)
Dept: LAB | Facility: HOSPITAL | Age: 43
End: 2023-08-10
Attending: INTERNAL MEDICINE
Payer: COMMERCIAL

## 2023-08-10 DIAGNOSIS — Z94.4 LIVER REPLACED BY TRANSPLANT: ICD-10-CM

## 2023-08-10 DIAGNOSIS — C22.1 CHOLANGIOCARCINOMA: ICD-10-CM

## 2023-08-10 LAB
ALBUMIN SERPL BCP-MCNC: 4.2 G/DL (ref 3.5–5.2)
ALP SERPL-CCNC: 149 U/L (ref 55–135)
ALT SERPL W/O P-5'-P-CCNC: 24 U/L (ref 10–44)
ANION GAP SERPL CALC-SCNC: 13 MMOL/L (ref 8–16)
AST SERPL-CCNC: 20 U/L (ref 10–40)
BASOPHILS # BLD AUTO: 0.02 K/UL (ref 0–0.2)
BASOPHILS NFR BLD: 0.4 % (ref 0–1.9)
BILIRUB DIRECT SERPL-MCNC: 0.6 MG/DL (ref 0.1–0.3)
BILIRUB SERPL-MCNC: 2 MG/DL (ref 0.1–1)
BUN SERPL-MCNC: 22 MG/DL (ref 6–20)
CALCIUM SERPL-MCNC: 9.6 MG/DL (ref 8.7–10.5)
CHLORIDE SERPL-SCNC: 105 MMOL/L (ref 95–110)
CO2 SERPL-SCNC: 24 MMOL/L (ref 23–29)
CREAT SERPL-MCNC: 1.3 MG/DL (ref 0.5–1.4)
DIFFERENTIAL METHOD: ABNORMAL
EOSINOPHIL # BLD AUTO: 0.2 K/UL (ref 0–0.5)
EOSINOPHIL NFR BLD: 3.4 % (ref 0–8)
ERYTHROCYTE [DISTWIDTH] IN BLOOD BY AUTOMATED COUNT: 11.9 % (ref 11.5–14.5)
EST. GFR  (NO RACE VARIABLE): >60 ML/MIN/1.73 M^2
GLUCOSE SERPL-MCNC: 101 MG/DL (ref 70–110)
HCT VFR BLD AUTO: 44.3 % (ref 40–54)
HGB BLD-MCNC: 15.3 G/DL (ref 14–18)
IMM GRANULOCYTES # BLD AUTO: 0.01 K/UL (ref 0–0.04)
IMM GRANULOCYTES NFR BLD AUTO: 0.2 % (ref 0–0.5)
LYMPHOCYTES # BLD AUTO: 1.6 K/UL (ref 1–4.8)
LYMPHOCYTES NFR BLD: 34.7 % (ref 18–48)
MCH RBC QN AUTO: 32.6 PG (ref 27–31)
MCHC RBC AUTO-ENTMCNC: 34.5 G/DL (ref 32–36)
MCV RBC AUTO: 94 FL (ref 82–98)
MONOCYTES # BLD AUTO: 0.4 K/UL (ref 0.3–1)
MONOCYTES NFR BLD: 8.4 % (ref 4–15)
NEUTROPHILS # BLD AUTO: 2.5 K/UL (ref 1.8–7.7)
NEUTROPHILS NFR BLD: 52.9 % (ref 38–73)
NRBC BLD-RTO: 0 /100 WBC
PLATELET # BLD AUTO: 147 K/UL (ref 150–450)
PMV BLD AUTO: 8.7 FL (ref 9.2–12.9)
POTASSIUM SERPL-SCNC: 4.7 MMOL/L (ref 3.5–5.1)
PROT SERPL-MCNC: 7.1 G/DL (ref 6–8.4)
RBC # BLD AUTO: 4.7 M/UL (ref 4.6–6.2)
SODIUM SERPL-SCNC: 142 MMOL/L (ref 136–145)
WBC # BLD AUTO: 4.67 K/UL (ref 3.9–12.7)

## 2023-08-10 PROCEDURE — 82378 CARCINOEMBRYONIC ANTIGEN: CPT | Performed by: INTERNAL MEDICINE

## 2023-08-10 PROCEDURE — 85025 COMPLETE CBC W/AUTO DIFF WBC: CPT | Performed by: INTERNAL MEDICINE

## 2023-08-10 PROCEDURE — 82248 BILIRUBIN DIRECT: CPT | Performed by: INTERNAL MEDICINE

## 2023-08-10 PROCEDURE — 86301 IMMUNOASSAY TUMOR CA 19-9: CPT | Performed by: INTERNAL MEDICINE

## 2023-08-10 PROCEDURE — 80053 COMPREHEN METABOLIC PANEL: CPT | Performed by: INTERNAL MEDICINE

## 2023-08-10 PROCEDURE — 36415 COLL VENOUS BLD VENIPUNCTURE: CPT | Performed by: INTERNAL MEDICINE

## 2023-08-10 PROCEDURE — 80197 ASSAY OF TACROLIMUS: CPT | Performed by: INTERNAL MEDICINE

## 2023-08-11 DIAGNOSIS — Z94.4 S/P LIVER TRANSPLANT: ICD-10-CM

## 2023-08-11 DIAGNOSIS — Z94.4 LIVER REPLACED BY TRANSPLANT: ICD-10-CM

## 2023-08-11 LAB — TACROLIMUS BLD-MCNC: 12.9 NG/ML (ref 5–15)

## 2023-08-12 LAB
CANCER AG19-9 SERPL-ACNC: 4 U/ML (ref 0–40)
CEA SERPL-MCNC: <1.7 NG/ML (ref 0–5)

## 2023-08-14 DIAGNOSIS — Z94.4 S/P LIVER TRANSPLANT: ICD-10-CM

## 2023-08-14 DIAGNOSIS — Z94.4 LIVER REPLACED BY TRANSPLANT: ICD-10-CM

## 2023-08-14 RX ORDER — NAPROXEN SODIUM 220 MG/1
81 TABLET, FILM COATED ORAL DAILY
Qty: 30 TABLET | Refills: 11 | OUTPATIENT
Start: 2023-08-14 | End: 2024-08-13

## 2023-08-14 RX ORDER — URSODIOL 250 MG/1
250 TABLET, FILM COATED ORAL 2 TIMES DAILY
Qty: 60 TABLET | Refills: 11 | Status: SHIPPED | OUTPATIENT
Start: 2023-08-14

## 2023-08-15 ENCOUNTER — TELEPHONE (OUTPATIENT)
Dept: TRANSPLANT | Facility: CLINIC | Age: 43
End: 2023-08-15
Payer: COMMERCIAL

## 2023-08-15 DIAGNOSIS — Z94.4 LIVER REPLACED BY TRANSPLANT: Primary | ICD-10-CM

## 2023-08-15 RX ORDER — URSODIOL 250 MG/1
250 TABLET, FILM COATED ORAL 2 TIMES DAILY
Qty: 60 TABLET | Refills: 11 | Status: SHIPPED | OUTPATIENT
Start: 2023-08-15 | End: 2023-09-26 | Stop reason: SDUPTHER

## 2023-08-15 NOTE — TELEPHONE ENCOUNTER
----- Message from Zoran Nobles MD sent at 8/14/2023 12:12 PM CDT -----  Results reviewed. No action.

## 2023-08-27 DIAGNOSIS — I10 ESSENTIAL HYPERTENSION: ICD-10-CM

## 2023-08-27 DIAGNOSIS — Z94.4 LIVER REPLACED BY TRANSPLANT: ICD-10-CM

## 2023-09-01 RX ORDER — CARVEDILOL 12.5 MG/1
12.5 TABLET ORAL 2 TIMES DAILY WITH MEALS
Qty: 60 TABLET | Refills: 11 | Status: SHIPPED | OUTPATIENT
Start: 2023-09-01

## 2023-09-05 ENCOUNTER — PATIENT MESSAGE (OUTPATIENT)
Dept: TRANSPLANT | Facility: CLINIC | Age: 43
End: 2023-09-05
Payer: COMMERCIAL

## 2023-09-05 ENCOUNTER — LAB VISIT (OUTPATIENT)
Dept: LAB | Facility: HOSPITAL | Age: 43
End: 2023-09-05
Attending: INTERNAL MEDICINE
Payer: COMMERCIAL

## 2023-09-05 ENCOUNTER — TELEPHONE (OUTPATIENT)
Dept: TRANSPLANT | Facility: CLINIC | Age: 43
End: 2023-09-05
Payer: COMMERCIAL

## 2023-09-05 DIAGNOSIS — Z94.4 LIVER REPLACED BY TRANSPLANT: Primary | ICD-10-CM

## 2023-09-05 DIAGNOSIS — Z94.4 LIVER REPLACED BY TRANSPLANT: ICD-10-CM

## 2023-09-05 LAB
ALBUMIN SERPL BCP-MCNC: 4.1 G/DL (ref 3.5–5.2)
ALP SERPL-CCNC: 175 U/L (ref 55–135)
ALT SERPL W/O P-5'-P-CCNC: 155 U/L (ref 10–44)
ANION GAP SERPL CALC-SCNC: 8 MMOL/L (ref 8–16)
AST SERPL-CCNC: 78 U/L (ref 10–40)
BASOPHILS # BLD AUTO: 0.02 K/UL (ref 0–0.2)
BASOPHILS NFR BLD: 0.5 % (ref 0–1.9)
BILIRUB DIRECT SERPL-MCNC: 0.7 MG/DL (ref 0.1–0.3)
BILIRUB SERPL-MCNC: 2.2 MG/DL (ref 0.1–1)
BUN SERPL-MCNC: 16 MG/DL (ref 6–20)
CALCIUM SERPL-MCNC: 9.6 MG/DL (ref 8.7–10.5)
CHLORIDE SERPL-SCNC: 104 MMOL/L (ref 95–110)
CO2 SERPL-SCNC: 27 MMOL/L (ref 23–29)
CREAT SERPL-MCNC: 1.4 MG/DL (ref 0.5–1.4)
DIFFERENTIAL METHOD: ABNORMAL
EOSINOPHIL # BLD AUTO: 0.1 K/UL (ref 0–0.5)
EOSINOPHIL NFR BLD: 2.6 % (ref 0–8)
ERYTHROCYTE [DISTWIDTH] IN BLOOD BY AUTOMATED COUNT: 12.7 % (ref 11.5–14.5)
EST. GFR  (NO RACE VARIABLE): >60 ML/MIN/1.73 M^2
GLUCOSE SERPL-MCNC: 124 MG/DL (ref 70–110)
HCT VFR BLD AUTO: 46.3 % (ref 40–54)
HGB BLD-MCNC: 15.5 G/DL (ref 14–18)
IMM GRANULOCYTES # BLD AUTO: 0.02 K/UL (ref 0–0.04)
IMM GRANULOCYTES NFR BLD AUTO: 0.5 % (ref 0–0.5)
LYMPHOCYTES # BLD AUTO: 1.4 K/UL (ref 1–4.8)
LYMPHOCYTES NFR BLD: 32.1 % (ref 18–48)
MCH RBC QN AUTO: 31.8 PG (ref 27–31)
MCHC RBC AUTO-ENTMCNC: 33.5 G/DL (ref 32–36)
MCV RBC AUTO: 95 FL (ref 82–98)
MONOCYTES # BLD AUTO: 0.5 K/UL (ref 0.3–1)
MONOCYTES NFR BLD: 12.4 % (ref 4–15)
NEUTROPHILS # BLD AUTO: 2.2 K/UL (ref 1.8–7.7)
NEUTROPHILS NFR BLD: 51.9 % (ref 38–73)
NRBC BLD-RTO: 0 /100 WBC
PLATELET # BLD AUTO: 124 K/UL (ref 150–450)
PMV BLD AUTO: 9.2 FL (ref 9.2–12.9)
POTASSIUM SERPL-SCNC: 4.8 MMOL/L (ref 3.5–5.1)
PROT SERPL-MCNC: 7 G/DL (ref 6–8.4)
RBC # BLD AUTO: 4.87 M/UL (ref 4.6–6.2)
SODIUM SERPL-SCNC: 139 MMOL/L (ref 136–145)
WBC # BLD AUTO: 4.21 K/UL (ref 3.9–12.7)

## 2023-09-05 PROCEDURE — 80053 COMPREHEN METABOLIC PANEL: CPT | Performed by: INTERNAL MEDICINE

## 2023-09-05 PROCEDURE — 36415 COLL VENOUS BLD VENIPUNCTURE: CPT | Mod: PO | Performed by: INTERNAL MEDICINE

## 2023-09-05 PROCEDURE — 80197 ASSAY OF TACROLIMUS: CPT | Performed by: INTERNAL MEDICINE

## 2023-09-05 PROCEDURE — 85025 COMPLETE CBC W/AUTO DIFF WBC: CPT | Performed by: INTERNAL MEDICINE

## 2023-09-05 PROCEDURE — 82248 BILIRUBIN DIRECT: CPT | Performed by: INTERNAL MEDICINE

## 2023-09-05 NOTE — TELEPHONE ENCOUNTER
Message received from patient via [facility]:    I felt like crap Monday morning,  feel better today but labs are wicked out again.     Let me redo on Friday to see if they improve.

## 2023-09-06 ENCOUNTER — TELEPHONE (OUTPATIENT)
Dept: TRANSPLANT | Facility: CLINIC | Age: 43
End: 2023-09-06
Payer: COMMERCIAL

## 2023-09-06 LAB — TACROLIMUS BLD-MCNC: 10.5 NG/ML (ref 5–15)

## 2023-09-06 NOTE — TELEPHONE ENCOUNTER
Message rec'd. from patient via portal, with description of symptoms he had prior to Tuesday's lab draw ; but reported he recovered from these symptoms now:     Just crappy, sinusy, foggy     Hangoverish for 12 hours with no alcohol

## 2023-09-08 ENCOUNTER — LAB VISIT (OUTPATIENT)
Dept: LAB | Facility: HOSPITAL | Age: 43
End: 2023-09-08
Attending: INTERNAL MEDICINE
Payer: COMMERCIAL

## 2023-09-08 DIAGNOSIS — Z94.4 LIVER REPLACED BY TRANSPLANT: ICD-10-CM

## 2023-09-08 LAB
ALBUMIN SERPL BCP-MCNC: 4.2 G/DL (ref 3.5–5.2)
ALP SERPL-CCNC: 146 U/L (ref 55–135)
ALT SERPL W/O P-5'-P-CCNC: 60 U/L (ref 10–44)
ANION GAP SERPL CALC-SCNC: 9 MMOL/L (ref 8–16)
AST SERPL-CCNC: 23 U/L (ref 10–40)
BASOPHILS # BLD AUTO: 0.03 K/UL (ref 0–0.2)
BASOPHILS NFR BLD: 0.6 % (ref 0–1.9)
BILIRUB DIRECT SERPL-MCNC: 0.5 MG/DL (ref 0.1–0.3)
BILIRUB SERPL-MCNC: 1.9 MG/DL (ref 0.1–1)
BUN SERPL-MCNC: 19 MG/DL (ref 6–20)
CALCIUM SERPL-MCNC: 9.7 MG/DL (ref 8.7–10.5)
CHLORIDE SERPL-SCNC: 103 MMOL/L (ref 95–110)
CO2 SERPL-SCNC: 28 MMOL/L (ref 23–29)
CREAT SERPL-MCNC: 1.3 MG/DL (ref 0.5–1.4)
DIFFERENTIAL METHOD: ABNORMAL
EOSINOPHIL # BLD AUTO: 0.1 K/UL (ref 0–0.5)
EOSINOPHIL NFR BLD: 2.7 % (ref 0–8)
ERYTHROCYTE [DISTWIDTH] IN BLOOD BY AUTOMATED COUNT: 12.3 % (ref 11.5–14.5)
EST. GFR  (NO RACE VARIABLE): >60 ML/MIN/1.73 M^2
GLUCOSE SERPL-MCNC: 117 MG/DL (ref 70–110)
HCT VFR BLD AUTO: 46.7 % (ref 40–54)
HGB BLD-MCNC: 15.9 G/DL (ref 14–18)
IMM GRANULOCYTES # BLD AUTO: 0.02 K/UL (ref 0–0.04)
IMM GRANULOCYTES NFR BLD AUTO: 0.4 % (ref 0–0.5)
LYMPHOCYTES # BLD AUTO: 1.3 K/UL (ref 1–4.8)
LYMPHOCYTES NFR BLD: 27 % (ref 18–48)
MCH RBC QN AUTO: 32.3 PG (ref 27–31)
MCHC RBC AUTO-ENTMCNC: 34 G/DL (ref 32–36)
MCV RBC AUTO: 95 FL (ref 82–98)
MONOCYTES # BLD AUTO: 0.5 K/UL (ref 0.3–1)
MONOCYTES NFR BLD: 9.3 % (ref 4–15)
NEUTROPHILS # BLD AUTO: 2.9 K/UL (ref 1.8–7.7)
NEUTROPHILS NFR BLD: 60 % (ref 38–73)
NRBC BLD-RTO: 0 /100 WBC
PLATELET # BLD AUTO: 148 K/UL (ref 150–450)
PMV BLD AUTO: 8.5 FL (ref 9.2–12.9)
POTASSIUM SERPL-SCNC: 4.8 MMOL/L (ref 3.5–5.1)
PROT SERPL-MCNC: 7.1 G/DL (ref 6–8.4)
RBC # BLD AUTO: 4.93 M/UL (ref 4.6–6.2)
SODIUM SERPL-SCNC: 140 MMOL/L (ref 136–145)
TACROLIMUS BLD-MCNC: 10.3 NG/ML (ref 5–15)
WBC # BLD AUTO: 4.85 K/UL (ref 3.9–12.7)

## 2023-09-08 PROCEDURE — 36415 COLL VENOUS BLD VENIPUNCTURE: CPT | Performed by: INTERNAL MEDICINE

## 2023-09-08 PROCEDURE — 80053 COMPREHEN METABOLIC PANEL: CPT | Performed by: INTERNAL MEDICINE

## 2023-09-08 PROCEDURE — 85025 COMPLETE CBC W/AUTO DIFF WBC: CPT | Performed by: INTERNAL MEDICINE

## 2023-09-08 PROCEDURE — 80197 ASSAY OF TACROLIMUS: CPT | Performed by: INTERNAL MEDICINE

## 2023-09-08 PROCEDURE — 82248 BILIRUBIN DIRECT: CPT | Performed by: INTERNAL MEDICINE

## 2023-09-11 ENCOUNTER — PATIENT MESSAGE (OUTPATIENT)
Dept: TRANSPLANT | Facility: CLINIC | Age: 43
End: 2023-09-11
Payer: COMMERCIAL

## 2023-09-11 ENCOUNTER — TELEPHONE (OUTPATIENT)
Dept: TRANSPLANT | Facility: CLINIC | Age: 43
End: 2023-09-11
Payer: COMMERCIAL

## 2023-09-11 DIAGNOSIS — Z94.4 LIVER REPLACED BY TRANSPLANT: Primary | ICD-10-CM

## 2023-09-11 NOTE — TELEPHONE ENCOUNTER
----- Message from Zoran Nobles MD sent at 9/10/2023  8:48 AM CDT -----  Results reviewed. No action.

## 2023-09-11 NOTE — TELEPHONE ENCOUNTER
Patient notified and instructed via [facility]:    Your labs were reviewed by ; no changes made. Repeat labs due on Monday 9/18/23. Thanks.

## 2023-09-14 ENCOUNTER — PATIENT MESSAGE (OUTPATIENT)
Dept: HEMATOLOGY/ONCOLOGY | Facility: CLINIC | Age: 43
End: 2023-09-14
Payer: COMMERCIAL

## 2023-09-15 ENCOUNTER — PATIENT MESSAGE (OUTPATIENT)
Dept: ADMINISTRATIVE | Facility: HOSPITAL | Age: 43
End: 2023-09-15
Payer: COMMERCIAL

## 2023-09-18 ENCOUNTER — HOSPITAL ENCOUNTER (OUTPATIENT)
Dept: RADIOLOGY | Facility: HOSPITAL | Age: 43
Discharge: HOME | End: 2023-09-18
Attending: INTERNAL MEDICINE
Payer: COMMERCIAL

## 2023-09-18 DIAGNOSIS — C24.0 HILAR CHOLANGIOCARCINOMA: ICD-10-CM

## 2023-09-18 PROCEDURE — 71260 CT CHEST ABDOMEN PELVIS WITH CONTRAST (XPD): ICD-10-PCS | Mod: 26,,, | Performed by: RADIOLOGY

## 2023-09-18 PROCEDURE — 71260 CT THORAX DX C+: CPT | Mod: TC,PO

## 2023-09-18 PROCEDURE — 74177 CT ABD & PELVIS W/CONTRAST: CPT | Mod: TC,PO

## 2023-09-18 PROCEDURE — A9698 NON-RAD CONTRAST MATERIALNOC: HCPCS | Mod: PO | Performed by: INTERNAL MEDICINE

## 2023-09-18 PROCEDURE — 74177 CT ABD & PELVIS W/CONTRAST: CPT | Mod: 26,,, | Performed by: RADIOLOGY

## 2023-09-18 PROCEDURE — 25500020 PHARM REV CODE 255: Mod: PO | Performed by: INTERNAL MEDICINE

## 2023-09-18 PROCEDURE — 71260 CT THORAX DX C+: CPT | Mod: 26,,, | Performed by: RADIOLOGY

## 2023-09-18 PROCEDURE — 74177 CT CHEST ABDOMEN PELVIS WITH CONTRAST (XPD): ICD-10-PCS | Mod: 26,,, | Performed by: RADIOLOGY

## 2023-09-18 RX ADMIN — IOHEXOL 75 ML: 350 INJECTION, SOLUTION INTRAVENOUS at 11:09

## 2023-09-18 RX ADMIN — IOHEXOL 1000 ML: 9 SOLUTION ORAL at 11:09

## 2023-09-19 ENCOUNTER — PATIENT MESSAGE (OUTPATIENT)
Dept: TRANSPLANT | Facility: CLINIC | Age: 43
End: 2023-09-19
Payer: COMMERCIAL

## 2023-09-19 ENCOUNTER — TELEPHONE (OUTPATIENT)
Dept: TRANSPLANT | Facility: CLINIC | Age: 43
End: 2023-09-19
Payer: COMMERCIAL

## 2023-09-19 ENCOUNTER — OFFICE VISIT (OUTPATIENT)
Dept: HEMATOLOGY/ONCOLOGY | Facility: CLINIC | Age: 43
End: 2023-09-19
Payer: COMMERCIAL

## 2023-09-19 VITALS
OXYGEN SATURATION: 99 % | RESPIRATION RATE: 18 BRPM | WEIGHT: 185.19 LBS | BODY MASS INDEX: 27.43 KG/M2 | DIASTOLIC BLOOD PRESSURE: 97 MMHG | TEMPERATURE: 98 F | SYSTOLIC BLOOD PRESSURE: 137 MMHG | HEIGHT: 69 IN | HEART RATE: 73 BPM

## 2023-09-19 DIAGNOSIS — I10 ESSENTIAL HYPERTENSION: ICD-10-CM

## 2023-09-19 DIAGNOSIS — Z94.4 LIVER REPLACED BY TRANSPLANT: Primary | ICD-10-CM

## 2023-09-19 DIAGNOSIS — Z12.11 SCREEN FOR COLON CANCER: ICD-10-CM

## 2023-09-19 DIAGNOSIS — Z94.4 S/P LIVER TRANSPLANT: ICD-10-CM

## 2023-09-19 DIAGNOSIS — Z79.60 LONG-TERM USE OF IMMUNOSUPPRESSANT MEDICATION: ICD-10-CM

## 2023-09-19 DIAGNOSIS — C24.0 HILAR CHOLANGIOCARCINOMA: Primary | ICD-10-CM

## 2023-09-19 PROCEDURE — 3044F HG A1C LEVEL LT 7.0%: CPT | Mod: CPTII,S$GLB,, | Performed by: INTERNAL MEDICINE

## 2023-09-19 PROCEDURE — 3061F NEG MICROALBUMINURIA REV: CPT | Mod: CPTII,S$GLB,, | Performed by: INTERNAL MEDICINE

## 2023-09-19 PROCEDURE — 3061F PR NEG MICROALBUMINURIA RESULT DOCUMENTED/REVIEW: ICD-10-PCS | Mod: CPTII,S$GLB,, | Performed by: INTERNAL MEDICINE

## 2023-09-19 PROCEDURE — 3044F PR MOST RECENT HEMOGLOBIN A1C LEVEL <7.0%: ICD-10-PCS | Mod: CPTII,S$GLB,, | Performed by: INTERNAL MEDICINE

## 2023-09-19 PROCEDURE — 99214 PR OFFICE/OUTPT VISIT, EST, LEVL IV, 30-39 MIN: ICD-10-PCS | Mod: S$GLB,,, | Performed by: INTERNAL MEDICINE

## 2023-09-19 PROCEDURE — 1160F RVW MEDS BY RX/DR IN RCRD: CPT | Mod: CPTII,S$GLB,, | Performed by: INTERNAL MEDICINE

## 2023-09-19 PROCEDURE — 3066F NEPHROPATHY DOC TX: CPT | Mod: CPTII,S$GLB,, | Performed by: INTERNAL MEDICINE

## 2023-09-19 PROCEDURE — 3080F PR MOST RECENT DIASTOLIC BLOOD PRESSURE >= 90 MM HG: ICD-10-PCS | Mod: CPTII,S$GLB,, | Performed by: INTERNAL MEDICINE

## 2023-09-19 PROCEDURE — 3075F PR MOST RECENT SYSTOLIC BLOOD PRESS GE 130-139MM HG: ICD-10-PCS | Mod: CPTII,S$GLB,, | Performed by: INTERNAL MEDICINE

## 2023-09-19 PROCEDURE — 3008F PR BODY MASS INDEX (BMI) DOCUMENTED: ICD-10-PCS | Mod: CPTII,S$GLB,, | Performed by: INTERNAL MEDICINE

## 2023-09-19 PROCEDURE — 3080F DIAST BP >= 90 MM HG: CPT | Mod: CPTII,S$GLB,, | Performed by: INTERNAL MEDICINE

## 2023-09-19 PROCEDURE — 1159F PR MEDICATION LIST DOCUMENTED IN MEDICAL RECORD: ICD-10-PCS | Mod: CPTII,S$GLB,, | Performed by: INTERNAL MEDICINE

## 2023-09-19 PROCEDURE — 3066F PR DOCUMENTATION OF TREATMENT FOR NEPHROPATHY: ICD-10-PCS | Mod: CPTII,S$GLB,, | Performed by: INTERNAL MEDICINE

## 2023-09-19 PROCEDURE — 1159F MED LIST DOCD IN RCRD: CPT | Mod: CPTII,S$GLB,, | Performed by: INTERNAL MEDICINE

## 2023-09-19 PROCEDURE — 3075F SYST BP GE 130 - 139MM HG: CPT | Mod: CPTII,S$GLB,, | Performed by: INTERNAL MEDICINE

## 2023-09-19 PROCEDURE — 1160F PR REVIEW ALL MEDS BY PRESCRIBER/CLIN PHARMACIST DOCUMENTED: ICD-10-PCS | Mod: CPTII,S$GLB,, | Performed by: INTERNAL MEDICINE

## 2023-09-19 PROCEDURE — 99999 PR PBB SHADOW E&M-EST. PATIENT-LVL V: ICD-10-PCS | Mod: PBBFAC,,, | Performed by: INTERNAL MEDICINE

## 2023-09-19 PROCEDURE — 3008F BODY MASS INDEX DOCD: CPT | Mod: CPTII,S$GLB,, | Performed by: INTERNAL MEDICINE

## 2023-09-19 PROCEDURE — 99999 PR PBB SHADOW E&M-EST. PATIENT-LVL V: CPT | Mod: PBBFAC,,, | Performed by: INTERNAL MEDICINE

## 2023-09-19 PROCEDURE — 99214 OFFICE O/P EST MOD 30 MIN: CPT | Mod: S$GLB,,, | Performed by: INTERNAL MEDICINE

## 2023-09-19 NOTE — TELEPHONE ENCOUNTER
----- Message from Zoran Nobles MD sent at 9/19/2023  8:39 AM CDT -----  Results reviewed. No action.

## 2023-09-19 NOTE — TELEPHONE ENCOUNTER
Patient notified and instructed via [facility]:    Your labs have been reviewed by . No changes made. Repeat labs due 10/9/23. Thanks.

## 2023-09-19 NOTE — PROGRESS NOTES
"                                                         PROGRESS NOTE    Subjective:       Patient ID: OSCAR LARSON is a 43 y.o. male.    Chief Complaint: follow up for hilar cholangiocarcinoma    Diagnosis:  Yp T2N0M0 hilar cholangiocarcinoma, s/p neoadjuvant chemoradiation, chemotherapy and liver transplant on 6/21/2022    Oncologic History:  1. Mr Larson is a 40 yo man who presents today for further management of suspected hilar cholangiocarcinoma. He presented with dark urine, abdominal pain and jaundice since August 2021. He presented to outside hospital ER with elevated bilirubin. MRCP was performed demonstrating bi-lobar intrahepatic bile duct dilation and a ~2cm ill defined mass at the hilum concerning for hilar cholangiocarcinoma. He was referred to the advanced endoscopy group at [facility]. ERCP confirmed severe, malignant appearing stricture involving right and left main hepatic ducts. Biliary stents were placed to relieve obstruction. EUS was performed. It showed an irregular hypoechoic mass where the hepatic duct bifurcates into the right and left hepatic ducts. The mass measured 11.4 mm by 11.3 mm in maximal cross-sectional diameter. FNA sampling of hilar lymph nodes was negative for metastatic disease. Bile duct biopsy showed "rare groups of glandular epithelium with mild atypia, strips of benign glandular epithelium intermixed with blood clot, and focal fibrous tissue with chronic inflammation."  CT C/A/P 10/27/21:  1. Intrahepatic biliary dilatation despite the presence of 2 biliary drains.  The patient is recent comparison MRI a revealed a possible central hepatic mass, concerning for either cholangiocarcinoma or hepatocellular carcinoma.  This is, however, not definitively demonstrated on today's exam.  There are enlarged lymph nodes present in the vicinity of the ana cristina hepatis and celiac axis as detailed above.  2. Scattered pulmonary nodules measuring up to 6 mm.  3. Other findings as detailed " "above.  He presents today for further management. Seen by Dr Jara and considered a candidate for liver transplant. Seen by Dr Cunningham last Friday. Scheduled for PET this Wednesday. 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   Discussed neoadjuvant chemoradiation with 5FU followed by neoadjuvant cis/gem prior to liver transplant.   2. Hospitalized 11/3/21-21 for fever 2/2 acute cholangitis. Repeat ERCP biopsy on 21 again non-diagnostic. Biopsy of the celiac lymph node was negative.   3. PET scan 11/3/21: "1. Wedge-shaped geographic hypermetabolic activity within the right lobe of the liver in a similar distribution to the intrahepatic biliary dilatation.  This could relate to inflammation from multiple etiologies including biliary stasis, cholangitis, and obstructive dilatation.  Neoplastic involvement would be difficult to exclude.  The liver is poorly evaluated given background activity. 2. Hypermetabolic lymphadenopathy is noted in a periportal and celiac distribution.  Infectious inflammatory and neoplastic etiologies are on the differential.  Index nodes have been measured. 3. Multiple pulmonary nodules are noted too small to categorize by PET-CT fusion as evidence seen on a prior CT of 10/27/2021.  CT for follow-up of size stability is necessary."  4. Completed chemoradiation with 5FU from . Started cis/gem on 22. Completed neoadjuvant chemotherapy  5. Underwent living donor liver transplant on 2022. Pathology showed ypT2N0 well to moderately differentiated cholangiocarcinoma of the perihilar bile ducts, with invasion into the perihilar soft tissue. No LVI or PNI. Surgical margins are negative for carcinoma. Two lymph nodes, negative for carcinoma (0/2).     Interval History:   Mr Larson returns for follow up. He is feeling well.     ECO    ROS:   A ten-point system review is obtained and negative except for what was stated in the Interval History.     Physical Examination: "   Vital signs reviewed.   General: well hydrated, well developed, in no acute distress  HEENT: normocephalic, PERRLA, EOMI, anicteric sclerae  Neck: supple, no JVD, thyromegaly, cervical or supraclavicular lymphadenopathy  Lungs: clear breath sounds bilaterally, no wheezing, rales, or rhonchi  Chest: port site clean  Heart: RRR, no M/R/G  Abdomen: soft, no tenderness, non-distended, no hepatosplenomegaly, mass, or hernia. BS present  Extremities: no clubbing, cyanosis, or edema  Skin: no rash, ulcer, or open wounds  Neuro: alert and oriented x 4, no focal neuro deficit  Psych: pleasant and appropriate mood and affect    Objective:     Laboratory Data:  Labs reviewed. CA 19-9 normal     Imaging Data:  CT C/A/P 9/19/23:  Impression:     No evidence for metastatic disease in the chest, abdomen, or pelvis.     Status post liver transplant without evidence for complication.     Unchanged tiny pulmonary nodules.        Assessment and Plan:     1. Hilar cholangiocarcinoma    2. S/P liver transplant    3. Long-term use of immunosuppressant medication    4. Essential hypertension    5. Screen for colon cancer        1.  - Mr Larson is a 42 yo man with stage I hilar cholangiocarcinoma. Biopsy non-diagnostic but clinical picture most consistent with hilar cholangiocarcinoma. He is a candidate for liver transplant. Completed chemoradiation with 5FU from 11/29/21-1/5/2022. Started cis/gem on 1/19/22. Completed neoadjuvant chemotherapy  - Underwent living donor liver transplant on 6/21/2022. Pathology showed ypT2N0 well to moderately differentiated cholangiocarcinoma of the perihilar bile ducts, with invasion into the perihilar soft tissue. No LVI or PNI. Surgical margins are negative for carcinoma. Two lymph nodes, negative for carcinoma (0/2).   - doing well  - reviewed test results with patient. CA 19-9 normal. DERRICK on CT scan  - return in 3 months with labs and surveillance scan in [place]    2.3  - f/u with liver transplant  team    4.  - BP controlled  - c/w current medication    5.  - discussed cologuard vs colonoscopy. I recommend colonoscopy as it is also therapeutic and can remove polyps if detected. Patient agrees with colonoscopy. Referendo order placed    Follow-up:     RTC in 3 months  Knows to call in the interval if any problems arise.    Electronically signed by Young Wesley    Route Chart for Scheduling    Med Onc Chart Routing      Follow up with physician 3 months. see me in 3 months with CBC, CMP, CA 19-9, CT C/A/P at [place] 1-2 days prior to return. flush port in [place] every 3 months   Follow up with JOSEPH    Infusion scheduling note    Injection scheduling note flush port in [place] every 3 months   Labs CA 19-9, CBC and CMP   Scheduling:  Preferred lab:  Lab interval:     Imaging CT chest abdomen pelvis   in 3 months   Pharmacy appointment    Other referrals            Treatment Plan Information   OP GEMCITABINE CISPLATIN Q3W   Young Wesley MD   Upcoming Treatment Dates - OP GEMCITABINE CISPLATIN Q3W    6/1/2022       Chemotherapy       gemcitabine (GEMZAR) 1,615 mg in sodium chloride 0.9% 250 mL chemo infusion       CISplatin (PLATINOL) 20 mg/m2 = 40 mg in sodium chloride 0.9% 500 mL chemo infusion       Pre-Hydration       sodium chloride 0.9% 1,000 mL with magnesium sulfate 1 g, potassium chloride 20 mEq infusion       Post-Hydration       sodium chloride 0.9% bolus 500 mL       Antiemetics       palonosetron injection 0.25 mg       dexamethasone injection 8 mg       aprepitant (CINVANTI) injection 130 mg  6/8/2022       Chemotherapy       gemcitabine (GEMZAR) 1,615 mg in sodium chloride 0.9% 250 mL chemo infusion       CISplatin (PLATINOL) 20 mg/m2 = 40 mg in sodium chloride 0.9% 500 mL chemo infusion       Pre-Hydration       sodium chloride 0.9% 1,000 mL with magnesium sulfate 1 g, potassium chloride 20 mEq infusion       Post-Hydration       sodium chloride 0.9% bolus 500 mL       Antiemetics        palonosetron injection 0.25 mg       dexamethasone injection 8 mg       aprepitant (CINVANTI) injection 130 mg  6/9/2022       Growth Factor       pegfilgrastim-cbqv injection 6 mg  6/22/2022       Chemotherapy       gemcitabine (GEMZAR) 1,615 mg in sodium chloride 0.9% 250 mL chemo infusion       CISplatin (PLATINOL) 20 mg/m2 = 40 mg in sodium chloride 0.9% 500 mL chemo infusion       Pre-Hydration       sodium chloride 0.9% 1,000 mL with magnesium sulfate 1 g, potassium chloride 20 mEq infusion       Post-Hydration       sodium chloride 0.9% bolus 500 mL       Antiemetics       palonosetron injection 0.25 mg       dexamethasone injection 8 mg       aprepitant (CINVANTI) injection 130 mg    Supportive Plan Information  OP FILGRASTIM 480 MCG   Young Wesley MD   Upcoming Treatment Dates - OP FILGRASTIM 480 MCG    2/9/2022       Medications       filgrastim-sndz (ZARXIO) injection 480 mcg/0.8 mL (Preferred Regimen)  2/10/2022       Medications       filgrastim-sndz (ZARXIO) injection 480 mcg/0.8 mL (Preferred Regimen)  2/11/2022       Medications       filgrastim-sndz (ZARXIO) injection 480 mcg/0.8 mL (Preferred Regimen)  2/12/2022       Medications       filgrastim-sndz (ZARXIO) injection 480 mcg/0.8 mL (Preferred Regimen)    Therapy Plan Information  Flushes  heparin, porcine (PF) 100 unit/mL injection flush 500 Units  500 Units, Intravenous, On the 4th Fri of every 1 month  sodium chloride 0.9% flush 10 mL  10 mL, Intravenous, On the 4th Fri of every 1 month

## 2023-09-26 ENCOUNTER — OFFICE VISIT (OUTPATIENT)
Dept: TRANSPLANT | Facility: CLINIC | Age: 43
End: 2023-09-26
Attending: INTERNAL MEDICINE
Payer: COMMERCIAL

## 2023-09-26 VITALS
TEMPERATURE: 98 F | DIASTOLIC BLOOD PRESSURE: 99 MMHG | OXYGEN SATURATION: 96 % | RESPIRATION RATE: 18 BRPM | HEIGHT: 68 IN | WEIGHT: 185.44 LBS | BODY MASS INDEX: 28.1 KG/M2 | SYSTOLIC BLOOD PRESSURE: 145 MMHG | HEART RATE: 68 BPM

## 2023-09-26 DIAGNOSIS — Z94.4 S/P LIVER TRANSPLANT: ICD-10-CM

## 2023-09-26 DIAGNOSIS — Z29.89 PROPHYLACTIC IMMUNOTHERAPY: Primary | ICD-10-CM

## 2023-09-26 DIAGNOSIS — C24.0 HILAR CHOLANGIOCARCINOMA: ICD-10-CM

## 2023-09-26 PROCEDURE — 99213 PR OFFICE/OUTPT VISIT, EST, LEVL III, 20-29 MIN: ICD-10-PCS | Mod: S$GLB,,, | Performed by: INTERNAL MEDICINE

## 2023-09-26 PROCEDURE — 3077F SYST BP >= 140 MM HG: CPT | Mod: CPTII,S$GLB,, | Performed by: INTERNAL MEDICINE

## 2023-09-26 PROCEDURE — 3061F PR NEG MICROALBUMINURIA RESULT DOCUMENTED/REVIEW: ICD-10-PCS | Mod: CPTII,S$GLB,, | Performed by: INTERNAL MEDICINE

## 2023-09-26 PROCEDURE — 3080F DIAST BP >= 90 MM HG: CPT | Mod: CPTII,S$GLB,, | Performed by: INTERNAL MEDICINE

## 2023-09-26 PROCEDURE — 1159F PR MEDICATION LIST DOCUMENTED IN MEDICAL RECORD: ICD-10-PCS | Mod: CPTII,S$GLB,, | Performed by: INTERNAL MEDICINE

## 2023-09-26 PROCEDURE — 1160F PR REVIEW ALL MEDS BY PRESCRIBER/CLIN PHARMACIST DOCUMENTED: ICD-10-PCS | Mod: CPTII,S$GLB,, | Performed by: INTERNAL MEDICINE

## 2023-09-26 PROCEDURE — 99999 PR PBB SHADOW E&M-EST. PATIENT-LVL IV: CPT | Mod: PBBFAC,,, | Performed by: INTERNAL MEDICINE

## 2023-09-26 PROCEDURE — 3066F PR DOCUMENTATION OF TREATMENT FOR NEPHROPATHY: ICD-10-PCS | Mod: CPTII,S$GLB,, | Performed by: INTERNAL MEDICINE

## 2023-09-26 PROCEDURE — 3008F BODY MASS INDEX DOCD: CPT | Mod: CPTII,S$GLB,, | Performed by: INTERNAL MEDICINE

## 2023-09-26 PROCEDURE — 1160F RVW MEDS BY RX/DR IN RCRD: CPT | Mod: CPTII,S$GLB,, | Performed by: INTERNAL MEDICINE

## 2023-09-26 PROCEDURE — 99213 OFFICE O/P EST LOW 20 MIN: CPT | Mod: S$GLB,,, | Performed by: INTERNAL MEDICINE

## 2023-09-26 PROCEDURE — 3044F HG A1C LEVEL LT 7.0%: CPT | Mod: CPTII,S$GLB,, | Performed by: INTERNAL MEDICINE

## 2023-09-26 PROCEDURE — 3066F NEPHROPATHY DOC TX: CPT | Mod: CPTII,S$GLB,, | Performed by: INTERNAL MEDICINE

## 2023-09-26 PROCEDURE — 3008F PR BODY MASS INDEX (BMI) DOCUMENTED: ICD-10-PCS | Mod: CPTII,S$GLB,, | Performed by: INTERNAL MEDICINE

## 2023-09-26 PROCEDURE — 99999 PR PBB SHADOW E&M-EST. PATIENT-LVL IV: ICD-10-PCS | Mod: PBBFAC,,, | Performed by: INTERNAL MEDICINE

## 2023-09-26 PROCEDURE — 3080F PR MOST RECENT DIASTOLIC BLOOD PRESSURE >= 90 MM HG: ICD-10-PCS | Mod: CPTII,S$GLB,, | Performed by: INTERNAL MEDICINE

## 2023-09-26 PROCEDURE — 3044F PR MOST RECENT HEMOGLOBIN A1C LEVEL <7.0%: ICD-10-PCS | Mod: CPTII,S$GLB,, | Performed by: INTERNAL MEDICINE

## 2023-09-26 PROCEDURE — 1159F MED LIST DOCD IN RCRD: CPT | Mod: CPTII,S$GLB,, | Performed by: INTERNAL MEDICINE

## 2023-09-26 PROCEDURE — 3077F PR MOST RECENT SYSTOLIC BLOOD PRESSURE >= 140 MM HG: ICD-10-PCS | Mod: CPTII,S$GLB,, | Performed by: INTERNAL MEDICINE

## 2023-09-26 PROCEDURE — 3061F NEG MICROALBUMINURIA REV: CPT | Mod: CPTII,S$GLB,, | Performed by: INTERNAL MEDICINE

## 2023-09-26 NOTE — PROGRESS NOTES
Transplant Hepatology  Liver Transplant Recipient Follow-up    Transplant Date: 6/21/2022  UNOS Native Liver Dx: Primary Liver Malignancy: Cholangiocarcinoma (CH-CA)    OSCAR is here for follow up of his liver transplant.    ORGAN: RIGHT LIVER LOBE (SEGS 5,6,7,8) WITHOUT MIDDLE HEPATIC VEIN  Whole or Partial: partial liver  Donor Type: living  [facility] High Risk:   Donor CMV Status:   Donor HCV Status:   Donor HBcAb:   Donor HBV GUSTABO:   Donor HCV GUSTABO:   Biliary Anastomosis: vick-en-y  Arterial Anatomy: standard  IVC reconstruction: hepatic vein confluence piggyback  Portal vein status: patent    He has had the following complications since transplant: bile leak. The noted complications is well controlled.  The bile leak had required a PTC for several weeks which has subsequently been removed.    Subjective:     Interval History:  This 43-year-old gentleman underwent a liver transplant in June of this year for cholangiocarcinoma.  He has been free of any clinical or radiological recurrence of cholangiocarcinoma.  His performance status is excellent.  He is stable allograft function on tacrolimus 1 mg twice daily.  Weight and appetite are stable.  Review of Systems   Constitutional:  Negative for activity change, appetite change, chills, fatigue and unexpected weight change.   HENT:  Negative for congestion, facial swelling and tinnitus.    Eyes:  Negative for visual disturbance.   Respiratory:  Negative for cough, shortness of breath and wheezing.    Cardiovascular:  Negative for chest pain and palpitations.   Gastrointestinal:  Negative for abdominal distention.   Genitourinary:  Negative for dysuria.   Musculoskeletal:  Negative for arthralgias, joint swelling and myalgias.   Neurological:  Negative for syncope and headaches.   Hematological:  Does not bruise/bleed easily.   Psychiatric/Behavioral:  Negative for confusion.        Objective:     Physical Exam  Constitutional:       Appearance: He is well-developed.    Eyes:      General: No scleral icterus.  Cardiovascular:      Rate and Rhythm: Normal rate and regular rhythm.      Heart sounds: Normal heart sounds.   Pulmonary:      Effort: Pulmonary effort is normal. No respiratory distress.      Breath sounds: Normal breath sounds. No wheezing.   Abdominal:      General: Bowel sounds are normal. There is no distension.      Palpations: Abdomen is soft. There is no mass.      Tenderness: There is no abdominal tenderness. There is no rebound.       Musculoskeletal:         General: Normal range of motion.   Lymphadenopathy:      Cervical: No cervical adenopathy.   Skin:     General: Skin is warm and dry.   Neurological:      Mental Status: He is alert and oriented to person, place, and time.       Lab Results   Component Value Date    BILITOT 1.5 (H) 09/18/2023    AST 23 09/18/2023    ALT 40 09/18/2023    ALKPHOS 137 (H) 09/18/2023    CREATININE 1.2 09/18/2023    ALBUMIN 4.0 09/18/2023     Lab Results   Component Value Date    WBC 5.04 09/18/2023    HGB 15.1 09/18/2023    HCT 42.9 09/18/2023    HCT 25 (L) 06/23/2022     (L) 09/18/2023     Lab Results   Component Value Date    TACROLIMUS 9.5 09/18/2023       Assessment/Plan:     1. Prophylactic immunotherapy    2. S/P liver transplant    3. Hilar cholangiocarcinoma        I have made no changes to his current immunosuppression.  We will continue to get laboratory follow-up every month.  I will have him return to clinic in 6 months' time.    Zoran Nobles MD           [facility] Patient Status  Functional Status: 90% - Able to carry on normal activity: minor symptoms of disease  Physical Capacity: No Limitations  Working for income: yes  New diabetes onset between last follow-up to the current follow-up: No  Did patient have any acute rejection episodes during the follow-up period: No  Post transplant malignancy: No

## 2023-09-26 NOTE — LETTER
September 26, 2023        Pravin Maria  [address]  [facility] [address]  Phone: [phone]  Fax: [phone]             [facility]  [address]  [facility] [address]  Phone: [phone]   Patient: OSCAR PAYNE   MR Number: [id number]   YOB: 1980   Date of Visit: 9/26/2023       Dear Dr. Pravin Maria    Thank you for referring OSCAR PAYNE to me for evaluation. Attached you will find relevant portions of my assessment and plan of care.    If you have questions, please do not hesitate to call me. I look forward to following OSCAR PAYNE along with you.    Sincerely,    Zoran Nobles MD    Enclosure    If you would like to receive this communication electronically, please contact [email or web address] or [phone] to request [facility] access.    [facility] is a tool which provides read-only access to select patient information with whom you have a relationship. Its easy to use and provides real time access to review your patients record including encounter summaries, notes, results, and demographic information.    If you feel you have received this communication in error or would no longer like to receive these types of communications, please e-mail [email or web address]

## 2023-10-09 ENCOUNTER — LAB VISIT (OUTPATIENT)
Dept: LAB | Facility: HOSPITAL | Age: 43
End: 2023-10-09
Attending: INTERNAL MEDICINE
Payer: COMMERCIAL

## 2023-10-09 ENCOUNTER — TELEPHONE (OUTPATIENT)
Dept: TRANSPLANT | Facility: CLINIC | Age: 43
End: 2023-10-09
Payer: COMMERCIAL

## 2023-10-09 ENCOUNTER — PATIENT MESSAGE (OUTPATIENT)
Dept: TRANSPLANT | Facility: CLINIC | Age: 43
End: 2023-10-09
Payer: COMMERCIAL

## 2023-10-09 DIAGNOSIS — Z94.4 LIVER REPLACED BY TRANSPLANT: Primary | ICD-10-CM

## 2023-10-09 DIAGNOSIS — Z94.4 LIVER REPLACED BY TRANSPLANT: ICD-10-CM

## 2023-10-09 LAB
ALBUMIN SERPL BCP-MCNC: 4.2 G/DL (ref 3.5–5.2)
ALP SERPL-CCNC: 151 U/L (ref 55–135)
ALT SERPL W/O P-5'-P-CCNC: 31 U/L (ref 10–44)
ANION GAP SERPL CALC-SCNC: 8 MMOL/L (ref 8–16)
AST SERPL-CCNC: 24 U/L (ref 10–40)
BASOPHILS # BLD AUTO: 0.02 K/UL (ref 0–0.2)
BASOPHILS NFR BLD: 0.5 % (ref 0–1.9)
BILIRUB DIRECT SERPL-MCNC: 0.4 MG/DL (ref 0.1–0.3)
BILIRUB SERPL-MCNC: 1.1 MG/DL (ref 0.1–1)
BUN SERPL-MCNC: 18 MG/DL (ref 6–20)
CALCIUM SERPL-MCNC: 9.7 MG/DL (ref 8.7–10.5)
CHLORIDE SERPL-SCNC: 105 MMOL/L (ref 95–110)
CO2 SERPL-SCNC: 27 MMOL/L (ref 23–29)
CREAT SERPL-MCNC: 1.2 MG/DL (ref 0.5–1.4)
DIFFERENTIAL METHOD: ABNORMAL
EOSINOPHIL # BLD AUTO: 0.1 K/UL (ref 0–0.5)
EOSINOPHIL NFR BLD: 2.5 % (ref 0–8)
ERYTHROCYTE [DISTWIDTH] IN BLOOD BY AUTOMATED COUNT: 11.9 % (ref 11.5–14.5)
EST. GFR  (NO RACE VARIABLE): >60 ML/MIN/1.73 M^2
GLUCOSE SERPL-MCNC: 118 MG/DL (ref 70–110)
HCT VFR BLD AUTO: 46.6 % (ref 40–54)
HGB BLD-MCNC: 15.6 G/DL (ref 14–18)
IMM GRANULOCYTES # BLD AUTO: 0.01 K/UL (ref 0–0.04)
IMM GRANULOCYTES NFR BLD AUTO: 0.2 % (ref 0–0.5)
LYMPHOCYTES # BLD AUTO: 1.5 K/UL (ref 1–4.8)
LYMPHOCYTES NFR BLD: 33.6 % (ref 18–48)
MCH RBC QN AUTO: 32.1 PG (ref 27–31)
MCHC RBC AUTO-ENTMCNC: 33.5 G/DL (ref 32–36)
MCV RBC AUTO: 96 FL (ref 82–98)
MONOCYTES # BLD AUTO: 0.4 K/UL (ref 0.3–1)
MONOCYTES NFR BLD: 10.2 % (ref 4–15)
NEUTROPHILS # BLD AUTO: 2.3 K/UL (ref 1.8–7.7)
NEUTROPHILS NFR BLD: 53 % (ref 38–73)
NRBC BLD-RTO: 0 /100 WBC
PLATELET # BLD AUTO: 145 K/UL (ref 150–450)
PMV BLD AUTO: 8.6 FL (ref 9.2–12.9)
POTASSIUM SERPL-SCNC: 4.8 MMOL/L (ref 3.5–5.1)
PROT SERPL-MCNC: 7.2 G/DL (ref 6–8.4)
RBC # BLD AUTO: 4.86 M/UL (ref 4.6–6.2)
SODIUM SERPL-SCNC: 140 MMOL/L (ref 136–145)
TACROLIMUS BLD-MCNC: 9.1 NG/ML (ref 5–15)
WBC # BLD AUTO: 4.32 K/UL (ref 3.9–12.7)

## 2023-10-09 PROCEDURE — 85025 COMPLETE CBC W/AUTO DIFF WBC: CPT | Performed by: INTERNAL MEDICINE

## 2023-10-09 PROCEDURE — 82248 BILIRUBIN DIRECT: CPT | Performed by: INTERNAL MEDICINE

## 2023-10-09 PROCEDURE — 36415 COLL VENOUS BLD VENIPUNCTURE: CPT | Performed by: INTERNAL MEDICINE

## 2023-10-09 PROCEDURE — 80197 ASSAY OF TACROLIMUS: CPT | Performed by: INTERNAL MEDICINE

## 2023-10-09 PROCEDURE — 80053 COMPREHEN METABOLIC PANEL: CPT | Performed by: INTERNAL MEDICINE

## 2023-10-09 NOTE — TELEPHONE ENCOUNTER
Patient notified and instructed via [employer]:    Your labs have been reviewed by ; no changes made. Repeat labs due 11/6/23. Thanks.

## 2023-10-09 NOTE — TELEPHONE ENCOUNTER
----- Message from Zoran Nobles MD sent at 10/9/2023  3:26 PM CDT -----  Results reviewed. No action.

## 2023-10-25 ENCOUNTER — PATIENT MESSAGE (OUTPATIENT)
Dept: TRANSPLANT | Facility: CLINIC | Age: 43
End: 2023-10-25
Payer: COMMERCIAL

## 2023-10-25 ENCOUNTER — HOSPITAL ENCOUNTER (INPATIENT)
Facility: HOSPITAL | Age: 43
LOS: 3 days | Discharge: HOME | DRG: 872 | End: 2023-10-28
Attending: STUDENT IN AN ORGANIZED HEALTH CARE EDUCATION/TRAINING PROGRAM | Admitting: EMERGENCY MEDICINE
Payer: COMMERCIAL

## 2023-10-25 DIAGNOSIS — R00.0 TACHYCARDIA: ICD-10-CM

## 2023-10-25 DIAGNOSIS — R50.9 FEVER, UNSPECIFIED FEVER CAUSE: Primary | ICD-10-CM

## 2023-10-25 DIAGNOSIS — R07.9 CHEST PAIN: ICD-10-CM

## 2023-10-25 PROBLEM — C24.0: Chronic | Status: ACTIVE | Noted: 2021-11-04

## 2023-10-25 PROBLEM — R65.20 SEVERE SEPSIS: Status: ACTIVE | Noted: 2022-07-18

## 2023-10-25 PROBLEM — Z91.89 AT RISK FOR OPPORTUNISTIC INFECTIONS: Chronic | Status: ACTIVE | Noted: 2022-06-21

## 2023-10-25 PROBLEM — Z94.4 S/P LIVER TRANSPLANT: Chronic | Status: ACTIVE | Noted: 2022-06-21

## 2023-10-25 PROBLEM — I10 HYPERTENSION: Chronic | Status: ACTIVE | Noted: 2021-10-15

## 2023-10-25 LAB
ALBUMIN SERPL BCP-MCNC: 4.5 G/DL (ref 3.5–5.2)
ALLENS TEST: NORMAL
ALP SERPL-CCNC: 179 U/L (ref 55–135)
ALT SERPL W/O P-5'-P-CCNC: 159 U/L (ref 10–44)
ANION GAP SERPL CALC-SCNC: 11 MMOL/L (ref 8–16)
AST SERPL-CCNC: 202 U/L (ref 10–40)
BASOPHILS # BLD AUTO: 0.01 K/UL (ref 0–0.2)
BASOPHILS NFR BLD: 0.2 % (ref 0–1.9)
BILIRUB SERPL-MCNC: 2.4 MG/DL (ref 0.1–1)
BILIRUB UR QL STRIP: NEGATIVE
BUN SERPL-MCNC: 17 MG/DL (ref 6–20)
CALCIUM SERPL-MCNC: 9.7 MG/DL (ref 8.7–10.5)
CHLORIDE SERPL-SCNC: 105 MMOL/L (ref 95–110)
CLARITY UR REFRACT.AUTO: CLEAR
CO2 SERPL-SCNC: 21 MMOL/L (ref 23–29)
COLOR UR AUTO: YELLOW
CREAT SERPL-MCNC: 1.5 MG/DL (ref 0.5–1.4)
DIFFERENTIAL METHOD: ABNORMAL
EOSINOPHIL # BLD AUTO: 0 K/UL (ref 0–0.5)
EOSINOPHIL NFR BLD: 0.6 % (ref 0–8)
ERYTHROCYTE [DISTWIDTH] IN BLOOD BY AUTOMATED COUNT: 11.8 % (ref 11.5–14.5)
EST. GFR  (NO RACE VARIABLE): 58.1 ML/MIN/1.73 M^2
GLUCOSE SERPL-MCNC: 146 MG/DL (ref 70–110)
GLUCOSE UR QL STRIP: NEGATIVE
HAV IGM SERPL QL IA: NORMAL
HBV CORE IGM SERPL QL IA: NORMAL
HBV SURFACE AG SERPL QL IA: NORMAL
HCT VFR BLD AUTO: 47.7 % (ref 40–54)
HCV AB SERPL QL IA: NORMAL
HGB BLD-MCNC: 16.3 G/DL (ref 14–18)
HGB UR QL STRIP: NEGATIVE
HIV 1+2 AB+HIV1 P24 AG SERPL QL IA: NORMAL
IMM GRANULOCYTES # BLD AUTO: 0.03 K/UL (ref 0–0.04)
IMM GRANULOCYTES NFR BLD AUTO: 0.6 % (ref 0–0.5)
INFLUENZA A, MOLECULAR: NEGATIVE
INFLUENZA B, MOLECULAR: NEGATIVE
INR PPP: 1 (ref 0.8–1.2)
KETONES UR QL STRIP: NEGATIVE
LDH SERPL L TO P-CCNC: 1.5 MMOL/L (ref 0.5–2.2)
LEUKOCYTE ESTERASE UR QL STRIP: NEGATIVE
LYMPHOCYTES # BLD AUTO: 0.4 K/UL (ref 1–4.8)
LYMPHOCYTES NFR BLD: 6.8 % (ref 18–48)
MAGNESIUM SERPL-MCNC: 1.3 MG/DL (ref 1.6–2.6)
MCH RBC QN AUTO: 31.7 PG (ref 27–31)
MCHC RBC AUTO-ENTMCNC: 34.2 G/DL (ref 32–36)
MCV RBC AUTO: 93 FL (ref 82–98)
MONOCYTES # BLD AUTO: 0.2 K/UL (ref 0.3–1)
MONOCYTES NFR BLD: 2.8 % (ref 4–15)
NEUTROPHILS # BLD AUTO: 4.7 K/UL (ref 1.8–7.7)
NEUTROPHILS NFR BLD: 89 % (ref 38–73)
NITRITE UR QL STRIP: NEGATIVE
NRBC BLD-RTO: 0 /100 WBC
PH UR STRIP: 5 [PH] (ref 5–8)
PHOSPHATE SERPL-MCNC: 1.7 MG/DL (ref 2.7–4.5)
PLATELET # BLD AUTO: 119 K/UL (ref 150–450)
PMV BLD AUTO: 8.8 FL (ref 9.2–12.9)
POTASSIUM SERPL-SCNC: 4.8 MMOL/L (ref 3.5–5.1)
PROCALCITONIN SERPL IA-MCNC: 10.89 NG/ML
PROT SERPL-MCNC: 7.6 G/DL (ref 6–8.4)
PROT UR QL STRIP: NEGATIVE
PROTHROMBIN TIME: 10.5 SEC (ref 9–12.5)
RBC # BLD AUTO: 5.14 M/UL (ref 4.6–6.2)
SAMPLE: NORMAL
SARS-COV-2 RDRP RESP QL NAA+PROBE: NEGATIVE
SITE: NORMAL
SODIUM SERPL-SCNC: 137 MMOL/L (ref 136–145)
SP GR UR STRIP: 1.01 (ref 1–1.03)
SPECIMEN SOURCE: NORMAL
URN SPEC COLLECT METH UR: NORMAL
WBC # BLD AUTO: 5.28 K/UL (ref 3.9–12.7)

## 2023-10-25 PROCEDURE — 99900035 HC TECH TIME PER 15 MIN (STAT)

## 2023-10-25 PROCEDURE — 25000003 PHARM REV CODE 250: Performed by: STUDENT IN AN ORGANIZED HEALTH CARE EDUCATION/TRAINING PROGRAM

## 2023-10-25 PROCEDURE — 93005 ELECTROCARDIOGRAM TRACING: CPT

## 2023-10-25 PROCEDURE — 21400001 HC TELEMETRY ROOM

## 2023-10-25 PROCEDURE — 63600175 PHARM REV CODE 636 W HCPCS

## 2023-10-25 PROCEDURE — 93010 ELECTROCARDIOGRAM REPORT: CPT | Mod: ,,, | Performed by: INTERNAL MEDICINE

## 2023-10-25 PROCEDURE — U0002 COVID-19 LAB TEST NON-CDC: HCPCS | Performed by: PHYSICIAN ASSISTANT

## 2023-10-25 PROCEDURE — 63600175 PHARM REV CODE 636 W HCPCS: Performed by: PHYSICIAN ASSISTANT

## 2023-10-25 PROCEDURE — 25000003 PHARM REV CODE 250: Performed by: PHYSICIAN ASSISTANT

## 2023-10-25 PROCEDURE — 99285 EMERGENCY DEPT VISIT HI MDM: CPT | Mod: 25

## 2023-10-25 PROCEDURE — 96367 TX/PROPH/DG ADDL SEQ IV INF: CPT

## 2023-10-25 PROCEDURE — 87389 HIV-1 AG W/HIV-1&-2 AB AG IA: CPT

## 2023-10-25 PROCEDURE — 63600175 PHARM REV CODE 636 W HCPCS: Performed by: STUDENT IN AN ORGANIZED HEALTH CARE EDUCATION/TRAINING PROGRAM

## 2023-10-25 PROCEDURE — 84100 ASSAY OF PHOSPHORUS: CPT | Performed by: PHYSICIAN ASSISTANT

## 2023-10-25 PROCEDURE — 87040 BLOOD CULTURE FOR BACTERIA: CPT | Performed by: PHYSICIAN ASSISTANT

## 2023-10-25 PROCEDURE — 83735 ASSAY OF MAGNESIUM: CPT | Performed by: PHYSICIAN ASSISTANT

## 2023-10-25 PROCEDURE — 84145 PROCALCITONIN (PCT): CPT

## 2023-10-25 PROCEDURE — 83605 ASSAY OF LACTIC ACID: CPT

## 2023-10-25 PROCEDURE — 94761 N-INVAS EAR/PLS OXIMETRY MLT: CPT

## 2023-10-25 PROCEDURE — 93010 EKG 12-LEAD: ICD-10-PCS | Mod: ,,, | Performed by: INTERNAL MEDICINE

## 2023-10-25 PROCEDURE — 80074 ACUTE HEPATITIS PANEL: CPT

## 2023-10-25 PROCEDURE — 87502 INFLUENZA DNA AMP PROBE: CPT

## 2023-10-25 PROCEDURE — 96365 THER/PROPH/DIAG IV INF INIT: CPT | Mod: 59

## 2023-10-25 PROCEDURE — 25000003 PHARM REV CODE 250

## 2023-10-25 PROCEDURE — 81003 URINALYSIS AUTO W/O SCOPE: CPT | Performed by: PHYSICIAN ASSISTANT

## 2023-10-25 PROCEDURE — 11000001 HC ACUTE MED/SURG PRIVATE ROOM

## 2023-10-25 PROCEDURE — 85610 PROTHROMBIN TIME: CPT | Performed by: PHYSICIAN ASSISTANT

## 2023-10-25 PROCEDURE — 85025 COMPLETE CBC W/AUTO DIFF WBC: CPT | Performed by: PHYSICIAN ASSISTANT

## 2023-10-25 PROCEDURE — 87502 INFLUENZA DNA AMP PROBE: CPT | Performed by: PHYSICIAN ASSISTANT

## 2023-10-25 PROCEDURE — 80053 COMPREHEN METABOLIC PANEL: CPT | Performed by: PHYSICIAN ASSISTANT

## 2023-10-25 PROCEDURE — 96361 HYDRATE IV INFUSION ADD-ON: CPT | Mod: 59

## 2023-10-25 RX ORDER — TACROLIMUS 1 MG/1
1 CAPSULE ORAL 2 TIMES DAILY
Status: DISCONTINUED | OUTPATIENT
Start: 2023-10-25 | End: 2023-10-28 | Stop reason: HOSPADM

## 2023-10-25 RX ORDER — IBUPROFEN 200 MG
16 TABLET ORAL
Status: DISCONTINUED | OUTPATIENT
Start: 2023-10-25 | End: 2023-10-28 | Stop reason: HOSPADM

## 2023-10-25 RX ORDER — IBUPROFEN 200 MG
24 TABLET ORAL
Status: DISCONTINUED | OUTPATIENT
Start: 2023-10-25 | End: 2023-10-28 | Stop reason: HOSPADM

## 2023-10-25 RX ORDER — NALOXONE HCL 0.4 MG/ML
0.02 VIAL (ML) INJECTION
Status: DISCONTINUED | OUTPATIENT
Start: 2023-10-25 | End: 2023-10-28 | Stop reason: HOSPADM

## 2023-10-25 RX ORDER — SODIUM,POTASSIUM PHOSPHATES 280-250MG
2 POWDER IN PACKET (EA) ORAL ONCE
Status: COMPLETED | OUTPATIENT
Start: 2023-10-25 | End: 2023-10-25

## 2023-10-25 RX ORDER — NAPROXEN SODIUM 220 MG/1
81 TABLET, FILM COATED ORAL DAILY
Status: DISCONTINUED | OUTPATIENT
Start: 2023-10-26 | End: 2023-10-25

## 2023-10-25 RX ORDER — SODIUM CHLORIDE 0.9 % (FLUSH) 0.9 %
10 SYRINGE (ML) INJECTION EVERY 12 HOURS PRN
Status: DISCONTINUED | OUTPATIENT
Start: 2023-10-25 | End: 2023-10-28 | Stop reason: HOSPADM

## 2023-10-25 RX ORDER — HEPARIN SODIUM 5000 [USP'U]/ML
5000 INJECTION, SOLUTION INTRAVENOUS; SUBCUTANEOUS EVERY 8 HOURS
Status: DISCONTINUED | OUTPATIENT
Start: 2023-10-25 | End: 2023-10-28 | Stop reason: HOSPADM

## 2023-10-25 RX ORDER — ACETAMINOPHEN 500 MG
500 TABLET ORAL
COMMUNITY

## 2023-10-25 RX ORDER — GLUCAGON 1 MG
1 KIT INJECTION
Status: DISCONTINUED | OUTPATIENT
Start: 2023-10-25 | End: 2023-10-28 | Stop reason: HOSPADM

## 2023-10-25 RX ORDER — MAGNESIUM SULFATE HEPTAHYDRATE 40 MG/ML
2 INJECTION, SOLUTION INTRAVENOUS ONCE
Status: COMPLETED | OUTPATIENT
Start: 2023-10-25 | End: 2023-10-25

## 2023-10-25 RX ADMIN — VANCOMYCIN HYDROCHLORIDE 1750 MG: 10 INJECTION, POWDER, LYOPHILIZED, FOR SOLUTION INTRAVENOUS at 02:10

## 2023-10-25 RX ADMIN — CEFEPIME 2 G: 2 INJECTION, POWDER, FOR SOLUTION INTRAVENOUS at 01:10

## 2023-10-25 RX ADMIN — SODIUM CHLORIDE 2052 ML: 9 INJECTION, SOLUTION INTRAVENOUS at 01:10

## 2023-10-25 RX ADMIN — CEFEPIME 2 G: 2 INJECTION, POWDER, FOR SOLUTION INTRAVENOUS at 10:10

## 2023-10-25 RX ADMIN — TACROLIMUS 1 MG: 1 CAPSULE ORAL at 10:10

## 2023-10-25 RX ADMIN — HEPARIN SODIUM 5000 UNITS: 5000 INJECTION INTRAVENOUS; SUBCUTANEOUS at 10:10

## 2023-10-25 RX ADMIN — MAGNESIUM SULFATE HEPTAHYDRATE 2 G: 40 INJECTION, SOLUTION INTRAVENOUS at 05:10

## 2023-10-25 RX ADMIN — POTASSIUM & SODIUM PHOSPHATES POWDER PACK 280-160-250 MG 2 PACKET: 280-160-250 PACK at 10:10

## 2023-10-25 NOTE — PHARMACY MED REC
"Admission Medication History     The home medication history was taken by Delmar Xiong.    You may go to "Admission" then "Reconcile Home Medications" tabs to review and/or act upon these items.     The home medication list has been updated by the Pharmacy department.   Please read ALL comments highlighted in yellow.   Please address this information as you see fit.    Feel free to contact us if you have any questions or require assistance.      The medications listed below were removed from the home medication list. Please reorder if appropriate:  Patient reports no longer taking the following medication(s):  ASPIRIN 81 MG TABLET  BACLOFEN 10 MG TABLET  FAMOTIDINE 20 MG TABLET  NOVOLOG U-100 UNIT/ML INSULIN PEN  LOSARTAN 50 MG TABLET  METFORMIN 500 MG TABLET  TAMSULOSIN 0.4 MG CAPSULE      Medications listed below were obtained from: Patient/family and Analytic software- [employer]  (Not in a hospital admission)    Current Outpatient Medications on File Prior to Encounter   Medication Sig    acetaminophen (TYLENOL) 500 MG tablet   Take 500 mg by mouth every 4 to 6 hours as needed for Pain.    carvediloL (COREG) 12.5 MG tablet   Take 1 tablet (12.5 mg total) by mouth 2 (two) times daily with meals.    tacrolimus (PROGRAF) 1 MG Cap   Take 1 capsule (1 mg total) by mouth every 12 (twelve) hours.    ursodioL (ACTIGALL) 250 mg Tab   Take 1 tablet (250 mg total) by mouth 2 (two) times a day.       Delmar Xiong  EXT [id number]                .          "

## 2023-10-25 NOTE — ED NOTES
Patient ambulatory to restroom with steady gait and NADN. Returned to ED stretcher without incident.

## 2023-10-25 NOTE — PROVIDER PROGRESS NOTES - EMERGENCY DEPT.
Encounter Date: 10/25/2023    ED Physician Progress Notes        Physician Note:   Signout Note  I received signout from the previous providers.     Chief complaint:  Fever (Subjective fever starting today, nausea; denies cough)      Per sign out and chart review: OSCAR PAYNE is a 43 y.o. male with past medical history of cholangiocarcinoma and liver transplant (June 2022) now presenting with chief complaint of fever.    During ED stay patient patient noted to have elevated transaminases and hyperbilirubinemia.    Pt signed out to me pending:  Additional labs, chest x-ray, and Doppler    Update/ Disposition: As per my interpretation, the chest x-ray is grossly normal with no acute findings concerning for new infiltrates, new edema, new effusions, changes in cardiac silhouette.  As per my independent interpretation ultrasound of liver shows normal waveform.  Decided to admit patient to Hospital Medicine and recommended hepatology.  Patient remained stable.      Patient, caregiver and/or family understands the plan and verbalized agreement. All questions answered.     Diagnostic Impression:    Fever, unspecified fever cause  (primary encounter diagnosis)  Tachycardia

## 2023-10-25 NOTE — ED TRIAGE NOTES
Patient to ED via personal vehicle with complaints of f/c x this am. [facility] liver transplant 06/23. Denies recent exposure to sick contacts. Denies HA, dizziness, chest pain, shortness of breath, abd pain, NVD,  complaints. Patient reports taking dose of tylenol PTA. Patient aaox4. Respirations even and unlabored. Skin warm and dry. Plan of care discussed with patient - verbalized understanding. Pending ED workup at this time.

## 2023-10-25 NOTE — ED PROVIDER NOTES
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  He reports mild myalgias, chills.  He took tylenol around 11a. No known sick contacts. Not currently on any abx. Denies chest pain, SOB, URI symptoms, abdominal pain, nausea, dysuria, diarrhea, headache, lightheadedness. No recent travel.    The history is provided by the patient.     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    Hypercholesteremia     Hypertension      Past Surgical History:   Procedure Laterality Date    DIAGNOSTIC ULTRASOUND N/A 6/21/2022    Procedure: ULTRASOUND, DIAGNOSTIC;  Surgeon: Sinan Cline MD;  Location: [facility] OR [address] [place] [address];  Service: Transplant;  Laterality: N/A;    ENDOSCOPIC ULTRASOUND OF UPPER GASTROINTESTINAL TRACT N/A 10/20/2021    Procedure: ULTRASOUND, UPPER GI TRACT, ENDOSCOPIC;  Surgeon: Valeriy Sotelo MD;  [facility]  Service: Endoscopy;  Laterality: N/A;  wife to email vacc card-inst email-tb    ERCP N/A 10/20/2021    Procedure: ERCP (ENDOSCOPIC RETROGRADE CHOLANGIOPANCREATOGRAPHY);  Surgeon: Valeriy Sotelo MD;  [facility]  Service: Endoscopy;  Laterality: N/A;    ERCP N/A 11/4/2021    Procedure: ERCP (ENDOSCOPIC RETROGRADE CHOLANGIOPANCREATOGRAPHY);  Surgeon: Valeriy Sotelo MD;  [facility]  Service: Endoscopy;  Laterality: N/A;    ERCP N/A 11/4/2021    Procedure: ERCP (ENDOSCOPIC RETROGRADE  CHOLANGIOPANCREATOGRAPHY);  Surgeon: Roselia Pereyra MD;  Location: [facility] ENDO (2ND FLR);  Service: Endoscopy;  Laterality: N/A;  pt vaccinated, instructed to bring card to procedure, instructions sent portal-MG    ERCP N/A 1/14/2022    Procedure: ERCP (ENDOSCOPIC RETROGRADE CHOLANGIOPANCREATOGRAPHY);  Surgeon: Roselia Pereyra MD;  Location: [facility] ENDO (2ND FLR);  Service: Endoscopy;  Laterality: N/A;  inst portal-right chest port-tb  Pt requested at home COVID testing, Pt instructed at home RAPID to be done morning of procedure, Pt instructed to call endoscopy scheuding if there is a positive result, Pt informed if a positive     ERCP N/A 3/31/2022    Procedure: ERCP (ENDOSCOPIC RETROGRADE CHOLANGIOPANCREATOGRAPHY);  Surgeon: Julio Cesar Alonzo MD;  [facility]  Service: Endoscopy;  Laterality: N/A;  3/16: port L chest wall. pt to bring covid vaccination card. Instructions sent via portal.-SC  3/18/22-Updated instructions sent via portal re:new date/time-DS    EXPLORATORY LAPAROTOMY AFTER LIVER TRANSPLANTATION N/A 6/23/2022    Procedure: LAPAROTOMY, EXPLORATORY, AFTER LIVER TRANSPLANT;  Surgeon: Julio Cesar Jara MD;  Location: [address];  Service: Transplant;  Laterality: N/A;    INSERTION OF TUNNELED CENTRAL VENOUS CATHETER (CVC) WITH SUBCUTANEOUS PORT N/A 11/24/2021    Procedure: INSERTION, PORT-A-CATH;  Surgeon: Prem Syed MD;  [place] [facility] [place];  Service: Radiology;  Laterality: N/A;    LIVER TRANSPLANT N/A 6/21/2022    Procedure: TRANSPLANT, LIVER;  Surgeon: Sinan Cline MD;  [facility];  Service: Transplant;  Laterality: N/A;    REPAIR OF BILE DUCT N/A 6/23/2022    Procedure: REPAIR, BILE DUCT;  Surgeon: Julio Cesar Jara MD;  [facility];  Service: Transplant;  Laterality: N/A;    ROBOT-ASSISTED LAPAROSCOPIC LYMPHADENECTOMY USING DA МАРИНА XI  6/17/2022    Procedure: XI ROBOTIC LYMPHADENECTOMY - Portal;  Surgeon: Julio Cesar Jara MD;  Location:  [facility] OR 2ND FLR;  Service: Transplant;;    TONSILLECTOMY      XI ROBOTIC LAPAROSCOPY, EXPLORATORY N/A 6/17/2022    Procedure: XI ROBOTIC LAPAROSCOPY,EXPLORATORY;  Surgeon: Julio Cesar Jara MD;  Location: [facility] OR 2ND FLR;  Service: Transplant;  Laterality: N/A;     Family History   Problem Relation Age of Onset    No Known Problems Mother     Hyperlipidemia Father     No Known Problems Sister     No Known Problems Brother      Social History     Tobacco Use    Smoking status: Never    Smokeless tobacco: Never   Substance Use Topics    Alcohol use: Not Currently    Drug use: Never     Review of Systems   Constitutional:  Positive for chills and fever.   Musculoskeletal:  Positive for myalgias.       Physical Exam     Initial Vitals [10/25/23 1245]   BP Pulse Resp Temp SpO2   (!) 153/92 (!) 112 20 (!) 101.4 °F (38.6 °C) 96 %      MAP       --         Physical Exam    Nursing note and vitals reviewed.  Constitutional: He appears well-developed and well-nourished. He is not diaphoretic. No distress.   HENT:   Head: Normocephalic and atraumatic.   Cardiovascular:  Regular rhythm and normal heart sounds.   Tachycardia present.   Exam reveals no gallop and no friction rub.       No murmur heard.  Pulmonary/Chest: Breath sounds normal. He has no wheezes. He has no rhonchi. He has no rales.   Abdominal: Abdomen is soft. Bowel sounds are normal. There is no abdominal tenderness.   Scar from liver transplant noted There is no rebound and no guarding.   Musculoskeletal:         General: Normal range of motion.     Neurological: He is alert and oriented to person, place, and time.   Skin: Skin is warm and dry.   Psychiatric: He has a normal mood and affect.       ED Course   Procedures  Labs Reviewed   CBC W/ AUTO DIFFERENTIAL - Abnormal; Notable for the following components:       Result Value    MCH 31.7 (*)     Platelets 119 (*)     MPV 8.8 (*)     Immature Granulocytes 0.6 (*)     Lymph # 0.4 (*)     Mono # 0.2 (*)      Gran % 89.0 (*)     Lymph % 6.8 (*)     Mono % 2.8 (*)     All other components within normal limits   COMPREHENSIVE METABOLIC PANEL - Abnormal; Notable for the following components:    CO2 21 (*)     Glucose 146 (*)     Creatinine 1.5 (*)     Total Bilirubin 2.4 (*)     Alkaline Phosphatase 179 (*)      (*)      (*)     eGFR 58.1 (*)     All other components within normal limits   MAGNESIUM - Abnormal; Notable for the following components:    Magnesium 1.3 (*)     All other components within normal limits   PHOSPHORUS - Abnormal; Notable for the following components:    Phosphorus 1.7 (*)     All other components within normal limits   CULTURE, BLOOD   CULTURE, BLOOD   INFLUENZA A & B BY MOLECULAR   PROTIME-INR   SARS-COV-2 RNA AMPLIFICATION, QUAL   URINALYSIS, REFLEX TO URINE CULTURE   ISTAT LACTATE          Imaging Results              X-Ray Chest AP Portable (In process)                      Medications   vancomycin 1.75 g in 5 % dextrose 500 mL IVPB (1,750 mg Intravenous New Bag 10/25/23 1440)   sodium chloride 0.9% bolus 2,052 mL 2,052 mL (0 mLs Intravenous Stopped 10/25/23 1436)   ceFEPIme (MAXIPIME) 2 g in dextrose 5 % in water (D5W) 100 mL IVPB (MB+) (0 g Intravenous Stopped 10/25/23 1429)     Medical Decision Making  Chart review:  Liver transplant 6/21/22    Amount and/or Complexity of Data Reviewed  External Data Reviewed: radiology.     Details: Most recent echo:   · Normal left ventricular size with normal systolic function. The estimated ejection fraction is 65%.  · Normal right ventricular size with normal right ventricular systolic function.  · Normal left ventricular diastolic function.  · The estimated PA systolic pressure is 26 mmHg.  · Normal central venous pressure (3 mmHg).    Labs: ordered.  Radiology: ordered.    Risk  Decision regarding hospitalization.         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  Febrile and tachycardic. Regular rhythm. Lungs clear. Scar from liver transplant noted. Abdomen soft, nontender. Well appearing. DDx includes but is not limited to sepsis, COVID, influenza, UTI, pneumonia, colitis. Will get labs, CXR.     No leukocytosis. Lactic acid normal. COVID negative. Cr 1.5 (last checked 1.2). Transaminitis noted: T bili 2.4, , , Alk Phos 179. INR normal.      Given IVF, vancomycin, cefepime. Pending CXR, liver ultrasound.     2:55 PM  Signed out to on-coming ED team pending imaging. Anticipate admission.        Attending Attestation:     Physician Attestation Statement for NP/PA:   I have directed and reviewed the workup performed by the PA/NP.  I performed the substantive portion of the medical decision making.     Other NP/PA Attestation Additions:      Medical Decision Making: CMP remarkable for up trending LFTs and bilirubin, so we ordered a ultrasound liver transplant Doppler.  Sepsis workup was initiated given the patient's fever, tachycardia, and chronic immunocompromised state.    This patient does not have evidence of infective focus  My overall impression is sepsis.  Source: Unknown  Antibiotics given- Antibiotics (72h ago, onward)    Start     Stop Route Frequency Ordered    10/25/23 1500  vancomycin 1.75 g in 5 % dextrose 500 mL IVPB         -- IV Once 10/25/23 1350      Latest lactate reviewed-  @[id number](lactate,poclac)@  Organ dysfunction indicated by Acute kidney injury and Acute liver injury    Fluid challenge Actual Body weight- Patient will receive 30ml/kg actual body weight to calculate fluid bolus for treatment of septic shock.     Post- resuscitation assessment Yes Perfusion exam was performed within 6 hours of septic shock presentation after bolus shows Adequate tissue perfusion assessed by non-invasive monitoring       Will Not start Pressors- Levophed for MAP of 65  Source control achieved by:  Antibiotics      Procedure: Critical Care  Please put in 45 minutes of critical care                            Clinical Impression:   Final diagnoses:  [R00.0] Tachycardia  [R50.9] Fever, unspecified fever cause (Primary)               Vilma Dias PA-C  10/25/23 1456       Cyril Burnett, MD  10/25/23 2425

## 2023-10-26 LAB
ALBUMIN SERPL BCP-MCNC: 3.7 G/DL (ref 3.5–5.2)
ALP SERPL-CCNC: 137 U/L (ref 55–135)
ALT SERPL W/O P-5'-P-CCNC: 173 U/L (ref 10–44)
ANION GAP SERPL CALC-SCNC: 11 MMOL/L (ref 8–16)
AST SERPL-CCNC: 126 U/L (ref 10–40)
BASOPHILS # BLD AUTO: 0.02 K/UL (ref 0–0.2)
BASOPHILS NFR BLD: 0.2 % (ref 0–1.9)
BILIRUB DIRECT SERPL-MCNC: 0.7 MG/DL (ref 0.1–0.3)
BILIRUB SERPL-MCNC: 3.1 MG/DL (ref 0.1–1)
BUN SERPL-MCNC: 14 MG/DL (ref 6–20)
CALCIUM SERPL-MCNC: 9.3 MG/DL (ref 8.7–10.5)
CHLORIDE SERPL-SCNC: 107 MMOL/L (ref 95–110)
CMV DNA SPEC QL NAA+PROBE: NORMAL
CO2 SERPL-SCNC: 20 MMOL/L (ref 23–29)
CREAT SERPL-MCNC: 1.2 MG/DL (ref 0.5–1.4)
CYTOMEGALOVIRUS PCR, QUANT: NOT DETECTED IU/ML
DIFFERENTIAL METHOD: ABNORMAL
EOSINOPHIL # BLD AUTO: 0.1 K/UL (ref 0–0.5)
EOSINOPHIL NFR BLD: 0.6 % (ref 0–8)
ERYTHROCYTE [DISTWIDTH] IN BLOOD BY AUTOMATED COUNT: 12.4 % (ref 11.5–14.5)
EST. GFR  (NO RACE VARIABLE): >60 ML/MIN/1.73 M^2
ESTIMATED AVG GLUCOSE: 126 MG/DL (ref 68–131)
GGT SERPL-CCNC: 365 U/L (ref 8–55)
GLUCOSE SERPL-MCNC: 118 MG/DL (ref 70–110)
HBA1C MFR BLD: 6 % (ref 4–5.6)
HCT VFR BLD AUTO: 42.8 % (ref 40–54)
HGB BLD-MCNC: 14.7 G/DL (ref 14–18)
IMM GRANULOCYTES # BLD AUTO: 0.04 K/UL (ref 0–0.04)
IMM GRANULOCYTES NFR BLD AUTO: 0.5 % (ref 0–0.5)
INR PPP: 1.1 (ref 0.8–1.2)
LYMPHOCYTES # BLD AUTO: 1 K/UL (ref 1–4.8)
LYMPHOCYTES NFR BLD: 10.7 % (ref 18–48)
MAGNESIUM SERPL-MCNC: 1.9 MG/DL (ref 1.6–2.6)
MCH RBC QN AUTO: 32.4 PG (ref 27–31)
MCHC RBC AUTO-ENTMCNC: 34.3 G/DL (ref 32–36)
MCV RBC AUTO: 94 FL (ref 82–98)
MONOCYTES # BLD AUTO: 0.7 K/UL (ref 0.3–1)
MONOCYTES NFR BLD: 7.8 % (ref 4–15)
NEUTROPHILS # BLD AUTO: 7.1 K/UL (ref 1.8–7.7)
NEUTROPHILS NFR BLD: 80.2 % (ref 38–73)
NRBC BLD-RTO: 0 /100 WBC
PHOSPHATE SERPL-MCNC: 2.4 MG/DL (ref 2.7–4.5)
PLATELET # BLD AUTO: 114 K/UL (ref 150–450)
PMV BLD AUTO: 9.4 FL (ref 9.2–12.9)
POTASSIUM SERPL-SCNC: 4.3 MMOL/L (ref 3.5–5.1)
PROT SERPL-MCNC: 6.5 G/DL (ref 6–8.4)
PROTHROMBIN TIME: 11.4 SEC (ref 9–12.5)
RBC # BLD AUTO: 4.54 M/UL (ref 4.6–6.2)
SODIUM SERPL-SCNC: 138 MMOL/L (ref 136–145)
TACROLIMUS BLD-MCNC: 10.5 NG/ML (ref 5–15)
WBC # BLD AUTO: 8.88 K/UL (ref 3.9–12.7)

## 2023-10-26 PROCEDURE — A9585 GADOBUTROL INJECTION: HCPCS | Performed by: HOSPITALIST

## 2023-10-26 PROCEDURE — 36415 COLL VENOUS BLD VENIPUNCTURE: CPT

## 2023-10-26 PROCEDURE — 83735 ASSAY OF MAGNESIUM: CPT

## 2023-10-26 PROCEDURE — 63600175 PHARM REV CODE 636 W HCPCS: Performed by: HOSPITALIST

## 2023-10-26 PROCEDURE — 85025 COMPLETE CBC W/AUTO DIFF WBC: CPT

## 2023-10-26 PROCEDURE — 99223 PR INITIAL HOSPITAL CARE,LEVL III: ICD-10-PCS | Mod: ,,, | Performed by: HOSPITALIST

## 2023-10-26 PROCEDURE — 85610 PROTHROMBIN TIME: CPT

## 2023-10-26 PROCEDURE — 25000003 PHARM REV CODE 250: Performed by: HOSPITALIST

## 2023-10-26 PROCEDURE — 83036 HEMOGLOBIN GLYCOSYLATED A1C: CPT

## 2023-10-26 PROCEDURE — 21400001 HC TELEMETRY ROOM

## 2023-10-26 PROCEDURE — 82977 ASSAY OF GGT: CPT

## 2023-10-26 PROCEDURE — 63600175 PHARM REV CODE 636 W HCPCS

## 2023-10-26 PROCEDURE — 99223 PR INITIAL HOSPITAL CARE,LEVL III: ICD-10-PCS | Mod: ,,, | Performed by: INTERNAL MEDICINE

## 2023-10-26 PROCEDURE — 82248 BILIRUBIN DIRECT: CPT

## 2023-10-26 PROCEDURE — 25500020 PHARM REV CODE 255: Performed by: HOSPITALIST

## 2023-10-26 PROCEDURE — 25000003 PHARM REV CODE 250

## 2023-10-26 PROCEDURE — 84100 ASSAY OF PHOSPHORUS: CPT

## 2023-10-26 PROCEDURE — 99223 1ST HOSP IP/OBS HIGH 75: CPT | Mod: ,,, | Performed by: HOSPITALIST

## 2023-10-26 PROCEDURE — 80197 ASSAY OF TACROLIMUS: CPT

## 2023-10-26 PROCEDURE — 80053 COMPREHEN METABOLIC PANEL: CPT

## 2023-10-26 PROCEDURE — 99223 1ST HOSP IP/OBS HIGH 75: CPT | Mod: ,,, | Performed by: INTERNAL MEDICINE

## 2023-10-26 RX ORDER — SODIUM,POTASSIUM PHOSPHATES 280-250MG
2 POWDER IN PACKET (EA) ORAL ONCE
Status: COMPLETED | OUTPATIENT
Start: 2023-10-26 | End: 2023-10-26

## 2023-10-26 RX ORDER — CARVEDILOL 12.5 MG/1
12.5 TABLET ORAL 2 TIMES DAILY WITH MEALS
Status: DISCONTINUED | OUTPATIENT
Start: 2023-10-26 | End: 2023-10-26

## 2023-10-26 RX ORDER — GADOBUTROL 604.72 MG/ML
10 INJECTION INTRAVENOUS
Status: COMPLETED | OUTPATIENT
Start: 2023-10-26 | End: 2023-10-26

## 2023-10-26 RX ADMIN — VANCOMYCIN HYDROCHLORIDE 1250 MG: 1.25 INJECTION, POWDER, LYOPHILIZED, FOR SOLUTION INTRAVENOUS at 05:10

## 2023-10-26 RX ADMIN — POTASSIUM & SODIUM PHOSPHATES POWDER PACK 280-160-250 MG 2 PACKET: 280-160-250 PACK at 08:10

## 2023-10-26 RX ADMIN — TACROLIMUS 1 MG: 1 CAPSULE ORAL at 07:10

## 2023-10-26 RX ADMIN — VANCOMYCIN HYDROCHLORIDE 1250 MG: 1.25 INJECTION, POWDER, LYOPHILIZED, FOR SOLUTION INTRAVENOUS at 10:10

## 2023-10-26 RX ADMIN — CEFEPIME 2 G: 2 INJECTION, POWDER, FOR SOLUTION INTRAVENOUS at 09:10

## 2023-10-26 RX ADMIN — HEPARIN SODIUM 5000 UNITS: 5000 INJECTION INTRAVENOUS; SUBCUTANEOUS at 05:10

## 2023-10-26 RX ADMIN — HEPARIN SODIUM 5000 UNITS: 5000 INJECTION INTRAVENOUS; SUBCUTANEOUS at 02:10

## 2023-10-26 RX ADMIN — TACROLIMUS 1 MG: 1 CAPSULE ORAL at 08:10

## 2023-10-26 RX ADMIN — HEPARIN SODIUM 5000 UNITS: 5000 INJECTION INTRAVENOUS; SUBCUTANEOUS at 09:10

## 2023-10-26 RX ADMIN — CEFEPIME 2 G: 2 INJECTION, POWDER, FOR SOLUTION INTRAVENOUS at 05:10

## 2023-10-26 RX ADMIN — CEFEPIME 2 G: 2 INJECTION, POWDER, FOR SOLUTION INTRAVENOUS at 02:10

## 2023-10-26 RX ADMIN — GADOBUTROL 10 ML: 604.72 INJECTION INTRAVENOUS at 06:10

## 2023-10-26 NOTE — ASSESSMENT & PLAN NOTE
S/p chemotherapy, radiation, and liver transplant. Patient reports recent surveillance CT scan negative for disease recurrence.

## 2023-10-26 NOTE — ASSESSMENT & PLAN NOTE
"This patient does not have evidence of infective focus  My overall impression is sepsis.  Source: Abdominal and Unknown  Antibiotics given-   Antibiotics (72h ago, onward)    Start     Stop Route Frequency Ordered    10/26/23 0630  vancomycin 1,250 mg in dextrose 5 % (D5W) 250 mL IVPB (Vial-Mate)         -- IV Every 12 hours (non-standard times) 10/25/23 1957    10/25/23 2200  ceFEPIme (MAXIPIME) 2 g in dextrose 5 % in water (D5W) 100 mL IVPB (MB+)         -- IV Every 8 hours (non-standard times) 10/25/23 1952        Latest lactate reviewed-  No results for input(s): "LACTATE" in the last 72 hours.  Organ dysfunction indicated by Acute liver injury and Thrombocytopenia     Fluid challenge Actual Body weight- Patient will receive 30ml/kg actual body weight to calculate fluid bolus for treatment of septic shock.     Post- resuscitation assessment No - Post resuscitation assessment not needed     Will Not start Pressors- Levophed for MAP of 65  Source control achieved by: IV antibiotics    Patient meets SIRs criteria on admission w/ fever, tachycardia. High risk for infection given immunocompromized status. Received IV fluids in ED. Will continue broad spectrum antibiotics on admission w/ vancomycin and cefepime. Unclear source at time of admission, possible biliary/hepatic source given elevated bilirubin and transaminitis however patient without RUQ tenderness on exam. RUQ ultrasound significant only for low resistive indices. Will check viral studies including acute hepatitis panel, CMV, HIV, and follow-up Bcx.    -- Continue Cefepime and Vancomycin  -- Follow-up Bcx; will likely discontinue vanc if Bcx NG x 48 hours  -- Lactate normal on admission, not hypotensive; will not trend  -- Follow-up CMV, HIV, Hep panel  -- Monitor for evidence of source  -- CBC daily  "

## 2023-10-26 NOTE — PROGRESS NOTES
"Pharmacokinetic Initial Assessment & Plan: IV Vancomycin     IV Vancomycin 1750 mg x once was given in the ED on 10/25 @ 1440  Continue Vancomycin 1250 mg every 12 hours   Obtain a Vancomycin trough 30 mins prior to the 4 th dose on 10/27 @ 0600 with am labs  Desired empiric serum trough concentration is 10 to 20 mcg/mL     Pharmacy will continue to follow and monitor vancomycin. X [phone] with any questions regarding this assessment.      Thank you for the consult,   Malka Mendenhall       Patient brief summary:  OSCAR PAYNE is a 43 y.o. male initiated on antimicrobial therapy with IV Vancomycin for treatment of suspected sepsis    Drug Allergies:   Review of patient's allergies indicates:   Allergen Reactions    Zosyn [piperacillin-tazobactam] Swelling     Lip swelling       Actual Body Weight:   81.6 kg    Renal Function:   Estimated Creatinine Clearance: 60.2 mL/min (A) (based on SCr of 1.5 mg/dL (H)).,     CBC (last 72 hours):  Recent Labs   Lab Result Units 10/25/23  1334   WBC K/uL 5.28   Hemoglobin g/dL 16.3   Hematocrit % 47.7   Platelets K/uL 119*   Gran % % 89.0*   Lymph % % 6.8*   Mono % % 2.8*   Eosinophil % % 0.6   Basophil % % 0.2   Differential Method  Automated       Metabolic Panel (last 72 hours):  Recent Labs   Lab Result Units 10/25/23  1334 10/25/23  1442   Sodium mmol/L 137  --    Potassium mmol/L 4.8  --    Chloride mmol/L 105  --    CO2 mmol/L 21*  --    Glucose mg/dL 146*  --    Glucose, UA   --  Negative   BUN mg/dL 17  --    Creatinine mg/dL 1.5*  --    Albumin g/dL 4.5  --    Total Bilirubin mg/dL 2.4*  --    Alkaline Phosphatase U/L 179*  --    AST U/L 202*  --    ALT U/L 159*  --    Magnesium mg/dL 1.3*  --    Phosphorus mg/dL 1.7*  --        Drug levels (last 3 results):  No results for input(s): "VANCOMYCINRA", "VANCORANDOM", "VANCOMYCINPE", "VANCOPEAK", "VANCOMYCINTR", "VANCOTROUGH" in the last 72 hours.    Microbiologic Results:  Microbiology Results (last 7 days)       " Procedure Component Value Units Date/Time    Influenza A & B by Molecular [[id number]] Collected: 10/25/23 1355    Order Status: Completed Specimen: Nasopharyngeal Swab Updated: 10/25/23 1553     Influenza A, Molecular Negative     Influenza B, Molecular Negative     Flu A & B Source Nasal swab    Blood culture x two cultures. Draw prior to antibiotics. [[id number]] Collected: 10/25/23 1335    Order Status: Sent Specimen: Blood from Peripheral, Forearm, Left Updated: 10/25/23 1357    Blood culture x two cultures. Draw prior to antibiotics. [[id number]] Collected: 10/25/23 1335    Order Status: Sent Specimen: Blood from Peripheral, Forearm, Right Updated: 10/25/23 9619

## 2023-10-26 NOTE — HOSPITAL COURSE
43M admitted to  with severe sepsis c/b CASA and immunocompromised state. Tachycardic, transaminitis, CASA, normal WBC count on admission. Sepsis treated empirically with vanc and cefepime. HIV, influenza, acute hepatitis panel negative. CMV pending. Liver transplant team consulted who recommend MRI/MRCP with and without contrast which was unremarkable. The patient was transitioned to oral ciprofloxacin and monitored overnight. Patient discharged home on 10/28/2023 in hemodynamically stable condition with close PCP and hepatology follow up with oral ciprofloxacin with strict return precautions as discussed at bedside.

## 2023-10-26 NOTE — H&P
[facility] - Emergency Dept  [facility]  History & Physical    Patient Name: OSCAR PAYNE  MRN: [id number]  Patient Class: IP- Inpatient  Admission Date: 10/25/2023  Attending Physician: Sandra Brink*   Primary Care Provider: Yves Moses MD    Patient information was obtained from patient, past medical records and ER records.     Subjective:     Principal Problem:Severe sepsis    Chief Complaint:   Chief Complaint   Patient presents with    Fever     Subjective fever starting today, nausea; denies cough        HPI: The patient is a 43 yoM with a history of cholangiocarcinoma s/p chemotherapy, radiation, and liver transplant (2022; on tacrolimus), who presented to [facility] ED with subjective fever. The patient states he felt slightly fatigued this morning and checked his temperature and noted a fever. Upon arrival to [facility] ED his temperature was 101.7. He denies other symptoms including cough, congestion, shortness of breath, abdominal pain, nausea, vomiting, diarrhea, constipation, dysuria. He denies sick contacts at home. The patient states that he has a history of elevated AST and ALT associated with any illness he has experienced in the past, even simple colds. He has been compliant with his tacrolimus (last dose AM of admission).    ED evaluation significant for: hemodynamically stable, T 101.7, pulse 112, saturating well on room air. WBC 5.28, Hgb 16.3, plts 119, Na 137, K 4.8, bicarb 21, Cr 1.5 (baseline ~ 1.2-1.3), phos 1.7, mag 1.3, alk phos 179, bili 2.4, , . Flu, covid negative. UA unremarkable. Lactate 1.5. CXR without acute process. US doppler liver transplant w/ normal waveforms but low resistive indices. The patient was given IV vancomycin and cefepime, 2L IV fluids in the ED.      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    Hypercholesteremia     Hypertension        Past Surgical History:   Procedure Laterality Date    DIAGNOSTIC ULTRASOUND N/A 6/21/2022    Procedure: ULTRASOUND, DIAGNOSTIC;  Surgeon: Sinan Cline MD;  [facility];  Service: Transplant;  Laterality: N/A;    ENDOSCOPIC ULTRASOUND OF UPPER GASTROINTESTINAL TRACT N/A 10/20/2021    Procedure: ULTRASOUND, UPPER GI TRACT, ENDOSCOPIC;  Surgeon: Valeriy Sotelo MD;  Location: [facility] (2ND FLR);  Service: Endoscopy;  Laterality: N/A;  wife to email vacc card-inst email-tb    ERCP N/A 10/20/2021    Procedure: ERCP (ENDOSCOPIC RETROGRADE CHOLANGIOPANCREATOGRAPHY);  Surgeon: Valeriy Sotelo MD;  [facility]  Service: Endoscopy;  Laterality: N/A;    ERCP N/A 11/4/2021    Procedure: ERCP (ENDOSCOPIC RETROGRADE CHOLANGIOPANCREATOGRAPHY);  Surgeon: Valeriy Sotelo MD;  [facility]  Service: Endoscopy;  Laterality: N/A;    ERCP N/A 11/4/2021    Procedure: ERCP (ENDOSCOPIC RETROGRADE CHOLANGIOPANCREATOGRAPHY);  Surgeon: Roselia Pereyra MD;  [facility]  Service: Endoscopy;  Laterality: N/A;  pt vaccinated, instructed to bring card to procedure, instructions sent portal-MG    ERCP N/A 1/14/2022    Procedure: ERCP (ENDOSCOPIC RETROGRADE CHOLANGIOPANCREATOGRAPHY);  Surgeon: Roselia Pereyra MD;  [facility]  Service: Endoscopy;  Laterality: N/A;  inst portal-right chest port-tb  Pt requested at home COVID testing, Pt instructed at home RAPID to be done morning of procedure, Pt instructed to call endoscopy scheuding if there is a positive result, Pt informed if a positive     ERCP N/A 3/31/2022    Procedure: ERCP (ENDOSCOPIC RETROGRADE CHOLANGIOPANCREATOGRAPHY);  Surgeon: Julio Cesar Alonzo MD;  Location: [facility] (2ND FLR);  Service: Endoscopy;  Laterality: N/A;  3/16: port L chest wall. pt to bring covid vaccination card. Instructions sent via  portal.-SC  3/18/22-Updated instructions sent via portal re:new date/time-DS    EXPLORATORY LAPAROTOMY AFTER LIVER TRANSPLANTATION N/A 6/23/2022    Procedure: LAPAROTOMY, EXPLORATORY, AFTER LIVER TRANSPLANT;  Surgeon: Julio Cesar Jara MD;  Location: [facility] OR 2ND FLR;  Service: Transplant;  Laterality: N/A;    INSERTION OF TUNNELED CENTRAL VENOUS CATHETER (CVC) WITH SUBCUTANEOUS PORT N/A 11/24/2021    Procedure: INSERTION, PORT-A-CATH;  Surgeon: Prem Syed MD;  Location: [facility] CATH LAB;  Service: Radiology;  Laterality: N/A;    LIVER TRANSPLANT N/A 6/21/2022    Procedure: TRANSPLANT, LIVER;  Surgeon: Sinan Cline MD;  Location: [facility] OR 2ND FLR;  Service: Transplant;  Laterality: N/A;    REPAIR OF BILE DUCT N/A 6/23/2022    Procedure: REPAIR, BILE DUCT;  Surgeon: Julio Cesar Jara MD;  Location: [facility] OR 2ND FLR;  Service: Transplant;  Laterality: N/A;    ROBOT-ASSISTED LAPAROSCOPIC LYMPHADENECTOMY USING DA МАРИНА XI  6/17/2022    Procedure: XI ROBOTIC LYMPHADENECTOMY - Portal;  Surgeon: Julio Cesar Jara MD;  Location: [facility] OR 2ND FLR;  Service: Transplant;;    TONSILLECTOMY      XI ROBOTIC LAPAROSCOPY, EXPLORATORY N/A 6/17/2022    Procedure: XI ROBOTIC LAPAROSCOPY,EXPLORATORY;  Surgeon: Julio Cesar Jara MD;  Location: [facility] OR 2ND FLR;  Service: Transplant;  Laterality: N/A;       Review of patient's allergies indicates:   Allergen Reactions    Zosyn [piperacillin-tazobactam] Swelling     Lip swelling       No current facility-administered medications on file prior to encounter.     Current Outpatient Medications on File Prior to Encounter   Medication Sig    acetaminophen (TYLENOL) 500 MG tablet Take 500 mg by mouth every 4 to 6 hours as needed for Pain.    carvediloL (COREG) 12.5 MG tablet Take 1 tablet (12.5 mg total) by mouth 2 (two) times daily with meals.    tacrolimus (PROGRAF) 1 MG Cap Take 1 capsule (1 mg total) by mouth every 12 (twelve) hours.    ursodioL (ACTIGALL) 250 mg Tab Take 1 tablet (250 mg total) by mouth 2  (two) times a day.    aspirin 81 MG Chew CHEW 1 tablet (81 mg total) by mouth once daily.    [DISCONTINUED] baclofen (LIORESAL) 10 MG tablet TAKE 1 TABLET(10 MG) BY MOUTH THREE TIMES DAILY AS NEEDED FOR HICCUPS (Patient not taking: No sig reported)    [DISCONTINUED] famotidine (PEPCID) 20 MG tablet Take 1 tablet (20 mg total) by mouth every evening. STOP 7/24/22    [DISCONTINUED] insulin aspart U-100 (NOVOLOG) 100 unit/mL (3 mL) InPn pen Inject 5 Units into the skin 3 (three) times daily with meals. + sliding scale.  MDD 30 units/d    [DISCONTINUED] losartan (COZAAR) 50 MG tablet Take 1 tablet (50 mg total) by mouth once daily.    [DISCONTINUED] metFORMIN (GLUCOPHAGE) 500 MG tablet Take 1 tablet (500 mg total) by mouth 2 (two) times daily with meals.    [DISCONTINUED] tamsulosin (FLOMAX) 0.4 mg Cap Take 2 capsules (0.8 mg total) by mouth once daily.     Family History       Problem Relation (Age of Onset)    Hyperlipidemia Father    No Known Problems Mother, Sister, Brother          Tobacco Use    Smoking status: Never    Smokeless tobacco: Never   Substance and Sexual Activity    Alcohol use: Not Currently    Drug use: Never    Sexual activity: Yes     Review of Systems   Constitutional:  Negative for chills and fever.   HENT:  Negative for congestion and sore throat.    Respiratory:  Negative for cough and shortness of breath.    Cardiovascular:  Negative for chest pain.   Gastrointestinal:  Negative for abdominal pain, constipation, diarrhea, nausea and vomiting.   Genitourinary:  Negative for dysuria.   Musculoskeletal:  Negative for arthralgias and back pain.   Skin:  Negative for rash and wound.   Allergic/Immunologic: Positive for immunocompromised state. Negative for food allergies.   Neurological:  Negative for dizziness and headaches.   Psychiatric/Behavioral:  Negative for sleep disturbance.      Objective:     Vital Signs (Most Recent):  Temp: 99.1 °F (37.3 °C) (10/25/23 1735)  Pulse: 77 (10/25/23  1832)  Resp: 18 (10/25/23 1832)  BP: 117/79 (10/25/23 1832)  SpO2: 95 % (10/25/23 1832) Vital Signs (24h Range):  Temp:  [99.1 °F (37.3 °C)-101.7 °F (38.7 °C)] 99.1 °F (37.3 °C)  Pulse:  [] 77  Resp:  [18-20] 18  SpO2:  [95 %-99 %] 95 %  BP: (112-153)/(72-92) 117/79     Weight: 81.6 kg (180 lb)  Body mass index is 27.37 kg/m².     Physical Exam  Vitals and nursing note reviewed.   Constitutional:       General: He is not in acute distress.  HENT:      Head: Normocephalic.   Eyes:      General: No scleral icterus.  Cardiovascular:      Rate and Rhythm: Normal rate and regular rhythm.      Pulses: Normal pulses.      Heart sounds: Normal heart sounds, S1 normal and S2 normal.   Pulmonary:      Effort: Pulmonary effort is normal. No respiratory distress.      Breath sounds: Normal breath sounds. No wheezing or rales.   Abdominal:      General: A surgical scar is present. Bowel sounds are normal. There is no distension.      Palpations: Abdomen is soft.      Tenderness: There is no abdominal tenderness. There is no guarding or rebound.      Comments: No abdominal tenderness   Musculoskeletal:         General: No swelling.      Cervical back: Normal range of motion.      Right lower leg: No edema.      Left lower leg: No edema.   Skin:     General: Skin is warm and dry.      Capillary Refill: Capillary refill takes less than 2 seconds.      Coloration: Skin is not jaundiced.   Neurological:      Mental Status: He is alert and oriented to person, place, and time.      Motor: No weakness.   Psychiatric:         Attention and Perception: Attention normal.         Speech: Speech normal.         Behavior: Behavior is cooperative.          Significant Labs: All pertinent labs within the past 24 hours have been reviewed.  CBC:   Recent Labs   Lab 10/25/23  1334   WBC 5.28   HGB 16.3   HCT 47.7   *     CMP:   Recent Labs   Lab 10/25/23  1334      K 4.8      CO2 21*   *   BUN 17   CREATININE 1.5*  "  CALCIUM 9.7   PROT 7.6   ALBUMIN 4.5   BILITOT 2.4*   ALKPHOS 179*   *   *   ANIONGAP 11     Significant Imaging: I have reviewed all pertinent imaging results/findings within the past 24 hours.    Assessment/Plan:     * Severe sepsis  This patient does not have evidence of infective focus  My overall impression is sepsis.  Source: Abdominal and Unknown  Antibiotics given-   Antibiotics (72h ago, onward)      Start     Stop Route Frequency Ordered    10/26/23 0630  vancomycin 1,250 mg in dextrose 5 % (D5W) 250 mL IVPB (Vial-Mate)         -- IV Every 12 hours (non-standard times) 10/25/23 1957    10/25/23 2200  ceFEPIme (MAXIPIME) 2 g in dextrose 5 % in water (D5W) 100 mL IVPB (MB+)         -- IV Every 8 hours (non-standard times) 10/25/23 1952          Latest lactate reviewed-  No results for input(s): "LACTATE" in the last 72 hours.  Organ dysfunction indicated by Acute liver injury and Thrombocytopenia     Fluid challenge Actual Body weight- Patient will receive 30ml/kg actual body weight to calculate fluid bolus for treatment of septic shock.     Post- resuscitation assessment No - Post resuscitation assessment not needed     Will Not start Pressors- Levophed for MAP of 65  Source control achieved by: IV antibiotics    Patient meets SIRs criteria on admission w/ fever, tachycardia. High risk for infection given immunocompromized status. Received IV fluids in ED. Will continue broad spectrum antibiotics on admission w/ vancomycin and cefepime. Unclear source at time of admission, possible biliary/hepatic source given elevated bilirubin and transaminitis however patient without RUQ tenderness on exam. RUQ ultrasound significant only for low resistive indices. Will check viral studies including acute hepatitis panel, CMV, HIV, and follow-up Bcx.    -- Continue Cefepime and Vancomycin  -- Follow-up Bcx; will likely discontinue vanc if Bcx NG x 48 hours  -- Lactate normal on admission, not hypotensive; " will not trend  -- Follow-up CMV, HIV, Hep panel  -- Monitor for evidence of source  -- CBC daily    Cholangitis of transplanted liver  AST (202), ALT (159) on admission. Bilirubin elevated to 2.4. Will consult liver transplant team to assist w/ workup. Will obtain viral serology. Patient denies excessive tylenol use. He has been compliant w/ immunosuppresant medications. Initial evaluation significant for low hepatic indices, considered hepatic artery thrombus/emboli however patient without RUQ abdominal pain and lactate normal.    -- Liver transplant team consulted; appreciate recs  -- Trend AST/ALT  -- Follow-up acute hepatitis panel, HIV, CMV  -- Further imaging per hepatology recs    Elevated LFTs  See 'cholangitis'    At risk for opportunistic infections  Noted increased risk of infections 2/2 tacrolimus    S/P liver transplant  Noted transplant status    Hilar cholangiocarcinoma  S/p chemotherapy, radiation, and liver transplant. Patient reports recent surveillance CT scan negative for disease recurrence.    Hypertension  Patient on carvedilol 12.5 mg BID at home. Will hold in the setting of sepsis.    -- Resume home meds as indicated      VTE Risk Mitigation (From admission, onward)           Ordered     heparin (porcine) injection 5,000 Units  Every 8 hours         10/25/23 1951     IP VTE HIGH RISK PATIENT  Once         10/25/23 1951     Place sequential compression device  Until discontinued         10/25/23 1951                     Daniel Correa MD  Department of Hospital Medicine  [facility] - Emergency Dept

## 2023-10-26 NOTE — CARE UPDATE
IP Sepsis Screen (most recent)       Sepsis Screen (IP) - 10/26/23 1223       Is the patient's history or complaint suggestive of a possible infection? Yes  -GM    Are there at least two of the following signs and symptoms present? Yes  -GM    Sepsis signs/symptoms - Hyper or Hypothermia Hyperthermia >100.4 or Hypothermia < 96.8  -GM    Sepsis signs/symptoms - Tachycardia Tachycardia     >90  -GM    Are any of the following organ dysfunction criteria present and not considered to be due to a chronic condition? Yes  -GM    Organ Dysfunction Criteria Total Bilirubin > 2.0 Platelet count < 100,000  -GM    Initiate Sepsis Protocol No  -GM    Reason sepsis not considered Pt. receiving appropriate management  -              User Key  (r) = Recorded By, (t) = Taken By, (c) = Cosigned By      Initials Name     Alondra Roy PA-C

## 2023-10-26 NOTE — ED NOTES
Telemetry Verification   Patient placed on Telemetry Box  Verified with War Room          Rate 78   Rhythm NS

## 2023-10-26 NOTE — CONSULTS
[facility]  Hepatology  Consult Note    Patient Name: OSCAR PAYNE  MRN: [id number]  Admission Date: 10/25/2023  Hospital Length of Stay: 1 days  Code Status: Full Code   Attending Provider:  Dr. Viveros  Consulting Provider: Andrews Babcock MD  Primary Care Physician: Yves Moses MD  Principal Problem:Severe sepsis    Inpatient consult to Hepatology  Consult performed by: Andrews Babcock MD  Consult ordered by: Daniel Correa MD        Subjective:     HPI: OSCAR PAYNE is a 43 y.o. male with history of cholangiocarcinoma (s/p chemotherapy, radiation & liver tx in June 2022) who presented to the ER on 10/25 with fever. On arrival to the ER, he was febrile and tachycardic. Labs showed normal WBC, normal lactate, Cr 1.5 (baseline 1-1.2), T bili 2.4, , ,  & INR 1.2. CXR without acute process. UA without evidence of UTI. Viral hepatitis panel was negative. US doppler liver transplant w/ normal waveforms but low resistive indices.Cultures were drawn and he was started on broad spectrum abx. Hepatology consulted for elevated transaminases. He denies abdominal pain, diarrhea, cough, SOB, dysuria or rashes. He has been compliant on medication and does not use alcohol/illicit substances.      Was diagnosed with hilar cholangiocarcinoma in 2021 with imaging and ERCP after presenting with jaundice, malaise and fatigue. His liver transplant was complicated by a bile leak requiring a temporary PTC. Was seen by Dr. Nobles in clinic in September 2023 when he was found to have excellent allograft function on tacrolimus 1 mg twice daily (normal transaminases but slightly elevated alk phos).       Review of Systems   Constitutional:  Positive for chills and fever.   Respiratory:  Positive for shortness of breath. Negative for cough.    Cardiovascular:  Negative for chest pain and palpitations.   Gastrointestinal:  Negative for abdominal pain, constipation, diarrhea and  nausea.   Genitourinary:  Negative for dysuria.   Skin:  Negative for itching and rash.        No color change   Neurological:  Negative for tremors and seizures.   Psychiatric/Behavioral:  Negative for substance abuse.         Objective:     Vitals:    10/26/23 0548   BP: 121/78   Pulse: 76   Resp: 18   Temp: 98.8 °F (37.1 °C)         Constitutional: healthy,  alert,  not in acute distress, and well developed  HENT: Head: Normal, normocephalic, atraumatic.  Eyes: conjunctiva clear and sclera nonicteric  GI: soft, non-tender, without masses or organomegaly, nondistended, without guarding, and without rebound. Midline surgical scar from surgery noted; well healed.   Skin: normal color and no jaundice  Neurological: alert, oriented x3  speech normal in context and clarity  memory intact grossly  Psychiatric: oriented to time, place and person, mood and affect are within normal limits, pt is a good historian; no memory problems were noted      Significant Labs:  Recent Labs   Lab 10/25/23  1334 10/26/23  0253   HGB 16.3 14.7       Lab Results   Component Value Date    WBC 8.88 10/26/2023    HGB 14.7 10/26/2023    HCT 42.8 10/26/2023    MCV 94 10/26/2023     (L) 10/26/2023       Lab Results   Component Value Date     10/26/2023    K 4.3 10/26/2023     10/26/2023    CO2 20 (L) 10/26/2023    BUN 14 10/26/2023    CREATININE 1.2 10/26/2023    CALCIUM 9.3 10/26/2023    ANIONGAP 11 10/26/2023    ESTGFRAFRICA >60.0 07/28/2022    EGFRNONAA >60.0 07/28/2022       Lab Results   Component Value Date     (H) 10/26/2023     (H) 10/26/2023     (H) 10/26/2023    ALKPHOS 137 (H) 10/26/2023    BILITOT 3.1 (H) 10/26/2023       Lab Results   Component Value Date    INR 1.1 10/26/2023    INR 1.0 10/25/2023    INR 1.0 07/10/2023       Significant Imaging:  Reviewed pertinent radiology findings.       Assessment/Plan:     OSCAR PAYNE is a 43 y.o. male with history of cholangiocarcinoma (s/p  chemotherapy, radiation & liver tx in June 2022) who presented to the ER on 10/25 with fever. Cr 1.5 (baseline 1-1.2), T bili 2.4, , ,  & INR 1.2. Prograf level 10.5. Viral hepatitis panel negative. US doppler liver transplant w/ normal waveforms but low resistive indices. His liver transplant was complicated by a bile leak requiring a temporary PTC. Was seen by Dr. Nobles in clinic in September 2023 when he was found to have excellent allograft function on tacrolimus 1 mg twice daily, but mildly elevated alkaline phosphatase.       Problem List:  Fever and elevated transaminases, concerning for possible cholangitis  On chronic immunosuppression  cholangiocarcinoma (s/p chemotherapy, radiation & liver tx in June 2022)       Recommendations:  - Obtain MRI MRCP without and with contrast. In the interim, continue vancomycin and cefepime.   - Continue tacrolimus at 1 mg BID (will keep trough level between 8-10)  - Daily CBC, CMP, INR and AM Prograf level  - Follow blood cx       Thank you for involving us in the care of OSCAR PAYNE. Please call with any additional questions, concerns or changes in the patient's clinical status. We will continue to follow.       Andrews Babcock MD  Gastroenterology Fellow PGY V  [facility]

## 2023-10-26 NOTE — SUBJECTIVE & OBJECTIVE
Interval History: NAEO. AF. HDS. Patient reports feeling much better today. Denies CP/SOB/NVD/ subjective fevers/chills and pain/tenderness to abdomen and R port access. On vanc and cefepime. HIV, influenza, acute hepatitis panel negative. CMV pending. Liver transplant team consulted who recommend MRI/MRCP with and without contrast which is pending.    Review of Systems   Constitutional:  Negative for chills and fever.   HENT:  Negative for congestion and sore throat.    Respiratory:  Negative for cough and shortness of breath.    Cardiovascular:  Negative for chest pain.   Gastrointestinal:  Negative for abdominal pain, constipation, diarrhea, nausea and vomiting.   Genitourinary:  Negative for dysuria.   Musculoskeletal:  Negative for arthralgias and back pain.   Skin:  Negative for rash and wound.   Allergic/Immunologic: Positive for immunocompromised state. Negative for food allergies.   Neurological:  Negative for dizziness and headaches.   Psychiatric/Behavioral:  Negative for sleep disturbance.      Objective:     Vital Signs (Most Recent):  Temp: 98.1 °F (36.7 °C) (10/26/23 1155)  Pulse: 80 (10/26/23 1155)  Resp: 16 (10/26/23 1155)  BP: (!) 119/92 (10/26/23 1155)  SpO2: 99 % (10/26/23 1155) Vital Signs (24h Range):  Temp:  [97.2 °F (36.2 °C)-101.7 °F (38.7 °C)] 98.1 °F (36.7 °C)  Pulse:  [] 80  Resp:  [16-20] 16  SpO2:  [95 %-99 %] 99 %  BP: (112-153)/(72-92) 119/92     Weight: 84.2 kg (185 lb 10 oz)  Body mass index is 28.22 kg/m².    Intake/Output Summary (Last 24 hours) at 10/26/2023 1244  Last data filed at 10/25/2023 1910  Gross per 24 hour   Intake 2702.06 ml   Output --   Net 2702.06 ml         Physical Exam  Vitals and nursing note reviewed.   Constitutional:       General: He is not in acute distress.  HENT:      Head: Normocephalic.      Nose: Nose normal.   Eyes:      General: No scleral icterus.     Extraocular Movements: Extraocular movements intact.   Cardiovascular:      Rate and Rhythm:  Normal rate and regular rhythm.      Pulses: Normal pulses.      Heart sounds: Normal heart sounds, S1 normal and S2 normal.   Pulmonary:      Effort: Pulmonary effort is normal. No respiratory distress.      Breath sounds: Normal breath sounds. No wheezing or rales.   Abdominal:      General: A surgical scar is present. Bowel sounds are normal. There is no distension.      Palpations: Abdomen is soft.      Tenderness: There is no abdominal tenderness. There is no guarding or rebound.      Comments: No abdominal tenderness   Musculoskeletal:         General: No swelling.      Cervical back: Normal range of motion.      Right lower leg: No edema.      Left lower leg: No edema.      Comments: Port on R chest non-TTP, non-erythematous   Skin:     General: Skin is warm and dry.      Capillary Refill: Capillary refill takes less than 2 seconds.      Coloration: Skin is not jaundiced.   Neurological:      Mental Status: He is alert and oriented to person, place, and time.      Motor: No weakness.   Psychiatric:         Attention and Perception: Attention normal.         Mood and Affect: Mood normal.         Speech: Speech normal.         Behavior: Behavior normal. Behavior is cooperative.         Thought Content: Thought content normal.         Judgment: Judgment normal.             Significant Labs: All pertinent labs within the past 24 hours have been reviewed.  Recent Lab Results  (Last 5 results in the past 24 hours)        10/26/23  0253   10/25/23  2027   10/25/23  1442   10/25/23  1355   10/25/23  1348        Influenza A, Molecular       Negative         Influenza B, Molecular       Negative         Procalcitonin   10.89  Comment: A concentration < 0.25 ng/mL represents a low risk of bacterial   infection.  Procalcitonin may not be accurate among patients with localized   infection, recent trauma or major surgery, immunosuppressed state,   invasive fungal infection, renal dysfunction. Decisions regarding    initiation or continuation of antibiotic therapy should not be based   solely on procalcitonin levels.               Albumin 3.7                              Allens Test         N/A                      Anion Gap 11               Appearance, UA     Clear                          Baso # 0.02               Basophil % 0.2               Bilirubin (UA)     Negative           Bilirubin Direct 0.7               BILIRUBIN TOTAL 3.1  Comment: For infants and newborns, interpretation of results should be based  on gestational age, weight and in agreement with clinical  observations.    Premature Infant recommended reference ranges:  Up to 24 hours.............<8.0 mg/dL  Up to 48 hours............<12.0 mg/dL  3-5 days..................<15.0 mg/dL  6-29 days.................<15.0 mg/dL                 Site         Other       BUN 14               Calcium 9.3               Chloride 107               CO2 20               Color, UA     Yellow           Creatinine 1.2               Differential Method Automated               eGFR >60.0               Eos # 0.1               Eosinophil % 0.6               Flu A & B Source       Nasal swab                        Glucose 118               Glucose, UA     Negative           Gran # (ANC) 7.1               Gran % 80.2               Hematocrit 42.8               Hemoglobin 14.7               Hep A IgM   Non-reactive             Hep B C IgM   Non-reactive             Hepatitis B Surface Ag   Non-reactive             Hepatitis C Ab   Non-reactive             HIV 1/2 Ag/Ab   Non-reactive             Immature Grans (Abs) 0.04  Comment: Mild elevation in immature granulocytes is non specific and   can be seen in a variety of conditions including stress response,   acute inflammation, trauma and pregnancy. Correlation with other   laboratory and clinical findings is essential.                 Immature Granulocytes 0.5               INR 1.1  Comment: Coumadin  Therapy:  2.0 - 3.0 for INR for all indicators except mechanical heart valves  and antiphospholipid syndromes which should use 2.5 - 3.5.                 Ketones, UA     Negative           Leukocytes, UA     Negative           Lymph # 1.0               Lymph % 10.7               Magnesium  1.9               MCH 32.4               MCHC 34.3               MCV 94               Mono # 0.7               Mono % 7.8               MPV 9.4               NITRITE UA     Negative           nRBC 0               Occult Blood UA     Negative           pH, UA     5.0           Phosphorus Level 2.4               Platelet Count 114               POC Lactate         1.50       Potassium 4.3               PROTEIN TOTAL 6.5               Protein, UA     Negative  Comment: Recommend a 24 hour urine protein or a urine   protein/creatinine ratio if globulin induced proteinuria is  clinically suspected.             Protime 11.4               RBC 4.54               RDW 12.4               Sample         VENOUS       SARS-CoV-2 RNA, Amplification, Qual       Negative  Comment: This test utilizes isothermal nucleic acid amplification technology   to   detect the SARS-CoV-2 RdRp nucleic acid segment. The analytical   sensitivity   (limit of detection) is 500 copies/swab.     A POSITIVE result is indicative of the presence of SARS-CoV-2 RNA;   clinical   correlation with patient history and other diagnostic information is   necessary to determine patient infection status.    A NEGATIVE result means that SARS-CoV-2 nucleic acids are not present   above   the limit of detection. A NEGATIVE result should be treated as   presumptive.   It does not rule out the possibility of COVID-19 and should not be   the sole   basis for treatment decisions. If COVID-19 is strongly suspected   based on   clinical and exposure history, re-testing using an alternate   molecular assay   should be considered.     This test is only for use under the Food and Drug  Administration s   Emergency   Use Authorization (EUA).     Commercial kits are provided by [employer] Performance   characteristics of the EUA have been independently verified by   [facility] Department of Pathology and Laboratory Medicine.   _________________________________________________________________   The authorized Fact Sheet for Healthcare Providers and the authorized   Fact   Sheet for Patients of the ID NOW COVID-19 are available on the FDA   website:   https://www.fda.gov/media/895392/download   https://www.fda.gov/media/750747/download           Sodium 138               Specific Randall, UA     1.010           Specimen UA     Urine, Clean Catch           Tacrolimus Lvl 10.5  Comment: Testing performed by a chemiluminescent microparticle   immunoassay on the [employer] System.    CAUTION: No firm therapeutic range exists for tacrolimus in whole   blood. The   complexity of the clinical state, individual differences in   sensitivity to   immunosuppressive and nephrotoxic effects of tacrolimus,   co-administration   of other immunosuppressants, type of transplant, time post-transplant   and a   number of other factors contribute to different requirements for   optimal   blood levels of tacrolimus. Therefore, individual tacrolimus values   cannot   be used as the sole indicator for making changes in treatment regimen   and   each patient should be thoroughly evaluated clinically before changes   in   treatment regimens are made. Each user must establish his or her own   ranges   based on clinical experience.  Therapeutic ranges vary according to the commercial test used, and   therefore   should be established for each commercial test. Values obtained with   different assay methods cannot be used interchangeably due to   differences in   assay methods and cross-reactivity with metabolites, nor should   correction   factors be applied. Therefore, consistent use of one assay for    individual   patients is recommended.                 WBC 8.88                                      Significant Imaging: I have reviewed and interpreted all pertinent imaging results/findings within the past 24 hours. MRI/MRCP pending.

## 2023-10-26 NOTE — PLAN OF CARE
[facility]  Initial Discharge Assessment       Primary Care Provider: Yves Moses MD    Admission Diagnosis: Tachycardia [R00.0]  Chest pain [R07.9]  Fever, unspecified fever cause [R50.9]    Admission Date: 10/25/2023  Expected Discharge Date: 10/27/2023    Transition of Care Barriers: None    Payor: BLUE CROSS BLUE SHIELD / Plan: BLUE CONNECT / Product Type: HMO /     Extended Emergency Contact Information  Primary Emergency Contact: Shell Larson   [place]  Mobile Phone: [phone]  Relation: Spouse  Preferred language: English   needed? No  Secondary Emergency Contact: Clinton Larson  Mobile Phone: [phone]  Relation: Father  Preferred language: English   needed? No    Discharge Plan A: Home with family  Discharge Plan B: Home      [id number] [place] [address] AT [place] [address] & [address] [address]  Phone: [phone] Fax: [phone]    [facility] [address] [address]  Phone: [phone] Fax: [phone]      Initial Assessment (most recent)       Adult Discharge Assessment - 10/26/23 1143          Discharge Assessment    Assessment Type Discharge Planning Assessment     Confirmed/corrected address, phone number and insurance Yes     Confirmed Demographics Correct on Facesheet     Source of Information patient     Does patient/caregiver understand observation status Yes     Communicated SHERLEY with patient/caregiver Yes     Reason For Admission Tachycardai     People in Home spouse     Facility Arrived From: Home     Do you expect to return to your current living situation? Yes     Do you have help at home or someone to help you manage your care at home? Yes     Who are your caregiver(s) and their phone number(s)? Shell Larson Spouse     Prior to hospitilization cognitive status: Unable to Assess     Current cognitive status: Unable to Assess     Home  Accessibility wheelchair accessible     Home Layout Able to live on 1st floor     Equipment Currently Used at Home none     Readmission within 30 days? No     Patient currently being followed by outpatient case management? No     Do you currently have service(s) that help you manage your care at home? No     Do you take prescription medications? Yes     Do you have prescription coverage? Yes     Do you have any problems affording any of your prescribed medications? No     Is the patient taking medications as prescribed? yes     Who is going to help you get home at discharge? Harvey Larson Spouse     How do you get to doctors appointments? car, drives self     Are you on dialysis? No     Do you take coumadin? No     DME Needed Upon Discharge  none     Discharge Plan discussed with: Spouse/sig other     Transition of Care Barriers None     Discharge Plan A Home with family     Discharge Plan B Home        Physical Activity    On average, how many days per week do you engage in moderate to strenuous exercise (like a brisk walk)? 3 days     On average, how many minutes do you engage in exercise at this level? 60 min        Financial Resource Strain    How hard is it for you to pay for the very basics like food, housing, medical care, and heating? Not hard at all        Housing Stability    In the last 12 months, was there a time when you were not able to pay the mortgage or rent on time? No     In the last 12 months, how many places have you lived? 1     In the last 12 months, was there a time when you did not have a steady place to sleep or slept in a shelter (including now)? No        Transportation Needs    In the past 12 months, has lack of transportation kept you from medical appointments or from getting medications? No     In the past 12 months, has lack of transportation kept you from meetings, work, or from getting things needed for daily living? No        Food Insecurity    Within the past 12 months, you worried  that your food would run out before you got the money to buy more. Never true     Within the past 12 months, the food you bought just didn't last and you didn't have money to get more. Never true        Stress    Do you feel stress - tense, restless, nervous, or anxious, or unable to sleep at night because your mind is troubled all the time - these days? Not at all        Social Connections    In a typical week, how many times do you talk on the phone with family, friends, or neighbors? More than three times a week     How often do you get together with friends or relatives? More than three times a week     How often do you attend Mosque or Baptist services? More than 4 times per year     Do you belong to any clubs or organizations such as Mosque groups, unions, fraternal or athletic groups, or school groups? Yes     How often do you attend meetings of the clubs or organizations you belong to? More than 4 times per year     Are you , , , , never , or living with a partner?         Alcohol Use    Q1: How often do you have a drink containing alcohol? Never     Q2: How many drinks containing alcohol do you have on a typical day when you are drinking? Patient does not drink     Q3: How often do you have six or more drinks on one occasion? Never        OTHER    Name(s) of People in Home Shell Larson Spouse                          Discharge Plan A and Plan B have been determined by review of patient's clinical status, future medical and therapeutic needs, and coverage/benefits for post-acute care in coordination with multidisciplinary team members.

## 2023-10-26 NOTE — ASSESSMENT & PLAN NOTE
Patient on carvedilol 12.5 mg BID at home. Will hold in the setting of sepsis.    -- Resume home meds as indicated

## 2023-10-26 NOTE — ASSESSMENT & PLAN NOTE
"This patient does not have evidence of infective focus  My overall impression is sepsis.  Source: Abdominal and Unknown  Antibiotics given-   Antibiotics (72h ago, onward)    Start     Stop Route Frequency Ordered    10/26/23 0630  vancomycin 1,250 mg in dextrose 5 % (D5W) 250 mL IVPB (Vial-Mate)         -- IV Every 12 hours (non-standard times) 10/25/23 1957    10/25/23 2200  ceFEPIme (MAXIPIME) 2 g in dextrose 5 % in water (D5W) 100 mL IVPB (MB+)         -- IV Every 8 hours (non-standard times) 10/25/23 1952        Latest lactate reviewed-  No results for input(s): "LACTATE" in the last 72 hours.  Organ dysfunction indicated by Acute liver injury and Thrombocytopenia     Fluid challenge Actual Body weight- Patient will receive 30ml/kg actual body weight to calculate fluid bolus for treatment of septic shock.     Post- resuscitation assessment No - Post resuscitation assessment not needed     Will Not start Pressors- Levophed for MAP of 65  Source control achieved by: IV antibiotics    Patient meets SIRs criteria on admission w/ fever, tachycardia. High risk for infection given immunocompromized status. Received IV fluids in ED. Will continue broad spectrum antibiotics on admission w/ vancomycin and cefepime. Unclear source at time of admission, possible biliary/hepatic source given elevated bilirubin and transaminitis however patient without RUQ tenderness on exam. RUQ ultrasound significant only for low resistive indices. Will check viral studies including acute hepatitis panel, CMV, HIV, and follow-up Bcx.    HIV, hep panel negative  BCx 10/25 NGTD  MRCP/MRI pending    -- Continue Cefepime and Vancomycin  -- Follow-up BCx; will likely discontinue vanc if Bcx NG x 48 hours  -- Lactate normal on admission, not hypotensive; will not trend  -- Monitor for evidence of source  -- Daily CBC, CMP, mag, phos  -- Keep K > 4, Phos > 3, Mg > 2  -- Replete electrolytes prn  -- q4h vitals  "

## 2023-10-26 NOTE — ASSESSMENT & PLAN NOTE
AST (202), ALT (159) on admission. Bilirubin elevated to 2.4. Will consult liver transplant team to assist w/ workup. Will obtain viral serology. Patient denies excessive tylenol use. He has been compliant w/ immunosuppresant medications. Initial evaluation significant for low hepatic indices, considered hepatic artery thrombus/emboli however patient without RUQ abdominal pain and lactate normal.    -- Liver transplant team consulted; appreciate recs  -- Trend AST/ALT  -- Follow-up acute hepatitis panel, HIV, CMV  -- Further imaging per hepatology recs

## 2023-10-26 NOTE — HPI
The patient is a 43 yoM with a history of cholangiocarcinoma s/p chemotherapy, radiation, and liver transplant (2022; on tacrolimus), who presented to [facility] ED with subjective fever. The patient states he felt slightly fatigued this morning and checked his temperature and noted a fever. Upon arrival to [facility] ED his temperature was 101.7. He denies other symptoms including cough, congestion, shortness of breath, abdominal pain, nausea, vomiting, diarrhea, constipation, dysuria. He denies sick contacts at home. The patient states that he has a history of elevated AST and ALT associated with any illness he has experienced in the past, even simple colds. He has been compliant with his tacrolimus (last dose AM of admission).    ED evaluation significant for: hemodynamically stable, T 101.7, pulse 112, saturating well on room air. WBC 5.28, Hgb 16.3, plts 119, Na 137, K 4.8, bicarb 21, Cr 1.5 (baseline ~ 1.2-1.3), phos 1.7, mag 1.3, alk phos 179, bili 2.4, , . Flu, covid negative. UA unremarkable. Lactate 1.5. CXR without acute process. US doppler liver transplant w/ normal waveforms but low resistive indices. The patient was given IV vancomycin and cefepime, 2L IV fluids in the ED.

## 2023-10-26 NOTE — SUBJECTIVE & OBJECTIVE
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 OR [address] [place] [address];  Service: Transplant;  Laterality: N/A;    ENDOSCOPIC ULTRASOUND OF UPPER GASTROINTESTINAL TRACT N/A 10/20/2021    Procedure: ULTRASOUND, UPPER GI TRACT, ENDOSCOPIC;  Surgeon: Valeriy Sotelo MD;  [facility]  Service: Endoscopy;  Laterality: N/A;  wife to email vacc card-inst email-tb    ERCP N/A 10/20/2021    Procedure: ERCP (ENDOSCOPIC RETROGRADE CHOLANGIOPANCREATOGRAPHY);  Surgeon: Valeriy Sotelo MD;  [address]  Service: Endoscopy;  Laterality: N/A;    ERCP N/A 11/4/2021    Procedure: ERCP (ENDOSCOPIC RETROGRADE CHOLANGIOPANCREATOGRAPHY);  Surgeon: Valeriy Sotelo MD;  [address]  Service: Endoscopy;  Laterality: N/A;    ERCP N/A 11/4/2021    Procedure: ERCP (ENDOSCOPIC RETROGRADE CHOLANGIOPANCREATOGRAPHY);  Surgeon: Roselia Pereyra MD;  [facility]  Service: Endoscopy;  Laterality: N/A;  pt vaccinated, instructed to bring card to procedure, instructions sent portal-MG    ERCP N/A 1/14/2022    Procedure: ERCP (ENDOSCOPIC RETROGRADE CHOLANGIOPANCREATOGRAPHY);  Surgeon: Roselia Pereyra MD;  [facility]  Service: Endoscopy;  Laterality: N/A;  inst portal-right chest port-tb  Pt requested at home COVID testing, Pt instructed at home RAPID to be done morning of procedure, Pt instructed to call endoscopy scheuding if there is a positive result, Pt informed if a positive     ERCP N/A 3/31/2022    Procedure: ERCP (ENDOSCOPIC RETROGRADE CHOLANGIOPANCREATOGRAPHY);   Surgeon: Julio Cesar Alonzo MD;  Location: [facility] ENDO (2ND FLR);  Service: Endoscopy;  Laterality: N/A;  3/16: port L chest wall. pt to bring covid vaccination card. Instructions sent via portal.-SC  3/18/22-Updated instructions sent via portal re:new date/time-DS    EXPLORATORY LAPAROTOMY AFTER LIVER TRANSPLANTATION N/A 6/23/2022    Procedure: LAPAROTOMY, EXPLORATORY, AFTER LIVER TRANSPLANT;  Surgeon: Julio Cesar Jara MD;  Location: [facility] OR 2ND FLR;  Service: Transplant;  Laterality: N/A;    INSERTION OF TUNNELED CENTRAL VENOUS CATHETER (CVC) WITH SUBCUTANEOUS PORT N/A 11/24/2021    Procedure: INSERTION, PORT-A-CATH;  Surgeon: Prem Syed MD;  Location: [facility] CATH LAB;  Service: Radiology;  Laterality: N/A;    LIVER TRANSPLANT N/A 6/21/2022    Procedure: TRANSPLANT, LIVER;  Surgeon: Sinan Cline MD;  Location: [facility] OR [facility] FLR;  Service: Transplant;  Laterality: N/A;    REPAIR OF BILE DUCT N/A 6/23/2022    Procedure: REPAIR, BILE DUCT;  Surgeon: Julio Cesar Jara MD;  Location: [facility] OR [facility] FLR;  Service: Transplant;  Laterality: N/A;    ROBOT-ASSISTED LAPAROSCOPIC LYMPHADENECTOMY USING DA МАРИНА XI  6/17/2022    Procedure: XI ROBOTIC LYMPHADENECTOMY - Portal;  Surgeon: Julio Cesar Jara MD;  Location: [facility] OR 2ND FLR;  Service: Transplant;;    TONSILLECTOMY      XI ROBOTIC LAPAROSCOPY, EXPLORATORY N/A 6/17/2022    Procedure: XI ROBOTIC LAPAROSCOPY,EXPLORATORY;  Surgeon: Julio Cesar Jara MD;  Location: [facility] OR [facility] FLR;  Service: Transplant;  Laterality: N/A;       Review of patient's allergies indicates:   Allergen Reactions    Zosyn [piperacillin-tazobactam] Swelling     Lip swelling       No current facility-administered medications on file prior to encounter.     Current Outpatient Medications on File Prior to Encounter   Medication Sig    acetaminophen (TYLENOL) 500 MG tablet Take 500 mg by mouth every 4 to 6 hours as needed for Pain.    carvediloL (COREG) 12.5 MG tablet Take 1 tablet (12.5 mg total) by mouth 2 (two) times  daily with meals.    tacrolimus (PROGRAF) 1 MG Cap Take 1 capsule (1 mg total) by mouth every 12 (twelve) hours.    ursodioL (ACTIGALL) 250 mg Tab Take 1 tablet (250 mg total) by mouth 2 (two) times a day.    aspirin 81 MG Chew CHEW 1 tablet (81 mg total) by mouth once daily.    [DISCONTINUED] baclofen (LIORESAL) 10 MG tablet TAKE 1 TABLET(10 MG) BY MOUTH THREE TIMES DAILY AS NEEDED FOR HICCUPS (Patient not taking: No sig reported)    [DISCONTINUED] famotidine (PEPCID) 20 MG tablet Take 1 tablet (20 mg total) by mouth every evening. STOP 7/24/22    [DISCONTINUED] insulin aspart U-100 (NOVOLOG) 100 unit/mL (3 mL) InPn pen Inject 5 Units into the skin 3 (three) times daily with meals. + sliding scale.  MDD 30 units/d    [DISCONTINUED] losartan (COZAAR) 50 MG tablet Take 1 tablet (50 mg total) by mouth once daily.    [DISCONTINUED] metFORMIN (GLUCOPHAGE) 500 MG tablet Take 1 tablet (500 mg total) by mouth 2 (two) times daily with meals.    [DISCONTINUED] tamsulosin (FLOMAX) 0.4 mg Cap Take 2 capsules (0.8 mg total) by mouth once daily.     Family History       Problem Relation (Age of Onset)    Hyperlipidemia Father    No Known Problems Mother, Sister, Brother          Tobacco Use    Smoking status: Never    Smokeless tobacco: Never   Substance and Sexual Activity    Alcohol use: Not Currently    Drug use: Never    Sexual activity: Yes     Review of Systems   Constitutional:  Negative for chills and fever.   HENT:  Negative for congestion and sore throat.    Respiratory:  Negative for cough and shortness of breath.    Cardiovascular:  Negative for chest pain.   Gastrointestinal:  Negative for abdominal pain, constipation, diarrhea, nausea and vomiting.   Genitourinary:  Negative for dysuria.   Musculoskeletal:  Negative for arthralgias and back pain.   Skin:  Negative for rash and wound.   Allergic/Immunologic: Positive for immunocompromised state. Negative for food allergies.   Neurological:  Negative for dizziness  and headaches.   Psychiatric/Behavioral:  Negative for sleep disturbance.      Objective:     Vital Signs (Most Recent):  Temp: 99.1 °F (37.3 °C) (10/25/23 1735)  Pulse: 77 (10/25/23 1832)  Resp: 18 (10/25/23 1832)  BP: 117/79 (10/25/23 1832)  SpO2: 95 % (10/25/23 1832) Vital Signs (24h Range):  Temp:  [99.1 °F (37.3 °C)-101.7 °F (38.7 °C)] 99.1 °F (37.3 °C)  Pulse:  [] 77  Resp:  [18-20] 18  SpO2:  [95 %-99 %] 95 %  BP: (112-153)/(72-92) 117/79     Weight: 81.6 kg (180 lb)  Body mass index is 27.37 kg/m².     Physical Exam  Vitals and nursing note reviewed.   Constitutional:       General: He is not in acute distress.  HENT:      Head: Normocephalic.   Eyes:      General: No scleral icterus.  Cardiovascular:      Rate and Rhythm: Normal rate and regular rhythm.      Pulses: Normal pulses.      Heart sounds: Normal heart sounds, S1 normal and S2 normal.   Pulmonary:      Effort: Pulmonary effort is normal. No respiratory distress.      Breath sounds: Normal breath sounds. No wheezing or rales.   Abdominal:      General: A surgical scar is present. Bowel sounds are normal. There is no distension.      Palpations: Abdomen is soft.      Tenderness: There is no abdominal tenderness. There is no guarding or rebound.      Comments: No abdominal tenderness   Musculoskeletal:         General: No swelling.      Cervical back: Normal range of motion.      Right lower leg: No edema.      Left lower leg: No edema.   Skin:     General: Skin is warm and dry.      Capillary Refill: Capillary refill takes less than 2 seconds.      Coloration: Skin is not jaundiced.   Neurological:      Mental Status: He is alert and oriented to person, place, and time.      Motor: No weakness.   Psychiatric:         Attention and Perception: Attention normal.         Speech: Speech normal.         Behavior: Behavior is cooperative.          Significant Labs: All pertinent labs within the past 24 hours have been reviewed.  CBC:   Recent Labs    Lab 10/25/23  1334   WBC 5.28   HGB 16.3   HCT 47.7   *     CMP:   Recent Labs   Lab 10/25/23  1334      K 4.8      CO2 21*   *   BUN 17   CREATININE 1.5*   CALCIUM 9.7   PROT 7.6   ALBUMIN 4.5   BILITOT 2.4*   ALKPHOS 179*   *   *   ANIONGAP 11     Significant Imaging: I have reviewed all pertinent imaging results/findings within the past 24 hours.

## 2023-10-26 NOTE — PROGRESS NOTES
[facility] Medicine  Progress Note    Patient Name: OSCAR PAYNE  MRN: [id number]  Patient Class: IP- Inpatient   Admission Date: 10/25/2023  Length of Stay: 1 days  Attending Physician: Sandra Brink*  Primary Care Provider: Yves Moses MD        Subjective:     Principal Problem:Severe sepsis        HPI:  The patient is a 43 yoM with a history of cholangiocarcinoma s/p chemotherapy, radiation, and liver transplant (2022; on tacrolimus), who presented to [facility] [place] [place] ED with subjective fever. The patient states he felt slightly fatigued this morning and checked his temperature and noted a fever. Upon arrival to [facility] [place] [place] ED his temperature was 101.7. He denies other symptoms including cough, congestion, shortness of breath, abdominal pain, nausea, vomiting, diarrhea, constipation, dysuria. He denies sick contacts at home. The patient states that he has a history of elevated AST and ALT associated with any illness he has experienced in the past, even simple colds. He has been compliant with his tacrolimus (last dose AM of admission).    ED evaluation significant for: hemodynamically stable, T 101.7, pulse 112, saturating well on room air. WBC 5.28, Hgb 16.3, plts 119, Na 137, K 4.8, bicarb 21, Cr 1.5 (baseline ~ 1.2-1.3), phos 1.7, mag 1.3, alk phos 179, bili 2.4, , . Flu, covid negative. UA unremarkable. Lactate 1.5. CXR without acute process. US doppler liver transplant w/ normal waveforms but low resistive indices. The patient was given IV vancomycin and cefepime, 2L IV fluids in the ED.      Overview/Hospital Course:  43M admitted to  with severe sepsis c/b CASA and immunocompromised state. Tachycardic, transaminitis, CASA, normal WBC count on admission. Sepsis treated empirically with vanc and cefepime. HIV, influenza, acute hepatitis panel negative. CMV pending. Liver transplant team consulted who recommend MRI/MRCP with and without contrast which is pending.      Interval  History: NAEO. AF. HDS. Patient reports feeling much better today. Denies CP/SOB/NVD/ subjective fevers/chills and pain/tenderness to abdomen and R port access. On vanc and cefepime. HIV, influenza, acute hepatitis panel negative. CMV pending. Liver transplant team consulted who recommend MRI/MRCP with and without contrast which is pending.    Review of Systems   Constitutional:  Negative for chills and fever.   HENT:  Negative for congestion and sore throat.    Respiratory:  Negative for cough and shortness of breath.    Cardiovascular:  Negative for chest pain.   Gastrointestinal:  Negative for abdominal pain, constipation, diarrhea, nausea and vomiting.   Genitourinary:  Negative for dysuria.   Musculoskeletal:  Negative for arthralgias and back pain.   Skin:  Negative for rash and wound.   Allergic/Immunologic: Positive for immunocompromised state. Negative for food allergies.   Neurological:  Negative for dizziness and headaches.   Psychiatric/Behavioral:  Negative for sleep disturbance.      Objective:     Vital Signs (Most Recent):  Temp: 98.1 °F (36.7 °C) (10/26/23 1155)  Pulse: 80 (10/26/23 1155)  Resp: 16 (10/26/23 1155)  BP: (!) 119/92 (10/26/23 1155)  SpO2: 99 % (10/26/23 1155) Vital Signs (24h Range):  Temp:  [97.2 °F (36.2 °C)-101.7 °F (38.7 °C)] 98.1 °F (36.7 °C)  Pulse:  [] 80  Resp:  [16-20] 16  SpO2:  [95 %-99 %] 99 %  BP: (112-153)/(72-92) 119/92     Weight: 84.2 kg (185 lb 10 oz)  Body mass index is 28.22 kg/m².    Intake/Output Summary (Last 24 hours) at 10/26/2023 1244  Last data filed at 10/25/2023 1910  Gross per 24 hour   Intake 2702.06 ml   Output --   Net 2702.06 ml         Physical Exam  Vitals and nursing note reviewed.   Constitutional:       General: He is not in acute distress.  HENT:      Head: Normocephalic.      Nose: Nose normal.   Eyes:      General: No scleral icterus.     Extraocular Movements: Extraocular movements intact.   Cardiovascular:      Rate and Rhythm: Normal  rate and regular rhythm.      Pulses: Normal pulses.      Heart sounds: Normal heart sounds, S1 normal and S2 normal.   Pulmonary:      Effort: Pulmonary effort is normal. No respiratory distress.      Breath sounds: Normal breath sounds. No wheezing or rales.   Abdominal:      General: A surgical scar is present. Bowel sounds are normal. There is no distension.      Palpations: Abdomen is soft.      Tenderness: There is no abdominal tenderness. There is no guarding or rebound.      Comments: No abdominal tenderness   Musculoskeletal:         General: No swelling.      Cervical back: Normal range of motion.      Right lower leg: No edema.      Left lower leg: No edema.      Comments: Port on R chest non-TTP, non-erythematous   Skin:     General: Skin is warm and dry.      Capillary Refill: Capillary refill takes less than 2 seconds.      Coloration: Skin is not jaundiced.   Neurological:      Mental Status: He is alert and oriented to person, place, and time.      Motor: No weakness.   Psychiatric:         Attention and Perception: Attention normal.         Mood and Affect: Mood normal.         Speech: Speech normal.         Behavior: Behavior normal. Behavior is cooperative.         Thought Content: Thought content normal.         Judgment: Judgment normal.             Significant Labs: All pertinent labs within the past 24 hours have been reviewed.  Recent Lab Results  (Last 5 results in the past 24 hours)        10/26/23  0253   10/25/23  2027   10/25/23  1442   10/25/23  1355   10/25/23  1348        Influenza A, Molecular       Negative         Influenza B, Molecular       Negative         Procalcitonin   10.89  Comment: A concentration < 0.25 ng/mL represents a low risk of bacterial   infection.  Procalcitonin may not be accurate among patients with localized   infection, recent trauma or major surgery, immunosuppressed state,   invasive fungal infection, renal dysfunction. Decisions regarding   initiation or  continuation of antibiotic therapy should not be based   solely on procalcitonin levels.               Albumin 3.7                              Allens Test         N/A                      Anion Gap 11               Appearance, UA     Clear                          Baso # 0.02               Basophil % 0.2               Bilirubin (UA)     Negative           Bilirubin Direct 0.7               BILIRUBIN TOTAL 3.1  Comment: For infants and newborns, interpretation of results should be based  on gestational age, weight and in agreement with clinical  observations.    Premature Infant recommended reference ranges:  Up to 24 hours.............<8.0 mg/dL  Up to 48 hours............<12.0 mg/dL  3-5 days..................<15.0 mg/dL  6-29 days.................<15.0 mg/dL                 Site         Other       BUN 14               Calcium 9.3               Chloride 107               CO2 20               Color, UA     Yellow           Creatinine 1.2               Differential Method Automated               eGFR >60.0               Eos # 0.1               Eosinophil % 0.6               Flu A & B Source       Nasal swab                        Glucose 118               Glucose, UA     Negative           Gran # (ANC) 7.1               Gran % 80.2               Hematocrit 42.8               Hemoglobin 14.7               Hep A IgM   Non-reactive             Hep B C IgM   Non-reactive             Hepatitis B Surface Ag   Non-reactive             Hepatitis C Ab   Non-reactive             HIV 1/2 Ag/Ab   Non-reactive             Immature Grans (Abs) 0.04  Comment: Mild elevation in immature granulocytes is non specific and   can be seen in a variety of conditions including stress response,   acute inflammation, trauma and pregnancy. Correlation with other   laboratory and clinical findings is essential.                 Immature Granulocytes 0.5               INR 1.1  Comment: Coumadin Therapy:  2.0 - 3.0  for INR for all indicators except mechanical heart valves  and antiphospholipid syndromes which should use 2.5 - 3.5.                 Ketones, UA     Negative           Leukocytes, UA     Negative           Lymph # 1.0               Lymph % 10.7               Magnesium  1.9               MCH 32.4               MCHC 34.3               MCV 94               Mono # 0.7               Mono % 7.8               MPV 9.4               NITRITE UA     Negative           nRBC 0               Occult Blood UA     Negative           pH, UA     5.0           Phosphorus Level 2.4               Platelet Count 114               POC Lactate         1.50       Potassium 4.3               PROTEIN TOTAL 6.5               Protein, UA     Negative  Comment: Recommend a 24 hour urine protein or a urine   protein/creatinine ratio if globulin induced proteinuria is  clinically suspected.             Protime 11.4               RBC 4.54               RDW 12.4               Sample         VENOUS       SARS-CoV-2 RNA, Amplification, Qual       Negative  Comment: This test utilizes isothermal nucleic acid amplification technology   to   detect the SARS-CoV-2 RdRp nucleic acid segment. The analytical   sensitivity   (limit of detection) is 500 copies/swab.     A POSITIVE result is indicative of the presence of SARS-CoV-2 RNA;   clinical   correlation with patient history and other diagnostic information is   necessary to determine patient infection status.    A NEGATIVE result means that SARS-CoV-2 nucleic acids are not present   above   the limit of detection. A NEGATIVE result should be treated as   presumptive.   It does not rule out the possibility of COVID-19 and should not be   the sole   basis for treatment decisions. If COVID-19 is strongly suspected   based on   clinical and exposure history, re-testing using an alternate   molecular assay   should be considered.     This test is only for use under the Food and Drug Administration s    Emergency   Use Authorization (EUA).     Commercial kits are provided by [employer]. Performance   characteristics of the EUA have been independently verified by   [facility] Department of Pathology and Laboratory Medicine.   _________________________________________________________________   The authorized Fact Sheet for Healthcare Providers and the authorized   Fact   Sheet for Patients of the ID NOW COVID-19 are available on the FDA   website:   https://www.fda.gov/media/745675/download   https://www.fda.gov/media/262479/download           Sodium 138               Specific [place], UA     1.010           Specimen UA     Urine, Clean Catch           Tacrolimus Lvl 10.5  Comment: Testing performed by a chemiluminescent microparticle   immunoassay on the [employer] i System.    CAUTION: No firm therapeutic range exists for tacrolimus in whole   blood. The   complexity of the clinical state, individual differences in   sensitivity to   immunosuppressive and nephrotoxic effects of tacrolimus,   co-administration   of other immunosuppressants, type of transplant, time post-transplant   and a   number of other factors contribute to different requirements for   optimal   blood levels of tacrolimus. Therefore, individual tacrolimus values   cannot   be used as the sole indicator for making changes in treatment regimen   and   each patient should be thoroughly evaluated clinically before changes   in   treatment regimens are made. Each user must establish his or her own   ranges   based on clinical experience.  Therapeutic ranges vary according to the commercial test used, and   therefore   should be established for each commercial test. Values obtained with   different assay methods cannot be used interchangeably due to   differences in   assay methods and cross-reactivity with metabolites, nor should   correction   factors be applied. Therefore, consistent use of one assay for   individual  "  patients is recommended.                 WBC 8.88                                      Significant Imaging: I have reviewed and interpreted all pertinent imaging results/findings within the past 24 hours. MRI/MRCP pending.      Assessment/Plan:      * Severe sepsis  This patient does not have evidence of infective focus  My overall impression is sepsis.  Source: Abdominal and Unknown  Antibiotics given-   Antibiotics (72h ago, onward)    Start     Stop Route Frequency Ordered    10/26/23 0630  vancomycin 1,250 mg in dextrose 5 % (D5W) 250 mL IVPB (Vial-Mate)         -- IV Every 12 hours (non-standard times) 10/25/23 1957    10/25/23 2200  ceFEPIme (MAXIPIME) 2 g in dextrose 5 % in water (D5W) 100 mL IVPB (MB+)         -- IV Every 8 hours (non-standard times) 10/25/23 1952        Latest lactate reviewed-  No results for input(s): "LACTATE" in the last 72 hours.  Organ dysfunction indicated by Acute liver injury and Thrombocytopenia     Fluid challenge Actual Body weight- Patient will receive 30ml/kg actual body weight to calculate fluid bolus for treatment of septic shock.     Post- resuscitation assessment No - Post resuscitation assessment not needed     Will Not start Pressors- Levophed for MAP of 65  Source control achieved by: IV antibiotics    Patient meets SIRs criteria on admission w/ fever, tachycardia. High risk for infection given immunocompromized status. Received IV fluids in ED. Will continue broad spectrum antibiotics on admission w/ vancomycin and cefepime. Unclear source at time of admission, possible biliary/hepatic source given elevated bilirubin and transaminitis however patient without RUQ tenderness on exam. RUQ ultrasound significant only for low resistive indices. Will check viral studies including acute hepatitis panel, CMV, HIV, and follow-up Bcx.    HIV, hep panel negative  BCx 10/25 NGTD  MRCP/MRI pending    -- Continue Cefepime and Vancomycin  -- Follow-up BCx; will likely discontinue " vanc if Bcx NG x 48 hours  -- Lactate normal on admission, not hypotensive; will not trend  -- Monitor for evidence of source  -- Daily CBC, CMP, mag, phos  -- Keep K > 4, Phos > 3, Mg > 2  -- Replete electrolytes prn  -- q4h vitals    Cholangitis of transplanted liver  AST (202), ALT (159) on admission. Bilirubin elevated to 2.4. Will consult liver transplant team to assist w/ workup. Will obtain viral serology. Patient denies excessive tylenol use. He has been compliant w/ immunosuppresant medications. Initial evaluation significant for low hepatic indices, considered hepatic artery thrombus/emboli however patient without RUQ abdominal pain and lactate normal.    -- Liver transplant team consulted; appreciate recs  -- MRCP/MRI with and without pending.  -- Trend AST/ALT    Elevated LFTs  See 'cholangitis'    At risk for opportunistic infections  Noted increased risk of infections 2/2 tacrolimus    S/P liver transplant  Noted transplant status    Hilar cholangiocarcinoma  S/p chemotherapy, radiation, and liver transplant. Patient reports recent surveillance CT scan negative for disease recurrence.    Hypertension  Patient on carvedilol 12.5 mg BID at home. Will hold in the setting of sepsis.    -- Resume home meds as indicated      VTE Risk Mitigation (From admission, onward)         Ordered     heparin (porcine) injection 5,000 Units  Every 8 hours         10/25/23 1951     IP VTE HIGH RISK PATIENT  Once         10/25/23 1951     Place sequential compression device  Until discontinued         10/25/23 1951                Discharge Planning   SHERLEY: 10/27/2023     Code Status: Full Code   Is the patient medically ready for discharge?: No    Reason for patient still in hospital (select all that apply): Patient trending condition, Imaging and Consult recommendations  Discharge Plan A: Home with family                  John Leon MD  Department of Hospital Medicine   [facility] Surg     English

## 2023-10-26 NOTE — ASSESSMENT & PLAN NOTE
AST (202), ALT (159) on admission. Bilirubin elevated to 2.4. Will consult liver transplant team to assist w/ workup. Will obtain viral serology. Patient denies excessive tylenol use. He has been compliant w/ immunosuppresant medications. Initial evaluation significant for low hepatic indices, considered hepatic artery thrombus/emboli however patient without RUQ abdominal pain and lactate normal.    -- Liver transplant team consulted; appreciate recs  -- MRCP/MRI with and without pending.  -- Trend AST/ALT  -- Follow-up acute hepatitis panel, HIV, CMV  -- Further imaging per hepatology recs

## 2023-10-27 ENCOUNTER — PATIENT MESSAGE (OUTPATIENT)
Dept: TRANSPLANT | Facility: CLINIC | Age: 43
End: 2023-10-27
Payer: COMMERCIAL

## 2023-10-27 DIAGNOSIS — Z94.4 LIVER REPLACED BY TRANSPLANT: Primary | ICD-10-CM

## 2023-10-27 LAB
ALBUMIN SERPL BCP-MCNC: 3.4 G/DL (ref 3.5–5.2)
ALP SERPL-CCNC: 133 U/L (ref 55–135)
ALT SERPL W/O P-5'-P-CCNC: 122 U/L (ref 10–44)
ANION GAP SERPL CALC-SCNC: 11 MMOL/L (ref 8–16)
AST SERPL-CCNC: 58 U/L (ref 10–40)
BASOPHILS # BLD AUTO: 0.01 K/UL (ref 0–0.2)
BASOPHILS NFR BLD: 0.2 % (ref 0–1.9)
BILIRUB SERPL-MCNC: 1.3 MG/DL (ref 0.1–1)
BUN SERPL-MCNC: 15 MG/DL (ref 6–20)
CALCIUM SERPL-MCNC: 9.1 MG/DL (ref 8.7–10.5)
CHLORIDE SERPL-SCNC: 103 MMOL/L (ref 95–110)
CO2 SERPL-SCNC: 21 MMOL/L (ref 23–29)
CREAT SERPL-MCNC: 1.2 MG/DL (ref 0.5–1.4)
DIFFERENTIAL METHOD: ABNORMAL
EOSINOPHIL # BLD AUTO: 0.1 K/UL (ref 0–0.5)
EOSINOPHIL NFR BLD: 2.8 % (ref 0–8)
ERYTHROCYTE [DISTWIDTH] IN BLOOD BY AUTOMATED COUNT: 12.2 % (ref 11.5–14.5)
EST. GFR  (NO RACE VARIABLE): >60 ML/MIN/1.73 M^2
GLUCOSE SERPL-MCNC: 183 MG/DL (ref 70–110)
HCT VFR BLD AUTO: 40.5 % (ref 40–54)
HGB BLD-MCNC: 13.9 G/DL (ref 14–18)
IMM GRANULOCYTES # BLD AUTO: 0.01 K/UL (ref 0–0.04)
IMM GRANULOCYTES NFR BLD AUTO: 0.2 % (ref 0–0.5)
INR PPP: 1 (ref 0.8–1.2)
LYMPHOCYTES # BLD AUTO: 1.2 K/UL (ref 1–4.8)
LYMPHOCYTES NFR BLD: 23 % (ref 18–48)
MAGNESIUM SERPL-MCNC: 1.7 MG/DL (ref 1.6–2.6)
MCH RBC QN AUTO: 31.9 PG (ref 27–31)
MCHC RBC AUTO-ENTMCNC: 34.3 G/DL (ref 32–36)
MCV RBC AUTO: 93 FL (ref 82–98)
MONOCYTES # BLD AUTO: 0.5 K/UL (ref 0.3–1)
MONOCYTES NFR BLD: 10.2 % (ref 4–15)
NEUTROPHILS # BLD AUTO: 3.2 K/UL (ref 1.8–7.7)
NEUTROPHILS NFR BLD: 63.6 % (ref 38–73)
NRBC BLD-RTO: 0 /100 WBC
PHOSPHATE SERPL-MCNC: 3.5 MG/DL (ref 2.7–4.5)
PLATELET # BLD AUTO: 103 K/UL (ref 150–450)
PMV BLD AUTO: 9 FL (ref 9.2–12.9)
POTASSIUM SERPL-SCNC: 3.9 MMOL/L (ref 3.5–5.1)
PROCALCITONIN SERPL IA-MCNC: 6.78 NG/ML
PROT SERPL-MCNC: 6.3 G/DL (ref 6–8.4)
PROTHROMBIN TIME: 10.6 SEC (ref 9–12.5)
RBC # BLD AUTO: 4.36 M/UL (ref 4.6–6.2)
SODIUM SERPL-SCNC: 135 MMOL/L (ref 136–145)
TACROLIMUS BLD-MCNC: 8 NG/ML (ref 5–15)
WBC # BLD AUTO: 5.09 K/UL (ref 3.9–12.7)

## 2023-10-27 PROCEDURE — 84145 PROCALCITONIN (PCT): CPT | Performed by: HOSPITALIST

## 2023-10-27 PROCEDURE — 99233 PR SUBSEQUENT HOSPITAL CARE,LEVL III: ICD-10-PCS | Mod: ,,, | Performed by: HOSPITALIST

## 2023-10-27 PROCEDURE — 80053 COMPREHEN METABOLIC PANEL: CPT

## 2023-10-27 PROCEDURE — 63600175 PHARM REV CODE 636 W HCPCS

## 2023-10-27 PROCEDURE — 85610 PROTHROMBIN TIME: CPT

## 2023-10-27 PROCEDURE — 99233 SBSQ HOSP IP/OBS HIGH 50: CPT | Mod: ,,, | Performed by: HOSPITALIST

## 2023-10-27 PROCEDURE — 11000001 HC ACUTE MED/SURG PRIVATE ROOM

## 2023-10-27 PROCEDURE — 25000003 PHARM REV CODE 250

## 2023-10-27 PROCEDURE — 84100 ASSAY OF PHOSPHORUS: CPT

## 2023-10-27 PROCEDURE — 83735 ASSAY OF MAGNESIUM: CPT

## 2023-10-27 PROCEDURE — 36415 COLL VENOUS BLD VENIPUNCTURE: CPT | Performed by: HOSPITALIST

## 2023-10-27 PROCEDURE — 80197 ASSAY OF TACROLIMUS: CPT

## 2023-10-27 PROCEDURE — 85025 COMPLETE CBC W/AUTO DIFF WBC: CPT

## 2023-10-27 RX ORDER — CIPROFLOXACIN 500 MG/1
500 TABLET ORAL EVERY 12 HOURS
Status: DISCONTINUED | OUTPATIENT
Start: 2023-10-27 | End: 2023-10-28 | Stop reason: HOSPADM

## 2023-10-27 RX ORDER — URSODIOL 250 MG/1
250 TABLET, FILM COATED ORAL 2 TIMES DAILY
Status: DISCONTINUED | OUTPATIENT
Start: 2023-10-27 | End: 2023-10-28 | Stop reason: HOSPADM

## 2023-10-27 RX ADMIN — HEPARIN SODIUM 5000 UNITS: 5000 INJECTION INTRAVENOUS; SUBCUTANEOUS at 05:10

## 2023-10-27 RX ADMIN — CEFEPIME 2 G: 2 INJECTION, POWDER, FOR SOLUTION INTRAVENOUS at 05:10

## 2023-10-27 RX ADMIN — URSODIOL 250 MG: 250 TABLET ORAL at 09:10

## 2023-10-27 RX ADMIN — TACROLIMUS 1 MG: 1 CAPSULE ORAL at 09:10

## 2023-10-27 RX ADMIN — CIPROFLOXACIN 500 MG: 500 TABLET, FILM COATED ORAL at 10:10

## 2023-10-27 RX ADMIN — CIPROFLOXACIN 500 MG: 500 TABLET, FILM COATED ORAL at 09:10

## 2023-10-27 RX ADMIN — URSODIOL 250 MG: 250 TABLET ORAL at 10:10

## 2023-10-27 RX ADMIN — TACROLIMUS 1 MG: 1 CAPSULE ORAL at 05:10

## 2023-10-27 NOTE — ASSESSMENT & PLAN NOTE
AST (202), ALT (159) on admission. Bilirubin elevated to 2.4. Will consult liver transplant team to assist w/ workup. Will obtain viral serology. Patient denies excessive tylenol use. He has been compliant w/ immunosuppresant medications. Initial evaluation significant for low hepatic indices, considered hepatic artery thrombus/emboli however patient without RUQ abdominal pain and lactate normal. S/p MRCP 10/26 unremarkable.    -- IV vanc + cefepime switched to PO ciprofloxacin 10/27  -- Liver transplant team consulted; appreciate recs  -- Trend AST/ALT; if stable 10/28 AM will plan for discharge

## 2023-10-27 NOTE — PLAN OF CARE
[facility] S[facility]  Discharge Final Note    Primary Care Provider: Yves Moses MD    Expected Discharge Date: 10/27/2023    Final Discharge Note (most recent)       Final Note - 10/27/23 0912          Final Note    Assessment Type Final Discharge Note     Anticipated Discharge Disposition Home or Self Care     Hospital Resources/Appts/Education Provided Appointments scheduled and added to AVS        Post-Acute Status    Post-Acute Authorization Other     Other Status No Post-Acute Service Needs     Discharge Delays None known at this time                     Important Message from Medicare

## 2023-10-27 NOTE — ASSESSMENT & PLAN NOTE
"This patient does not have evidence of infective focus  My overall impression is sepsis.  Source: Abdominal and Unknown  Antibiotics given-   Antibiotics (72h ago, onward)    Start     Stop Route Frequency Ordered    10/27/23 1015  ciprofloxacin HCl tablet 500 mg         11/03/23 0859 Oral Every 12 hours 10/27/23 0904        Latest lactate reviewed-  No results for input(s): "LACTATE" in the last 72 hours.  Organ dysfunction indicated by Acute liver injury and Thrombocytopenia     Fluid challenge Actual Body weight- Patient will receive 30ml/kg actual body weight to calculate fluid bolus for treatment of septic shock.     Post- resuscitation assessment No - Post resuscitation assessment not needed     Will Not start Pressors- Levophed for MAP of 65  Source control achieved by: IV antibiotics    Patient meets SIRs criteria on admission w/ fever, tachycardia. High risk for infection given immunocompromized status. Received IV fluids in ED. Will continue broad spectrum antibiotics on admission w/ vancomycin and cefepime. Unclear source at time of admission, possible biliary/hepatic source given elevated bilirubin and transaminitis however patient without RUQ tenderness on exam. RUQ ultrasound significant only for low resistive indices. Will check viral studies including acute hepatitis panel, CMV, HIV, and follow-up Bcx.    HIV, hep panel negative  BCx 10/25 NGTD  MRCP/MRI negative    -- Ab as above  -- Lactate normal on admission, not hypotensive; will not trend  -- Monitor for evidence of source  -- Daily CBC, CMP, mag, phos  -- Keep K > 4, Phos > 3, Mg > 2  -- Replete electrolytes prn  -- q4h vitals  "

## 2023-10-27 NOTE — TREATMENT PLAN
Hepatology Treatment Plan    OSCAR PAYNE is a 43 y.o. male admitted to hospital 10/25/2023 (Hospital Day: 3) due to Severe sepsis.     Interval History  No acute events overnight.  Patient remains afebrile with stable vital signs.    MRCP on 10/26 showed no biliary ductal dilation or signs of cholangitis.  Blood cultures have not shown any growth to date.    Prograf level this a.m. - 8.0    Objective  Temp:  [97.2 °F (36.2 °C)-99.5 °F (37.5 °C)] 97.2 °F (36.2 °C) (10/27 103)  Pulse:  [63-91] 73 (10/27 103)  BP: (118-136)/(81-97) 132/93 (10/27 103)  Resp:  [16-18] 16 (10/27 0800)  SpO2:  [94 %-99 %] 94 % (10/27 1039)        Laboratory    Lab Results   Component Value Date    WBC 5.09 10/27/2023    HGB 13.9 (L) 10/27/2023    HCT 40.5 10/27/2023    MCV 93 10/27/2023     (L) 10/27/2023       Lab Results   Component Value Date     (L) 10/27/2023    K 3.9 10/27/2023     10/27/2023    CO2 21 (L) 10/27/2023    BUN 15 10/27/2023    CREATININE 1.2 10/27/2023    CALCIUM 9.1 10/27/2023       Lab Results   Component Value Date    ALBUMIN 3.4 (L) 10/27/2023     (H) 10/27/2023    AST 58 (H) 10/27/2023     (H) 10/26/2023    ALKPHOS 133 10/27/2023    BILITOT 1.3 (H) 10/27/2023       Lab Results   Component Value Date    INR 1.0 10/27/2023    INR 1.1 10/26/2023    INR 1.0 10/25/2023       MELD 3.0: 11 at 10/27/2023  5:59 AM  MELD-Na: 9 at 10/27/2023  5:59 AM  Calculated from:  Serum Creatinine: 1.2 mg/dL at 10/27/2023  5:59 AM  Serum Sodium: 135 mmol/L at 10/27/2023  5:59 AM  Total Bilirubin: 1.3 mg/dL at 10/27/2023  5:59 AM  Serum Albumin: 3.4 g/dL at 10/27/2023  5:59 AM  INR(ratio): 1.0 at 10/27/2023  5:59 AM  Age at listin years  Sex: Male at 10/27/2023  5:59 AM      Assessment  OSCAR PAYNE is a 43 y.o. male with history of cholangiocarcinoma (s/p chemotherapy, radiation & liver tx in 2022) who presented to the ER on 10/25 with fever. Cr 1.5 (baseline 1-1.2), T bili  2.4, , ,  & INR 1.2. Prograf level 10.5. Viral hepatitis panel negative. US doppler liver transplant w/ normal waveforms but low resistive indices. His liver transplant was complicated by a bile leak requiring a temporary PTC. Was seen by Dr. Nobles in clinic in September 2023 when he was found to have excellent allograft function on tacrolimus 1 mg twice daily, but mildly elevated alkaline phosphatase. MRCP showed no signs of cholangitis/biliary dilation.         Problem List:  Fever and elevated transaminases, resolving  On chronic immunosuppression  cholangiocarcinoma (s/p chemotherapy, radiation & liver tx in June 2022)      Plan  - continue Prograf 1 mg b.i.d.  - Would also resume Actigall at home dose.    - switch antibiotics to PO ciprofloxacin.  If patient continues to remain afebrile with downtrending LFTs, can discharge tomorrow on his 7 day course of ciprofloxacin.  - Follow blood cx  - please obtain daily CBC, CMP, AM Prograf level  - we will arrange follow-up appointment in Hepatology Clinic  - Plan of care was discussed with primary team    Thank you for involving us in the care of OSCAR PAYNE. Please call with any additional concerns or questions.    Andrews Babcock MD , PGY-V  Gastroenterology Fellow  [facility]

## 2023-10-27 NOTE — PLAN OF CARE
10/27/23 1224   Post-Acute Status   Post-Acute Authorization Other   Other Status No Post-Acute Service Needs   Hospital Resources/Appts/Education Provided Appointments scheduled and added to AVS   Discharge Delays None known at this time   Discharge Plan   Discharge Plan A Home with family   Discharge Plan B Home

## 2023-10-27 NOTE — PLAN OF CARE
Roz placed call to [facility] scheduling for hospital follow up at [place]. Roz informed that by Razia, appt setup for 11/8/2023 1020 am.

## 2023-10-27 NOTE — PLAN OF CARE
[facility]  Discharge Reassessment    Primary Care Provider: Yves Moses MD    Expected Discharge Date: 10/28/2023    Reassessment (most recent)       Discharge Reassessment - 10/27/23 1224          Discharge Reassessment    Assessment Type Discharge Planning Reassessment     Did the patient's condition or plan change since previous assessment? No     Discharge Plan discussed with: Spouse/sig other     Communicated SHERLEY with patient/caregiver Yes     DME Needed Upon Discharge  none     Transition of Care Barriers None     Why the patient remains in the hospital Requires continued medical care

## 2023-10-27 NOTE — SUBJECTIVE & OBJECTIVE
Interval History: No acute overnight events. IV vanc + cefepime switched to oral ciprofloxacin. Will observe patient overnight, possible discharge in AM pending stable aminotransferases.    Review of Systems   Constitutional:  Negative for chills and fever.   HENT:  Negative for congestion and sore throat.    Respiratory:  Negative for cough and shortness of breath.    Cardiovascular:  Negative for chest pain.   Gastrointestinal:  Negative for abdominal pain, constipation, diarrhea, nausea and vomiting.   Genitourinary:  Negative for dysuria.   Musculoskeletal:  Negative for arthralgias and back pain.   Skin:  Negative for rash and wound.   Allergic/Immunologic: Positive for immunocompromised state. Negative for food allergies.   Neurological:  Negative for dizziness and headaches.   Psychiatric/Behavioral:  Negative for sleep disturbance.      Objective:     Vital Signs (Most Recent):  Temp: 97.2 °F (36.2 °C) (10/27/23 1039)  Pulse: 73 (10/27/23 1039)  Resp: 16 (10/27/23 0800)  BP: (!) 132/93 (10/27/23 1039)  SpO2: (!) 94 % (10/27/23 1039) Vital Signs (24h Range):  Temp:  [97.2 °F (36.2 °C)-99.5 °F (37.5 °C)] 97.2 °F (36.2 °C)  Pulse:  [63-91] 73  Resp:  [16-18] 16  SpO2:  [94 %-99 %] 94 %  BP: (118-136)/(81-97) 132/93     Weight: 84.2 kg (185 lb 10 oz)  Body mass index is 28.22 kg/m².  No intake or output data in the 24 hours ending 10/27/23 1048      Physical Exam  Vitals and nursing note reviewed.   Constitutional:       General: He is not in acute distress.  HENT:      Head: Normocephalic.   Eyes:      General: No scleral icterus.  Cardiovascular:      Rate and Rhythm: Normal rate and regular rhythm.      Pulses: Normal pulses.      Heart sounds: Normal heart sounds, S1 normal and S2 normal.   Pulmonary:      Effort: Pulmonary effort is normal. No respiratory distress.      Breath sounds: Normal breath sounds. No wheezing or rales.   Abdominal:      General: A surgical scar is present. Bowel sounds are normal.  There is no distension.      Palpations: Abdomen is soft.      Tenderness: There is no abdominal tenderness. There is no guarding or rebound.      Comments: No abdominal tenderness   Musculoskeletal:         General: No swelling.      Cervical back: Normal range of motion.      Right lower leg: No edema.      Left lower leg: No edema.   Skin:     General: Skin is warm and dry.      Capillary Refill: Capillary refill takes less than 2 seconds.      Coloration: Skin is not jaundiced.   Neurological:      Mental Status: He is alert and oriented to person, place, and time.      Motor: No weakness.   Psychiatric:         Attention and Perception: Attention normal.         Speech: Speech normal.         Behavior: Behavior is cooperative.       Significant Labs: All pertinent labs within the past 24 hours have been reviewed.  CBC:   Recent Labs   Lab 10/25/23  1334 10/26/23  0253 10/27/23  0559   WBC 5.28 8.88 5.09   HGB 16.3 14.7 13.9*   HCT 47.7 42.8 40.5   * 114* 103*     CMP:   Recent Labs   Lab 10/25/23  1334 10/26/23  0253 10/27/23  0559    138 135*   K 4.8 4.3 3.9    107 103   CO2 21* 20* 21*   * 118* 183*   BUN 17 14 15   CREATININE 1.5* 1.2 1.2   CALCIUM 9.7 9.3 9.1   PROT 7.6 6.5 6.3   ALBUMIN 4.5 3.7 3.4*   BILITOT 2.4* 3.1* 1.3*   ALKPHOS 179* 137* 133   * 126* 58*   * 173* 122*   ANIONGAP 11 11 11       Significant Imaging: I have reviewed all pertinent imaging results/findings within the past 24 hours.

## 2023-10-27 NOTE — PROGRESS NOTES
Therapy with vancomycin complete and/or consult discontinued by provider.  Pharmacy will sign off, please re-consult as needed.     Larry Rios, PharmD  Ext: [phone]

## 2023-10-27 NOTE — PROGRESS NOTES
[facility] Medicine  Progress Note    Patient Name: OSCAR PAYEN  MRN: [id number]  Patient Class: IP- Inpatient   Admission Date: 10/25/2023  Length of Stay: 2 days  Attending Physician: Sandra Brink*  Primary Care Provider: Yves Moses MD      Subjective:     Principal Problem:Severe sepsis      HPI:  The patient is a 43 yoM with a history of cholangiocarcinoma s/p chemotherapy, radiation, and liver transplant (2022; on tacrolimus), who presented to [facility] ED with subjective fever. The patient states he felt slightly fatigued this morning and checked his temperature and noted a fever. Upon arrival to [facility] ED his temperature was 101.7. He denies other symptoms including cough, congestion, shortness of breath, abdominal pain, nausea, vomiting, diarrhea, constipation, dysuria. He denies sick contacts at home. The patient states that he has a history of elevated AST and ALT associated with any illness he has experienced in the past, even simple colds. He has been compliant with his tacrolimus (last dose AM of admission).    ED evaluation significant for: hemodynamically stable, T 101.7, pulse 112, saturating well on room air. WBC 5.28, Hgb 16.3, plts 119, Na 137, K 4.8, bicarb 21, Cr 1.5 (baseline ~ 1.2-1.3), phos 1.7, mag 1.3, alk phos 179, bili 2.4, , . Flu, covid negative. UA unremarkable. Lactate 1.5. CXR without acute process. US doppler liver transplant w/ normal waveforms but low resistive indices. The patient was given IV vancomycin and cefepime, 2L IV fluids in the ED.      Overview/Hospital Course:  43M admitted to  with severe sepsis c/b CASA and immunocompromised state. Tachycardic, transaminitis, CASA, normal WBC count on admission. Sepsis treated empirically with vanc and cefepime. HIV, influenza, acute hepatitis panel negative. CMV pending. Liver transplant team consulted who recommend MRI/MRCP with and without contrast which was unremarkable. The patient was  transitioned to oral ciprofloxacin and monitored overnight.      Interval History: No acute overnight events. IV vanc + cefepime switched to oral ciprofloxacin. Will observe patient overnight, possible discharge in AM pending stable aminotransferases.    Review of Systems   Constitutional:  Negative for chills and fever.   HENT:  Negative for congestion and sore throat.    Respiratory:  Negative for cough and shortness of breath.    Cardiovascular:  Negative for chest pain.   Gastrointestinal:  Negative for abdominal pain, constipation, diarrhea, nausea and vomiting.   Genitourinary:  Negative for dysuria.   Musculoskeletal:  Negative for arthralgias and back pain.   Skin:  Negative for rash and wound.   Allergic/Immunologic: Positive for immunocompromised state. Negative for food allergies.   Neurological:  Negative for dizziness and headaches.   Psychiatric/Behavioral:  Negative for sleep disturbance.      Objective:     Vital Signs (Most Recent):  Temp: 97.2 °F (36.2 °C) (10/27/23 1039)  Pulse: 73 (10/27/23 1039)  Resp: 16 (10/27/23 0800)  BP: (!) 132/93 (10/27/23 1039)  SpO2: (!) 94 % (10/27/23 1039) Vital Signs (24h Range):  Temp:  [97.2 °F (36.2 °C)-99.5 °F (37.5 °C)] 97.2 °F (36.2 °C)  Pulse:  [63-91] 73  Resp:  [16-18] 16  SpO2:  [94 %-99 %] 94 %  BP: (118-136)/(81-97) 132/93     Weight: 84.2 kg (185 lb 10 oz)  Body mass index is 28.22 kg/m².  No intake or output data in the 24 hours ending 10/27/23 1048      Physical Exam  Vitals and nursing note reviewed.   Constitutional:       General: He is not in acute distress.  HENT:      Head: Normocephalic.   Eyes:      General: No scleral icterus.  Cardiovascular:      Rate and Rhythm: Normal rate and regular rhythm.      Pulses: Normal pulses.      Heart sounds: Normal heart sounds, S1 normal and S2 normal.   Pulmonary:      Effort: Pulmonary effort is normal. No respiratory distress.      Breath sounds: Normal breath sounds. No wheezing or rales.   Abdominal:       General: A surgical scar is present. Bowel sounds are normal. There is no distension.      Palpations: Abdomen is soft.      Tenderness: There is no abdominal tenderness. There is no guarding or rebound.      Comments: No abdominal tenderness   Musculoskeletal:         General: No swelling.      Cervical back: Normal range of motion.      Right lower leg: No edema.      Left lower leg: No edema.   Skin:     General: Skin is warm and dry.      Capillary Refill: Capillary refill takes less than 2 seconds.      Coloration: Skin is not jaundiced.   Neurological:      Mental Status: He is alert and oriented to person, place, and time.      Motor: No weakness.   Psychiatric:         Attention and Perception: Attention normal.         Speech: Speech normal.         Behavior: Behavior is cooperative.       Significant Labs: All pertinent labs within the past 24 hours have been reviewed.  CBC:   Recent Labs   Lab 10/25/23  1334 10/26/23  0253 10/27/23  0559   WBC 5.28 8.88 5.09   HGB 16.3 14.7 13.9*   HCT 47.7 42.8 40.5   * 114* 103*     CMP:   Recent Labs   Lab 10/25/23  1334 10/26/23  0253 10/27/23  0559    138 135*   K 4.8 4.3 3.9    107 103   CO2 21* 20* 21*   * 118* 183*   BUN 17 14 15   CREATININE 1.5* 1.2 1.2   CALCIUM 9.7 9.3 9.1   PROT 7.6 6.5 6.3   ALBUMIN 4.5 3.7 3.4*   BILITOT 2.4* 3.1* 1.3*   ALKPHOS 179* 137* 133   * 126* 58*   * 173* 122*   ANIONGAP 11 11 11       Significant Imaging: I have reviewed all pertinent imaging results/findings within the past 24 hours.      Assessment/Plan:      * Severe sepsis  This patient does not have evidence of infective focus  My overall impression is sepsis.  Source: Abdominal and Unknown  Antibiotics given-   Antibiotics (72h ago, onward)    Start     Stop Route Frequency Ordered    10/27/23 1015  ciprofloxacin HCl tablet 500 mg         11/03/23 0859 Oral Every 12 hours 10/27/23 0904        Latest lactate reviewed-  No results for  "input(s): "LACTATE" in the last 72 hours.  Organ dysfunction indicated by Acute liver injury and Thrombocytopenia     Fluid challenge Actual Body weight- Patient will receive 30ml/kg actual body weight to calculate fluid bolus for treatment of septic shock.     Post- resuscitation assessment No - Post resuscitation assessment not needed     Will Not start Pressors- Levophed for MAP of 65  Source control achieved by: IV antibiotics    Patient meets SIRs criteria on admission w/ fever, tachycardia. High risk for infection given immunocompromized status. Received IV fluids in ED. Will continue broad spectrum antibiotics on admission w/ vancomycin and cefepime. Unclear source at time of admission, possible biliary/hepatic source given elevated bilirubin and transaminitis however patient without RUQ tenderness on exam. RUQ ultrasound significant only for low resistive indices. Will check viral studies including acute hepatitis panel, CMV, HIV, and follow-up Bcx.    HIV, hep panel negative  BCx 10/25 NGTD  MRCP/MRI negative    -- Ab as above  -- Lactate normal on admission, not hypotensive; will not trend  -- Monitor for evidence of source  -- Daily CBC, CMP, mag, phos  -- Keep K > 4, Phos > 3, Mg > 2  -- Replete electrolytes prn  -- q4h vitals    Cholangitis of transplanted liver  AST (202), ALT (159) on admission. Bilirubin elevated to 2.4. Will consult liver transplant team to assist w/ workup. Will obtain viral serology. Patient denies excessive tylenol use. He has been compliant w/ immunosuppresant medications. Initial evaluation significant for low hepatic indices, considered hepatic artery thrombus/emboli however patient without RUQ abdominal pain and lactate normal. S/p MRCP 10/26 unremarkable.    -- IV vanc + cefepime switched to PO ciprofloxacin 10/27  -- Liver transplant team consulted; appreciate recs  -- Trend AST/ALT; if stable 10/28 AM will plan for discharge    Elevated LFTs  See 'cholangitis'    At risk " for opportunistic infections  Noted increased risk of infections 2/2 tacrolimus     S/P liver transplant  Noted transplant status    Hilar cholangiocarcinoma  S/p chemotherapy, radiation, and liver transplant. Patient reports recent surveillance CT scan negative for disease recurrence.    Hypertension  Patient on carvedilol 12.5 mg BID at home. Will hold in the setting of sepsis.    -- Resume home meds as indicated      VTE Risk Mitigation (From admission, onward)         Ordered     heparin (porcine) injection 5,000 Units  Every 8 hours         10/25/23 1951     IP VTE HIGH RISK PATIENT  Once         10/25/23 1951     Place sequential compression device  Until discontinued         10/25/23 1951                Discharge Planning   SHERLEY: 10/28/2023     Code Status: Full Code   Is the patient medically ready for discharge?: No    Reason for patient still in hospital (select all that apply): Patient trending condition  Discharge Plan A: Home with family   Discharge Delays: None known at this time      Daniel Correa MD  Department of Hospital Medicine   [facility]

## 2023-10-28 VITALS
RESPIRATION RATE: 18 BRPM | SYSTOLIC BLOOD PRESSURE: 127 MMHG | HEART RATE: 80 BPM | HEIGHT: 68 IN | DIASTOLIC BLOOD PRESSURE: 91 MMHG | OXYGEN SATURATION: 98 % | BODY MASS INDEX: 28.13 KG/M2 | WEIGHT: 185.63 LBS | TEMPERATURE: 98 F

## 2023-10-28 LAB
ALBUMIN SERPL BCP-MCNC: 3.6 G/DL (ref 3.5–5.2)
ALP SERPL-CCNC: 148 U/L (ref 55–135)
ALT SERPL W/O P-5'-P-CCNC: 111 U/L (ref 10–44)
ANION GAP SERPL CALC-SCNC: 11 MMOL/L (ref 8–16)
AST SERPL-CCNC: 43 U/L (ref 10–40)
BASOPHILS # BLD AUTO: 0.03 K/UL (ref 0–0.2)
BASOPHILS NFR BLD: 0.7 % (ref 0–1.9)
BILIRUB SERPL-MCNC: 0.9 MG/DL (ref 0.1–1)
BUN SERPL-MCNC: 18 MG/DL (ref 6–20)
CALCIUM SERPL-MCNC: 9.7 MG/DL (ref 8.7–10.5)
CHLORIDE SERPL-SCNC: 107 MMOL/L (ref 95–110)
CO2 SERPL-SCNC: 21 MMOL/L (ref 23–29)
CREAT SERPL-MCNC: 1.3 MG/DL (ref 0.5–1.4)
DIFFERENTIAL METHOD: ABNORMAL
EOSINOPHIL # BLD AUTO: 0.1 K/UL (ref 0–0.5)
EOSINOPHIL NFR BLD: 3.2 % (ref 0–8)
ERYTHROCYTE [DISTWIDTH] IN BLOOD BY AUTOMATED COUNT: 12.2 % (ref 11.5–14.5)
EST. GFR  (NO RACE VARIABLE): >60 ML/MIN/1.73 M^2
GLUCOSE SERPL-MCNC: 179 MG/DL (ref 70–110)
HCT VFR BLD AUTO: 42.6 % (ref 40–54)
HGB BLD-MCNC: 14.7 G/DL (ref 14–18)
IMM GRANULOCYTES # BLD AUTO: 0.01 K/UL (ref 0–0.04)
IMM GRANULOCYTES NFR BLD AUTO: 0.2 % (ref 0–0.5)
INR PPP: 0.9 (ref 0.8–1.2)
LYMPHOCYTES # BLD AUTO: 1.3 K/UL (ref 1–4.8)
LYMPHOCYTES NFR BLD: 28.5 % (ref 18–48)
MAGNESIUM SERPL-MCNC: 1.6 MG/DL (ref 1.6–2.6)
MCH RBC QN AUTO: 32.3 PG (ref 27–31)
MCHC RBC AUTO-ENTMCNC: 34.5 G/DL (ref 32–36)
MCV RBC AUTO: 94 FL (ref 82–98)
MONOCYTES # BLD AUTO: 0.5 K/UL (ref 0.3–1)
MONOCYTES NFR BLD: 10.7 % (ref 4–15)
NEUTROPHILS # BLD AUTO: 2.5 K/UL (ref 1.8–7.7)
NEUTROPHILS NFR BLD: 56.7 % (ref 38–73)
NRBC BLD-RTO: 0 /100 WBC
PHOSPHATE SERPL-MCNC: 3.9 MG/DL (ref 2.7–4.5)
PLATELET # BLD AUTO: 112 K/UL (ref 150–450)
PMV BLD AUTO: 8.9 FL (ref 9.2–12.9)
POTASSIUM SERPL-SCNC: 4.2 MMOL/L (ref 3.5–5.1)
PROT SERPL-MCNC: 6.7 G/DL (ref 6–8.4)
PROTHROMBIN TIME: 10.1 SEC (ref 9–12.5)
RBC # BLD AUTO: 4.55 M/UL (ref 4.6–6.2)
SODIUM SERPL-SCNC: 139 MMOL/L (ref 136–145)
TACROLIMUS BLD-MCNC: 8.4 NG/ML (ref 5–15)
WBC # BLD AUTO: 4.38 K/UL (ref 3.9–12.7)

## 2023-10-28 PROCEDURE — 83735 ASSAY OF MAGNESIUM: CPT

## 2023-10-28 PROCEDURE — 85025 COMPLETE CBC W/AUTO DIFF WBC: CPT

## 2023-10-28 PROCEDURE — 80053 COMPREHEN METABOLIC PANEL: CPT

## 2023-10-28 PROCEDURE — 36415 COLL VENOUS BLD VENIPUNCTURE: CPT

## 2023-10-28 PROCEDURE — 25000003 PHARM REV CODE 250

## 2023-10-28 PROCEDURE — 94761 N-INVAS EAR/PLS OXIMETRY MLT: CPT

## 2023-10-28 PROCEDURE — 80197 ASSAY OF TACROLIMUS: CPT

## 2023-10-28 PROCEDURE — 85610 PROTHROMBIN TIME: CPT

## 2023-10-28 PROCEDURE — 63600175 PHARM REV CODE 636 W HCPCS

## 2023-10-28 PROCEDURE — 84100 ASSAY OF PHOSPHORUS: CPT

## 2023-10-28 RX ORDER — CIPROFLOXACIN 500 MG/1
500 TABLET ORAL EVERY 12 HOURS
Qty: 12 TABLET | Refills: 0 | Status: SHIPPED | OUTPATIENT
Start: 2023-10-28 | End: 2023-11-03

## 2023-10-28 RX ORDER — CIPROFLOXACIN 500 MG/1
500 TABLET ORAL EVERY 12 HOURS
Qty: 12 TABLET | Refills: 0 | Status: SHIPPED | OUTPATIENT
Start: 2023-10-28 | End: 2023-10-28 | Stop reason: SDUPTHER

## 2023-10-28 RX ADMIN — TACROLIMUS 1 MG: 1 CAPSULE ORAL at 08:10

## 2023-10-28 RX ADMIN — CIPROFLOXACIN 500 MG: 500 TABLET, FILM COATED ORAL at 08:10

## 2023-10-28 RX ADMIN — URSODIOL 250 MG: 250 TABLET ORAL at 08:10

## 2023-10-28 NOTE — PLAN OF CARE
Problem: Infection  Goal: Absence of Infection Signs and Symptoms  Outcome: Ongoing, Progressing     Problem: Nutrition Impaired (Sepsis/Septic Shock)  Goal: Optimal Nutrition Intake  Outcome: Ongoing, Progressing     Problem: Infection Progression (Sepsis/Septic Shock)  Goal: Absence of Infection Signs and Symptoms  Outcome: Ongoing, Progressing     Problem: Glycemic Control Impaired (Sepsis/Septic Shock)  Goal: Blood Glucose Level Within Desired Range  Outcome: Ongoing, Progressing     Problem: Bleeding (Sepsis/Septic Shock)  Goal: Absence of Bleeding  Outcome: Ongoing, Progressing

## 2023-10-28 NOTE — TREATMENT PLAN
Hepatology Treatment Plan    OSCAR PAYNE is a 43 y.o. male admitted to hospital 10/25/2023 (Hospital Day: 4) due to Severe sepsis.     Interval History  No acute events overnight.  Patient remains afebrile with stable vital signs.    MRCP on 10/26 showed no biliary ductal dilation or signs of cholangitis.  Blood cultures have not shown any growth to date.    Prograf level this a.m. - 8.0 > 8.4    Objective  Temp:  [97.8 °F (36.6 °C)-99 °F (37.2 °C)] 97.8 °F (36.6 °C) (10/28 0708)  Pulse:  [80-99] 80 (10/28 0708)  BP: (119-129)/(86-98) 127/91 (10/28 0708)  Resp:  [18] 18 (10/28 0708)  SpO2:  [94 %-99 %] 98 % (10/28 0708)        Laboratory    Lab Results   Component Value Date    WBC 4.38 10/28/2023    HGB 14.7 10/28/2023    HCT 42.6 10/28/2023    MCV 94 10/28/2023     (L) 10/28/2023       Lab Results   Component Value Date     10/28/2023    K 4.2 10/28/2023     10/28/2023    CO2 21 (L) 10/28/2023    BUN 18 10/28/2023    CREATININE 1.3 10/28/2023    CALCIUM 9.7 10/28/2023       Lab Results   Component Value Date    ALBUMIN 3.6 10/28/2023     (H) 10/28/2023    AST 43 (H) 10/28/2023     (H) 10/26/2023    ALKPHOS 148 (H) 10/28/2023    BILITOT 0.9 10/28/2023       Lab Results   Component Value Date    INR 0.9 10/28/2023    INR 1.0 10/27/2023    INR 1.1 10/26/2023       MELD 3.0: 9 at 10/28/2023  4:19 AM  MELD-Na: 9 at 10/28/2023  4:19 AM  Calculated from:  Serum Creatinine: 1.3 mg/dL at 10/28/2023  4:19 AM  Serum Sodium: 139 mmol/L (Using max of 137 mmol/L) at 10/28/2023  4:19 AM  Total Bilirubin: 0.9 mg/dL (Using min of 1 mg/dL) at 10/28/2023  4:19 AM  Serum Albumin: 3.6 g/dL (Using max of 3.5 g/dL) at 10/28/2023  4:19 AM  INR(ratio): 0.9 (Using min of 1) at 10/28/2023  4:19 AM  Age at listin years  Sex: Male at 10/28/2023  4:19 AM      Assessment  OSCAR PAYNE is a 43 y.o. male with history of cholangiocarcinoma (s/p chemotherapy, radiation & liver tx in 2022)  who presented to the ER on 10/25 with fever. Cr 1.5 (baseline 1-1.2), T bili 2.4, , ,  & INR 1.2. Prograf level 10.5. Viral hepatitis panel negative. US doppler liver transplant w/ normal waveforms but low resistive indices. His liver transplant was complicated by a bile leak requiring a temporary PTC. Was seen by Dr. Nobles in clinic in September 2023 when he was found to have excellent allograft function on tacrolimus 1 mg twice daily, but mildly elevated alkaline phosphatase. MRCP showed no signs of cholangitis/biliary dilation.         Problem List:  Fever and elevated transaminases, resolving  On chronic immunosuppression  cholangiocarcinoma (s/p chemotherapy, radiation & liver tx in June 2022)      Plan  - continue Prograf 1 mg b.i.d.  - Would also resume Actigall at home dose.    - can discharge today on his 7 day course of ciprofloxacin. Advise patient to return if he has fever. In that case, will need ERCP.   - Follow blood cx  - please obtain daily CBC, CMP, AM Prograf level  - we will arrange follow-up appointment in Hepatology Clinic  - Plan of care was discussed with primary team    Thank you for involving us in the care of OSCAR PAYNE. Please call with any additional concerns or questions.    Andrews Babcock MD , PGY-V  Gastroenterology Fellow  [facility]

## 2023-10-28 NOTE — NURSING
Discharge instructions given to patient, IV discontinued. Patient ambulated with no patient escort to his car. Patient stated already collected medication from Pharmacy. All questions answered.

## 2023-10-28 NOTE — DISCHARGE SUMMARY
[facility] Medicine  Discharge Summary      Patient Name: OSCAR PAYNE  MRN: [id number]  COREY: [id number]  Patient Class: IP- Inpatient  Admission Date: 10/25/2023  Hospital Length of Stay: 3 days  Discharge Date and Time:  10/28/2023 1:04 PM  Attending Physician: Gia att. providers found   Discharging Provider: John Leon MD  Primary Care Provider: Yves Moses MD  [facility] Medicine Team: [facility]OS[facility] MED 2 John Leon MD  Primary Care Team: [facility] 2    HPI:   The patient is a 43 yoM with a history of cholangiocarcinoma s/p chemotherapy, radiation, and liver transplant (2022; on tacrolimus), who presented to [facility] ED with subjective fever. The patient states he felt slightly fatigued this morning and checked his temperature and noted a fever. Upon arrival to [facility] ED his temperature was 101.7. He denies other symptoms including cough, congestion, shortness of breath, abdominal pain, nausea, vomiting, diarrhea, constipation, dysuria. He denies sick contacts at home. The patient states that he has a history of elevated AST and ALT associated with any illness he has experienced in the past, even simple colds. He has been compliant with his tacrolimus (last dose AM of admission).    ED evaluation significant for: hemodynamically stable, T 101.7, pulse 112, saturating well on room air. WBC 5.28, Hgb 16.3, plts 119, Na 137, K 4.8, bicarb 21, Cr 1.5 (baseline ~ 1.2-1.3), phos 1.7, mag 1.3, alk phos 179, bili 2.4, , . Flu, covid negative. UA unremarkable. Lactate 1.5. CXR without acute process. US doppler liver transplant w/ normal waveforms but low resistive indices. The patient was given IV vancomycin and cefepime, 2L IV fluids in the ED.      * No surgery found *      Hospital Course:   43M admitted to  with severe sepsis c/b CASA and immunocompromised state. Tachycardic, transaminitis, CASA, normal WBC count on admission. Sepsis treated empirically with vanc and cefepime. HIV,  influenza, acute hepatitis panel negative. CMV pending. Liver transplant team consulted who recommend MRI/MRCP with and without contrast which was unremarkable. The patient was transitioned to oral ciprofloxacin and monitored overnight. Patient discharged home on 10/28/2023 in hemodynamically stable condition with close PCP and hepatology follow up with oral ciprofloxacin with strict return precautions as discussed at bedside.       Physical Exam  Vitals and nursing note reviewed.   Constitutional:       General: He is not in acute distress.  HENT:      Head: Normocephalic.   Eyes:      General: No scleral icterus.  Cardiovascular:      Rate and Rhythm: Normal rate and regular rhythm.      Pulses: Normal pulses.      Heart sounds: Normal heart sounds, S1 normal and S2 normal.   Pulmonary:      Effort: Pulmonary effort is normal. No respiratory distress.      Breath sounds: Normal breath sounds. No wheezing or rales.   Abdominal:      General: A surgical scar is present. Bowel sounds are normal. There is no distension.      Palpations: Abdomen is soft.      Tenderness: There is no abdominal tenderness. There is no guarding or rebound.      Comments: No abdominal tenderness   Musculoskeletal:         General: No swelling.      Cervical back: Normal range of motion.      Right lower leg: No edema.      Left lower leg: No edema.   Skin:     General: Skin is warm and dry.      Capillary Refill: Capillary refill takes less than 2 seconds.      Coloration: Skin is not jaundiced.   Neurological:      Mental Status: He is alert and oriented to person, place, and time.      Motor: No weakness.   Psychiatric:         Attention and Perception: Attention normal.         Speech: Speech normal.         Behavior: Behavior is cooperative.     Vitals:    10/27/23 1936 10/27/23 2344 10/28/23 0425 10/28/23 0708   BP: (!) 129/98 (!) 119/92 128/89 (!) 127/91   BP Location: Right arm Right arm Left arm    Patient Position: Sitting Lying  Sitting Sitting   Pulse: 88 91 86 80   Resp: 18 18 18 18   Temp: 98.8 °F (37.1 °C) 99 °F (37.2 °C) 98.3 °F (36.8 °C) 97.8 °F (36.6 °C)   TempSrc: Oral Oral Oral Oral   SpO2: 98% 99% 97% 98%   Weight:       Height:             Goals of Care Treatment Preferences:  Code Status: Full Code      Consults:   Consults (From admission, onward)        Status Ordering Provider     Inpatient consult to Hepatology  Once        Provider:  (Not yet assigned)    Completed MUNIR VELAZQUEZ          No new Assessment & Plan notes have been filed under this hospital service since the last note was generated.  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    Specialty: Internal Medicine  Why: 1020am with Yves Moses  Contact information:  [address]  [place] [address] [phone]                       Patient Instructions:   No discharge procedures on file.    Significant Diagnostic Studies: Labs:   CMP   Recent Labs   Lab 10/27/23  0559 10/28/23  0419   * 139   K 3.9 4.2    107   CO2 21* 21*   * 179*   BUN 15 18   CREATININE 1.2 1.3   CALCIUM 9.1 9.7   PROT 6.3 6.7   ALBUMIN 3.4* 3.6   BILITOT 1.3* 0.9   ALKPHOS 133 148*   AST 58* 43*   * 111*   ANIONGAP 11 11    and CBC   Recent Labs   Lab 10/27/23  0559 10/28/23  0419   WBC 5.09  4.38   HGB 13.9* 14.7   HCT 40.5 42.6   * 112*     MRCP on 10/26 showed no biliary ductal dilation or signs of cholangitis.    Pending Diagnostic Studies:     None         Medications:  Reconciled Home Medications:      Medication List      START taking these medications    ciprofloxacin HCl 500 MG tablet  Commonly known as: CIPRO  Take 1 tablet (500 mg total) by mouth every 12 (twelve) hours. for 6 days        CONTINUE taking these medications    acetaminophen 500 MG tablet  Commonly known as: TYLENOL  Take 500 mg by mouth every 4 to 6 hours as needed for Pain.     aspirin 81 MG Chew  CHEW 1 tablet (81 mg total) by mouth once daily.     carvediloL 12.5 MG tablet  Commonly known as: COREG  Take 1 tablet (12.5 mg total) by mouth 2 (two) times daily with meals.     tacrolimus 1 MG Cap  Commonly known as: PROGRAF  Take 1 capsule (1 mg total) by mouth every 12 (twelve) hours.     ursodioL 250 mg Tab  Commonly known as: ACTIGALL  Take 1 tablet (250 mg total) by mouth 2 (two) times a day.            Indwelling Lines/Drains at time of discharge:   Lines/Drains/Airways     Central Venous Catheter Line  Duration           Port A Cath Single Lumen 11/24/21 1202 right subclavian;right internal jugular 703 days                Time spent on the discharge of patient: 45 minutes         John Leon MD  Department of Hospital Medicine  [facility]

## 2023-10-28 NOTE — PLAN OF CARE
Problem: Adult Inpatient Plan of Care  Goal: Plan of Care Review  Outcome: Met  Goal: Patient-Specific Goal (Individualized)  Outcome: Met  Goal: Absence of Hospital-Acquired Illness or Injury  Outcome: Met  Goal: Optimal Comfort and Wellbeing  Outcome: Met  Goal: Readiness for Transition of Care  Outcome: Met     Problem: Adjustment to Illness (Sepsis/Septic Shock)  Goal: Optimal Coping  Outcome: Adequate for Care Transition     Problem: Bleeding (Sepsis/Septic Shock)  Goal: Absence of Bleeding  Outcome: Met   No acute health changes during shift.  Patient remain afebrile, VS also within pt normal range.  Discharge instructions given at bedside, pt verbalize understanding.  PIV removed, pt tolerated well. Discharge prescription medication picked up by patient.

## 2023-10-30 ENCOUNTER — TELEPHONE (OUTPATIENT)
Dept: TRANSPLANT | Facility: CLINIC | Age: 43
End: 2023-10-30
Payer: COMMERCIAL

## 2023-10-30 ENCOUNTER — LAB VISIT (OUTPATIENT)
Dept: LAB | Facility: HOSPITAL | Age: 43
End: 2023-10-30
Attending: INTERNAL MEDICINE
Payer: COMMERCIAL

## 2023-10-30 DIAGNOSIS — Z94.4 LIVER REPLACED BY TRANSPLANT: ICD-10-CM

## 2023-10-30 LAB
ALBUMIN SERPL BCP-MCNC: 4.1 G/DL (ref 3.5–5.2)
ALP SERPL-CCNC: 133 U/L (ref 55–135)
ALT SERPL W/O P-5'-P-CCNC: 78 U/L (ref 10–44)
ANION GAP SERPL CALC-SCNC: 6 MMOL/L (ref 8–16)
AST SERPL-CCNC: 37 U/L (ref 10–40)
BACTERIA BLD CULT: NORMAL
BACTERIA BLD CULT: NORMAL
BASOPHILS # BLD AUTO: 0.03 K/UL (ref 0–0.2)
BASOPHILS NFR BLD: 0.6 % (ref 0–1.9)
BILIRUB DIRECT SERPL-MCNC: 0.6 MG/DL (ref 0.1–0.3)
BILIRUB SERPL-MCNC: 1.8 MG/DL (ref 0.1–1)
BUN SERPL-MCNC: 25 MG/DL (ref 6–20)
CALCIUM SERPL-MCNC: 9.8 MG/DL (ref 8.7–10.5)
CHLORIDE SERPL-SCNC: 105 MMOL/L (ref 95–110)
CO2 SERPL-SCNC: 26 MMOL/L (ref 23–29)
CREAT SERPL-MCNC: 1.3 MG/DL (ref 0.5–1.4)
DIFFERENTIAL METHOD: ABNORMAL
EOSINOPHIL # BLD AUTO: 0.2 K/UL (ref 0–0.5)
EOSINOPHIL NFR BLD: 3 % (ref 0–8)
ERYTHROCYTE [DISTWIDTH] IN BLOOD BY AUTOMATED COUNT: 12.3 % (ref 11.5–14.5)
EST. GFR  (NO RACE VARIABLE): >60 ML/MIN/1.73 M^2
GLUCOSE SERPL-MCNC: 130 MG/DL (ref 70–110)
HCT VFR BLD AUTO: 45 % (ref 40–54)
HGB BLD-MCNC: 15.4 G/DL (ref 14–18)
IMM GRANULOCYTES # BLD AUTO: 0.02 K/UL (ref 0–0.04)
IMM GRANULOCYTES NFR BLD AUTO: 0.4 % (ref 0–0.5)
LYMPHOCYTES # BLD AUTO: 1.5 K/UL (ref 1–4.8)
LYMPHOCYTES NFR BLD: 26.9 % (ref 18–48)
MCH RBC QN AUTO: 32.4 PG (ref 27–31)
MCHC RBC AUTO-ENTMCNC: 34.2 G/DL (ref 32–36)
MCV RBC AUTO: 95 FL (ref 82–98)
MONOCYTES # BLD AUTO: 0.5 K/UL (ref 0.3–1)
MONOCYTES NFR BLD: 9.1 % (ref 4–15)
NEUTROPHILS # BLD AUTO: 3.2 K/UL (ref 1.8–7.7)
NEUTROPHILS NFR BLD: 60 % (ref 38–73)
NRBC BLD-RTO: 0 /100 WBC
PLATELET # BLD AUTO: 149 K/UL (ref 150–450)
PMV BLD AUTO: 9.1 FL (ref 9.2–12.9)
POTASSIUM SERPL-SCNC: 4.7 MMOL/L (ref 3.5–5.1)
PROT SERPL-MCNC: 7.2 G/DL (ref 6–8.4)
RBC # BLD AUTO: 4.76 M/UL (ref 4.6–6.2)
SODIUM SERPL-SCNC: 137 MMOL/L (ref 136–145)
WBC # BLD AUTO: 5.39 K/UL (ref 3.9–12.7)

## 2023-10-30 PROCEDURE — 36415 COLL VENOUS BLD VENIPUNCTURE: CPT | Mod: PO | Performed by: INTERNAL MEDICINE

## 2023-10-30 PROCEDURE — 80053 COMPREHEN METABOLIC PANEL: CPT | Performed by: INTERNAL MEDICINE

## 2023-10-30 PROCEDURE — 80197 ASSAY OF TACROLIMUS: CPT | Performed by: INTERNAL MEDICINE

## 2023-10-30 PROCEDURE — 85025 COMPLETE CBC W/AUTO DIFF WBC: CPT | Performed by: INTERNAL MEDICINE

## 2023-10-30 PROCEDURE — 82248 BILIRUBIN DIRECT: CPT | Performed by: INTERNAL MEDICINE

## 2023-10-30 NOTE — TELEPHONE ENCOUNTER
----- Message from Jeferson Nielson MA sent at 10/30/2023  7:11 AM CDT -----  Regarding: FW: F/up    ----- Message -----  From: Andrews Babcock MD  Sent: 10/28/2023  11:09 AM CDT  To: Mallorie Meehan Staff  Subject: F/up                                             Please arrange f/up with Dr. Nobles in 2-3 weeks

## 2023-10-31 LAB — TACROLIMUS BLD-MCNC: 7.7 NG/ML (ref 5–15)

## 2023-10-31 NOTE — PHYSICIAN QUERY
PT Name: OSCAR PAYNE  MR #: [id number]     DOCUMENTATION CLARIFICATION     CDS/: Sowmya Larkin RN          Contact Information: [email or web address]    This form is a permanent document in the medical record.     Query Date: October 31, 2023    By submitting this query, we are merely seeking further clarification of documentation.  Please utilize your independent clinical judgment when addressing the question(s) below.  The Medical Record contains the following:  Indicators Supporting Clinical Findings Location in Medical Record   x HR         RR          BP        Temp T 99.1-101.7, HR , RR 18-20, /74  T 97.2-99.5, HR 70-91, RR 16-18, /92 10/25 vitals  10/26 vitals   x Lactic Acid          Procalcitonin  10/25/23 20:27 10/27/23 05:59   Procalcitonin 10.89 (H) 6.78 (H)    Lab results    x WBC           Bands          CRP     10/25/23 13:34 10/26/23 02:53 10/27/23 05:59 10/28/23 04:19   WBC 5.28 8.88 5.09 4.38    Lab results     Culture(s)      AMS, Confusion, LOC, etc.     x Organ Dysfunction/Failure Organ dysfunction indicated by Acute liver injury and Thrombocytopenia   CASA 10/25 H&P   x Bacteremia or Sepsis / Septic Severe sepsis 10/25 H&P-10/28 DC Summary   x Known or Suspected Source of Infection documented Abdominal and unknown source  Cholangitis of transplanted liver 10/25 H&P    (Failed) Outpatient Treatment     x Medication ceFEPIme (MAXIPIME) 2 g IVPB q8hrs   vancomycin (VANCOCIN) 1,000 mg IVPB q12hrs   ciprofloxacin HCl tablet 500 mg q12hrs  10/25-10/27 medications     10/27-10/28 medications   x Treatment sodium chloride 0.9% bolus 2,052 x1  10/25 medication    x Other history of cholangiocarcinoma s/p chemotherapy, radiation, and liver transplant (2022; on tacrolimus)  Immunocompromised state 10/25 H&P        Due to the conflicting clinical picture, please clinically validate the diagnosis of Severe sepsis.    If validated, please provide additional clinical  support for the diagnosis.     [    ] Severe sepsis diagnosis is not confirmed and/or it has been ruled out   [    ] Severe sepsis diagnosis is not confirmed and/or it has been ruled out, other diagnosis ruled in (please specify):_______________   [    x] Severe sepsis diagnosis is confirmed and additional clinical support/decision-making indicators for the diagnosis include (please specify):_____2/4 SIRS criteria (fever, tachycardia), source (presumed intraabdominal), and end organ damage w/ AKI__________________________________________   [    ] Other clarification (please specify): ___________________       Please document in your progress notes daily for the duration of treatment until resolved and include in your discharge summary.

## 2023-11-01 ENCOUNTER — PATIENT MESSAGE (OUTPATIENT)
Dept: TRANSPLANT | Facility: CLINIC | Age: 43
End: 2023-11-01
Payer: COMMERCIAL

## 2023-11-01 ENCOUNTER — TELEPHONE (OUTPATIENT)
Dept: TRANSPLANT | Facility: CLINIC | Age: 43
End: 2023-11-01
Payer: COMMERCIAL

## 2023-11-01 DIAGNOSIS — E11.9 TYPE 2 DIABETES MELLITUS WITHOUT COMPLICATION, UNSPECIFIED WHETHER LONG TERM INSULIN USE: ICD-10-CM

## 2023-11-01 NOTE — TELEPHONE ENCOUNTER
Patient notified and instructed via [facility]:    Your labs have been reviewed by ; no changes made. Repeat labs due Monday 11/6/23. Thanks.

## 2023-11-01 NOTE — TELEPHONE ENCOUNTER
----- Message from Zoran Nobles MD sent at 10/31/2023 10:33 AM CDT -----  Results reviewed. No action.

## 2023-11-06 ENCOUNTER — LAB VISIT (OUTPATIENT)
Dept: LAB | Facility: HOSPITAL | Age: 43
End: 2023-11-06
Attending: INTERNAL MEDICINE
Payer: COMMERCIAL

## 2023-11-06 ENCOUNTER — PATIENT MESSAGE (OUTPATIENT)
Dept: ADMINISTRATIVE | Facility: HOSPITAL | Age: 43
End: 2023-11-06
Payer: COMMERCIAL

## 2023-11-06 DIAGNOSIS — Z94.4 LIVER REPLACED BY TRANSPLANT: ICD-10-CM

## 2023-11-06 LAB
ALBUMIN SERPL BCP-MCNC: 4.2 G/DL (ref 3.5–5.2)
ALP SERPL-CCNC: 140 U/L (ref 55–135)
ALT SERPL W/O P-5'-P-CCNC: 43 U/L (ref 10–44)
ANION GAP SERPL CALC-SCNC: 7 MMOL/L (ref 8–16)
AST SERPL-CCNC: 24 U/L (ref 10–40)
BASOPHILS # BLD AUTO: 0.04 K/UL (ref 0–0.2)
BASOPHILS NFR BLD: 0.7 % (ref 0–1.9)
BILIRUB DIRECT SERPL-MCNC: 0.5 MG/DL (ref 0.1–0.3)
BILIRUB SERPL-MCNC: 1.5 MG/DL (ref 0.1–1)
BUN SERPL-MCNC: 22 MG/DL (ref 6–20)
CALCIUM SERPL-MCNC: 9.7 MG/DL (ref 8.7–10.5)
CHLORIDE SERPL-SCNC: 104 MMOL/L (ref 95–110)
CO2 SERPL-SCNC: 26 MMOL/L (ref 23–29)
CREAT SERPL-MCNC: 1.2 MG/DL (ref 0.5–1.4)
DIFFERENTIAL METHOD: ABNORMAL
EOSINOPHIL # BLD AUTO: 0.1 K/UL (ref 0–0.5)
EOSINOPHIL NFR BLD: 2.4 % (ref 0–8)
ERYTHROCYTE [DISTWIDTH] IN BLOOD BY AUTOMATED COUNT: 12.1 % (ref 11.5–14.5)
EST. GFR  (NO RACE VARIABLE): >60 ML/MIN/1.73 M^2
GLUCOSE SERPL-MCNC: 104 MG/DL (ref 70–110)
HCT VFR BLD AUTO: 43.9 % (ref 40–54)
HGB BLD-MCNC: 15 G/DL (ref 14–18)
IMM GRANULOCYTES # BLD AUTO: 0.05 K/UL (ref 0–0.04)
IMM GRANULOCYTES NFR BLD AUTO: 0.9 % (ref 0–0.5)
LYMPHOCYTES # BLD AUTO: 2.1 K/UL (ref 1–4.8)
LYMPHOCYTES NFR BLD: 36.1 % (ref 18–48)
MCH RBC QN AUTO: 32.3 PG (ref 27–31)
MCHC RBC AUTO-ENTMCNC: 34.2 G/DL (ref 32–36)
MCV RBC AUTO: 94 FL (ref 82–98)
MONOCYTES # BLD AUTO: 0.4 K/UL (ref 0.3–1)
MONOCYTES NFR BLD: 7.4 % (ref 4–15)
NEUTROPHILS # BLD AUTO: 3.1 K/UL (ref 1.8–7.7)
NEUTROPHILS NFR BLD: 52.5 % (ref 38–73)
NRBC BLD-RTO: 0 /100 WBC
PLATELET # BLD AUTO: 172 K/UL (ref 150–450)
PMV BLD AUTO: 8.7 FL (ref 9.2–12.9)
POTASSIUM SERPL-SCNC: 4.5 MMOL/L (ref 3.5–5.1)
PROT SERPL-MCNC: 7.1 G/DL (ref 6–8.4)
RBC # BLD AUTO: 4.65 M/UL (ref 4.6–6.2)
SODIUM SERPL-SCNC: 137 MMOL/L (ref 136–145)
TACROLIMUS BLD-MCNC: 14.7 NG/ML (ref 5–15)
WBC # BLD AUTO: 5.82 K/UL (ref 3.9–12.7)

## 2023-11-06 PROCEDURE — 36415 COLL VENOUS BLD VENIPUNCTURE: CPT | Performed by: INTERNAL MEDICINE

## 2023-11-06 PROCEDURE — 80197 ASSAY OF TACROLIMUS: CPT | Performed by: INTERNAL MEDICINE

## 2023-11-06 PROCEDURE — 85025 COMPLETE CBC W/AUTO DIFF WBC: CPT | Performed by: INTERNAL MEDICINE

## 2023-11-06 PROCEDURE — 82248 BILIRUBIN DIRECT: CPT | Performed by: INTERNAL MEDICINE

## 2023-11-06 PROCEDURE — 80053 COMPREHEN METABOLIC PANEL: CPT | Performed by: INTERNAL MEDICINE

## 2023-11-07 ENCOUNTER — TELEPHONE (OUTPATIENT)
Dept: TRANSPLANT | Facility: CLINIC | Age: 43
End: 2023-11-07
Payer: COMMERCIAL

## 2023-11-07 ENCOUNTER — PATIENT MESSAGE (OUTPATIENT)
Dept: TRANSPLANT | Facility: CLINIC | Age: 43
End: 2023-11-07
Payer: COMMERCIAL

## 2023-11-07 NOTE — TELEPHONE ENCOUNTER
----- Message from Zoran oNbles MD sent at 11/6/2023  5:05 PM CST -----  Results reviewed. Is level accurate?

## 2023-11-07 NOTE — TELEPHONE ENCOUNTER
Message sent to patient via [facility], await response:    Your Prograf level was 14 yesterday's labs, usually runs 7-8.  Did you take your dose before your blood was drawn yesterday?  Thanks.

## 2023-11-08 ENCOUNTER — PATIENT MESSAGE (OUTPATIENT)
Dept: TRANSPLANT | Facility: CLINIC | Age: 43
End: 2023-11-08
Payer: COMMERCIAL

## 2023-11-09 ENCOUNTER — TELEPHONE (OUTPATIENT)
Dept: TRANSPLANT | Facility: CLINIC | Age: 43
End: 2023-11-09
Payer: COMMERCIAL

## 2023-11-09 ENCOUNTER — PATIENT MESSAGE (OUTPATIENT)
Dept: TRANSPLANT | Facility: CLINIC | Age: 43
End: 2023-11-09
Payer: COMMERCIAL

## 2023-11-09 DIAGNOSIS — Z94.4 LIVER REPLACED BY TRANSPLANT: Primary | ICD-10-CM

## 2023-11-09 NOTE — TELEPHONE ENCOUNTER
----- Message from Zoran Nobles MD sent at 11/6/2023  5:05 PM CST -----  Results reviewed. Is level accurate?

## 2023-11-09 NOTE — TELEPHONE ENCOUNTER
Message sent to patient via [facility]:    Your labs were reviewed by ; your Prograf level was high.  Please have repeat labs on Monday 11/13/23;  let us know if you happened to have taken your Prograf dose before your labs were drawn..  On Monday please get your labs drawn (8am) before you take your morning dose of Prograf. Thanks.

## 2023-11-10 ENCOUNTER — TELEPHONE (OUTPATIENT)
Dept: TRANSPLANT | Facility: CLINIC | Age: 43
End: 2023-11-10
Payer: COMMERCIAL

## 2023-11-10 RX ORDER — TACROLIMUS 1 MG/1
1 CAPSULE ORAL EVERY 12 HOURS
Qty: 60 CAPSULE | Refills: 11 | Status: SHIPPED | OUTPATIENT
Start: 2023-11-10 | End: 2024-03-18 | Stop reason: DRUGHIGH

## 2023-11-10 NOTE — TELEPHONE ENCOUNTER
Message received from patient via [facility] re: if he took his prograf before recent labs were drawn:      Shauna, I took at 9am and did blood at 11  That's why they were high.

## 2023-11-13 ENCOUNTER — LAB VISIT (OUTPATIENT)
Dept: LAB | Facility: HOSPITAL | Age: 43
End: 2023-11-13
Attending: INTERNAL MEDICINE
Payer: COMMERCIAL

## 2023-11-13 DIAGNOSIS — Z94.4 LIVER REPLACED BY TRANSPLANT: ICD-10-CM

## 2023-11-13 LAB
ALBUMIN SERPL BCP-MCNC: 4 G/DL (ref 3.5–5.2)
ALP SERPL-CCNC: 116 U/L (ref 55–135)
ALT SERPL W/O P-5'-P-CCNC: 30 U/L (ref 10–44)
ANION GAP SERPL CALC-SCNC: 8 MMOL/L (ref 8–16)
AST SERPL-CCNC: 24 U/L (ref 10–40)
BASOPHILS # BLD AUTO: 0.02 K/UL (ref 0–0.2)
BASOPHILS NFR BLD: 0.5 % (ref 0–1.9)
BILIRUB DIRECT SERPL-MCNC: 0.5 MG/DL (ref 0.1–0.3)
BILIRUB SERPL-MCNC: 1.6 MG/DL (ref 0.1–1)
BUN SERPL-MCNC: 23 MG/DL (ref 6–20)
CALCIUM SERPL-MCNC: 9.1 MG/DL (ref 8.7–10.5)
CHLORIDE SERPL-SCNC: 106 MMOL/L (ref 95–110)
CO2 SERPL-SCNC: 25 MMOL/L (ref 23–29)
CREAT SERPL-MCNC: 1.2 MG/DL (ref 0.5–1.4)
DIFFERENTIAL METHOD: ABNORMAL
EOSINOPHIL # BLD AUTO: 0.1 K/UL (ref 0–0.5)
EOSINOPHIL NFR BLD: 2.6 % (ref 0–8)
ERYTHROCYTE [DISTWIDTH] IN BLOOD BY AUTOMATED COUNT: 12.3 % (ref 11.5–14.5)
EST. GFR  (NO RACE VARIABLE): >60 ML/MIN/1.73 M^2
GLUCOSE SERPL-MCNC: 112 MG/DL (ref 70–110)
HCT VFR BLD AUTO: 43.3 % (ref 40–54)
HGB BLD-MCNC: 14.9 G/DL (ref 14–18)
IMM GRANULOCYTES # BLD AUTO: 0.02 K/UL (ref 0–0.04)
IMM GRANULOCYTES NFR BLD AUTO: 0.5 % (ref 0–0.5)
LYMPHOCYTES # BLD AUTO: 1.4 K/UL (ref 1–4.8)
LYMPHOCYTES NFR BLD: 33.6 % (ref 18–48)
MCH RBC QN AUTO: 32.9 PG (ref 27–31)
MCHC RBC AUTO-ENTMCNC: 34.4 G/DL (ref 32–36)
MCV RBC AUTO: 96 FL (ref 82–98)
MONOCYTES # BLD AUTO: 0.5 K/UL (ref 0.3–1)
MONOCYTES NFR BLD: 12.2 % (ref 4–15)
NEUTROPHILS # BLD AUTO: 2.2 K/UL (ref 1.8–7.7)
NEUTROPHILS NFR BLD: 50.6 % (ref 38–73)
NRBC BLD-RTO: 0 /100 WBC
PLATELET # BLD AUTO: 132 K/UL (ref 150–450)
PMV BLD AUTO: 9.5 FL (ref 9.2–12.9)
POTASSIUM SERPL-SCNC: 4.1 MMOL/L (ref 3.5–5.1)
PROT SERPL-MCNC: 6.5 G/DL (ref 6–8.4)
RBC # BLD AUTO: 4.53 M/UL (ref 4.6–6.2)
SODIUM SERPL-SCNC: 139 MMOL/L (ref 136–145)
WBC # BLD AUTO: 4.26 K/UL (ref 3.9–12.7)

## 2023-11-13 PROCEDURE — 82248 BILIRUBIN DIRECT: CPT | Performed by: INTERNAL MEDICINE

## 2023-11-13 PROCEDURE — 36415 COLL VENOUS BLD VENIPUNCTURE: CPT | Mod: PO | Performed by: INTERNAL MEDICINE

## 2023-11-13 PROCEDURE — 80053 COMPREHEN METABOLIC PANEL: CPT | Performed by: INTERNAL MEDICINE

## 2023-11-13 PROCEDURE — 85025 COMPLETE CBC W/AUTO DIFF WBC: CPT | Performed by: INTERNAL MEDICINE

## 2023-11-13 PROCEDURE — 80197 ASSAY OF TACROLIMUS: CPT | Performed by: INTERNAL MEDICINE

## 2023-11-14 ENCOUNTER — PATIENT MESSAGE (OUTPATIENT)
Dept: TRANSPLANT | Facility: CLINIC | Age: 43
End: 2023-11-14
Payer: COMMERCIAL

## 2023-11-14 ENCOUNTER — TELEPHONE (OUTPATIENT)
Dept: TRANSPLANT | Facility: CLINIC | Age: 43
End: 2023-11-14
Payer: COMMERCIAL

## 2023-11-14 DIAGNOSIS — C22.1 CHOLANGIOCARCINOMA: ICD-10-CM

## 2023-11-14 DIAGNOSIS — Z94.4 LIVER REPLACED BY TRANSPLANT: Primary | ICD-10-CM

## 2023-11-14 LAB — TACROLIMUS BLD-MCNC: 8.5 NG/ML (ref 5–15)

## 2023-11-14 NOTE — TELEPHONE ENCOUNTER
----- Message from Zoran Nobles MD sent at 11/14/2023  2:50 PM CST -----  Results reviewed. No action.

## 2023-11-14 NOTE — TELEPHONE ENCOUNTER
Patient notified and instructed via [facility]:    Your labs have been reviewed by ; no changes made. Repeat labs due 12/11/23. Thanks.

## 2023-11-29 ENCOUNTER — PATIENT MESSAGE (OUTPATIENT)
Dept: HEMATOLOGY/ONCOLOGY | Facility: CLINIC | Age: 43
End: 2023-11-29
Payer: COMMERCIAL

## 2023-11-30 DIAGNOSIS — Z12.11 SCREEN FOR COLON CANCER: Primary | ICD-10-CM

## 2023-12-04 ENCOUNTER — PATIENT MESSAGE (OUTPATIENT)
Dept: HEMATOLOGY/ONCOLOGY | Facility: CLINIC | Age: 43
End: 2023-12-04
Payer: COMMERCIAL

## 2023-12-04 ENCOUNTER — TELEPHONE (OUTPATIENT)
Dept: ENDOSCOPY | Facility: HOSPITAL | Age: 43
End: 2023-12-04

## 2023-12-04 ENCOUNTER — CLINICAL SUPPORT (OUTPATIENT)
Dept: ENDOSCOPY | Facility: HOSPITAL | Age: 43
End: 2023-12-04
Attending: INTERNAL MEDICINE
Payer: COMMERCIAL

## 2023-12-04 ENCOUNTER — TELEPHONE (OUTPATIENT)
Dept: GASTROENTEROLOGY | Facility: CLINIC | Age: 43
End: 2023-12-04
Payer: COMMERCIAL

## 2023-12-04 DIAGNOSIS — Z12.11 SCREEN FOR COLON CANCER: ICD-10-CM

## 2023-12-04 NOTE — TELEPHONE ENCOUNTER
----- Message from Nedra Cardona LPN sent at 12/4/2023 11:40 AM CST -----    ----- Message -----  From: Carly Xiong  Sent: 12/4/2023   8:56 AM CST  To: [facility] Clinical Staff    Type: Needs Medical Advice  Who Called:  pt  Best Call Back Number: [phone]  Additional Information: pt got a call from [facility] to schedule his colonoscopy but he wants it in [place] so they xferred him to [facility] scheduling, pl call bk and advised thanks

## 2023-12-04 NOTE — TELEPHONE ENCOUNTER
C-scope Dx is verified prior to scheduling from PCP's order and pt's Hx. Prep instructions has been sent via [facility] and/or mail. Address is confirmed. Patient is informed the preop Nurse will call him/her with the arrival time a day or two prior to procedure. Patient verbalizes understanding.

## 2023-12-04 NOTE — TELEPHONE ENCOUNTER
Called patient for PAT appointment to schedule colonoscopy. Patient wants to schedule colonoscopy in [place]. Phone number given to patient.

## 2023-12-05 ENCOUNTER — TELEPHONE (OUTPATIENT)
Dept: GASTROENTEROLOGY | Facility: CLINIC | Age: 43
End: 2023-12-05
Payer: COMMERCIAL

## 2023-12-06 ENCOUNTER — PATIENT OUTREACH (OUTPATIENT)
Dept: ADMINISTRATIVE | Facility: HOSPITAL | Age: 43
End: 2023-12-06
Payer: COMMERCIAL

## 2023-12-06 NOTE — PROGRESS NOTES
Health Maintenance Due   Topic Date Due    Foot Exam  Never done    Eye Exam  Never done    Pneumococcal Vaccines (Age 0-64) (2 - PPSV23 or PCV20) 08/10/2022    Colorectal Cancer Screening  Never done    COVID-19 Vaccine (6 - 2023-24 season) 09/01/2023      Chart reviewed. Triggered LINKS. Updated Care Everywhere.     Zelalem Gomez CMA  Population Health Care Coordinator  Primary Care Team

## 2023-12-19 ENCOUNTER — HOSPITAL ENCOUNTER (OUTPATIENT)
Dept: RADIOLOGY | Facility: HOSPITAL | Age: 43
Discharge: HOME | End: 2023-12-19
Attending: INTERNAL MEDICINE
Payer: COMMERCIAL

## 2023-12-19 ENCOUNTER — TELEPHONE (OUTPATIENT)
Dept: INTERNAL MEDICINE | Facility: CLINIC | Age: 43
End: 2023-12-19
Payer: COMMERCIAL

## 2023-12-19 ENCOUNTER — INFUSION (OUTPATIENT)
Dept: INFUSION THERAPY | Facility: HOSPITAL | Age: 43
End: 2023-12-19
Attending: PHYSICIAN ASSISTANT
Payer: COMMERCIAL

## 2023-12-19 DIAGNOSIS — C24.0 HILAR CHOLANGIOCARCINOMA: Primary | ICD-10-CM

## 2023-12-19 DIAGNOSIS — C24.0 HILAR CHOLANGIOCARCINOMA: ICD-10-CM

## 2023-12-19 DIAGNOSIS — Z45.2 ADJUSTMENT AND MANAGEMENT OF VASCULAR ACCESS DEVICE: ICD-10-CM

## 2023-12-19 PROCEDURE — 71260 CT THORAX DX C+: CPT | Mod: 26,,, | Performed by: RADIOLOGY

## 2023-12-19 PROCEDURE — 96523 IRRIG DRUG DELIVERY DEVICE: CPT | Mod: PN

## 2023-12-19 PROCEDURE — 74177 CT ABD & PELVIS W/CONTRAST: CPT | Mod: TC,PO

## 2023-12-19 PROCEDURE — 25000003 PHARM REV CODE 250: Mod: PN | Performed by: PHYSICIAN ASSISTANT

## 2023-12-19 PROCEDURE — 63600175 PHARM REV CODE 636 W HCPCS: Mod: PN | Performed by: PHYSICIAN ASSISTANT

## 2023-12-19 PROCEDURE — A9698 NON-RAD CONTRAST MATERIALNOC: HCPCS | Mod: PO | Performed by: INTERNAL MEDICINE

## 2023-12-19 PROCEDURE — 25500020 PHARM REV CODE 255: Mod: PO | Performed by: INTERNAL MEDICINE

## 2023-12-19 PROCEDURE — 74177 CT ABD & PELVIS W/CONTRAST: CPT | Mod: 26,,, | Performed by: RADIOLOGY

## 2023-12-19 PROCEDURE — 71260 CT CHEST ABDOMEN PELVIS WITH CONTRAST (XPD): ICD-10-PCS | Mod: 26,,, | Performed by: RADIOLOGY

## 2023-12-19 PROCEDURE — A4216 STERILE WATER/SALINE, 10 ML: HCPCS | Mod: PN | Performed by: PHYSICIAN ASSISTANT

## 2023-12-19 PROCEDURE — 74177 CT CHEST ABDOMEN PELVIS WITH CONTRAST (XPD): ICD-10-PCS | Mod: 26,,, | Performed by: RADIOLOGY

## 2023-12-19 PROCEDURE — 71260 CT THORAX DX C+: CPT | Mod: TC,PO

## 2023-12-19 RX ORDER — HEPARIN 100 UNIT/ML
500 SYRINGE INTRAVENOUS
Status: CANCELLED | OUTPATIENT
Start: 2023-12-22

## 2023-12-19 RX ORDER — SODIUM CHLORIDE 0.9 % (FLUSH) 0.9 %
10 SYRINGE (ML) INJECTION
Status: DISCONTINUED | OUTPATIENT
Start: 2023-12-19 | End: 2023-12-19 | Stop reason: HOSPADM

## 2023-12-19 RX ORDER — SODIUM CHLORIDE 0.9 % (FLUSH) 0.9 %
10 SYRINGE (ML) INJECTION
Status: CANCELLED | OUTPATIENT
Start: 2023-12-22

## 2023-12-19 RX ORDER — HEPARIN 100 UNIT/ML
500 SYRINGE INTRAVENOUS
Status: DISCONTINUED | OUTPATIENT
Start: 2023-12-19 | End: 2023-12-19 | Stop reason: HOSPADM

## 2023-12-19 RX ADMIN — Medication 10 ML: at 03:12

## 2023-12-19 RX ADMIN — IOHEXOL 75 ML: 350 INJECTION, SOLUTION INTRAVENOUS at 02:12

## 2023-12-19 RX ADMIN — IOHEXOL 1000 ML: 9 SOLUTION ORAL at 02:12

## 2023-12-19 RX ADMIN — Medication 500 UNITS: at 03:12

## 2023-12-19 NOTE — TELEPHONE ENCOUNTER
Called patient to check to see if had their DM eye exam this year, states has not.   Offered the DM eyecam and he agrees.   Appt set  Petra Leblanc RN HC

## 2023-12-20 ENCOUNTER — TELEPHONE (OUTPATIENT)
Dept: TRANSPLANT | Facility: CLINIC | Age: 43
End: 2023-12-20
Payer: COMMERCIAL

## 2023-12-20 ENCOUNTER — PATIENT MESSAGE (OUTPATIENT)
Dept: TRANSPLANT | Facility: CLINIC | Age: 43
End: 2023-12-20
Payer: COMMERCIAL

## 2023-12-20 DIAGNOSIS — Z94.4 LIVER REPLACED BY TRANSPLANT: Primary | ICD-10-CM

## 2023-12-20 NOTE — TELEPHONE ENCOUNTER
Patient notified and instructed via [facility]:    Your labs have been reviewed by ; no changes made.  Repeat labs due 1/8/2024. Thanks.

## 2023-12-20 NOTE — TELEPHONE ENCOUNTER
----- Message from Zoran Nobles MD sent at 12/20/2023  7:57 AM CST -----  Results reviewed. No action.

## 2023-12-21 ENCOUNTER — OFFICE VISIT (OUTPATIENT)
Dept: HEMATOLOGY/ONCOLOGY | Facility: CLINIC | Age: 43
End: 2023-12-21
Payer: COMMERCIAL

## 2023-12-21 VITALS
HEART RATE: 69 BPM | OXYGEN SATURATION: 98 % | RESPIRATION RATE: 16 BRPM | BODY MASS INDEX: 28.43 KG/M2 | WEIGHT: 186.94 LBS | DIASTOLIC BLOOD PRESSURE: 102 MMHG | SYSTOLIC BLOOD PRESSURE: 141 MMHG

## 2023-12-21 DIAGNOSIS — D69.6 THROMBOCYTOPENIA, UNSPECIFIED: ICD-10-CM

## 2023-12-21 DIAGNOSIS — C24.0 HILAR CHOLANGIOCARCINOMA: Primary | ICD-10-CM

## 2023-12-21 DIAGNOSIS — Z94.4 S/P LIVER TRANSPLANT: ICD-10-CM

## 2023-12-21 DIAGNOSIS — I10 ESSENTIAL HYPERTENSION: ICD-10-CM

## 2023-12-21 DIAGNOSIS — Z79.60 LONG-TERM USE OF IMMUNOSUPPRESSANT MEDICATION: ICD-10-CM

## 2023-12-21 PROBLEM — T45.1X5A IMMUNODEFICIENCY SECONDARY TO CHEMOTHERAPY: Status: RESOLVED | Noted: 2021-12-13 | Resolved: 2023-12-21

## 2023-12-21 PROBLEM — Z79.899 IMMUNODEFICIENCY SECONDARY TO CHEMOTHERAPY: Status: RESOLVED | Noted: 2021-12-13 | Resolved: 2023-12-21

## 2023-12-21 PROBLEM — D84.821 IMMUNODEFICIENCY SECONDARY TO CHEMOTHERAPY: Status: RESOLVED | Noted: 2021-12-13 | Resolved: 2023-12-21

## 2023-12-21 PROBLEM — D61.818 PANCYTOPENIA: Status: RESOLVED | Noted: 2022-09-15 | Resolved: 2023-12-21

## 2023-12-21 PROBLEM — Z79.69 IMMUNODEFICIENCY SECONDARY TO CHEMOTHERAPY: Status: RESOLVED | Noted: 2021-12-13 | Resolved: 2023-12-21

## 2023-12-21 PROCEDURE — 3066F NEPHROPATHY DOC TX: CPT | Mod: CPTII,S$GLB,, | Performed by: INTERNAL MEDICINE

## 2023-12-21 PROCEDURE — 3080F DIAST BP >= 90 MM HG: CPT | Mod: CPTII,S$GLB,, | Performed by: INTERNAL MEDICINE

## 2023-12-21 PROCEDURE — 3044F PR MOST RECENT HEMOGLOBIN A1C LEVEL <7.0%: ICD-10-PCS | Mod: CPTII,S$GLB,, | Performed by: INTERNAL MEDICINE

## 2023-12-21 PROCEDURE — 3008F BODY MASS INDEX DOCD: CPT | Mod: CPTII,S$GLB,, | Performed by: INTERNAL MEDICINE

## 2023-12-21 PROCEDURE — 3077F SYST BP >= 140 MM HG: CPT | Mod: CPTII,S$GLB,, | Performed by: INTERNAL MEDICINE

## 2023-12-21 PROCEDURE — 1160F PR REVIEW ALL MEDS BY PRESCRIBER/CLIN PHARMACIST DOCUMENTED: ICD-10-PCS | Mod: CPTII,S$GLB,, | Performed by: INTERNAL MEDICINE

## 2023-12-21 PROCEDURE — 3044F HG A1C LEVEL LT 7.0%: CPT | Mod: CPTII,S$GLB,, | Performed by: INTERNAL MEDICINE

## 2023-12-21 PROCEDURE — 3061F PR NEG MICROALBUMINURIA RESULT DOCUMENTED/REVIEW: ICD-10-PCS | Mod: CPTII,S$GLB,, | Performed by: INTERNAL MEDICINE

## 2023-12-21 PROCEDURE — 1160F RVW MEDS BY RX/DR IN RCRD: CPT | Mod: CPTII,S$GLB,, | Performed by: INTERNAL MEDICINE

## 2023-12-21 PROCEDURE — 99214 OFFICE O/P EST MOD 30 MIN: CPT | Mod: S$GLB,,, | Performed by: INTERNAL MEDICINE

## 2023-12-21 PROCEDURE — 3077F PR MOST RECENT SYSTOLIC BLOOD PRESSURE >= 140 MM HG: ICD-10-PCS | Mod: CPTII,S$GLB,, | Performed by: INTERNAL MEDICINE

## 2023-12-21 PROCEDURE — 3008F PR BODY MASS INDEX (BMI) DOCUMENTED: ICD-10-PCS | Mod: CPTII,S$GLB,, | Performed by: INTERNAL MEDICINE

## 2023-12-21 PROCEDURE — 99999 PR PBB SHADOW E&M-EST. PATIENT-LVL III: CPT | Mod: PBBFAC,,, | Performed by: INTERNAL MEDICINE

## 2023-12-21 PROCEDURE — 99999 PR PBB SHADOW E&M-EST. PATIENT-LVL III: ICD-10-PCS | Mod: PBBFAC,,, | Performed by: INTERNAL MEDICINE

## 2023-12-21 PROCEDURE — 99214 PR OFFICE/OUTPT VISIT, EST, LEVL IV, 30-39 MIN: ICD-10-PCS | Mod: S$GLB,,, | Performed by: INTERNAL MEDICINE

## 2023-12-21 PROCEDURE — 3061F NEG MICROALBUMINURIA REV: CPT | Mod: CPTII,S$GLB,, | Performed by: INTERNAL MEDICINE

## 2023-12-21 PROCEDURE — 1159F MED LIST DOCD IN RCRD: CPT | Mod: CPTII,S$GLB,, | Performed by: INTERNAL MEDICINE

## 2023-12-21 PROCEDURE — 3080F PR MOST RECENT DIASTOLIC BLOOD PRESSURE >= 90 MM HG: ICD-10-PCS | Mod: CPTII,S$GLB,, | Performed by: INTERNAL MEDICINE

## 2023-12-21 PROCEDURE — 1159F PR MEDICATION LIST DOCUMENTED IN MEDICAL RECORD: ICD-10-PCS | Mod: CPTII,S$GLB,, | Performed by: INTERNAL MEDICINE

## 2023-12-21 PROCEDURE — 3066F PR DOCUMENTATION OF TREATMENT FOR NEPHROPATHY: ICD-10-PCS | Mod: CPTII,S$GLB,, | Performed by: INTERNAL MEDICINE

## 2023-12-21 NOTE — PROGRESS NOTES
"                                                         PROGRESS NOTE    Subjective:       Patient ID: OSCAR LARSON is a 43 y.o. male.    Chief Complaint: follow up for hilar cholangiocarcinoma    Diagnosis:  Yp T2N0M0 hilar cholangiocarcinoma, s/p neoadjuvant chemoradiation, chemotherapy and liver transplant on 6/21/2022    Oncologic History:  1. Mr Larson is a 40 yo man who presents today for further management of suspected hilar cholangiocarcinoma. He presented with dark urine, abdominal pain and jaundice since August 2021. He presented to outside hospital ER with elevated bilirubin. MRCP was performed demonstrating bi-lobar intrahepatic bile duct dilation and a ~2cm ill defined mass at the hilum concerning for hilar cholangiocarcinoma. He was referred to the advanced endoscopy group at [facility]. ERCP confirmed severe, malignant appearing stricture involving right and left main hepatic ducts. Biliary stents were placed to relieve obstruction. EUS was performed. It showed an irregular hypoechoic mass where the hepatic duct bifurcates into the right and left hepatic ducts. The mass measured 11.4 mm by 11.3 mm in maximal cross-sectional diameter. FNA sampling of hilar lymph nodes was negative for metastatic disease. Bile duct biopsy showed "rare groups of glandular epithelium with mild atypia, strips of benign glandular epithelium intermixed with blood clot, and focal fibrous tissue with chronic inflammation."  CT C/A/P 10/27/21:  1. Intrahepatic biliary dilatation despite the presence of 2 biliary drains.  The patient is recent comparison MRI a revealed a possible central hepatic mass, concerning for either cholangiocarcinoma or hepatocellular carcinoma.  This is, however, not definitively demonstrated on today's exam.  There are enlarged lymph nodes present in the vicinity of the ana cristina hepatis and celiac axis as detailed above.  2. Scattered pulmonary nodules measuring up to 6 mm.  3. Other findings as detailed " "above.  He presents today for further management. Seen by Dr Jara and considered a candidate for liver transplant. Seen by Dr Cunningham last Friday. Scheduled for PET this Wednesday. 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   Discussed neoadjuvant chemoradiation with 5FU followed by neoadjuvant cis/gem prior to liver transplant.   2. Hospitalized 11/3/21-21 for fever 2/2 acute cholangitis. Repeat ERCP biopsy on 21 again non-diagnostic. Biopsy of the celiac lymph node was negative.   3. PET scan 11/3/21: "1. Wedge-shaped geographic hypermetabolic activity within the right lobe of the liver in a similar distribution to the intrahepatic biliary dilatation.  This could relate to inflammation from multiple etiologies including biliary stasis, cholangitis, and obstructive dilatation.  Neoplastic involvement would be difficult to exclude.  The liver is poorly evaluated given background activity. 2. Hypermetabolic lymphadenopathy is noted in a periportal and celiac distribution.  Infectious inflammatory and neoplastic etiologies are on the differential.  Index nodes have been measured. 3. Multiple pulmonary nodules are noted too small to categorize by PET-CT fusion as evidence seen on a prior CT of 10/27/2021.  CT for follow-up of size stability is necessary."  4. Completed chemoradiation with 5FU from . Started cis/gem on 22. Completed neoadjuvant chemotherapy  5. Underwent living donor liver transplant on 2022. Pathology showed ypT2N0 well to moderately differentiated cholangiocarcinoma of the perihilar bile ducts, with invasion into the perihilar soft tissue. No LVI or PNI. Surgical margins are negative for carcinoma. Two lymph nodes, negative for carcinoma (0/2).     Interval History:   Mr Larson returns for follow up. He is feeling well.     ECO    ROS:   A ten-point system review is obtained and negative except for what was stated in the Interval History.     Physical Examination: "   Vital signs reviewed.   General: well hydrated, well developed, in no acute distress  HEENT: normocephalic, PERRLA, EOMI, anicteric sclerae  Neck: supple, no JVD, thyromegaly, cervical or supraclavicular lymphadenopathy  Lungs: clear breath sounds bilaterally, no wheezing, rales, or rhonchi  Chest: port site clean  Heart: RRR, no M/R/G  Abdomen: soft, no tenderness, non-distended, no hepatosplenomegaly, mass, or hernia. BS present  Extremities: no clubbing, cyanosis, or edema  Skin: no rash, ulcer, or open wounds  Neuro: alert and oriented x 4, no focal neuro deficit  Psych: pleasant and appropriate mood and affect    Objective:     Laboratory Data:  Labs reviewed. CA 19-9 normal     Imaging Data:  CT C/A/P 12/19/23:  Impression:     1. No evidence of metastatic disease within the chest, abdomen, or pelvis.  2. Redemonstrated liver transplant without evidence of adverse change.  3. Stable pulmonary nodules measuring up to 4 mm.  4. Mild nondistention versus subtle colitis changes of the distal colon within the left lower quadrant which can be correlated with clinical history.           Assessment and Plan:     1. Hilar cholangiocarcinoma    2. S/P liver transplant    3. Thrombocytopenia, unspecified    4. Long-term use of immunosuppressant medication    5. Essential hypertension        1.  - Mr Larson is a 44 yo man with stage I hilar cholangiocarcinoma. Biopsy non-diagnostic but clinical picture most consistent with hilar cholangiocarcinoma. He is a candidate for liver transplant. Completed chemoradiation with 5FU from 11/29/21-1/5/2022. Started cis/gem on 1/19/22. Completed neoadjuvant chemotherapy  - Underwent living donor liver transplant on 6/21/2022. Pathology showed ypT2N0 well to moderately differentiated cholangiocarcinoma of the perihilar bile ducts, with invasion into the perihilar soft tissue. No LVI or PNI. Surgical margins are negative for carcinoma. Two lymph nodes, negative for carcinoma (0/2).   -  doing well  - reviewed test results with patient. CA 19-9 normal. DERRICK on CT scan  - return in 3 months with labs and surveillance scan in [place]. Flush port in [place]    2.4  - f/u with liver transplant team    3.  -mild. Monitor    5.  - reviewed with patient. Diastolic BP high. F/u with PCP    Follow-up:     RTC in 3 months  Knows to call in the interval if any problems arise.    Electronically signed by Young Wesley

## 2023-12-27 ENCOUNTER — HOSPITAL ENCOUNTER (OUTPATIENT)
Facility: HOSPITAL | Age: 43
Discharge: HOME | End: 2023-12-27
Attending: COLON & RECTAL SURGERY | Admitting: COLON & RECTAL SURGERY
Payer: COMMERCIAL

## 2023-12-27 ENCOUNTER — ANESTHESIA EVENT (OUTPATIENT)
Dept: ENDOSCOPY | Facility: HOSPITAL | Age: 43
End: 2023-12-27
Payer: COMMERCIAL

## 2023-12-27 ENCOUNTER — ANESTHESIA (OUTPATIENT)
Dept: ENDOSCOPY | Facility: HOSPITAL | Age: 43
End: 2023-12-27
Payer: COMMERCIAL

## 2023-12-27 VITALS
RESPIRATION RATE: 16 BRPM | HEIGHT: 69 IN | TEMPERATURE: 97 F | WEIGHT: 180 LBS | DIASTOLIC BLOOD PRESSURE: 84 MMHG | HEART RATE: 70 BPM | SYSTOLIC BLOOD PRESSURE: 124 MMHG | OXYGEN SATURATION: 99 % | BODY MASS INDEX: 26.66 KG/M2

## 2023-12-27 DIAGNOSIS — Z12.11 SCREEN FOR COLON CANCER: Primary | ICD-10-CM

## 2023-12-27 PROCEDURE — 37000009 HC ANESTHESIA EA ADD 15 MINS: Mod: PO | Performed by: COLON & RECTAL SURGERY

## 2023-12-27 PROCEDURE — G0121 COLON CA SCRN NOT HI RSK IND: HCPCS | Mod: ,,, | Performed by: COLON & RECTAL SURGERY

## 2023-12-27 PROCEDURE — 63600175 PHARM REV CODE 636 W HCPCS: Mod: PO | Performed by: COLON & RECTAL SURGERY

## 2023-12-27 PROCEDURE — 25000003 PHARM REV CODE 250: Mod: PO | Performed by: NURSE ANESTHETIST, CERTIFIED REGISTERED

## 2023-12-27 PROCEDURE — D9220A PRA ANESTHESIA: ICD-10-PCS | Mod: CRNA,,, | Performed by: NURSE ANESTHETIST, CERTIFIED REGISTERED

## 2023-12-27 PROCEDURE — 37000008 HC ANESTHESIA 1ST 15 MINUTES: Mod: PO | Performed by: COLON & RECTAL SURGERY

## 2023-12-27 PROCEDURE — 63600175 PHARM REV CODE 636 W HCPCS: Mod: PO | Performed by: NURSE ANESTHETIST, CERTIFIED REGISTERED

## 2023-12-27 PROCEDURE — G0121 COLON CA SCRN NOT HI RSK IND: HCPCS | Mod: PO | Performed by: COLON & RECTAL SURGERY

## 2023-12-27 PROCEDURE — D9220A PRA ANESTHESIA: ICD-10-PCS | Mod: ANES,,, | Performed by: ANESTHESIOLOGY

## 2023-12-27 PROCEDURE — D9220A PRA ANESTHESIA: Mod: ANES,,, | Performed by: ANESTHESIOLOGY

## 2023-12-27 PROCEDURE — D9220A PRA ANESTHESIA: Mod: CRNA,,, | Performed by: NURSE ANESTHETIST, CERTIFIED REGISTERED

## 2023-12-27 PROCEDURE — G0121 COLON CA SCRN NOT HI RSK IND: ICD-10-PCS | Mod: ,,, | Performed by: COLON & RECTAL SURGERY

## 2023-12-27 RX ORDER — PROPOFOL 10 MG/ML
VIAL (ML) INTRAVENOUS
Status: DISCONTINUED | OUTPATIENT
Start: 2023-12-27 | End: 2023-12-27

## 2023-12-27 RX ORDER — PROPOFOL 10 MG/ML
VIAL (ML) INTRAVENOUS CONTINUOUS PRN
Status: DISCONTINUED | OUTPATIENT
Start: 2023-12-27 | End: 2023-12-27

## 2023-12-27 RX ORDER — SODIUM CHLORIDE, SODIUM LACTATE, POTASSIUM CHLORIDE, CALCIUM CHLORIDE 600; 310; 30; 20 MG/100ML; MG/100ML; MG/100ML; MG/100ML
INJECTION, SOLUTION INTRAVENOUS CONTINUOUS
Status: DISCONTINUED | OUTPATIENT
Start: 2023-12-27 | End: 2023-12-27 | Stop reason: HOSPADM

## 2023-12-27 RX ORDER — LIDOCAINE HYDROCHLORIDE 20 MG/ML
INJECTION INTRAVENOUS
Status: DISCONTINUED | OUTPATIENT
Start: 2023-12-27 | End: 2023-12-27

## 2023-12-27 RX ADMIN — LIDOCAINE HYDROCHLORIDE 100 MG: 20 INJECTION INTRAVENOUS at 01:12

## 2023-12-27 RX ADMIN — PROPOFOL 150 MCG/KG/MIN: 10 INJECTION, EMULSION INTRAVENOUS at 01:12

## 2023-12-27 RX ADMIN — PROPOFOL 100 MG: 10 INJECTION, EMULSION INTRAVENOUS at 01:12

## 2023-12-27 RX ADMIN — SODIUM CHLORIDE, POTASSIUM CHLORIDE, SODIUM LACTATE AND CALCIUM CHLORIDE: 600; 310; 30; 20 INJECTION, SOLUTION INTRAVENOUS at 01:12

## 2023-12-27 NOTE — TRANSFER OF CARE
"Anesthesia Transfer of Care Note    Patient: OSCAR PAYNE    Procedure(s) Performed: Procedure(s) (LRB):  COLONOSCOPY (N/A)    Patient location: PACU    Anesthesia Type: general    Transport from OR: Transported from OR on room air with adequate spontaneous ventilation    Post pain: adequate analgesia    Post assessment: no apparent anesthetic complications and tolerated procedure well    Post vital signs: stable    Level of consciousness: sedated    Nausea/Vomiting: no nausea/vomiting    Complications: none    Transfer of care protocol was followed    Last vitals: Visit Vitals  BP (!) 147/105 (BP Location: Right arm, Patient Position: Sitting)   Pulse 73   Temp 36.3 °C (97.4 °F) (Skin)   Resp 16   Ht 5' 8.5" (1.74 m)   Wt 81.6 kg (180 lb)   SpO2 98%   BMI 26.97 kg/m²     "

## 2023-12-27 NOTE — BRIEF OP NOTE
Brief Operative Note    Date of surgery: 12/27/2023    Pre-operative Diagnosis:  Screen for colon cancer [Z12.11]     Post-operative Diagnosis:   Same as above    Procedure:  Colonoscopy  Procedure(s) (LRB):  COLONOSCOPY (N/A)      Anesthesia: Monitored anesthesia care    Surgeon: Timmy Mcdowell MD     Findings:  Normal study               [id number]     Timmy Mcdowell MD  
Alert-The patient is alert, awake and responds to voice. The patient is oriented to time, place, and person. The triage nurse is able to obtain subjective information.

## 2023-12-27 NOTE — ANESTHESIA PREPROCEDURE EVALUATION
12/27/2023  OSCAR PAYNE is a 43 y.o., male.      Pre-op Assessment    I have reviewed the Patient Summary Reports.     I have reviewed the Nursing Notes. I have reviewed the NPO Status.   I have reviewed the Medications.     Review of Systems  Anesthesia Hx:             Denies Family Hx of Anesthesia complications.    Denies Personal Hx of Anesthesia complications.                    Cardiovascular:     Hypertension                                        Hepatic/GI:  Bowel Prep.      Cholangiocarcinoma, OLT              Physical Exam  General: Well nourished, Cooperative, Alert and Oriented    Airway:  Mallampati: II   Mouth Opening: Normal  TM Distance: Normal  Tongue: Normal  Neck ROM: Normal ROM        Anesthesia Plan  Type of Anesthesia, risks & benefits discussed:    Anesthesia Type: Gen Natural Airway  Intra-op Monitoring Plan: Standard ASA Monitors  Induction:  IV  Informed Consent: Informed consent signed with the Patient and all parties understand the risks and agree with anesthesia plan.  All questions answered.   ASA Score: 3    Ready For Surgery From Anesthesia Perspective.     .

## 2023-12-27 NOTE — PROVATION PATIENT INSTRUCTIONS
Discharge Summary/Instructions after an Endoscopic Procedure  Patient Name: Romeo Larson  Patient MRN: [id number]  Patient YOB: 1980 Wednesday, December 27, 2023  Timmy Mcdowell MD  Dear patient,  As a result of recent federal legislation (The Federal Cures Act), you may   receive lab or pathology results from your procedure in your [facility]   account before your physician is able to contact you. Your physician or   their representative will relay the results to you with their   recommendations at their soonest availability.  Thank you,  RESTRICTIONS:  During your procedure today, you received medications for sedation.  These   medications may affect your judgment, balance and coordination.  Therefore,   for 24 hours, you have the following restrictions:   - DO NOT drive a car, operate machinery, make legal/financial decisions,   sign important papers or drink alcohol.    ACTIVITY:  Today: no heavy lifting, straining or running due to procedural   sedation/anesthesia.  The following day: return to full activity including work.  DIET:  Eat and drink normally unless instructed otherwise.     TREATMENT FOR COMMON SIDE EFFECTS:  - Mild abdominal pain, nausea, belching, bloating or excessive gas:  rest,   eat lightly and use a heating pad.  - Sore Throat: treat with throat lozenges and/or gargle with warm salt   water.  - Because air was used during the procedure, expelling large amounts of air   from your rectum or belching is normal.  - If a bowel prep was taken, you may not have a bowel movement for 1-3 days.    This is normal.  SYMPTOMS TO WATCH FOR AND REPORT TO YOUR PHYSICIAN:  1. Abdominal pain or bloating, other than gas cramps.  2. Chest pain.  3. Back pain.  4. Signs of infection such as: chills or fever occurring within 24 hours   after the procedure.  5. Rectal bleeding, which would show as bright red, maroon, or black stools.   (A tablespoon of blood from the rectum is not serious,  especially if   hemorrhoids are present.)  6. Vomiting.  7. Weakness or dizziness.  GO DIRECTLY TO THE NEAREST EMERGENCY ROOM IF YOU HAVE ANY OF THE FOLLOWING:      Difficulty breathing              Chills and/or fever over 101 F   Persistent vomiting and/or vomiting blood   Severe abdominal pain   Severe chest pain   Black, tarry stools   Bleeding- more than one tablespoon   Any other symptom or condition that you feel may need urgent attention  Your doctor recommends these additional instructions:  If any biopsies were taken, your doctors clinic will contact you in 1 to 2   weeks with any results.  You are being discharged to home.   Resume your previous diet.   Continue your present medications.  For questions, problems or results please call your physician - Timmy Mcdowell MD at Work:  [phone].  EMERGENCY PHONE NUMBER: [phone], LAB RESULTS: [phone]  IF A COMPLICATION OR EMERGENCY SITUATION ARISES AND YOU ARE UNABLE TO REACH   YOUR PHYSICIAN - GO DIRECTLY TO THE EMERGENCY ROOM.  ___________________________________________  Nurse Signature  ___________________________________________  Patient/Designated Responsible Party Signature  Timmy Mcdowell M.D.  Timmy Mcdowell MD  12/27/2023 1:50:54 PM  This report has been verified and signed electronically.  Dear patient,  As a result of recent federal legislation (The Federal Cures Act), you may   receive lab or pathology results from your procedure in your [facility]   account before your physician is able to contact you. Your physician or   their representative will relay the results to you with their   recommendations at their soonest availability.  Thank you.  PROVATION   Cheiloplasty (Complex) Text: A decision was made to reconstruct the defect with a  cheiloplasty.  The defect was undermined extensively.  Additional obicularis oris muscle was excised with a 15 blade scalpel.  The defect was converted into a full thickness wedge to facilite a better cosmetic result.  Small vessels were then tied off with 5-0 monocyrl. The obicularis oris, superficial fascia, adipose and dermis were then reapproximated.  After the deeper layers were approximated the epidermis was reapproximated with particular care given to realign the vermilion border.

## 2023-12-27 NOTE — H&P
History & Physical - Short Stay  Gastroenterology      SUBJECTIVE:     Procedure: Colonoscopy    Chief Complaint/Indication for Procedure: Screening    PTA Medications   Medication Sig    acetaminophen (TYLENOL) 500 MG tablet Take 500 mg by mouth every 4 to 6 hours as needed for Pain.    carvediloL (COREG) 12.5 MG tablet Take 1 tablet (12.5 mg total) by mouth 2 (two) times daily with meals.    tacrolimus (PROGRAF) 1 MG Cap Take 1 capsule (1 mg total) by mouth every 12 (twelve) hours.    ursodioL (ACTIGALL) 250 mg Tab Take 1 tablet (250 mg total) by mouth 2 (two) times a day.    aspirin 81 MG Chew CHEW 1 tablet (81 mg total) by mouth once daily.       Review of patient's allergies indicates:   Allergen Reactions    Zosyn [piperacillin-tazobactam] Swelling     Lip swelling        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    Hypercholesteremia     Hypertension      Past Surgical History:   Procedure Laterality Date    DIAGNOSTIC ULTRASOUND N/A 6/21/2022    Procedure: ULTRASOUND, DIAGNOSTIC;  Surgeon: Sinan Cline MD;  Location: [facility] OR [address] [place] [address];  Service: Transplant;  Laterality: N/A;    ENDOSCOPIC ULTRASOUND OF UPPER GASTROINTESTINAL TRACT N/A 10/20/2021    Procedure: ULTRASOUND, UPPER GI TRACT, ENDOSCOPIC;  Surgeon: Valeriy Sotelo MD;  Location: [address]);  Service: Endoscopy;  Laterality: N/A;  wife to email vacc card-inst email-tb    ERCP N/A 10/20/2021    Procedure: ERCP (ENDOSCOPIC RETROGRADE CHOLANGIOPANCREATOGRAPHY);  Surgeon: Valeriy Sotleo MD;  Location: [address]);  Service: Endoscopy;  Laterality: N/A;    ERCP N/A 11/4/2021    Procedure: ERCP (ENDOSCOPIC RETROGRADE CHOLANGIOPANCREATOGRAPHY);  Surgeon: Valeriy Sotelo MD;  Location: [address]);  Service: Endoscopy;  Laterality: N/A;    ERCP N/A  11/4/2021    Procedure: ERCP (ENDOSCOPIC RETROGRADE CHOLANGIOPANCREATOGRAPHY);  Surgeon: Roselia Pereyra MD;  [facility]  Service: Endoscopy;  Laterality: N/A;  pt vaccinated, instructed to bring card to procedure, instructions sent portal-MG    ERCP N/A 1/14/2022    Procedure: ERCP (ENDOSCOPIC RETROGRADE CHOLANGIOPANCREATOGRAPHY);  Surgeon: Roselia Pereyra MD;  Location: [facility] ENDO (2ND FLR);  Service: Endoscopy;  Laterality: N/A;  inst portal-right chest port-tb  Pt requested at home COVID testing, Pt instructed at home RAPID to be done morning of procedure, Pt instructed to call endoscopy scheuding if there is a positive result, Pt informed if a positive     ERCP N/A 3/31/2022    Procedure: ERCP (ENDOSCOPIC RETROGRADE CHOLANGIOPANCREATOGRAPHY);  Surgeon: Julio Cesar Alonzo MD;  [facility]  Service: Endoscopy;  Laterality: N/A;  3/16: port L chest wall. pt to bring covid vaccination card. Instructions sent via portal.-SC  3/18/22-Updated instructions sent via portal re:new date/time-DS    EXPLORATORY LAPAROTOMY AFTER LIVER TRANSPLANTATION N/A 6/23/2022    Procedure: LAPAROTOMY, EXPLORATORY, AFTER LIVER TRANSPLANT;  Surgeon: Julio Cesar Jara MD;  [facility];  Service: Transplant;  Laterality: N/A;    INSERTION OF TUNNELED CENTRAL VENOUS CATHETER (CVC) WITH SUBCUTANEOUS PORT N/A 11/24/2021    Procedure: INSERTION, PORT-A-CATH;  Surgeon: Prem Syed MD;  Location: [facility] [place] CATH LAB;  Service: Radiology;  Laterality: N/A;    LIVER TRANSPLANT N/A 6/21/2022    Procedure: TRANSPLANT, LIVER;  Surgeon: Sinan Cline MD;  [facility];  Service: Transplant;  Laterality: N/A;    REPAIR OF BILE DUCT N/A 6/23/2022    Procedure: REPAIR, BILE DUCT;  Surgeon: Julio Cesar Jara MD;  [facility];  Service: Transplant;  Laterality: N/A;    ROBOT-ASSISTED LAPAROSCOPIC LYMPHADENECTOMY USING DA МАРИНА XI  6/17/2022    Procedure: XI ROBOTIC LYMPHADENECTOMY -  Portal;  Surgeon: Julio Cesar Jara MD;  [facility];  Service: Transplant;;    TONSILLECTOMY      XI ROBOTIC LAPAROSCOPY, EXPLORATORY N/A 6/17/2022    Procedure: XI ROBOTIC LAPAROSCOPY,EXPLORATORY;  Surgeon: Julio Cesar Jara MD;  [facility];  Service: Transplant;  Laterality: N/A;     Family History   Problem Relation Age of Onset    No Known Problems Mother     Hyperlipidemia Father     No Known Problems Sister     No Known Problems Brother      Social History     Tobacco Use    Smoking status: Never    Smokeless tobacco: Never   Substance Use Topics    Alcohol use: Not Currently    Drug use: Never         OBJECTIVE:     Vital Signs (Most Recent)       Physical Exam:                                                       GENERAL:  Comfortable, in no acute distress.                                 HEENT EXAM:  Nonicteric.  No adenopathy.  Oropharynx is clear.               NECK:  Supple.                                                               LUNGS:  Clear.                                                               CARDIAC:  Regular rate and rhythm.  S1, S2.  No murmur.                      ABDOMEN:  Soft, positive bowel sounds, nontender.  No hepatosplenomegaly or masses.  No rebound or guarding.                                             EXTREMITIES:  No edema.     MENTAL STATUS:  Normal, alert and oriented.      ASSESSMENT/PLAN:     Assessment: Screening    Plan: Colonoscopy    Anesthesia Plan: Moderate Sedation    ASA Grade: ASA 2 - Patient with mild systemic disease with no functional limitations    MALLAMPATI SCORE:  I (soft palate, uvula, fauces, and tonsillar pillars visible)

## 2023-12-27 NOTE — ANESTHESIA POSTPROCEDURE EVALUATION
Anesthesia Post Evaluation    Patient: OSCAR PAYNE    Procedure(s) Performed: Procedure(s) (LRB):  COLONOSCOPY (N/A)    Final Anesthesia Type: general      Patient location during evaluation: PACU  Patient participation: Yes- Able to Participate  Level of consciousness: awake and alert  Post-procedure vital signs: reviewed and stable  Pain management: adequate  Airway patency: patent    PONV status at discharge: No PONV  Anesthetic complications: no      Cardiovascular status: blood pressure returned to baseline  Respiratory status: unassisted  Hydration status: euvolemic  Follow-up not needed.              Vitals Value Taken Time   /84 12/27/23 1400   Temp   12/27/23 1416   Pulse 70 12/27/23 1400   Resp 16 12/27/23 1400   SpO2 99 % 12/27/23 1400         No case tracking events are documented in the log.      Pain/Moe Score: Moe Score: 6 (12/27/2023  1:45 PM)

## 2024-01-05 ENCOUNTER — OFFICE VISIT (OUTPATIENT)
Dept: INTERNAL MEDICINE | Facility: CLINIC | Age: 44
End: 2024-01-05
Payer: COMMERCIAL

## 2024-01-05 VITALS
SYSTOLIC BLOOD PRESSURE: 118 MMHG | HEART RATE: 62 BPM | DIASTOLIC BLOOD PRESSURE: 80 MMHG | WEIGHT: 187.63 LBS | BODY MASS INDEX: 27.79 KG/M2 | OXYGEN SATURATION: 97 % | HEIGHT: 69 IN

## 2024-01-05 DIAGNOSIS — Z94.4 S/P LIVER TRANSPLANT: Chronic | ICD-10-CM

## 2024-01-05 DIAGNOSIS — D69.6 THROMBOCYTOPENIA, UNSPECIFIED: ICD-10-CM

## 2024-01-05 DIAGNOSIS — I10 PRIMARY HYPERTENSION: Chronic | ICD-10-CM

## 2024-01-05 DIAGNOSIS — Z00.00 ENCOUNTER FOR ANNUAL PHYSICAL EXAM: Primary | ICD-10-CM

## 2024-01-05 DIAGNOSIS — I10 ESSENTIAL HYPERTENSION: ICD-10-CM

## 2024-01-05 DIAGNOSIS — Z91.89 AT RISK FOR OPPORTUNISTIC INFECTIONS: Chronic | ICD-10-CM

## 2024-01-05 DIAGNOSIS — C24.0 HILAR CHOLANGIOCARCINOMA: Chronic | ICD-10-CM

## 2024-01-05 DIAGNOSIS — E78.00 HYPERCHOLESTEROLEMIA: ICD-10-CM

## 2024-01-05 DIAGNOSIS — Z23 NEED FOR VACCINATION FOR STREP PNEUMONIAE: ICD-10-CM

## 2024-01-05 DIAGNOSIS — R73.9 STEROID-INDUCED HYPERGLYCEMIA: ICD-10-CM

## 2024-01-05 DIAGNOSIS — T38.0X5A STEROID-INDUCED HYPERGLYCEMIA: ICD-10-CM

## 2024-01-05 DIAGNOSIS — E13.9 SECONDARY DIABETES: ICD-10-CM

## 2024-01-05 DIAGNOSIS — Z12.11 SCREENING FOR COLON CANCER: ICD-10-CM

## 2024-01-05 PROBLEM — A41.9 SEVERE SEPSIS: Status: RESOLVED | Noted: 2022-07-18 | Resolved: 2024-01-05

## 2024-01-05 PROBLEM — R65.20 SEVERE SEPSIS: Status: RESOLVED | Noted: 2022-07-18 | Resolved: 2024-01-05

## 2024-01-05 PROCEDURE — 3008F BODY MASS INDEX DOCD: CPT | Mod: CPTII,S$GLB,, | Performed by: INTERNAL MEDICINE

## 2024-01-05 PROCEDURE — 3074F SYST BP LT 130 MM HG: CPT | Mod: CPTII,S$GLB,, | Performed by: INTERNAL MEDICINE

## 2024-01-05 PROCEDURE — 99999 PR PBB SHADOW E&M-EST. PATIENT-LVL IV: CPT | Mod: PBBFAC,,, | Performed by: INTERNAL MEDICINE

## 2024-01-05 PROCEDURE — 99396 PREV VISIT EST AGE 40-64: CPT | Mod: 25,S$GLB,, | Performed by: INTERNAL MEDICINE

## 2024-01-05 PROCEDURE — 3079F DIAST BP 80-89 MM HG: CPT | Mod: CPTII,S$GLB,, | Performed by: INTERNAL MEDICINE

## 2024-01-05 PROCEDURE — 1159F MED LIST DOCD IN RCRD: CPT | Mod: CPTII,S$GLB,, | Performed by: INTERNAL MEDICINE

## 2024-01-05 PROCEDURE — 90471 IMMUNIZATION ADMIN: CPT | Mod: S$GLB,,, | Performed by: INTERNAL MEDICINE

## 2024-01-05 PROCEDURE — 90677 PCV20 VACCINE IM: CPT | Mod: S$GLB,,, | Performed by: INTERNAL MEDICINE

## 2024-01-05 NOTE — PROGRESS NOTES
Subjective:       Patient ID: OSCAR PAYNE is a 43 y.o. male.    Chief Complaint: Follow-up      HPI  OSCAR PAYNE is a 43 y.o. year old male with cholangiocarcinoma s/p liver transplant, history of bile leak, HTN presents for annual exam. Doing well. Had FUO 10/2023, recovered. Took ciprofloxacin outpatient.     Review of Systems   Constitutional:  Negative for activity change, appetite change, chills, fatigue, fever and unexpected weight change.   HENT:  Negative for congestion, rhinorrhea and sore throat.    Eyes:  Negative for visual disturbance.   Respiratory:  Negative for shortness of breath.    Cardiovascular:  Negative for chest pain.   Gastrointestinal:  Negative for abdominal pain, diarrhea, nausea and vomiting.   Genitourinary:  Negative for difficulty urinating and dysuria.   Musculoskeletal:  Negative for arthralgias, back pain and myalgias.   Skin:  Negative for color change and rash.   Neurological:  Negative for dizziness, weakness and headaches.         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 Authorizing Provider   acetaminophen (TYLENOL) 500 MG tablet Take 500 mg by mouth every 4 to 6 hours as needed for Pain.   Yes Provider, Historical   carvediloL (COREG) 12.5 MG tablet Take 1 tablet (12.5 mg total) by mouth 2 (two) times daily with meals. 9/1/23  Yes Young Wesley MD   tacrolimus (PROGRAF) 1 MG Cap Take 1 capsule (1 mg total) by mouth every 12 (twelve) hours. 11/10/23 11/9/24 Yes Zoran Nobles MD   ursodioL (ACTIGALL) 250 mg Tab Take 1 tablet (250 mg total) by mouth 2 (two) times a day. 8/14/23  Yes Zoran Nobles MD   aspirin 81 MG Chew CHEW 1 tablet (81 mg total) by mouth once daily.  "6/28/22 7/6/23  Abida Arenas, NP   baclofen (LIORESAL) 10 MG tablet TAKE 1 TABLET(10 MG) BY MOUTH THREE TIMES DAILY AS NEEDED FOR HICCUPS  Patient not taking: No sig reported 4/6/22 6/17/22  Young Wesley MD   famotidine (PEPCID) 20 MG tablet Take 1 tablet (20 mg total) by mouth every evening. STOP 7/24/22 6/21/22 7/8/22  Sinan Cline MD   insulin aspart U-100 (NOVOLOG) 100 unit/mL (3 mL) InPn pen Inject 5 Units into the skin 3 (three) times daily with meals. + sliding scale.  MDD 30 units/d 7/16/22 7/27/22  Brinda Serrato MD   losartan (COZAAR) 50 MG tablet Take 1 tablet (50 mg total) by mouth once daily. 3/21/22 6/24/22  Pravin Maria MD   metFORMIN (GLUCOPHAGE) 500 MG tablet Take 1 tablet (500 mg total) by mouth 2 (two) times daily with meals. 7/8/22 7/8/22  Sinan Cline MD   tamsulosin (FLOMAX) 0.4 mg Cap Take 2 capsules (0.8 mg total) by mouth once daily. 7/9/22 7/27/22  Sinan Cline MD        Past medical history, surgical history, and family medical history reviewed and updated as appropriate.    Medications and allergies reviewed.     Objective:          Vitals:    01/05/24 0835   BP: 118/80   BP Location: Right arm   Patient Position: Sitting   Pulse: 62   SpO2: 97%   Weight: 85.1 kg (187 lb 9.8 oz)   Height: 5' 8.5" (1.74 m)     Body mass index is 28.11 kg/m².  Physical Exam  Constitutional:       General: He is not in acute distress.     Appearance: He is well-developed.   HENT:      Head: Normocephalic and atraumatic.      Nose: Nose normal.   Eyes:      General: No scleral icterus.     Extraocular Movements: Extraocular movements intact.   Neck:      Thyroid: No thyromegaly.      Vascular: No JVD.      Trachea: No tracheal deviation.   Cardiovascular:      Rate and Rhythm: Normal rate and regular rhythm.      Heart sounds: Normal heart sounds. No murmur heard.     No friction rub. No gallop.   Pulmonary:      Effort: Pulmonary effort is normal. No respiratory distress.      Breath " sounds: Normal breath sounds. No wheezing or rales.   Abdominal:      General: Bowel sounds are normal. There is no distension.      Palpations: Abdomen is soft. There is no mass.      Tenderness: There is no abdominal tenderness.   Musculoskeletal:         General: No tenderness. Normal range of motion.      Cervical back: Normal range of motion and neck supple.   Lymphadenopathy:      Cervical: No cervical adenopathy.   Skin:     General: Skin is warm and dry.      Findings: No rash.   Neurological:      Mental Status: He is alert and oriented to person, place, and time.      Cranial Nerves: No cranial nerve deficit.      Deep Tendon Reflexes: Reflexes normal.   Psychiatric:         Behavior: Behavior normal.         Lab Results   Component Value Date    WBC 5.77 12/19/2023    WBC 5.89 12/19/2023    HGB 16.2 12/19/2023    HGB 16.2 12/19/2023    HCT 45.8 12/19/2023    HCT 45.7 12/19/2023     (L) 12/19/2023     (L) 12/19/2023    CHOL 157 05/01/2023    TRIG 116 05/01/2023    HDL 44 05/01/2023    ALT 21 12/19/2023    ALT 23 12/19/2023    AST 14 12/19/2023    AST 15 12/19/2023     12/19/2023     12/19/2023    K 4.4 12/19/2023    K 4.5 12/19/2023     12/19/2023     12/19/2023    CREATININE 1.3 12/19/2023    CREATININE 1.3 12/19/2023    BUN 17 12/19/2023    BUN 18 12/19/2023    CO2 22 (L) 12/19/2023    CO2 23 12/19/2023    TSH 0.630 05/01/2023    PSA 0.68 03/24/2022    INR 0.9 10/28/2023    HGBA1C 6.0 (H) 10/26/2023       Assessment:       1. Encounter for annual physical exam    2. Hilar cholangiocarcinoma    3. S/P liver transplant    4. Steroid-induced hyperglycemia    5. Essential hypertension    6. Screening for colon cancer due 2033    7. Need for vaccination for Strep pneumoniae    8. Secondary diabetes    9. Thrombocytopenia, unspecified    10. Primary hypertension    11. At risk for opportunistic infections    12. Hypercholesterolemia          Plan:     OSCAR was seen  today for follow-up.    Diagnoses and all orders for this visit:    Encounter for annual physical exam    Hilar cholangiocarcinoma    S/P liver transplant    Steroid-induced hyperglycemia    Essential hypertension  -     TSH; Future    Screening for colon cancer due 2033  Comments:  normal colonoscopy 12/27/2023    Need for vaccination for Strep pneumoniae  -     (In Office Administered) Pneumococcal Conjugate Vaccine (20 Valent) (IM) (Preferred)    Secondary diabetes  Comments:  last a1c 5.8, repeat a1c to monitor. not on medications.  Orders:  -     HEMOGLOBIN A1C; Future    Thrombocytopenia, unspecified    Primary hypertension  -     Lipid Panel; Future    At risk for opportunistic infections    Hypercholesterolemia  -     Lipid Panel; Future    Benign physical examination, no issues identified. Will obtain routine labwork and age appropriate health screenings.     Health maintenance reviewed with patient.     Follow up in about 6 months (around 7/5/2024).    Yves Moses MD  Internal Medicine / Primary Care  [facility]  1/5/2024

## 2024-01-08 ENCOUNTER — LAB VISIT (OUTPATIENT)
Dept: LAB | Facility: HOSPITAL | Age: 44
End: 2024-01-08
Attending: INTERNAL MEDICINE
Payer: COMMERCIAL

## 2024-01-08 DIAGNOSIS — E78.00 HYPERCHOLESTEROLEMIA: ICD-10-CM

## 2024-01-08 DIAGNOSIS — E13.9 SECONDARY DIABETES: ICD-10-CM

## 2024-01-08 DIAGNOSIS — I10 ESSENTIAL HYPERTENSION: ICD-10-CM

## 2024-01-08 DIAGNOSIS — Z94.4 LIVER REPLACED BY TRANSPLANT: ICD-10-CM

## 2024-01-08 DIAGNOSIS — I10 PRIMARY HYPERTENSION: Chronic | ICD-10-CM

## 2024-01-08 LAB
ALBUMIN SERPL BCP-MCNC: 4.1 G/DL (ref 3.5–5.2)
ALP SERPL-CCNC: 144 U/L (ref 55–135)
ALT SERPL W/O P-5'-P-CCNC: 41 U/L (ref 10–44)
ANION GAP SERPL CALC-SCNC: 10 MMOL/L (ref 8–16)
AST SERPL-CCNC: 26 U/L (ref 10–40)
BASOPHILS # BLD AUTO: 0.03 K/UL (ref 0–0.2)
BASOPHILS NFR BLD: 0.5 % (ref 0–1.9)
BILIRUB SERPL-MCNC: 1.6 MG/DL (ref 0.1–1)
BUN SERPL-MCNC: 22 MG/DL (ref 6–20)
CALCIUM SERPL-MCNC: 9.4 MG/DL (ref 8.7–10.5)
CHLORIDE SERPL-SCNC: 104 MMOL/L (ref 95–110)
CHOLEST SERPL-MCNC: 161 MG/DL (ref 120–199)
CHOLEST/HDLC SERPL: 4.7 {RATIO} (ref 2–5)
CO2 SERPL-SCNC: 24 MMOL/L (ref 23–29)
CREAT SERPL-MCNC: 1.4 MG/DL (ref 0.5–1.4)
DIFFERENTIAL METHOD BLD: ABNORMAL
EOSINOPHIL # BLD AUTO: 0.2 K/UL (ref 0–0.5)
EOSINOPHIL NFR BLD: 2.9 % (ref 0–8)
ERYTHROCYTE [DISTWIDTH] IN BLOOD BY AUTOMATED COUNT: 12.3 % (ref 11.5–14.5)
EST. GFR  (NO RACE VARIABLE): >60 ML/MIN/1.73 M^2
ESTIMATED AVG GLUCOSE: 126 MG/DL (ref 68–131)
GLUCOSE SERPL-MCNC: 115 MG/DL (ref 70–110)
HBA1C MFR BLD: 6 % (ref 4–5.6)
HCT VFR BLD AUTO: 47 % (ref 40–54)
HDLC SERPL-MCNC: 34 MG/DL (ref 40–75)
HDLC SERPL: 21.1 % (ref 20–50)
HGB BLD-MCNC: 15.9 G/DL (ref 14–18)
IMM GRANULOCYTES # BLD AUTO: 0.02 K/UL (ref 0–0.04)
IMM GRANULOCYTES NFR BLD AUTO: 0.4 % (ref 0–0.5)
LDLC SERPL CALC-MCNC: 91.4 MG/DL (ref 63–159)
LYMPHOCYTES # BLD AUTO: 1.5 K/UL (ref 1–4.8)
LYMPHOCYTES NFR BLD: 26.4 % (ref 18–48)
MCH RBC QN AUTO: 32.3 PG (ref 27–31)
MCHC RBC AUTO-ENTMCNC: 33.8 G/DL (ref 32–36)
MCV RBC AUTO: 96 FL (ref 82–98)
MONOCYTES # BLD AUTO: 0.6 K/UL (ref 0.3–1)
MONOCYTES NFR BLD: 11.5 % (ref 4–15)
NEUTROPHILS # BLD AUTO: 3.2 K/UL (ref 1.8–7.7)
NEUTROPHILS NFR BLD: 58.3 % (ref 38–73)
NONHDLC SERPL-MCNC: 127 MG/DL
NRBC BLD-RTO: 0 /100 WBC
PLATELET # BLD AUTO: 145 K/UL (ref 150–450)
PMV BLD AUTO: 9.4 FL (ref 9.2–12.9)
POTASSIUM SERPL-SCNC: 4.4 MMOL/L (ref 3.5–5.1)
PROT SERPL-MCNC: 6.9 G/DL (ref 6–8.4)
RBC # BLD AUTO: 4.92 M/UL (ref 4.6–6.2)
SODIUM SERPL-SCNC: 138 MMOL/L (ref 136–145)
TRIGL SERPL-MCNC: 178 MG/DL (ref 30–150)
TSH SERPL DL<=0.005 MIU/L-ACNC: 1.89 UIU/ML (ref 0.4–4)
WBC # BLD AUTO: 5.49 K/UL (ref 3.9–12.7)

## 2024-01-08 PROCEDURE — 80061 LIPID PANEL: CPT | Performed by: INTERNAL MEDICINE

## 2024-01-08 PROCEDURE — 80197 ASSAY OF TACROLIMUS: CPT | Performed by: INTERNAL MEDICINE

## 2024-01-08 PROCEDURE — 84443 ASSAY THYROID STIM HORMONE: CPT | Performed by: INTERNAL MEDICINE

## 2024-01-08 PROCEDURE — 83036 HEMOGLOBIN GLYCOSYLATED A1C: CPT | Performed by: INTERNAL MEDICINE

## 2024-01-08 PROCEDURE — 85025 COMPLETE CBC W/AUTO DIFF WBC: CPT | Performed by: INTERNAL MEDICINE

## 2024-01-08 PROCEDURE — 36415 COLL VENOUS BLD VENIPUNCTURE: CPT | Mod: PO | Performed by: INTERNAL MEDICINE

## 2024-01-08 PROCEDURE — 80053 COMPREHEN METABOLIC PANEL: CPT | Performed by: INTERNAL MEDICINE

## 2024-01-09 LAB — TACROLIMUS BLD-MCNC: 8.4 NG/ML (ref 5–15)

## 2024-01-11 ENCOUNTER — PATIENT MESSAGE (OUTPATIENT)
Dept: TRANSPLANT | Facility: CLINIC | Age: 44
End: 2024-01-11
Payer: COMMERCIAL

## 2024-01-11 ENCOUNTER — TELEPHONE (OUTPATIENT)
Dept: TRANSPLANT | Facility: CLINIC | Age: 44
End: 2024-01-11
Payer: COMMERCIAL

## 2024-01-11 DIAGNOSIS — Z94.4 LIVER REPLACED BY TRANSPLANT: Primary | ICD-10-CM

## 2024-01-11 NOTE — TELEPHONE ENCOUNTER
----- Message from Zoran Nobles MD sent at 1/10/2024  4:39 PM CST -----  Results reviewed. No action.

## 2024-01-11 NOTE — TELEPHONE ENCOUNTER
Patient notified and instructed via [employer]:    Your labs have been reviewed by ; no changes made. Repeat labs due 2/19/24. Thanks.

## 2024-02-20 ENCOUNTER — LAB VISIT (OUTPATIENT)
Dept: LAB | Facility: HOSPITAL | Age: 44
End: 2024-02-20
Attending: INTERNAL MEDICINE
Payer: COMMERCIAL

## 2024-02-20 DIAGNOSIS — Z94.4 LIVER REPLACED BY TRANSPLANT: ICD-10-CM

## 2024-02-20 LAB
ALBUMIN SERPL BCP-MCNC: 4.5 G/DL (ref 3.5–5.2)
ALP SERPL-CCNC: 142 U/L (ref 55–135)
ALT SERPL W/O P-5'-P-CCNC: 55 U/L (ref 10–44)
ANION GAP SERPL CALC-SCNC: 10 MMOL/L (ref 8–16)
AST SERPL-CCNC: 36 U/L (ref 10–40)
BASOPHILS # BLD AUTO: 0.02 K/UL (ref 0–0.2)
BASOPHILS NFR BLD: 0.3 % (ref 0–1.9)
BILIRUB SERPL-MCNC: 2.3 MG/DL (ref 0.1–1)
BUN SERPL-MCNC: 19 MG/DL (ref 6–20)
CALCIUM SERPL-MCNC: 10.4 MG/DL (ref 8.7–10.5)
CHLORIDE SERPL-SCNC: 105 MMOL/L (ref 95–110)
CO2 SERPL-SCNC: 26 MMOL/L (ref 23–29)
CREAT SERPL-MCNC: 1.2 MG/DL (ref 0.5–1.4)
DIFFERENTIAL METHOD BLD: ABNORMAL
EOSINOPHIL # BLD AUTO: 0.1 K/UL (ref 0–0.5)
EOSINOPHIL NFR BLD: 2.1 % (ref 0–8)
ERYTHROCYTE [DISTWIDTH] IN BLOOD BY AUTOMATED COUNT: 11.8 % (ref 11.5–14.5)
EST. GFR  (NO RACE VARIABLE): >60 ML/MIN/1.73 M^2
GLUCOSE SERPL-MCNC: 100 MG/DL (ref 70–110)
HCT VFR BLD AUTO: 50.9 % (ref 40–54)
HGB BLD-MCNC: 17.2 G/DL (ref 14–18)
IMM GRANULOCYTES # BLD AUTO: 0.02 K/UL (ref 0–0.04)
IMM GRANULOCYTES NFR BLD AUTO: 0.3 % (ref 0–0.5)
LYMPHOCYTES # BLD AUTO: 2.1 K/UL (ref 1–4.8)
LYMPHOCYTES NFR BLD: 30.7 % (ref 18–48)
MCH RBC QN AUTO: 32.3 PG (ref 27–31)
MCHC RBC AUTO-ENTMCNC: 33.8 G/DL (ref 32–36)
MCV RBC AUTO: 96 FL (ref 82–98)
MONOCYTES # BLD AUTO: 0.5 K/UL (ref 0.3–1)
MONOCYTES NFR BLD: 8 % (ref 4–15)
NEUTROPHILS # BLD AUTO: 4 K/UL (ref 1.8–7.7)
NEUTROPHILS NFR BLD: 58.6 % (ref 38–73)
NRBC BLD-RTO: 0 /100 WBC
PLATELET # BLD AUTO: 170 K/UL (ref 150–450)
PMV BLD AUTO: 9 FL (ref 9.2–12.9)
POTASSIUM SERPL-SCNC: 4.6 MMOL/L (ref 3.5–5.1)
PROT SERPL-MCNC: 7.7 G/DL (ref 6–8.4)
RBC # BLD AUTO: 5.33 M/UL (ref 4.6–6.2)
SODIUM SERPL-SCNC: 141 MMOL/L (ref 136–145)
WBC # BLD AUTO: 6.78 K/UL (ref 3.9–12.7)

## 2024-02-20 PROCEDURE — 80197 ASSAY OF TACROLIMUS: CPT | Performed by: INTERNAL MEDICINE

## 2024-02-20 PROCEDURE — 80053 COMPREHEN METABOLIC PANEL: CPT | Performed by: INTERNAL MEDICINE

## 2024-02-20 PROCEDURE — 36415 COLL VENOUS BLD VENIPUNCTURE: CPT | Performed by: INTERNAL MEDICINE

## 2024-02-20 PROCEDURE — 85025 COMPLETE CBC W/AUTO DIFF WBC: CPT | Performed by: INTERNAL MEDICINE

## 2024-02-21 ENCOUNTER — TELEPHONE (OUTPATIENT)
Dept: TRANSPLANT | Facility: CLINIC | Age: 44
End: 2024-02-21
Payer: COMMERCIAL

## 2024-02-21 ENCOUNTER — PATIENT MESSAGE (OUTPATIENT)
Dept: TRANSPLANT | Facility: CLINIC | Age: 44
End: 2024-02-21
Payer: COMMERCIAL

## 2024-02-21 DIAGNOSIS — Z94.4 LIVER REPLACED BY TRANSPLANT: Primary | ICD-10-CM

## 2024-02-21 LAB — TACROLIMUS BLD-MCNC: 7.6 NG/ML (ref 5–15)

## 2024-02-21 NOTE — TELEPHONE ENCOUNTER
Patient notified and instructed via [employer];    Your labs have been reviewed by ; no changes made . He would like you to have repeat labs in 2 weeks (due 3/4/24). Thanks.

## 2024-02-21 NOTE — TELEPHONE ENCOUNTER
----- Message from Zoran Nobles MD sent at 2/21/2024  1:45 PM CST -----  Results reviewed. Repeat labs and get a repeat CMP and direct bili in 2 weeks.

## 2024-03-04 ENCOUNTER — LAB VISIT (OUTPATIENT)
Dept: LAB | Facility: HOSPITAL | Age: 44
End: 2024-03-04
Attending: INTERNAL MEDICINE
Payer: COMMERCIAL

## 2024-03-04 DIAGNOSIS — Z94.4 LIVER REPLACED BY TRANSPLANT: ICD-10-CM

## 2024-03-04 LAB
ALBUMIN SERPL BCP-MCNC: 4.1 G/DL (ref 3.5–5.2)
ALP SERPL-CCNC: 166 U/L (ref 55–135)
ALT SERPL W/O P-5'-P-CCNC: 63 U/L (ref 10–44)
ANION GAP SERPL CALC-SCNC: 9 MMOL/L (ref 8–16)
AST SERPL-CCNC: 39 U/L (ref 10–40)
BASOPHILS # BLD AUTO: 0.03 K/UL (ref 0–0.2)
BASOPHILS NFR BLD: 0.5 % (ref 0–1.9)
BILIRUB DIRECT SERPL-MCNC: 0.3 MG/DL (ref 0.1–0.3)
BILIRUB SERPL-MCNC: 1.1 MG/DL (ref 0.1–1)
BUN SERPL-MCNC: 19 MG/DL (ref 6–20)
CALCIUM SERPL-MCNC: 9.5 MG/DL (ref 8.7–10.5)
CHLORIDE SERPL-SCNC: 105 MMOL/L (ref 95–110)
CO2 SERPL-SCNC: 25 MMOL/L (ref 23–29)
CREAT SERPL-MCNC: 1.2 MG/DL (ref 0.5–1.4)
DIFFERENTIAL METHOD BLD: ABNORMAL
EOSINOPHIL # BLD AUTO: 0.2 K/UL (ref 0–0.5)
EOSINOPHIL NFR BLD: 2.5 % (ref 0–8)
ERYTHROCYTE [DISTWIDTH] IN BLOOD BY AUTOMATED COUNT: 11.9 % (ref 11.5–14.5)
EST. GFR  (NO RACE VARIABLE): >60 ML/MIN/1.73 M^2
GLUCOSE SERPL-MCNC: 125 MG/DL (ref 70–110)
HCT VFR BLD AUTO: 47.2 % (ref 40–54)
HGB BLD-MCNC: 15.9 G/DL (ref 14–18)
IMM GRANULOCYTES # BLD AUTO: 0.01 K/UL (ref 0–0.04)
IMM GRANULOCYTES NFR BLD AUTO: 0.2 % (ref 0–0.5)
LYMPHOCYTES # BLD AUTO: 1.5 K/UL (ref 1–4.8)
LYMPHOCYTES NFR BLD: 24.8 % (ref 18–48)
MCH RBC QN AUTO: 32.2 PG (ref 27–31)
MCHC RBC AUTO-ENTMCNC: 33.7 G/DL (ref 32–36)
MCV RBC AUTO: 96 FL (ref 82–98)
MONOCYTES # BLD AUTO: 0.7 K/UL (ref 0.3–1)
MONOCYTES NFR BLD: 11.4 % (ref 4–15)
NEUTROPHILS # BLD AUTO: 3.7 K/UL (ref 1.8–7.7)
NEUTROPHILS NFR BLD: 60.6 % (ref 38–73)
NRBC BLD-RTO: 0 /100 WBC
PLATELET # BLD AUTO: 130 K/UL (ref 150–450)
PMV BLD AUTO: 8.8 FL (ref 9.2–12.9)
POTASSIUM SERPL-SCNC: 4.8 MMOL/L (ref 3.5–5.1)
PROT SERPL-MCNC: 6.9 G/DL (ref 6–8.4)
RBC # BLD AUTO: 4.94 M/UL (ref 4.6–6.2)
SODIUM SERPL-SCNC: 139 MMOL/L (ref 136–145)
TACROLIMUS BLD-MCNC: 9.2 NG/ML (ref 5–15)
WBC # BLD AUTO: 6.06 K/UL (ref 3.9–12.7)

## 2024-03-04 PROCEDURE — 82248 BILIRUBIN DIRECT: CPT | Performed by: INTERNAL MEDICINE

## 2024-03-04 PROCEDURE — 36415 COLL VENOUS BLD VENIPUNCTURE: CPT | Performed by: INTERNAL MEDICINE

## 2024-03-04 PROCEDURE — 80053 COMPREHEN METABOLIC PANEL: CPT | Performed by: INTERNAL MEDICINE

## 2024-03-04 PROCEDURE — 80197 ASSAY OF TACROLIMUS: CPT | Performed by: INTERNAL MEDICINE

## 2024-03-04 PROCEDURE — 85025 COMPLETE CBC W/AUTO DIFF WBC: CPT | Performed by: INTERNAL MEDICINE

## 2024-03-05 ENCOUNTER — TELEPHONE (OUTPATIENT)
Dept: TRANSPLANT | Facility: CLINIC | Age: 44
End: 2024-03-05
Payer: COMMERCIAL

## 2024-03-05 ENCOUNTER — PATIENT MESSAGE (OUTPATIENT)
Dept: TRANSPLANT | Facility: CLINIC | Age: 44
End: 2024-03-05
Payer: COMMERCIAL

## 2024-03-05 DIAGNOSIS — Z94.4 LIVER REPLACED BY TRANSPLANT: Primary | ICD-10-CM

## 2024-03-05 DIAGNOSIS — C22.1 CHOLANGIOCARCINOMA: ICD-10-CM

## 2024-03-05 NOTE — TELEPHONE ENCOUNTER
----- Message from Zoran Nobles MD sent at 3/4/2024 11:41 AM CST -----  Results reviewed. No action.

## 2024-03-05 NOTE — TELEPHONE ENCOUNTER
Patient notified and instructed via [employer]:    Your labs have been reviewed by ; no changes made. Repeat labs due 4/22/24. Thanks.

## 2024-03-08 ENCOUNTER — PATIENT MESSAGE (OUTPATIENT)
Dept: TRANSPLANT | Facility: CLINIC | Age: 44
End: 2024-03-08
Payer: COMMERCIAL

## 2024-03-12 ENCOUNTER — PATIENT MESSAGE (OUTPATIENT)
Dept: HEMATOLOGY/ONCOLOGY | Facility: CLINIC | Age: 44
End: 2024-03-12
Payer: COMMERCIAL

## 2024-03-15 ENCOUNTER — LAB VISIT (OUTPATIENT)
Dept: LAB | Facility: HOSPITAL | Age: 44
End: 2024-03-15
Attending: INTERNAL MEDICINE
Payer: COMMERCIAL

## 2024-03-15 ENCOUNTER — PATIENT MESSAGE (OUTPATIENT)
Dept: INTERNAL MEDICINE | Facility: CLINIC | Age: 44
End: 2024-03-15
Payer: COMMERCIAL

## 2024-03-15 ENCOUNTER — TELEPHONE (OUTPATIENT)
Dept: TRANSPLANT | Facility: CLINIC | Age: 44
End: 2024-03-15
Payer: COMMERCIAL

## 2024-03-15 DIAGNOSIS — Z94.4 LIVER REPLACED BY TRANSPLANT: Primary | ICD-10-CM

## 2024-03-15 DIAGNOSIS — Z94.4 LIVER REPLACED BY TRANSPLANT: ICD-10-CM

## 2024-03-15 LAB
ALBUMIN SERPL BCP-MCNC: 3.8 G/DL (ref 3.5–5.2)
ALP SERPL-CCNC: 175 U/L (ref 55–135)
ALT SERPL W/O P-5'-P-CCNC: 279 U/L (ref 10–44)
ANION GAP SERPL CALC-SCNC: 8 MMOL/L (ref 8–16)
AST SERPL-CCNC: 122 U/L (ref 10–40)
BASOPHILS # BLD AUTO: 0.02 K/UL (ref 0–0.2)
BASOPHILS NFR BLD: 0.3 % (ref 0–1.9)
BILIRUB DIRECT SERPL-MCNC: 2.1 MG/DL (ref 0.1–0.3)
BILIRUB SERPL-MCNC: 3.7 MG/DL (ref 0.1–1)
BUN SERPL-MCNC: 18 MG/DL (ref 6–20)
CALCIUM SERPL-MCNC: 9.9 MG/DL (ref 8.7–10.5)
CHLORIDE SERPL-SCNC: 106 MMOL/L (ref 95–110)
CO2 SERPL-SCNC: 24 MMOL/L (ref 23–29)
CREAT SERPL-MCNC: 1.4 MG/DL (ref 0.5–1.4)
DIFFERENTIAL METHOD BLD: ABNORMAL
EOSINOPHIL # BLD AUTO: 0.1 K/UL (ref 0–0.5)
EOSINOPHIL NFR BLD: 2.3 % (ref 0–8)
ERYTHROCYTE [DISTWIDTH] IN BLOOD BY AUTOMATED COUNT: 12.4 % (ref 11.5–14.5)
EST. GFR  (NO RACE VARIABLE): >60 ML/MIN/1.73 M^2
GLUCOSE SERPL-MCNC: 132 MG/DL (ref 70–110)
HCT VFR BLD AUTO: 46.8 % (ref 40–54)
HGB BLD-MCNC: 15.9 G/DL (ref 14–18)
IMM GRANULOCYTES # BLD AUTO: 0.01 K/UL (ref 0–0.04)
IMM GRANULOCYTES NFR BLD AUTO: 0.2 % (ref 0–0.5)
LYMPHOCYTES # BLD AUTO: 1 K/UL (ref 1–4.8)
LYMPHOCYTES NFR BLD: 16.9 % (ref 18–48)
MCH RBC QN AUTO: 32.5 PG (ref 27–31)
MCHC RBC AUTO-ENTMCNC: 34 G/DL (ref 32–36)
MCV RBC AUTO: 96 FL (ref 82–98)
MONOCYTES # BLD AUTO: 0.5 K/UL (ref 0.3–1)
MONOCYTES NFR BLD: 9.4 % (ref 4–15)
NEUTROPHILS # BLD AUTO: 4.1 K/UL (ref 1.8–7.7)
NEUTROPHILS NFR BLD: 70.9 % (ref 38–73)
NRBC BLD-RTO: 0 /100 WBC
PLATELET # BLD AUTO: 134 K/UL (ref 150–450)
PMV BLD AUTO: 9.3 FL (ref 9.2–12.9)
POTASSIUM SERPL-SCNC: 4.8 MMOL/L (ref 3.5–5.1)
PROT SERPL-MCNC: 7 G/DL (ref 6–8.4)
RBC # BLD AUTO: 4.89 M/UL (ref 4.6–6.2)
SODIUM SERPL-SCNC: 138 MMOL/L (ref 136–145)
WBC # BLD AUTO: 5.73 K/UL (ref 3.9–12.7)

## 2024-03-15 PROCEDURE — 36415 COLL VENOUS BLD VENIPUNCTURE: CPT | Mod: PO | Performed by: INTERNAL MEDICINE

## 2024-03-15 PROCEDURE — 80197 ASSAY OF TACROLIMUS: CPT | Performed by: INTERNAL MEDICINE

## 2024-03-15 PROCEDURE — 80053 COMPREHEN METABOLIC PANEL: CPT | Performed by: INTERNAL MEDICINE

## 2024-03-15 PROCEDURE — 85025 COMPLETE CBC W/AUTO DIFF WBC: CPT | Performed by: INTERNAL MEDICINE

## 2024-03-15 PROCEDURE — 82248 BILIRUBIN DIRECT: CPT | Performed by: INTERNAL MEDICINE

## 2024-03-15 NOTE — TELEPHONE ENCOUNTER
Message received from patient via portal:    Can I do labs this morning?     I started with itchiness yesterday like in '21 and it concerns me      Patient reported he has not yet taken his morning Prograf dose.   Will go to the lab now.

## 2024-03-16 LAB — TACROLIMUS BLD-MCNC: 6.3 NG/ML (ref 5–15)

## 2024-03-18 ENCOUNTER — PATIENT MESSAGE (OUTPATIENT)
Dept: TRANSPLANT | Facility: CLINIC | Age: 44
End: 2024-03-18
Payer: COMMERCIAL

## 2024-03-18 ENCOUNTER — INFUSION (OUTPATIENT)
Dept: INFUSION THERAPY | Facility: HOSPITAL | Age: 44
End: 2024-03-18
Attending: PHYSICIAN ASSISTANT
Payer: COMMERCIAL

## 2024-03-18 ENCOUNTER — TELEPHONE (OUTPATIENT)
Dept: TRANSPLANT | Facility: CLINIC | Age: 44
End: 2024-03-18
Payer: COMMERCIAL

## 2024-03-18 ENCOUNTER — HOSPITAL ENCOUNTER (OUTPATIENT)
Dept: RADIOLOGY | Facility: HOSPITAL | Age: 44
Discharge: HOME | End: 2024-03-18
Attending: INTERNAL MEDICINE
Payer: COMMERCIAL

## 2024-03-18 VITALS — DIASTOLIC BLOOD PRESSURE: 103 MMHG | HEART RATE: 68 BPM | SYSTOLIC BLOOD PRESSURE: 150 MMHG

## 2024-03-18 DIAGNOSIS — C24.0 HILAR CHOLANGIOCARCINOMA: ICD-10-CM

## 2024-03-18 DIAGNOSIS — R79.89 ELEVATED LFTS: Primary | ICD-10-CM

## 2024-03-18 DIAGNOSIS — C24.0 HILAR CHOLANGIOCARCINOMA: Primary | ICD-10-CM

## 2024-03-18 DIAGNOSIS — Z45.2 ADJUSTMENT AND MANAGEMENT OF VASCULAR ACCESS DEVICE: ICD-10-CM

## 2024-03-18 PROCEDURE — 74177 CT ABD & PELVIS W/CONTRAST: CPT | Mod: 26,,, | Performed by: RADIOLOGY

## 2024-03-18 PROCEDURE — 71260 CT THORAX DX C+: CPT | Mod: 26,,, | Performed by: RADIOLOGY

## 2024-03-18 PROCEDURE — 25500020 PHARM REV CODE 255: Mod: PO | Performed by: INTERNAL MEDICINE

## 2024-03-18 PROCEDURE — 74177 CT ABD & PELVIS W/CONTRAST: CPT | Mod: TC,PO

## 2024-03-18 PROCEDURE — 96523 IRRIG DRUG DELIVERY DEVICE: CPT | Mod: PN

## 2024-03-18 PROCEDURE — A9698 NON-RAD CONTRAST MATERIALNOC: HCPCS | Mod: PO | Performed by: INTERNAL MEDICINE

## 2024-03-18 PROCEDURE — 63600175 PHARM REV CODE 636 W HCPCS: Mod: PN | Performed by: INTERNAL MEDICINE

## 2024-03-18 RX ORDER — TACROLIMUS 1 MG/1
2 CAPSULE ORAL EVERY 12 HOURS
Qty: 120 CAPSULE | Refills: 11 | Status: SHIPPED | OUTPATIENT
Start: 2024-03-18 | End: 2024-04-24 | Stop reason: DRUGHIGH

## 2024-03-18 RX ORDER — HEPARIN 100 UNIT/ML
500 SYRINGE INTRAVENOUS
Status: DISCONTINUED | OUTPATIENT
Start: 2024-03-18 | End: 2024-03-18 | Stop reason: HOSPADM

## 2024-03-18 RX ORDER — SODIUM CHLORIDE 0.9 % (FLUSH) 0.9 %
10 SYRINGE (ML) INJECTION
Status: DISCONTINUED | OUTPATIENT
Start: 2024-03-18 | End: 2024-03-18 | Stop reason: HOSPADM

## 2024-03-18 RX ADMIN — IOHEXOL 1000 ML: 9 SOLUTION ORAL at 09:03

## 2024-03-18 RX ADMIN — Medication 500 UNITS: at 08:03

## 2024-03-18 RX ADMIN — IOHEXOL 100 ML: 350 INJECTION, SOLUTION INTRAVENOUS at 09:03

## 2024-03-18 NOTE — TELEPHONE ENCOUNTER
Patient notified and instructed via [facility]: increase Prograf dose to 2mg twice a day, needs urgent liver ultrasound(will schedule).

## 2024-03-18 NOTE — TELEPHONE ENCOUNTER
Message received from patient via [facility]:    I feel much better today than Thursday with itchiness and labs today should show. Let's wait till after ct and labs to schedule anything. The cold I had made me feel like garbage most of last week but no fever higher than 100

## 2024-03-18 NOTE — TELEPHONE ENCOUNTER
----- Message from Zoran Nobles MD sent at 3/18/2024  8:19 AM CDT -----  Results reviewed. Increase tac to 2/2. Need urgent doppler ultrasound and biopsy. Labs twice weekely.

## 2024-03-18 NOTE — PLAN OF CARE
Pt arrived to clinic today for port flush and tolerated well. No changes throughout therapy. Pt aware of follow up appointments. Discharged to home. NAD.

## 2024-03-19 ENCOUNTER — TELEPHONE (OUTPATIENT)
Dept: TRANSPLANT | Facility: CLINIC | Age: 44
End: 2024-03-19
Payer: COMMERCIAL

## 2024-03-19 ENCOUNTER — OFFICE VISIT (OUTPATIENT)
Dept: HEMATOLOGY/ONCOLOGY | Facility: CLINIC | Age: 44
End: 2024-03-19
Payer: COMMERCIAL

## 2024-03-19 ENCOUNTER — HOSPITAL ENCOUNTER (OUTPATIENT)
Dept: RADIOLOGY | Facility: HOSPITAL | Age: 44
Discharge: HOME | End: 2024-03-19
Attending: INTERNAL MEDICINE
Payer: COMMERCIAL

## 2024-03-19 ENCOUNTER — PATIENT MESSAGE (OUTPATIENT)
Dept: TRANSPLANT | Facility: CLINIC | Age: 44
End: 2024-03-19
Payer: COMMERCIAL

## 2024-03-19 VITALS
DIASTOLIC BLOOD PRESSURE: 96 MMHG | OXYGEN SATURATION: 98 % | HEART RATE: 75 BPM | SYSTOLIC BLOOD PRESSURE: 139 MMHG | TEMPERATURE: 98 F | BODY MASS INDEX: 27.51 KG/M2 | HEIGHT: 69 IN | WEIGHT: 185.75 LBS

## 2024-03-19 DIAGNOSIS — Z79.60 LONG-TERM USE OF IMMUNOSUPPRESSANT MEDICATION: ICD-10-CM

## 2024-03-19 DIAGNOSIS — R79.89 ELEVATED LFTS: ICD-10-CM

## 2024-03-19 DIAGNOSIS — I10 ESSENTIAL HYPERTENSION: ICD-10-CM

## 2024-03-19 DIAGNOSIS — Z94.4 S/P LIVER TRANSPLANT: ICD-10-CM

## 2024-03-19 DIAGNOSIS — Z94.4 LIVER REPLACED BY TRANSPLANT: Primary | ICD-10-CM

## 2024-03-19 DIAGNOSIS — C24.0 HILAR CHOLANGIOCARCINOMA: Primary | ICD-10-CM

## 2024-03-19 PROCEDURE — 3044F HG A1C LEVEL LT 7.0%: CPT | Mod: CPTII,S$GLB,, | Performed by: INTERNAL MEDICINE

## 2024-03-19 PROCEDURE — 99214 OFFICE O/P EST MOD 30 MIN: CPT | Mod: S$GLB,,, | Performed by: INTERNAL MEDICINE

## 2024-03-19 PROCEDURE — 3080F DIAST BP >= 90 MM HG: CPT | Mod: CPTII,S$GLB,, | Performed by: INTERNAL MEDICINE

## 2024-03-19 PROCEDURE — 3075F SYST BP GE 130 - 139MM HG: CPT | Mod: CPTII,S$GLB,, | Performed by: INTERNAL MEDICINE

## 2024-03-19 PROCEDURE — 93976 VASCULAR STUDY: CPT | Mod: 26,,, | Performed by: RADIOLOGY

## 2024-03-19 PROCEDURE — 1159F MED LIST DOCD IN RCRD: CPT | Mod: CPTII,S$GLB,, | Performed by: INTERNAL MEDICINE

## 2024-03-19 PROCEDURE — 76705 ECHO EXAM OF ABDOMEN: CPT | Mod: 26,59,, | Performed by: RADIOLOGY

## 2024-03-19 PROCEDURE — 1160F RVW MEDS BY RX/DR IN RCRD: CPT | Mod: CPTII,S$GLB,, | Performed by: INTERNAL MEDICINE

## 2024-03-19 PROCEDURE — 99999 PR PBB SHADOW E&M-EST. PATIENT-LVL IV: CPT | Mod: PBBFAC,,, | Performed by: INTERNAL MEDICINE

## 2024-03-19 PROCEDURE — G2211 COMPLEX E/M VISIT ADD ON: HCPCS | Mod: S$GLB,,, | Performed by: INTERNAL MEDICINE

## 2024-03-19 PROCEDURE — 3008F BODY MASS INDEX DOCD: CPT | Mod: CPTII,S$GLB,, | Performed by: INTERNAL MEDICINE

## 2024-03-19 PROCEDURE — 76705 ECHO EXAM OF ABDOMEN: CPT | Mod: TC,PO

## 2024-03-19 NOTE — PROGRESS NOTES
"                                                         PROGRESS NOTE    Subjective:       Patient ID: OSCAR LARSON is a 43 y.o. male.    Chief Complaint: follow up for hilar cholangiocarcinoma    Diagnosis:  Yp T2N0M0 hilar cholangiocarcinoma, s/p neoadjuvant chemoradiation, chemotherapy and liver transplant on 6/21/2022    Oncologic History:  1. Mr Larson is a 42 yo man who presents today for further management of suspected hilar cholangiocarcinoma. He presented with dark urine, abdominal pain and jaundice since August 2021. He presented to outside hospital ER with elevated bilirubin. MRCP was performed demonstrating bi-lobar intrahepatic bile duct dilation and a ~2cm ill defined mass at the hilum concerning for hilar cholangiocarcinoma. He was referred to the advanced endoscopy group at [facility]. ERCP confirmed severe, malignant appearing stricture involving right and left main hepatic ducts. Biliary stents were placed to relieve obstruction. EUS was performed. It showed an irregular hypoechoic mass where the hepatic duct bifurcates into the right and left hepatic ducts. The mass measured 11.4 mm by 11.3 mm in maximal cross-sectional diameter. FNA sampling of hilar lymph nodes was negative for metastatic disease. Bile duct biopsy showed "rare groups of glandular epithelium with mild atypia, strips of benign glandular epithelium intermixed with blood clot, and focal fibrous tissue with chronic inflammation."  CT C/A/P 10/27/21:  1. Intrahepatic biliary dilatation despite the presence of 2 biliary drains.  The patient is recent comparison MRI a revealed a possible central hepatic mass, concerning for either cholangiocarcinoma or hepatocellular carcinoma.  This is, however, not definitively demonstrated on today's exam.  There are enlarged lymph nodes present in the vicinity of the ana cristina hepatis and celiac axis as detailed above.  2. Scattered pulmonary nodules measuring up to 6 mm.  3. Other findings as detailed " "above.  He presents today for further management. Seen by Dr Jara and considered a candidate for liver transplant. Seen by Dr Cunningham last Friday. Scheduled for PET this Wednesday. 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   Discussed neoadjuvant chemoradiation with 5FU followed by neoadjuvant cis/gem prior to liver transplant.   2. Hospitalized 11/3/21-21 for fever 2/2 acute cholangitis. Repeat ERCP biopsy on 21 again non-diagnostic. Biopsy of the celiac lymph node was negative.   3. PET scan 11/3/21: "1. Wedge-shaped geographic hypermetabolic activity within the right lobe of the liver in a similar distribution to the intrahepatic biliary dilatation.  This could relate to inflammation from multiple etiologies including biliary stasis, cholangitis, and obstructive dilatation.  Neoplastic involvement would be difficult to exclude.  The liver is poorly evaluated given background activity. 2. Hypermetabolic lymphadenopathy is noted in a periportal and celiac distribution.  Infectious inflammatory and neoplastic etiologies are on the differential.  Index nodes have been measured. 3. Multiple pulmonary nodules are noted too small to categorize by PET-CT fusion as evidence seen on a prior CT of 10/27/2021.  CT for follow-up of size stability is necessary."  4. Completed chemoradiation with 5FU from . Started cis/gem on 22. Completed neoadjuvant chemotherapy  5. Underwent living donor liver transplant on 2022. Pathology showed ypT2N0 well to moderately differentiated cholangiocarcinoma of the perihilar bile ducts, with invasion into the perihilar soft tissue. No LVI or PNI. Surgical margins are negative for carcinoma. Two lymph nodes, negative for carcinoma (0/2).     Interval History:   Mr Larson returns for follow up. He is feeling better. Had a cold last week and turned jaundiced. It is getting better.     ECO    ROS:   A ten-point system review is obtained and negative except for what " was stated in the Interval History.     Physical Examination:   Vital signs reviewed.   General: well hydrated, well developed, in no acute distress  HEENT: normocephalic, PERRLA, EOMI, anicteric sclerae  Neck: supple, no JVD, thyromegaly, cervical or supraclavicular lymphadenopathy  Lungs: clear breath sounds bilaterally, no wheezing, rales, or rhonchi  Chest: port site clean  Heart: RRR, no M/R/G  Abdomen: soft, no tenderness, non-distended, no hepatosplenomegaly, mass, or hernia. BS present  Extremities: no clubbing, cyanosis, or edema  Skin: no rash, ulcer, or open wounds  Neuro: alert and oriented x 4, no focal neuro deficit  Psych: pleasant and appropriate mood and affect    Objective:     Laboratory Data:  Labs reviewed. CA 19-9 normal     Imaging Data:  CT C/A/P 3/18/24:  Impression:     1. 2 benign-appearing right upper lobe nodules less than 5 mm in diameter which are unchanged in size and appearance.  No new nodules have developed.  2. No definitive evidence of metastatic disease.  3. Status post liver transplant with no evidence of complication.  4. Atherosclerotic calcification of the left anterior descending coronary artery.           Assessment and Plan:     1. Hilar cholangiocarcinoma    2. S/P liver transplant    3. Long-term use of immunosuppressant medication    4. Essential hypertension        1.  - Mr Larson is a 42 yo man with stage I hilar cholangiocarcinoma. Biopsy non-diagnostic but clinical picture most consistent with hilar cholangiocarcinoma. He is a candidate for liver transplant. Completed chemoradiation with 5FU from 11/29/21-1/5/2022. Started cis/gem on 1/19/22. Completed neoadjuvant chemotherapy  - Underwent living donor liver transplant on 6/21/2022. Pathology showed ypT2N0 well to moderately differentiated cholangiocarcinoma of the perihilar bile ducts, with invasion into the perihilar soft tissue. No LVI or PNI. Surgical margins are negative for carcinoma. Two lymph nodes, negative  for carcinoma (0/2).   - doing well  - reviewed test results with patient. CA 19-9 normal. DERRICK on CT scan  - return in 3 months with labs and surveillance scan in [place]. Flush port in [place]    2.3  - f/u with liver transplant team    4.  - doing okay  - f/u with PCP    Follow-up:     RTC in 3 months  Knows to call in the interval if any problems arise.    Electronically signed by Young Wesley      Route Chart for Scheduling    Med Onc Chart Routing      Follow up with physician 3 months. flush port in [place] every 3 months. see me in 3 months with CBC, CMP, CA 19-9, CT C/A/P done in [place] 2 days prior to return   Follow up with JOSEPH    Infusion scheduling note    Injection scheduling note flush port in [place] every 3 months   Labs CBC, CMP and CA 19-9   Scheduling:  Preferred lab: [place]  Lab interval:     Imaging CT chest abdomen pelvis   in 3 months   Pharmacy appointment    Other referrals              Supportive Plan Information  OP FILGRASTIM 480 MCG   Young Wesley MD   Upcoming Treatment Dates - OP FILGRASTIM 480 MCG    2/9/2022       Medications       filgrastim-sndz (ZARXIO) injection 480 mcg/0.8 mL (Preferred Regimen)  2/10/2022       Medications       filgrastim-sndz (ZARXIO) injection 480 mcg/0.8 mL (Preferred Regimen)  2/11/2022       Medications       filgrastim-sndz (ZARXIO) injection 480 mcg/0.8 mL (Preferred Regimen)  2/12/2022       Medications       filgrastim-sndz (ZARXIO) injection 480 mcg/0.8 mL (Preferred Regimen)    Therapy Plan Information  Flushes  heparin, porcine (PF) 100 unit/mL injection flush 500 Units  500 Units, Intravenous, On the 4th Fri of every 1 month  sodium chloride 0.9% flush 10 mL  10 mL, Intravenous, On the 4th Fri of every 1 month

## 2024-03-19 NOTE — TELEPHONE ENCOUNTER
Patient notified and instructed via [employer];    Your ultrasound has been reviewed by ; no orders received. Repeat labs on Monday 3/25/24. Thanks.

## 2024-03-19 NOTE — TELEPHONE ENCOUNTER
----- Message from Zoran Nobles MD sent at 3/19/2024  2:20 PM CDT -----  Results reviewed. No action.

## 2024-03-25 ENCOUNTER — LAB VISIT (OUTPATIENT)
Dept: LAB | Facility: HOSPITAL | Age: 44
End: 2024-03-25
Attending: INTERNAL MEDICINE
Payer: COMMERCIAL

## 2024-03-25 DIAGNOSIS — Z94.4 LIVER REPLACED BY TRANSPLANT: ICD-10-CM

## 2024-03-25 LAB
ALBUMIN SERPL BCP-MCNC: 4 G/DL (ref 3.5–5.2)
ALP SERPL-CCNC: 165 U/L (ref 55–135)
ALT SERPL W/O P-5'-P-CCNC: 77 U/L (ref 10–44)
ANION GAP SERPL CALC-SCNC: 7 MMOL/L (ref 8–16)
AST SERPL-CCNC: 36 U/L (ref 10–40)
BASOPHILS # BLD AUTO: 0.03 K/UL (ref 0–0.2)
BASOPHILS NFR BLD: 0.5 % (ref 0–1.9)
BILIRUB DIRECT SERPL-MCNC: 0.5 MG/DL (ref 0.1–0.3)
BILIRUB SERPL-MCNC: 1.4 MG/DL (ref 0.1–1)
BUN SERPL-MCNC: 21 MG/DL (ref 6–20)
CALCIUM SERPL-MCNC: 9.7 MG/DL (ref 8.7–10.5)
CHLORIDE SERPL-SCNC: 104 MMOL/L (ref 95–110)
CO2 SERPL-SCNC: 26 MMOL/L (ref 23–29)
CREAT SERPL-MCNC: 1.2 MG/DL (ref 0.5–1.4)
DIFFERENTIAL METHOD BLD: ABNORMAL
EOSINOPHIL # BLD AUTO: 0.2 K/UL (ref 0–0.5)
EOSINOPHIL NFR BLD: 3.2 % (ref 0–8)
ERYTHROCYTE [DISTWIDTH] IN BLOOD BY AUTOMATED COUNT: 12 % (ref 11.5–14.5)
EST. GFR  (NO RACE VARIABLE): >60 ML/MIN/1.73 M^2
GLUCOSE SERPL-MCNC: 127 MG/DL (ref 70–110)
HCT VFR BLD AUTO: 46.6 % (ref 40–54)
HGB BLD-MCNC: 15.8 G/DL (ref 14–18)
IMM GRANULOCYTES # BLD AUTO: 0.02 K/UL (ref 0–0.04)
IMM GRANULOCYTES NFR BLD AUTO: 0.4 % (ref 0–0.5)
LYMPHOCYTES # BLD AUTO: 1.6 K/UL (ref 1–4.8)
LYMPHOCYTES NFR BLD: 29.2 % (ref 18–48)
MCH RBC QN AUTO: 32.6 PG (ref 27–31)
MCHC RBC AUTO-ENTMCNC: 33.9 G/DL (ref 32–36)
MCV RBC AUTO: 96 FL (ref 82–98)
MONOCYTES # BLD AUTO: 0.4 K/UL (ref 0.3–1)
MONOCYTES NFR BLD: 7.9 % (ref 4–15)
NEUTROPHILS # BLD AUTO: 3.3 K/UL (ref 1.8–7.7)
NEUTROPHILS NFR BLD: 58.8 % (ref 38–73)
NRBC BLD-RTO: 0 /100 WBC
PLATELET # BLD AUTO: 166 K/UL (ref 150–450)
PMV BLD AUTO: 9 FL (ref 9.2–12.9)
POTASSIUM SERPL-SCNC: 4.3 MMOL/L (ref 3.5–5.1)
PROT SERPL-MCNC: 6.8 G/DL (ref 6–8.4)
RBC # BLD AUTO: 4.85 M/UL (ref 4.6–6.2)
SODIUM SERPL-SCNC: 137 MMOL/L (ref 136–145)
TACROLIMUS BLD-MCNC: 9.3 NG/ML (ref 5–15)
WBC # BLD AUTO: 5.55 K/UL (ref 3.9–12.7)

## 2024-03-25 PROCEDURE — 85025 COMPLETE CBC W/AUTO DIFF WBC: CPT | Performed by: INTERNAL MEDICINE

## 2024-03-25 PROCEDURE — 36415 COLL VENOUS BLD VENIPUNCTURE: CPT | Performed by: INTERNAL MEDICINE

## 2024-03-25 PROCEDURE — 80053 COMPREHEN METABOLIC PANEL: CPT | Performed by: INTERNAL MEDICINE

## 2024-03-25 PROCEDURE — 80197 ASSAY OF TACROLIMUS: CPT | Performed by: INTERNAL MEDICINE

## 2024-03-25 PROCEDURE — 82248 BILIRUBIN DIRECT: CPT | Performed by: INTERNAL MEDICINE

## 2024-03-26 ENCOUNTER — PATIENT MESSAGE (OUTPATIENT)
Dept: TRANSPLANT | Facility: CLINIC | Age: 44
End: 2024-03-26
Payer: COMMERCIAL

## 2024-03-26 ENCOUNTER — TELEPHONE (OUTPATIENT)
Dept: TRANSPLANT | Facility: CLINIC | Age: 44
End: 2024-03-26
Payer: COMMERCIAL

## 2024-03-26 NOTE — TELEPHONE ENCOUNTER
Patient notified and instructed via [employer]:    Your labs have been reviewed by ; no changes made. Repeat labs due 4/22/24. Thanks.

## 2024-03-26 NOTE — TELEPHONE ENCOUNTER
----- Message from Zoran Nobles MD sent at 3/26/2024  9:03 AM CDT -----  Results reviewed. No action.

## 2024-04-22 ENCOUNTER — LAB VISIT (OUTPATIENT)
Dept: LAB | Facility: HOSPITAL | Age: 44
End: 2024-04-22
Attending: INTERNAL MEDICINE
Payer: COMMERCIAL

## 2024-04-22 DIAGNOSIS — Z94.4 LIVER REPLACED BY TRANSPLANT: ICD-10-CM

## 2024-04-22 DIAGNOSIS — C22.1 CHOLANGIOCARCINOMA: ICD-10-CM

## 2024-04-22 LAB
ALBUMIN SERPL BCP-MCNC: 4.2 G/DL (ref 3.5–5.2)
ALP SERPL-CCNC: 143 U/L (ref 55–135)
ALT SERPL W/O P-5'-P-CCNC: 53 U/L (ref 10–44)
ANION GAP SERPL CALC-SCNC: 8 MMOL/L (ref 8–16)
AST SERPL-CCNC: 30 U/L (ref 10–40)
BASOPHILS # BLD AUTO: 0.01 K/UL (ref 0–0.2)
BASOPHILS NFR BLD: 0.2 % (ref 0–1.9)
BILIRUB DIRECT SERPL-MCNC: 0.6 MG/DL (ref 0.1–0.3)
BILIRUB SERPL-MCNC: 1.8 MG/DL (ref 0.1–1)
BUN SERPL-MCNC: 22 MG/DL (ref 6–20)
CALCIUM SERPL-MCNC: 9.9 MG/DL (ref 8.7–10.5)
CANCER AG19-9 SERPL-ACNC: 3 U/ML (ref 0–40)
CEA SERPL-MCNC: 1.8 NG/ML (ref 0–5)
CHLORIDE SERPL-SCNC: 105 MMOL/L (ref 95–110)
CO2 SERPL-SCNC: 27 MMOL/L (ref 23–29)
CREAT SERPL-MCNC: 1.3 MG/DL (ref 0.5–1.4)
DIFFERENTIAL METHOD BLD: ABNORMAL
EOSINOPHIL # BLD AUTO: 0.1 K/UL (ref 0–0.5)
EOSINOPHIL NFR BLD: 2.2 % (ref 0–8)
ERYTHROCYTE [DISTWIDTH] IN BLOOD BY AUTOMATED COUNT: 11.9 % (ref 11.5–14.5)
EST. GFR  (NO RACE VARIABLE): >60 ML/MIN/1.73 M^2
GLUCOSE SERPL-MCNC: 99 MG/DL (ref 70–110)
HCT VFR BLD AUTO: 46.3 % (ref 40–54)
HGB BLD-MCNC: 16 G/DL (ref 14–18)
IMM GRANULOCYTES # BLD AUTO: 0.01 K/UL (ref 0–0.04)
IMM GRANULOCYTES NFR BLD AUTO: 0.2 % (ref 0–0.5)
LYMPHOCYTES # BLD AUTO: 1.7 K/UL (ref 1–4.8)
LYMPHOCYTES NFR BLD: 34.3 % (ref 18–48)
MCH RBC QN AUTO: 32.7 PG (ref 27–31)
MCHC RBC AUTO-ENTMCNC: 34.6 G/DL (ref 32–36)
MCV RBC AUTO: 95 FL (ref 82–98)
MONOCYTES # BLD AUTO: 0.5 K/UL (ref 0.3–1)
MONOCYTES NFR BLD: 10.9 % (ref 4–15)
NEUTROPHILS # BLD AUTO: 2.6 K/UL (ref 1.8–7.7)
NEUTROPHILS NFR BLD: 52.2 % (ref 38–73)
NRBC BLD-RTO: 0 /100 WBC
PLATELET # BLD AUTO: 146 K/UL (ref 150–450)
PMV BLD AUTO: 9 FL (ref 9.2–12.9)
POTASSIUM SERPL-SCNC: 4.9 MMOL/L (ref 3.5–5.1)
PROT SERPL-MCNC: 7.1 G/DL (ref 6–8.4)
RBC # BLD AUTO: 4.89 M/UL (ref 4.6–6.2)
SODIUM SERPL-SCNC: 140 MMOL/L (ref 136–145)
TACROLIMUS BLD-MCNC: 10.6 NG/ML (ref 5–15)
WBC # BLD AUTO: 4.96 K/UL (ref 3.9–12.7)

## 2024-04-22 PROCEDURE — 82378 CARCINOEMBRYONIC ANTIGEN: CPT | Performed by: INTERNAL MEDICINE

## 2024-04-22 PROCEDURE — 82248 BILIRUBIN DIRECT: CPT | Performed by: INTERNAL MEDICINE

## 2024-04-22 PROCEDURE — 86301 IMMUNOASSAY TUMOR CA 19-9: CPT | Performed by: INTERNAL MEDICINE

## 2024-04-22 PROCEDURE — 36415 COLL VENOUS BLD VENIPUNCTURE: CPT | Performed by: INTERNAL MEDICINE

## 2024-04-22 PROCEDURE — 80053 COMPREHEN METABOLIC PANEL: CPT | Performed by: INTERNAL MEDICINE

## 2024-04-22 PROCEDURE — 85025 COMPLETE CBC W/AUTO DIFF WBC: CPT | Performed by: INTERNAL MEDICINE

## 2024-04-22 PROCEDURE — 80197 ASSAY OF TACROLIMUS: CPT | Performed by: INTERNAL MEDICINE

## 2024-04-23 ENCOUNTER — OFFICE VISIT (OUTPATIENT)
Dept: TRANSPLANT | Facility: CLINIC | Age: 44
End: 2024-04-23
Attending: INTERNAL MEDICINE
Payer: COMMERCIAL

## 2024-04-23 VITALS
DIASTOLIC BLOOD PRESSURE: 95 MMHG | SYSTOLIC BLOOD PRESSURE: 145 MMHG | HEART RATE: 68 BPM | WEIGHT: 186.31 LBS | BODY MASS INDEX: 27.6 KG/M2 | TEMPERATURE: 97 F | OXYGEN SATURATION: 100 % | RESPIRATION RATE: 18 BRPM | HEIGHT: 69 IN

## 2024-04-23 DIAGNOSIS — Z29.89 PROPHYLACTIC IMMUNOTHERAPY: Primary | ICD-10-CM

## 2024-04-23 DIAGNOSIS — Z94.4 S/P LIVER TRANSPLANT: Chronic | ICD-10-CM

## 2024-04-23 PROCEDURE — 99999 PR PBB SHADOW E&M-EST. PATIENT-LVL III: CPT | Mod: PBBFAC,,, | Performed by: INTERNAL MEDICINE

## 2024-04-23 PROCEDURE — 3008F BODY MASS INDEX DOCD: CPT | Mod: CPTII,S$GLB,, | Performed by: INTERNAL MEDICINE

## 2024-04-23 PROCEDURE — 1159F MED LIST DOCD IN RCRD: CPT | Mod: CPTII,S$GLB,, | Performed by: INTERNAL MEDICINE

## 2024-04-23 PROCEDURE — 1160F RVW MEDS BY RX/DR IN RCRD: CPT | Mod: CPTII,S$GLB,, | Performed by: INTERNAL MEDICINE

## 2024-04-23 PROCEDURE — 3044F HG A1C LEVEL LT 7.0%: CPT | Mod: CPTII,S$GLB,, | Performed by: INTERNAL MEDICINE

## 2024-04-23 PROCEDURE — 3080F DIAST BP >= 90 MM HG: CPT | Mod: CPTII,S$GLB,, | Performed by: INTERNAL MEDICINE

## 2024-04-23 PROCEDURE — 99213 OFFICE O/P EST LOW 20 MIN: CPT | Mod: S$GLB,,, | Performed by: INTERNAL MEDICINE

## 2024-04-23 PROCEDURE — 3077F SYST BP >= 140 MM HG: CPT | Mod: CPTII,S$GLB,, | Performed by: INTERNAL MEDICINE

## 2024-04-23 NOTE — PROGRESS NOTES
Transplant Hepatology  Liver Transplant Recipient Follow-up    Transplant Date: 6/21/2022  UNOS Native Liver Dx: Primary Liver Malignancy: Cholangiocarcinoma (CH-CA)    OSCAR is here for follow up of his liver transplant.    ORGAN: RIGHT LIVER LOBE (SEGS 5,6,7,8) WITHOUT MIDDLE HEPATIC VEIN  Whole or Partial: partial liver  Donor Type: living  [facility] High Risk:   Donor CMV Status:   Donor HCV Status:   Donor HBcAb:   Donor HBV GUSTABO:   Donor HCV GUSTABO:   Biliary Anastomosis: vick-en-y  Arterial Anatomy: standard  IVC reconstruction: hepatic vein confluence piggyback  Portal vein status: patent    He has had the following complications since transplant: bile leak. The noted complications is well controlled.  The bile leak had required a PTC for several weeks which has subsequently been removed.    Subjective:     Interval History:  This 43-year-old gentleman underwent a liver transplant in June of 2022 for cholangiocarcinoma.  He has been free of any clinical or radiological recurrence of cholangiocarcinoma.  His performance status is excellent.  He is stable allograft function on tacrolimus 2 mg twice daily.  Weight and appetite are stable. His CA 19-9 is normal.  Review of Systems   Constitutional:  Negative for activity change, appetite change, chills, fatigue and unexpected weight change.   HENT:  Negative for congestion, facial swelling and tinnitus.    Eyes:  Negative for visual disturbance.   Respiratory:  Negative for cough, shortness of breath and wheezing.    Cardiovascular:  Negative for chest pain and palpitations.   Gastrointestinal:  Negative for abdominal distention.   Genitourinary:  Negative for dysuria.   Musculoskeletal:  Negative for arthralgias, joint swelling and myalgias.   Neurological:  Negative for syncope and headaches.   Hematological:  Does not bruise/bleed easily.   Psychiatric/Behavioral:  Negative for confusion.        Objective:     Physical Exam  Constitutional:       Appearance: He is  well-developed.   Eyes:      General: No scleral icterus.  Cardiovascular:      Rate and Rhythm: Normal rate and regular rhythm.      Heart sounds: Normal heart sounds.   Pulmonary:      Effort: Pulmonary effort is normal. No respiratory distress.      Breath sounds: Normal breath sounds. No wheezing.   Abdominal:      General: Bowel sounds are normal. There is no distension.      Palpations: Abdomen is soft. There is no mass.      Tenderness: There is no abdominal tenderness. There is no rebound.       Musculoskeletal:         General: Normal range of motion.   Lymphadenopathy:      Cervical: No cervical adenopathy.   Skin:     General: Skin is warm and dry.   Neurological:      Mental Status: He is alert and oriented to person, place, and time.       Lab Results   Component Value Date    BILITOT 1.8 (H) 04/22/2024    AST 30 04/22/2024    ALT 53 (H) 04/22/2024    ALKPHOS 143 (H) 04/22/2024    CREATININE 1.3 04/22/2024    ALBUMIN 4.2 04/22/2024     Lab Results   Component Value Date    WBC 4.96 04/22/2024    HGB 16.0 04/22/2024    HCT 46.3 04/22/2024    HCT 25 (L) 06/23/2022     (L) 04/22/2024     Lab Results   Component Value Date    TACROLIMUS 10.6 04/22/2024       Assessment/Plan:     1. Prophylactic immunotherapy    2. S/P liver transplant        I have made no changes to his current immunosuppression.  We will continue to get laboratory follow-up every 2 months.  I will have him return to clinic in 1 year.    Zoran Nobles MD           [facility] Patient Status  Functional Status: 90% - Able to carry on normal activity: minor symptoms of disease  Physical Capacity: No Limitations  Working for income: yes  New diabetes onset between last follow-up to the current follow-up: No  Did patient have any acute rejection episodes during the follow-up period: No  Post transplant malignancy: No

## 2024-04-23 NOTE — LETTER
April 23, 2024        Pravin Maria  [address]  [place] [address]  Phone: [phone]  Fax: [phone]             [facility]  [address]  [facility] [address]  Phone: [phone]   Patient: OSCAR PAYNE   MR Number: [id number]   YOB: 1980   Date of Visit: 4/23/2024       Dear Dr. Pravin Maria    Thank you for referring OSCAR PAYNE to me for evaluation. Attached you will find relevant portions of my assessment and plan of care.    If you have questions, please do not hesitate to call me. I look forward to following OSCAR PAYNE along with you.    Sincerely,    Zoran Nobles MD    Enclosure    If you would like to receive this communication electronically, please contact [email or web address] or [phone] to request [employer] access.    [employer] is a tool which provides read-only access to select patient information with whom you have a relationship. Its easy to use and provides real time access to review your patients record including encounter summaries, notes, results, and demographic information.    If you feel you have received this communication in error or would no longer like to receive these types of communications, please e-mail [email or web address]

## 2024-04-24 ENCOUNTER — PATIENT MESSAGE (OUTPATIENT)
Dept: TRANSPLANT | Facility: CLINIC | Age: 44
End: 2024-04-24
Payer: COMMERCIAL

## 2024-04-24 DIAGNOSIS — Z94.4 LIVER REPLACED BY TRANSPLANT: Primary | ICD-10-CM

## 2024-04-24 NOTE — TELEPHONE ENCOUNTER
Patient notified and instructed via [facility]:    Per  : reduce Prograf dose to 2mg in AM and 1 mg in PM; repeat labs due 6/24/24. Thanks.

## 2024-04-24 NOTE — TELEPHONE ENCOUNTER
----- Message from Zoran Nobles MD sent at 4/24/2024 11:30 AM CDT -----  Results reviewed. Reduce tac to 2/1

## 2024-04-25 RX ORDER — TACROLIMUS 1 MG/1
CAPSULE ORAL
Qty: 90 CAPSULE | Refills: 11 | Status: SHIPPED | OUTPATIENT
Start: 2024-04-25 | End: 2025-04-25

## 2024-04-29 ENCOUNTER — TELEPHONE (OUTPATIENT)
Dept: INFUSION THERAPY | Facility: HOSPITAL | Age: 44
End: 2024-04-29
Payer: COMMERCIAL

## 2024-04-29 DIAGNOSIS — Z45.2 ADJUSTMENT AND MANAGEMENT OF VASCULAR ACCESS DEVICE: Primary | ICD-10-CM

## 2024-04-29 DIAGNOSIS — C24.0 HILAR CHOLANGIOCARCINOMA: ICD-10-CM

## 2024-06-07 RX ORDER — SODIUM CHLORIDE 0.9 % (FLUSH) 0.9 %
10 SYRINGE (ML) INJECTION
Status: CANCELLED | OUTPATIENT
Start: 2024-06-07

## 2024-06-07 RX ORDER — HEPARIN 100 UNIT/ML
500 SYRINGE INTRAVENOUS
Status: CANCELLED | OUTPATIENT
Start: 2024-06-07

## 2024-06-10 ENCOUNTER — PATIENT MESSAGE (OUTPATIENT)
Dept: INTERNAL MEDICINE | Facility: CLINIC | Age: 44
End: 2024-06-10
Payer: COMMERCIAL

## 2024-06-10 ENCOUNTER — LAB VISIT (OUTPATIENT)
Dept: LAB | Facility: HOSPITAL | Age: 44
End: 2024-06-10
Attending: INTERNAL MEDICINE
Payer: COMMERCIAL

## 2024-06-10 ENCOUNTER — HOSPITAL ENCOUNTER (OUTPATIENT)
Dept: RADIOLOGY | Facility: HOSPITAL | Age: 44
Discharge: HOME | End: 2024-06-10
Attending: INTERNAL MEDICINE
Payer: COMMERCIAL

## 2024-06-10 ENCOUNTER — TELEPHONE (OUTPATIENT)
Dept: HEMATOLOGY/ONCOLOGY | Facility: CLINIC | Age: 44
End: 2024-06-10
Payer: COMMERCIAL

## 2024-06-10 ENCOUNTER — INFUSION (OUTPATIENT)
Dept: INFUSION THERAPY | Facility: HOSPITAL | Age: 44
End: 2024-06-10
Attending: PHYSICIAN ASSISTANT
Payer: COMMERCIAL

## 2024-06-10 DIAGNOSIS — Z45.2 ADJUSTMENT AND MANAGEMENT OF VASCULAR ACCESS DEVICE: ICD-10-CM

## 2024-06-10 DIAGNOSIS — C24.0 HILAR CHOLANGIOCARCINOMA: Primary | ICD-10-CM

## 2024-06-10 DIAGNOSIS — C24.0 HILAR CHOLANGIOCARCINOMA: ICD-10-CM

## 2024-06-10 LAB
ALBUMIN SERPL BCP-MCNC: 4 G/DL (ref 3.5–5.2)
ALP SERPL-CCNC: 159 U/L (ref 55–135)
ALT SERPL W/O P-5'-P-CCNC: 81 U/L (ref 10–44)
ANION GAP SERPL CALC-SCNC: 10 MMOL/L (ref 8–16)
AST SERPL-CCNC: 30 U/L (ref 10–40)
BASOPHILS # BLD AUTO: 0.02 K/UL (ref 0–0.2)
BASOPHILS NFR BLD: 0.4 % (ref 0–1.9)
BILIRUB SERPL-MCNC: 1.7 MG/DL (ref 0.1–1)
BUN SERPL-MCNC: 20 MG/DL (ref 6–20)
CALCIUM SERPL-MCNC: 10 MG/DL (ref 8.7–10.5)
CANCER AG19-9 SERPL-ACNC: 8.6 U/ML (ref 0–40)
CHLORIDE SERPL-SCNC: 105 MMOL/L (ref 95–110)
CO2 SERPL-SCNC: 25 MMOL/L (ref 23–29)
CREAT SERPL-MCNC: 1.3 MG/DL (ref 0.5–1.4)
DIFFERENTIAL METHOD BLD: ABNORMAL
EOSINOPHIL # BLD AUTO: 0.2 K/UL (ref 0–0.5)
EOSINOPHIL NFR BLD: 2.6 % (ref 0–8)
ERYTHROCYTE [DISTWIDTH] IN BLOOD BY AUTOMATED COUNT: 11.9 % (ref 11.5–14.5)
EST. GFR  (NO RACE VARIABLE): >60 ML/MIN/1.73 M^2
GLUCOSE SERPL-MCNC: 134 MG/DL (ref 70–110)
HCT VFR BLD AUTO: 44.8 % (ref 40–54)
HGB BLD-MCNC: 15.7 G/DL (ref 14–18)
IMM GRANULOCYTES # BLD AUTO: 0.03 K/UL (ref 0–0.04)
IMM GRANULOCYTES NFR BLD AUTO: 0.5 % (ref 0–0.5)
LYMPHOCYTES # BLD AUTO: 1.8 K/UL (ref 1–4.8)
LYMPHOCYTES NFR BLD: 31.9 % (ref 18–48)
MCH RBC QN AUTO: 33 PG (ref 27–31)
MCHC RBC AUTO-ENTMCNC: 35 G/DL (ref 32–36)
MCV RBC AUTO: 94 FL (ref 82–98)
MONOCYTES # BLD AUTO: 0.6 K/UL (ref 0.3–1)
MONOCYTES NFR BLD: 9.7 % (ref 4–15)
NEUTROPHILS # BLD AUTO: 3.1 K/UL (ref 1.8–7.7)
NEUTROPHILS NFR BLD: 54.9 % (ref 38–73)
NRBC BLD-RTO: 0 /100 WBC
PLATELET # BLD AUTO: 142 K/UL (ref 150–450)
PMV BLD AUTO: 8.6 FL (ref 9.2–12.9)
POTASSIUM SERPL-SCNC: 4.9 MMOL/L (ref 3.5–5.1)
PROT SERPL-MCNC: 7 G/DL (ref 6–8.4)
RBC # BLD AUTO: 4.76 M/UL (ref 4.6–6.2)
SODIUM SERPL-SCNC: 140 MMOL/L (ref 136–145)
WBC # BLD AUTO: 5.67 K/UL (ref 3.9–12.7)

## 2024-06-10 PROCEDURE — A9698 NON-RAD CONTRAST MATERIALNOC: HCPCS | Mod: PO | Performed by: INTERNAL MEDICINE

## 2024-06-10 PROCEDURE — 25500020 PHARM REV CODE 255: Mod: PO | Performed by: INTERNAL MEDICINE

## 2024-06-10 PROCEDURE — 86301 IMMUNOASSAY TUMOR CA 19-9: CPT | Performed by: INTERNAL MEDICINE

## 2024-06-10 PROCEDURE — 25000003 PHARM REV CODE 250: Mod: PN | Performed by: INTERNAL MEDICINE

## 2024-06-10 PROCEDURE — 80053 COMPREHEN METABOLIC PANEL: CPT | Mod: PO | Performed by: INTERNAL MEDICINE

## 2024-06-10 PROCEDURE — A4216 STERILE WATER/SALINE, 10 ML: HCPCS | Mod: PN | Performed by: INTERNAL MEDICINE

## 2024-06-10 PROCEDURE — 85025 COMPLETE CBC W/AUTO DIFF WBC: CPT | Mod: PO | Performed by: INTERNAL MEDICINE

## 2024-06-10 PROCEDURE — 63600175 PHARM REV CODE 636 W HCPCS: Mod: PN | Performed by: INTERNAL MEDICINE

## 2024-06-10 PROCEDURE — 36415 COLL VENOUS BLD VENIPUNCTURE: CPT | Mod: PO | Performed by: INTERNAL MEDICINE

## 2024-06-10 PROCEDURE — 96523 IRRIG DRUG DELIVERY DEVICE: CPT | Mod: PN

## 2024-06-10 PROCEDURE — 71260 CT THORAX DX C+: CPT | Mod: 26,,, | Performed by: RADIOLOGY

## 2024-06-10 PROCEDURE — 74177 CT ABD & PELVIS W/CONTRAST: CPT | Mod: TC,PO

## 2024-06-10 PROCEDURE — 74177 CT ABD & PELVIS W/CONTRAST: CPT | Mod: 26,,, | Performed by: RADIOLOGY

## 2024-06-10 RX ORDER — SODIUM CHLORIDE 0.9 % (FLUSH) 0.9 %
10 SYRINGE (ML) INJECTION
Status: DISCONTINUED | OUTPATIENT
Start: 2024-06-10 | End: 2024-06-10 | Stop reason: HOSPADM

## 2024-06-10 RX ORDER — HEPARIN 100 UNIT/ML
500 SYRINGE INTRAVENOUS
Status: DISCONTINUED | OUTPATIENT
Start: 2024-06-10 | End: 2024-06-10 | Stop reason: HOSPADM

## 2024-06-10 RX ORDER — SODIUM CHLORIDE 0.9 % (FLUSH) 0.9 %
10 SYRINGE (ML) INJECTION
OUTPATIENT
Start: 2024-06-28

## 2024-06-10 RX ORDER — HEPARIN 100 UNIT/ML
500 SYRINGE INTRAVENOUS
OUTPATIENT
Start: 2024-06-28

## 2024-06-10 RX ADMIN — Medication 10 ML: at 08:06

## 2024-06-10 RX ADMIN — IOHEXOL 1000 ML: 9 SOLUTION ORAL at 10:06

## 2024-06-10 RX ADMIN — Medication 500 UNITS: at 08:06

## 2024-06-10 RX ADMIN — IOHEXOL 75 ML: 350 INJECTION, SOLUTION INTRAVENOUS at 10:06

## 2024-06-14 ENCOUNTER — OFFICE VISIT (OUTPATIENT)
Dept: HEMATOLOGY/ONCOLOGY | Facility: CLINIC | Age: 44
End: 2024-06-14
Payer: COMMERCIAL

## 2024-06-14 VITALS
WEIGHT: 185.94 LBS | BODY MASS INDEX: 27.54 KG/M2 | RESPIRATION RATE: 18 BRPM | DIASTOLIC BLOOD PRESSURE: 98 MMHG | OXYGEN SATURATION: 100 % | HEART RATE: 67 BPM | SYSTOLIC BLOOD PRESSURE: 153 MMHG | TEMPERATURE: 98 F | HEIGHT: 69 IN

## 2024-06-14 DIAGNOSIS — Z79.60 LONG-TERM USE OF IMMUNOSUPPRESSANT MEDICATION: ICD-10-CM

## 2024-06-14 DIAGNOSIS — Z94.4 S/P LIVER TRANSPLANT: ICD-10-CM

## 2024-06-14 DIAGNOSIS — I10 ESSENTIAL HYPERTENSION: ICD-10-CM

## 2024-06-14 DIAGNOSIS — C24.0 HILAR CHOLANGIOCARCINOMA: Primary | ICD-10-CM

## 2024-06-14 PROCEDURE — 3044F HG A1C LEVEL LT 7.0%: CPT | Mod: CPTII,S$GLB,, | Performed by: INTERNAL MEDICINE

## 2024-06-14 PROCEDURE — 99999 PR PBB SHADOW E&M-EST. PATIENT-LVL IV: CPT | Mod: PBBFAC,,, | Performed by: INTERNAL MEDICINE

## 2024-06-14 PROCEDURE — 3008F BODY MASS INDEX DOCD: CPT | Mod: CPTII,S$GLB,, | Performed by: INTERNAL MEDICINE

## 2024-06-14 PROCEDURE — 99214 OFFICE O/P EST MOD 30 MIN: CPT | Mod: S$GLB,,, | Performed by: INTERNAL MEDICINE

## 2024-06-14 PROCEDURE — 1159F MED LIST DOCD IN RCRD: CPT | Mod: CPTII,S$GLB,, | Performed by: INTERNAL MEDICINE

## 2024-06-14 PROCEDURE — G2211 COMPLEX E/M VISIT ADD ON: HCPCS | Mod: S$GLB,,, | Performed by: INTERNAL MEDICINE

## 2024-06-14 PROCEDURE — 3077F SYST BP >= 140 MM HG: CPT | Mod: CPTII,S$GLB,, | Performed by: INTERNAL MEDICINE

## 2024-06-14 PROCEDURE — 1160F RVW MEDS BY RX/DR IN RCRD: CPT | Mod: CPTII,S$GLB,, | Performed by: INTERNAL MEDICINE

## 2024-06-14 PROCEDURE — 3080F DIAST BP >= 90 MM HG: CPT | Mod: CPTII,S$GLB,, | Performed by: INTERNAL MEDICINE

## 2024-06-14 NOTE — PROGRESS NOTES
"                                                         PROGRESS NOTE    Subjective:       Patient ID: OSCAR LARSON is a 43 y.o. male.    Chief Complaint: follow up for hilar cholangiocarcinoma    Diagnosis:  Yp T2N0M0 hilar cholangiocarcinoma, s/p neoadjuvant chemoradiation, chemotherapy and liver transplant on 6/21/2022    Oncologic History:  1. Mr Larson is a 42 yo man who presents today for further management of suspected hilar cholangiocarcinoma. He presented with dark urine, abdominal pain and jaundice since August 2021. He presented to outside hospital ER with elevated bilirubin. MRCP was performed demonstrating bi-lobar intrahepatic bile duct dilation and a ~2cm ill defined mass at the hilum concerning for hilar cholangiocarcinoma. He was referred to the advanced endoscopy group at [facility]. ERCP confirmed severe, malignant appearing stricture involving right and left main hepatic ducts. Biliary stents were placed to relieve obstruction. EUS was performed. It showed an irregular hypoechoic mass where the hepatic duct bifurcates into the right and left hepatic ducts. The mass measured 11.4 mm by 11.3 mm in maximal cross-sectional diameter. FNA sampling of hilar lymph nodes was negative for metastatic disease. Bile duct biopsy showed "rare groups of glandular epithelium with mild atypia, strips of benign glandular epithelium intermixed with blood clot, and focal fibrous tissue with chronic inflammation."  CT C/A/P 10/27/21:  1. Intrahepatic biliary dilatation despite the presence of 2 biliary drains.  The patient is recent comparison MRI a revealed a possible central hepatic mass, concerning for either cholangiocarcinoma or hepatocellular carcinoma.  This is, however, not definitively demonstrated on today's exam.  There are enlarged lymph nodes present in the vicinity of the ana cristina hepatis and celiac axis as detailed above.  2. Scattered pulmonary nodules measuring up to 6 mm.  3. Other findings as detailed " "above.  He presents today for further management. Seen by Dr Jara and considered a candidate for liver transplant. Seen by Dr Cunningham last Friday. Scheduled for PET this Wednesday. 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   Discussed neoadjuvant chemoradiation with 5FU followed by neoadjuvant cis/gem prior to liver transplant.   2. Hospitalized 11/3/21-21 for fever 2/2 acute cholangitis. Repeat ERCP biopsy on 21 again non-diagnostic. Biopsy of the celiac lymph node was negative.   3. PET scan 11/3/21: "1. Wedge-shaped geographic hypermetabolic activity within the right lobe of the liver in a similar distribution to the intrahepatic biliary dilatation.  This could relate to inflammation from multiple etiologies including biliary stasis, cholangitis, and obstructive dilatation.  Neoplastic involvement would be difficult to exclude.  The liver is poorly evaluated given background activity. 2. Hypermetabolic lymphadenopathy is noted in a periportal and celiac distribution.  Infectious inflammatory and neoplastic etiologies are on the differential.  Index nodes have been measured. 3. Multiple pulmonary nodules are noted too small to categorize by PET-CT fusion as evidence seen on a prior CT of 10/27/2021.  CT for follow-up of size stability is necessary."  4. Completed chemoradiation with 5FU from . Started cis/gem on 22. Completed neoadjuvant chemotherapy  5. Underwent living donor liver transplant on 2022. Pathology showed ypT2N0 well to moderately differentiated cholangiocarcinoma of the perihilar bile ducts, with invasion into the perihilar soft tissue. No LVI or PNI. Surgical margins are negative for carcinoma. Two lymph nodes, negative for carcinoma (0/2).     Interval History:   Mr Larson returns for follow up. He is feeling well.     ECO    ROS:   A ten-point system review is obtained and negative except for what was stated in the Interval History.     Physical Examination: "   Vital signs reviewed.   General: well hydrated, well developed, in no acute distress  HEENT: normocephalic, PERRLA, EOMI, anicteric sclerae  Neck: supple, no JVD, thyromegaly, cervical or supraclavicular lymphadenopathy  Lungs: clear breath sounds bilaterally, no wheezing, rales, or rhonchi  Chest: port site clean  Heart: RRR, no M/R/G  Abdomen: soft, no tenderness, non-distended, no hepatosplenomegaly, mass, or hernia. BS present  Extremities: no clubbing, cyanosis, or edema  Skin: no rash, ulcer, or open wounds  Neuro: alert and oriented x 4, no focal neuro deficit  Psych: pleasant and appropriate mood and affect    Objective:     Laboratory Data:  Labs reviewed. CA 19-9 normal     Imaging Data:  CT C/A/P 3/18/24:  Impression:     1. 2 benign-appearing right upper lobe nodules less than 5 mm in diameter which are unchanged in size and appearance.  No new nodules have developed.  2. No definitive evidence of metastatic disease.  3. Status post liver transplant with no evidence of complication.  4. Atherosclerotic calcification of the left anterior descending coronary artery.      CT C/A/P 6/10/24:  Impression:     Stable pulmonary nodules.  Otherwise, no evidence of metastatic disease in the chest, abdomen or pelvis.     Postsurgical changes of liver transplant.     Assessment and Plan:     1. Hilar cholangiocarcinoma    2. S/P liver transplant    3. Long-term use of immunosuppressant medication    4. Essential hypertension      1.  - Mr Larson is a 44 yo man with stage I hilar cholangiocarcinoma. Biopsy non-diagnostic but clinical picture most consistent with hilar cholangiocarcinoma. He is a candidate for liver transplant. Completed chemoradiation with 5FU from 11/29/21-1/5/2022. Started cis/gem on 1/19/22. Completed neoadjuvant chemotherapy  - Underwent living donor liver transplant on 6/21/2022. Pathology showed ypT2N0 well to moderately differentiated cholangiocarcinoma of the perihilar bile ducts, with  invasion into the perihilar soft tissue. No LVI or PNI. Surgical margins are negative for carcinoma. Two lymph nodes, negative for carcinoma (0/2).   - doing well  - reviewed test results with patient. CA 19-9 normal. DERRICK on CT scan  - return in 3 months with labs and surveillance scan in [place]. Flush port in [place]    2.3  - f/u with liver transplant team    4.  - f/u with PCP    Follow-up:     RTC in 3 months  Knows to call in the interval if any problems arise.    Electronically signed by Young Wesley      Route Chart for Scheduling    Med Onc Chart Routing      Follow up with physician 3 months. see me in 3 months with CBC, CMP, CA 19-9 from port, keep port accessed for CT then deaccess port. see me after   Follow up with JOSEPH    Infusion scheduling note    Injection scheduling note    Labs CBC, CA 19-9 and CMP   Scheduling:  Preferred lab:  Lab interval:     Imaging CT chest abdomen pelvis      Pharmacy appointment    Other referrals            Supportive Plan Information  OP FILGRASTIM 480 MCG   Young Wesley MD   Upcoming Treatment Dates - OP FILGRASTIM 480 MCG    2/9/2022       Medications       filgrastim-sndz (ZARXIO) injection 480 mcg/0.8 mL (Preferred Regimen)  2/10/2022       Medications       filgrastim-sndz (ZARXIO) injection 480 mcg/0.8 mL (Preferred Regimen)  2/11/2022       Medications       filgrastim-sndz (ZARXIO) injection 480 mcg/0.8 mL (Preferred Regimen)  2/12/2022       Medications       filgrastim-sndz (ZARXIO) injection 480 mcg/0.8 mL (Preferred Regimen)    Therapy Plan Information  Flushes  heparin, porcine (PF) 100 unit/mL injection flush 500 Units  500 Units, Intravenous, On the 4th Fri of every 1 month  sodium chloride 0.9% flush 10 mL  10 mL, Intravenous, On the 4th Fri of every 1 month

## 2024-06-18 DIAGNOSIS — Z94.4 LIVER REPLACED BY TRANSPLANT: ICD-10-CM

## 2024-06-18 DIAGNOSIS — I10 ESSENTIAL HYPERTENSION: ICD-10-CM

## 2024-06-20 RX ORDER — CARVEDILOL 12.5 MG/1
12.5 TABLET ORAL 2 TIMES DAILY WITH MEALS
Qty: 60 TABLET | Refills: 11 | Status: SHIPPED | OUTPATIENT
Start: 2024-06-20

## 2024-06-24 ENCOUNTER — LAB VISIT (OUTPATIENT)
Dept: LAB | Facility: HOSPITAL | Age: 44
End: 2024-06-24
Attending: INTERNAL MEDICINE
Payer: COMMERCIAL

## 2024-06-24 DIAGNOSIS — Z94.4 LIVER REPLACED BY TRANSPLANT: ICD-10-CM

## 2024-06-24 LAB
ALBUMIN SERPL BCP-MCNC: 4 G/DL (ref 3.5–5.2)
ALP SERPL-CCNC: 133 U/L (ref 55–135)
ALT SERPL W/O P-5'-P-CCNC: 46 U/L (ref 10–44)
ANION GAP SERPL CALC-SCNC: 8 MMOL/L (ref 8–16)
AST SERPL-CCNC: 25 U/L (ref 10–40)
BASOPHILS # BLD AUTO: 0.03 K/UL (ref 0–0.2)
BASOPHILS NFR BLD: 0.5 % (ref 0–1.9)
BILIRUB DIRECT SERPL-MCNC: 0.5 MG/DL (ref 0.1–0.3)
BILIRUB SERPL-MCNC: 1.7 MG/DL (ref 0.1–1)
BUN SERPL-MCNC: 24 MG/DL (ref 6–20)
CALCIUM SERPL-MCNC: 9.5 MG/DL (ref 8.7–10.5)
CHLORIDE SERPL-SCNC: 104 MMOL/L (ref 95–110)
CO2 SERPL-SCNC: 24 MMOL/L (ref 23–29)
CREAT SERPL-MCNC: 1.3 MG/DL (ref 0.5–1.4)
DIFFERENTIAL METHOD BLD: ABNORMAL
EOSINOPHIL # BLD AUTO: 0.1 K/UL (ref 0–0.5)
EOSINOPHIL NFR BLD: 2 % (ref 0–8)
ERYTHROCYTE [DISTWIDTH] IN BLOOD BY AUTOMATED COUNT: 12.2 % (ref 11.5–14.5)
EST. GFR  (NO RACE VARIABLE): >60 ML/MIN/1.73 M^2
GLUCOSE SERPL-MCNC: 128 MG/DL (ref 70–110)
HCT VFR BLD AUTO: 44.5 % (ref 40–54)
HGB BLD-MCNC: 15.3 G/DL (ref 14–18)
IMM GRANULOCYTES # BLD AUTO: 0.01 K/UL (ref 0–0.04)
IMM GRANULOCYTES NFR BLD AUTO: 0.2 % (ref 0–0.5)
LYMPHOCYTES # BLD AUTO: 1.6 K/UL (ref 1–4.8)
LYMPHOCYTES NFR BLD: 26.8 % (ref 18–48)
MCH RBC QN AUTO: 33.5 PG (ref 27–31)
MCHC RBC AUTO-ENTMCNC: 34.4 G/DL (ref 32–36)
MCV RBC AUTO: 97 FL (ref 82–98)
MONOCYTES # BLD AUTO: 0.6 K/UL (ref 0.3–1)
MONOCYTES NFR BLD: 9.4 % (ref 4–15)
NEUTROPHILS # BLD AUTO: 3.6 K/UL (ref 1.8–7.7)
NEUTROPHILS NFR BLD: 61.1 % (ref 38–73)
NRBC BLD-RTO: 0 /100 WBC
PLATELET # BLD AUTO: 136 K/UL (ref 150–450)
PMV BLD AUTO: 8.9 FL (ref 9.2–12.9)
POTASSIUM SERPL-SCNC: 4.2 MMOL/L (ref 3.5–5.1)
PROT SERPL-MCNC: 6.9 G/DL (ref 6–8.4)
RBC # BLD AUTO: 4.57 M/UL (ref 4.6–6.2)
SODIUM SERPL-SCNC: 136 MMOL/L (ref 136–145)
WBC # BLD AUTO: 5.93 K/UL (ref 3.9–12.7)

## 2024-06-24 PROCEDURE — 80053 COMPREHEN METABOLIC PANEL: CPT | Performed by: INTERNAL MEDICINE

## 2024-06-24 PROCEDURE — 80197 ASSAY OF TACROLIMUS: CPT | Performed by: INTERNAL MEDICINE

## 2024-06-24 PROCEDURE — 85025 COMPLETE CBC W/AUTO DIFF WBC: CPT | Performed by: INTERNAL MEDICINE

## 2024-06-24 PROCEDURE — 36415 COLL VENOUS BLD VENIPUNCTURE: CPT | Mod: PO | Performed by: INTERNAL MEDICINE

## 2024-06-24 PROCEDURE — 82248 BILIRUBIN DIRECT: CPT | Performed by: INTERNAL MEDICINE

## 2024-06-25 ENCOUNTER — TELEPHONE (OUTPATIENT)
Dept: TRANSPLANT | Facility: CLINIC | Age: 44
End: 2024-06-25
Payer: COMMERCIAL

## 2024-06-25 ENCOUNTER — PATIENT MESSAGE (OUTPATIENT)
Dept: TRANSPLANT | Facility: CLINIC | Age: 44
End: 2024-06-25
Payer: COMMERCIAL

## 2024-06-25 DIAGNOSIS — C22.1 CHOLANGIOCARCINOMA: ICD-10-CM

## 2024-06-25 DIAGNOSIS — Z94.4 LIVER REPLACED BY TRANSPLANT: Primary | ICD-10-CM

## 2024-06-25 LAB — TACROLIMUS BLD-MCNC: 10.2 NG/ML (ref 5–15)

## 2024-06-25 NOTE — TELEPHONE ENCOUNTER
Patient notified and instructed via [employer]:    Your labs have been reviewed by ; no changes made. Repeat labs due 8/19/24. Thanks.

## 2024-06-25 NOTE — TELEPHONE ENCOUNTER
----- Message from Zoran Nobles MD sent at 6/25/2024 10:36 AM CDT -----  Results reviewed. No action.

## 2024-06-26 DIAGNOSIS — E11.9 TYPE 2 DIABETES MELLITUS WITHOUT COMPLICATION: ICD-10-CM

## 2024-07-02 ENCOUNTER — TELEPHONE (OUTPATIENT)
Dept: INTERNAL MEDICINE | Facility: CLINIC | Age: 44
End: 2024-07-02
Payer: COMMERCIAL

## 2024-07-02 NOTE — TELEPHONE ENCOUNTER
Called patient to check to see if had their DM eye exam this year, states has not.   Offered the DM eyecam while here to see PCP at visit and he agrees.   Appt set. He would also like to set eye appt with doctor will send message and have them call him to set up appt.   Petra Leblanc RN HC

## 2024-07-05 NOTE — ASSESSMENT & PLAN NOTE
"- s/p PTC (internalized) on 7/13/22  - PTC to gravity with bilious output. SHRUTHI x 2 with scant output no longer draining per patient  - Cont flushing PTC  - IR consulted for cholangiogram with tube exchange 7/20/22  - clamped PTC 7/21/22 and cont to flush   - See "sepsis"    " Chief Complaint  Gynecologic Exam    Entirety of today's encounter including patient interview, exam, and counseling performed with the aid of a medical  via the telephone.     Subjective        Mónica Leonard presents to [facility]  History of Present Illness  Patient is here for annual exam.  She is also having concerns about a vaginal discharge that she notices just before just after her menstrual cycle.  She was told previously when she was in [place] that she may have bacterial vaginosis.  She is otherwise without major complaints at this time.  She is still having regular monthly menstrual cycles.  She would like to discuss starting on birth control.  She has previously used birth control pills many years ago, but she was told that she had to stop them after age 33.    Menstrual History:  OB History          2    Para   2    Term                AB        Living   2         SAB        IAB        Ectopic        Molar        Multiple        Live Births   2                 Patient's last menstrual period was 2024 (exact date).     Past Medical History:   Diagnosis Date    Abnormal Pap smear of cervix      Past Surgical History:   Procedure Laterality Date    CERVICAL CONE BIOPSY       Family History   Problem Relation Age of Onset    Breast cancer Mother      Social History     Tobacco Use    Smoking status: Never   Vaping Use    Vaping status: Never Used   Substance Use Topics    Alcohol use: Not Currently    Drug use: Never     Current Outpatient Medications on File Prior to Visit   Medication Sig    cetirizine (zyrTEC) 10 MG tablet Take 1 tablet by mouth Daily.    clindamycin (CLEOCIN) 300 MG capsule     fluticasone (FLONASE) 50 MCG/ACT nasal spray 1 spray by Each Nare route Daily.    ibuprofen (ADVIL,MOTRIN) 800 MG tablet TAKE 1 TABLET BY MOUTH EVERY 8 HOURS WITH FOOD OR MILK AS NEEDED    methocarbamol (ROBAXIN) 500 MG tablet 1  "TABLEY ORALLY TWICE A DAY    montelukast (SINGULAIR) 10 MG tablet Take 1 tablet by mouth every night at bedtime.     No current facility-administered medications on file prior to visit.     No Known Allergies    Review of systems:  Constitutional: No fevers, chills, sweats   Eye: No recent visual problems, denies blurry vision   HEENT: No ear pain, nasal congestion, sore throat, voice changes   Respiratory: No shortness of breath, cough, pain on breathing   Cardiovascular: No Chest pain, palpitations   Gastrointestinal: No nausea, vomiting, diarrhea, constipation   Genitourinary: No hematuria, dysuria, lesions on genitalia   Hema/Lymph: Negative for bruising, no edema   Endocrine: Negative for excessive thirst, excessive hunger, heat or cold intolerance   Musculoskeletal: No joint pain, muscle pain, decreased range of motion   Integumentary: No rash, pruritus, abrasions, lesions   Neurologic: No weakness, numbness, headaches   Psychiatric: No anxiety, depression, mood changes       Objective   Vital Signs:  /72   Pulse 72   Ht 156.2 cm (61.5\")   Wt 81.6 kg (180 lb)   BMI 33.46 kg/m²   Estimated body mass index is 33.46 kg/m² as calculated from the following:    Height as of this encounter: 156.2 cm (61.5\").    Weight as of this encounter: 81.6 kg (180 lb).             Physical Exam   Exam performed in the presence of a female chaperone  Patient has provided verbal consent to proceed with exam.    Gen: No acute distress, awake and oriented times three  HENT: Normocephalic, atraumatic, Moist mucous membranes  Eyes: PERRLA, EOMI  Lungs: Normal work of breathing, lungs clear bilaterally  Breast: Symmetrical. No skin changes or nipple retractions. No lumps or masses bilaterally. No tenderness bilaterally.  Abdomen: soft, nontender, no masses or hernia, non distended, normoactive bowel sounds  Normal external female genitalia, no lesions  Urethra: Normal meatus, no caruncle  Bladder: nontender  Vagina: No blood " or discharge  Cervix: No cervical motion tenderness, no lesions, no active bleeding, nonfriable  Uterus: Anteverted, normal size and shape, nontender  Adnexa: No masses or tenderness  External anal exam: Normal appearance, no lesions or hemorrhoids  Rectal: Deferred  Skin: Warm and dry, no rashes  Psych: Good judgement and insight, normal affect and mood  Neuro: CN 2-12 intact, no gross deficits    Result Review :                     Assessment and Plan     Diagnoses and all orders for this visit:    1. Encounter for gynecological examination with abnormal finding (Primary)    Pap - Ordered today.  STD screening - Cultures performed today.   Contraception - Options discussed with pt at length. Risks, benefits, side effects, and alternatives to various forms of hormonal, nonhormonal and barrier methods discussed. Pt elects POPs.   Safe sex practices encouraged with patient.  Breast cancer screening. Patient encouraged to perform routine self breast exams. Recommend yearly clinical breast exam and mammogram.  Recommend pt take calcium and vitamin D supplementation as well as prenatal vitamin or folic acid if she is attempting to conceive.  Encourage aerobic exercise with at least 30 minutes 5 days/wk.  Avoid excessive alcohol use.  Patient is advised to call the office for results after 1 week if she has not seen or heard the results of any tests ordered or performed today.      2. Screening for malignant neoplasm of cervix  -     IGP,CtNgTv,Apt HPV,rfx 16 / 18,45    3. Screen for sexually transmitted diseases  -     IGP,CtNgTv,Apt HPV,rfx 16 / 18,45    4. Encounter for screening mammogram for malignant neoplasm of breast  -     Mammo Screening Digital Tomosynthesis Bilateral With CAD; Future    5. BV (bacterial vaginosis)  -     metroNIDAZOLE (METROGEL) 0.75 % vaginal gel; Insert 1 Applicatorful into the vagina 2 (Two) Times a Day.  Dispense: 70 g; Refill: 0    Send like patient to be getting recurrent BV.  Not  having symptoms currently.  We discussed the use of probiotics for prevention.  Also discussed prophylactic intravaginal metronidazole before after her menstrual cycle.    6. Recurrent vaginitis  -     metroNIDAZOLE (METROGEL) 0.75 % vaginal gel; Insert 1 Applicatorful into the vagina 2 (Two) Times a Day.  Dispense: 70 g; Refill: 0    7. Encounter for initial prescription of contraceptive pills  -     norethindrone (MICRONOR) 0.35 MG tablet; Take 1 tablet by mouth Daily.  Dispense: 84 tablet; Refill: 3    Various contraceptive options discussed including combined oral contraceptives, contraceptive patch, NuvaRing, progesterone only contraceptive, Depo-Provera, Nexplanon, progesterone IUD, copper IUD, and barrier methods. Paitent elects for progesterone only contraceptive pills. Risks, benefits, alternatives discussed at length.  Instructions for use and Sunday start discussed. Discussed condoms in first month for backup method.                  Follow Up     Return in about 1 year (around 7/5/2025), or if symptoms worsen or fail to improve.  Patient was given instructions and counseling regarding her condition or for health maintenance advice. Please see specific information pulled into the AVS if appropriate.

## 2024-07-08 ENCOUNTER — CLINICAL SUPPORT (OUTPATIENT)
Dept: INTERNAL MEDICINE | Facility: CLINIC | Age: 44
End: 2024-07-08
Attending: INTERNAL MEDICINE
Payer: COMMERCIAL

## 2024-07-08 ENCOUNTER — OFFICE VISIT (OUTPATIENT)
Dept: INTERNAL MEDICINE | Facility: CLINIC | Age: 44
End: 2024-07-08
Payer: COMMERCIAL

## 2024-07-08 VITALS
HEIGHT: 69 IN | HEART RATE: 74 BPM | BODY MASS INDEX: 27.3 KG/M2 | DIASTOLIC BLOOD PRESSURE: 80 MMHG | OXYGEN SATURATION: 97 % | WEIGHT: 184.31 LBS | SYSTOLIC BLOOD PRESSURE: 112 MMHG

## 2024-07-08 DIAGNOSIS — E13.9 SECONDARY DIABETES: ICD-10-CM

## 2024-07-08 DIAGNOSIS — E11.9 TYPE 2 DIABETES MELLITUS WITHOUT COMPLICATION, UNSPECIFIED WHETHER LONG TERM INSULIN USE: ICD-10-CM

## 2024-07-08 DIAGNOSIS — D70.1 CHEMOTHERAPY INDUCED NEUTROPENIA: ICD-10-CM

## 2024-07-08 DIAGNOSIS — T45.1X5A CHEMOTHERAPY INDUCED NEUTROPENIA: ICD-10-CM

## 2024-07-08 DIAGNOSIS — C24.0 HILAR CHOLANGIOCARCINOMA: Chronic | ICD-10-CM

## 2024-07-08 DIAGNOSIS — T38.0X5A STEROID-INDUCED HYPERGLYCEMIA: ICD-10-CM

## 2024-07-08 DIAGNOSIS — Z09 FOLLOW-UP EXAM: Primary | ICD-10-CM

## 2024-07-08 DIAGNOSIS — R73.9 STEROID-INDUCED HYPERGLYCEMIA: ICD-10-CM

## 2024-07-08 DIAGNOSIS — I10 PRIMARY HYPERTENSION: ICD-10-CM

## 2024-07-08 DIAGNOSIS — Z94.4 S/P LIVER TRANSPLANT: Chronic | ICD-10-CM

## 2024-07-08 PROCEDURE — 3008F BODY MASS INDEX DOCD: CPT | Mod: CPTII,S$GLB,, | Performed by: INTERNAL MEDICINE

## 2024-07-08 PROCEDURE — 3074F SYST BP LT 130 MM HG: CPT | Mod: CPTII,S$GLB,, | Performed by: INTERNAL MEDICINE

## 2024-07-08 PROCEDURE — 3079F DIAST BP 80-89 MM HG: CPT | Mod: CPTII,S$GLB,, | Performed by: INTERNAL MEDICINE

## 2024-07-08 PROCEDURE — 99999 PR PBB SHADOW E&M-EST. PATIENT-LVL IV: CPT | Mod: PBBFAC,,, | Performed by: INTERNAL MEDICINE

## 2024-07-08 PROCEDURE — 1159F MED LIST DOCD IN RCRD: CPT | Mod: CPTII,S$GLB,, | Performed by: INTERNAL MEDICINE

## 2024-07-08 PROCEDURE — 99214 OFFICE O/P EST MOD 30 MIN: CPT | Mod: S$GLB,,, | Performed by: INTERNAL MEDICINE

## 2024-07-08 PROCEDURE — 3044F HG A1C LEVEL LT 7.0%: CPT | Mod: CPTII,S$GLB,, | Performed by: INTERNAL MEDICINE

## 2024-07-08 NOTE — PROGRESS NOTES
OSCAR PAYNE is a 43 y.o. male here for a diabetic eye screening with non-dilated fundus photos per Dr. Moses.    Patient cooperative?: Yes  Small pupils?: No  Last eye exam: N/A    For exam results, see Encounter Report.

## 2024-07-08 NOTE — PATIENT INSTRUCTIONS
Continue carvedilol 12.5 mg bid.     Check your blood pressures at home, twice a day, for 1 week, just to make sure that it is within reasonable range. Goal blood pressure <130/<80 (120s/70s).  Let the office know if your blood pressure is not near goal. Send either a patient message or call the office [phone] to schedule a nurse visit for blood pressure check / BP device check.    Return to clinic in 6 months or sooner if needed.

## 2024-07-08 NOTE — PROGRESS NOTES
Subjective:       Patient ID: OSCAR PAYNE is a 43 y.o. male.    Chief Complaint: Follow-up      HPI  OSCAR PAYNE is a 43 y.o. year old male with  cholangiocarcinoma s/p liver transplant, history of bile leak, HTN, HLD presents for follow up. Doing well, no new complaints.     Review of Systems   Constitutional:  Negative for activity change, appetite change, chills, fatigue, fever and unexpected weight change.   HENT:  Negative for congestion, rhinorrhea and sore throat.    Eyes:  Negative for visual disturbance.   Respiratory:  Negative for shortness of breath.    Cardiovascular:  Negative for chest pain.   Gastrointestinal:  Positive for diarrhea (with certain foods). Negative for abdominal pain, nausea and vomiting.   Genitourinary:  Negative for difficulty urinating and dysuria.   Musculoskeletal:  Negative for arthralgias, back pain and myalgias.   Skin:  Negative for color change and rash.   Neurological:  Negative for dizziness, weakness and headaches.         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 Authorizing Provider   acetaminophen (TYLENOL) 500 MG tablet Take 500 mg by mouth every 4 to 6 hours as needed for Pain.   Yes Provider, Historical   carvediloL (COREG) 12.5 MG tablet Take 1 tablet (12.5 mg total) by mouth 2 (two) times daily with meals. 6/20/24  Yes Yves Moses MD   tacrolimus (PROGRAF) 1 MG Cap Take 2 capsules (2 mg total) by mouth every morning AND 1 capsule (1 mg total) every evening. 4/25/24 4/25/25 Yes Zoran Nobles MD   ursodioL (ACTIGALL) 250 mg Tab Take 1 tablet (250 mg total) by mouth 2 (two) times a day. 8/14/23  Yes Zoran Nobles MD   baclofen (LIORESAL) 10 MG tablet TAKE  "1 TABLET(10 MG) BY MOUTH THREE TIMES DAILY AS NEEDED FOR HICCUPS  Patient not taking: No sig reported 4/6/22 6/17/22  Young Wesley MD   famotidine (PEPCID) 20 MG tablet Take 1 tablet (20 mg total) by mouth every evening. STOP 7/24/22 6/21/22 7/8/22  Sinan Cline MD   insulin aspart U-100 (NOVOLOG) 100 unit/mL (3 mL) InPn pen Inject 5 Units into the skin 3 (three) times daily with meals. + sliding scale.  MDD 30 units/d 7/16/22 7/27/22  Brinda Serrato MD   losartan (COZAAR) 50 MG tablet Take 1 tablet (50 mg total) by mouth once daily. 3/21/22 6/24/22  Pravin Maria MD   metFORMIN (GLUCOPHAGE) 500 MG tablet Take 1 tablet (500 mg total) by mouth 2 (two) times daily with meals. 7/8/22 7/8/22  Sinan Cline MD   tamsulosin (FLOMAX) 0.4 mg Cap Take 2 capsules (0.8 mg total) by mouth once daily. 7/9/22 7/27/22  Sinan Cline MD        Past medical history, surgical history, and family medical history reviewed and updated as appropriate.    Medications and allergies reviewed.     Objective:          Vitals:    07/08/24 1138   BP: 112/80   BP Location: Right arm   Patient Position: Sitting   Pulse: 74   SpO2: 97%   Weight: 83.6 kg (184 lb 4.9 oz)   Height: 5' 8.5" (1.74 m)     Body mass index is 27.62 kg/m².  Physical Exam  Constitutional:       General: He is not in acute distress.     Appearance: He is well-developed.   HENT:      Head: Normocephalic and atraumatic.      Nose: Nose normal.   Eyes:      General: No scleral icterus.     Extraocular Movements: Extraocular movements intact.   Neck:      Thyroid: No thyromegaly.      Vascular: No JVD.      Trachea: No tracheal deviation.   Cardiovascular:      Rate and Rhythm: Normal rate and regular rhythm.      Heart sounds: Normal heart sounds. No murmur heard.     No friction rub. No gallop.   Pulmonary:      Effort: Pulmonary effort is normal. No respiratory distress.      Breath sounds: Normal breath sounds. No wheezing or rales.   Abdominal:      General: " Bowel sounds are normal. There is no distension.      Palpations: Abdomen is soft. There is no mass.      Tenderness: There is no abdominal tenderness.   Musculoskeletal:         General: No tenderness. Normal range of motion.      Cervical back: Normal range of motion and neck supple.   Lymphadenopathy:      Cervical: No cervical adenopathy.   Skin:     General: Skin is warm and dry.      Findings: No rash.   Neurological:      Mental Status: He is alert and oriented to person, place, and time.      Cranial Nerves: No cranial nerve deficit.      Deep Tendon Reflexes: Reflexes normal.   Psychiatric:         Behavior: Behavior normal.         Lab Results   Component Value Date    WBC 5.93 06/24/2024    HGB 15.3 06/24/2024    HCT 44.5 06/24/2024     (L) 06/24/2024    CHOL 161 01/08/2024    TRIG 178 (H) 01/08/2024    HDL 34 (L) 01/08/2024    ALT 46 (H) 06/24/2024    AST 25 06/24/2024     06/24/2024    K 4.2 06/24/2024     06/24/2024    CREATININE 1.3 06/24/2024    BUN 24 (H) 06/24/2024    CO2 24 06/24/2024    TSH 1.887 01/08/2024    PSA 0.68 03/24/2022    INR 0.9 10/28/2023    HGBA1C 6.0 (H) 01/08/2024       Assessment:       1. Follow-up exam    2. Primary hypertension    3. Steroid-induced hyperglycemia    4. Secondary diabetes    5. Hilar cholangiocarcinoma    6. S/P liver transplant    7. Chemotherapy induced neutropenia          Plan:     OSCAR was seen today for follow-up.    Diagnoses and all orders for this visit:    Follow-up exam    Primary hypertension  Comments:  on carvedilol 12.5 mg bid; takes consistently. In clinic today 116/84. Pt feels situational HTN when in oncology/hepatology clinic. BP good at home.    Steroid-induced hyperglycemia    Secondary diabetes  Comments:  from steroids, a1c 6.0 last not on medication, lifestyle controlled. Will recheck A1c. had diabetic eye exam today  Orders:  -     HEMOGLOBIN A1C; Future    Hilar cholangiocarcinoma    S/P liver  transplant    Chemotherapy induced neutropenia    Benign physical examination, no issues identified. Will obtain routine labwork and age appropriate health screenings.     Health maintenance reviewed with patient.    Follow up in about 6 months (around 1/8/2025).    Yves Moses MD  Internal Medicine / Primary Care  [facility]  7/8/2024

## 2024-07-29 RX ORDER — TACROLIMUS 1 MG/1
CAPSULE ORAL
Qty: 1 CAPSULE | Refills: 1 | Status: SHIPPED | OUTPATIENT
Start: 2024-07-29 | End: 2025-07-29

## 2024-08-14 ENCOUNTER — PATIENT MESSAGE (OUTPATIENT)
Dept: TRANSPLANT | Facility: CLINIC | Age: 44
End: 2024-08-14
Payer: COMMERCIAL

## 2024-08-15 DIAGNOSIS — Z94.4 S/P LIVER TRANSPLANT: ICD-10-CM

## 2024-08-15 DIAGNOSIS — Z94.4 LIVER REPLACED BY TRANSPLANT: ICD-10-CM

## 2024-08-15 RX ORDER — URSODIOL 250 MG/1
250 TABLET, FILM COATED ORAL 2 TIMES DAILY
Qty: 60 TABLET | Refills: 11 | Status: SHIPPED | OUTPATIENT
Start: 2024-08-15

## 2024-08-17 ENCOUNTER — LAB VISIT (OUTPATIENT)
Dept: LAB | Facility: HOSPITAL | Age: 44
End: 2024-08-17
Attending: INTERNAL MEDICINE
Payer: COMMERCIAL

## 2024-08-17 DIAGNOSIS — Z94.4 LIVER REPLACED BY TRANSPLANT: ICD-10-CM

## 2024-08-17 DIAGNOSIS — E13.9 SECONDARY DIABETES: ICD-10-CM

## 2024-08-17 DIAGNOSIS — C22.1 CHOLANGIOCARCINOMA: ICD-10-CM

## 2024-08-17 LAB
ALBUMIN SERPL BCP-MCNC: 4 G/DL (ref 3.5–5.2)
ALP SERPL-CCNC: 128 U/L (ref 55–135)
ALT SERPL W/O P-5'-P-CCNC: 40 U/L (ref 10–44)
ANION GAP SERPL CALC-SCNC: 10 MMOL/L (ref 8–16)
AST SERPL-CCNC: 25 U/L (ref 10–40)
BASOPHILS # BLD AUTO: 0.02 K/UL (ref 0–0.2)
BASOPHILS NFR BLD: 0.4 % (ref 0–1.9)
BILIRUB DIRECT SERPL-MCNC: 0.5 MG/DL (ref 0.1–0.3)
BILIRUB SERPL-MCNC: 1.6 MG/DL (ref 0.1–1)
BUN SERPL-MCNC: 17 MG/DL (ref 6–20)
CALCIUM SERPL-MCNC: 9.7 MG/DL (ref 8.7–10.5)
CANCER AG19-9 SERPL-ACNC: 3.2 U/ML (ref 0–40)
CEA SERPL-MCNC: 1.7 NG/ML (ref 0–5)
CHLORIDE SERPL-SCNC: 106 MMOL/L (ref 95–110)
CO2 SERPL-SCNC: 23 MMOL/L (ref 23–29)
CREAT SERPL-MCNC: 1.3 MG/DL (ref 0.5–1.4)
DIFFERENTIAL METHOD BLD: ABNORMAL
EOSINOPHIL # BLD AUTO: 0.1 K/UL (ref 0–0.5)
EOSINOPHIL NFR BLD: 2.4 % (ref 0–8)
ERYTHROCYTE [DISTWIDTH] IN BLOOD BY AUTOMATED COUNT: 11.9 % (ref 11.5–14.5)
EST. GFR  (NO RACE VARIABLE): >60 ML/MIN/1.73 M^2
ESTIMATED AVG GLUCOSE: 137 MG/DL (ref 68–131)
GLUCOSE SERPL-MCNC: 117 MG/DL (ref 70–110)
HBA1C MFR BLD: 6.4 % (ref 4–5.6)
HCT VFR BLD AUTO: 45.7 % (ref 40–54)
HGB BLD-MCNC: 15.7 G/DL (ref 14–18)
IMM GRANULOCYTES # BLD AUTO: 0.02 K/UL (ref 0–0.04)
IMM GRANULOCYTES NFR BLD AUTO: 0.4 % (ref 0–0.5)
LYMPHOCYTES # BLD AUTO: 1.5 K/UL (ref 1–4.8)
LYMPHOCYTES NFR BLD: 29.3 % (ref 18–48)
MCH RBC QN AUTO: 32.4 PG (ref 27–31)
MCHC RBC AUTO-ENTMCNC: 34.4 G/DL (ref 32–36)
MCV RBC AUTO: 94 FL (ref 82–98)
MONOCYTES # BLD AUTO: 0.4 K/UL (ref 0.3–1)
MONOCYTES NFR BLD: 8.1 % (ref 4–15)
NEUTROPHILS # BLD AUTO: 3 K/UL (ref 1.8–7.7)
NEUTROPHILS NFR BLD: 59.4 % (ref 38–73)
NRBC BLD-RTO: 0 /100 WBC
PLATELET # BLD AUTO: 148 K/UL (ref 150–450)
PMV BLD AUTO: 9 FL (ref 9.2–12.9)
POTASSIUM SERPL-SCNC: 4.5 MMOL/L (ref 3.5–5.1)
PROT SERPL-MCNC: 6.7 G/DL (ref 6–8.4)
RBC # BLD AUTO: 4.84 M/UL (ref 4.6–6.2)
SODIUM SERPL-SCNC: 139 MMOL/L (ref 136–145)
WBC # BLD AUTO: 5.09 K/UL (ref 3.9–12.7)

## 2024-08-17 PROCEDURE — 82378 CARCINOEMBRYONIC ANTIGEN: CPT | Performed by: INTERNAL MEDICINE

## 2024-08-17 PROCEDURE — 80197 ASSAY OF TACROLIMUS: CPT | Performed by: INTERNAL MEDICINE

## 2024-08-17 PROCEDURE — 82248 BILIRUBIN DIRECT: CPT | Performed by: INTERNAL MEDICINE

## 2024-08-17 PROCEDURE — 85025 COMPLETE CBC W/AUTO DIFF WBC: CPT | Performed by: INTERNAL MEDICINE

## 2024-08-17 PROCEDURE — 86301 IMMUNOASSAY TUMOR CA 19-9: CPT | Performed by: INTERNAL MEDICINE

## 2024-08-17 PROCEDURE — 83036 HEMOGLOBIN GLYCOSYLATED A1C: CPT | Performed by: INTERNAL MEDICINE

## 2024-08-17 PROCEDURE — 80053 COMPREHEN METABOLIC PANEL: CPT | Performed by: INTERNAL MEDICINE

## 2024-08-17 PROCEDURE — 36415 COLL VENOUS BLD VENIPUNCTURE: CPT | Mod: PO | Performed by: INTERNAL MEDICINE

## 2024-08-18 LAB — TACROLIMUS BLD-MCNC: 7.8 NG/ML (ref 5–15)

## 2024-08-20 ENCOUNTER — PATIENT MESSAGE (OUTPATIENT)
Dept: TRANSPLANT | Facility: CLINIC | Age: 44
End: 2024-08-20
Payer: COMMERCIAL

## 2024-08-20 ENCOUNTER — TELEPHONE (OUTPATIENT)
Dept: TRANSPLANT | Facility: CLINIC | Age: 44
End: 2024-08-20
Payer: COMMERCIAL

## 2024-08-20 DIAGNOSIS — Z94.4 LIVER REPLACED BY TRANSPLANT: Primary | ICD-10-CM

## 2024-08-20 NOTE — TELEPHONE ENCOUNTER
Patient notified and instructed via [facility]:    Your labs have been reviewed by ; no changes were made. Repeat labs due 10/14/24; thanks.

## 2024-08-20 NOTE — TELEPHONE ENCOUNTER
----- Message from Zoran Nobles MD sent at 8/18/2024  8:03 AM CDT -----  Results reviewed. No action.

## 2024-09-04 ENCOUNTER — INFUSION (OUTPATIENT)
Dept: INFUSION THERAPY | Facility: HOSPITAL | Age: 44
End: 2024-09-04
Attending: PHYSICIAN ASSISTANT
Payer: COMMERCIAL

## 2024-09-04 ENCOUNTER — HOSPITAL ENCOUNTER (OUTPATIENT)
Dept: RADIOLOGY | Facility: HOSPITAL | Age: 44
Discharge: HOME | End: 2024-09-04
Attending: INTERNAL MEDICINE
Payer: COMMERCIAL

## 2024-09-04 DIAGNOSIS — Z45.2 ADJUSTMENT AND MANAGEMENT OF VASCULAR ACCESS DEVICE: Primary | ICD-10-CM

## 2024-09-04 DIAGNOSIS — C24.0 HILAR CHOLANGIOCARCINOMA: ICD-10-CM

## 2024-09-04 LAB
ALBUMIN SERPL BCP-MCNC: 4.2 G/DL (ref 3.5–5.2)
ALP SERPL-CCNC: 139 U/L (ref 55–135)
ALT SERPL W/O P-5'-P-CCNC: 59 U/L (ref 10–44)
ANION GAP SERPL CALC-SCNC: 8 MMOL/L (ref 8–16)
AST SERPL-CCNC: 39 U/L (ref 10–40)
BASOPHILS # BLD AUTO: 0.02 K/UL (ref 0–0.2)
BASOPHILS NFR BLD: 0.3 % (ref 0–1.9)
BILIRUB SERPL-MCNC: 1.9 MG/DL (ref 0.1–1)
BUN SERPL-MCNC: 20 MG/DL (ref 6–20)
CALCIUM SERPL-MCNC: 9.4 MG/DL (ref 8.7–10.5)
CANCER AG19-9 SERPL-ACNC: 3.4 U/ML (ref 0–40)
CHLORIDE SERPL-SCNC: 104 MMOL/L (ref 95–110)
CO2 SERPL-SCNC: 22 MMOL/L (ref 23–29)
CREAT SERPL-MCNC: 1.3 MG/DL (ref 0.5–1.4)
DIFFERENTIAL METHOD BLD: ABNORMAL
EOSINOPHIL # BLD AUTO: 0.2 K/UL (ref 0–0.5)
EOSINOPHIL NFR BLD: 2.6 % (ref 0–8)
ERYTHROCYTE [DISTWIDTH] IN BLOOD BY AUTOMATED COUNT: 11.7 % (ref 11.5–14.5)
EST. GFR  (NO RACE VARIABLE): >60 ML/MIN/1.73 M^2
GLUCOSE SERPL-MCNC: 99 MG/DL (ref 70–110)
HCT VFR BLD AUTO: 43.1 % (ref 40–54)
HGB BLD-MCNC: 15.6 G/DL (ref 14–18)
IMM GRANULOCYTES # BLD AUTO: 0.04 K/UL (ref 0–0.04)
IMM GRANULOCYTES NFR BLD AUTO: 0.7 % (ref 0–0.5)
LYMPHOCYTES # BLD AUTO: 1.4 K/UL (ref 1–4.8)
LYMPHOCYTES NFR BLD: 24.2 % (ref 18–48)
MCH RBC QN AUTO: 33.1 PG (ref 27–31)
MCHC RBC AUTO-ENTMCNC: 36.2 G/DL (ref 32–36)
MCV RBC AUTO: 91 FL (ref 82–98)
MONOCYTES # BLD AUTO: 0.5 K/UL (ref 0.3–1)
MONOCYTES NFR BLD: 8.5 % (ref 4–15)
NEUTROPHILS # BLD AUTO: 3.7 K/UL (ref 1.8–7.7)
NEUTROPHILS NFR BLD: 63.7 % (ref 38–73)
NRBC BLD-RTO: 0 /100 WBC
PLATELET # BLD AUTO: 157 K/UL (ref 150–450)
PMV BLD AUTO: 8.7 FL (ref 9.2–12.9)
POTASSIUM SERPL-SCNC: 5 MMOL/L (ref 3.5–5.1)
PROT SERPL-MCNC: 7.1 G/DL (ref 6–8.4)
RBC # BLD AUTO: 4.72 M/UL (ref 4.6–6.2)
SODIUM SERPL-SCNC: 134 MMOL/L (ref 136–145)
WBC # BLD AUTO: 5.87 K/UL (ref 3.9–12.7)

## 2024-09-04 PROCEDURE — 80053 COMPREHEN METABOLIC PANEL: CPT | Mod: PN | Performed by: INTERNAL MEDICINE

## 2024-09-04 PROCEDURE — 71260 CT THORAX DX C+: CPT | Mod: TC,PO

## 2024-09-04 PROCEDURE — 74177 CT ABD & PELVIS W/CONTRAST: CPT | Mod: 26,,, | Performed by: STUDENT IN AN ORGANIZED HEALTH CARE EDUCATION/TRAINING PROGRAM

## 2024-09-04 PROCEDURE — 25500020 PHARM REV CODE 255: Mod: PO | Performed by: INTERNAL MEDICINE

## 2024-09-04 PROCEDURE — 71260 CT THORAX DX C+: CPT | Mod: 26,,, | Performed by: STUDENT IN AN ORGANIZED HEALTH CARE EDUCATION/TRAINING PROGRAM

## 2024-09-04 PROCEDURE — 36591 DRAW BLOOD OFF VENOUS DEVICE: CPT | Mod: PN

## 2024-09-04 PROCEDURE — 63600175 PHARM REV CODE 636 W HCPCS: Mod: PN | Performed by: INTERNAL MEDICINE

## 2024-09-04 PROCEDURE — A4216 STERILE WATER/SALINE, 10 ML: HCPCS | Mod: PN | Performed by: INTERNAL MEDICINE

## 2024-09-04 PROCEDURE — 25000003 PHARM REV CODE 250: Mod: PN | Performed by: INTERNAL MEDICINE

## 2024-09-04 PROCEDURE — A9698 NON-RAD CONTRAST MATERIALNOC: HCPCS | Mod: PO | Performed by: INTERNAL MEDICINE

## 2024-09-04 PROCEDURE — 86301 IMMUNOASSAY TUMOR CA 19-9: CPT | Performed by: INTERNAL MEDICINE

## 2024-09-04 PROCEDURE — 74177 CT ABD & PELVIS W/CONTRAST: CPT | Mod: TC,PO

## 2024-09-04 PROCEDURE — 85025 COMPLETE CBC W/AUTO DIFF WBC: CPT | Mod: PN | Performed by: INTERNAL MEDICINE

## 2024-09-04 RX ORDER — SODIUM CHLORIDE 0.9 % (FLUSH) 0.9 %
10 SYRINGE (ML) INJECTION
OUTPATIENT
Start: 2024-09-27

## 2024-09-04 RX ORDER — SODIUM CHLORIDE 0.9 % (FLUSH) 0.9 %
10 SYRINGE (ML) INJECTION
Status: DISCONTINUED | OUTPATIENT
Start: 2024-09-04 | End: 2024-09-04 | Stop reason: HOSPADM

## 2024-09-04 RX ORDER — SODIUM CHLORIDE 0.9 % (FLUSH) 0.9 %
10 SYRINGE (ML) INJECTION
Status: CANCELLED | OUTPATIENT
Start: 2024-09-27

## 2024-09-04 RX ORDER — HEPARIN 100 UNIT/ML
500 SYRINGE INTRAVENOUS
Status: CANCELLED | OUTPATIENT
Start: 2024-09-27

## 2024-09-04 RX ORDER — HEPARIN 100 UNIT/ML
500 SYRINGE INTRAVENOUS
OUTPATIENT
Start: 2024-09-27

## 2024-09-04 RX ORDER — HEPARIN 100 UNIT/ML
500 SYRINGE INTRAVENOUS
Status: DISCONTINUED | OUTPATIENT
Start: 2024-09-04 | End: 2024-09-04 | Stop reason: HOSPADM

## 2024-09-04 RX ADMIN — SODIUM CHLORIDE, PRESERVATIVE FREE 10 ML: 5 INJECTION INTRAVENOUS at 01:09

## 2024-09-04 RX ADMIN — IOHEXOL 1000 ML: 9 SOLUTION ORAL at 11:09

## 2024-09-04 RX ADMIN — IOHEXOL 75 ML: 350 INJECTION, SOLUTION INTRAVENOUS at 11:09

## 2024-09-04 RX ADMIN — Medication 500 UNITS: at 01:09

## 2024-09-09 ENCOUNTER — OFFICE VISIT (OUTPATIENT)
Dept: HEMATOLOGY/ONCOLOGY | Facility: CLINIC | Age: 44
End: 2024-09-09
Payer: COMMERCIAL

## 2024-09-09 VITALS
HEIGHT: 69 IN | RESPIRATION RATE: 18 BRPM | OXYGEN SATURATION: 98 % | WEIGHT: 186.38 LBS | SYSTOLIC BLOOD PRESSURE: 120 MMHG | DIASTOLIC BLOOD PRESSURE: 88 MMHG | BODY MASS INDEX: 27.6 KG/M2 | TEMPERATURE: 98 F | HEART RATE: 72 BPM

## 2024-09-09 DIAGNOSIS — Z94.4 S/P LIVER TRANSPLANT: ICD-10-CM

## 2024-09-09 DIAGNOSIS — C24.0 HILAR CHOLANGIOCARCINOMA: Primary | ICD-10-CM

## 2024-09-09 DIAGNOSIS — Z79.60 LONG-TERM USE OF IMMUNOSUPPRESSANT MEDICATION: ICD-10-CM

## 2024-09-09 PROCEDURE — 3044F HG A1C LEVEL LT 7.0%: CPT | Mod: CPTII,S$GLB,, | Performed by: INTERNAL MEDICINE

## 2024-09-09 PROCEDURE — 3008F BODY MASS INDEX DOCD: CPT | Mod: CPTII,S$GLB,, | Performed by: INTERNAL MEDICINE

## 2024-09-09 PROCEDURE — G2211 COMPLEX E/M VISIT ADD ON: HCPCS | Mod: S$GLB,,, | Performed by: INTERNAL MEDICINE

## 2024-09-09 PROCEDURE — 1159F MED LIST DOCD IN RCRD: CPT | Mod: CPTII,S$GLB,, | Performed by: INTERNAL MEDICINE

## 2024-09-09 PROCEDURE — 3074F SYST BP LT 130 MM HG: CPT | Mod: CPTII,S$GLB,, | Performed by: INTERNAL MEDICINE

## 2024-09-09 PROCEDURE — 1160F RVW MEDS BY RX/DR IN RCRD: CPT | Mod: CPTII,S$GLB,, | Performed by: INTERNAL MEDICINE

## 2024-09-09 PROCEDURE — 3079F DIAST BP 80-89 MM HG: CPT | Mod: CPTII,S$GLB,, | Performed by: INTERNAL MEDICINE

## 2024-09-09 PROCEDURE — 99999 PR PBB SHADOW E&M-EST. PATIENT-LVL IV: CPT | Mod: PBBFAC,,, | Performed by: INTERNAL MEDICINE

## 2024-09-09 PROCEDURE — 99214 OFFICE O/P EST MOD 30 MIN: CPT | Mod: S$GLB,,, | Performed by: INTERNAL MEDICINE

## 2024-09-09 NOTE — PROGRESS NOTES
"                                                         PROGRESS NOTE    Subjective:       Patient ID: OSCAR LARSON is a 44 y.o. male.    Chief Complaint: follow up for hilar cholangiocarcinoma    Diagnosis:  Yp T2N0M0 hilar cholangiocarcinoma, s/p neoadjuvant chemoradiation, chemotherapy and liver transplant on 6/21/2022    Oncologic History:  1. Mr Larson is a 40 yo man who presents today for further management of suspected hilar cholangiocarcinoma. He presented with dark urine, abdominal pain and jaundice since August 2021. He presented to outside hospital ER with elevated bilirubin. MRCP was performed demonstrating bi-lobar intrahepatic bile duct dilation and a ~2cm ill defined mass at the hilum concerning for hilar cholangiocarcinoma. He was referred to the advanced endoscopy group at [facility] [place]. ERCP confirmed severe, malignant appearing stricture involving right and left main hepatic ducts. Biliary stents were placed to relieve obstruction. EUS was performed. It showed an irregular hypoechoic mass where the hepatic duct bifurcates into the right and left hepatic ducts. The mass measured 11.4 mm by 11.3 mm in maximal cross-sectional diameter. FNA sampling of hilar lymph nodes was negative for metastatic disease. Bile duct biopsy showed "rare groups of glandular epithelium with mild atypia, strips of benign glandular epithelium intermixed with blood clot, and focal fibrous tissue with chronic inflammation."  CT C/A/P 10/27/21:  1. Intrahepatic biliary dilatation despite the presence of 2 biliary drains.  The patient is recent comparison MRI a revealed a possible central hepatic mass, concerning for either cholangiocarcinoma or hepatocellular carcinoma.  This is, however, not definitively demonstrated on today's exam.  There are enlarged lymph nodes present in the vicinity of the ana cristina hepatis and celiac axis as detailed above.  2. Scattered pulmonary nodules measuring up to 6 mm.  3. Other findings as detailed " "above.  He presents today for further management. Seen by Dr Jara and considered a candidate for liver transplant. Seen by Dr Cunningham last Friday. Scheduled for PET this Wednesday. 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   Discussed neoadjuvant chemoradiation with 5FU followed by neoadjuvant cis/gem prior to liver transplant.   2. Hospitalized 11/3/21-21 for fever 2/2 acute cholangitis. Repeat ERCP biopsy on 21 again non-diagnostic. Biopsy of the celiac lymph node was negative.   3. PET scan 11/3/21: "1. Wedge-shaped geographic hypermetabolic activity within the right lobe of the liver in a similar distribution to the intrahepatic biliary dilatation.  This could relate to inflammation from multiple etiologies including biliary stasis, cholangitis, and obstructive dilatation.  Neoplastic involvement would be difficult to exclude.  The liver is poorly evaluated given background activity. 2. Hypermetabolic lymphadenopathy is noted in a periportal and celiac distribution.  Infectious inflammatory and neoplastic etiologies are on the differential.  Index nodes have been measured. 3. Multiple pulmonary nodules are noted too small to categorize by PET-CT fusion as evidence seen on a prior CT of 10/27/2021.  CT for follow-up of size stability is necessary."  4. Completed chemoradiation with 5FU from . Started cis/gem on 22. Completed neoadjuvant chemotherapy  5. Underwent living donor liver transplant on 2022. Pathology showed ypT2N0 well to moderately differentiated cholangiocarcinoma of the perihilar bile ducts, with invasion into the perihilar soft tissue. No LVI or PNI. Surgical margins are negative for carcinoma. Two lymph nodes, negative for carcinoma (0/2).     Interval History:   Mr Larson returns for follow up. He is feeling well.     ECO    ROS:   A ten-point system review is obtained and negative except for what was stated in the Interval History.     Physical Examination: "   Vital signs reviewed.   General: well hydrated, well developed, in no acute distress  HEENT: normocephalic, PERRLA, EOMI, anicteric sclerae  Neck: supple, no JVD, thyromegaly, cervical or supraclavicular lymphadenopathy  Lungs: clear breath sounds bilaterally, no wheezing, rales, or rhonchi  Heart: RRR, no M/R/G  Abdomen: soft, no tenderness, non-distended, no hepatosplenomegaly, mass, or hernia. BS present  Extremities: no clubbing, cyanosis, or edema  Skin: no rash, ulcer, or open wounds  Neuro: alert and oriented x 4, no focal neuro deficit  Psych: pleasant and appropriate mood and affect    Objective:     Laboratory Data:  Labs reviewed. CA 19-9 normal     Imaging Data:  CT C/A/P 9/4/24:  Impression:     1. Postoperative changes of liver transplant.  No evidence of disease recurrence or metastasis.  2. Stable bilateral pulmonary micro nodules.  3. Additional findings as above.        Assessment and Plan:     1. Hilar cholangiocarcinoma    2. S/P liver transplant    3. Long-term use of immunosuppressant medication      1.  - Mr Larson is a 44 yo man with stage I hilar cholangiocarcinoma. Biopsy non-diagnostic but clinical picture most consistent with hilar cholangiocarcinoma. Completed chemoradiation with 5FU from 11/29/21-1/5/2022. Started cis/gem on 1/19/22. Completed neoadjuvant chemotherapy  - Underwent living donor liver transplant on 6/21/2022. Pathology showed ypT2N0 well to moderately differentiated cholangiocarcinoma of the perihilar bile ducts, with invasion into the perihilar soft tissue. No LVI or PNI. Surgical margins are negative for carcinoma. Two lymph nodes, negative for carcinoma (0/2).   - doing well  - reviewed test results with patient. CA 19-9 normal. DERRICK on CT scan  - return in 3 months with labs and surveillance scan in [place]. Flush port in [place]    2.3  - f/u with liver transplant team    Follow-up:     RTC in 3 months  Knows to call in the interval if any problems  arise.    Electronically signed by Young Wesley      Route Chart for Scheduling    Med Onc Chart Routing      Follow up with physician 3 months. see me in 3 months with CBC, CMP, CA 19-9 in [place], access port for CT C/A/P, get CT C/A/P. see me a few days after   Follow up with JOSEPH    Infusion scheduling note    Injection scheduling note flush port every 3 months   Labs CBC, CA 19-9 and CMP   Scheduling:  Preferred lab:  Lab interval:     Imaging CT chest abdomen pelvis      Pharmacy appointment    Other referrals          Supportive Plan Information  OP FILGRASTIM 480 MCG Young Wesley MD   Associated Diagnosis: Chemotherapy induced neutropenia   noted on 2/9/2022  Associated Diagnosis: Adjustment and management of vascular access device   noted on 5/18/2022   Line of treatment: Supportive Care   Treatment goal: Supportive     Upcoming Treatment Dates - OP FILGRASTIM 480 MCG    2/9/2022       Medications       filgrastim-sndz (ZARXIO) injection 480 mcg/0.8 mL (Preferred Regimen)  2/10/2022       Medications       filgrastim-sndz (ZARXIO) injection 480 mcg/0.8 mL (Preferred Regimen)  2/11/2022       Medications       filgrastim-sndz (ZARXIO) injection 480 mcg/0.8 mL (Preferred Regimen)  2/12/2022       Medications       filgrastim-sndz (ZARXIO) injection 480 mcg/0.8 mL (Preferred Regimen)    Therapy Plan Information  PORT FLUSH for Hilar cholangiocarcinoma, noted on 11/4/2021  PORT FLUSH for Adjustment and management of vascular access device, noted on 5/18/2022  Flushes  heparin, porcine (PF) 100 unit/mL injection flush 500 Units  500 Units, Intravenous, On the 4th Fri of every 1 month  sodium chloride 0.9% flush 10 mL  10 mL, Intravenous, On the 4th Fri of every 1 month      No therapy plan of the specified type found.    No therapy plan of the specified type found.

## 2024-10-14 ENCOUNTER — LAB VISIT (OUTPATIENT)
Dept: LAB | Facility: HOSPITAL | Age: 44
End: 2024-10-14
Attending: INTERNAL MEDICINE
Payer: COMMERCIAL

## 2024-10-14 DIAGNOSIS — Z94.4 LIVER REPLACED BY TRANSPLANT: ICD-10-CM

## 2024-10-14 LAB
ALBUMIN SERPL BCP-MCNC: 4.1 G/DL (ref 3.5–5.2)
ALP SERPL-CCNC: 154 U/L (ref 55–135)
ALT SERPL W/O P-5'-P-CCNC: 57 U/L (ref 10–44)
ANION GAP SERPL CALC-SCNC: 9 MMOL/L (ref 8–16)
AST SERPL-CCNC: 26 U/L (ref 10–40)
BASOPHILS # BLD AUTO: 0.03 K/UL (ref 0–0.2)
BASOPHILS NFR BLD: 0.6 % (ref 0–1.9)
BILIRUB DIRECT SERPL-MCNC: 0.4 MG/DL (ref 0.1–0.3)
BILIRUB SERPL-MCNC: 1.6 MG/DL (ref 0.1–1)
BUN SERPL-MCNC: 20 MG/DL (ref 6–20)
CALCIUM SERPL-MCNC: 9.8 MG/DL (ref 8.7–10.5)
CHLORIDE SERPL-SCNC: 105 MMOL/L (ref 95–110)
CO2 SERPL-SCNC: 25 MMOL/L (ref 23–29)
CREAT SERPL-MCNC: 1.3 MG/DL (ref 0.5–1.4)
DIFFERENTIAL METHOD BLD: ABNORMAL
EOSINOPHIL # BLD AUTO: 0.1 K/UL (ref 0–0.5)
EOSINOPHIL NFR BLD: 2.6 % (ref 0–8)
ERYTHROCYTE [DISTWIDTH] IN BLOOD BY AUTOMATED COUNT: 12.2 % (ref 11.5–14.5)
EST. GFR  (NO RACE VARIABLE): >60 ML/MIN/1.73 M^2
GLUCOSE SERPL-MCNC: 128 MG/DL (ref 70–110)
HCT VFR BLD AUTO: 47.1 % (ref 40–54)
HGB BLD-MCNC: 15.9 G/DL (ref 14–18)
IMM GRANULOCYTES # BLD AUTO: 0.01 K/UL (ref 0–0.04)
IMM GRANULOCYTES NFR BLD AUTO: 0.2 % (ref 0–0.5)
LYMPHOCYTES # BLD AUTO: 1.3 K/UL (ref 1–4.8)
LYMPHOCYTES NFR BLD: 24.8 % (ref 18–48)
MCH RBC QN AUTO: 32.8 PG (ref 27–31)
MCHC RBC AUTO-ENTMCNC: 33.8 G/DL (ref 32–36)
MCV RBC AUTO: 97 FL (ref 82–98)
MONOCYTES # BLD AUTO: 0.6 K/UL (ref 0.3–1)
MONOCYTES NFR BLD: 11 % (ref 4–15)
NEUTROPHILS # BLD AUTO: 3.2 K/UL (ref 1.8–7.7)
NEUTROPHILS NFR BLD: 60.8 % (ref 38–73)
NRBC BLD-RTO: 0 /100 WBC
PLATELET # BLD AUTO: 147 K/UL (ref 150–450)
PMV BLD AUTO: 8.9 FL (ref 9.2–12.9)
POTASSIUM SERPL-SCNC: 4.4 MMOL/L (ref 3.5–5.1)
PROT SERPL-MCNC: 7.1 G/DL (ref 6–8.4)
RBC # BLD AUTO: 4.85 M/UL (ref 4.6–6.2)
SODIUM SERPL-SCNC: 139 MMOL/L (ref 136–145)
WBC # BLD AUTO: 5.29 K/UL (ref 3.9–12.7)

## 2024-10-14 PROCEDURE — 80053 COMPREHEN METABOLIC PANEL: CPT | Performed by: INTERNAL MEDICINE

## 2024-10-14 PROCEDURE — 36415 COLL VENOUS BLD VENIPUNCTURE: CPT | Mod: PO | Performed by: INTERNAL MEDICINE

## 2024-10-14 PROCEDURE — 82248 BILIRUBIN DIRECT: CPT | Performed by: INTERNAL MEDICINE

## 2024-10-14 PROCEDURE — 85025 COMPLETE CBC W/AUTO DIFF WBC: CPT | Performed by: INTERNAL MEDICINE

## 2024-10-14 PROCEDURE — 80197 ASSAY OF TACROLIMUS: CPT | Performed by: INTERNAL MEDICINE

## 2024-10-15 ENCOUNTER — PATIENT MESSAGE (OUTPATIENT)
Dept: TRANSPLANT | Facility: CLINIC | Age: 44
End: 2024-10-15
Payer: COMMERCIAL

## 2024-10-15 DIAGNOSIS — C22.1 CHOLANGIOCARCINOMA: ICD-10-CM

## 2024-10-15 DIAGNOSIS — Z94.4 LIVER REPLACED BY TRANSPLANT: Primary | ICD-10-CM

## 2024-10-15 LAB — TACROLIMUS BLD-MCNC: 8.3 NG/ML (ref 5–15)

## 2024-10-15 NOTE — TELEPHONE ENCOUNTER
----- Message from Zoran Nobles MD sent at 10/15/2024  2:25 PM CDT -----  Results reviewed. Reduce tac to 1/1

## 2024-10-17 RX ORDER — TACROLIMUS 1 MG/1
CAPSULE ORAL
Qty: 60 CAPSULE | Refills: 11 | Status: SHIPPED | OUTPATIENT
Start: 2024-10-17 | End: 2025-10-17

## 2024-12-02 ENCOUNTER — LAB VISIT (OUTPATIENT)
Dept: LAB | Facility: HOSPITAL | Age: 44
End: 2024-12-02
Attending: INTERNAL MEDICINE
Payer: COMMERCIAL

## 2024-12-02 DIAGNOSIS — Z94.4 LIVER REPLACED BY TRANSPLANT: ICD-10-CM

## 2024-12-02 DIAGNOSIS — C22.1 CHOLANGIOCARCINOMA: ICD-10-CM

## 2024-12-02 LAB
ALBUMIN SERPL BCP-MCNC: 4 G/DL (ref 3.5–5.2)
ALP SERPL-CCNC: 164 U/L (ref 40–150)
ALT SERPL W/O P-5'-P-CCNC: 55 U/L (ref 10–44)
ANION GAP SERPL CALC-SCNC: 9 MMOL/L (ref 8–16)
AST SERPL-CCNC: 28 U/L (ref 10–40)
BASOPHILS # BLD AUTO: 0.02 K/UL (ref 0–0.2)
BASOPHILS NFR BLD: 0.4 % (ref 0–1.9)
BILIRUB DIRECT SERPL-MCNC: 0.4 MG/DL (ref 0.1–0.3)
BILIRUB SERPL-MCNC: 1.2 MG/DL (ref 0.1–1)
BUN SERPL-MCNC: 15 MG/DL (ref 6–20)
CALCIUM SERPL-MCNC: 9.4 MG/DL (ref 8.7–10.5)
CANCER AG19-9 SERPL-ACNC: 3.7 U/ML (ref 0–40)
CEA SERPL-MCNC: 2 NG/ML (ref 0–5)
CHLORIDE SERPL-SCNC: 106 MMOL/L (ref 95–110)
CO2 SERPL-SCNC: 23 MMOL/L (ref 23–29)
CREAT SERPL-MCNC: 1.3 MG/DL (ref 0.5–1.4)
DIFFERENTIAL METHOD BLD: ABNORMAL
EOSINOPHIL # BLD AUTO: 0.2 K/UL (ref 0–0.5)
EOSINOPHIL NFR BLD: 2.9 % (ref 0–8)
ERYTHROCYTE [DISTWIDTH] IN BLOOD BY AUTOMATED COUNT: 12 % (ref 11.5–14.5)
EST. GFR  (NO RACE VARIABLE): >60 ML/MIN/1.73 M^2
GLUCOSE SERPL-MCNC: 144 MG/DL (ref 70–110)
HCT VFR BLD AUTO: 46.6 % (ref 40–54)
HGB BLD-MCNC: 16 G/DL (ref 14–18)
IMM GRANULOCYTES # BLD AUTO: 0.01 K/UL (ref 0–0.04)
IMM GRANULOCYTES NFR BLD AUTO: 0.2 % (ref 0–0.5)
LYMPHOCYTES # BLD AUTO: 1.6 K/UL (ref 1–4.8)
LYMPHOCYTES NFR BLD: 30.1 % (ref 18–48)
MCH RBC QN AUTO: 32.9 PG (ref 27–31)
MCHC RBC AUTO-ENTMCNC: 34.3 G/DL (ref 32–36)
MCV RBC AUTO: 96 FL (ref 82–98)
MONOCYTES # BLD AUTO: 0.5 K/UL (ref 0.3–1)
MONOCYTES NFR BLD: 8.9 % (ref 4–15)
NEUTROPHILS # BLD AUTO: 3 K/UL (ref 1.8–7.7)
NEUTROPHILS NFR BLD: 57.5 % (ref 38–73)
NRBC BLD-RTO: 0 /100 WBC
PLATELET # BLD AUTO: 162 K/UL (ref 150–450)
PMV BLD AUTO: 9.2 FL (ref 9.2–12.9)
POTASSIUM SERPL-SCNC: 4.2 MMOL/L (ref 3.5–5.1)
PROT SERPL-MCNC: 7 G/DL (ref 6–8.4)
RBC # BLD AUTO: 4.87 M/UL (ref 4.6–6.2)
SODIUM SERPL-SCNC: 138 MMOL/L (ref 136–145)
WBC # BLD AUTO: 5.15 K/UL (ref 3.9–12.7)

## 2024-12-02 PROCEDURE — 82378 CARCINOEMBRYONIC ANTIGEN: CPT | Performed by: INTERNAL MEDICINE

## 2024-12-02 PROCEDURE — 85025 COMPLETE CBC W/AUTO DIFF WBC: CPT | Performed by: INTERNAL MEDICINE

## 2024-12-02 PROCEDURE — 82248 BILIRUBIN DIRECT: CPT | Performed by: INTERNAL MEDICINE

## 2024-12-02 PROCEDURE — 36415 COLL VENOUS BLD VENIPUNCTURE: CPT | Mod: PO | Performed by: INTERNAL MEDICINE

## 2024-12-02 PROCEDURE — 86301 IMMUNOASSAY TUMOR CA 19-9: CPT | Performed by: INTERNAL MEDICINE

## 2024-12-02 PROCEDURE — 80197 ASSAY OF TACROLIMUS: CPT | Performed by: INTERNAL MEDICINE

## 2024-12-02 PROCEDURE — 80053 COMPREHEN METABOLIC PANEL: CPT | Performed by: INTERNAL MEDICINE

## 2024-12-03 LAB — TACROLIMUS BLD-MCNC: 10 NG/ML (ref 5–15)

## 2024-12-04 ENCOUNTER — PATIENT MESSAGE (OUTPATIENT)
Dept: TRANSPLANT | Facility: CLINIC | Age: 44
End: 2024-12-04
Payer: COMMERCIAL

## 2024-12-04 ENCOUNTER — TELEPHONE (OUTPATIENT)
Dept: TRANSPLANT | Facility: CLINIC | Age: 44
End: 2024-12-04
Payer: COMMERCIAL

## 2024-12-04 DIAGNOSIS — Z94.4 LIVER REPLACED BY TRANSPLANT: Primary | ICD-10-CM

## 2024-12-04 NOTE — TELEPHONE ENCOUNTER
Patient notified and instructed via [facility]:    Your labs have been reviewed by ; no changes were made. Repeat labs due 2/3/2025. Thanks.

## 2024-12-04 NOTE — TELEPHONE ENCOUNTER
----- Message from Zoran Nobles MD sent at 12/4/2024  9:03 AM CST -----  Results reviewed. No action.

## 2024-12-10 ENCOUNTER — PATIENT MESSAGE (OUTPATIENT)
Dept: INTERNAL MEDICINE | Facility: CLINIC | Age: 44
End: 2024-12-10
Payer: COMMERCIAL

## 2024-12-10 ENCOUNTER — HOSPITAL ENCOUNTER (OUTPATIENT)
Dept: RADIOLOGY | Facility: HOSPITAL | Age: 44
Discharge: HOME | End: 2024-12-10
Attending: INTERNAL MEDICINE
Payer: COMMERCIAL

## 2024-12-10 ENCOUNTER — INFUSION (OUTPATIENT)
Dept: INFUSION THERAPY | Facility: HOSPITAL | Age: 44
End: 2024-12-10
Attending: PHYSICIAN ASSISTANT
Payer: COMMERCIAL

## 2024-12-10 ENCOUNTER — PATIENT MESSAGE (OUTPATIENT)
Dept: HEMATOLOGY/ONCOLOGY | Facility: CLINIC | Age: 44
End: 2024-12-10
Payer: COMMERCIAL

## 2024-12-10 DIAGNOSIS — C24.0 HILAR CHOLANGIOCARCINOMA: Primary | ICD-10-CM

## 2024-12-10 DIAGNOSIS — C24.0 HILAR CHOLANGIOCARCINOMA: ICD-10-CM

## 2024-12-10 DIAGNOSIS — Z45.2 ADJUSTMENT AND MANAGEMENT OF VASCULAR ACCESS DEVICE: ICD-10-CM

## 2024-12-10 PROCEDURE — 71260 CT THORAX DX C+: CPT | Mod: 26,,, | Performed by: RADIOLOGY

## 2024-12-10 PROCEDURE — 71260 CT THORAX DX C+: CPT | Mod: TC,PO

## 2024-12-10 PROCEDURE — A4216 STERILE WATER/SALINE, 10 ML: HCPCS | Mod: PN | Performed by: INTERNAL MEDICINE

## 2024-12-10 PROCEDURE — A9698 NON-RAD CONTRAST MATERIALNOC: HCPCS | Mod: PO | Performed by: INTERNAL MEDICINE

## 2024-12-10 PROCEDURE — 25000003 PHARM REV CODE 250: Mod: PN | Performed by: INTERNAL MEDICINE

## 2024-12-10 PROCEDURE — 96523 IRRIG DRUG DELIVERY DEVICE: CPT | Mod: PN

## 2024-12-10 PROCEDURE — 25500020 PHARM REV CODE 255: Mod: PO | Performed by: INTERNAL MEDICINE

## 2024-12-10 PROCEDURE — 74177 CT ABD & PELVIS W/CONTRAST: CPT | Mod: 26,,, | Performed by: RADIOLOGY

## 2024-12-10 PROCEDURE — 63600175 PHARM REV CODE 636 W HCPCS: Mod: PN | Performed by: INTERNAL MEDICINE

## 2024-12-10 RX ORDER — SODIUM CHLORIDE 0.9 % (FLUSH) 0.9 %
10 SYRINGE (ML) INJECTION
OUTPATIENT
Start: 2024-12-27

## 2024-12-10 RX ORDER — HEPARIN 100 UNIT/ML
500 SYRINGE INTRAVENOUS
OUTPATIENT
Start: 2024-12-27

## 2024-12-10 RX ORDER — SODIUM CHLORIDE 0.9 % (FLUSH) 0.9 %
10 SYRINGE (ML) INJECTION
Status: DISCONTINUED | OUTPATIENT
Start: 2024-12-10 | End: 2024-12-10 | Stop reason: HOSPADM

## 2024-12-10 RX ORDER — HEPARIN 100 UNIT/ML
500 SYRINGE INTRAVENOUS
Status: DISCONTINUED | OUTPATIENT
Start: 2024-12-10 | End: 2024-12-10 | Stop reason: HOSPADM

## 2024-12-10 RX ADMIN — IOHEXOL 75 ML: 350 INJECTION, SOLUTION INTRAVENOUS at 10:12

## 2024-12-10 RX ADMIN — Medication 500 UNITS: at 11:12

## 2024-12-10 RX ADMIN — BARIUM SULFATE 900 ML: 20 SUSPENSION ORAL at 10:12

## 2024-12-10 RX ADMIN — Medication 10 ML: at 11:12

## 2024-12-10 NOTE — TELEPHONE ENCOUNTER
- How long? Over the weekend  -Sick contacts? None that he can think of  -Any Medication taken Over the Counter? Mucinex   -Fever? Couple days - resumed now  -Cough? yes  -Congestion? yes  -Mucus? YES If yes, what color? green  -flu and covid shot 3 weeks    Advised pt to stick with his Mucinex and go to urgent care and call our office if symptoms worsened. Pt agreed.

## 2024-12-16 ENCOUNTER — OFFICE VISIT (OUTPATIENT)
Dept: HEMATOLOGY/ONCOLOGY | Facility: CLINIC | Age: 44
End: 2024-12-16
Payer: COMMERCIAL

## 2024-12-16 ENCOUNTER — TELEPHONE (OUTPATIENT)
Dept: HEPATOLOGY | Facility: CLINIC | Age: 44
End: 2024-12-16
Payer: COMMERCIAL

## 2024-12-16 ENCOUNTER — LAB VISIT (OUTPATIENT)
Dept: LAB | Facility: HOSPITAL | Age: 44
End: 2024-12-16
Attending: INTERNAL MEDICINE
Payer: COMMERCIAL

## 2024-12-16 VITALS
RESPIRATION RATE: 16 BRPM | DIASTOLIC BLOOD PRESSURE: 86 MMHG | HEIGHT: 69 IN | SYSTOLIC BLOOD PRESSURE: 122 MMHG | BODY MASS INDEX: 26.94 KG/M2 | WEIGHT: 181.88 LBS | OXYGEN SATURATION: 98 % | HEART RATE: 80 BPM | TEMPERATURE: 97 F

## 2024-12-16 DIAGNOSIS — R97.8 ABNORMAL TUMOR MARKERS: ICD-10-CM

## 2024-12-16 DIAGNOSIS — Z94.4 S/P LIVER TRANSPLANT: ICD-10-CM

## 2024-12-16 DIAGNOSIS — Z79.60 LONG-TERM USE OF IMMUNOSUPPRESSANT MEDICATION: ICD-10-CM

## 2024-12-16 DIAGNOSIS — C24.0 HILAR CHOLANGIOCARCINOMA: ICD-10-CM

## 2024-12-16 DIAGNOSIS — C24.0 HILAR CHOLANGIOCARCINOMA: Primary | ICD-10-CM

## 2024-12-16 LAB
ALBUMIN SERPL BCP-MCNC: 3.6 G/DL (ref 3.5–5.2)
ALP SERPL-CCNC: 384 U/L (ref 40–150)
ALT SERPL W/O P-5'-P-CCNC: 206 U/L (ref 10–44)
ANION GAP SERPL CALC-SCNC: 9 MMOL/L (ref 8–16)
AST SERPL-CCNC: 93 U/L (ref 10–40)
BASOPHILS # BLD AUTO: 0.02 K/UL (ref 0–0.2)
BASOPHILS NFR BLD: 0.4 % (ref 0–1.9)
BILIRUB SERPL-MCNC: 2.2 MG/DL (ref 0.1–1)
BUN SERPL-MCNC: 15 MG/DL (ref 6–20)
CALCIUM SERPL-MCNC: 9.4 MG/DL (ref 8.7–10.5)
CANCER AG19-9 SERPL-ACNC: 217.6 U/ML (ref 0–40)
CHLORIDE SERPL-SCNC: 97 MMOL/L (ref 95–110)
CO2 SERPL-SCNC: 25 MMOL/L (ref 23–29)
CREAT SERPL-MCNC: 1.5 MG/DL (ref 0.5–1.4)
DIFFERENTIAL METHOD BLD: ABNORMAL
EOSINOPHIL # BLD AUTO: 0.1 K/UL (ref 0–0.5)
EOSINOPHIL NFR BLD: 1.8 % (ref 0–8)
ERYTHROCYTE [DISTWIDTH] IN BLOOD BY AUTOMATED COUNT: 11.9 % (ref 11.5–14.5)
EST. GFR  (NO RACE VARIABLE): 57.8 ML/MIN/1.73 M^2
GLUCOSE SERPL-MCNC: 383 MG/DL (ref 70–110)
HCT VFR BLD AUTO: 43.5 % (ref 40–54)
HGB BLD-MCNC: 15.1 G/DL (ref 14–18)
IMM GRANULOCYTES # BLD AUTO: 0.02 K/UL (ref 0–0.04)
IMM GRANULOCYTES NFR BLD AUTO: 0.4 % (ref 0–0.5)
LYMPHOCYTES # BLD AUTO: 1.2 K/UL (ref 1–4.8)
LYMPHOCYTES NFR BLD: 22.4 % (ref 18–48)
MCH RBC QN AUTO: 33 PG (ref 27–31)
MCHC RBC AUTO-ENTMCNC: 34.7 G/DL (ref 32–36)
MCV RBC AUTO: 95 FL (ref 82–98)
MONOCYTES # BLD AUTO: 0.3 K/UL (ref 0.3–1)
MONOCYTES NFR BLD: 6 % (ref 4–15)
NEUTROPHILS # BLD AUTO: 3.5 K/UL (ref 1.8–7.7)
NEUTROPHILS NFR BLD: 69 % (ref 38–73)
NRBC BLD-RTO: 0 /100 WBC
PLATELET # BLD AUTO: 168 K/UL (ref 150–450)
PMV BLD AUTO: 9 FL (ref 9.2–12.9)
POTASSIUM SERPL-SCNC: 4.3 MMOL/L (ref 3.5–5.1)
PROT SERPL-MCNC: 7 G/DL (ref 6–8.4)
RBC # BLD AUTO: 4.58 M/UL (ref 4.6–6.2)
SODIUM SERPL-SCNC: 131 MMOL/L (ref 136–145)
WBC # BLD AUTO: 5.13 K/UL (ref 3.9–12.7)

## 2024-12-16 PROCEDURE — 1160F RVW MEDS BY RX/DR IN RCRD: CPT | Mod: CPTII,S$GLB,, | Performed by: INTERNAL MEDICINE

## 2024-12-16 PROCEDURE — G2211 COMPLEX E/M VISIT ADD ON: HCPCS | Mod: S$GLB,,, | Performed by: INTERNAL MEDICINE

## 2024-12-16 PROCEDURE — 99214 OFFICE O/P EST MOD 30 MIN: CPT | Mod: S$GLB,,, | Performed by: INTERNAL MEDICINE

## 2024-12-16 PROCEDURE — 1159F MED LIST DOCD IN RCRD: CPT | Mod: CPTII,S$GLB,, | Performed by: INTERNAL MEDICINE

## 2024-12-16 PROCEDURE — 3008F BODY MASS INDEX DOCD: CPT | Mod: CPTII,S$GLB,, | Performed by: INTERNAL MEDICINE

## 2024-12-16 PROCEDURE — 85025 COMPLETE CBC W/AUTO DIFF WBC: CPT | Performed by: INTERNAL MEDICINE

## 2024-12-16 PROCEDURE — 3044F HG A1C LEVEL LT 7.0%: CPT | Mod: CPTII,S$GLB,, | Performed by: INTERNAL MEDICINE

## 2024-12-16 PROCEDURE — 80053 COMPREHEN METABOLIC PANEL: CPT | Performed by: INTERNAL MEDICINE

## 2024-12-16 PROCEDURE — 86301 IMMUNOASSAY TUMOR CA 19-9: CPT | Performed by: INTERNAL MEDICINE

## 2024-12-16 PROCEDURE — 3074F SYST BP LT 130 MM HG: CPT | Mod: CPTII,S$GLB,, | Performed by: INTERNAL MEDICINE

## 2024-12-16 PROCEDURE — 36415 COLL VENOUS BLD VENIPUNCTURE: CPT | Performed by: INTERNAL MEDICINE

## 2024-12-16 PROCEDURE — 3079F DIAST BP 80-89 MM HG: CPT | Mod: CPTII,S$GLB,, | Performed by: INTERNAL MEDICINE

## 2024-12-16 PROCEDURE — 99999 PR PBB SHADOW E&M-EST. PATIENT-LVL IV: CPT | Mod: PBBFAC,,, | Performed by: INTERNAL MEDICINE

## 2024-12-16 NOTE — TELEPHONE ENCOUNTER
Patient: OSCAR PAYNE       MRN: [id number]      : 1980     Age: 44 y.o.  [address] [facility] [place] [address]    Presenting Radiologists:     Providers:  Dr Wesley    Priority of review: Cancer    Patient Transplant Status:     Reason for presentation:     Clinical Summary: Patient is a 44 y.o. male with hilar cholangiocarcinoma. On 12/10/24, elevated CA 19-9. CT scan abnormal.     Imaging to be reviewed: CT 12/10/24    HCC Treatment History:     Platelets:   Lab Results   Component Value Date/Time     (L) 12/10/2024 08:21 AM     Creatinine:   Lab Results   Component Value Date/Time    CREATININE 1.5 (H) 12/10/2024 08:21 AM     Bilirubin:   Lab Results   Component Value Date/Time    BILITOT 3.0 (H) 12/10/2024 08:21 AM     AFP Last 3 each if available:   Lab Results   Component Value Date/Time    AFP 3.5 2022 08:41 AM       MELD: Computed MELD 3.0 unavailable. One or more values for this score either were not found within the given timeframe or did not fit some other criterion.  Computed MELD-Na unavailable. One or more values for this score either were not found within the given timeframe or did not fit some other criterion.    Committee Discussion:    Plan:     Follow-up Provider: Dr Wesley

## 2024-12-16 NOTE — PROGRESS NOTES
"                                                         PROGRESS NOTE    Subjective:       Patient ID: OSCAR LARSON is a 44 y.o. male.    Chief Complaint: follow up for hilar cholangiocarcinoma    Diagnosis:  Yp T2N0M0 hilar cholangiocarcinoma, s/p neoadjuvant chemoradiation, chemotherapy and liver transplant on 6/21/2022    Oncologic History:  1. Mr Larson is a 42 yo man who presents today for further management of suspected hilar cholangiocarcinoma. He presented with dark urine, abdominal pain and jaundice since August 2021. He presented to outside hospital ER with elevated bilirubin. MRCP was performed demonstrating bi-lobar intrahepatic bile duct dilation and a ~2cm ill defined mass at the hilum concerning for hilar cholangiocarcinoma. [place] [facility] ERCP confirmed severe, malignant appearing stricture involving right and left main hepatic ducts. Biliary stents were placed to relieve obstruction. EUS was performed. It showed an irregular hypoechoic mass where the hepatic duct bifurcates into the right and left hepatic ducts. The mass measured 11.4 mm by 11.3 mm in maximal cross-sectional diameter. FNA sampling of hilar lymph nodes was negative for metastatic disease. Bile duct biopsy showed "rare groups of glandular epithelium with mild atypia, strips of benign glandular epithelium intermixed with blood clot, and focal fibrous tissue with chronic inflammation."  CT C/A/P 10/27/21:  1. Intrahepatic biliary dilatation despite the presence of 2 biliary drains.  The patient is recent comparison MRI a revealed a possible central hepatic mass, concerning for either cholangiocarcinoma or hepatocellular carcinoma.  This is, however, not definitively demonstrated on today's exam.  There are enlarged lymph nodes present in the vicinity of the ana cristina hepatis and celiac axis as detailed above.  2. Scattered pulmonary nodules measuring up to 6 mm.  3. Other findings as detailed " "above.  He presents today for further management. Seen by Dr Jara and considered a candidate for liver transplant. Seen by Dr Cunningham last Friday. Scheduled for PET this Wednesday. 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   Discussed neoadjuvant chemoradiation with 5FU followed by neoadjuvant cis/gem prior to liver transplant.   2. Hospitalized 11/3/21-21 for fever 2/2 acute cholangitis. Repeat ERCP biopsy on 21 again non-diagnostic. Biopsy of the celiac lymph node was negative.   3. PET scan 11/3/21: "1. Wedge-shaped geographic hypermetabolic activity within the right lobe of the liver in a similar distribution to the intrahepatic biliary dilatation.  This could relate to inflammation from multiple etiologies including biliary stasis, cholangitis, and obstructive dilatation.  Neoplastic involvement would be difficult to exclude.  The liver is poorly evaluated given background activity. 2. Hypermetabolic lymphadenopathy is noted in a periportal and celiac distribution.  Infectious inflammatory and neoplastic etiologies are on the differential.  Index nodes have been measured. 3. Multiple pulmonary nodules are noted too small to categorize by PET-CT fusion as evidence seen on a prior CT of 10/27/2021.  CT for follow-up of size stability is necessary."  4. Completed chemoradiation with 5FU from . Started cis/gem on 22. Completed neoadjuvant chemotherapy  5. Underwent living donor liver transplant on 2022. Pathology showed ypT2N0 well to moderately differentiated cholangiocarcinoma of the perihilar bile ducts, with invasion into the perihilar soft tissue. No LVI or PNI. Surgical margins are negative for carcinoma. Two lymph nodes, negative for carcinoma (0/2).     Interval History:   Mr Larson returns for follow up. He has been sick with fever and URI symptoms for 2 weeks. Feels bad. Son and boss got the same thing.     ECO    ROS:   A ten-point system review is obtained and negative " except for what was stated in the Interval History.     Physical Examination:   Vital signs reviewed.   General: well hydrated, well developed, in no acute distress  HEENT: normocephalic, PERRLA, EOMI, anicteric sclerae  Neck: supple, no JVD, thyromegaly, cervical or supraclavicular lymphadenopathy  Lungs: clear breath sounds bilaterally, no wheezing, rales, or rhonchi  Heart: RRR, no M/R/G  Abdomen: soft, no tenderness, non-distended, no hepatosplenomegaly, mass, or hernia. BS present  Extremities: no clubbing, cyanosis, or edema  Skin: no rash, ulcer, or open wounds  Neuro: alert and oriented x 4, no focal neuro deficit  Psych: pleasant and appropriate mood and affect    Objective:     Laboratory Data:  Labs reviewed. CA 19-9 100. Bilirubin 3.0. Cr 1.5    Imaging Data:  CT C/A/P 12/10/24:  Impression:     Nonspecific prominent, although not pathologically enlarged, ana cristina hepatis lymph node, not definitely seen on priors.  Recommend correlation with clinical exam and attention on follow-up.     Otherwise, no evidence of recurrent or metastatic disease in the chest, abdomen or pelvis.     Stable bilateral pulmonary nodules.  Postsurgical changes of orthotopic liver transplant.        Assessment and Plan:     1. Hilar cholangiocarcinoma    2. Abnormal tumor markers    3. S/P liver transplant    4. Long-term use of immunosuppressant medication      1.2  - Mr Larson is a 44 yo man with stage I hilar cholangiocarcinoma. Biopsy non-diagnostic but clinical picture most consistent with hilar cholangiocarcinoma. Completed chemoradiation with 5FU from 11/29/21-1/5/2022. Started cis/gem on 1/19/22. Completed neoadjuvant chemotherapy  - Underwent living donor liver transplant on 6/21/2022. Pathology showed ypT2N0 well to moderately differentiated cholangiocarcinoma of the perihilar bile ducts, with invasion into the perihilar soft tissue. No LVI or PNI. Surgical margins are negative for carcinoma. Two lymph nodes, negative for  carcinoma (0/2).   - doing well  - reviewed test results with patient. CA 19-9 100 but could be elevated due to elevated bilirubin  - repeat blood work today  - will review CT at the liver conference tomorrow. Will call patient after that. Will likely get CT and repeat blood work in 2 months for close follow up  - labs and surveillance scan in [place]. Flush port in [place]    3.4  - f/u with liver transplant team    Follow-up:     RTC in 2 months  Knows to call in the interval if any problems arise.    Electronically signed by Young Wesley      Route Chart for Scheduling    Med Onc Chart Routing      Follow up with physician 2 months. see me in 2 months with CBC, CMP, CA 19-9, CT C/A/P in [place] 2 days prior   Follow up with JOSEPH    Infusion scheduling note    Injection scheduling note    Labs CBC, CMP and CA 19-9   Scheduling:  Preferred lab:  Lab interval:     Imaging CT chest abdomen pelvis      Pharmacy appointment    Other referrals          Supportive Plan Information  OP FILGRASTIM 480 MCG Young Wesley MD   Associated Diagnosis: Chemotherapy induced neutropenia   noted on 2/9/2022  Associated Diagnosis: Adjustment and management of vascular access device   noted on 5/18/2022   Line of treatment: Supportive Care   Treatment goal: Supportive     Upcoming Treatment Dates - OP FILGRASTIM 480 MCG    2/9/2022       Medications       filgrastim-sndz (ZARXIO) injection 480 mcg/0.8 mL (Preferred Regimen)  2/10/2022       Medications       filgrastim-sndz (ZARXIO) injection 480 mcg/0.8 mL (Preferred Regimen)  2/11/2022       Medications       filgrastim-sndz (ZARXIO) injection 480 mcg/0.8 mL (Preferred Regimen)  2/12/2022       Medications       filgrastim-sndz (ZARXIO) injection 480 mcg/0.8 mL (Preferred Regimen)    Therapy Plan Information  PORT FLUSH for Hilar cholangiocarcinoma, noted on 11/4/2021  PORT FLUSH for Adjustment and management of vascular access device, noted on 5/18/2022  Flushes  heparin,  porcine (PF) 100 unit/mL injection flush 500 Units  500 Units, Intravenous, On the 4th Fri of every 1 month  sodium chloride 0.9% flush 10 mL  10 mL, Intravenous, On the 4th Fri of every 1 month      No therapy plan of the specified type found.    No therapy plan of the specified type found.

## 2024-12-17 ENCOUNTER — CONFERENCE (OUTPATIENT)
Dept: TRANSPLANT | Facility: CLINIC | Age: 44
End: 2024-12-17
Payer: COMMERCIAL

## 2024-12-20 ENCOUNTER — TELEPHONE (OUTPATIENT)
Dept: ENDOSCOPY | Facility: HOSPITAL | Age: 44
End: 2024-12-20
Payer: COMMERCIAL

## 2024-12-20 ENCOUNTER — TELEPHONE (OUTPATIENT)
Dept: HEMATOLOGY/ONCOLOGY | Facility: CLINIC | Age: 44
End: 2024-12-20
Payer: COMMERCIAL

## 2024-12-20 DIAGNOSIS — R97.8 ELEVATED CA 19-9 LEVEL: Primary | ICD-10-CM

## 2024-12-20 NOTE — TELEPHONE ENCOUNTER
Patient: OSCAR PAYNE       MRN: [id number]      : 1980     Age: 44 y.o.  [address] [facility] [id number]     Presenting Radiologists: Nirmala Perry MD     Providers:  Dr Wesley     Priority of review: Cancer     Patient Transplant Status:      Reason for presentation:      Clinical Summary: Patient is a 44 y.o. male with hilar cholangiocarcinoma. On 12/10/24, elevated CA 19-9. CT scan abnormal.      Imaging to be reviewed: CT 12/10/24     HCC Treatment History:      Platelets:         Lab Results   Component Value Date/Time      (L) 12/10/2024 08:21 AM      Creatinine:         Lab Results   Component Value Date/Time     CREATININE 1.5 (H) 12/10/2024 08:21 AM      Bilirubin:         Lab Results   Component Value Date/Time     BILITOT 3.0 (H) 12/10/2024 08:21 AM      AFP Last 3 each if available:         Lab Results   Component Value Date/Time     AFP 3.5 2022 08:41 AM         MELD: Computed MELD 3.0 unavailable. One or more values for this score either were not found within the given timeframe or did not fit some other criterion.  Computed MELD-Na unavailable. One or more values for this score either were not found within the given timeframe or did not fit some other criterion.     Committee Discussion:     Plan: CT CAP 12/10/2024: Post-surgical changed of LDLT pneumobilia and mild intrahepatic biliary ductal dilation. Increased size of hilar lymph node, which measures 1.5 x 0.8cm from 0.8 x 0.6cm. Subtle focus of hypodensity along the inferior hepatic margin is new from prior study but of uncertain clinical significance. Close attention on short-term follow-up imaging is recommended.      Discussion: The lymph node in question is more prominent than on the prior study from Sept.  There is soft tissue there but it is smaller.  Could be related to acute illness but it is concerning because it is at the ana cristina hepatitis , the cardiophrenic nodes are all stable no diffuse lymphadenopathy  seen.  Get one more set of close imaging follow up to make sure it's real   Along the resection or surgical margin it is a little hypodense that may be related to the timing of the contrast Bolus, not  seen on prior studies.  Not in the shape of a mass but worth paying   attention to on the follow up imaging as well.    Follow-up Provider: Dr Wesley

## 2024-12-20 NOTE — TELEPHONE ENCOUNTER
Referral for procedure from [facility]      Spoke to patient to schedule procedure(s) Upper Endoscopy Ultrasound (EUS)       Physician to perform procedure(s) Dr. PANCHO Chamorro  Date of Procedure (s) 12/31/24  Arrival Time 08:30 AM  Time of Procedure(s) 09:30 AM   Location of Procedure(s) [place] [facility] [address] Floor  Type of Rx Prep sent to patient: N/A  Instructions provided to patient via [facility]    Patient was informed on the following information and verbalized understanding. Screening questionnaire reviewed with patient and complete. If procedure requires anesthesia, a responsible adult needs to be present to accompany the patient home, patient cannot drive after receiving anesthesia. Appointment details are tentative, especially check-in time. Patient will receive a prep-op call 7 days prior to confirm check-in time for procedure. If applicable the patient should contact their pharmacy to verify Rx for procedure prep is ready for pick-up. Patient was advised to call the scheduling department at [phone] if pharmacy states no Rx is available. Patient was advised to call the endoscopy scheduling department if any questions or concerns arise.      [facility] Scheduling Department

## 2024-12-27 ENCOUNTER — TELEPHONE (OUTPATIENT)
Dept: HEMATOLOGY/ONCOLOGY | Facility: CLINIC | Age: 44
End: 2024-12-27
Payer: COMMERCIAL

## 2024-12-30 ENCOUNTER — ANESTHESIA EVENT (OUTPATIENT)
Dept: ENDOSCOPY | Facility: HOSPITAL | Age: 44
End: 2024-12-30
Payer: COMMERCIAL

## 2024-12-30 NOTE — ANESTHESIA PREPROCEDURE EVALUATION
[facility]  Anesthesia Pre-Operative Evaluation         Patient Name: OSCAR PAYNE  YOB: 1980  MRN: [id number]    SUBJECTIVE:     Pre-operative evaluation for Procedure(s) (LRB):  ULTRASOUND, UPPER GI TRACT, ENDOSCOPIC (N/A)     12/30/2024    OSCAR PAYNE is a 44 y.o. male w/ a significant PMHx of HTN, Hilar colangiocarcinoma, s/p liver transplant - 6/2022    Patient now presents for the above procedure(s).        LDA:   Port A Cath Single Lumen 11/24/21 0800 right subclavian;right internal jugular (Active)   Number of days: 1132       Port A Cath Single Lumen (Active)   Number of days:        Prev airway:   Intubation     Date/Time: 7/13/2022 3:15 PM  Performed by: Pravin Noyola CRNA  Authorized by: MANJEET Spain MD      Intubation:     Induction:  Intravenous    Intubated:  Postinduction    Mask Ventilation:  N/a    Attempts:  1    Attempted By:  CRNA    Method of Intubation:  Direct    Blade:  Lauren 4    Laryngeal View Grade: Grade IIA - cords partially seen      Difficult Airway Encountered?: No      Complications:  None    Airway Device:  Oral endotracheal tube    Airway Device Size:  7.5    Style/Cuff Inflation:  Cuffed    Inflation Amount (mL):  5    Tube secured:  23    Secured at:  The lips    Placement Verified By:  Capnometry    Complicating Factors:  None    Findings Post-Intubation:  BS equal bilateral and atraumatic/condition of teeth unchanged    Drips: None documented.      Patient Active Problem List   Diagnosis    Hypertension    Biliary obstruction    Hilar cholangiocarcinoma    Chemotherapy induced neutropenia    Fever    Anemia    Adjustment and management of vascular access device    S/P liver transplant    Prophylactic immunotherapy    At risk for opportunistic infections    Long-term use of immunosuppressant medication    Bile leak    Elevated LFTs    Elevated bilirubin    Cholangitis of transplanted liver    Steroid-induced hyperglycemia     Thrombocytopenia, unspecified    Secondary diabetes       Review of patient's allergies indicates:   Allergen Reactions    Zosyn [piperacillin-tazobactam] Swelling     Lip swelling       Current Inpatient Medications:      No current facility-administered medications on file prior to encounter.     Current Outpatient Medications on File Prior to Encounter   Medication Sig Dispense Refill    acetaminophen (TYLENOL) 500 MG tablet Take 500 mg by mouth every 4 to 6 hours as needed for Pain. (Patient not taking: Reported on 12/16/2024)      carvediloL (COREG) 12.5 MG tablet Take 1 tablet (12.5 mg total) by mouth 2 (two) times daily with meals. 60 tablet 11    tacrolimus (PROGRAF) 1 MG Cap Take 1 capsule (1 mg total) by mouth every morning AND 1 capsule (1 mg total) every evening. 60 capsule 11    ursodioL (ACTIGALL) 250 mg Tab Take 1 tablet (250 mg total) by mouth 2 (two) times a day. 60 tablet 11    [DISCONTINUED] baclofen (LIORESAL) 10 MG tablet TAKE 1 TABLET(10 MG) BY MOUTH THREE TIMES DAILY AS NEEDED FOR HICCUPS (Patient not taking: No sig reported) 90 tablet 0    [DISCONTINUED] famotidine (PEPCID) 20 MG tablet Take 1 tablet (20 mg total) by mouth every evening. STOP 7/24/22 30 tablet 0    [DISCONTINUED] insulin aspart U-100 (NOVOLOG) 100 unit/mL (3 mL) InPn pen Inject 5 Units into the skin 3 (three) times daily with meals. + sliding scale.  MDD 30 units/d 15 mL 5    [DISCONTINUED] losartan (COZAAR) 50 MG tablet Take 1 tablet (50 mg total) by mouth once daily. 90 tablet 3    [DISCONTINUED] metFORMIN (GLUCOPHAGE) 500 MG tablet Take 1 tablet (500 mg total) by mouth 2 (two) times daily with meals. 60 tablet 3    [DISCONTINUED] tamsulosin (FLOMAX) 0.4 mg Cap Take 2 capsules (0.8 mg total) by mouth once daily. 60 capsule 11       Past Surgical History:   Procedure Laterality Date    COLONOSCOPY N/A 12/27/2023    Procedure: COLONOSCOPY;  Surgeon: Timmy Mcdowell MD;  Location: [facility];  Service: Endoscopy;   Laterality: N/A;  pt wants to schedule in [place]    DIAGNOSTIC ULTRASOUND N/A 6/21/2022    Procedure: ULTRASOUND, DIAGNOSTIC;  Surgeon: Sinan Cline MD;  [facility];  Service: Transplant;  Laterality: N/A;    ENDOSCOPIC ULTRASOUND OF UPPER GASTROINTESTINAL TRACT N/A 10/20/2021    Procedure: ULTRASOUND, UPPER GI TRACT, ENDOSCOPIC;  Surgeon: Valeriy Sotelo MD;  Location: [facility] (2ND FLR);  Service: Endoscopy;  Laterality: N/A;  wife to email vacc card-inst email-tb    ERCP N/A 10/20/2021    Procedure: ERCP (ENDOSCOPIC RETROGRADE CHOLANGIOPANCREATOGRAPHY);  Surgeon: Valeriy Sotelo MD;  Location: [facility] (2ND FLR);  Service: Endoscopy;  Laterality: N/A;    ERCP N/A 11/4/2021    Procedure: ERCP (ENDOSCOPIC RETROGRADE CHOLANGIOPANCREATOGRAPHY);  Surgeon: Valeriy Sotelo MD;  [facility]  Service: Endoscopy;  Laterality: N/A;    ERCP N/A 11/4/2021    Procedure: ERCP (ENDOSCOPIC RETROGRADE CHOLANGIOPANCREATOGRAPHY);  Surgeon: Roselia Pereyra MD;  [facility]  Service: Endoscopy;  Laterality: N/A;  pt vaccinated, instructed to bring card to procedure, instructions sent portal-    ERCP N/A 1/14/2022    Procedure: ERCP (ENDOSCOPIC RETROGRADE CHOLANGIOPANCREATOGRAPHY);  Surgeon: Roselia Pereyra MD;  [facility]  Service: Endoscopy;  Laterality: N/A;  inst portal-right chest port-tb  Pt requested at home COVID testing, Pt instructed at home RAPID to be done morning of procedure, Pt instructed to call endoscopy scheuding if there is a positive result, Pt informed if a positive     ERCP N/A 3/31/2022    Procedure: ERCP (ENDOSCOPIC RETROGRADE CHOLANGIOPANCREATOGRAPHY);  Surgeon: Julio Cesar Alonzo MD;  Location: [facility] ENDO (2ND FLR);  Service: Endoscopy;  Laterality: N/A;  3/16: port L chest wall. pt to bring covid vaccination card. Instructions sent via portal.-SC  3/18/22-Updated instructions sent via portal re:new date/time-DS     EXPLORATORY LAPAROTOMY AFTER LIVER TRANSPLANTATION N/A 6/23/2022    Procedure: LAPAROTOMY, EXPLORATORY, AFTER LIVER TRANSPLANT;  Surgeon: Julio Cesar Jara MD;  Location: [facility] OR [facility] FLR;  Service: Transplant;  Laterality: N/A;    INSERTION OF TUNNELED CENTRAL VENOUS CATHETER (CVC) WITH SUBCUTANEOUS PORT N/A 11/24/2021    Procedure: INSERTION, PORT-A-CATH;  Surgeon: Prem Syed MD;  Location: [facility] CATH LAB;  Service: Radiology;  Laterality: N/A;    LIVER TRANSPLANT N/A 6/21/2022    Procedure: TRANSPLANT, LIVER;  Surgeon: Sinan Cline MD;  Location: [facility] OR 2ND FLR;  Service: Transplant;  Laterality: N/A;    REPAIR OF BILE DUCT N/A 6/23/2022    Procedure: REPAIR, BILE DUCT;  Surgeon: Julio Cesar Jara MD;  Location: [facility] OR 2ND FLR;  Service: Transplant;  Laterality: N/A;    ROBOT-ASSISTED LAPAROSCOPIC LYMPHADENECTOMY USING DA МАРИНА XI  6/17/2022    Procedure: XI ROBOTIC LYMPHADENECTOMY - Portal;  Surgeon: Julio Cesar Jara MD;  Location: [facility] OR [facility] FLR;  Service: Transplant;;    TONSILLECTOMY      XI ROBOTIC LAPAROSCOPY, EXPLORATORY N/A 6/17/2022    Procedure: XI ROBOTIC LAPAROSCOPY,EXPLORATORY;  Surgeon: Julio Cesar Jara MD;  Location: [facility] OR [facility] FLR;  Service: Transplant;  Laterality: N/A;       Social History     Socioeconomic History    Marital status:    Tobacco Use    Smoking status: Never    Smokeless tobacco: Never   Substance and Sexual Activity    Alcohol use: Not Currently    Drug use: Never    Sexual activity: Yes   Social History Narrative    ** Merged History Encounter **          Social Drivers of Health     Financial Resource Strain: Low Risk  (10/26/2023)    Overall Financial Resource Strain (CARDIA)     Difficulty of Paying Living Expenses: Not hard at all   Food Insecurity: No Food Insecurity (10/26/2023)    Hunger Vital Sign     Worried About Running Out of Food in the Last Year: Never true     Ran Out of Food in the Last Year: Never true   Transportation Needs: No Transportation Needs  (10/26/2023)    PRAPARE - Transportation     Lack of Transportation (Medical): No     Lack of Transportation (Non-Medical): No   Physical Activity: Sufficiently Active (10/26/2023)    Exercise Vital Sign     Days of Exercise per Week: 3 days     Minutes of Exercise per Session: 60 min   Stress: No Stress Concern Present (10/26/2023)    [place] citizen [place] [employer] - Occupational Stress Questionnaire     Feeling of Stress : Not at all   Housing Stability: Low Risk  (10/26/2023)    Housing Stability Vital Sign     Unable to Pay for Housing in the Last Year: No     Number of Places Lived in the Last Year: 1     Unstable Housing in the Last Year: No       OBJECTIVE:     Vital Signs Range (Last 24H):         Significant Labs:  Lab Results   Component Value Date    WBC 5.13 12/16/2024    HGB 15.1 12/16/2024    HCT 43.5 12/16/2024     12/16/2024    CHOL 161 01/08/2024    TRIG 178 (H) 01/08/2024    HDL 34 (L) 01/08/2024     (H) 12/16/2024    AST 93 (H) 12/16/2024     (L) 12/16/2024    K 4.3 12/16/2024    CL 97 12/16/2024    CREATININE 1.5 (H) 12/16/2024    BUN 15 12/16/2024    CO2 25 12/16/2024    TSH 1.887 01/08/2024    PSA 0.68 03/24/2022    INR 0.9 10/28/2023    HGBA1C 6.4 (H) 08/17/2024       Diagnostic Studies: No relevant studies.    EKG:   Results for orders placed or performed during the hospital encounter of 10/25/23   EKG 12-lead    Collection Time: 10/25/23  1:51 PM    Narrative    Test Reason : R00.0,    Vent. Rate : 101 BPM     Atrial Rate : 101 BPM     P-R Int : 140 ms          QRS Dur : 076 ms      QT Int : 310 ms       P-R-T Axes : 049 069 014 degrees     QTc Int : 401 ms    Sinus tachycardia  Otherwise normal ECG  When compared with ECG of 14-SEP-2022 19:57,  No significant change was found  Confirmed by Amado COSME MD ([id number]) on 10/25/2023 5:16:59 PM    Referred By: [email or web address]   SELF           Confirmed By:Amado COSME MD       2D ECHO:  TTE:  No results found. However, due to  the size of the patient record, not all encounters were searched. Please check Results Review for a complete set of results.    CAMILO:  No results found. However, due to the size of the patient record, not all encounters were searched. Please check Results Review for a complete set of results.    ASSESSMENT/PLAN:           Pre-op Assessment    I have reviewed the Patient Summary Reports.     I have reviewed the Nursing Notes. I have reviewed the NPO Status.   I have reviewed the Medications.     Review of Systems  Anesthesia Hx:  No problems with previous Anesthesia   History of prior surgery of interest to airway management or planning:          Denies Family Hx of Anesthesia complications.    Denies Personal Hx of Anesthesia complications.                    Hematology/Oncology:  Hematology Normal                       --  Cancer in past history:                     EENT/Dental:  EENT/Dental Normal           Cardiovascular:     Hypertension                                          Pulmonary:  Pulmonary Normal                       Renal/:  Renal/ Normal                 Hepatic/GI:  Hepatic/GI Normal Bowel Prep.      Cholangiocarcinoma, hx of liver transplant             Musculoskeletal:  Musculoskeletal Normal                Neurological:  Neurology Normal                                          Physical Exam  General: Well nourished, Cooperative, Alert and Oriented    Airway:  Mallampati: II   Mouth Opening: Normal  TM Distance: Normal  Tongue: Normal  Neck ROM: Normal ROM    Dental:  Intact        Anesthesia Plan  Type of Anesthesia, risks & benefits discussed:    Anesthesia Type: Gen Natural Airway, Gen ETT  Intra-op Monitoring Plan: Standard ASA Monitors  Post Op Pain Control Plan: multimodal analgesia and IV/PO Opioids PRN  Induction:  IV  Airway Plan: Direct and Video, Post-Induction  Informed Consent: Informed consent signed with the Patient and all parties understand the risks and agree with anesthesia  plan.  All questions answered.   ASA Score: 3  Day of Surgery Review of History & Physical: H&P Update referred to the surgeon/provider.    Ready For Surgery From Anesthesia Perspective.     .

## 2024-12-31 ENCOUNTER — ANESTHESIA (OUTPATIENT)
Dept: ENDOSCOPY | Facility: HOSPITAL | Age: 44
End: 2024-12-31
Payer: COMMERCIAL

## 2024-12-31 ENCOUNTER — HOSPITAL ENCOUNTER (OUTPATIENT)
Facility: HOSPITAL | Age: 44
Discharge: HOME | End: 2024-12-31
Attending: INTERNAL MEDICINE | Admitting: INTERNAL MEDICINE
Payer: COMMERCIAL

## 2024-12-31 VITALS
TEMPERATURE: 98 F | HEIGHT: 68 IN | DIASTOLIC BLOOD PRESSURE: 74 MMHG | HEART RATE: 72 BPM | SYSTOLIC BLOOD PRESSURE: 103 MMHG | OXYGEN SATURATION: 96 % | BODY MASS INDEX: 26.67 KG/M2 | RESPIRATION RATE: 12 BRPM | WEIGHT: 176 LBS

## 2024-12-31 DIAGNOSIS — R93.5 ABNORMAL CT OF THE ABDOMEN: ICD-10-CM

## 2024-12-31 PROCEDURE — 37000009 HC ANESTHESIA EA ADD 15 MINS: Performed by: INTERNAL MEDICINE

## 2024-12-31 PROCEDURE — 25000003 PHARM REV CODE 250

## 2024-12-31 PROCEDURE — 37000008 HC ANESTHESIA 1ST 15 MINUTES: Performed by: INTERNAL MEDICINE

## 2024-12-31 PROCEDURE — 63600175 PHARM REV CODE 636 W HCPCS

## 2024-12-31 PROCEDURE — 43259 EGD US EXAM DUODENUM/JEJUNUM: CPT | Performed by: INTERNAL MEDICINE

## 2024-12-31 PROCEDURE — 43259 EGD US EXAM DUODENUM/JEJUNUM: CPT | Mod: ,,, | Performed by: INTERNAL MEDICINE

## 2024-12-31 PROCEDURE — 63600175 PHARM REV CODE 636 W HCPCS: Performed by: INTERNAL MEDICINE

## 2024-12-31 RX ORDER — HEPARIN 100 UNIT/ML
5 SYRINGE INTRAVENOUS ONCE
Status: COMPLETED | OUTPATIENT
Start: 2024-12-31 | End: 2024-12-31

## 2024-12-31 RX ORDER — TOPICAL ANESTHETIC 200 MG/ML
SPRAY DENTAL; PERIODONTAL
Status: DISCONTINUED | OUTPATIENT
Start: 2024-12-31 | End: 2024-12-31

## 2024-12-31 RX ORDER — PROPOFOL 10 MG/ML
VIAL (ML) INTRAVENOUS
Status: DISCONTINUED | OUTPATIENT
Start: 2024-12-31 | End: 2024-12-31

## 2024-12-31 RX ORDER — SODIUM CHLORIDE 0.9 % (FLUSH) 0.9 %
10 SYRINGE (ML) INJECTION
Status: DISCONTINUED | OUTPATIENT
Start: 2024-12-31 | End: 2024-12-31 | Stop reason: HOSPADM

## 2024-12-31 RX ORDER — GLUCAGON 1 MG
1 KIT INJECTION
Status: DISCONTINUED | OUTPATIENT
Start: 2024-12-31 | End: 2024-12-31 | Stop reason: HOSPADM

## 2024-12-31 RX ORDER — OXYCODONE HYDROCHLORIDE 5 MG/1
5 TABLET ORAL
Status: DISCONTINUED | OUTPATIENT
Start: 2024-12-31 | End: 2024-12-31 | Stop reason: HOSPADM

## 2024-12-31 RX ORDER — DEXMEDETOMIDINE HYDROCHLORIDE 100 UG/ML
INJECTION, SOLUTION INTRAVENOUS
Status: DISCONTINUED | OUTPATIENT
Start: 2024-12-31 | End: 2024-12-31

## 2024-12-31 RX ORDER — HALOPERIDOL 5 MG/ML
0.5 INJECTION INTRAMUSCULAR EVERY 10 MIN PRN
Status: DISCONTINUED | OUTPATIENT
Start: 2024-12-31 | End: 2024-12-31 | Stop reason: HOSPADM

## 2024-12-31 RX ORDER — OXYCODONE HYDROCHLORIDE 5 MG/1
10 TABLET ORAL EVERY 4 HOURS PRN
Status: DISCONTINUED | OUTPATIENT
Start: 2024-12-31 | End: 2024-12-31 | Stop reason: HOSPADM

## 2024-12-31 RX ADMIN — DEXMEDETOMIDINE HYDROCHLORIDE 8 MCG: 100 INJECTION, SOLUTION, CONCENTRATE INTRAVENOUS at 09:12

## 2024-12-31 RX ADMIN — TOPICAL ANESTHETIC 2 EACH: 200 SPRAY DENTAL; PERIODONTAL at 09:12

## 2024-12-31 RX ADMIN — PROPOFOL 70 MG: 10 INJECTION, EMULSION INTRAVENOUS at 09:12

## 2024-12-31 RX ADMIN — HEPARIN 500 UNITS: 100 SYRINGE at 10:12

## 2024-12-31 RX ADMIN — PROPOFOL 10 MG: 10 INJECTION, EMULSION INTRAVENOUS at 09:12

## 2024-12-31 RX ADMIN — PROPOFOL 150 MCG/KG/MIN: 10 INJECTION, EMULSION INTRAVENOUS at 09:12

## 2024-12-31 RX ADMIN — SODIUM CHLORIDE: 0.9 INJECTION, SOLUTION INTRAVENOUS at 09:12

## 2024-12-31 RX ADMIN — DEXMEDETOMIDINE HYDROCHLORIDE 4 MCG: 100 INJECTION, SOLUTION, CONCENTRATE INTRAVENOUS at 09:12

## 2024-12-31 NOTE — ANESTHESIA POSTPROCEDURE EVALUATION
Anesthesia Post Evaluation    Patient: OSCAR PAYNE    Procedure(s) Performed: Procedure(s) (LRB):  ULTRASOUND, UPPER GI TRACT, ENDOSCOPIC (N/A)    Final Anesthesia Type: general      Patient location during evaluation: GI PACU  Patient participation: Yes- Able to Participate  Level of consciousness: awake and alert and oriented  Post-procedure vital signs: reviewed and stable  Pain management: adequate  Airway patency: patent    PONV status at discharge: No PONV  Anesthetic complications: no      Cardiovascular status: hemodynamically stable  Respiratory status: unassisted  Hydration status: euvolemic  Follow-up not needed.              Vitals Value Taken Time   /69 12/31/24 0948   Temp 36.5 °C (97.7 °F) 12/31/24 0933   Pulse 62 12/31/24 0948   Resp 17 12/31/24 0948   SpO2 97 % 12/31/24 0948         No case tracking events are documented in the log.      Pain/Moe Score: Moe Score: 9 (12/31/2024  9:48 AM)

## 2024-12-31 NOTE — H&P
Short Stay Endoscopy History and Physical    PCP - Yves Moses MD  Referring Physician - Young Wesley MD  [address] [place] [address]    Procedure - EUS  ASA - per anesthesia  Mallampati - per anesthesia  History of Anesthesia problems - no  Family history Anesthesia problems -  no   Plan of anesthesia - General    HPI:  This is a 44 y.o. male here for evaluation of: abnl CT; enlarged portal LN    Reflux - no  Dysphagia - no  Abdominal pain - no  Diarrhea - no    ROS:  Constitutional: No fevers, chills, No weight loss  CV: No chest pain  Pulm: No cough, No shortness of breath  GI: see HPI    Medical History:  has a past medical history of Adjustment and management of vascular access device (5/18/2022), Cholangiocarcinoma, Fever of unknown origin (4/26/2022), Hiccups, Hilar cholangiocarcinoma (11/4/2021), Hypercholesteremia, and Hypertension.    Surgical History:  has a past surgical history that includes Endoscopic ultrasound of upper gastrointestinal tract (N/A, 10/20/2021); ERCP (N/A, 10/20/2021); ERCP (N/A, 11/4/2021); ERCP (N/A, 11/4/2021); Insertion of tunneled central venous catheter (CVC) with subcutaneous port (N/A, 11/24/2021); Tonsillectomy; ERCP (N/A, 1/14/2022); ERCP (N/A, 3/31/2022); xi robotic laparoscopy, exploratory (N/A, 6/17/2022); Robot-assisted laparoscopic lymphadenectomy using da Telma Xi (6/17/2022); Liver transplant (N/A, 6/21/2022); Diagnostic ultrasound (N/A, 6/21/2022); Repair of bile duct (N/A, 6/23/2022); Exploratory laparotomy after liver transplantation (N/A, 6/23/2022); and Colonoscopy (N/A, 12/27/2023).    Family History: family history includes Hyperlipidemia in his father; No Known Problems in his brother, mother, and sister..    Social History:  reports that he has never smoked. He has never used smokeless tobacco. He reports that he does not currently use alcohol. He reports that he does not use drugs.    Review of patient's allergies indicates:   Allergen  Reactions    Zosyn [piperacillin-tazobactam] Swelling     Lip swelling       Medications:   Medications Prior to Admission   Medication Sig Dispense Refill Last Dose/Taking    carvediloL (COREG) 12.5 MG tablet Take 1 tablet (12.5 mg total) by mouth 2 (two) times daily with meals. 60 tablet 11 12/30/2024    tacrolimus (PROGRAF) 1 MG Cap Take 1 capsule (1 mg total) by mouth every morning AND 1 capsule (1 mg total) every evening. 60 capsule 11 12/30/2024    ursodioL (ACTIGALL) 250 mg Tab Take 1 tablet (250 mg total) by mouth 2 (two) times a day. 60 tablet 11 12/30/2024    acetaminophen (TYLENOL) 500 MG tablet Take 500 mg by mouth every 4 to 6 hours as needed for Pain. (Patient not taking: Reported on 12/16/2024)          Physical Exam:    Vital Signs:   Vitals:    12/31/24 0840   BP: (!) 148/91   Pulse: 67   Resp: 18   Temp: 97.7 °F (36.5 °C)       General Appearance: Well appearing in no acute distress  Lungs: no labored breathing  CVS:  regular rate  Abdomen: non tender    Labs:  Lab Results   Component Value Date    WBC 5.13 12/16/2024    HGB 15.1 12/16/2024    HCT 43.5 12/16/2024     12/16/2024    CHOL 161 01/08/2024    TRIG 178 (H) 01/08/2024    HDL 34 (L) 01/08/2024     (H) 12/16/2024    AST 93 (H) 12/16/2024     (L) 12/16/2024    K 4.3 12/16/2024    CL 97 12/16/2024    CREATININE 1.5 (H) 12/16/2024    BUN 15 12/16/2024    CO2 25 12/16/2024    TSH 1.887 01/08/2024    PSA 0.68 03/24/2022    INR 0.9 10/28/2023    HGBA1C 6.4 (H) 08/17/2024       I have explained the risks and benefits of this endoscopic procedure to the patient including but not limited to bleeding, inflammation, infection, perforation, and death.      Jose Chamorro MD

## 2024-12-31 NOTE — TRANSFER OF CARE
"Anesthesia Transfer of Care Note    Patient: OSCAR PAYNE    Procedure(s) Performed: Procedure(s) (LRB):  ULTRASOUND, UPPER GI TRACT, ENDOSCOPIC (N/A)    Patient location: PACU    Anesthesia Type: MAC    Transport from OR: Transported from OR on 6-10 L/min O2 by face mask with adequate spontaneous ventilation    Post pain: adequate analgesia    Post assessment: no apparent anesthetic complications    Post vital signs: stable    Level of consciousness: responds to stimulation    Nausea/Vomiting: no nausea/vomiting    Complications: none    Transfer of care protocol was followed      Last vitals: Visit Vitals  BP (!) 148/91 (BP Location: Left arm, Patient Position: Lying)   Pulse 67   Temp 36.5 °C (97.7 °F) (Temporal)   Resp 18   Ht 5' 8" (1.727 m)   Wt 79.8 kg (176 lb)   SpO2 100%   BMI 26.76 kg/m²     "

## 2024-12-31 NOTE — PROVATION PATIENT INSTRUCTIONS
Discharge Summary/Instructions after an Endoscopic Procedure  Patient Name: Romeo Larson  Patient MRN: [id number]  Patient YOB: 1980 Tuesday, December 31, 2024  Jose Chamorro MD  Dear patient,  As a result of recent federal legislation (The Federal Cures Act), you may   receive lab or pathology results from your procedure in your [facility]   account before your physician is able to contact you. Your physician or   their representative will relay the results to you with their   recommendations at their soonest availability.  Thank you,  Your health is very important to us during the Covid Crisis. Following your   procedure today, you will receive a daily text for 2 weeks asking about   signs or symptoms of Covid 19.  Please respond to this text when you   receive it so we can follow up and keep you as safe as possible.   RESTRICTIONS:  During your procedure today, you received medications for sedation.  These   medications may affect your judgment, balance and coordination.  Therefore,   for 24 hours, you have the following restrictions:   - DO NOT drive a car, operate machinery, make legal/financial decisions,   sign important papers or drink alcohol.    ACTIVITY:  Today: no heavy lifting, straining or running due to procedural   sedation/anesthesia.  The following day: return to full activity including work.  DIET:  Eat and drink normally unless instructed otherwise.     TREATMENT FOR COMMON SIDE EFFECTS:  - Mild abdominal pain, nausea, belching, bloating or excessive gas:  rest,   eat lightly and use a heating pad.  - Sore Throat: treat with throat lozenges and/or gargle with warm salt   water.  - Because air was used during the procedure, expelling large amounts of air   from your rectum or belching is normal.  - If a bowel prep was taken, you may not have a bowel movement for 1-3 days.    This is normal.  SYMPTOMS TO WATCH FOR AND REPORT TO YOUR PHYSICIAN:  1. Abdominal pain or bloating, other than  gas cramps.  2. Chest pain.  3. Back pain.  4. Signs of infection such as: chills or fever occurring within 24 hours   after the procedure.  5. Rectal bleeding, which would show as bright red, maroon, or black stools.   (A tablespoon of blood from the rectum is not serious, especially if   hemorrhoids are present.)  6. Vomiting.  7. Weakness or dizziness.  GO DIRECTLY TO THE NEAREST EMERGENCY ROOM IF YOU HAVE ANY OF THE FOLLOWING:      Difficulty breathing              Chills and/or fever over 101 F   Persistent vomiting and/or vomiting blood   Severe abdominal pain   Severe chest pain   Black, tarry stools   Bleeding- more than one tablespoon   Any other symptom or condition that you feel may need urgent attention  Your doctor recommends these additional instructions:  If any biopsies were taken, your doctors clinic will contact you in 1 to 2   weeks with any results.  - Discharge patient to home (ambulatory).   - Patient has a contact number available for emergencies.  The signs and   symptoms of potential delayed complications were discussed with the   patient.  Return to normal activities tomorrow.  Written discharge   instructions were provided to the patient.   - Resume previous diet.   - Continue present medications.   - Return to referring physician as previously scheduled.   - No LN were visualized by EUS in the ana cristina hepatis region. Consider   interval CT (or PET/CT) for follow-up. Defer to Dr. Wesley on timing and exact   imaging modality.  For questions, problems or results please call your physician - Jose Chamorro MD.  EMERGENCY PHONE NUMBER: [phone],  LAB RESULTS: [phone]  IF A COMPLICATION OR EMERGENCY SITUATION ARISES AND YOU ARE UNABLE TO REACH   YOUR PHYSICIAN - GO DIRECTLY TO THE EMERGENCY ROOM.  Jose Chmaorro MD  12/31/2024 9:45:36 AM  This report has been verified and signed electronically.  Dear patient,  As a result of recent federal legislation (The Federal Cures Act), you may    receive lab or pathology results from your procedure in your [facility]   account before your physician is able to contact you. Your physician or   their representative will relay the results to you with their   recommendations at their soonest availability.  Thank you,  [employer]OVATION

## 2025-01-02 ENCOUNTER — TELEPHONE (OUTPATIENT)
Dept: HEPATOLOGY | Facility: CLINIC | Age: 45
End: 2025-01-02
Payer: COMMERCIAL

## 2025-01-02 DIAGNOSIS — C24.0 HILAR CHOLANGIOCARCINOMA: Primary | ICD-10-CM

## 2025-01-02 NOTE — TELEPHONE ENCOUNTER
----- Message from Young Wesley MD sent at 1/2/2025 10:49 AM CST -----  Ruth, can you put this patient on liver conference on 1/28? thanks

## 2025-01-02 NOTE — TELEPHONE ENCOUNTER
Patient: OSCAR PAYNE       MRN: [id number]      : 1980     Age: 44 y.o.  [address] [facility] [place] [address]    Presenting Radiologists:     Providers:  Young Wesley    Priority of review: Cancer    Patient Transplant Status: Oncology    Reason for presentation:       Clinical Summary:    Patient is a 44 y.o. male with hilar cholangiocarcinoma with abnormal tumor markers     Imaging to be reviewed: 12/10/2024    HCC Treatment History:     Platelets:   Lab Results   Component Value Date/Time     2024 02:02 PM     Creatinine:   Lab Results   Component Value Date/Time    CREATININE 1.5 (H) 2024 02:02 PM     Bilirubin:   Lab Results   Component Value Date/Time    BILITOT 2.2 (H) 2024 02:02 PM     AFP Last 3 each if available:   Lab Results   Component Value Date/Time    AFP 3.5 2022 08:41 AM       MELD: Computed MELD 3.0 unavailable. One or more values for this score either were not found within the given timeframe or did not fit some other criterion.  Computed MELD-Na unavailable. One or more values for this score either were not found within the given timeframe or did not fit some other criterion.      Plan:     Follow-up Provider: Dr TIMMY Wesley

## 2025-01-14 DIAGNOSIS — C24.0 HILAR CHOLANGIOCARCINOMA: Primary | ICD-10-CM

## 2025-01-15 ENCOUNTER — LAB VISIT (OUTPATIENT)
Dept: LAB | Facility: HOSPITAL | Age: 45
End: 2025-01-15
Attending: INTERNAL MEDICINE
Payer: COMMERCIAL

## 2025-01-15 DIAGNOSIS — C24.0 HILAR CHOLANGIOCARCINOMA: ICD-10-CM

## 2025-01-15 LAB
ALBUMIN SERPL BCP-MCNC: 4.2 G/DL (ref 3.5–5.2)
ALP SERPL-CCNC: 212 U/L (ref 40–150)
ALT SERPL W/O P-5'-P-CCNC: 79 U/L (ref 10–44)
ANION GAP SERPL CALC-SCNC: 11 MMOL/L (ref 8–16)
AST SERPL-CCNC: 56 U/L (ref 10–40)
BASOPHILS # BLD AUTO: 0.02 K/UL (ref 0–0.2)
BASOPHILS NFR BLD: 0.3 % (ref 0–1.9)
BILIRUB SERPL-MCNC: 1.4 MG/DL (ref 0.1–1)
BUN SERPL-MCNC: 19 MG/DL (ref 6–20)
CALCIUM SERPL-MCNC: 9.4 MG/DL (ref 8.7–10.5)
CANCER AG19-9 SERPL-ACNC: 9.6 U/ML (ref 0–40)
CHLORIDE SERPL-SCNC: 106 MMOL/L (ref 95–110)
CO2 SERPL-SCNC: 21 MMOL/L (ref 23–29)
CREAT SERPL-MCNC: 1.2 MG/DL (ref 0.5–1.4)
DIFFERENTIAL METHOD BLD: ABNORMAL
EOSINOPHIL # BLD AUTO: 0.1 K/UL (ref 0–0.5)
EOSINOPHIL NFR BLD: 2 % (ref 0–8)
ERYTHROCYTE [DISTWIDTH] IN BLOOD BY AUTOMATED COUNT: 11.9 % (ref 11.5–14.5)
EST. GFR  (NO RACE VARIABLE): >60 ML/MIN/1.73 M^2
GLUCOSE SERPL-MCNC: 184 MG/DL (ref 70–110)
HCT VFR BLD AUTO: 46.1 % (ref 40–54)
HGB BLD-MCNC: 16.6 G/DL (ref 14–18)
IMM GRANULOCYTES # BLD AUTO: 0.02 K/UL (ref 0–0.04)
IMM GRANULOCYTES NFR BLD AUTO: 0.3 % (ref 0–0.5)
LYMPHOCYTES # BLD AUTO: 1.6 K/UL (ref 1–4.8)
LYMPHOCYTES NFR BLD: 27.6 % (ref 18–48)
MCH RBC QN AUTO: 33.1 PG (ref 27–31)
MCHC RBC AUTO-ENTMCNC: 36 G/DL (ref 32–36)
MCV RBC AUTO: 92 FL (ref 82–98)
MONOCYTES # BLD AUTO: 0.5 K/UL (ref 0.3–1)
MONOCYTES NFR BLD: 8.1 % (ref 4–15)
NEUTROPHILS # BLD AUTO: 3.7 K/UL (ref 1.8–7.7)
NEUTROPHILS NFR BLD: 61.7 % (ref 38–73)
NRBC BLD-RTO: 0 /100 WBC
PLATELET # BLD AUTO: 151 K/UL (ref 150–450)
PMV BLD AUTO: 8.7 FL (ref 9.2–12.9)
POTASSIUM SERPL-SCNC: 4.1 MMOL/L (ref 3.5–5.1)
PROT SERPL-MCNC: 7.1 G/DL (ref 6–8.4)
RBC # BLD AUTO: 5.02 M/UL (ref 4.6–6.2)
SODIUM SERPL-SCNC: 138 MMOL/L (ref 136–145)
WBC # BLD AUTO: 5.94 K/UL (ref 3.9–12.7)

## 2025-01-15 PROCEDURE — 85025 COMPLETE CBC W/AUTO DIFF WBC: CPT | Mod: PO | Performed by: INTERNAL MEDICINE

## 2025-01-15 PROCEDURE — 80053 COMPREHEN METABOLIC PANEL: CPT | Mod: PO | Performed by: INTERNAL MEDICINE

## 2025-01-15 PROCEDURE — 36415 COLL VENOUS BLD VENIPUNCTURE: CPT | Mod: PO | Performed by: INTERNAL MEDICINE

## 2025-01-15 PROCEDURE — 86301 IMMUNOASSAY TUMOR CA 19-9: CPT | Performed by: INTERNAL MEDICINE

## 2025-01-17 ENCOUNTER — TELEPHONE (OUTPATIENT)
Dept: HEMATOLOGY/ONCOLOGY | Facility: CLINIC | Age: 45
End: 2025-01-17
Payer: COMMERCIAL

## 2025-01-17 NOTE — TELEPHONE ENCOUNTER
----- Message from GASPER Peterson sent at 1/15/2025  5:23 PM -----    Called pt & cancelled tomorrow's virtual appt.  Discussed resched scan/labs/visit to Feb].  He will be back from a trip by the 17th.  Pt to check portal.    ----- Message -----  From: Young Wesley MD  Sent: 1/15/2025   2:41 PM CST  To: Kristen Sanchez RN; Paul Coe RN    Hi Chiara, he is scheduled for close follow up CT scan in Jan. Can we move that to the end of Feb with CBC, CMP, CA 19-9. And move his appt with me after CT (he has one after CT) to after CT in Feb? thanks  Young Wesley MD

## 2025-01-29 DIAGNOSIS — E11.9 TYPE 2 DIABETES MELLITUS WITHOUT COMPLICATION: ICD-10-CM

## 2025-02-01 ENCOUNTER — LAB VISIT (OUTPATIENT)
Dept: LAB | Facility: HOSPITAL | Age: 45
End: 2025-02-01
Attending: INTERNAL MEDICINE
Payer: COMMERCIAL

## 2025-02-01 DIAGNOSIS — Z94.4 LIVER REPLACED BY TRANSPLANT: ICD-10-CM

## 2025-02-01 LAB
ALBUMIN SERPL BCP-MCNC: 4 G/DL (ref 3.5–5.2)
ALP SERPL-CCNC: 164 U/L (ref 40–150)
ALT SERPL W/O P-5'-P-CCNC: 46 U/L (ref 10–44)
ANION GAP SERPL CALC-SCNC: 6 MMOL/L (ref 8–16)
AST SERPL-CCNC: 20 U/L (ref 10–40)
BASOPHILS # BLD AUTO: 0.02 K/UL (ref 0–0.2)
BASOPHILS NFR BLD: 0.4 % (ref 0–1.9)
BILIRUB DIRECT SERPL-MCNC: 0.5 MG/DL (ref 0.1–0.3)
BILIRUB SERPL-MCNC: 1.4 MG/DL (ref 0.1–1)
BUN SERPL-MCNC: 20 MG/DL (ref 6–20)
CALCIUM SERPL-MCNC: 9.5 MG/DL (ref 8.7–10.5)
CHLORIDE SERPL-SCNC: 106 MMOL/L (ref 95–110)
CO2 SERPL-SCNC: 27 MMOL/L (ref 23–29)
CREAT SERPL-MCNC: 1.2 MG/DL (ref 0.5–1.4)
DIFFERENTIAL METHOD BLD: ABNORMAL
EOSINOPHIL # BLD AUTO: 0.1 K/UL (ref 0–0.5)
EOSINOPHIL NFR BLD: 2.4 % (ref 0–8)
ERYTHROCYTE [DISTWIDTH] IN BLOOD BY AUTOMATED COUNT: 12.1 % (ref 11.5–14.5)
EST. GFR  (NO RACE VARIABLE): >60 ML/MIN/1.73 M^2
GLUCOSE SERPL-MCNC: 165 MG/DL (ref 70–110)
HCT VFR BLD AUTO: 47.2 % (ref 40–54)
HGB BLD-MCNC: 16 G/DL (ref 14–18)
IMM GRANULOCYTES # BLD AUTO: 0.02 K/UL (ref 0–0.04)
IMM GRANULOCYTES NFR BLD AUTO: 0.4 % (ref 0–0.5)
LYMPHOCYTES # BLD AUTO: 1.5 K/UL (ref 1–4.8)
LYMPHOCYTES NFR BLD: 26.8 % (ref 18–48)
MCH RBC QN AUTO: 33 PG (ref 27–31)
MCHC RBC AUTO-ENTMCNC: 33.9 G/DL (ref 32–36)
MCV RBC AUTO: 97 FL (ref 82–98)
MONOCYTES # BLD AUTO: 0.5 K/UL (ref 0.3–1)
MONOCYTES NFR BLD: 8.3 % (ref 4–15)
NEUTROPHILS # BLD AUTO: 3.4 K/UL (ref 1.8–7.7)
NEUTROPHILS NFR BLD: 61.7 % (ref 38–73)
NRBC BLD-RTO: 0 /100 WBC
PLATELET # BLD AUTO: 190 K/UL (ref 150–450)
PMV BLD AUTO: 9.1 FL (ref 9.2–12.9)
POTASSIUM SERPL-SCNC: 4.9 MMOL/L (ref 3.5–5.1)
PROT SERPL-MCNC: 7.2 G/DL (ref 6–8.4)
RBC # BLD AUTO: 4.85 M/UL (ref 4.6–6.2)
SODIUM SERPL-SCNC: 139 MMOL/L (ref 136–145)
WBC # BLD AUTO: 5.45 K/UL (ref 3.9–12.7)

## 2025-02-01 PROCEDURE — 36415 COLL VENOUS BLD VENIPUNCTURE: CPT | Mod: PO | Performed by: INTERNAL MEDICINE

## 2025-02-01 PROCEDURE — 82248 BILIRUBIN DIRECT: CPT | Performed by: INTERNAL MEDICINE

## 2025-02-01 PROCEDURE — 80197 ASSAY OF TACROLIMUS: CPT | Performed by: INTERNAL MEDICINE

## 2025-02-01 PROCEDURE — 85025 COMPLETE CBC W/AUTO DIFF WBC: CPT | Performed by: INTERNAL MEDICINE

## 2025-02-01 PROCEDURE — 80053 COMPREHEN METABOLIC PANEL: CPT | Performed by: INTERNAL MEDICINE

## 2025-02-02 LAB — TACROLIMUS BLD-MCNC: 10.2 NG/ML (ref 5–15)

## 2025-02-03 ENCOUNTER — TELEPHONE (OUTPATIENT)
Dept: TRANSPLANT | Facility: CLINIC | Age: 45
End: 2025-02-03
Payer: COMMERCIAL

## 2025-02-03 DIAGNOSIS — Z94.4 LIVER REPLACED BY TRANSPLANT: Primary | ICD-10-CM

## 2025-02-03 NOTE — TELEPHONE ENCOUNTER
----- Message from Zoran Nobles MD sent at 2/3/2025  2:54 PM CST -----  Results reviewed. No action.

## 2025-02-03 NOTE — TELEPHONE ENCOUNTER
Patient notified and instructed via [facility]:    Your labs have been reviewed by ; no changes were made. Repeat labs due 4/7/25, thanks.

## 2025-02-19 ENCOUNTER — TELEPHONE (OUTPATIENT)
Dept: INTERNAL MEDICINE | Facility: CLINIC | Age: 45
End: 2025-02-19
Payer: COMMERCIAL

## 2025-02-19 ENCOUNTER — HOSPITAL ENCOUNTER (OUTPATIENT)
Dept: RADIOLOGY | Facility: HOSPITAL | Age: 45
Discharge: HOME | End: 2025-02-19
Attending: INTERNAL MEDICINE
Payer: COMMERCIAL

## 2025-02-19 DIAGNOSIS — E78.00 HYPERCHOLESTEROLEMIA: ICD-10-CM

## 2025-02-19 DIAGNOSIS — E11.9 TYPE 2 DIABETES MELLITUS WITHOUT COMPLICATION, UNSPECIFIED WHETHER LONG TERM INSULIN USE: Primary | ICD-10-CM

## 2025-02-19 DIAGNOSIS — C24.0 HILAR CHOLANGIOCARCINOMA: ICD-10-CM

## 2025-02-19 DIAGNOSIS — E11.9 TYPE 2 DIABETES MELLITUS WITHOUT COMPLICATION: ICD-10-CM

## 2025-02-19 PROCEDURE — 71260 CT THORAX DX C+: CPT | Mod: TC,PO

## 2025-02-19 PROCEDURE — A9698 NON-RAD CONTRAST MATERIALNOC: HCPCS | Mod: PO | Performed by: INTERNAL MEDICINE

## 2025-02-19 PROCEDURE — 25500020 PHARM REV CODE 255: Mod: PO | Performed by: INTERNAL MEDICINE

## 2025-02-19 RX ADMIN — IOHEXOL 75 ML: 350 INJECTION, SOLUTION INTRAVENOUS at 09:02

## 2025-02-19 RX ADMIN — BARIUM SULFATE 900 ML: 20 SUSPENSION ORAL at 09:02

## 2025-02-19 NOTE — TELEPHONE ENCOUNTER
----- Message from Mae sent at 2/19/2025  8:38 AM CST -----  Type:  Patient Requesting ReferralWho Called:ptDoes the patient already have the specialty appointment scheduled?:noIf yes, what is the date of that appointment?:n/aReferral to What Specialty:LabReason for Referral: Hemoglobin [id number] the patient want the referral with a specific physician?:yesIs the specialist an [facility] or [facility]ician?:ochsnerPatient Requesting a Response?:yesWould the patient rather a call back or a response via [facility]? callBest Call Back Number: [phone] Information:

## 2025-02-20 NOTE — TELEPHONE ENCOUNTER
----- Message from Young Wesley MD sent at 2/11/2022 12:51 PM CST -----  Cade Miranda,     This patient needs Stat Zarxio since yesterday. Meli has checked with PA multiple times. Still pending. Any way we can expedite it?     Spoke with [facility] RN that had triaged patient and he had wanted to speak with someone from the clinic.  Spoke with patient was having some nausea  last night.  This morning prior to taking his blood pressure medication had some vertigo and blood pressure with home cuff was 170/100.  Was concerned that this was how he had felt 4 years ago when he had had a \"stroke\".  Took his prescribed  blood pressure medication and about 90 minutes later re checked blood pressure and it was 150/92.  Patient stated he is not having any blurred vision, headaches , nausea, difficulty with speech or any right or left sided weakness.  No loss of bowel or bladder.  Was recently seen by provider in Jan.  Is looking for advise and recommendations from provider, Patient had refused advise and recommendations from [facility] triage nurse.  But he did state that if he would continue to have these symptoms that he would seek immediate UC/ED evaluation.  Will review with provider for further advise and recommendations.

## 2025-02-21 ENCOUNTER — OFFICE VISIT (OUTPATIENT)
Dept: HEMATOLOGY/ONCOLOGY | Facility: CLINIC | Age: 45
End: 2025-02-21
Payer: COMMERCIAL

## 2025-02-21 VITALS
BODY MASS INDEX: 27.15 KG/M2 | OXYGEN SATURATION: 100 % | WEIGHT: 179.13 LBS | HEIGHT: 68 IN | HEART RATE: 88 BPM | DIASTOLIC BLOOD PRESSURE: 88 MMHG | RESPIRATION RATE: 17 BRPM | TEMPERATURE: 97 F | SYSTOLIC BLOOD PRESSURE: 128 MMHG

## 2025-02-21 DIAGNOSIS — Z94.4 S/P LIVER TRANSPLANT: ICD-10-CM

## 2025-02-21 DIAGNOSIS — N17.9 AKI (ACUTE KIDNEY INJURY): ICD-10-CM

## 2025-02-21 DIAGNOSIS — C24.0 HILAR CHOLANGIOCARCINOMA: Primary | ICD-10-CM

## 2025-02-21 DIAGNOSIS — Z79.60 LONG-TERM USE OF IMMUNOSUPPRESSANT MEDICATION: ICD-10-CM

## 2025-02-21 DIAGNOSIS — R97.8 ABNORMAL TUMOR MARKERS: ICD-10-CM

## 2025-02-21 NOTE — PROGRESS NOTES
"                                                         PROGRESS NOTE    Subjective:       Patient ID: OSCAR LARSON is a 44 y.o. male.    Chief Complaint: follow up for hilar cholangiocarcinoma    Diagnosis:  Yp T2N0M0 hilar cholangiocarcinoma, s/p neoadjuvant chemoradiation, chemotherapy and liver transplant on 6/21/2022    Oncologic History:  1. Mr Larson is a 42 yo man who presents today for further management of suspected hilar cholangiocarcinoma. He presented with dark urine, abdominal pain and jaundice since August 2021. He presented to outside hospital ER with elevated bilirubin. MRCP was performed demonstrating bi-lobar intrahepatic bile duct dilation and a ~2cm ill defined mass at the hilum concerning for hilar cholangiocarcinoma. He was referred to the advanced endoscopy group at [facility]. ERCP confirmed severe, malignant appearing stricture involving right and left main hepatic ducts. Biliary stents were placed to relieve obstruction. EUS was performed. It showed an irregular hypoechoic mass where the hepatic duct bifurcates into the right and left hepatic ducts. The mass measured 11.4 mm by 11.3 mm in maximal cross-sectional diameter. FNA sampling of hilar lymph nodes was negative for metastatic disease. Bile duct biopsy showed "rare groups of glandular epithelium with mild atypia, strips of benign glandular epithelium intermixed with blood clot, and focal fibrous tissue with chronic inflammation."  CT C/A/P 10/27/21:  1. Intrahepatic biliary dilatation despite the presence of 2 biliary drains.  The patient is recent comparison MRI a revealed a possible central hepatic mass, concerning for either cholangiocarcinoma or hepatocellular carcinoma.  This is, however, not definitively demonstrated on today's exam.  There are enlarged lymph nodes present in the vicinity of the ana cristina hepatis and celiac axis as detailed above.  2. Scattered pulmonary nodules measuring up to 6 mm.  3. Other findings as detailed " "above.  He presents today for further management. Seen by Dr Jara and considered a candidate for liver transplant. Seen by Dr Cunningham last Friday. Scheduled for PET this Wednesday. 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   Discussed neoadjuvant chemoradiation with 5FU followed by neoadjuvant cis/gem prior to liver transplant.   2. Hospitalized 11/3/21-11/5/21 for fever 2/2 acute cholangitis. Repeat ERCP biopsy on 11/4/21 again non-diagnostic. Biopsy of the celiac lymph node was negative.   3. PET scan 11/3/21: "1. Wedge-shaped geographic hypermetabolic activity within the right lobe of the liver in a similar distribution to the intrahepatic biliary dilatation.  This could relate to inflammation from multiple etiologies including biliary stasis, cholangitis, and obstructive dilatation.  Neoplastic involvement would be difficult to exclude.  The liver is poorly evaluated given background activity. 2. Hypermetabolic lymphadenopathy is noted in a periportal and celiac distribution.  Infectious inflammatory and neoplastic etiologies are on the differential.  Index nodes have been measured. 3. Multiple pulmonary nodules are noted too small to categorize by PET-CT fusion as evidence seen on a prior CT of 10/27/2021.  CT for follow-up of size stability is necessary."  4. Completed chemoradiation with 5FU from 11/29/21-1/5/2022. Started cis/gem on 1/19/22. Completed neoadjuvant chemotherapy  5. Underwent living donor liver transplant on 6/21/2022. Pathology showed ypT2N0 well to moderately differentiated cholangiocarcinoma of the perihilar bile ducts, with invasion into the perihilar soft tissue. No LVI or PNI. Surgical margins are negative for carcinoma. Two lymph nodes, negative for carcinoma (0/2).     Interval History:   Mr Larson returns for follow up. He had abnormal ana cristina hepatis lymph node on CT scan last time 12/10/24 with elevated CA 19-9. He was also sick with viral infection at that time. Upper EUS 12/31/24 did not " find abnormal LN. He returns for short-term follow up. Got sick again after he came back from [place].     [facility]    ROS:   A ten-point system review is obtained and negative except for what was stated in the Interval History.     Physical Examination:   Vital signs reviewed.   General: well hydrated, well developed, in no acute distress  HEENT: normocephalic, EOMI, anicteric sclerae  Neck: supple, no JVD, thyromegaly, cervical or supraclavicular lymphadenopathy  Lungs: clear breath sounds bilaterally, no wheezing, rales, or rhonchi  Heart: RRR, no M/R/G  Abdomen: soft, no tenderness, non-distended, no hepatosplenomegaly, mass, or hernia. BS present  Extremities: no clubbing, cyanosis, or edema  Skin: no rash, ulcer, or open wounds  Neuro: alert and oriented x 4, no focal neuro deficit  Psych: pleasant and appropriate mood and affect    Objective:     Laboratory Data:  Labs reviewed. CA 19-9 59.3 Cr 1.5    Imaging Data:  CT C/A/P 25:  Impression:     1. No obvious evidence of metastatic disease.  Several small stable pulmonary nodules are noted the largest of 4 mm.  2. The ana cristina hepatis nodes seen on 12/10/24 appears smaller in size.  3. Evidence of liver surgery is noted similar to on the prior  Assessment and Plan:     1. Hilar cholangiocarcinoma    2. Abnormal tumor markers    3. S/P liver transplant    4. Long-term use of immunosuppressant medication    5. CASA (acute kidney injury)        1.2  - Mr Marsha is a 42 yo man with stage I hilar cholangiocarcinoma. Biopsy non-diagnostic but clinical picture most consistent with hilar cholangiocarcinoma. Completed chemoradiation with 5FU from . Started cis/gem on 22. Completed neoadjuvant chemotherapy  - Underwent living donor liver transplant on 2022. Pathology showed ypT2N0 well to moderately differentiated cholangiocarcinoma of the perihilar bile ducts, with invasion into the perihilar soft tissue. No LVI or PNI. Surgical margins are  negative for carcinoma. Two lymph nodes, negative for carcinoma (0/2).   - doing well  - He had abnormal ana cristina hepatis lymph node on CT scan last time 12/10/24 with elevated CA 19-9. He was also sick with viral infection at that time. Upper EUS 12/31/24 did not find abnormal LN. He returns for short-term follow up. Got sick again after he came back from [place].   - reviewed test results with patient. CA 19-9 was normal last time but came back up again. CT did not show metastatic or progression of disease. The lymph node prior was probably reactive.   - c/w close surveillance  - RTC in 2 months  - Flush port in [place] every 3 months    3.4  - f/u with liver transplant team    5.  - 2/2 viral illness. Encouraged oral hydration    Follow-up:     RTC in 2 months  Knows to call in the interval if any problems arise.    Electronically signed by Young Wesley      Route Chart for Scheduling    Med Onc Chart Routing      Follow up with physician . Flush port in [place] every 3 months. see me in 2 months with CBC, CMP, CA 19-9, CT C/A/P in [place] 2 days prior to return   Follow up with JOSEPH    Infusion scheduling note    Injection scheduling note    Labs CBC, CA 19-9 and CMP   Scheduling:  Preferred lab:  Lab interval:     Imaging CT chest abdomen pelvis      Pharmacy appointment    Other referrals          Supportive Plan Information  OP FILGRASTIM 480 MCG Young Wesley MD   Associated Diagnosis: Chemotherapy induced neutropenia   noted on 2/9/2022  Associated Diagnosis: Adjustment and management of vascular access device   noted on 5/18/2022   Line of treatment: Supportive Care   Treatment goal: Supportive     Upcoming Treatment Dates - OP FILGRASTIM 480 MCG    2/9/2022       Medications       filgrastim-sndz (ZARXIO) injection 480 mcg/0.8 mL (Preferred Regimen)  2/10/2022       Medications       filgrastim-sndz (ZARXIO) injection 480 mcg/0.8 mL (Preferred Regimen)  2/11/2022       Medications       filgrastim-sndz  (ZARXIO) injection 480 mcg/0.8 mL (Preferred Regimen)  2/12/2022       Medications       filgrastim-sndz (ZARXIO) injection 480 mcg/0.8 mL (Preferred Regimen)    Therapy Plan Information  PORT FLUSH for Hilar cholangiocarcinoma, noted on 11/4/2021  PORT FLUSH for Adjustment and management of vascular access device, noted on 5/18/2022  Flushes  heparin, porcine (PF) 100 unit/mL injection flush 500 Units  500 Units, Intravenous, On the 4th Fri of every 1 month  sodium chloride 0.9% flush 10 mL  10 mL, Intravenous, On the 4th Fri of every 1 month      No therapy plan of the specified type found.    No therapy plan of the specified type found.

## 2025-02-25 ENCOUNTER — CONFERENCE (OUTPATIENT)
Dept: TRANSPLANT | Facility: CLINIC | Age: 45
End: 2025-02-25
Payer: COMMERCIAL

## 2025-02-25 NOTE — TELEPHONE ENCOUNTER
atient: OSCAR PAYNE       MRN: [id number]      : 1980     Age: 44 y.o.  [address] [facility] [address]     Presenting Radiologists: Nirmala Perry MD     Providers:  Young Wesley     Priority of review: Cancer     Patient Transplant Status: Oncology     Reason for presentation:        Clinical Summary:    Patient is a 44 y.o. male with hilar cholangiocarcinoma with abnormal tumor markers      Imaging to be reviewed: 12/10/2024     HCC Treatment History:      Platelets:         Lab Results   Component Value Date/Time      2024 02:02 PM      Creatinine:         Lab Results   Component Value Date/Time     CREATININE 1.5 (H) 2024 02:02 PM      Bilirubin:         Lab Results   Component Value Date/Time     BILITOT 2.2 (H) 2024 02:02 PM      AFP Last 3 each if available:         Lab Results   Component Value Date/Time     AFP 3.5 2022 08:41 AM         MELD: Computed MELD 3.0 unavailable. One or more values for this score either were not found within the given timeframe or did not fit some other criterion.  Computed MELD-Na unavailable. One or more values for this score either were not found within the given timeframe or did not fit some other criterion.        Plan: CT CAP 25: Hilar lymph is decreased in size from prior study, now 1.2 x 0.4cm from 1.5 x 0.8cm (3;43). Linear hypodensity along anteroinferior hepatic margin is unchanged from prior. Continued close attention on follow-up. Additional findings including pulmonary nodules are stable from prior.      Discussion: The lymph node was more prominent compared to  prior  studies,now it is tiny again.  Possible reactive in the setting of illness. There is a hypodense linear area along the margin that is probably post surgical, should continue to follow and make sure if remains stable.   Possible cholangitis, abnormal labs when sick.  Mild duct dilatation  Should have a HIDA  scan.  Possible standing order for  Cipro during his illness.    HIDA scan    Note sent to Steph Pink for follow up and scheduling     Follow-up Provider: Dr TIMMY Wesley

## 2025-02-27 DIAGNOSIS — R93.5 ABNORMAL FINDINGS ON DIAGNOSTIC IMAGING OF ABDOMEN: Primary | ICD-10-CM

## 2025-03-03 ENCOUNTER — INFUSION (OUTPATIENT)
Dept: INFUSION THERAPY | Facility: HOSPITAL | Age: 45
End: 2025-03-03
Attending: PHYSICIAN ASSISTANT
Payer: COMMERCIAL

## 2025-03-03 DIAGNOSIS — C24.0 HILAR CHOLANGIOCARCINOMA: Primary | ICD-10-CM

## 2025-03-03 DIAGNOSIS — Z45.2 ADJUSTMENT AND MANAGEMENT OF VASCULAR ACCESS DEVICE: ICD-10-CM

## 2025-03-03 PROCEDURE — 63600175 PHARM REV CODE 636 W HCPCS: Mod: PN | Performed by: INTERNAL MEDICINE

## 2025-03-03 PROCEDURE — 25000003 PHARM REV CODE 250: Mod: PN | Performed by: INTERNAL MEDICINE

## 2025-03-03 PROCEDURE — 96523 IRRIG DRUG DELIVERY DEVICE: CPT | Mod: PN

## 2025-03-03 PROCEDURE — A4216 STERILE WATER/SALINE, 10 ML: HCPCS | Mod: PN | Performed by: INTERNAL MEDICINE

## 2025-03-03 RX ORDER — HEPARIN 100 UNIT/ML
500 SYRINGE INTRAVENOUS
Status: DISCONTINUED | OUTPATIENT
Start: 2025-03-03 | End: 2025-03-03 | Stop reason: HOSPADM

## 2025-03-03 RX ORDER — SODIUM CHLORIDE 0.9 % (FLUSH) 0.9 %
10 SYRINGE (ML) INJECTION
Status: DISCONTINUED | OUTPATIENT
Start: 2025-03-03 | End: 2025-03-03 | Stop reason: HOSPADM

## 2025-03-03 RX ORDER — SODIUM CHLORIDE 0.9 % (FLUSH) 0.9 %
10 SYRINGE (ML) INJECTION
OUTPATIENT
Start: 2025-03-28

## 2025-03-03 RX ORDER — HEPARIN 100 UNIT/ML
500 SYRINGE INTRAVENOUS
OUTPATIENT
Start: 2025-03-28

## 2025-03-03 RX ADMIN — Medication 500 UNITS: at 11:03

## 2025-03-03 RX ADMIN — SODIUM CHLORIDE, PRESERVATIVE FREE 10 ML: 5 INJECTION INTRAVENOUS at 11:03

## 2025-03-11 ENCOUNTER — PATIENT MESSAGE (OUTPATIENT)
Dept: INTERNAL MEDICINE | Facility: CLINIC | Age: 45
End: 2025-03-11
Payer: COMMERCIAL

## 2025-03-11 ENCOUNTER — PATIENT MESSAGE (OUTPATIENT)
Dept: TRANSPLANT | Facility: CLINIC | Age: 45
End: 2025-03-11
Payer: COMMERCIAL

## 2025-03-13 ENCOUNTER — OFFICE VISIT (OUTPATIENT)
Dept: INTERNAL MEDICINE | Facility: CLINIC | Age: 45
End: 2025-03-13
Payer: COMMERCIAL

## 2025-03-13 VITALS
OXYGEN SATURATION: 97 % | HEART RATE: 71 BPM | SYSTOLIC BLOOD PRESSURE: 122 MMHG | WEIGHT: 183.44 LBS | HEIGHT: 68 IN | BODY MASS INDEX: 27.8 KG/M2 | DIASTOLIC BLOOD PRESSURE: 80 MMHG

## 2025-03-13 DIAGNOSIS — M54.41 ACUTE BILATERAL LOW BACK PAIN WITH RIGHT-SIDED SCIATICA: Primary | ICD-10-CM

## 2025-03-13 DIAGNOSIS — E13.9 SECONDARY DIABETES: ICD-10-CM

## 2025-03-13 PROCEDURE — 99999 PR PBB SHADOW E&M-EST. PATIENT-LVL V: CPT | Mod: PBBFAC,,, | Performed by: PHYSICIAN ASSISTANT

## 2025-03-13 RX ORDER — GABAPENTIN 300 MG/1
CAPSULE ORAL
Qty: 90 CAPSULE | Refills: 0 | Status: SHIPPED | OUTPATIENT
Start: 2025-03-13

## 2025-03-13 NOTE — PROGRESS NOTES
"Subjective     Patient ID: OSCAR PAYNE is a 44 y.o. male. With  has a past medical history of Hilar cholangiocarcinoma (s/p liver transplant) Hypercholesteremia, and Hypertension       Chief Complaint: Sciatica (Right side )    HPI    Established pt of Yves Moses MD (new to me)    Same day/Urgent Care visit    Here with concerns of right sided sciatica    Onset LBP Right sided about 2 weeks ago. Tried tylenol, next week hurting more with radiating tingling to R glute, leg, lateral foot. Today he reports lower back pain has improved, notes continued n/t of RLE. No leg weakness. No saddle anesthesia or bowel/bladder incontinence. He tried baclofen which helped.     He reports at CT last month that revealed. Disc bulging or protrusion is suspected at the L5-S1 level with bulging at the L4-L5 level. At least mild central canal narrowing is suspected.     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    Hypercholesteremia     Hypertension        Social History[1]    Review of patient's allergies indicates:   Allergen Reactions    Zosyn [piperacillin-tazobactam] Swelling     Lip swelling       Review of Systems   Constitutional:  Negative for chills, diaphoresis, fever and unexpected weight change.   Cardiovascular:  Negative for leg swelling.   Gastrointestinal:  Negative for nausea and vomiting.   Musculoskeletal:  Positive for back pain and leg pain.   Integumentary:  Negative for rash.   Neurological:  Negative for weakness.            Objective  /80 (BP Location: Left arm, Patient Position: Sitting)   Pulse 71   Ht 5' 8" (1.727 m)   Wt 83.2 kg (183 lb 6.8 oz)   SpO2 97%   BMI 27.89 kg/m²       Physical Exam  Vitals reviewed.   Constitutional:       General: He is not in acute distress.     Appearance: He is well-developed. He is " not ill-appearing.   HENT:      Head: Normocephalic and atraumatic.   Cardiovascular:      Rate and Rhythm: Normal rate and regular rhythm.      Heart sounds: No murmur heard.  Pulmonary:      Effort: Pulmonary effort is normal.      Breath sounds: Normal breath sounds. No wheezing or rales.   Musculoskeletal:      Lumbar back: No bony tenderness. Decreased range of motion. Negative right straight leg raise test and negative left straight leg raise test.      Right lower leg: No edema.      Left lower leg: No edema.      Comments: Ambulating without difficulty unassisted.   5/5 strength to BLE   Skin:     General: Skin is warm and dry.      Findings: No rash.   Neurological:      Mental Status: He is alert.   Psychiatric:         Mood and Affect: Mood normal.          Assessment and Plan     1. Acute bilateral low back pain with right-sided sciatica  -     gabapentin (NEURONTIN) 300 MG capsule; 1 po q hs x 3 days, then 1 po bid x 3 days, then 1 po tid  Dispense: 90 capsule; Refill: 0  -     Ambulatory Referral/Consult to Physical Therapy; Future; Expected date: 03/20/2025    2. Secondary diabetes  Hx prolonged steroid use  Lab Results   Component Value Date    HGBA1C 6.4 (H) 08/17/2024   No longer on metformin  Repeat A1c scheduled      We discussed symptomatic care with gabapentin and PT  Handout exercises given  We discussed MRI, pt declines at this time, reconsider if symptoms worsen or fail to improve with conservative measures    Future Appointments   Date Time Provider Department Center   3/20/2025  8:30 AM [facility] NM1 [facility] NUC MED [place]   4/7/2025  8:00 AM LAB, [facility] LAB [place]   4/14/2025  3:00 PM INJECTION SCHEDULE, [facility] INFUSION [facility]F [facility]   4/21/2025 10:40 AM LAB, [facility] LAB [place]   4/21/2025 11:30 AM [facility] CT1 LIMIT 500 LBS [facility] CT SCAN [place]   4/25/2025  2:40 PM Young Wesley MD [facility] HEMONC2 [facility] [place]   5/26/2025  4:00 PM INJECTION SCHEDULE, [facility]  INFUSION [facility] INF [facility] at [facility]              [1]   Social History  Tobacco Use    Smoking status: Never    Smokeless tobacco: Never   Substance Use Topics    Alcohol use: Not Currently    Drug use: Never

## 2025-04-07 ENCOUNTER — LAB VISIT (OUTPATIENT)
Dept: LAB | Facility: HOSPITAL | Age: 45
End: 2025-04-07
Attending: INTERNAL MEDICINE
Payer: COMMERCIAL

## 2025-04-07 DIAGNOSIS — E11.9 TYPE 2 DIABETES MELLITUS WITHOUT COMPLICATION: ICD-10-CM

## 2025-04-07 DIAGNOSIS — Z94.4 LIVER REPLACED BY TRANSPLANT: ICD-10-CM

## 2025-04-07 LAB
ABSOLUTE EOSINOPHIL (OHS): 0.16 K/UL
ABSOLUTE MONOCYTE (OHS): 0.49 K/UL (ref 0.3–1)
ABSOLUTE NEUTROPHIL COUNT (OHS): 2.93 K/UL (ref 1.8–7.7)
ALBUMIN SERPL BCP-MCNC: 3.8 G/DL (ref 3.5–5.2)
ALP SERPL-CCNC: 185 UNIT/L (ref 40–150)
ALT SERPL W/O P-5'-P-CCNC: 68 UNIT/L (ref 10–44)
ANION GAP (OHS): 5 MMOL/L (ref 8–16)
AST SERPL-CCNC: 38 UNIT/L (ref 11–45)
BASOPHILS # BLD AUTO: 0.02 K/UL
BASOPHILS NFR BLD AUTO: 0.4 %
BILIRUB DIRECT SERPL-MCNC: 0.6 MG/DL (ref 0.1–0.3)
BILIRUB SERPL-MCNC: 1.7 MG/DL (ref 0.1–1)
BUN SERPL-MCNC: 19 MG/DL (ref 6–20)
CALCIUM SERPL-MCNC: 9.9 MG/DL (ref 8.7–10.5)
CHLORIDE SERPL-SCNC: 104 MMOL/L (ref 95–110)
CHOLEST SERPL-MCNC: 171 MG/DL (ref 120–199)
CHOLEST/HDLC SERPL: 4 {RATIO} (ref 2–5)
CO2 SERPL-SCNC: 27 MMOL/L (ref 23–29)
CREAT SERPL-MCNC: 1.3 MG/DL (ref 0.5–1.4)
EAG (OHS): 177 MG/DL (ref 68–131)
ERYTHROCYTE [DISTWIDTH] IN BLOOD BY AUTOMATED COUNT: 11.6 % (ref 11.5–14.5)
GFR SERPLBLD CREATININE-BSD FMLA CKD-EPI: >60 ML/MIN/1.73/M2
GLUCOSE SERPL-MCNC: 161 MG/DL (ref 70–110)
HBA1C MFR BLD: 7.8 % (ref 4–5.6)
HCT VFR BLD AUTO: 46 % (ref 40–54)
HDLC SERPL-MCNC: 43 MG/DL (ref 40–75)
HDLC SERPL: 25.1 % (ref 20–50)
HGB BLD-MCNC: 15.3 GM/DL (ref 14–18)
IMM GRANULOCYTES # BLD AUTO: 0.02 K/UL (ref 0–0.04)
IMM GRANULOCYTES NFR BLD AUTO: 0.4 % (ref 0–0.5)
LDLC SERPL CALC-MCNC: 101.8 MG/DL (ref 63–159)
LYMPHOCYTES # BLD AUTO: 1.28 K/UL (ref 1–4.8)
MCH RBC QN AUTO: 32 PG (ref 27–31)
MCHC RBC AUTO-ENTMCNC: 33.3 G/DL (ref 32–36)
MCV RBC AUTO: 96 FL (ref 82–98)
NONHDLC SERPL-MCNC: 128 MG/DL
NUCLEATED RBC (/100WBC) (OHS): 0 /100 WBC
PLATELET # BLD AUTO: 178 K/UL (ref 150–450)
PMV BLD AUTO: 8.9 FL (ref 9.2–12.9)
POTASSIUM SERPL-SCNC: 5.2 MMOL/L (ref 3.5–5.1)
PROT SERPL-MCNC: 7.1 GM/DL (ref 6–8.4)
RBC # BLD AUTO: 4.78 M/UL (ref 4.6–6.2)
RELATIVE EOSINOPHIL (OHS): 3.3 %
RELATIVE LYMPHOCYTE (OHS): 26.1 % (ref 18–48)
RELATIVE MONOCYTE (OHS): 10 % (ref 4–15)
RELATIVE NEUTROPHIL (OHS): 59.8 % (ref 38–73)
SODIUM SERPL-SCNC: 136 MMOL/L (ref 136–145)
TRIGL SERPL-MCNC: 131 MG/DL (ref 30–150)
WBC # BLD AUTO: 4.9 K/UL (ref 3.9–12.7)

## 2025-04-07 PROCEDURE — 85025 COMPLETE CBC W/AUTO DIFF WBC: CPT

## 2025-04-07 PROCEDURE — 80053 COMPREHEN METABOLIC PANEL: CPT

## 2025-04-07 PROCEDURE — 36415 COLL VENOUS BLD VENIPUNCTURE: CPT | Mod: PO

## 2025-04-07 PROCEDURE — 80197 ASSAY OF TACROLIMUS: CPT

## 2025-04-07 PROCEDURE — 83036 HEMOGLOBIN GLYCOSYLATED A1C: CPT

## 2025-04-07 PROCEDURE — 80061 LIPID PANEL: CPT

## 2025-04-07 PROCEDURE — 82248 BILIRUBIN DIRECT: CPT

## 2025-04-08 ENCOUNTER — RESULTS FOLLOW-UP (OUTPATIENT)
Dept: TRANSPLANT | Facility: CLINIC | Age: 45
End: 2025-04-08
Payer: COMMERCIAL

## 2025-04-08 LAB — TACROLIMUS BLD-MCNC: 8.4 NG/ML (ref 5–15)

## 2025-04-09 ENCOUNTER — PATIENT MESSAGE (OUTPATIENT)
Dept: TRANSPLANT | Facility: CLINIC | Age: 45
End: 2025-04-09
Payer: COMMERCIAL

## 2025-04-09 DIAGNOSIS — C22.1 CHOLANGIOCARCINOMA: ICD-10-CM

## 2025-04-09 DIAGNOSIS — Z94.4 LIVER REPLACED BY TRANSPLANT: Primary | ICD-10-CM

## 2025-04-09 NOTE — TELEPHONE ENCOUNTER
Patient notified and instructed via [facility]:    Your labs have been reviewed by ; no changes were made. Repeat labs due 6/9/25. Thanks.

## 2025-04-09 NOTE — TELEPHONE ENCOUNTER
----- Message from Zoran Nobles MD sent at 4/8/2025  9:15 AM CDT -----  Results reviewed. No action.  ----- Message -----  From: Lab, Background User  Sent: 4/7/2025   1:47 PM CDT  To: Zoran Nobles MD

## 2025-04-21 ENCOUNTER — HOSPITAL ENCOUNTER (OUTPATIENT)
Dept: RADIOLOGY | Facility: HOSPITAL | Age: 45
Discharge: HOME | End: 2025-04-21
Attending: INTERNAL MEDICINE
Payer: COMMERCIAL

## 2025-04-21 ENCOUNTER — RESULTS FOLLOW-UP (OUTPATIENT)
Dept: HEMATOLOGY/ONCOLOGY | Facility: CLINIC | Age: 45
End: 2025-04-21

## 2025-04-21 DIAGNOSIS — C24.0 HILAR CHOLANGIOCARCINOMA: ICD-10-CM

## 2025-04-21 PROCEDURE — 74177 CT ABD & PELVIS W/CONTRAST: CPT | Mod: 26,,, | Performed by: STUDENT IN AN ORGANIZED HEALTH CARE EDUCATION/TRAINING PROGRAM

## 2025-04-21 PROCEDURE — A9698 NON-RAD CONTRAST MATERIALNOC: HCPCS | Mod: PO | Performed by: INTERNAL MEDICINE

## 2025-04-21 PROCEDURE — 71260 CT THORAX DX C+: CPT | Mod: 26,,, | Performed by: STUDENT IN AN ORGANIZED HEALTH CARE EDUCATION/TRAINING PROGRAM

## 2025-04-21 PROCEDURE — 25500020 PHARM REV CODE 255: Mod: PO | Performed by: INTERNAL MEDICINE

## 2025-04-21 PROCEDURE — 74177 CT ABD & PELVIS W/CONTRAST: CPT | Mod: TC,PO

## 2025-04-21 RX ADMIN — IOHEXOL 75 ML: 350 INJECTION, SOLUTION INTRAVENOUS at 09:04

## 2025-04-21 RX ADMIN — BARIUM SULFATE 900 ML: 20 SUSPENSION ORAL at 09:04

## 2025-04-25 ENCOUNTER — OFFICE VISIT (OUTPATIENT)
Dept: HEMATOLOGY/ONCOLOGY | Facility: CLINIC | Age: 45
End: 2025-04-25
Payer: COMMERCIAL

## 2025-04-25 VITALS
WEIGHT: 177.56 LBS | DIASTOLIC BLOOD PRESSURE: 78 MMHG | SYSTOLIC BLOOD PRESSURE: 120 MMHG | BODY MASS INDEX: 26.91 KG/M2 | HEIGHT: 68 IN | OXYGEN SATURATION: 98 % | RESPIRATION RATE: 18 BRPM | HEART RATE: 74 BPM | TEMPERATURE: 98 F

## 2025-04-25 DIAGNOSIS — Z94.4 S/P LIVER TRANSPLANT: ICD-10-CM

## 2025-04-25 DIAGNOSIS — R97.8 ABNORMAL TUMOR MARKERS: ICD-10-CM

## 2025-04-25 DIAGNOSIS — C24.0 HILAR CHOLANGIOCARCINOMA: Primary | ICD-10-CM

## 2025-04-25 DIAGNOSIS — I10 ESSENTIAL HYPERTENSION: ICD-10-CM

## 2025-04-25 DIAGNOSIS — Z79.60 LONG-TERM USE OF IMMUNOSUPPRESSANT MEDICATION: ICD-10-CM

## 2025-04-25 PROCEDURE — 99999 PR PBB SHADOW E&M-EST. PATIENT-LVL IV: CPT | Mod: PBBFAC,,, | Performed by: INTERNAL MEDICINE

## 2025-04-25 NOTE — PROGRESS NOTES
"                                                         PROGRESS NOTE    Subjective:       Patient ID: Romeo Larson is a 44 y.o. male.    Chief Complaint: follow up for hilar cholangiocarcinoma    Diagnosis:  Yp T2N0M0 hilar cholangiocarcinoma, s/p neoadjuvant chemoradiation, chemotherapy and liver transplant on 6/21/2022    Oncologic History:  1. Mr Larson is a 42 yo man who presents today for further management of suspected hilar cholangiocarcinoma. He presented with dark urine, abdominal pain and jaundice since August 2021. He presented to outside hospital ER with elevated bilirubin. MRCP was performed demonstrating bi-lobar intrahepatic bile duct dilation and a ~2cm ill defined mass at the hilum concerning for hilar cholangiocarcinoma. He was referred to the advanced endoscopy group at [facility]. ERCP confirmed severe, malignant appearing stricture involving right and left main hepatic ducts. Biliary stents were placed to relieve obstruction. EUS was performed. It showed an irregular hypoechoic mass where the hepatic duct bifurcates into the right and left hepatic ducts. The mass measured 11.4 mm by 11.3 mm in maximal cross-sectional diameter. FNA sampling of hilar lymph nodes was negative for metastatic disease. Bile duct biopsy showed "rare groups of glandular epithelium with mild atypia, strips of benign glandular epithelium intermixed with blood clot, and focal fibrous tissue with chronic inflammation."  CT C/A/P 10/27/21:  1. Intrahepatic biliary dilatation despite the presence of 2 biliary drains.  The patient is recent comparison MRI a revealed a possible central hepatic mass, concerning for either cholangiocarcinoma or hepatocellular carcinoma.  This is, however, not definitively demonstrated on today's exam.  There are enlarged lymph nodes present in the vicinity of the ana cristina hepatis and celiac axis as detailed above.  2. Scattered pulmonary nodules measuring up to 6 mm.  3. Other findings as detailed " "above.  He presents today for further management. Seen by Dr Jara and considered a candidate for liver transplant. Seen by Dr Cunningham last Friday. Scheduled for PET this Wednesday. 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   Discussed neoadjuvant chemoradiation with 5FU followed by neoadjuvant cis/gem prior to liver transplant.   2. Hospitalized 11/3/21-11/5/21 for fever 2/2 acute cholangitis. Repeat ERCP biopsy on 11/4/21 again non-diagnostic. Biopsy of the celiac lymph node was negative.   3. PET scan 11/3/21: "1. Wedge-shaped geographic hypermetabolic activity within the right lobe of the liver in a similar distribution to the intrahepatic biliary dilatation.  This could relate to inflammation from multiple etiologies including biliary stasis, cholangitis, and obstructive dilatation.  Neoplastic involvement would be difficult to exclude.  The liver is poorly evaluated given background activity. 2. Hypermetabolic lymphadenopathy is noted in a periportal and celiac distribution.  Infectious inflammatory and neoplastic etiologies are on the differential.  Index nodes have been measured. 3. Multiple pulmonary nodules are noted too small to categorize by PET-CT fusion as evidence seen on a prior CT of 10/27/2021.  CT for follow-up of size stability is necessary."  4. Completed chemoradiation with 5FU from 11/29/21-1/5/2022. Started cis/gem on 1/19/22. Completed neoadjuvant chemotherapy  5. Underwent living donor liver transplant on 6/21/2022. Pathology showed ypT2N0 well to moderately differentiated cholangiocarcinoma of the perihilar bile ducts, with invasion into the perihilar soft tissue. No LVI or PNI. Surgical margins are negative for carcinoma. Two lymph nodes, negative for carcinoma (0/2).   6. He had abnormal ana cristina hepatis lymph node on CT scan last time 12/10/24 with elevated CA 19-9. He was also sick with viral infection at that time. Upper EUS 12/31/24 did not find abnormal LN. Subsequent CT scan has been " negative for recurrent cancer. CA 19-9 fluctuates, sometimes improved on its own.     Interval History:   Mr Larson returns for follow up. He is feeling well. Dr Nobles talked to him about getting a HIDA scan. The plan was to watch it and decide later.     ECO    ROS:   A ten-point system review is obtained and negative except for what was stated in the Interval History.     Physical Examination:   Vital signs reviewed.   General: well hydrated, well developed, in no acute distress  HEENT: normocephalic, EOMI, anicteric sclerae  Neck: supple, no JVD, thyromegaly, cervical or supraclavicular lymphadenopathy  Lungs: clear breath sounds bilaterally, no wheezing, rales, or rhonchi  Heart: RRR, no M/R/G  Abdomen: soft, no tenderness, non-distended, no hepatosplenomegaly, mass, or hernia. BS present  Extremities: no clubbing, cyanosis, or edema  Skin: no rash, ulcer, or open wounds  Neuro: alert and oriented x 4, no focal neuro deficit  Psych: pleasant and appropriate mood and affect    Objective:     Laboratory Data:  Labs reviewed. CA 19-9 82. Bilirubin 1.7    Imaging Data:  CT C/A/P 25:  Impression:     1. No obvious evidence of metastatic disease.  Several small stable pulmonary nodules are noted the largest of 4 mm.  2. The ana cristina hepatis nodes seen on 12/10/24 appears smaller in size.  3. Evidence of liver surgery is noted similar to on the prior    CT C/A/P 25:     Impression:     1. Postoperative changes of right lobe liver transplant.  No evidence of local disease recurrence or metastasis.  2. Stable small pulmonary nodules.  3. Additional findings as above.  Assessment and Plan:     1. Hilar cholangiocarcinoma    2. Abnormal tumor markers    3. S/P liver transplant    4. Long-term use of immunosuppressant medication    5. Essential hypertension      1.2  - Mr aLrson is a 45 yo man with stage I hilar cholangiocarcinoma. Biopsy non-diagnostic but clinical picture most consistent with hilar  cholangiocarcinoma. Completed chemoradiation with 5FU from 11/29/21-1/5/2022. Started cis/gem on 1/19/22. Completed neoadjuvant chemotherapy  - Underwent living donor liver transplant on 6/21/2022. Pathology showed ypT2N0 well to moderately differentiated cholangiocarcinoma of the perihilar bile ducts, with invasion into the perihilar soft tissue. No LVI or PNI. Surgical margins are negative for carcinoma. Two lymph nodes, negative for carcinoma (0/2).   - doing well  - He had abnormal ana cristina hepatis lymph node on CT scan last time 12/10/24 with elevated CA 19-9. He was also sick with viral infection at that time. Upper EUS 12/31/24 did not find abnormal LN. Subsequent CT scan has been negative for recurrent cancer. CA 19-9 fluctuates, sometimes improved on its own.   - reviewed test results with patient. CA 19-9 82. But DERRICK on CT scan. When we reviewed it at the last liver conference, we discussed the possibility of cholangitis causing CA 19-9 to increase. Plan was to do a HIDA scan. It was on hold last time patient talked to Dr Nobles. He will talk to Dr Nobles to get HIDA scan scheduled.   - c/w close surveillance  - RTC in 3 months  - Flush port in [place] every 3 months    3.4  - f/u with liver transplant team    5.  - BP controlled  - c/w current medication      Follow-up:     RTC in 3 months  Knows to call in the interval if any problems arise.    Electronically signed by Young Wesley      Route Chart for Scheduling    Med Onc Chart Routing      Follow up with physician 3 months. see me in 3 months with CBC, CMP, CA 19-9, CT C/A/P in [place] 2 days prior   Follow up with JOSEPH    Infusion scheduling note    Injection scheduling note flush port every 3 months in [place]   Labs CBC, CA 19-9 and CMP   Scheduling:  Preferred lab:  Lab interval:     Imaging CT chest abdomen pelvis      Pharmacy appointment    Other referrals          Supportive Plan Information  OP FILGRASTIM 480 MCG Young Wesley MD    Associated Diagnosis: Chemotherapy induced neutropenia   noted on 2/9/2022  Associated Diagnosis: Adjustment and management of vascular access device   noted on 5/18/2022   Line of treatment: Supportive Care   Treatment goal: Supportive     Upcoming Treatment Dates - OP FILGRASTIM 480 MCG    2/9/2022       Medications       filgrastim-sndz (ZARXIO) injection 480 mcg/0.8 mL (Preferred Regimen)  2/10/2022       Medications       filgrastim-sndz (ZARXIO) injection 480 mcg/0.8 mL (Preferred Regimen)  2/11/2022       Medications       filgrastim-sndz (ZARXIO) injection 480 mcg/0.8 mL (Preferred Regimen)  2/12/2022       Medications       filgrastim-sndz (ZARXIO) injection 480 mcg/0.8 mL (Preferred Regimen)    Therapy Plan Information  PORT FLUSH for Hilar cholangiocarcinoma, noted on 11/4/2021  PORT FLUSH for Adjustment and management of vascular access device, noted on 5/18/2022  Flushes  heparin, porcine (PF) 100 unit/mL injection flush 500 Units  500 Units, Intravenous, On the 4th Fri of every 1 month  sodium chloride 0.9% flush 10 mL  10 mL, Intravenous, On the 4th Fri of every 1 month      No therapy plan of the specified type found.    No therapy plan of the specified type found.

## 2025-05-05 RX ORDER — TACROLIMUS 1 MG/1
2 CAPSULE ORAL EVERY 12 HOURS
Qty: 120 CAPSULE | Refills: 3 | Status: SHIPPED | OUTPATIENT
Start: 2025-05-05

## 2025-05-26 ENCOUNTER — TELEPHONE (OUTPATIENT)
Dept: INFUSION THERAPY | Facility: HOSPITAL | Age: 45
End: 2025-05-26
Payer: COMMERCIAL

## 2025-05-26 NOTE — TELEPHONE ENCOUNTER
----- Message from [place] sent at 5/26/2025 11:02 AM CDT -----  Type: Needs Medical AdviceWho Called:  Patient Symptoms (please be specific):  How long has patient had these symptoms:  Pharmacy name and phone #:  Best Call Back Number: [phone] Information: Patient is requesting a call back to reschedule his PF.

## 2025-05-30 ENCOUNTER — PATIENT OUTREACH (OUTPATIENT)
Dept: ADMINISTRATIVE | Facility: HOSPITAL | Age: 45
End: 2025-05-30
Payer: COMMERCIAL

## 2025-05-30 DIAGNOSIS — E13.9 SECONDARY DIABETES: Primary | ICD-10-CM

## 2025-06-07 ENCOUNTER — LAB VISIT (OUTPATIENT)
Dept: LAB | Facility: HOSPITAL | Age: 45
End: 2025-06-07
Attending: INTERNAL MEDICINE
Payer: COMMERCIAL

## 2025-06-07 DIAGNOSIS — C22.1 CHOLANGIOCARCINOMA: ICD-10-CM

## 2025-06-07 DIAGNOSIS — Z94.4 LIVER REPLACED BY TRANSPLANT: ICD-10-CM

## 2025-06-07 LAB
ABSOLUTE EOSINOPHIL (OHS): 0.18 K/UL
ABSOLUTE MONOCYTE (OHS): 0.52 K/UL (ref 0.3–1)
ABSOLUTE NEUTROPHIL COUNT (OHS): 3.27 K/UL (ref 1.8–7.7)
ALBUMIN SERPL BCP-MCNC: 4.4 G/DL (ref 3.5–5.2)
ALP SERPL-CCNC: 142 UNIT/L (ref 40–150)
ALT SERPL W/O P-5'-P-CCNC: 33 UNIT/L (ref 10–44)
ANION GAP (OHS): 10 MMOL/L (ref 8–16)
AST SERPL-CCNC: 25 UNIT/L (ref 11–45)
BASOPHILS # BLD AUTO: 0.03 K/UL
BASOPHILS NFR BLD AUTO: 0.5 %
BILIRUB DIRECT SERPL-MCNC: 0.5 MG/DL (ref 0.1–0.3)
BILIRUB SERPL-MCNC: 1.6 MG/DL (ref 0.1–1)
BUN SERPL-MCNC: 23 MG/DL (ref 6–20)
CALCIUM SERPL-MCNC: 9.4 MG/DL (ref 8.7–10.5)
CANCER AG19-9 SERPL-ACNC: 16.7 U/ML
CARCINOEMBRYONIC ANTIGEN (OHS): 1.9 NG/ML
CHLORIDE SERPL-SCNC: 105 MMOL/L (ref 95–110)
CO2 SERPL-SCNC: 25 MMOL/L (ref 23–29)
CREAT SERPL-MCNC: 1.2 MG/DL (ref 0.5–1.4)
ERYTHROCYTE [DISTWIDTH] IN BLOOD BY AUTOMATED COUNT: 12.1 % (ref 11.5–14.5)
GFR SERPLBLD CREATININE-BSD FMLA CKD-EPI: >60 ML/MIN/1.73/M2
GLUCOSE SERPL-MCNC: 162 MG/DL (ref 70–110)
HCT VFR BLD AUTO: 48.6 % (ref 40–54)
HGB BLD-MCNC: 15.8 GM/DL (ref 14–18)
IMM GRANULOCYTES # BLD AUTO: 0.03 K/UL (ref 0–0.04)
IMM GRANULOCYTES NFR BLD AUTO: 0.5 % (ref 0–0.5)
LYMPHOCYTES # BLD AUTO: 1.63 K/UL (ref 1–4.8)
MCH RBC QN AUTO: 31.7 PG (ref 27–31)
MCHC RBC AUTO-ENTMCNC: 32.5 G/DL (ref 32–36)
MCV RBC AUTO: 97 FL (ref 82–98)
NUCLEATED RBC (/100WBC) (OHS): 0 /100 WBC
PLATELET # BLD AUTO: 185 K/UL (ref 150–450)
PMV BLD AUTO: 9 FL (ref 9.2–12.9)
POTASSIUM SERPL-SCNC: 5.3 MMOL/L (ref 3.5–5.1)
PROT SERPL-MCNC: 7.3 GM/DL (ref 6–8.4)
RBC # BLD AUTO: 4.99 M/UL (ref 4.6–6.2)
RELATIVE EOSINOPHIL (OHS): 3.2 %
RELATIVE LYMPHOCYTE (OHS): 28.8 % (ref 18–48)
RELATIVE MONOCYTE (OHS): 9.2 % (ref 4–15)
RELATIVE NEUTROPHIL (OHS): 57.8 % (ref 38–73)
SODIUM SERPL-SCNC: 140 MMOL/L (ref 136–145)
WBC # BLD AUTO: 5.66 K/UL (ref 3.9–12.7)

## 2025-06-07 PROCEDURE — 36415 COLL VENOUS BLD VENIPUNCTURE: CPT | Mod: PO

## 2025-06-07 PROCEDURE — 85025 COMPLETE CBC W/AUTO DIFF WBC: CPT

## 2025-06-07 PROCEDURE — 82378 CARCINOEMBRYONIC ANTIGEN: CPT

## 2025-06-07 PROCEDURE — 80053 COMPREHEN METABOLIC PANEL: CPT

## 2025-06-07 PROCEDURE — 86301 IMMUNOASSAY TUMOR CA 19-9: CPT

## 2025-06-07 PROCEDURE — 82248 BILIRUBIN DIRECT: CPT

## 2025-06-07 PROCEDURE — 80197 ASSAY OF TACROLIMUS: CPT

## 2025-06-08 LAB — TACROLIMUS BLD-MCNC: 8.9 NG/ML (ref 5–15)

## 2025-06-10 ENCOUNTER — RESULTS FOLLOW-UP (OUTPATIENT)
Dept: HEPATOLOGY | Facility: CLINIC | Age: 45
End: 2025-06-10
Payer: COMMERCIAL

## 2025-06-11 ENCOUNTER — PATIENT MESSAGE (OUTPATIENT)
Dept: TRANSPLANT | Facility: CLINIC | Age: 45
End: 2025-06-11
Payer: COMMERCIAL

## 2025-06-11 DIAGNOSIS — Z94.4 LIVER REPLACED BY TRANSPLANT: Primary | ICD-10-CM

## 2025-06-11 NOTE — TELEPHONE ENCOUNTER
Patient notified and instructed via [facility]:    Your labs have been reviewed by ; no changes were made. Repeat labs due 8/11/25, thanks.

## 2025-06-11 NOTE — TELEPHONE ENCOUNTER
----- Message from Zoran Nobles MD sent at 6/10/2025 12:52 PM CDT -----  Results reviewed. No action.  ----- Message -----  From: Lab, Background User  Sent: 6/7/2025   4:27 PM CDT  To: Zoran Nobles MD

## 2025-06-12 NOTE — TELEPHONE ENCOUNTER
Pathology Conference 6/23/22    Lymph node biopsy from 6/17/22 reviewed:  No carcinoma  Benign fibroadipose tissue          No

## 2025-07-17 DIAGNOSIS — E11.9 TYPE 2 DIABETES MELLITUS WITHOUT COMPLICATION, UNSPECIFIED WHETHER LONG TERM INSULIN USE: ICD-10-CM

## 2025-07-17 DIAGNOSIS — I10 ESSENTIAL HYPERTENSION: ICD-10-CM

## 2025-07-17 DIAGNOSIS — Z94.4 LIVER REPLACED BY TRANSPLANT: ICD-10-CM

## 2025-07-18 RX ORDER — CARVEDILOL 12.5 MG/1
12.5 TABLET ORAL
Qty: 180 TABLET | Refills: 0 | Status: SHIPPED | OUTPATIENT
Start: 2025-07-18

## 2025-07-18 NOTE — TELEPHONE ENCOUNTER
Provider Staff:  Action required for this patient    Requires appointment      Please see care gap opportunities below in Care Due Message.    Thanks!  [facility]     Appointments      Date Provider   Last Visit   7/8/2024 Yves Moses MD   Next Visit   Visit date not found Yves Moses MD     Refill Decision Note   Romeo Larson  is requesting a refill authorization.  Brief Assessment and Rationale for Refill:  Approve     Medication Therapy Plan:         Comments:     Note composed:6:50 AM 07/18/2025

## 2025-07-18 NOTE — TELEPHONE ENCOUNTER
Care Due:                  Date            Visit Type   Department     Provider  --------------------------------------------------------------------------------                                EP -                              PRIMARY      [facility]  Last Visit: 07-      CARE ([facility])   MEDICINE       Yves Moses  Next Visit: None Scheduled  None         None Found                                                            Last  Test          Frequency    Reason                     Performed    Due Date  --------------------------------------------------------------------------------    Office Visit  15 months..  carvediloL...............  07-   10-    [facility] Embedded Care Due Messages. Reference number: [id number].   7/18/2025 6:47:17 AM CDT

## 2025-07-25 ENCOUNTER — INFUSION (OUTPATIENT)
Dept: INFUSION THERAPY | Facility: HOSPITAL | Age: 45
End: 2025-07-25
Attending: PHYSICIAN ASSISTANT
Payer: COMMERCIAL

## 2025-07-25 ENCOUNTER — HOSPITAL ENCOUNTER (OUTPATIENT)
Dept: RADIOLOGY | Facility: HOSPITAL | Age: 45
Discharge: HOME | End: 2025-07-25
Attending: INTERNAL MEDICINE
Payer: COMMERCIAL

## 2025-07-25 DIAGNOSIS — C24.0 HILAR CHOLANGIOCARCINOMA: ICD-10-CM

## 2025-07-25 DIAGNOSIS — Z45.2 ADJUSTMENT AND MANAGEMENT OF VASCULAR ACCESS DEVICE: Primary | ICD-10-CM

## 2025-07-25 PROCEDURE — 25000003 PHARM REV CODE 250: Mod: PN | Performed by: INTERNAL MEDICINE

## 2025-07-25 PROCEDURE — 63600175 PHARM REV CODE 636 W HCPCS: Mod: PN | Performed by: INTERNAL MEDICINE

## 2025-07-25 PROCEDURE — 25500020 PHARM REV CODE 255: Mod: PO | Performed by: INTERNAL MEDICINE

## 2025-07-25 PROCEDURE — 74177 CT ABD & PELVIS W/CONTRAST: CPT | Mod: 26,,, | Performed by: RADIOLOGY

## 2025-07-25 PROCEDURE — 96523 IRRIG DRUG DELIVERY DEVICE: CPT | Mod: PN

## 2025-07-25 PROCEDURE — 71260 CT THORAX DX C+: CPT | Mod: 26,,, | Performed by: RADIOLOGY

## 2025-07-25 PROCEDURE — A4216 STERILE WATER/SALINE, 10 ML: HCPCS | Mod: PN | Performed by: INTERNAL MEDICINE

## 2025-07-25 PROCEDURE — 71260 CT THORAX DX C+: CPT | Mod: TC,PO

## 2025-07-25 PROCEDURE — A9698 NON-RAD CONTRAST MATERIALNOC: HCPCS | Mod: PO | Performed by: INTERNAL MEDICINE

## 2025-07-25 RX ORDER — HEPARIN 100 UNIT/ML
500 SYRINGE INTRAVENOUS
OUTPATIENT
Start: 2025-08-22

## 2025-07-25 RX ORDER — SODIUM CHLORIDE 0.9 % (FLUSH) 0.9 %
10 SYRINGE (ML) INJECTION
OUTPATIENT
Start: 2025-08-22

## 2025-07-25 RX ORDER — HEPARIN 100 UNIT/ML
500 SYRINGE INTRAVENOUS
Status: DISCONTINUED | OUTPATIENT
Start: 2025-07-25 | End: 2025-07-25 | Stop reason: HOSPADM

## 2025-07-25 RX ORDER — SODIUM CHLORIDE 0.9 % (FLUSH) 0.9 %
10 SYRINGE (ML) INJECTION
Status: DISCONTINUED | OUTPATIENT
Start: 2025-07-25 | End: 2025-07-25 | Stop reason: HOSPADM

## 2025-07-25 RX ADMIN — Medication 500 UNITS: at 08:07

## 2025-07-25 RX ADMIN — Medication 10 ML: at 08:07

## 2025-07-25 RX ADMIN — IOHEXOL 1000 ML: 9 SOLUTION ORAL at 10:07

## 2025-07-25 RX ADMIN — IOHEXOL 75 ML: 350 INJECTION, SOLUTION INTRAVENOUS at 10:07

## 2025-07-28 ENCOUNTER — OFFICE VISIT (OUTPATIENT)
Dept: HEMATOLOGY/ONCOLOGY | Facility: CLINIC | Age: 45
End: 2025-07-28
Payer: COMMERCIAL

## 2025-07-28 VITALS
TEMPERATURE: 97 F | RESPIRATION RATE: 16 BRPM | HEIGHT: 68 IN | WEIGHT: 185.44 LBS | SYSTOLIC BLOOD PRESSURE: 110 MMHG | DIASTOLIC BLOOD PRESSURE: 68 MMHG | BODY MASS INDEX: 28.1 KG/M2 | HEART RATE: 81 BPM | OXYGEN SATURATION: 99 %

## 2025-07-28 DIAGNOSIS — K90.89 BILE SALT-INDUCED DIARRHEA: ICD-10-CM

## 2025-07-28 DIAGNOSIS — C24.0 HILAR CHOLANGIOCARCINOMA: Primary | ICD-10-CM

## 2025-07-28 DIAGNOSIS — Z94.4 S/P LIVER TRANSPLANT: ICD-10-CM

## 2025-07-28 DIAGNOSIS — R97.8 ABNORMAL TUMOR MARKERS: ICD-10-CM

## 2025-07-28 PROCEDURE — G2211 COMPLEX E/M VISIT ADD ON: HCPCS | Mod: S$GLB,,, | Performed by: INTERNAL MEDICINE

## 2025-07-28 PROCEDURE — 3051F HG A1C>EQUAL 7.0%<8.0%: CPT | Mod: CPTII,S$GLB,, | Performed by: INTERNAL MEDICINE

## 2025-07-28 PROCEDURE — 1160F RVW MEDS BY RX/DR IN RCRD: CPT | Mod: CPTII,S$GLB,, | Performed by: INTERNAL MEDICINE

## 2025-07-28 PROCEDURE — 3074F SYST BP LT 130 MM HG: CPT | Mod: CPTII,S$GLB,, | Performed by: INTERNAL MEDICINE

## 2025-07-28 PROCEDURE — 1159F MED LIST DOCD IN RCRD: CPT | Mod: CPTII,S$GLB,, | Performed by: INTERNAL MEDICINE

## 2025-07-28 PROCEDURE — 99214 OFFICE O/P EST MOD 30 MIN: CPT | Mod: S$GLB,,, | Performed by: INTERNAL MEDICINE

## 2025-07-28 PROCEDURE — 3008F BODY MASS INDEX DOCD: CPT | Mod: CPTII,S$GLB,, | Performed by: INTERNAL MEDICINE

## 2025-07-28 PROCEDURE — 99999 PR PBB SHADOW E&M-EST. PATIENT-LVL IV: CPT | Mod: PBBFAC,,, | Performed by: INTERNAL MEDICINE

## 2025-07-28 PROCEDURE — 3078F DIAST BP <80 MM HG: CPT | Mod: CPTII,S$GLB,, | Performed by: INTERNAL MEDICINE

## 2025-07-28 RX ORDER — CHOLESTYRAMINE 4 G/9G
4 POWDER, FOR SUSPENSION ORAL
Qty: 90 PACKET | Refills: 3 | Status: SHIPPED | OUTPATIENT
Start: 2025-07-28 | End: 2026-07-28

## 2025-07-28 NOTE — PROGRESS NOTES
"                                                         PROGRESS NOTE    Subjective:       Patient ID: Romeo Larson is a 45 y.o. male.    Chief Complaint: follow up for hilar cholangiocarcinoma    Diagnosis:  Yp T2N0M0 hilar cholangiocarcinoma, s/p neoadjuvant chemoradiation, chemotherapy and liver transplant on 6/21/2022    Oncologic History:  1. Mr Larson is a 42 yo man who presents today for further management of suspected hilar cholangiocarcinoma. He presented with dark urine, abdominal pain and jaundice since August 2021. He presented to outside hospital ER with elevated bilirubin. MRCP was performed demonstrating bi-lobar intrahepatic bile duct dilation and a ~2cm ill defined mass at the hilum concerning for hilar cholangiocarcinoma. He was referred to the advanced endoscopy group at [facility]. ERCP confirmed severe, malignant appearing stricture involving right and left main hepatic ducts. Biliary stents were placed to relieve obstruction. EUS was performed. It showed an irregular hypoechoic mass where the hepatic duct bifurcates into the right and left hepatic ducts. The mass measured 11.4 mm by 11.3 mm in maximal cross-sectional diameter. FNA sampling of hilar lymph nodes was negative for metastatic disease. Bile duct biopsy showed "rare groups of glandular epithelium with mild atypia, strips of benign glandular epithelium intermixed with blood clot, and focal fibrous tissue with chronic inflammation."  CT C/A/P 10/27/21:  1. Intrahepatic biliary dilatation despite the presence of 2 biliary drains.  The patient is recent comparison MRI a revealed a possible central hepatic mass, concerning for either cholangiocarcinoma or hepatocellular carcinoma.  This is, however, not definitively demonstrated on today's exam.  There are enlarged lymph nodes present in the vicinity of the ana cristina hepatis and celiac axis as detailed above.  2. Scattered pulmonary nodules measuring up to 6 mm.  3. Other findings as detailed " "above.  He presents today for further management. Seen by Dr Jara and considered a candidate for liver transplant. Seen by Dr Cunningham last Friday. Scheduled for PET this Wednesday. 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   Discussed neoadjuvant chemoradiation with 5FU followed by neoadjuvant cis/gem prior to liver transplant.   2. Hospitalized 11/3/21-11/5/21 for fever 2/2 acute cholangitis. Repeat ERCP biopsy on 11/4/21 again non-diagnostic. Biopsy of the celiac lymph node was negative.   3. PET scan 11/3/21: "1. Wedge-shaped geographic hypermetabolic activity within the right lobe of the liver in a similar distribution to the intrahepatic biliary dilatation.  This could relate to inflammation from multiple etiologies including biliary stasis, cholangitis, and obstructive dilatation.  Neoplastic involvement would be difficult to exclude.  The liver is poorly evaluated given background activity. 2. Hypermetabolic lymphadenopathy is noted in a periportal and celiac distribution.  Infectious inflammatory and neoplastic etiologies are on the differential.  Index nodes have been measured. 3. Multiple pulmonary nodules are noted too small to categorize by PET-CT fusion as evidence seen on a prior CT of 10/27/2021.  CT for follow-up of size stability is necessary."  4. Completed chemoradiation with 5FU from 11/29/21-1/5/2022. Started cis/gem on 1/19/22. Completed neoadjuvant chemotherapy  5. Underwent living donor liver transplant on 6/21/2022. Pathology showed ypT2N0 well to moderately differentiated cholangiocarcinoma of the perihilar bile ducts, with invasion into the perihilar soft tissue. No LVI or PNI. Surgical margins are negative for carcinoma. Two lymph nodes, negative for carcinoma (0/2).   6. He had abnormal ana cristina hepatis lymph node on CT scan last time 12/10/24 with elevated CA 19-9. He was also sick with viral infection at that time. Upper EUS 12/31/24 did not find abnormal LN. Subsequent CT scan has been " negative for recurrent cancer. CA 19-9 fluctuates, sometimes improved on its own.     Interval History:   Mr Larson returns for follow up. He is feeling well. Continues to have loose stools after fatty meals.     ECO    ROS:   A ten-point system review is obtained and negative except for what was stated in the Interval History.     Physical Examination:   Vital signs reviewed.   General: well hydrated, well developed, in no acute distress  HEENT: normocephalic, EOMI, anicteric sclerae  Neck: supple, no JVD, thyromegaly, cervical or supraclavicular lymphadenopathy  Lungs: clear breath sounds bilaterally, no wheezing, rales, or rhonchi  Heart: RRR, no M/R/G  Abdomen: soft, no tenderness, non-distended, no hepatosplenomegaly, mass, or hernia. BS present  Extremities: no clubbing, cyanosis, or edema  Skin: no rash, ulcer, or open wounds  Neuro: alert and oriented x 4, no focal neuro deficit  Psych: pleasant and appropriate mood and affect    Objective:     Laboratory Data:  Labs reviewed. CA 19-9 38.3. Bilirubin 1.8    Imaging Data:  CT C/A/P 25:  Impression:     1. No obvious evidence of metastatic disease.  Several small stable pulmonary nodules are noted the largest of 4 mm.  2. The ana cristina hepatis nodes seen on 12/10/24 appears smaller in size.  3. Evidence of liver surgery is noted similar to on the prior    CT C/A/P 25:  Impression:     1. Status post right hepatic lobe transplantation.  No definitive imaging evidence of local disease recurrence or new distant metastatic disease.  2. Stable solid pulmonary nodules which measure < 5 mm, unchanged in size or number when compared to the previous study.  3. Focal prominent loop of jejunum within the left anterior mid abdomen with irregular intrinsic luminal soft tissue thickening noted.  This could relate to postoperative scarring, peristalsis/nondistended loop of bowel, or a mild transient intussusception in the vicinity of the patient's small bowel  surgical change.  No evidence of high-grade bowel obstruction.  Attention on short-term follow-up studies is recommended.  4. Other findings as above.  Assessment and Plan:     1. Hilar cholangiocarcinoma    2. Abnormal tumor markers    3. S/P liver transplant    4. Bile salt-induced diarrhea      1-3  - Mr Larson is a 43 yo man with stage I hilar cholangiocarcinoma. Biopsy non-diagnostic but clinical picture most consistent with hilar cholangiocarcinoma. Completed chemoradiation with 5FU from 11/29/21-1/5/2022. Started cis/gem on 1/19/22. Completed neoadjuvant chemotherapy  - Underwent living donor liver transplant on 6/21/2022. Pathology showed ypT2N0 well to moderately differentiated cholangiocarcinoma of the perihilar bile ducts, with invasion into the perihilar soft tissue. No LVI or PNI. Surgical margins are negative for carcinoma. Two lymph nodes, negative for carcinoma (0/2).   - doing well  - He had abnormal ana cristina hepatis lymph node on CT scan last time 12/10/24 with elevated CA 19-9. He was also sick with viral infection at that time. Upper EUS 12/31/24 did not find abnormal LN. Subsequent CT scan has been negative for recurrent cancer. CA 19-9 fluctuates, sometimes improved on its own.   - reviewed test results with patient. CA 19-9 normal. DERRICK on CT scan. I have personally reviewed his CT scan images. I do not see any highly concerning jejunal findings, but I am not a radiologist. We will do a surveillance CT scan in 3 months just to make sure the abnormal findings of the jejunum is nothing to worry about. If the CT in 3 months looks good, we will see him every 6 months as he is three years out from transplant.   - c/w close surveillance  - RTC in 3 months  - Flush port in [place] every 3 months    4.  - discussed with patient. Try cholestyramine      Follow-up:     RTC in 3 months  Knows to call in the interval if any problems arise.     code applied: patient requires a continuous, longitudinal,  and active collaborative plan of care related to this patient's health condition, cholangiocarcinoma -- the management of which requires the direction of a practitioner with specialized clinical knowledge, skill, and expertise.      Electronically signed by Young Wesley      Route Chart for Scheduling    Med Onc Chart Routing      Follow up with physician 3 months. see me in 3 months with CBC, CMP, CA 19-9, CT C/A/P done in [place] 2-3 days prior. please ask [place] to keep flushing patient's port every 3 months   Follow up with JOSEPH    Infusion scheduling note    Injection scheduling note flush port every 3 months in [place]   Labs CBC, CA 19-9 and CMP   Scheduling:  Preferred lab:  Lab interval:     Imaging CT chest abdomen pelvis      Pharmacy appointment    Other referrals            Supportive Plan Information  OP FILGRASTIM 480 MCG Young Wesley MD   Associated Diagnosis: Chemotherapy induced neutropenia   noted on 2/9/2022  Associated Diagnosis: Adjustment and management of vascular access device   noted on 5/18/2022   Line of treatment: Supportive Care   Treatment goal: Supportive     Upcoming Treatment Dates - OP FILGRASTIM 480 MCG    2/9/2022       Medications       filgrastim-sndz (ZARXIO) injection 480 mcg/0.8 mL (Preferred Regimen)  2/10/2022       Medications       filgrastim-sndz (ZARXIO) injection 480 mcg/0.8 mL (Preferred Regimen)  2/11/2022       Medications       filgrastim-sndz (ZARXIO) injection 480 mcg/0.8 mL (Preferred Regimen)  2/12/2022       Medications       filgrastim-sndz (ZARXIO) injection 480 mcg/0.8 mL (Preferred Regimen)    Therapy Plan Information  PORT FLUSH for Hilar cholangiocarcinoma, noted on 11/4/2021  PORT FLUSH for Adjustment and management of vascular access device, noted on 5/18/2022  Flushes  heparin, porcine (PF) 100 unit/mL injection flush 500 Units  500 Units, Intravenous, On the 4th Fri of every 1 month  sodium chloride 0.9% flush 10 mL  10 mL, Intravenous, On the  4th Fri of every 1 month      No therapy plan of the specified type found.    No therapy plan of the specified type found.

## 2025-07-29 ENCOUNTER — TELEPHONE (OUTPATIENT)
Dept: HEMATOLOGY/ONCOLOGY | Facility: CLINIC | Age: 45
End: 2025-07-29
Payer: COMMERCIAL

## 2025-07-29 ENCOUNTER — OFFICE VISIT (OUTPATIENT)
Dept: INTERNAL MEDICINE | Facility: CLINIC | Age: 45
End: 2025-07-29
Payer: COMMERCIAL

## 2025-07-29 VITALS
OXYGEN SATURATION: 99 % | BODY MASS INDEX: 26.86 KG/M2 | HEIGHT: 68 IN | DIASTOLIC BLOOD PRESSURE: 80 MMHG | SYSTOLIC BLOOD PRESSURE: 116 MMHG | WEIGHT: 177.25 LBS | HEART RATE: 65 BPM

## 2025-07-29 DIAGNOSIS — Z94.4 LIVER REPLACED BY TRANSPLANT: ICD-10-CM

## 2025-07-29 DIAGNOSIS — Z85.09 HISTORY OF CHOLANGIOCARCINOMA: ICD-10-CM

## 2025-07-29 DIAGNOSIS — E11.9 TYPE 2 DIABETES MELLITUS WITHOUT COMPLICATION, UNSPECIFIED WHETHER LONG TERM INSULIN USE: ICD-10-CM

## 2025-07-29 DIAGNOSIS — Z00.00 ENCOUNTER FOR ANNUAL PHYSICAL EXAM: Primary | ICD-10-CM

## 2025-07-29 DIAGNOSIS — I10 ESSENTIAL HYPERTENSION: ICD-10-CM

## 2025-07-29 PROCEDURE — 3074F SYST BP LT 130 MM HG: CPT | Mod: CPTII,S$GLB,, | Performed by: INTERNAL MEDICINE

## 2025-07-29 PROCEDURE — 99396 PREV VISIT EST AGE 40-64: CPT | Mod: S$GLB,,, | Performed by: INTERNAL MEDICINE

## 2025-07-29 PROCEDURE — 3079F DIAST BP 80-89 MM HG: CPT | Mod: CPTII,S$GLB,, | Performed by: INTERNAL MEDICINE

## 2025-07-29 PROCEDURE — 3008F BODY MASS INDEX DOCD: CPT | Mod: CPTII,S$GLB,, | Performed by: INTERNAL MEDICINE

## 2025-07-29 PROCEDURE — 3051F HG A1C>EQUAL 7.0%<8.0%: CPT | Mod: CPTII,S$GLB,, | Performed by: INTERNAL MEDICINE

## 2025-07-29 PROCEDURE — 99999 PR PBB SHADOW E&M-EST. PATIENT-LVL IV: CPT | Mod: PBBFAC,,, | Performed by: INTERNAL MEDICINE

## 2025-07-29 PROCEDURE — 1159F MED LIST DOCD IN RCRD: CPT | Mod: CPTII,S$GLB,, | Performed by: INTERNAL MEDICINE

## 2025-07-29 RX ORDER — INSULIN PUMP SYRINGE, 3 ML
EACH MISCELLANEOUS
Qty: 1 EACH | Refills: 0 | Status: SHIPPED | OUTPATIENT
Start: 2025-07-29 | End: 2026-07-29

## 2025-07-29 RX ORDER — LANCETS
EACH MISCELLANEOUS
Qty: 100 EACH | Refills: 11 | Status: SHIPPED | OUTPATIENT
Start: 2025-07-29

## 2025-07-29 NOTE — PATIENT INSTRUCTIONS
Schedule fasting labwork  Schedule diabetic eye camera  Urine today.    Return to clinic in 6 months

## 2025-07-29 NOTE — PROGRESS NOTES
Subjective:       Patient ID: Romeo Larson is a 45 y.o. male.    Chief Complaint: Annual Exam      HPI  Romeo Larson is a 45 y.o. 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  Patient has been complaining of quarterly diarrheal illness which he sometimes will take ciprofloxacin with improvement.  Patient had seem Heme-Onc yesterday and was prescribed cholestyramine for these episodes.  Patient does report that these episodes of diarrhea are accompanied with intense pruritus.  Denies fever or chills associated with these episodes.  These episodes usually last a couple of days.  He will sometimes takes Imodium to help with these symptoms.  Otherwise he has been doing well with no new other complaints.    Patient also concerned about his rising hemoglobin A1c.  Patient used to be a mealtime insulin on a correction scale which he has not been taken.  Patient does report that he has had an increased amount of carbohydrate intake recently.  Patient has been cutting back on the sweets as well.  He is unable to check his blood glucose at home due to not having a glucometer anymore.    Review of Systems   Constitutional:  Negative for activity change, appetite change, chills, fatigue, fever and unexpected weight change.   HENT:  Negative for congestion, rhinorrhea and sore throat.    Eyes:  Negative for visual disturbance.   Respiratory:  Negative for shortness of breath.    Cardiovascular:  Negative for chest pain.   Gastrointestinal:  Negative for abdominal pain, diarrhea, nausea and vomiting.   Genitourinary:  Negative for difficulty urinating and dysuria.   Musculoskeletal:  Negative for arthralgias, back pain and myalgias.   Skin:  Negative for color change and rash.   Neurological:  Negative for dizziness, weakness and headaches.         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 Authorizing Provider   carvediloL (COREG) 12.5 MG tablet TAKE 1 TABLET(12.5 MG) BY MOUTH TWICE DAILY WITH MEALS 7/18/25  Yes Yves Moses MD   cholestyramine (QUESTRAN) 4 gram packet Take 1 packet (4 g total) by mouth 3 (three) times daily with meals. 7/28/25 7/28/26 Yes Young Wesley MD   tacrolimus (PROGRAF) 1 MG Cap TAKE 2 CAPSULES BY MOUTH EVERY 12 HOURS 5/5/25  Yes Zoran Nobles MD   ursodioL (ACTIGALL) 250 mg Tab Take 1 tablet (250 mg total) by mouth 2 (two) times a day. 8/15/24  Yes Zoran Nobles MD   blood sugar diagnostic Strp To check BG 2 times daily, to use with insurance preferred meter 7/29/25   Yves Moses MD   blood-glucose meter kit To check BG 2 times daily, to use with insurance preferred meter 7/29/25 7/29/26  Yves Moses MD   gabapentin (NEURONTIN) 300 MG capsule 1 po q hs x 3 days, then 1 po bid x 3 days, then 1 po tid  Patient not taking: Reported on 7/29/2025 3/13/25   Adrianna Gray, PALibertadC   lancets [facility] To check BG 2 times daily, to use with insurance preferred meter 7/29/25   Yves Moses MD   baclofen (LIORESAL) 10 MG tablet TAKE 1 TABLET(10 MG) BY MOUTH THREE TIMES DAILY AS NEEDED FOR HICCUPS  Patient not taking: No sig reported 4/6/22 6/17/22  Young Wesley MD   famotidine (PEPCID) 20 MG tablet Take 1 tablet (20 mg total) by mouth every evening. STOP 7/24/22 6/21/22 7/8/22  Sinan Cline MD   insulin aspart U-100 (NOVOLOG) 100 unit/mL (3 mL) InPn pen Inject 5 Units into the skin 3 (three) times daily with meals. + sliding scale.  MDD 30 units/d 7/16/22 7/27/22  Brinda Serrato MD   losartan (COZAAR) 50 MG tablet Take 1 tablet (50 mg total) by mouth once daily. 3/21/22 6/24/22   "Pravin Maria MD   metFORMIN (GLUCOPHAGE) 500 MG tablet Take 1 tablet (500 mg total) by mouth 2 (two) times daily with meals. 7/8/22 7/8/22  Sinan Cline MD   tamsulosin (FLOMAX) 0.4 mg Cap Take 2 capsules (0.8 mg total) by mouth once daily. 7/9/22 7/27/22  Sinan Cline MD        Past medical history, surgical history, and family medical history reviewed and updated as appropriate.    Medications and allergies reviewed.     Objective:          Vitals:    07/29/25 1014   BP: 116/80   BP Location: Right arm   Patient Position: Sitting   Pulse: 65   SpO2: 99%   Weight: 80.4 kg (177 lb 4 oz)   Height: 5' 8" (1.727 m)     Body mass index is 26.95 kg/m².  Physical Exam  Constitutional:       General: He is not in acute distress.     Appearance: He is well-developed.   HENT:      Head: Normocephalic and atraumatic.      Nose: Nose normal.   Eyes:      General: No scleral icterus.     Extraocular Movements: Extraocular movements intact.   Neck:      Thyroid: No thyromegaly.      Vascular: No JVD.      Trachea: No tracheal deviation.   Cardiovascular:      Rate and Rhythm: Normal rate and regular rhythm.      Heart sounds: Normal heart sounds. No murmur heard.     No friction rub. No gallop.   Pulmonary:      Effort: Pulmonary effort is normal. No respiratory distress.      Breath sounds: Normal breath sounds. No wheezing or rales.   Abdominal:      General: Bowel sounds are normal. There is no distension.      Palpations: Abdomen is soft. There is no mass.      Tenderness: There is no abdominal tenderness.   Musculoskeletal:         General: No tenderness. Normal range of motion.      Cervical back: Normal range of motion and neck supple.   Lymphadenopathy:      Cervical: No cervical adenopathy.   Skin:     General: Skin is warm and dry.      Findings: No rash.   Neurological:      Mental Status: He is alert and oriented to person, place, and time.      Cranial Nerves: No cranial nerve deficit.      Deep Tendon " Reflexes: Reflexes normal.   Psychiatric:         Behavior: Behavior normal.         Lab Results   Component Value Date    WBC 6.77 07/25/2025    HGB 15.8 07/25/2025    HCT 45.1 07/25/2025     (L) 07/25/2025    CHOL 171 04/07/2025    TRIG 131 04/07/2025    HDL 43 04/07/2025    ALT 77 (H) 07/25/2025    AST 44 07/25/2025     07/25/2025    K 5.0 07/25/2025     07/25/2025    CREATININE 1.4 07/25/2025    BUN 23 (H) 07/25/2025    CO2 25 07/25/2025    TSH 1.887 01/08/2024    PSA 0.68 03/24/2022    INR 0.9 10/28/2023    HGBA1C 7.8 (H) 04/07/2025       Assessment:       1. Encounter for annual physical exam    2. Essential hypertension    3. Type 2 diabetes mellitus without complication, unspecified whether long term insulin use    4. History of cholangiocarcinoma    5. Liver replaced by transplant          Plan:     Romeo was seen today for annual exam.    Diagnoses and all orders for this visit:    Encounter for annual physical exam    Essential hypertension  Comments:  On carvedilol 12.5 mg twice a day.  Blood pressure is controlled, no changes to current management.  Orders:  -     CBC Auto Differential; Future  -     Comprehensive Metabolic Panel; Future  -     TSH; Future    Type 2 diabetes mellitus without complication, unspecified whether long term insulin use  Comments:  Last hemoglobin A1c 7.8.  Previous to this was 6.0.  Lifestyle changes were made.  Recheck A1c.  Orders:  -     Microalbumin/Creatinine Ratio, Urine; Future  -     Diabetic Eye Screening Photo - Automated Analysis; Future  -     Hemoglobin A1C; Future  -     Lipid Panel; Future  -     blood-glucose meter kit; To check BG 2 times daily, to use with insurance preferred meter  -     lancets Misc; To check BG 2 times daily, to use with insurance preferred meter  -     blood sugar diagnostic Strp; To check BG 2 times daily, to use with insurance preferred meter    History of cholangiocarcinoma  Comments:  Follows with Heme-Onc.  Is  on immunosuppression.    Liver replaced by transplant  Comments:  Patient on tacrolimus.  Follows with transplant/Heme-Onc.  No changes in current management.        Health maintenance reviewed with patient.     Follow up in about 6 months (around 1/29/2026).    Yves Moses MD  Internal Medicine / Primary Care  [facility]  7/29/2025

## 2025-07-29 NOTE — TELEPHONE ENCOUNTER
Spoke to patient and confirmed his upcoming apt with provider and [facility]

## 2025-07-31 ENCOUNTER — CLINICAL SUPPORT (OUTPATIENT)
Dept: INTERNAL MEDICINE | Facility: CLINIC | Age: 45
End: 2025-07-31
Attending: INTERNAL MEDICINE
Payer: COMMERCIAL

## 2025-07-31 DIAGNOSIS — E11.9 TYPE 2 DIABETES MELLITUS WITHOUT COMPLICATION, UNSPECIFIED WHETHER LONG TERM INSULIN USE: ICD-10-CM

## 2025-07-31 PROCEDURE — 92229 IMG RTA DETC/MNTR DS POC ALY: CPT | Mod: S$GLB,,, | Performed by: INTERNAL MEDICINE

## 2025-08-01 LAB — HM EYE EXAM: NEGATIVE

## 2025-08-18 DIAGNOSIS — Z94.4 LIVER REPLACED BY TRANSPLANT: ICD-10-CM

## 2025-08-18 DIAGNOSIS — Z94.4 S/P LIVER TRANSPLANT: ICD-10-CM

## 2025-08-18 RX ORDER — URSODIOL 250 MG/1
250 TABLET, FILM COATED ORAL 2 TIMES DAILY
Qty: 60 TABLET | Refills: 11 | Status: SHIPPED | OUTPATIENT
Start: 2025-08-18

## 2025-08-25 ENCOUNTER — TELEPHONE (OUTPATIENT)
Dept: TRANSPLANT | Facility: CLINIC | Age: 45
End: 2025-08-25
Payer: COMMERCIAL

## 2025-08-25 ENCOUNTER — PATIENT MESSAGE (OUTPATIENT)
Dept: TRANSPLANT | Facility: CLINIC | Age: 45
End: 2025-08-25
Payer: COMMERCIAL

## 2025-08-29 ENCOUNTER — LAB VISIT (OUTPATIENT)
Dept: LAB | Facility: HOSPITAL | Age: 45
End: 2025-08-29
Attending: INTERNAL MEDICINE
Payer: COMMERCIAL

## 2025-08-29 DIAGNOSIS — I10 ESSENTIAL HYPERTENSION: ICD-10-CM

## 2025-08-29 DIAGNOSIS — E11.9 TYPE 2 DIABETES MELLITUS WITHOUT COMPLICATION, UNSPECIFIED WHETHER LONG TERM INSULIN USE: ICD-10-CM

## 2025-08-29 LAB
ABSOLUTE EOSINOPHIL (OHS): 0.11 K/UL
ABSOLUTE MONOCYTE (OHS): 0.39 K/UL (ref 0.3–1)
ABSOLUTE NEUTROPHIL COUNT (OHS): 2.58 K/UL (ref 1.8–7.7)
ALBUMIN SERPL BCP-MCNC: 4 G/DL (ref 3.5–5.2)
ALP SERPL-CCNC: 180 UNIT/L (ref 40–150)
ALT SERPL W/O P-5'-P-CCNC: 59 UNIT/L (ref 0–55)
ANION GAP (OHS): 9 MMOL/L (ref 8–16)
AST SERPL-CCNC: 33 UNIT/L (ref 0–50)
BASOPHILS # BLD AUTO: 0.02 K/UL
BASOPHILS NFR BLD AUTO: 0.5 %
BILIRUB SERPL-MCNC: 1.9 MG/DL (ref 0.1–1)
BUN SERPL-MCNC: 24 MG/DL (ref 6–20)
CALCIUM SERPL-MCNC: 9.8 MG/DL (ref 8.7–10.5)
CHLORIDE SERPL-SCNC: 105 MMOL/L (ref 95–110)
CHOLEST SERPL-MCNC: 161 MG/DL (ref 120–199)
CHOLEST/HDLC SERPL: 4.4 {RATIO} (ref 2–5)
CO2 SERPL-SCNC: 24 MMOL/L (ref 23–29)
CREAT SERPL-MCNC: 1.3 MG/DL (ref 0.5–1.4)
EAG (OHS): 209 MG/DL (ref 68–131)
ERYTHROCYTE [DISTWIDTH] IN BLOOD BY AUTOMATED COUNT: 11.7 % (ref 11.5–14.5)
GFR SERPLBLD CREATININE-BSD FMLA CKD-EPI: >60 ML/MIN/1.73/M2
GLUCOSE SERPL-MCNC: 211 MG/DL (ref 70–110)
HBA1C MFR BLD: 8.9 % (ref 4–5.6)
HCT VFR BLD AUTO: 43.4 % (ref 40–54)
HDLC SERPL-MCNC: 37 MG/DL (ref 40–75)
HDLC SERPL: 23 % (ref 20–50)
HGB BLD-MCNC: 14.6 GM/DL (ref 14–18)
IMM GRANULOCYTES # BLD AUTO: 0.01 K/UL (ref 0–0.04)
IMM GRANULOCYTES NFR BLD AUTO: 0.2 % (ref 0–0.5)
LDLC SERPL CALC-MCNC: 98.8 MG/DL (ref 63–159)
LYMPHOCYTES # BLD AUTO: 1.29 K/UL (ref 1–4.8)
MCH RBC QN AUTO: 32.1 PG (ref 27–31)
MCHC RBC AUTO-ENTMCNC: 33.6 G/DL (ref 32–36)
MCV RBC AUTO: 95 FL (ref 82–98)
NONHDLC SERPL-MCNC: 124 MG/DL
NUCLEATED RBC (/100WBC) (OHS): 0 /100 WBC
PLATELET # BLD AUTO: 145 K/UL (ref 150–450)
PMV BLD AUTO: 9.3 FL (ref 9.2–12.9)
POTASSIUM SERPL-SCNC: 4.7 MMOL/L (ref 3.5–5.1)
PROT SERPL-MCNC: 7 GM/DL (ref 6–8.4)
RBC # BLD AUTO: 4.55 M/UL (ref 4.6–6.2)
RELATIVE EOSINOPHIL (OHS): 2.5 %
RELATIVE LYMPHOCYTE (OHS): 29.3 % (ref 18–48)
RELATIVE MONOCYTE (OHS): 8.9 % (ref 4–15)
RELATIVE NEUTROPHIL (OHS): 58.6 % (ref 38–73)
SODIUM SERPL-SCNC: 138 MMOL/L (ref 136–145)
TRIGL SERPL-MCNC: 126 MG/DL (ref 30–150)
TSH SERPL-ACNC: 1.34 UIU/ML (ref 0.4–4)
WBC # BLD AUTO: 4.4 K/UL (ref 3.9–12.7)

## 2025-08-29 PROCEDURE — 85025 COMPLETE CBC W/AUTO DIFF WBC: CPT

## 2025-08-29 PROCEDURE — 83036 HEMOGLOBIN GLYCOSYLATED A1C: CPT

## 2025-08-29 PROCEDURE — 84443 ASSAY THYROID STIM HORMONE: CPT

## 2025-08-29 PROCEDURE — 36415 COLL VENOUS BLD VENIPUNCTURE: CPT | Mod: PO

## 2025-08-29 PROCEDURE — 80061 LIPID PANEL: CPT

## 2025-08-29 PROCEDURE — 82040 ASSAY OF SERUM ALBUMIN: CPT

## 2025-09-04 ENCOUNTER — TELEPHONE (OUTPATIENT)
Dept: TRANSPLANT | Facility: CLINIC | Age: 45
End: 2025-09-04
Payer: COMMERCIAL

## 2025-09-05 ENCOUNTER — TELEPHONE (OUTPATIENT)
Dept: TRANSPLANT | Facility: CLINIC | Age: 45
End: 2025-09-05
Payer: COMMERCIAL

## (undated) DEVICE — GOWN SURGICAL X-LARGE

## (undated) DEVICE — BLADE 4 INCH EDGE UN-INS

## (undated) DEVICE — TOWEL OR XRAY WHITE 17X26IN

## (undated) DEVICE — NDL 22GA X1 1/2 REG BEVEL

## (undated) DEVICE — CATH URETHRAL RED RUBBER 18FR

## (undated) DEVICE — CLOSURE SKIN STERI STRIP 1/2X4

## (undated) DEVICE — SUT MCRYL PLUS 4-0 PS2 27IN

## (undated) DEVICE — DRESSING TRANS 2X2 TEGADERM

## (undated) DEVICE — NDL MONOPTY BIOPSY 14GX10CM

## (undated) DEVICE — CUTTER PROXIMATE BLUE 75MM

## (undated) DEVICE — SUT 3-0 12-18IN SILK

## (undated) DEVICE — ELECTRODE REM PLYHSV RETURN 9

## (undated) DEVICE — SUT PROLENE 4-0 SH BLU 36IN

## (undated) DEVICE — DRESSING ADH ISLAND 3.6 X 14

## (undated) DEVICE — SUT SILK 3-0 STRANDS 30IN

## (undated) DEVICE — KIT PROBE COVER WITH GEL

## (undated) DEVICE — Device

## (undated) DEVICE — SPONGE LAP 18X18 PREWASHED

## (undated) DEVICE — ADHESIVE DERMABOND ADVANCED

## (undated) DEVICE — TOWEL OR DISP STRL BLUE 4/PK

## (undated) DEVICE — SUT 1 36IN PDS II VIO MONO

## (undated) DEVICE — SUT PROLENE 6-0 BV-1

## (undated) DEVICE — DRAPE ARM DAVINCI XI

## (undated) DEVICE — DRAIN CHANNEL ROUND 19FR

## (undated) DEVICE — TAPE SILK 3IN

## (undated) DEVICE — KIT SAHARA DRAPE DRAW/LIFT

## (undated) DEVICE — DRAPE THYROID WITH ARMBOARD

## (undated) DEVICE — SEE MEDLINE ITEM 156901

## (undated) DEVICE — APPLICATOR CHLORAPREP ORN 26ML

## (undated) DEVICE — STAPLER SKIN PROXIMATE WIDE

## (undated) DEVICE — SEALER VESSEL EXTEND

## (undated) DEVICE — WIPE ESENTA BARR STNG FREE 3ML

## (undated) DEVICE — SUT PROLENE 3-0 SH DA 36 BL

## (undated) DEVICE — CONTAINER SPECIMEN STRL 4OZ

## (undated) DEVICE — PAD K-THERMIA 24IN X 60IN

## (undated) DEVICE — EVACUATOR WOUND BULB 100CC

## (undated) DEVICE — SUT 3-0 VICRYL / SH (J416)

## (undated) DEVICE — REMOVER STAPLE SKIN DISPOSABLE

## (undated) DEVICE — DRAPE CORETEMP FLD WRM 56X62IN

## (undated) DEVICE — SPONGE GAUZE 16PLY 4X4

## (undated) DEVICE — SCALPEL #15 BLADE STRL DISP.

## (undated) DEVICE — CLIP SPRING 6MM

## (undated) DEVICE — SET IV ADMIN 15DROP 3 CARESITE

## (undated) DEVICE — SUT CTD VICRYL VIL BR SH 27

## (undated) DEVICE — SEAL UNIVERSAL 5MM-8MM XI

## (undated) DEVICE — DRAPE COLUMN DAVINCI XI

## (undated) DEVICE — SUT PDS BV 6-0

## (undated) DEVICE — SUT 0 VICRYL / UR6 (J603)

## (undated) DEVICE — HANDSET ARGON PLUS

## (undated) DEVICE — HEMOSTAT SURGICEL NU-KNIT 6X9

## (undated) DEVICE — SEE MEDLINE ITEM 156902

## (undated) DEVICE — SUT ETHILON 3-0 PS2 18 BLK

## (undated) DEVICE — BOOT AIR FLUID HEEL ADLT STD

## (undated) DEVICE — SUT PDS BV-1 7-0 24IN

## (undated) DEVICE — ELECTRODE PAD DEFIB STERILE

## (undated) DEVICE — SET EXTENSION STERILE 30IN

## (undated) DEVICE — SUT PROLENE 6-0 BV-1 30IN

## (undated) DEVICE — DRAPE INCISE IOBAN 2 23X17IN

## (undated) DEVICE — SUT 4-0 12-18IN SILK BLACK

## (undated) DEVICE — SET DECANTER MEDICHOICE

## (undated) DEVICE — KIT ANTIFOG W/SPONG & FLUID

## (undated) DEVICE — SUT SILK 2-0 STRANDS 30IN

## (undated) DEVICE — TIP YANKAUERS BULB NO VENT

## (undated) DEVICE — TRAY PERIPHERAL VASCULAR OMC

## (undated) DEVICE — PACK UNIVERSAL SPLIT II

## (undated) DEVICE — KIT COLLECTION E SWAB REGULAR

## (undated) DEVICE — TRAY MINOR GEN SURG

## (undated) DEVICE — TRAY FOLEY 16FR INFECTION CONT

## (undated) DEVICE — GLOVE SURGEON SYN PF SZ 9

## (undated) DEVICE — SET MICPUNC ACC STIFF CANNULA

## (undated) DEVICE — FIBRILLAR ABS HEMOSTAT 4X4

## (undated) DEVICE — GLOVE BIOGEL SKINSENSE PI 8.5

## (undated) DEVICE — SUT 7/0 24IN PROLENE BL MO

## (undated) DEVICE — PORT ACCESS 8MM W/100MM LOW

## (undated) DEVICE — SYR SLIP TIP 1CC

## (undated) DEVICE — COVER CLAMP FABRIC RADIOPAQUE

## (undated) DEVICE — SUT 2-0 12-18IN SILK

## (undated) DEVICE — LOOP VESSEL BLUE MAXI

## (undated) DEVICE — COVER LIGHT HANDLE

## (undated) DEVICE — BAG TISSUE RETRIEVAL 5MM

## (undated) DEVICE — GLOVE BIOGEL SKINSENSE PI 8.0

## (undated) DEVICE — SUT 4-0 12-30IN SILK

## (undated) DEVICE — DRAPE ABDOMINAL TIBURON 14X11

## (undated) DEVICE — CONNECTOR TUBING STR 5 IN 1

## (undated) DEVICE — BOOT SUTURE AID

## (undated) DEVICE — NDL HYPO REG 25G X 1 1/2

## (undated) DEVICE — SPONGE IV DRAIN 4X4 STERILE

## (undated) DEVICE — SUT SILK 0 STRANDS 30IN BLK

## (undated) DEVICE — SUTURE PROLENE 5-0

## (undated) DEVICE — SEE MEDLINE ITEM 156918

## (undated) DEVICE — SOL NS 1000CC

## (undated) DEVICE — PORT AIRSEAL 12/120MM LPI

## (undated) DEVICE — SUT PROLENE 5-0 36IN C-1

## (undated) DEVICE — DRAPE SCOPE PILLOW WARMER

## (undated) DEVICE — SUT SILK 3-0 SH DETACH 30IN

## (undated) DEVICE — SYR 30CC LUER LOCK

## (undated) DEVICE — SUT SILK 3-0 SH 18IN BLACK

## (undated) DEVICE — DRESSING AQUACEL SACRAL 9 X 9